# Patient Record
Sex: FEMALE | Race: WHITE | NOT HISPANIC OR LATINO
[De-identification: names, ages, dates, MRNs, and addresses within clinical notes are randomized per-mention and may not be internally consistent; named-entity substitution may affect disease eponyms.]

---

## 2017-04-14 ENCOUNTER — APPOINTMENT (OUTPATIENT)
Dept: HEART AND VASCULAR | Facility: CLINIC | Age: 32
End: 2017-04-14

## 2017-05-03 VITALS
SYSTOLIC BLOOD PRESSURE: 149 MMHG | DIASTOLIC BLOOD PRESSURE: 87 MMHG | OXYGEN SATURATION: 99 % | TEMPERATURE: 98 F | RESPIRATION RATE: 16 BRPM | HEART RATE: 92 BPM

## 2017-05-03 RX ORDER — CHLORHEXIDINE GLUCONATE 213 G/1000ML
1 SOLUTION TOPICAL ONCE
Qty: 0 | Refills: 0 | Status: DISCONTINUED | OUTPATIENT
Start: 2017-05-04 | End: 2017-05-05

## 2017-05-04 ENCOUNTER — INPATIENT (INPATIENT)
Facility: HOSPITAL | Age: 32
LOS: 0 days | Discharge: HOME | DRG: 254 | End: 2017-05-05
Attending: RADIOLOGY | Admitting: RADIOLOGY
Payer: COMMERCIAL

## 2017-05-04 DIAGNOSIS — Z98.89 OTHER SPECIFIED POSTPROCEDURAL STATES: Chronic | ICD-10-CM

## 2017-05-04 LAB
ALBUMIN SERPL ELPH-MCNC: 3.5 G/DL — SIGNIFICANT CHANGE UP (ref 3.4–5)
ALP SERPL-CCNC: 69 U/L — SIGNIFICANT CHANGE UP (ref 40–120)
ALT FLD-CCNC: 18 U/L — SIGNIFICANT CHANGE UP (ref 12–42)
ANION GAP SERPL CALC-SCNC: 7 MMOL/L — LOW (ref 9–16)
APTT BLD: 30.2 SEC — SIGNIFICANT CHANGE UP (ref 27.5–37.4)
AST SERPL-CCNC: 10 U/L — LOW (ref 15–37)
BASOPHILS NFR BLD AUTO: 0.3 % — SIGNIFICANT CHANGE UP (ref 0–2)
BILIRUB SERPL-MCNC: 0.6 MG/DL — SIGNIFICANT CHANGE UP (ref 0.2–1.2)
BUN SERPL-MCNC: 17 MG/DL — SIGNIFICANT CHANGE UP (ref 7–23)
CALCIUM SERPL-MCNC: 9.1 MG/DL — SIGNIFICANT CHANGE UP (ref 8.5–10.5)
CHLORIDE SERPL-SCNC: 103 MMOL/L — SIGNIFICANT CHANGE UP (ref 96–108)
CO2 SERPL-SCNC: 27 MMOL/L — SIGNIFICANT CHANGE UP (ref 22–31)
CREAT SERPL-MCNC: 0.68 MG/DL — SIGNIFICANT CHANGE UP (ref 0.5–1.3)
EOSINOPHIL NFR BLD AUTO: 0.3 % — SIGNIFICANT CHANGE UP (ref 0–6)
GLUCOSE SERPL-MCNC: 92 MG/DL — SIGNIFICANT CHANGE UP (ref 70–99)
HCG SERPL-ACNC: <1 MIU/ML — SIGNIFICANT CHANGE UP
HCT VFR BLD CALC: 40 % — SIGNIFICANT CHANGE UP (ref 34.5–45)
HGB BLD-MCNC: 13.7 G/DL — SIGNIFICANT CHANGE UP (ref 11.5–15.5)
INR BLD: 1.01 — SIGNIFICANT CHANGE UP (ref 0.88–1.16)
LYMPHOCYTES # BLD AUTO: 18.1 % — SIGNIFICANT CHANGE UP (ref 13–44)
MCHC RBC-ENTMCNC: 29.5 PG — SIGNIFICANT CHANGE UP (ref 27–34)
MCHC RBC-ENTMCNC: 34.3 G/DL — SIGNIFICANT CHANGE UP (ref 32–36)
MCV RBC AUTO: 86.2 FL — SIGNIFICANT CHANGE UP (ref 80–100)
MONOCYTES NFR BLD AUTO: 7.2 % — SIGNIFICANT CHANGE UP (ref 2–14)
NEUTROPHILS NFR BLD AUTO: 74.1 % — SIGNIFICANT CHANGE UP (ref 43–77)
PLATELET # BLD AUTO: 409 K/UL — HIGH (ref 150–400)
POTASSIUM SERPL-MCNC: 3.8 MMOL/L — SIGNIFICANT CHANGE UP (ref 3.5–5.3)
POTASSIUM SERPL-SCNC: 3.8 MMOL/L — SIGNIFICANT CHANGE UP (ref 3.5–5.3)
PROT SERPL-MCNC: 8.2 G/DL — SIGNIFICANT CHANGE UP (ref 6.4–8.2)
PROTHROM AB SERPL-ACNC: 11.2 SEC — SIGNIFICANT CHANGE UP (ref 9.8–12.7)
RBC # BLD: 4.64 M/UL — SIGNIFICANT CHANGE UP (ref 3.8–5.2)
RBC # FLD: 12.2 % — SIGNIFICANT CHANGE UP (ref 10.3–16.9)
SODIUM SERPL-SCNC: 137 MMOL/L — SIGNIFICANT CHANGE UP (ref 135–145)
WBC # BLD: 9.4 K/UL — SIGNIFICANT CHANGE UP (ref 3.8–10.5)
WBC # FLD AUTO: 9.4 K/UL — SIGNIFICANT CHANGE UP (ref 3.8–10.5)

## 2017-05-04 PROCEDURE — 75710 ARTERY X-RAYS ARM/LEG: CPT | Mod: 26,59

## 2017-05-04 PROCEDURE — 37242 VASC EMBOLIZE/OCCLUDE ARTERY: CPT

## 2017-05-04 PROCEDURE — 93010 ELECTROCARDIOGRAM REPORT: CPT

## 2017-05-04 RX ORDER — LISINOPRIL 2.5 MG/1
10 TABLET ORAL ONCE
Qty: 0 | Refills: 0 | Status: COMPLETED | OUTPATIENT
Start: 2017-05-04 | End: 2017-05-04

## 2017-05-04 RX ORDER — HYDROMORPHONE HYDROCHLORIDE 2 MG/ML
2 INJECTION INTRAMUSCULAR; INTRAVENOUS; SUBCUTANEOUS EVERY 6 HOURS
Qty: 0 | Refills: 0 | Status: DISCONTINUED | OUTPATIENT
Start: 2017-05-04 | End: 2017-05-05

## 2017-05-04 RX ORDER — SODIUM CHLORIDE 9 MG/ML
500 INJECTION INTRAMUSCULAR; INTRAVENOUS; SUBCUTANEOUS
Qty: 0 | Refills: 0 | Status: DISCONTINUED | OUTPATIENT
Start: 2017-05-04 | End: 2017-05-05

## 2017-05-04 RX ORDER — SODIUM CHLORIDE 9 MG/ML
1000 INJECTION INTRAMUSCULAR; INTRAVENOUS; SUBCUTANEOUS
Qty: 0 | Refills: 0 | Status: DISCONTINUED | OUTPATIENT
Start: 2017-05-04 | End: 2017-05-05

## 2017-05-04 RX ORDER — GABAPENTIN 400 MG/1
600 CAPSULE ORAL DAILY
Qty: 0 | Refills: 0 | Status: DISCONTINUED | OUTPATIENT
Start: 2017-05-04 | End: 2017-05-05

## 2017-05-04 RX ORDER — ONDANSETRON 8 MG/1
4 TABLET, FILM COATED ORAL EVERY 4 HOURS
Qty: 0 | Refills: 0 | Status: DISCONTINUED | OUTPATIENT
Start: 2017-05-04 | End: 2017-05-05

## 2017-05-04 RX ORDER — CIPROFLOXACIN LACTATE 400MG/40ML
400 VIAL (ML) INTRAVENOUS EVERY 12 HOURS
Qty: 0 | Refills: 0 | Status: COMPLETED | OUTPATIENT
Start: 2017-05-04 | End: 2017-05-05

## 2017-05-04 RX ADMIN — HYDROMORPHONE HYDROCHLORIDE 2 MILLIGRAM(S): 2 INJECTION INTRAMUSCULAR; INTRAVENOUS; SUBCUTANEOUS at 13:45

## 2017-05-04 RX ADMIN — Medication 200 MILLIGRAM(S): at 18:25

## 2017-05-04 RX ADMIN — HYDROMORPHONE HYDROCHLORIDE 2 MILLIGRAM(S): 2 INJECTION INTRAMUSCULAR; INTRAVENOUS; SUBCUTANEOUS at 21:45

## 2017-05-04 RX ADMIN — HYDROMORPHONE HYDROCHLORIDE 2 MILLIGRAM(S): 2 INJECTION INTRAMUSCULAR; INTRAVENOUS; SUBCUTANEOUS at 21:23

## 2017-05-04 RX ADMIN — HYDROMORPHONE HYDROCHLORIDE 2 MILLIGRAM(S): 2 INJECTION INTRAMUSCULAR; INTRAVENOUS; SUBCUTANEOUS at 13:30

## 2017-05-04 NOTE — BRIEF OPERATIVE NOTE - OPERATION/FINDINGS
LLE angiogram demonstrates normal arterial flow to the level of the distal PT, AV shunting in the foot at the region of known ulcer, DSE performed using 4cc of dehydrated EtOH.

## 2017-05-04 NOTE — BRIEF OPERATIVE NOTE - COMMENTS
3F R CFA sheath pulled and hemostasis obtained using manual compression. Bedrest 6h post sheath pull. Cipro 400mg q12h x 2 doses.

## 2017-05-05 VITALS
SYSTOLIC BLOOD PRESSURE: 144 MMHG | HEART RATE: 94 BPM | RESPIRATION RATE: 16 BRPM | DIASTOLIC BLOOD PRESSURE: 85 MMHG | OXYGEN SATURATION: 97 %

## 2017-05-05 LAB
ANION GAP SERPL CALC-SCNC: 6 MMOL/L — LOW (ref 9–16)
BUN SERPL-MCNC: 14 MG/DL — SIGNIFICANT CHANGE UP (ref 7–23)
CALCIUM SERPL-MCNC: 8.4 MG/DL — LOW (ref 8.5–10.5)
CHLORIDE SERPL-SCNC: 104 MMOL/L — SIGNIFICANT CHANGE UP (ref 96–108)
CO2 SERPL-SCNC: 29 MMOL/L — SIGNIFICANT CHANGE UP (ref 22–31)
CREAT SERPL-MCNC: 0.7 MG/DL — SIGNIFICANT CHANGE UP (ref 0.5–1.3)
GLUCOSE SERPL-MCNC: 90 MG/DL — SIGNIFICANT CHANGE UP (ref 70–99)
HCT VFR BLD CALC: 34.6 % — SIGNIFICANT CHANGE UP (ref 34.5–45)
HGB BLD-MCNC: 11.4 G/DL — LOW (ref 11.5–15.5)
MCHC RBC-ENTMCNC: 29.2 PG — SIGNIFICANT CHANGE UP (ref 27–34)
MCHC RBC-ENTMCNC: 32.9 G/DL — SIGNIFICANT CHANGE UP (ref 32–36)
MCV RBC AUTO: 88.5 FL — SIGNIFICANT CHANGE UP (ref 80–100)
PLATELET # BLD AUTO: 341 K/UL — SIGNIFICANT CHANGE UP (ref 150–400)
POTASSIUM SERPL-MCNC: 4.4 MMOL/L — SIGNIFICANT CHANGE UP (ref 3.5–5.3)
POTASSIUM SERPL-SCNC: 4.4 MMOL/L — SIGNIFICANT CHANGE UP (ref 3.5–5.3)
RBC # BLD: 3.91 M/UL — SIGNIFICANT CHANGE UP (ref 3.8–5.2)
RBC # FLD: 12.6 % — SIGNIFICANT CHANGE UP (ref 10.3–16.9)
SODIUM SERPL-SCNC: 139 MMOL/L — SIGNIFICANT CHANGE UP (ref 135–145)
WBC # BLD: 9.6 K/UL — SIGNIFICANT CHANGE UP (ref 3.8–10.5)
WBC # FLD AUTO: 9.6 K/UL — SIGNIFICANT CHANGE UP (ref 3.8–10.5)

## 2017-05-05 PROCEDURE — 80053 COMPREHEN METABOLIC PANEL: CPT

## 2017-05-05 PROCEDURE — 85027 COMPLETE CBC AUTOMATED: CPT

## 2017-05-05 PROCEDURE — 85025 COMPLETE CBC W/AUTO DIFF WBC: CPT

## 2017-05-05 PROCEDURE — 84702 CHORIONIC GONADOTROPIN TEST: CPT

## 2017-05-05 PROCEDURE — A9698: CPT

## 2017-05-05 PROCEDURE — C1887: CPT

## 2017-05-05 PROCEDURE — C1894: CPT

## 2017-05-05 PROCEDURE — C1769: CPT

## 2017-05-05 PROCEDURE — 80048 BASIC METABOLIC PNL TOTAL CA: CPT

## 2017-05-05 PROCEDURE — 85610 PROTHROMBIN TIME: CPT

## 2017-05-05 PROCEDURE — 93005 ELECTROCARDIOGRAM TRACING: CPT

## 2017-05-05 PROCEDURE — 85730 THROMBOPLASTIN TIME PARTIAL: CPT

## 2017-05-05 PROCEDURE — 97161 PT EVAL LOW COMPLEX 20 MIN: CPT

## 2017-05-05 PROCEDURE — C1889: CPT

## 2017-05-05 RX ORDER — MOXIFLOXACIN HYDROCHLORIDE TABLETS, 400 MG 400 MG/1
1 TABLET, FILM COATED ORAL
Qty: 14 | Refills: 0 | OUTPATIENT
Start: 2017-05-05 | End: 2017-05-12

## 2017-05-05 RX ADMIN — GABAPENTIN 600 MILLIGRAM(S): 400 CAPSULE ORAL at 10:24

## 2017-05-05 RX ADMIN — HYDROMORPHONE HYDROCHLORIDE 2 MILLIGRAM(S): 2 INJECTION INTRAMUSCULAR; INTRAVENOUS; SUBCUTANEOUS at 04:17

## 2017-05-05 RX ADMIN — HYDROMORPHONE HYDROCHLORIDE 2 MILLIGRAM(S): 2 INJECTION INTRAMUSCULAR; INTRAVENOUS; SUBCUTANEOUS at 10:45

## 2017-05-05 RX ADMIN — Medication 200 MILLIGRAM(S): at 05:31

## 2017-05-05 RX ADMIN — HYDROMORPHONE HYDROCHLORIDE 2 MILLIGRAM(S): 2 INJECTION INTRAMUSCULAR; INTRAVENOUS; SUBCUTANEOUS at 10:24

## 2017-05-05 NOTE — DISCHARGE NOTE ADULT - MEDICATION SUMMARY - MEDICATIONS TO TAKE
I will START or STAY ON the medications listed below when I get home from the hospital:    Percocet 10/325 oral tablet  -- 1 tab(s) by mouth every 6 hours, As Needed MDD:4  -- Indication: For Pain    lisinopril 10 mg oral tablet  -- 1 tab(s) by mouth once a day  -- Indication: For Hypertensive    gabapentin 600 mg/24 hours oral tablet, extended release  -- 1 tab(s) by mouth once a day  -- Indication: For Pain    hydrochlorothiazide 25 mg oral tablet  -- 1 tab(s) by mouth once a day  -- Indication: For High Blood Pressure    Cipro 500 mg oral tablet  -- 1 tab(s) by mouth 2 times a day  -- Avoid prolonged or excessive exposure to direct and/or artificial sunlight while taking this medication.  Check with your doctor before becoming pregnant.  Do not take dairy products, antacids, or iron preparations within one hour of this medication.  Finish all this medication unless otherwise directed by prescriber.  Medication should be taken with plenty of water.    -- Indication: For Infection Prevention    Junel 1.5/30 oral tablet  -- 1 tab(s) by mouth once a day  -- Indication: For Birth Control

## 2017-05-05 NOTE — DISCHARGE NOTE ADULT - CARE PROVIDER_API CALL
J Carlos Teran (MD), Diagnostic Radiology  [address] [address]  Phone: [phone]  Fax: [phone]

## 2017-05-05 NOTE — DISCHARGE NOTE ADULT - CARE PLAN
Principal Discharge DX:	AVM (arteriovenous malformation)  Secondary Diagnosis:	HTN (hypertension) Principal Discharge DX:	AVM (arteriovenous malformation)  Goal:	Please follow up with Dr Teran in 1-2 weeks and  your prescriptions from your pharmacy  Instructions for follow-up, activity and diet:	You underwent direct stick embolization of your left foot AVM and an angiogram that was done through the artery in your right groin should wait 3 days before returning to ordinary activities. Do not drive for 2 days. Consult your doctor before returning to vigorous activity. You may return to work in 3-5 days. The catheter from your groin was removed and you should remove the dressing in 2 hours. You may remove the dressing from your foot in two days. You may shower once the dressing is removed, but avoid baths, hot tubs, or swimming for 5 days to prevent infection. If you notice bleeding from the site, hardening and pain at the site, drainage or redness from the site, coolness/paleness of the extremity, swelling, or fever, please call [phone]. Please  your pain medications and antibiotics (Cipro 500mg orally BID times daily for the next 7 days) at your preferred [facility] today. Please follow up with Dr Teran in 1-2 weeks.  Secondary Diagnosis:	HTN (hypertension) Principal Discharge DX:	AVM (arteriovenous malformation)  Goal:	Please follow up with Dr Teran in 1-2 weeks and  your prescriptions from your pharmacy  Instructions for follow-up, activity and diet:	You underwent direct stick embolization of your left foot AVM and an angiogram that was done through the artery in your right groin should wait 3 days before returning to ordinary activities. Do not drive for 2 days. Consult your doctor before returning to vigorous activity. You may return to work in 3-5 days. The catheter from your groin was removed and you should remove the dressing in 2 hours. You may remove the dressing from your foot in two days. You may shower once the dressing is removed, but avoid baths, hot tubs, or swimming for 5 days to prevent infection. If you notice bleeding from the site, hardening and pain at the site, drainage or redness from the site, coolness/paleness of the extremity, swelling, or fever, please call [phone]. Please  your pain medications and antibiotics (Cipro 500mg orally two times daily for the next 7 days) at [facility] preferred [facility] today. Please follow up with Dr Teran in 1-2 weeks.  Secondary Diagnosis:	HTN (hypertension) Principal Discharge DX:	AVM (arteriovenous malformation)  Goal:	Please follow up with Dr Teran in 1-2 weeks and  your prescriptions from your pharmacy  Instructions for follow-up, activity and diet:	You underwent direct stick embolization of your left foot AVM and an angiogram that was done through the artery in your right groin should wait 3 days before returning to ordinary activities. Do not drive for 2 days. Consult your doctor before returning to vigorous activity. You may return to work in 3-5 days. The catheter from your groin was removed and you should remove the dressing in 24 hours. You may remove the dressing from your foot in two days. You may shower once the dressing is removed, but avoid baths, hot tubs, or swimming for 5 days to prevent infection. If you notice bleeding from the site, hardening and pain at the site, drainage or redness from the site, coolness/paleness of the extremity, swelling, or fever, please call [phone]. Please  your pain medications and antibiotics (Cipro 500mg orally two times daily for the next 7 days) at [facility] preferred [facility] today. Please follow up with Dr Teran in 1-2 weeks.  Secondary Diagnosis:	HTN (hypertension) Principal Discharge DX:	AVM (arteriovenous malformation)  Goal:	Please follow up with Dr Teran in 1-2 weeks and  your prescriptions from your pharmacy  Instructions for follow-up, activity and diet:	You underwent direct stick embolization of your left foot AVM and an angiogram that was done through the artery in your right groin should wait 3 days before returning to ordinary activities. Do not drive for 2 days. Consult your doctor before returning to vigorous activity. You may return to work in 3-5 days. The catheter from your groin was removed and you should remove the dressing in 24 hours. You may remove the dressing from your foot in two days. You may shower once the dressing is removed, but avoid baths, hot tubs, or swimming for 5 days to prevent infection. If you notice bleeding from the site, hardening and pain at the site, drainage or redness from the site, coolness/paleness of the extremity, swelling, or fever, please call [phone]. Please  your pain medications and antibiotics (Cipro 500mg orally two times daily for the next 7 days) at [facility] preferred [facility] today. Please follow up with Dr Teran in 1-2 weeks.  Secondary Diagnosis:	HTN (hypertension)

## 2017-05-05 NOTE — DISCHARGE NOTE ADULT - HOSPITAL COURSE
****LMP: Last week     32 y/o Obese Female, with PMHx of HTN, left foot AVM with recurrent non healing ulcer on the lateral aspect of her left foot with prior embolizations, most recently DSE @ [facility] on 06/22/16, who states she was doing well after last procedure and the ulcer on her foot had healed until 3 weeks she noticed it had opened  again with accompanying persistent pain. She is being followed by Dr. Sims  for pain management who prescribed Percocet 10/325mg PO Q4-6hrs as needed for pain and Gabapentin 600mg PO QHS.  She states she applies Medihoney and wraps the foot on a daily basis. Pt denies any fever/chills or any diagnostic LLE imaging studies since her last procedure.  .  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 In light of her Known Left foot AVM and painful  non-healing ulcer, pt now presents for left lower extremity angiogram with DSE under general anesthesia.  Pt is 5/4 s/p LLE angiogram, DSE left foot AVM, 5F R CFA sheath out. Vital sign sT 97.4F, HR 90, /57, RR 16, O2 98% on RA, Labs WNL, Physical exam WNL, access site stable. Patient was seen by PT and deemed it not necessary for crutches at this time. Patient asking for PO dilaudid, discussed wtih Dr Teran's PA and deemed if patient is in pain on Monday Emily PA will address it at that time. Case discussed with attending and after reviewing patient status, patient deemed stable for discharge wt follow up. Cipro 500mg BID x 7 days prescribed to patients preferred pharmacy.

## 2017-05-05 NOTE — PHYSICAL THERAPY INITIAL EVALUATION ADULT - PERTINENT HX OF CURRENT PROBLEM, REHAB EVAL
32 y/o Obese Female, with PMHx of HTN, left foot AVM with recurrent non healing ulcer on the lateral aspect of her left foot with prior embolizations, most recently DSE @ [facility] on 06/22/16, who states she was doing well after last procedure and the ulcer on her foot had healed until 3 weeks she noticed it had opened  again with accompanying persistent pain. Please refer to H&P on [place] for remaining.

## 2017-05-05 NOTE — DISCHARGE NOTE ADULT - PATIENT PORTAL LINK FT
“You can access the [facility] Patient Portal, offered by [facility], by registering with the following website: [email or web address]”

## 2017-05-05 NOTE — DISCHARGE NOTE ADULT - PLAN OF CARE
Please follow up with Dr Teran in 1-2 weeks and  your prescriptions from your pharmacy You underwent direct stick embolization of your left foot AVM and an angiogram that was done through the artery in your right groin should wait 3 days before returning to ordinary activities. Do not drive for 2 days. Consult your doctor before returning to vigorous activity. You may return to work in 3-5 days. The catheter from your groin was removed and you should remove the dressing in 2 hours. You may remove the dressing from your foot in two days. You may shower once the dressing is removed, but avoid baths, hot tubs, or swimming for 5 days to prevent infection. If you notice bleeding from the site, hardening and pain at the site, drainage or redness from the site, coolness/paleness of the extremity, swelling, or fever, please call [phone]. Please  your pain medications and antibiotics (Cipro 500mg orally BID times daily for the next 7 days) at [facility] preferred [facility] today. Please follow up with Dr Teran in 1-2 weeks. You underwent direct stick embolization of your left foot AVM and an angiogram that was done through the artery in your right groin should wait 3 days before returning to ordinary activities. Do not drive for 2 days. Consult your doctor before returning to vigorous activity. You may return to work in 3-5 days. The catheter from your groin was removed and you should remove the dressing in 2 hours. You may remove the dressing from your foot in two days. You may shower once the dressing is removed, but avoid baths, hot tubs, or swimming for 5 days to prevent infection. If you notice bleeding from the site, hardening and pain at the site, drainage or redness from the site, coolness/paleness of the extremity, swelling, or fever, please call [phone]. Please  your pain medications and antibiotics (Cipro 500mg orally two times daily for the next 7 days) at [facility] preferred [facility] today. Please follow up with Dr Teran in 1-2 weeks. You underwent direct stick embolization of your left foot AVM and an angiogram that was done through the artery in your right groin should wait 3 days before returning to ordinary activities. Do not drive for 2 days. Consult your doctor before returning to vigorous activity. You may return to work in 3-5 days. The catheter from your groin was removed and you should remove the dressing in 24 hours. You may remove the dressing from your foot in two days. You may shower once the dressing is removed, but avoid baths, hot tubs, or swimming for 5 days to prevent infection. If you notice bleeding from the site, hardening and pain at the site, drainage or redness from the site, coolness/paleness of the extremity, swelling, or fever, please call [phone]. Please  your pain medications and antibiotics (Cipro 500mg orally two times daily for the next 7 days) at [facility] preferred [facility] today. Please follow up with Dr Teran in 1-2 weeks.

## 2017-05-05 NOTE — PHYSICAL THERAPY INITIAL EVALUATION ADULT - LIVES WITH, PROFILE
family in house, 2 steps to enter (railing)/1 flight to bedroom, no use of assistive device prior to admission

## 2017-05-10 DIAGNOSIS — Z88.0 ALLERGY STATUS TO PENICILLIN: ICD-10-CM

## 2017-05-10 DIAGNOSIS — E66.9 OBESITY, UNSPECIFIED: ICD-10-CM

## 2017-05-10 DIAGNOSIS — L97.529 NON-PRESSURE CHRONIC ULCER OF OTHER PART OF LEFT FOOT WITH UNSPECIFIED SEVERITY: ICD-10-CM

## 2017-05-10 DIAGNOSIS — Q27.32 ARTERIOVENOUS MALFORMATION OF VESSEL OF LOWER LIMB: ICD-10-CM

## 2017-05-10 DIAGNOSIS — I10 ESSENTIAL (PRIMARY) HYPERTENSION: ICD-10-CM

## 2017-05-19 ENCOUNTER — APPOINTMENT (OUTPATIENT)
Dept: HEART AND VASCULAR | Facility: CLINIC | Age: 32
End: 2017-05-19

## 2018-01-22 ENCOUNTER — APPOINTMENT (OUTPATIENT)
Dept: HEART AND VASCULAR | Facility: CLINIC | Age: 33
End: 2018-01-22
Payer: COMMERCIAL

## 2018-01-22 PROCEDURE — 76882 US LMTD JT/FCL EVL NVASC XTR: CPT

## 2018-01-22 PROCEDURE — 99214 OFFICE O/P EST MOD 30 MIN: CPT

## 2018-02-06 VITALS
SYSTOLIC BLOOD PRESSURE: 131 MMHG | OXYGEN SATURATION: 98 % | RESPIRATION RATE: 16 BRPM | HEART RATE: 96 BPM | TEMPERATURE: 99 F | DIASTOLIC BLOOD PRESSURE: 78 MMHG | WEIGHT: 293 LBS | HEIGHT: 67 IN

## 2018-02-06 RX ORDER — CHLORHEXIDINE GLUCONATE 213 G/1000ML
1 SOLUTION TOPICAL ONCE
Qty: 0 | Refills: 0 | Status: DISCONTINUED | OUTPATIENT
Start: 2018-02-07 | End: 2018-02-08

## 2018-02-06 NOTE — H&P ADULT - ADDITIONAL PE
ASA:  Mallampati:  EKG: ASA: II  Mallampati: I  EKG: ASA: II  Mallampati: I  EKG: NSR@ 93 bpm, TWI III and V1

## 2018-02-06 NOTE — H&P ADULT - HISTORY OF PRESENT ILLNESS
**SKELETON      32 yr old F with PMHx of HTN, left foot AVM with recurrent non healing ulcer on the lateral aspect of her left foot with prior embolizations, most recently DSE @ [facility] on 5/4/17      Patient states she was doing well after last procedure and the ulcer on her foot had healed until 3 weeks she noticed it had opened  again with accompanying persistent pain. She is being followed by Dr. Sims  for pain management who prescribed Percocet 10/325mg PO Q4-6hrs as needed for pain and Gabapentin 600mg PO QHS.  She states she applies Medihoney and wraps the foot on a daily basis. Pt denies any fever/chills or any diagnostic LLE imaging studies since her last procedure. 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     In light of her Known Left foot AVM and painful  non-healing ulcer, pt now presents for left lower extremity angiogram with DSE under general anesthesia. 32 yr old F, LMP 1/14/18  with PMHx of HTN, left foot AVM with recurrent non healing ulcer on the lateral aspect of her left foot with prior embolizations, most recently DSE @ [facility] on 5/4/17. Patient states she was doing well after last procedure until the past few weeks when she noticed a dime sized ulcer on her foot with accompanying persistent pain. Patient describes it as being a throbbing pain with radiation to left foot into mid calf, 8/10 in severity occurring both upon ambulation and at rest. Patient followed for pain management who prescribed Percocet 10/325mg PO 5x/daily as needed for pain and Gabapentin 600mg PO QHS.  She states she applies topical neosporin wraps the foot on a daily basis. Pt denies any fever/chills or any diagnostic LLE imaging studies since her last procedure. 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     In light of her Known Left foot AVM and painful  non-healing ulcer, pt now presents for left lower extremity angiogram with DSE under general anesthesia. 32 yr old F, LMP 1/14/18  with PMHx of HTN, left foot AVM with recurrent non healing ulcer on the lateral aspect of her left foot with prior embolizations, most recently DSE @ [facility] on 5/4/17. Patient states she was doing well after last procedure until the past few weeks when she noticed a dime sized ulcer on her foot with accompanying persistent pain. Patient describes it as being a throbbing pain with radiation to left foot into mid calf, 8/10 in severity occurring both upon ambulation and at rest. Patient followed for pain management who prescribed Percocet 10/325mg PO 5x/daily as needed for pain and Gabapentin 600mg PO QHS.  She states she applies topical neosporin wraps the foot on a daily basis. Pt denies any fever/chills or any diagnostic LLE imaging studies since her last procedure. 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     In light of her Known Left foot AVM and painful  non-healing ulcer, pt now presents for left lower extremity angiogram with DSE under general anesthesia. 32 yr old F, LMP 1/14/18  with PMHx of HTN, left foot AVM with recurrent non healing ulcer on the lateral aspect of her left foot with prior embolizations, most recently DSE @ [facility] on 5/4/17. Patient states she was doing well after last procedure until the past few weeks when she noticed a dime sized ulcer on her foot with accompanying persistent pain. Patient describes it as being a throbbing pain with radiation to left foot into mid calf, 8/10 in severity occurring both upon ambulation and at rest. Patient followed for pain management who prescribed Percocet 10/325mg PO 5x/daily as needed for pain and Gabapentin 600mg PO QHS.  She states she applies topical neosporin wraps the foot on a daily basis. Pt denies any fever/chills or any diagnostic LLE imaging studies since her last procedure. 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     In light of her Known Left foot AVM and painful  non-healing ulcer, pt now presents for left lower extremity angiogram with DSE under general anesthesia.       LMP: 1/14/2018

## 2018-02-06 NOTE — H&P ADULT - ASSESSMENT
32 yr old F, LMP 1/14/18  with PMHx of HTN, left foot AVM with recurrent non healing ulcer on the lateral aspect of her left foot with prior embolizations, most recently DSE @ [facility] on 5/4/17.  Pt presents for left lower extremity angiogram with DSE under general anesthesia in light of her Known Left foot AVM and painful  non-healing ulcer.      Risks & benefits of procedure and alternative therapy have been explained to the patient including but not limited to: allergic reaction, bleeding w/possible need for blood transfusion, infection, renal and vascular compromise, limb damage, emergent surgery. Informed consent obtained and in chart.

## 2018-02-07 ENCOUNTER — INPATIENT (INPATIENT)
Facility: HOSPITAL | Age: 33
LOS: 0 days | Discharge: HOME | DRG: 253 | End: 2018-02-08
Attending: RADIOLOGY | Admitting: RADIOLOGY
Payer: COMMERCIAL

## 2018-02-07 DIAGNOSIS — Z98.89 OTHER SPECIFIED POSTPROCEDURAL STATES: Chronic | ICD-10-CM

## 2018-02-07 LAB
ANION GAP SERPL CALC-SCNC: 11 MMOL/L — SIGNIFICANT CHANGE UP (ref 5–17)
APTT BLD: 38.6 SEC — HIGH (ref 27.5–37.4)
BASOPHILS NFR BLD AUTO: 0.2 % — SIGNIFICANT CHANGE UP (ref 0–2)
BUN SERPL-MCNC: 16 MG/DL — SIGNIFICANT CHANGE UP (ref 7–23)
CALCIUM SERPL-MCNC: 9.4 MG/DL — SIGNIFICANT CHANGE UP (ref 8.4–10.5)
CHLORIDE SERPL-SCNC: 100 MMOL/L — SIGNIFICANT CHANGE UP (ref 96–108)
CO2 SERPL-SCNC: 25 MMOL/L — SIGNIFICANT CHANGE UP (ref 22–31)
CREAT SERPL-MCNC: 0.73 MG/DL — SIGNIFICANT CHANGE UP (ref 0.5–1.3)
EOSINOPHIL NFR BLD AUTO: 0.5 % — SIGNIFICANT CHANGE UP (ref 0–6)
GLUCOSE SERPL-MCNC: 94 MG/DL — SIGNIFICANT CHANGE UP (ref 70–99)
HCG SERPL-ACNC: <.1 MIU/ML — SIGNIFICANT CHANGE UP
HCT VFR BLD CALC: 38.9 % — SIGNIFICANT CHANGE UP (ref 34.5–45)
HGB BLD-MCNC: 13 G/DL — SIGNIFICANT CHANGE UP (ref 11.5–15.5)
INR BLD: 0.96 — SIGNIFICANT CHANGE UP (ref 0.88–1.16)
LYMPHOCYTES # BLD AUTO: 17.7 % — SIGNIFICANT CHANGE UP (ref 13–44)
MCHC RBC-ENTMCNC: 29.1 PG — SIGNIFICANT CHANGE UP (ref 27–34)
MCHC RBC-ENTMCNC: 33.4 G/DL — SIGNIFICANT CHANGE UP (ref 32–36)
MCV RBC AUTO: 87 FL — SIGNIFICANT CHANGE UP (ref 80–100)
MONOCYTES NFR BLD AUTO: 5.4 % — SIGNIFICANT CHANGE UP (ref 2–14)
NEUTROPHILS NFR BLD AUTO: 76.2 % — SIGNIFICANT CHANGE UP (ref 43–77)
PLATELET # BLD AUTO: 381 K/UL — SIGNIFICANT CHANGE UP (ref 150–400)
POTASSIUM SERPL-MCNC: 4.1 MMOL/L — SIGNIFICANT CHANGE UP (ref 3.5–5.3)
POTASSIUM SERPL-SCNC: 4.1 MMOL/L — SIGNIFICANT CHANGE UP (ref 3.5–5.3)
PROTHROM AB SERPL-ACNC: 10.6 SEC — SIGNIFICANT CHANGE UP (ref 9.8–12.7)
RBC # BLD: 4.47 M/UL — SIGNIFICANT CHANGE UP (ref 3.8–5.2)
RBC # FLD: 12.6 % — SIGNIFICANT CHANGE UP (ref 10.3–16.9)
SODIUM SERPL-SCNC: 136 MMOL/L — SIGNIFICANT CHANGE UP (ref 135–145)
WBC # BLD: 12.1 K/UL — HIGH (ref 3.8–10.5)
WBC # FLD AUTO: 12.1 K/UL — HIGH (ref 3.8–10.5)

## 2018-02-07 PROCEDURE — 37242 VASC EMBOLIZE/OCCLUDE ARTERY: CPT

## 2018-02-07 RX ORDER — ONDANSETRON 8 MG/1
4 TABLET, FILM COATED ORAL EVERY 4 HOURS
Qty: 0 | Refills: 0 | Status: DISCONTINUED | OUTPATIENT
Start: 2018-02-07 | End: 2018-02-08

## 2018-02-07 RX ORDER — HYDROMORPHONE HYDROCHLORIDE 2 MG/ML
2 INJECTION INTRAMUSCULAR; INTRAVENOUS; SUBCUTANEOUS EVERY 4 HOURS
Qty: 0 | Refills: 0 | Status: DISCONTINUED | OUTPATIENT
Start: 2018-02-07 | End: 2018-02-08

## 2018-02-07 RX ORDER — CIPROFLOXACIN LACTATE 400MG/40ML
400 VIAL (ML) INTRAVENOUS EVERY 12 HOURS
Qty: 0 | Refills: 0 | Status: COMPLETED | OUTPATIENT
Start: 2018-02-07 | End: 2018-02-08

## 2018-02-07 RX ADMIN — Medication 200 MILLIGRAM(S): at 23:44

## 2018-02-07 RX ADMIN — HYDROMORPHONE HYDROCHLORIDE 2 MILLIGRAM(S): 2 INJECTION INTRAMUSCULAR; INTRAVENOUS; SUBCUTANEOUS at 20:21

## 2018-02-07 NOTE — BRIEF OPERATIVE NOTE - PROCEDURE
<<-----Click on this checkbox to enter Procedure Embolization of artery  02/07/2018  direct stick embolization of LLE with ETOH  Active  MVRAMONER

## 2018-02-08 ENCOUNTER — TRANSCRIPTION ENCOUNTER (OUTPATIENT)
Age: 33
End: 2018-02-08

## 2018-02-08 VITALS — TEMPERATURE: 97 F

## 2018-02-08 LAB
ANION GAP SERPL CALC-SCNC: 12 MMOL/L — SIGNIFICANT CHANGE UP (ref 5–17)
BUN SERPL-MCNC: 13 MG/DL — SIGNIFICANT CHANGE UP (ref 7–23)
CALCIUM SERPL-MCNC: 9.4 MG/DL — SIGNIFICANT CHANGE UP (ref 8.4–10.5)
CHLORIDE SERPL-SCNC: 98 MMOL/L — SIGNIFICANT CHANGE UP (ref 96–108)
CO2 SERPL-SCNC: 26 MMOL/L — SIGNIFICANT CHANGE UP (ref 22–31)
CREAT SERPL-MCNC: 0.82 MG/DL — SIGNIFICANT CHANGE UP (ref 0.5–1.3)
GLUCOSE SERPL-MCNC: 140 MG/DL — HIGH (ref 70–99)
HCT VFR BLD CALC: 37.5 % — SIGNIFICANT CHANGE UP (ref 34.5–45)
HGB BLD-MCNC: 12.4 G/DL — SIGNIFICANT CHANGE UP (ref 11.5–15.5)
MCHC RBC-ENTMCNC: 28.9 PG — SIGNIFICANT CHANGE UP (ref 27–34)
MCHC RBC-ENTMCNC: 33.1 G/DL — SIGNIFICANT CHANGE UP (ref 32–36)
MCV RBC AUTO: 87.4 FL — SIGNIFICANT CHANGE UP (ref 80–100)
PLATELET # BLD AUTO: 360 K/UL — SIGNIFICANT CHANGE UP (ref 150–400)
POTASSIUM SERPL-MCNC: 4.8 MMOL/L — SIGNIFICANT CHANGE UP (ref 3.5–5.3)
POTASSIUM SERPL-SCNC: 4.8 MMOL/L — SIGNIFICANT CHANGE UP (ref 3.5–5.3)
RBC # BLD: 4.29 M/UL — SIGNIFICANT CHANGE UP (ref 3.8–5.2)
RBC # FLD: 12 % — SIGNIFICANT CHANGE UP (ref 10.3–16.9)
SODIUM SERPL-SCNC: 136 MMOL/L — SIGNIFICANT CHANGE UP (ref 135–145)
WBC # BLD: 8.2 K/UL — SIGNIFICANT CHANGE UP (ref 3.8–10.5)
WBC # FLD AUTO: 8.2 K/UL — SIGNIFICANT CHANGE UP (ref 3.8–10.5)

## 2018-02-08 PROCEDURE — 85730 THROMBOPLASTIN TIME PARTIAL: CPT

## 2018-02-08 PROCEDURE — 85610 PROTHROMBIN TIME: CPT

## 2018-02-08 PROCEDURE — 84702 CHORIONIC GONADOTROPIN TEST: CPT

## 2018-02-08 PROCEDURE — A9698: CPT

## 2018-02-08 PROCEDURE — 80048 BASIC METABOLIC PNL TOTAL CA: CPT

## 2018-02-08 PROCEDURE — C1889: CPT

## 2018-02-08 PROCEDURE — 85025 COMPLETE CBC W/AUTO DIFF WBC: CPT

## 2018-02-08 RX ORDER — HYDROCHLOROTHIAZIDE 25 MG
25 TABLET ORAL DAILY
Qty: 0 | Refills: 0 | Status: DISCONTINUED | OUTPATIENT
Start: 2018-02-08 | End: 2018-02-08

## 2018-02-08 RX ORDER — HYDROMORPHONE HYDROCHLORIDE 2 MG/ML
0.5 INJECTION INTRAMUSCULAR; INTRAVENOUS; SUBCUTANEOUS
Qty: 6 | Refills: 0 | OUTPATIENT
Start: 2018-02-08 | End: 2018-02-09

## 2018-02-08 RX ORDER — LISINOPRIL 2.5 MG/1
10 TABLET ORAL DAILY
Qty: 0 | Refills: 0 | Status: DISCONTINUED | OUTPATIENT
Start: 2018-02-08 | End: 2018-02-08

## 2018-02-08 RX ORDER — HYDROMORPHONE HYDROCHLORIDE 2 MG/ML
1 INJECTION INTRAMUSCULAR; INTRAVENOUS; SUBCUTANEOUS
Qty: 18 | Refills: 0
Start: 2018-02-08 | End: 2018-02-10

## 2018-02-08 RX ORDER — CIPROFLOXACIN LACTATE 400MG/40ML
1 VIAL (ML) INTRAVENOUS
Qty: 14 | Refills: 0 | OUTPATIENT
Start: 2018-02-08 | End: 2018-02-14

## 2018-02-08 RX ADMIN — LISINOPRIL 10 MILLIGRAM(S): 2.5 TABLET ORAL at 11:57

## 2018-02-08 RX ADMIN — HYDROMORPHONE HYDROCHLORIDE 2 MILLIGRAM(S): 2 INJECTION INTRAMUSCULAR; INTRAVENOUS; SUBCUTANEOUS at 13:18

## 2018-02-08 RX ADMIN — HYDROMORPHONE HYDROCHLORIDE 2 MILLIGRAM(S): 2 INJECTION INTRAMUSCULAR; INTRAVENOUS; SUBCUTANEOUS at 05:20

## 2018-02-08 RX ADMIN — Medication 25 MILLIGRAM(S): at 11:57

## 2018-02-08 RX ADMIN — HYDROMORPHONE HYDROCHLORIDE 2 MILLIGRAM(S): 2 INJECTION INTRAMUSCULAR; INTRAVENOUS; SUBCUTANEOUS at 13:35

## 2018-02-08 RX ADMIN — HYDROMORPHONE HYDROCHLORIDE 2 MILLIGRAM(S): 2 INJECTION INTRAMUSCULAR; INTRAVENOUS; SUBCUTANEOUS at 01:26

## 2018-02-08 RX ADMIN — Medication 200 MILLIGRAM(S): at 10:56

## 2018-02-08 RX ADMIN — HYDROMORPHONE HYDROCHLORIDE 2 MILLIGRAM(S): 2 INJECTION INTRAMUSCULAR; INTRAVENOUS; SUBCUTANEOUS at 00:44

## 2018-02-08 RX ADMIN — HYDROMORPHONE HYDROCHLORIDE 2 MILLIGRAM(S): 2 INJECTION INTRAMUSCULAR; INTRAVENOUS; SUBCUTANEOUS at 09:04

## 2018-02-08 RX ADMIN — HYDROMORPHONE HYDROCHLORIDE 2 MILLIGRAM(S): 2 INJECTION INTRAMUSCULAR; INTRAVENOUS; SUBCUTANEOUS at 04:49

## 2018-02-08 RX ADMIN — HYDROMORPHONE HYDROCHLORIDE 2 MILLIGRAM(S): 2 INJECTION INTRAMUSCULAR; INTRAVENOUS; SUBCUTANEOUS at 09:20

## 2018-02-08 NOTE — DISCHARGE NOTE ADULT - CARE PLAN
Principal Discharge DX:	Arteriovenous malformation  Goal:	continue medications, follow up  Assessment and plan of treatment:	You received a direct stick embolization to your right leg.  Secondary Diagnosis:	HTN (hypertension) Principal Discharge DX:	Arteriovenous malformation  Goal:	continue medications, follow up  Assessment and plan of treatment:	You received a direct stick embolization to your right leg. You underwent a direct stick embolization of your arteriovenous malformation. Avoid strenuous activity or heavy lifting for the next five days. Do not take a bath or swim for the next five days; you may shower. For any bleeding or hematoma formation (hardened blood collection under the skin) at the access site of bilateral groins please hold pressure and go to the emergency room. Please continue medications as prescribed. Follow up with Dr. Teran in 6 weeks.  Secondary Diagnosis:	HTN (hypertension)  Goal:	continue medications

## 2018-02-08 NOTE — DISCHARGE NOTE ADULT - PATIENT PORTAL LINK FT
You can access the [facility] Portal, offered by [facility], by registering with the following website: [email or web address]

## 2018-02-08 NOTE — DISCHARGE NOTE ADULT - MEDICATION SUMMARY - MEDICATIONS TO TAKE
I will START or STAY ON the medications listed below when I get home from the hospital:    Dilaudid 2 mg oral tablet  -- 1 tab(s) by mouth every 4 hours, As Needed -for severe pain MDD:6   -- Caution federal law prohibits the transfer of this drug to any person other  than the person for whom it was prescribed.  May cause drowsiness.  Alcohol may intensify this effect.  Use care when operating dangerous machinery.  This prescription cannot be refilled.  Using more of this medication than prescribed may cause serious breathing problems.    -- Indication: For As needed for pain    lisinopril 10 mg oral tablet  -- 1 tab(s) by mouth once a day  -- Indication: For High blood pressure    gabapentin 600 mg/24 hours oral tablet, extended release  -- 1 tab(s) by mouth once a day  -- Indication: For As prescribed    hydrochlorothiazide 25 mg oral tablet  -- 1 tab(s) by mouth once a day  -- Indication: For High blood pressure    ciprofloxacin 500 mg oral tablet  -- 1 tab(s) by mouth every 12 hours   -- Avoid prolonged or excessive exposure to direct and/or artificial sunlight while taking this medication.  Check with your doctor before becoming pregnant.  Do not take dairy products, antacids, or iron preparations within one hour of this medication.  Finish all this medication unless otherwise directed by prescriber.  Medication should be taken with plenty of water.    -- Indication: For infection prevention    Junel 1.5/30 oral tablet  -- 1 tab(s) by mouth once a day  -- Indication: For As prescribed

## 2018-02-08 NOTE — DISCHARGE NOTE ADULT - CARE PROVIDER_API CALL
J Carlos Teran (MD), Diagnostic Radiology  [address]  [address] [address]  Phone: [phone]  Fax: [phone]

## 2018-02-08 NOTE — DISCHARGE NOTE ADULT - HOSPITAL COURSE
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 on 5/4/17. Patient states she was doing well after last procedure until the past few weeks when she noticed a dime sized ulcer on her foot with accompanying persistent pain. Patient describes it as being a throbbing pain with radiation to left foot into mid calf, 8/10 in severity occurring both upon ambulation and at rest. Patient followed for pain management who prescribed Percocet 10/325mg PO 5x/daily as needed for pain and Gabapentin 600mg PO QHS.  She states she applies topical neosporin wraps the foot on a daily basis. Pt denies any fever/chills or any diagnostic LLE imaging studies since her last procedure. 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     In light of her Known Left foot AVM and painful  non-healing ulcer, pt now presents for left lower extremity angiogram with DSE under general anesthesia. 33yo F, LMP 1/14/18, with PMHx of HTN, left foot AVM with recurrent non healing ulcer on the lateral aspect of her left foot with prior embolizations, most recently DSE @ [facility] on 5/4/17. Patient states she was doing well after last procedure until the past few weeks when she noticed a dime sized ulcer on her foot with accompanying persistent pain. Patient describes it as being a throbbing pain with radiation to left foot into mid calf, 8/10 in severity occurring both upon ambulation and at rest. Patient followed for pain management who prescribed Percocet 10/325mg PO 5x/daily as needed for pain and Gabapentin 600mg PO QHS.  She states she applies topical neosporin wraps the foot on a daily basis. Pt denies any fever/chills or any diagnostic LLE imaging studies since her last procedure. 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 In light of her Known Left foot AVM and painful  non-healing ulcer, pt now presents for left lower extremity angiogram with DSE under general anesthesia. Patient underwent DSE of left foot AVM without complication on 2/7. Patient was seen and examined this AM and was asymptomatic without complaints. Patient's VSS, labs wnl and no events overnight on telemetry. Patient is stable for discharge per Dr. Teran. Patient has been given appropriate discharge instructions including medication regimen, access site management and follow up. Patient's medications have been e-prescribed to their preferred pharmacy.    Temp 97.7F, HR 87bpm, /57, RR 16, O2 sat 95% RA  Gen: NAD, A&O x 3  Cards: RRR, clear S1 and S2 without murmur  Pulm: CTA b/l without w/r/r  Abd: soft, NT  Ext: Dressing intact over left foot AVM without ooze, no edema or ulcerations b/l 31yo F, LMP 1/14/18, with PMHx of HTN, left foot AVM with recurrent non healing ulcer on the lateral aspect of her left foot with prior embolizations, most recently DSE @ [facility] on 5/4/17. Patient states she was doing well after last procedure until the past few weeks when she noticed a dime sized ulcer on her foot with accompanying persistent pain. Patient describes it as being a throbbing pain with radiation to left foot into mid calf, 8/10 in severity occurring both upon ambulation and at rest. Patient followed for pain management who prescribed Percocet 10/325mg PO 5x/daily as needed for pain and Gabapentin 600mg PO QHS.  She states she applies topical neosporin wraps the foot on a daily basis. Pt denies any fever/chills or any diagnostic LLE imaging studies since her last procedure. 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 In light of her Known Left foot AVM and painful  non-healing ulcer, pt now presents for left lower extremity angiogram with DSE under general anesthesia. Patient underwent DSE of left foot AVM without complication on 2/7. Patient was seen and examined this AM and was asymptomatic without complaints. Patient's VSS, labs wnl and no events overnight on telemetry. Patient is stable for discharge per Dr. Teran. Patient has been given appropriate discharge instructions including medication regimen, access site management and follow up. Patient's medications have been e-prescribed to their preferred pharmacy. Of note, patient's original prescription that was sent for pain post procedure was not filled by patient's pharmacy as she recently had a prescription at the same dosage for dilaudid filled. I spoke directly with patient's pain management MD, Dr. Rosaura Michaels, that patient turned in the dilaudid from that prescription and it was subsequently disposed of. Patient will be prescribed a few doses of dilaudid 4mg 1/2 tab every 4hrs as needed for severe pain for post procedure. Patient's pain MD was made    Temp 97.7F, HR 87bpm, /57, RR 16, O2 sat 95% RA  Gen: NAD, A&O x 3  Cards: RRR, clear S1 and S2 without murmur  Pulm: CTA b/l without w/r/r  Abd: soft, NT  Ext: Dressing intact over left foot AVM without ooze, no edema or ulcerations b/l 31yo F, LMP 1/14/18, with PMHx of HTN, left foot AVM with recurrent non healing ulcer on the lateral aspect of her left foot with prior embolizations, most recently DSE @ [facility] on 5/4/17. Patient states she was doing well after last procedure until the past few weeks when she noticed a dime sized ulcer on her foot with accompanying persistent pain. Patient describes it as being a throbbing pain with radiation to left foot into mid calf, 8/10 in severity occurring both upon ambulation and at rest. Patient followed for pain management who prescribed Percocet 10/325mg PO 5x/daily as needed for pain and Gabapentin 600mg PO QHS.  She states she applies topical neosporin wraps the foot on a daily basis. Pt denies any fever/chills or any diagnostic LLE imaging studies since her last procedure. 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 In light of her Known Left foot AVM and painful  non-healing ulcer, pt now presents for left lower extremity angiogram with DSE under general anesthesia. Patient underwent DSE of left foot AVM without complication on 2/7. Patient was seen and examined this AM and was asymptomatic without complaints. Patient's VSS, labs wnl and no events overnight on telemetry. Patient is stable for discharge per Dr. Teran. Patient has been given appropriate discharge instructions including medication regimen, access site management and follow up. Patient's medications have been e-prescribed to their preferred pharmacy. Of note, patient's original prescription that was sent for pain post procedure was not filled by patient's pharmacy as she recently had a prescription at the same dosage for dilaudid filled. I spoke directly with patient's pain management MD, Dr. Rosaura Michaels, who confirmed that patient turned in the dilaudid from that prescription to her office and it was subsequently disposed of. Patient will be prescribed a few doses of dilaudid 4mg 1/2 tab every 4hrs as needed for severe pain for post procedure. Patient's pain MD was made aware of this decision and will prescribe further meds thereafter.    Temp 97.7F, HR 87bpm, /57, RR 16, O2 sat 95% RA  Gen: NAD, A&O x 3  Cards: RRR, clear S1 and S2 without murmur  Pulm: CTA b/l without w/r/r  Abd: soft, NT  Ext: Dressing intact over left foot AVM without ooze, no edema or ulcerations b/l

## 2018-02-08 NOTE — DISCHARGE NOTE ADULT - MEDICATION SUMMARY - MEDICATIONS TO STOP TAKING
I will STOP taking the medications listed below when I get home from the hospital:    Percocet 10/325 oral tablet  -- 1 tab(s) by mouth 5 times a day, As Needed

## 2018-02-08 NOTE — DISCHARGE NOTE ADULT - PLAN OF CARE
continue medications, follow up You received a direct stick embolization to your right leg. You received a direct stick embolization to your right leg. You underwent a direct stick embolization of your arteriovenous malformation. Avoid strenuous activity or heavy lifting for the next five days. Do not take a bath or swim for the next five days; you may shower. For any bleeding or hematoma formation (hardened blood collection under the skin) at the access site of bilateral groins please hold pressure and go to the emergency room. Please continue medications as prescribed. Follow up with Dr. Teran in 6 weeks. continue medications

## 2018-02-13 DIAGNOSIS — Z88.0 ALLERGY STATUS TO PENICILLIN: ICD-10-CM

## 2018-02-13 DIAGNOSIS — E66.9 OBESITY, UNSPECIFIED: ICD-10-CM

## 2018-02-13 DIAGNOSIS — Q27.32 ARTERIOVENOUS MALFORMATION OF VESSEL OF LOWER LIMB: ICD-10-CM

## 2018-02-13 DIAGNOSIS — L97.529 NON-PRESSURE CHRONIC ULCER OF OTHER PART OF LEFT FOOT WITH UNSPECIFIED SEVERITY: ICD-10-CM

## 2018-02-13 DIAGNOSIS — I10 ESSENTIAL (PRIMARY) HYPERTENSION: ICD-10-CM

## 2018-03-30 ENCOUNTER — APPOINTMENT (OUTPATIENT)
Dept: HEART AND VASCULAR | Facility: CLINIC | Age: 33
End: 2018-03-30

## 2018-04-20 ENCOUNTER — APPOINTMENT (OUTPATIENT)
Dept: HEART AND VASCULAR | Facility: CLINIC | Age: 33
End: 2018-04-20
Payer: COMMERCIAL

## 2018-04-20 DIAGNOSIS — Z01.818 ENCOUNTER FOR OTHER PREPROCEDURAL EXAMINATION: ICD-10-CM

## 2018-04-20 PROCEDURE — 76882 US LMTD JT/FCL EVL NVASC XTR: CPT

## 2018-04-20 PROCEDURE — 99214 OFFICE O/P EST MOD 30 MIN: CPT | Mod: 25

## 2018-05-16 NOTE — H&P ADULT - ASSESSMENT
32 y.o Female,  LMP 05/10/18,  with PMHx of HTN, left foot AVM with recurrent non healing ulcer of the infra-malleolar area of the left foot with prior embolizations, most recently DSE @ [facility] on 02/07/18 who in light of persistent pain  and non healing ulcer returns today for repeat left lower extremity angiogram with DSE under general anesthesia.        ASA: III and Mallampati: III     -Slight Leukocytosis w/ WBC of 10.6.  No L shift noted. Pt remains afebrile.  No signs or symptoms of active infection.     -HCG negative today.

## 2018-05-16 NOTE — H&P ADULT - HISTORY OF PRESENT ILLNESS
***PT TO BRING IN ALL MEDS       **CONFIRM LMP         32 y.o Female,  LMP 1/14/18  with PMHx of HTN, left foot AVM with recurrent non healing ulcer of the infra-malleolar area of the left foot with prior embolizations, most recently DSE @ [facility] on 02/07/18.  Pt states she was doing well after her last procedure until a couple of months ago when a new area of breakdown appeared in the same location with accompanying persistent pain. She denies any oozing or bleeding from the site.   Patient  is followed for pain management who prescribed Percocet 10/325mg PO 5x/daily as needed for pain and Gabapentin 100mg PO QAM and 800mg PO QM.  She states she also applies topical neosporin wraps to the the foot on a daily basis. Pt denies any fever/chills or any other diagnostic LLE imaging studies since her last procedure.     In light of her Known Left foot AVM and painful non-healing ulcer, pt now presents for left lower extremity angiogram with DSE under general anesthesia. 32 y.o Female,  LMP 05/10/18,  with PMHx of HTN, left foot AVM with recurrent non healing ulcer of the infra-malleolar area of the left foot with prior embolizations, most recently DSE @ [facility] on 02/07/18.  Pt states she was doing well after her last procedure until a couple of months ago when a new area of breakdown appeared in the same location with accompanying persistent pain. She denies any oozing or bleeding from the site.   Patient  is followed for pain management who prescribed Percocet 10/325mg PO 5x/daily as needed for pain and Gabapentin 100mg PO QAM and 800mg PO QM.  She states she also applies topical neosporin wraps to the the foot on a daily basis. Pt denies any fever/chills or any other diagnostic LLE imaging studies since her last procedure.     In light of her Known Left foot AVM and painful non-healing ulcer, pt now presents for left lower extremity angiogram with DSE under general anesthesia.

## 2018-05-17 ENCOUNTER — INPATIENT (INPATIENT)
Facility: HOSPITAL | Age: 33
LOS: 0 days | Discharge: HOME | DRG: 253 | End: 2018-05-18
Attending: RADIOLOGY | Admitting: RADIOLOGY
Payer: COMMERCIAL

## 2018-05-17 VITALS
DIASTOLIC BLOOD PRESSURE: 84 MMHG | SYSTOLIC BLOOD PRESSURE: 138 MMHG | OXYGEN SATURATION: 97 % | HEART RATE: 104 BPM | WEIGHT: 293 LBS | RESPIRATION RATE: 18 BRPM | HEIGHT: 67 IN

## 2018-05-17 DIAGNOSIS — Z98.89 OTHER SPECIFIED POSTPROCEDURAL STATES: Chronic | ICD-10-CM

## 2018-05-17 LAB
ALBUMIN SERPL ELPH-MCNC: 4.1 G/DL — SIGNIFICANT CHANGE UP (ref 3.3–5)
ALP SERPL-CCNC: 61 U/L — SIGNIFICANT CHANGE UP (ref 40–120)
ALT FLD-CCNC: 9 U/L — LOW (ref 10–45)
ANION GAP SERPL CALC-SCNC: 16 MMOL/L — SIGNIFICANT CHANGE UP (ref 5–17)
APTT BLD: 29.2 SEC — SIGNIFICANT CHANGE UP (ref 27.5–37.4)
AST SERPL-CCNC: 14 U/L — SIGNIFICANT CHANGE UP (ref 10–40)
BASOPHILS NFR BLD AUTO: 0.3 % — SIGNIFICANT CHANGE UP (ref 0–2)
BILIRUB SERPL-MCNC: 0.5 MG/DL — SIGNIFICANT CHANGE UP (ref 0.2–1.2)
BUN SERPL-MCNC: 10 MG/DL — SIGNIFICANT CHANGE UP (ref 7–23)
CALCIUM SERPL-MCNC: 9.5 MG/DL — SIGNIFICANT CHANGE UP (ref 8.4–10.5)
CHLORIDE SERPL-SCNC: 98 MMOL/L — SIGNIFICANT CHANGE UP (ref 96–108)
CO2 SERPL-SCNC: 24 MMOL/L — SIGNIFICANT CHANGE UP (ref 22–31)
CREAT SERPL-MCNC: 0.76 MG/DL — SIGNIFICANT CHANGE UP (ref 0.5–1.3)
EOSINOPHIL NFR BLD AUTO: 0.4 % — SIGNIFICANT CHANGE UP (ref 0–6)
GLUCOSE SERPL-MCNC: 95 MG/DL — SIGNIFICANT CHANGE UP (ref 70–99)
HCG SERPL-ACNC: <.1 MIU/ML — SIGNIFICANT CHANGE UP
HCT VFR BLD CALC: 40.6 % — SIGNIFICANT CHANGE UP (ref 34.5–45)
HGB BLD-MCNC: 13.2 G/DL — SIGNIFICANT CHANGE UP (ref 11.5–15.5)
INR BLD: 1.06 — SIGNIFICANT CHANGE UP (ref 0.88–1.16)
LYMPHOCYTES # BLD AUTO: 20.6 % — SIGNIFICANT CHANGE UP (ref 13–44)
MCHC RBC-ENTMCNC: 28.8 PG — SIGNIFICANT CHANGE UP (ref 27–34)
MCHC RBC-ENTMCNC: 32.5 G/DL — SIGNIFICANT CHANGE UP (ref 32–36)
MCV RBC AUTO: 88.5 FL — SIGNIFICANT CHANGE UP (ref 80–100)
MONOCYTES NFR BLD AUTO: 5.9 % — SIGNIFICANT CHANGE UP (ref 2–14)
NEUTROPHILS NFR BLD AUTO: 72.8 % — SIGNIFICANT CHANGE UP (ref 43–77)
PLATELET # BLD AUTO: 467 K/UL — HIGH (ref 150–400)
POTASSIUM SERPL-MCNC: 4 MMOL/L — SIGNIFICANT CHANGE UP (ref 3.5–5.3)
POTASSIUM SERPL-SCNC: 4 MMOL/L — SIGNIFICANT CHANGE UP (ref 3.5–5.3)
PROT SERPL-MCNC: 8 G/DL — SIGNIFICANT CHANGE UP (ref 6–8.3)
PROTHROM AB SERPL-ACNC: 11.8 SEC — SIGNIFICANT CHANGE UP (ref 9.8–12.7)
RBC # BLD: 4.59 M/UL — SIGNIFICANT CHANGE UP (ref 3.8–5.2)
RBC # FLD: 12.6 % — SIGNIFICANT CHANGE UP (ref 10.3–16.9)
SODIUM SERPL-SCNC: 138 MMOL/L — SIGNIFICANT CHANGE UP (ref 135–145)
WBC # BLD: 10.6 K/UL — HIGH (ref 3.8–10.5)
WBC # FLD AUTO: 10.6 K/UL — HIGH (ref 3.8–10.5)

## 2018-05-17 PROCEDURE — 37242 VASC EMBOLIZE/OCCLUDE ARTERY: CPT

## 2018-05-17 PROCEDURE — 36248 INS CATH ABD/L-EXT ART ADDL: CPT | Mod: LT

## 2018-05-17 PROCEDURE — 36247 INS CATH ABD/L-EXT ART 3RD: CPT | Mod: LT

## 2018-05-17 RX ORDER — GABAPENTIN 400 MG/1
1 CAPSULE ORAL
Qty: 60 | Refills: 1 | COMMUNITY

## 2018-05-17 RX ORDER — HYDROMORPHONE HYDROCHLORIDE 2 MG/ML
2 INJECTION INTRAMUSCULAR; INTRAVENOUS; SUBCUTANEOUS EVERY 4 HOURS
Qty: 0 | Refills: 0 | Status: DISCONTINUED | OUTPATIENT
Start: 2018-05-17 | End: 2018-05-18

## 2018-05-17 RX ORDER — CIPROFLOXACIN LACTATE 400MG/40ML
400 VIAL (ML) INTRAVENOUS EVERY 12 HOURS
Qty: 0 | Refills: 0 | Status: COMPLETED | OUTPATIENT
Start: 2018-05-17 | End: 2018-05-18

## 2018-05-17 RX ORDER — ONDANSETRON 8 MG/1
4 TABLET, FILM COATED ORAL EVERY 4 HOURS
Qty: 0 | Refills: 0 | Status: DISCONTINUED | OUTPATIENT
Start: 2018-05-17 | End: 2018-05-18

## 2018-05-17 RX ORDER — SODIUM CHLORIDE 9 MG/ML
1000 INJECTION INTRAMUSCULAR; INTRAVENOUS; SUBCUTANEOUS
Qty: 0 | Refills: 0 | Status: DISCONTINUED | OUTPATIENT
Start: 2018-05-17 | End: 2018-05-18

## 2018-05-17 RX ORDER — BLEOMYCIN SULFATE 30 UNIT
15 VIAL (EA) INJECTION ONCE
Qty: 0 | Refills: 0 | Status: DISCONTINUED | OUTPATIENT
Start: 2018-05-17 | End: 2018-05-18

## 2018-05-17 RX ADMIN — Medication 200 MILLIGRAM(S): at 19:58

## 2018-05-17 RX ADMIN — HYDROMORPHONE HYDROCHLORIDE 2 MILLIGRAM(S): 2 INJECTION INTRAMUSCULAR; INTRAVENOUS; SUBCUTANEOUS at 20:45

## 2018-05-17 RX ADMIN — HYDROMORPHONE HYDROCHLORIDE 2 MILLIGRAM(S): 2 INJECTION INTRAMUSCULAR; INTRAVENOUS; SUBCUTANEOUS at 20:26

## 2018-05-18 ENCOUNTER — TRANSCRIPTION ENCOUNTER (OUTPATIENT)
Age: 33
End: 2018-05-18

## 2018-05-18 VITALS
DIASTOLIC BLOOD PRESSURE: 86 MMHG | RESPIRATION RATE: 16 BRPM | OXYGEN SATURATION: 97 % | HEART RATE: 86 BPM | SYSTOLIC BLOOD PRESSURE: 120 MMHG

## 2018-05-18 LAB
ANION GAP SERPL CALC-SCNC: 11 MMOL/L — SIGNIFICANT CHANGE UP (ref 5–17)
BUN SERPL-MCNC: 12 MG/DL — SIGNIFICANT CHANGE UP (ref 7–23)
CALCIUM SERPL-MCNC: 8.8 MG/DL — SIGNIFICANT CHANGE UP (ref 8.4–10.5)
CHLORIDE SERPL-SCNC: 100 MMOL/L — SIGNIFICANT CHANGE UP (ref 96–108)
CO2 SERPL-SCNC: 27 MMOL/L — SIGNIFICANT CHANGE UP (ref 22–31)
CREAT SERPL-MCNC: 0.68 MG/DL — SIGNIFICANT CHANGE UP (ref 0.5–1.3)
GLUCOSE SERPL-MCNC: 100 MG/DL — HIGH (ref 70–99)
HCT VFR BLD CALC: 34.8 % — SIGNIFICANT CHANGE UP (ref 34.5–45)
HGB BLD-MCNC: 11.5 G/DL — SIGNIFICANT CHANGE UP (ref 11.5–15.5)
MCHC RBC-ENTMCNC: 29.7 PG — SIGNIFICANT CHANGE UP (ref 27–34)
MCHC RBC-ENTMCNC: 33 G/DL — SIGNIFICANT CHANGE UP (ref 32–36)
MCV RBC AUTO: 89.9 FL — SIGNIFICANT CHANGE UP (ref 80–100)
PLATELET # BLD AUTO: 407 K/UL — HIGH (ref 150–400)
POTASSIUM SERPL-MCNC: 4 MMOL/L — SIGNIFICANT CHANGE UP (ref 3.5–5.3)
POTASSIUM SERPL-SCNC: 4 MMOL/L — SIGNIFICANT CHANGE UP (ref 3.5–5.3)
RBC # BLD: 3.87 M/UL — SIGNIFICANT CHANGE UP (ref 3.8–5.2)
RBC # FLD: 12.1 % — SIGNIFICANT CHANGE UP (ref 10.3–16.9)
SODIUM SERPL-SCNC: 138 MMOL/L — SIGNIFICANT CHANGE UP (ref 135–145)
WBC # BLD: 10 K/UL — SIGNIFICANT CHANGE UP (ref 3.8–10.5)
WBC # FLD AUTO: 10 K/UL — SIGNIFICANT CHANGE UP (ref 3.8–10.5)

## 2018-05-18 PROCEDURE — 36415 COLL VENOUS BLD VENIPUNCTURE: CPT

## 2018-05-18 PROCEDURE — 84702 CHORIONIC GONADOTROPIN TEST: CPT

## 2018-05-18 PROCEDURE — C1894: CPT

## 2018-05-18 PROCEDURE — C1887: CPT

## 2018-05-18 PROCEDURE — 85730 THROMBOPLASTIN TIME PARTIAL: CPT

## 2018-05-18 PROCEDURE — 85027 COMPLETE CBC AUTOMATED: CPT

## 2018-05-18 PROCEDURE — 85025 COMPLETE CBC W/AUTO DIFF WBC: CPT

## 2018-05-18 PROCEDURE — 80048 BASIC METABOLIC PNL TOTAL CA: CPT

## 2018-05-18 PROCEDURE — A9698: CPT

## 2018-05-18 PROCEDURE — 80053 COMPREHEN METABOLIC PANEL: CPT

## 2018-05-18 PROCEDURE — 85610 PROTHROMBIN TIME: CPT

## 2018-05-18 PROCEDURE — C1769: CPT

## 2018-05-18 PROCEDURE — C1889: CPT

## 2018-05-18 RX ORDER — OXYCODONE AND ACETAMINOPHEN 5; 325 MG/1; MG/1
1 TABLET ORAL ONCE
Qty: 0 | Refills: 0 | Status: DISCONTINUED | OUTPATIENT
Start: 2018-05-18 | End: 2018-05-18

## 2018-05-18 RX ORDER — GABAPENTIN 400 MG/1
0 CAPSULE ORAL
Qty: 0 | Refills: 0 | COMMUNITY

## 2018-05-18 RX ORDER — CIPROFLOXACIN LACTATE 400MG/40ML
1 VIAL (ML) INTRAVENOUS
Qty: 14 | Refills: 0 | OUTPATIENT
Start: 2018-05-18 | End: 2018-05-24

## 2018-05-18 RX ADMIN — HYDROMORPHONE HYDROCHLORIDE 2 MILLIGRAM(S): 2 INJECTION INTRAMUSCULAR; INTRAVENOUS; SUBCUTANEOUS at 08:59

## 2018-05-18 RX ADMIN — OXYCODONE AND ACETAMINOPHEN 1 TABLET(S): 5; 325 TABLET ORAL at 12:34

## 2018-05-18 RX ADMIN — Medication 200 MILLIGRAM(S): at 06:12

## 2018-05-18 RX ADMIN — OXYCODONE AND ACETAMINOPHEN 1 TABLET(S): 5; 325 TABLET ORAL at 13:00

## 2018-05-18 RX ADMIN — HYDROMORPHONE HYDROCHLORIDE 2 MILLIGRAM(S): 2 INJECTION INTRAMUSCULAR; INTRAVENOUS; SUBCUTANEOUS at 04:48

## 2018-05-18 RX ADMIN — HYDROMORPHONE HYDROCHLORIDE 2 MILLIGRAM(S): 2 INJECTION INTRAMUSCULAR; INTRAVENOUS; SUBCUTANEOUS at 04:31

## 2018-05-18 RX ADMIN — HYDROMORPHONE HYDROCHLORIDE 2 MILLIGRAM(S): 2 INJECTION INTRAMUSCULAR; INTRAVENOUS; SUBCUTANEOUS at 09:16

## 2018-05-18 RX ADMIN — HYDROMORPHONE HYDROCHLORIDE 2 MILLIGRAM(S): 2 INJECTION INTRAMUSCULAR; INTRAVENOUS; SUBCUTANEOUS at 00:49

## 2018-05-18 RX ADMIN — HYDROMORPHONE HYDROCHLORIDE 2 MILLIGRAM(S): 2 INJECTION INTRAMUSCULAR; INTRAVENOUS; SUBCUTANEOUS at 00:28

## 2018-05-18 NOTE — DISCHARGE NOTE ADULT - PATIENT PORTAL LINK FT
You can access the [facility] Patient Portal, offered by [facility], by registering with the following website: [email or web address]

## 2018-05-18 NOTE — DISCHARGE NOTE ADULT - HOSPITAL COURSE
32 yr old F,  LMP 05/10/18,  with PMHx of HTN, left foot AVM with recurrent non healing ulcer of the infra-malleolar area of the left foot with prior embolizations, most recently DSE @ [facility] on 02/07/18.  Pt was doing well after her last procedure until a couple of months ago when a new area of breakdown appeared in the same location with accompanying persistent pain. She denies any oozing or bleeding from the site.   Patient  is followed for pain management who prescribed Percocet 10/325mg PO 5x/daily as needed for pain and Gabapentin 100mg PO QAM and 800mg PO QM.  She states she also applies topical neosporin wraps to the foot on a daily basis. Pt denies any fever/chills or any other diagnostic LLE imaging studies since her last procedure. Patient now s/p transcatheter embolization to L foot AVM       BP:  128/86                HR: 83                   RR:16                  Temp: 97.7F                     O2 sat: 100% RA    GEN: well appearing female in NAD  Neck: supple, no JVD  Pulm: CTA B/L  CV: [id number]  Ext: warm well perfused, LLE dressing C/D/I  Neuro: no focal neurodeficits noted      Patient asymptomatic, labs reviewed. Patient deemed stable for discharge as per Dr. Teran and to follow-up with him in 4-6 weeks. 32 yr old F,  LMP 05/10/18,  with PMHx of HTN, left foot AVM with recurrent non healing ulcer of the infra-malleolar area of the left foot with prior embolizations, most recently DSE @ [facility] on 02/07/18.  Pt was doing well after her last procedure until a couple of months ago when a new area of breakdown appeared in the same location with accompanying persistent pain. She denies any oozing or bleeding from the site.   Patient  is followed for pain management who prescribed Percocet 10/325mg PO 5x/daily as needed for pain and Gabapentin 100mg PO QAM and 800mg PO QM.  She states she also applies topical neosporin wraps to the foot on a daily basis. Pt denies any fever/chills or any other diagnostic LLE imaging studies since her last procedure. Patient now s/p transcatheter embolization to L foot AVM       BP:  128/86                HR: 83                   RR:16                  Temp: 97.7F                     O2 sat: 100% RA    GEN: well appearing female in NAD  Neck: supple, no JVD  Pulm: CTA B/L  CV: [id number]  Ext: warm well perfused, LLE dressing C/D/I  Neuro: no focal neurodeficits noted      Patient asymptomatic, labs reviewed. Patient deemed stable for discharge as per Dr. Teran and to follow-up with him in 4 weeks.

## 2018-05-18 NOTE — DISCHARGE NOTE ADULT - MEDICATION SUMMARY - MEDICATIONS TO STOP TAKING
I will STOP taking the medications listed below when I get home from the hospital:    Percocet 10/325 oral tablet  -- orally every 4 hours, As Needed

## 2018-05-18 NOTE — DISCHARGE NOTE ADULT - MEDICATION SUMMARY - MEDICATIONS TO TAKE
I will START or STAY ON the medications listed below when I get home from the hospital:    lisinopril 10 mg oral tablet  -- 1 tab(s) by mouth once a day  -- Indication: For Hypertension    gabapentin 100 mg oral tablet  -- orally once a day in AM   -- Indication: For Pain    hydrochlorothiazide 25 mg oral tablet  -- 1 tab(s) by mouth once a day  -- Indication: For Hypertension    ciprofloxacin 500 mg oral tablet  -- 1 tab(s) by mouth every 12 hours  -- Indication: For Prophylaxis for infection    Junel 1.5/30 oral tablet  -- 1 tab(s) by mouth once a day  -- Indication: For Birth control

## 2018-05-18 NOTE — DISCHARGE NOTE ADULT - CARE PROVIDER_API CALL
J Carlos Teran (MD), Diagnostic Radiology  [address]  [address] [address]  Phone: [phone]  Fax: [phone]

## 2018-05-18 NOTE — DISCHARGE NOTE ADULT - PLAN OF CARE
You had successful transcatheter embolization to your left foot. --You are recommended to take Keflex 500mg by mouth every 6 hours for 1 week to prevent infection.  --Continue home regimen of pain management medications. Follow low sodium diet and continue current medications. --Continue Lisinopril 10mg by mouth daily and Hydrochlorothiazide 25mg by mouth daily --You are recommended to take Ciprofloxacin 500mg by mouth every 12 hours for 1 week to prevent infection.  --Continue home regimen of pain management medications. --Continue Lisinopril 10mg by mouth daily and Hydrochlorothiazide 25mg by mouth daily  -monitor your blood pressure daily with goal <140/90

## 2018-05-18 NOTE — DISCHARGE NOTE ADULT - CARE PLAN
Principal Discharge DX:	AVM (arteriovenous malformation)  Goal:	You had successful transcatheter embolization to your left foot.  Assessment and plan of treatment:	--You are recommended to take Keflex 500mg by mouth every 6 hours for 1 week to prevent infection.  --Continue home regimen of pain management medications.  Secondary Diagnosis:	HTN (hypertension)  Goal:	Follow low sodium diet and continue current medications.  Assessment and plan of treatment:	--Continue Lisinopril 10mg by mouth daily and Hydrochlorothiazide 25mg by mouth daily Principal Discharge DX:	AVM (arteriovenous malformation)  Goal:	You had successful transcatheter embolization to your left foot.  Assessment and plan of treatment:	--You are recommended to take Ciprofloxacin 500mg by mouth every 12 hours for 1 week to prevent infection.  --Continue home regimen of pain management medications.  Secondary Diagnosis:	HTN (hypertension)  Goal:	Follow low sodium diet and continue current medications.  Assessment and plan of treatment:	--Continue Lisinopril 10mg by mouth daily and Hydrochlorothiazide 25mg by mouth daily  -monitor your blood pressure daily with goal <140/90

## 2018-05-23 DIAGNOSIS — E66.09 OTHER OBESITY DUE TO EXCESS CALORIES: ICD-10-CM

## 2018-05-23 DIAGNOSIS — I25.10 ATHEROSCLEROTIC HEART DISEASE OF NATIVE CORONARY ARTERY WITHOUT ANGINA PECTORIS: ICD-10-CM

## 2018-05-23 DIAGNOSIS — L97.529 NON-PRESSURE CHRONIC ULCER OF OTHER PART OF LEFT FOOT WITH UNSPECIFIED SEVERITY: ICD-10-CM

## 2018-05-23 DIAGNOSIS — I10 ESSENTIAL (PRIMARY) HYPERTENSION: ICD-10-CM

## 2018-05-23 DIAGNOSIS — Q27.32 ARTERIOVENOUS MALFORMATION OF VESSEL OF LOWER LIMB: ICD-10-CM

## 2018-06-15 ENCOUNTER — APPOINTMENT (OUTPATIENT)
Dept: HEART AND VASCULAR | Facility: CLINIC | Age: 33
End: 2018-06-15
Payer: COMMERCIAL

## 2018-06-15 PROCEDURE — 99214 OFFICE O/P EST MOD 30 MIN: CPT

## 2018-06-15 RX ORDER — MOXIFLOXACIN HYDROCHLORIDE TABLETS, 400 MG 400 MG/1
1 TABLET, FILM COATED ORAL
Qty: 14 | Refills: 0
Start: 2018-06-15 | End: 2021-04-20

## 2018-06-28 ENCOUNTER — OTHER (OUTPATIENT)
Age: 33
End: 2018-06-28

## 2018-07-11 VITALS
HEART RATE: 90 BPM | WEIGHT: 293 LBS | HEIGHT: 67 IN | RESPIRATION RATE: 16 BRPM | SYSTOLIC BLOOD PRESSURE: 131 MMHG | OXYGEN SATURATION: 100 % | DIASTOLIC BLOOD PRESSURE: 74 MMHG | TEMPERATURE: 98 F

## 2018-07-11 RX ORDER — GABAPENTIN 400 MG/1
0 CAPSULE ORAL
Qty: 0 | Refills: 0 | COMMUNITY

## 2018-07-11 NOTE — ASU PATIENT PROFILE, ADULT - PSH
Elective surgery  transcatheter therapy vascular embolization x5  S/P debridement  LLE area overlying AVM

## 2018-07-12 ENCOUNTER — APPOINTMENT (OUTPATIENT)
Dept: VASCULAR SURGERY | Facility: HOSPITAL | Age: 33
End: 2018-07-12

## 2018-07-12 ENCOUNTER — RESULT REVIEW (OUTPATIENT)
Age: 33
End: 2018-07-12

## 2018-07-12 ENCOUNTER — INPATIENT (INPATIENT)
Facility: HOSPITAL | Age: 33
LOS: 0 days | Discharge: HOME HEALTH | DRG: 572 | End: 2018-07-13
Attending: SURGERY | Admitting: SURGERY
Payer: COMMERCIAL

## 2018-07-12 DIAGNOSIS — Z41.9 ENCOUNTER FOR PROCEDURE FOR PURPOSES OTHER THAN REMEDYING HEALTH STATE, UNSPECIFIED: Chronic | ICD-10-CM

## 2018-07-12 DIAGNOSIS — Z98.89 OTHER SPECIFIED POSTPROCEDURAL STATES: Chronic | ICD-10-CM

## 2018-07-12 LAB
ANION GAP SERPL CALC-SCNC: 15 MMOL/L — SIGNIFICANT CHANGE UP (ref 5–17)
BUN SERPL-MCNC: 15 MG/DL — SIGNIFICANT CHANGE UP (ref 7–23)
CALCIUM SERPL-MCNC: 9 MG/DL — SIGNIFICANT CHANGE UP (ref 8.4–10.5)
CHLORIDE SERPL-SCNC: 97 MMOL/L — SIGNIFICANT CHANGE UP (ref 96–108)
CO2 SERPL-SCNC: 22 MMOL/L — SIGNIFICANT CHANGE UP (ref 22–31)
CREAT SERPL-MCNC: 1.01 MG/DL — SIGNIFICANT CHANGE UP (ref 0.5–1.3)
GLUCOSE SERPL-MCNC: 187 MG/DL — HIGH (ref 70–99)
HCT VFR BLD CALC: 37.1 % — SIGNIFICANT CHANGE UP (ref 34.5–45)
HGB BLD-MCNC: 12.6 G/DL — SIGNIFICANT CHANGE UP (ref 11.5–15.5)
MAGNESIUM SERPL-MCNC: 1.7 MG/DL — SIGNIFICANT CHANGE UP (ref 1.6–2.6)
MCHC RBC-ENTMCNC: 29.4 PG — SIGNIFICANT CHANGE UP (ref 27–34)
MCHC RBC-ENTMCNC: 34 G/DL — SIGNIFICANT CHANGE UP (ref 32–36)
MCV RBC AUTO: 86.7 FL — SIGNIFICANT CHANGE UP (ref 80–100)
PHOSPHATE SERPL-MCNC: 3.2 MG/DL — SIGNIFICANT CHANGE UP (ref 2.5–4.5)
PLATELET # BLD AUTO: 397 K/UL — SIGNIFICANT CHANGE UP (ref 150–400)
POTASSIUM SERPL-MCNC: 4.4 MMOL/L — SIGNIFICANT CHANGE UP (ref 3.5–5.3)
POTASSIUM SERPL-SCNC: 4.4 MMOL/L — SIGNIFICANT CHANGE UP (ref 3.5–5.3)
RBC # BLD: 4.28 M/UL — SIGNIFICANT CHANGE UP (ref 3.8–5.2)
RBC # FLD: 12 % — SIGNIFICANT CHANGE UP (ref 10.3–16.9)
SODIUM SERPL-SCNC: 134 MMOL/L — LOW (ref 135–145)
WBC # BLD: 8 K/UL — SIGNIFICANT CHANGE UP (ref 3.8–10.5)
WBC # FLD AUTO: 8 K/UL — SIGNIFICANT CHANGE UP (ref 3.8–10.5)

## 2018-07-12 PROCEDURE — 97605 NEG PRS WND THER DME<=50SQCM: CPT | Mod: GC

## 2018-07-12 PROCEDURE — 11042 DBRDMT SUBQ TIS 1ST 20SQCM/<: CPT | Mod: GC

## 2018-07-12 RX ORDER — SODIUM CHLORIDE 9 MG/ML
1000 INJECTION, SOLUTION INTRAVENOUS
Qty: 0 | Refills: 0 | Status: DISCONTINUED | OUTPATIENT
Start: 2018-07-12 | End: 2018-07-13

## 2018-07-12 RX ORDER — HEPARIN SODIUM 5000 [USP'U]/ML
7500 INJECTION INTRAVENOUS; SUBCUTANEOUS EVERY 8 HOURS
Qty: 0 | Refills: 0 | Status: DISCONTINUED | OUTPATIENT
Start: 2018-07-12 | End: 2018-07-13

## 2018-07-12 RX ORDER — HYDROMORPHONE HYDROCHLORIDE 2 MG/ML
1 INJECTION INTRAMUSCULAR; INTRAVENOUS; SUBCUTANEOUS EVERY 4 HOURS
Qty: 0 | Refills: 0 | Status: DISCONTINUED | OUTPATIENT
Start: 2018-07-12 | End: 2018-07-13

## 2018-07-12 RX ORDER — MAGNESIUM OXIDE 400 MG ORAL TABLET 241.3 MG
400 TABLET ORAL
Qty: 0 | Refills: 0 | Status: COMPLETED | OUTPATIENT
Start: 2018-07-12 | End: 2018-07-13

## 2018-07-12 RX ORDER — HYDROMORPHONE HYDROCHLORIDE 2 MG/ML
0.5 INJECTION INTRAMUSCULAR; INTRAVENOUS; SUBCUTANEOUS EVERY 4 HOURS
Qty: 0 | Refills: 0 | Status: DISCONTINUED | OUTPATIENT
Start: 2018-07-12 | End: 2018-07-12

## 2018-07-12 RX ORDER — OXYCODONE AND ACETAMINOPHEN 5; 325 MG/1; MG/1
2 TABLET ORAL EVERY 4 HOURS
Qty: 0 | Refills: 0 | Status: DISCONTINUED | OUTPATIENT
Start: 2018-07-12 | End: 2018-07-13

## 2018-07-12 RX ORDER — HYDROMORPHONE HYDROCHLORIDE 2 MG/ML
0.5 INJECTION INTRAMUSCULAR; INTRAVENOUS; SUBCUTANEOUS ONCE
Qty: 0 | Refills: 0 | Status: DISCONTINUED | OUTPATIENT
Start: 2018-07-12 | End: 2018-07-12

## 2018-07-12 RX ORDER — LISINOPRIL 2.5 MG/1
10 TABLET ORAL DAILY
Qty: 0 | Refills: 0 | Status: DISCONTINUED | OUTPATIENT
Start: 2018-07-13 | End: 2018-07-13

## 2018-07-12 RX ORDER — HYDROMORPHONE HYDROCHLORIDE 2 MG/ML
0.5 INJECTION INTRAMUSCULAR; INTRAVENOUS; SUBCUTANEOUS EVERY 4 HOURS
Qty: 0 | Refills: 0 | Status: DISCONTINUED | OUTPATIENT
Start: 2018-07-12 | End: 2018-07-13

## 2018-07-12 RX ORDER — HYDROCHLOROTHIAZIDE 25 MG
25 TABLET ORAL DAILY
Qty: 0 | Refills: 0 | Status: DISCONTINUED | OUTPATIENT
Start: 2018-07-13 | End: 2018-07-13

## 2018-07-12 RX ORDER — DOCUSATE SODIUM 100 MG
100 CAPSULE ORAL THREE TIMES A DAY
Qty: 0 | Refills: 0 | Status: DISCONTINUED | OUTPATIENT
Start: 2018-07-12 | End: 2018-07-13

## 2018-07-12 RX ADMIN — HYDROMORPHONE HYDROCHLORIDE 1 MILLIGRAM(S): 2 INJECTION INTRAMUSCULAR; INTRAVENOUS; SUBCUTANEOUS at 23:27

## 2018-07-12 RX ADMIN — Medication 100 MILLIGRAM(S): at 22:55

## 2018-07-12 RX ADMIN — HYDROMORPHONE HYDROCHLORIDE 0.5 MILLIGRAM(S): 2 INJECTION INTRAMUSCULAR; INTRAVENOUS; SUBCUTANEOUS at 12:29

## 2018-07-12 RX ADMIN — HYDROMORPHONE HYDROCHLORIDE 0.5 MILLIGRAM(S): 2 INJECTION INTRAMUSCULAR; INTRAVENOUS; SUBCUTANEOUS at 14:12

## 2018-07-12 RX ADMIN — HYDROMORPHONE HYDROCHLORIDE 1 MILLIGRAM(S): 2 INJECTION INTRAMUSCULAR; INTRAVENOUS; SUBCUTANEOUS at 19:00

## 2018-07-12 RX ADMIN — HEPARIN SODIUM 7500 UNIT(S): 5000 INJECTION INTRAVENOUS; SUBCUTANEOUS at 22:55

## 2018-07-12 RX ADMIN — HYDROMORPHONE HYDROCHLORIDE 0.5 MILLIGRAM(S): 2 INJECTION INTRAMUSCULAR; INTRAVENOUS; SUBCUTANEOUS at 14:22

## 2018-07-12 RX ADMIN — HYDROMORPHONE HYDROCHLORIDE 0.5 MILLIGRAM(S): 2 INJECTION INTRAMUSCULAR; INTRAVENOUS; SUBCUTANEOUS at 12:04

## 2018-07-12 RX ADMIN — HYDROMORPHONE HYDROCHLORIDE 1 MILLIGRAM(S): 2 INJECTION INTRAMUSCULAR; INTRAVENOUS; SUBCUTANEOUS at 18:24

## 2018-07-12 RX ADMIN — SODIUM CHLORIDE 100 MILLILITER(S): 9 INJECTION, SOLUTION INTRAVENOUS at 12:21

## 2018-07-12 RX ADMIN — HYDROMORPHONE HYDROCHLORIDE 1 MILLIGRAM(S): 2 INJECTION INTRAMUSCULAR; INTRAVENOUS; SUBCUTANEOUS at 22:57

## 2018-07-12 NOTE — PROGRESS NOTE ADULT - SUBJECTIVE AND OBJECTIVE BOX
Vascular Surgery Post-Op Note    Procedure: left lateral heel wound debridement    Diagnosis/Indication: left heel non healing wound    Surgeon: Dr. Berkowitz    S: Pt has no complaints. Denies CP, SOB, AGUILAR, calf tenderness. Pain controlled with medication.    O:  T(C): 37.2 (07-12-18 @ 11:56), Max: 37.2 (07-12-18 @ 11:56)  T(F): 98.9 (07-12-18 @ 11:56), Max: 98.9 (07-12-18 @ 11:56)  HR: 84 (07-12-18 @ 14:36) (84 - 102)  BP: 103/87 (07-12-18 @ 14:36) (103/87 - 161/71)  RR: 16 (07-12-18 @ 14:36) (15 - 17)  SpO2: 95% (07-12-18 @ 14:36) (95% - 100%)  Wt(kg): --            Gen: NAD, resting comfortably in bed  C/V: NSR  Pulm: Nonlabored breathing, no respiratory distress  Extrem: left lateral heel ulcer with wound VAC  in place, good seal. Warm, calfs soft and on tender.      A/P: 32yFemale s/p above procedure  Diet: as tolerated  IVF  Pain/nausea control  DVT ppx: HSQ  Wound VAC to left heel

## 2018-07-12 NOTE — H&P ADULT - HISTORY OF PRESENT ILLNESS
32F w/ HTN, LEFT foot AVM c/b recurrent, non-healing infra-malleolar ulcer s/p embolizations (most recent DSE @ [facility] 5/16/18). Pt is followed by pain management MD who prescribes Percocet 10/325mg PO 5x/daily as needed for pain and Gabapentin 100mg PO QAM and 800mg PO QM.  She states she also applies topical neosporin wraps to the the foot on a daily basis. Now admitted to [facility] skin graft. 32F w/ HTN, LEFT foot AVM c/b recurrent, non-healing infra-malleolar ulcer. LLE angio ([facility]) 5/4/17 showed normal arterial flow to level of distal PT w/ AV shunting in region of known ulcer. Pt has since undergone multiple DSEs, most recently 5/26/18. Pt is followed by pain management MD who prescribes Percocet 10/325mg PO 5x/daily as needed for pain and Gabapentin 100mg PO QAM and 800mg PO QM. She states she also applies topical neosporin wraps to the the foot on a daily basis. Now admitted to [facility] skin graft.

## 2018-07-12 NOTE — H&P ADULT - ATTENDING COMMENTS
Discussion with patient.    Patient does not want a skin graft at this time. Would prefer for debridement and vac.    Will proceed with this.    Risks and benefits explained to patient and family.    Will admit overnight due to risk of bleeding.

## 2018-07-12 NOTE — BRIEF OPERATIVE NOTE - OPERATION/FINDINGS
Debridement of left lateral foot wound ~ 2 x 2 cm in size. Minimal surrounding redness. No drainage. Does not appear grossly infected. Wound vac applied.

## 2018-07-12 NOTE — H&P ADULT - NSHPPHYSICALEXAM_GEN_ALL_CORE
NEURO: A&Ox3, NAD, VINSON.  CV: RRR, S1&S2.   PULM: CTAB, no increased WOB.  ABD: Obese, Soft, ND, NT.  EXTR: WWP, no peripheral edema. LEFT inframalleolar/heel ulcer.

## 2018-07-13 ENCOUNTER — TRANSCRIPTION ENCOUNTER (OUTPATIENT)
Age: 33
End: 2018-07-13

## 2018-07-13 VITALS — TEMPERATURE: 96 F

## 2018-07-13 LAB — GRAM STN FLD: SIGNIFICANT CHANGE UP

## 2018-07-13 PROCEDURE — 87070 CULTURE OTHR SPECIMN AEROBIC: CPT

## 2018-07-13 PROCEDURE — C9399: CPT

## 2018-07-13 PROCEDURE — 83735 ASSAY OF MAGNESIUM: CPT

## 2018-07-13 PROCEDURE — 85027 COMPLETE CBC AUTOMATED: CPT

## 2018-07-13 PROCEDURE — 36415 COLL VENOUS BLD VENIPUNCTURE: CPT

## 2018-07-13 PROCEDURE — 87075 CULTR BACTERIA EXCEPT BLOOD: CPT

## 2018-07-13 PROCEDURE — 80048 BASIC METABOLIC PNL TOTAL CA: CPT

## 2018-07-13 PROCEDURE — 88304 TISSUE EXAM BY PATHOLOGIST: CPT

## 2018-07-13 PROCEDURE — 84100 ASSAY OF PHOSPHORUS: CPT

## 2018-07-13 RX ADMIN — HYDROMORPHONE HYDROCHLORIDE 1 MILLIGRAM(S): 2 INJECTION INTRAMUSCULAR; INTRAVENOUS; SUBCUTANEOUS at 11:51

## 2018-07-13 RX ADMIN — Medication 100 MILLIGRAM(S): at 07:10

## 2018-07-13 RX ADMIN — MAGNESIUM OXIDE 400 MG ORAL TABLET 400 MILLIGRAM(S): 241.3 TABLET ORAL at 07:50

## 2018-07-13 RX ADMIN — HYDROMORPHONE HYDROCHLORIDE 1 MILLIGRAM(S): 2 INJECTION INTRAMUSCULAR; INTRAVENOUS; SUBCUTANEOUS at 03:46

## 2018-07-13 RX ADMIN — OXYCODONE AND ACETAMINOPHEN 2 TABLET(S): 5; 325 TABLET ORAL at 14:53

## 2018-07-13 RX ADMIN — HYDROMORPHONE HYDROCHLORIDE 1 MILLIGRAM(S): 2 INJECTION INTRAMUSCULAR; INTRAVENOUS; SUBCUTANEOUS at 03:19

## 2018-07-13 RX ADMIN — HYDROMORPHONE HYDROCHLORIDE 1 MILLIGRAM(S): 2 INJECTION INTRAMUSCULAR; INTRAVENOUS; SUBCUTANEOUS at 12:15

## 2018-07-13 RX ADMIN — OXYCODONE AND ACETAMINOPHEN 2 TABLET(S): 5; 325 TABLET ORAL at 15:13

## 2018-07-13 RX ADMIN — SODIUM CHLORIDE 100 MILLILITER(S): 9 INJECTION, SOLUTION INTRAVENOUS at 03:22

## 2018-07-13 RX ADMIN — HYDROMORPHONE HYDROCHLORIDE 1 MILLIGRAM(S): 2 INJECTION INTRAMUSCULAR; INTRAVENOUS; SUBCUTANEOUS at 08:15

## 2018-07-13 RX ADMIN — HYDROMORPHONE HYDROCHLORIDE 1 MILLIGRAM(S): 2 INJECTION INTRAMUSCULAR; INTRAVENOUS; SUBCUTANEOUS at 07:50

## 2018-07-13 RX ADMIN — MAGNESIUM OXIDE 400 MG ORAL TABLET 400 MILLIGRAM(S): 241.3 TABLET ORAL at 11:50

## 2018-07-13 RX ADMIN — Medication 25 MILLIGRAM(S): at 07:11

## 2018-07-13 RX ADMIN — HEPARIN SODIUM 7500 UNIT(S): 5000 INJECTION INTRAVENOUS; SUBCUTANEOUS at 07:11

## 2018-07-13 RX ADMIN — LISINOPRIL 10 MILLIGRAM(S): 2.5 TABLET ORAL at 07:10

## 2018-07-13 NOTE — DISCHARGE NOTE ADULT - CARE PROVIDER_API CALL
Swapnil Berkowitz (MD), Surgery  [address]  [address]  [place], [place] [address]  Phone: [phone]  Fax: [phone]

## 2018-07-13 NOTE — DISCHARGE NOTE ADULT - HOME CARE AGENCY
Ibeth ([facility]) tel [phone], [phone] RN visits is pending insurance authorization

## 2018-07-13 NOTE — DISCHARGE NOTE ADULT - MEDICATION SUMMARY - MEDICATIONS TO TAKE
I will START or STAY ON the medications listed below when I get home from the hospital:    Percocet 7.5/325 oral tablet  -- 1 tab(s) by mouth every 6 hours  -- Indication: For Pain control    lisinopril 10 mg oral tablet  -- 1 tab(s) by mouth once a day  -- Indication: For HTN    hydrochlorothiazide 25 mg oral tablet  -- 1 tab(s) by mouth once a day  -- Indication: For HTN    Junel 1.5/30 oral tablet  -- 1 tab(s) by mouth once a day  -- Indication: For Hormone supplement

## 2018-07-13 NOTE — DISCHARGE NOTE ADULT - PATIENT PORTAL LINK FT
You can access the [facility] Patient Portal, offered by [facility], by registering with the following website: [email or web address]

## 2018-07-13 NOTE — DISCHARGE NOTE ADULT - CARE PLAN
Principal Discharge DX:	S/P debridement  Goal:	Wound healing  Assessment and plan of treatment:	Follow up with Dr. Berkowitz in 1-2 weeks. Call the office at  to schedule your appointment. You may shower; soap and water over wound sites. Do not scrub. Pat dry when done.   Left lateral heel: V.A.C. wound care. Change 3 times per week at 125 mmHg.  IF VAC malfunctions, then dampen gauze with saline solution. Pack into wound. Cover with dry gauze and Kerlix wrap daily. Ambulate as tolerated, but no heavy lifting (>10lbs) or strenuous exercise.   You may resume regular diet.   Call the office if you experience increasing pain, redness/swelling or discharge from the wound, chills or temperature >101.4F.

## 2018-07-13 NOTE — DISCHARGE NOTE ADULT - HOSPITAL COURSE
32F w/ HTN, LEFT foot AVM c/b recurrent, non-healing infra-malleolar ulcer. LLE angio ([facility]) 5/4/17 showed normal arterial flow to level of distal PT w/ AV shunting in region of known ulcer. Pt has since undergone multiple DSEs, most recently 5/26/18. Pt is followed by pain management MD who prescribes Percocet 10/325mg PO 5x/daily as needed for pain and Gabapentin 100mg PO QAM and 800mg PO QM. She states she also applies topical neosporin wraps to the the foot on a daily basis. Now admitted to E skin graft. 32F w/ HTN, LEFT foot AVM c/b recurrent, non-healing infra-malleolar ulcer. LLE angio ([facility]) 5/4/17 showed normal arterial flow to level of distal PT w/ AV shunting in region of known ulcer. Pt has since undergone multiple DSEs, most recently 5/26/18. Pt is followed by pain management MD who prescribes Percocet 10/325mg PO 5x/daily as needed for pain and Gabapentin 100mg PO QAM and 800mg PO QM. She states she also applies topical neosporin wraps to the the foot on a daily basis. Patient admitted and on 7/12/18 she underwent left heel wound debridement and wound VAC placement. Post op patient doing well, her vital signs and labs are normal. Patient was set up to resume wound VAC therapy at home and will be discharged today with wound care services and outpatient follow up.

## 2018-07-13 NOTE — PROGRESS NOTE ADULT - SUBJECTIVE AND OBJECTIVE BOX
O/N; ISABELLE, VSS              32y.o w/ HTN, and L ft AVM with non a non-healing inframalleolar ulcer.  Pt s/p L lateral heel wound debriedment     -Dc home with Vac today O/N; ISABELLE, VSS    Subjective:  No acute complaints at this time. Pain well controlled. Denies headache, nausea, vomiting, dizziness.    Vital Signs Last 24 Hrs  T(C): 36.6 (13 Jul 2018 09:48), Max: 37.4 (12 Jul 2018 18:28)  T(F): 97.9 (13 Jul 2018 09:48), Max: 99.3 (12 Jul 2018 18:28)  HR: 95 (13 Jul 2018 08:43) (75 - 104)  BP: 127/75 (13 Jul 2018 08:43) (103/87 - 161/71)  BP(mean): 90 (12 Jul 2018 15:36) (90 - 114)  RR: 18 (13 Jul 2018 08:43) (14 - 18)  SpO2: 95% (13 Jul 2018 08:43) (94% - 100%)    I&O's Summary    12 Jul 2018 07:01  -  13 Jul 2018 07:00  --------------------------------------------------------  IN: 1240 mL / OUT: 30 mL / NET: 1210 mL    13 Jul 2018 07:01  -  13 Jul 2018 09:54  --------------------------------------------------------  IN: 240 mL / OUT: 0 mL / NET: 240 mL        Physical Exam:  General: NAD, resting comfortably  Pulmonary: normal resp effort  Extremities: LLE vac in place  Neuro: A/O x 3    Lines/drains/tubes:  LLE Vac in place    LABS:                        12.6   8.0   )-----------( 397      ( 12 Jul 2018 20:21 )             37.1     07-12    134<L>  |  97  |  15  ----------------------------<  187<H>  4.4   |  22  |  1.01    Ca    9.0      12 Jul 2018 20:21  Phos  3.2     07-12  Mg     1.7     07-12      CAPILLARY BLOOD GLUCOSE  RADIOLOGY & ADDITIONAL TESTS:          32y.o w/ HTN, and L ft AVM with non a non-healing inframalleolar ulcer.  Pt POD#1 s/p L lateral lateral foot wound debridement.    - Vac ordered for home  - Will be d/c'd today with a wet-to-dry

## 2018-07-13 NOTE — PROGRESS NOTE ADULT - ATTENDING COMMENTS
No events overnight.    Tolerating VAC.    Will d/c today.    Will need home vac.    Will follow cultures in the office.

## 2018-07-13 NOTE — DISCHARGE NOTE ADULT - PLAN OF CARE
Wound healing Follow up with Dr. Berkowitz in 1-2 weeks. Call the office at  to schedule your appointment. You may shower; soap and water over wound sites. Do not scrub. Pat dry when done.   Left lateral heel: V.A.C. wound care. Change 3 times per week at 125 mmHg.  IF VAC malfunctions, then dampen gauze with saline solution. Pack into wound. Cover with dry gauze and Kerlix wrap daily. Ambulate as tolerated, but no heavy lifting (>10lbs) or strenuous exercise.   You may resume regular diet.   Call the office if you experience increasing pain, redness/swelling or discharge from the wound, chills or temperature >101.4F.

## 2018-07-15 LAB
CULTURE RESULTS: NO GROWTH — SIGNIFICANT CHANGE UP
SPECIMEN SOURCE: SIGNIFICANT CHANGE UP

## 2018-07-16 PROBLEM — L97.509 NON-PRESSURE CHRONIC ULCER OF OTHER PART OF UNSPECIFIED FOOT WITH UNSPECIFIED SEVERITY: Chronic | Status: ACTIVE | Noted: 2018-07-11

## 2018-07-16 PROBLEM — Q27.30 ARTERIOVENOUS MALFORMATION, SITE UNSPECIFIED: Chronic | Status: ACTIVE | Noted: 2018-07-11

## 2018-07-19 LAB — SURGICAL PATHOLOGY STUDY: SIGNIFICANT CHANGE UP

## 2018-07-20 DIAGNOSIS — I10 ESSENTIAL (PRIMARY) HYPERTENSION: ICD-10-CM

## 2018-07-20 DIAGNOSIS — L97.529 NON-PRESSURE CHRONIC ULCER OF OTHER PART OF LEFT FOOT WITH UNSPECIFIED SEVERITY: ICD-10-CM

## 2018-07-20 DIAGNOSIS — Q27.32 ARTERIOVENOUS MALFORMATION OF VESSEL OF LOWER LIMB: ICD-10-CM

## 2018-07-20 DIAGNOSIS — Z88.0 ALLERGY STATUS TO PENICILLIN: ICD-10-CM

## 2018-07-30 ENCOUNTER — APPOINTMENT (OUTPATIENT)
Dept: VASCULAR SURGERY | Facility: CLINIC | Age: 33
End: 2018-07-30
Payer: COMMERCIAL

## 2018-07-30 ENCOUNTER — INPATIENT (INPATIENT)
Facility: HOSPITAL | Age: 33
LOS: 5 days | Discharge: HOME | DRG: 593 | End: 2018-08-05
Attending: SURGERY | Admitting: SURGERY
Payer: COMMERCIAL

## 2018-07-30 VITALS — WEIGHT: 293 LBS | HEIGHT: 67 IN | BODY MASS INDEX: 45.99 KG/M2

## 2018-07-30 VITALS
SYSTOLIC BLOOD PRESSURE: 135 MMHG | RESPIRATION RATE: 18 BRPM | DIASTOLIC BLOOD PRESSURE: 81 MMHG | TEMPERATURE: 102 F | OXYGEN SATURATION: 100 % | HEART RATE: 92 BPM

## 2018-07-30 DIAGNOSIS — Z98.89 OTHER SPECIFIED POSTPROCEDURAL STATES: Chronic | ICD-10-CM

## 2018-07-30 DIAGNOSIS — Z41.9 ENCOUNTER FOR PROCEDURE FOR PURPOSES OTHER THAN REMEDYING HEALTH STATE, UNSPECIFIED: Chronic | ICD-10-CM

## 2018-07-30 LAB
ANION GAP SERPL CALC-SCNC: 16 MMOL/L — SIGNIFICANT CHANGE UP (ref 5–17)
APPEARANCE UR: CLEAR — SIGNIFICANT CHANGE UP
APTT BLD: 28.3 SEC — SIGNIFICANT CHANGE UP (ref 27.5–37.4)
BASOPHILS NFR BLD AUTO: 0.3 % — SIGNIFICANT CHANGE UP (ref 0–2)
BILIRUB UR-MCNC: NEGATIVE — SIGNIFICANT CHANGE UP
BLD GP AB SCN SERPL QL: NEGATIVE — SIGNIFICANT CHANGE UP
BUN SERPL-MCNC: 12 MG/DL — SIGNIFICANT CHANGE UP (ref 7–23)
CALCIUM SERPL-MCNC: 9.4 MG/DL — SIGNIFICANT CHANGE UP (ref 8.4–10.5)
CHLORIDE SERPL-SCNC: 98 MMOL/L — SIGNIFICANT CHANGE UP (ref 96–108)
CO2 SERPL-SCNC: 23 MMOL/L — SIGNIFICANT CHANGE UP (ref 22–31)
COLOR SPEC: YELLOW — SIGNIFICANT CHANGE UP
CREAT SERPL-MCNC: 0.78 MG/DL — SIGNIFICANT CHANGE UP (ref 0.5–1.3)
DIFF PNL FLD: ABNORMAL
EOSINOPHIL NFR BLD AUTO: 0.2 % — SIGNIFICANT CHANGE UP (ref 0–6)
GLUCOSE SERPL-MCNC: 92 MG/DL — SIGNIFICANT CHANGE UP (ref 70–99)
GLUCOSE UR QL: NEGATIVE — SIGNIFICANT CHANGE UP
HCT VFR BLD CALC: 38.5 % — SIGNIFICANT CHANGE UP (ref 34.5–45)
HGB BLD-MCNC: 12.7 G/DL — SIGNIFICANT CHANGE UP (ref 11.5–15.5)
INR BLD: 1.08 — SIGNIFICANT CHANGE UP (ref 0.88–1.16)
KETONES UR-MCNC: NEGATIVE — SIGNIFICANT CHANGE UP
LEUKOCYTE ESTERASE UR-ACNC: ABNORMAL
LYMPHOCYTES # BLD AUTO: 16.2 % — SIGNIFICANT CHANGE UP (ref 13–44)
MAGNESIUM SERPL-MCNC: 1.8 MG/DL — SIGNIFICANT CHANGE UP (ref 1.6–2.6)
MCHC RBC-ENTMCNC: 29.1 PG — SIGNIFICANT CHANGE UP (ref 27–34)
MCHC RBC-ENTMCNC: 33 G/DL — SIGNIFICANT CHANGE UP (ref 32–36)
MCV RBC AUTO: 88.3 FL — SIGNIFICANT CHANGE UP (ref 80–100)
MONOCYTES NFR BLD AUTO: 5.9 % — SIGNIFICANT CHANGE UP (ref 2–14)
NEUTROPHILS NFR BLD AUTO: 77.4 % — HIGH (ref 43–77)
NITRITE UR-MCNC: NEGATIVE — SIGNIFICANT CHANGE UP
PH UR: 5.5 — SIGNIFICANT CHANGE UP (ref 5–8)
PHOSPHATE SERPL-MCNC: 2.7 MG/DL — SIGNIFICANT CHANGE UP (ref 2.5–4.5)
PLATELET # BLD AUTO: 407 K/UL — HIGH (ref 150–400)
POTASSIUM SERPL-MCNC: 3.8 MMOL/L — SIGNIFICANT CHANGE UP (ref 3.5–5.3)
POTASSIUM SERPL-SCNC: 3.8 MMOL/L — SIGNIFICANT CHANGE UP (ref 3.5–5.3)
PROT UR-MCNC: NEGATIVE MG/DL — SIGNIFICANT CHANGE UP
PROTHROM AB SERPL-ACNC: 12 SEC — SIGNIFICANT CHANGE UP (ref 9.8–12.7)
RBC # BLD: 4.36 M/UL — SIGNIFICANT CHANGE UP (ref 3.8–5.2)
RBC # FLD: 12.5 % — SIGNIFICANT CHANGE UP (ref 10.3–16.9)
RH IG SCN BLD-IMP: POSITIVE — SIGNIFICANT CHANGE UP
SODIUM SERPL-SCNC: 137 MMOL/L — SIGNIFICANT CHANGE UP (ref 135–145)
SP GR SPEC: 1.02 — SIGNIFICANT CHANGE UP (ref 1–1.03)
UROBILINOGEN FLD QL: 0.2 E.U./DL — SIGNIFICANT CHANGE UP
WBC # BLD: 10 K/UL — SIGNIFICANT CHANGE UP (ref 3.8–10.5)
WBC # FLD AUTO: 10 K/UL — SIGNIFICANT CHANGE UP (ref 3.8–10.5)

## 2018-07-30 PROCEDURE — 71045 X-RAY EXAM CHEST 1 VIEW: CPT | Mod: 26

## 2018-07-30 PROCEDURE — 93010 ELECTROCARDIOGRAM REPORT: CPT

## 2018-07-30 PROCEDURE — 99214 OFFICE O/P EST MOD 30 MIN: CPT

## 2018-07-30 RX ORDER — LISINOPRIL 2.5 MG/1
10 TABLET ORAL DAILY
Qty: 0 | Refills: 0 | Status: DISCONTINUED | OUTPATIENT
Start: 2018-07-30 | End: 2018-08-05

## 2018-07-30 RX ORDER — CIPROFLOXACIN LACTATE 400MG/40ML
400 VIAL (ML) INTRAVENOUS ONCE
Qty: 0 | Refills: 0 | Status: DISCONTINUED | OUTPATIENT
Start: 2018-07-30 | End: 2018-07-30

## 2018-07-30 RX ORDER — CIPROFLOXACIN LACTATE 400MG/40ML
VIAL (ML) INTRAVENOUS
Qty: 0 | Refills: 0 | Status: DISCONTINUED | OUTPATIENT
Start: 2018-07-30 | End: 2018-07-30

## 2018-07-30 RX ORDER — HYDROMORPHONE HYDROCHLORIDE 2 MG/ML
0.5 INJECTION INTRAMUSCULAR; INTRAVENOUS; SUBCUTANEOUS EVERY 6 HOURS
Qty: 0 | Refills: 0 | Status: DISCONTINUED | OUTPATIENT
Start: 2018-07-30 | End: 2018-07-30

## 2018-07-30 RX ORDER — VANCOMYCIN HCL 1 G
1750 VIAL (EA) INTRAVENOUS ONCE
Qty: 0 | Refills: 0 | Status: COMPLETED | OUTPATIENT
Start: 2018-07-30 | End: 2018-07-30

## 2018-07-30 RX ORDER — HYDROMORPHONE HYDROCHLORIDE 2 MG/ML
2 INJECTION INTRAMUSCULAR; INTRAVENOUS; SUBCUTANEOUS EVERY 6 HOURS
Qty: 0 | Refills: 0 | Status: DISCONTINUED | OUTPATIENT
Start: 2018-07-30 | End: 2018-07-30

## 2018-07-30 RX ORDER — HEPARIN SODIUM 5000 [USP'U]/ML
7500 INJECTION INTRAVENOUS; SUBCUTANEOUS EVERY 8 HOURS
Qty: 0 | Refills: 0 | Status: DISCONTINUED | OUTPATIENT
Start: 2018-07-30 | End: 2018-08-05

## 2018-07-30 RX ORDER — HYDROMORPHONE HYDROCHLORIDE 2 MG/ML
1 INJECTION INTRAMUSCULAR; INTRAVENOUS; SUBCUTANEOUS EVERY 4 HOURS
Qty: 0 | Refills: 0 | Status: DISCONTINUED | OUTPATIENT
Start: 2018-07-30 | End: 2018-07-31

## 2018-07-30 RX ORDER — CIPROFLOXACIN LACTATE 400MG/40ML
400 VIAL (ML) INTRAVENOUS EVERY 12 HOURS
Qty: 0 | Refills: 0 | Status: DISCONTINUED | OUTPATIENT
Start: 2018-07-30 | End: 2018-07-31

## 2018-07-30 RX ORDER — DOCUSATE SODIUM 100 MG
100 CAPSULE ORAL THREE TIMES A DAY
Qty: 0 | Refills: 0 | Status: DISCONTINUED | OUTPATIENT
Start: 2018-07-30 | End: 2018-08-05

## 2018-07-30 RX ORDER — HYDROMORPHONE HYDROCHLORIDE 2 MG/ML
1 INJECTION INTRAMUSCULAR; INTRAVENOUS; SUBCUTANEOUS EVERY 6 HOURS
Qty: 0 | Refills: 0 | Status: DISCONTINUED | OUTPATIENT
Start: 2018-07-30 | End: 2018-07-30

## 2018-07-30 RX ORDER — HYDROMORPHONE HYDROCHLORIDE 2 MG/ML
0.5 INJECTION INTRAMUSCULAR; INTRAVENOUS; SUBCUTANEOUS EVERY 4 HOURS
Qty: 0 | Refills: 0 | Status: DISCONTINUED | OUTPATIENT
Start: 2018-07-30 | End: 2018-07-31

## 2018-07-30 RX ORDER — VANCOMYCIN HCL 1 G
1750 VIAL (EA) INTRAVENOUS EVERY 12 HOURS
Qty: 0 | Refills: 0 | Status: DISCONTINUED | OUTPATIENT
Start: 2018-07-31 | End: 2018-08-02

## 2018-07-30 RX ORDER — VANCOMYCIN HCL 1 G
VIAL (EA) INTRAVENOUS
Qty: 0 | Refills: 0 | Status: DISCONTINUED | OUTPATIENT
Start: 2018-07-30 | End: 2018-08-02

## 2018-07-30 RX ORDER — HYDROCHLOROTHIAZIDE 25 MG
25 TABLET ORAL DAILY
Qty: 0 | Refills: 0 | Status: DISCONTINUED | OUTPATIENT
Start: 2018-07-30 | End: 2018-08-05

## 2018-07-30 RX ADMIN — HYDROMORPHONE HYDROCHLORIDE 1 MILLIGRAM(S): 2 INJECTION INTRAMUSCULAR; INTRAVENOUS; SUBCUTANEOUS at 23:31

## 2018-07-30 RX ADMIN — HYDROMORPHONE HYDROCHLORIDE 1 MILLIGRAM(S): 2 INJECTION INTRAMUSCULAR; INTRAVENOUS; SUBCUTANEOUS at 19:11

## 2018-07-30 RX ADMIN — HYDROMORPHONE HYDROCHLORIDE 1 MILLIGRAM(S): 2 INJECTION INTRAMUSCULAR; INTRAVENOUS; SUBCUTANEOUS at 23:11

## 2018-07-30 RX ADMIN — Medication 200 MILLIGRAM(S): at 21:33

## 2018-07-30 RX ADMIN — HEPARIN SODIUM 7500 UNIT(S): 5000 INJECTION INTRAVENOUS; SUBCUTANEOUS at 21:33

## 2018-07-30 RX ADMIN — Medication 100 MILLIGRAM(S): at 21:33

## 2018-07-30 RX ADMIN — Medication 250 MILLIGRAM(S): at 19:12

## 2018-07-30 NOTE — H&P ADULT - HISTORY OF PRESENT ILLNESS
32F w/ HTN, LEFT foot AVM c/b recurrent, non-healing infra-malleolar ulcer. LLE angio ([facility]) 5/4/17 showed normal arterial flow to level of distal PT w/ AV shunting in region of known ulcer. Pt has since undergone multiple DSEs, most recently 5/26/18. Pt is followed by pain management MD who prescribes Percocet 10/325mg PO 5x/daily as needed for pain and Gabapentin 100mg PO QAM and 800mg PO QM. She states she also applies topical neosporin wraps to the the foot on a daily basis. Patient admitted and on 7/12/18 she underwent left heel wound debridement and wound VAC placement.     Presents today from Dr. Berkowitz's office with infected ulcer. 32F w/ HTN, LEFT foot AVM c/b recurrent, non-healing infra-malleolar ulcer. LLE angio ([facility]) 5/4/17 showed normal arterial flow to level of distal PT w/ AV shunting in region of known ulcer. Pt has since undergone multiple DSEs, most recently 5/26/18. Pt is followed by pain management MD who prescribes Percocet 10/325mg PO 5x/daily as needed for pain and Gabapentin 100mg PO QAM and 800mg PO QM. She states she also applies topical neosporin wraps to the the foot on a daily basis. Patient admitted and on 7/12/18 she underwent left heel wound debridement and wound VAC placement.     Presents today from Dr. Berkowitz's office with infected left foot ulcer. Patient had pain and noticed her wound was infected yesterday, opted to not reapply wound-vac, applied wet-to dry dressing and notified Dr. Berkowitz's office. Today, patient was seen in Dr. Berkowitz's office, ulcer found to be erythematous with necrotic core. Wet to dry dressing was changed and she was sent to the hospital for wound care and IV antibiotics.    Currently, the patient is febrile to 101.9 and has 9/10 pain in her left foot ulcer, she has not taken any of her pain medications today. Denies chills, chest pain/shortness of breath/palpitations, nausea/vomiting/diarrhea, urinary pain or frequency.

## 2018-07-30 NOTE — H&P ADULT - ASSESSMENT
32F w/ HTN, LEFT foot AVM c/b recurrent, non-healing infra-malleolar ulcer. 7/12/18 she underwent left heel wound debridement and wound VAC placement, now presenting with infected ulcer    -Antonacci protocol with ACE wrap  -Vanc/Zosyn  -f/u wound cultures from office 32F w/ HTN, LEFT foot AVM c/b recurrent, non-healing infra-malleolar ulcer. 7/12/18 she underwent left heel wound debridement and wound VAC placement, now presenting with infection and necrosis of ulcer, admitted for wound care and antibiotics    -Dilaudid PO PRN  -Antonacci protocol with ACE wrap  -Vanc/Cipro  - home meds: lisinopril, HCTZ, OCP  -f/u wound cultures from office   -f/u CXR, UA, BCx  -SQH, SCDs  -Diet:DASH  -Incentive Spirometry  -OOB, ambulate with assistance

## 2018-07-30 NOTE — H&P ADULT - NSHPPHYSICALEXAM_GEN_ALL_CORE
Vital Signs Last 24 Hrs  T(C): 38.8 (30 Jul 2018 16:55), Max: 38.8 (30 Jul 2018 16:55)  T(F): 101.9 (30 Jul 2018 16:55), Max: 101.9 (30 Jul 2018 16:55)  HR: 92 (30 Jul 2018 16:55) (92 - 92)  BP: 135/81 (30 Jul 2018 16:55) (135/81 - 135/81)  BP(mean): --  RR: 18 (30 Jul 2018 16:55) (18 - 18)  SpO2: 100% (30 Jul 2018 16:55) (100% - 100%)    General: NAD, Comfortable in bed      Neuro: A&Ox3  Respiratory: nonlabored breathing, no respiratory distress, CTAB  CV:  no palpable heaves/thrills, RRR, S1 S2, no m/r/g  Abd: obese, soft, nontender, nondistended  Extremities: WWP, nonedematous, SCDs in place  Vascular DP/PT palpable in RLE, LLE dressing clean, dry, intact

## 2018-07-31 ENCOUNTER — APPOINTMENT (OUTPATIENT)
Dept: VASCULAR SURGERY | Facility: CLINIC | Age: 33
End: 2018-07-31

## 2018-07-31 LAB
ANION GAP SERPL CALC-SCNC: 16 MMOL/L — SIGNIFICANT CHANGE UP (ref 5–17)
BUN SERPL-MCNC: 12 MG/DL — SIGNIFICANT CHANGE UP (ref 7–23)
CALCIUM SERPL-MCNC: 9.3 MG/DL — SIGNIFICANT CHANGE UP (ref 8.4–10.5)
CHLORIDE SERPL-SCNC: 99 MMOL/L — SIGNIFICANT CHANGE UP (ref 96–108)
CO2 SERPL-SCNC: 21 MMOL/L — LOW (ref 22–31)
CREAT SERPL-MCNC: 0.73 MG/DL — SIGNIFICANT CHANGE UP (ref 0.5–1.3)
GLUCOSE SERPL-MCNC: 102 MG/DL — HIGH (ref 70–99)
HCT VFR BLD CALC: 36.2 % — SIGNIFICANT CHANGE UP (ref 34.5–45)
HGB BLD-MCNC: 11.7 G/DL — SIGNIFICANT CHANGE UP (ref 11.5–15.5)
MAGNESIUM SERPL-MCNC: 1.8 MG/DL — SIGNIFICANT CHANGE UP (ref 1.6–2.6)
MCHC RBC-ENTMCNC: 28.7 PG — SIGNIFICANT CHANGE UP (ref 27–34)
MCHC RBC-ENTMCNC: 32.3 G/DL — SIGNIFICANT CHANGE UP (ref 32–36)
MCV RBC AUTO: 88.7 FL — SIGNIFICANT CHANGE UP (ref 80–100)
PHOSPHATE SERPL-MCNC: 3.2 MG/DL — SIGNIFICANT CHANGE UP (ref 2.5–4.5)
PLATELET # BLD AUTO: 353 K/UL — SIGNIFICANT CHANGE UP (ref 150–400)
POTASSIUM SERPL-MCNC: 3.8 MMOL/L — SIGNIFICANT CHANGE UP (ref 3.5–5.3)
POTASSIUM SERPL-SCNC: 3.8 MMOL/L — SIGNIFICANT CHANGE UP (ref 3.5–5.3)
RBC # BLD: 4.08 M/UL — SIGNIFICANT CHANGE UP (ref 3.8–5.2)
RBC # FLD: 12.6 % — SIGNIFICANT CHANGE UP (ref 10.3–16.9)
SODIUM SERPL-SCNC: 136 MMOL/L — SIGNIFICANT CHANGE UP (ref 135–145)
WBC # BLD: 7 K/UL — SIGNIFICANT CHANGE UP (ref 3.8–10.5)
WBC # FLD AUTO: 7 K/UL — SIGNIFICANT CHANGE UP (ref 3.8–10.5)

## 2018-07-31 PROCEDURE — 99231 SBSQ HOSP IP/OBS SF/LOW 25: CPT | Mod: GC

## 2018-07-31 RX ORDER — HYDROMORPHONE HYDROCHLORIDE 2 MG/ML
1 INJECTION INTRAMUSCULAR; INTRAVENOUS; SUBCUTANEOUS EVERY 4 HOURS
Qty: 0 | Refills: 0 | Status: DISCONTINUED | OUTPATIENT
Start: 2018-07-31 | End: 2018-08-04

## 2018-07-31 RX ORDER — HYDROMORPHONE HYDROCHLORIDE 2 MG/ML
2 INJECTION INTRAMUSCULAR; INTRAVENOUS; SUBCUTANEOUS EVERY 4 HOURS
Qty: 0 | Refills: 0 | Status: DISCONTINUED | OUTPATIENT
Start: 2018-07-31 | End: 2018-08-04

## 2018-07-31 RX ORDER — HYDROMORPHONE HYDROCHLORIDE 2 MG/ML
1 INJECTION INTRAMUSCULAR; INTRAVENOUS; SUBCUTANEOUS EVERY 6 HOURS
Qty: 0 | Refills: 0 | Status: DISCONTINUED | OUTPATIENT
Start: 2018-07-31 | End: 2018-07-31

## 2018-07-31 RX ORDER — MAGNESIUM SULFATE 500 MG/ML
1 VIAL (ML) INJECTION ONCE
Qty: 0 | Refills: 0 | Status: COMPLETED | OUTPATIENT
Start: 2018-07-31 | End: 2018-07-31

## 2018-07-31 RX ORDER — CIPROFLOXACIN LACTATE 400MG/40ML
500 VIAL (ML) INTRAVENOUS EVERY 12 HOURS
Qty: 0 | Refills: 0 | Status: DISCONTINUED | OUTPATIENT
Start: 2018-07-31 | End: 2018-08-02

## 2018-07-31 RX ORDER — HYDROMORPHONE HYDROCHLORIDE 2 MG/ML
2 INJECTION INTRAMUSCULAR; INTRAVENOUS; SUBCUTANEOUS EVERY 6 HOURS
Qty: 0 | Refills: 0 | Status: DISCONTINUED | OUTPATIENT
Start: 2018-07-31 | End: 2018-07-31

## 2018-07-31 RX ADMIN — HEPARIN SODIUM 7500 UNIT(S): 5000 INJECTION INTRAVENOUS; SUBCUTANEOUS at 05:08

## 2018-07-31 RX ADMIN — HYDROMORPHONE HYDROCHLORIDE 2 MILLIGRAM(S): 2 INJECTION INTRAMUSCULAR; INTRAVENOUS; SUBCUTANEOUS at 12:53

## 2018-07-31 RX ADMIN — HYDROMORPHONE HYDROCHLORIDE 2 MILLIGRAM(S): 2 INJECTION INTRAMUSCULAR; INTRAVENOUS; SUBCUTANEOUS at 17:23

## 2018-07-31 RX ADMIN — Medication 500 MILLIGRAM(S): at 09:40

## 2018-07-31 RX ADMIN — Medication 250 MILLIGRAM(S): at 17:22

## 2018-07-31 RX ADMIN — HEPARIN SODIUM 7500 UNIT(S): 5000 INJECTION INTRAVENOUS; SUBCUTANEOUS at 16:28

## 2018-07-31 RX ADMIN — Medication 100 MILLIGRAM(S): at 23:00

## 2018-07-31 RX ADMIN — Medication 500 MILLIGRAM(S): at 17:31

## 2018-07-31 RX ADMIN — HYDROMORPHONE HYDROCHLORIDE 2 MILLIGRAM(S): 2 INJECTION INTRAMUSCULAR; INTRAVENOUS; SUBCUTANEOUS at 21:50

## 2018-07-31 RX ADMIN — Medication 250 MILLIGRAM(S): at 05:08

## 2018-07-31 RX ADMIN — HYDROMORPHONE HYDROCHLORIDE 2 MILLIGRAM(S): 2 INJECTION INTRAMUSCULAR; INTRAVENOUS; SUBCUTANEOUS at 21:26

## 2018-07-31 RX ADMIN — HYDROMORPHONE HYDROCHLORIDE 1 MILLIGRAM(S): 2 INJECTION INTRAMUSCULAR; INTRAVENOUS; SUBCUTANEOUS at 07:35

## 2018-07-31 RX ADMIN — HYDROMORPHONE HYDROCHLORIDE 1 MILLIGRAM(S): 2 INJECTION INTRAMUSCULAR; INTRAVENOUS; SUBCUTANEOUS at 07:26

## 2018-07-31 RX ADMIN — HEPARIN SODIUM 7500 UNIT(S): 5000 INJECTION INTRAVENOUS; SUBCUTANEOUS at 23:00

## 2018-07-31 RX ADMIN — Medication 100 MILLIGRAM(S): at 16:29

## 2018-07-31 RX ADMIN — Medication 500 MILLIGRAM(S): at 17:22

## 2018-07-31 RX ADMIN — Medication 100 GRAM(S): at 09:40

## 2018-07-31 RX ADMIN — HYDROMORPHONE HYDROCHLORIDE 1 MILLIGRAM(S): 2 INJECTION INTRAMUSCULAR; INTRAVENOUS; SUBCUTANEOUS at 03:30

## 2018-07-31 RX ADMIN — HYDROMORPHONE HYDROCHLORIDE 1 MILLIGRAM(S): 2 INJECTION INTRAMUSCULAR; INTRAVENOUS; SUBCUTANEOUS at 03:20

## 2018-07-31 RX ADMIN — Medication 100 MILLIGRAM(S): at 05:08

## 2018-07-31 NOTE — PROGRESS NOTE ADULT - SUBJECTIVE AND OBJECTIVE BOX
24hr Events:  O/N: Dressing changed with bacitracin solution  7/30: admitted with wound infections following left heel debridement Fever 101.9, fever workup sent 24hr Events:  O/N: Dressing changed with bacitracin solution  7/30: admitted with wound infections following left heel debridement Fever 101.9, fever workup sent        Assessment/Plan:  32F w/ HTN, LEFT foot AVM c/b recurrent, non-healing infra-malleolar ulcer. 7/12/18 she underwent left heel wound debridement and wound VAC placement, now presenting with infection and necrosis of ulcer, admitted for wound care and antibiotics    -dilaudid prn  -Antonacci protocol with ACE wrap  -Vanc/Cipro  -f/u wound cultures from office   -SQH  -home medication  -F/u AM labs 24hr Events:  O/N: Dressing changed with bacitracin solution  7/30: admitted with wound infections following left heel debridement Fever 101.9, fever workup sent        Assessment/Plan:  32F w/ HTN, LEFT foot AVM c/b recurrent, non-healing infra-malleolar ulcer. 7/12/18 she underwent left heel wound debridement and wound VAC placement, now presenting with infection and necrosis of ulcer, admitted for wound care and antibiotics    -dilaudid prn  -Antonacci protocol with ACE wrap  -Vanc/Cipro  -f/u wound cultures from office   -SQH  -home medication  -F/u chest Xray read  -F/u Blood cultures  -F/u AM labs SUBJECTIVE:   Patient complains of pain   CXR negative, UA contaminated, BCx NGTD. switched to PO cipro. IV dilaudid to . dressing changed with bacitracin in AM  O/N: Dressing changed with bacitracin solution. Fever 101.9, fever workup sent  ---------------------------------------------------------------    Vital Signs Last 24 Hrs  T(C): 36.7 (2018 08:46), Max: 38.8 (2018 16:55)  T(F): 98 (2018 08:46), Max: 101.9 (2018 16:55)  HR: 84 (2018 08:46) (84 - 92)  BP: 122/82 (2018 08:46) (102/67 - 135/81)  BP(mean): --  RR: 16 (2018 08:46) (16 - 18)  SpO2: 99% (2018 08:46) (97% - 100%)    I&O's Summary    2018 07:  -  2018 07:00  --------------------------------------------------------  IN: 1420 mL / OUT: 1100 mL / NET: 320 mL    2018 07:01  -  2018 13:30  --------------------------------------------------------  IN: 100 mL / OUT: 0 mL / NET: 100 mL        ----------------------------------------------------------------    Physical Exam:  General: NAD, Comfortable in bed      	Neuro: A&Ox3  	Respiratory: nonlabored breathing, no respiratory distress, CTAB  	CV:  no palpable heaves/thrills, RRR, S1 S2, no m/r/g  	Abd: obese, soft, nontender, nondistended  	Extremities: WWP, nonedematous, SCDs in place  Vascular DP/PT palpable in RLE, LLE ulcer posterior to medial malleolus pink, improvement in surrounding erythema    -------------------------------------------------------------------    LABS:                        11.7   7.0   )-----------( 353      ( 2018 06:45 )             36.2     07-31    136  |  99  |  12  ----------------------------<  102<H>  3.8   |  21<L>  |  0.73    Ca    9.3      2018 06:45  Phos  3.2     07-31  Mg     1.8     07-31      PT/INR - ( 2018 19:19 )   PT: 12.0 sec;   INR: 1.08          PTT - ( 2018 19:19 )  PTT:28.3 sec  Urinalysis Basic - ( 2018 19:33 )    Color: Yellow / Appearance: Clear / S.025 / pH: x  Gluc: x / Ketone: NEGATIVE  / Bili: Negative / Urobili: 0.2 E.U./dL   Blood: x / Protein: NEGATIVE mg/dL / Nitrite: NEGATIVE   Leuk Esterase: Trace / RBC: 5-10 /HPF / WBC 5-10 /HPF   Sq Epi: x / Non Sq Epi: Moderate /HPF / Bacteria: Present /HPF        RADIOLOGY & ADDITIONAL STUDIES:

## 2018-08-01 LAB
ANION GAP SERPL CALC-SCNC: 13 MMOL/L — SIGNIFICANT CHANGE UP (ref 5–17)
BUN SERPL-MCNC: 13 MG/DL — SIGNIFICANT CHANGE UP (ref 7–23)
CALCIUM SERPL-MCNC: 8.7 MG/DL — SIGNIFICANT CHANGE UP (ref 8.4–10.5)
CHLORIDE SERPL-SCNC: 102 MMOL/L — SIGNIFICANT CHANGE UP (ref 96–108)
CO2 SERPL-SCNC: 22 MMOL/L — SIGNIFICANT CHANGE UP (ref 22–31)
CREAT SERPL-MCNC: 0.78 MG/DL — SIGNIFICANT CHANGE UP (ref 0.5–1.3)
GLUCOSE SERPL-MCNC: 93 MG/DL — SIGNIFICANT CHANGE UP (ref 70–99)
GRAM STN FLD: SIGNIFICANT CHANGE UP
HCT VFR BLD CALC: 37.2 % — SIGNIFICANT CHANGE UP (ref 34.5–45)
HGB BLD-MCNC: 11.5 G/DL — SIGNIFICANT CHANGE UP (ref 11.5–15.5)
MAGNESIUM SERPL-MCNC: 1.9 MG/DL — SIGNIFICANT CHANGE UP (ref 1.6–2.6)
MCHC RBC-ENTMCNC: 28.3 PG — SIGNIFICANT CHANGE UP (ref 27–34)
MCHC RBC-ENTMCNC: 30.9 G/DL — LOW (ref 32–36)
MCV RBC AUTO: 91.4 FL — SIGNIFICANT CHANGE UP (ref 80–100)
PHOSPHATE SERPL-MCNC: 3.4 MG/DL — SIGNIFICANT CHANGE UP (ref 2.5–4.5)
PLATELET # BLD AUTO: 342 K/UL — SIGNIFICANT CHANGE UP (ref 150–400)
POTASSIUM SERPL-MCNC: 3.9 MMOL/L — SIGNIFICANT CHANGE UP (ref 3.5–5.3)
POTASSIUM SERPL-SCNC: 3.9 MMOL/L — SIGNIFICANT CHANGE UP (ref 3.5–5.3)
RBC # BLD: 4.07 M/UL — SIGNIFICANT CHANGE UP (ref 3.8–5.2)
RBC # FLD: 12.5 % — SIGNIFICANT CHANGE UP (ref 10.3–16.9)
SODIUM SERPL-SCNC: 137 MMOL/L — SIGNIFICANT CHANGE UP (ref 135–145)
SPECIMEN SOURCE: SIGNIFICANT CHANGE UP
VANCOMYCIN TROUGH SERPL-MCNC: 15.1 UG/ML — SIGNIFICANT CHANGE UP (ref 10–20)
WBC # BLD: 7.2 K/UL — SIGNIFICANT CHANGE UP (ref 3.8–10.5)
WBC # FLD AUTO: 7.2 K/UL — SIGNIFICANT CHANGE UP (ref 3.8–10.5)

## 2018-08-01 PROCEDURE — 99231 SBSQ HOSP IP/OBS SF/LOW 25: CPT | Mod: GC

## 2018-08-01 RX ORDER — MAGNESIUM SULFATE 500 MG/ML
1 VIAL (ML) INJECTION ONCE
Qty: 0 | Refills: 0 | Status: COMPLETED | OUTPATIENT
Start: 2018-08-01 | End: 2018-08-01

## 2018-08-01 RX ORDER — POTASSIUM CHLORIDE 20 MEQ
20 PACKET (EA) ORAL ONCE
Qty: 0 | Refills: 0 | Status: COMPLETED | OUTPATIENT
Start: 2018-08-01 | End: 2018-08-01

## 2018-08-01 RX ADMIN — HYDROMORPHONE HYDROCHLORIDE 2 MILLIGRAM(S): 2 INJECTION INTRAMUSCULAR; INTRAVENOUS; SUBCUTANEOUS at 19:52

## 2018-08-01 RX ADMIN — Medication 250 MILLIGRAM(S): at 18:34

## 2018-08-01 RX ADMIN — HYDROMORPHONE HYDROCHLORIDE 2 MILLIGRAM(S): 2 INJECTION INTRAMUSCULAR; INTRAVENOUS; SUBCUTANEOUS at 23:50

## 2018-08-01 RX ADMIN — Medication 250 MILLIGRAM(S): at 07:12

## 2018-08-01 RX ADMIN — Medication 20 MILLIEQUIVALENT(S): at 10:21

## 2018-08-01 RX ADMIN — HYDROMORPHONE HYDROCHLORIDE 2 MILLIGRAM(S): 2 INJECTION INTRAMUSCULAR; INTRAVENOUS; SUBCUTANEOUS at 11:10

## 2018-08-01 RX ADMIN — Medication 100 MILLIGRAM(S): at 07:08

## 2018-08-01 RX ADMIN — HYDROMORPHONE HYDROCHLORIDE 2 MILLIGRAM(S): 2 INJECTION INTRAMUSCULAR; INTRAVENOUS; SUBCUTANEOUS at 11:39

## 2018-08-01 RX ADMIN — Medication 100 GRAM(S): at 10:21

## 2018-08-01 RX ADMIN — HEPARIN SODIUM 7500 UNIT(S): 5000 INJECTION INTRAVENOUS; SUBCUTANEOUS at 15:15

## 2018-08-01 RX ADMIN — HEPARIN SODIUM 7500 UNIT(S): 5000 INJECTION INTRAVENOUS; SUBCUTANEOUS at 07:07

## 2018-08-01 RX ADMIN — HYDROMORPHONE HYDROCHLORIDE 2 MILLIGRAM(S): 2 INJECTION INTRAMUSCULAR; INTRAVENOUS; SUBCUTANEOUS at 16:12

## 2018-08-01 RX ADMIN — Medication 500 MILLIGRAM(S): at 18:34

## 2018-08-01 RX ADMIN — HEPARIN SODIUM 7500 UNIT(S): 5000 INJECTION INTRAVENOUS; SUBCUTANEOUS at 22:54

## 2018-08-01 RX ADMIN — HYDROMORPHONE HYDROCHLORIDE 2 MILLIGRAM(S): 2 INJECTION INTRAMUSCULAR; INTRAVENOUS; SUBCUTANEOUS at 02:33

## 2018-08-01 RX ADMIN — HYDROMORPHONE HYDROCHLORIDE 2 MILLIGRAM(S): 2 INJECTION INTRAMUSCULAR; INTRAVENOUS; SUBCUTANEOUS at 19:24

## 2018-08-01 RX ADMIN — HYDROMORPHONE HYDROCHLORIDE 2 MILLIGRAM(S): 2 INJECTION INTRAMUSCULAR; INTRAVENOUS; SUBCUTANEOUS at 23:33

## 2018-08-01 RX ADMIN — HYDROMORPHONE HYDROCHLORIDE 2 MILLIGRAM(S): 2 INJECTION INTRAMUSCULAR; INTRAVENOUS; SUBCUTANEOUS at 02:23

## 2018-08-01 RX ADMIN — HYDROMORPHONE HYDROCHLORIDE 2 MILLIGRAM(S): 2 INJECTION INTRAMUSCULAR; INTRAVENOUS; SUBCUTANEOUS at 15:16

## 2018-08-01 RX ADMIN — Medication 100 MILLIGRAM(S): at 22:54

## 2018-08-01 RX ADMIN — HYDROMORPHONE HYDROCHLORIDE 2 MILLIGRAM(S): 2 INJECTION INTRAMUSCULAR; INTRAVENOUS; SUBCUTANEOUS at 07:30

## 2018-08-01 RX ADMIN — Medication 100 MILLIGRAM(S): at 15:16

## 2018-08-01 RX ADMIN — HYDROMORPHONE HYDROCHLORIDE 2 MILLIGRAM(S): 2 INJECTION INTRAMUSCULAR; INTRAVENOUS; SUBCUTANEOUS at 06:57

## 2018-08-01 NOTE — PROGRESS NOTE ADULT - SUBJECTIVE AND OBJECTIVE BOX
24hr Events:  O/N: Dressing changed, VSS  7/31 CXR negative, UA contaminated, BCx NGTD. switched to PO cipro. IV dilaudid to 1/2 q4. dressing changed with bacitracin in AM, gentamicin in PM        Assessment/Pan;  32F w/ HTN, LEFT foot AVM c/b recurrent, non-healing infra-malleolar ulcer. 7/12/18 she underwent left heel wound debridement and wound VAC placement, now presenting with infection and necrosis of ulcer, admitted for wound care and antibiotics    -dilaudid prn  -Antonacci protocol with ACE wrap     -AM bacitracin      -PM gentamicin     -O/N amphotericin  -Vanc IV/ Cipro PO  -f/u wound cultures from office   -SQH  -home medication 24hr Events:  O/N: Dressing changed, VSS   CXR negative, UA contaminated, BCx NGTD. switched to PO cipro. IV dilaudid to 1/2 q4. dressing changed with bacitracin in AM, gentamicin in PM    SUBJECTIVE:  pt seen at bedside, without complaints at this time    MEDICATIONS  (STANDING):  Amphotericin B (Conventional) 50 milliGRAM(s),Sterile water for irrigation 1000 milliLiter(s) 50 milliGRAM(s) Irrigation daily  Bacitracin 05079 Unit(s),Sodium Chlorie 0.9% 1000 milliLiter(s) 06123 Unit(s) Irrigation daily  ciprofloxacin     Tablet 500 milliGRAM(s) Oral every 12 hours  docusate sodium 100 milliGRAM(s) Oral three times a day  Gentamicin 80 milliGRAM(s),Sodium Chloride 0.9% 1000 milliLiter(s) 80 milliGRAM(s) Irrigation daily  heparin  Injectable 7500 Unit(s) SubCutaneous every 8 hours  hydrochlorothiazide 25 milliGRAM(s) Oral daily  lisinopril 10 milliGRAM(s) Oral daily  magnesium sulfate  IVPB 1 Gram(s) IV Intermittent once  potassium chloride   Powder 20 milliEquivalent(s) Oral once  vancomycin  IVPB 1750 milliGRAM(s) IV Intermittent every 12 hours  vancomycin  IVPB        MEDICATIONS  (PRN):  HYDROmorphone  Injectable 1 milliGRAM(s) IV Push every 4 hours PRN Moderate Pain (4 - 6)  HYDROmorphone  Injectable 2 milliGRAM(s) IV Push every 4 hours PRN Severe Pain (7 - 10)      Vital Signs Last 24 Hrs  T(C): 36.5 (01 Aug 2018 05:37), Max: 37.1 (2018 21:07)  T(F): 97.7 (01 Aug 2018 05:37), Max: 98.7 (2018 21:07)  HR: 75 (01 Aug 2018 05:37) (75 - 93)  BP: 106/72 (01 Aug 2018 05:37) (106/72 - 130/83)  BP(mean): --  RR: 17 (01 Aug 2018 05:37) (16 - 18)  SpO2: 99% (01 Aug 2018 05:37) (97% - 100%)    PHYSICAL EXAM:      Constitutional: A&Ox3    Respiratory: non labored breathing, no respiratory distress    Cardiovascular: NSR, RRR    Gastrointestinal: soft, nontender, nondistended     Extremities:  nonedematous, SCDs in place, LLE ulcer posterior to medical malleolus with improving erythema, tissue is pink and granulating, DP/PT palpable in RLE      I&O's Detail    2018 07:01  -  01 Aug 2018 07:00  --------------------------------------------------------  IN:    Oral Fluid: 380 mL    Solution: 100 mL  Total IN: 480 mL    OUT:  Total OUT: 0 mL    Total NET: 480 mL          LABS:                        11.5   7.2   )-----------( 342      ( 01 Aug 2018 06:32 )             37.2     08-    137  |  102  |  13  ----------------------------<  93  3.9   |  22  |  0.78    Ca    8.7      01 Aug 2018 06:32  Phos  3.4     08-  Mg     1.9     08-      PT/INR - ( 2018 19:19 )   PT: 12.0 sec;   INR: 1.08          PTT - ( 2018 19:19 )  PTT:28.3 sec  Urinalysis Basic - ( 2018 19:33 )    Color: Yellow / Appearance: Clear / S.025 / pH: x  Gluc: x / Ketone: NEGATIVE  / Bili: Negative / Urobili: 0.2 E.U./dL   Blood: x / Protein: NEGATIVE mg/dL / Nitrite: NEGATIVE   Leuk Esterase: Trace / RBC: 5-10 /HPF / WBC 5-10 /HPF   Sq Epi: x / Non Sq Epi: Moderate /HPF / Bacteria: Present /HPF        RADIOLOGY & ADDITIONAL STUDIES:        Assessment/Pan;  32F w/ HTN, LEFT foot AVM c/b recurrent, non-healing infra-malleolar ulcer. 18 she underwent left heel wound debridement and wound VAC placement, now presenting with infection and necrosis of ulcer, admitted for wound care and antibiotics    -dilaudid prn  -Antonacci protocol with ACE wrap     -AM bacitracin      -PM gentamicin     -O/N amphotericin  -Vanc IV/ Cipro PO  -f/u wound cultures from office   -SQH  -home medication 24hr Events:  O/N: Dressing changed, VSS   CXR negative, UA contaminated, BCx NGTD. switched to PO cipro. IV dilaudid to 1/2 q4. dressing changed with bacitracin in AM, gentamicin in PM    SUBJECTIVE:  pt seen at bedside, without complaints at this time    MEDICATIONS  (STANDING):  Amphotericin B (Conventional) 50 milliGRAM(s),Sterile water for irrigation 1000 milliLiter(s) 50 milliGRAM(s) Irrigation daily  Bacitracin 51663 Unit(s),Sodium Chlorie 0.9% 1000 milliLiter(s) 58770 Unit(s) Irrigation daily  ciprofloxacin     Tablet 500 milliGRAM(s) Oral every 12 hours  docusate sodium 100 milliGRAM(s) Oral three times a day  Gentamicin 80 milliGRAM(s),Sodium Chloride 0.9% 1000 milliLiter(s) 80 milliGRAM(s) Irrigation daily  heparin  Injectable 7500 Unit(s) SubCutaneous every 8 hours  hydrochlorothiazide 25 milliGRAM(s) Oral daily  lisinopril 10 milliGRAM(s) Oral daily  magnesium sulfate  IVPB 1 Gram(s) IV Intermittent once  potassium chloride   Powder 20 milliEquivalent(s) Oral once  vancomycin  IVPB 1750 milliGRAM(s) IV Intermittent every 12 hours  vancomycin  IVPB        MEDICATIONS  (PRN):  HYDROmorphone  Injectable 1 milliGRAM(s) IV Push every 4 hours PRN Moderate Pain (4 - 6)  HYDROmorphone  Injectable 2 milliGRAM(s) IV Push every 4 hours PRN Severe Pain (7 - 10)      Vital Signs Last 24 Hrs  T(C): 36.5 (01 Aug 2018 05:37), Max: 37.1 (2018 21:07)  T(F): 97.7 (01 Aug 2018 05:37), Max: 98.7 (2018 21:07)  HR: 75 (01 Aug 2018 05:37) (75 - 93)  BP: 106/72 (01 Aug 2018 05:37) (106/72 - 130/83)  BP(mean): --  RR: 17 (01 Aug 2018 05:37) (16 - 18)  SpO2: 99% (01 Aug 2018 05:37) (97% - 100%)    PHYSICAL EXAM:      Constitutional: A&Ox3    Respiratory: non labored breathing, no respiratory distress    Cardiovascular: NSR, RRR    Gastrointestinal: soft, nontender, nondistended     Extremities:  nonedematous, SCDs in place, LLE ulcer posterior to medical malleolus with improving erythema, tissue is pink and granulating, DP/PT palpable in RLE      I&O's Detail    2018 07:01  -  01 Aug 2018 07:00  --------------------------------------------------------  IN:    Oral Fluid: 380 mL    Solution: 100 mL  Total IN: 480 mL    OUT:  Total OUT: 0 mL    Total NET: 480 mL          LABS:                        11.5   7.2   )-----------( 342      ( 01 Aug 2018 06:32 )             37.2     08-    137  |  102  |  13  ----------------------------<  93  3.9   |  22  |  0.78    Ca    8.7      01 Aug 2018 06:32  Phos  3.4     08-  Mg     1.9     08-      PT/INR - ( 2018 19:19 )   PT: 12.0 sec;   INR: 1.08          PTT - ( 2018 19:19 )  PTT:28.3 sec  Urinalysis Basic - ( 2018 19:33 )    Color: Yellow / Appearance: Clear / S.025 / pH: x  Gluc: x / Ketone: NEGATIVE  / Bili: Negative / Urobili: 0.2 E.U./dL   Blood: x / Protein: NEGATIVE mg/dL / Nitrite: NEGATIVE   Leuk Esterase: Trace / RBC: 5-10 /HPF / WBC 5-10 /HPF   Sq Epi: x / Non Sq Epi: Moderate /HPF / Bacteria: Present /HPF        RADIOLOGY & ADDITIONAL STUDIES:        Assessment/Pan;  32F w/ HTN, morbid obesity, LEFT foot AVM c/b recurrent, non-healing infra-malleolar ulcer. 18 she underwent left heel wound debridement and wound VAC placement, now presenting with infection and necrosis of ulcer, admitted for wound care and antibiotics    -dilaudid prn  -Antonacci protocol with ACE wrap     -AM bacitracin      -PM gentamicin     -O/N amphotericin  -Vanc IV/ Cipro PO  -f/u wound cultures from office   -SQH  -home medication

## 2018-08-01 NOTE — PROGRESS NOTE ADULT - ATTENDING COMMENTS
Patient doing well    Pain controlled    Wound improving. Erythema better. Wound granulating. Slough has been removed with wet to dry dressings    Plan  1) F/u cultures  2) Continue abx and wound care  3) Aim home Friday with VNS

## 2018-08-02 LAB
-  CEFAZOLIN: SIGNIFICANT CHANGE UP
-  CLINDAMYCIN: SIGNIFICANT CHANGE UP
-  ERYTHROMYCIN: SIGNIFICANT CHANGE UP
-  LINEZOLID: SIGNIFICANT CHANGE UP
-  OXACILLIN: SIGNIFICANT CHANGE UP
-  PENICILLIN: SIGNIFICANT CHANGE UP
-  RIFAMPIN: SIGNIFICANT CHANGE UP
-  TRIMETHOPRIM/SULFAMETHOXAZOLE: SIGNIFICANT CHANGE UP
-  VANCOMYCIN: SIGNIFICANT CHANGE UP
ANION GAP SERPL CALC-SCNC: 14 MMOL/L — SIGNIFICANT CHANGE UP (ref 5–17)
BUN SERPL-MCNC: 11 MG/DL — SIGNIFICANT CHANGE UP (ref 7–23)
CALCIUM SERPL-MCNC: 8.6 MG/DL — SIGNIFICANT CHANGE UP (ref 8.4–10.5)
CHLORIDE SERPL-SCNC: 100 MMOL/L — SIGNIFICANT CHANGE UP (ref 96–108)
CO2 SERPL-SCNC: 22 MMOL/L — SIGNIFICANT CHANGE UP (ref 22–31)
CREAT SERPL-MCNC: 0.77 MG/DL — SIGNIFICANT CHANGE UP (ref 0.5–1.3)
CULTURE RESULTS: SIGNIFICANT CHANGE UP
GLUCOSE SERPL-MCNC: 87 MG/DL — SIGNIFICANT CHANGE UP (ref 70–99)
HCT VFR BLD CALC: 35.9 % — SIGNIFICANT CHANGE UP (ref 34.5–45)
HGB BLD-MCNC: 11.3 G/DL — LOW (ref 11.5–15.5)
MAGNESIUM SERPL-MCNC: 1.7 MG/DL — SIGNIFICANT CHANGE UP (ref 1.6–2.6)
MCHC RBC-ENTMCNC: 28.5 PG — SIGNIFICANT CHANGE UP (ref 27–34)
MCHC RBC-ENTMCNC: 31.5 G/DL — LOW (ref 32–36)
MCV RBC AUTO: 90.4 FL — SIGNIFICANT CHANGE UP (ref 80–100)
METHOD TYPE: SIGNIFICANT CHANGE UP
ORGANISM # SPEC MICROSCOPIC CNT: SIGNIFICANT CHANGE UP
ORGANISM # SPEC MICROSCOPIC CNT: SIGNIFICANT CHANGE UP
PHOSPHATE SERPL-MCNC: 3.6 MG/DL — SIGNIFICANT CHANGE UP (ref 2.5–4.5)
PLATELET # BLD AUTO: 352 K/UL — SIGNIFICANT CHANGE UP (ref 150–400)
POTASSIUM SERPL-MCNC: 4 MMOL/L — SIGNIFICANT CHANGE UP (ref 3.5–5.3)
POTASSIUM SERPL-SCNC: 4 MMOL/L — SIGNIFICANT CHANGE UP (ref 3.5–5.3)
RBC # BLD: 3.97 M/UL — SIGNIFICANT CHANGE UP (ref 3.8–5.2)
RBC # FLD: 12.4 % — SIGNIFICANT CHANGE UP (ref 10.3–16.9)
SODIUM SERPL-SCNC: 136 MMOL/L — SIGNIFICANT CHANGE UP (ref 135–145)
SPECIMEN SOURCE: SIGNIFICANT CHANGE UP
VANCOMYCIN TROUGH SERPL-MCNC: 11.2 UG/ML — SIGNIFICANT CHANGE UP (ref 10–20)
WBC # BLD: 6.9 K/UL — SIGNIFICANT CHANGE UP (ref 3.8–10.5)
WBC # FLD AUTO: 6.9 K/UL — SIGNIFICANT CHANGE UP (ref 3.8–10.5)

## 2018-08-02 PROCEDURE — 99231 SBSQ HOSP IP/OBS SF/LOW 25: CPT | Mod: GC

## 2018-08-02 RX ORDER — METRONIDAZOLE 500 MG
500 TABLET ORAL EVERY 8 HOURS
Qty: 0 | Refills: 0 | Status: DISCONTINUED | OUTPATIENT
Start: 2018-08-02 | End: 2018-08-05

## 2018-08-02 RX ORDER — MAGNESIUM SULFATE 500 MG/ML
1 VIAL (ML) INJECTION ONCE
Qty: 0 | Refills: 0 | Status: COMPLETED | OUTPATIENT
Start: 2018-08-02 | End: 2018-08-02

## 2018-08-02 RX ADMIN — Medication 100 MILLIGRAM(S): at 06:39

## 2018-08-02 RX ADMIN — HEPARIN SODIUM 7500 UNIT(S): 5000 INJECTION INTRAVENOUS; SUBCUTANEOUS at 21:21

## 2018-08-02 RX ADMIN — Medication 500 MILLIGRAM(S): at 06:39

## 2018-08-02 RX ADMIN — HYDROMORPHONE HYDROCHLORIDE 2 MILLIGRAM(S): 2 INJECTION INTRAMUSCULAR; INTRAVENOUS; SUBCUTANEOUS at 07:39

## 2018-08-02 RX ADMIN — Medication 100 MILLIGRAM(S): at 21:21

## 2018-08-02 RX ADMIN — HEPARIN SODIUM 7500 UNIT(S): 5000 INJECTION INTRAVENOUS; SUBCUTANEOUS at 06:39

## 2018-08-02 RX ADMIN — Medication 100 GRAM(S): at 11:55

## 2018-08-02 RX ADMIN — Medication 25 MILLIGRAM(S): at 06:39

## 2018-08-02 RX ADMIN — Medication 100 MILLIGRAM(S): at 13:09

## 2018-08-02 RX ADMIN — HYDROMORPHONE HYDROCHLORIDE 2 MILLIGRAM(S): 2 INJECTION INTRAMUSCULAR; INTRAVENOUS; SUBCUTANEOUS at 17:00

## 2018-08-02 RX ADMIN — Medication 1 TABLET(S): at 18:18

## 2018-08-02 RX ADMIN — HYDROMORPHONE HYDROCHLORIDE 2 MILLIGRAM(S): 2 INJECTION INTRAMUSCULAR; INTRAVENOUS; SUBCUTANEOUS at 03:35

## 2018-08-02 RX ADMIN — Medication 500 MILLIGRAM(S): at 18:18

## 2018-08-02 RX ADMIN — HYDROMORPHONE HYDROCHLORIDE 2 MILLIGRAM(S): 2 INJECTION INTRAMUSCULAR; INTRAVENOUS; SUBCUTANEOUS at 11:55

## 2018-08-02 RX ADMIN — Medication 250 MILLIGRAM(S): at 06:38

## 2018-08-02 RX ADMIN — HYDROMORPHONE HYDROCHLORIDE 2 MILLIGRAM(S): 2 INJECTION INTRAMUSCULAR; INTRAVENOUS; SUBCUTANEOUS at 07:57

## 2018-08-02 RX ADMIN — HYDROMORPHONE HYDROCHLORIDE 2 MILLIGRAM(S): 2 INJECTION INTRAMUSCULAR; INTRAVENOUS; SUBCUTANEOUS at 13:00

## 2018-08-02 RX ADMIN — HEPARIN SODIUM 7500 UNIT(S): 5000 INJECTION INTRAVENOUS; SUBCUTANEOUS at 13:09

## 2018-08-02 RX ADMIN — HYDROMORPHONE HYDROCHLORIDE 2 MILLIGRAM(S): 2 INJECTION INTRAMUSCULAR; INTRAVENOUS; SUBCUTANEOUS at 03:50

## 2018-08-02 RX ADMIN — HYDROMORPHONE HYDROCHLORIDE 2 MILLIGRAM(S): 2 INJECTION INTRAMUSCULAR; INTRAVENOUS; SUBCUTANEOUS at 19:56

## 2018-08-02 RX ADMIN — HYDROMORPHONE HYDROCHLORIDE 2 MILLIGRAM(S): 2 INJECTION INTRAMUSCULAR; INTRAVENOUS; SUBCUTANEOUS at 15:57

## 2018-08-02 RX ADMIN — LISINOPRIL 10 MILLIGRAM(S): 2.5 TABLET ORAL at 06:39

## 2018-08-02 NOTE — PROGRESS NOTE ADULT - SUBJECTIVE AND OBJECTIVE BOX
O/N: ISABELLE, VSS, Dressing chg'd                          Assessment/Pan;  32F w/ HTN, morbid obesity, LEFT foot AVM c/b recurrent, non-healing infra-malleolar ulcer. 7/12/18 she underwent left heel wound debridement and wound VAC placement, now presenting with infection and necrosis of ulcer, admitted for wound care and antibiotics    -dilaudid prn  -Antonacci protocol with ACE wrap     -AM bacitracin      -PM gentamicin     -O/N amphotericin  -Vanc IV/ Cipro PO  -f/u wound cultures from office   -SQH  -home medication O/N: ISABELLE, VSS, Dressing chg'd    SUBJECTIVE:  pt seen at bedside, without complaints at this time    MEDICATIONS  (STANDING):  Amphotericin B (Conventional) 50 milliGRAM(s),Sterile water for irrigation 1000 milliLiter(s) 50 milliGRAM(s) Irrigation daily  Bacitracin 43663 Unit(s),Sodium Chlorie 0.9% 1000 milliLiter(s) 18009 Unit(s) Irrigation daily  ciprofloxacin     Tablet 500 milliGRAM(s) Oral every 12 hours  docusate sodium 100 milliGRAM(s) Oral three times a day  Gentamicin 80 milliGRAM(s),Sodium Chloride 0.9% 1000 milliLiter(s) 80 milliGRAM(s) Irrigation daily  heparin  Injectable 7500 Unit(s) SubCutaneous every 8 hours  hydrochlorothiazide 25 milliGRAM(s) Oral daily  lisinopril 10 milliGRAM(s) Oral daily  magnesium sulfate  IVPB 1 Gram(s) IV Intermittent once  vancomycin  IVPB 1750 milliGRAM(s) IV Intermittent every 12 hours  vancomycin  IVPB        MEDICATIONS  (PRN):  HYDROmorphone  Injectable 1 milliGRAM(s) IV Push every 4 hours PRN Moderate Pain (4 - 6)  HYDROmorphone  Injectable 2 milliGRAM(s) IV Push every 4 hours PRN Severe Pain (7 - 10)      Vital Signs Last 24 Hrs  T(C): 36.6 (02 Aug 2018 05:40), Max: 36.9 (01 Aug 2018 13:27)  T(F): 97.9 (02 Aug 2018 05:40), Max: 98.5 (01 Aug 2018 13:27)  HR: 81 (02 Aug 2018 05:40) (79 - 87)  BP: 118/80 (02 Aug 2018 05:40) (98/71 - 120/81)  BP(mean): --  RR: 17 (02 Aug 2018 05:40) (17 - 18)  SpO2: 97% (02 Aug 2018 05:40) (96% - 98%)    PHYSICAL EXAM:      Constitutional: A&Ox3    Respiratory: non labored breathing, no respiratory distress    Cardiovascular: NSR, RRR    Gastrointestinal: soft, nontender, nondistended    Extremities: (-) edema, RLE wound improved, unsaturated dressing        I&O's Detail    01 Aug 2018 07:01  -  02 Aug 2018 07:00  --------------------------------------------------------  IN:    Oral Fluid: 560 mL    Solution: 500 mL    Solution: 100 mL  Total IN: 1160 mL    OUT:    Voided: 900 mL  Total OUT: 900 mL    Total NET: 260 mL          LABS:                        11.3   6.9   )-----------( 352      ( 02 Aug 2018 05:56 )             35.9     08-02    136  |  100  |  11  ----------------------------<  87  4.0   |  22  |  0.77    Ca    8.6      02 Aug 2018 05:55  Phos  3.6     08-02  Mg     1.7     08-02            RADIOLOGY & ADDITIONAL STUDIES:            Assessment/Pan;  32F w/ HTN, morbid obesity, LEFT foot AVM c/b recurrent, non-healing infra-malleolar ulcer. 7/12/18 she underwent left heel wound debridement and wound VAC placement, now presenting with infection and necrosis of ulcer, admitted for wound care and antibiotics    -dilaudid prn  -Antonacci protocol with ACE wrap     -AM bacitracin      -PM gentamicin     -O/N amphotericin  -Vanc IV/ Cipro PO  -f/u wound cultures from office   -SQH  -home medication

## 2018-08-03 ENCOUNTER — TRANSCRIPTION ENCOUNTER (OUTPATIENT)
Age: 33
End: 2018-08-03

## 2018-08-03 LAB
ANION GAP SERPL CALC-SCNC: 13 MMOL/L — SIGNIFICANT CHANGE UP (ref 5–17)
BUN SERPL-MCNC: 12 MG/DL — SIGNIFICANT CHANGE UP (ref 7–23)
CALCIUM SERPL-MCNC: 9.6 MG/DL — SIGNIFICANT CHANGE UP (ref 8.4–10.5)
CHLORIDE SERPL-SCNC: 97 MMOL/L — SIGNIFICANT CHANGE UP (ref 96–108)
CO2 SERPL-SCNC: 27 MMOL/L — SIGNIFICANT CHANGE UP (ref 22–31)
CREAT SERPL-MCNC: 1.05 MG/DL — SIGNIFICANT CHANGE UP (ref 0.5–1.3)
GLUCOSE SERPL-MCNC: 92 MG/DL — SIGNIFICANT CHANGE UP (ref 70–99)
HCT VFR BLD CALC: 39.5 % — SIGNIFICANT CHANGE UP (ref 34.5–45)
HGB BLD-MCNC: 12.5 G/DL — SIGNIFICANT CHANGE UP (ref 11.5–15.5)
MAGNESIUM SERPL-MCNC: 1.8 MG/DL — SIGNIFICANT CHANGE UP (ref 1.6–2.6)
MCHC RBC-ENTMCNC: 28.7 PG — SIGNIFICANT CHANGE UP (ref 27–34)
MCHC RBC-ENTMCNC: 31.6 G/DL — LOW (ref 32–36)
MCV RBC AUTO: 90.6 FL — SIGNIFICANT CHANGE UP (ref 80–100)
PHOSPHATE SERPL-MCNC: 3.9 MG/DL — SIGNIFICANT CHANGE UP (ref 2.5–4.5)
PLATELET # BLD AUTO: 407 K/UL — HIGH (ref 150–400)
POTASSIUM SERPL-MCNC: 4.4 MMOL/L — SIGNIFICANT CHANGE UP (ref 3.5–5.3)
POTASSIUM SERPL-SCNC: 4.4 MMOL/L — SIGNIFICANT CHANGE UP (ref 3.5–5.3)
RBC # BLD: 4.36 M/UL — SIGNIFICANT CHANGE UP (ref 3.8–5.2)
RBC # FLD: 12.6 % — SIGNIFICANT CHANGE UP (ref 10.3–16.9)
SODIUM SERPL-SCNC: 137 MMOL/L — SIGNIFICANT CHANGE UP (ref 135–145)
WBC # BLD: 8.6 K/UL — SIGNIFICANT CHANGE UP (ref 3.8–10.5)
WBC # FLD AUTO: 8.6 K/UL — SIGNIFICANT CHANGE UP (ref 3.8–10.5)

## 2018-08-03 PROCEDURE — 99231 SBSQ HOSP IP/OBS SF/LOW 25: CPT | Mod: GC

## 2018-08-03 RX ORDER — MAGNESIUM SULFATE 500 MG/ML
1 VIAL (ML) INJECTION ONCE
Qty: 0 | Refills: 0 | Status: COMPLETED | OUTPATIENT
Start: 2018-08-03 | End: 2018-08-03

## 2018-08-03 RX ADMIN — HYDROMORPHONE HYDROCHLORIDE 2 MILLIGRAM(S): 2 INJECTION INTRAMUSCULAR; INTRAVENOUS; SUBCUTANEOUS at 21:50

## 2018-08-03 RX ADMIN — HEPARIN SODIUM 7500 UNIT(S): 5000 INJECTION INTRAVENOUS; SUBCUTANEOUS at 06:07

## 2018-08-03 RX ADMIN — HYDROMORPHONE HYDROCHLORIDE 2 MILLIGRAM(S): 2 INJECTION INTRAMUSCULAR; INTRAVENOUS; SUBCUTANEOUS at 04:22

## 2018-08-03 RX ADMIN — Medication 500 MILLIGRAM(S): at 06:07

## 2018-08-03 RX ADMIN — Medication 500 MILLIGRAM(S): at 14:11

## 2018-08-03 RX ADMIN — Medication 100 MILLIGRAM(S): at 14:10

## 2018-08-03 RX ADMIN — HYDROMORPHONE HYDROCHLORIDE 2 MILLIGRAM(S): 2 INJECTION INTRAMUSCULAR; INTRAVENOUS; SUBCUTANEOUS at 09:57

## 2018-08-03 RX ADMIN — Medication 100 GRAM(S): at 14:10

## 2018-08-03 RX ADMIN — Medication 2 TABLET(S): at 18:07

## 2018-08-03 RX ADMIN — HYDROMORPHONE HYDROCHLORIDE 2 MILLIGRAM(S): 2 INJECTION INTRAMUSCULAR; INTRAVENOUS; SUBCUTANEOUS at 18:40

## 2018-08-03 RX ADMIN — HYDROMORPHONE HYDROCHLORIDE 2 MILLIGRAM(S): 2 INJECTION INTRAMUSCULAR; INTRAVENOUS; SUBCUTANEOUS at 12:55

## 2018-08-03 RX ADMIN — HYDROMORPHONE HYDROCHLORIDE 2 MILLIGRAM(S): 2 INJECTION INTRAMUSCULAR; INTRAVENOUS; SUBCUTANEOUS at 08:19

## 2018-08-03 RX ADMIN — HYDROMORPHONE HYDROCHLORIDE 2 MILLIGRAM(S): 2 INJECTION INTRAMUSCULAR; INTRAVENOUS; SUBCUTANEOUS at 04:07

## 2018-08-03 RX ADMIN — HYDROMORPHONE HYDROCHLORIDE 2 MILLIGRAM(S): 2 INJECTION INTRAMUSCULAR; INTRAVENOUS; SUBCUTANEOUS at 00:01

## 2018-08-03 RX ADMIN — HEPARIN SODIUM 7500 UNIT(S): 5000 INJECTION INTRAVENOUS; SUBCUTANEOUS at 21:18

## 2018-08-03 RX ADMIN — Medication 25 MILLIGRAM(S): at 06:07

## 2018-08-03 RX ADMIN — LISINOPRIL 10 MILLIGRAM(S): 2.5 TABLET ORAL at 06:07

## 2018-08-03 RX ADMIN — HYDROMORPHONE HYDROCHLORIDE 2 MILLIGRAM(S): 2 INJECTION INTRAMUSCULAR; INTRAVENOUS; SUBCUTANEOUS at 21:18

## 2018-08-03 RX ADMIN — Medication 100 MILLIGRAM(S): at 06:07

## 2018-08-03 RX ADMIN — Medication 100 MILLIGRAM(S): at 21:18

## 2018-08-03 RX ADMIN — HEPARIN SODIUM 7500 UNIT(S): 5000 INJECTION INTRAVENOUS; SUBCUTANEOUS at 14:10

## 2018-08-03 RX ADMIN — Medication 500 MILLIGRAM(S): at 21:18

## 2018-08-03 RX ADMIN — HYDROMORPHONE HYDROCHLORIDE 2 MILLIGRAM(S): 2 INJECTION INTRAMUSCULAR; INTRAVENOUS; SUBCUTANEOUS at 17:18

## 2018-08-03 RX ADMIN — HYDROMORPHONE HYDROCHLORIDE 2 MILLIGRAM(S): 2 INJECTION INTRAMUSCULAR; INTRAVENOUS; SUBCUTANEOUS at 14:00

## 2018-08-03 RX ADMIN — Medication 1 TABLET(S): at 06:07

## 2018-08-03 NOTE — DISCHARGE NOTE ADULT - CARE PLAN
Principal Discharge DX:	Ulcer of left foot, unspecified ulcer stage  Goal:	Wound care  Assessment and plan of treatment:	Dressing changes 3 times daily.  AM dressing change wet to dry with 4x4 gauze with bacitracin solution, dry 4x4 and wrapped with krilex  Afternoon dressing change wet to dry with 4x4 gauze with gentimicin solution, dry 4x4 and wrapped with krilex  PM dressing change wet to dry with 4x4 with amphotericin b solution, dry 4x4 and wrapped with krilex  Goal:	Recovery  Assessment and plan of treatment:	General Discharge Instructions:  Please resume all regular home medications unless specifically advised not to take a particular medication. Also, please take any new medications as prescribed.  Please get plenty of rest, continue to ambulate several times per day, and drink adequate amounts of fluids. Avoid lifting weights greater than 5-10 lbs until you follow-up with your surgeon, who will instruct you further regarding activity restrictions.  Avoid driving or operating heavy machinery while taking pain medications.  Please follow-up with your surgeon and Primary Care Provider (PCP) as advised.  Incision Care:  *Please call your doctor or nurse practitioner if you have increased pain, swelling, redness, or drainage from the incision site.  *Avoid swimming and baths until your follow-up appointment.  Goal:	follow up  Assessment and plan of treatment:	Please follow up with Dr. Berkowitz in 1-2 weeks, call the office for an appointment [phone]

## 2018-08-03 NOTE — DISCHARGE NOTE ADULT - MEDICATION SUMMARY - MEDICATIONS TO TAKE
I will START or STAY ON the medications listed below when I get home from the hospital:    metroNIDAZOLE 500 mg oral tablet  -- 1 tab(s) by mouth every 8 hours  -- Indication: For Ulcer of left foot, unspecified ulcer stage    Percocet 7.5/325 oral tablet  -- 1 tab(s) by mouth every 6 hours  -- Indication: For pain control    lisinopril 10 mg oral tablet  -- 1 tab(s) by mouth once a day  -- Indication: For hypertension    hydrochlorothiazide 25 mg oral tablet  -- 1 tab(s) by mouth once a day  -- Indication: For hypertension    Junel 1.5/30 oral tablet  -- 1 tab(s) by mouth once a day  -- Indication: For home medication    sulfamethoxazole-trimethoprim 800 mg-160 mg oral tablet  -- 2 tab(s) by mouth every 12 hours  -- Indication: For Ulcer of left foot, unspecified ulcer stage

## 2018-08-03 NOTE — DIETITIAN INITIAL EVALUATION ADULT. - OTHER INFO
32F w/ HTN, LEFT foot AVM c/b recurrent, non-healing infra-malleolar ulcer. 7/12/18 she underwent left heel wound debridement and wound VAC placement, now p/w  infection and necrosis of ulcer, admitted for wound care and antibiotics. Pt seen resting in bed w/ mother at bedside. Currently on a DASH/TLC diet and tolerating PO. Endorses good appetite, consuming >75% meals. Denies N/V/D/C. No issues chewing or swallowing. NKFA or dietary restrictions. No known wt changes. Skin: wound; GI WDL per flowsheet.

## 2018-08-03 NOTE — DISCHARGE NOTE ADULT - PATIENT PORTAL LINK FT
You can access the [facility] Patient Portal, offered by [facility], by registering with the following website: [email or web address]

## 2018-08-03 NOTE — DISCHARGE NOTE ADULT - HOSPITAL COURSE
INCOMPLETE 32F w/ HTN, LEFT foot AVM c/b recurrent, non-healing infra-malleolar ulcer. LLE angio (Jeffry) 5/4/17 showed normal arterial flow to level of distal PT w/ AV shunting in region of known ulcer. Pt has since undergone multiple DSEs, most recently 5/26/18. Pt is followed by pain management MD who prescribes Percocet 10/325mg PO 5x/daily as needed for pain and Gabapentin 100mg PO QAM and 800mg PO QM. She states she also applies topical neosporin wraps to the the foot on a daily basis. Patient admitted and on 7/12/18 she underwent left heel wound debridement and wound VAC placement.   Presents from Berkowitz's office with ulcer now erythematous with necrotic core.    7/30 Fever 101.9, fever workup negative. started on IV vancomycin and IV cipro. Ernestoacci protocol (TID wet to dry dressing changes with bacitracin solution in AM, gentamicin solution in PM, amphotericin solution at night)  7/31 Switched to PO cipro.   8/2 Wound cultures with staph aureus and prevatella, switched to flagyl and bactrim PO.    Discharged home 8/4 with bedside bottles of antonacci protocol and detailed instructions for dressing, PO flagyl and bactrim for 2 days. 32F w/ HTN, LEFT foot AVM c/b recurrent, non-healing infra-malleolar ulcer. LLE angio (Jeffry) 5/4/17 showed normal arterial flow to level of distal PT w/ AV shunting in region of known ulcer. Pt has since undergone multiple DSEs, most recently 5/26/18. Pt is followed by pain management MD who prescribes Percocet 10/325mg PO 5x/daily as needed for pain and Gabapentin 100mg PO QAM and 800mg PO QM. She states she also applies topical neosporin wraps to the the foot on a daily basis. Patient admitted and on 7/12/18 she underwent left heel wound debridement and wound VAC placement.   Presents from Berkowitz's office with ulcer now erythematous with necrotic core.    7/30 Fever 101.9, fever workup negative. started on IV vancomycin and IV cipro. Ernestoacci protocol (TID wet to dry dressing changes with bacitracin solution in AM, gentamicin solution in PM, amphotericin solution at night)  7/31 Switched to PO cipro.   8/2 Wound cultures with staph aureus and prevatella, switched to flagyl and bactrim PO.    Discharged home 8/5 with bedside bottles of antonacci protocol and detailed instructions for dressing, PO flagyl and bactrim for 11 days.

## 2018-08-03 NOTE — DISCHARGE NOTE ADULT - REASON FOR ADMISSION
L foot/ankle ulcer r/t vascular malformation. Hx of debridement on 7/12 at [facility] by MD Woo. Here for r/o & tx of infection.

## 2018-08-03 NOTE — DIETITIAN INITIAL EVALUATION ADULT. - NS AS NUTRI INTERV ED CONTENT
Purpose of the nutrition education/discussed purpose of DASH/TLC diet order and increased needs for wound healing

## 2018-08-03 NOTE — DISCHARGE NOTE ADULT - PLAN OF CARE
Wound care Dressing changes 3 times daily.  AM dressing change wet to dry with 4x4 gauze with bacitracin solution, dry 4x4 and wrapped with krilex  Afternoon dressing change wet to dry with 4x4 gauze with gentimicin solution, dry 4x4 and wrapped with krilex  PM dressing change wet to dry with 4x4 with amphotericin b solution, dry 4x4 and wrapped with krilex Recovery General Discharge Instructions:  Please resume all regular home medications unless specifically advised not to take a particular medication. Also, please take any new medications as prescribed.  Please get plenty of rest, continue to ambulate several times per day, and drink adequate amounts of fluids. Avoid lifting weights greater than 5-10 lbs until you follow-up with your surgeon, who will instruct you further regarding activity restrictions.  Avoid driving or operating heavy machinery while taking pain medications.  Please follow-up with your surgeon and Primary Care Provider (PCP) as advised.  Incision Care:  *Please call your doctor or nurse practitioner if you have increased pain, swelling, redness, or drainage from the incision site.  *Avoid swimming and baths until your follow-up appointment. follow up Please follow up with Dr. Berkowitz in 1-2 weeks, call the office for an appointment [phone]

## 2018-08-03 NOTE — PROGRESS NOTE ADULT - SUBJECTIVE AND OBJECTIVE BOX
O/N: ISABELLE, VSS, dressing chg'd                          32F w/ HTN, LEFT foot AVM c/b recurrent, non-healing infra-malleolar ulcer. 7/12/18 she underwent left heel wound debridement and wound VAC placement, now presenting with infection and necrosis of ulcer, admitted for wound care and antibiotics    -dilaudid prn  -Antonacci protocol with ACE wrap  -Bactrim and flagyl  -wound cx shows MRSA and Prevotella Disiens  -SQH  -home medication O/N: ISABELLE, VSS, dressing chg'd    SUBJECTIVE:  pt seen at bedside, complains of some ankle pain    MEDICATIONS  (STANDING):  docusate sodium 100 milliGRAM(s) Oral three times a day  heparin  Injectable 7500 Unit(s) SubCutaneous every 8 hours  hydrochlorothiazide 25 milliGRAM(s) Oral daily  lisinopril 10 milliGRAM(s) Oral daily  metroNIDAZOLE    Tablet 500 milliGRAM(s) Oral every 8 hours  trimethoprim  160 mG/sulfamethoxazole 800 mG 2 Tablet(s) Oral every 12 hours    MEDICATIONS  (PRN):  HYDROmorphone  Injectable 1 milliGRAM(s) IV Push every 4 hours PRN Moderate Pain (4 - 6)  HYDROmorphone  Injectable 2 milliGRAM(s) IV Push every 4 hours PRN Severe Pain (7 - 10)      Vital Signs Last 24 Hrs  T(C): 37.2 (03 Aug 2018 08:18), Max: 37.2 (02 Aug 2018 21:20)  T(F): 98.9 (03 Aug 2018 08:18), Max: 98.9 (02 Aug 2018 21:20)  HR: 99 (03 Aug 2018 08:18) (94 - 99)  BP: 126/86 (03 Aug 2018 08:18) (109/75 - 126/86)  BP(mean): --  RR: 17 (03 Aug 2018 08:18) (16 - 18)  SpO2: 99% (03 Aug 2018 08:18) (96% - 99%)    PHYSICAL EXAM:      Constitutional: A&Ox3    Respiratory: non labored breathing, no respiratory distress    Cardiovascular: NSR, RRR    Gastrointestinal: soft, nontender, nondistended    Extremities: (-) edema, right ankle wound healing, pink tissue noted without active discharge, erythema improved        I&O's Detail    02 Aug 2018 07:01  -  03 Aug 2018 07:00  --------------------------------------------------------  IN:    Oral Fluid: 360 mL    Solution: 100 mL  Total IN: 460 mL    OUT:    Voided: 700 mL  Total OUT: 700 mL    Total NET: -240 mL          LABS:                        11.3   6.9   )-----------( 352      ( 02 Aug 2018 05:56 )             35.9     08-02    136  |  100  |  11  ----------------------------<  87  4.0   |  22  |  0.77    Ca    8.6      02 Aug 2018 05:55  Phos  3.6     08-02  Mg     1.7     08-02            RADIOLOGY & ADDITIONAL STUDIES:      32F w/ HTN, LEFT foot AVM c/b recurrent, non-healing infra-malleolar ulcer. 7/12/18 she underwent left heel wound debridement and wound VAC placement, now presenting with infection and necrosis of ulcer, admitted for wound care and antibiotics    -dilaudid prn  -Antonacci protocol with ACE wrap  -Bactrim and flagyl  -wound cx shows MRSA and Prevotella Disiens  -SQH  -home medication  -dc planned for tomorrow with 2 week course of antibiotics

## 2018-08-03 NOTE — DISCHARGE NOTE ADULT - CARE PROVIDER_API CALL
Swapnil Berkowitz (MD), Surgery  [address]  [address]  [place], [place] [address]  Phone: [phone]  Fax: [phone]

## 2018-08-03 NOTE — DIETITIAN INITIAL EVALUATION ADULT. - ENERGY NEEDS
Height: 5'7" Weight: 294lbs, IBW 135lbs+/-10%, %%, BMI 46  IBW used for calculations as pt >120% of IBW   Nutrient needs based on [facility] standards of care for maintenance in adults.   Needs adjusted for wound healing

## 2018-08-04 LAB
CULTURE RESULTS: SIGNIFICANT CHANGE UP
CULTURE RESULTS: SIGNIFICANT CHANGE UP
SPECIMEN SOURCE: SIGNIFICANT CHANGE UP
SPECIMEN SOURCE: SIGNIFICANT CHANGE UP

## 2018-08-04 PROCEDURE — 99231 SBSQ HOSP IP/OBS SF/LOW 25: CPT | Mod: GC

## 2018-08-04 RX ORDER — ONDANSETRON 8 MG/1
4 TABLET, FILM COATED ORAL ONCE
Qty: 0 | Refills: 0 | Status: COMPLETED | OUTPATIENT
Start: 2018-08-04 | End: 2018-08-04

## 2018-08-04 RX ORDER — HYDROMORPHONE HYDROCHLORIDE 2 MG/ML
2 INJECTION INTRAMUSCULAR; INTRAVENOUS; SUBCUTANEOUS EVERY 6 HOURS
Qty: 0 | Refills: 0 | Status: DISCONTINUED | OUTPATIENT
Start: 2018-08-04 | End: 2018-08-04

## 2018-08-04 RX ORDER — OXYCODONE AND ACETAMINOPHEN 5; 325 MG/1; MG/1
1 TABLET ORAL EVERY 4 HOURS
Qty: 0 | Refills: 0 | Status: DISCONTINUED | OUTPATIENT
Start: 2018-08-04 | End: 2018-08-05

## 2018-08-04 RX ORDER — HYDROMORPHONE HYDROCHLORIDE 2 MG/ML
2 INJECTION INTRAMUSCULAR; INTRAVENOUS; SUBCUTANEOUS EVERY 4 HOURS
Qty: 0 | Refills: 0 | Status: DISCONTINUED | OUTPATIENT
Start: 2018-08-04 | End: 2018-08-04

## 2018-08-04 RX ORDER — ONDANSETRON 8 MG/1
4 TABLET, FILM COATED ORAL EVERY 6 HOURS
Qty: 0 | Refills: 0 | Status: DISCONTINUED | OUTPATIENT
Start: 2018-08-04 | End: 2018-08-05

## 2018-08-04 RX ADMIN — OXYCODONE AND ACETAMINOPHEN 1 TABLET(S): 5; 325 TABLET ORAL at 23:40

## 2018-08-04 RX ADMIN — Medication 2 TABLET(S): at 05:16

## 2018-08-04 RX ADMIN — ONDANSETRON 4 MILLIGRAM(S): 8 TABLET, FILM COATED ORAL at 22:51

## 2018-08-04 RX ADMIN — HEPARIN SODIUM 7500 UNIT(S): 5000 INJECTION INTRAVENOUS; SUBCUTANEOUS at 22:28

## 2018-08-04 RX ADMIN — HYDROMORPHONE HYDROCHLORIDE 2 MILLIGRAM(S): 2 INJECTION INTRAMUSCULAR; INTRAVENOUS; SUBCUTANEOUS at 09:38

## 2018-08-04 RX ADMIN — Medication 500 MILLIGRAM(S): at 13:10

## 2018-08-04 RX ADMIN — LISINOPRIL 10 MILLIGRAM(S): 2.5 TABLET ORAL at 05:16

## 2018-08-04 RX ADMIN — HYDROMORPHONE HYDROCHLORIDE 2 MILLIGRAM(S): 2 INJECTION INTRAMUSCULAR; INTRAVENOUS; SUBCUTANEOUS at 18:25

## 2018-08-04 RX ADMIN — Medication 500 MILLIGRAM(S): at 23:40

## 2018-08-04 RX ADMIN — Medication 100 MILLIGRAM(S): at 13:10

## 2018-08-04 RX ADMIN — HEPARIN SODIUM 7500 UNIT(S): 5000 INJECTION INTRAVENOUS; SUBCUTANEOUS at 13:09

## 2018-08-04 RX ADMIN — Medication 2 TABLET(S): at 18:21

## 2018-08-04 RX ADMIN — Medication 25 MILLIGRAM(S): at 05:16

## 2018-08-04 RX ADMIN — Medication 100 MILLIGRAM(S): at 05:16

## 2018-08-04 RX ADMIN — HYDROMORPHONE HYDROCHLORIDE 2 MILLIGRAM(S): 2 INJECTION INTRAMUSCULAR; INTRAVENOUS; SUBCUTANEOUS at 02:40

## 2018-08-04 RX ADMIN — Medication 500 MILLIGRAM(S): at 05:16

## 2018-08-04 RX ADMIN — HYDROMORPHONE HYDROCHLORIDE 2 MILLIGRAM(S): 2 INJECTION INTRAMUSCULAR; INTRAVENOUS; SUBCUTANEOUS at 10:30

## 2018-08-04 RX ADMIN — HEPARIN SODIUM 7500 UNIT(S): 5000 INJECTION INTRAVENOUS; SUBCUTANEOUS at 05:16

## 2018-08-04 RX ADMIN — HYDROMORPHONE HYDROCHLORIDE 2 MILLIGRAM(S): 2 INJECTION INTRAMUSCULAR; INTRAVENOUS; SUBCUTANEOUS at 06:17

## 2018-08-04 RX ADMIN — HYDROMORPHONE HYDROCHLORIDE 2 MILLIGRAM(S): 2 INJECTION INTRAMUSCULAR; INTRAVENOUS; SUBCUTANEOUS at 01:21

## 2018-08-04 RX ADMIN — HYDROMORPHONE HYDROCHLORIDE 2 MILLIGRAM(S): 2 INJECTION INTRAMUSCULAR; INTRAVENOUS; SUBCUTANEOUS at 14:24

## 2018-08-04 RX ADMIN — HYDROMORPHONE HYDROCHLORIDE 2 MILLIGRAM(S): 2 INJECTION INTRAMUSCULAR; INTRAVENOUS; SUBCUTANEOUS at 05:21

## 2018-08-04 NOTE — PROGRESS NOTE ADULT - ASSESSMENT
32F w/ HTN, LEFT foot AVM c/b recurrent, non-healing infra-malleolar ulcer. 7/12/18 she underwent left heel wound debridement and wound VAC placement, now presenting with infection and necrosis of ulcer, admitted for wound care and antibiotics    -PO pain meds (dilaudid)  -Antonacci protocol with ACE wrap  -Bactrim and flagyl  -wound cx shows MRSA and Prevotella Disiens  -SQH  -home medications  -possible discharge today if patient feels better in PM
32F w/ HTN, LEFT foot AVM c/b recurrent, non-healing infra-malleolar ulcer. 7/12/18 she underwent left heel wound debridement and wound VAC placement, now presenting with infection and necrosis of ulcer, admitted for wound care and antibiotics    -dilaudid prn  -Celena protocol with ACE wrap     -AM bacitracin      -PM gentamicin     -O/N amphotericin  -Vanc IV/ Cipro PO  -f/u wound cultures from office   -SQH  -home medication

## 2018-08-04 NOTE — PROGRESS NOTE ADULT - SUBJECTIVE AND OBJECTIVE BOX
O/N: 3rd dressing changed. PT concerned that wound more swollen and painful since ABX change   8/3 patient with pain and warmth at wound, wound marked, dressing changed x2              32F w/ HTN, LEFT foot AVM c/b recurrent, non-healing infra-malleolar ulcer. 7/12/18 she underwent left heel wound debridement and wound VAC placement, now presenting with infection and necrosis of ulcer, admitted for wound care and antibiotics    -dilaudid prn  -Antonacci protocol with ACE wrap  -Bactrim and flagyl  -wound cx shows MRSA and Prevotella Disiens  -SQH  -home medication SUBJECTIVE:   No acute events overnight.  Patient concerned about wound since Abx change from Vancomycin. PO Bactrim adjusted yesterday for weight.  Patient complains of diffuse aches and flu-like symptoms, pain in LLE    ---------------------------------------------------------------    Vital Signs Last 24 Hrs  T(C): 36.8 (04 Aug 2018 08:58), Max: 37.3 (03 Aug 2018 16:06)  T(F): 98.3 (04 Aug 2018 08:58), Max: 99.1 (03 Aug 2018 16:06)  HR: 94 (04 Aug 2018 08:58) (94 - 102)  BP: 127/81 (04 Aug 2018 08:58) (116/82 - 147/83)  BP(mean): --  RR: 16 (04 Aug 2018 08:58) (16 - 17)  SpO2: 98% (04 Aug 2018 08:58) (96% - 98%)    I&O's Summary    03 Aug 2018 07:01  -  04 Aug 2018 07:00  --------------------------------------------------------  IN: 840 mL / OUT: 900 mL / NET: -60 mL    04 Aug 2018 07:01  -  04 Aug 2018 12:33  --------------------------------------------------------  IN: 200 mL / OUT: 0 mL / NET: 200 mL        ----------------------------------------------------------------    Physical Exam:      Constitutional: A&Ox3    Respiratory: non labored breathing, no respiratory distress    Cardiovascular: NSR, RRR    Gastrointestinal: soft, nontender, nondistended    Extremities: (-) edema, right ankle wound healing, pink tissue noted without active discharge, erythema improved  -------------------------------------------------------------------    LABS:                        12.5   8.6   )-----------( 407      ( 03 Aug 2018 12:53 )             39.5     08-03    137  |  97  |  12  ----------------------------<  92  4.4   |  27  |  1.05    Ca    9.6      03 Aug 2018 12:53  Phos  3.9     08-03  Mg     1.8     08-03              RADIOLOGY & ADDITIONAL STUDIES:

## 2018-08-05 VITALS
OXYGEN SATURATION: 97 % | RESPIRATION RATE: 17 BRPM | HEART RATE: 90 BPM | SYSTOLIC BLOOD PRESSURE: 129 MMHG | DIASTOLIC BLOOD PRESSURE: 78 MMHG | TEMPERATURE: 98 F

## 2018-08-05 PROCEDURE — 80202 ASSAY OF VANCOMYCIN: CPT

## 2018-08-05 PROCEDURE — 80048 BASIC METABOLIC PNL TOTAL CA: CPT

## 2018-08-05 PROCEDURE — 93005 ELECTROCARDIOGRAM TRACING: CPT

## 2018-08-05 PROCEDURE — 87040 BLOOD CULTURE FOR BACTERIA: CPT

## 2018-08-05 PROCEDURE — 86850 RBC ANTIBODY SCREEN: CPT

## 2018-08-05 PROCEDURE — 81001 URINALYSIS AUTO W/SCOPE: CPT

## 2018-08-05 PROCEDURE — 87075 CULTR BACTERIA EXCEPT BLOOD: CPT

## 2018-08-05 PROCEDURE — 87070 CULTURE OTHR SPECIMN AEROBIC: CPT

## 2018-08-05 PROCEDURE — 86900 BLOOD TYPING SEROLOGIC ABO: CPT

## 2018-08-05 PROCEDURE — 85027 COMPLETE CBC AUTOMATED: CPT

## 2018-08-05 PROCEDURE — 85610 PROTHROMBIN TIME: CPT

## 2018-08-05 PROCEDURE — 87186 SC STD MICRODIL/AGAR DIL: CPT

## 2018-08-05 PROCEDURE — 86901 BLOOD TYPING SEROLOGIC RH(D): CPT

## 2018-08-05 PROCEDURE — 71045 X-RAY EXAM CHEST 1 VIEW: CPT

## 2018-08-05 PROCEDURE — 85025 COMPLETE CBC W/AUTO DIFF WBC: CPT

## 2018-08-05 PROCEDURE — 36415 COLL VENOUS BLD VENIPUNCTURE: CPT

## 2018-08-05 PROCEDURE — 84100 ASSAY OF PHOSPHORUS: CPT

## 2018-08-05 PROCEDURE — 85730 THROMBOPLASTIN TIME PARTIAL: CPT

## 2018-08-05 PROCEDURE — 83735 ASSAY OF MAGNESIUM: CPT

## 2018-08-05 RX ORDER — METRONIDAZOLE 500 MG
1 TABLET ORAL
Qty: 33 | Refills: 0 | OUTPATIENT
Start: 2018-08-05 | End: 2018-08-15

## 2018-08-05 RX ADMIN — HEPARIN SODIUM 7500 UNIT(S): 5000 INJECTION INTRAVENOUS; SUBCUTANEOUS at 06:21

## 2018-08-05 RX ADMIN — ONDANSETRON 4 MILLIGRAM(S): 8 TABLET, FILM COATED ORAL at 08:56

## 2018-08-05 RX ADMIN — OXYCODONE AND ACETAMINOPHEN 1 TABLET(S): 5; 325 TABLET ORAL at 00:40

## 2018-08-05 RX ADMIN — OXYCODONE AND ACETAMINOPHEN 1 TABLET(S): 5; 325 TABLET ORAL at 04:42

## 2018-08-05 RX ADMIN — Medication 2 TABLET(S): at 06:22

## 2018-08-05 RX ADMIN — LISINOPRIL 10 MILLIGRAM(S): 2.5 TABLET ORAL at 06:22

## 2018-08-05 RX ADMIN — Medication 25 MILLIGRAM(S): at 06:22

## 2018-08-05 RX ADMIN — OXYCODONE AND ACETAMINOPHEN 1 TABLET(S): 5; 325 TABLET ORAL at 11:06

## 2018-08-05 RX ADMIN — Medication 500 MILLIGRAM(S): at 06:22

## 2018-08-05 RX ADMIN — OXYCODONE AND ACETAMINOPHEN 1 TABLET(S): 5; 325 TABLET ORAL at 03:42

## 2018-08-10 DIAGNOSIS — B96.89 OTHER SPECIFIED BACTERIAL AGENTS AS THE CAUSE OF DISEASES CLASSIFIED ELSEWHERE: ICD-10-CM

## 2018-08-10 DIAGNOSIS — L97.321 NON-PRESSURE CHRONIC ULCER OF LEFT ANKLE LIMITED TO BREAKDOWN OF SKIN: ICD-10-CM

## 2018-08-10 DIAGNOSIS — Q27.32 ARTERIOVENOUS MALFORMATION OF VESSEL OF LOWER LIMB: ICD-10-CM

## 2018-08-10 DIAGNOSIS — I10 ESSENTIAL (PRIMARY) HYPERTENSION: ICD-10-CM

## 2018-08-10 DIAGNOSIS — E66.01 MORBID (SEVERE) OBESITY DUE TO EXCESS CALORIES: ICD-10-CM

## 2018-08-10 DIAGNOSIS — I96 GANGRENE, NOT ELSEWHERE CLASSIFIED: ICD-10-CM

## 2018-08-10 DIAGNOSIS — B95.61 METHICILLIN SUSCEPTIBLE STAPHYLOCOCCUS AUREUS INFECTION AS THE CAUSE OF DISEASES CLASSIFIED ELSEWHERE: ICD-10-CM

## 2018-08-10 DIAGNOSIS — Z88.0 ALLERGY STATUS TO PENICILLIN: ICD-10-CM

## 2018-08-14 ENCOUNTER — TRANSCRIPTION ENCOUNTER (OUTPATIENT)
Age: 33
End: 2018-08-14

## 2018-08-28 ENCOUNTER — APPOINTMENT (OUTPATIENT)
Dept: VASCULAR SURGERY | Facility: CLINIC | Age: 33
End: 2018-08-28
Payer: COMMERCIAL

## 2018-08-28 VITALS — OXYGEN SATURATION: 98 % | HEART RATE: 101 BPM | DIASTOLIC BLOOD PRESSURE: 100 MMHG | SYSTOLIC BLOOD PRESSURE: 148 MMHG

## 2018-08-28 DIAGNOSIS — Z86.79 PERSONAL HISTORY OF OTHER DISEASES OF THE CIRCULATORY SYSTEM: ICD-10-CM

## 2018-08-28 PROCEDURE — 99213 OFFICE O/P EST LOW 20 MIN: CPT

## 2018-08-29 PROBLEM — Z86.79 HISTORY OF HYPERTENSION: Status: RESOLVED | Noted: 2018-07-30 | Resolved: 2018-08-29

## 2018-08-29 RX ORDER — NORETHINDRONE ACETATE AND ETHINYL ESTRADIOL AND FERROUS FUMARATE 1.5-30(21)
1.5-3 KIT ORAL
Qty: 84 | Refills: 0 | Status: ACTIVE | COMMUNITY
Start: 2018-05-14

## 2018-08-29 RX ORDER — NYSTATIN 100000 U/G
100000 OINTMENT TOPICAL
Qty: 30 | Refills: 0 | Status: COMPLETED | COMMUNITY
Start: 2018-07-05

## 2018-08-29 RX ORDER — OXYCODONE AND ACETAMINOPHEN 7.5; 325 MG/1; MG/1
7.5-325 TABLET ORAL
Qty: 25 | Refills: 0 | Status: COMPLETED | COMMUNITY
Start: 2018-06-11

## 2018-08-29 RX ORDER — METRONIDAZOLE 7.5 MG/G
0.75 GEL VAGINAL
Qty: 70 | Refills: 0 | Status: COMPLETED | COMMUNITY
Start: 2018-04-24

## 2018-08-29 RX ORDER — GABAPENTIN 800 MG/1
800 TABLET, COATED ORAL
Qty: 90 | Refills: 0 | Status: ACTIVE | COMMUNITY
Start: 2018-04-24

## 2018-08-29 RX ORDER — OXYCODONE HYDROCHLORIDE 15 MG/1
15 TABLET ORAL
Qty: 25 | Refills: 0 | Status: COMPLETED | COMMUNITY
Start: 2018-05-14

## 2018-08-29 RX ORDER — ERGOCALCIFEROL 1.25 MG/1
1.25 MG CAPSULE, LIQUID FILLED ORAL
Qty: 12 | Refills: 0 | Status: ACTIVE | COMMUNITY
Start: 2018-05-14

## 2018-08-29 RX ORDER — CIPROFLOXACIN HYDROCHLORIDE 500 MG/1
500 TABLET, FILM COATED ORAL
Qty: 14 | Refills: 0 | Status: COMPLETED | COMMUNITY
Start: 2018-06-15

## 2018-08-29 RX ORDER — METRONIDAZOLE 500 MG/1
500 TABLET ORAL
Qty: 33 | Refills: 0 | Status: COMPLETED | COMMUNITY
Start: 2018-08-05

## 2018-08-29 RX ORDER — GABAPENTIN 100 MG/1
100 CAPSULE ORAL
Qty: 90 | Refills: 0 | Status: ACTIVE | COMMUNITY
Start: 2018-04-24

## 2018-08-29 RX ORDER — OXYCODONE HYDROCHLORIDE AND ACETAMINOPHEN 10; 325 MG/1; MG/1
10-325 TABLET ORAL
Refills: 0 | Status: COMPLETED | COMMUNITY

## 2018-08-29 RX ORDER — SULFAMETHOXAZOLE AND TRIMETHOPRIM 800; 160 MG/1; MG/1
800-160 TABLET ORAL
Qty: 44 | Refills: 0 | Status: COMPLETED | COMMUNITY
Start: 2018-08-05

## 2018-10-30 ENCOUNTER — APPOINTMENT (OUTPATIENT)
Dept: VASCULAR SURGERY | Facility: CLINIC | Age: 33
End: 2018-10-30
Payer: COMMERCIAL

## 2018-10-30 PROCEDURE — 99213 OFFICE O/P EST LOW 20 MIN: CPT

## 2018-12-10 ENCOUNTER — APPOINTMENT (OUTPATIENT)
Dept: HEART AND VASCULAR | Facility: CLINIC | Age: 33
End: 2018-12-10
Payer: COMMERCIAL

## 2018-12-10 PROCEDURE — 76882 US LMTD JT/FCL EVL NVASC XTR: CPT

## 2018-12-10 PROCEDURE — 99214 OFFICE O/P EST MOD 30 MIN: CPT | Mod: 25

## 2019-02-26 VITALS
DIASTOLIC BLOOD PRESSURE: 83 MMHG | SYSTOLIC BLOOD PRESSURE: 139 MMHG | WEIGHT: 293 LBS | OXYGEN SATURATION: 100 % | TEMPERATURE: 98 F | HEIGHT: 67 IN | HEART RATE: 101 BPM | RESPIRATION RATE: 17 BRPM

## 2019-02-26 RX ORDER — CHLORHEXIDINE GLUCONATE 213 G/1000ML
1 SOLUTION TOPICAL ONCE
Qty: 0 | Refills: 0 | Status: DISCONTINUED | OUTPATIENT
Start: 2019-02-28 | End: 2019-03-01

## 2019-02-26 NOTE — H&P ADULT - ASSESSMENT
This is a 33-year-old female with PMHx of AVM (s/p multiple treatments, most recently 05/2018) and HTN who presents for DSE under general anesthesia     ASA III Mallampati III  Consent will be obtained by Dr. Teran's team  Started IVF NS @ 75cc/h       Risks & benefits of procedure and alternative therapy have been explained to the patient including but not limited to: allergic reaction, bleeding w/possible need for blood transfusion, infection, renal and vascular compromise, limb damage, emergent surgery. Informed consent obtained and in chart. This is a 33-year-old female with PMHx of AVM (s/p multiple treatments, most recently 05/2018) and HTN who presents for DSE under general anesthesia     ASA III Mallampati III  Consent will be obtained by Dr. Teran's team  Started IVF NS @ 75cc/h   Patient hard stick unable to obtain CBC and K, LFTs hemolyzed resident and RN made aware will draw during procedure       Risks & benefits of procedure and alternative therapy have been explained to the patient including but not limited to: allergic reaction, bleeding w/possible need for blood transfusion, infection, renal and vascular compromise, limb damage, emergent surgery. Informed consent obtained and in chart.

## 2019-02-26 NOTE — H&P ADULT - HISTORY OF PRESENT ILLNESS
Confirm meds upon arrival     This is a 33-year-old female with PMHx of AVM (s/p multiple treatments, most recently 05/2018) and HTN who presented to Dr. Teran c/o an unusual high flow AVM involving the heel and plantar at aspect of the left hindfoot. The lesion consists of what appears to be poorly defined densely matted hypervascular tissues. She is status post multiple embolization procedures with significant improvement in her symptoms. Earlier last year she had an area of ulceration over the lateral inframalleolar area. She had embolization of peroneal branches feeding the malformation in May 2018 which did not seem to help healing. She then underwent 2 debridements and a wound VAC by Dr. Berkowitz (vascular surgery). Dr. Berkowitz has been following her for the last several months. She does complain of pain and tenderness in the lateral plantar heel area. An ultrasound in the office was completed not much was seen in the way of high flow at this point, but there are compressible vascular spaces visible. Pt denies any other symptoms including CP and SOB    Pt now presents for DSE under general anesthesia     LMP Confirm meds upon arrival     This is a 33-year-old female with PMHx of AVM (s/p multiple treatments, most recently 05/2018) and HTN who presented to Dr. Teran c/o an unusual high flow AVM involving the heel and plantar at aspect of the left hindfoot. The lesion consists of what appears to be poorly defined densely matted hypervascular tissues. She is status post multiple embolization procedures with significant improvement in her symptoms. Earlier last year she had an area of ulceration over the lateral inframalleolar area. She had embolization of peroneal branches feeding the malformation in May 2018 which did not seem to help healing. She then underwent 2 debridements and a wound VAC by Dr. Berkowitz (vascular surgery). Dr. Berkowitz has been following her for the last several months. She does complain of pain and tenderness in the lateral plantar heel area. An ultrasound in the office was completed not much was seen in the way of high flow at this point, but there are compressible vascular spaces visible. Pt denies any other symptoms including CP and SOB    Pt now presents for DSE under general anesthesia     LMP 02/07/2019 This is a 33-year-old female with PMHx of AVM (s/p multiple treatments, most recently 05/2018) and HTN who presented to Dr. Teran c/o an unusual high flow AVM involving the heel and plantar at aspect of the left hindfoot. The lesion consists of what appears to be poorly defined densely matted hypervascular tissues. She is status post multiple embolization procedures with significant improvement in her symptoms. Earlier last year she had an area of ulceration over the lateral inframalleolar area. She had embolization of peroneal branches feeding the malformation in May 2018 which did not seem to help healing. She then underwent 2 debridements and a wound VAC by Dr. Berkowitz (vascular surgery). Dr. Berkowitz has been following her for the last several months. She does complain of pain and tenderness in the lateral plantar heel area. An ultrasound in the office was completed not much was seen in the way of high flow at this point, but there are compressible vascular spaces visible. Pt denies any other symptoms including CP and SOB    Pt now presents for DSE under general anesthesia     LMP 02/07/2019

## 2019-02-28 ENCOUNTER — INPATIENT (INPATIENT)
Facility: HOSPITAL | Age: 34
LOS: 0 days | Discharge: HOME | DRG: 253 | End: 2019-03-01
Attending: RADIOLOGY | Admitting: RADIOLOGY
Payer: COMMERCIAL

## 2019-02-28 DIAGNOSIS — Z98.89 OTHER SPECIFIED POSTPROCEDURAL STATES: Chronic | ICD-10-CM

## 2019-02-28 DIAGNOSIS — Z41.9 ENCOUNTER FOR PROCEDURE FOR PURPOSES OTHER THAN REMEDYING HEALTH STATE, UNSPECIFIED: Chronic | ICD-10-CM

## 2019-02-28 LAB
ALBUMIN SERPL ELPH-MCNC: 3.2 G/DL — LOW (ref 3.3–5)
ALBUMIN SERPL ELPH-MCNC: 4.4 G/DL — SIGNIFICANT CHANGE UP (ref 3.3–5)
ALP SERPL-CCNC: 57 U/L — SIGNIFICANT CHANGE UP (ref 40–120)
ALP SERPL-CCNC: 70 U/L — SIGNIFICANT CHANGE UP (ref 40–120)
ALT FLD-CCNC: 7 U/L — LOW (ref 10–45)
ALT FLD-CCNC: SIGNIFICANT CHANGE UP U/L (ref 10–45)
ANION GAP SERPL CALC-SCNC: 12 MMOL/L — SIGNIFICANT CHANGE UP (ref 5–17)
ANION GAP SERPL CALC-SCNC: 12 MMOL/L — SIGNIFICANT CHANGE UP (ref 5–17)
APTT BLD: 26 SEC — LOW (ref 27.5–36.3)
AST SERPL-CCNC: 13 U/L — SIGNIFICANT CHANGE UP (ref 10–40)
AST SERPL-CCNC: SIGNIFICANT CHANGE UP U/L (ref 10–40)
BILIRUB SERPL-MCNC: 0.4 MG/DL — SIGNIFICANT CHANGE UP (ref 0.2–1.2)
BILIRUB SERPL-MCNC: 0.4 MG/DL — SIGNIFICANT CHANGE UP (ref 0.2–1.2)
BUN SERPL-MCNC: 14 MG/DL — SIGNIFICANT CHANGE UP (ref 7–23)
BUN SERPL-MCNC: 15 MG/DL — SIGNIFICANT CHANGE UP (ref 7–23)
CALCIUM SERPL-MCNC: 8.5 MG/DL — SIGNIFICANT CHANGE UP (ref 8.4–10.5)
CALCIUM SERPL-MCNC: 9.6 MG/DL — SIGNIFICANT CHANGE UP (ref 8.4–10.5)
CHLORIDE SERPL-SCNC: 103 MMOL/L — SIGNIFICANT CHANGE UP (ref 96–108)
CHLORIDE SERPL-SCNC: 105 MMOL/L — SIGNIFICANT CHANGE UP (ref 96–108)
CO2 SERPL-SCNC: 21 MMOL/L — LOW (ref 22–31)
CO2 SERPL-SCNC: 22 MMOL/L — SIGNIFICANT CHANGE UP (ref 22–31)
CREAT SERPL-MCNC: 0.69 MG/DL — SIGNIFICANT CHANGE UP (ref 0.5–1.3)
CREAT SERPL-MCNC: 0.73 MG/DL — SIGNIFICANT CHANGE UP (ref 0.5–1.3)
GLUCOSE SERPL-MCNC: 85 MG/DL — SIGNIFICANT CHANGE UP (ref 70–99)
GLUCOSE SERPL-MCNC: 95 MG/DL — SIGNIFICANT CHANGE UP (ref 70–99)
HCG SERPL-ACNC: <.1 MIU/ML — SIGNIFICANT CHANGE UP
HCT VFR BLD CALC: 36.4 % — SIGNIFICANT CHANGE UP (ref 34.5–45)
HGB BLD-MCNC: 11.8 G/DL — SIGNIFICANT CHANGE UP (ref 11.5–15.5)
INR BLD: 1.08 — SIGNIFICANT CHANGE UP (ref 0.88–1.16)
MCHC RBC-ENTMCNC: 29.4 PG — SIGNIFICANT CHANGE UP (ref 27–34)
MCHC RBC-ENTMCNC: 32.4 GM/DL — SIGNIFICANT CHANGE UP (ref 32–36)
MCV RBC AUTO: 90.5 FL — SIGNIFICANT CHANGE UP (ref 80–100)
NRBC # BLD: 0 /100 WBCS — SIGNIFICANT CHANGE UP (ref 0–0)
PLATELET # BLD AUTO: 424 K/UL — HIGH (ref 150–400)
POTASSIUM SERPL-MCNC: 4.1 MMOL/L — SIGNIFICANT CHANGE UP (ref 3.5–5.3)
POTASSIUM SERPL-MCNC: SIGNIFICANT CHANGE UP MMOL/L (ref 3.5–5.3)
POTASSIUM SERPL-SCNC: 4.1 MMOL/L — SIGNIFICANT CHANGE UP (ref 3.5–5.3)
POTASSIUM SERPL-SCNC: SIGNIFICANT CHANGE UP MMOL/L (ref 3.5–5.3)
PROT SERPL-MCNC: 6.6 G/DL — SIGNIFICANT CHANGE UP (ref 6–8.3)
PROT SERPL-MCNC: 7.9 G/DL — SIGNIFICANT CHANGE UP (ref 6–8.3)
PROTHROM AB SERPL-ACNC: 12.2 SEC — SIGNIFICANT CHANGE UP (ref 10–12.9)
RBC # BLD: 4.02 M/UL — SIGNIFICANT CHANGE UP (ref 3.8–5.2)
RBC # FLD: 12.6 % — SIGNIFICANT CHANGE UP (ref 10.3–14.5)
SODIUM SERPL-SCNC: 137 MMOL/L — SIGNIFICANT CHANGE UP (ref 135–145)
SODIUM SERPL-SCNC: 138 MMOL/L — SIGNIFICANT CHANGE UP (ref 135–145)
WBC # BLD: 15 K/UL — HIGH (ref 3.8–10.5)
WBC # FLD AUTO: 15 K/UL — HIGH (ref 3.8–10.5)

## 2019-02-28 PROCEDURE — 93010 ELECTROCARDIOGRAM REPORT: CPT

## 2019-02-28 PROCEDURE — 37241 VASC EMBOLIZE/OCCLUDE VENOUS: CPT

## 2019-02-28 RX ORDER — SODIUM CHLORIDE 9 MG/ML
1000 INJECTION INTRAMUSCULAR; INTRAVENOUS; SUBCUTANEOUS
Qty: 0 | Refills: 0 | Status: DISCONTINUED | OUTPATIENT
Start: 2019-02-28 | End: 2019-03-01

## 2019-02-28 RX ORDER — HEPARIN SODIUM 5000 [USP'U]/ML
7500 INJECTION INTRAVENOUS; SUBCUTANEOUS EVERY 8 HOURS
Qty: 0 | Refills: 0 | Status: DISCONTINUED | OUTPATIENT
Start: 2019-02-28 | End: 2019-03-01

## 2019-02-28 RX ORDER — SODIUM CHLORIDE 9 MG/ML
500 INJECTION INTRAMUSCULAR; INTRAVENOUS; SUBCUTANEOUS
Qty: 0 | Refills: 0 | Status: DISCONTINUED | OUTPATIENT
Start: 2019-02-28 | End: 2019-03-01

## 2019-02-28 RX ORDER — HYDROMORPHONE HYDROCHLORIDE 2 MG/ML
1 INJECTION INTRAMUSCULAR; INTRAVENOUS; SUBCUTANEOUS EVERY 4 HOURS
Qty: 0 | Refills: 0 | Status: DISCONTINUED | OUTPATIENT
Start: 2019-02-28 | End: 2019-02-28

## 2019-02-28 RX ORDER — OXYCODONE AND ACETAMINOPHEN 5; 325 MG/1; MG/1
2 TABLET ORAL EVERY 4 HOURS
Qty: 0 | Refills: 0 | Status: DISCONTINUED | OUTPATIENT
Start: 2019-02-28 | End: 2019-03-01

## 2019-02-28 RX ORDER — ONDANSETRON 8 MG/1
4 TABLET, FILM COATED ORAL EVERY 4 HOURS
Qty: 0 | Refills: 0 | Status: DISCONTINUED | OUTPATIENT
Start: 2019-02-28 | End: 2019-03-01

## 2019-02-28 RX ORDER — HYDROMORPHONE HYDROCHLORIDE 2 MG/ML
2 INJECTION INTRAMUSCULAR; INTRAVENOUS; SUBCUTANEOUS EVERY 4 HOURS
Qty: 0 | Refills: 0 | Status: DISCONTINUED | OUTPATIENT
Start: 2019-02-28 | End: 2019-03-01

## 2019-02-28 RX ADMIN — Medication 100 MILLIGRAM(S): at 22:56

## 2019-02-28 RX ADMIN — HYDROMORPHONE HYDROCHLORIDE 1 MILLIGRAM(S): 2 INJECTION INTRAMUSCULAR; INTRAVENOUS; SUBCUTANEOUS at 16:15

## 2019-02-28 RX ADMIN — HEPARIN SODIUM 7500 UNIT(S): 5000 INJECTION INTRAVENOUS; SUBCUTANEOUS at 22:56

## 2019-02-28 RX ADMIN — OXYCODONE AND ACETAMINOPHEN 2 TABLET(S): 5; 325 TABLET ORAL at 17:10

## 2019-02-28 RX ADMIN — HYDROMORPHONE HYDROCHLORIDE 1 MILLIGRAM(S): 2 INJECTION INTRAMUSCULAR; INTRAVENOUS; SUBCUTANEOUS at 16:30

## 2019-02-28 RX ADMIN — OXYCODONE AND ACETAMINOPHEN 2 TABLET(S): 5; 325 TABLET ORAL at 19:30

## 2019-02-28 RX ADMIN — HYDROMORPHONE HYDROCHLORIDE 2 MILLIGRAM(S): 2 INJECTION INTRAMUSCULAR; INTRAVENOUS; SUBCUTANEOUS at 21:12

## 2019-02-28 RX ADMIN — SODIUM CHLORIDE 100 MILLILITER(S): 9 INJECTION INTRAMUSCULAR; INTRAVENOUS; SUBCUTANEOUS at 15:56

## 2019-02-28 RX ADMIN — HYDROMORPHONE HYDROCHLORIDE 2 MILLIGRAM(S): 2 INJECTION INTRAMUSCULAR; INTRAVENOUS; SUBCUTANEOUS at 20:43

## 2019-02-28 NOTE — BRIEF OPERATIVE NOTE - PRE-OP DX
Arteriovenous malformation  02/28/2019  left foot  [facility]

## 2019-02-28 NOTE — BRIEF OPERATIVE NOTE - PROCEDURE
<<-----Click on this checkbox to enter Procedure Transcatheter embolization  02/28/2019    Active  [facility]  Angiogram  02/28/2019    [facility]

## 2019-02-28 NOTE — BRIEF OPERATIVE NOTE - POST-OP DX
Arteriovenous malformation  02/28/2019  left foot  [facility]

## 2019-03-01 ENCOUNTER — TRANSCRIPTION ENCOUNTER (OUTPATIENT)
Age: 34
End: 2019-03-01

## 2019-03-01 VITALS — TEMPERATURE: 98 F

## 2019-03-01 LAB
ANION GAP SERPL CALC-SCNC: 9 MMOL/L — SIGNIFICANT CHANGE UP (ref 5–17)
BUN SERPL-MCNC: 14 MG/DL — SIGNIFICANT CHANGE UP (ref 7–23)
CALCIUM SERPL-MCNC: 8.8 MG/DL — SIGNIFICANT CHANGE UP (ref 8.4–10.5)
CHLORIDE SERPL-SCNC: 106 MMOL/L — SIGNIFICANT CHANGE UP (ref 96–108)
CO2 SERPL-SCNC: 21 MMOL/L — LOW (ref 22–31)
CREAT SERPL-MCNC: 0.6 MG/DL — SIGNIFICANT CHANGE UP (ref 0.5–1.3)
GLUCOSE SERPL-MCNC: 130 MG/DL — HIGH (ref 70–99)
HCT VFR BLD CALC: 37 % — SIGNIFICANT CHANGE UP (ref 34.5–45)
HGB BLD-MCNC: 11.5 G/DL — SIGNIFICANT CHANGE UP (ref 11.5–15.5)
MCHC RBC-ENTMCNC: 29.3 PG — SIGNIFICANT CHANGE UP (ref 27–34)
MCHC RBC-ENTMCNC: 31.1 GM/DL — LOW (ref 32–36)
MCV RBC AUTO: 94.1 FL — SIGNIFICANT CHANGE UP (ref 80–100)
NRBC # BLD: 0 /100 WBCS — SIGNIFICANT CHANGE UP (ref 0–0)
PLATELET # BLD AUTO: 442 K/UL — HIGH (ref 150–400)
POTASSIUM SERPL-MCNC: 4.2 MMOL/L — SIGNIFICANT CHANGE UP (ref 3.5–5.3)
POTASSIUM SERPL-SCNC: 4.2 MMOL/L — SIGNIFICANT CHANGE UP (ref 3.5–5.3)
RBC # BLD: 3.93 M/UL — SIGNIFICANT CHANGE UP (ref 3.8–5.2)
RBC # FLD: 12.6 % — SIGNIFICANT CHANGE UP (ref 10.3–14.5)
SODIUM SERPL-SCNC: 136 MMOL/L — SIGNIFICANT CHANGE UP (ref 135–145)
WBC # BLD: 9.58 K/UL — SIGNIFICANT CHANGE UP (ref 3.8–10.5)
WBC # FLD AUTO: 9.58 K/UL — SIGNIFICANT CHANGE UP (ref 3.8–10.5)

## 2019-03-01 RX ADMIN — OXYCODONE AND ACETAMINOPHEN 2 TABLET(S): 5; 325 TABLET ORAL at 06:54

## 2019-03-01 RX ADMIN — HYDROMORPHONE HYDROCHLORIDE 2 MILLIGRAM(S): 2 INJECTION INTRAMUSCULAR; INTRAVENOUS; SUBCUTANEOUS at 05:24

## 2019-03-01 RX ADMIN — HYDROMORPHONE HYDROCHLORIDE 2 MILLIGRAM(S): 2 INJECTION INTRAMUSCULAR; INTRAVENOUS; SUBCUTANEOUS at 01:06

## 2019-03-01 RX ADMIN — HYDROMORPHONE HYDROCHLORIDE 2 MILLIGRAM(S): 2 INJECTION INTRAMUSCULAR; INTRAVENOUS; SUBCUTANEOUS at 09:43

## 2019-03-01 RX ADMIN — HEPARIN SODIUM 7500 UNIT(S): 5000 INJECTION INTRAVENOUS; SUBCUTANEOUS at 06:45

## 2019-03-01 RX ADMIN — OXYCODONE AND ACETAMINOPHEN 2 TABLET(S): 5; 325 TABLET ORAL at 14:15

## 2019-03-01 RX ADMIN — HYDROMORPHONE HYDROCHLORIDE 2 MILLIGRAM(S): 2 INJECTION INTRAMUSCULAR; INTRAVENOUS; SUBCUTANEOUS at 10:01

## 2019-03-01 RX ADMIN — OXYCODONE AND ACETAMINOPHEN 2 TABLET(S): 5; 325 TABLET ORAL at 13:40

## 2019-03-01 RX ADMIN — OXYCODONE AND ACETAMINOPHEN 2 TABLET(S): 5; 325 TABLET ORAL at 07:10

## 2019-03-01 RX ADMIN — HEPARIN SODIUM 7500 UNIT(S): 5000 INJECTION INTRAVENOUS; SUBCUTANEOUS at 12:05

## 2019-03-01 RX ADMIN — HYDROMORPHONE HYDROCHLORIDE 2 MILLIGRAM(S): 2 INJECTION INTRAMUSCULAR; INTRAVENOUS; SUBCUTANEOUS at 01:36

## 2019-03-01 RX ADMIN — Medication 100 MILLIGRAM(S): at 06:45

## 2019-03-01 NOTE — DISCHARGE NOTE PROVIDER - HOSPITAL COURSE
This is a 33-year-old female with PMHx of AVM (s/p multiple treatments, most recently 05/2018) and HTN who presented to Dr. Teran c/o an unusual high flow AVM involving the heel and plantar at aspect of the left hindfoot. The lesion consists of what appears to be poorly defined densely matted hypervascular tissues. She is status post multiple embolization procedures with significant improvement in her symptoms. Earlier last year she had an area of ulceration over the lateral inframalleolar area. She had embolization of peroneal branches feeding the malformation in May 2018 which did not seem to help healing. She then underwent 2 debridements and a wound VAC by Dr. Berkowitz (vascular surgery). Dr. Berkowitz has been following her for the last several months. She does complain of pain and tenderness in the lateral plantar heel area. An ultrasound in the office was completed not much was seen in the way of high flow at this point, but there are compressible vascular spaces visible. Pt denies any other symptoms including CP and SOB. Pt now presents for DSE under general anesthesia. LMP 02/07/2019.        Pt is s/p  DSE PT artery in L foot. R groin sheath removed and hemostasis achieved by direct manual pressure. Overnight, no events. This AM, feels well, denies CP, SOB, n/v/d, LE edema. VSS, no events on tele, labs unremarkable. PE sig for R groin site stable c/d/i and left leg wrapped c/d/i, no bleeding, pain or hematoma. Patient was seen and examined by attending who agrees with above d/c plan. All meds rx to pharmacy and explained to patient. Patient instructed to return if sx worsen including not limited to chest pain, shortness of breath.

## 2019-03-01 NOTE — DISCHARGE NOTE PROVIDER - NSDCCPCAREPLAN_GEN_ALL_CORE_FT
PRINCIPAL DISCHARGE DIAGNOSIS  Problem: AVM (arteriovenous malformation)  Assessment and Plan of Treatment: -You had a direct stick embolization to your AV malformation to your left foot.  -Please follow up with Dr. Teran in 6 weeks  -continue pain medications as prescribed by your pain managment provider      SECONDARY DISCHARGE DIAGNOSES  Problem: Hypertension  Assessment and Plan of Treatment: You have a history of elevated blood pressure and you should continue your blood pressure medications as prescribed.  -continue at home blood pressure medications as prescribed   -monitor blood pressure daily with goal <140/90

## 2019-03-01 NOTE — DISCHARGE NOTE NURSING/CASE MANAGEMENT/SOCIAL WORK - NSDCDPATPORTLINK_GEN_ALL_CORE
You can access the [facility] Patient Portal, offered by [facility], by registering with the following website: [email or web address]

## 2019-03-01 NOTE — DISCHARGE NOTE PROVIDER - CARE PROVIDER_API CALL
J Carlos Teran)  Diagnostic Radiology  [address] [place] [address]  Phone: [phone]  Fax: [phone]  Follow Up Time:

## 2019-03-01 NOTE — DISCHARGE NOTE PROVIDER - INSTRUCTIONS
Please continue a heart healthy diet low in sodium, cholesterol, and fat.   You underwent a direct stick embolization and should wait 3 days before returning to ordinary activities. Do not drive for 2 days. Consult your doctor before returning to vigorous activity. You may return to work in 3-5 days. The catheter from your groin was removed and you should remove the dressing in 24 hours. You may shower once the dressing is removed, but avoid baths, hot tubs, or swimming for 5 days to prevent infection. If you notice bleeding from the site, hardening and pain at the site, drainage or redness from the site, coolness/paleness of the extremity, swelling, or fever, please call [phone]. All of your prescriptions have been sent to the pharmacy. Please follow up with Dr. Teran in 6 weeks. All of your needed prescriptions have been sent

## 2019-03-06 DIAGNOSIS — R00.0 TACHYCARDIA, UNSPECIFIED: ICD-10-CM

## 2019-03-06 DIAGNOSIS — E66.9 OBESITY, UNSPECIFIED: ICD-10-CM

## 2019-03-06 DIAGNOSIS — I10 ESSENTIAL (PRIMARY) HYPERTENSION: ICD-10-CM

## 2019-03-06 DIAGNOSIS — Q27.32 ARTERIOVENOUS MALFORMATION OF VESSEL OF LOWER LIMB: ICD-10-CM

## 2019-03-06 DIAGNOSIS — Z88.0 ALLERGY STATUS TO PENICILLIN: ICD-10-CM

## 2019-03-20 PROCEDURE — 80053 COMPREHEN METABOLIC PANEL: CPT

## 2019-03-20 PROCEDURE — 93005 ELECTROCARDIOGRAM TRACING: CPT

## 2019-03-20 PROCEDURE — 84702 CHORIONIC GONADOTROPIN TEST: CPT

## 2019-03-20 PROCEDURE — C1887: CPT

## 2019-03-20 PROCEDURE — 85027 COMPLETE CBC AUTOMATED: CPT

## 2019-03-20 PROCEDURE — 85730 THROMBOPLASTIN TIME PARTIAL: CPT

## 2019-03-20 PROCEDURE — C1769: CPT

## 2019-03-20 PROCEDURE — C1894: CPT

## 2019-03-20 PROCEDURE — C1889: CPT

## 2019-03-20 PROCEDURE — 85610 PROTHROMBIN TIME: CPT

## 2019-03-20 PROCEDURE — 80048 BASIC METABOLIC PNL TOTAL CA: CPT

## 2019-03-20 PROCEDURE — 36415 COLL VENOUS BLD VENIPUNCTURE: CPT

## 2019-04-03 NOTE — ASU PREOP CHECKLIST - BMI (KG/M2)
Thank you for choosing [facility]!    DR. GOMEZ'S CLINIC SCHEDULE  MONDAY AM - [place] TUESDAY - [place]   [address] [address] [place] [address] [place] [address]   [phone] / [phone] / [phone]       WEDNESDAY - [place] FRIDAY AM - WOUND CENTER   [address]   [place] [address] [place] [address]   [phone] / [phone]       FRIDAY PM - [place] SCHEDULE SURGERY: [phone]   [address] BILLING QUESTIONS: [phone]   [place] [address] AFTER HOURS: [phone]   [phone] / [phone] APPOINTMENTS: [phone]     Consumer Price Line (CPL) [phone]       Follow up as needed          BODY WEIGHT AND YOUR FEET  The following information is included in the after visit summary for all patients. Body weight can be a sensitive issue to discuss in clinic, but we think the following information is very important. Although we focus on the feet and ankles, we do support the overall health of our patients.     Many things can cause foot and ankle problems. Foot structure, activity level, foot mechanics and injuries are common causes of pain. One very important issue that often goes unmentioned, is body weight. Extra weight can cause increased stress on muscles, ligaments, bones and tendons. Sometimes just a few extra pounds is all it takes to put one over her/his threshold. Without reducing that stress, it can be difficult to alleviate pain. As Foot & Ankle specialists, our job is addressing the lower extremity problem and possible causes. Regarding extra body weight, we encourage patients to discuss diet and weight management plans with their primary care doctors. It is this team approach that gives you the best opportunity for pain relief and getting you back on your feet.      [facility] has a Comprehensive Weight Management Program. This program  includes counseling, education, non-surgical and surgical approaches to weight loss. If you are interested in learning more either talk to you primary care provider or call [phone].         46.1

## 2019-04-22 ENCOUNTER — APPOINTMENT (OUTPATIENT)
Dept: HEART AND VASCULAR | Facility: CLINIC | Age: 34
End: 2019-04-22
Payer: COMMERCIAL

## 2019-04-22 PROCEDURE — 99214 OFFICE O/P EST MOD 30 MIN: CPT | Mod: 25

## 2019-04-22 PROCEDURE — 76882 US LMTD JT/FCL EVL NVASC XTR: CPT

## 2019-04-24 NOTE — ASSESSMENT
[FreeTextEntry1] : This patient is well-known to us with a an unusual AVM involving the lateral heel of the left foot. She had a nonhealing ulcer in that area for many months and we have done several embolizations, most recently trans-arterial. She is has finally healed the ulceration. The lesion looks quite good with intact overlying skin which seems to be quite healthy. She still has some discomfort there but this seems to be primarily due to the bulk of the lesion and trying to get a a shoe on. On physical exam she has the fairly large fusiform swelling in the area which is minimally tender. I did an ultrasound the office and it shows a homogeneous fibrous appearance with no significant blood flow arterially or any abnormal venous spaces visible. I told her that I wouldn't do anything further at the moment since she is finally healed and is doing reasonably well. We are going to give her the name of someone who might be able to make her her a special shoe/orthotic to improve her comfort when wearing shoes.

## 2019-04-24 NOTE — PHYSICAL EXAM
[Alert] : alert [No Acute Distress] : no acute distress [Normal Hearing] : hearing was normal [PERRL] : pupils equal, round and reactive to light [No Neck Mass] : no neck mass was observed [Normal Rate] : heart rate was normal  [No Respiratory Distress] : no respiratory distress [No Edema] : there was no peripheral edema [Regular Rhythm] : with a regular rhythm [Pedal Pulses Normal] : the pedal pulses are present [Not Tender] : non-tender [Not Distended] : not distended [Normal Strength/Tone] : muscle strength and tone were normal [Normal Gait] : normal gait [No Sensory Deficits] : the sensory exam was normal to light touch and pinprick [No Motor Deficits] : the motor exam was normal [Oriented x3] : oriented to person, place, and time [de-identified] : ulcer on lateral aspect of left heel has closed, skin less erythematous than previous visit

## 2019-04-29 ENCOUNTER — TRANSCRIPTION ENCOUNTER (OUTPATIENT)
Age: 34
End: 2019-04-29

## 2019-06-28 ENCOUNTER — APPOINTMENT (OUTPATIENT)
Dept: HEART AND VASCULAR | Facility: CLINIC | Age: 34
End: 2019-06-28
Payer: COMMERCIAL

## 2019-06-28 PROCEDURE — 99214 OFFICE O/P EST MOD 30 MIN: CPT

## 2019-07-08 NOTE — PHYSICAL EXAM
[Alert] : alert [No Acute Distress] : no acute distress [PERRL] : pupils equal, round and reactive to light [Normal Hearing] : hearing was normal [No Neck Mass] : no neck mass was observed [Normal Rate] : heart rate was normal  [No Respiratory Distress] : no respiratory distress [Pedal Pulses Normal] : the pedal pulses are present [Regular Rhythm] : with a regular rhythm [No Edema] : there was no peripheral edema [Not Tender] : non-tender [Not Distended] : not distended [Normal Gait] : normal gait [Normal Strength/Tone] : muscle strength and tone were normal [No Motor Deficits] : the motor exam was normal [No Sensory Deficits] : the sensory exam was normal to light touch and pinprick [de-identified] : ulcer on lateral aspect of left heel has closed, skin less erythematous than previous visit [Oriented x3] : oriented to person, place, and time

## 2019-07-08 NOTE — ASSESSMENT
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 She has had treatment elsewhere and she has had several embolizations here using both a transcatheter and direct stick approach. We've used both STS foam as well as ethanol. She went on to heal her area of ulceration last year and was doing fairly well but over the last several weeks she's noticed a very tiny new area of scab which she is concerned may be the beginning of another ulceration. Her lesion has a very unusual configuration in texture as it is a 5 x 6 cm tense firm mass without pulsation. The overlying skin is tense but without any associated birthmark. Neuromuscular function is intact and she walks without much of a problem except for some shoes which rub on the mass.  Previous angiogram shows diffuse small vessel hypervascularity, but on ultrasound there is no significant high flow noted. There is clearly a large amount of fibrous tissue associated with the lesion. I went over her prior scans to see if surgical resection might be feasible but the lesion does extend all the way to the bone (calcaneus) and is fairly extensive. Of note she states that all three of her siblings have vascular malformations in different parts of her body (spine, chest) but there was no known family history before her generation. I suggested that we do some genetic studies through Dr. Gómez and we will give her Dr Gómez's contact information. We took some pictures of the lesion and I will send these and the scans and angiographic studies to Dr. Uribe to see if he thinks resection might be feasible.  I would be concerned about healing in this area as well as the likelihood of recurrence since this could not be entirely resected.
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
## 2019-07-24 VITALS
OXYGEN SATURATION: 100 % | HEART RATE: 89 BPM | DIASTOLIC BLOOD PRESSURE: 80 MMHG | WEIGHT: 291.01 LBS | SYSTOLIC BLOOD PRESSURE: 150 MMHG | TEMPERATURE: 98 F | HEIGHT: 67 IN | RESPIRATION RATE: 16 BRPM

## 2019-07-24 RX ORDER — GABAPENTIN 400 MG/1
1 CAPSULE ORAL
Qty: 0 | Refills: 0 | DISCHARGE

## 2019-07-24 RX ORDER — CHLORHEXIDINE GLUCONATE 213 G/1000ML
1 SOLUTION TOPICAL ONCE
Refills: 0 | Status: DISCONTINUED | OUTPATIENT
Start: 2019-07-25 | End: 2019-07-26

## 2019-07-24 NOTE — H&P ADULT - ASSESSMENT
33-year-old female w/ PMHx of HTN and Left Foot AVM (s/p multiple treatments, most recently s/p DSE to Left PT Artery on 2/28/19) and HTN who presents for DSE under general anesthesia    ASA III Mallampati III  Consent to be obtained by Dr. Teran's team  Started IVF NS @ 75cc/h      Risks & benefits of procedure and alternative therapy have been explained to the patient including but not limited to: allergic reaction, bleeding w/possible need for blood transfusion, infection, renal and vascular compromise, limb damage, emergent surgery. Informed consent obtained and in chart.

## 2019-07-24 NOTE — H&P ADULT - NSICDXPASTSURGICALHX_GEN_ALL_CORE_FT
PAST SURGICAL HISTORY:  Elective surgery transcatheter therapy vascular embolization x5    S/P debridement LLE area overlying AVM

## 2019-07-24 NOTE — H&P ADULT - NSICDXPASTMEDICALHX_GEN_ALL_CORE_FT
PAST MEDICAL HISTORY:  AVM (arteriovenous malformation) of left foot    Foot ulceration left foot    HTN (hypertension)

## 2019-07-24 NOTE — H&P ADULT - HISTORY OF PRESENT ILLNESS
33-year-old female we've been treating for several years for a vascular malformation involving the lateral aspect of her left hindfoot associated with swelling, pain and nonhealing ulcers. She has had treatment elsewhere and she has had several embolizations here using both a transcatheter and direct stick approach. We've used both STS foam as well as ethanol. She went on to heal her area of ulceration last year and was doing fairly well but over the last several weeks she's noticed a very tiny new area of scab which she is concerned may be the beginning of another ulceration. Her lesion has a very unusual configuration in texture as it is a 5 x 6 cm tense firm mass without pulsation. The overlying skin is tense but without any associated birthmark. Neuromuscular function is intact and she walks without much of a problem except for some shoes which rub on the mass. Previous angiogram shows diffuse small vessel hypervascularity, but on ultrasound there is no significant high flow noted. There is clearly a large amount of fibrous tissue associated with the lesion. I went over her prior scans to see if surgical resection might be feasible but the lesion does extend all the way to the bone (calcaneus) and is fairly extensive. Of note she states that all three of her siblings have vascular malformations in different parts of her body (spine, chest) but there was no known family history before her generation. I suggested that we do some genetic studies through Dr. Gómez and we will give her Dr Gómez's contact information. We took some pictures of the lesion and I will send these and the scans and angiographic studies to Dr. Uribe to see if he thinks resection might be feasible. I would be concerned about healing in this area as well as the likelihood of recurrence since this could not be entirely resected. 33-year-old female w/ PMHx of HTN and Left Foot AVM (s/p multiple treatments, most recently s/p DSE to Left PT Artery on 2/28/19) and HTN who presented to Dr. Teran c/o a vascular malformation involving the lateral aspect of her left hindfoot associated with swelling, pain and nonhealing ulcers. She has had treatment elsewhere and she has had several embolizations here using both a transcatheter and direct stick approach. We've used both STS foam as well as ethanol. She went on to heal her area of ulceration last year and was doing fairly well but over the last several weeks she's noticed a very tiny new area of scab which she is concerned may be the beginning of another ulceration. Her lesion has a very unusual configuration in texture as it is a 5 x 6 cm tense firm mass without pulsation. The overlying skin is tense but without any associated birthmark. Neuromuscular function is intact and she walks without much of a problem except for some shoes which rub on the mass. Previous angiogram shows diffuse small vessel hypervascularity, but on ultrasound there is no significant high flow noted. There is clearly a large amount of fibrous tissue associated with the lesion. I went over her prior scans to see if surgical resection might be feasible but the lesion does extend all the way to the bone (calcaneus) and is fairly extensive. Of note she states that all three of her siblings have vascular malformations in different parts of her body (spine, chest) but there was no known family history before her generation.  In light of pt's symptoms and known AVM, pt now presents to [facility] for recommended DSE under general anesthesia with Dr. Teran.    LMP: "3 weeks ago" per pt

## 2019-07-25 ENCOUNTER — INPATIENT (INPATIENT)
Facility: HOSPITAL | Age: 34
LOS: 0 days | Discharge: HOME | DRG: 254 | End: 2019-07-26
Attending: RADIOLOGY | Admitting: RADIOLOGY
Payer: COMMERCIAL

## 2019-07-25 DIAGNOSIS — Z41.9 ENCOUNTER FOR PROCEDURE FOR PURPOSES OTHER THAN REMEDYING HEALTH STATE, UNSPECIFIED: Chronic | ICD-10-CM

## 2019-07-25 DIAGNOSIS — Z98.89 OTHER SPECIFIED POSTPROCEDURAL STATES: Chronic | ICD-10-CM

## 2019-07-25 LAB — HCG UR QL: NEGATIVE — SIGNIFICANT CHANGE UP

## 2019-07-25 PROCEDURE — 37242 VASC EMBOLIZE/OCCLUDE ARTERY: CPT

## 2019-07-25 PROCEDURE — 93010 ELECTROCARDIOGRAM REPORT: CPT

## 2019-07-25 PROCEDURE — 36246 INS CATH ABD/L-EXT ART 2ND: CPT

## 2019-07-25 RX ORDER — SODIUM CHLORIDE 9 MG/ML
500 INJECTION INTRAMUSCULAR; INTRAVENOUS; SUBCUTANEOUS
Refills: 0 | Status: DISCONTINUED | OUTPATIENT
Start: 2019-07-25 | End: 2019-07-26

## 2019-07-25 RX ORDER — ONDANSETRON 8 MG/1
4 TABLET, FILM COATED ORAL EVERY 4 HOURS
Refills: 0 | Status: DISCONTINUED | OUTPATIENT
Start: 2019-07-25 | End: 2019-07-26

## 2019-07-25 RX ORDER — HYDROMORPHONE HYDROCHLORIDE 2 MG/ML
2 INJECTION INTRAMUSCULAR; INTRAVENOUS; SUBCUTANEOUS EVERY 4 HOURS
Refills: 0 | Status: DISCONTINUED | OUTPATIENT
Start: 2019-07-25 | End: 2019-07-26

## 2019-07-25 RX ORDER — CIPROFLOXACIN LACTATE 400MG/40ML
400 VIAL (ML) INTRAVENOUS EVERY 12 HOURS
Refills: 0 | Status: COMPLETED | OUTPATIENT
Start: 2019-07-25 | End: 2019-07-26

## 2019-07-25 RX ADMIN — HYDROMORPHONE HYDROCHLORIDE 2 MILLIGRAM(S): 2 INJECTION INTRAMUSCULAR; INTRAVENOUS; SUBCUTANEOUS at 22:47

## 2019-07-25 RX ADMIN — SODIUM CHLORIDE 75 MILLILITER(S): 9 INJECTION INTRAMUSCULAR; INTRAVENOUS; SUBCUTANEOUS at 16:39

## 2019-07-25 RX ADMIN — HYDROMORPHONE HYDROCHLORIDE 2 MILLIGRAM(S): 2 INJECTION INTRAMUSCULAR; INTRAVENOUS; SUBCUTANEOUS at 18:55

## 2019-07-25 RX ADMIN — Medication 200 MILLIGRAM(S): at 22:42

## 2019-07-25 RX ADMIN — HYDROMORPHONE HYDROCHLORIDE 2 MILLIGRAM(S): 2 INJECTION INTRAMUSCULAR; INTRAVENOUS; SUBCUTANEOUS at 20:15

## 2019-07-25 RX ADMIN — HYDROMORPHONE HYDROCHLORIDE 2 MILLIGRAM(S): 2 INJECTION INTRAMUSCULAR; INTRAVENOUS; SUBCUTANEOUS at 23:17

## 2019-07-25 NOTE — PATIENT PROFILE ADULT - NSPROGENARRIVEDFROM_GEN_A_NUR
Health Maintenance Summary     Topic Due On Due Status Completed On Postpone Until Reason    PAP SMEAR - CERVICAL CANCER SCREENING Mar 17, 2018 Not Due Mar 17, 2015      Immunization - TDAP Pregnancy  Hidden       IMMUNIZATION - DTaP/Tdap/Td Aug 6, 1997 Postponed  Nov 30, 2017 Patient Refused    Immunization-Influenza Sep 1, 2017 Postponed  Apr 1, 2018 Patient Refused          Patient is due for topics as listed above, she wishes to proceed at this time, order (s) placed and patient given information .     home

## 2019-07-25 NOTE — BRIEF OPERATIVE NOTE - OPERATION/FINDINGS
Angiography of the left lower extremity via right CFA access demonstrated arteriovenous shunting within the left lateral hindfoot. Embolization of a branch of the posterior tibial artery using 500-700 micron microspheres was performed.  Repeat angiography demonstrated decreased shunting.

## 2019-07-26 ENCOUNTER — TRANSCRIPTION ENCOUNTER (OUTPATIENT)
Age: 34
End: 2019-07-26

## 2019-07-26 VITALS
OXYGEN SATURATION: 99 % | SYSTOLIC BLOOD PRESSURE: 133 MMHG | RESPIRATION RATE: 18 BRPM | HEART RATE: 91 BPM | DIASTOLIC BLOOD PRESSURE: 58 MMHG

## 2019-07-26 LAB
ANION GAP SERPL CALC-SCNC: 11 MMOL/L — SIGNIFICANT CHANGE UP (ref 5–17)
BUN SERPL-MCNC: 11 MG/DL — SIGNIFICANT CHANGE UP (ref 7–23)
CALCIUM SERPL-MCNC: 9.2 MG/DL — SIGNIFICANT CHANGE UP (ref 8.4–10.5)
CHLORIDE SERPL-SCNC: 101 MMOL/L — SIGNIFICANT CHANGE UP (ref 96–108)
CO2 SERPL-SCNC: 23 MMOL/L — SIGNIFICANT CHANGE UP (ref 22–31)
CREAT SERPL-MCNC: 0.67 MG/DL — SIGNIFICANT CHANGE UP (ref 0.5–1.3)
GLUCOSE SERPL-MCNC: 148 MG/DL — HIGH (ref 70–99)
HCT VFR BLD CALC: 39.1 % — SIGNIFICANT CHANGE UP (ref 34.5–45)
HGB BLD-MCNC: 12.6 G/DL — SIGNIFICANT CHANGE UP (ref 11.5–15.5)
MCHC RBC-ENTMCNC: 28.8 PG — SIGNIFICANT CHANGE UP (ref 27–34)
MCHC RBC-ENTMCNC: 32.2 GM/DL — SIGNIFICANT CHANGE UP (ref 32–36)
MCV RBC AUTO: 89.3 FL — SIGNIFICANT CHANGE UP (ref 80–100)
NRBC # BLD: 0 /100 WBCS — SIGNIFICANT CHANGE UP (ref 0–0)
PLATELET # BLD AUTO: 434 K/UL — HIGH (ref 150–400)
POTASSIUM SERPL-MCNC: 4.5 MMOL/L — SIGNIFICANT CHANGE UP (ref 3.5–5.3)
POTASSIUM SERPL-SCNC: 4.5 MMOL/L — SIGNIFICANT CHANGE UP (ref 3.5–5.3)
RBC # BLD: 4.38 M/UL — SIGNIFICANT CHANGE UP (ref 3.8–5.2)
RBC # FLD: 12.4 % — SIGNIFICANT CHANGE UP (ref 10.3–14.5)
SODIUM SERPL-SCNC: 135 MMOL/L — SIGNIFICANT CHANGE UP (ref 135–145)
WBC # BLD: 6.69 K/UL — SIGNIFICANT CHANGE UP (ref 3.8–10.5)
WBC # FLD AUTO: 6.69 K/UL — SIGNIFICANT CHANGE UP (ref 3.8–10.5)

## 2019-07-26 PROCEDURE — 36415 COLL VENOUS BLD VENIPUNCTURE: CPT

## 2019-07-26 PROCEDURE — 80048 BASIC METABOLIC PNL TOTAL CA: CPT

## 2019-07-26 PROCEDURE — C1894: CPT

## 2019-07-26 PROCEDURE — C1769: CPT

## 2019-07-26 PROCEDURE — 93005 ELECTROCARDIOGRAM TRACING: CPT

## 2019-07-26 PROCEDURE — C1889: CPT

## 2019-07-26 PROCEDURE — 81025 URINE PREGNANCY TEST: CPT

## 2019-07-26 PROCEDURE — C1887: CPT

## 2019-07-26 PROCEDURE — 85027 COMPLETE CBC AUTOMATED: CPT

## 2019-07-26 PROCEDURE — A9698: CPT

## 2019-07-26 RX ORDER — CIPROFLOXACIN LACTATE 400MG/40ML
1 VIAL (ML) INTRAVENOUS
Qty: 14 | Refills: 0
Start: 2019-07-26 | End: 2019-08-01

## 2019-07-26 RX ADMIN — HYDROMORPHONE HYDROCHLORIDE 2 MILLIGRAM(S): 2 INJECTION INTRAMUSCULAR; INTRAVENOUS; SUBCUTANEOUS at 11:45

## 2019-07-26 RX ADMIN — Medication 200 MILLIGRAM(S): at 11:34

## 2019-07-26 RX ADMIN — HYDROMORPHONE HYDROCHLORIDE 2 MILLIGRAM(S): 2 INJECTION INTRAMUSCULAR; INTRAVENOUS; SUBCUTANEOUS at 03:13

## 2019-07-26 RX ADMIN — HYDROMORPHONE HYDROCHLORIDE 2 MILLIGRAM(S): 2 INJECTION INTRAMUSCULAR; INTRAVENOUS; SUBCUTANEOUS at 11:28

## 2019-07-26 RX ADMIN — HYDROMORPHONE HYDROCHLORIDE 2 MILLIGRAM(S): 2 INJECTION INTRAMUSCULAR; INTRAVENOUS; SUBCUTANEOUS at 06:41

## 2019-07-26 RX ADMIN — HYDROMORPHONE HYDROCHLORIDE 2 MILLIGRAM(S): 2 INJECTION INTRAMUSCULAR; INTRAVENOUS; SUBCUTANEOUS at 07:11

## 2019-07-26 RX ADMIN — HYDROMORPHONE HYDROCHLORIDE 2 MILLIGRAM(S): 2 INJECTION INTRAMUSCULAR; INTRAVENOUS; SUBCUTANEOUS at 02:43

## 2019-07-26 NOTE — DISCHARGE NOTE PROVIDER - HOSPITAL COURSE
33-year-old female w/ PMHx of HTN and Left Foot AVM (s/p multiple treatments, most recently s/p DSE to Left PT Artery on 2/28/19) and HTN who presented to Dr. Teran c/o a vascular malformation involving the lateral aspect of her left hindfoot associated with swelling, pain and nonhealing ulcers. She has had treatment elsewhere and she has had several embolizations here using both a transcatheter and direct stick approach. We've used both STS foam as well as ethanol. She went on to heal her area of ulceration last year and was doing fairly well but over the last several weeks she's noticed a very tiny new area of scab which she is concerned may be the beginning of another ulceration. Her lesion has a very unusual configuration in texture as it is a 5 x 6 cm tense firm mass without pulsation. The overlying skin is tense but without any associated birthmark. Neuromuscular function is intact and she walks without much of a problem except for some shoes which rub on the mass. Previous angiogram shows diffuse small vessel hypervascularity, but on ultrasound there is no significant high flow noted. There is clearly a large amount of fibrous tissue associated with the lesion. I went over her prior scans to see if surgical resection might be feasible but the lesion does extend all the way to the bone (calcaneus) and is fairly extensive. Of note she states that all three of her siblings have vascular malformations in different parts of her body (spine, chest) but there was no known family history before her generation.    In light of pt's symptoms and known AVM, pt now presents to [facility] for recommended DSE under general anesthesia with Dr. Teran.    Pt is now s/p angiography of the left lower extremity via right CFA on 7/26/19 which demonstrated arteriovenous shunting within the left lateral hindfoot. Embolization of a branch of the posterior tibial artery using 500-700 micron microspheres was performed.  Repeat angiography demonstrated decreased shunting.    No significant events on telemetry overnight. Patient has been medically cleared for discharge as per Dr. Teran. Patient has been given appropriate discharge instructions including medication regimen, access site management and follow up. Medications that patient needs refills on have been e-prescribed to preferred pharmacy.         Temp 98.3 HR 77 /55 RR 16 SpO2 99% RA    Gen: NAD, A&O x3    Cards: RRR, clear S1 and S2 without murmur    Pulm: CTA B/L without w/r/r    Left Groin: No hematoma or ooze, peripheral pulses 2+ B/L    Abd: soft, NT    Ext: no LE edema or ulcerations B/L

## 2019-07-26 NOTE — DISCHARGE NOTE NURSING/CASE MANAGEMENT/SOCIAL WORK - NSDCDPATPORTLINK_GEN_ALL_CORE
You can access the [facility], offered by [facility], by registering with the following website: [email or web address]

## 2019-07-26 NOTE — DISCHARGE NOTE PROVIDER - NSDCCPCAREPLAN_GEN_ALL_CORE_FT
PRINCIPAL DISCHARGE DIAGNOSIS  Diagnosis: AVM (arteriovenous malformation)  Assessment and Plan of Treatment: You underwent a direct stick embolization to your AV malformation of your left foot.  -Please follow up with Dr. Teran in 6 weeks  -Continue pain medications as prescribed by your pain managment provider  -Please take Ciprofloxacin 500mg twice daily for one week

## 2019-07-26 NOTE — DISCHARGE NOTE PROVIDER - CARE PROVIDER_API CALL
J Carlos Teran)  Diagnostic Radiology  [address] [place] [address]  Phone: [phone]  Fax: [phone]  Follow Up Time:

## 2019-07-26 NOTE — DISCHARGE NOTE PROVIDER - NSDCFUADDINST_GEN_ALL_CORE_FT
You underwent a direct stick embolization and should wait 3 days before returning to ordinary activities. Do not drive for 2 days. Consult your doctor before returning to vigorous activity. You may return to work in 3-5 days. The catheter from your groin was removed and you should remove the dressing in 24 hours. You may shower once the dressing is removed, but avoid baths, hot tubs, or swimming for 5 days to prevent infection. If you notice bleeding from the site, hardening and pain at the site, drainage or redness from the site, coolness/paleness of the extremity, swelling, or fever, please call [phone]. All of your prescriptions have been sent to the pharmacy. All of your needed prescriptions have been sent

## 2019-07-31 DIAGNOSIS — Q27.32 ARTERIOVENOUS MALFORMATION OF VESSEL OF LOWER LIMB: ICD-10-CM

## 2019-07-31 DIAGNOSIS — I10 ESSENTIAL (PRIMARY) HYPERTENSION: ICD-10-CM

## 2019-11-01 ENCOUNTER — APPOINTMENT (OUTPATIENT)
Dept: HEART AND VASCULAR | Facility: CLINIC | Age: 34
End: 2019-11-01
Payer: COMMERCIAL

## 2019-11-01 PROCEDURE — 99214 OFFICE O/P EST MOD 30 MIN: CPT

## 2019-11-04 NOTE — ASSESSMENT
[FreeTextEntry1] : This is a 34-year-old female well known to us with a fairly large (approx 8cm diameter) high flow AVM in the lateral aspect of the left heel. In the past she has had pain, bleeding and ulceration. She has had multiple transcatheter and direct puncture embolizations using both glue and ethanol. Her last procedure was 3 months ago and she was doing fairly well but over the last month she noticed a very small area of scab formation in the upper aspect of the mass. There's been no bleeding and no significant pain. On physical exam the mass is still firm and feels minimally pulsatile. Even though it seems slightly pulsatile on ultrasound I don't see much of anything in terms of high flow.  Except for the tiny dry scab the overlying skin is intact and well perfused.  Given the fact that she is not in any pain and the scab is quite small I told her I would wait another month and see which way things are going before committing to another procedure. She was fine with this and will keep us informed.

## 2019-11-04 NOTE — PHYSICAL EXAM
[Alert] : alert [No Acute Distress] : no acute distress [PERRL] : pupils equal, round and reactive to light [Normal Hearing] : hearing was normal [No Neck Mass] : no neck mass was observed [No Respiratory Distress] : no respiratory distress [Normal Rate] : heart rate was normal  [Regular Rhythm] : with a regular rhythm [Pedal Pulses Normal] : the pedal pulses are present [No Edema] : there was no peripheral edema [Not Tender] : non-tender [Not Distended] : not distended [Normal Gait] : normal gait [Normal Strength/Tone] : muscle strength and tone were normal [No Motor Deficits] : the motor exam was normal [No Sensory Deficits] : the sensory exam was normal to light touch and pinprick [Oriented x3] : oriented to person, place, and time [de-identified] : ulcer on lateral aspect of left heel about 1mm in size, eschar over the ulceration, skin erythematous surrounding

## 2019-11-29 VITALS
HEIGHT: 67 IN | HEART RATE: 103 BPM | DIASTOLIC BLOOD PRESSURE: 91 MMHG | OXYGEN SATURATION: 97 % | WEIGHT: 293 LBS | TEMPERATURE: 98 F | RESPIRATION RATE: 16 BRPM | SYSTOLIC BLOOD PRESSURE: 166 MMHG

## 2019-11-29 RX ORDER — CHLORHEXIDINE GLUCONATE 213 G/1000ML
1 SOLUTION TOPICAL ONCE
Refills: 0 | Status: DISCONTINUED | OUTPATIENT
Start: 2019-12-03 | End: 2019-12-04

## 2019-11-29 NOTE — H&P ADULT - RS GEN PE MLT RESP DETAILS PC
clear to auscultation bilaterally/no rhonchi/chest wall tenderness/intercostal retractions/no rales/no wheezes

## 2019-11-29 NOTE — H&P ADULT - NSHPLABSRESULTS_GEN_ALL_CORE
EKG: EKG: Sinus tachycardia @ 104bpm, TWI in III, poor R wave progression.                         13.5   11.68 )-----------( 499      ( 03 Dec 2019 13:09 )             41.1       12-03    140  |  102  |  10  ----------------------------<  91  4.4   |  23  |  0.61    Ca    9.6      03 Dec 2019 13:09    TPro  7.7  /  Alb  4.3  /  TBili  0.7  /  DBili  x   /  AST  12  /  ALT  10  /  AlkPhos  77  12-03      PT/INR - ( 03 Dec 2019 13:09 )   PT: 11.3 sec;   INR: 1.00          PTT - ( 03 Dec 2019 13:09 )  PTT:31.0 sec

## 2019-11-29 NOTE — H&P ADULT - NSHPREVIEWOFSYSTEMS_GEN_ALL_CORE
This is a 34-year-old female with a history of L foot AVM and HTN who initially presented to Dr. Teran for follow up of well known, fairly large (approx 8cm diameter), high flow AVM in the lateral aspect of the left heel. In the past she has had pain, bleeding and ulceration. She has had multiple transcatheter and direct puncture embolization’s using both glue and ethanol. Her last procedure was 3 months ago and she was doing fairly well but over the last month she noticed a very small area of scab formation in the upper aspect of the mass. There's is no bleeding and no significant pain. Per Dr. Teran, on physical exam the mass is still firm and feels minimally pulsatile. On ultrasound there was not high flow. With the exception of a tiny dry scab the overlying skin, it is intact and well perfused. Per Dr. Teran’s [employer] note, the plan was to wait 4 wks for continued monitoring of the site. However, after 1 week, the patient began to notice that her crusting AVM begin to open and develop into a “reddish/purple” ulceration “about the size of a peter.” She admits to an intermittent burning, stabbing, pulsatile pain on the L heal with radiation to the superior aspect of the L foot and inferior aspect of the L heal. Additionally, she admits to pruritus, tingling and occasional bleeding but denies numbness, swelling, fever, chills. Pain is increased with ambulation and decreased with rest, gabapentin/oxycodone. It was decided by Dr. Teran that the patient should return for direct stick embolization to further manage known AVM. This is a 34-year-old female with a history of L foot AVM and HTN who initially presented to Dr. Teran for follow up of well known, fairly large (approx 8cm diameter), high flow AVM in the lateral aspect of the left heel. In the past she has had pain, bleeding and ulceration. She has had multiple transcatheter and direct puncture embolization’s using both glue and ethanol. Her last procedure was 5 months ago and she was doing fairly well but over the last month she noticed a very small area of scab formation in the upper aspect of the mass. There's is no bleeding and no significant pain. Per Dr. Teran, on physical exam the mass is still firm and feels minimally pulsatile. On ultrasound there was not high flow. With the exception of a tiny dry scab the overlying skin, it is intact and well perfused. Per Dr. Teran’s [employer] note, the plan was to wait 4 wks for continued monitoring of the site. However, after 1 week, the patient began to notice that her crusting AVM begin to open and develop into a “reddish/purple” ulceration “about the size of a peter.” She admits to an intermittent burning, stabbing, pulsatile pain on the L heal with radiation to the superior aspect of the L foot and inferior aspect of the L heal. Additionally, she admits to pruritus, tingling and occasional bleeding but denies numbness, swelling, fever, chills. Pain is increased with ambulation and decreased with rest, gabapentin/oxycodone. It was decided by Dr. Teran that the patient should return for direct stick embolization to further manage known AVM. This is a 34-year-old female with a history of L foot AVM and HTN who initially presented to Dr. Teran for follow up of well known, fairly large (approx 8cm diameter), high flow AVM in the lateral aspect of the left heel. In the past she has had pain, bleeding and ulceration. She has had multiple transcatheter and direct puncture embolization’s using both glue and ethanol. Her last procedure was 5 months ago and she was doing fairly well but over the last month she noticed a very small area of scab formation in the upper aspect of the mass. There's is no bleeding and no significant pain. Per Dr. Teran, on physical exam the mass is still firm and feels minimally pulsatile. On ultrasound there was not high flow. With the exception of a tiny dry scab the overlying skin, it is intact and well perfused. Per Dr. Teran’s [employer] note, the plan was to wait 4 wks for continued monitoring of the site. However, after 1 week, the patient began to notice that her crusting AVM begin to open and develop into a “reddish/purple” ulceration “about the size of a peter.” She admits to an intermittent burning, stabbing, pulsatile pain on the L heal with radiation to the superior aspect of the L foot and inferior aspect of the L heal. Additionally, she admits to pruritus, tingling and occasional bleeding but denies numbness, swelling, fever, chills. Pain is increased with ambulation and decreased with rest, gabapentin/oxycodone. It was decided by Dr. Teran that the patient should return for direct stick embolization to further manage known AVM.    LMP: 11/30/19

## 2019-12-03 ENCOUNTER — INPATIENT (INPATIENT)
Facility: HOSPITAL | Age: 34
LOS: 0 days | Discharge: HOME | DRG: 263 | End: 2019-12-04
Attending: RADIOLOGY | Admitting: RADIOLOGY
Payer: COMMERCIAL

## 2019-12-03 DIAGNOSIS — Z98.89 OTHER SPECIFIED POSTPROCEDURAL STATES: Chronic | ICD-10-CM

## 2019-12-03 DIAGNOSIS — Z41.9 ENCOUNTER FOR PROCEDURE FOR PURPOSES OTHER THAN REMEDYING HEALTH STATE, UNSPECIFIED: Chronic | ICD-10-CM

## 2019-12-03 LAB
ALBUMIN SERPL ELPH-MCNC: 4.3 G/DL — SIGNIFICANT CHANGE UP (ref 3.3–5)
ALP SERPL-CCNC: 77 U/L — SIGNIFICANT CHANGE UP (ref 40–120)
ALT FLD-CCNC: 10 U/L — SIGNIFICANT CHANGE UP (ref 10–45)
ANION GAP SERPL CALC-SCNC: 15 MMOL/L — SIGNIFICANT CHANGE UP (ref 5–17)
APTT BLD: 31 SEC — SIGNIFICANT CHANGE UP (ref 27.5–36.3)
AST SERPL-CCNC: 12 U/L — SIGNIFICANT CHANGE UP (ref 10–40)
BASOPHILS # BLD AUTO: 0.06 K/UL — SIGNIFICANT CHANGE UP (ref 0–0.2)
BASOPHILS NFR BLD AUTO: 0.5 % — SIGNIFICANT CHANGE UP (ref 0–2)
BILIRUB SERPL-MCNC: 0.7 MG/DL — SIGNIFICANT CHANGE UP (ref 0.2–1.2)
BUN SERPL-MCNC: 10 MG/DL — SIGNIFICANT CHANGE UP (ref 7–23)
CALCIUM SERPL-MCNC: 9.6 MG/DL — SIGNIFICANT CHANGE UP (ref 8.4–10.5)
CHLORIDE SERPL-SCNC: 102 MMOL/L — SIGNIFICANT CHANGE UP (ref 96–108)
CO2 SERPL-SCNC: 23 MMOL/L — SIGNIFICANT CHANGE UP (ref 22–31)
CREAT SERPL-MCNC: 0.61 MG/DL — SIGNIFICANT CHANGE UP (ref 0.5–1.3)
EOSINOPHIL # BLD AUTO: 0.05 K/UL — SIGNIFICANT CHANGE UP (ref 0–0.5)
EOSINOPHIL NFR BLD AUTO: 0.4 % — SIGNIFICANT CHANGE UP (ref 0–6)
GLUCOSE SERPL-MCNC: 91 MG/DL — SIGNIFICANT CHANGE UP (ref 70–99)
HCG SERPL-ACNC: 0 MIU/ML — SIGNIFICANT CHANGE UP
HCT VFR BLD CALC: 41.1 % — SIGNIFICANT CHANGE UP (ref 34.5–45)
HGB BLD-MCNC: 13.5 G/DL — SIGNIFICANT CHANGE UP (ref 11.5–15.5)
IMM GRANULOCYTES NFR BLD AUTO: 0.5 % — SIGNIFICANT CHANGE UP (ref 0–1.5)
INR BLD: 1 — SIGNIFICANT CHANGE UP (ref 0.88–1.16)
LYMPHOCYTES # BLD AUTO: 1.72 K/UL — SIGNIFICANT CHANGE UP (ref 1–3.3)
LYMPHOCYTES # BLD AUTO: 14.8 % — SIGNIFICANT CHANGE UP (ref 13–44)
MCHC RBC-ENTMCNC: 29 PG — SIGNIFICANT CHANGE UP (ref 27–34)
MCHC RBC-ENTMCNC: 32.8 GM/DL — SIGNIFICANT CHANGE UP (ref 32–36)
MCV RBC AUTO: 88.4 FL — SIGNIFICANT CHANGE UP (ref 80–100)
MONOCYTES # BLD AUTO: 0.72 K/UL — SIGNIFICANT CHANGE UP (ref 0–0.9)
MONOCYTES NFR BLD AUTO: 6.2 % — SIGNIFICANT CHANGE UP (ref 2–14)
NEUTROPHILS # BLD AUTO: 9.05 K/UL — HIGH (ref 1.8–7.4)
NEUTROPHILS NFR BLD AUTO: 77.6 % — HIGH (ref 43–77)
NRBC # BLD: 0 /100 WBCS — SIGNIFICANT CHANGE UP (ref 0–0)
PLATELET # BLD AUTO: 499 K/UL — HIGH (ref 150–400)
POTASSIUM SERPL-MCNC: 4.4 MMOL/L — SIGNIFICANT CHANGE UP (ref 3.5–5.3)
POTASSIUM SERPL-SCNC: 4.4 MMOL/L — SIGNIFICANT CHANGE UP (ref 3.5–5.3)
PROT SERPL-MCNC: 7.7 G/DL — SIGNIFICANT CHANGE UP (ref 6–8.3)
PROTHROM AB SERPL-ACNC: 11.3 SEC — SIGNIFICANT CHANGE UP (ref 10–12.9)
RBC # BLD: 4.65 M/UL — SIGNIFICANT CHANGE UP (ref 3.8–5.2)
RBC # FLD: 12.5 % — SIGNIFICANT CHANGE UP (ref 10.3–14.5)
SODIUM SERPL-SCNC: 140 MMOL/L — SIGNIFICANT CHANGE UP (ref 135–145)
WBC # BLD: 11.68 K/UL — HIGH (ref 3.8–10.5)
WBC # FLD AUTO: 11.68 K/UL — HIGH (ref 3.8–10.5)

## 2019-12-03 PROCEDURE — 37241 VASC EMBOLIZE/OCCLUDE VENOUS: CPT

## 2019-12-03 RX ORDER — GABAPENTIN 400 MG/1
800 CAPSULE ORAL DAILY
Refills: 0 | Status: DISCONTINUED | OUTPATIENT
Start: 2019-12-03 | End: 2019-12-04

## 2019-12-03 RX ORDER — ONDANSETRON 8 MG/1
4 TABLET, FILM COATED ORAL EVERY 4 HOURS
Refills: 0 | Status: DISCONTINUED | OUTPATIENT
Start: 2019-12-03 | End: 2019-12-04

## 2019-12-03 RX ORDER — KETOROLAC TROMETHAMINE 30 MG/ML
30 SYRINGE (ML) INJECTION ONCE
Refills: 0 | Status: DISCONTINUED | OUTPATIENT
Start: 2019-12-03 | End: 2019-12-03

## 2019-12-03 RX ORDER — SODIUM CHLORIDE 9 MG/ML
500 INJECTION INTRAMUSCULAR; INTRAVENOUS; SUBCUTANEOUS
Refills: 0 | Status: DISCONTINUED | OUTPATIENT
Start: 2019-12-03 | End: 2019-12-04

## 2019-12-03 RX ORDER — DIPHENHYDRAMINE HCL 50 MG
25 CAPSULE ORAL ONCE
Refills: 0 | Status: COMPLETED | OUTPATIENT
Start: 2019-12-03 | End: 2019-12-03

## 2019-12-03 RX ORDER — CIPROFLOXACIN LACTATE 400MG/40ML
500 VIAL (ML) INTRAVENOUS
Refills: 0 | Status: COMPLETED | OUTPATIENT
Start: 2019-12-03 | End: 2019-12-04

## 2019-12-03 RX ORDER — HYDROMORPHONE HYDROCHLORIDE 2 MG/ML
0.5 INJECTION INTRAMUSCULAR; INTRAVENOUS; SUBCUTANEOUS ONCE
Refills: 0 | Status: DISCONTINUED | OUTPATIENT
Start: 2019-12-03 | End: 2019-12-03

## 2019-12-03 RX ORDER — CIPROFLOXACIN LACTATE 400MG/40ML
400 VIAL (ML) INTRAVENOUS EVERY 12 HOURS
Refills: 0 | Status: DISCONTINUED | OUTPATIENT
Start: 2019-12-03 | End: 2019-12-03

## 2019-12-03 RX ORDER — INFLUENZA VIRUS VACCINE 15; 15; 15; 15 UG/.5ML; UG/.5ML; UG/.5ML; UG/.5ML
0.5 SUSPENSION INTRAMUSCULAR ONCE
Refills: 0 | Status: DISCONTINUED | OUTPATIENT
Start: 2019-12-03 | End: 2019-12-04

## 2019-12-03 RX ORDER — HYDROMORPHONE HYDROCHLORIDE 2 MG/ML
2 INJECTION INTRAMUSCULAR; INTRAVENOUS; SUBCUTANEOUS EVERY 4 HOURS
Refills: 0 | Status: DISCONTINUED | OUTPATIENT
Start: 2019-12-03 | End: 2019-12-04

## 2019-12-03 RX ADMIN — HYDROMORPHONE HYDROCHLORIDE 2 MILLIGRAM(S): 2 INJECTION INTRAMUSCULAR; INTRAVENOUS; SUBCUTANEOUS at 19:20

## 2019-12-03 RX ADMIN — HYDROMORPHONE HYDROCHLORIDE 2 MILLIGRAM(S): 2 INJECTION INTRAMUSCULAR; INTRAVENOUS; SUBCUTANEOUS at 23:45

## 2019-12-03 RX ADMIN — Medication 25 MILLIGRAM(S): at 23:25

## 2019-12-03 RX ADMIN — Medication 500 MILLIGRAM(S): at 23:25

## 2019-12-03 RX ADMIN — HYDROMORPHONE HYDROCHLORIDE 0.5 MILLIGRAM(S): 2 INJECTION INTRAMUSCULAR; INTRAVENOUS; SUBCUTANEOUS at 19:57

## 2019-12-03 RX ADMIN — Medication 200 MILLIGRAM(S): at 22:02

## 2019-12-03 RX ADMIN — HYDROMORPHONE HYDROCHLORIDE 2 MILLIGRAM(S): 2 INJECTION INTRAMUSCULAR; INTRAVENOUS; SUBCUTANEOUS at 19:58

## 2019-12-03 NOTE — CHART NOTE - NSCHARTNOTEFT_GEN_A_CORE
Patient with mild injection site reaction (erythema/pruritis) in left forearm to Cipro IV, has taken Cipro IV and PO form in the past without reactions. Patients VSS, no evidence of respiratory distress or generalized allergic reaction. Will give Cipro 500mg PO tonight and in AM instead. Will continue to monitor closely.

## 2019-12-04 ENCOUNTER — TRANSCRIPTION ENCOUNTER (OUTPATIENT)
Age: 34
End: 2019-12-04

## 2019-12-04 VITALS
SYSTOLIC BLOOD PRESSURE: 120 MMHG | DIASTOLIC BLOOD PRESSURE: 58 MMHG | RESPIRATION RATE: 16 BRPM | OXYGEN SATURATION: 97 % | HEART RATE: 110 BPM

## 2019-12-04 LAB
ANION GAP SERPL CALC-SCNC: 9 MMOL/L — SIGNIFICANT CHANGE UP (ref 5–17)
BUN SERPL-MCNC: 17 MG/DL — SIGNIFICANT CHANGE UP (ref 7–23)
CALCIUM SERPL-MCNC: 9.4 MG/DL — SIGNIFICANT CHANGE UP (ref 8.4–10.5)
CHLORIDE SERPL-SCNC: 104 MMOL/L — SIGNIFICANT CHANGE UP (ref 96–108)
CO2 SERPL-SCNC: 25 MMOL/L — SIGNIFICANT CHANGE UP (ref 22–31)
CREAT SERPL-MCNC: 0.85 MG/DL — SIGNIFICANT CHANGE UP (ref 0.5–1.3)
GLUCOSE SERPL-MCNC: 139 MG/DL — HIGH (ref 70–99)
HCT VFR BLD CALC: 40.4 % — SIGNIFICANT CHANGE UP (ref 34.5–45)
HGB BLD-MCNC: 12.6 G/DL — SIGNIFICANT CHANGE UP (ref 11.5–15.5)
MCHC RBC-ENTMCNC: 28.3 PG — SIGNIFICANT CHANGE UP (ref 27–34)
MCHC RBC-ENTMCNC: 31.2 GM/DL — LOW (ref 32–36)
MCV RBC AUTO: 90.6 FL — SIGNIFICANT CHANGE UP (ref 80–100)
NRBC # BLD: 0 /100 WBCS — SIGNIFICANT CHANGE UP (ref 0–0)
PLATELET # BLD AUTO: 509 K/UL — HIGH (ref 150–400)
POTASSIUM SERPL-MCNC: 5.7 MMOL/L — HIGH (ref 3.5–5.3)
POTASSIUM SERPL-SCNC: 5.7 MMOL/L — HIGH (ref 3.5–5.3)
RBC # BLD: 4.46 M/UL — SIGNIFICANT CHANGE UP (ref 3.8–5.2)
RBC # FLD: 12.2 % — SIGNIFICANT CHANGE UP (ref 10.3–14.5)
SODIUM SERPL-SCNC: 138 MMOL/L — SIGNIFICANT CHANGE UP (ref 135–145)
WBC # BLD: 9.72 K/UL — SIGNIFICANT CHANGE UP (ref 3.8–10.5)
WBC # FLD AUTO: 9.72 K/UL — SIGNIFICANT CHANGE UP (ref 3.8–10.5)

## 2019-12-04 RX ORDER — MOXIFLOXACIN HYDROCHLORIDE TABLETS, 400 MG 400 MG/1
1 TABLET, FILM COATED ORAL
Qty: 14 | Refills: 0
Start: 2019-12-04 | End: 2019-12-10

## 2019-12-04 RX ADMIN — HYDROMORPHONE HYDROCHLORIDE 2 MILLIGRAM(S): 2 INJECTION INTRAMUSCULAR; INTRAVENOUS; SUBCUTANEOUS at 00:15

## 2019-12-04 RX ADMIN — HYDROMORPHONE HYDROCHLORIDE 2 MILLIGRAM(S): 2 INJECTION INTRAMUSCULAR; INTRAVENOUS; SUBCUTANEOUS at 08:31

## 2019-12-04 RX ADMIN — HYDROMORPHONE HYDROCHLORIDE 2 MILLIGRAM(S): 2 INJECTION INTRAMUSCULAR; INTRAVENOUS; SUBCUTANEOUS at 04:05

## 2019-12-04 RX ADMIN — HYDROMORPHONE HYDROCHLORIDE 2 MILLIGRAM(S): 2 INJECTION INTRAMUSCULAR; INTRAVENOUS; SUBCUTANEOUS at 12:07

## 2019-12-04 RX ADMIN — Medication 500 MILLIGRAM(S): at 07:22

## 2019-12-04 RX ADMIN — HYDROMORPHONE HYDROCHLORIDE 2 MILLIGRAM(S): 2 INJECTION INTRAMUSCULAR; INTRAVENOUS; SUBCUTANEOUS at 07:55

## 2019-12-04 RX ADMIN — HYDROMORPHONE HYDROCHLORIDE 2 MILLIGRAM(S): 2 INJECTION INTRAMUSCULAR; INTRAVENOUS; SUBCUTANEOUS at 03:52

## 2019-12-04 NOTE — DISCHARGE NOTE PROVIDER - CARE PROVIDER_API CALL
J Carlos Teran)  Diagnostic Radiology  [address] [place] [address]  Phone: [phone]  Fax: [phone]  Follow Up Time:

## 2019-12-04 NOTE — DISCHARGE NOTE NURSING/CASE MANAGEMENT/SOCIAL WORK - PATIENT PORTAL LINK FT
You can access the FollowMyHealth Patient Portal offered by [facility] by registering at the following website: [email or web address]. By joining [facility]’s FollowMyHealth portal, you will also be able to view your health information using other applications (apps) compatible with our system.

## 2019-12-04 NOTE — DISCHARGE NOTE PROVIDER - NSDCCPCAREPLAN_GEN_ALL_CORE_FT
PRINCIPAL DISCHARGE DIAGNOSIS  Diagnosis: AVM (arteriovenous malformation)  Assessment and Plan of Treatment: - Please follow up with Dr. Teran  - You underwent a direct stick embolization to your left foot AVM  - Continue Cipro 500mg Twice daily for one week  - Continue home medications as usual

## 2019-12-04 NOTE — DISCHARGE NOTE PROVIDER - NSDCMRMEDTOKEN_GEN_ALL_CORE_FT
Cipro 500 mg oral tablet: 1 tab(s) orally every 12 hours   gabapentin 100 mg oral tablet: orally once a day  gabapentin 800 mg oral tablet: 1 tab(s) orally once a day  Junel 1.5/30 oral tablet: 1 tab(s) orally once a day  Percocet 10/325 oral tablet: 1 tab(s) orally every 6 hours, As Needed

## 2019-12-04 NOTE — DISCHARGE NOTE PROVIDER - HOSPITAL COURSE
34-year-old female with a history of L foot AVM and HTN who initially presented to Dr. Teran for follow up of well known, fairly large (approx 8cm diameter), high flow AVM in the lateral aspect of the left heel. In the past she has had pain, bleeding and ulceration. She has had multiple transcatheter and direct puncture embolization’s using both glue and ethanol. Her last procedure was 5 months ago and she was doing fairly well but over the last month she noticed a very small area of scab formation in the upper aspect of the mass. There's is no bleeding and no significant pain. Per Dr. Teran, on physical exam the mass is still firm and feels minimally pulsatile. On ultrasound there was not high flow. With the exception of a tiny dry scab the overlying skin, it is intact and well perfused. Per Dr. Teran’s [employer] note, the plan was to wait 4 wks for continued monitoring of the site. However, after 1 week, the patient began to notice that her crusting AVM begin to open and develop into a “reddish/purple” ulceration “about the size of a peter.” She admits to an intermittent burning, stabbing, pulsatile pain on the L heal with radiation to the superior aspect of the L foot and inferior aspect of the L heal. Additionally, she admits to pruritus, tingling and occasional bleeding but denies numbness, swelling, fever, chills. Pain is increased with ambulation and decreased with rest, gabapentin/oxycodone. It was decided by Dr. Teran that the patient should return for direct stick embolization to further manage known AVM.  She is s/p a direct embolization to the left foot AVM on 12/3/19.  Overnight patient with a mild injection site reaction (erythema/pruritis) in left forearm to Cipro IV , and was switched to oral cipro without further reaction.  Potassium noted to be elevated at 5.7 on morning labs, Dr. Teran aware, no further treatment at this time.  Labs, vitals, meds reviewed with dr. Teran and patient deemed stable for discharge home.  She will follow up with Dr Teran.  New antibiotics are E prescribed to pharmacy of choice.

## 2019-12-06 DIAGNOSIS — L97.529 NON-PRESSURE CHRONIC ULCER OF OTHER PART OF LEFT FOOT WITH UNSPECIFIED SEVERITY: ICD-10-CM

## 2019-12-06 DIAGNOSIS — E66.9 OBESITY, UNSPECIFIED: ICD-10-CM

## 2019-12-06 DIAGNOSIS — I10 ESSENTIAL (PRIMARY) HYPERTENSION: ICD-10-CM

## 2019-12-06 DIAGNOSIS — Q27.32 ARTERIOVENOUS MALFORMATION OF VESSEL OF LOWER LIMB: ICD-10-CM

## 2019-12-06 DIAGNOSIS — M79.606 PAIN IN LEG, UNSPECIFIED: ICD-10-CM

## 2019-12-06 DIAGNOSIS — Z88.0 ALLERGY STATUS TO PENICILLIN: ICD-10-CM

## 2019-12-16 NOTE — H&P ADULT - HISTORY OF PRESENT ILLNESS
The patient feels that the cataract is significantly impacting daily activities and has elected cataract surgery. The risks, benefits, and alternatives to surgery were discussed. The patient elects to proceed with surgery. *****SKELETON:    ****LMP:    32 y/o Obese Female, with PMHx of HTN, and left foot AVM with prior embolizations, most recentlyl DSE @ [facility] on 06/22/16 with persistent painful non-bleeding, non-healing ulcer on the lateral plantar aspect of her left foot.   After her procedure, pt was seen by Dr Sutton for pain management, recommended Gabapentin 300mg up to 4 x a day as patient feels improved, Oxycodone 5mg every 4-6 hrs as needed for pain and Motrin 600mg q hrs,       Pt states since her last procedure in April, the ulcer has not improved and the pain has now gotten a little worse. Pt states she applies Medihoney and wraps the foot on a daily basis. 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     In light of this non-healing ulcer and increasing LLE pain, pt now presents for left lower extremity angiogram with embolization. ****LMP: Last week     30 y/o Obese Female, with PMHx of HTN, left foot AVM with recurrent non healing ulcer on the lateral aspect of her left foot with prior embolizations, most recently DSE @ [facility] on 06/22/16, who states she was doing well after last procedure and the ulcer on her foot had healed until 3 weeks she noticed it had opened  again with accompanying persistent pain. She is being followed by Dr. Sims  for pain management who prescribed Percocet 10/325mg PO Q4-6hrs as needed for pain and Gabapentin 600mg PO QHS.  She states she applies Medihoney and wraps the foot on a daily basis. Pt denies any fever/chills or any diagnostic LLE imaging studies since her last procedure.  .  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     In light of her Known Left foot AVM and painful  non-healing ulcer, pt now presents for left lower extremity angiogram with DSE under general anesthesia.

## 2019-12-27 ENCOUNTER — APPOINTMENT (OUTPATIENT)
Dept: VASCULAR SURGERY | Facility: CLINIC | Age: 34
End: 2019-12-27
Payer: COMMERCIAL

## 2019-12-27 ENCOUNTER — INPATIENT (INPATIENT)
Facility: HOSPITAL | Age: 34
LOS: 2 days | Discharge: HOME | DRG: 594 | End: 2019-12-30
Attending: SURGERY | Admitting: SURGERY
Payer: COMMERCIAL

## 2019-12-27 VITALS
SYSTOLIC BLOOD PRESSURE: 190 MMHG | TEMPERATURE: 98 F | RESPIRATION RATE: 16 BRPM | OXYGEN SATURATION: 98 % | DIASTOLIC BLOOD PRESSURE: 157 MMHG | HEART RATE: 125 BPM

## 2019-12-27 DIAGNOSIS — Z41.9 ENCOUNTER FOR PROCEDURE FOR PURPOSES OTHER THAN REMEDYING HEALTH STATE, UNSPECIFIED: Chronic | ICD-10-CM

## 2019-12-27 DIAGNOSIS — Z98.89 OTHER SPECIFIED POSTPROCEDURAL STATES: Chronic | ICD-10-CM

## 2019-12-27 LAB
ALBUMIN SERPL ELPH-MCNC: 4.4 G/DL — SIGNIFICANT CHANGE UP (ref 3.3–5)
ALP SERPL-CCNC: 77 U/L — SIGNIFICANT CHANGE UP (ref 40–120)
ALT FLD-CCNC: 6 U/L — LOW (ref 10–45)
ANION GAP SERPL CALC-SCNC: 16 MMOL/L — SIGNIFICANT CHANGE UP (ref 5–17)
AST SERPL-CCNC: 10 U/L — SIGNIFICANT CHANGE UP (ref 10–40)
BASOPHILS # BLD AUTO: 0.05 K/UL — SIGNIFICANT CHANGE UP (ref 0–0.2)
BASOPHILS NFR BLD AUTO: 0.5 % — SIGNIFICANT CHANGE UP (ref 0–2)
BILIRUB SERPL-MCNC: 0.6 MG/DL — SIGNIFICANT CHANGE UP (ref 0.2–1.2)
BUN SERPL-MCNC: 12 MG/DL — SIGNIFICANT CHANGE UP (ref 7–23)
CALCIUM SERPL-MCNC: 9.6 MG/DL — SIGNIFICANT CHANGE UP (ref 8.4–10.5)
CHLORIDE SERPL-SCNC: 102 MMOL/L — SIGNIFICANT CHANGE UP (ref 96–108)
CO2 SERPL-SCNC: 21 MMOL/L — LOW (ref 22–31)
CREAT SERPL-MCNC: 0.6 MG/DL — SIGNIFICANT CHANGE UP (ref 0.5–1.3)
EOSINOPHIL # BLD AUTO: 0.11 K/UL — SIGNIFICANT CHANGE UP (ref 0–0.5)
EOSINOPHIL NFR BLD AUTO: 1 % — SIGNIFICANT CHANGE UP (ref 0–6)
GLUCOSE SERPL-MCNC: 95 MG/DL — SIGNIFICANT CHANGE UP (ref 70–99)
HCT VFR BLD CALC: 42.3 % — SIGNIFICANT CHANGE UP (ref 34.5–45)
HGB BLD-MCNC: 13.6 G/DL — SIGNIFICANT CHANGE UP (ref 11.5–15.5)
IMM GRANULOCYTES NFR BLD AUTO: 0.3 % — SIGNIFICANT CHANGE UP (ref 0–1.5)
LACTATE SERPL-SCNC: 2.8 MMOL/L — HIGH (ref 0.5–2)
LACTATE SERPL-SCNC: 2.9 MMOL/L — HIGH (ref 0.5–2)
LACTATE SERPL-SCNC: 3.1 MMOL/L — HIGH (ref 0.5–2)
LYMPHOCYTES # BLD AUTO: 2.25 K/UL — SIGNIFICANT CHANGE UP (ref 1–3.3)
LYMPHOCYTES # BLD AUTO: 20.4 % — SIGNIFICANT CHANGE UP (ref 13–44)
MCHC RBC-ENTMCNC: 28.7 PG — SIGNIFICANT CHANGE UP (ref 27–34)
MCHC RBC-ENTMCNC: 32.2 GM/DL — SIGNIFICANT CHANGE UP (ref 32–36)
MCV RBC AUTO: 89.2 FL — SIGNIFICANT CHANGE UP (ref 80–100)
MONOCYTES # BLD AUTO: 0.68 K/UL — SIGNIFICANT CHANGE UP (ref 0–0.9)
MONOCYTES NFR BLD AUTO: 6.2 % — SIGNIFICANT CHANGE UP (ref 2–14)
NEUTROPHILS # BLD AUTO: 7.93 K/UL — HIGH (ref 1.8–7.4)
NEUTROPHILS NFR BLD AUTO: 71.6 % — SIGNIFICANT CHANGE UP (ref 43–77)
NRBC # BLD: 0 /100 WBCS — SIGNIFICANT CHANGE UP (ref 0–0)
PLATELET # BLD AUTO: 496 K/UL — HIGH (ref 150–400)
POTASSIUM SERPL-MCNC: 4.2 MMOL/L — SIGNIFICANT CHANGE UP (ref 3.5–5.3)
POTASSIUM SERPL-SCNC: 4.2 MMOL/L — SIGNIFICANT CHANGE UP (ref 3.5–5.3)
PROT SERPL-MCNC: 7.9 G/DL — SIGNIFICANT CHANGE UP (ref 6–8.3)
RBC # BLD: 4.74 M/UL — SIGNIFICANT CHANGE UP (ref 3.8–5.2)
RBC # FLD: 12.3 % — SIGNIFICANT CHANGE UP (ref 10.3–14.5)
SODIUM SERPL-SCNC: 139 MMOL/L — SIGNIFICANT CHANGE UP (ref 135–145)
WBC # BLD: 11.05 K/UL — HIGH (ref 3.8–10.5)
WBC # FLD AUTO: 11.05 K/UL — HIGH (ref 3.8–10.5)

## 2019-12-27 PROCEDURE — 93010 ELECTROCARDIOGRAM REPORT: CPT | Mod: NC

## 2019-12-27 PROCEDURE — 99213 OFFICE O/P EST LOW 20 MIN: CPT

## 2019-12-27 PROCEDURE — 99254 IP/OBS CNSLTJ NEW/EST MOD 60: CPT | Mod: GC

## 2019-12-27 PROCEDURE — 99285 EMERGENCY DEPT VISIT HI MDM: CPT

## 2019-12-27 RX ORDER — ACETAMINOPHEN 500 MG
650 TABLET ORAL EVERY 6 HOURS
Refills: 0 | Status: DISCONTINUED | OUTPATIENT
Start: 2019-12-27 | End: 2019-12-30

## 2019-12-27 RX ORDER — CEFEPIME 1 G/1
INJECTION, POWDER, FOR SOLUTION INTRAMUSCULAR; INTRAVENOUS
Refills: 0 | Status: DISCONTINUED | OUTPATIENT
Start: 2019-12-27 | End: 2019-12-27

## 2019-12-27 RX ORDER — VANCOMYCIN HCL 1 G
1000 VIAL (EA) INTRAVENOUS ONCE
Refills: 0 | Status: COMPLETED | OUTPATIENT
Start: 2019-12-27 | End: 2019-12-27

## 2019-12-27 RX ORDER — SODIUM CHLORIDE 9 MG/ML
1000 INJECTION, SOLUTION INTRAVENOUS ONCE
Refills: 0 | Status: DISCONTINUED | OUTPATIENT
Start: 2019-12-27 | End: 2019-12-28

## 2019-12-27 RX ORDER — ONDANSETRON 8 MG/1
4 TABLET, FILM COATED ORAL EVERY 6 HOURS
Refills: 0 | Status: DISCONTINUED | OUTPATIENT
Start: 2019-12-27 | End: 2019-12-30

## 2019-12-27 RX ORDER — HYDROMORPHONE HYDROCHLORIDE 2 MG/ML
1 INJECTION INTRAMUSCULAR; INTRAVENOUS; SUBCUTANEOUS ONCE
Refills: 0 | Status: DISCONTINUED | OUTPATIENT
Start: 2019-12-27 | End: 2019-12-27

## 2019-12-27 RX ORDER — CEFEPIME 1 G/1
2000 INJECTION, POWDER, FOR SOLUTION INTRAMUSCULAR; INTRAVENOUS EVERY 8 HOURS
Refills: 0 | Status: COMPLETED | OUTPATIENT
Start: 2019-12-27 | End: 2019-12-28

## 2019-12-27 RX ORDER — OXYCODONE AND ACETAMINOPHEN 5; 325 MG/1; MG/1
2 TABLET ORAL EVERY 4 HOURS
Refills: 0 | Status: DISCONTINUED | OUTPATIENT
Start: 2019-12-27 | End: 2019-12-30

## 2019-12-27 RX ORDER — OXYCODONE AND ACETAMINOPHEN 5; 325 MG/1; MG/1
1 TABLET ORAL EVERY 4 HOURS
Refills: 0 | Status: DISCONTINUED | OUTPATIENT
Start: 2019-12-27 | End: 2019-12-30

## 2019-12-27 RX ORDER — ENOXAPARIN SODIUM 100 MG/ML
40 INJECTION SUBCUTANEOUS EVERY 12 HOURS
Refills: 0 | Status: DISCONTINUED | OUTPATIENT
Start: 2019-12-28 | End: 2019-12-30

## 2019-12-27 RX ORDER — CEFEPIME 1 G/1
2000 INJECTION, POWDER, FOR SOLUTION INTRAMUSCULAR; INTRAVENOUS EVERY 8 HOURS
Refills: 0 | Status: DISCONTINUED | OUTPATIENT
Start: 2019-12-28 | End: 2019-12-28

## 2019-12-27 RX ORDER — SODIUM CHLORIDE 9 MG/ML
4100 INJECTION INTRAMUSCULAR; INTRAVENOUS; SUBCUTANEOUS ONCE
Refills: 0 | Status: COMPLETED | OUTPATIENT
Start: 2019-12-27 | End: 2019-12-27

## 2019-12-27 RX ORDER — VANCOMYCIN HCL 1 G
1000 VIAL (EA) INTRAVENOUS EVERY 12 HOURS
Refills: 0 | Status: DISCONTINUED | OUTPATIENT
Start: 2019-12-27 | End: 2019-12-28

## 2019-12-27 RX ORDER — MORPHINE SULFATE 50 MG/1
4 CAPSULE, EXTENDED RELEASE ORAL EVERY 4 HOURS
Refills: 0 | Status: DISCONTINUED | OUTPATIENT
Start: 2019-12-27 | End: 2019-12-28

## 2019-12-27 RX ORDER — OXYCODONE AND ACETAMINOPHEN 5; 325 MG/1; MG/1
5-325 TABLET ORAL
Qty: 35 | Refills: 0 | Status: ACTIVE | COMMUNITY
Start: 2019-12-19

## 2019-12-27 RX ORDER — HYDROMORPHONE HYDROCHLORIDE 2 MG/ML
2 INJECTION INTRAMUSCULAR; INTRAVENOUS; SUBCUTANEOUS EVERY 24 HOURS
Refills: 0 | Status: DISCONTINUED | OUTPATIENT
Start: 2019-12-27 | End: 2019-12-27

## 2019-12-27 RX ORDER — SODIUM CHLORIDE 9 MG/ML
1000 INJECTION INTRAMUSCULAR; INTRAVENOUS; SUBCUTANEOUS
Refills: 0 | Status: DISCONTINUED | OUTPATIENT
Start: 2019-12-27 | End: 2019-12-28

## 2019-12-27 RX ADMIN — MORPHINE SULFATE 4 MILLIGRAM(S): 50 CAPSULE, EXTENDED RELEASE ORAL at 23:07

## 2019-12-27 RX ADMIN — HYDROMORPHONE HYDROCHLORIDE 1 MILLIGRAM(S): 2 INJECTION INTRAMUSCULAR; INTRAVENOUS; SUBCUTANEOUS at 19:00

## 2019-12-27 RX ADMIN — SODIUM CHLORIDE 4100 MILLILITER(S): 9 INJECTION INTRAMUSCULAR; INTRAVENOUS; SUBCUTANEOUS at 16:41

## 2019-12-27 RX ADMIN — MORPHINE SULFATE 4 MILLIGRAM(S): 50 CAPSULE, EXTENDED RELEASE ORAL at 23:48

## 2019-12-27 RX ADMIN — HYDROMORPHONE HYDROCHLORIDE 1 MILLIGRAM(S): 2 INJECTION INTRAMUSCULAR; INTRAVENOUS; SUBCUTANEOUS at 19:30

## 2019-12-27 RX ADMIN — Medication 250 MILLIGRAM(S): at 19:00

## 2019-12-27 RX ADMIN — CEFEPIME 100 MILLIGRAM(S): 1 INJECTION, POWDER, FOR SOLUTION INTRAMUSCULAR; INTRAVENOUS at 23:39

## 2019-12-27 RX ADMIN — SODIUM CHLORIDE 125 MILLILITER(S): 9 INJECTION INTRAMUSCULAR; INTRAVENOUS; SUBCUTANEOUS at 23:00

## 2019-12-27 NOTE — H&P ADULT - NSHPLABSRESULTS_GEN_ALL_CORE
LABS/RADIOLOGY RESULTS:                          13.6   11.05 )-----------( 496      ( 27 Dec 2019 17:23 )             42.3       Blood Cultures

## 2019-12-27 NOTE — ED PROVIDER NOTE - OBJECTIVE STATEMENT
33 y/o F pt with PMHx of HTN, L foot AVM, and hx of L foot ulcerations, and PSHx of elective surgery for vascular embolization and debridement presents to ED c/o L lateral malleolar open wound x 1 week. Per pt, she had a recent surgery 3 weeks ago on her L foot and while the surgical area had closed, a new area had opened up. Pt relates that since 3 days ago she noticed worsening pain, erythema to the area, discharge to the area, and last night she had an elevated fever of 101 F. Pt was noted to be afebrile and hypertensive on arrival with no discharge from the wound. Pt endorses having 9 total embolizations so far, and denies any other acute complaints.

## 2019-12-27 NOTE — PHYSICAL EXAM
[Respiratory Effort] : normal respiratory effort [Normal Heart Sounds] : normal heart sounds [2+] : left 2+ [Ankle Swelling (On Exam)] : present [Ankle Swelling On The Left] : of the left ankle [Alert] : alert [Oriented to Person] : oriented to person [Oriented to Place] : oriented to place [Oriented to Time] : oriented to time [Calm] : calm [de-identified] : well appearing [de-identified] : left lateral mallealor ulcer measuring 3x2cm with some slough, redness and tenderness around the wound.

## 2019-12-27 NOTE — H&P ADULT - HISTORY OF PRESENT ILLNESS
34 F with PMhx of HTN, left leg AVM s/p embolization x 9 presents with one week of left lateral malleolus open wound. She reports worsening pain, erythema. She recently got embolization of left leg AVM, reports fevers to 101 last night. On arrival, afebrile, hypertensive, no discharge from wound.

## 2019-12-27 NOTE — PATIENT PROFILE ADULT - NSPROSPHOSPCHAPLAINYN_GEN_A_NUR
Dehydration: Care Instructions  Your Care Instructions  Dehydration happens when your body loses too much fluid. This might happen when you do not drink enough water or you lose large amounts of fluids from your body because of diarrhea, vomiting, or sweating. Severe dehydration can be life-threatening. Water and minerals called electrolytes help put your body fluids back in balance. Learn the early signs of fluid loss, and drink more fluids to prevent dehydration. Follow-up care is a key part of your treatment and safety. Be sure to make and go to all appointments, and call your doctor if you are having problems. It's also a good idea to know your test results and keep a list of the medicines you take. How can you care for yourself at home? · To prevent dehydration, drink plenty of fluids, enough so that your urine is light yellow or clear like water. Choose water and other caffeine-free clear liquids until you feel better. If you have kidney, heart, or liver disease and have to limit fluids, talk with your doctor before you increase the amount of fluids you drink. · If you do not feel like eating or drinking, try taking small sips of water, sports drinks, or other rehydration drinks. · Get plenty of rest.  To prevent dehydration  · Add more fluids to your diet and daily routine, unless your doctor has told you not to. · During hot weather, drink more fluids. Drink even more fluids if you exercise a lot. Stay away from drinks with alcohol or caffeine. · Watch for the symptoms of dehydration. These include:  ¨ A dry, sticky mouth. ¨ Dark yellow urine, and not much of it. ¨ Dry and sunken eyes. ¨ Feeling very tired. · Learn what problems can lead to dehydration. These include:  ¨ Diarrhea, fever, and vomiting. ¨ Any illness with a fever, such as pneumonia or the flu. ¨ Activities that cause heavy sweating, such as endurance races and heavy outdoor work in hot or humid weather.   ¨ Alcohol or drug abuse or withdrawal.  ¨ Certain medicines, such as cold and allergy pills (antihistamines), diet pills (diuretics), and laxatives. ¨ Certain diseases, such as diabetes, cancer, and heart or kidney disease. When should you call for help? Call 911 anytime you think you may need emergency care. For example, call if:    · You passed out (lost consciousness).    Call your doctor now or seek immediate medical care if:    · You are confused and cannot think clearly.     · You are dizzy or lightheaded, or you feel like you may faint.     · You have signs of needing more fluids. You have sunken eyes and a dry mouth, and you pass only a little dark urine.     · You cannot keep fluids down.    Watch closely for changes in your health, and be sure to contact your doctor if:    · You are not making tears.     · Your skin is very dry and sags slowly back into place after you pinch it.     · Your mouth and eyes are very dry. Where can you learn more? Go to http://casey-sameer.info/. Enter W572 in the search box to learn more about \"Dehydration: Care Instructions. \"  Current as of: November 20, 2017  Content Version: 11.7  © 3651-9103 [employer]. Care instructions adapted under license by [employer] (which disclaims liability or warranty for this information). If you have questions about a medical condition or this instruction, always ask your healthcare professional. [place] [address] any warranty or liability for your use of this information. no

## 2019-12-27 NOTE — H&P ADULT - ATTENDING COMMENTS
Patient seen and examined    Agree with the above findings & statements    Needs IV abx and wound care    Will likely be ready for discharge in 48 hours    F/u culture results

## 2019-12-27 NOTE — ED ADULT NURSE NOTE - OBJECTIVE STATEMENT
left outer ankle wound open and draining after embolization 3 weeks ago- not healing and having fever and pain - sent by MD for evaluation and treatment

## 2019-12-27 NOTE — ASSESSMENT
[FreeTextEntry1] : 33 yo female with recurrent left foot AVM with multiple embolizations, now with infected wound. She does have a fever, recommend we admit her to the hospital for IV abx and Antonacci protocol dressing changes. Vascular team aware.

## 2019-12-27 NOTE — ED ADULT TRIAGE NOTE - CHIEF COMPLAINT QUOTE
L foot pain, sent in by vascular surgery team due to infected foot after vascular malformation surgery 3 weeks ago

## 2019-12-27 NOTE — HISTORY OF PRESENT ILLNESS
[FreeTextEntry1] : 33 yo female with PMH of AMV with multiple embolizations with Dr. Teran and open wounds requiring hospitalizations in the past. She had a small ulcer on the left lateral malleolus and Dr. Teran did direct stick embolization on 12/4/19, she was discharged on one week of cipro. She states that the wound was very small and healed up. About a week later she noticed a new wound under the old wound. She started doing wet to dry dressings and the wound continued to get larger, painful and red about 2 days ago. She felt feverish yesterday with a temp of 101, she started taking tylenol which helped.

## 2019-12-27 NOTE — H&P ADULT - NSHPPHYSICALEXAM_GEN_ALL_CORE
General: No acute distress  Neurology: Awake  Eyes: Scleras clear  Respiratory: Normal work of breathing  Extremities: B/L DP 2+, left lateral malleolus erythema, open ulcer, erythema, no drainage, cellulitis, tender to palpation

## 2019-12-27 NOTE — ED PROVIDER NOTE - CLINICAL SUMMARY MEDICAL DECISION MAKING FREE TEXT BOX
33 y/o F pt with PMHx of L foot AVM presents to ED with L lateral malleolar open wound. Will start pt on abx, give IV for elevated lactates, and admit for vascular surgery. 33 y/o F pt with PMHx of L foot AVM presents to ED with L lateral malleolar open wound. Will start pt on abx, given IV for elevated lactates, and admit for vascular surgery.

## 2019-12-27 NOTE — H&P ADULT - ASSESSMENT
34 F with PMhx of HTN, left leg AVM s/p embolization x 9 presents with one week of left lateral malleolus ulcer. On exam, erythema, tenderness to palpation, labs significant for leukocytosis concern for infected wound.    Blood cultures  Dr. Dallas protocol  Vancomycin/ Cefepime given penicillin anaphylaxis  Admit to Dr. Berkowitz  Wound cultures.

## 2019-12-27 NOTE — ED ADULT NURSE NOTE - NSIMPLEMENTINTERV_GEN_ALL_ED
Implemented All Fall Risk Interventions:  [place] to call system. Call bell, personal items and telephone within reach. Instruct patient to call for assistance. Room bathroom lighting operational. Non-slip footwear when patient is off stretcher. Physically safe environment: no spills, clutter or unnecessary equipment. Stretcher in lowest position, wheels locked, appropriate side rails in place. Provide visual cue, wrist band, yellow gown, etc. Monitor gait and stability. Monitor for mental status changes and reorient to person, place, and time. Review medications for side effects contributing to fall risk. Reinforce activity limits and safety measures with patient and family.

## 2019-12-28 LAB
ANION GAP SERPL CALC-SCNC: 11 MMOL/L — SIGNIFICANT CHANGE UP (ref 5–17)
BASOPHILS # BLD AUTO: 0.05 K/UL — SIGNIFICANT CHANGE UP (ref 0–0.2)
BASOPHILS NFR BLD AUTO: 0.6 % — SIGNIFICANT CHANGE UP (ref 0–2)
BUN SERPL-MCNC: 13 MG/DL — SIGNIFICANT CHANGE UP (ref 7–23)
CALCIUM SERPL-MCNC: 8.3 MG/DL — LOW (ref 8.4–10.5)
CHLORIDE SERPL-SCNC: 106 MMOL/L — SIGNIFICANT CHANGE UP (ref 96–108)
CO2 SERPL-SCNC: 20 MMOL/L — LOW (ref 22–31)
CREAT SERPL-MCNC: 0.58 MG/DL — SIGNIFICANT CHANGE UP (ref 0.5–1.3)
EOSINOPHIL # BLD AUTO: 0.15 K/UL — SIGNIFICANT CHANGE UP (ref 0–0.5)
EOSINOPHIL NFR BLD AUTO: 1.7 % — SIGNIFICANT CHANGE UP (ref 0–6)
GLUCOSE SERPL-MCNC: 107 MG/DL — HIGH (ref 70–99)
GRAM STN FLD: SIGNIFICANT CHANGE UP
GRAM STN FLD: SIGNIFICANT CHANGE UP
HCT VFR BLD CALC: 36.3 % — SIGNIFICANT CHANGE UP (ref 34.5–45)
HGB BLD-MCNC: 11.3 G/DL — LOW (ref 11.5–15.5)
IMM GRANULOCYTES NFR BLD AUTO: 0.5 % — SIGNIFICANT CHANGE UP (ref 0–1.5)
LACTATE SERPL-SCNC: 2.3 MMOL/L — HIGH (ref 0.5–2)
LYMPHOCYTES # BLD AUTO: 2.13 K/UL — SIGNIFICANT CHANGE UP (ref 1–3.3)
LYMPHOCYTES # BLD AUTO: 24.3 % — SIGNIFICANT CHANGE UP (ref 13–44)
MAGNESIUM SERPL-MCNC: 1.6 MG/DL — SIGNIFICANT CHANGE UP (ref 1.6–2.6)
MCHC RBC-ENTMCNC: 28.7 PG — SIGNIFICANT CHANGE UP (ref 27–34)
MCHC RBC-ENTMCNC: 31.1 GM/DL — LOW (ref 32–36)
MCV RBC AUTO: 92.1 FL — SIGNIFICANT CHANGE UP (ref 80–100)
MONOCYTES # BLD AUTO: 0.68 K/UL — SIGNIFICANT CHANGE UP (ref 0–0.9)
MONOCYTES NFR BLD AUTO: 7.7 % — SIGNIFICANT CHANGE UP (ref 2–14)
NEUTROPHILS # BLD AUTO: 5.73 K/UL — SIGNIFICANT CHANGE UP (ref 1.8–7.4)
NEUTROPHILS NFR BLD AUTO: 65.2 % — SIGNIFICANT CHANGE UP (ref 43–77)
NRBC # BLD: 0 /100 WBCS — SIGNIFICANT CHANGE UP (ref 0–0)
PHOSPHATE SERPL-MCNC: 3 MG/DL — SIGNIFICANT CHANGE UP (ref 2.5–4.5)
PLATELET # BLD AUTO: 392 K/UL — SIGNIFICANT CHANGE UP (ref 150–400)
POTASSIUM SERPL-MCNC: 4 MMOL/L — SIGNIFICANT CHANGE UP (ref 3.5–5.3)
POTASSIUM SERPL-SCNC: 4 MMOL/L — SIGNIFICANT CHANGE UP (ref 3.5–5.3)
RBC # BLD: 3.94 M/UL — SIGNIFICANT CHANGE UP (ref 3.8–5.2)
RBC # FLD: 12.4 % — SIGNIFICANT CHANGE UP (ref 10.3–14.5)
SODIUM SERPL-SCNC: 137 MMOL/L — SIGNIFICANT CHANGE UP (ref 135–145)
SPECIMEN SOURCE: SIGNIFICANT CHANGE UP
SPECIMEN SOURCE: SIGNIFICANT CHANGE UP
VANCOMYCIN TROUGH SERPL-MCNC: 4.9 UG/ML — LOW (ref 10–20)
WBC # BLD: 8.78 K/UL — SIGNIFICANT CHANGE UP (ref 3.8–10.5)
WBC # FLD AUTO: 8.78 K/UL — SIGNIFICANT CHANGE UP (ref 3.8–10.5)

## 2019-12-28 PROCEDURE — 99231 SBSQ HOSP IP/OBS SF/LOW 25: CPT | Mod: GC

## 2019-12-28 RX ORDER — VANCOMYCIN HCL 1 G
1250 VIAL (EA) INTRAVENOUS EVERY 12 HOURS
Refills: 0 | Status: DISCONTINUED | OUTPATIENT
Start: 2019-12-28 | End: 2019-12-29

## 2019-12-28 RX ORDER — HYDROMORPHONE HYDROCHLORIDE 2 MG/ML
2 INJECTION INTRAMUSCULAR; INTRAVENOUS; SUBCUTANEOUS EVERY 4 HOURS
Refills: 0 | Status: DISCONTINUED | OUTPATIENT
Start: 2019-12-28 | End: 2019-12-28

## 2019-12-28 RX ORDER — HYDROMORPHONE HYDROCHLORIDE 2 MG/ML
2 INJECTION INTRAMUSCULAR; INTRAVENOUS; SUBCUTANEOUS EVERY 4 HOURS
Refills: 0 | Status: DISCONTINUED | OUTPATIENT
Start: 2019-12-28 | End: 2019-12-30

## 2019-12-28 RX ORDER — HYDROMORPHONE HYDROCHLORIDE 2 MG/ML
2 INJECTION INTRAMUSCULAR; INTRAVENOUS; SUBCUTANEOUS EVERY 6 HOURS
Refills: 0 | Status: DISCONTINUED | OUTPATIENT
Start: 2019-12-28 | End: 2019-12-28

## 2019-12-28 RX ORDER — HYDROMORPHONE HYDROCHLORIDE 2 MG/ML
1 INJECTION INTRAMUSCULAR; INTRAVENOUS; SUBCUTANEOUS ONCE
Refills: 0 | Status: DISCONTINUED | OUTPATIENT
Start: 2019-12-28 | End: 2019-12-28

## 2019-12-28 RX ORDER — CEFEPIME 1 G/1
2000 INJECTION, POWDER, FOR SOLUTION INTRAMUSCULAR; INTRAVENOUS EVERY 8 HOURS
Refills: 0 | Status: DISCONTINUED | OUTPATIENT
Start: 2019-12-28 | End: 2019-12-29

## 2019-12-28 RX ORDER — MAGNESIUM OXIDE 400 MG ORAL TABLET 241.3 MG
400 TABLET ORAL ONCE
Refills: 0 | Status: COMPLETED | OUTPATIENT
Start: 2019-12-28 | End: 2019-12-28

## 2019-12-28 RX ADMIN — MAGNESIUM OXIDE 400 MG ORAL TABLET 400 MILLIGRAM(S): 241.3 TABLET ORAL at 10:06

## 2019-12-28 RX ADMIN — HYDROMORPHONE HYDROCHLORIDE 2 MILLIGRAM(S): 2 INJECTION INTRAMUSCULAR; INTRAVENOUS; SUBCUTANEOUS at 10:20

## 2019-12-28 RX ADMIN — Medication 250 MILLIGRAM(S): at 19:48

## 2019-12-28 RX ADMIN — CEFEPIME 100 MILLIGRAM(S): 1 INJECTION, POWDER, FOR SOLUTION INTRAMUSCULAR; INTRAVENOUS at 22:25

## 2019-12-28 RX ADMIN — CEFEPIME 100 MILLIGRAM(S): 1 INJECTION, POWDER, FOR SOLUTION INTRAMUSCULAR; INTRAVENOUS at 13:55

## 2019-12-28 RX ADMIN — ENOXAPARIN SODIUM 40 MILLIGRAM(S): 100 INJECTION SUBCUTANEOUS at 17:45

## 2019-12-28 RX ADMIN — OXYCODONE AND ACETAMINOPHEN 2 TABLET(S): 5; 325 TABLET ORAL at 06:30

## 2019-12-28 RX ADMIN — OXYCODONE AND ACETAMINOPHEN 2 TABLET(S): 5; 325 TABLET ORAL at 05:57

## 2019-12-28 RX ADMIN — Medication 250 MILLIGRAM(S): at 06:54

## 2019-12-28 RX ADMIN — HYDROMORPHONE HYDROCHLORIDE 2 MILLIGRAM(S): 2 INJECTION INTRAMUSCULAR; INTRAVENOUS; SUBCUTANEOUS at 23:35

## 2019-12-28 RX ADMIN — HYDROMORPHONE HYDROCHLORIDE 2 MILLIGRAM(S): 2 INJECTION INTRAMUSCULAR; INTRAVENOUS; SUBCUTANEOUS at 10:05

## 2019-12-28 RX ADMIN — HYDROMORPHONE HYDROCHLORIDE 2 MILLIGRAM(S): 2 INJECTION INTRAMUSCULAR; INTRAVENOUS; SUBCUTANEOUS at 14:29

## 2019-12-28 RX ADMIN — ENOXAPARIN SODIUM 40 MILLIGRAM(S): 100 INJECTION SUBCUTANEOUS at 05:56

## 2019-12-28 RX ADMIN — HYDROMORPHONE HYDROCHLORIDE 2 MILLIGRAM(S): 2 INJECTION INTRAMUSCULAR; INTRAVENOUS; SUBCUTANEOUS at 14:46

## 2019-12-28 RX ADMIN — CEFEPIME 100 MILLIGRAM(S): 1 INJECTION, POWDER, FOR SOLUTION INTRAMUSCULAR; INTRAVENOUS at 06:01

## 2019-12-28 RX ADMIN — HYDROMORPHONE HYDROCHLORIDE 2 MILLIGRAM(S): 2 INJECTION INTRAMUSCULAR; INTRAVENOUS; SUBCUTANEOUS at 19:37

## 2019-12-28 RX ADMIN — HYDROMORPHONE HYDROCHLORIDE 1 MILLIGRAM(S): 2 INJECTION INTRAMUSCULAR; INTRAVENOUS; SUBCUTANEOUS at 03:27

## 2019-12-28 RX ADMIN — HYDROMORPHONE HYDROCHLORIDE 2 MILLIGRAM(S): 2 INJECTION INTRAMUSCULAR; INTRAVENOUS; SUBCUTANEOUS at 19:02

## 2019-12-28 NOTE — CHART NOTE - NSCHARTNOTEFT_GEN_A_CORE
Infectious Diseases Anti-infective Approval Note    Medication:  Cefepime  Dose:  2 g  Route:  IV  Frequency:  q8h  Duration:  3d    Dose may be adjusted as needed for alterations in renal function.    *THIS IS NOT AN INFECTIOUS DISEASES CONSULTATION*

## 2019-12-28 NOTE — PROGRESS NOTE ADULT - SUBJECTIVE AND OBJECTIVE BOX
O/N: ISABELLE, VSS                                        34 F with PMhx of HTN, left leg AVM s/p embolization x 9 presents with one week of left lateral malleolus ulcer. On exam, erythema, tenderness to palpation, labs significant for leukocytosis concern for infected wound.    f/u Blood cultures  Antonacci protocol dressing changes  Vancomycin/ Cefepime given penicillin anaphylaxis  Consult ID  Wound cultures. O/N: ISABELLE, ADORE    Continues to have pain in left ankle wound. denies Fevers, chills, n,v     cefepime   IVPB 2000  cefepime   IVPB 2000  vancomycin  IVPB 1000  cefepime   IVPB 2000  cefepime   IVPB 2000  enoxaparin Injectable 40  vancomycin  IVPB 1000      Allergies    hydrochlorothiazide (Hives)  lisinopril (Angioedema; Rash; Hives)  penicillins (Anaphylaxis)    Intolerances        Vital Signs Last 24 Hrs  T(C): 36.8 (28 Dec 2019 06:20), Max: 36.9 (27 Dec 2019 16:36)  T(F): 98.2 (28 Dec 2019 06:20), Max: 98.5 (27 Dec 2019 16:36)  HR: 73 (28 Dec 2019 05:53) (73 - 125)  BP: 173/104 (28 Dec 2019 05:53) (115/73 - 190/157)  BP(mean): --  RR: 16 (28 Dec 2019 05:53) (16 - 17)  SpO2: 100% (28 Dec 2019 05:53) (98% - 100%)  I&O's Summary    27 Dec 2019 07:01  -  28 Dec 2019 07:00  --------------------------------------------------------  IN: 1590 mL / OUT: 700 mL / NET: 890 mL    28 Dec 2019 07:01  -  28 Dec 2019 09:49  --------------------------------------------------------  IN: 0 mL / OUT: 600 mL / NET: -600 mL        Physical Exam:  General:NAD  Pulmonary:b/l clear   Cardiovascular: s1s2 regular   Abdominal:  Extremities:Left ankle wound noted with ulceration. some educate and serous fluid discharge noted   no surrounding erythema   no purulent drainage noted   Pulses: bilateral DP pulses palpable   Right:                                                                          Left:  FEM [ ]2+ [ ]1+ [ ]doppler                                             FEM [ ]2+ [ ]1+ [ ]doppler    POP [ ]2+ [ ]1+ [ ]doppler                                             POP [ ]2+ [ ]1+ [ ]doppler    DP [ ]2+ [ ]1+ [ ]doppler                                                DP [ ]2+ [ ]1+ [ ]doppler  PT[ ]2+ [ ]1+ [ ]doppler                                                  PT [ ]2+ [ ]1+ [ ]doppler      LABS:                        11.3   8.78  )-----------( 392      ( 28 Dec 2019 06:50 )             36.3     12-28    137  |  106  |  13  ----------------------------<  107<H>  4.0   |  20<L>  |  0.58    Ca    8.3<L>      28 Dec 2019 06:50  Phos  3.0     12-28  Mg     1.6     12-28    TPro  7.9  /  Alb  4.4  /  TBili  0.6  /  DBili  x   /  AST  10  /  ALT  6<L>  /  AlkPhos  77  12-27        Radiology and Additional Studies:                      34 F with PMhx of HTN, left leg AVM s/p embolization x 9 presents with one week of left lateral malleolus ulcer. On exam, erythema, tenderness to palpation, labs significant for leukocytosis concern for infected wound.    f/u Blood cultures  Antonacci protocol dressing changes  continue to trend Lactate  will d/c fluids this morning   Vancomycin/ Cefepime given penicillin anaphylaxis  Vanc trough 12/29 17:30  Consulted ID (Dr. Rahman) Approved for Cefepime x3 days   Wound cultures. O/N: ISABELLE, ADORE    Continues to have pain in left ankle wound. denies Fevers, chills, n,v     cefepime   IVPB 2000  cefepime   IVPB 2000  vancomycin  IVPB 1000  cefepime   IVPB 2000  cefepime   IVPB 2000  enoxaparin Injectable 40  vancomycin  IVPB 1000      Allergies    hydrochlorothiazide (Hives)  lisinopril (Angioedema; Rash; Hives)  penicillins (Anaphylaxis)    Intolerances        Vital Signs Last 24 Hrs  T(C): 36.8 (28 Dec 2019 06:20), Max: 36.9 (27 Dec 2019 16:36)  T(F): 98.2 (28 Dec 2019 06:20), Max: 98.5 (27 Dec 2019 16:36)  HR: 73 (28 Dec 2019 05:53) (73 - 125)  BP: 173/104 (28 Dec 2019 05:53) (115/73 - 190/157)  BP(mean): --  RR: 16 (28 Dec 2019 05:53) (16 - 17)  SpO2: 100% (28 Dec 2019 05:53) (98% - 100%)  I&O's Summary    27 Dec 2019 07:01  -  28 Dec 2019 07:00  --------------------------------------------------------  IN: 1590 mL / OUT: 700 mL / NET: 890 mL    28 Dec 2019 07:01  -  28 Dec 2019 09:49  --------------------------------------------------------  IN: 0 mL / OUT: 600 mL / NET: -600 mL        Physical Exam:  General:NAD  Pulmonary:b/l clear   Cardiovascular: s1s2 regular   Abdominal:  Extremities:Left ankle wound noted with ulceration. some educate and serous fluid discharge noted   no surrounding erythema   no purulent drainage noted   Pulses: bilateral DP pulses palpable   Right:                                                                          Left:  FEM [ ]2+ [ ]1+ [ ]doppler                                             FEM [ ]2+ [ ]1+ [ ]doppler    POP [ ]2+ [ ]1+ [ ]doppler                                             POP [ ]2+ [ ]1+ [ ]doppler    DP [ ]2+ [ ]1+ [ ]doppler                                                DP [ ]2+ [ ]1+ [ ]doppler  PT[ ]2+ [ ]1+ [ ]doppler                                                  PT [ ]2+ [ ]1+ [ ]doppler      LABS:                        11.3   8.78  )-----------( 392      ( 28 Dec 2019 06:50 )             36.3     12-28    137  |  106  |  13  ----------------------------<  107<H>  4.0   |  20<L>  |  0.58    Ca    8.3<L>      28 Dec 2019 06:50  Phos  3.0     12-28  Mg     1.6     12-28    TPro  7.9  /  Alb  4.4  /  TBili  0.6  /  DBili  x   /  AST  10  /  ALT  6<L>  /  AlkPhos  77  12-27        Radiology and Additional Studies:          PLEASE CHECK WHEN PRESENT:     [  ]Heart Failure     [  ] Acute     [  ] Acute on Chronic     [  ] Chronic  -------------------------------------------------------------------     [  ]Diastolic [HFpEF]     [  ]Systolic [HFrEF]     [  ]Combined [HFpEF & HFrEF]     [ x ]Other: HTN  -------------------------------------------------------------------  [ ] Respiratory failure  [ ] Acute cor pulmonale  [ ] Asthma/COPD Exacerbation  [ ] Pleural effusion  [ ] Aspiration pneumonia  -------------------------------------------------------------------  [  ]GISELLE     [  ]ATN     [  ]Reneal Medullary Necrosis     [  ]Renal Cortical Necrosis     [  ]Other Pathological Lesions:    [  ]CKD 1  [  ]CKD 2  [  ]CKD 3  [  ]CKD 4  [ ]CKD 5  [  ]Other  -------------------------------------------------------------------  [  ]Diabetes  [  ] Diabetic PVD Ulcer  [  ] Neuropathic ulcer to DM  [  ] Diabetes with Nephropathy  [  ] Osteomyelitis due to diabetes  --------------------------------------------------------------------  [  ]Malnutrition: See Nutrition Note  [  ]Cachexia  [  ]Other:   [  ]Supplement Ordered:  [x  ]Morbid Obesity (BMI >=40]  ---------------------------------------------------------------------  [ ] Sepsis/severe sepsis/septic shock  [ ] UTI  [ ] Pneumonia  -----------------------------------------------------------------------  [ ] Acidosis/alkalosis  [ ] Fluid overload  [ ] Hypokalemia  [ ] Hyperkalemia  [ ] Hypomagnesemia  [ ] Hypophosphatemia  [ ] Hyperphosphatemia  ------------------------------------------------------------------------  [ ] Acute blood loss anemia  [ ] Post op blood loss anemia  [ ] Iron deficiency anemia  [ ] Anemia due to chronic disease  [ ] Hypercoagulable state  ----------------------------------------------------------------------  [ ] Cerebral infarction  [ ] Transient ischemia attack  [ ] Encephalopathy                      34 F with PMhx of HTN, left leg AVM s/p embolization x 9 presents with one week of left lateral malleolus ulcer. On exam, erythema, tenderness to palpation, labs significant for leukocytosis concern for infected wound.    f/u Blood cultures  [facility]  continue to trend Lactate  will d/c fluids this morning   Vancomycin/ Cefepime given penicillin anaphylaxis  Vanc trough 12/29 17:30  Consulted ID (Dr. Rahman) Approved for Cefepime x3 days   Wound cultures.

## 2019-12-29 LAB
-  CEFAZOLIN: SIGNIFICANT CHANGE UP
-  CEFAZOLIN: SIGNIFICANT CHANGE UP
-  CLINDAMYCIN: SIGNIFICANT CHANGE UP
-  CLINDAMYCIN: SIGNIFICANT CHANGE UP
-  ERYTHROMYCIN: SIGNIFICANT CHANGE UP
-  ERYTHROMYCIN: SIGNIFICANT CHANGE UP
-  LINEZOLID: SIGNIFICANT CHANGE UP
-  LINEZOLID: SIGNIFICANT CHANGE UP
-  OXACILLIN: SIGNIFICANT CHANGE UP
-  OXACILLIN: SIGNIFICANT CHANGE UP
-  RIFAMPIN: SIGNIFICANT CHANGE UP
-  RIFAMPIN: SIGNIFICANT CHANGE UP
-  TRIMETHOPRIM/SULFAMETHOXAZOLE: SIGNIFICANT CHANGE UP
-  TRIMETHOPRIM/SULFAMETHOXAZOLE: SIGNIFICANT CHANGE UP
-  VANCOMYCIN: SIGNIFICANT CHANGE UP
-  VANCOMYCIN: SIGNIFICANT CHANGE UP
ANION GAP SERPL CALC-SCNC: 12 MMOL/L — SIGNIFICANT CHANGE UP (ref 5–17)
BUN SERPL-MCNC: 15 MG/DL — SIGNIFICANT CHANGE UP (ref 7–23)
CALCIUM SERPL-MCNC: 8.7 MG/DL — SIGNIFICANT CHANGE UP (ref 8.4–10.5)
CHLORIDE SERPL-SCNC: 104 MMOL/L — SIGNIFICANT CHANGE UP (ref 96–108)
CO2 SERPL-SCNC: 24 MMOL/L — SIGNIFICANT CHANGE UP (ref 22–31)
CREAT SERPL-MCNC: 0.75 MG/DL — SIGNIFICANT CHANGE UP (ref 0.5–1.3)
CULTURE RESULTS: SIGNIFICANT CHANGE UP
CULTURE RESULTS: SIGNIFICANT CHANGE UP
GLUCOSE SERPL-MCNC: 90 MG/DL — SIGNIFICANT CHANGE UP (ref 70–99)
HCT VFR BLD CALC: 35.6 % — SIGNIFICANT CHANGE UP (ref 34.5–45)
HGB BLD-MCNC: 11 G/DL — LOW (ref 11.5–15.5)
MAGNESIUM SERPL-MCNC: 1.8 MG/DL — SIGNIFICANT CHANGE UP (ref 1.6–2.6)
MCHC RBC-ENTMCNC: 28.5 PG — SIGNIFICANT CHANGE UP (ref 27–34)
MCHC RBC-ENTMCNC: 30.9 GM/DL — LOW (ref 32–36)
MCV RBC AUTO: 92.2 FL — SIGNIFICANT CHANGE UP (ref 80–100)
METHOD TYPE: SIGNIFICANT CHANGE UP
METHOD TYPE: SIGNIFICANT CHANGE UP
NRBC # BLD: 0 /100 WBCS — SIGNIFICANT CHANGE UP (ref 0–0)
ORGANISM # SPEC MICROSCOPIC CNT: SIGNIFICANT CHANGE UP
PHOSPHATE SERPL-MCNC: 3.6 MG/DL — SIGNIFICANT CHANGE UP (ref 2.5–4.5)
PLATELET # BLD AUTO: 383 K/UL — SIGNIFICANT CHANGE UP (ref 150–400)
POTASSIUM SERPL-MCNC: 4.6 MMOL/L — SIGNIFICANT CHANGE UP (ref 3.5–5.3)
POTASSIUM SERPL-SCNC: 4.6 MMOL/L — SIGNIFICANT CHANGE UP (ref 3.5–5.3)
RBC # BLD: 3.86 M/UL — SIGNIFICANT CHANGE UP (ref 3.8–5.2)
RBC # FLD: 12.7 % — SIGNIFICANT CHANGE UP (ref 10.3–14.5)
SODIUM SERPL-SCNC: 140 MMOL/L — SIGNIFICANT CHANGE UP (ref 135–145)
SPECIMEN SOURCE: SIGNIFICANT CHANGE UP
SPECIMEN SOURCE: SIGNIFICANT CHANGE UP
WBC # BLD: 8.81 K/UL — SIGNIFICANT CHANGE UP (ref 3.8–10.5)
WBC # FLD AUTO: 8.81 K/UL — SIGNIFICANT CHANGE UP (ref 3.8–10.5)

## 2019-12-29 PROCEDURE — 99231 SBSQ HOSP IP/OBS SF/LOW 25: CPT | Mod: GC

## 2019-12-29 RX ORDER — VANCOMYCIN HCL 1 G
2000 VIAL (EA) INTRAVENOUS EVERY 12 HOURS
Refills: 0 | Status: DISCONTINUED | OUTPATIENT
Start: 2019-12-29 | End: 2019-12-29

## 2019-12-29 RX ORDER — VANCOMYCIN HCL 1 G
1000 VIAL (EA) INTRAVENOUS EVERY 12 HOURS
Refills: 0 | Status: DISCONTINUED | OUTPATIENT
Start: 2019-12-29 | End: 2019-12-29

## 2019-12-29 RX ORDER — MAGNESIUM OXIDE 400 MG ORAL TABLET 241.3 MG
400 TABLET ORAL ONCE
Refills: 0 | Status: COMPLETED | OUTPATIENT
Start: 2019-12-29 | End: 2019-12-29

## 2019-12-29 RX ADMIN — CEFEPIME 100 MILLIGRAM(S): 1 INJECTION, POWDER, FOR SOLUTION INTRAMUSCULAR; INTRAVENOUS at 14:17

## 2019-12-29 RX ADMIN — Medication 450 MILLIGRAM(S): at 17:20

## 2019-12-29 RX ADMIN — HYDROMORPHONE HYDROCHLORIDE 2 MILLIGRAM(S): 2 INJECTION INTRAMUSCULAR; INTRAVENOUS; SUBCUTANEOUS at 08:10

## 2019-12-29 RX ADMIN — HYDROMORPHONE HYDROCHLORIDE 2 MILLIGRAM(S): 2 INJECTION INTRAMUSCULAR; INTRAVENOUS; SUBCUTANEOUS at 16:24

## 2019-12-29 RX ADMIN — HYDROMORPHONE HYDROCHLORIDE 2 MILLIGRAM(S): 2 INJECTION INTRAMUSCULAR; INTRAVENOUS; SUBCUTANEOUS at 00:00

## 2019-12-29 RX ADMIN — HYDROMORPHONE HYDROCHLORIDE 2 MILLIGRAM(S): 2 INJECTION INTRAMUSCULAR; INTRAVENOUS; SUBCUTANEOUS at 12:13

## 2019-12-29 RX ADMIN — Medication 450 MILLIGRAM(S): at 23:55

## 2019-12-29 RX ADMIN — CEFEPIME 100 MILLIGRAM(S): 1 INJECTION, POWDER, FOR SOLUTION INTRAMUSCULAR; INTRAVENOUS at 05:50

## 2019-12-29 RX ADMIN — ENOXAPARIN SODIUM 40 MILLIGRAM(S): 100 INJECTION SUBCUTANEOUS at 17:21

## 2019-12-29 RX ADMIN — HYDROMORPHONE HYDROCHLORIDE 2 MILLIGRAM(S): 2 INJECTION INTRAMUSCULAR; INTRAVENOUS; SUBCUTANEOUS at 20:27

## 2019-12-29 RX ADMIN — HYDROMORPHONE HYDROCHLORIDE 2 MILLIGRAM(S): 2 INJECTION INTRAMUSCULAR; INTRAVENOUS; SUBCUTANEOUS at 16:39

## 2019-12-29 RX ADMIN — HYDROMORPHONE HYDROCHLORIDE 2 MILLIGRAM(S): 2 INJECTION INTRAMUSCULAR; INTRAVENOUS; SUBCUTANEOUS at 03:48

## 2019-12-29 RX ADMIN — MAGNESIUM OXIDE 400 MG ORAL TABLET 400 MILLIGRAM(S): 241.3 TABLET ORAL at 10:20

## 2019-12-29 RX ADMIN — HYDROMORPHONE HYDROCHLORIDE 2 MILLIGRAM(S): 2 INJECTION INTRAMUSCULAR; INTRAVENOUS; SUBCUTANEOUS at 12:28

## 2019-12-29 RX ADMIN — HYDROMORPHONE HYDROCHLORIDE 2 MILLIGRAM(S): 2 INJECTION INTRAMUSCULAR; INTRAVENOUS; SUBCUTANEOUS at 07:55

## 2019-12-29 RX ADMIN — HYDROMORPHONE HYDROCHLORIDE 2 MILLIGRAM(S): 2 INJECTION INTRAMUSCULAR; INTRAVENOUS; SUBCUTANEOUS at 04:10

## 2019-12-29 RX ADMIN — Medication 166.67 MILLIGRAM(S): at 07:02

## 2019-12-29 RX ADMIN — HYDROMORPHONE HYDROCHLORIDE 2 MILLIGRAM(S): 2 INJECTION INTRAMUSCULAR; INTRAVENOUS; SUBCUTANEOUS at 20:42

## 2019-12-29 RX ADMIN — ENOXAPARIN SODIUM 40 MILLIGRAM(S): 100 INJECTION SUBCUTANEOUS at 05:50

## 2019-12-30 ENCOUNTER — TRANSCRIPTION ENCOUNTER (OUTPATIENT)
Age: 34
End: 2019-12-30

## 2019-12-30 VITALS — DIASTOLIC BLOOD PRESSURE: 89 MMHG | RESPIRATION RATE: 19 BRPM | SYSTOLIC BLOOD PRESSURE: 159 MMHG | HEART RATE: 87 BPM

## 2019-12-30 PROCEDURE — 87070 CULTURE OTHR SPECIMN AEROBIC: CPT

## 2019-12-30 PROCEDURE — 85027 COMPLETE CBC AUTOMATED: CPT

## 2019-12-30 PROCEDURE — 87040 BLOOD CULTURE FOR BACTERIA: CPT

## 2019-12-30 PROCEDURE — 80053 COMPREHEN METABOLIC PANEL: CPT

## 2019-12-30 PROCEDURE — 36415 COLL VENOUS BLD VENIPUNCTURE: CPT

## 2019-12-30 PROCEDURE — 83605 ASSAY OF LACTIC ACID: CPT

## 2019-12-30 PROCEDURE — 87186 SC STD MICRODIL/AGAR DIL: CPT

## 2019-12-30 PROCEDURE — 99231 SBSQ HOSP IP/OBS SF/LOW 25: CPT | Mod: GC

## 2019-12-30 PROCEDURE — 80202 ASSAY OF VANCOMYCIN: CPT

## 2019-12-30 PROCEDURE — 85025 COMPLETE CBC W/AUTO DIFF WBC: CPT

## 2019-12-30 PROCEDURE — 93005 ELECTROCARDIOGRAM TRACING: CPT

## 2019-12-30 PROCEDURE — 99285 EMERGENCY DEPT VISIT HI MDM: CPT | Mod: 25

## 2019-12-30 PROCEDURE — 87075 CULTR BACTERIA EXCEPT BLOOD: CPT

## 2019-12-30 PROCEDURE — 84100 ASSAY OF PHOSPHORUS: CPT

## 2019-12-30 PROCEDURE — 83735 ASSAY OF MAGNESIUM: CPT

## 2019-12-30 PROCEDURE — 80048 BASIC METABOLIC PNL TOTAL CA: CPT

## 2019-12-30 RX ORDER — GABAPENTIN 400 MG/1
1 CAPSULE ORAL
Qty: 0 | Refills: 0 | DISCHARGE

## 2019-12-30 RX ORDER — PENICILLIN G POTASSIUM 5000000 [IU]/1
1 POWDER, FOR SOLUTION INTRAMUSCULAR; INTRAPLEURAL; INTRATHECAL; INTRAVENOUS ONCE
Refills: 0 | Status: DISCONTINUED | OUTPATIENT
Start: 2019-12-30 | End: 2019-12-30

## 2019-12-30 RX ORDER — SODIUM CHLORIDE 9 MG/ML
0.5 INJECTION INTRAMUSCULAR; INTRAVENOUS; SUBCUTANEOUS ONCE
Refills: 0 | Status: DISCONTINUED | OUTPATIENT
Start: 2019-12-30 | End: 2019-12-30

## 2019-12-30 RX ORDER — BENZYLPENICILLOYL POLYLYSINE 0.25 ML
0.25 AMPUL (ML) INTRADERMAL ONCE
Refills: 0 | Status: DISCONTINUED | OUTPATIENT
Start: 2019-12-30 | End: 2019-12-30

## 2019-12-30 RX ADMIN — Medication 450 MILLIGRAM(S): at 12:17

## 2019-12-30 RX ADMIN — HYDROMORPHONE HYDROCHLORIDE 2 MILLIGRAM(S): 2 INJECTION INTRAMUSCULAR; INTRAVENOUS; SUBCUTANEOUS at 06:06

## 2019-12-30 RX ADMIN — HYDROMORPHONE HYDROCHLORIDE 2 MILLIGRAM(S): 2 INJECTION INTRAMUSCULAR; INTRAVENOUS; SUBCUTANEOUS at 00:53

## 2019-12-30 RX ADMIN — OXYCODONE AND ACETAMINOPHEN 2 TABLET(S): 5; 325 TABLET ORAL at 13:41

## 2019-12-30 RX ADMIN — ENOXAPARIN SODIUM 40 MILLIGRAM(S): 100 INJECTION SUBCUTANEOUS at 06:06

## 2019-12-30 RX ADMIN — HYDROMORPHONE HYDROCHLORIDE 2 MILLIGRAM(S): 2 INJECTION INTRAMUSCULAR; INTRAVENOUS; SUBCUTANEOUS at 00:38

## 2019-12-30 RX ADMIN — HYDROMORPHONE HYDROCHLORIDE 2 MILLIGRAM(S): 2 INJECTION INTRAMUSCULAR; INTRAVENOUS; SUBCUTANEOUS at 06:21

## 2019-12-30 RX ADMIN — Medication 450 MILLIGRAM(S): at 06:06

## 2019-12-30 RX ADMIN — HYDROMORPHONE HYDROCHLORIDE 2 MILLIGRAM(S): 2 INJECTION INTRAMUSCULAR; INTRAVENOUS; SUBCUTANEOUS at 10:20

## 2019-12-30 RX ADMIN — HYDROMORPHONE HYDROCHLORIDE 2 MILLIGRAM(S): 2 INJECTION INTRAMUSCULAR; INTRAVENOUS; SUBCUTANEOUS at 10:03

## 2019-12-30 RX ADMIN — OXYCODONE AND ACETAMINOPHEN 2 TABLET(S): 5; 325 TABLET ORAL at 14:20

## 2019-12-30 NOTE — DISCHARGE NOTE PROVIDER - NSDCCPCAREPLAN_GEN_ALL_CORE_FT
PRINCIPAL DISCHARGE DIAGNOSIS  Diagnosis: Nonhealing ulcer of left lower leg  Assessment and Plan of Treatment: Lateral malleolus      SECONDARY DISCHARGE DIAGNOSES  Diagnosis: AVM (arteriovenous malformation)  Assessment and Plan of Treatment: Lt foot    Diagnosis: HTN (hypertension)  Assessment and Plan of Treatment:

## 2019-12-30 NOTE — DISCHARGE NOTE PROVIDER - HOSPITAL COURSE
34 F with PMhx of HTN, left leg AVM s/p embolization x 9 presents with one week of left lateral malleolus open wound. She reports worsening pain, erythema. She recently got embolization of left leg AVM, reports fevers to 101 last night. On arrival, afebrile, hypertensive, no discharge from wound.        During hospital course admitted and started on Vanc/Clinda due to pcn allergy.  Next day called ID for cefepime approval.  Stopped Vanc/cefepime and started on clindamycin PO.  TID dressing changes.  On day of discharge patient was stable to be d/c'd home on clinda x7days and TID dressing changes.

## 2019-12-30 NOTE — DISCHARGE NOTE NURSING/CASE MANAGEMENT/SOCIAL WORK - PATIENT PORTAL LINK FT
You can access the FollowMyHealth Patient Portal offered by [facility] by registering at the following website: [email or web address]. By joining [employer]’s FollowMyHealth portal, you will also be able to view your health information using other applications (apps) compatible with our system.

## 2019-12-30 NOTE — DISCHARGE NOTE PROVIDER - NSDCACTIVITY_GEN_ALL_CORE
Walking - Outdoors allowed/Showering allowed/Do not drive or operate machinery/Stairs allowed/Walking - Indoors allowed/No heavy lifting/straining

## 2019-12-30 NOTE — DISCHARGE NOTE PROVIDER - NSDCFUADDINST_GEN_ALL_CORE_FT
Follow-up with Dr. Berkowitz in 2 weeks in office at [phone].    Wound care: Remove dressings to shower with soap and water daily. Dry well. Dampen gauze with saline solution. Three different solutions (1. Amphotericin, 2. Polymyxin B-Bacitracin, Wilsons solution) use these solutions provided in an alternating fashion for three times a day dressing change.  Place on or into wound. Cover with Kerlix wrap daily.   Do NOT bathe, swim, or hot tub until cleared by your doctor.    Please call your doctor if you have a fever of over 101.9 or swelling/bleeding at wound. Follow-up with Dr. Berkowitz in 2 weeks in office at [phone].    Wound care: Remove dressings to shower with soap and water daily. Dry well. Dampen gauze with saline solution. Three different solutions (1. Amphotericin, 2. Polymyxin B-Bacitracin, Wilsons solution) use these solutions provided in an alternating fashion for three times a day dressing change.  Place on or into wound. Cover with Kerlix wrap daily.   Do NOT bathe, swim, or hot tub until cleared by your doctor.    Antibiotic: Clindamycin please take as prescribed for 7days    Please call your doctor if you have a fever of over 101.9 or swelling/bleeding at wound.

## 2019-12-30 NOTE — DISCHARGE NOTE PROVIDER - NSDCMRMEDTOKEN_GEN_ALL_CORE_FT
clindamycin 150 mg oral capsule: 3 cap(s) orally every 6 hours   gabapentin 100 mg oral tablet: orally once a day  gabapentin 800 mg oral tablet: 1 tab(s) orally once a day (at bedtime)  Junel 1.5/30 oral tablet: 1 tab(s) orally once a day  Percocet 10/325 oral tablet: 1 tab(s) orally every 6 hours, As Needed

## 2019-12-30 NOTE — PROGRESS NOTE ADULT - SUBJECTIVE AND OBJECTIVE BOX
24hr Events:  O/N: Dressing changed with Bacitracin, VSS  12/29: Increased vanc dose to 2g q12. TID dressign changes switched to Clinda PO           Assessment/Plan;  34 F with PMhx of HTN, left leg AVM s/p embolization x 9 presents with one week of left lateral malleolus ulcer. On exam, erythema, tenderness to palpation, labs significant for leukocytosis concern for infected wound.    F/u wound cultures   Antonacci protocol dressing changes  Vancomycin/ Cefepime given penicillin anaphylaxis  Vancomycin dose increased to 2g IV q12 hrs   Clindamycin  F/u AM labs 24hr Events:  O/N: Dressing changed with Bacitracin, VSS  12/29: Increased vanc dose to 2g q12. TID dressign changes switched to Clinda PO     clindamycin   Capsule 450  clindamycin   Capsule 450  enoxaparin Injectable 40        Vital Signs Last 24 Hrs  T(C): 36.7 (30 Dec 2019 05:06), Max: 36.9 (29 Dec 2019 18:27)  T(F): 98 (30 Dec 2019 05:06), Max: 98.5 (29 Dec 2019 22:33)  HR: 90 (30 Dec 2019 06:00) (85 - 106)  BP: 150/80 (30 Dec 2019 06:00) (128/78 - 183/104)  BP(mean): --  RR: 18 (30 Dec 2019 06:00) (16 - 18)  SpO2: 99% (29 Dec 2019 20:10) (98% - 99%)  I&O's Summary    29 Dec 2019 07:01  -  30 Dec 2019 07:00  --------------------------------------------------------  IN: 240 mL / OUT: 0 mL / NET: 240 mL        Physical Exam:  General: NAD, resting comfortably in bed  Pulmonary: Nonlabored breathing, no respiratory distress  Extremities:2+ edema of left ankle   ulceration without surrounding erythema or drainage noted   Pulses: DP pulse palpable in left foot       LABS:                        11.0   8.81  )-----------( 383      ( 29 Dec 2019 07:46 )             35.6     12-29    140  |  104  |  15  ----------------------------<  90  4.6   |  24  |  0.75    Ca    8.7      29 Dec 2019 07:46  Phos  3.6     12-29  Mg     1.8     12-29              Assessment/Plan;  34 F with PMhx of HTN, left leg AVM s/p embolization x 9 presents with one week of left lateral malleolus ulcer. On exam, erythema, tenderness to palpation, labs significant for leukocytosis concern for infected wound.    F/u wound cultures   Antonacci protocol dressing changes  Vancomycin/ Cefepime given penicillin anaphylaxis  Vancomycin dose increased to 2g IV q12 hrs   Clindamycin  F/u AM labs 24hr Events:  O/N: Dressing changed with Bacitracin, VSS  12/29: Increased vanc dose to 2g q12. TID dressign changes switched to Clinda PO     clindamycin   Capsule 450  clindamycin   Capsule 450  enoxaparin Injectable 40        Vital Signs Last 24 Hrs  T(C): 36.7 (30 Dec 2019 05:06), Max: 36.9 (29 Dec 2019 18:27)  T(F): 98 (30 Dec 2019 05:06), Max: 98.5 (29 Dec 2019 22:33)  HR: 90 (30 Dec 2019 06:00) (85 - 106)  BP: 150/80 (30 Dec 2019 06:00) (128/78 - 183/104)  BP(mean): --  RR: 18 (30 Dec 2019 06:00) (16 - 18)  SpO2: 99% (29 Dec 2019 20:10) (98% - 99%)  I&O's Summary    29 Dec 2019 07:01  -  30 Dec 2019 07:00  --------------------------------------------------------  IN: 240 mL / OUT: 0 mL / NET: 240 mL        Physical Exam:  General: NAD, resting comfortably in bed  Pulmonary: Nonlabored breathing, no respiratory distress  Extremities:drsg c/d/i      LABS:                        11.0   8.81  )-----------( 383      ( 29 Dec 2019 07:46 )             35.6     12-29    140  |  104  |  15  ----------------------------<  90  4.6   |  24  |  0.75    Ca    8.7      29 Dec 2019 07:46  Phos  3.6     12-29  Mg     1.8     12-29        PLEASE CHECK WHEN PRESENT:     [  ]Heart Failure     [  ] Acute     [  ] Acute on Chronic     [  ] Chronic  -------------------------------------------------------------------     [  ]Diastolic [HFpEF]     [  ]Systolic [HFrEF]     [  ]Combined [HFpEF & HFrEF]     [  ]Other:  -------------------------------------------------------------------  [ ] Respiratory failure  [ ] Acute cor pulmonale  [ ] Asthma/COPD Exacerbation  [ ] Pleural effusion  [ ] Aspiration pneumonia  -------------------------------------------------------------------  [  ]GISELLE     [  ]ATN     [  ]Reneal Medullary Necrosis     [  ]Renal Cortical Necrosis     [  ]Other Pathological Lesions:    [  ]CKD 1  [  ]CKD 2  [  ]CKD 3  [  ]CKD 4  [ ]CKD 5  [  ]Other  -------------------------------------------------------------------  [  ]Diabetes  [  ] Diabetic PVD Ulcer  [  ] Neuropathic ulcer to DM  [  ] Diabetes with Nephropathy  [  ] Osteomyelitis due to diabetes  --------------------------------------------------------------------  [  ]Malnutrition: See Nutrition Note  [  ]Cachexia   [  ]Other:   [  ]Supplement Ordered:  [  ]Morbid Obesity (BMI >=40]  ---------------------------------------------------------------------  [ ] Sepsis/severe sepsis/septic shock  [ ] UTI  [ ] Pneumonia  -----------------------------------------------------------------------  [ ] Acidosis/alkalosis  [ ] Fluid overload  [ ] Hypokalemia  [ ] Hyperkalemia  [ ] Hypomagnesemia  [ ] Hypophosphatemia  [ ] Hyperphosphatemia  ------------------------------------------------------------------------  [ ] Acute blood loss anemia  [ ] Post op blood loss anemia  [ ] Iron deficiency anemia  [ ] Anemia due to chronic disease  [ ] Hypercoagulable state  ----------------------------------------------------------------------  [ ] Cerebral infarction  [ ] Transient ischemia attack  [ ] Encephalopathy      Assessment/Plan;  34 F with PMhx of HTN, left leg AVM s/p embolization x 9 presents with one week of left lateral malleolus ulcer. On exam, erythema, tenderness to palpation, labs significant for leukocytosis concern for infected wound.    F/u wound cultures   LSQ  Celena protocol dressing changes  pain control  regular diet  Clindamycin PO  F/u AM labs

## 2019-12-31 ENCOUNTER — INBOUND DOCUMENT (OUTPATIENT)
Age: 34
End: 2019-12-31

## 2020-01-02 DIAGNOSIS — L97.529 NON-PRESSURE CHRONIC ULCER OF OTHER PART OF LEFT FOOT WITH UNSPECIFIED SEVERITY: ICD-10-CM

## 2020-01-02 DIAGNOSIS — I10 ESSENTIAL (PRIMARY) HYPERTENSION: ICD-10-CM

## 2020-01-02 DIAGNOSIS — Z88.0 ALLERGY STATUS TO PENICILLIN: ICD-10-CM

## 2020-01-02 DIAGNOSIS — Q27.32 ARTERIOVENOUS MALFORMATION OF VESSEL OF LOWER LIMB: ICD-10-CM

## 2020-01-05 NOTE — BRIEF OPERATIVE NOTE - LESION SIZE
RT called for help with suctioning. HFNC increased to 8 L 50%, from 5 L 25%, due to pt desats during suctioning. Will wean as appropriate once pt is calm.    2 x 2 cm

## 2020-01-14 PROCEDURE — 85730 THROMBOPLASTIN TIME PARTIAL: CPT

## 2020-01-14 PROCEDURE — 85610 PROTHROMBIN TIME: CPT

## 2020-01-14 PROCEDURE — C1889: CPT

## 2020-01-14 PROCEDURE — 85027 COMPLETE CBC AUTOMATED: CPT

## 2020-01-14 PROCEDURE — 36415 COLL VENOUS BLD VENIPUNCTURE: CPT

## 2020-01-14 PROCEDURE — 80053 COMPREHEN METABOLIC PANEL: CPT

## 2020-01-14 PROCEDURE — 80048 BASIC METABOLIC PNL TOTAL CA: CPT

## 2020-01-14 PROCEDURE — 84702 CHORIONIC GONADOTROPIN TEST: CPT

## 2020-01-21 ENCOUNTER — APPOINTMENT (OUTPATIENT)
Dept: VASCULAR SURGERY | Facility: CLINIC | Age: 35
End: 2020-01-21
Payer: COMMERCIAL

## 2020-01-21 ENCOUNTER — INPATIENT (INPATIENT)
Facility: HOSPITAL | Age: 35
LOS: 3 days | Discharge: HOME | DRG: 863 | End: 2020-01-25
Attending: SURGERY | Admitting: SURGERY
Payer: COMMERCIAL

## 2020-01-21 VITALS
SYSTOLIC BLOOD PRESSURE: 155 MMHG | DIASTOLIC BLOOD PRESSURE: 97 MMHG | OXYGEN SATURATION: 100 % | TEMPERATURE: 98 F | WEIGHT: 293 LBS | HEART RATE: 123 BPM | RESPIRATION RATE: 16 BRPM

## 2020-01-21 DIAGNOSIS — Z98.89 OTHER SPECIFIED POSTPROCEDURAL STATES: Chronic | ICD-10-CM

## 2020-01-21 DIAGNOSIS — Z41.9 ENCOUNTER FOR PROCEDURE FOR PURPOSES OTHER THAN REMEDYING HEALTH STATE, UNSPECIFIED: Chronic | ICD-10-CM

## 2020-01-21 LAB
ALBUMIN SERPL ELPH-MCNC: 4 G/DL — SIGNIFICANT CHANGE UP (ref 3.3–5)
ALP SERPL-CCNC: 61 U/L — SIGNIFICANT CHANGE UP (ref 40–120)
ALT FLD-CCNC: 7 U/L — LOW (ref 10–45)
ANION GAP SERPL CALC-SCNC: 16 MMOL/L — SIGNIFICANT CHANGE UP (ref 5–17)
ANION GAP SERPL CALC-SCNC: 17 MMOL/L — SIGNIFICANT CHANGE UP (ref 5–17)
APTT BLD: 24.5 SEC — LOW (ref 27.5–36.3)
AST SERPL-CCNC: 13 U/L — SIGNIFICANT CHANGE UP (ref 10–40)
BILIRUB SERPL-MCNC: 0.7 MG/DL — SIGNIFICANT CHANGE UP (ref 0.2–1.2)
BLD GP AB SCN SERPL QL: NEGATIVE — SIGNIFICANT CHANGE UP
BUN SERPL-MCNC: 9 MG/DL — SIGNIFICANT CHANGE UP (ref 7–23)
BUN SERPL-MCNC: 9 MG/DL — SIGNIFICANT CHANGE UP (ref 7–23)
CALCIUM SERPL-MCNC: 9.4 MG/DL — SIGNIFICANT CHANGE UP (ref 8.4–10.5)
CALCIUM SERPL-MCNC: 9.8 MG/DL — SIGNIFICANT CHANGE UP (ref 8.4–10.5)
CHLORIDE SERPL-SCNC: 100 MMOL/L — SIGNIFICANT CHANGE UP (ref 96–108)
CHLORIDE SERPL-SCNC: 103 MMOL/L — SIGNIFICANT CHANGE UP (ref 96–108)
CO2 SERPL-SCNC: 18 MMOL/L — LOW (ref 22–31)
CO2 SERPL-SCNC: 20 MMOL/L — LOW (ref 22–31)
CREAT SERPL-MCNC: 0.68 MG/DL — SIGNIFICANT CHANGE UP (ref 0.5–1.3)
CREAT SERPL-MCNC: 0.78 MG/DL — SIGNIFICANT CHANGE UP (ref 0.5–1.3)
CRP SERPL-MCNC: 2.41 MG/DL — HIGH (ref 0–0.4)
GLUCOSE SERPL-MCNC: 95 MG/DL — SIGNIFICANT CHANGE UP (ref 70–99)
GLUCOSE SERPL-MCNC: 96 MG/DL — SIGNIFICANT CHANGE UP (ref 70–99)
HCT VFR BLD CALC: 41.6 % — SIGNIFICANT CHANGE UP (ref 34.5–45)
HGB BLD-MCNC: 13.4 G/DL — SIGNIFICANT CHANGE UP (ref 11.5–15.5)
INR BLD: 1.06 — SIGNIFICANT CHANGE UP (ref 0.88–1.16)
LACTATE SERPL-SCNC: 1.4 MMOL/L — SIGNIFICANT CHANGE UP (ref 0.5–2)
MAGNESIUM SERPL-MCNC: 1.8 MG/DL — SIGNIFICANT CHANGE UP (ref 1.6–2.6)
MCHC RBC-ENTMCNC: 28.6 PG — SIGNIFICANT CHANGE UP (ref 27–34)
MCHC RBC-ENTMCNC: 32.2 GM/DL — SIGNIFICANT CHANGE UP (ref 32–36)
MCV RBC AUTO: 88.7 FL — SIGNIFICANT CHANGE UP (ref 80–100)
NRBC # BLD: 0 /100 WBCS — SIGNIFICANT CHANGE UP (ref 0–0)
PHOSPHATE SERPL-MCNC: 3.4 MG/DL — SIGNIFICANT CHANGE UP (ref 2.5–4.5)
PLATELET # BLD AUTO: 482 K/UL — HIGH (ref 150–400)
POTASSIUM SERPL-MCNC: 4.1 MMOL/L — SIGNIFICANT CHANGE UP (ref 3.5–5.3)
POTASSIUM SERPL-MCNC: 4.3 MMOL/L — SIGNIFICANT CHANGE UP (ref 3.5–5.3)
POTASSIUM SERPL-SCNC: 4.1 MMOL/L — SIGNIFICANT CHANGE UP (ref 3.5–5.3)
POTASSIUM SERPL-SCNC: 4.3 MMOL/L — SIGNIFICANT CHANGE UP (ref 3.5–5.3)
PROT SERPL-MCNC: 7.8 G/DL — SIGNIFICANT CHANGE UP (ref 6–8.3)
PROTHROM AB SERPL-ACNC: 12.1 SEC — SIGNIFICANT CHANGE UP (ref 10–12.9)
RBC # BLD: 4.69 M/UL — SIGNIFICANT CHANGE UP (ref 3.8–5.2)
RBC # FLD: 12 % — SIGNIFICANT CHANGE UP (ref 10.3–14.5)
RH IG SCN BLD-IMP: POSITIVE — SIGNIFICANT CHANGE UP
SODIUM SERPL-SCNC: 137 MMOL/L — SIGNIFICANT CHANGE UP (ref 135–145)
SODIUM SERPL-SCNC: 137 MMOL/L — SIGNIFICANT CHANGE UP (ref 135–145)
WBC # BLD: 10.7 K/UL — HIGH (ref 3.8–10.5)
WBC # FLD AUTO: 10.7 K/UL — HIGH (ref 3.8–10.5)

## 2020-01-21 PROCEDURE — 93010 ELECTROCARDIOGRAM REPORT: CPT | Mod: NC

## 2020-01-21 PROCEDURE — 99213 OFFICE O/P EST LOW 20 MIN: CPT

## 2020-01-21 PROCEDURE — 99285 EMERGENCY DEPT VISIT HI MDM: CPT

## 2020-01-21 RX ORDER — CHLORHEXIDINE GLUCONATE 213 G/1000ML
1 SOLUTION TOPICAL
Refills: 0 | Status: DISCONTINUED | OUTPATIENT
Start: 2020-01-21 | End: 2020-01-25

## 2020-01-21 RX ORDER — HEPARIN SODIUM 5000 [USP'U]/ML
5000 INJECTION INTRAVENOUS; SUBCUTANEOUS EVERY 8 HOURS
Refills: 0 | Status: DISCONTINUED | OUTPATIENT
Start: 2020-01-21 | End: 2020-01-21

## 2020-01-21 RX ORDER — SODIUM CHLORIDE 9 MG/ML
1000 INJECTION INTRAMUSCULAR; INTRAVENOUS; SUBCUTANEOUS ONCE
Refills: 0 | Status: COMPLETED | OUTPATIENT
Start: 2020-01-21 | End: 2020-01-21

## 2020-01-21 RX ORDER — HYDROMORPHONE HYDROCHLORIDE 2 MG/ML
1 INJECTION INTRAMUSCULAR; INTRAVENOUS; SUBCUTANEOUS EVERY 4 HOURS
Refills: 0 | Status: DISCONTINUED | OUTPATIENT
Start: 2020-01-21 | End: 2020-01-25

## 2020-01-21 RX ORDER — GABAPENTIN 400 MG/1
100 CAPSULE ORAL ONCE
Refills: 0 | Status: COMPLETED | OUTPATIENT
Start: 2020-01-22 | End: 2020-01-22

## 2020-01-21 RX ORDER — ENOXAPARIN SODIUM 100 MG/ML
40 INJECTION SUBCUTANEOUS EVERY 12 HOURS
Refills: 0 | Status: DISCONTINUED | OUTPATIENT
Start: 2020-01-21 | End: 2020-01-25

## 2020-01-21 RX ORDER — CEFTRIAXONE 500 MG/1
2000 INJECTION, POWDER, FOR SOLUTION INTRAMUSCULAR; INTRAVENOUS EVERY 24 HOURS
Refills: 0 | Status: DISCONTINUED | OUTPATIENT
Start: 2020-01-21 | End: 2020-01-22

## 2020-01-21 RX ORDER — HYDROMORPHONE HYDROCHLORIDE 2 MG/ML
1 INJECTION INTRAMUSCULAR; INTRAVENOUS; SUBCUTANEOUS EVERY 6 HOURS
Refills: 0 | Status: DISCONTINUED | OUTPATIENT
Start: 2020-01-21 | End: 2020-01-21

## 2020-01-21 RX ORDER — HYDROMORPHONE HYDROCHLORIDE 2 MG/ML
0.5 INJECTION INTRAMUSCULAR; INTRAVENOUS; SUBCUTANEOUS EVERY 4 HOURS
Refills: 0 | Status: DISCONTINUED | OUTPATIENT
Start: 2020-01-21 | End: 2020-01-25

## 2020-01-21 RX ORDER — VANCOMYCIN HCL 1 G
1750 VIAL (EA) INTRAVENOUS EVERY 12 HOURS
Refills: 0 | Status: DISCONTINUED | OUTPATIENT
Start: 2020-01-21 | End: 2020-01-22

## 2020-01-21 RX ORDER — GABAPENTIN 400 MG/1
800 CAPSULE ORAL AT BEDTIME
Refills: 0 | Status: DISCONTINUED | OUTPATIENT
Start: 2020-01-21 | End: 2020-01-25

## 2020-01-21 RX ORDER — HYDROMORPHONE HYDROCHLORIDE 2 MG/ML
0.5 INJECTION INTRAMUSCULAR; INTRAVENOUS; SUBCUTANEOUS EVERY 6 HOURS
Refills: 0 | Status: DISCONTINUED | OUTPATIENT
Start: 2020-01-21 | End: 2020-01-21

## 2020-01-21 RX ADMIN — SODIUM CHLORIDE 1000 MILLILITER(S): 9 INJECTION INTRAMUSCULAR; INTRAVENOUS; SUBCUTANEOUS at 17:20

## 2020-01-21 RX ADMIN — HYDROMORPHONE HYDROCHLORIDE 1 MILLIGRAM(S): 2 INJECTION INTRAMUSCULAR; INTRAVENOUS; SUBCUTANEOUS at 19:00

## 2020-01-21 RX ADMIN — HYDROMORPHONE HYDROCHLORIDE 1 MILLIGRAM(S): 2 INJECTION INTRAMUSCULAR; INTRAVENOUS; SUBCUTANEOUS at 18:03

## 2020-01-21 RX ADMIN — Medication 250 MILLIGRAM(S): at 18:08

## 2020-01-21 RX ADMIN — HYDROMORPHONE HYDROCHLORIDE 1 MILLIGRAM(S): 2 INJECTION INTRAMUSCULAR; INTRAVENOUS; SUBCUTANEOUS at 22:31

## 2020-01-21 RX ADMIN — HYDROMORPHONE HYDROCHLORIDE 1 MILLIGRAM(S): 2 INJECTION INTRAMUSCULAR; INTRAVENOUS; SUBCUTANEOUS at 23:34

## 2020-01-21 RX ADMIN — CEFTRIAXONE 100 MILLIGRAM(S): 500 INJECTION, POWDER, FOR SOLUTION INTRAMUSCULAR; INTRAVENOUS at 17:39

## 2020-01-21 RX ADMIN — ENOXAPARIN SODIUM 40 MILLIGRAM(S): 100 INJECTION SUBCUTANEOUS at 17:40

## 2020-01-21 NOTE — ED PROVIDER NOTE - MUSCULOSKELETAL, MLM
Spine appears normal, range of motion is not limited, left lower extremity with swelling over lateral malleolus with 1cm superficial ulceration, no drainage.  erythema/warmth of latera/dorsum of foot.  tender to touch.  excoriations over lower leg.  pedal pulse 2+

## 2020-01-21 NOTE — H&P ADULT - HISTORY OF PRESENT ILLNESS
34 F with PMhx of HTN, left leg AVM s/p embolization x 9, recent hospitalization for L lateral malleolus wound (d/c 12/30/19), presents with increasing redness and swelling around wound associated with fever 101F. Patient states that she finished her clindamycin however about one week ago her current sxs began. She is otherwise asymptomatic and denies nausea, emesis, CP, SOB, abd pain, constipation, diarrhea. During previous hospitalization was on vanc/cefepime 2/2 penicillin allergy.

## 2020-01-21 NOTE — ED ADULT NURSE NOTE - FAMILY HISTORY
Sibling  Still living? Unknown  Family history of congenital malformation, Age at diagnosis: Age Unknown

## 2020-01-21 NOTE — PATIENT PROFILE ADULT - HAS THE PATIENT BEEN ADMITTED FROM SHORT TERM REHAB?
Airway  Urgency: elective    Date/Time: 2/17/2017 7:25 AM  Airway not difficult    General Information and Staff    Patient location during procedure: OR  Anesthesiologist: COSMO CLARKE  CRNA: CATA ARTEAGA    Indications and Patient Condition  Indications for airway management: airway protection    Preoxygenated: yes  MILS maintained throughout  Mask difficulty assessment: 1 - vent by mask    Final Airway Details  Final airway type: endotracheal airway      Successful airway: ETT  Cuffed: yes   Successful intubation technique: direct laryngoscopy  Endotracheal tube insertion site: oral  Blade: Nur  Blade size: #2  ETT size: 7.5 mm  Cormack-Lehane Classification: grade I - full view of glottis  Placement verified by: chest auscultation   Measured from: lips  ETT to lips (cm): 20  Number of attempts at approach: 1    Additional Comments  No trauma or change to dentition             
no

## 2020-01-21 NOTE — ED PROVIDER NOTE - OBJECTIVE STATEMENT
history of left ankle avm s/p multiple embolizations, recent admission a few weeks ago for infected ulcer overlying area, here with increased redness and pain in area associated with fever for past 2 days.  Denies drainage.  Says she took tylenol at home pta.  Saw her vascular surgeon in office today and told to come to ED for iv antibiotics/ admission.  No n/v/d, cough/sob, urinary symptoms.  Notes she does feel itchy all over.  Finished course of clindamycin about a week ago.

## 2020-01-21 NOTE — ED ADULT NURSE NOTE - OBJECTIVE STATEMENT
Pt sent by Dr. Berkowitz for assessment of L foot arteriovenous malformation. Pt reports fever of 101 last night and this morning at home treated with tylenol. Pt states she has an embolization by vascular team within the last month and was dc'ed on abx. Per pt, abx were completed last week and since then the wound has become more edematous and red. Pt denies numbness/tingling. Positive movement/sensation, positive pulses. Redness and edema noted, minimal drainage, wound dressed with kurlex at this time, CDI.

## 2020-01-21 NOTE — ED PROVIDER NOTE - CLINICAL SUMMARY MEDICAL DECISION MAKING FREE TEXT BOX
left foot cellulitis with superficial ulceration over avm.  tachycardic, reports recent fever.  concern for sepsis.  labs/cultures obtained.  started ivf.  vascular consulted.  iv antibiotic ordered.  admit for further management.

## 2020-01-21 NOTE — H&P ADULT - ASSESSMENT
34 F with PMhx of HTN, left leg AVM s/p embolization x 9, recent hospitalization for L lateral malleolus wound (d/c 12/30/19), presents with non-healing L lateral malleolus wound.    - admit to Dr. Berkowitz, telemetry  - home gabapentin  - pain control  - regular diet  - start vanc/clinda  - Julianai protocol for dressings  - SQH  - ID consult

## 2020-01-21 NOTE — H&P ADULT - NSHPPHYSICALEXAM_GEN_ALL_CORE
Vital Signs Last 24 Hrs  T(C): 36.6 (21 Jan 2020 15:34), Max: 36.6 (21 Jan 2020 15:34)  T(F): 97.8 (21 Jan 2020 15:34), Max: 97.8 (21 Jan 2020 15:34)  HR: 123 (21 Jan 2020 15:34) (123 - 123)  BP: 155/97 (21 Jan 2020 15:34) (155/97 - 155/97)  BP(mean): --  RR: 16 (21 Jan 2020 15:34) (16 - 16)  SpO2: 100% (21 Jan 2020 15:34) (100% - 100%)    General: NAD  Pulm: normal resp effort and excursion  Ext: 2+ DP pulses bilaterally, L lateral malleolus w/ erythema, edema, and small non-purulent ulceration TTP

## 2020-01-22 LAB
ANION GAP SERPL CALC-SCNC: 12 MMOL/L — SIGNIFICANT CHANGE UP (ref 5–17)
BUN SERPL-MCNC: 14 MG/DL — SIGNIFICANT CHANGE UP (ref 7–23)
CALCIUM SERPL-MCNC: 8.8 MG/DL — SIGNIFICANT CHANGE UP (ref 8.4–10.5)
CHLORIDE SERPL-SCNC: 108 MMOL/L — SIGNIFICANT CHANGE UP (ref 96–108)
CO2 SERPL-SCNC: 22 MMOL/L — SIGNIFICANT CHANGE UP (ref 22–31)
CREAT SERPL-MCNC: 0.78 MG/DL — SIGNIFICANT CHANGE UP (ref 0.5–1.3)
GLUCOSE SERPL-MCNC: 97 MG/DL — SIGNIFICANT CHANGE UP (ref 70–99)
GRAM STN FLD: SIGNIFICANT CHANGE UP
HCT VFR BLD CALC: 38.5 % — SIGNIFICANT CHANGE UP (ref 34.5–45)
HGB BLD-MCNC: 12 G/DL — SIGNIFICANT CHANGE UP (ref 11.5–15.5)
MAGNESIUM SERPL-MCNC: 1.9 MG/DL — SIGNIFICANT CHANGE UP (ref 1.6–2.6)
MCHC RBC-ENTMCNC: 28.4 PG — SIGNIFICANT CHANGE UP (ref 27–34)
MCHC RBC-ENTMCNC: 31.2 GM/DL — LOW (ref 32–36)
MCV RBC AUTO: 91 FL — SIGNIFICANT CHANGE UP (ref 80–100)
NRBC # BLD: 0 /100 WBCS — SIGNIFICANT CHANGE UP (ref 0–0)
PHOSPHATE SERPL-MCNC: 3.4 MG/DL — SIGNIFICANT CHANGE UP (ref 2.5–4.5)
PLATELET # BLD AUTO: 406 K/UL — HIGH (ref 150–400)
POTASSIUM SERPL-MCNC: 5.1 MMOL/L — SIGNIFICANT CHANGE UP (ref 3.5–5.3)
POTASSIUM SERPL-SCNC: 5.1 MMOL/L — SIGNIFICANT CHANGE UP (ref 3.5–5.3)
RBC # BLD: 4.23 M/UL — SIGNIFICANT CHANGE UP (ref 3.8–5.2)
RBC # FLD: 12.4 % — SIGNIFICANT CHANGE UP (ref 10.3–14.5)
SODIUM SERPL-SCNC: 142 MMOL/L — SIGNIFICANT CHANGE UP (ref 135–145)
SPECIMEN SOURCE: SIGNIFICANT CHANGE UP
WBC # BLD: 8.27 K/UL — SIGNIFICANT CHANGE UP (ref 3.8–10.5)
WBC # FLD AUTO: 8.27 K/UL — SIGNIFICANT CHANGE UP (ref 3.8–10.5)

## 2020-01-22 PROCEDURE — 99253 IP/OBS CNSLTJ NEW/EST LOW 45: CPT

## 2020-01-22 PROCEDURE — 99231 SBSQ HOSP IP/OBS SF/LOW 25: CPT | Mod: GC

## 2020-01-22 PROCEDURE — 99254 IP/OBS CNSLTJ NEW/EST MOD 60: CPT

## 2020-01-22 RX ORDER — HYDROXYZINE HCL 10 MG
25 TABLET ORAL ONCE
Refills: 0 | Status: COMPLETED | OUTPATIENT
Start: 2020-01-22 | End: 2020-01-22

## 2020-01-22 RX ORDER — CEFAZOLIN SODIUM 1 G
VIAL (EA) INJECTION
Refills: 0 | Status: DISCONTINUED | OUTPATIENT
Start: 2020-01-22 | End: 2020-01-22

## 2020-01-22 RX ORDER — CEFAZOLIN SODIUM 1 G
2000 VIAL (EA) INJECTION ONCE
Refills: 0 | Status: COMPLETED | OUTPATIENT
Start: 2020-01-22 | End: 2020-01-22

## 2020-01-22 RX ORDER — CEFAZOLIN SODIUM 1 G
2000 VIAL (EA) INJECTION EVERY 8 HOURS
Refills: 0 | Status: DISCONTINUED | OUTPATIENT
Start: 2020-01-22 | End: 2020-01-22

## 2020-01-22 RX ORDER — CEFAZOLIN SODIUM 1 G
2000 VIAL (EA) INJECTION ONCE
Refills: 0 | Status: DISCONTINUED | OUTPATIENT
Start: 2020-01-22 | End: 2020-01-22

## 2020-01-22 RX ORDER — CEFAZOLIN SODIUM 1 G
VIAL (EA) INJECTION
Refills: 0 | Status: DISCONTINUED | OUTPATIENT
Start: 2020-01-22 | End: 2020-01-23

## 2020-01-22 RX ORDER — CEFAZOLIN SODIUM 1 G
2000 VIAL (EA) INJECTION EVERY 8 HOURS
Refills: 0 | Status: DISCONTINUED | OUTPATIENT
Start: 2020-01-22 | End: 2020-01-23

## 2020-01-22 RX ORDER — SODIUM CHLORIDE 9 MG/ML
1000 INJECTION INTRAMUSCULAR; INTRAVENOUS; SUBCUTANEOUS
Refills: 0 | Status: DISCONTINUED | OUTPATIENT
Start: 2020-01-22 | End: 2020-01-22

## 2020-01-22 RX ADMIN — SODIUM CHLORIDE 100 MILLILITER(S): 9 INJECTION INTRAMUSCULAR; INTRAVENOUS; SUBCUTANEOUS at 02:57

## 2020-01-22 RX ADMIN — Medication 25 MILLIGRAM(S): at 09:50

## 2020-01-22 RX ADMIN — ENOXAPARIN SODIUM 40 MILLIGRAM(S): 100 INJECTION SUBCUTANEOUS at 18:37

## 2020-01-22 RX ADMIN — GABAPENTIN 100 MILLIGRAM(S): 400 CAPSULE ORAL at 06:24

## 2020-01-22 RX ADMIN — Medication 250 MILLIGRAM(S): at 06:24

## 2020-01-22 RX ADMIN — HYDROMORPHONE HYDROCHLORIDE 1 MILLIGRAM(S): 2 INJECTION INTRAMUSCULAR; INTRAVENOUS; SUBCUTANEOUS at 03:10

## 2020-01-22 RX ADMIN — Medication 25 MILLIGRAM(S): at 21:38

## 2020-01-22 RX ADMIN — HYDROMORPHONE HYDROCHLORIDE 1 MILLIGRAM(S): 2 INJECTION INTRAMUSCULAR; INTRAVENOUS; SUBCUTANEOUS at 21:20

## 2020-01-22 RX ADMIN — Medication 1 APPLICATION(S): at 17:15

## 2020-01-22 RX ADMIN — GABAPENTIN 800 MILLIGRAM(S): 400 CAPSULE ORAL at 21:38

## 2020-01-22 RX ADMIN — Medication 2000 MILLIGRAM(S): at 12:01

## 2020-01-22 RX ADMIN — HYDROMORPHONE HYDROCHLORIDE 1 MILLIGRAM(S): 2 INJECTION INTRAMUSCULAR; INTRAVENOUS; SUBCUTANEOUS at 12:20

## 2020-01-22 RX ADMIN — ENOXAPARIN SODIUM 40 MILLIGRAM(S): 100 INJECTION SUBCUTANEOUS at 06:24

## 2020-01-22 RX ADMIN — HYDROMORPHONE HYDROCHLORIDE 1 MILLIGRAM(S): 2 INJECTION INTRAMUSCULAR; INTRAVENOUS; SUBCUTANEOUS at 02:55

## 2020-01-22 RX ADMIN — Medication 2000 MILLIGRAM(S): at 21:37

## 2020-01-22 RX ADMIN — HYDROMORPHONE HYDROCHLORIDE 1 MILLIGRAM(S): 2 INJECTION INTRAMUSCULAR; INTRAVENOUS; SUBCUTANEOUS at 16:20

## 2020-01-22 RX ADMIN — CHLORHEXIDINE GLUCONATE 1 APPLICATION(S): 213 SOLUTION TOPICAL at 18:37

## 2020-01-22 RX ADMIN — HYDROMORPHONE HYDROCHLORIDE 1 MILLIGRAM(S): 2 INJECTION INTRAMUSCULAR; INTRAVENOUS; SUBCUTANEOUS at 20:20

## 2020-01-22 RX ADMIN — CHLORHEXIDINE GLUCONATE 1 APPLICATION(S): 213 SOLUTION TOPICAL at 06:25

## 2020-01-22 RX ADMIN — HYDROMORPHONE HYDROCHLORIDE 1 MILLIGRAM(S): 2 INJECTION INTRAMUSCULAR; INTRAVENOUS; SUBCUTANEOUS at 16:02

## 2020-01-22 RX ADMIN — HYDROMORPHONE HYDROCHLORIDE 1 MILLIGRAM(S): 2 INJECTION INTRAMUSCULAR; INTRAVENOUS; SUBCUTANEOUS at 07:54

## 2020-01-22 RX ADMIN — HYDROMORPHONE HYDROCHLORIDE 1 MILLIGRAM(S): 2 INJECTION INTRAMUSCULAR; INTRAVENOUS; SUBCUTANEOUS at 08:12

## 2020-01-22 RX ADMIN — HYDROMORPHONE HYDROCHLORIDE 1 MILLIGRAM(S): 2 INJECTION INTRAMUSCULAR; INTRAVENOUS; SUBCUTANEOUS at 12:01

## 2020-01-22 NOTE — HISTORY OF PRESENT ILLNESS
[FreeTextEntry1] : 33 yo female with PMH of AMV with  embolizations x9 with Dr. Teran and open wounds requiring hospitalizations in the past due to cellulitis. She had a small ulcer on the left lateral malleolus and Dr. Teran did direct stick embolization on 12/4/19.\par \par She came to the office on 12/27/19 with worsening cellulitis and was admitted for a few days with wett to dry dressings and IV abx. She was discharged on one week of clindamycin which she finished last week. She was doing well until 2 days ago when the wound became very hot, warm and itchy. She started to develop fever last night about 101.

## 2020-01-22 NOTE — CONSULT NOTE ADULT - ASSESSMENT
33 yo female with LLE AVM s/p 9 embolizations most recently last month with course c/b MSSA SSTI of LLE for which she was discharged in late December on clindamycin (hx amoxicillin allergy ~10 years ago--angioedema) X 2 weeks with interval improvement however with relapse in symptoms after finishing clindamycin one week ago.  - recommend d/c vancomycin and ceftriaxone and start cefazolin 2g IV q8h (for optimal MSSA coverage); if continued improvement, anticipate transition to PO cephalexin tomorrow (given hx amoxicillin allergy, recommend first dose be administered in house)    ID will continue to follow

## 2020-01-22 NOTE — PROGRESS NOTE ADULT - SUBJECTIVE AND OBJECTIVE BOX
24hr Events:  O/N:Pain control, completed 1L bolus of fluids, with improvement of tachycardia, HR , started on NS@100ml/hr at 2:30am  1/21: Admitted, started on ceftriaxone and vanc per ID (Dr. Bennett) rec        Assessment/Plan;  34 F with PMhx of HTN, left leg AVM s/p embolization x 9, recent hospitalization for L lateral malleolus wound (d/c 12/30/19), presents with non-healing L lateral malleolus wound.      - home gabapentin  - pain control  - regular diet  - start vanc/Ceftriaxone  - Jose protocol for dressings  - Lovenox for DVT ppx  - F/u ID rec  -f/u AM labs 24hr Events:  O/N:Pain control, completed 1L bolus of fluids, with improvement of tachycardia, HR , started on NS@100ml/hr at 2:30am  1/21: Admitted, started on ceftriaxone and vanc per ID (Dr. Bennett) rec      Patient is a 34y old  Female who presents with a chief complaint of     INTERVAL HPI/OVERNIGHT EVENTS:      MEDICATIONS  (STANDING):  Amphotericin B (Conventional) 50 milliGRAM(s),Sterile water for Irrigation 1000 milliLiter(s) 50 milliGRAM(s) Irrigation daily  Bacitracin 36277 Unit(s),Sterile Water for Irrigation 1000 milliLiter(s) 39262 Unit(s) Irrigation daily  cefTRIAXone   IVPB 2000 milliGRAM(s) IV Intermittent every 24 hours  chlorhexidine 4% Liquid 1 Application(s) Topical two times a day  enoxaparin Injectable 40 milliGRAM(s) SubCutaneous every 12 hours  gabapentin 800 milliGRAM(s) Oral at bedtime  Gentamicin 160 milliGRAM(s),Sodium Chloride 0.9% Irrigation 1000 milliLiter(s) 160 milliGRAM(s) Irrigation daily  sodium chloride 0.9%. 1000 milliLiter(s) (100 mL/Hr) IV Continuous <Continuous>  vancomycin  IVPB 1750 milliGRAM(s) IV Intermittent every 12 hours    MEDICATIONS  (PRN):  HYDROmorphone  Injectable 1 milliGRAM(s) IV Push every 4 hours PRN Severe Pain (7 - 10)  HYDROmorphone  Injectable 0.5 milliGRAM(s) IV Push every 4 hours PRN Moderate Pain (4 - 6)      Allergies    hydrochlorothiazide (Hives)  lisinopril (Angioedema; Rash; Hives)  penicillins (Anaphylaxis)    Intolerances          Vital Signs Last 24 Hrs  T(C): 37.4 (22 Jan 2020 06:24), Max: 37.4 (22 Jan 2020 06:24)  T(F): 99.3 (22 Jan 2020 06:24), Max: 99.3 (22 Jan 2020 06:24)  HR: 94 (22 Jan 2020 06:00) (94 - 123)  BP: 132/67 (22 Jan 2020 06:00) (116/58 - 174/79)  BP(mean): --  RR: 18 (22 Jan 2020 06:00) (16 - 20)  SpO2: 97% (22 Jan 2020 06:00) (97% - 100%)  CAPILLARY BLOOD GLUCOSE            Physical Exam:    Daily Height in cm: 170.18 (21 Jan 2020 21:07)    Daily   General:  Well appearing, NAD  CV:  RRR  Lungs:  nonlabored breathing  Abdomen:  Soft, non-tender, no distended  Extremities:  LLE- lateral malleolus ulcer with surround erythema- improving no discharge/odor  Vascular: 2+ pal pulses throughout  Neuro:  AAOx3    LABS:                        12.0   8.27  )-----------( 406      ( 22 Jan 2020 06:45 )             38.5     01-21    137  |  103  |  9   ----------------------------<  96  4.1   |  18<L>  |  0.68    Ca    9.4      21 Jan 2020 18:29  Phos  3.4     01-21  Mg     1.8     01-21    TPro  7.8  /  Alb  4.0  /  TBili  0.7  /  DBili  x   /  AST  13  /  ALT  7<L>  /  AlkPhos  61  01-21    PT/INR - ( 21 Jan 2020 18:30 )   PT: 12.1 sec;   INR: 1.06          PTT - ( 21 Jan 2020 18:30 )  PTT:24.5 sec        ---------------------------------------------------------------------------  PLEASE CHECK WHEN PRESENT:     [  ] Heart Failure     [  ] Acute     [  ] Acute on Chronic     [  ] Chronic  -------------------------------------------------------------------     [  ]Diastolic [HFpEF]     [  ]Systolic [HFrEF]     [  ]Combined [HFpEF & HFrEF]     [X  ]Other: HTN  -------------------------------------------------------------------  [ ] Respiratory failure  [ ] Acute cor pulmonale  [ ] Asthma/COPD Exacerbation  [ ] Pleural effusion  [ ] Aspiration pneumonia  -------------------------------------------------------------------  [  ]GISELLE     [  ]ATN     [  ]Reneal Medullary Necrosis     [  ]Renal Cortical Necrosis     [  ]Other Pathological Lesions:    [  ]CKD 1  [  ]CKD 2  [  ]CKD 3  [  ]CKD 4  [  ]CKD 5  [  ]Other  -------------------------------------------------------------------  [  ]Diabetes  [  ] Diabetic PVD Ulcer  [  ] Neuropathic ulcer to DM  [  ] Diabetes with Nephropathy  [  ] Osteomyelitis due to diabetes  --------------------------------------------------------------------  [  ]Malnutrition: See Nutrition Note  [  ]Cachexia  [  ]Other:   [  ]Supplement Ordered:  [  ]Morbid Obesity (BMI >=40]  ---------------------------------------------------------------------  [ ] Sepsis/severe sepsis/septic shock  [ ] UTI  [ ] Pneumonia  -----------------------------------------------------------------------  [ ] Acidosis/alkalosis  [ ] Fluid overload  [ ] Hypokalemia  [ ] Hyperkalemia  [ ] Hypomagnesemia  [ ] Hypophosphatemia  [ ] Hyperphosphatemia  ------------------------------------------------------------------------  [ ] Acute blood loss anemia  [ ] Post op blood loss anemia  [ ] Iron deficiency anemia  [ ] Anemia due to chronic disease  [ ] Hypercoagulable state  ----------------------------------------------------------------------  [ ] Cerebral infarction  [ ] Transient ischemia attack  [ ] Encephalopathy        Assessment/Plan;  34 F with PMhx of HTN, left leg AVM s/p embolization x 9, recent hospitalization for L lateral malleolus wound (d/c 12/30/19), presents with non-healing L lateral malleolus wound.      - home gabapentin  - pain control  - regular diet  - start vanc/Ceftriaxone  - Antonocci protocol for dressings  - Lovenox for DVT ppx  - F/u ID rec  -f/u AM labs

## 2020-01-22 NOTE — ASSESSMENT
[FreeTextEntry1] : 33 yo female with recurrent left foot AVM with multiple embolizations, now with recurrent infected wound. She does have a fever, recommend we re-admit her to the hospital for IV abx and Antonacci protocol dressing changes. Vascular team aware.  [Arterial/Venous Disease] : arterial/venous disease

## 2020-01-22 NOTE — CONSULT NOTE ADULT - ATTENDING COMMENTS
33 y/o female with    1- SSTI- LLE- recurrent infected wound  on cefazolin  monitor for any allergic reaction  2- Rash- likely drug allergy to clinda- pt reports she's had it for over a week.  clinda stopped  symptomatic control  will monitor   3- Obesity

## 2020-01-22 NOTE — PHYSICAL EXAM
[Respiratory Effort] : normal respiratory effort [Normal Heart Sounds] : normal heart sounds [Ankle Swelling On The Left] : of the left ankle [2+] : left 2+ [Ankle Swelling (On Exam)] : present [Oriented to Time] : oriented to time [Alert] : alert [Oriented to Person] : oriented to person [Oriented to Place] : oriented to place [Calm] : calm [de-identified] : left lateral malleolar ulcer measuring 3x2cm with some slough, redness and tenderness around the wound. scratches on the foot.  [de-identified] : well appearing

## 2020-01-22 NOTE — CONSULT NOTE ADULT - SUBJECTIVE AND OBJECTIVE BOX
HPI:  34 F with PMhx of HTN, left leg AVM s/p embolization x 9, recent hospitalization for L lateral malleolus wound (d/c 12/30/19), presents with increasing redness and swelling around wound associated with fever 101F. Patient states that she finished her clindamycin however about one week ago her current sxs began. She is otherwise asymptomatic and denies nausea, emesis, CP, SOB, abd pain, constipation, diarrhea. During previous hospitalization was on vanc/cefepime 2/2 penicillin allergy. (21 Jan 2020 16:28)      PAST MEDICAL & SURGICAL HISTORY:  Foot ulceration: left foot  AVM (arteriovenous malformation): of left foot  HTN (hypertension)  Elective surgery: transcatheter therapy vascular embolization x5  S/P debridement: LLE area overlying AVM        REVIEW OF SYSTEMS:    General:	 no weakness; no fevers, no chills  Skin/Breast: no rash  Respiratory and Thorax: no SOB, no cough  Cardiovascular:	No chest pain  Gastrointestinal:	 no nausea, vomiting , diarrhea  Genitourinary:	no dysuria, no difficulty urinating, no hematuria  Musculoskeletal:	no weakness, no joint swelling/pain  Neurological:	no focal weakness/numbness  Endocrine:	no polyuria, no polydipsia      ANTIBIOTICS:  MEDICATIONS  (STANDING):  Amphotericin B (Conventional) 50 milliGRAM(s),Sterile water for Irrigation 1000 milliLiter(s) 50 milliGRAM(s) Irrigation daily  Bacitracin 76602 Unit(s),Sterile Water for Irrigation 1000 milliLiter(s) 24963 Unit(s) Irrigation daily  cefTRIAXone   IVPB 2000 milliGRAM(s) IV Intermittent every 24 hours  chlorhexidine 4% Liquid 1 Application(s) Topical two times a day  enoxaparin Injectable 40 milliGRAM(s) SubCutaneous every 12 hours  gabapentin 800 milliGRAM(s) Oral at bedtime  Gentamicin 160 milliGRAM(s),Sodium Chloride 0.9% Irrigation 1000 milliLiter(s) 160 milliGRAM(s) Irrigation daily  vancomycin  IVPB 1750 milliGRAM(s) IV Intermittent every 12 hours    MEDICATIONS  (PRN):  HYDROmorphone  Injectable 1 milliGRAM(s) IV Push every 4 hours PRN Severe Pain (7 - 10)  HYDROmorphone  Injectable 0.5 milliGRAM(s) IV Push every 4 hours PRN Moderate Pain (4 - 6)      Allergies    hydrochlorothiazide (Hives)  lisinopril (Angioedema; Rash; Hives)  penicillins (Anaphylaxis)    Intolerances        SOCIAL HISTORY:    FAMILY HISTORY:  Family history of congenital malformation (Sibling): AVMs: siblings      Vital Signs Last 24 Hrs  T(C): 36.6 (22 Jan 2020 09:06), Max: 37.4 (22 Jan 2020 06:24)  T(F): 97.9 (22 Jan 2020 09:06), Max: 99.3 (22 Jan 2020 06:24)  HR: 83 (22 Jan 2020 08:27) (83 - 123)  BP: 153/83 (22 Jan 2020 08:27) (116/58 - 174/79)  BP(mean): --  RR: 18 (22 Jan 2020 08:27) (16 - 20)  SpO2: 98% (22 Jan 2020 08:27) (97% - 100%)    PHYSICAL EXAM:  Constitutional:Well-developed, well nourished  Eyes:ANTOINETTE, EOMI  Ear/Nose/Throat: no oral lesion, no sinus tenderness on percussion	  Neck:no JVD, no lymphadenopathy, supple  Respiratory: CTA chanel  Cardiovascular: S1S2 RRR, no murmurs  Gastrointestinal:soft, (+) BS, no HSM  Extremities:no e/e/c; L lateral malleolus with shallow ulcer with surrounding erythema and warmth  Vascular: DP Pulse:	right normal; left normal            LABS:                        12.0   8.27  )-----------( 406      ( 22 Jan 2020 06:45 )             38.5     01-22    142  |  108  |  14  ----------------------------<  97  5.1   |  22  |  0.78    Ca    8.8      22 Jan 2020 06:45  Phos  3.4     01-22  Mg     1.9     01-22    TPro  7.8  /  Alb  4.0  /  TBili  0.7  /  DBili  x   /  AST  13  /  ALT  7<L>  /  AlkPhos  61  01-21    PT/INR - ( 21 Jan 2020 18:30 )   PT: 12.1 sec;   INR: 1.06          PTT - ( 21 Jan 2020 18:30 )  PTT:24.5 sec      MICROBIOLOGY: Culture - Surgical Swab (12.27.19 @ 19:43)    -  Vancomycin: S 2    -  Trimethoprim/Sulfamethoxazole: S <=0.5/9.5    -  Oxacillin: S <=0.25    -  RIF- Rifampin: S <=1 Should not be used as monotherapy    -  Linezolid: S 2    -  Clindamycin: S <=0.25    -  Erythromycin: S <=0.25    -  Cefazolin: S <=4    Gram Stain:   Few Gram positive cocci in pairs  Few White blood cells    Specimen Source: .Surgical Swab wound    Culture Results:   Moderate Staphylococcus aureus    Organism Identification: Staphylococcus aureus    Organism: Staphylococcus aureus    Method Type: GREG      RADIOLOGY & ADDITIONAL STUDIES: reviewed

## 2020-01-22 NOTE — PROGRESS NOTE ADULT - ATTENDING COMMENTS
Patient seen and examined    Improving    Afebrile overnight    Plan  1) Continue current abx & wound care  2) F/u cultures

## 2020-01-22 NOTE — CONSULT NOTE ADULT - SUBJECTIVE AND OBJECTIVE BOX
pt with rash on her upper and lower extremities, hives. reports rash started prior to coming to hospital. Improved since yesterday  no fever or chills  no sob  ros otherwise negative     HPI:  34 F with PMhx of HTN, left leg AVM s/p embolization x 9, recent hospitalization for L lateral malleolus wound (d/c 12/30/19), presents with increasing redness and swelling around wound associated with fever 101F. Patient states that she finished her clindamycin however about one week ago her current sxs began. She is otherwise asymptomatic and denies nausea, emesis, CP, SOB, abd pain, constipation, diarrhea. During previous hospitalization was on vanc/cefepime 2/2 penicillin allergy. (21 Jan 2020 16:28)      Endocrine:	    Allergic/Immunologic:	    Allergies    hydrochlorothiazide (Hives)  lisinopril (Angioedema; Rash; Hives)  penicillins (Anaphylaxis)    Intolerances        Home Medications:  gabapentin 100 mg oral tablet: orally once a day (03 Dec 2019 12:48)  gabapentin 800 mg oral tablet: 1 tab(s) orally once a day (at bedtime) (30 Dec 2019 08:42)  Junel 1.5/30 oral tablet: 1 tab(s) orally once a day (03 Dec 2019 12:48)      PAST MEDICAL & SURGICAL HISTORY:  Foot ulceration: left foot  AVM (arteriovenous malformation): of left foot  HTN (hypertension)  Elective surgery: transcatheter therapy vascular embolization x5  S/P debridement: LLE area overlying AVM      FAMILY HISTORY:  Family history of congenital malformation (Sibling): AVMs: siblings      SOCIAL HISTORY:      Vital Signs Last 24 Hrs  T(C): 36.6 (22 Jan 2020 09:06), Max: 37.4 (22 Jan 2020 06:24)  T(F): 97.9 (22 Jan 2020 09:06), Max: 99.3 (22 Jan 2020 06:24)  HR: 92 (22 Jan 2020 11:59) (83 - 123)  BP: 152/89 (22 Jan 2020 11:59) (116/58 - 174/79)  BP(mean): --  RR: 18 (22 Jan 2020 11:59) (16 - 20)  SpO2: 98% (22 Jan 2020 11:59) (97% - 100%)   I&O's Detail    Daily Height in cm: 170.18 (21 Jan 2020 21:07)    Daily     Physical Exam:   GEN: NAD, AAOx3  HEENT: MMM, no icterus  CV: S1 S2 RRR, no MRG  Lung: CTAB  Abd: soft NT ND +BS  Ext: no c/c/e  Neuro: no focal neuro deficit

## 2020-01-23 ENCOUNTER — TRANSCRIPTION ENCOUNTER (OUTPATIENT)
Age: 35
End: 2020-01-23

## 2020-01-23 LAB
ANION GAP SERPL CALC-SCNC: 11 MMOL/L — SIGNIFICANT CHANGE UP (ref 5–17)
BUN SERPL-MCNC: 12 MG/DL — SIGNIFICANT CHANGE UP (ref 7–23)
CALCIUM SERPL-MCNC: 8.7 MG/DL — SIGNIFICANT CHANGE UP (ref 8.4–10.5)
CHLORIDE SERPL-SCNC: 106 MMOL/L — SIGNIFICANT CHANGE UP (ref 96–108)
CO2 SERPL-SCNC: 23 MMOL/L — SIGNIFICANT CHANGE UP (ref 22–31)
CREAT SERPL-MCNC: 0.78 MG/DL — SIGNIFICANT CHANGE UP (ref 0.5–1.3)
GLUCOSE SERPL-MCNC: 98 MG/DL — SIGNIFICANT CHANGE UP (ref 70–99)
HCT VFR BLD CALC: 36.9 % — SIGNIFICANT CHANGE UP (ref 34.5–45)
HGB BLD-MCNC: 11.9 G/DL — SIGNIFICANT CHANGE UP (ref 11.5–15.5)
MAGNESIUM SERPL-MCNC: 1.8 MG/DL — SIGNIFICANT CHANGE UP (ref 1.6–2.6)
MCHC RBC-ENTMCNC: 29.2 PG — SIGNIFICANT CHANGE UP (ref 27–34)
MCHC RBC-ENTMCNC: 32.2 GM/DL — SIGNIFICANT CHANGE UP (ref 32–36)
MCV RBC AUTO: 90.4 FL — SIGNIFICANT CHANGE UP (ref 80–100)
NRBC # BLD: 0 /100 WBCS — SIGNIFICANT CHANGE UP (ref 0–0)
PHOSPHATE SERPL-MCNC: 3.5 MG/DL — SIGNIFICANT CHANGE UP (ref 2.5–4.5)
PLATELET # BLD AUTO: 390 K/UL — SIGNIFICANT CHANGE UP (ref 150–400)
POTASSIUM SERPL-MCNC: 4.4 MMOL/L — SIGNIFICANT CHANGE UP (ref 3.5–5.3)
POTASSIUM SERPL-SCNC: 4.4 MMOL/L — SIGNIFICANT CHANGE UP (ref 3.5–5.3)
RBC # BLD: 4.08 M/UL — SIGNIFICANT CHANGE UP (ref 3.8–5.2)
RBC # FLD: 12.4 % — SIGNIFICANT CHANGE UP (ref 10.3–14.5)
SODIUM SERPL-SCNC: 140 MMOL/L — SIGNIFICANT CHANGE UP (ref 135–145)
WBC # BLD: 7.64 K/UL — SIGNIFICANT CHANGE UP (ref 3.8–10.5)
WBC # FLD AUTO: 7.64 K/UL — SIGNIFICANT CHANGE UP (ref 3.8–10.5)

## 2020-01-23 PROCEDURE — 99231 SBSQ HOSP IP/OBS SF/LOW 25: CPT

## 2020-01-23 PROCEDURE — 99232 SBSQ HOSP IP/OBS MODERATE 35: CPT | Mod: GC

## 2020-01-23 PROCEDURE — 99231 SBSQ HOSP IP/OBS SF/LOW 25: CPT | Mod: GC

## 2020-01-23 RX ORDER — HYDROXYZINE HCL 10 MG
25 TABLET ORAL ONCE
Refills: 0 | Status: COMPLETED | OUTPATIENT
Start: 2020-01-23 | End: 2020-01-23

## 2020-01-23 RX ORDER — MAGNESIUM OXIDE 400 MG ORAL TABLET 241.3 MG
400 TABLET ORAL ONCE
Refills: 0 | Status: COMPLETED | OUTPATIENT
Start: 2020-01-23 | End: 2020-01-23

## 2020-01-23 RX ORDER — CEPHALEXIN 500 MG
500 CAPSULE ORAL ONCE
Refills: 0 | Status: COMPLETED | OUTPATIENT
Start: 2020-01-23 | End: 2020-01-23

## 2020-01-23 RX ORDER — CEPHALEXIN 500 MG
1 CAPSULE ORAL
Qty: 28 | Refills: 0
Start: 2020-01-23 | End: 2020-01-29

## 2020-01-23 RX ORDER — CEPHALEXIN 500 MG
500 CAPSULE ORAL EVERY 6 HOURS
Refills: 0 | Status: DISCONTINUED | OUTPATIENT
Start: 2020-01-23 | End: 2020-01-25

## 2020-01-23 RX ADMIN — HYDROMORPHONE HYDROCHLORIDE 1 MILLIGRAM(S): 2 INJECTION INTRAMUSCULAR; INTRAVENOUS; SUBCUTANEOUS at 00:31

## 2020-01-23 RX ADMIN — HYDROMORPHONE HYDROCHLORIDE 1 MILLIGRAM(S): 2 INJECTION INTRAMUSCULAR; INTRAVENOUS; SUBCUTANEOUS at 23:15

## 2020-01-23 RX ADMIN — HYDROMORPHONE HYDROCHLORIDE 1 MILLIGRAM(S): 2 INJECTION INTRAMUSCULAR; INTRAVENOUS; SUBCUTANEOUS at 22:39

## 2020-01-23 RX ADMIN — Medication 500 MILLIGRAM(S): at 18:36

## 2020-01-23 RX ADMIN — CHLORHEXIDINE GLUCONATE 1 APPLICATION(S): 213 SOLUTION TOPICAL at 05:24

## 2020-01-23 RX ADMIN — HYDROMORPHONE HYDROCHLORIDE 1 MILLIGRAM(S): 2 INJECTION INTRAMUSCULAR; INTRAVENOUS; SUBCUTANEOUS at 14:00

## 2020-01-23 RX ADMIN — Medication 25 MILLIGRAM(S): at 16:54

## 2020-01-23 RX ADMIN — ENOXAPARIN SODIUM 40 MILLIGRAM(S): 100 INJECTION SUBCUTANEOUS at 05:24

## 2020-01-23 RX ADMIN — HYDROMORPHONE HYDROCHLORIDE 1 MILLIGRAM(S): 2 INJECTION INTRAMUSCULAR; INTRAVENOUS; SUBCUTANEOUS at 20:00

## 2020-01-23 RX ADMIN — ENOXAPARIN SODIUM 40 MILLIGRAM(S): 100 INJECTION SUBCUTANEOUS at 16:56

## 2020-01-23 RX ADMIN — Medication 500 MILLIGRAM(S): at 13:44

## 2020-01-23 RX ADMIN — HYDROMORPHONE HYDROCHLORIDE 1 MILLIGRAM(S): 2 INJECTION INTRAMUSCULAR; INTRAVENOUS; SUBCUTANEOUS at 10:00

## 2020-01-23 RX ADMIN — HYDROMORPHONE HYDROCHLORIDE 1 MILLIGRAM(S): 2 INJECTION INTRAMUSCULAR; INTRAVENOUS; SUBCUTANEOUS at 09:44

## 2020-01-23 RX ADMIN — HYDROMORPHONE HYDROCHLORIDE 1 MILLIGRAM(S): 2 INJECTION INTRAMUSCULAR; INTRAVENOUS; SUBCUTANEOUS at 01:31

## 2020-01-23 RX ADMIN — HYDROMORPHONE HYDROCHLORIDE 1 MILLIGRAM(S): 2 INJECTION INTRAMUSCULAR; INTRAVENOUS; SUBCUTANEOUS at 05:24

## 2020-01-23 RX ADMIN — HYDROMORPHONE HYDROCHLORIDE 1 MILLIGRAM(S): 2 INJECTION INTRAMUSCULAR; INTRAVENOUS; SUBCUTANEOUS at 13:45

## 2020-01-23 RX ADMIN — HYDROMORPHONE HYDROCHLORIDE 1 MILLIGRAM(S): 2 INJECTION INTRAMUSCULAR; INTRAVENOUS; SUBCUTANEOUS at 18:36

## 2020-01-23 RX ADMIN — Medication 2000 MILLIGRAM(S): at 05:23

## 2020-01-23 RX ADMIN — HYDROMORPHONE HYDROCHLORIDE 1 MILLIGRAM(S): 2 INJECTION INTRAMUSCULAR; INTRAVENOUS; SUBCUTANEOUS at 06:24

## 2020-01-23 RX ADMIN — HYDROMORPHONE HYDROCHLORIDE 1 MILLIGRAM(S): 2 INJECTION INTRAMUSCULAR; INTRAVENOUS; SUBCUTANEOUS at 22:53

## 2020-01-23 RX ADMIN — MAGNESIUM OXIDE 400 MG ORAL TABLET 400 MILLIGRAM(S): 241.3 TABLET ORAL at 09:43

## 2020-01-23 NOTE — DISCHARGE NOTE PROVIDER - NSDCMRMEDTOKEN_GEN_ALL_CORE_FT
gabapentin 100 mg oral tablet: orally once a day  gabapentin 800 mg oral tablet: 1 tab(s) orally once a day (at bedtime)  Junel 1.5/30 oral tablet: 1 tab(s) orally once a day freetext medication     -: 25766 unit(s) irrigation once a day  freetext medication     -: 160 milligram(s) irrigation once a day  freetext medication     -: 50 milligram(s) irrigation once a day  gabapentin 100 mg oral tablet: orally once a day  gabapentin 800 mg oral tablet: 1 tab(s) orally once a day (at bedtime)  Junel 1.5/30 oral tablet: 1 tab(s) orally once a day freetext medication     -: 03800 unit(s) irrigation once a day  freetext medication     -: 160 milligram(s) irrigation once a day  freetext medication     -: 50 milligram(s) irrigation once a day  gabapentin 100 mg oral tablet: orally once a day  gabapentin 800 mg oral tablet: 1 tab(s) orally once a day (at bedtime)  Junel 1.5/30 oral tablet: 1 tab(s) orally once a day  Keflex 500 mg oral capsule: 1 cap(s) orally every 6 hours freetext medication     -: 94583 unit(s) irrigation once a day  freetext medication     -: 160 milligram(s) irrigation once a day  freetext medication     -: 50 milligram(s) irrigation once a day  gabapentin 100 mg oral tablet: orally once a day  gabapentin 800 mg oral tablet: 1 tab(s) orally once a day (at bedtime)  Junel 1.5/30 oral tablet: 1 tab(s) orally once a day  Keflex 500 mg oral capsule: 1 cap(s) orally every 6 hours cephalexin 500 mg oral capsule: 1 cap(s) orally every 6 hours  freetext medication     -: 66687 unit(s) irrigation once a day  freetext medication     -: 160 milligram(s) irrigation once a day  freetext medication     -: 50 milligram(s) irrigation once a day  gabapentin 100 mg oral tablet: orally once a day  gabapentin 800 mg oral tablet: 1 tab(s) orally once a day (at bedtime)  Junel 1.5/30 oral tablet: 1 tab(s) orally once a day  Zyvox 600 mg oral tablet: 1 tab(s) orally 2 times a day cephalexin 500 mg oral capsule: 1 cap(s) orally every 6 hours  freetext medication     -: 05751 unit(s) irrigation once a day  freetext medication     -: 160 milligram(s) irrigation once a day  freetext medication     -: 50 milligram(s) irrigation once a day  gabapentin 100 mg oral tablet: orally once a day  gabapentin 800 mg oral tablet: 1 tab(s) orally once a day (at bedtime)  Junel 1.5/30 oral tablet: 1 tab(s) orally once a day cephalexin 500 mg oral capsule: 1 cap(s) orally every 6 hours  freetext medication     -: 02306 unit(s) irrigation once a day  freetext medication     -: 160 milligram(s) irrigation once a day  freetext medication     -: 50 milligram(s) irrigation once a day  gabapentin 100 mg oral tablet: orally once a day  gabapentin 800 mg oral tablet: 1 tab(s) orally once a day (at bedtime)  Junel 1.5/30 oral tablet: 1 tab(s) orally once a day  Keflex 500 mg oral capsule: 1 cap(s) orally 4 times a day cephalexin 500 mg oral capsule: 1 cap(s) orally every 6 hours  freetext medication     -: 77416 unit(s) irrigation once a day  freetext medication     -: 160 milligram(s) irrigation once a day  freetext medication     -: 50 milligram(s) irrigation once a day  gabapentin 100 mg oral tablet: orally once a day  gabapentin 800 mg oral tablet: 1 tab(s) orally once a day (at bedtime)  Junel 1.5/30 oral tablet: 1 tab(s) orally once a day  Keflex 500 mg oral capsule: 1 cap(s) orally 4 times a day

## 2020-01-23 NOTE — DIETITIAN INITIAL EVALUATION ADULT. - ENERGY NEEDS
Ht: 67" Wt(1/21): 294lbs IBW: 135lbs (+/-10%), 218%IBW, BMI: 46.1 kg/m2   IBW (61.2 kg) used for calculations as pt >120% of IBW.  Nutrient needs based on [facility] standards of care for adults. Needs adjusted for wound, cellulitis, obesity.

## 2020-01-23 NOTE — DISCHARGE NOTE PROVIDER - CARE PROVIDER_API CALL
Swapnil Berkowitz)  Vascular Surgery  [address]  [place], [place] [address]  Phone: [phone]  Fax: [phone]  Follow Up Time:

## 2020-01-23 NOTE — DISCHARGE NOTE PROVIDER - NSDCFUADDINST_GEN_ALL_CORE_FT
FOLLOW UP: with  ___ in 1-2 weeks. Call the office at  to schedule your appointment. You may shower; soap and water over incision sites. Do not scrub. Pat dry when done.  Ambulate as tolerated, but no heavy lifting (>10lbs) or strenuous exercise. You may resume regular diet.   Call the office if you experience increasing pain, redness, swelling or drainage from incision sites/wounds, or temperature >101.4F. Follow-up with Dr. Berkowitz in 2 weeks in office at [phone].    Wound care: Remove dressings to shower with soap and water daily. Dry well. Dampen gauze with saline solution. Three different solutions (1. Amphotericin, 2. Polymyxin B-Bacitracin, Wilsons solution) use these solutions provided in an alternating fashion for three times a day dressing change.  Place on or into wound. Cover with Kerlix wrap daily.   Do NOT bathe, swim, or hot tub until cleared by your doctor.    Antibiotic:     Please call your doctor if you have a fever of over 101.9 or swelling/bleeding at wound. Follow-up with Dr. Berkowitz in 2 weeks in office at [phone].    Wound care: Remove dressings to shower with soap and water daily. Dry well. Dampen gauze with saline solution. Three different solutions (1. Amphotericin, 2. Polymyxin B-Bacitracin, Wilsons solution) use these solutions provided in an alternating fashion for three times a day dressing change.  Place on or into wound. Cover with Kerlix wrap daily.   Do NOT bathe, swim, or hot tub until cleared by your doctor.    Antibiotic: Keflax 500mg PO every 6 hours for 7 days. Jan 27th will be last day of medication.     Please call your doctor if you have a fever of over 101.9 or swelling/bleeding at wound.

## 2020-01-23 NOTE — PROGRESS NOTE ADULT - SUBJECTIVE AND OBJECTIVE BOX
O/N: ISABELLE, VSS, itching Atarax given                                Assessment/Plan;  34 F with PMhx of HTN, left leg AVM s/p embolization x 9, recent hospitalization for L lateral malleolus wound (d/c 12/30/19), presents with non-healing L lateral malleolus wound.      - home gabapentin  - pain control  - regular diet  - vanc/Ceftriaxone  - Antonacci protocol for dressings  - Lovenox for DVT ppx  - F/u ID rec  -f/u AM labs O/N: ISABELLE, VSS, itching Atarax given    1ceFAZolin  Injectable. 2000  ceFAZolin  Injectable.   ceFAZolin  Injectable. 2000  ceFAZolin  Injectable.   enoxaparin Injectable 40      Vital Signs Last 24 Hrs  T(C): 37 (23 Jan 2020 06:25), Max: 37 (23 Jan 2020 06:25)  T(F): 98.6 (23 Jan 2020 06:25), Max: 98.6 (23 Jan 2020 06:25)  HR: 93 (23 Jan 2020 00:16) (83 - 93)  BP: 143/63 (23 Jan 2020 00:16) (143/63 - 169/80)  BP(mean): --  RR: 18 (23 Jan 2020 00:16) (16 - 18)  SpO2: 98% (23 Jan 2020 00:16) (98% - 98%)  I&O's Summary      Physical Exam:  General:  Well appearing, NAD  CV:  RRR  Lungs:  nonlabored breathing  Abdomen:  Soft, non-tender, no distended  Extremities:  LLE- lateral malleolus ulcer with surround erythema- improving no discharge/odor  Vascular: 2+ pal pulses throughout  Neuro:  AAOx3      LABS:                        12.0   8.27  )-----------( 406      ( 22 Jan 2020 06:45 )             38.5     01-22    142  |  108  |  14  ----------------------------<  97  5.1   |  22  |  0.78    Ca    8.8      22 Jan 2020 06:45  Phos  3.4     01-22  Mg     1.9     01-22    TPro  7.8  /  Alb  4.0  /  TBili  0.7  /  DBili  x   /  AST  13  /  ALT  7<L>  /  AlkPhos  61  01-21    PT/INR - ( 21 Jan 2020 18:30 )   PT: 12.1 sec;   INR: 1.06          PTT - ( 21 Jan 2020 18:30 )  PTT:24.5 sec    PLEASE CHECK WHEN PRESENT:     [  ] Heart Failure     [  ] Acute     [  ] Acute on Chronic     [  ] Chronic  -------------------------------------------------------------------     [  ]Diastolic [HFpEF]     [  ]Systolic [HFrEF]     [  ]Combined [HFpEF & HFrEF]     [X  ]Other: HTN  -------------------------------------------------------------------  [ ] Respiratory failure  [ ] Acute cor pulmonale  [ ] Asthma/COPD Exacerbation  [ ] Pleural effusion  [ ] Aspiration pneumonia  -------------------------------------------------------------------  [  ]GISELLE     [  ]ATN     [  ]Reneal Medullary Necrosis     [  ]Renal Cortical Necrosis     [  ]Other Pathological Lesions:    [  ]CKD 1  [  ]CKD 2  [  ]CKD 3  [  ]CKD 4  [  ]CKD 5  [  ]Other  -------------------------------------------------------------------  [  ]Diabetes  [  ] Diabetic PVD Ulcer  [  ] Neuropathic ulcer to DM  [  ] Diabetes with Nephropathy  [  ] Osteomyelitis due to diabetes  --------------------------------------------------------------------  [  ]Malnutrition: See Nutrition Note  [  ]Cachexia  [  ]Other:   [  ]Supplement Ordered:  [  ]Morbid Obesity (BMI >=40]  ---------------------------------------------------------------------  [ ] Sepsis/severe sepsis/septic shock  [ ] UTI  [ ] Pneumonia  -----------------------------------------------------------------------  [ ] Acidosis/alkalosis  [ ] Fluid overload  [ ] Hypokalemia  [ ] Hyperkalemia  [ ] Hypomagnesemia  [ ] Hypophosphatemia  [ ] Hyperphosphatemia  ------------------------------------------------------------------------  [ ] Acute blood loss anemia  [ ] Post op blood loss anemia  [ ] Iron deficiency anemia  [ ] Anemia due to chronic disease  [ ] Hypercoagulable state  ----------------------------------------------------------------------  [ ] Cerebral infarction  [ ] Transient ischemia attack  [ ] Encephalopathy      Assessment/Plan;  34 F with PMhx of HTN, left leg AVM s/p embolization x 9, recent hospitalization for L lateral malleolus wound (d/c 12/30/19), presents with non-healing L lateral malleolus wound.      - home gabapentin  - pain control  - regular diet  - vanc/Ceftriaxone  - Antonacci protocol for dressings  - Lovenox for DVT ppx  - F/u ID rec  -f/u AM labs

## 2020-01-23 NOTE — DISCHARGE NOTE PROVIDER - HOSPITAL COURSE
34 F with PMhx of HTN, left leg AVM s/p embolization x 9, recent hospitalization for L lateral malleolus wound (d/c 12/30/19), presents with increasing redness and swelling around wound associated with fever 101F. Patient states that she finished her clindamycin however about one week ago her current sxs began. She is otherwise asymptomatic and denies nausea, emesis, CP, SOB, abd pain, constipation, diarrhea. During previous hospitalization was on vanc/cefepime 2/2 penicillin allergy. 34 F with PMhx of HTN, left leg AVM s/p embolization x 9, recent hospitalization for L lateral malleolus wound (d/c 12/30/19), presents with increasing redness and swelling around wound associated with fever 101F. Patient states that she finished her clindamycin however about one week ago her current sxs began. During previous hospitalization was on vanc/cefepime 2/2 penicillin allergy.         Upon admission patient was noted to have a rash throughout her body and itching that developed as outpatient. ID was consulted who recommended starting Cefazolin IV and wound cultures to be sent. Patient remained afebrile during her admission and her left foot wound became less erythematous and less tender. She underwent three times daily dressing changes while in hospital.         on 1/23/20 patient was in stable condition with improvement in her rash, and left foot cellulitis. She will be discharged home with plan for 3 times a day dressing changes and oral antibiotics 34 F with PMhx of HTN, left leg AVM s/p embolization x 9, recent hospitalization for L lateral malleolus wound (d/c 12/30/19), presents with increasing redness and swelling around wound associated with fever 101F. Patient states that she finished her clindamycin however about one week ago her current sxs began. During previous hospitalization was on vanc/cefepime 2/2 penicillin allergy.         Upon admission patient was noted to have a rash throughout her body and itching that developed as outpatient. ID was consulted who recommended starting Cefazolin IV and wound cultures to be sent. Patient remained afebrile during her admission and her left foot wound became less erythematous and less tender. She underwent three times daily dressing changes while in hospital.         on 1/23/20 patient was in stable condition with improvement in her rash, and left foot cellulitis. She will be discharged home with plan for 3 times a day dressing changes and oral antibiotics         Keflex 500 x4 for one week

## 2020-01-23 NOTE — PROGRESS NOTE ADULT - SUBJECTIVE AND OBJECTIVE BOX
************ INCOMPLETE NOTE******************  Infectious Diseases Progress Note:    SUBJECTIVE: Patient seen and examined at bedside. Patient denies fever, chills, HA, nausea, vomiting, abdominal pain, dysuria, diarrhea.    CELLULITIS        Allergies    hydrochlorothiazide (Hives)  lisinopril (Angioedema; Rash; Hives)  penicillins (Anaphylaxis)    Intolerances        ANTIBIOTICS/RELEVANT:  antimicrobials  ceFAZolin  Injectable. 2000 milliGRAM(s) IV Push every 8 hours  ceFAZolin  Injectable.        immunologic:      OTHER:  Amphotericin B (Conventional) 50 milliGRAM(s),Sterile water for Irrigation 1000 milliLiter(s) 50 milliGRAM(s) Irrigation daily  Bacitracin 83375 Unit(s),Sterile Water for Irrigation 1000 milliLiter(s) 55661 Unit(s) Irrigation daily  chlorhexidine 4% Liquid 1 Application(s) Topical two times a day  enoxaparin Injectable 40 milliGRAM(s) SubCutaneous every 12 hours  gabapentin 800 milliGRAM(s) Oral at bedtime  Gentamicin 160 milliGRAM(s),Sodium Chloride 0.9% Irrigation 1000 milliLiter(s) 160 milliGRAM(s) Irrigation daily  HYDROmorphone  Injectable 1 milliGRAM(s) IV Push every 4 hours PRN  HYDROmorphone  Injectable 0.5 milliGRAM(s) IV Push every 4 hours PRN  magnesium oxide 400 milliGRAM(s) Oral once      Objective:  Vital Signs Last 24 Hrs  T(C): 37 (23 Jan 2020 06:25), Max: 37 (23 Jan 2020 06:25)  T(F): 98.6 (23 Jan 2020 06:25), Max: 98.6 (23 Jan 2020 06:25)  HR: 79 (23 Jan 2020 05:20) (79 - 93)  BP: 126/86 (23 Jan 2020 05:20) (126/86 - 169/80)  BP(mean): --  RR: 17 (23 Jan 2020 05:20) (16 - 18)  SpO2: 97% (23 Jan 2020 05:20) (97% - 98%)    PHYSICAL EXAM:  Constitutional: Well-developed, well nourished  Eyes: PERRLA, EOMI  Ear/Nose/Throat: no oral lesion, no sinus tenderness on percussion	  Neck:no JVD, no lymphadenopathy, supple  Respiratory: CTA bilateral  Cardiovascular: S1S2, RRR, no murmurs  Gastrointestinal: soft, NTND, (+) BS, no HSM  Extremities: no c/e/e  Skin: no rashes    LABS:                        11.9   7.64  )-----------( 390      ( 23 Jan 2020 06:46 )             36.9     01-23    140  |  106  |  12  ----------------------------<  98  4.4   |  23  |  0.78    Ca    8.7      23 Jan 2020 06:46  Phos  3.5     01-23  Mg     1.8     01-23    TPro  7.8  /  Alb  4.0  /  TBili  0.7  /  DBili  x   /  AST  13  /  ALT  7<L>  /  AlkPhos  61  01-21    PT/INR - ( 21 Jan 2020 18:30 )   PT: 12.1 sec;   INR: 1.06          PTT - ( 21 Jan 2020 18:30 )  PTT:24.5 sec      MICROBIOLOGY:    Culture - Other (collected 01-22-20 @ 14:51)  Source: .Other LLE ulcer  Gram Stain (01-22-20 @ 19:43):    No organisms seen    Few WBC's              RADIOLOGY & ADDITIONAL STUDIES: Infectious Diseases Progress Note:    SUBJECTIVE: Patient seen and examined at bedside. Patient endorses continued pruritis which began prior to admission, but denies fever, chills, HA, nausea, vomiting, abdominal pain, dysuria, diarrhea, rash.    CELLULITIS        Allergies    hydrochlorothiazide (Hives)  lisinopril (Angioedema; Rash; Hives)  penicillins (Anaphylaxis)    Intolerances        ANTIBIOTICS/RELEVANT:  antimicrobials  ceFAZolin  Injectable. 2000 milliGRAM(s) IV Push every 8 hours  ceFAZolin  Injectable.        immunologic:      OTHER:  Amphotericin B (Conventional) 50 milliGRAM(s),Sterile water for Irrigation 1000 milliLiter(s) 50 milliGRAM(s) Irrigation daily  Bacitracin 96274 Unit(s),Sterile Water for Irrigation 1000 milliLiter(s) 68376 Unit(s) Irrigation daily  chlorhexidine 4% Liquid 1 Application(s) Topical two times a day  enoxaparin Injectable 40 milliGRAM(s) SubCutaneous every 12 hours  gabapentin 800 milliGRAM(s) Oral at bedtime  Gentamicin 160 milliGRAM(s),Sodium Chloride 0.9% Irrigation 1000 milliLiter(s) 160 milliGRAM(s) Irrigation daily  HYDROmorphone  Injectable 1 milliGRAM(s) IV Push every 4 hours PRN  HYDROmorphone  Injectable 0.5 milliGRAM(s) IV Push every 4 hours PRN  magnesium oxide 400 milliGRAM(s) Oral once      Objective:  Vital Signs Last 24 Hrs  T(C): 37 (23 Jan 2020 06:25), Max: 37 (23 Jan 2020 06:25)  T(F): 98.6 (23 Jan 2020 06:25), Max: 98.6 (23 Jan 2020 06:25)  HR: 79 (23 Jan 2020 05:20) (79 - 93)  BP: 126/86 (23 Jan 2020 05:20) (126/86 - 169/80)  BP(mean): --  RR: 17 (23 Jan 2020 05:20) (16 - 18)  SpO2: 97% (23 Jan 2020 05:20) (97% - 98%)    PHYSICAL EXAM:  Constitutional: Well-developed, well nourished  Eyes: PERRLA, EOMI  Ear/Nose/Throat: no oral lesion, no sinus tenderness on percussion	  Neck:no JVD, no lymphadenopathy, supple  Respiratory: CTA bilateral  Cardiovascular: S1S2, RRR, no murmurs  Gastrointestinal: soft, NTND, (+) BS, no HSM  Extremities: L ankle wrapped with gauze, nontender, no evidence of erythema   Skin: no evidence of rash     LABS:                        11.9   7.64  )-----------( 390      ( 23 Jan 2020 06:46 )             36.9     01-23    140  |  106  |  12  ----------------------------<  98  4.4   |  23  |  0.78    Ca    8.7      23 Jan 2020 06:46  Phos  3.5     01-23  Mg     1.8     01-23    TPro  7.8  /  Alb  4.0  /  TBili  0.7  /  DBili  x   /  AST  13  /  ALT  7<L>  /  AlkPhos  61  01-21    PT/INR - ( 21 Jan 2020 18:30 )   PT: 12.1 sec;   INR: 1.06          PTT - ( 21 Jan 2020 18:30 )  PTT:24.5 sec      MICROBIOLOGY:    Culture - Other (collected 01-22-20 @ 14:51)  Source: .Other LLE ulcer  Gram Stain (01-22-20 @ 19:43):    No organisms seen    Few WBC's              RADIOLOGY & ADDITIONAL STUDIES:

## 2020-01-23 NOTE — DIETITIAN INITIAL EVALUATION ADULT. - ADD RECOMMEND
1) Recommend change to DASH/TLC diet. 2) Recommend add daily multivitamin/minerals to aid in wound healing. 3) Monitor lytes and replete prn. 4) Trend wts.

## 2020-01-23 NOTE — DIETITIAN INITIAL EVALUATION ADULT. - OTHER INFO
Pt is a 34 F with PMhx of HTN, left leg AVM s/p embolization x 9, recent hospitalization for L lateral malleolus wound (d/c 12/30/19), presents with non-healing L lateral malleolus wound. Vascular following.     Pt seen resting in bed, pleasant. Pt reports good appetite and PO intake PTA, typically consuming a protein shake in the morning, protein bar for lunch, and a light dinner. Reports making an effort to intentionally lose wt PTA and has seen an RD in the past for wt management. Pt reports UBW 318lbs --> achieved wt loss to 250lbs --> now gained back to 294lbs (1/21) in the past "few" years. Confirms NKFA or dietary restrictions. Denies chewing/swallowing difficulties. Pt endorses good appetite at present, consuming >75% of meals. Denies N/V/D/C. +BM 1/23. No pain reported at this time. Skin: Edema 1+ left foot, left ankle noted; LLE cellulitis noted. Discussed heart-healthy nutrition therapy w/ handouts, increased protein needs. Pt appeared receptive. RD to f/u per protocol.

## 2020-01-23 NOTE — DISCHARGE NOTE PROVIDER - NSDCCPCAREPLAN_GEN_ALL_CORE_FT
PRINCIPAL DISCHARGE DIAGNOSIS  Diagnosis: Cellulitis  Assessment and Plan of Treatment: Left foot wound      SECONDARY DISCHARGE DIAGNOSES  Diagnosis: Body rash  Assessment and Plan of Treatment: Drug reaction    Diagnosis: Hypertension  Assessment and Plan of Treatment:     Diagnosis: Obesity  Assessment and Plan of Treatment: PRINCIPAL DISCHARGE DIAGNOSIS  Diagnosis: Cellulitis  Assessment and Plan of Treatment: Left foot wound      SECONDARY DISCHARGE DIAGNOSES  Diagnosis: AVM (arteriovenous malformation)  Assessment and Plan of Treatment:     Diagnosis: Body rash  Assessment and Plan of Treatment: Drug reaction    Diagnosis: Hypertension  Assessment and Plan of Treatment:     Diagnosis: Obesity  Assessment and Plan of Treatment:

## 2020-01-23 NOTE — PROGRESS NOTE ADULT - ASSESSMENT
33 yo female with LLE AVM s/p 9 embolizations most recently last month with course c/b MSSA SSTI of LLE for which she was discharged in late December on clindamycin (hx amoxicillin allergy ~10 years ago--angioedema) X 2 weeks with interval improvement however with relapse in symptoms after finishing clindamycin one week ago.      #LLE lateral malleolus non healing wound   - Start PO Keflex 500 mg Q6 for 7 days total (last dose 1/27/20).  - Please give first dose of Keflex while pt is still admitted to monitor for possible allergic reaction.       ID will sign off. Please reconsult if additional questions arise.

## 2020-01-23 NOTE — PROGRESS NOTE ADULT - ATTENDING COMMENTS
33 y/o female with    1- SSTI- LLE- recurrent infected wound  on cefazolin  monitor for any allergic reaction  2- Rash- likely drug allergy to clinda- pt reports she's had it for over a week.  clinda stopped  symptomatic control  will monitor   3- Obesity 35 y/o female with    1- SSTI- LLE- recurrent infected wound  on cefazolin  monitor for any allergic reaction  2- Rash- improved off clinda  3- Obesity

## 2020-01-23 NOTE — CHART NOTE - NSCHARTNOTEFT_GEN_A_CORE
Upon Nutritional Assessment by the Registered Dietitian your patient was determined to meet criteria / has evidence of the following diagnosis/diagnoses:          [ ]  Mild Protein Calorie Malnutrition        [ ]  Moderate Protein Calorie Malnutrition        [ ] Severe Protein Calorie Malnutrition        [ ] Unspecified Protein Calorie Malnutrition        [ ] Underweight / BMI <19        [ X ] Morbid Obesity / BMI > 40      Findings as based on:  •  Comprehensive nutrition assessment and consultation  Ht: 67" Wt(1/21): 294lbs IBW: 135lbs (+/-10%), 218%IBW, BMI: 46.1 kg/m2     Treatment:    The following diet has been recommended:  1) Recommend change to DASH/TLC diet.   2) Recommend add daily multivitamin/minerals to aid in wound healing.   3) Monitor lytes and replete prn.   4) Trend wts.   5) Nutrition education provided-please see diet initial.     PROVIDER Section:     By signing this assessment you are acknowledging and agree with the diagnosis/diagnoses assigned by the Registered Dietitian    Comments:

## 2020-01-23 NOTE — DIETITIAN INITIAL EVALUATION ADULT. - NUTRITIONGOAL OUTCOME1
Pt will consistently meet >75% of estimated nutrient needs; Prevent further wt gain throughout admit

## 2020-01-24 LAB
-  CEFAZOLIN: SIGNIFICANT CHANGE UP
-  CLINDAMYCIN: SIGNIFICANT CHANGE UP
-  ERYTHROMYCIN: SIGNIFICANT CHANGE UP
-  LINEZOLID: SIGNIFICANT CHANGE UP
-  OXACILLIN: SIGNIFICANT CHANGE UP
-  PENICILLIN: SIGNIFICANT CHANGE UP
-  RIFAMPIN: SIGNIFICANT CHANGE UP
-  TRIMETHOPRIM/SULFAMETHOXAZOLE: SIGNIFICANT CHANGE UP
-  VANCOMYCIN: SIGNIFICANT CHANGE UP
METHOD TYPE: SIGNIFICANT CHANGE UP

## 2020-01-24 PROCEDURE — 99231 SBSQ HOSP IP/OBS SF/LOW 25: CPT | Mod: GC

## 2020-01-24 RX ORDER — DIPHENHYDRAMINE HCL 50 MG
50 CAPSULE ORAL ONCE
Refills: 0 | Status: COMPLETED | OUTPATIENT
Start: 2020-01-24 | End: 2020-01-24

## 2020-01-24 RX ORDER — LINEZOLID 600 MG/300ML
600 INJECTION, SOLUTION INTRAVENOUS ONCE
Refills: 0 | Status: COMPLETED | OUTPATIENT
Start: 2020-01-24 | End: 2020-01-24

## 2020-01-24 RX ORDER — LINEZOLID 600 MG/300ML
1 INJECTION, SOLUTION INTRAVENOUS
Qty: 28 | Refills: 0
Start: 2020-01-24 | End: 2020-02-06

## 2020-01-24 RX ORDER — SODIUM CHLORIDE 9 MG/ML
1000 INJECTION, SOLUTION INTRAVENOUS ONCE
Refills: 0 | Status: COMPLETED | OUTPATIENT
Start: 2020-01-24 | End: 2020-01-24

## 2020-01-24 RX ORDER — CEPHALEXIN 500 MG
1 CAPSULE ORAL
Qty: 0 | Refills: 0 | DISCHARGE
Start: 2020-01-24

## 2020-01-24 RX ADMIN — HYDROMORPHONE HYDROCHLORIDE 1 MILLIGRAM(S): 2 INJECTION INTRAMUSCULAR; INTRAVENOUS; SUBCUTANEOUS at 15:18

## 2020-01-24 RX ADMIN — HYDROMORPHONE HYDROCHLORIDE 1 MILLIGRAM(S): 2 INJECTION INTRAMUSCULAR; INTRAVENOUS; SUBCUTANEOUS at 07:15

## 2020-01-24 RX ADMIN — HYDROMORPHONE HYDROCHLORIDE 1 MILLIGRAM(S): 2 INJECTION INTRAMUSCULAR; INTRAVENOUS; SUBCUTANEOUS at 15:03

## 2020-01-24 RX ADMIN — ENOXAPARIN SODIUM 40 MILLIGRAM(S): 100 INJECTION SUBCUTANEOUS at 06:03

## 2020-01-24 RX ADMIN — Medication 500 MILLIGRAM(S): at 11:01

## 2020-01-24 RX ADMIN — HYDROMORPHONE HYDROCHLORIDE 1 MILLIGRAM(S): 2 INJECTION INTRAMUSCULAR; INTRAVENOUS; SUBCUTANEOUS at 19:05

## 2020-01-24 RX ADMIN — HYDROMORPHONE HYDROCHLORIDE 1 MILLIGRAM(S): 2 INJECTION INTRAMUSCULAR; INTRAVENOUS; SUBCUTANEOUS at 23:25

## 2020-01-24 RX ADMIN — HYDROMORPHONE HYDROCHLORIDE 1 MILLIGRAM(S): 2 INJECTION INTRAMUSCULAR; INTRAVENOUS; SUBCUTANEOUS at 11:02

## 2020-01-24 RX ADMIN — Medication 50 MILLIGRAM(S): at 12:15

## 2020-01-24 RX ADMIN — HYDROMORPHONE HYDROCHLORIDE 1 MILLIGRAM(S): 2 INJECTION INTRAMUSCULAR; INTRAVENOUS; SUBCUTANEOUS at 03:22

## 2020-01-24 RX ADMIN — Medication 500 MILLIGRAM(S): at 00:30

## 2020-01-24 RX ADMIN — HYDROMORPHONE HYDROCHLORIDE 1 MILLIGRAM(S): 2 INJECTION INTRAMUSCULAR; INTRAVENOUS; SUBCUTANEOUS at 23:12

## 2020-01-24 RX ADMIN — GABAPENTIN 800 MILLIGRAM(S): 400 CAPSULE ORAL at 21:53

## 2020-01-24 RX ADMIN — HYDROMORPHONE HYDROCHLORIDE 1 MILLIGRAM(S): 2 INJECTION INTRAMUSCULAR; INTRAVENOUS; SUBCUTANEOUS at 11:20

## 2020-01-24 RX ADMIN — SODIUM CHLORIDE 1000 MILLILITER(S): 9 INJECTION, SOLUTION INTRAVENOUS at 18:57

## 2020-01-24 RX ADMIN — Medication 500 MILLIGRAM(S): at 18:14

## 2020-01-24 RX ADMIN — ENOXAPARIN SODIUM 40 MILLIGRAM(S): 100 INJECTION SUBCUTANEOUS at 18:14

## 2020-01-24 RX ADMIN — HYDROMORPHONE HYDROCHLORIDE 1 MILLIGRAM(S): 2 INJECTION INTRAMUSCULAR; INTRAVENOUS; SUBCUTANEOUS at 07:00

## 2020-01-24 RX ADMIN — HYDROMORPHONE HYDROCHLORIDE 1 MILLIGRAM(S): 2 INJECTION INTRAMUSCULAR; INTRAVENOUS; SUBCUTANEOUS at 02:55

## 2020-01-24 RX ADMIN — Medication 500 MILLIGRAM(S): at 06:03

## 2020-01-24 RX ADMIN — LINEZOLID 600 MILLIGRAM(S): 600 INJECTION, SOLUTION INTRAVENOUS at 11:18

## 2020-01-24 RX ADMIN — Medication 50 MILLIGRAM(S): at 23:11

## 2020-01-24 RX ADMIN — HYDROMORPHONE HYDROCHLORIDE 1 MILLIGRAM(S): 2 INJECTION INTRAMUSCULAR; INTRAVENOUS; SUBCUTANEOUS at 19:20

## 2020-01-24 NOTE — CHART NOTE - NSCHARTNOTEFT_GEN_A_CORE
Infectious Diseases Anti-infective Approval Note    Medication:  linezolid  Dose:  600 mg  Route:  PO   Frequency:  q12  Duration:  2 days     Dose may be adjusted as needed for alterations in renal function.    *THIS IS NOT AN INFECTIOUS DISEASES CONSULTATION*

## 2020-01-24 NOTE — PROVIDER CONTACT NOTE (OTHER) - SITUATION
patient reports feeling "itchy all over" with hives starting on fingers and pinpoint rash on right left. Was given Zyvox and Keflex PO within the past hour

## 2020-01-24 NOTE — PROGRESS NOTE ADULT - ATTENDING COMMENTS
Patient seen and examined    Wound improving    Afebrile    Pain improved    Plan  1) Home today with PO abx, once sensitivities are available  2) Continue wound care with TID dressing changes  3) F/u in 2 weeks in the office

## 2020-01-24 NOTE — PROVIDER CONTACT NOTE (OTHER) - REASON
patient reports feeling "itchy all over" with hives starting on fingers and pinpoint rash on right left

## 2020-01-24 NOTE — PROGRESS NOTE ADULT - SUBJECTIVE AND OBJECTIVE BOX
O/N: ISABELLE, VSS, dressing changed                                      Assessment/Plan;  34 F with PMhx of HTN, left leg AVM s/p embolization x 9, recent hospitalization for L lateral malleolus wound (d/c 12/30/19), presents with non-healing L lateral malleolus wound.      - home gabapentin  - pain control  - regular diet  - keflex  - Antonacci protocol for dressings  - Lovenox for DVT ppx  -Dc home today O/N: ISABELLE, VSS, dressing changed      cephalexin 500  cephalexin 500  enoxaparin Injectable 40      Vital Signs Last 24 Hrs  T(C): 36.8 (24 Jan 2020 05:37), Max: 36.8 (23 Jan 2020 17:58)  T(F): 98.2 (24 Jan 2020 05:37), Max: 98.3 (23 Jan 2020 17:58)  HR: 85 (24 Jan 2020 08:25) (80 - 109)  BP: 136/77 (24 Jan 2020 08:25) (115/56 - 158/84)  BP(mean): --  RR: 17 (24 Jan 2020 08:25) (15 - 18)  SpO2: 97% (24 Jan 2020 08:25) (96% - 100%)  I&O's Summary    23 Jan 2020 07:01  -  24 Jan 2020 07:00  --------------------------------------------------------  IN: 120 mL / OUT: 0 mL / NET: 120 mL    Physical Exam:  General:  Well appearing, NAD  CV:  RRR  Lungs:  nonlabored breathing  Abdomen:  Soft, non-tender, no distended  Extremities:  LLE- lateral malleolus ulcer with surround erythema- improving no discharge/odor  Vascular: 2+ pal pulses throughout  Neuro:  AAOx3    LABS:                        11.9   7.64  )-----------( 390      ( 23 Jan 2020 06:46 )             36.9     01-23    140  |  106  |  12  ----------------------------<  98  4.4   |  23  |  0.78    Ca    8.7      23 Jan 2020 06:46  Phos  3.5     01-23  Mg     1.8     01-23      PLEASE CHECK WHEN PRESENT:     [  ] Heart Failure     [  ] Acute     [  ] Acute on Chronic     [  ] Chronic  -------------------------------------------------------------------     [  ]Diastolic [HFpEF]     [  ]Systolic [HFrEF]     [  ]Combined [HFpEF & HFrEF]     [X  ]Other: HTN  -------------------------------------------------------------------  [ ] Respiratory failure  [ ] Acute cor pulmonale  [ ] Asthma/COPD Exacerbation  [ ] Pleural effusion  [ ] Aspiration pneumonia  -------------------------------------------------------------------  [  ]GISELLE     [  ]ATN     [  ]Reneal Medullary Necrosis     [  ]Renal Cortical Necrosis     [  ]Other Pathological Lesions:    [  ]CKD 1  [  ]CKD 2  [  ]CKD 3  [  ]CKD 4  [  ]CKD 5  [  ]Other  -------------------------------------------------------------------  [  ]Diabetes  [  ] Diabetic PVD Ulcer  [  ] Neuropathic ulcer to DM  [  ] Diabetes with Nephropathy  [  ] Osteomyelitis due to diabetes  --------------------------------------------------------------------  [  ]Malnutrition: See Nutrition Note  [  ]Cachexia  [  ]Other:   [  ]Supplement Ordered:  [  ]Morbid Obesity (BMI >=40]  ---------------------------------------------------------------------  [ ] Sepsis/severe sepsis/septic shock  [ ] UTI  [ ] Pneumonia  -----------------------------------------------------------------------  [ ] Acidosis/alkalosis  [ ] Fluid overload  [ ] Hypokalemia  [ ] Hyperkalemia  [ ] Hypomagnesemia  [ ] Hypophosphatemia  [ ] Hyperphosphatemia  ------------------------------------------------------------------------  [ ] Acute blood loss anemia  [ ] Post op blood loss anemia  [ ] Iron deficiency anemia  [ ] Anemia due to chronic disease  [ ] Hypercoagulable state  ----------------------------------------------------------------------  [ ] Cerebral infarction  [ ] Transient ischemia attack  [ ] Encephalopathy        Assessment/Plan;  34 F with PMhx of HTN, left leg AVM s/p embolization x 9, recent hospitalization for L lateral malleolus wound (d/c 12/30/19), presents with non-healing L lateral malleolus wound.      - home gabapentin  - pain control  - regular diet  - keflex  - Antonacci protocol for dressings  - Lovenox for DVT ppx  -Dc home today

## 2020-01-25 ENCOUNTER — TRANSCRIPTION ENCOUNTER (OUTPATIENT)
Age: 35
End: 2020-01-25

## 2020-01-25 VITALS — DIASTOLIC BLOOD PRESSURE: 91 MMHG | SYSTOLIC BLOOD PRESSURE: 159 MMHG | RESPIRATION RATE: 19 BRPM | HEART RATE: 94 BPM

## 2020-01-25 LAB
CULTURE RESULTS: SIGNIFICANT CHANGE UP
ORGANISM # SPEC MICROSCOPIC CNT: SIGNIFICANT CHANGE UP
ORGANISM # SPEC MICROSCOPIC CNT: SIGNIFICANT CHANGE UP
SPECIMEN SOURCE: SIGNIFICANT CHANGE UP

## 2020-01-25 PROCEDURE — 80053 COMPREHEN METABOLIC PANEL: CPT

## 2020-01-25 PROCEDURE — 36415 COLL VENOUS BLD VENIPUNCTURE: CPT

## 2020-01-25 PROCEDURE — 86140 C-REACTIVE PROTEIN: CPT

## 2020-01-25 PROCEDURE — 86850 RBC ANTIBODY SCREEN: CPT

## 2020-01-25 PROCEDURE — 93005 ELECTROCARDIOGRAM TRACING: CPT

## 2020-01-25 PROCEDURE — 87040 BLOOD CULTURE FOR BACTERIA: CPT

## 2020-01-25 PROCEDURE — 85730 THROMBOPLASTIN TIME PARTIAL: CPT

## 2020-01-25 PROCEDURE — 80048 BASIC METABOLIC PNL TOTAL CA: CPT

## 2020-01-25 PROCEDURE — 87070 CULTURE OTHR SPECIMN AEROBIC: CPT

## 2020-01-25 PROCEDURE — 99231 SBSQ HOSP IP/OBS SF/LOW 25: CPT | Mod: GC

## 2020-01-25 PROCEDURE — 99285 EMERGENCY DEPT VISIT HI MDM: CPT

## 2020-01-25 PROCEDURE — 83735 ASSAY OF MAGNESIUM: CPT

## 2020-01-25 PROCEDURE — 84100 ASSAY OF PHOSPHORUS: CPT

## 2020-01-25 PROCEDURE — 85610 PROTHROMBIN TIME: CPT

## 2020-01-25 PROCEDURE — 87186 SC STD MICRODIL/AGAR DIL: CPT

## 2020-01-25 PROCEDURE — 83605 ASSAY OF LACTIC ACID: CPT

## 2020-01-25 PROCEDURE — 85027 COMPLETE CBC AUTOMATED: CPT

## 2020-01-25 PROCEDURE — 86901 BLOOD TYPING SEROLOGIC RH(D): CPT

## 2020-01-25 RX ORDER — CEPHALEXIN 500 MG
1 CAPSULE ORAL
Qty: 28 | Refills: 0
Start: 2020-01-25 | End: 2020-01-31

## 2020-01-25 RX ORDER — LORATADINE 10 MG/1
10 TABLET ORAL DAILY
Refills: 0 | Status: DISCONTINUED | OUTPATIENT
Start: 2020-01-25 | End: 2020-01-25

## 2020-01-25 RX ADMIN — Medication 500 MILLIGRAM(S): at 06:27

## 2020-01-25 RX ADMIN — HYDROMORPHONE HYDROCHLORIDE 1 MILLIGRAM(S): 2 INJECTION INTRAMUSCULAR; INTRAVENOUS; SUBCUTANEOUS at 11:33

## 2020-01-25 RX ADMIN — Medication 500 MILLIGRAM(S): at 00:38

## 2020-01-25 RX ADMIN — HYDROMORPHONE HYDROCHLORIDE 1 MILLIGRAM(S): 2 INJECTION INTRAMUSCULAR; INTRAVENOUS; SUBCUTANEOUS at 03:27

## 2020-01-25 RX ADMIN — Medication 500 MILLIGRAM(S): at 11:29

## 2020-01-25 RX ADMIN — LORATADINE 10 MILLIGRAM(S): 10 TABLET ORAL at 07:26

## 2020-01-25 RX ADMIN — HYDROMORPHONE HYDROCHLORIDE 1 MILLIGRAM(S): 2 INJECTION INTRAMUSCULAR; INTRAVENOUS; SUBCUTANEOUS at 07:26

## 2020-01-25 RX ADMIN — CHLORHEXIDINE GLUCONATE 1 APPLICATION(S): 213 SOLUTION TOPICAL at 06:23

## 2020-01-25 RX ADMIN — HYDROMORPHONE HYDROCHLORIDE 1 MILLIGRAM(S): 2 INJECTION INTRAMUSCULAR; INTRAVENOUS; SUBCUTANEOUS at 07:40

## 2020-01-25 RX ADMIN — HYDROMORPHONE HYDROCHLORIDE 1 MILLIGRAM(S): 2 INJECTION INTRAMUSCULAR; INTRAVENOUS; SUBCUTANEOUS at 15:40

## 2020-01-25 RX ADMIN — ENOXAPARIN SODIUM 40 MILLIGRAM(S): 100 INJECTION SUBCUTANEOUS at 06:26

## 2020-01-25 RX ADMIN — HYDROMORPHONE HYDROCHLORIDE 1 MILLIGRAM(S): 2 INJECTION INTRAMUSCULAR; INTRAVENOUS; SUBCUTANEOUS at 15:55

## 2020-01-25 RX ADMIN — HYDROMORPHONE HYDROCHLORIDE 1 MILLIGRAM(S): 2 INJECTION INTRAMUSCULAR; INTRAVENOUS; SUBCUTANEOUS at 11:50

## 2020-01-25 RX ADMIN — HYDROMORPHONE HYDROCHLORIDE 1 MILLIGRAM(S): 2 INJECTION INTRAMUSCULAR; INTRAVENOUS; SUBCUTANEOUS at 03:12

## 2020-01-25 NOTE — PROGRESS NOTE ADULT - SUBJECTIVE AND OBJECTIVE BOX
O/N: ISABELLE, VSS itching gave Benadryl in pm and Loratadine this am                                  Assessment/Plan;  34 F with PMhx of HTN, left leg AVM s/p embolization x 9, recent hospitalization for L lateral malleolus wound (d/c 12/30/19), presents with non-healing L lateral malleolus wound.      - home gabapentin  - pain control  - regular diet  - keflex  - Antonacci protocol for dressings  - Lovenox for DVT ppx  -Dc home today O/N: ISABELLE, VSS itching gave Benadryl in pm and Loratadine this am      Patient was seen and examined at bedside. No acute event overnight. No complaints.      Vital Signs Last 24 Hrs  T(C): 36.9 (25 Jan 2020 09:20), Max: 37 (24 Jan 2020 18:40)  T(F): 98.5 (25 Jan 2020 09:20), Max: 98.6 (24 Jan 2020 18:40)  HR: 97 (25 Jan 2020 08:57) (92 - 108)  BP: 149/81 (25 Jan 2020 08:57) (136/70 - 166/76)  BP(mean): --  RR: 19 (25 Jan 2020 08:57) (16 - 19)  SpO2: 99% (25 Jan 2020 08:57) (97% - 99%)    Physical Exam:  General: NAD  Pulmonary: Nonlabored breathing, no respiratory distress  Cardiovascular: NSR, tachycardic to 110s  Abdominal: soft, NT/ND, no organomegaly  Extremities: WWP, normal strength, no clubbing/cyanosis/edema, wound with good granulation. no discharge. Dressing changed and cleaned with ampho B  Neuro: A/O x3,   Pulses: palpable distal pulses    Lines/drains/tubes:    I&O's Summary    24 Jan 2020 07:01  -  25 Jan 2020 07:00  --------------------------------------------------------  IN: 660 mL / OUT: 1000 mL / NET: -340 mL    25 Jan 2020 07:01  -  25 Jan 2020 12:24  --------------------------------------------------------  IN: 180 mL / OUT: 0 mL / NET: 180 mL        LABS:              CAPILLARY BLOOD GLUCOSE            RADIOLOGY & ADDITIONAL STUDIES:          PLEASE CHECK WHEN PRESENT:     [  ] Heart Failure     [  ] Acute     [  ] Acute on Chronic     [  ] Chronic  -------------------------------------------------------------------     [  ]Diastolic [HFpEF]     [  ]Systolic [HFrEF]     [  ]Combined [HFpEF & HFrEF]     [X  ]Other: HTN  -------------------------------------------------------------------  [ ] Respiratory failure  [ ] Acute cor pulmonale  [ ] Asthma/COPD Exacerbation  [ ] Pleural effusion  [ ] Aspiration pneumonia  -------------------------------------------------------------------  [  ]GISELLE     [  ]ATN     [  ]Reneal Medullary Necrosis     [  ]Renal Cortical Necrosis     [  ]Other Pathological Lesions:    [  ]CKD 1  [  ]CKD 2  [  ]CKD 3  [  ]CKD 4  [  ]CKD 5  [  ]Other  -------------------------------------------------------------------  [  ]Diabetes  [  ] Diabetic PVD Ulcer  [  ] Neuropathic ulcer to DM  [  ] Diabetes with Nephropathy  [  ] Osteomyelitis due to diabetes  --------------------------------------------------------------------  [  ]Malnutrition: See Nutrition Note  [  ]Cachexia  [  ]Other:   [  ]Supplement Ordered:  [  ]Morbid Obesity (BMI >=40]  ---------------------------------------------------------------------  [ ] Sepsis/severe sepsis/septic shock  [ ] UTI  [ ] Pneumonia  -----------------------------------------------------------------------  [ ] Acidosis/alkalosis  [ ] Fluid overload  [ ] Hypokalemia  [ ] Hyperkalemia  [ ] Hypomagnesemia  [ ] Hypophosphatemia  [ ] Hyperphosphatemia  ------------------------------------------------------------------------  [ ] Acute blood loss anemia  [ ] Post op blood loss anemia  [ ] Iron deficiency anemia  [ ] Anemia due to chronic disease  [ ] Hypercoagulable state  ----------------------------------------------------------------------  [ ] Cerebral infarction  [ ] Transient ischemia attack  [ ] Encephalopathy          Assessment/Plan;  34 F with PMhx of HTN, left leg AVM s/p embolization x 9, recent hospitalization for L lateral malleolus wound (d/c 12/30/19), presents with non-healing L lateral malleolus wound.      - home gabapentin  - pain control  - regular diet  - keflex  - Antonacci protocol for dressings  - Lovenox for DVT ppx  -Dc home today if tachy resolves

## 2020-01-25 NOTE — DISCHARGE NOTE NURSING/CASE MANAGEMENT/SOCIAL WORK - PATIENT PORTAL LINK FT
You can access the FollowMyHealth Patient Portal offered by [facility] by registering at the following website: [email or web address]. By joining [employer]’s FollowMyHealth portal, you will also be able to view your health information using other applications (apps) compatible with our system.

## 2020-01-31 DIAGNOSIS — I10 ESSENTIAL (PRIMARY) HYPERTENSION: ICD-10-CM

## 2020-01-31 DIAGNOSIS — T49.0X5D: ICD-10-CM

## 2020-01-31 DIAGNOSIS — T81.49XA INFECTION FOLLOWING A PROCEDURE, OTHER SURGICAL SITE, INITIAL ENCOUNTER: ICD-10-CM

## 2020-01-31 DIAGNOSIS — T36.8X5A ADVERSE EFFECT OF OTHER SYSTEMIC ANTIBIOTICS, INITIAL ENCOUNTER: ICD-10-CM

## 2020-01-31 DIAGNOSIS — E66.01 MORBID (SEVERE) OBESITY DUE TO EXCESS CALORIES: ICD-10-CM

## 2020-01-31 DIAGNOSIS — Q27.32 ARTERIOVENOUS MALFORMATION OF VESSEL OF LOWER LIMB: ICD-10-CM

## 2020-01-31 DIAGNOSIS — B95.61 METHICILLIN SUSCEPTIBLE STAPHYLOCOCCUS AUREUS INFECTION AS THE CAUSE OF DISEASES CLASSIFIED ELSEWHERE: ICD-10-CM

## 2020-01-31 DIAGNOSIS — L03.116 CELLULITIS OF LEFT LOWER LIMB: ICD-10-CM
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
## 2020-01-31 DIAGNOSIS — L97.329 NON-PRESSURE CHRONIC ULCER OF LEFT ANKLE WITH UNSPECIFIED SEVERITY: ICD-10-CM

## 2020-01-31 DIAGNOSIS — L27.0 GENERALIZED SKIN ERUPTION DUE TO DRUGS AND MEDICAMENTS TAKEN INTERNALLY: ICD-10-CM

## 2020-06-22 ENCOUNTER — APPOINTMENT (OUTPATIENT)
Dept: HEART AND VASCULAR | Facility: CLINIC | Age: 35
End: 2020-06-22
Payer: COMMERCIAL

## 2020-06-22 PROCEDURE — 99214 OFFICE O/P EST MOD 30 MIN: CPT

## 2020-06-24 NOTE — PHYSICAL EXAM
[Alert] : alert [No Acute Distress] : no acute distress [PERRL] : pupils equal, round and reactive to light [Normal Hearing] : hearing was normal [No Neck Mass] : no neck mass was observed [Normal Rate] : heart rate was normal  [No Respiratory Distress] : no respiratory distress [Pedal Pulses Normal] : the pedal pulses are present [Regular Rhythm] : with a regular rhythm [No Edema] : there was no peripheral edema [Not Tender] : non-tender [Not Distended] : not distended [Normal Gait] : normal gait [Normal Strength/Tone] : muscle strength and tone were normal [No Motor Deficits] : the motor exam was normal [No Sensory Deficits] : the sensory exam was normal to light touch and pinprick [de-identified] : ulcer on lateral aspect of left heel about 1mm in size, eschar over the ulceration, skin erythematous surrounding [Oriented x3] : oriented to person, place, and time

## 2020-06-24 NOTE — ASSESSMENT
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 This was causing pain, swelling and an area of spontaneous breakdown with subsequent infection. She's had multiple embolizations both transarterial and by direct puncture as well as wound care and treatment of infection by Dr. Berkowitz. Her last embolization was 6 months ago and she went on to heal the ulceration in January and it remained healed for approximately 5 months. Recently she noticed a feeling of pulsation in the same area and subsequently developed a new area of ulceration measuring approximately 1 x 1 cm. There is no evidence of active infection and there has been no bleeding. She said the area is moderately painful. I did an ultrasound in the office which doesn't show any evidence of macrovascular shunting. Her angiograms in the past have shown an unusual matrix of a tiny vessels without major shunting. I think we should do another angiogram and see if there are any new feeding vessels amenable to embolization using either glue or microspheres. I would then do direct embolization trying bleomycin this time. We were going to treat with bleomycin previously but she healed prior to requiring this. She did have pulmonary function studies performed in preparation for the procedure at that time and therefore we'll not need new testing.

## 2020-06-27 ENCOUNTER — TRANSCRIPTION ENCOUNTER (OUTPATIENT)
Age: 35
End: 2020-06-27

## 2020-06-29 VITALS
SYSTOLIC BLOOD PRESSURE: 180 MMHG | OXYGEN SATURATION: 98 % | RESPIRATION RATE: 16 BRPM | HEIGHT: 67 IN | DIASTOLIC BLOOD PRESSURE: 104 MMHG | TEMPERATURE: 98 F | HEART RATE: 87 BPM | WEIGHT: 293 LBS

## 2020-06-29 NOTE — H&P ADULT - ASSESSMENT
34 F with PMHx of HTN, left leg AVM s/p multiple embolization, persistent non-healing L lateral malleolus wound, presents for direct stick embolization of LLE AVM.     -Mallampati I, ASA II  -consent to be obtained by Dr. Teran's team

## 2020-06-29 NOTE — H&P ADULT - HISTORY OF PRESENT ILLNESS
Hx obtained via Dr. 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 This was causing pain, swelling and an area of spontaneous breakdown with subsequent infection. Per Dr. Teran’s note, she's had multiple embolizations both transarterial and by direct puncture as well as wound care and treatment of infection by Dr. Berkowitz. Her last embolization was 6 months ago and she went on to heal the ulceration in January and it remained healed for approximately 5 months. Per Dr. Teran, she recently she noticed a feeling of pulsation in the same area and subsequently developed a new area of ulceration measuring approximately 1 x 1 cm. There is no evidence of active infection and there has been no bleeding. She said the area is moderately painful. Dr. Teran did an ultrasound in the office which didn’t show any evidence of macrovascular shunting. Her angiograms in the past have shown an unusual matrix of a tiny vessels without major shunting. Dr. Teran felt another angiogram was indicated to see if there are any new feeding vessels amenable to embolization using either glue or microspheres. Dr. Teran would also like to do direct embolization with bleomycin this time. In the past, bleomycin was attempted to be used however she healed prior to requiring this. She did have pulmonary function studies performed in preparation for the procedure at that time and therefore we'll not need new testing. Hx obtained via Dr. 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 This was causing pain, swelling and an area of spontaneous breakdown with subsequent infection. Per Dr. Teran’s note, she's had multiple embolizations both transarterial and by direct puncture as well as wound care and treatment of infection by Dr. Berkowitz. Her last embolization was 6 months ago and she went on to heal the ulceration in January and it remained healed for approximately 5 months. Per Dr. Teran, she recently she noticed a feeling of pulsation in the same area and subsequently developed a new area of ulceration measuring approximately 1 x 1 cm. There is no evidence of active infection and there has been no bleeding. She said the area is moderately painful. Dr. Teran did an ultrasound in the office which didn’t show any evidence of macrovascular shunting. Her angiograms in the past have shown an unusual matrix of a tiny vessels without major shunting. Dr. Teran felt another angiogram was indicated to see if there are any new feeding vessels amenable to embolization using either glue or microspheres. Dr. Teran would also like to do direct embolization with bleomycin this time. In the past, bleomycin was attempted to be used however she healed prior to requiring this. She did have pulmonary function studies performed in preparation for the procedure at that time and therefore we'll not need new testing. Hx obtained via Dr. Trean's most recent office note 6/22/20  COVID neg 6/27 -- HIE    34-year-old female who Dr. 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 This was causing pain, swelling and an area of spontaneous breakdown with subsequent infection. Per Dr. Teran’s note, she's had multiple embolizations both transarterial and by direct puncture as well as wound care and treatment of infection by Dr. Berkowitz. Her last embolization was 6 months ago and she went on to heal the ulceration in January and it remained healed for approximately 5 months. Per Dr. Teran, she recently she noticed a feeling of pulsation in the same area and subsequently developed a new area of ulceration measuring approximately 1 x 1 cm. There is no evidence of active infection and there has been no bleeding. She said the area is moderately painful. Dr. Teran did an ultrasound in the office which didn’t show any evidence of macrovascular shunting. Her angiograms in the past have shown an unusual matrix of a tiny vessels without major shunting. Dr. Teran felt another angiogram was indicated to see if there are any new feeding vessels amenable to embolization using either glue or microspheres. Dr. Teran would also like to do direct embolization with bleomycin this time. In the past, bleomycin was attempted to be used however she healed prior to requiring this. She did have pulmonary function studies performed in preparation for the procedure at that time and therefore we'll not need new testing. Hx obtained via Dr. Teran's most recent office note 6/22/20  COVID neg 6/27    34-year-old female who Dr. 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 This was causing pain, swelling and an area of spontaneous breakdown with subsequent infection. Per Dr. Teran’s note, she's had multiple embolizations both transarterial and by direct puncture as well as wound care and treatment of infection by Dr. Berkowitz. Her last embolization was 6 months ago and she went on to heal the ulceration in January and it remained healed for approximately 5 months. Per Dr. Teran, she recently she noticed a feeling of pulsation in the same area and subsequently developed a new area of ulceration measuring approximately 1 x 1 cm. There is no evidence of active infection and there has been no bleeding. She said the area is moderately painful. Dr. Teran did an ultrasound in the office which didn’t show any evidence of macrovascular shunting. Her angiograms in the past have shown an unusual matrix of a tiny vessels without major shunting. Dr. Teran felt another angiogram was indicated to see if there are any new feeding vessels amenable to embolization using either glue or microspheres. Dr. Teran would also like to do direct embolization with bleomycin this time. In the past, bleomycin was attempted to be used however she healed prior to requiring this. She did have pulmonary function studies performed in preparation for the procedure at that time and therefore we'll not need new testing.

## 2020-06-29 NOTE — H&P ADULT - NSICDXFAMILYHX_GEN_ALL_CORE_FT
FAMILY HISTORY:  Sibling  Still living? Unknown  Family history of congenital malformation, Age at diagnosis: Age Unknown

## 2020-06-29 NOTE — H&P ADULT - NSHPLABSRESULTS_GEN_ALL_CORE
12.1   10.45 )-----------( 398      ( 30 Jun 2020 08:13 )             37.3         PT/INR - ( 30 Jun 2020 08:13 )   PT: 12.3 sec;   INR: 1.03          PTT - ( 30 Jun 2020 08:13 )  PTT:31.4 sec              EKG: 12.1   10.45 )-----------( 398      ( 30 Jun 2020 08:13 )             37.3       06-30    140  |  106  |  13  ----------------------------<  93  4.2   |  21<L>  |  0.71    Ca    8.8      30 Jun 2020 08:13    TPro  7.1  /  Alb  3.8  /  TBili  0.5  /  DBili  <0.2  /  AST  12  /  ALT  8<L>  /  AlkPhos  63  06-30      PT/INR - ( 30 Jun 2020 08:13 )   PT: 12.3 sec;   INR: 1.03          PTT - ( 30 Jun 2020 08:13 )  PTT:31.4 sec      EKG: not required per anesthesia due to bleomycin protocol

## 2020-06-30 ENCOUNTER — INPATIENT (INPATIENT)
Facility: HOSPITAL | Age: 35
LOS: 0 days | Discharge: HOME | DRG: 253 | End: 2020-07-01
Attending: RADIOLOGY | Admitting: RADIOLOGY
Payer: COMMERCIAL

## 2020-06-30 DIAGNOSIS — Z98.89 OTHER SPECIFIED POSTPROCEDURAL STATES: Chronic | ICD-10-CM

## 2020-06-30 DIAGNOSIS — Z41.9 ENCOUNTER FOR PROCEDURE FOR PURPOSES OTHER THAN REMEDYING HEALTH STATE, UNSPECIFIED: Chronic | ICD-10-CM

## 2020-06-30 LAB
ALBUMIN SERPL ELPH-MCNC: 3.8 G/DL — SIGNIFICANT CHANGE UP (ref 3.3–5)
ALP SERPL-CCNC: 63 U/L — SIGNIFICANT CHANGE UP (ref 40–120)
ALT FLD-CCNC: 8 U/L — LOW (ref 10–45)
ANION GAP SERPL CALC-SCNC: 13 MMOL/L — SIGNIFICANT CHANGE UP (ref 5–17)
APTT BLD: 31.4 SEC — SIGNIFICANT CHANGE UP (ref 27.5–35.5)
AST SERPL-CCNC: 12 U/L — SIGNIFICANT CHANGE UP (ref 10–40)
BILIRUB DIRECT SERPL-MCNC: <0.2 MG/DL — SIGNIFICANT CHANGE UP (ref 0–0.2)
BILIRUB INDIRECT FLD-MCNC: >0.3 MG/DL — SIGNIFICANT CHANGE UP (ref 0.2–1)
BILIRUB SERPL-MCNC: 0.5 MG/DL — SIGNIFICANT CHANGE UP (ref 0.2–1.2)
BUN SERPL-MCNC: 13 MG/DL — SIGNIFICANT CHANGE UP (ref 7–23)
CALCIUM SERPL-MCNC: 8.8 MG/DL — SIGNIFICANT CHANGE UP (ref 8.4–10.5)
CHLORIDE SERPL-SCNC: 106 MMOL/L — SIGNIFICANT CHANGE UP (ref 96–108)
CO2 SERPL-SCNC: 21 MMOL/L — LOW (ref 22–31)
CREAT SERPL-MCNC: 0.71 MG/DL — SIGNIFICANT CHANGE UP (ref 0.5–1.3)
GLUCOSE SERPL-MCNC: 93 MG/DL — SIGNIFICANT CHANGE UP (ref 70–99)
HCG SERPL-ACNC: <0 MIU/ML — SIGNIFICANT CHANGE UP
HCT VFR BLD CALC: 37.3 % — SIGNIFICANT CHANGE UP (ref 34.5–45)
HGB BLD-MCNC: 12.1 G/DL — SIGNIFICANT CHANGE UP (ref 11.5–15.5)
INR BLD: 1.03 — SIGNIFICANT CHANGE UP (ref 0.88–1.16)
MCHC RBC-ENTMCNC: 28.5 PG — SIGNIFICANT CHANGE UP (ref 27–34)
MCHC RBC-ENTMCNC: 32.4 GM/DL — SIGNIFICANT CHANGE UP (ref 32–36)
MCV RBC AUTO: 88 FL — SIGNIFICANT CHANGE UP (ref 80–100)
NRBC # BLD: 0 /100 WBCS — SIGNIFICANT CHANGE UP (ref 0–0)
PLATELET # BLD AUTO: 398 K/UL — SIGNIFICANT CHANGE UP (ref 150–400)
POTASSIUM SERPL-MCNC: 4.2 MMOL/L — SIGNIFICANT CHANGE UP (ref 3.5–5.3)
POTASSIUM SERPL-SCNC: 4.2 MMOL/L — SIGNIFICANT CHANGE UP (ref 3.5–5.3)
PROT SERPL-MCNC: 7.1 G/DL — SIGNIFICANT CHANGE UP (ref 6–8.3)
PROTHROM AB SERPL-ACNC: 12.3 SEC — SIGNIFICANT CHANGE UP (ref 10.6–13.6)
RBC # BLD: 4.24 M/UL — SIGNIFICANT CHANGE UP (ref 3.8–5.2)
RBC # FLD: 12.5 % — SIGNIFICANT CHANGE UP (ref 10.3–14.5)
SODIUM SERPL-SCNC: 140 MMOL/L — SIGNIFICANT CHANGE UP (ref 135–145)
WBC # BLD: 10.45 K/UL — SIGNIFICANT CHANGE UP (ref 3.8–10.5)
WBC # FLD AUTO: 10.45 K/UL — SIGNIFICANT CHANGE UP (ref 3.8–10.5)

## 2020-06-30 PROCEDURE — 37242 VASC EMBOLIZE/OCCLUDE ARTERY: CPT

## 2020-06-30 RX ORDER — GABAPENTIN 400 MG/1
0 CAPSULE ORAL
Qty: 0 | Refills: 0 | DISCHARGE

## 2020-06-30 RX ORDER — CIPROFLOXACIN LACTATE 400MG/40ML
400 VIAL (ML) INTRAVENOUS EVERY 12 HOURS
Refills: 0 | Status: COMPLETED | OUTPATIENT
Start: 2020-06-30 | End: 2020-07-01

## 2020-06-30 RX ORDER — HYDROMORPHONE HYDROCHLORIDE 2 MG/ML
2 INJECTION INTRAMUSCULAR; INTRAVENOUS; SUBCUTANEOUS EVERY 4 HOURS
Refills: 0 | Status: DISCONTINUED | OUTPATIENT
Start: 2020-06-30 | End: 2020-07-01

## 2020-06-30 RX ORDER — BLEOMYCIN SULFATE 30 UNIT
15 VIAL (EA) INJECTION ONCE
Refills: 0 | Status: DISCONTINUED | OUTPATIENT
Start: 2020-06-30 | End: 2020-07-01

## 2020-06-30 RX ORDER — ONDANSETRON 8 MG/1
4 TABLET, FILM COATED ORAL EVERY 4 HOURS
Refills: 0 | Status: DISCONTINUED | OUTPATIENT
Start: 2020-06-30 | End: 2020-07-01

## 2020-06-30 RX ADMIN — Medication 200 MILLIGRAM(S): at 18:36

## 2020-06-30 RX ADMIN — HYDROMORPHONE HYDROCHLORIDE 2 MILLIGRAM(S): 2 INJECTION INTRAMUSCULAR; INTRAVENOUS; SUBCUTANEOUS at 20:35

## 2020-06-30 RX ADMIN — HYDROMORPHONE HYDROCHLORIDE 2 MILLIGRAM(S): 2 INJECTION INTRAMUSCULAR; INTRAVENOUS; SUBCUTANEOUS at 16:38

## 2020-07-01 ENCOUNTER — TRANSCRIPTION ENCOUNTER (OUTPATIENT)
Age: 35
End: 2020-07-01

## 2020-07-01 VITALS
RESPIRATION RATE: 18 BRPM | SYSTOLIC BLOOD PRESSURE: 154 MMHG | OXYGEN SATURATION: 97 % | DIASTOLIC BLOOD PRESSURE: 102 MMHG | HEART RATE: 100 BPM

## 2020-07-01 LAB
ALBUMIN SERPL ELPH-MCNC: 3.4 G/DL — SIGNIFICANT CHANGE UP (ref 3.3–5)
ALP SERPL-CCNC: 60 U/L — SIGNIFICANT CHANGE UP (ref 40–120)
ALT FLD-CCNC: 7 U/L — LOW (ref 10–45)
ANION GAP SERPL CALC-SCNC: 10 MMOL/L — SIGNIFICANT CHANGE UP (ref 5–17)
AST SERPL-CCNC: 10 U/L — SIGNIFICANT CHANGE UP (ref 10–40)
BILIRUB SERPL-MCNC: 0.5 MG/DL — SIGNIFICANT CHANGE UP (ref 0.2–1.2)
BUN SERPL-MCNC: 11 MG/DL — SIGNIFICANT CHANGE UP (ref 7–23)
CALCIUM SERPL-MCNC: 8.9 MG/DL — SIGNIFICANT CHANGE UP (ref 8.4–10.5)
CHLORIDE SERPL-SCNC: 103 MMOL/L — SIGNIFICANT CHANGE UP (ref 96–108)
CO2 SERPL-SCNC: 24 MMOL/L — SIGNIFICANT CHANGE UP (ref 22–31)
CREAT SERPL-MCNC: 0.65 MG/DL — SIGNIFICANT CHANGE UP (ref 0.5–1.3)
GLUCOSE SERPL-MCNC: 119 MG/DL — HIGH (ref 70–99)
HCT VFR BLD CALC: 35.4 % — SIGNIFICANT CHANGE UP (ref 34.5–45)
HGB BLD-MCNC: 11.7 G/DL — SIGNIFICANT CHANGE UP (ref 11.5–15.5)
MAGNESIUM SERPL-MCNC: 1.9 MG/DL — SIGNIFICANT CHANGE UP (ref 1.6–2.6)
MCHC RBC-ENTMCNC: 28.9 PG — SIGNIFICANT CHANGE UP (ref 27–34)
MCHC RBC-ENTMCNC: 33.1 GM/DL — SIGNIFICANT CHANGE UP (ref 32–36)
MCV RBC AUTO: 87.4 FL — SIGNIFICANT CHANGE UP (ref 80–100)
NRBC # BLD: 0 /100 WBCS — SIGNIFICANT CHANGE UP (ref 0–0)
PLATELET # BLD AUTO: 422 K/UL — HIGH (ref 150–400)
POTASSIUM SERPL-MCNC: 4.2 MMOL/L — SIGNIFICANT CHANGE UP (ref 3.5–5.3)
POTASSIUM SERPL-SCNC: 4.2 MMOL/L — SIGNIFICANT CHANGE UP (ref 3.5–5.3)
PROT SERPL-MCNC: 6.6 G/DL — SIGNIFICANT CHANGE UP (ref 6–8.3)
RBC # BLD: 4.05 M/UL — SIGNIFICANT CHANGE UP (ref 3.8–5.2)
RBC # FLD: 12.4 % — SIGNIFICANT CHANGE UP (ref 10.3–14.5)
SODIUM SERPL-SCNC: 137 MMOL/L — SIGNIFICANT CHANGE UP (ref 135–145)
WBC # BLD: 10.14 K/UL — SIGNIFICANT CHANGE UP (ref 3.8–10.5)
WBC # FLD AUTO: 10.14 K/UL — SIGNIFICANT CHANGE UP (ref 3.8–10.5)

## 2020-07-01 PROCEDURE — 84702 CHORIONIC GONADOTROPIN TEST: CPT

## 2020-07-01 PROCEDURE — 85730 THROMBOPLASTIN TIME PARTIAL: CPT

## 2020-07-01 PROCEDURE — 80076 HEPATIC FUNCTION PANEL: CPT

## 2020-07-01 PROCEDURE — 83735 ASSAY OF MAGNESIUM: CPT

## 2020-07-01 PROCEDURE — 85027 COMPLETE CBC AUTOMATED: CPT

## 2020-07-01 PROCEDURE — C9399: CPT

## 2020-07-01 PROCEDURE — 80053 COMPREHEN METABOLIC PANEL: CPT

## 2020-07-01 PROCEDURE — C1889: CPT

## 2020-07-01 PROCEDURE — 36415 COLL VENOUS BLD VENIPUNCTURE: CPT

## 2020-07-01 PROCEDURE — 80048 BASIC METABOLIC PNL TOTAL CA: CPT

## 2020-07-01 PROCEDURE — 85610 PROTHROMBIN TIME: CPT

## 2020-07-01 RX ORDER — HYDROMORPHONE HYDROCHLORIDE 2 MG/ML
1 INJECTION INTRAMUSCULAR; INTRAVENOUS; SUBCUTANEOUS
Qty: 30 | Refills: 0
Start: 2020-07-01 | End: 2020-07-05

## 2020-07-01 RX ORDER — OXYCODONE AND ACETAMINOPHEN 5; 325 MG/1; MG/1
1 TABLET ORAL ONCE
Refills: 0 | Status: DISCONTINUED | OUTPATIENT
Start: 2020-07-01 | End: 2020-07-01

## 2020-07-01 RX ORDER — MOXIFLOXACIN HYDROCHLORIDE TABLETS, 400 MG 400 MG/1
1 TABLET, FILM COATED ORAL
Qty: 14 | Refills: 0
Start: 2020-07-01 | End: 2020-07-07

## 2020-07-01 RX ORDER — HYDROMORPHONE HYDROCHLORIDE 2 MG/ML
1 INJECTION INTRAMUSCULAR; INTRAVENOUS; SUBCUTANEOUS
Qty: 0 | Refills: 0 | DISCHARGE
Start: 2020-07-01

## 2020-07-01 RX ORDER — NEBIVOLOL HYDROCHLORIDE 5 MG/1
5 TABLET ORAL
Refills: 0 | Status: DISCONTINUED | OUTPATIENT
Start: 2020-07-01 | End: 2020-07-01

## 2020-07-01 RX ADMIN — HYDROMORPHONE HYDROCHLORIDE 2 MILLIGRAM(S): 2 INJECTION INTRAMUSCULAR; INTRAVENOUS; SUBCUTANEOUS at 04:57

## 2020-07-01 RX ADMIN — HYDROMORPHONE HYDROCHLORIDE 2 MILLIGRAM(S): 2 INJECTION INTRAMUSCULAR; INTRAVENOUS; SUBCUTANEOUS at 00:41

## 2020-07-01 RX ADMIN — OXYCODONE AND ACETAMINOPHEN 1 TABLET(S): 5; 325 TABLET ORAL at 15:22

## 2020-07-01 RX ADMIN — HYDROMORPHONE HYDROCHLORIDE 2 MILLIGRAM(S): 2 INJECTION INTRAMUSCULAR; INTRAVENOUS; SUBCUTANEOUS at 09:16

## 2020-07-01 RX ADMIN — Medication 200 MILLIGRAM(S): at 05:01

## 2020-07-01 RX ADMIN — HYDROMORPHONE HYDROCHLORIDE 2 MILLIGRAM(S): 2 INJECTION INTRAMUSCULAR; INTRAVENOUS; SUBCUTANEOUS at 13:11

## 2020-07-01 RX ADMIN — NEBIVOLOL HYDROCHLORIDE 5 MILLIGRAM(S): 5 TABLET ORAL at 06:43

## 2020-07-01 NOTE — DISCHARGE NOTE PROVIDER - NSDCCPCAREPLAN_GEN_ALL_CORE_FT
PRINCIPAL DISCHARGE DIAGNOSIS  Diagnosis: Venous malformation  Assessment and Plan of Treatment:       SECONDARY DISCHARGE DIAGNOSES  Diagnosis: Hypertension  Assessment and Plan of Treatment: PRINCIPAL DISCHARGE DIAGNOSIS  Diagnosis: Venous malformation  Assessment and Plan of Treatment: You underwent direct stick embolization of your left foot with Bleomycin. Please do not remove the EKG Leads or tegaderms for 2 days or this may cause skin discoloration. Please take Cipro 500mg twice daily for 7 days to prevent infection.      SECONDARY DISCHARGE DIAGNOSES  Diagnosis: Hypertension  Assessment and Plan of Treatment: Hypertension, commonly called high blood pressure, is when the force of blood pumping through your arteries is too strong. Hypertension forces your heart to work harder to pump blood. Your arteries may become narrow or stiff. Having untreated or uncontrolled hypertension for a long period of time can cause heart attack, stroke, kidney disease, and other problems.   Please continue to taking your blood pressure medications  Maintain a healthy lifestyle and follow up with your primary care physician. For blood pressures at home that are too high or low please see your doctor or go to the Emergency Room as necessary.

## 2020-07-01 NOTE — DISCHARGE NOTE PROVIDER - YES NO FOR MLM POSITIVE OR NEGATIVE COVID RESULT
, Follow up with primary physician and neurologist in 1 week.  Pt needs outpatient neuropathy work -up with EMG/NCS

## 2020-07-01 NOTE — DISCHARGE NOTE PROVIDER - NSDCMRMEDTOKEN_GEN_ALL_CORE_FT
Bystolic 5 mg oral tablet: 1 tab(s) orally 2 times a day  Cipro 500 mg oral tablet: 1 tab(s) orally 2 times a day   gabapentin 100 mg oral capsule: 1 cap(s) orally once a day (in the morning)  gabapentin 800 mg oral tablet: 1 tab(s) orally once a day (at bedtime)  HYDROmorphone 100 mg/50 mL-D5% intravenous solution: 1 milliliter(s) intravenous every 4 hours, As needed, Severe Pain (7 - 10)  Junel 1.5/30 oral tablet: 1 tab(s) orally once a day  Percocet 10/325 oral tablet: 1 tab(s) orally every 4 hours, As Needed

## 2020-07-01 NOTE — DISCHARGE NOTE PROVIDER - HOSPITAL COURSE
34-year-old female who Dr. 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 This was causing pain, swelling and an area of spontaneous breakdown with subsequent infection. Per Dr. Teran’s note, she's had multiple embolizations both transarterial and by direct puncture as well as wound care and treatment of infection by Dr. Berkowitz. Her last embolization was 6 months ago and she went on to heal the ulceration in January and it remained healed for approximately 5 months. Per Dr. Teran, she recently she noticed a feeling of pulsation in the same area and subsequently developed a new area of ulceration measuring approximately 1 x 1 cm. There is no evidence of active infection and there has been no bleeding. She said the area is moderately painful. Dr. Teran did an ultrasound in the office which didn’t show any evidence of macrovascular shunting. Her angiograms in the past have shown an unusual matrix of a tiny vessels without major shunting. Dr. Teran felt another angiogram was indicated to see if there are any new feeding vessels amenable to embolization using either glue or microspheres. Patient no s/p  direct stick embolization of Left foot Pt. seen and examined at bedside this morning, without complaints and is out of bed ambulating. LE foot dressing C/D/I.  Vital signs stable, and labs reviwed. Patient sent home with Cipro 500mg BID x 7 days and pain medications were sent by [facility].  Pt. stable for discharge as per Dr. Teran and to f/u with Dr. Teran. Patient has been given appropriate discharge instructions including medication regimen, access site management and follow up. Prescriptions have been e-prescribed to patient's preferred pharmacy.

## 2020-07-01 NOTE — DISCHARGE NOTE PROVIDER - CARE PROVIDER_API CALL
J Carlos Teran  DIAGNOSTIC RADIOLOGY  [address] [place] [address]  Phone: [phone]  Fax: [phone]  Follow Up Time:

## 2020-07-01 NOTE — DISCHARGE NOTE PROVIDER - NSDCQMSTROKE_NEU_ALL_CORE
Diverticulosis    Diverticulosis means that small pouches have formed in the wall of your large intestine (colon). Most often, this problem causes no symptoms and is common as people age. But the pouches in the colon are at risk of becoming infected. When this happens, the condition is called diverticulitis. Although most people with diverticulosis never develop diverticulitis, it is still not uncommon. Rectal bleeding can also occur and in less common situations, a type of colon inflammation called colitis.  While most people do not have symptoms, some people with diverticulosis may have:  · Abdominal cramps and pain  · Bloating  · Constipation  · Change in bowel habits  Causes  The exact cause of diverticulosis (and diverticulitis) has not been proved, but a few things are associated with the condition:  · Low-fiber diet  · Constipation  · Lack of exercise  Your healthcare provider will talk with you about how to manage your condition. Diet changes may be all that are needed to help control diverticulosis and prevent progression to diverticulitis. If you develop diverticulitis, you will likely need other treatments.  Home care  You may be told to take fiber supplements daily. Fiber adds bulk to the stool so that it passes through the colon more easily. Stool softeners may be recommended. You may also be given medications for pain relief. Be sure to take all medications as directed.  In the past, people were told to avoid corn, nuts, and seeds. This is no longer necessary.  Follow these guidelines when caring for yourself at home:  · Eat unprocessed foods that are high in fiber. Whole grains, fruits, and vegetables are good choices.  · Drink 6 to 8 glasses of water every day unless your healthcare provider has you limit how much fluid you should have.  · Watch for changes in your bowel movements. Tell your provider if you notice any changes.  · Begin an exercise program. Ask your provider how to get started.  Generally, walking is the best.  · Get plenty of rest and sleep.  Follow-up care  Follow up with your healthcare provider, or as advised. Regular visits may be needed to check on your health. Sometimes special procedures such as colonoscopy, are needed after an episode of diverticulitis or blooding. Be sure to keep all your appointments.  If a stool sample was taken, or cultures were done, you should be told if they are positive, or if your treatment needs to be changed. You can call as directed for the results.  If X-rays were done, a radiologist will look at them. You will be told if there is a change in your treatment.  If antibiotics were prescribed, be sure to finish them all.  When to seek medical advice  Call your healthcare provider right away if any of these occur:  · Fever of 100.4°F (38°C) or higher, or as directed by your healthcare provider  · Severe cramps in the lower left side of the abdomen or pain that is getting worse  · Tenderness in the lower left side of the abdomen or worsening pain throughout the abdomen  · Diarrhea or constipation that doesn't get better within 24 hours  · Nausea and vomiting  · Bleeding from the rectum  Call 911  Call 911 if any of the following occur:  · Trouble breathing  · Confusion  · Very drowsy or trouble awakening  · Fainting or loss of consciousness  · Rapid heart rate  · Chest pain  Date Last Reviewed: 12/30/2015 © 2000-2018 [employer]. [address] [place] [address]. All rights reserved. This information is not intended as a substitute for professional medical care. Always follow your healthcare professional's instructions.          Dehydration (Adult)  Dehydration occurs when your body loses too much fluid. This may be the result of prolonged vomiting or diarrhea, excessive sweating, or a high fever. It may also happen if you don’t drink enough fluid when you’re sick or out in the heat. Misuse of diuretics (water pills) can also be a  cause.  Symptoms include thirst and decreased urine output. You may also feel dizzy, weak, fatigued, or very drowsy. The diet described below is usually enough to treat dehydration. In some cases, you may need medicine.  Home care  · Drink at least 12 8-ounce glasses of fluid every day to resolve the dehydration. Fluid may include water; orange juice; lemonade; apple, grape, or cranberry juice; clear fruit drinks; electrolyte replacement and sports drinks; and teas and coffee without caffeine. Don't drink alcohol. If you have been diagnosed with a kidney disease, ask your doctor how much and what types of fluids you should drink to prevent dehydration. If you have kidney disease, fluid can build up in the body. This can be dangerous to your health.  · If you have a fever, muscle aches, or a headache as a result of a cold or flu, you may take acetaminophen or ibuprofen, unless another medicine was prescribed. If you have chronic liver or kidney disease, or have ever had a stomach ulcer or gastrointestinal bleeding, talk with your healthcare provider before using these medicines. Don't take aspirin if you are younger than 18 and have a fever. Aspirin raises the chance for severe liver injury.  Follow-up care  Follow up with your healthcare provider, or as advised.  When to seek medical advice  Call your healthcare provider right away if any of these occur:  · Continued vomiting  · Frequent diarrhea (more than 5 times a day); blood (red or black color) or mucus in diarrhea  · Blood in vomit or stool  · Swollen abdomen or increasing abdominal pain  · Weakness, dizziness, or fainting  · Unusual drowsiness or confusion  · Reduced urine output or extreme thirst  · Fever of 100.4°F (38°C) or higher  Date Last Reviewed: 5/1/2017  © 5147-4190 [employer]. [address] [place] [address], [place] [address]. All rights reserved. This information is not intended as a substitute for professional medical care. Always follow  your healthcare professional's instructions.          Pneumonia (Adult)  Pneumonia is an infection deep within the lungs. It is in the small air sacs (alveoli). Pneumonia may be caused by a virus or bacteria. Pneumonia caused by bacteria is usually treated with an antibiotic. Severe cases may need to be treated in the hospital. Milder cases can be treated at home. Symptoms usually start to get better during the first 2 days of treatment.    Home care  Follow these guidelines when caring for yourself at home:  · Rest at home for the first 2 to 3 days, or until you feel stronger. Don’t let yourself get overly tired when you go back to your activities.  · Stay away from cigarette smoke - yours or other people’s.  · You may use acetaminophen or ibuprofen to control fever or pain, unless another medicine was prescribed. If you have chronic liver or kidney disease, talk with your healthcare provider before using these medicines. Also talk with your provider if you’ve had a stomach ulcer or gastrointestinal bleeding. Don’t give aspirin to anyone younger than 18 years of age who is ill with a fever. It may cause severe liver damage.  · Your appetite may be poor, so a light diet is fine.  · Drink 6 to 8 glasses of fluids every day to make sure you are getting enough fluids. Beverages can include water, sport drinks, sodas without caffeine, juices, tea, or soup. Fluids will help loosen secretions in the lung. This will make it easier for you to cough up the phlegm (sputum). If you also have heart or kidney disease, check with your healthcare provider before you drink extra fluids.  · Take antibiotic medicine prescribed until it is all gone, even if you are feeling better after a few days.  Follow-up care  Follow up with your healthcare provider in the next 2 to 3 days, or as advised. This is to be sure the medicine is helping you get better.  If you are 65 or older, you should get a pneumococcal vaccine and a yearly flu  (influenza) shot. You should also get these vaccines if you have chronic lung disease like asthma, emphysema, or COPD. Recently, a second type of pneumonia vaccine has become available for everyone over 65 years old. This is in addition to the previous vaccine. Ask your provider about this.  When to seek medical advice  Call your healthcare provider right away if any of these occur:  · You don’t get better within the first 48 hours of treatment  · Shortness of breath gets worse  · Rapid breathing (more than 25 breaths per minute)  · Coughing up blood  · Chest pain gets worse with breathing  · Fever of 100.4°F (38°C) or higher that doesn’t get better with fever medicine  · Weakness, dizziness, or fainting that gets worse  · Thirst or dry mouth that gets worse  · Sinus pain, headache, or a stiff neck  · Chest pain not caused by coughing  Date Last Reviewed: 1/1/2017  © 3108-1153 [employer]. [address] [place] [address]. All rights reserved. This information is not intended as a substitute for professional medical care. Always follow your healthcare professional's instructions.      Keep yourself well-hydrated with water, Gatorade. Avoid nuts and seeds which can worsen your diverticulosis. Take antibiotic as prescribed. Follow-up with your primary care for reevaluation and also get a repeat chest x-ray or CT scan of the chest in 2 weeks to look for resolution of the left lower lobe lesion noted on CT scan today.   No

## 2020-07-01 NOTE — DISCHARGE NOTE NURSING/CASE MANAGEMENT/SOCIAL WORK - PATIENT PORTAL LINK FT
You can access the FollowMyHealth Patient Portal offered by [facility] by registering at the following website: [email or web address]. By joining [employer]’s FollowMyHealth portal, you will also be able to view your health information using other applications (apps) compatible with our system.

## 2020-07-08 DIAGNOSIS — I10 ESSENTIAL (PRIMARY) HYPERTENSION: ICD-10-CM

## 2020-07-08 DIAGNOSIS — Q27.32 ARTERIOVENOUS MALFORMATION OF VESSEL OF LOWER LIMB: ICD-10-CM

## 2020-07-08 DIAGNOSIS — Z88.1 ALLERGY STATUS TO OTHER ANTIBIOTIC AGENTS STATUS: ICD-10-CM

## 2020-07-08 DIAGNOSIS — E66.9 OBESITY, UNSPECIFIED: ICD-10-CM

## 2020-07-08 DIAGNOSIS — L97.529 NON-PRESSURE CHRONIC ULCER OF OTHER PART OF LEFT FOOT WITH UNSPECIFIED SEVERITY: ICD-10-CM

## 2020-07-08 DIAGNOSIS — Z88.8 ALLERGY STATUS TO OTHER DRUGS, MEDICAMENTS AND BIOLOGICAL SUBSTANCES: ICD-10-CM

## 2020-10-18 ENCOUNTER — TRANSCRIPTION ENCOUNTER (OUTPATIENT)
Age: 35
End: 2020-10-18

## 2020-10-21 ENCOUNTER — TRANSCRIPTION ENCOUNTER (OUTPATIENT)
Age: 35
End: 2020-10-21

## 2020-10-21 ENCOUNTER — INPATIENT (INPATIENT)
Facility: HOSPITAL | Age: 35
LOS: 0 days | Discharge: HOME | DRG: 254 | End: 2020-10-22
Attending: RADIOLOGY | Admitting: RADIOLOGY
Payer: COMMERCIAL

## 2020-10-21 VITALS
SYSTOLIC BLOOD PRESSURE: 141 MMHG | DIASTOLIC BLOOD PRESSURE: 118 MMHG | HEART RATE: 95 BPM | OXYGEN SATURATION: 96 % | RESPIRATION RATE: 16 BRPM | HEIGHT: 67 IN | WEIGHT: 293 LBS

## 2020-10-21 DIAGNOSIS — Z41.9 ENCOUNTER FOR PROCEDURE FOR PURPOSES OTHER THAN REMEDYING HEALTH STATE, UNSPECIFIED: Chronic | ICD-10-CM

## 2020-10-21 DIAGNOSIS — Z98.89 OTHER SPECIFIED POSTPROCEDURAL STATES: Chronic | ICD-10-CM

## 2020-10-21 LAB
ALBUMIN SERPL ELPH-MCNC: 3.9 G/DL — SIGNIFICANT CHANGE UP (ref 3.3–5)
ALP SERPL-CCNC: 82 U/L — SIGNIFICANT CHANGE UP (ref 40–120)
ALT FLD-CCNC: 9 U/L — LOW (ref 10–45)
ANION GAP SERPL CALC-SCNC: 13 MMOL/L — SIGNIFICANT CHANGE UP (ref 5–17)
APTT BLD: 24.1 SEC — LOW (ref 27.5–35.5)
AST SERPL-CCNC: 13 U/L — SIGNIFICANT CHANGE UP (ref 10–40)
BASOPHILS # BLD AUTO: 0.07 K/UL — SIGNIFICANT CHANGE UP (ref 0–0.2)
BASOPHILS NFR BLD AUTO: 0.7 % — SIGNIFICANT CHANGE UP (ref 0–2)
BILIRUB SERPL-MCNC: 0.6 MG/DL — SIGNIFICANT CHANGE UP (ref 0.2–1.2)
BUN SERPL-MCNC: 14 MG/DL — SIGNIFICANT CHANGE UP (ref 7–23)
CALCIUM SERPL-MCNC: 9 MG/DL — SIGNIFICANT CHANGE UP (ref 8.4–10.5)
CHLORIDE SERPL-SCNC: 103 MMOL/L — SIGNIFICANT CHANGE UP (ref 96–108)
CO2 SERPL-SCNC: 22 MMOL/L — SIGNIFICANT CHANGE UP (ref 22–31)
CREAT SERPL-MCNC: 0.68 MG/DL — SIGNIFICANT CHANGE UP (ref 0.5–1.3)
EOSINOPHIL # BLD AUTO: 0.08 K/UL — SIGNIFICANT CHANGE UP (ref 0–0.5)
EOSINOPHIL NFR BLD AUTO: 0.7 % — SIGNIFICANT CHANGE UP (ref 0–6)
GLUCOSE SERPL-MCNC: 98 MG/DL — SIGNIFICANT CHANGE UP (ref 70–99)
HCG SERPL-ACNC: <0 MIU/ML — SIGNIFICANT CHANGE UP
HCG UR QL: NEGATIVE — SIGNIFICANT CHANGE UP
HCT VFR BLD CALC: 39.3 % — SIGNIFICANT CHANGE UP (ref 34.5–45)
HGB BLD-MCNC: 12.7 G/DL — SIGNIFICANT CHANGE UP (ref 11.5–15.5)
IMM GRANULOCYTES NFR BLD AUTO: 0.2 % — SIGNIFICANT CHANGE UP (ref 0–1.5)
INR BLD: 1 — SIGNIFICANT CHANGE UP (ref 0.88–1.16)
LYMPHOCYTES # BLD AUTO: 2.25 K/UL — SIGNIFICANT CHANGE UP (ref 1–3.3)
LYMPHOCYTES # BLD AUTO: 21.1 % — SIGNIFICANT CHANGE UP (ref 13–44)
MCHC RBC-ENTMCNC: 28.9 PG — SIGNIFICANT CHANGE UP (ref 27–34)
MCHC RBC-ENTMCNC: 32.3 GM/DL — SIGNIFICANT CHANGE UP (ref 32–36)
MCV RBC AUTO: 89.3 FL — SIGNIFICANT CHANGE UP (ref 80–100)
MONOCYTES # BLD AUTO: 0.85 K/UL — SIGNIFICANT CHANGE UP (ref 0–0.9)
MONOCYTES NFR BLD AUTO: 8 % — SIGNIFICANT CHANGE UP (ref 2–14)
NEUTROPHILS # BLD AUTO: 7.4 K/UL — SIGNIFICANT CHANGE UP (ref 1.8–7.4)
NEUTROPHILS NFR BLD AUTO: 69.3 % — SIGNIFICANT CHANGE UP (ref 43–77)
NRBC # BLD: 0 /100 WBCS — SIGNIFICANT CHANGE UP (ref 0–0)
PLATELET # BLD AUTO: 434 K/UL — HIGH (ref 150–400)
POTASSIUM SERPL-MCNC: 4.2 MMOL/L — SIGNIFICANT CHANGE UP (ref 3.5–5.3)
POTASSIUM SERPL-SCNC: 4.2 MMOL/L — SIGNIFICANT CHANGE UP (ref 3.5–5.3)
PROT SERPL-MCNC: 7 G/DL — SIGNIFICANT CHANGE UP (ref 6–8.3)
PROTHROM AB SERPL-ACNC: 12 SEC — SIGNIFICANT CHANGE UP (ref 10.6–13.6)
RBC # BLD: 4.4 M/UL — SIGNIFICANT CHANGE UP (ref 3.8–5.2)
RBC # FLD: 12.7 % — SIGNIFICANT CHANGE UP (ref 10.3–14.5)
SODIUM SERPL-SCNC: 138 MMOL/L — SIGNIFICANT CHANGE UP (ref 135–145)
WBC # BLD: 10.67 K/UL — HIGH (ref 3.8–10.5)
WBC # FLD AUTO: 10.67 K/UL — HIGH (ref 3.8–10.5)

## 2020-10-21 PROCEDURE — 36247 INS CATH ABD/L-EXT ART 3RD: CPT

## 2020-10-21 PROCEDURE — 37242 VASC EMBOLIZE/OCCLUDE ARTERY: CPT

## 2020-10-21 PROCEDURE — 36248 INS CATH ABD/L-EXT ART ADDL: CPT

## 2020-10-21 PROCEDURE — 75710 ARTERY X-RAYS ARM/LEG: CPT | Mod: 26,59

## 2020-10-21 PROCEDURE — 93010 ELECTROCARDIOGRAM REPORT: CPT

## 2020-10-21 PROCEDURE — 75774 ARTERY X-RAY EACH VESSEL: CPT | Mod: 26,59

## 2020-10-21 RX ORDER — INFLUENZA VIRUS VACCINE 15; 15; 15; 15 UG/.5ML; UG/.5ML; UG/.5ML; UG/.5ML
0.5 SUSPENSION INTRAMUSCULAR ONCE
Refills: 0 | Status: COMPLETED | OUTPATIENT
Start: 2020-10-21 | End: 2020-10-22

## 2020-10-21 RX ORDER — NEBIVOLOL HYDROCHLORIDE 5 MG/1
5 TABLET ORAL ONCE
Refills: 0 | Status: COMPLETED | OUTPATIENT
Start: 2020-10-21 | End: 2020-10-21

## 2020-10-21 RX ORDER — CIPROFLOXACIN LACTATE 400MG/40ML
400 VIAL (ML) INTRAVENOUS EVERY 12 HOURS
Refills: 0 | Status: DISCONTINUED | OUTPATIENT
Start: 2020-10-21 | End: 2020-10-22

## 2020-10-21 RX ORDER — ONDANSETRON 8 MG/1
4 TABLET, FILM COATED ORAL EVERY 4 HOURS
Refills: 0 | Status: DISCONTINUED | OUTPATIENT
Start: 2020-10-21 | End: 2020-10-22

## 2020-10-21 RX ORDER — HYDROMORPHONE HYDROCHLORIDE 2 MG/ML
2 INJECTION INTRAMUSCULAR; INTRAVENOUS; SUBCUTANEOUS EVERY 4 HOURS
Refills: 0 | Status: DISCONTINUED | OUTPATIENT
Start: 2020-10-21 | End: 2020-10-22

## 2020-10-21 RX ORDER — SODIUM CHLORIDE 9 MG/ML
500 INJECTION INTRAMUSCULAR; INTRAVENOUS; SUBCUTANEOUS
Refills: 0 | Status: DISCONTINUED | OUTPATIENT
Start: 2020-10-21 | End: 2020-10-22

## 2020-10-21 RX ORDER — CHLORHEXIDINE GLUCONATE 213 G/1000ML
1 SOLUTION TOPICAL ONCE
Refills: 0 | Status: DISCONTINUED | OUTPATIENT
Start: 2020-10-21 | End: 2020-10-22

## 2020-10-21 RX ADMIN — Medication 200 MILLIGRAM(S): at 17:22

## 2020-10-21 RX ADMIN — SODIUM CHLORIDE 75 MILLILITER(S): 9 INJECTION INTRAMUSCULAR; INTRAVENOUS; SUBCUTANEOUS at 12:59

## 2020-10-21 RX ADMIN — NEBIVOLOL HYDROCHLORIDE 5 MILLIGRAM(S): 5 TABLET ORAL at 22:18

## 2020-10-21 RX ADMIN — HYDROMORPHONE HYDROCHLORIDE 2 MILLIGRAM(S): 2 INJECTION INTRAMUSCULAR; INTRAVENOUS; SUBCUTANEOUS at 17:22

## 2020-10-21 RX ADMIN — HYDROMORPHONE HYDROCHLORIDE 2 MILLIGRAM(S): 2 INJECTION INTRAMUSCULAR; INTRAVENOUS; SUBCUTANEOUS at 21:40

## 2020-10-21 RX ADMIN — HYDROMORPHONE HYDROCHLORIDE 2 MILLIGRAM(S): 2 INJECTION INTRAMUSCULAR; INTRAVENOUS; SUBCUTANEOUS at 12:59

## 2020-10-21 RX ADMIN — HYDROMORPHONE HYDROCHLORIDE 2 MILLIGRAM(S): 2 INJECTION INTRAMUSCULAR; INTRAVENOUS; SUBCUTANEOUS at 13:30

## 2020-10-21 RX ADMIN — HYDROMORPHONE HYDROCHLORIDE 2 MILLIGRAM(S): 2 INJECTION INTRAMUSCULAR; INTRAVENOUS; SUBCUTANEOUS at 21:50

## 2020-10-21 RX ADMIN — HYDROMORPHONE HYDROCHLORIDE 2 MILLIGRAM(S): 2 INJECTION INTRAMUSCULAR; INTRAVENOUS; SUBCUTANEOUS at 17:47

## 2020-10-21 NOTE — DISCHARGE NOTE PROVIDER - NSDCCPCAREPLAN_GEN_ALL_CORE_FT
PRINCIPAL DISCHARGE DIAGNOSIS  Diagnosis: AVM (arteriovenous malformation)  Assessment and Plan of Treatment: You underwent successful left lower extremity angiogram and embolization left foot AVM.  ****The catheter from your groin was removed and you should remove the dressing in 24 hours.   ***If you notice bleeding from the site, hardening and pain at the site, drainage or redness from the site, coolness/paleness of the extremity, swelling, or fever, please call [phone].   ***All of your prescriptions have been sent to the pharmacy.         SECONDARY DISCHARGE DIAGNOSES  Diagnosis: HTN (hypertension)  Assessment and Plan of Treatment: Continue home regimen of Bystolic 5mg by mouth twice daily     PRINCIPAL DISCHARGE DIAGNOSIS  Diagnosis: AVM (arteriovenous malformation)  Assessment and Plan of Treatment: You underwent successful left lower extremity angiogram and embolization left foot AVM. The catheter from your groin was removed and you should avoid strenuous activity or heavy lifting anything more than 5lbs for the next five days. Do not take a bath or swim for the next five days; you may shower. For any bleeding or hematoma formation (hardened blood collection under the skin) at the access site of your groin please hold pressure and go to the emergency room.   Please START CEPHALEXIN 500MG 4 TIMES A DAY FOR 7 DAYS. Please follow up with Dr. Berkowitz in 4 weeks.      SECONDARY DISCHARGE DIAGNOSES  Diagnosis: HTN (hypertension)  Assessment and Plan of Treatment: Continue home regimen of Bystolic 5mg by mouth twice daily

## 2020-10-21 NOTE — DISCHARGE NOTE PROVIDER - NSDCMRMEDTOKEN_GEN_ALL_CORE_FT
Bystolic 5 mg oral tablet: 1 tab(s) orally 2 times a day  gabapentin 100 mg oral capsule: 1 cap(s) orally once a day (in the morning)  gabapentin 800 mg oral tablet: 1 tab(s) orally once a day (at bedtime)  Junel 1.5/30 oral tablet: 1 tab(s) orally once a day  Percocet 10/325 oral tablet: 1 tab(s) orally every 4 hours, As Needed   Bystolic 5 mg oral tablet: 1 tab(s) orally 2 times a day  gabapentin 100 mg oral capsule: 1 cap(s) orally once a day (in the morning)  gabapentin 800 mg oral tablet: 1 tab(s) orally once a day (at bedtime)  Junel 1.5/30 oral tablet: 1 tab(s) orally once a day  Keflex 500 mg oral capsule: 1 cap(s) orally 4 times a day   Percocet 10/325 oral tablet: 1 tab(s) orally every 4 hours, As Needed

## 2020-10-21 NOTE — DISCHARGE NOTE PROVIDER - HOSPITAL COURSE
**INCOMPLETE  35 yr old F with PMHx HTN, known vascular malformation of left foot s/p multiple embolizations both transarterial and by direct puncture as well as wound care and treatment of infection by Dr. Berkowitz. Pt states she felt mild improvement with her symptoms following DSE in June 2020, but noticed worsening Left ankle pain and 3 new ulcers over the last 4 weeks. Patient describes the pain as constant, burning/throbbing and particularly worse with ambulating. Patient subsequently referred to [facility] 10/21/20 and now s/p LLE angio and embolization left foot AVM.    Patient admitted to [facility] for monitoring post procedure. Patient currently asymptomatic, VSS, left foot dressing C/D/I, right groin access site soft, NT, distal pulses unchanged from baseline. Labs/telemetry reviewed. Patient deemed stable for discharge as per Dr. Teran and to follow with _____ in ____ weeks.   35 yr old F with PMHx HTN, known vascular malformation of left foot s/p multiple embolizations both transarterial and by direct puncture as well as wound care and treatment of infection by Dr. Berkowitz. Pt states she felt mild improvement with her symptoms following DSE in June 2020, but noticed worsening Left ankle pain and 3 new ulcers over the last 4 weeks. Patient describes the pain as constant, burning/throbbing and particularly worse with ambulating. Patient subsequently referred to [facility] 10/21/20 and now s/p LLE angio and embolization left foot AVM. Patient admitted to [facility] for monitoring post procedure. Patient currently asymptomatic, VSS, left foot dressing C/D/I, right groin access site soft, NT, distal pulses unchanged from baseline. Labs/telemetry reviewed. Patient deemed stable for discharge as per Dr. Teran and to follow with Dr. Berkowitz in 4 weeks.

## 2020-10-21 NOTE — BRIEF OPERATIVE NOTE - OPERATION/FINDINGS
Angiogram shows normal patent infrarenal aorta, bilateral iliac arteries, patent left CFA, SFA, popliteal, PT, peroneal, and AT arteries. Left PT artery selectively catheterized using 135cm Berenstein catheter and Progreat microcatheter. AV malformation of foot supplied by branches from PT artery in foot, embolized using 300-500 micron microspheres with moderately decreased flow, and intact pedal arch on completion angiogram. R groin sheath removed, hemostasis achieved by direct manual pressure.

## 2020-10-22 ENCOUNTER — TRANSCRIPTION ENCOUNTER (OUTPATIENT)
Age: 35
End: 2020-10-22

## 2020-10-22 VITALS
HEART RATE: 93 BPM | RESPIRATION RATE: 18 BRPM | SYSTOLIC BLOOD PRESSURE: 150 MMHG | OXYGEN SATURATION: 96 % | DIASTOLIC BLOOD PRESSURE: 70 MMHG

## 2020-10-22 LAB
ANION GAP SERPL CALC-SCNC: 13 MMOL/L — SIGNIFICANT CHANGE UP (ref 5–17)
BUN SERPL-MCNC: 13 MG/DL — SIGNIFICANT CHANGE UP (ref 7–23)
CALCIUM SERPL-MCNC: 8.6 MG/DL — SIGNIFICANT CHANGE UP (ref 8.4–10.5)
CHLORIDE SERPL-SCNC: 104 MMOL/L — SIGNIFICANT CHANGE UP (ref 96–108)
CO2 SERPL-SCNC: 21 MMOL/L — LOW (ref 22–31)
CREAT SERPL-MCNC: 0.58 MG/DL — SIGNIFICANT CHANGE UP (ref 0.5–1.3)
GLUCOSE SERPL-MCNC: 146 MG/DL — HIGH (ref 70–99)
HCT VFR BLD CALC: 34.6 % — SIGNIFICANT CHANGE UP (ref 34.5–45)
HGB BLD-MCNC: 10.9 G/DL — LOW (ref 11.5–15.5)
MCHC RBC-ENTMCNC: 28.2 PG — SIGNIFICANT CHANGE UP (ref 27–34)
MCHC RBC-ENTMCNC: 31.5 GM/DL — LOW (ref 32–36)
MCV RBC AUTO: 89.6 FL — SIGNIFICANT CHANGE UP (ref 80–100)
NRBC # BLD: 0 /100 WBCS — SIGNIFICANT CHANGE UP (ref 0–0)
PLATELET # BLD AUTO: 406 K/UL — HIGH (ref 150–400)
POTASSIUM SERPL-MCNC: 4.3 MMOL/L — SIGNIFICANT CHANGE UP (ref 3.5–5.3)
POTASSIUM SERPL-SCNC: 4.3 MMOL/L — SIGNIFICANT CHANGE UP (ref 3.5–5.3)
RBC # BLD: 3.86 M/UL — SIGNIFICANT CHANGE UP (ref 3.8–5.2)
RBC # FLD: 12.8 % — SIGNIFICANT CHANGE UP (ref 10.3–14.5)
SODIUM SERPL-SCNC: 138 MMOL/L — SIGNIFICANT CHANGE UP (ref 135–145)
WBC # BLD: 9.19 K/UL — SIGNIFICANT CHANGE UP (ref 3.8–10.5)
WBC # FLD AUTO: 9.19 K/UL — SIGNIFICANT CHANGE UP (ref 3.8–10.5)

## 2020-10-22 RX ORDER — CEPHALEXIN 500 MG
1 CAPSULE ORAL
Qty: 28 | Refills: 0
Start: 2020-10-22 | End: 2020-10-28

## 2020-10-22 RX ADMIN — HYDROMORPHONE HYDROCHLORIDE 2 MILLIGRAM(S): 2 INJECTION INTRAMUSCULAR; INTRAVENOUS; SUBCUTANEOUS at 01:40

## 2020-10-22 RX ADMIN — HYDROMORPHONE HYDROCHLORIDE 2 MILLIGRAM(S): 2 INJECTION INTRAMUSCULAR; INTRAVENOUS; SUBCUTANEOUS at 10:15

## 2020-10-22 RX ADMIN — Medication 200 MILLIGRAM(S): at 05:47

## 2020-10-22 RX ADMIN — HYDROMORPHONE HYDROCHLORIDE 2 MILLIGRAM(S): 2 INJECTION INTRAMUSCULAR; INTRAVENOUS; SUBCUTANEOUS at 14:02

## 2020-10-22 RX ADMIN — HYDROMORPHONE HYDROCHLORIDE 2 MILLIGRAM(S): 2 INJECTION INTRAMUSCULAR; INTRAVENOUS; SUBCUTANEOUS at 06:00

## 2020-10-22 RX ADMIN — HYDROMORPHONE HYDROCHLORIDE 2 MILLIGRAM(S): 2 INJECTION INTRAMUSCULAR; INTRAVENOUS; SUBCUTANEOUS at 01:59

## 2020-10-22 RX ADMIN — INFLUENZA VIRUS VACCINE 0.5 MILLILITER(S): 15; 15; 15; 15 SUSPENSION INTRAMUSCULAR at 10:12

## 2020-10-22 RX ADMIN — HYDROMORPHONE HYDROCHLORIDE 2 MILLIGRAM(S): 2 INJECTION INTRAMUSCULAR; INTRAVENOUS; SUBCUTANEOUS at 05:42

## 2020-10-22 RX ADMIN — HYDROMORPHONE HYDROCHLORIDE 2 MILLIGRAM(S): 2 INJECTION INTRAMUSCULAR; INTRAVENOUS; SUBCUTANEOUS at 10:01

## 2020-10-22 RX ADMIN — HYDROMORPHONE HYDROCHLORIDE 2 MILLIGRAM(S): 2 INJECTION INTRAMUSCULAR; INTRAVENOUS; SUBCUTANEOUS at 14:17

## 2020-10-22 NOTE — DISCHARGE NOTE NURSING/CASE MANAGEMENT/SOCIAL WORK - NSDCPEFALRISK_GEN_ALL_CORE
Patient information on fall and injury prevention Epidermal Autograft Text: The defect edges were debeveled with a #15 scalpel blade.  Given the location of the defect, shape of the defect and the proximity to free margins an epidermal autograft was deemed most appropriate.  Using a sterile surgical marker, the primary defect shape was transferred to the donor site. The epidermal graft was then harvested.  The skin graft was then placed in the primary defect and oriented appropriately.

## 2020-10-22 NOTE — DISCHARGE NOTE NURSING/CASE MANAGEMENT/SOCIAL WORK - PATIENT PORTAL LINK FT
You can access the FollowMyHealth Patient Portal offered by [facility] by registering at the following website: [email or web address]. By joining [employer]’s FollowMyHealth portal, you will also be able to view your health information using other applications (apps) compatible with our system.

## 2020-10-29 DIAGNOSIS — Q27.32 ARTERIOVENOUS MALFORMATION OF VESSEL OF LOWER LIMB: ICD-10-CM

## 2020-10-29 DIAGNOSIS — I10 ESSENTIAL (PRIMARY) HYPERTENSION: ICD-10-CM

## 2020-10-29 DIAGNOSIS — Z88.0 ALLERGY STATUS TO PENICILLIN: ICD-10-CM

## 2020-10-30 PROCEDURE — C1769: CPT

## 2020-10-30 PROCEDURE — C1887: CPT

## 2020-10-30 PROCEDURE — 81025 URINE PREGNANCY TEST: CPT

## 2020-10-30 PROCEDURE — 85025 COMPLETE CBC W/AUTO DIFF WBC: CPT

## 2020-10-30 PROCEDURE — 84702 CHORIONIC GONADOTROPIN TEST: CPT

## 2020-10-30 PROCEDURE — 90686 IIV4 VACC NO PRSV 0.5 ML IM: CPT

## 2020-10-30 PROCEDURE — 36415 COLL VENOUS BLD VENIPUNCTURE: CPT

## 2020-10-30 PROCEDURE — C1889: CPT

## 2020-10-30 PROCEDURE — 85730 THROMBOPLASTIN TIME PARTIAL: CPT

## 2020-10-30 PROCEDURE — C1894: CPT

## 2020-10-30 PROCEDURE — 85610 PROTHROMBIN TIME: CPT

## 2020-10-30 PROCEDURE — 80053 COMPREHEN METABOLIC PANEL: CPT

## 2020-10-30 PROCEDURE — C1725: CPT

## 2020-10-30 PROCEDURE — 80048 BASIC METABOLIC PNL TOTAL CA: CPT

## 2020-10-30 PROCEDURE — 93005 ELECTROCARDIOGRAM TRACING: CPT

## 2020-10-30 PROCEDURE — 85027 COMPLETE CBC AUTOMATED: CPT

## 2020-11-23 NOTE — PATIENT PROFILE ADULT. - PT NEEDS ASSIST
no Closure 4 Information: This tab is for additional flaps and grafts above and beyond our usual structured repairs.  Please note if you enter information here it will not currently bill and you will need to add the billing information manually.

## 2021-03-29 ENCOUNTER — APPOINTMENT (OUTPATIENT)
Dept: HEART AND VASCULAR | Facility: CLINIC | Age: 36
End: 2021-03-29
Payer: MEDICARE

## 2021-03-29 PROCEDURE — 99214 OFFICE O/P EST MOD 30 MIN: CPT

## 2021-03-29 RX ORDER — NEBIVOLOL HYDROCHLORIDE 5 MG/1
5 TABLET ORAL
Refills: 0 | Status: ACTIVE | COMMUNITY

## 2021-03-29 RX ORDER — HYDROCHLOROTHIAZIDE 25 MG/1
25 TABLET ORAL
Refills: 0 | Status: DISCONTINUED | COMMUNITY
End: 2021-03-29

## 2021-03-29 NOTE — ASSESSMENT
[FreeTextEntry1] : Bell Kearney. 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 She has a fairly large soft tissue mass which is prone to recurrent ulceration associated with pain and bleeding on occasion. We did a transarterial microsphere embolization 6 months ago and she went on to heal and had very good pain relief.  Recently she again developed a small (5 mm) area of superficial ulceration on the lateral aspect of the heel with oozing and painful. She states she had Covid in December and feels like the pain is worse since that time. We previously investigated the possibility of surgical resection as the lesion is quite protuberant but MRI showed that the depth the lesion with involvement down to the bone made this inadvisable.  The skin over the lesion is also very tight with a diffuse dense consistency to the malformation. I told her that we would proceed with another angiogram and transarterial embolization and we will make arrangements for this as soon as possible.

## 2021-03-29 NOTE — PHYSICAL EXAM
[Alert] : alert [No Acute Distress] : no acute distress [PERRL] : pupils equal, round and reactive to light [Normal Hearing] : hearing was normal [No Neck Mass] : no neck mass was observed [No Respiratory Distress] : no respiratory distress [Normal Rate] : heart rate was normal  [Regular Rhythm] : with a regular rhythm [Pedal Pulses Normal] : the pedal pulses are present [No Edema] : there was no peripheral edema [Not Tender] : non-tender [Not Distended] : not distended [Normal Gait] : normal gait [Normal Strength/Tone] : muscle strength and tone were normal [No Motor Deficits] : the motor exam was normal [No Sensory Deficits] : the sensory exam was normal to light touch and pinprick [Oriented x3] : oriented to person, place, and time [de-identified] : ulcer on lateral aspect of left heel about 1mm in size, eschar over the ulceration, skin erythematous surrounding

## 2021-04-11 ENCOUNTER — TRANSCRIPTION ENCOUNTER (OUTPATIENT)
Age: 36
End: 2021-04-11

## 2021-04-13 VITALS
DIASTOLIC BLOOD PRESSURE: 86 MMHG | WEIGHT: 293 LBS | HEIGHT: 67 IN | TEMPERATURE: 98 F | SYSTOLIC BLOOD PRESSURE: 175 MMHG | RESPIRATION RATE: 16 BRPM | HEART RATE: 83 BPM | OXYGEN SATURATION: 95 %

## 2021-04-13 RX ORDER — CHLORHEXIDINE GLUCONATE 213 G/1000ML
1 SOLUTION TOPICAL ONCE
Refills: 0 | Status: DISCONTINUED | OUTPATIENT
Start: 2021-04-14 | End: 2021-04-15

## 2021-04-13 NOTE — H&P ADULT - HISTORY OF PRESENT ILLNESS
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 She has a fairly large soft tissue mass which is prone to recurrent ulceration associated with pain and bleeding on occasion. We did a transarterial microsphere embolization 6 months ago and she went on to heal and had very good pain relief. Recently she again developed a small (5 mm) area of superficial ulceration on the lateral aspect of the heel with oozing and painful. She states she had Covid in December and feels like the pain is worse since that time. We previously investigated the possibility of surgical resection as the lesion is quite protuberant but MRI showed that the depth the lesion with involvement down to the bone made this inadvisable. The skin over the lesion is also very tight with a diffuse dense consistency to the malformation. I told her that we would proceed with another angiogram and transarterial embolization and we will make arrangements for this as soon as possible.    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 She has a fairly large soft tissue mass which is prone to recurrent ulceration associated with pain and bleeding on occasion. We did a transarterial microsphere embolization 6 months ago and she went on to heal and had very good pain relief. Recently she again developed a small (5 mm) area of superficial ulceration on the lateral aspect of the heel with oozing and painful. She states she had Covid in December and feels like the pain is worse since that time. We previously investigated the possibility of surgical resection as the lesion is quite protuberant but MRI showed that the depth the lesion with involvement down to the bone made this inadvisable. The skin over the lesion is also very tight with a diffuse dense consistency to the malformation. I told her that we would proceed with another angiogram and transarterial embolization and we will make arrangements for this as soon as possible.    COVID negative 04/11 in [facility]  Pharmacy information in [place]  LMP: 04/08/21  Hx obtained via Dr. 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 She has a fairly large soft tissue mass which is prone to recurrent ulceration associated with pain and bleeding on occasion. We did a transarterial microsphere embolization 6 months ago and she went on to heal and had very good pain relief. Recently she again developed a small (5 mm) area of superficial ulceration on the lateral aspect of the heel with oozing and painful. She states she had Covid in December and feels like the pain is worse since that time. We previously investigated the possibility of surgical resection as the lesion is quite protuberant but MRI showed that the depth the lesion with involvement down to the bone made this inadvisable. The skin over the lesion is also very tight with a diffuse dense consistency to the malformation. I told her that we would proceed with another angiogram and transarterial embolization and we will make arrangements for this as soon as possible.

## 2021-04-13 NOTE — H&P ADULT - ASSESSMENT
36-year-old female with an small vessel arteriovenous malformation  presents for DSE under general anesthesia.     ASA I Mallampati I  Consent to be obtained by Dr. Teran's team  Started IVF NS @ 75cc/h

## 2021-04-14 ENCOUNTER — INPATIENT (INPATIENT)
Facility: HOSPITAL | Age: 36
LOS: 0 days | Discharge: HOME | DRG: 253 | End: 2021-04-15
Attending: RADIOLOGY | Admitting: RADIOLOGY
Payer: MEDICARE

## 2021-04-14 ENCOUNTER — TRANSCRIPTION ENCOUNTER (OUTPATIENT)
Age: 36
End: 2021-04-14

## 2021-04-14 DIAGNOSIS — Z98.89 OTHER SPECIFIED POSTPROCEDURAL STATES: Chronic | ICD-10-CM

## 2021-04-14 DIAGNOSIS — Z41.9 ENCOUNTER FOR PROCEDURE FOR PURPOSES OTHER THAN REMEDYING HEALTH STATE, UNSPECIFIED: Chronic | ICD-10-CM

## 2021-04-14 LAB
ALBUMIN SERPL ELPH-MCNC: 3.2 G/DL — LOW (ref 3.3–5)
ALP SERPL-CCNC: 72 U/L — SIGNIFICANT CHANGE UP (ref 40–120)
ALT FLD-CCNC: 16 U/L — SIGNIFICANT CHANGE UP (ref 10–45)
ANION GAP SERPL CALC-SCNC: 15 MMOL/L — SIGNIFICANT CHANGE UP (ref 5–17)
APTT BLD: 51.6 SEC — HIGH (ref 27.5–35.5)
AST SERPL-CCNC: 18 U/L — SIGNIFICANT CHANGE UP (ref 10–40)
BASOPHILS # BLD AUTO: 0.04 K/UL — SIGNIFICANT CHANGE UP (ref 0–0.2)
BASOPHILS NFR BLD AUTO: 0.5 % — SIGNIFICANT CHANGE UP (ref 0–2)
BILIRUB SERPL-MCNC: 0.5 MG/DL — SIGNIFICANT CHANGE UP (ref 0.2–1.2)
BUN SERPL-MCNC: 10 MG/DL — SIGNIFICANT CHANGE UP (ref 7–23)
CALCIUM SERPL-MCNC: 7.9 MG/DL — LOW (ref 8.4–10.5)
CHLORIDE SERPL-SCNC: 107 MMOL/L — SIGNIFICANT CHANGE UP (ref 96–108)
CHOLEST SERPL-MCNC: 144 MG/DL — SIGNIFICANT CHANGE UP
CO2 SERPL-SCNC: 22 MMOL/L — SIGNIFICANT CHANGE UP (ref 22–31)
CREAT SERPL-MCNC: 0.52 MG/DL — SIGNIFICANT CHANGE UP (ref 0.5–1.3)
EOSINOPHIL # BLD AUTO: 0.08 K/UL — SIGNIFICANT CHANGE UP (ref 0–0.5)
EOSINOPHIL NFR BLD AUTO: 0.9 % — SIGNIFICANT CHANGE UP (ref 0–6)
GLUCOSE SERPL-MCNC: 98 MG/DL — SIGNIFICANT CHANGE UP (ref 70–99)
HCG SERPL-ACNC: <0 MIU/ML — SIGNIFICANT CHANGE UP
HCG UR QL: NEGATIVE — SIGNIFICANT CHANGE UP
HCT VFR BLD CALC: 33.4 % — LOW (ref 34.5–45)
HDLC SERPL-MCNC: 35 MG/DL — LOW
HGB BLD-MCNC: 10.7 G/DL — LOW (ref 11.5–15.5)
IMM GRANULOCYTES NFR BLD AUTO: 0.2 % — SIGNIFICANT CHANGE UP (ref 0–1.5)
INR BLD: 1.12 — SIGNIFICANT CHANGE UP (ref 0.88–1.16)
LIPID PNL WITH DIRECT LDL SERPL: 54 MG/DL — SIGNIFICANT CHANGE UP
LYMPHOCYTES # BLD AUTO: 1.98 K/UL — SIGNIFICANT CHANGE UP (ref 1–3.3)
LYMPHOCYTES # BLD AUTO: 22.5 % — SIGNIFICANT CHANGE UP (ref 13–44)
MCHC RBC-ENTMCNC: 28.7 PG — SIGNIFICANT CHANGE UP (ref 27–34)
MCHC RBC-ENTMCNC: 32 GM/DL — SIGNIFICANT CHANGE UP (ref 32–36)
MCV RBC AUTO: 89.5 FL — SIGNIFICANT CHANGE UP (ref 80–100)
MONOCYTES # BLD AUTO: 0.66 K/UL — SIGNIFICANT CHANGE UP (ref 0–0.9)
MONOCYTES NFR BLD AUTO: 7.5 % — SIGNIFICANT CHANGE UP (ref 2–14)
NEUTROPHILS # BLD AUTO: 6.02 K/UL — SIGNIFICANT CHANGE UP (ref 1.8–7.4)
NEUTROPHILS NFR BLD AUTO: 68.4 % — SIGNIFICANT CHANGE UP (ref 43–77)
NON HDL CHOLESTEROL: 109 MG/DL — SIGNIFICANT CHANGE UP
NRBC # BLD: 0 /100 WBCS — SIGNIFICANT CHANGE UP (ref 0–0)
PLATELET # BLD AUTO: 414 K/UL — HIGH (ref 150–400)
POTASSIUM SERPL-MCNC: 3.9 MMOL/L — SIGNIFICANT CHANGE UP (ref 3.5–5.3)
POTASSIUM SERPL-SCNC: 3.9 MMOL/L — SIGNIFICANT CHANGE UP (ref 3.5–5.3)
PROT SERPL-MCNC: 5.7 G/DL — LOW (ref 6–8.3)
PROTHROM AB SERPL-ACNC: 13.4 SEC — SIGNIFICANT CHANGE UP (ref 10.6–13.6)
RBC # BLD: 3.73 M/UL — LOW (ref 3.8–5.2)
RBC # FLD: 12.4 % — SIGNIFICANT CHANGE UP (ref 10.3–14.5)
SODIUM SERPL-SCNC: 144 MMOL/L — SIGNIFICANT CHANGE UP (ref 135–145)
TRIGL SERPL-MCNC: 275 MG/DL — HIGH
WBC # BLD: 8.8 K/UL — SIGNIFICANT CHANGE UP (ref 3.8–10.5)
WBC # FLD AUTO: 8.8 K/UL — SIGNIFICANT CHANGE UP (ref 3.8–10.5)

## 2021-04-14 PROCEDURE — 93010 ELECTROCARDIOGRAM REPORT: CPT

## 2021-04-14 PROCEDURE — 36248 INS CATH ABD/L-EXT ART ADDL: CPT

## 2021-04-14 PROCEDURE — 37242 VASC EMBOLIZE/OCCLUDE ARTERY: CPT

## 2021-04-14 PROCEDURE — 75710 ARTERY X-RAYS ARM/LEG: CPT | Mod: 26,59

## 2021-04-14 PROCEDURE — 36247 INS CATH ABD/L-EXT ART 3RD: CPT

## 2021-04-14 RX ORDER — CIPROFLOXACIN LACTATE 400MG/40ML
400 VIAL (ML) INTRAVENOUS EVERY 12 HOURS
Refills: 0 | Status: DISCONTINUED | OUTPATIENT
Start: 2021-04-14 | End: 2021-04-14

## 2021-04-14 RX ORDER — CIPROFLOXACIN LACTATE 400MG/40ML
400 VIAL (ML) INTRAVENOUS EVERY 12 HOURS
Refills: 0 | Status: COMPLETED | OUTPATIENT
Start: 2021-04-14 | End: 2021-04-14

## 2021-04-14 RX ORDER — ONDANSETRON 8 MG/1
4 TABLET, FILM COATED ORAL EVERY 4 HOURS
Refills: 0 | Status: DISCONTINUED | OUTPATIENT
Start: 2021-04-14 | End: 2021-04-15

## 2021-04-14 RX ORDER — HYDROMORPHONE HYDROCHLORIDE 2 MG/ML
2 INJECTION INTRAMUSCULAR; INTRAVENOUS; SUBCUTANEOUS EVERY 4 HOURS
Refills: 0 | Status: DISCONTINUED | OUTPATIENT
Start: 2021-04-14 | End: 2021-04-15

## 2021-04-14 RX ORDER — SODIUM CHLORIDE 9 MG/ML
500 INJECTION INTRAMUSCULAR; INTRAVENOUS; SUBCUTANEOUS
Refills: 0 | Status: DISCONTINUED | OUTPATIENT
Start: 2021-04-14 | End: 2021-04-15

## 2021-04-14 RX ADMIN — HYDROMORPHONE HYDROCHLORIDE 2 MILLIGRAM(S): 2 INJECTION INTRAMUSCULAR; INTRAVENOUS; SUBCUTANEOUS at 23:26

## 2021-04-14 RX ADMIN — HYDROMORPHONE HYDROCHLORIDE 2 MILLIGRAM(S): 2 INJECTION INTRAMUSCULAR; INTRAVENOUS; SUBCUTANEOUS at 18:58

## 2021-04-14 RX ADMIN — Medication 200 MILLIGRAM(S): at 23:27

## 2021-04-14 NOTE — BRIEF OPERATIVE NOTE - NSICDXBRIEFPROCEDURE_GEN_ALL_CORE_FT
PROCEDURES:  Embolization of aneurysm or arteriovenous malformation 14-Apr-2021 17:18:52  Kaylyn Worley

## 2021-04-14 NOTE — BRIEF OPERATIVE NOTE - OPERATION/FINDINGS
Patient prepped and draped in usual sterile fashion. Access was obtained via the right common femoral artery using micropuncture kit and 5F sheath. Aortic bifurcation was crossed and went up and over and initial angiogram demonstrated patent SFA, patent popliteal, and patent infrapopliteal vessels. At the level of the left foot, there were two AVMs seen on angiography. The proximal AVM was smaller and located at the PT near the proximal foot and the distal AVM was larger and located at the PT near the midfoot. The proximal lesion was injected with 300-500 Embosphere microsphere solution and the distal lesion was injected with 500-700 Embosphere microsphere solution. After injection, repeat angiogram demonstrated normal flow to the foot with improvement in AVM filling. No complications were encountered in the case.

## 2021-04-15 ENCOUNTER — TRANSCRIPTION ENCOUNTER (OUTPATIENT)
Age: 36
End: 2021-04-15

## 2021-04-15 VITALS
HEART RATE: 106 BPM | DIASTOLIC BLOOD PRESSURE: 85 MMHG | OXYGEN SATURATION: 95 % | SYSTOLIC BLOOD PRESSURE: 159 MMHG | RESPIRATION RATE: 18 BRPM

## 2021-04-15 LAB
ALBUMIN SERPL ELPH-MCNC: 3.5 G/DL — SIGNIFICANT CHANGE UP (ref 3.3–5)
ALP SERPL-CCNC: 83 U/L — SIGNIFICANT CHANGE UP (ref 40–120)
ALT FLD-CCNC: 18 U/L — SIGNIFICANT CHANGE UP (ref 10–45)
ANION GAP SERPL CALC-SCNC: 12 MMOL/L — SIGNIFICANT CHANGE UP (ref 5–17)
AST SERPL-CCNC: 16 U/L — SIGNIFICANT CHANGE UP (ref 10–40)
BILIRUB SERPL-MCNC: 0.5 MG/DL — SIGNIFICANT CHANGE UP (ref 0.2–1.2)
BUN SERPL-MCNC: 11 MG/DL — SIGNIFICANT CHANGE UP (ref 7–23)
CALCIUM SERPL-MCNC: 9 MG/DL — SIGNIFICANT CHANGE UP (ref 8.4–10.5)
CHLORIDE SERPL-SCNC: 103 MMOL/L — SIGNIFICANT CHANGE UP (ref 96–108)
CO2 SERPL-SCNC: 23 MMOL/L — SIGNIFICANT CHANGE UP (ref 22–31)
COVID-19 SPIKE DOMAIN AB INTERP: POSITIVE
COVID-19 SPIKE DOMAIN ANTIBODY RESULT: >250 U/ML — HIGH
CREAT SERPL-MCNC: 0.56 MG/DL — SIGNIFICANT CHANGE UP (ref 0.5–1.3)
GLUCOSE SERPL-MCNC: 141 MG/DL — HIGH (ref 70–99)
HCT VFR BLD CALC: 38.6 % — SIGNIFICANT CHANGE UP (ref 34.5–45)
HGB BLD-MCNC: 12.4 G/DL — SIGNIFICANT CHANGE UP (ref 11.5–15.5)
MCHC RBC-ENTMCNC: 28.4 PG — SIGNIFICANT CHANGE UP (ref 27–34)
MCHC RBC-ENTMCNC: 32.1 GM/DL — SIGNIFICANT CHANGE UP (ref 32–36)
MCV RBC AUTO: 88.5 FL — SIGNIFICANT CHANGE UP (ref 80–100)
NRBC # BLD: 0 /100 WBCS — SIGNIFICANT CHANGE UP (ref 0–0)
PLATELET # BLD AUTO: 431 K/UL — HIGH (ref 150–400)
POTASSIUM SERPL-MCNC: 4.4 MMOL/L — SIGNIFICANT CHANGE UP (ref 3.5–5.3)
POTASSIUM SERPL-SCNC: 4.4 MMOL/L — SIGNIFICANT CHANGE UP (ref 3.5–5.3)
PROT SERPL-MCNC: 7 G/DL — SIGNIFICANT CHANGE UP (ref 6–8.3)
RBC # BLD: 4.36 M/UL — SIGNIFICANT CHANGE UP (ref 3.8–5.2)
RBC # FLD: 12.3 % — SIGNIFICANT CHANGE UP (ref 10.3–14.5)
SARS-COV-2 IGG+IGM SERPL QL IA: >250 U/ML — HIGH
SARS-COV-2 IGG+IGM SERPL QL IA: POSITIVE
SODIUM SERPL-SCNC: 138 MMOL/L — SIGNIFICANT CHANGE UP (ref 135–145)
WBC # BLD: 8.69 K/UL — SIGNIFICANT CHANGE UP (ref 3.8–10.5)
WBC # FLD AUTO: 8.69 K/UL — SIGNIFICANT CHANGE UP (ref 3.8–10.5)

## 2021-04-15 PROCEDURE — C1887: CPT

## 2021-04-15 PROCEDURE — C1889: CPT

## 2021-04-15 PROCEDURE — 80053 COMPREHEN METABOLIC PANEL: CPT

## 2021-04-15 PROCEDURE — 85027 COMPLETE CBC AUTOMATED: CPT

## 2021-04-15 PROCEDURE — 93005 ELECTROCARDIOGRAM TRACING: CPT

## 2021-04-15 PROCEDURE — 85025 COMPLETE CBC W/AUTO DIFF WBC: CPT

## 2021-04-15 PROCEDURE — 84702 CHORIONIC GONADOTROPIN TEST: CPT

## 2021-04-15 PROCEDURE — 36415 COLL VENOUS BLD VENIPUNCTURE: CPT

## 2021-04-15 PROCEDURE — 85730 THROMBOPLASTIN TIME PARTIAL: CPT

## 2021-04-15 PROCEDURE — 80061 LIPID PANEL: CPT

## 2021-04-15 PROCEDURE — 85610 PROTHROMBIN TIME: CPT

## 2021-04-15 PROCEDURE — 86769 SARS-COV-2 COVID-19 ANTIBODY: CPT

## 2021-04-15 PROCEDURE — C1894: CPT

## 2021-04-15 PROCEDURE — C1769: CPT

## 2021-04-15 PROCEDURE — 81025 URINE PREGNANCY TEST: CPT

## 2021-04-15 RX ORDER — NEBIVOLOL HYDROCHLORIDE 5 MG/1
5 TABLET ORAL ONCE
Refills: 0 | Status: COMPLETED | OUTPATIENT
Start: 2021-04-15 | End: 2021-04-15

## 2021-04-15 RX ORDER — NEBIVOLOL HYDROCHLORIDE 5 MG/1
5 TABLET ORAL
Refills: 0 | Status: DISCONTINUED | OUTPATIENT
Start: 2021-04-15 | End: 2021-04-15

## 2021-04-15 RX ADMIN — HYDROMORPHONE HYDROCHLORIDE 2 MILLIGRAM(S): 2 INJECTION INTRAMUSCULAR; INTRAVENOUS; SUBCUTANEOUS at 11:55

## 2021-04-15 RX ADMIN — NEBIVOLOL HYDROCHLORIDE 5 MILLIGRAM(S): 5 TABLET ORAL at 16:11

## 2021-04-15 RX ADMIN — HYDROMORPHONE HYDROCHLORIDE 2 MILLIGRAM(S): 2 INJECTION INTRAMUSCULAR; INTRAVENOUS; SUBCUTANEOUS at 00:07

## 2021-04-15 RX ADMIN — HYDROMORPHONE HYDROCHLORIDE 2 MILLIGRAM(S): 2 INJECTION INTRAMUSCULAR; INTRAVENOUS; SUBCUTANEOUS at 04:00

## 2021-04-15 RX ADMIN — HYDROMORPHONE HYDROCHLORIDE 2 MILLIGRAM(S): 2 INJECTION INTRAMUSCULAR; INTRAVENOUS; SUBCUTANEOUS at 03:35

## 2021-04-15 RX ADMIN — HYDROMORPHONE HYDROCHLORIDE 2 MILLIGRAM(S): 2 INJECTION INTRAMUSCULAR; INTRAVENOUS; SUBCUTANEOUS at 07:42

## 2021-04-15 RX ADMIN — HYDROMORPHONE HYDROCHLORIDE 2 MILLIGRAM(S): 2 INJECTION INTRAMUSCULAR; INTRAVENOUS; SUBCUTANEOUS at 16:11

## 2021-04-15 NOTE — DISCHARGE NOTE PROVIDER - NSDCMRMEDTOKEN_GEN_ALL_CORE_FT
Bystolic 5 mg oral tablet: 1 tab(s) orally 2 times a day  Cipro 500 mg oral tablet: 1 tab(s) orally every 12 hours   gabapentin 100 mg oral capsule: 1 cap(s) orally once a day (in the morning)  gabapentin 800 mg oral tablet: 1 tab(s) orally once a day (at bedtime)  Junel 1.5/30 oral tablet: 1 tab(s) orally once a day  Percocet 10/325 oral tablet: 1 tab(s) orally every 4 hours, As Needed

## 2021-04-15 NOTE — DISCHARGE NOTE PROVIDER - HOSPITAL COURSE
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 She has a fairly large soft tissue mass which is prone to recurrent ulceration associated with pain and bleeding on occasion. We did a transarterial microsphere embolization 6 months ago and she went on to heal and had very good pain relief. Recently she again developed a small (5 mm) area of superficial ulceration on the lateral aspect of the heel with oozing and painful. She states she had Covid in December and feels like the pain is worse since that time. We previously investigated the possibility of surgical resection as the lesion is quite protuberant but MRI showed that the depth the lesion with involvement down to the bone made this inadvisable. The skin over the lesion is also very tight with a diffuse dense consistency to the malformation. I told her that we would proceed with another angiogram and transarterial embolization and we will make arrangements for this as soon as possible.       4/14 Cath - left leg angio. 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 She has a fairly large soft tissue mass which is prone to recurrent ulceration associated with pain and bleeding on occasion. We did a transarterial microsphere embolization 6 months ago and she went on to heal and had very good pain relief. Recently she again developed a small (5 mm) area of superficial ulceration on the lateral aspect of the heel with oozing and painful. She states she had Covid in December and feels like the pain is worse since that time. We previously investigated the possibility of surgical resection as the lesion is quite protuberant but MRI showed that the depth the lesion with involvement down to the bone made this inadvisable. The skin over the lesion is also very tight with a diffuse dense consistency to the malformation. I told her that we would proceed with another angiogram and transarterial embolization and we will make arrangements for this as soon as possible.   Pt now s/p left leg angiogram and embolization of AVM, access R groin. Pt admitted overnight for observation and telemetry monitoring. Pt seen and examined at bedside this AM without any complaints or events overnight, VSS, labs and telemetry reviewed and pt stable for discharge as discussed with Dr. Teran. Pt has received appropriate discharge instructions, including medication regimen, access site management and follow up with Dr. Teran in 1-2 weeks.

## 2021-04-15 NOTE — DISCHARGE NOTE NURSING/CASE MANAGEMENT/SOCIAL WORK - PATIENT PORTAL LINK FT
You can access the FollowMyHealth Patient Portal offered by [facility] by registering at the following website: [email or web address]. By joining [employer]’s FollowMyHealth portal, you will also be able to view your health information using other applications (apps) compatible with our system.

## 2021-04-15 NOTE — DISCHARGE NOTE PROVIDER - CARE PROVIDER_API CALL
J Carlos Teran)  Diagnostic Radiology  [address] [place] [address]  Phone: [phone]  Fax: [phone]  Follow Up Time:

## 2021-04-15 NOTE — DISCHARGE NOTE PROVIDER - NSDCCPCAREPLAN_GEN_ALL_CORE_FT
PRINCIPAL DISCHARGE DIAGNOSIS  Diagnosis: Arteriovenous malformation (AVM)  Assessment and Plan of Treatment: You came to the hospital and had a direct stick embolization of your arteriovenous malformation.  Please continue Ciprofloxacin 500mg two times a day.  Avoid strenuous activity or heavy lifting anything more than 5lbs for the next five days. Do not take a bath or swim for the next five days; you may shower. For any bleeding or hematoma formation (hardened blood collection under the skin) at the access site of your groin please hold pressure and go to the emergency room. Please follow up with Dr. Teran in 1-2 weeks.

## 2021-04-20 DIAGNOSIS — Q27.32 ARTERIOVENOUS MALFORMATION OF VESSEL OF LOWER LIMB: ICD-10-CM

## 2021-04-20 DIAGNOSIS — I10 ESSENTIAL (PRIMARY) HYPERTENSION: ICD-10-CM

## 2021-04-20 DIAGNOSIS — Z86.16 PERSONAL HISTORY OF COVID-19: ICD-10-CM

## 2021-04-20 DIAGNOSIS — Z79.891 LONG TERM (CURRENT) USE OF OPIATE ANALGESIC: ICD-10-CM

## 2021-04-20 DIAGNOSIS — Z88.0 ALLERGY STATUS TO PENICILLIN: ICD-10-CM

## 2021-04-20 DIAGNOSIS — L97.429 NON-PRESSURE CHRONIC ULCER OF LEFT HEEL AND MIDFOOT WITH UNSPECIFIED SEVERITY: ICD-10-CM

## 2021-04-20 DIAGNOSIS — Z79.899 OTHER LONG TERM (CURRENT) DRUG THERAPY: ICD-10-CM

## 2021-04-20 DIAGNOSIS — Z88.8 ALLERGY STATUS TO OTHER DRUGS, MEDICAMENTS AND BIOLOGICAL SUBSTANCES STATUS: ICD-10-CM

## 2021-07-19 ENCOUNTER — APPOINTMENT (OUTPATIENT)
Dept: HEART AND VASCULAR | Facility: CLINIC | Age: 36
End: 2021-07-19
Payer: MEDICARE

## 2021-07-19 PROCEDURE — 99214 OFFICE O/P EST MOD 30 MIN: CPT

## 2021-07-23 NOTE — PHYSICAL EXAM
[Alert] : alert [No Acute Distress] : no acute distress [PERRL] : pupils equal, round and reactive to light [Normal Hearing] : hearing was normal [No Neck Mass] : no neck mass was observed [No Respiratory Distress] : no respiratory distress [Normal Rate] : heart rate was normal  [Regular Rhythm] : with a regular rhythm [Pedal Pulses Normal] : the pedal pulses are present [No Edema] : there was no peripheral edema [Not Tender] : non-tender [Not Distended] : not distended [Normal Gait] : normal gait [Normal Strength/Tone] : muscle strength and tone were normal [No Motor Deficits] : the motor exam was normal [No Sensory Deficits] : the sensory exam was normal to light touch and pinprick [Oriented x3] : oriented to person, place, and time [de-identified] : ulcer on lateral aspect of left heel about 1mm in size, eschar over the ulceration, skin erythematous surrounding

## 2021-07-23 NOTE — ASSESSMENT
[FreeTextEntry1] : Bell is 2 months since her last transarterial microsphere embolization for a high flow AVM in her left lateral heel which causes pain and recurrent ulceration with bleeding. She was doing well until last week when she developed a new small area of breakdown measuring approximately 1 x 2 cm causing pain and minimal bleeding. On physical exam she still has the large firm soft tissue mass over the lateral heel which is nontender to palpation except at the ulceration site. I did an ultrasound in the office and don’t see any evidence of significant high flow but we have found that the arterial embolizations seemed to result in longer periods of healing and pain relief than the direct embolizations. Therefore we will schedule her for another angiogram and most likely microsphere embolization.

## 2021-07-29 VITALS
SYSTOLIC BLOOD PRESSURE: 157 MMHG | TEMPERATURE: 98 F | RESPIRATION RATE: 18 BRPM | HEART RATE: 95 BPM | DIASTOLIC BLOOD PRESSURE: 82 MMHG | OXYGEN SATURATION: 98 %

## 2021-07-29 RX ORDER — CHLORHEXIDINE GLUCONATE 213 G/1000ML
1 SOLUTION TOPICAL ONCE
Refills: 0 | Status: DISCONTINUED | OUTPATIENT
Start: 2021-08-03 | End: 2021-08-04

## 2021-07-29 NOTE — H&P ADULT - ASSESSMENT
36 y/o Female w/ PMHx of HTN and small vessel AVM involving lateral aspect of L heel now presents to [facility] for LLE angiogram w/ transarterial embolization w/ Dr. Teran.    EKG: To be performed by anesthesia in room.    ASA 2			Mallampati class: 4     Patient Is Suitable Candidate For Sedation?     Yes    Consent to be obtained by Dr. Teran's team.

## 2021-07-31 ENCOUNTER — TRANSCRIPTION ENCOUNTER (OUTPATIENT)
Age: 36
End: 2021-07-31

## 2021-08-03 ENCOUNTER — INPATIENT (INPATIENT)
Facility: HOSPITAL | Age: 36
LOS: 0 days | Discharge: HOME | DRG: 253 | End: 2021-08-04
Attending: RADIOLOGY | Admitting: INTERNAL MEDICINE
Payer: COMMERCIAL

## 2021-08-03 DIAGNOSIS — Z86.16 PERSONAL HISTORY OF COVID-19: ICD-10-CM

## 2021-08-03 DIAGNOSIS — Q27.32 ARTERIOVENOUS MALFORMATION OF VESSEL OF LOWER LIMB: ICD-10-CM

## 2021-08-03 DIAGNOSIS — Z98.89 OTHER SPECIFIED POSTPROCEDURAL STATES: Chronic | ICD-10-CM

## 2021-08-03 DIAGNOSIS — Z41.9 ENCOUNTER FOR PROCEDURE FOR PURPOSES OTHER THAN REMEDYING HEALTH STATE, UNSPECIFIED: Chronic | ICD-10-CM

## 2021-08-03 DIAGNOSIS — E66.9 OBESITY, UNSPECIFIED: ICD-10-CM

## 2021-08-03 DIAGNOSIS — L97.429 NON-PRESSURE CHRONIC ULCER OF LEFT HEEL AND MIDFOOT WITH UNSPECIFIED SEVERITY: ICD-10-CM

## 2021-08-03 DIAGNOSIS — I10 ESSENTIAL (PRIMARY) HYPERTENSION: ICD-10-CM

## 2021-08-03 DIAGNOSIS — Z88.0 ALLERGY STATUS TO PENICILLIN: ICD-10-CM

## 2021-08-03 LAB
A1C WITH ESTIMATED AVERAGE GLUCOSE RESULT: 5.3 % — SIGNIFICANT CHANGE UP (ref 4–5.6)
ALBUMIN SERPL ELPH-MCNC: 4.1 G/DL — SIGNIFICANT CHANGE UP (ref 3.3–5)
ALP SERPL-CCNC: 103 U/L — SIGNIFICANT CHANGE UP (ref 40–120)
ALT FLD-CCNC: 28 U/L — SIGNIFICANT CHANGE UP (ref 10–45)
ANION GAP SERPL CALC-SCNC: 11 MMOL/L — SIGNIFICANT CHANGE UP (ref 5–17)
APTT BLD: 25.2 SEC — LOW (ref 27.5–35.5)
AST SERPL-CCNC: 23 U/L — SIGNIFICANT CHANGE UP (ref 10–40)
BASOPHILS # BLD AUTO: 0.04 K/UL — SIGNIFICANT CHANGE UP (ref 0–0.2)
BASOPHILS NFR BLD AUTO: 0.5 % — SIGNIFICANT CHANGE UP (ref 0–2)
BILIRUB SERPL-MCNC: 0.5 MG/DL — SIGNIFICANT CHANGE UP (ref 0.2–1.2)
BUN SERPL-MCNC: 14 MG/DL — SIGNIFICANT CHANGE UP (ref 7–23)
CALCIUM SERPL-MCNC: 9.3 MG/DL — SIGNIFICANT CHANGE UP (ref 8.4–10.5)
CHLORIDE SERPL-SCNC: 103 MMOL/L — SIGNIFICANT CHANGE UP (ref 96–108)
CHOLEST SERPL-MCNC: 157 MG/DL — SIGNIFICANT CHANGE UP
CO2 SERPL-SCNC: 24 MMOL/L — SIGNIFICANT CHANGE UP (ref 22–31)
CREAT SERPL-MCNC: 0.69 MG/DL — SIGNIFICANT CHANGE UP (ref 0.5–1.3)
EOSINOPHIL # BLD AUTO: 0.17 K/UL — SIGNIFICANT CHANGE UP (ref 0–0.5)
EOSINOPHIL NFR BLD AUTO: 2 % — SIGNIFICANT CHANGE UP (ref 0–6)
ESTIMATED AVERAGE GLUCOSE: 105 MG/DL — SIGNIFICANT CHANGE UP (ref 68–114)
GLUCOSE SERPL-MCNC: 109 MG/DL — HIGH (ref 70–99)
HCG SERPL-ACNC: <0 MIU/ML — SIGNIFICANT CHANGE UP
HCT VFR BLD CALC: 39.2 % — SIGNIFICANT CHANGE UP (ref 34.5–45)
HDLC SERPL-MCNC: 44 MG/DL — LOW
HGB BLD-MCNC: 12.4 G/DL — SIGNIFICANT CHANGE UP (ref 11.5–15.5)
IMM GRANULOCYTES NFR BLD AUTO: 0.5 % — SIGNIFICANT CHANGE UP (ref 0–1.5)
INR BLD: 0.97 — SIGNIFICANT CHANGE UP (ref 0.88–1.16)
LIPID PNL WITH DIRECT LDL SERPL: 68 MG/DL — SIGNIFICANT CHANGE UP
LYMPHOCYTES # BLD AUTO: 2.19 K/UL — SIGNIFICANT CHANGE UP (ref 1–3.3)
LYMPHOCYTES # BLD AUTO: 25.9 % — SIGNIFICANT CHANGE UP (ref 13–44)
MCHC RBC-ENTMCNC: 28.1 PG — SIGNIFICANT CHANGE UP (ref 27–34)
MCHC RBC-ENTMCNC: 31.6 GM/DL — LOW (ref 32–36)
MCV RBC AUTO: 88.9 FL — SIGNIFICANT CHANGE UP (ref 80–100)
MONOCYTES # BLD AUTO: 0.74 K/UL — SIGNIFICANT CHANGE UP (ref 0–0.9)
MONOCYTES NFR BLD AUTO: 8.7 % — SIGNIFICANT CHANGE UP (ref 2–14)
NEUTROPHILS # BLD AUTO: 5.28 K/UL — SIGNIFICANT CHANGE UP (ref 1.8–7.4)
NEUTROPHILS NFR BLD AUTO: 62.4 % — SIGNIFICANT CHANGE UP (ref 43–77)
NON HDL CHOLESTEROL: 113 MG/DL — SIGNIFICANT CHANGE UP
NRBC # BLD: 0 /100 WBCS — SIGNIFICANT CHANGE UP (ref 0–0)
PLATELET # BLD AUTO: 423 K/UL — HIGH (ref 150–400)
POTASSIUM SERPL-MCNC: 3.9 MMOL/L — SIGNIFICANT CHANGE UP (ref 3.5–5.3)
POTASSIUM SERPL-SCNC: 3.9 MMOL/L — SIGNIFICANT CHANGE UP (ref 3.5–5.3)
PROT SERPL-MCNC: 7.3 G/DL — SIGNIFICANT CHANGE UP (ref 6–8.3)
PROTHROM AB SERPL-ACNC: 11.6 SEC — SIGNIFICANT CHANGE UP (ref 10.6–13.6)
RBC # BLD: 4.41 M/UL — SIGNIFICANT CHANGE UP (ref 3.8–5.2)
RBC # FLD: 12.7 % — SIGNIFICANT CHANGE UP (ref 10.3–14.5)
SODIUM SERPL-SCNC: 138 MMOL/L — SIGNIFICANT CHANGE UP (ref 135–145)
TRIGL SERPL-MCNC: 224 MG/DL — HIGH
WBC # BLD: 8.46 K/UL — SIGNIFICANT CHANGE UP (ref 3.8–10.5)
WBC # FLD AUTO: 8.46 K/UL — SIGNIFICANT CHANGE UP (ref 3.8–10.5)

## 2021-08-03 PROCEDURE — 37242 VASC EMBOLIZE/OCCLUDE ARTERY: CPT

## 2021-08-03 PROCEDURE — 93010 ELECTROCARDIOGRAM REPORT: CPT

## 2021-08-03 PROCEDURE — 75710 ARTERY X-RAYS ARM/LEG: CPT | Mod: 26,59

## 2021-08-03 PROCEDURE — 36247 INS CATH ABD/L-EXT ART 3RD: CPT

## 2021-08-03 PROCEDURE — 36248 INS CATH ABD/L-EXT ART ADDL: CPT

## 2021-08-03 RX ORDER — NEBIVOLOL HYDROCHLORIDE 5 MG/1
1 TABLET ORAL
Qty: 0 | Refills: 0 | DISCHARGE

## 2021-08-03 RX ORDER — GABAPENTIN 400 MG/1
800 CAPSULE ORAL AT BEDTIME
Refills: 0 | Status: COMPLETED | OUTPATIENT
Start: 2021-08-03 | End: 2021-08-04

## 2021-08-03 RX ORDER — SODIUM CHLORIDE 9 MG/ML
1000 INJECTION INTRAMUSCULAR; INTRAVENOUS; SUBCUTANEOUS
Refills: 0 | Status: DISCONTINUED | OUTPATIENT
Start: 2021-08-03 | End: 2021-08-04

## 2021-08-03 RX ORDER — ONDANSETRON 8 MG/1
4 TABLET, FILM COATED ORAL EVERY 4 HOURS
Refills: 0 | Status: DISCONTINUED | OUTPATIENT
Start: 2021-08-03 | End: 2021-08-04

## 2021-08-03 RX ORDER — NEBIVOLOL HYDROCHLORIDE 5 MG/1
5 TABLET ORAL AT BEDTIME
Refills: 0 | Status: DISCONTINUED | OUTPATIENT
Start: 2021-08-03 | End: 2021-08-03

## 2021-08-03 RX ORDER — CIPROFLOXACIN LACTATE 400MG/40ML
400 VIAL (ML) INTRAVENOUS ONCE
Refills: 0 | Status: DISCONTINUED | OUTPATIENT
Start: 2021-08-03 | End: 2021-08-03

## 2021-08-03 RX ORDER — NEBIVOLOL HYDROCHLORIDE 5 MG/1
10 TABLET ORAL
Refills: 0 | Status: DISCONTINUED | OUTPATIENT
Start: 2021-08-04 | End: 2021-08-04

## 2021-08-03 RX ORDER — GABAPENTIN 400 MG/1
100 CAPSULE ORAL ONCE
Refills: 0 | Status: DISCONTINUED | OUTPATIENT
Start: 2021-08-03 | End: 2021-08-03

## 2021-08-03 RX ORDER — NEBIVOLOL HYDROCHLORIDE 5 MG/1
10 TABLET ORAL
Refills: 0 | Status: DISCONTINUED | OUTPATIENT
Start: 2021-08-03 | End: 2021-08-03

## 2021-08-03 RX ORDER — GABAPENTIN 400 MG/1
100 CAPSULE ORAL DAILY
Refills: 0 | Status: DISCONTINUED | OUTPATIENT
Start: 2021-08-03 | End: 2021-08-04

## 2021-08-03 RX ORDER — HYDROMORPHONE HYDROCHLORIDE 2 MG/ML
2 INJECTION INTRAMUSCULAR; INTRAVENOUS; SUBCUTANEOUS EVERY 4 HOURS
Refills: 0 | Status: DISCONTINUED | OUTPATIENT
Start: 2021-08-03 | End: 2021-08-04

## 2021-08-03 RX ORDER — NEBIVOLOL HYDROCHLORIDE 5 MG/1
5 TABLET ORAL AT BEDTIME
Refills: 0 | Status: DISCONTINUED | OUTPATIENT
Start: 2021-08-03 | End: 2021-08-04

## 2021-08-03 RX ORDER — CIPROFLOXACIN LACTATE 400MG/40ML
400 VIAL (ML) INTRAVENOUS EVERY 12 HOURS
Refills: 0 | Status: COMPLETED | OUTPATIENT
Start: 2021-08-03 | End: 2021-08-04

## 2021-08-03 RX ORDER — HEPARIN SODIUM 5000 [USP'U]/ML
5000 INJECTION INTRAVENOUS; SUBCUTANEOUS EVERY 8 HOURS
Refills: 0 | Status: DISCONTINUED | OUTPATIENT
Start: 2021-08-03 | End: 2021-08-04

## 2021-08-03 RX ORDER — MORPHINE SULFATE 50 MG/1
4 CAPSULE, EXTENDED RELEASE ORAL EVERY 4 HOURS
Refills: 0 | Status: DISCONTINUED | OUTPATIENT
Start: 2021-08-03 | End: 2021-08-03

## 2021-08-03 RX ADMIN — HYDROMORPHONE HYDROCHLORIDE 2 MILLIGRAM(S): 2 INJECTION INTRAMUSCULAR; INTRAVENOUS; SUBCUTANEOUS at 21:56

## 2021-08-03 RX ADMIN — HEPARIN SODIUM 5000 UNIT(S): 5000 INJECTION INTRAVENOUS; SUBCUTANEOUS at 13:39

## 2021-08-03 RX ADMIN — HYDROMORPHONE HYDROCHLORIDE 2 MILLIGRAM(S): 2 INJECTION INTRAMUSCULAR; INTRAVENOUS; SUBCUTANEOUS at 16:15

## 2021-08-03 RX ADMIN — HYDROMORPHONE HYDROCHLORIDE 2 MILLIGRAM(S): 2 INJECTION INTRAMUSCULAR; INTRAVENOUS; SUBCUTANEOUS at 13:34

## 2021-08-03 RX ADMIN — NEBIVOLOL HYDROCHLORIDE 5 MILLIGRAM(S): 5 TABLET ORAL at 21:40

## 2021-08-03 RX ADMIN — HYDROMORPHONE HYDROCHLORIDE 2 MILLIGRAM(S): 2 INJECTION INTRAMUSCULAR; INTRAVENOUS; SUBCUTANEOUS at 21:41

## 2021-08-03 RX ADMIN — SODIUM CHLORIDE 75 MILLILITER(S): 9 INJECTION INTRAMUSCULAR; INTRAVENOUS; SUBCUTANEOUS at 11:57

## 2021-08-03 RX ADMIN — HYDROMORPHONE HYDROCHLORIDE 2 MILLIGRAM(S): 2 INJECTION INTRAMUSCULAR; INTRAVENOUS; SUBCUTANEOUS at 16:30

## 2021-08-03 RX ADMIN — Medication 200 MILLIGRAM(S): at 21:40

## 2021-08-03 RX ADMIN — HEPARIN SODIUM 5000 UNIT(S): 5000 INJECTION INTRAVENOUS; SUBCUTANEOUS at 21:41

## 2021-08-03 RX ADMIN — HYDROMORPHONE HYDROCHLORIDE 2 MILLIGRAM(S): 2 INJECTION INTRAMUSCULAR; INTRAVENOUS; SUBCUTANEOUS at 12:13

## 2021-08-03 NOTE — BRIEF OPERATIVE NOTE - NSICDXBRIEFPROCEDURE_GEN_ALL_CORE_FT
PROCEDURES:  Arteriogram, lower extremity, with embolization 03-Aug-2021 11:48:44  Beltran Singletary

## 2021-08-03 NOTE — BRIEF OPERATIVE NOTE - OPERATION/FINDINGS
R groin CFA access via micropuncture technique under ultrasound guidance. Initial LLE Angiograms showed multiple high flow AVMs in foot stemming from the PT artery. Complete pedal arch. Exchanged for 6Fr orestes Carrasquillo Catheter, Progreat microcatheter. Treated with 500-700um Embospheres in 3 separate niduses of AVM. Completion runs showed decreased shunting through AVM, confirmed pedal arch still intact. R groin sheath pulled, manual compression held with hemostasis.

## 2021-08-04 ENCOUNTER — TRANSCRIPTION ENCOUNTER (OUTPATIENT)
Age: 36
End: 2021-08-04

## 2021-08-04 VITALS
SYSTOLIC BLOOD PRESSURE: 127 MMHG | OXYGEN SATURATION: 95 % | DIASTOLIC BLOOD PRESSURE: 68 MMHG | RESPIRATION RATE: 16 BRPM | HEART RATE: 88 BPM

## 2021-08-04 LAB
ANION GAP SERPL CALC-SCNC: 12 MMOL/L — SIGNIFICANT CHANGE UP (ref 5–17)
BUN SERPL-MCNC: 13 MG/DL — SIGNIFICANT CHANGE UP (ref 7–23)
CALCIUM SERPL-MCNC: 8.9 MG/DL — SIGNIFICANT CHANGE UP (ref 8.4–10.5)
CHLORIDE SERPL-SCNC: 105 MMOL/L — SIGNIFICANT CHANGE UP (ref 96–108)
CO2 SERPL-SCNC: 22 MMOL/L — SIGNIFICANT CHANGE UP (ref 22–31)
COVID-19 SPIKE DOMAIN AB INTERP: POSITIVE
COVID-19 SPIKE DOMAIN ANTIBODY RESULT: >250 U/ML — HIGH
CREAT SERPL-MCNC: 0.6 MG/DL — SIGNIFICANT CHANGE UP (ref 0.5–1.3)
GLUCOSE SERPL-MCNC: 143 MG/DL — HIGH (ref 70–99)
HCT VFR BLD CALC: 35.9 % — SIGNIFICANT CHANGE UP (ref 34.5–45)
HGB BLD-MCNC: 11.2 G/DL — LOW (ref 11.5–15.5)
MAGNESIUM SERPL-MCNC: 1.7 MG/DL — SIGNIFICANT CHANGE UP (ref 1.6–2.6)
MCHC RBC-ENTMCNC: 28.1 PG — SIGNIFICANT CHANGE UP (ref 27–34)
MCHC RBC-ENTMCNC: 31.2 GM/DL — LOW (ref 32–36)
MCV RBC AUTO: 90 FL — SIGNIFICANT CHANGE UP (ref 80–100)
NRBC # BLD: 0 /100 WBCS — SIGNIFICANT CHANGE UP (ref 0–0)
PHOSPHATE SERPL-MCNC: 3 MG/DL — SIGNIFICANT CHANGE UP (ref 2.5–4.5)
PLATELET # BLD AUTO: 444 K/UL — HIGH (ref 150–400)
POTASSIUM SERPL-MCNC: 4 MMOL/L — SIGNIFICANT CHANGE UP (ref 3.5–5.3)
POTASSIUM SERPL-SCNC: 4 MMOL/L — SIGNIFICANT CHANGE UP (ref 3.5–5.3)
RBC # BLD: 3.99 M/UL — SIGNIFICANT CHANGE UP (ref 3.8–5.2)
RBC # FLD: 12.6 % — SIGNIFICANT CHANGE UP (ref 10.3–14.5)
SARS-COV-2 IGG+IGM SERPL QL IA: >250 U/ML — HIGH
SARS-COV-2 IGG+IGM SERPL QL IA: POSITIVE
SODIUM SERPL-SCNC: 139 MMOL/L — SIGNIFICANT CHANGE UP (ref 135–145)
WBC # BLD: 10.33 K/UL — SIGNIFICANT CHANGE UP (ref 3.8–10.5)
WBC # FLD AUTO: 10.33 K/UL — SIGNIFICANT CHANGE UP (ref 3.8–10.5)

## 2021-08-04 PROCEDURE — 85027 COMPLETE CBC AUTOMATED: CPT

## 2021-08-04 PROCEDURE — 84100 ASSAY OF PHOSPHORUS: CPT

## 2021-08-04 PROCEDURE — C1894: CPT

## 2021-08-04 PROCEDURE — 85730 THROMBOPLASTIN TIME PARTIAL: CPT

## 2021-08-04 PROCEDURE — 84702 CHORIONIC GONADOTROPIN TEST: CPT

## 2021-08-04 PROCEDURE — 37242 VASC EMBOLIZE/OCCLUDE ARTERY: CPT

## 2021-08-04 PROCEDURE — 80048 BASIC METABOLIC PNL TOTAL CA: CPT

## 2021-08-04 PROCEDURE — 86769 SARS-COV-2 COVID-19 ANTIBODY: CPT

## 2021-08-04 PROCEDURE — 85025 COMPLETE CBC W/AUTO DIFF WBC: CPT

## 2021-08-04 PROCEDURE — 83036 HEMOGLOBIN GLYCOSYLATED A1C: CPT

## 2021-08-04 PROCEDURE — 80053 COMPREHEN METABOLIC PANEL: CPT

## 2021-08-04 PROCEDURE — 85610 PROTHROMBIN TIME: CPT

## 2021-08-04 PROCEDURE — 83735 ASSAY OF MAGNESIUM: CPT

## 2021-08-04 PROCEDURE — 36415 COLL VENOUS BLD VENIPUNCTURE: CPT

## 2021-08-04 PROCEDURE — 36246 INS CATH ABD/L-EXT ART 2ND: CPT

## 2021-08-04 PROCEDURE — 80061 LIPID PANEL: CPT

## 2021-08-04 PROCEDURE — 93005 ELECTROCARDIOGRAM TRACING: CPT

## 2021-08-04 RX ORDER — MAGNESIUM SULFATE 500 MG/ML
2 VIAL (ML) INJECTION ONCE
Refills: 0 | Status: COMPLETED | OUTPATIENT
Start: 2021-08-04 | End: 2021-08-04

## 2021-08-04 RX ORDER — CEPHALEXIN 500 MG
1 CAPSULE ORAL
Qty: 28 | Refills: 0
Start: 2021-08-04 | End: 2021-08-10

## 2021-08-04 RX ORDER — ACETAMINOPHEN 500 MG
650 TABLET ORAL EVERY 6 HOURS
Refills: 0 | Status: DISCONTINUED | OUTPATIENT
Start: 2021-08-04 | End: 2021-08-04

## 2021-08-04 RX ADMIN — HYDROMORPHONE HYDROCHLORIDE 2 MILLIGRAM(S): 2 INJECTION INTRAMUSCULAR; INTRAVENOUS; SUBCUTANEOUS at 10:44

## 2021-08-04 RX ADMIN — Medication 200 MILLIGRAM(S): at 09:22

## 2021-08-04 RX ADMIN — HYDROMORPHONE HYDROCHLORIDE 2 MILLIGRAM(S): 2 INJECTION INTRAMUSCULAR; INTRAVENOUS; SUBCUTANEOUS at 06:52

## 2021-08-04 RX ADMIN — HYDROMORPHONE HYDROCHLORIDE 2 MILLIGRAM(S): 2 INJECTION INTRAMUSCULAR; INTRAVENOUS; SUBCUTANEOUS at 02:19

## 2021-08-04 RX ADMIN — HYDROMORPHONE HYDROCHLORIDE 2 MILLIGRAM(S): 2 INJECTION INTRAMUSCULAR; INTRAVENOUS; SUBCUTANEOUS at 02:04

## 2021-08-04 RX ADMIN — NEBIVOLOL HYDROCHLORIDE 10 MILLIGRAM(S): 5 TABLET ORAL at 05:54

## 2021-08-04 RX ADMIN — HYDROMORPHONE HYDROCHLORIDE 2 MILLIGRAM(S): 2 INJECTION INTRAMUSCULAR; INTRAVENOUS; SUBCUTANEOUS at 14:49

## 2021-08-04 RX ADMIN — HEPARIN SODIUM 5000 UNIT(S): 5000 INJECTION INTRAVENOUS; SUBCUTANEOUS at 05:54

## 2021-08-04 RX ADMIN — GABAPENTIN 800 MILLIGRAM(S): 400 CAPSULE ORAL at 05:54

## 2021-08-04 RX ADMIN — Medication 50 GRAM(S): at 08:01

## 2021-08-04 RX ADMIN — HYDROMORPHONE HYDROCHLORIDE 2 MILLIGRAM(S): 2 INJECTION INTRAMUSCULAR; INTRAVENOUS; SUBCUTANEOUS at 15:05

## 2021-08-04 RX ADMIN — HYDROMORPHONE HYDROCHLORIDE 2 MILLIGRAM(S): 2 INJECTION INTRAMUSCULAR; INTRAVENOUS; SUBCUTANEOUS at 11:00

## 2021-08-04 RX ADMIN — HYDROMORPHONE HYDROCHLORIDE 2 MILLIGRAM(S): 2 INJECTION INTRAMUSCULAR; INTRAVENOUS; SUBCUTANEOUS at 06:37

## 2021-08-04 NOTE — DISCHARGE NOTE PROVIDER - NSDCCPCAREPLAN_GEN_ALL_CORE_FT
PRINCIPAL DISCHARGE DIAGNOSIS  Diagnosis: Arteriovenous malformation (AVM)  Assessment and Plan of Treatment: You presented to the hospital due to an arteriovenous malformation involving the lateral aspect of your left heel. You subsequently underwent an angiogram and embolization procedure which you tolerated well. We are sending you home on a 7 day course of Keflex 500 mg four times a day to prevent infection from this procedure. Please continue to take your pain medicine as it has been prescribed to you by your outpatient pain management provider.   The catheter from your groin was removed and bleeding was stopped with manual pressure.  After 24hours you may take off the dressing and shower. Wash the site with soap and water.  There is no need to put on another bandage.  Avoid tub baths, hot tubs or swimming for 5 days.  If you experience any of the following, please contact Dr. Teran's office or our team at [facility] ([phone]): Bleeding or hematoma formation (collection of blood under the skin), drainage or redness at the puncture site, numbness, decrease in strength, coolness or pale coloration of skin of the leg or hand.      SECONDARY DISCHARGE DIAGNOSES  Diagnosis: Hypertension  Assessment and Plan of Treatment: You have a history of being diagnosed with high blood pressure and should continue to take your home medications as they were previously prescribed.

## 2021-08-04 NOTE — DISCHARGE NOTE PROVIDER - CARE PROVIDER_API CALL
J Carlos Teran)  Diagnostic Radiology  [address] [place] [address]  Phone: [phone]  Fax: [phone]  Follow Up Time: Routine

## 2021-08-04 NOTE — DISCHARGE NOTE PROVIDER - HOSPITAL COURSE
34 y/o female w/ PMHx HTN and small vessel AVM involving lateral aspect of left heel who initially presented to Dr. Teran for transarterial microsphere embolization in 2020 which was successful w/ good pain relief, now returns for small (5mm) superficial ulcer of lateral aspect of left heel w/ oozing and pain. She has a fairly large soft tissue mass prone to recurrent ulceration and associated w/ occasional pain and bleeding. She states she had COVID in December and feels that the pain has been worse since that time. Surgical resection was previously considered as the lesion is quite protuberant but MRI showed that the lesion depth had bone involvement so this is inadvisable. The skin over the lesion is also very tight w/ diffuse dense consistency to the malformation. Patient presented to [facility] for LLE angiogram w/ transarterial embolization w/ Dr. Teran.    Patient is now s/p LLE angiogram and embolization on 08/03/2021. Initial angiogram showed multiple high flow AVM's in the foot stemming from the PT artery w/ complete pedal arch, and these were treated w/ Embospheres in three separate niduses of AVM. Completion runs showed decreased shunting through the AVM and confirmed the pedal arch still intact. Right groin access was used, sheath was pulled, and manual compression was held w/ hemostasis achieved. Patient was seen and examined at bedside on 08/04/2021 AM and denied any active complaints. Right groin access remained stable. No significant events were noted overnight on telemetry and labs were reviewed and remained stable. Patient has now been medically cleared for discharge as per Dr. Teran. Patient was given appropriate discharge instructions including medication regimen, access site management, and follow up. Prescriptions the patient requires have been e-prescribed to patient's preferred pharmacy.     VS Stable  Gen: NAD, A&O x3  Cards: RRR, clear S1 and S2 without murmur  Pulm: CTA B/L without w/r/r  Right Groin: No hematoma or ooze, peripheral pulses 2+ B/L  Abd: soft, NT  Ext: no LE edema or ulcerations B/L

## 2021-08-04 NOTE — DISCHARGE NOTE NURSING/CASE MANAGEMENT/SOCIAL WORK - PATIENT PORTAL LINK FT
You can access the FollowMyHealth Patient Portal offered by [facility] by registering at the following website: [email or web address]. By joining [employer]’s FollowMyHealth portal, you will also be able to view your health information using other applications (apps) compatible with our system.

## 2021-08-04 NOTE — DISCHARGE NOTE PROVIDER - NSDCMRMEDTOKEN_GEN_ALL_CORE_FT
Bystolic 10 mg oral tablet: 1 tab(s) orally once a day (in the morning)  Bystolic 5 mg oral tablet: 1 tab(s) orally once a day (at bedtime)  cephalexin 500 mg oral capsule: 1 cap(s) orally 4 times a day   gabapentin 100 mg oral capsule: 1 cap(s) orally once a day (in the morning)  gabapentin 800 mg oral tablet: 1 tab(s) orally once a day (at bedtime)  Percocet 10/325 oral tablet: 1 tab(s) orally every 4 hours, As Needed

## 2021-09-20 ENCOUNTER — APPOINTMENT (OUTPATIENT)
Dept: HEART AND VASCULAR | Facility: CLINIC | Age: 36
End: 2021-09-20
Payer: MEDICARE

## 2021-09-20 DIAGNOSIS — M79.672 PAIN IN LEFT FOOT: ICD-10-CM

## 2021-09-20 PROCEDURE — 99214 OFFICE O/P EST MOD 30 MIN: CPT

## 2021-09-22 NOTE — PHYSICAL EXAM
[Alert] : alert [No Acute Distress] : no acute distress [PERRL] : pupils equal, round and reactive to light [Normal Hearing] : hearing was normal [No Neck Mass] : no neck mass was observed [No Respiratory Distress] : no respiratory distress [Normal Rate] : heart rate was normal  [Regular Rhythm] : with a regular rhythm [Pedal Pulses Normal] : the pedal pulses are present [No Edema] : there was no peripheral edema [Not Tender] : non-tender [Not Distended] : not distended [Normal Gait] : normal gait [Normal Strength/Tone] : muscle strength and tone were normal [No Motor Deficits] : the motor exam was normal [No Sensory Deficits] : the sensory exam was normal to light touch and pinprick [Oriented x3] : oriented to person, place, and time [de-identified] : ulcer on lateral aspect of left heel about 1mm in size, eschar over the ulceration, skin erythematous surrounding

## 2021-09-22 NOTE — ASSESSMENT
[FreeTextEntry1] : Bell is 7 weeks status post her most recent embolization for ulceration secondary to a small vessel AVM in the left foot.  The embolization was performed via a transarterial approach. The ulcer we were treating at that time healed completely but in the last week or two she's developed a new area of ulceration at a different site which is about one cm and linear in shape. She said it is moderately painful. It's unclear what it will take to keep her foot from ulcerating as we’ve been through this sequence multiple times. I reviewed her prior procedure and we could be more aggressive embolizing if necessary but this obviously increases the risk that we could end up with ischemic problems. I told her I would like to wait 2 weeks with conservative wound care see if it might heal on its own. If not she is willing to come in for another embolization procedure. There is some exudate in the ulcer and I suggested that she scrub it with soap and water at each dressing change.

## 2021-10-10 ENCOUNTER — TRANSCRIPTION ENCOUNTER (OUTPATIENT)
Age: 36
End: 2021-10-10

## 2021-10-11 VITALS
HEIGHT: 67 IN | SYSTOLIC BLOOD PRESSURE: 149 MMHG | HEART RATE: 83 BPM | DIASTOLIC BLOOD PRESSURE: 89 MMHG | TEMPERATURE: 98 F | OXYGEN SATURATION: 100 % | WEIGHT: 293 LBS | RESPIRATION RATE: 16 BRPM

## 2021-10-11 RX ORDER — CHLORHEXIDINE GLUCONATE 213 G/1000ML
1 SOLUTION TOPICAL ONCE
Refills: 0 | Status: DISCONTINUED | OUTPATIENT
Start: 2021-10-13 | End: 2021-10-14

## 2021-10-11 NOTE — H&P ADULT - HISTORY OF PRESENT ILLNESS
COVID: PCR @ [facility] [place] 10/10  Pharmacy: [facility] - [address], [place] [address]  Escort: boyfriend    37 y/o female w/ PMHx HTN and small vessel AVM involving lateral aspect of left heel who initially presented to Dr. Teran for small (5mm) superficial ulcer of lateral aspect of left heel w/ oozing and pain, now s/p LLE angiogram and embolization on 08/03/2021 now presents to [facility] for LLE angiogram w/ embolization. Pt had ulceration of L foot mass prior to embolization in 8/2021, which she states has healed, however, she states she now has developed a similar ulceration just lateral to the old one. Ulceration in slightly larger than the most recent one (5cm), and is causing her pain. Surgical resection was previously considered as the lesion is quite protuberant but MRI showed that the lesion depth had bone involvement so this is inadvisable. The skin over the lesion is also very tight w/ diffuse dense consistency to the malformation.    COVID: PCR @ [facility] [place] 10/10 (-) in chart   [facility] [address], [place] [address]  Escort: boyfriend    35 y/o female w/ PMHx HTN and small vessel AVM involving lateral aspect of left heel who initially presented to Dr. Teran for small (5mm) superficial ulcer of lateral aspect of left heel w/ oozing and pain, now s/p LLE angiogram and embolization on 08/03/2021 now presents to [facility] for LLE angiogram w/ embolization. Pt had ulceration of L foot mass prior to embolization in 8/2021, which she states has healed, however, she states she now has developed a similar ulceration just lateral to the old one. Ulceration in slightly larger than the most recent one (5cm), and is causing her pain. Surgical resection was previously considered as the lesion is quite protuberant but MRI showed that the lesion depth had bone involvement so this is inadvisable. The skin over the lesion is also very tight w/ diffuse dense consistency to the malformation.   Patient presents for LLE angiogram w/ embolization with Dr. Teran.

## 2021-10-11 NOTE — H&P ADULT - ASSESSMENT
35 y/o female w/ PMHx HTN and small vessel AVM involving lateral aspect of left heel who initially presented to Dr. Teran for small (5mm) superficial ulcer of lateral aspect of left heel w/ oozing and pain, now s/p LLE angiogram and embolization on 08/03/2021 now presents to [facility] for LLE angiogram w/ embolization. Pt had ulceration of L foot mass prior to embolization in 8/2021, which she states has healed, however, she states she now has developed a similar ulceration just lateral to the old one. Ulceration in slightly larger than the most recent one (5cm), and is causing her pain. Surgical resection was previously considered as the lesion is quite protuberant but MRI showed that the lesion depth had bone involvement so this is inadvisable. The skin over the lesion is also very tight w/ diffuse dense consistency to the malformation.  Patient presents for LLE angiogram w/ embolization with Dr. Teran.     - EKG:   NSR @ 75 bpm, no acute ST-T wave changes   - ASA: III           Mallampati: II  - H/H stable: 13.4/44.0       Platelets/Coags stable. Cr:   - Fluids to be ordered by Dr. Teran and their team. Consent to be obtained by Dr. Teran and their team.      Risks & benefits of procedure and alternative therapy have been explained to the patient including but not limited to: allergic reaction, bleeding w/possible need for blood transfusion, infection, renal and vascular compromise, limb damage, emergent surgery. Informed consent obtained and in chart.     37 y/o female w/ PMHx HTN and small vessel AVM involving lateral aspect of left heel who initially presented to Dr. Teran for small (5mm) superficial ulcer of lateral aspect of left heel w/ oozing and pain, now s/p LLE angiogram and embolization on 08/03/2021 now presents to [facility] for LLE angiogram w/ embolization. Pt had ulceration of L foot mass prior to embolization in 8/2021, which she states has healed, however, she states she now has developed a similar ulceration just lateral to the old one. Ulceration in slightly larger than the most recent one (5cm), and is causing her pain. Surgical resection was previously considered as the lesion is quite protuberant but MRI showed that the lesion depth had bone involvement so this is inadvisable. The skin over the lesion is also very tight w/ diffuse dense consistency to the malformation.  Patient presents for LLE angiogram w/ embolization with Dr. Teran.     - EKG:   NSR @ 75 bpm, no acute ST-T wave changes   - ASA: III           Mallampati: II  - H/H stable: 13.4/44.0       Platelets/Coags stable. Cr: 0.80; HCG serum (-)   - K hemolyzed, repeat K to be sent in room   - Fluids to be ordered by Dr. Teran and their team. Consent to be obtained by Dr. Teran and their team.      Risks & benefits of procedure and alternative therapy have been explained to the patient including but not limited to: allergic reaction, bleeding w/possible need for blood transfusion, infection, renal and vascular compromise, limb damage, emergent surgery. Informed consent obtained and in chart.

## 2021-10-13 ENCOUNTER — INPATIENT (INPATIENT)
Facility: HOSPITAL | Age: 36
LOS: 0 days | Discharge: HOME | DRG: 253 | End: 2021-10-14
Attending: RADIOLOGY | Admitting: RADIOLOGY
Payer: COMMERCIAL

## 2021-10-13 DIAGNOSIS — Z41.9 ENCOUNTER FOR PROCEDURE FOR PURPOSES OTHER THAN REMEDYING HEALTH STATE, UNSPECIFIED: Chronic | ICD-10-CM

## 2021-10-13 DIAGNOSIS — Z98.89 OTHER SPECIFIED POSTPROCEDURAL STATES: Chronic | ICD-10-CM

## 2021-10-13 LAB
ALBUMIN SERPL ELPH-MCNC: 3.6 G/DL — SIGNIFICANT CHANGE UP (ref 3.3–5)
ALBUMIN SERPL ELPH-MCNC: 4.1 G/DL — SIGNIFICANT CHANGE UP (ref 3.3–5)
ALP SERPL-CCNC: 102 U/L — SIGNIFICANT CHANGE UP (ref 40–120)
ALP SERPL-CCNC: 117 U/L — SIGNIFICANT CHANGE UP (ref 40–120)
ALT FLD-CCNC: 14 U/L — SIGNIFICANT CHANGE UP (ref 10–45)
ALT FLD-CCNC: SIGNIFICANT CHANGE UP (ref 10–45)
ANION GAP SERPL CALC-SCNC: 12 MMOL/L — SIGNIFICANT CHANGE UP (ref 5–17)
ANION GAP SERPL CALC-SCNC: 14 MMOL/L — SIGNIFICANT CHANGE UP (ref 5–17)
APTT BLD: 29.4 SEC — SIGNIFICANT CHANGE UP (ref 27.5–35.5)
AST SERPL-CCNC: 20 U/L — SIGNIFICANT CHANGE UP (ref 10–40)
AST SERPL-CCNC: SIGNIFICANT CHANGE UP (ref 10–40)
BASOPHILS # BLD AUTO: 0.1 K/UL — SIGNIFICANT CHANGE UP (ref 0–0.2)
BASOPHILS NFR BLD AUTO: 0.9 % — SIGNIFICANT CHANGE UP (ref 0–2)
BILIRUB SERPL-MCNC: 0.5 MG/DL — SIGNIFICANT CHANGE UP (ref 0.2–1.2)
BILIRUB SERPL-MCNC: 0.5 MG/DL — SIGNIFICANT CHANGE UP (ref 0.2–1.2)
BUN SERPL-MCNC: 12 MG/DL — SIGNIFICANT CHANGE UP (ref 7–23)
BUN SERPL-MCNC: 14 MG/DL — SIGNIFICANT CHANGE UP (ref 7–23)
CALCIUM SERPL-MCNC: 9.2 MG/DL — SIGNIFICANT CHANGE UP (ref 8.4–10.5)
CALCIUM SERPL-MCNC: 9.6 MG/DL — SIGNIFICANT CHANGE UP (ref 8.4–10.5)
CHLORIDE SERPL-SCNC: 104 MMOL/L — SIGNIFICANT CHANGE UP (ref 96–108)
CHLORIDE SERPL-SCNC: 105 MMOL/L — SIGNIFICANT CHANGE UP (ref 96–108)
CO2 SERPL-SCNC: 18 MMOL/L — LOW (ref 22–31)
CO2 SERPL-SCNC: 19 MMOL/L — LOW (ref 22–31)
CREAT SERPL-MCNC: 0.72 MG/DL — SIGNIFICANT CHANGE UP (ref 0.5–1.3)
CREAT SERPL-MCNC: 0.8 MG/DL — SIGNIFICANT CHANGE UP (ref 0.5–1.3)
EOSINOPHIL # BLD AUTO: 0.15 K/UL — SIGNIFICANT CHANGE UP (ref 0–0.5)
EOSINOPHIL NFR BLD AUTO: 1.4 % — SIGNIFICANT CHANGE UP (ref 0–6)
GLUCOSE SERPL-MCNC: 127 MG/DL — HIGH (ref 70–99)
GLUCOSE SERPL-MCNC: 98 MG/DL — SIGNIFICANT CHANGE UP (ref 70–99)
HCG SERPL-ACNC: <0 MIU/ML — SIGNIFICANT CHANGE UP
HCT VFR BLD CALC: 44 % — SIGNIFICANT CHANGE UP (ref 34.5–45)
HGB BLD-MCNC: 13.4 G/DL — SIGNIFICANT CHANGE UP (ref 11.5–15.5)
IMM GRANULOCYTES NFR BLD AUTO: 0.3 % — SIGNIFICANT CHANGE UP (ref 0–1.5)
INR BLD: 0.95 — SIGNIFICANT CHANGE UP (ref 0.88–1.16)
LYMPHOCYTES # BLD AUTO: 2.92 K/UL — SIGNIFICANT CHANGE UP (ref 1–3.3)
LYMPHOCYTES # BLD AUTO: 26.8 % — SIGNIFICANT CHANGE UP (ref 13–44)
MCHC RBC-ENTMCNC: 27.5 PG — SIGNIFICANT CHANGE UP (ref 27–34)
MCHC RBC-ENTMCNC: 30.5 GM/DL — LOW (ref 32–36)
MCV RBC AUTO: 90.2 FL — SIGNIFICANT CHANGE UP (ref 80–100)
MONOCYTES # BLD AUTO: 0.75 K/UL — SIGNIFICANT CHANGE UP (ref 0–0.9)
MONOCYTES NFR BLD AUTO: 6.9 % — SIGNIFICANT CHANGE UP (ref 2–14)
NEUTROPHILS # BLD AUTO: 6.94 K/UL — SIGNIFICANT CHANGE UP (ref 1.8–7.4)
NEUTROPHILS NFR BLD AUTO: 63.7 % — SIGNIFICANT CHANGE UP (ref 43–77)
NRBC # BLD: 0 /100 WBCS — SIGNIFICANT CHANGE UP (ref 0–0)
PLATELET # BLD AUTO: 471 K/UL — HIGH (ref 150–400)
POTASSIUM SERPL-MCNC: 4.9 MMOL/L — SIGNIFICANT CHANGE UP (ref 3.5–5.3)
POTASSIUM SERPL-MCNC: SIGNIFICANT CHANGE UP (ref 3.5–5.3)
POTASSIUM SERPL-SCNC: 4.9 MMOL/L — SIGNIFICANT CHANGE UP (ref 3.5–5.3)
POTASSIUM SERPL-SCNC: SIGNIFICANT CHANGE UP (ref 3.5–5.3)
PROT SERPL-MCNC: 7.2 G/DL — SIGNIFICANT CHANGE UP (ref 6–8.3)
PROT SERPL-MCNC: 8.2 G/DL — SIGNIFICANT CHANGE UP (ref 6–8.3)
PROTHROM AB SERPL-ACNC: 11.4 SEC — SIGNIFICANT CHANGE UP (ref 10.6–13.6)
RBC # BLD: 4.88 M/UL — SIGNIFICANT CHANGE UP (ref 3.8–5.2)
RBC # FLD: 12.8 % — SIGNIFICANT CHANGE UP (ref 10.3–14.5)
SODIUM SERPL-SCNC: 135 MMOL/L — SIGNIFICANT CHANGE UP (ref 135–145)
SODIUM SERPL-SCNC: 137 MMOL/L — SIGNIFICANT CHANGE UP (ref 135–145)
WBC # BLD: 10.89 K/UL — HIGH (ref 3.8–10.5)
WBC # FLD AUTO: 10.89 K/UL — HIGH (ref 3.8–10.5)

## 2021-10-13 PROCEDURE — 36247 INS CATH ABD/L-EXT ART 3RD: CPT

## 2021-10-13 PROCEDURE — 37242 VASC EMBOLIZE/OCCLUDE ARTERY: CPT

## 2021-10-13 PROCEDURE — 93010 ELECTROCARDIOGRAM REPORT: CPT

## 2021-10-13 PROCEDURE — 75710 ARTERY X-RAYS ARM/LEG: CPT | Mod: 26,59

## 2021-10-13 RX ORDER — INFLUENZA VIRUS VACCINE 15; 15; 15; 15 UG/.5ML; UG/.5ML; UG/.5ML; UG/.5ML
0.5 SUSPENSION INTRAMUSCULAR ONCE
Refills: 0 | Status: COMPLETED | OUTPATIENT
Start: 2021-10-13 | End: 2021-10-14

## 2021-10-13 RX ORDER — GABAPENTIN 400 MG/1
800 CAPSULE ORAL AT BEDTIME
Refills: 0 | Status: DISCONTINUED | OUTPATIENT
Start: 2021-10-13 | End: 2021-10-14

## 2021-10-13 RX ORDER — ONDANSETRON 8 MG/1
4 TABLET, FILM COATED ORAL EVERY 4 HOURS
Refills: 0 | Status: DISCONTINUED | OUTPATIENT
Start: 2021-10-13 | End: 2021-10-14

## 2021-10-13 RX ORDER — GABAPENTIN 400 MG/1
100 CAPSULE ORAL DAILY
Refills: 0 | Status: DISCONTINUED | OUTPATIENT
Start: 2021-10-14 | End: 2021-10-14

## 2021-10-13 RX ORDER — CIPROFLOXACIN LACTATE 400MG/40ML
400 VIAL (ML) INTRAVENOUS EVERY 12 HOURS
Refills: 0 | Status: COMPLETED | OUTPATIENT
Start: 2021-10-13 | End: 2021-10-14

## 2021-10-13 RX ORDER — HYDROMORPHONE HYDROCHLORIDE 2 MG/ML
2 INJECTION INTRAMUSCULAR; INTRAVENOUS; SUBCUTANEOUS EVERY 4 HOURS
Refills: 0 | Status: DISCONTINUED | OUTPATIENT
Start: 2021-10-13 | End: 2021-10-14

## 2021-10-13 RX ADMIN — HYDROMORPHONE HYDROCHLORIDE 2 MILLIGRAM(S): 2 INJECTION INTRAMUSCULAR; INTRAVENOUS; SUBCUTANEOUS at 17:10

## 2021-10-13 RX ADMIN — HYDROMORPHONE HYDROCHLORIDE 2 MILLIGRAM(S): 2 INJECTION INTRAMUSCULAR; INTRAVENOUS; SUBCUTANEOUS at 21:12

## 2021-10-13 RX ADMIN — HYDROMORPHONE HYDROCHLORIDE 2 MILLIGRAM(S): 2 INJECTION INTRAMUSCULAR; INTRAVENOUS; SUBCUTANEOUS at 12:35

## 2021-10-13 RX ADMIN — HYDROMORPHONE HYDROCHLORIDE 2 MILLIGRAM(S): 2 INJECTION INTRAMUSCULAR; INTRAVENOUS; SUBCUTANEOUS at 21:28

## 2021-10-13 RX ADMIN — Medication 200 MILLIGRAM(S): at 18:10

## 2021-10-13 RX ADMIN — HYDROMORPHONE HYDROCHLORIDE 2 MILLIGRAM(S): 2 INJECTION INTRAMUSCULAR; INTRAVENOUS; SUBCUTANEOUS at 16:47

## 2021-10-14 ENCOUNTER — TRANSCRIPTION ENCOUNTER (OUTPATIENT)
Age: 36
End: 2021-10-14

## 2021-10-14 VITALS — TEMPERATURE: 98 F

## 2021-10-14 LAB
ANION GAP SERPL CALC-SCNC: 10 MMOL/L — SIGNIFICANT CHANGE UP (ref 5–17)
BASOPHILS # BLD AUTO: 0.03 K/UL — SIGNIFICANT CHANGE UP (ref 0–0.2)
BASOPHILS NFR BLD AUTO: 0.3 % — SIGNIFICANT CHANGE UP (ref 0–2)
BUN SERPL-MCNC: 15 MG/DL — SIGNIFICANT CHANGE UP (ref 7–23)
CALCIUM SERPL-MCNC: 9.3 MG/DL — SIGNIFICANT CHANGE UP (ref 8.4–10.5)
CHLORIDE SERPL-SCNC: 105 MMOL/L — SIGNIFICANT CHANGE UP (ref 96–108)
CO2 SERPL-SCNC: 22 MMOL/L — SIGNIFICANT CHANGE UP (ref 22–31)
COVID-19 SPIKE DOMAIN AB INTERP: POSITIVE
COVID-19 SPIKE DOMAIN ANTIBODY RESULT: 247 U/ML — HIGH
CREAT SERPL-MCNC: 0.81 MG/DL — SIGNIFICANT CHANGE UP (ref 0.5–1.3)
EOSINOPHIL # BLD AUTO: 0.01 K/UL — SIGNIFICANT CHANGE UP (ref 0–0.5)
EOSINOPHIL NFR BLD AUTO: 0.1 % — SIGNIFICANT CHANGE UP (ref 0–6)
GLUCOSE SERPL-MCNC: 118 MG/DL — HIGH (ref 70–99)
HCT VFR BLD CALC: 38.2 % — SIGNIFICANT CHANGE UP (ref 34.5–45)
HGB BLD-MCNC: 12.2 G/DL — SIGNIFICANT CHANGE UP (ref 11.5–15.5)
IMM GRANULOCYTES NFR BLD AUTO: 0.4 % — SIGNIFICANT CHANGE UP (ref 0–1.5)
LYMPHOCYTES # BLD AUTO: 1.87 K/UL — SIGNIFICANT CHANGE UP (ref 1–3.3)
LYMPHOCYTES # BLD AUTO: 17.1 % — SIGNIFICANT CHANGE UP (ref 13–44)
MCHC RBC-ENTMCNC: 28.1 PG — SIGNIFICANT CHANGE UP (ref 27–34)
MCHC RBC-ENTMCNC: 31.9 GM/DL — LOW (ref 32–36)
MCV RBC AUTO: 88 FL — SIGNIFICANT CHANGE UP (ref 80–100)
MONOCYTES # BLD AUTO: 0.95 K/UL — HIGH (ref 0–0.9)
MONOCYTES NFR BLD AUTO: 8.7 % — SIGNIFICANT CHANGE UP (ref 2–14)
NEUTROPHILS # BLD AUTO: 8.05 K/UL — HIGH (ref 1.8–7.4)
NEUTROPHILS NFR BLD AUTO: 73.4 % — SIGNIFICANT CHANGE UP (ref 43–77)
NRBC # BLD: 0 /100 WBCS — SIGNIFICANT CHANGE UP (ref 0–0)
PLATELET # BLD AUTO: 472 K/UL — HIGH (ref 150–400)
POTASSIUM SERPL-MCNC: 4.2 MMOL/L — SIGNIFICANT CHANGE UP (ref 3.5–5.3)
POTASSIUM SERPL-SCNC: 4.2 MMOL/L — SIGNIFICANT CHANGE UP (ref 3.5–5.3)
RBC # BLD: 4.34 M/UL — SIGNIFICANT CHANGE UP (ref 3.8–5.2)
RBC # FLD: 12.9 % — SIGNIFICANT CHANGE UP (ref 10.3–14.5)
SARS-COV-2 IGG+IGM SERPL QL IA: 247 U/ML — HIGH
SARS-COV-2 IGG+IGM SERPL QL IA: POSITIVE
SODIUM SERPL-SCNC: 137 MMOL/L — SIGNIFICANT CHANGE UP (ref 135–145)
WBC # BLD: 10.95 K/UL — HIGH (ref 3.8–10.5)
WBC # FLD AUTO: 10.95 K/UL — HIGH (ref 3.8–10.5)

## 2021-10-14 RX ORDER — CIPROFLOXACIN LACTATE 400MG/40ML
1 VIAL (ML) INTRAVENOUS
Qty: 14 | Refills: 0
Start: 2021-10-14 | End: 2021-10-20

## 2021-10-14 RX ADMIN — HYDROMORPHONE HYDROCHLORIDE 2 MILLIGRAM(S): 2 INJECTION INTRAMUSCULAR; INTRAVENOUS; SUBCUTANEOUS at 09:19

## 2021-10-14 RX ADMIN — HYDROMORPHONE HYDROCHLORIDE 2 MILLIGRAM(S): 2 INJECTION INTRAMUSCULAR; INTRAVENOUS; SUBCUTANEOUS at 13:22

## 2021-10-14 RX ADMIN — HYDROMORPHONE HYDROCHLORIDE 2 MILLIGRAM(S): 2 INJECTION INTRAMUSCULAR; INTRAVENOUS; SUBCUTANEOUS at 05:35

## 2021-10-14 RX ADMIN — HYDROMORPHONE HYDROCHLORIDE 2 MILLIGRAM(S): 2 INJECTION INTRAMUSCULAR; INTRAVENOUS; SUBCUTANEOUS at 09:45

## 2021-10-14 RX ADMIN — HYDROMORPHONE HYDROCHLORIDE 2 MILLIGRAM(S): 2 INJECTION INTRAMUSCULAR; INTRAVENOUS; SUBCUTANEOUS at 01:16

## 2021-10-14 RX ADMIN — Medication 200 MILLIGRAM(S): at 06:07

## 2021-10-14 RX ADMIN — HYDROMORPHONE HYDROCHLORIDE 2 MILLIGRAM(S): 2 INJECTION INTRAMUSCULAR; INTRAVENOUS; SUBCUTANEOUS at 05:19

## 2021-10-14 RX ADMIN — HYDROMORPHONE HYDROCHLORIDE 2 MILLIGRAM(S): 2 INJECTION INTRAMUSCULAR; INTRAVENOUS; SUBCUTANEOUS at 01:37

## 2021-10-14 RX ADMIN — GABAPENTIN 100 MILLIGRAM(S): 400 CAPSULE ORAL at 11:43

## 2021-10-14 RX ADMIN — INFLUENZA VIRUS VACCINE 0.5 MILLILITER(S): 15; 15; 15; 15 SUSPENSION INTRAMUSCULAR at 09:19

## 2021-10-14 NOTE — DISCHARGE NOTE PROVIDER - NSDCCPCAREPLAN_GEN_ALL_CORE_FT
PRINCIPAL DISCHARGE DIAGNOSIS  Diagnosis: Arteriovenous malformation (AVM)  Assessment and Plan of Treatment: The catheter from your groin was removed and bleeding was stopped by manual pressure.  Wash the site daily with soap and water.  Please remove the dressing in 24 hours. There is no need to put on a bandage afterwards. No lifting more than 5 pounds or strenuous exercise for 5 days.       Call the Interventional Cardiology/Radiology team at [phone] if any of following occur pertaining to your vascular access site:  Bleeding or hematoma formation (collection of blood under the skin), drainage or redness at the puncture site, numbness, decrease in strength, coolness or pale coloration of skin of the leg or hand.  Please complete the antibiotic __________ for 7 days for prophylaxis.  Please call to schedule a follow up appointment with Dr Teran in 6 weeks.       PRINCIPAL DISCHARGE DIAGNOSIS  Diagnosis: Arteriovenous malformation (AVM)  Assessment and Plan of Treatment: The catheter from your groin was removed and bleeding was stopped by manual pressure.  Wash the site daily with soap and water.  Please remove the dressing in 24 hours. There is no need to put on a bandage afterwards. No lifting more than 5 pounds or strenuous exercise for 5 days.       Call the Interventional Cardiology/Radiology team at [phone] if any of following occur pertaining to your vascular access site:  Bleeding or hematoma formation (collection of blood under the skin), drainage or redness at the puncture site, numbness, decrease in strength, coolness or pale coloration of skin of the leg or hand.  Please complete the antibiotic Ciprofloxacin 500 mg twice daily for 7 days for prophylaxis.  Please call to schedule a follow up appointment with Dr Teran in 6 weeks.

## 2021-10-14 NOTE — DISCHARGE NOTE NURSING/CASE MANAGEMENT/SOCIAL WORK - NSDCVIVACCINE_GEN_ALL_CORE_FT
influenza, injectable, quadrivalent, preservative free; 22-Oct-2020 10:12; Miley Hebert (RN); [employer]; [id number] (Exp. Date: 30-Jun-2021); IntraMuscular; Deltoid Left.; 0.5 milliLiter(s); VIS (VIS Published: 15-Aug-2019, VIS Presented: 22-Oct-2020);   influenza, injectable, quadrivalent, preservative free; 14-Oct-2021 09:19; Margie Taveras (RN); [employer]; [id number] (Exp. Date: 30-Jun-2022); IntraMuscular; Deltoid Right.; 0.5 milliLiter(s); VIS (VIS Published: 06-Aug-2021, VIS Presented: 14-Oct-2021);

## 2021-10-14 NOTE — DISCHARGE NOTE PROVIDER - NSDCMRMEDTOKEN_GEN_ALL_CORE_FT
Bystolic 10 mg oral tablet: 1 tab(s) orally once a day (in the morning)  Bystolic 5 mg oral tablet: 1 tab(s) orally once a day (at bedtime)  gabapentin 100 mg oral capsule: 1 cap(s) orally once a day (in the morning)  gabapentin 800 mg oral tablet: 1 tab(s) orally once a day (at bedtime)  Percocet 10/325 oral tablet: 1 tab(s) orally every 4 hours, As Needed   Bystolic 10 mg oral tablet: 1 tab(s) orally once a day (in the morning)  Bystolic 5 mg oral tablet: 1 tab(s) orally once a day (at bedtime)  ciprofloxacin 500 mg oral tablet: 1 tab(s) orally every 12 hours   gabapentin 100 mg oral capsule: 1 cap(s) orally once a day (in the morning)  gabapentin 800 mg oral tablet: 1 tab(s) orally once a day (at bedtime)  Percocet 10/325 oral tablet: 1 tab(s) orally every 4 hours, As Needed

## 2021-10-14 NOTE — DISCHARGE NOTE PROVIDER - CARE PROVIDER_API CALL
J Carlos Teran)  Diagnostic Radiology  [address]  [place], [place] [address]  Phone: [phone]  Fax: [phone]  Established Patient  Follow Up Time:

## 2021-10-14 NOTE — DISCHARGE NOTE PROVIDER - HOSPITAL COURSE
37 y/o female w/ PMHx HTN and small vessel AVM involving lateral aspect of left heel who initially presented to Dr. Teran for small (5mm) superficial ulcer of lateral aspect of left heel w/ oozing and pain, now s/p LLE angiogram and embolization on 08/03/2021 now presents to [facility] for LLE angiogram w/ embolization. Pt had ulceration of L foot mass prior to embolization in 8/2021, which she states has healed, however, she states she now has developed a similar ulceration just lateral to the old one. Ulceration in slightly larger than the most recent one (5cm), and is causing her pain. Surgical resection was previously considered as the lesion is quite protuberant but MRI showed that the lesion depth had bone involvement so this is inadvisable. The skin over the lesion is also very tight w/ diffuse dense consistency to the malformation.   Patient presents for LLE angiogram w/ embolization with Dr. Teran.   10/13/21 s/p LLE angio, DSE Left foot AVM, 5F R CFA manually compressed. Admitted to [place] overnight for observation. VSS,labs checked, foot/groin sites stable no signs of bleeding/hematoma, dressing C/D/I. _______________ X 7 days prophylaxis. Pt given appropriate d/c instructions and verbalized understanding. Medications sent to preferred pharmacy. Pt deemed stable for d/c per Dr. Teran and will f/u with Dr. Teran in 6 weeks.     35 y/o female w/ PMHx HTN and small vessel AVM involving lateral aspect of left heel who initially presented to Dr. Teran for small (5mm) superficial ulcer of lateral aspect of left heel w/ oozing and pain, now s/p LLE angiogram and embolization on 08/03/2021 now presents to [facility] for LLE angiogram w/ embolization. Pt had ulceration of L foot mass prior to embolization in 8/2021, which she states has healed, however, she states she now has developed a similar ulceration just lateral to the old one. Ulceration in slightly larger than the most recent one (5cm), and is causing her pain. Surgical resection was previously considered as the lesion is quite protuberant but MRI showed that the lesion depth had bone involvement so this is inadvisable. The skin over the lesion is also very tight w/ diffuse dense consistency to the malformation.   Patient presents for LLE angiogram w/ embolization with Dr. Teran.   10/13/21 s/p LLE angio, DSE Left foot AVM, 5F R CFA manually compressed. Admitted to [facility] overnight for observation. VSS,labs checked, foot/groin sites stable no signs of bleeding/hematoma, dressing C/D/I. Ciprofloxacin 500 mg BID X 7 days prophylaxis. Pt given appropriate d/c instructions and verbalized understanding. Medications sent to preferred pharmacy. Pt deemed stable for d/c per Dr. Teran and will f/u with Dr. Teran in 6 weeks.

## 2021-10-14 NOTE — DISCHARGE NOTE PROVIDER - NSDCQMPCI_CARD_ALL_CORE
Date of Service: 10/07/2021    PROCEDURE NOTE     SURGEON:   Monisha Lawton MD.     ASSISTANT(S):   None.    CHIEF COMPLAINT:  Low back and lower extremity radicular pain.    PREPROCEDURE DIAGNOSES:  1.  Low back pain.  2.  Lower extremity radicular pain, worse on the left side.  3.  Multilevel lumbar degenerative disk disease.  4.  Multilevel lumbosacral spondylosis.  5.  Lumbar post-laminectomy syndrome.  6.  Lumbar spinal canal stenosis.    POSTOPERATIVE DIAGNOSES:  1.  Low back pain.  2.  Lower extremity radicular pain, worse on the left side.  3.  Multilevel lumbar degenerative disk disease.  4.  Multilevel lumbosacral spondylosis.  5.  Lumbar post-laminectomy syndrome.  6.  Lumbar spinal canal stenosis.    COMPLICATIONS:  None.    ESTIMATED BLOOD LOSS:  Zero.    INTERVAL HISTORY:  This patient was last seen at the [facility] on 07/08/2021.  On that day, he had a lumbar epidural steroid injection using an L4-L5 interlaminar approach and left L3-L4 transforaminal approach.  He said he has had excellent results with greater than 75% improvement in pain and function.  He would like to have repeat injection performed.  His pain has increased in intensity within the last 2 weeks.    PROCEDURE:  1.  L4-L5 interlaminar interspinous epidural steroid injection.  2.  Left L3-4 transforaminal epidural steroid injection.  3.  Procedure performed with the aid of fluoroscopy, confirmed on epidurogram.    Total fluoroscopy time is 63 seconds utilizing 37 mGy of radiation.    DESCRIPTION OF PROCEDURE:  After obtaining informed consent, he was taken to procedure room, placed prone on the fluoroscopy table.  Lower back, upper gluteal region was prepped with DuraPrep, draped in the usual sterile fashion.  The C-arm was utilized to visualize the lumbosacral spine.  Counting from below, the L4-L5 level was identified.  I infiltrated the skin and subcutaneous tissue overlying the L4-L5 space.  This was done with approximately 13  mL 0.25% Bupivacaine.  I placed an 18-gauge epidural Tuohy needle measuring 3.5 inches.  This was done in gradual increments using the loss of resistance technique to air.  There was easy entry into the epidural space at the first attempt.  I injected 2 mL of Omnipaque 180 dye.  It showed a normal lumbar epidurogram in the anteroposterior and lateral view.  I injected 4 mL 0.5% Lidocaine with 40 mg Methylprednisolone.  The patient tolerated the procedure well.  There was no acute exacerbation of pain or paresthesia.  Needle was removed.  A Band-Aid was placed over the cleansed skin puncture site.  I mobilized the C-arm superiorly to the L3 level.  I superimposed the inferior endplate of L3, rotated the C-arm in left oblique position to show the Scottie dog view.  I injected the skin and subcutaneous tissue overlying this level with 12 mL of 0.25% Bupivacaine.  I carefully placed a 22-gauge 5-inch spinal needle in gradual increments until it was in mid portion of the foramen on the left side at L3-L4.  There was no paresthesia or pain.  I injected 1 mL of Omnipaque 180 dye.  Dye was seen to migrate medially into the epidural space anterolaterally along the nerve root.  I injected 4 mL 0.1% Bupivacaine with 40 mg Methylprednisolone.  He tolerated the procedure well.  There was no acute exacerbation of pain or paresthesia.  Needle was removed.  A Band-Aid was placed over the cleansed skin puncture site.  Fluoroscopy time is 63 seconds utilizing 37 mGy of radiation.  Preprocedure pain was a 4/10 to 5/10 on a pain scale.  He will be seen within the next 3 months for possible repeat injection into the lumbar epidural space.        Dictated By: Monisha Lawton MD  Signing Provider: Monisha Lawton MD    AL/[id number] ([id number])  DD: 10/07/2021 19:02:33 TD: 10/07/2021 20:01:30    Copy Sent To:    No

## 2021-10-14 NOTE — DISCHARGE NOTE NURSING/CASE MANAGEMENT/SOCIAL WORK - PATIENT PORTAL LINK FT
You can access the FollowMyHealth Patient Portal offered by [facility] by registering at the following website: [email or web address]. By joining [employer]’s FollowMyHealth portal, you will also be able to view your health information using other applications (apps) compatible with our system.

## 2021-10-21 DIAGNOSIS — Q27.32 ARTERIOVENOUS MALFORMATION OF VESSEL OF LOWER LIMB: ICD-10-CM

## 2021-10-21 DIAGNOSIS — E66.9 OBESITY, UNSPECIFIED: ICD-10-CM

## 2021-10-21 DIAGNOSIS — L97.529 NON-PRESSURE CHRONIC ULCER OF OTHER PART OF LEFT FOOT WITH UNSPECIFIED SEVERITY: ICD-10-CM

## 2021-10-21 DIAGNOSIS — I10 ESSENTIAL (PRIMARY) HYPERTENSION: ICD-10-CM

## 2021-10-26 PROCEDURE — 84702 CHORIONIC GONADOTROPIN TEST: CPT

## 2021-10-26 PROCEDURE — 37242 VASC EMBOLIZE/OCCLUDE ARTERY: CPT

## 2021-10-26 PROCEDURE — 86769 SARS-COV-2 COVID-19 ANTIBODY: CPT

## 2021-10-26 PROCEDURE — 36415 COLL VENOUS BLD VENIPUNCTURE: CPT

## 2021-10-26 PROCEDURE — 85730 THROMBOPLASTIN TIME PARTIAL: CPT

## 2021-10-26 PROCEDURE — 85025 COMPLETE CBC W/AUTO DIFF WBC: CPT

## 2021-10-26 PROCEDURE — C1894: CPT

## 2021-10-26 PROCEDURE — 80053 COMPREHEN METABOLIC PANEL: CPT

## 2021-10-26 PROCEDURE — 93005 ELECTROCARDIOGRAM TRACING: CPT

## 2021-10-26 PROCEDURE — C1769: CPT

## 2021-10-26 PROCEDURE — C1889: CPT

## 2021-10-26 PROCEDURE — 80048 BASIC METABOLIC PNL TOTAL CA: CPT

## 2021-10-26 PROCEDURE — C1887: CPT

## 2021-10-26 PROCEDURE — 90686 IIV4 VACC NO PRSV 0.5 ML IM: CPT

## 2021-10-26 PROCEDURE — 85610 PROTHROMBIN TIME: CPT

## 2021-12-20 NOTE — DISCHARGE NOTE ADULT - REASON FOR ADMISSION
Bed: 12  Expected date:   Expected time:   Means of arrival:   Comments:  Ashly 709 Curtis Gold RN  12/20/21 7254 HCC s/p visceral angiogram with chemoembolization under general anesthesia Left foot AVM s/p LLE angiogram, DSE left foot AVM

## 2022-02-03 NOTE — PATIENT PROFILE ADULT - FALL HARM RISK CONCLUSION
Biometrics completed.    Results reviewed with a Registered Nurse; understanding of results and   educational materials was verbalized.   Fall with Harm Risk

## 2022-03-02 NOTE — BRIEF OPERATIVE NOTE - OPERATION/FINDINGS
LLE angiogram performed via R CFA access showing known AVM of left foot. Attempt made at transcatheter embolization of AVM nidus, but unable to reach distal foot. Direct stick embolization of left foot perforumed under fluoroscopic guidance with NCBA glue with adequate result.  Contrast 100cc It is important to see your primary physician as well as other necessary consultants within the next week to perform a comprehensive medical review.  Call their offices for an appointment as soon as you leave the hospital.  If you do not have a primary physician or cant reach him/her, contact the [facility] Physician Referral Service at [phone].  Your medical issues appear to be stable at this time, but if your symptoms recur or worsen, contact your physicians and/or return to the hospital if necessary.  If you encounter any issues or questions with your medication, call your physicians before stopping the medication.

## 2022-03-21 ENCOUNTER — APPOINTMENT (OUTPATIENT)
Dept: HEART AND VASCULAR | Facility: CLINIC | Age: 37
End: 2022-03-21
Payer: MEDICARE

## 2022-03-21 ENCOUNTER — NON-APPOINTMENT (OUTPATIENT)
Age: 37
End: 2022-03-21

## 2022-03-21 PROCEDURE — 99214 OFFICE O/P EST MOD 30 MIN: CPT

## 2022-03-21 NOTE — PHYSICAL EXAM
[No Acute Distress] : no acute distress [Alert] : alert [No Respiratory Distress] : no respiratory distress [Normal Rate and Effort] : normal respiratory rhythm and effort [de-identified] : Swelling and erythema in the lateral aspect of the posterior left foot. Small ulceration and clear discharge. Ultrasound demonstrating abnormal blood vessels with aliasing.

## 2022-03-31 ENCOUNTER — TRANSCRIPTION ENCOUNTER (OUTPATIENT)
Age: 37
End: 2022-03-31

## 2022-03-31 VITALS
TEMPERATURE: 99 F | OXYGEN SATURATION: 98 % | DIASTOLIC BLOOD PRESSURE: 86 MMHG | WEIGHT: 293 LBS | RESPIRATION RATE: 16 BRPM | HEIGHT: 67 IN | HEART RATE: 111 BPM | SYSTOLIC BLOOD PRESSURE: 148 MMHG

## 2022-03-31 RX ORDER — CHLORHEXIDINE GLUCONATE 213 G/1000ML
1 SOLUTION TOPICAL ONCE
Refills: 0 | Status: DISCONTINUED | OUTPATIENT
Start: 2022-04-05 | End: 2022-04-06

## 2022-03-31 NOTE — H&P ADULT - NSHPLABSRESULTS_GEN_ALL_CORE
EKG: sinus tachy 111bpm, no ischemia LABS:                    13.6   11.75 )-----------( 540      ( 05 Apr 2022 09:55 )             42.5       04-05    133<L>  |  99  |  17  ----------------------------<  119<H>  4.4   |  21<L>  |  0.79    Ca    9.9      05 Apr 2022 09:55        PT/INR - ( 05 Apr 2022 09:55 )   PT: 11.5 sec;   INR: 0.97          PTT - ( 05 Apr 2022 09:55 )  PTT:30.5 sec      EKG: sinus tachy 111bpm, no ischemia

## 2022-03-31 NOTE — H&P ADULT - ASSESSMENT
35 y/o female w/ PMHx HTN and small vessel AVM involving lateral aspect of left heel w/ numerous recurring superficial ulcers of the left foot, s/p multiple embolizations (most recent 10/2021), presents to [facility] for LLE angiogram w/ embolization.       -ASA II, Mallampati I  -EKG/labs reviewed

## 2022-03-31 NOTE — H&P ADULT - HISTORY OF PRESENT ILLNESS
Covid: [facility] in [place] 3/31  Cardiologist: Dr Teran  Pharmacy: [facility] [address] [facility]  Escort: [employer] [facility]    37 y/o female w/ PMHx HTN and small vessel AVM involving lateral aspect of left heel w/ numerous recurring superficial ulcers of the left foot, s/p multiple embolizations (most recent 10/2021), presented to Dr Teran for follow up. Pt states that the most recent ulcer healed well, but she has now developed another small, ~1cm, linear ulceration at a different site on the L foot. Pt is experiencing moderate pain with this. Conservative wound care was attempted over the course of two weeks with no relief. She now presents to [facility] for another LLE angiogram w/ embolization.    Covid: negative 3/31 (copy in chart)  Cardiologist: Dr Teran  Pharmacy: [facility] [address] [facility]; meds confirmed verbally with patient (reliable)  Escort: Boyfriend    37 y/o female w/ PMHx HTN and small vessel AVM involving lateral aspect of left heel w/ numerous recurring superficial ulcers of the left foot, s/p multiple embolizations (most recent 10/2021), presented to Dr Teran for follow up. Pt states that the most recent ulcer healed well, but she has now developed another small, ~1cm, linear ulceration at a different site on the L foot. Pt is experiencing moderate pain with this. Conservative wound care was attempted over the course of two weeks with no relief. She now presents to [facility] for another LLE angiogram w/ embolization.

## 2022-04-05 ENCOUNTER — INPATIENT (INPATIENT)
Facility: HOSPITAL | Age: 37
LOS: 0 days | Discharge: HOME | DRG: 254 | End: 2022-04-06
Attending: RADIOLOGY | Admitting: RADIOLOGY
Payer: MEDICARE

## 2022-04-05 ENCOUNTER — TRANSCRIPTION ENCOUNTER (OUTPATIENT)
Age: 37
End: 2022-04-05

## 2022-04-05 DIAGNOSIS — Z98.89 OTHER SPECIFIED POSTPROCEDURAL STATES: Chronic | ICD-10-CM

## 2022-04-05 DIAGNOSIS — Z41.9 ENCOUNTER FOR PROCEDURE FOR PURPOSES OTHER THAN REMEDYING HEALTH STATE, UNSPECIFIED: Chronic | ICD-10-CM

## 2022-04-05 LAB
ANION GAP SERPL CALC-SCNC: 13 MMOL/L — SIGNIFICANT CHANGE UP (ref 5–17)
APTT BLD: 30.5 SEC — SIGNIFICANT CHANGE UP (ref 27.5–35.5)
BASOPHILS # BLD AUTO: 0.03 K/UL — SIGNIFICANT CHANGE UP (ref 0–0.2)
BASOPHILS NFR BLD AUTO: 0.3 % — SIGNIFICANT CHANGE UP (ref 0–2)
BUN SERPL-MCNC: 17 MG/DL — SIGNIFICANT CHANGE UP (ref 7–23)
CALCIUM SERPL-MCNC: 9.9 MG/DL — SIGNIFICANT CHANGE UP (ref 8.4–10.5)
CHLORIDE SERPL-SCNC: 99 MMOL/L — SIGNIFICANT CHANGE UP (ref 96–108)
CO2 SERPL-SCNC: 21 MMOL/L — LOW (ref 22–31)
CREAT SERPL-MCNC: 0.79 MG/DL — SIGNIFICANT CHANGE UP (ref 0.5–1.3)
EGFR: 98 ML/MIN/1.73M2 — SIGNIFICANT CHANGE UP
EOSINOPHIL # BLD AUTO: 0.01 K/UL — SIGNIFICANT CHANGE UP (ref 0–0.5)
EOSINOPHIL NFR BLD AUTO: 0.1 % — SIGNIFICANT CHANGE UP (ref 0–6)
GLUCOSE SERPL-MCNC: 119 MG/DL — HIGH (ref 70–99)
HCG SERPL-ACNC: <0 MIU/ML — SIGNIFICANT CHANGE UP
HCT VFR BLD CALC: 42.5 % — SIGNIFICANT CHANGE UP (ref 34.5–45)
HGB BLD-MCNC: 13.6 G/DL — SIGNIFICANT CHANGE UP (ref 11.5–15.5)
IMM GRANULOCYTES NFR BLD AUTO: 0.3 % — SIGNIFICANT CHANGE UP (ref 0–1.5)
INR BLD: 0.97 — SIGNIFICANT CHANGE UP (ref 0.88–1.16)
LYMPHOCYTES # BLD AUTO: 1 K/UL — SIGNIFICANT CHANGE UP (ref 1–3.3)
LYMPHOCYTES # BLD AUTO: 8.5 % — LOW (ref 13–44)
MCHC RBC-ENTMCNC: 28.3 PG — SIGNIFICANT CHANGE UP (ref 27–34)
MCHC RBC-ENTMCNC: 32 GM/DL — SIGNIFICANT CHANGE UP (ref 32–36)
MCV RBC AUTO: 88.4 FL — SIGNIFICANT CHANGE UP (ref 80–100)
MONOCYTES # BLD AUTO: 0.31 K/UL — SIGNIFICANT CHANGE UP (ref 0–0.9)
MONOCYTES NFR BLD AUTO: 2.6 % — SIGNIFICANT CHANGE UP (ref 2–14)
NEUTROPHILS # BLD AUTO: 10.37 K/UL — HIGH (ref 1.8–7.4)
NEUTROPHILS NFR BLD AUTO: 88.2 % — HIGH (ref 43–77)
NRBC # BLD: 0 /100 WBCS — SIGNIFICANT CHANGE UP (ref 0–0)
PLATELET # BLD AUTO: 540 K/UL — HIGH (ref 150–400)
POTASSIUM SERPL-MCNC: 4.4 MMOL/L — SIGNIFICANT CHANGE UP (ref 3.5–5.3)
POTASSIUM SERPL-SCNC: 4.4 MMOL/L — SIGNIFICANT CHANGE UP (ref 3.5–5.3)
PROTHROM AB SERPL-ACNC: 11.5 SEC — SIGNIFICANT CHANGE UP (ref 10.5–13.4)
RBC # BLD: 4.81 M/UL — SIGNIFICANT CHANGE UP (ref 3.8–5.2)
RBC # FLD: 12.7 % — SIGNIFICANT CHANGE UP (ref 10.3–14.5)
SODIUM SERPL-SCNC: 133 MMOL/L — LOW (ref 135–145)
WBC # BLD: 11.75 K/UL — HIGH (ref 3.8–10.5)
WBC # FLD AUTO: 11.75 K/UL — HIGH (ref 3.8–10.5)

## 2022-04-05 PROCEDURE — 36246 INS CATH ABD/L-EXT ART 2ND: CPT

## 2022-04-05 PROCEDURE — 75710 ARTERY X-RAYS ARM/LEG: CPT | Mod: 26,59

## 2022-04-05 PROCEDURE — 93010 ELECTROCARDIOGRAM REPORT: CPT

## 2022-04-05 PROCEDURE — 37241 VASC EMBOLIZE/OCCLUDE VENOUS: CPT

## 2022-04-05 RX ORDER — NEBIVOLOL HYDROCHLORIDE 5 MG/1
10 TABLET ORAL AT BEDTIME
Refills: 0 | Status: ACTIVE | OUTPATIENT
Start: 2022-04-05 | End: 2023-03-04

## 2022-04-05 RX ORDER — GABAPENTIN 400 MG/1
100 CAPSULE ORAL DAILY
Refills: 0 | Status: DISCONTINUED | OUTPATIENT
Start: 2022-04-05 | End: 2022-04-06

## 2022-04-05 RX ORDER — ACETAMINOPHEN 500 MG
650 TABLET ORAL EVERY 4 HOURS
Refills: 0 | Status: DISCONTINUED | OUTPATIENT
Start: 2022-04-05 | End: 2022-04-06

## 2022-04-05 RX ORDER — GABAPENTIN 400 MG/1
1 CAPSULE ORAL
Qty: 0 | Refills: 0 | DISCHARGE

## 2022-04-05 RX ORDER — NORETHINDRONE AND ETHINYL ESTRADIOL 0.4-0.035
1 KIT ORAL
Qty: 0 | Refills: 0 | DISCHARGE

## 2022-04-05 RX ORDER — HYDROMORPHONE HYDROCHLORIDE 2 MG/ML
2 INJECTION INTRAMUSCULAR; INTRAVENOUS; SUBCUTANEOUS EVERY 4 HOURS
Refills: 0 | Status: DISCONTINUED | OUTPATIENT
Start: 2022-04-05 | End: 2022-04-06

## 2022-04-05 RX ORDER — GABAPENTIN 400 MG/1
800 CAPSULE ORAL DAILY
Refills: 0 | Status: DISCONTINUED | OUTPATIENT
Start: 2022-04-05 | End: 2022-04-06

## 2022-04-05 RX ORDER — HYDROMORPHONE HYDROCHLORIDE 2 MG/ML
1 INJECTION INTRAMUSCULAR; INTRAVENOUS; SUBCUTANEOUS EVERY 4 HOURS
Refills: 0 | Status: DISCONTINUED | OUTPATIENT
Start: 2022-04-05 | End: 2022-04-05

## 2022-04-05 RX ORDER — ONDANSETRON 8 MG/1
4 TABLET, FILM COATED ORAL ONCE
Refills: 0 | Status: DISCONTINUED | OUTPATIENT
Start: 2022-04-05 | End: 2022-04-06

## 2022-04-05 RX ORDER — NEBIVOLOL HYDROCHLORIDE 5 MG/1
1 TABLET ORAL
Qty: 0 | Refills: 0 | DISCHARGE

## 2022-04-05 RX ORDER — HYDROMORPHONE HYDROCHLORIDE 2 MG/ML
2 INJECTION INTRAMUSCULAR; INTRAVENOUS; SUBCUTANEOUS EVERY 6 HOURS
Refills: 0 | Status: DISCONTINUED | OUTPATIENT
Start: 2022-04-05 | End: 2022-04-05

## 2022-04-05 RX ADMIN — HYDROMORPHONE HYDROCHLORIDE 2 MILLIGRAM(S): 2 INJECTION INTRAMUSCULAR; INTRAVENOUS; SUBCUTANEOUS at 19:44

## 2022-04-05 RX ADMIN — HYDROMORPHONE HYDROCHLORIDE 2 MILLIGRAM(S): 2 INJECTION INTRAMUSCULAR; INTRAVENOUS; SUBCUTANEOUS at 22:53

## 2022-04-05 RX ADMIN — HYDROMORPHONE HYDROCHLORIDE 1 MILLIGRAM(S): 2 INJECTION INTRAMUSCULAR; INTRAVENOUS; SUBCUTANEOUS at 14:20

## 2022-04-05 RX ADMIN — HYDROMORPHONE HYDROCHLORIDE 2 MILLIGRAM(S): 2 INJECTION INTRAMUSCULAR; INTRAVENOUS; SUBCUTANEOUS at 23:10

## 2022-04-05 RX ADMIN — HYDROMORPHONE HYDROCHLORIDE 2 MILLIGRAM(S): 2 INJECTION INTRAMUSCULAR; INTRAVENOUS; SUBCUTANEOUS at 18:43

## 2022-04-05 NOTE — BRIEF OPERATIVE NOTE - OPERATION/FINDINGS
Patient placed under general anesthesia, Bilateral groins and L foot prepped and draped. R groin access via  5F max sheath. LLE angiogram performed demonstrating AV malformation with aberrant filling collaterals. Direct stick embolization performed using 2g of NBCA glue across multiple areas over left heel. Hemostasis over heel achieved with surgiflo and via manual pressure over R groin.

## 2022-04-05 NOTE — PATIENT PROFILE ADULT - FALL HARM RISK - HARM RISK INTERVENTIONS

## 2022-04-06 ENCOUNTER — TRANSCRIPTION ENCOUNTER (OUTPATIENT)
Age: 37
End: 2022-04-06

## 2022-04-06 VITALS
SYSTOLIC BLOOD PRESSURE: 151 MMHG | HEART RATE: 110 BPM | OXYGEN SATURATION: 97 % | DIASTOLIC BLOOD PRESSURE: 91 MMHG | RESPIRATION RATE: 18 BRPM

## 2022-04-06 LAB
ANION GAP SERPL CALC-SCNC: 10 MMOL/L — SIGNIFICANT CHANGE UP (ref 5–17)
BUN SERPL-MCNC: 17 MG/DL — SIGNIFICANT CHANGE UP (ref 7–23)
CALCIUM SERPL-MCNC: 8.8 MG/DL — SIGNIFICANT CHANGE UP (ref 8.4–10.5)
CHLORIDE SERPL-SCNC: 107 MMOL/L — SIGNIFICANT CHANGE UP (ref 96–108)
CO2 SERPL-SCNC: 23 MMOL/L — SIGNIFICANT CHANGE UP (ref 22–31)
CREAT SERPL-MCNC: 0.79 MG/DL — SIGNIFICANT CHANGE UP (ref 0.5–1.3)
EGFR: 98 ML/MIN/1.73M2 — SIGNIFICANT CHANGE UP
GLUCOSE SERPL-MCNC: 102 MG/DL — HIGH (ref 70–99)
POTASSIUM SERPL-MCNC: 4.2 MMOL/L — SIGNIFICANT CHANGE UP (ref 3.5–5.3)
POTASSIUM SERPL-SCNC: 4.2 MMOL/L — SIGNIFICANT CHANGE UP (ref 3.5–5.3)
SODIUM SERPL-SCNC: 140 MMOL/L — SIGNIFICANT CHANGE UP (ref 135–145)

## 2022-04-06 RX ORDER — OXYCODONE HYDROCHLORIDE 5 MG/1
10 TABLET ORAL EVERY 4 HOURS
Refills: 0 | Status: DISCONTINUED | OUTPATIENT
Start: 2022-04-06 | End: 2022-04-06

## 2022-04-06 RX ORDER — HYDROMORPHONE HYDROCHLORIDE 2 MG/ML
2 INJECTION INTRAMUSCULAR; INTRAVENOUS; SUBCUTANEOUS ONCE
Refills: 0 | Status: DISCONTINUED | OUTPATIENT
Start: 2022-04-06 | End: 2022-04-06

## 2022-04-06 RX ADMIN — HYDROMORPHONE HYDROCHLORIDE 2 MILLIGRAM(S): 2 INJECTION INTRAMUSCULAR; INTRAVENOUS; SUBCUTANEOUS at 11:11

## 2022-04-06 RX ADMIN — HYDROMORPHONE HYDROCHLORIDE 2 MILLIGRAM(S): 2 INJECTION INTRAMUSCULAR; INTRAVENOUS; SUBCUTANEOUS at 02:54

## 2022-04-06 RX ADMIN — Medication 650 MILLIGRAM(S): at 09:06

## 2022-04-06 RX ADMIN — HYDROMORPHONE HYDROCHLORIDE 2 MILLIGRAM(S): 2 INJECTION INTRAMUSCULAR; INTRAVENOUS; SUBCUTANEOUS at 07:15

## 2022-04-06 RX ADMIN — Medication 650 MILLIGRAM(S): at 10:00

## 2022-04-06 RX ADMIN — GABAPENTIN 100 MILLIGRAM(S): 400 CAPSULE ORAL at 09:06

## 2022-04-06 RX ADMIN — HYDROMORPHONE HYDROCHLORIDE 2 MILLIGRAM(S): 2 INJECTION INTRAMUSCULAR; INTRAVENOUS; SUBCUTANEOUS at 03:10

## 2022-04-06 RX ADMIN — HYDROMORPHONE HYDROCHLORIDE 2 MILLIGRAM(S): 2 INJECTION INTRAMUSCULAR; INTRAVENOUS; SUBCUTANEOUS at 07:00

## 2022-04-06 RX ADMIN — HYDROMORPHONE HYDROCHLORIDE 2 MILLIGRAM(S): 2 INJECTION INTRAMUSCULAR; INTRAVENOUS; SUBCUTANEOUS at 11:26

## 2022-04-06 NOTE — DISCHARGE NOTE PROVIDER - NSDCMRMEDTOKEN_GEN_ALL_CORE_FT
Bystolic 10 mg oral tablet: 1 tab(s) orally once a day (in the morning)  gabapentin 100 mg oral capsule: 1 cap(s) orally once a day (in the morning)  gabapentin 800 mg oral tablet: 1  orally once a day (in the afternoon)  Junel Fe 1.5/30 oral tablet: 1 tab(s) orally once a day  Percocet 10/325 oral tablet: 1 tab(s) orally every 4 hours, As Needed

## 2022-04-06 NOTE — DISCHARGE NOTE PROVIDER - CARE PROVIDERS DIRECT ADDRESSES
,DirectAddress_Unknown ,[email or web address]

## 2022-04-06 NOTE — DISCHARGE NOTE NURSING/CASE MANAGEMENT/SOCIAL WORK - PATIENT PORTAL LINK FT
You can access the FollowMyHealth Patient Portal offered by [facility] by registering at the following website: [email or web address]. By joining [employer]’s FollowMyHealth portal, you will also be able to view your health information using other applications (apps) compatible with our system.

## 2022-04-06 NOTE — DISCHARGE NOTE PROVIDER - NSDCCPCAREPLAN_GEN_ALL_CORE_FT
PRINCIPAL DISCHARGE DIAGNOSIS  Diagnosis: Arteriovenous malformation (AVM)  Assessment and Plan of Treatment: You were admitted to the hospital for a left lower extremity angiogram. The angiogram showed AV malformation and an embolization was performed. We recommend you follow up with Dr. Teran on discharge. Please keep your left heel clean and dry. Continue to use Tylenol as needed for pain control.  If you develop high fevers, chills, nausea/vomiting, extreme pain to the site, please return to the Emergency Department for further evaluation.       PRINCIPAL DISCHARGE DIAGNOSIS  Diagnosis: Arteriovenous malformation (AVM)  Assessment and Plan of Treatment: You were admitted to the hospital for a left lower extremity angiogram. The angiogram showed AV malformation and an embolization was performed. We recommend you follow up with Dr. Teran on discharge. Please continue wet to dry dressings to the left heel as directed. Continue to use your home dose of Percocet every 4 hours as needed for pain control. Limit additional use of Tylenol, as Percocet has Tylenol in it already.  If you develop high fevers, chills, nausea/vomiting, extreme pain to the site, please return to the Emergency Department for further evaluation.

## 2022-04-06 NOTE — DISCHARGE NOTE PROVIDER - CARE PROVIDER_API CALL
JESSICA CHAR  Internal Medicine  [address] [place] [address], [place] [address]  Phone: [phone]  Fax: [phone]  Established Patient  Follow Up Time: 1 week   J Carlos Teran)  Diagnostic Radiology  [address] [place] [address]  Phone: [phone]  Fax: [phone]  Established Patient  Follow Up Time: 1 month   J Carlos Teran)  Diagnostic Radiology  [address], [address] [place] [address]  Phone: [phone]  Fax: [phone]  Established Patient  Scheduled Appointment: 05/16/2022 12:00 PM

## 2022-04-06 NOTE — DISCHARGE NOTE NURSING/CASE MANAGEMENT/SOCIAL WORK - NSDCVIVACCINE_GEN_ALL_CORE_FT
influenza, injectable, quadrivalent, preservative free; 22-Oct-2020 10:12; Miley Hebert (RN); [employer]; [id number] (Exp. Date: 30-Jun-2021); IntraMuscular; Deltoid Left.; 0.5 milliLiter(s); VIS (VIS Published: 15-Aug-2019, VIS Presented: 22-Oct-2020);   influenza, injectable, quadrivalent, preservative free; 14-Oct-2021 09:19; Margie Taveras (RN); [employer]; [id number] (Exp. Date: 30-Jun-2022); IntraMuscular; Deltoid Right.; 0.5 milliLiter(s); VIS (VIS Published: 06-Aug-2021, VIS Presented: 14-Oct-2021);

## 2022-04-06 NOTE — DISCHARGE NOTE PROVIDER - HOSPITAL COURSE
#Discharge: do not delete    35 y/o female w/ PMHx HTN and small vessel AVM involving lateral aspect of left heel w/ numerous recurring superficial ulcers of the left foot, s/p multiple embolizations (most recent 10/2021), follows with Dr. Teran, presents to [facility] for LLE angiogram w/ embolization. Underwent LLE angiogram on 4/5. Showed AV malformation with aberrant filling collaterals. Direct stick embolization performed using 2g of NBCA glue across multiple areas over left heel. Hemostasis over heel achieved with surgiflo and via manual pressure over R groin. Patient tolerated procedure well. R groin checked on 4/6, no signs of hematoma. Patient reports pain is well controlled, notes slight tenderness. Limited drainage to L heel. Discharged home with f/u with Dr. Teran.    Patient was discharged to: Home    New medications: None  Changes to old medications: None  Medications that were stopped: None    Items to follow up as outpatient: PCP (Dr. Onesimo Randolph), Dr. Teran (cardiology)    Physical exam at the time of discharge:  Resting comfortably in bed, no acute distress, speaking in full sentences  RRR, CTA b/l  Abd soft, non tender  R groin site soft, no signs of hematoma. Slight tenderness to palpation  L heel with kerlex wrapping. Minimal drainage. LE with no tenderness or edema  AOx3       #Discharge: do not delete    37 y/o female w/ PMHx HTN and small vessel AVM involving lateral aspect of left heel w/ numerous recurring superficial ulcers of the left foot, s/p multiple embolizations (most recent 10/2021), follows with Dr. Teran, presents to [facility] for LLE angiogram w/ embolization. Underwent LLE angiogram on 4/5. Showed AV malformation with aberrant filling collaterals. Direct stick embolization performed using 2g of NBCA glue across multiple areas over left heel. Hemostasis over heel achieved with surgiflo and via manual pressure over R groin. Patient tolerated procedure well. R groin checked on 4/6, no signs of hematoma. Patient reports pain is well controlled, notes slight tenderness. Limited drainage to L heel. Discharged home with f/u with Dr. Teran. Patient has motorized wheelchair to be used at home.    Patient was discharged to: Home    New medications: None  Changes to old medications: None  Medications that were stopped: None    Items to follow up as outpatient: PCP (Dr. Onesimo Randolph), Dr. Teran (cardiology)    Physical exam at the time of discharge:  Resting comfortably in bed, no acute distress, speaking in full sentences  RRR, CTA b/l  Abd soft, non tender  R groin site soft, no signs of hematoma. Slight tenderness to palpation  L heel with kerlex wrapping. Minimal drainage. LE with no tenderness or edema  AOx3

## 2022-04-06 NOTE — DISCHARGE NOTE PROVIDER - PROVIDER TOKENS
PROVIDER:[TOKEN:[[id number]],FOLLOWUP:[1 week],ESTABLISHEDPATIENT:[T]] PROVIDER:[TOKEN:[[id number]],FOLLOWUP:[1 month],ESTABLISHEDPATIENT:[T]] PROVIDER:[TOKEN:[[id number]],SCHEDULEDAPPT:[05/16/2022],SCHEDULEDAPPTTIME:[12:00 PM],ESTABLISHEDPATIENT:[T]]

## 2022-04-06 NOTE — DISCHARGE NOTE NURSING/CASE MANAGEMENT/SOCIAL WORK - NSDCPEFALRISK_GEN_ALL_CORE
For information on Fall & Injury Prevention, visit: [email or web address] OR  [email or web address] OR  https://www.cdc.gov/steadi/patient.html

## 2022-04-10 DIAGNOSIS — L97.529 NON-PRESSURE CHRONIC ULCER OF OTHER PART OF LEFT FOOT WITH UNSPECIFIED SEVERITY: ICD-10-CM

## 2022-04-10 DIAGNOSIS — Q27.32 ARTERIOVENOUS MALFORMATION OF VESSEL OF LOWER LIMB: ICD-10-CM

## 2022-04-10 DIAGNOSIS — I10 ESSENTIAL (PRIMARY) HYPERTENSION: ICD-10-CM

## 2022-04-26 PROCEDURE — 85730 THROMBOPLASTIN TIME PARTIAL: CPT

## 2022-04-26 PROCEDURE — 80048 BASIC METABOLIC PNL TOTAL CA: CPT

## 2022-04-26 PROCEDURE — 85610 PROTHROMBIN TIME: CPT

## 2022-04-26 PROCEDURE — 36415 COLL VENOUS BLD VENIPUNCTURE: CPT

## 2022-04-26 PROCEDURE — C1894: CPT

## 2022-04-26 PROCEDURE — C1887: CPT

## 2022-04-26 PROCEDURE — C1889: CPT

## 2022-04-26 PROCEDURE — 93005 ELECTROCARDIOGRAM TRACING: CPT

## 2022-04-26 PROCEDURE — 85025 COMPLETE CBC W/AUTO DIFF WBC: CPT

## 2022-04-26 PROCEDURE — C1769: CPT

## 2022-04-26 PROCEDURE — 84702 CHORIONIC GONADOTROPIN TEST: CPT

## 2022-05-16 ENCOUNTER — APPOINTMENT (OUTPATIENT)
Dept: HEART AND VASCULAR | Facility: CLINIC | Age: 37
End: 2022-05-16

## 2022-05-30 NOTE — H&P ADULT - BMI (KG/M2)
Gen: Alert, NAD  Head: NC, AT   Eyes: PERRL, EOMI, normal lids/conjunctiva  ENT: normal hearing, patent oropharynx without erythema/exudate, uvula midline  Neck: supple, no tenderness, Trachea midline  Pulm: Bilateral BS, normal resp effort, no wheeze/stridor/retractions  CV: RRR, no M/R/G, 2+ radial and dp pulses bl, no edema  Abd: soft, NT/ND, +BS, no hepatosplenomegaly  Mskel: extremities x4 with normal ROM and no joint effusions. no ctl spine ttp.   Skin: no rash, no bruising   Neuro: AAOx3, no sensory/motor deficits, CN 2-12 intact 46.7

## 2022-06-23 NOTE — BRIEF OPERATIVE NOTE - OPERATION/FINDINGS
[address] [place], [place]  [address], [address], [place] [address]  [address]  PHONE: [phone]    Ronna Odonnell  1952      SUBJECTIVE:   Ronna Odonnell is a 68 y.o. female seen for a follow up visit regarding the following:     Chief Complaint   Patient presents with    Congestive Heart Failure     3 month. BMP/MAG       HPI:    Patient presents for follow-up. She has been doing well from a cardiac perspective. Minimal edema. No significant PND or orthopnea. No chest pain. She does note her blood pressure has been low at times at home with readings between 100-129. No true dizziness but sometimes feels as if she is on a boat. No syncope. No palpitations or tachycardia. Overall feels well. Past Medical History, Past Surgical History, Family history, Social History, and Medications were all reviewed with the patient today and updated as necessary. Current Outpatient Medications:     metoprolol succinate (TOPROL XL) 100 MG extended release tablet, Take 1 tablet by mouth daily, Disp: 30 tablet, Rfl: 5    potassium chloride (KLOR-CON) 10 MEQ extended release tablet, Take 1 tablet by mouth daily, Disp: 90 tablet, Rfl: 3    losartan (COZAAR) 50 MG tablet, Take 50 mg by mouth daily, Disp: , Rfl:     magnesium oxide (MAG-OX) 400 MG tablet, Take 400 mg by mouth 2 times daily, Disp: , Rfl:     omeprazole (PRILOSEC) 40 MG delayed release capsule, Take 40 mg by mouth daily, Disp: , Rfl:     spironolactone (ALDACTONE) 25 MG tablet, Take 25 mg by mouth daily, Disp: , Rfl:      No Known Allergies     Patient Active Problem List    Diagnosis Date Noted    Chronic systolic congestive heart failure (HCC) 11/25/2021     Priority: Low     1.  Echo (11/5/2021): EF 40-45%. Mild LAE. Moderate MR. Mild TR.  RVSP   27  2. LHC (12/27/21):  EF 45-50%. Normal coronary arteries.          Tachycardia 11/01/2021     Priority: Low    PVC's (premature ventricular contractions) 11/01/2021     Priority: Low     Holter (2/21): Average heart rate 75. Minimum heart rate 58. Max heart   rate 98.  3464 PVCs. 15 PACs with 1 3 beat run. No atrial fibrillation. No pauses. Total of 226,870 QRS complexes        Primary hypertension 11/01/2021     Priority: Low    Severe obesity ([address] 03/12/2020     Priority: Low        Social History     Tobacco Use    Smoking status: Never Smoker    Smokeless tobacco: Never Used   Substance Use Topics    Alcohol use: Never       ROS:    Review of Systems   Constitutional: Negative for malaise/fatigue. Cardiovascular: Negative for chest pain. Respiratory: Negative for shortness of breath. Musculoskeletal: Positive for arthritis. Neurological: Negative for focal weakness. Psychiatric/Behavioral: Negative for depression. PHYSICAL EXAM:  Wt Readings from Last 3 Encounters:   06/23/22 236 lb (107 kg)   03/28/22 234 lb (106.1 kg)   02/14/22 231 lb (104.8 kg)     BP Readings from Last 3 Encounters:   06/23/22 126/76   03/28/22 132/68   02/14/22 134/62     Pulse Readings from Last 3 Encounters:   06/23/22 71   03/28/22 72   02/14/22 74       Physical Exam  Constitutional:       General: She is not in acute distress. Appearance: Normal appearance. Neck:      Vascular: No carotid bruit. Cardiovascular:      Rate and Rhythm: Normal rate and regular rhythm. Heart sounds: Murmur (Grade I/ VI GLENN) heard. Pulmonary:      Breath sounds: Normal breath sounds. No wheezing. Abdominal:      General: There is no distension. Palpations: Abdomen is soft. Musculoskeletal:         General: No swelling. Skin:     General: Skin is warm and dry. Neurological:      General: No focal deficit present. Psychiatric:         Mood and Affect: Mood normal.         Medical problems and test results were reviewed with the patient today.        Lab Results   Component Value Date    WBC 8.6 12/27/2021    HGB 11.9 12/27/2021    HCT 37.8 12/27/2021    MCV 88.3 12/27/2021     12/27/2021       Lab Results   Component Value Date     04/15/2022    K 4.2 04/15/2022     04/15/2022    CO2 21 04/15/2022    BUN 11 04/15/2022    CREATININE 1.02 04/15/2022    GLUCOSE 91 04/15/2022    CALCIUM 9.2 04/15/2022        No results found for: CHOL  No results found for: TRIG  No results found for: HDL  No results found for: LDLCHOLESTEROL, LDLCALC  No results found for: LABVLDL, VLDL  No results found for: CHOLHDLRATIO     Data from outside records/labs from outside providers have been reviewed and summarized as noted in the HPI, past history and data review sections of this note       ASSESSMENT and PLAN      1. Chronic systolic congestive heart failure (HCC)  Currently well compensated. Labs in April showed normal electrolytes and renal function. Recheck echo prior to next visit which will be 1 year from her previous study. Take Toprol and losartan at night to avoid orthostatic hypotension. Call if her symptoms persist.  - metoprolol succinate (TOPROL XL) 100 MG extended release tablet; Take 1 tablet by mouth daily  Dispense: 30 tablet; Refill: 5  - potassium chloride (KLOR-CON) 10 MEQ extended release tablet; Take 1 tablet by mouth daily  Dispense: 90 tablet; Refill: 3  - Transthoracic echocardiogram (TTE) complete with contrast, bubble, strain, and 3D PRN; Future    2. Primary hypertension  Discussed taking Toprol and losartan night as she has some symptoms of orthostatic hypotension. Call if her symptoms persist and we will need to adjust medications. - metoprolol succinate (TOPROL XL) 100 MG extended release tablet; Take 1 tablet by mouth daily  Dispense: 30 tablet; Refill: 5  - potassium chloride (KLOR-CON) 10 MEQ extended release tablet; Take 1 tablet by mouth daily  Dispense: 90 tablet; Refill: 3    3. PVC's (premature ventricular contractions)  No recent symptoms. Continue Toprol.                    Anna Scott MD  6/23/2022  11:45 AM    This note may have been dictated using speech recognition software.   As a result, error of speech recognition may have occurred Angiogram shows normal patent infrarenal aorta, bilateral iliac arteries, patent left CFA, SFA, popliteal, PT, peroneal, and AT arteries. Left PT artery selectively catheterized using verterbral catheter and Progreat microcatheter. AV malformation of foot supplied by branches from PT artery in foot. Angiogram shows normal patent infrarenal aorta, bilateral iliac arteries, patent left CFA, SFA, popliteal, PT, peroneal, and AT arteries. Left PT artery selectively catheterized using verterbral catheter and Progreat microcatheter. AV malformation of foot supplied by branches from PT artery in foot, embolized using 500-700 micron microspheres with moderately decreased flow, and intact pedal arch on completion angiogram. R groin sheath removed, hemostasis achieved by direct manual pressure.

## 2022-08-08 ENCOUNTER — APPOINTMENT (OUTPATIENT)
Dept: HEART AND VASCULAR | Facility: CLINIC | Age: 37
End: 2022-08-08

## 2022-08-08 PROCEDURE — 99213 OFFICE O/P EST LOW 20 MIN: CPT

## 2022-08-08 NOTE — PHYSICAL EXAM
[Alert] : alert [No Acute Distress] : no acute distress [Well Nourished] : well nourished [Normal Sclera/Conjunctiva] : normal sclera/conjunctiva [Normal TMs] : both tympanic membranes were normal [No Neck Mass] : no neck mass was observed [No Respiratory Distress] : no respiratory distress [Normal PMI] : the apical impulse was normal [Carotids Normal] : carotid pulses were normal with no bruits

## 2022-08-08 NOTE — HISTORY OF PRESENT ILLNESS
[FreeTextEntry1] : Patient is 4 months status post an AVM embolization procedure of her left foot AVM. Patient reports that initially, it healed completely fine, but notes that about 1 month ago it opened up again and she developed 2 ulcers on her left lateral heel area and is here for another embolization procedure.

## 2022-08-08 NOTE — ASSESSMENT
[FreeTextEntry1] : Bell came in today as she has had 2 new tiny areas of ulceration appear in the usual location over the lateral left foot where she has an unusual small vessel high flow AVM. She seems to heal quickly after embolization and then have recurrence within several months. We will schedule her for another embolization as I don't think there are any better options given the location of this malformation.

## 2022-08-11 VITALS
HEART RATE: 104 BPM | TEMPERATURE: 98 F | OXYGEN SATURATION: 98 % | HEIGHT: 67 IN | WEIGHT: 293 LBS | SYSTOLIC BLOOD PRESSURE: 170 MMHG | DIASTOLIC BLOOD PRESSURE: 94 MMHG | RESPIRATION RATE: 18 BRPM

## 2022-08-11 RX ORDER — CHLORHEXIDINE GLUCONATE 213 G/1000ML
1 SOLUTION TOPICAL ONCE
Refills: 0 | Status: DISCONTINUED | OUTPATIENT
Start: 2022-08-16 | End: 2022-08-17

## 2022-08-11 NOTE — H&P ADULT - ASSESSMENT
35 yo female with PMHx of HTN, AVM presented to Dr. Teran's office for follow up of her 4 months s/p AVM embolization of her left foot AVM. Pt reports that initially, it healed completely fine but noted that about 1 month ago it opened up again and she developed 2 ulcers on her left lateral heel area. LLE angiogram 4/5/2022: arteriovenous microshunting within the left foot supplied by distal branches of the posterior tibial artery. Direct embolization of avm nidus performed. Repeat LLE angiogram demonstrates reduced arteriovenous shunting within the left foot. She is now presents to [facility] for another LLE angiogram w/ embolization.       - EKG:   Sinus tachycardia at 104 bpm, no acute ST-T wave changes   - Patient's BP and HR elevated - patient stating this happens when in pain, SAMM Savage made aware. Patient given her home Bystolic 10 mg PO x 1.   - Consent to be obtained by Dr. Teran and Janette QUIJANO     Risks & benefits of procedure and alternative therapy have been explained to the patient including but not limited to: allergic reaction, bleeding w/possible need for blood transfusion, infection, renal and vascular compromise, limb damage, emergent surgery. Informed consent obtained and in chart.   35 yo female with PMHx of HTN, AVM presented to Dr. Teran's office for follow up of her 4 months s/p AVM embolization of her left foot AVM. Pt reports that initially, it healed completely fine but noted that about 1 month ago it opened up again and she developed 2 ulcers on her left lateral heel area. LLE angiogram 4/5/2022: arteriovenous microshunting within the left foot supplied by distal branches of the posterior tibial artery. Direct embolization of avm nidus performed. Repeat LLE angiogram demonstrates reduced arteriovenous shunting within the left foot. She is now presents to [facility] for another LLE angiogram w/ embolization.       - EKG:   Sinus tachycardia at 104 bpm, no acute ST-T wave changes   - Patient's BP and HR elevated - patient stating this happens when in pain, SAMM Savage made aware. Patient given her home Bystolic 10 mg PO x 1.   - Hcg 0. Hgb/Hct/Platelets/Cr stable  - Consent to be obtained by Dr. Teran and Janette QUIJANO     Risks & benefits of procedure and alternative therapy have been explained to the patient including but not limited to: allergic reaction, bleeding w/possible need for blood transfusion, infection, renal and vascular compromise, limb damage, emergent surgery. Informed consent obtained and in chart.

## 2022-08-11 NOTE — H&P ADULT - HISTORY OF PRESENT ILLNESS
COVID:  Cardiologist: Dr. Teran  Pharmacy: [address]  Escort:     35 yo female with PMHx of HTN, AVM presented to Dr. Terna's office for follow up of her 4 months s/p AVM embolization of her left foot AVM. Pt reports that initially, it healed completely fine but noted that about 1 month ago it opened up again and she developed 2 ulcers on her left lateral heel area. LLE angiogram 4/5/2022: arteriovenous microshunting within the left foot supplied by distal branches of the posterior tibial artery. Direct embolization of avm nidus performed. Repeat LLE angiogram demonstrates reduced arteriovenous shunting within the left foot. She is now presents to [facility] for another LLE angiogram w/ embolization.    COVID: (-) in [place] Packet   Cardiologist: Dr. Teran  Pharmacy: [address]  Escort: None - staying overnight     35 yo female with PMHx of HTN, AVM presented to Dr. Teran's office for follow up of her 4 months s/p AVM embolization of her left foot AVM. Pt reports that initially, it healed completely fine but noted that about 1 month ago it opened up again and she developed 2 ulcers on her left lateral heel area. LLE angiogram 4/5/2022: arteriovenous microshunting within the left foot supplied by distal branches of the posterior tibial artery. Direct embolization of avm nidus performed. Repeat LLE angiogram demonstrates reduced arteriovenous shunting within the left foot. She is now presents to [facility] for another LLE angiogram w/ embolization.    COVID: (-) 08/14/22 in Packet   Cardiologist: Dr. Teran  Pharmacy: [address]  Escort: None - staying overnight     35 yo female with PMHx of HTN, AVM presented to Dr. Teran's office for follow up of her 4 months s/p AVM embolization of her left foot AVM. Pt reports that initially, it healed completely fine but noted that about 1 month ago it opened up again and she developed 2 ulcers on her left lateral heel area. LLE angiogram 4/5/2022: arteriovenous microshunting within the left foot supplied by distal branches of the posterior tibial artery. Direct embolization of avm nidus performed. Repeat LLE angiogram demonstrates reduced arteriovenous shunting within the left foot. She is now presents to [facility] for another LLE angiogram w/ embolization.

## 2022-08-12 ENCOUNTER — NON-APPOINTMENT (OUTPATIENT)
Age: 37
End: 2022-08-12

## 2022-08-16 ENCOUNTER — INPATIENT (INPATIENT)
Facility: HOSPITAL | Age: 37
LOS: 0 days | Discharge: HOME | DRG: 253 | End: 2022-08-17
Attending: RADIOLOGY | Admitting: RADIOLOGY
Payer: COMMERCIAL

## 2022-08-16 DIAGNOSIS — Z41.9 ENCOUNTER FOR PROCEDURE FOR PURPOSES OTHER THAN REMEDYING HEALTH STATE, UNSPECIFIED: Chronic | ICD-10-CM

## 2022-08-16 DIAGNOSIS — Z98.89 OTHER SPECIFIED POSTPROCEDURAL STATES: Chronic | ICD-10-CM

## 2022-08-16 LAB
A1C WITH ESTIMATED AVERAGE GLUCOSE RESULT: 5.1 % — SIGNIFICANT CHANGE UP (ref 4–5.6)
ALBUMIN SERPL ELPH-MCNC: 4.3 G/DL — SIGNIFICANT CHANGE UP (ref 3.3–5)
ALP SERPL-CCNC: 69 U/L — SIGNIFICANT CHANGE UP (ref 40–120)
ALT FLD-CCNC: 8 U/L — LOW (ref 10–45)
ANION GAP SERPL CALC-SCNC: 11 MMOL/L — SIGNIFICANT CHANGE UP (ref 5–17)
APTT BLD: 30.4 SEC — SIGNIFICANT CHANGE UP (ref 27.5–35.5)
AST SERPL-CCNC: 12 U/L — SIGNIFICANT CHANGE UP (ref 10–40)
BASOPHILS # BLD AUTO: 0.05 K/UL — SIGNIFICANT CHANGE UP (ref 0–0.2)
BASOPHILS NFR BLD AUTO: 0.6 % — SIGNIFICANT CHANGE UP (ref 0–2)
BILIRUB SERPL-MCNC: 0.5 MG/DL — SIGNIFICANT CHANGE UP (ref 0.2–1.2)
BUN SERPL-MCNC: 10 MG/DL — SIGNIFICANT CHANGE UP (ref 7–23)
CALCIUM SERPL-MCNC: 9 MG/DL — SIGNIFICANT CHANGE UP (ref 8.4–10.5)
CHLORIDE SERPL-SCNC: 104 MMOL/L — SIGNIFICANT CHANGE UP (ref 96–108)
CHOLEST SERPL-MCNC: 163 MG/DL — SIGNIFICANT CHANGE UP
CK MB CFR SERPL CALC: 1.2 NG/ML — SIGNIFICANT CHANGE UP (ref 0–6.7)
CK SERPL-CCNC: 104 U/L — SIGNIFICANT CHANGE UP (ref 25–170)
CO2 SERPL-SCNC: 23 MMOL/L — SIGNIFICANT CHANGE UP (ref 22–31)
CREAT SERPL-MCNC: 0.82 MG/DL — SIGNIFICANT CHANGE UP (ref 0.5–1.3)
EGFR: 94 ML/MIN/1.73M2 — SIGNIFICANT CHANGE UP
EOSINOPHIL # BLD AUTO: 0.05 K/UL — SIGNIFICANT CHANGE UP (ref 0–0.5)
EOSINOPHIL NFR BLD AUTO: 0.6 % — SIGNIFICANT CHANGE UP (ref 0–6)
ESTIMATED AVERAGE GLUCOSE: 100 MG/DL — SIGNIFICANT CHANGE UP (ref 68–114)
GLUCOSE SERPL-MCNC: 104 MG/DL — HIGH (ref 70–99)
HCG SERPL-ACNC: 0 MIU/ML — SIGNIFICANT CHANGE UP
HCT VFR BLD CALC: 38.8 % — SIGNIFICANT CHANGE UP (ref 34.5–45)
HDLC SERPL-MCNC: 50 MG/DL — LOW
HGB BLD-MCNC: 12.7 G/DL — SIGNIFICANT CHANGE UP (ref 11.5–15.5)
IMM GRANULOCYTES NFR BLD AUTO: 0.4 % — SIGNIFICANT CHANGE UP (ref 0–1.5)
INR BLD: 0.93 — SIGNIFICANT CHANGE UP (ref 0.88–1.16)
LIPID PNL WITH DIRECT LDL SERPL: 62 MG/DL — SIGNIFICANT CHANGE UP
LYMPHOCYTES # BLD AUTO: 1.52 K/UL — SIGNIFICANT CHANGE UP (ref 1–3.3)
LYMPHOCYTES # BLD AUTO: 17.8 % — SIGNIFICANT CHANGE UP (ref 13–44)
MCHC RBC-ENTMCNC: 28.6 PG — SIGNIFICANT CHANGE UP (ref 27–34)
MCHC RBC-ENTMCNC: 32.7 GM/DL — SIGNIFICANT CHANGE UP (ref 32–36)
MCV RBC AUTO: 87.4 FL — SIGNIFICANT CHANGE UP (ref 80–100)
MONOCYTES # BLD AUTO: 0.48 K/UL — SIGNIFICANT CHANGE UP (ref 0–0.9)
MONOCYTES NFR BLD AUTO: 5.6 % — SIGNIFICANT CHANGE UP (ref 2–14)
NEUTROPHILS # BLD AUTO: 6.41 K/UL — SIGNIFICANT CHANGE UP (ref 1.8–7.4)
NEUTROPHILS NFR BLD AUTO: 75 % — SIGNIFICANT CHANGE UP (ref 43–77)
NON HDL CHOLESTEROL: 113 MG/DL — SIGNIFICANT CHANGE UP
NRBC # BLD: 0 /100 WBCS — SIGNIFICANT CHANGE UP (ref 0–0)
PLATELET # BLD AUTO: 355 K/UL — SIGNIFICANT CHANGE UP (ref 150–400)
POTASSIUM SERPL-MCNC: 4.1 MMOL/L — SIGNIFICANT CHANGE UP (ref 3.5–5.3)
POTASSIUM SERPL-SCNC: 4.1 MMOL/L — SIGNIFICANT CHANGE UP (ref 3.5–5.3)
PROT SERPL-MCNC: 7.5 G/DL — SIGNIFICANT CHANGE UP (ref 6–8.3)
PROTHROM AB SERPL-ACNC: 11.1 SEC — SIGNIFICANT CHANGE UP (ref 10.5–13.4)
RBC # BLD: 4.44 M/UL — SIGNIFICANT CHANGE UP (ref 3.8–5.2)
RBC # FLD: 12 % — SIGNIFICANT CHANGE UP (ref 10.3–14.5)
SODIUM SERPL-SCNC: 138 MMOL/L — SIGNIFICANT CHANGE UP (ref 135–145)
TRIGL SERPL-MCNC: 253 MG/DL — HIGH
WBC # BLD: 8.54 K/UL — SIGNIFICANT CHANGE UP (ref 3.8–10.5)
WBC # FLD AUTO: 8.54 K/UL — SIGNIFICANT CHANGE UP (ref 3.8–10.5)

## 2022-08-16 PROCEDURE — 93010 ELECTROCARDIOGRAM REPORT: CPT

## 2022-08-16 PROCEDURE — 36246 INS CATH ABD/L-EXT ART 2ND: CPT

## 2022-08-16 PROCEDURE — 37241 VASC EMBOLIZE/OCCLUDE VENOUS: CPT

## 2022-08-16 RX ORDER — ONDANSETRON 8 MG/1
4 TABLET, FILM COATED ORAL ONCE
Refills: 0 | Status: DISCONTINUED | OUTPATIENT
Start: 2022-08-16 | End: 2022-08-17

## 2022-08-16 RX ORDER — KETOROLAC TROMETHAMINE 30 MG/ML
15 SYRINGE (ML) INJECTION ONCE
Refills: 0 | Status: DISCONTINUED | OUTPATIENT
Start: 2022-08-16 | End: 2022-08-17

## 2022-08-16 RX ORDER — SODIUM CHLORIDE 9 MG/ML
1000 INJECTION, SOLUTION INTRAVENOUS
Refills: 0 | Status: DISCONTINUED | OUTPATIENT
Start: 2022-08-16 | End: 2022-08-17

## 2022-08-16 RX ORDER — GABAPENTIN 400 MG/1
100 CAPSULE ORAL DAILY
Refills: 0 | Status: COMPLETED | OUTPATIENT
Start: 2022-08-16 | End: 2022-08-16

## 2022-08-16 RX ORDER — HYDROMORPHONE HYDROCHLORIDE 2 MG/ML
2 INJECTION INTRAMUSCULAR; INTRAVENOUS; SUBCUTANEOUS EVERY 4 HOURS
Refills: 0 | Status: DISCONTINUED | OUTPATIENT
Start: 2022-08-16 | End: 2022-08-16

## 2022-08-16 RX ORDER — HYDROMORPHONE HYDROCHLORIDE 2 MG/ML
2 INJECTION INTRAMUSCULAR; INTRAVENOUS; SUBCUTANEOUS EVERY 4 HOURS
Refills: 0 | Status: DISCONTINUED | OUTPATIENT
Start: 2022-08-16 | End: 2022-08-17

## 2022-08-16 RX ORDER — NEBIVOLOL HYDROCHLORIDE 5 MG/1
10 TABLET ORAL DAILY
Refills: 0 | Status: DISCONTINUED | OUTPATIENT
Start: 2022-08-16 | End: 2022-08-17

## 2022-08-16 RX ORDER — ACETAMINOPHEN 500 MG
650 TABLET ORAL EVERY 6 HOURS
Refills: 0 | Status: COMPLETED | OUTPATIENT
Start: 2022-08-16 | End: 2022-08-17

## 2022-08-16 RX ORDER — GABAPENTIN 400 MG/1
800 CAPSULE ORAL AT BEDTIME
Refills: 0 | Status: COMPLETED | OUTPATIENT
Start: 2022-08-16 | End: 2022-08-16

## 2022-08-16 RX ADMIN — Medication 650 MILLIGRAM(S): at 16:22

## 2022-08-16 RX ADMIN — HYDROMORPHONE HYDROCHLORIDE 2 MILLIGRAM(S): 2 INJECTION INTRAMUSCULAR; INTRAVENOUS; SUBCUTANEOUS at 22:49

## 2022-08-16 RX ADMIN — HYDROMORPHONE HYDROCHLORIDE 2 MILLIGRAM(S): 2 INJECTION INTRAMUSCULAR; INTRAVENOUS; SUBCUTANEOUS at 13:29

## 2022-08-16 RX ADMIN — Medication 100 MILLIGRAM(S): at 16:52

## 2022-08-16 RX ADMIN — SODIUM CHLORIDE 100 MILLILITER(S): 9 INJECTION, SOLUTION INTRAVENOUS at 13:29

## 2022-08-16 RX ADMIN — HYDROMORPHONE HYDROCHLORIDE 2 MILLIGRAM(S): 2 INJECTION INTRAMUSCULAR; INTRAVENOUS; SUBCUTANEOUS at 23:10

## 2022-08-16 RX ADMIN — GABAPENTIN 100 MILLIGRAM(S): 400 CAPSULE ORAL at 15:22

## 2022-08-16 RX ADMIN — GABAPENTIN 800 MILLIGRAM(S): 400 CAPSULE ORAL at 22:49

## 2022-08-16 RX ADMIN — HYDROMORPHONE HYDROCHLORIDE 2 MILLIGRAM(S): 2 INJECTION INTRAMUSCULAR; INTRAVENOUS; SUBCUTANEOUS at 19:24

## 2022-08-16 RX ADMIN — Medication 650 MILLIGRAM(S): at 21:10

## 2022-08-16 RX ADMIN — HYDROMORPHONE HYDROCHLORIDE 2 MILLIGRAM(S): 2 INJECTION INTRAMUSCULAR; INTRAVENOUS; SUBCUTANEOUS at 18:24

## 2022-08-16 RX ADMIN — Medication 650 MILLIGRAM(S): at 15:22

## 2022-08-16 RX ADMIN — Medication 650 MILLIGRAM(S): at 22:15

## 2022-08-16 RX ADMIN — Medication 100 MILLIGRAM(S): at 22:00

## 2022-08-16 RX ADMIN — NEBIVOLOL HYDROCHLORIDE 10 MILLIGRAM(S): 5 TABLET ORAL at 09:53

## 2022-08-16 RX ADMIN — HYDROMORPHONE HYDROCHLORIDE 2 MILLIGRAM(S): 2 INJECTION INTRAMUSCULAR; INTRAVENOUS; SUBCUTANEOUS at 14:13

## 2022-08-16 NOTE — PATIENT PROFILE ADULT - FALL HARM RISK - HARM RISK INTERVENTIONS

## 2022-08-16 NOTE — PRE-ANESTHESIA EVALUATION ADULT - NSANTHASARD_GEN_ALL_CORE
3 - consider chemical DVT ppx after CTH NC r/o bleed  - regular diet pending formal swallow eval  - fall precaution  - aspiration precaution - consider chemical DVT ppx after CTH NC r/o bleed  - regular diet pending formal swallow eval  - fall precaution  - aspiration precaution  - PT c/s - consider chemical DVT ppx as above after CTH NC r/o bleed  - regular diet pending formal swallow eval  - fall precaution  - aspiration precaution  - PT c/s

## 2022-08-16 NOTE — BRIEF OPERATIVE NOTE - OPERATION/FINDINGS
Patient prepped and draped under LMA anesthesia.  Left foot AVM accessed under direct stick.  Nidus visualized. A total of 1cc of nBCA glue was injected intrarterially into the nidus with completion angiography demonstrating good result.   No complications encountered.

## 2022-08-16 NOTE — BRIEF OPERATIVE NOTE - NSICDXBRIEFPROCEDURE_GEN_ALL_CORE_FT
PROCEDURES:  Arterial injection for occlusion of arteriovenous malformation 16-Aug-2022 12:09:39  Kaylyn Worley

## 2022-08-17 ENCOUNTER — TRANSCRIPTION ENCOUNTER (OUTPATIENT)
Age: 37
End: 2022-08-17

## 2022-08-17 VITALS — TEMPERATURE: 98 F

## 2022-08-17 PROCEDURE — 82550 ASSAY OF CK (CPK): CPT

## 2022-08-17 PROCEDURE — C1769: CPT

## 2022-08-17 PROCEDURE — 37242 VASC EMBOLIZE/OCCLUDE ARTERY: CPT

## 2022-08-17 PROCEDURE — 36415 COLL VENOUS BLD VENIPUNCTURE: CPT

## 2022-08-17 PROCEDURE — 82553 CREATINE MB FRACTION: CPT

## 2022-08-17 PROCEDURE — 84702 CHORIONIC GONADOTROPIN TEST: CPT

## 2022-08-17 PROCEDURE — 83036 HEMOGLOBIN GLYCOSYLATED A1C: CPT

## 2022-08-17 PROCEDURE — 80061 LIPID PANEL: CPT

## 2022-08-17 PROCEDURE — 80053 COMPREHEN METABOLIC PANEL: CPT

## 2022-08-17 PROCEDURE — 85730 THROMBOPLASTIN TIME PARTIAL: CPT

## 2022-08-17 PROCEDURE — 85610 PROTHROMBIN TIME: CPT

## 2022-08-17 PROCEDURE — C1889: CPT

## 2022-08-17 PROCEDURE — 93005 ELECTROCARDIOGRAM TRACING: CPT

## 2022-08-17 PROCEDURE — 85025 COMPLETE CBC W/AUTO DIFF WBC: CPT

## 2022-08-17 RX ORDER — ACETAMINOPHEN 500 MG
1 TABLET ORAL
Qty: 12 | Refills: 0
Start: 2022-08-17 | End: 2022-08-19

## 2022-08-17 RX ORDER — ACETAMINOPHEN 500 MG
2 TABLET ORAL
Qty: 24 | Refills: 0
Start: 2022-08-17 | End: 2022-08-19

## 2022-08-17 RX ADMIN — HYDROMORPHONE HYDROCHLORIDE 2 MILLIGRAM(S): 2 INJECTION INTRAMUSCULAR; INTRAVENOUS; SUBCUTANEOUS at 07:12

## 2022-08-17 RX ADMIN — Medication 650 MILLIGRAM(S): at 03:07

## 2022-08-17 RX ADMIN — HYDROMORPHONE HYDROCHLORIDE 2 MILLIGRAM(S): 2 INJECTION INTRAMUSCULAR; INTRAVENOUS; SUBCUTANEOUS at 15:19

## 2022-08-17 RX ADMIN — HYDROMORPHONE HYDROCHLORIDE 2 MILLIGRAM(S): 2 INJECTION INTRAMUSCULAR; INTRAVENOUS; SUBCUTANEOUS at 07:30

## 2022-08-17 RX ADMIN — HYDROMORPHONE HYDROCHLORIDE 2 MILLIGRAM(S): 2 INJECTION INTRAMUSCULAR; INTRAVENOUS; SUBCUTANEOUS at 15:45

## 2022-08-17 RX ADMIN — Medication 650 MILLIGRAM(S): at 11:13

## 2022-08-17 RX ADMIN — HYDROMORPHONE HYDROCHLORIDE 2 MILLIGRAM(S): 2 INJECTION INTRAMUSCULAR; INTRAVENOUS; SUBCUTANEOUS at 03:04

## 2022-08-17 RX ADMIN — HYDROMORPHONE HYDROCHLORIDE 2 MILLIGRAM(S): 2 INJECTION INTRAMUSCULAR; INTRAVENOUS; SUBCUTANEOUS at 04:00

## 2022-08-17 RX ADMIN — Medication 650 MILLIGRAM(S): at 10:34

## 2022-08-17 RX ADMIN — HYDROMORPHONE HYDROCHLORIDE 2 MILLIGRAM(S): 2 INJECTION INTRAMUSCULAR; INTRAVENOUS; SUBCUTANEOUS at 11:45

## 2022-08-17 RX ADMIN — SODIUM CHLORIDE 100 MILLILITER(S): 9 INJECTION, SOLUTION INTRAVENOUS at 03:40

## 2022-08-17 RX ADMIN — HYDROMORPHONE HYDROCHLORIDE 2 MILLIGRAM(S): 2 INJECTION INTRAMUSCULAR; INTRAVENOUS; SUBCUTANEOUS at 11:13

## 2022-08-17 RX ADMIN — NEBIVOLOL HYDROCHLORIDE 10 MILLIGRAM(S): 5 TABLET ORAL at 10:34

## 2022-08-17 RX ADMIN — Medication 650 MILLIGRAM(S): at 03:37

## 2022-08-17 NOTE — DISCHARGE NOTE NURSING/CASE MANAGEMENT/SOCIAL WORK - PATIENT PORTAL LINK FT
You can access the FollowMyHealth Patient Portal offered by [facility] by registering at the following website: [email or web address]. By joining [facility]’s FollowMyHealth portal, you will also be able to view your health information using other applications (apps) compatible with our system.

## 2022-08-17 NOTE — DISCHARGE NOTE PROVIDER - NSDCMRMEDTOKEN_GEN_ALL_CORE_FT
Bystolic 10 mg oral tablet: 1 tab(s) orally once a day (in the morning)  gabapentin 100 mg oral capsule: 1 cap(s) orally once a day (in the morning)  gabapentin 800 mg oral tablet: 1  orally once a day (in the afternoon)  Junel Fe 1.5/30 oral tablet: 1 tab(s) orally once a day  Percocet 10/325 oral tablet: 1 tab(s) orally every 4 hours, As Needed  Tylenol Extra Strength 500 mg oral tablet: 1 tab(s) orally 4 times a day

## 2022-08-17 NOTE — DISCHARGE NOTE NURSING/CASE MANAGEMENT/SOCIAL WORK - NSDCVIVACCINE_GEN_ALL_CORE_FT
influenza, injectable, quadrivalent, preservative free; 22-Oct-2020 10:12; Miley Hebert (RN); [employer]; [id number] (Exp. Date: 30-Jun-2021); IntraMuscular; Deltoid Left.; 0.5 milliLiter(s); VIS (VIS Published: 15-Aug-2019, VIS Presented: 22-Oct-2020);   influenza, injectable, quadrivalent, preservative free; 14-Oct-2021 09:19; Margie Taveras (RN); [employer]; [id number] (Exp. Date: 30-Jun-2022); IntraMuscular; Deltoid Right.; 0.5 milliLiter(s); VIS (VIS Published: 06-Aug-2021, VIS Presented: 14-Oct-2021);

## 2022-08-17 NOTE — DISCHARGE NOTE PROVIDER - CARE PROVIDER_API CALL
J Carlos Teran)  Diagnostic Radiology  [address] [place] [address]  Phone: [phone]  Fax: [phone]  Follow Up Time:

## 2022-08-17 NOTE — DISCHARGE NOTE PROVIDER - NSDCCPCAREPLAN_GEN_ALL_CORE_FT
PRINCIPAL DISCHARGE DIAGNOSIS  Diagnosis: AVM (arteriovenous malformation)  Assessment and Plan of Treatment: You underwent a left foot embolization with Dr. Teran on 8/16/22 without any difficulty  --TAKE HOME PERCOCET AS PRESCRIBED BY YOUR OUTPATIENT PHYSICIAN   --TAKE TYLENOL XL 500MG EVERY 6 HOURS FOR PAIN  --Call your doctor immediately should  increased/persistent pain at the site. Follow up with your Radiologist in one month for post discharge check-up.   You may call [facility] Cardiovascular unit at  [phone] after office hours and weekends  with any questions or concerns following your procedure. Take medications as prescribed.         PRINCIPAL DISCHARGE DIAGNOSIS  Diagnosis: AVM (arteriovenous malformation)  Assessment and Plan of Treatment: You underwent a left foot embolization with Dr. Teran on 8/16/22 without any difficulty  --TAKE HOME PERCOCET AS PRESCRIBED BY YOUR OUTPATIENT PHYSICIAN   --TAKE TYLENOL XL 500MG EVERY 6 HOURS FOR PAIN  --You may shower 24 hours following procedure but avoid baths and swimming for 1 week  --Call your doctor immediately should  increased/persistent pain at the site. Follow up with your Radiologist in one month for post discharge check-up.   You may call [facility] Cardiovascular unit at  [phone] after office hours and weekends  with any questions or concerns following your procedure. Take medications as prescribed.

## 2022-08-17 NOTE — DISCHARGE NOTE PROVIDER - HOSPITAL COURSE
35 yo female with PMHx of HTN, AVM presented to Dr. Teran's office for follow up of her 4 months s/p AVM embolization of her left foot AVM. Pt reports that initially, it healed completely fine but noted that about 1 month ago it opened up again and she developed 2 ulcers on her left lateral heel area. LLE angiogram 4/5/2022: arteriovenous microshunting within the left foot supplied by distal branches of the posterior tibial artery. Direct embolization of avm nidus performed. Repeat LLE angiogram demonstrates reduced arteriovenous shunting within the left foot. She is now presents to [facility] for another LLE angiogram w/ embolization.     Pt is Direct Stick Embolization of Left foot with Dr. Teran on 8/16/22.  Pt seen at bedside today, examined found NAD, HD stable, denies any complaints at this time.  VSS.  Right  foot embolization site appears stable.   Right pulse stable, 2+, back to baseline.  No blood work ordered for AM given pt is s/p DSE of foot.   Of note:  Pt  already has percocet 30 days supply prescribed by PCP refilled on 8/1/22, therefore, will not be discharged with narcotics.  Pt is was ordered for Tylenol 650mg q6h routinely and dilaudid 2mg PO q4h while inpatient.    Pt  received Clinda x 2 doses post op given amoxicillin allergies and hx post op infection.   She is to continue Tylenol 650mg q6h as needed and home Percocet as prescribed.    Pt is deemed stable for discharge per Dr Teran with follow up in one month for post discharge check-up.  Rx sent to pt preferred pharmacy.  Discharge plans and instruction discussed with pt who is agreeable with plan. is advised to return to the nearest ED if worsening symptoms of CP, SOB or palpitations or severe bleeding from access site occurs.       37 yo female with PMHx of HTN, AVM presented to Dr. Teran's office for follow up of her 4 months s/p AVM embolization of her left foot AVM. Pt reports that initially, it healed completely fine but noted that about 1 month ago it opened up again and she developed 2 ulcers on her left lateral heel area. LLE angiogram 4/5/2022: arteriovenous microshunting within the left foot supplied by distal branches of the posterior tibial artery. Direct embolization of avm nidus performed. Repeat LLE angiogram demonstrates reduced arteriovenous shunting within the left foot. She is now presents to [facility] for another LLE angiogram w/ embolization.     Pt is Direct Stick Embolization of Left foot with Dr. Teran on 8/16/22.  Pt seen at bedside today, examined found NAD, HD stable, denies any complaints at this time.  VSS.  Right  foot embolization site appears stable.   Right pulse stable, 2+, back to baseline.  No blood work ordered for AM given pt is s/p DSE of foot. .   Of note:  Pt  already has percocet 30 days supply prescribed by PCP refilled on 8/1/22, therefore, will not be discharged with narcotics,  No further oral antibiotics per IR team as pt. was treated with IV Clindamycin x 2 doses post op given amoxicillin allergies and hx post op infection.   She is to continue Tylenol 500mg q6h as needed and home Percocet as prescribed by outpatient PCP.  Pt is deemed stable for discharge per Dr Teran with follow up in one month for post discharge check-up.  Rx sent to pt preferred pharmacy.  Discharge plans and instruction discussed with pt who is agreeable with plan. is advised to return to the nearest ED if worsening symptoms of CP, SOB or palpitations or severe bleeding from access site occurs.

## 2022-08-17 NOTE — DISCHARGE NOTE NURSING/CASE MANAGEMENT/SOCIAL WORK - NSDCPEFALRISK_GEN_ALL_CORE
For information on Fall & Injury Prevention, visit: [email or web address] OR  [email or web address] OR  https://www.cdc.gov/steadi/patient.html

## 2022-08-22 DIAGNOSIS — Q27.32 ARTERIOVENOUS MALFORMATION OF VESSEL OF LOWER LIMB: ICD-10-CM

## 2022-08-22 DIAGNOSIS — E66.9 OBESITY, UNSPECIFIED: ICD-10-CM

## 2022-08-22 DIAGNOSIS — Z88.0 ALLERGY STATUS TO PENICILLIN: ICD-10-CM

## 2022-08-22 DIAGNOSIS — Z79.899 OTHER LONG TERM (CURRENT) DRUG THERAPY: ICD-10-CM

## 2022-08-22 DIAGNOSIS — Z88.8 ALLERGY STATUS TO OTHER DRUGS, MEDICAMENTS AND BIOLOGICAL SUBSTANCES: ICD-10-CM

## 2022-08-22 DIAGNOSIS — I10 ESSENTIAL (PRIMARY) HYPERTENSION: ICD-10-CM

## 2022-08-22 DIAGNOSIS — R00.0 TACHYCARDIA, UNSPECIFIED: ICD-10-CM

## 2022-09-09 NOTE — H&P ADULT - ASSESSMENT
32 y/o Obese Female, with PMHx of HTN, left foot AVM with recurrent non healing ulcer on the lateral aspect of her left foot with prior embolizations, most recently DSE @ [facility] on 06/22/16 presents for left lower extremity angiogram with DSE under general anesthesia due to Known Left foot AVM and painful  non-healing ulcer. 5

## 2022-09-14 NOTE — ED ADULT NURSE NOTE - IN THE PAST 12 MONTHS HAVE YOU USED DRUGS OTHER THAN THOSE REQUIRED FOR MEDICAL REASON?
Asaf is a 76 year old who is being evaluated via a billable video visit.      How would you like to obtain your AVS? MyChart  If the video visit is dropped, the invitation should be resent by: Text to cell phone: [phone]  Will anyone else be joining your video visit? No        Assessment & Plan     Anxiety  Continue PRN treatment, advised for side effects   - ALPRAZolam (XANAX) 0.5 MG tablet; TAKE ONE TABLET BY MOUTH ONE TIME DAILY AS NEEDED    Infection due to 2019 novel coronavirus  Improving, no respiratory symptoms. If he develops CP, SOB, recurrent fever, should go to ER   Unable to use Paxlovid, because of CKD, creatinine clearance <30.                See Patient Instructions    Return in about 1 week (around 9/21/2022) for follow up on acute problem if persists, Routine Visit.    Rahul Finch MD  [facility]   Asaf is a 76 year old, presenting for the following health issues:  Covid Concern      Patient was not able to open video visit  Telephone call    HPI       COVID-19 Symptom Review  How many days ago did these symptoms start? 09/11/2022    Are any of the following symptoms significant for you?    New or worsening difficulty breathing? No    Worsening cough? No    Fever or chills? Yes, I felt feverish or had chills.    Headache: YES    Sore throat: No    Chest pain: No    Diarrhea: YES    Body aches? YES    What treatments has patient tried?    Does patient live in a nursing home, group home, or shelter? No  Does patient have a way to get food/medications during quarantined? Yes, I have a friend or family member who can help me.    Patient presents with symptoms of fever, headache, muscle and joint ache.   Symptoms started 4 days ago. Tested positive for COVID in [facility].   Has had diarrheal stools. Had an episode of cough.   Symptoms have improved. No current fever, HA, diarrhea.   Has muscle and joint aches. Feels tired.   Has h/o CKD with cr clearance  <30.   Able to drink fluids and eat meals. No dizziness. No nausea, vomiting.   Has h/o anxiety. On PRN Xanax. No over use or side effects, usually takes 1/2 tablet at bedtime         Review of Systems   Constitutional, HEENT, cardiovascular, pulmonary, gi and gu systems are negative, except as otherwise noted.      Objective    Vitals - Patient Reported  Systolic (Patient Reported): (!) 140  Diastolic (Patient Reported): 70  Temperature (Patient Reported): 98  F (36.7  C)  Pain Score: Moderate Pain (5)  Pain Loc: Other - see comment      Vitals:  No vitals were obtained today due to virtual visit.    Physical Exam   GENERAL: alert and no distress  RESP: No audible wheeze, cough, + hoarseness of the voice     PSYCH: Mentation appears normal, affect normal/bright, judgement and insight intact, normal speech   Lab on 08/01/2022   Component Date Value Ref Range Status     Sodium 08/01/2022 141  133 - 144 mmol/L Final     Potassium 08/01/2022 4.1  3.4 - 5.3 mmol/L Final     Chloride 08/01/2022 110 (A) 94 - 109 mmol/L Final     Carbon Dioxide (CO2) 08/01/2022 19 (A) 20 - 32 mmol/L Final     Anion Gap 08/01/2022 12  3 - 14 mmol/L Final     Urea Nitrogen 08/01/2022 87 (A) 7 - 30 mg/dL Final     Creatinine 08/01/2022 4.66 (A) 0.66 - 1.25 mg/dL Final     Calcium 08/01/2022 7.7 (A) 8.5 - 10.1 mg/dL Final     Glucose 08/01/2022 147 (A) 70 - 99 mg/dL Final     Albumin 08/01/2022 3.7  3.4 - 5.0 g/dL Final     Phosphorus 08/01/2022 5.6 (A) 2.5 - 4.5 mg/dL Final     GFR Estimate 08/01/2022 12 (A) >60 mL/min/1.73m2 Final    Effective December 21, 2021 eGFRcr in adults is calculated using the 2021 CKD-EPI creatinine equation which includes age and gender (Christa vallejo al., NEJM, DOI: 10.1056/WPNVnv0394482)     Parathyroid Hormone Intact 08/01/2022 238 (A) 15 - 65 pg/mL Final     Vitamin D, Total (25-Hydroxy) 08/01/2022 28  20 - 75 ug/L Final     WBC Count 08/01/2022 7.4  4.0 - 11.0 10e3/uL Final     RBC Count 08/01/2022 3.30 (A) 4.40 -  5.90 10e6/uL Final     Hemoglobin 08/01/2022 10.2 (A) 13.3 - 17.7 g/dL Final     Hematocrit 08/01/2022 29.9 (A) 40.0 - 53.0 % Final     MCV 08/01/2022 91  78 - 100 fL Final     MCH 08/01/2022 30.9  26.5 - 33.0 pg Final     MCHC 08/01/2022 34.1  31.5 - 36.5 g/dL Final     RDW 08/01/2022 11.9  10.0 - 15.0 % Final     Platelet Count 08/01/2022 202  150 - 450 10e3/uL Final     Total Protein Urine mg/dL 08/01/2022 14.8  mg/dL Final    The reference ranges have not been established in urine protein. The results should be integrated into the clinical context for interpretation.     Total Protein UR MG/MG CR 08/01/2022 0.34 (A) 0.00 - 0.20 mg/mg Cr Final     Creatinine Urine mg/dL 08/01/2022 43.9  mg/dL Final    The reference ranges have not been established in urine creatinine. The results should be integrated into the clinical context for interpretation.             Phone call time 11 min   No

## 2022-09-19 ENCOUNTER — NON-APPOINTMENT (OUTPATIENT)
Age: 37
End: 2022-09-19

## 2022-09-20 VITALS
RESPIRATION RATE: 16 BRPM | DIASTOLIC BLOOD PRESSURE: 95 MMHG | HEIGHT: 67 IN | HEART RATE: 91 BPM | OXYGEN SATURATION: 99 % | WEIGHT: 293 LBS | TEMPERATURE: 98 F | SYSTOLIC BLOOD PRESSURE: 168 MMHG

## 2022-09-20 RX ORDER — CHLORHEXIDINE GLUCONATE 213 G/1000ML
1 SOLUTION TOPICAL ONCE
Refills: 0 | Status: DISCONTINUED | OUTPATIENT
Start: 2022-09-21 | End: 2022-09-23

## 2022-09-20 NOTE — H&P ADULT - ASSESSMENT
37F w/ PMHx HTN and L foot AVM s/p multiple DSE (most recently 8/16/22), who returns for wound debridement and peripheral angiogram with Dr. Teran.    ASA III and Mallampati III    OF NOTE:    Medications were confirmed with the pt.  Pt's WBC is 12.26, Dr. Teran is aware, pt will be admitted for IV abxs     BHCG urine is negative

## 2022-09-20 NOTE — H&P ADULT - HISTORY OF PRESENT ILLNESS
COVID: 9/20/22 ____  Pharmacy:  Escort:    37F w/ PMHx HTN and L foot AVM s/p multiple DSE (most recently 8/16/22), who returns for wound debridement. Pt reports that initially, it healed completely fine but noted that about 1 month ago it opened up again and she developed 2 ulcers on her left lateral heel area. She denies any ____ fever, chills, ulcer drainage, LE edema or erythema, decreased LE sensation.    Pt now presents for [facility] for recommended wound debridement. COVID: 9/20/22 neg in the chart   [facility] [place] [phone]  Escort: pt will be admitted     37F w/ PMHx HTN and L foot AVM s/p multiple DSE (most recently 8/16/22), who returns for wound debridement and peripheral angiogram.  Pt reports that initially, it healed completely fine but noted that about 1 month ago it opened up again and she developed 2 ulcers on her left lateral heel area. Pt complains of tenderness on her foot claims that she had low grade fever yesterday. Wound does not have any drainage but erythema around the foot present.  She denies  n/v, chills, ulcer drainage, LE edema or decreased LE sensation.    Pt now presents for [facility] for recommended wound debridement and possible peripheral angiogram with Dr. Teran.

## 2022-09-21 ENCOUNTER — TRANSCRIPTION ENCOUNTER (OUTPATIENT)
Age: 37
End: 2022-09-21

## 2022-09-21 ENCOUNTER — INPATIENT (INPATIENT)
Facility: HOSPITAL | Age: 37
LOS: 1 days | Discharge: HOME | DRG: 253 | End: 2022-09-23
Attending: RADIOLOGY | Admitting: RADIOLOGY
Payer: MEDICARE

## 2022-09-21 DIAGNOSIS — Z41.9 ENCOUNTER FOR PROCEDURE FOR PURPOSES OTHER THAN REMEDYING HEALTH STATE, UNSPECIFIED: Chronic | ICD-10-CM

## 2022-09-21 DIAGNOSIS — Z98.89 OTHER SPECIFIED POSTPROCEDURAL STATES: Chronic | ICD-10-CM

## 2022-09-21 LAB
ALBUMIN SERPL ELPH-MCNC: 4.8 G/DL — SIGNIFICANT CHANGE UP (ref 3.3–5)
ALP SERPL-CCNC: 81 U/L — SIGNIFICANT CHANGE UP (ref 40–120)
ALT FLD-CCNC: 8 U/L — LOW (ref 10–45)
ANION GAP SERPL CALC-SCNC: 13 MMOL/L — SIGNIFICANT CHANGE UP (ref 5–17)
APTT BLD: 31.6 SEC — SIGNIFICANT CHANGE UP (ref 27.5–35.5)
AST SERPL-CCNC: 12 U/L — SIGNIFICANT CHANGE UP (ref 10–40)
BASOPHILS # BLD AUTO: 0.06 K/UL — SIGNIFICANT CHANGE UP (ref 0–0.2)
BASOPHILS NFR BLD AUTO: 0.5 % — SIGNIFICANT CHANGE UP (ref 0–2)
BILIRUB SERPL-MCNC: 0.6 MG/DL — SIGNIFICANT CHANGE UP (ref 0.2–1.2)
BUN SERPL-MCNC: 12 MG/DL — SIGNIFICANT CHANGE UP (ref 7–23)
CALCIUM SERPL-MCNC: 9.4 MG/DL — SIGNIFICANT CHANGE UP (ref 8.4–10.5)
CHLORIDE SERPL-SCNC: 103 MMOL/L — SIGNIFICANT CHANGE UP (ref 96–108)
CO2 SERPL-SCNC: 22 MMOL/L — SIGNIFICANT CHANGE UP (ref 22–31)
CREAT SERPL-MCNC: 0.7 MG/DL — SIGNIFICANT CHANGE UP (ref 0.5–1.3)
EGFR: 113 ML/MIN/1.73M2 — SIGNIFICANT CHANGE UP
EOSINOPHIL # BLD AUTO: 0.03 K/UL — SIGNIFICANT CHANGE UP (ref 0–0.5)
EOSINOPHIL NFR BLD AUTO: 0.2 % — SIGNIFICANT CHANGE UP (ref 0–6)
GLUCOSE SERPL-MCNC: 101 MG/DL — HIGH (ref 70–99)
HCG UR QL: NEGATIVE — SIGNIFICANT CHANGE UP
HCT VFR BLD CALC: 41.5 % — SIGNIFICANT CHANGE UP (ref 34.5–45)
HGB BLD-MCNC: 13.6 G/DL — SIGNIFICANT CHANGE UP (ref 11.5–15.5)
IMM GRANULOCYTES NFR BLD AUTO: 0.3 % — SIGNIFICANT CHANGE UP (ref 0–0.9)
INR BLD: 1.05 — SIGNIFICANT CHANGE UP (ref 0.88–1.16)
LYMPHOCYTES # BLD AUTO: 1.91 K/UL — SIGNIFICANT CHANGE UP (ref 1–3.3)
LYMPHOCYTES # BLD AUTO: 15.6 % — SIGNIFICANT CHANGE UP (ref 13–44)
MCHC RBC-ENTMCNC: 28.5 PG — SIGNIFICANT CHANGE UP (ref 27–34)
MCHC RBC-ENTMCNC: 32.8 GM/DL — SIGNIFICANT CHANGE UP (ref 32–36)
MCV RBC AUTO: 87 FL — SIGNIFICANT CHANGE UP (ref 80–100)
MONOCYTES # BLD AUTO: 0.86 K/UL — SIGNIFICANT CHANGE UP (ref 0–0.9)
MONOCYTES NFR BLD AUTO: 7 % — SIGNIFICANT CHANGE UP (ref 2–14)
NEUTROPHILS # BLD AUTO: 9.36 K/UL — HIGH (ref 1.8–7.4)
NEUTROPHILS NFR BLD AUTO: 76.4 % — SIGNIFICANT CHANGE UP (ref 43–77)
NRBC # BLD: 0 /100 WBCS — SIGNIFICANT CHANGE UP (ref 0–0)
PLATELET # BLD AUTO: 514 K/UL — HIGH (ref 150–400)
POTASSIUM SERPL-MCNC: 3.9 MMOL/L — SIGNIFICANT CHANGE UP (ref 3.5–5.3)
POTASSIUM SERPL-SCNC: 3.9 MMOL/L — SIGNIFICANT CHANGE UP (ref 3.5–5.3)
PROT SERPL-MCNC: 7.9 G/DL — SIGNIFICANT CHANGE UP (ref 6–8.3)
PROTHROM AB SERPL-ACNC: 12.5 SEC — SIGNIFICANT CHANGE UP (ref 10.5–13.4)
RBC # BLD: 4.77 M/UL — SIGNIFICANT CHANGE UP (ref 3.8–5.2)
RBC # FLD: 12.5 % — SIGNIFICANT CHANGE UP (ref 10.3–14.5)
SODIUM SERPL-SCNC: 138 MMOL/L — SIGNIFICANT CHANGE UP (ref 135–145)
WBC # BLD: 12.26 K/UL — HIGH (ref 3.8–10.5)
WBC # FLD AUTO: 12.26 K/UL — HIGH (ref 3.8–10.5)

## 2022-09-21 PROCEDURE — 36247 INS CATH ABD/L-EXT ART 3RD: CPT

## 2022-09-21 PROCEDURE — 37242 VASC EMBOLIZE/OCCLUDE ARTERY: CPT

## 2022-09-21 PROCEDURE — 36248 INS CATH ABD/L-EXT ART ADDL: CPT | Mod: 59

## 2022-09-21 RX ORDER — NEBIVOLOL HYDROCHLORIDE 5 MG/1
10 TABLET ORAL DAILY
Refills: 0 | Status: DISCONTINUED | OUTPATIENT
Start: 2022-09-21 | End: 2022-09-23

## 2022-09-21 RX ORDER — NEBIVOLOL HYDROCHLORIDE 5 MG/1
1 TABLET ORAL
Qty: 0 | Refills: 0 | DISCHARGE

## 2022-09-21 RX ORDER — INFLUENZA VIRUS VACCINE 15; 15; 15; 15 UG/.5ML; UG/.5ML; UG/.5ML; UG/.5ML
0.5 SUSPENSION INTRAMUSCULAR ONCE
Refills: 0 | Status: DISCONTINUED | OUTPATIENT
Start: 2022-09-21 | End: 2022-09-23

## 2022-09-21 RX ORDER — GABAPENTIN 400 MG/1
100 CAPSULE ORAL EVERY 24 HOURS
Refills: 0 | Status: DISCONTINUED | OUTPATIENT
Start: 2022-09-22 | End: 2022-09-23

## 2022-09-21 RX ORDER — HYDROMORPHONE HYDROCHLORIDE 2 MG/ML
2 INJECTION INTRAMUSCULAR; INTRAVENOUS; SUBCUTANEOUS EVERY 4 HOURS
Refills: 0 | Status: DISCONTINUED | OUTPATIENT
Start: 2022-09-21 | End: 2022-09-21

## 2022-09-21 RX ORDER — HYDROMORPHONE HYDROCHLORIDE 2 MG/ML
2 INJECTION INTRAMUSCULAR; INTRAVENOUS; SUBCUTANEOUS EVERY 4 HOURS
Refills: 0 | Status: DISCONTINUED | OUTPATIENT
Start: 2022-09-21 | End: 2022-09-23

## 2022-09-21 RX ORDER — GABAPENTIN 400 MG/1
800 CAPSULE ORAL EVERY 24 HOURS
Refills: 0 | Status: DISCONTINUED | OUTPATIENT
Start: 2022-09-22 | End: 2022-09-23

## 2022-09-21 RX ORDER — OXYCODONE AND ACETAMINOPHEN 5; 325 MG/1; MG/1
2 TABLET ORAL EVERY 4 HOURS
Refills: 0 | Status: DISCONTINUED | OUTPATIENT
Start: 2022-09-21 | End: 2022-09-22

## 2022-09-21 RX ADMIN — HYDROMORPHONE HYDROCHLORIDE 2 MILLIGRAM(S): 2 INJECTION INTRAMUSCULAR; INTRAVENOUS; SUBCUTANEOUS at 17:40

## 2022-09-21 RX ADMIN — NEBIVOLOL HYDROCHLORIDE 10 MILLIGRAM(S): 5 TABLET ORAL at 19:40

## 2022-09-21 RX ADMIN — HYDROMORPHONE HYDROCHLORIDE 2 MILLIGRAM(S): 2 INJECTION INTRAMUSCULAR; INTRAVENOUS; SUBCUTANEOUS at 22:13

## 2022-09-21 RX ADMIN — HYDROMORPHONE HYDROCHLORIDE 2 MILLIGRAM(S): 2 INJECTION INTRAMUSCULAR; INTRAVENOUS; SUBCUTANEOUS at 22:30

## 2022-09-21 RX ADMIN — HYDROMORPHONE HYDROCHLORIDE 2 MILLIGRAM(S): 2 INJECTION INTRAMUSCULAR; INTRAVENOUS; SUBCUTANEOUS at 18:00

## 2022-09-21 NOTE — PATIENT PROFILE ADULT - FALL HARM RISK - HARM RISK INTERVENTIONS

## 2022-09-21 NOTE — BRIEF OPERATIVE NOTE - COMMENTS
admit for inpatient observation  resume home pain meds regimen Percocet 10mg Q4h with Dilaudid IV prn.  groin check per post cath protocol   bedrest for 4 hours.

## 2022-09-21 NOTE — PRE-ANESTHESIA EVALUATION ADULT - NSANTHOSAYNRD_GEN_A_CORE
No. IDANIA screening performed.  STOP BANG Legend: 0-2 = LOW Risk; 3-4 = INTERMEDIATE Risk; 5-8 = HIGH Risk

## 2022-09-21 NOTE — BRIEF OPERATIVE NOTE - NSICDXBRIEFPREOP_GEN_ALL_CORE_FT
PRE-OP DIAGNOSIS:  Congenital peripheral AVM 21-Sep-2022 13:24:08  Janette Dixon  Congenital peripheral AVM 21-Sep-2022 13:24:14  Janette Dixon

## 2022-09-21 NOTE — BRIEF OPERATIVE NOTE - ASSISTANT(S)
Goal Outcome Evaluation:           Progress: no change  Outcome Summary: Patient had no complaints of pain throughout shift. Wound care preformed, orders for sutures to be removed tomorrow. Patient was extremely fatigued throughout shift, but was able to sit up on the side of the bed and eat breakfast. Patient slept most of the day.   Li

## 2022-09-21 NOTE — BRIEF OPERATIVE NOTE - OPERATION/FINDINGS
R CAF access using micropuncture technique and 5F sheath. Left LE angiogram shows avm supplied by PTA. Transcatheter embolization performed using a total of 0.6 nBCA at 3 separate AVM nidus near the lateral ankle of the foot. Repeat angiogram shows improved an perfusion and decreased AV shunting. Sheath removed with manual compression for 20 mins with hemostasis achieved. R CAF access using micropuncture technique and 5F sheath. Left LE angiogram shows avm supplied by PTA. Transcatheter embolization performed using a total of 0.6 nBCA at 3 separate AVM nidus near the lateral ankle of the foot. Repeat angiogram shows improved an perfusion and decreased AV shunting. Sheath removed with manual compression for 20 mins with hemostasis achieved. Ulcer at the left lateral ankle cleaned with 4% chlorhexidine.

## 2022-09-22 ENCOUNTER — TRANSCRIPTION ENCOUNTER (OUTPATIENT)
Age: 37
End: 2022-09-22

## 2022-09-22 DIAGNOSIS — Q27.30 ARTERIOVENOUS MALFORMATION, SITE UNSPECIFIED: ICD-10-CM

## 2022-09-22 DIAGNOSIS — I10 ESSENTIAL (PRIMARY) HYPERTENSION: ICD-10-CM

## 2022-09-22 LAB
ANION GAP SERPL CALC-SCNC: 10 MMOL/L — SIGNIFICANT CHANGE UP (ref 5–17)
BUN SERPL-MCNC: 17 MG/DL — SIGNIFICANT CHANGE UP (ref 7–23)
CALCIUM SERPL-MCNC: 8.7 MG/DL — SIGNIFICANT CHANGE UP (ref 8.4–10.5)
CHLORIDE SERPL-SCNC: 103 MMOL/L — SIGNIFICANT CHANGE UP (ref 96–108)
CO2 SERPL-SCNC: 23 MMOL/L — SIGNIFICANT CHANGE UP (ref 22–31)
CREAT SERPL-MCNC: 0.71 MG/DL — SIGNIFICANT CHANGE UP (ref 0.5–1.3)
EGFR: 111 ML/MIN/1.73M2 — SIGNIFICANT CHANGE UP
GLUCOSE SERPL-MCNC: 114 MG/DL — HIGH (ref 70–99)
HCT VFR BLD CALC: 35.8 % — SIGNIFICANT CHANGE UP (ref 34.5–45)
HGB BLD-MCNC: 11.5 G/DL — SIGNIFICANT CHANGE UP (ref 11.5–15.5)
MAGNESIUM SERPL-MCNC: 1.9 MG/DL — SIGNIFICANT CHANGE UP (ref 1.6–2.6)
MCHC RBC-ENTMCNC: 28.7 PG — SIGNIFICANT CHANGE UP (ref 27–34)
MCHC RBC-ENTMCNC: 32.1 GM/DL — SIGNIFICANT CHANGE UP (ref 32–36)
MCV RBC AUTO: 89.3 FL — SIGNIFICANT CHANGE UP (ref 80–100)
NRBC # BLD: 0 /100 WBCS — SIGNIFICANT CHANGE UP (ref 0–0)
PLATELET # BLD AUTO: 430 K/UL — HIGH (ref 150–400)
POTASSIUM SERPL-MCNC: 4 MMOL/L — SIGNIFICANT CHANGE UP (ref 3.5–5.3)
POTASSIUM SERPL-SCNC: 4 MMOL/L — SIGNIFICANT CHANGE UP (ref 3.5–5.3)
RBC # BLD: 4.01 M/UL — SIGNIFICANT CHANGE UP (ref 3.8–5.2)
RBC # FLD: 12.4 % — SIGNIFICANT CHANGE UP (ref 10.3–14.5)
SODIUM SERPL-SCNC: 136 MMOL/L — SIGNIFICANT CHANGE UP (ref 135–145)
WBC # BLD: 11.83 K/UL — HIGH (ref 3.8–10.5)
WBC # FLD AUTO: 11.83 K/UL — HIGH (ref 3.8–10.5)

## 2022-09-22 RX ORDER — VANCOMYCIN HCL 1 G
2000 VIAL (EA) INTRAVENOUS ONCE
Refills: 0 | Status: COMPLETED | OUTPATIENT
Start: 2022-09-22 | End: 2022-09-22

## 2022-09-22 RX ADMIN — HYDROMORPHONE HYDROCHLORIDE 2 MILLIGRAM(S): 2 INJECTION INTRAMUSCULAR; INTRAVENOUS; SUBCUTANEOUS at 18:53

## 2022-09-22 RX ADMIN — HYDROMORPHONE HYDROCHLORIDE 2 MILLIGRAM(S): 2 INJECTION INTRAMUSCULAR; INTRAVENOUS; SUBCUTANEOUS at 14:44

## 2022-09-22 RX ADMIN — HYDROMORPHONE HYDROCHLORIDE 2 MILLIGRAM(S): 2 INJECTION INTRAMUSCULAR; INTRAVENOUS; SUBCUTANEOUS at 10:57

## 2022-09-22 RX ADMIN — NEBIVOLOL HYDROCHLORIDE 10 MILLIGRAM(S): 5 TABLET ORAL at 06:28

## 2022-09-22 RX ADMIN — HYDROMORPHONE HYDROCHLORIDE 2 MILLIGRAM(S): 2 INJECTION INTRAMUSCULAR; INTRAVENOUS; SUBCUTANEOUS at 18:38

## 2022-09-22 RX ADMIN — HYDROMORPHONE HYDROCHLORIDE 2 MILLIGRAM(S): 2 INJECTION INTRAMUSCULAR; INTRAVENOUS; SUBCUTANEOUS at 23:24

## 2022-09-22 RX ADMIN — HYDROMORPHONE HYDROCHLORIDE 2 MILLIGRAM(S): 2 INJECTION INTRAMUSCULAR; INTRAVENOUS; SUBCUTANEOUS at 06:45

## 2022-09-22 RX ADMIN — GABAPENTIN 800 MILLIGRAM(S): 400 CAPSULE ORAL at 12:20

## 2022-09-22 RX ADMIN — Medication 250 MILLIGRAM(S): at 14:10

## 2022-09-22 RX ADMIN — HYDROMORPHONE HYDROCHLORIDE 2 MILLIGRAM(S): 2 INJECTION INTRAMUSCULAR; INTRAVENOUS; SUBCUTANEOUS at 10:42

## 2022-09-22 RX ADMIN — HYDROMORPHONE HYDROCHLORIDE 2 MILLIGRAM(S): 2 INJECTION INTRAMUSCULAR; INTRAVENOUS; SUBCUTANEOUS at 02:15

## 2022-09-22 RX ADMIN — HYDROMORPHONE HYDROCHLORIDE 2 MILLIGRAM(S): 2 INJECTION INTRAMUSCULAR; INTRAVENOUS; SUBCUTANEOUS at 02:01

## 2022-09-22 RX ADMIN — GABAPENTIN 100 MILLIGRAM(S): 400 CAPSULE ORAL at 06:29

## 2022-09-22 RX ADMIN — HYDROMORPHONE HYDROCHLORIDE 2 MILLIGRAM(S): 2 INJECTION INTRAMUSCULAR; INTRAVENOUS; SUBCUTANEOUS at 22:54

## 2022-09-22 RX ADMIN — HYDROMORPHONE HYDROCHLORIDE 2 MILLIGRAM(S): 2 INJECTION INTRAMUSCULAR; INTRAVENOUS; SUBCUTANEOUS at 14:59

## 2022-09-22 RX ADMIN — HYDROMORPHONE HYDROCHLORIDE 2 MILLIGRAM(S): 2 INJECTION INTRAMUSCULAR; INTRAVENOUS; SUBCUTANEOUS at 06:30

## 2022-09-22 NOTE — PROGRESS NOTE ADULT - PROBLEM SELECTOR PLAN 2
-SBP 120s  -continue home BP meds          DVT PPx: ambulatory  Dispo: dc in AM if stable    Case d/w Dr. Teran's team

## 2022-09-22 NOTE — DISCHARGE NOTE PROVIDER - NSDCCPCAREPLAN_GEN_ALL_CORE_FT
PRINCIPAL DISCHARGE DIAGNOSIS  Diagnosis: AVM (arteriovenous malformation)  Assessment and Plan of Treatment:       SECONDARY DISCHARGE DIAGNOSES  Diagnosis: Hypertension  Assessment and Plan of Treatment: You have a history of elevated blood pressure and you should continue your blood pressure medications as prescribed. Monitor blood pressure daily with goal <140/90.     PRINCIPAL DISCHARGE DIAGNOSIS  Diagnosis: AVM (arteriovenous malformation)  Assessment and Plan of Treatment: You have a history of an AVM, you underwent a angiogram of your lower extremity and an embolization. You should continue your antibiotics and your pain medications as prescribed. Follow up with Dr. Teran  within 4-6 weeks of your discharge.      SECONDARY DISCHARGE DIAGNOSES  Diagnosis: Hypertension  Assessment and Plan of Treatment: You have a history of elevated blood pressure and you should continue your blood pressure medications as prescribed. Monitor blood pressure daily with goal <140/90.     PRINCIPAL DISCHARGE DIAGNOSIS  Diagnosis: AVM (arteriovenous malformation)  Assessment and Plan of Treatment: You have a history of an AVM, you underwent a angiogram of your lower extremity and an embolization. You should continue  your pain medications as prescribed by pain management specialist. Follow up with Dr. Teran  within 4-6 weeks of your discharge.      SECONDARY DISCHARGE DIAGNOSES  Diagnosis: Hypertension  Assessment and Plan of Treatment: You have a history of elevated blood pressure and you should continue your blood pressure medications as prescribed. Monitor blood pressure daily with goal <140/90.

## 2022-09-22 NOTE — PROGRESS NOTE ADULT - SUBJECTIVE AND OBJECTIVE BOX
Interventional Cardiology PA Adult Progress Note    Subjective Assessment:  	  MEDICATIONS:  nebivolol 10 milliGRAM(s) Oral daily    vancomycin  IVPB 2000 milliGRAM(s) IV Intermittent once      gabapentin 100 milliGRAM(s) Oral every 24 hours  gabapentin 800 milliGRAM(s) Oral every 24 hours  HYDROmorphone  Injectable 2 milliGRAM(s) IV Push every 4 hours PRN  oxycodone    5 mG/acetaminophen 325 mG 2 Tablet(s) Oral every 4 hours        chlorhexidine 4% Liquid 1 Application(s) Topical once  influenza   Vaccine 0.5 milliLiter(s) IntraMuscular once      	    [PHYSICAL EXAM:  TELEMETRY:  T(C): 36.7 (09-22-22 @ 09:29), Max: 36.8 (09-22-22 @ 06:08)  HR: 82 (09-22-22 @ 10:00) (78 - 90)  BP: 132/76 (09-22-22 @ 10:00) (91/55 - 136/90)  RR: 18 (09-22-22 @ 10:00) (18 - 18)  SpO2: 98% (09-22-22 @ 10:00) (95% - 99%)  Wt(kg): --  I&O's Summary    21 Sep 2022 07:01  -  22 Sep 2022 07:00  --------------------------------------------------------  IN: 200 mL / OUT: 0 mL / NET: 200 mL    22 Sep 2022 07:01  -  22 Sep 2022 11:13  --------------------------------------------------------  IN: 375 mL / OUT: 0 mL / NET: 375 mL        Robles:  Central/PICC/Mid Line:                                         Appearance: obese female   HEENT:   Normal oral mucosa, PERRL, EOMI	  Neck: Supple, - JVD; no Carotid Bruit   Cardiovascular: Normal S1 S2, No JVD, No murmurs,   Respiratory: Lungs clear to auscultation, No Rales, Rhonchi, Wheezing	  Gastrointestinal:  Soft, Non-tender, + BS	  Skin: No rashes, No ecchymoses, No cyanosis  Extremities: Normal range of motion, No clubbing, cyanosis or edema, left foot dressing c/d/i   Vascular: Peripheral pulses palpable 2+ bilaterally  Neurologic: Non-focal  Psychiatry: A & O x 3, Mood & affect appropriate    	    LABS:	 	               11.5   11.83 )-----------( 430      ( 22 Sep 2022 05:53 )             35.8     09-22    136  |  103  |  17  ----------------------------<  114<H>  4.0   |  23  |  0.71    Ca    8.7      22 Sep 2022 05:53  Mg     1.9     09-22    TPro  7.9  /  Alb  4.8  /  TBili  0.6  /  DBili  x   /  AST  12  /  ALT  8<L>  /  AlkPhos  81  09-21    PT/INR - ( 21 Sep 2022 09:17 )   PT: 12.5 sec;   INR: 1.05          PTT - ( 21 Sep 2022 09:17 )  PTT:31.6 sec       Interventional Cardiology PA Adult Progress Note    Subjective Assessment: Patient examined at bedside this AM, feeling well. Denies CP, SOB, LE pain, or palpitations.   	  MEDICATIONS:  nebivolol 10 milliGRAM(s) Oral daily  vancomycin  IVPB 2000 milliGRAM(s) IV Intermittent once  gabapentin 100 milliGRAM(s) Oral every 24 hours  gabapentin 800 milliGRAM(s) Oral every 24 hours  HYDROmorphone  Injectable 2 milliGRAM(s) IV Push every 4 hours PRN  oxycodone    5 mG/acetaminophen 325 mG 2 Tablet(s) Oral every 4 hours  chlorhexidine 4% Liquid 1 Application(s) Topical once  influenza   Vaccine 0.5 milliLiter(s) IntraMuscular once      	    [PHYSICAL EXAM:  TELEMETRY:  T(C): 36.7 (09-22-22 @ 09:29), Max: 36.8 (09-22-22 @ 06:08)  HR: 82 (09-22-22 @ 10:00) (78 - 90)  BP: 132/76 (09-22-22 @ 10:00) (91/55 - 136/90)  RR: 18 (09-22-22 @ 10:00) (18 - 18)  SpO2: 98% (09-22-22 @ 10:00) (95% - 99%)    I&O's Summary    21 Sep 2022 07:01  -  22 Sep 2022 07:00  --------------------------------------------------------  IN: 200 mL / OUT: 0 mL / NET: 200 mL    22 Sep 2022 07:01  -  22 Sep 2022 11:13  --------------------------------------------------------  IN: 375 mL / OUT: 0 mL / NET: 375 mL                                      Appearance: obese female   HEENT:   Normal oral mucosa, PERRL, EOMI	  Neck: Supple, - JVD; no Carotid Bruit   Cardiovascular: Normal S1 S2, No JVD, No murmurs,   Respiratory: Lungs clear to auscultation, No Rales, Rhonchi, Wheezing	  Gastrointestinal:  Soft, Non-tender, + BS	  Skin: No rashes, No ecchymoses, No cyanosis  Extremities: Normal range of motion, No clubbing, cyanosis or edema, left foot dressing c/d/i   Vascular: Peripheral pulses palpable 2+ bilaterally  Neurologic: Non-focal  Psychiatry: A & O x 3, Mood & affect appropriate    	    LABS:	 	               11.5   11.83 )-----------( 430      ( 22 Sep 2022 05:53 )             35.8     09-22    136  |  103  |  17  ----------------------------<  114<H>  4.0   |  23  |  0.71    Ca    8.7      22 Sep 2022 05:53  Mg     1.9     09-22    TPro  7.9  /  Alb  4.8  /  TBili  0.6  /  DBili  x   /  AST  12  /  ALT  8<L>  /  AlkPhos  81  09-21    PT/INR - ( 21 Sep 2022 09:17 )   PT: 12.5 sec;   INR: 1.05          PTT - ( 21 Sep 2022 09:17 )  PTT:31.6 sec

## 2022-09-22 NOTE — DISCHARGE NOTE PROVIDER - CARE PROVIDER_API CALL
J Carlos Teran)  Diagnostic Radiology  [address] [place] [address]  Phone: [phone]  Fax: [phone]  Follow Up Time: Routine

## 2022-09-22 NOTE — DISCHARGE NOTE PROVIDER - NSDCMRMEDTOKEN_GEN_ALL_CORE_FT
Bystolic 10 mg oral tablet: 1 tab(s) orally 2 times a day  gabapentin 100 mg oral capsule: 1 cap(s) orally once a day (in the morning)  gabapentin 800 mg oral tablet: 1  orally once a day (in the afternoon)  Junel Fe 1.5/30 oral tablet: 1 tab(s) orally once a day  Percocet 10/325 oral tablet: 1 tab(s) orally every 4 hours, As Needed  tiZANidine 4 mg oral tablet: orally once a day (at bedtime)  Tylenol Extra Strength 500 mg oral tablet: 1 tab(s) orally 4 times a day, As Needed

## 2022-09-22 NOTE — PROGRESS NOTE ADULT - ASSESSMENT
37F w/ PMHx HTN and L foot AVM s/p multiple DSE (most recently 8/16/22), who is s/p wound debridement and peripheral angiogram of left LE. Monitoring overnight w/ possible dc in AM

## 2022-09-22 NOTE — DISCHARGE NOTE PROVIDER - CARE PROVIDERS DIRECT ADDRESSES
[email or web address]

## 2022-09-22 NOTE — PROGRESS NOTE ADULT - PROBLEM SELECTOR PLAN 1
-hx of multiple DSE of left LE AVM  -s/p LE angiogram/embolization w/ wound debridement 09/21  -Ordered for IV Vancomycin 2g x1 and will continue pain management as per Dr. Teran team

## 2022-09-22 NOTE — DISCHARGE NOTE PROVIDER - HOSPITAL COURSE
37F w/ PMHx HTN and L foot AVM s/p multiple DSE (most recently 8/16/22), who returns for wound debridement and peripheral angiogram.  Pt reports that initially, it healed completely fine but noted that about 1 month ago it opened up again and she developed 2 ulcers on her left lateral heel area. Pt complains of tenderness on her foot claims that she had low grade fever yesterday. Wound does not have any drainage but erythema around the foot present.  She denies  n/v, chills, ulcer drainage, LE edema or decreased LE sensation. Pt now presents for [facility] for recommended wound debridement and possible peripheral angiogram with Dr. Teran.    37F w/ PMHx HTN and L foot AVM s/p multiple DSE (most recently 8/16/22), who returns for wound debridement and peripheral angiogram.  Pt reports that initially, it healed completely fine but noted that about 1 month ago it opened up again and she developed 2 ulcers on her left lateral heel area. Pt complains of tenderness on her foot claims that she had low grade fever yesterday. Wound does not have any drainage but erythema around the foot present.  She denies  n/v, chills, ulcer drainage, LE edema or decreased LE sensation. Pt now presents for [facility] for recommended wound debridement and possible peripheral angiogram with Dr. Teran. Pt is s/p angiogram and DSE 9/21. RFA sheath removed and manually compressed. Access site stable. Overnight, no events. This AM, feels well, denies CP, SOB, n/v/d, LE edema. VSS, no events on tele, labs reviewed PE sig for R groin site stable c/d/i, no bleeding, pain or hematoma. Patient was seen and examined by attending who agrees with above d/c plan. All meds rx to pharmacy and explained to patient. Patient instructed to return if sx worsen including not limited to chest pain, shortness of breath.    37F w/ PMHx HTN and L foot AVM s/p multiple DSE (most recently 8/16/22), who returns for wound debridement and peripheral angiogram.  Pt reports that initially, it healed completely fine but noted that about 1 month ago it opened up again and she developed 2 ulcers on her left lateral heel area. Pt complains of tenderness on her foot claims that she had low grade fever yesterday. Wound does not have any drainage but erythema around the foot present.  She denies  n/v, chills, ulcer drainage, LE edema or decreased LE sensation. Pt now presents for [facility] for recommended wound debridement and possible peripheral angiogram with Dr. Teran. Pt is s/p angiogram and DSE 9/21. RFA sheath removed and manually compressed. Access site stable. Overnight, no events. This AM, feels well, denies CP, SOB, n/v/d, LE edema. VSS, no events on tele, labs reviewed PE sig for R groin site stable c/d/i, no bleeding, pain or hematoma and LLE dressing c/d/i. Patient was seen and examined by attending who agrees with above d/c plan. All meds rx to pharmacy and explained to patient. Patient instructed to return if sx worsen including not limited to chest pain, shortness of breath.

## 2022-09-23 ENCOUNTER — TRANSCRIPTION ENCOUNTER (OUTPATIENT)
Age: 37
End: 2022-09-23

## 2022-09-23 VITALS
SYSTOLIC BLOOD PRESSURE: 162 MMHG | HEART RATE: 93 BPM | DIASTOLIC BLOOD PRESSURE: 92 MMHG | RESPIRATION RATE: 18 BRPM | OXYGEN SATURATION: 98 %

## 2022-09-23 LAB
ANION GAP SERPL CALC-SCNC: 12 MMOL/L — SIGNIFICANT CHANGE UP (ref 5–17)
BASOPHILS # BLD AUTO: 0.08 K/UL — SIGNIFICANT CHANGE UP (ref 0–0.2)
BASOPHILS NFR BLD AUTO: 0.8 % — SIGNIFICANT CHANGE UP (ref 0–2)
BUN SERPL-MCNC: 16 MG/DL — SIGNIFICANT CHANGE UP (ref 7–23)
CALCIUM SERPL-MCNC: 8.8 MG/DL — SIGNIFICANT CHANGE UP (ref 8.4–10.5)
CHLORIDE SERPL-SCNC: 103 MMOL/L — SIGNIFICANT CHANGE UP (ref 96–108)
CO2 SERPL-SCNC: 23 MMOL/L — SIGNIFICANT CHANGE UP (ref 22–31)
CREAT SERPL-MCNC: 0.8 MG/DL — SIGNIFICANT CHANGE UP (ref 0.5–1.3)
EGFR: 96 ML/MIN/1.73M2 — SIGNIFICANT CHANGE UP
EOSINOPHIL # BLD AUTO: 0.25 K/UL — SIGNIFICANT CHANGE UP (ref 0–0.5)
EOSINOPHIL NFR BLD AUTO: 2.6 % — SIGNIFICANT CHANGE UP (ref 0–6)
GLUCOSE SERPL-MCNC: 86 MG/DL — SIGNIFICANT CHANGE UP (ref 70–99)
HCT VFR BLD CALC: 37.5 % — SIGNIFICANT CHANGE UP (ref 34.5–45)
HGB BLD-MCNC: 11.9 G/DL — SIGNIFICANT CHANGE UP (ref 11.5–15.5)
IMM GRANULOCYTES NFR BLD AUTO: 0.4 % — SIGNIFICANT CHANGE UP (ref 0–0.9)
LYMPHOCYTES # BLD AUTO: 2.33 K/UL — SIGNIFICANT CHANGE UP (ref 1–3.3)
LYMPHOCYTES # BLD AUTO: 24.7 % — SIGNIFICANT CHANGE UP (ref 13–44)
MAGNESIUM SERPL-MCNC: 1.7 MG/DL — SIGNIFICANT CHANGE UP (ref 1.6–2.6)
MCHC RBC-ENTMCNC: 28.7 PG — SIGNIFICANT CHANGE UP (ref 27–34)
MCHC RBC-ENTMCNC: 31.7 GM/DL — LOW (ref 32–36)
MCV RBC AUTO: 90.6 FL — SIGNIFICANT CHANGE UP (ref 80–100)
MONOCYTES # BLD AUTO: 0.93 K/UL — HIGH (ref 0–0.9)
MONOCYTES NFR BLD AUTO: 9.8 % — SIGNIFICANT CHANGE UP (ref 2–14)
NEUTROPHILS # BLD AUTO: 5.82 K/UL — SIGNIFICANT CHANGE UP (ref 1.8–7.4)
NEUTROPHILS NFR BLD AUTO: 61.7 % — SIGNIFICANT CHANGE UP (ref 43–77)
NRBC # BLD: 0 /100 WBCS — SIGNIFICANT CHANGE UP (ref 0–0)
PLATELET # BLD AUTO: 411 K/UL — HIGH (ref 150–400)
POTASSIUM SERPL-MCNC: 4.1 MMOL/L — SIGNIFICANT CHANGE UP (ref 3.5–5.3)
POTASSIUM SERPL-SCNC: 4.1 MMOL/L — SIGNIFICANT CHANGE UP (ref 3.5–5.3)
RBC # BLD: 4.14 M/UL — SIGNIFICANT CHANGE UP (ref 3.8–5.2)
RBC # FLD: 12.7 % — SIGNIFICANT CHANGE UP (ref 10.3–14.5)
SODIUM SERPL-SCNC: 138 MMOL/L — SIGNIFICANT CHANGE UP (ref 135–145)
WBC # BLD: 9.45 K/UL — SIGNIFICANT CHANGE UP (ref 3.8–10.5)
WBC # FLD AUTO: 9.45 K/UL — SIGNIFICANT CHANGE UP (ref 3.8–10.5)

## 2022-09-23 PROCEDURE — C1887: CPT

## 2022-09-23 PROCEDURE — 36415 COLL VENOUS BLD VENIPUNCTURE: CPT

## 2022-09-23 PROCEDURE — 85027 COMPLETE CBC AUTOMATED: CPT

## 2022-09-23 PROCEDURE — 85610 PROTHROMBIN TIME: CPT

## 2022-09-23 PROCEDURE — 85025 COMPLETE CBC W/AUTO DIFF WBC: CPT

## 2022-09-23 PROCEDURE — 80048 BASIC METABOLIC PNL TOTAL CA: CPT

## 2022-09-23 PROCEDURE — 80053 COMPREHEN METABOLIC PANEL: CPT

## 2022-09-23 PROCEDURE — C1889: CPT

## 2022-09-23 PROCEDURE — 83735 ASSAY OF MAGNESIUM: CPT

## 2022-09-23 PROCEDURE — 81025 URINE PREGNANCY TEST: CPT

## 2022-09-23 PROCEDURE — C1894: CPT

## 2022-09-23 PROCEDURE — 85730 THROMBOPLASTIN TIME PARTIAL: CPT

## 2022-09-23 PROCEDURE — C1769: CPT

## 2022-09-23 RX ADMIN — HYDROMORPHONE HYDROCHLORIDE 2 MILLIGRAM(S): 2 INJECTION INTRAMUSCULAR; INTRAVENOUS; SUBCUTANEOUS at 11:55

## 2022-09-23 RX ADMIN — HYDROMORPHONE HYDROCHLORIDE 2 MILLIGRAM(S): 2 INJECTION INTRAMUSCULAR; INTRAVENOUS; SUBCUTANEOUS at 03:08

## 2022-09-23 RX ADMIN — HYDROMORPHONE HYDROCHLORIDE 2 MILLIGRAM(S): 2 INJECTION INTRAMUSCULAR; INTRAVENOUS; SUBCUTANEOUS at 16:15

## 2022-09-23 RX ADMIN — NEBIVOLOL HYDROCHLORIDE 10 MILLIGRAM(S): 5 TABLET ORAL at 05:35

## 2022-09-23 RX ADMIN — HYDROMORPHONE HYDROCHLORIDE 2 MILLIGRAM(S): 2 INJECTION INTRAMUSCULAR; INTRAVENOUS; SUBCUTANEOUS at 11:40

## 2022-09-23 RX ADMIN — GABAPENTIN 100 MILLIGRAM(S): 400 CAPSULE ORAL at 05:35

## 2022-09-23 RX ADMIN — HYDROMORPHONE HYDROCHLORIDE 2 MILLIGRAM(S): 2 INJECTION INTRAMUSCULAR; INTRAVENOUS; SUBCUTANEOUS at 07:15

## 2022-09-23 RX ADMIN — HYDROMORPHONE HYDROCHLORIDE 2 MILLIGRAM(S): 2 INJECTION INTRAMUSCULAR; INTRAVENOUS; SUBCUTANEOUS at 03:30

## 2022-09-23 RX ADMIN — HYDROMORPHONE HYDROCHLORIDE 2 MILLIGRAM(S): 2 INJECTION INTRAMUSCULAR; INTRAVENOUS; SUBCUTANEOUS at 15:54

## 2022-09-23 NOTE — DISCHARGE NOTE NURSING/CASE MANAGEMENT/SOCIAL WORK - NSDCPEFALRISK_GEN_ALL_CORE
For information on Fall & Injury Prevention, visit: [email or web address] OR  [email or web address] OR  https://www.cdc.gov/steadi/patient.html
For information on Fall & Injury Prevention, visit: [email or web address] OR  [email or web address] OR  https://www.cdc.gov/steadi/patient.html

## 2022-09-23 NOTE — DISCHARGE NOTE NURSING/CASE MANAGEMENT/SOCIAL WORK - NSDCVIVACCINE_GEN_ALL_CORE_FT
influenza, injectable, quadrivalent, preservative free; 22-Oct-2020 10:12; Miley Hebert (RN); [employer]; [id number] (Exp. Date: 30-Jun-2021); IntraMuscular; Deltoid Left.; 0.5 milliLiter(s); VIS (VIS Published: 15-Aug-2019, VIS Presented: 22-Oct-2020);   influenza, injectable, quadrivalent, preservative free; 14-Oct-2021 09:19; Margie Taveras (RN); [employer]; [id number] (Exp. Date: 30-Jun-2022); IntraMuscular; Deltoid Right.; 0.5 milliLiter(s); VIS (VIS Published: 06-Aug-2021, VIS Presented: 14-Oct-2021);   
influenza, injectable, quadrivalent, preservative free; 22-Oct-2020 10:12; Miley Hebert (RN); [employer]; [id number] (Exp. Date: 30-Jun-2021); IntraMuscular; Deltoid Left.; 0.5 milliLiter(s); VIS (VIS Published: 15-Aug-2019, VIS Presented: 22-Oct-2020);   influenza, injectable, quadrivalent, preservative free; 14-Oct-2021 09:19; Margie Taveras (RN); [employer]; [id number] (Exp. Date: 30-Jun-2022); IntraMuscular; Deltoid Right.; 0.5 milliLiter(s); VIS (VIS Published: 06-Aug-2021, VIS Presented: 14-Oct-2021);

## 2022-09-23 NOTE — DISCHARGE NOTE NURSING/CASE MANAGEMENT/SOCIAL WORK - PATIENT PORTAL LINK FT
You can access the FollowMyHealth Patient Portal offered by [facility] by registering at the following website: [email or web address]. By joining [employer]’s FollowMyHealth portal, you will also be able to view your health information using other applications (apps) compatible with our system.
You can access the FollowMyHealth Patient Portal offered by [facility] by registering at the following website: [email or web address]. By joining [employer]’s FollowMyHealth portal, you will also be able to view your health information using other applications (apps) compatible with our system.

## 2022-09-27 DIAGNOSIS — I10 ESSENTIAL (PRIMARY) HYPERTENSION: ICD-10-CM

## 2022-09-27 DIAGNOSIS — Q27.32 ARTERIOVENOUS MALFORMATION OF VESSEL OF LOWER LIMB: ICD-10-CM

## 2022-09-27 DIAGNOSIS — L97.329 NON-PRESSURE CHRONIC ULCER OF LEFT ANKLE WITH UNSPECIFIED SEVERITY: ICD-10-CM

## 2022-10-10 ENCOUNTER — INPATIENT (INPATIENT)
Facility: HOSPITAL | Age: 37
LOS: 6 days | Discharge: HOME HEALTH | DRG: 253 | End: 2022-10-17
Attending: RADIOLOGY | Admitting: RADIOLOGY
Payer: MEDICARE

## 2022-10-10 VITALS
TEMPERATURE: 99 F | HEIGHT: 67 IN | DIASTOLIC BLOOD PRESSURE: 115 MMHG | OXYGEN SATURATION: 100 % | HEART RATE: 105 BPM | SYSTOLIC BLOOD PRESSURE: 174 MMHG | RESPIRATION RATE: 18 BRPM | WEIGHT: 293 LBS

## 2022-10-10 DIAGNOSIS — Z41.9 ENCOUNTER FOR PROCEDURE FOR PURPOSES OTHER THAN REMEDYING HEALTH STATE, UNSPECIFIED: Chronic | ICD-10-CM

## 2022-10-10 DIAGNOSIS — L97.509 NON-PRESSURE CHRONIC ULCER OF OTHER PART OF UNSPECIFIED FOOT WITH UNSPECIFIED SEVERITY: ICD-10-CM

## 2022-10-10 DIAGNOSIS — I10 ESSENTIAL (PRIMARY) HYPERTENSION: ICD-10-CM

## 2022-10-10 DIAGNOSIS — Z98.89 OTHER SPECIFIED POSTPROCEDURAL STATES: Chronic | ICD-10-CM

## 2022-10-10 DIAGNOSIS — Q27.30 ARTERIOVENOUS MALFORMATION, SITE UNSPECIFIED: ICD-10-CM

## 2022-10-10 LAB
ALBUMIN SERPL ELPH-MCNC: 4.4 G/DL — SIGNIFICANT CHANGE UP (ref 3.3–5)
ALP SERPL-CCNC: 79 U/L — SIGNIFICANT CHANGE UP (ref 40–120)
ALT FLD-CCNC: 10 U/L — SIGNIFICANT CHANGE UP (ref 10–45)
ANION GAP SERPL CALC-SCNC: 13 MMOL/L — SIGNIFICANT CHANGE UP (ref 5–17)
APTT BLD: 32.6 SEC — SIGNIFICANT CHANGE UP (ref 27.5–35.5)
AST SERPL-CCNC: 16 U/L — SIGNIFICANT CHANGE UP (ref 10–40)
BASOPHILS # BLD AUTO: 0.07 K/UL — SIGNIFICANT CHANGE UP (ref 0–0.2)
BASOPHILS NFR BLD AUTO: 0.7 % — SIGNIFICANT CHANGE UP (ref 0–2)
BILIRUB SERPL-MCNC: 0.7 MG/DL — SIGNIFICANT CHANGE UP (ref 0.2–1.2)
BLD GP AB SCN SERPL QL: NEGATIVE — SIGNIFICANT CHANGE UP
BUN SERPL-MCNC: 12 MG/DL — SIGNIFICANT CHANGE UP (ref 7–23)
CALCIUM SERPL-MCNC: 9.6 MG/DL — SIGNIFICANT CHANGE UP (ref 8.4–10.5)
CHLORIDE SERPL-SCNC: 104 MMOL/L — SIGNIFICANT CHANGE UP (ref 96–108)
CO2 SERPL-SCNC: 23 MMOL/L — SIGNIFICANT CHANGE UP (ref 22–31)
CREAT SERPL-MCNC: 0.72 MG/DL — SIGNIFICANT CHANGE UP (ref 0.5–1.3)
EGFR: 109 ML/MIN/1.73M2 — SIGNIFICANT CHANGE UP
EOSINOPHIL # BLD AUTO: 0.02 K/UL — SIGNIFICANT CHANGE UP (ref 0–0.5)
EOSINOPHIL NFR BLD AUTO: 0.2 % — SIGNIFICANT CHANGE UP (ref 0–6)
GLUCOSE SERPL-MCNC: 104 MG/DL — HIGH (ref 70–99)
GRAM STN FLD: SIGNIFICANT CHANGE UP
HCT VFR BLD CALC: 40.3 % — SIGNIFICANT CHANGE UP (ref 34.5–45)
HGB BLD-MCNC: 13.2 G/DL — SIGNIFICANT CHANGE UP (ref 11.5–15.5)
IMM GRANULOCYTES NFR BLD AUTO: 0.3 % — SIGNIFICANT CHANGE UP (ref 0–0.9)
INR BLD: 1.1 — SIGNIFICANT CHANGE UP (ref 0.88–1.16)
LYMPHOCYTES # BLD AUTO: 1.62 K/UL — SIGNIFICANT CHANGE UP (ref 1–3.3)
LYMPHOCYTES # BLD AUTO: 15.3 % — SIGNIFICANT CHANGE UP (ref 13–44)
MCHC RBC-ENTMCNC: 28.8 PG — SIGNIFICANT CHANGE UP (ref 27–34)
MCHC RBC-ENTMCNC: 32.8 GM/DL — SIGNIFICANT CHANGE UP (ref 32–36)
MCV RBC AUTO: 87.8 FL — SIGNIFICANT CHANGE UP (ref 80–100)
MONOCYTES # BLD AUTO: 0.59 K/UL — SIGNIFICANT CHANGE UP (ref 0–0.9)
MONOCYTES NFR BLD AUTO: 5.6 % — SIGNIFICANT CHANGE UP (ref 2–14)
NEUTROPHILS # BLD AUTO: 8.26 K/UL — HIGH (ref 1.8–7.4)
NEUTROPHILS NFR BLD AUTO: 77.9 % — HIGH (ref 43–77)
NRBC # BLD: 0 /100 WBCS — SIGNIFICANT CHANGE UP (ref 0–0)
PLATELET # BLD AUTO: 533 K/UL — HIGH (ref 150–400)
POTASSIUM SERPL-MCNC: 4.2 MMOL/L — SIGNIFICANT CHANGE UP (ref 3.5–5.3)
POTASSIUM SERPL-SCNC: 4.2 MMOL/L — SIGNIFICANT CHANGE UP (ref 3.5–5.3)
PROT SERPL-MCNC: 8.3 G/DL — SIGNIFICANT CHANGE UP (ref 6–8.3)
PROTHROM AB SERPL-ACNC: 13.1 SEC — SIGNIFICANT CHANGE UP (ref 10.5–13.4)
RBC # BLD: 4.59 M/UL — SIGNIFICANT CHANGE UP (ref 3.8–5.2)
RBC # FLD: 12.6 % — SIGNIFICANT CHANGE UP (ref 10.3–14.5)
RH IG SCN BLD-IMP: POSITIVE — SIGNIFICANT CHANGE UP
SARS-COV-2 RNA SPEC QL NAA+PROBE: NEGATIVE — SIGNIFICANT CHANGE UP
SODIUM SERPL-SCNC: 140 MMOL/L — SIGNIFICANT CHANGE UP (ref 135–145)
SPECIMEN SOURCE: SIGNIFICANT CHANGE UP
WBC # BLD: 10.59 K/UL — HIGH (ref 3.8–10.5)
WBC # FLD AUTO: 10.59 K/UL — HIGH (ref 3.8–10.5)

## 2022-10-10 PROCEDURE — 99285 EMERGENCY DEPT VISIT HI MDM: CPT | Mod: FS

## 2022-10-10 RX ORDER — CHLORHEXIDINE GLUCONATE 213 G/1000ML
1 SOLUTION TOPICAL DAILY
Refills: 0 | Status: DISCONTINUED | OUTPATIENT
Start: 2022-10-10 | End: 2022-10-12

## 2022-10-10 RX ORDER — INFLUENZA VIRUS VACCINE 15; 15; 15; 15 UG/.5ML; UG/.5ML; UG/.5ML; UG/.5ML
0.5 SUSPENSION INTRAMUSCULAR ONCE
Refills: 0 | Status: DISCONTINUED | OUTPATIENT
Start: 2022-10-10 | End: 2022-10-17

## 2022-10-10 RX ORDER — ACETAMINOPHEN 500 MG
1000 TABLET ORAL ONCE
Refills: 0 | Status: COMPLETED | OUTPATIENT
Start: 2022-10-10 | End: 2022-10-11

## 2022-10-10 RX ORDER — HYDROMORPHONE HYDROCHLORIDE 2 MG/ML
2 INJECTION INTRAMUSCULAR; INTRAVENOUS; SUBCUTANEOUS EVERY 4 HOURS
Refills: 0 | Status: DISCONTINUED | OUTPATIENT
Start: 2022-10-10 | End: 2022-10-14

## 2022-10-10 RX ORDER — VANCOMYCIN HCL 1 G
1750 VIAL (EA) INTRAVENOUS EVERY 24 HOURS
Refills: 0 | Status: DISCONTINUED | OUTPATIENT
Start: 2022-10-10 | End: 2022-10-11

## 2022-10-10 RX ORDER — METOPROLOL TARTRATE 50 MG
50 TABLET ORAL
Refills: 0 | Status: DISCONTINUED | OUTPATIENT
Start: 2022-10-10 | End: 2022-10-11

## 2022-10-10 RX ORDER — GABAPENTIN 400 MG/1
800 CAPSULE ORAL
Refills: 0 | Status: DISCONTINUED | OUTPATIENT
Start: 2022-10-10 | End: 2022-10-17

## 2022-10-10 RX ORDER — HYDROMORPHONE HYDROCHLORIDE 2 MG/ML
2 INJECTION INTRAMUSCULAR; INTRAVENOUS; SUBCUTANEOUS ONCE
Refills: 0 | Status: DISCONTINUED | OUTPATIENT
Start: 2022-10-10 | End: 2022-10-10

## 2022-10-10 RX ORDER — GABAPENTIN 400 MG/1
100 CAPSULE ORAL
Refills: 0 | Status: DISCONTINUED | OUTPATIENT
Start: 2022-10-11 | End: 2022-10-17

## 2022-10-10 RX ADMIN — HYDROMORPHONE HYDROCHLORIDE 2 MILLIGRAM(S): 2 INJECTION INTRAMUSCULAR; INTRAVENOUS; SUBCUTANEOUS at 15:33

## 2022-10-10 RX ADMIN — Medication 50 MILLIGRAM(S): at 20:06

## 2022-10-10 RX ADMIN — Medication 250 MILLIGRAM(S): at 23:36

## 2022-10-10 RX ADMIN — HYDROMORPHONE HYDROCHLORIDE 2 MILLIGRAM(S): 2 INJECTION INTRAMUSCULAR; INTRAVENOUS; SUBCUTANEOUS at 20:34

## 2022-10-10 RX ADMIN — HYDROMORPHONE HYDROCHLORIDE 2 MILLIGRAM(S): 2 INJECTION INTRAMUSCULAR; INTRAVENOUS; SUBCUTANEOUS at 16:15

## 2022-10-10 RX ADMIN — HYDROMORPHONE HYDROCHLORIDE 2 MILLIGRAM(S): 2 INJECTION INTRAMUSCULAR; INTRAVENOUS; SUBCUTANEOUS at 20:06

## 2022-10-10 NOTE — H&P ADULT - PROBLEM SELECTOR PLAN 1
-Pt with Left Lateral foot ulcer with purulence and fever at home Tmax 101.4   - Wound culture swabbed and sent in ED, started on Vanco for empiric, reconsult ID in AM as team called and it was after hours   - Temp of 99 in ED had taken Tylenol at home continue tylenol prn has needed for fever/mild pain and Dialudid 2mg IV fro sever prn q 4  - Dressing changes as needed, placed saline dressing with Krilix 10/10 at night   - Chlorhexidine swabs   - Plan for possible repeat peripheral angiography or debridement if no improvement of ulcer with IV abx

## 2022-10-10 NOTE — ED ADULT NURSE NOTE - CHIEF COMPLAINT QUOTE
Pt presents to ED c/o post op complication. Had AVM embolization of L foot 2.5 weeks ago. States "Dr. Teran is admitting me because it is infected and I need wound care",

## 2022-10-10 NOTE — ED ADULT TRIAGE NOTE - OTHER COMPLAINTS
noted to be hypertensive, states "this is my baseline when I am in pain". denies cp, dizziness, headache, blurred vision.

## 2022-10-10 NOTE — H&P ADULT - SKIN
Patient with Left lateral ankle non healing ulcer lwith purulent 1.5-2-cm deep/warm and dry/color normal/normal/no rashes/no ulcers/wound

## 2022-10-10 NOTE — ED PROVIDER NOTE - PHYSICAL EXAMINATION
VITAL SIGNS: I have reviewed nursing notes and confirm.  CONSTITUTIONAL: Well-developed; in no acute distress.   SKIN:  warm and dry, no acute rash.   HEAD:  normocephalic, atraumatic.  EYES: PERRL, EOM intact; conjunctiva and sclera clear.  ENT: No nasal discharge; airway clear.   NECK: Supple; non tender.  CARD: S1, S2 normal; no murmurs, gallops, or rubs. Regular rate and rhythm.   RESP:  Clear to auscultation b/l, no wheezes, rales or rhonchi.  ABD: Normal bowel sounds; soft; non-distended; non-tender; no guarding/ rebound.  EXT: Normal ROM. No clubbing, cyanosis or edema. 2+ pulses to b/l ue/le.  NEURO: Alert, oriented, grossly unremarkable  PSYCH: Cooperative, mood and affect appropriate. VITAL SIGNS: I have reviewed nursing notes and confirm.  CONSTITUTIONAL: Well-developed; in no acute distress.   SKIN:  warm and dry, no acute rash. 1.5cm shallow ulceration over lateral aspect of left heel inferior to the lateral malleolus with 1cm surrounding erythema, ttp.   HEAD:  normocephalic, atraumatic.  EYES: PERRL, EOM intact; conjunctiva and sclera clear.  ENT: No nasal discharge; airway clear.   NECK: Supple; non tender.  CARD: S1, S2 normal; no murmurs, gallops, or rubs. Regular rate and rhythm.   RESP:  Clear to auscultation b/l, no wheezes, rales or rhonchi.  ABD: Normal bowel sounds; soft; non-distended; non-tender; no guarding/ rebound.  EXT: Normal ROM. No clubbing, cyanosis or edema. 2+ pulses to b/l ue/le.  NEURO: Alert, oriented, grossly unremarkable. Sensation and strength intact BLE.   PSYCH: Cooperative, mood and affect appropriate.

## 2022-10-10 NOTE — H&P ADULT - NSHPLABSRESULTS_GEN_ALL_CORE
13.2   10.59 )-----------( 533      ( 10 Oct 2022 15:30 )             40.3   10-10    140  |  104  |  12  ----------------------------<  104<H>  4.2   |  23  |  0.72    Ca    9.6      10 Oct 2022 15:30    TPro  8.3  /  Alb  4.4  /  TBili  0.7  /  DBili  x   /  AST  16  /  ALT  10  /  AlkPhos  79  10-10

## 2022-10-10 NOTE — H&P ADULT - ASSESSMENT
Pt is a 36yo female with pmhx of HTN and L foot AVM s/p multiple DSE (most recently 9/21/22) who returns for worsening left foot foot pain and redness. Pt was last admitted in late September, states she had angiogram and DSE 9/21/22 with Dr. Teran. At that time she reports there was consideration for debridement but was deferred at the time and was given one dose of Vanco before DC with report of improvement of fever and reduction of redness with dressing placed. Pt now reports last week she had increased pain and redness from 10/6 and was prescribed clindamycin by Dr. Teran's team. Pt has since starting PO Abx reporting continued fever Tmax 101.4, and intermittent diarrhea since starting Clinda. Pt denies cough, chest pain, shortness of breath, abd pain, leg swelling, bleeding from prior access site, palpations, fatigue, weakness, numbness. Pt admitted to 5 uris under Dr. Teran for Lower Left leg non healing ulcer with plan for IV Abx and if no improvement possible repeat angiogram/debridement.

## 2022-10-10 NOTE — ED PROVIDER NOTE - OBJECTIVE STATEMENT
37F w/ PMHx HTN and L foot AVM s/p multiple DSE (most recently 8/16/22), who returns for wound debridement and peripheral angiogram.  Pt reports that initially, it healed completely fine but noted that about 1 month ago it opened up again and she developed 2 ulcers on her left lateral heel area. Pt complains of tenderness on her foot claims that she had low grade fever yesterday. Wound does not have any drainage but erythema around the foot present.  She denies  n/v, chills, ulcer drainage, LE edema or decreased LE sensation. Pt now presents for [facility] for recommended wound debridement and possible peripheral angiogram with Dr. Teran. Pt is s/p angiogram and DSE 9/21. 36yo female with pmhx of HTN and L foot AVM s/p multiple DSE (most recently 9/21/22) who returns for worsening left foot foot pain and redness. Pt was last admitted in late September, states she had angiogram and DSE 9/21/22 with Dr. Teran. She reports increased pain and redness since 10/6 and has been taking clindamycin since that time. She endorses intermittent fevers (tmax 101F). Denies cough, chest pain, shortness of breath, abd pain, leg swelling.

## 2022-10-10 NOTE — ED ADULT NURSE NOTE - OBJECTIVE STATEMENT
37y female presents to ED c/o post-op complication. Pt states she has a AVM on left foot and had embolization with MD Teran. In the last 5 days, area has become reddened with purulent discharge. Referred for admission by MD Teran for wound care. Pt endorse pain to the area, denies fever/chills at home. PMH of HTN. A&Ox4.

## 2022-10-10 NOTE — ED PROVIDER NOTE - CLINICAL SUMMARY MEDICAL DECISION MAKING FREE TEXT BOX
ED course unremarkable- afebrile and hemodynamically stable. She has shallow ulceration over left lateral heel with surrounding erythema. 2+ DP pulse. Sensation intact. Labs sent. Wound culture obtained. Pt currently on clindamycin. Pt admitted to cardiology under Dr. Teran for further management. Pt aware of and agrees with plan.

## 2022-10-10 NOTE — ED PROVIDER NOTE - ATTENDING APP SHARED VISIT CONTRIBUTION OF CARE
38 yo F h/o HTN and L foot AVM s/p multiple DSE (most recently 9/21/22) who returns for worsening left foot pain, wound on lateral heel and redness tba to Dr. Teran for further treatment.  Pt noted sx starting 10/6 despite clindamycin. + fevers (tmax 101F).  Plan preop eval, tba to Dr Teran.

## 2022-10-10 NOTE — H&P ADULT - HISTORY OF PRESENT ILLNESS
Pt is a 36yo female with pmhx of HTN and L foot AVM s/p multiple DSE (most recently 9/21/22) who returns for worsening left foot foot pain and redness. Pt was last admitted in late September, states she had angiogram and DSE 9/21/22 with Dr. Teran. At that time she reports there was consideration for debridement but was deferred at the time and was given one dose of Vanco before DC with report of improvement of fever and reduction of redness with dressing placed. Pt now reports last week she had increased pain and redness from 10/6 and was prescribed clindamycin by Dr. Teran's team. Pt has since starting PO Abx reporting continued fever Tmax 101.4, and intermittent diahrrea since starting Clinda. Pt denies cough, chest pain, shortness of breath, abd pain, leg swelling, bleeding from prior access site, palpations, fatigue, weakness, numbness. Pt admitted to 5 uris under Dr. Teran for Lower Left leg non healing ulcer with plan for IV Abx and if no improvement possible repeat angiogram/debridement.

## 2022-10-11 DIAGNOSIS — E66.01 MORBID (SEVERE) OBESITY DUE TO EXCESS CALORIES: Chronic | ICD-10-CM

## 2022-10-11 LAB
ANION GAP SERPL CALC-SCNC: 11 MMOL/L — SIGNIFICANT CHANGE UP (ref 5–17)
APTT BLD: 29.4 SEC — SIGNIFICANT CHANGE UP (ref 27.5–35.5)
BUN SERPL-MCNC: 14 MG/DL — SIGNIFICANT CHANGE UP (ref 7–23)
CALCIUM SERPL-MCNC: 9.3 MG/DL — SIGNIFICANT CHANGE UP (ref 8.4–10.5)
CHLORIDE SERPL-SCNC: 102 MMOL/L — SIGNIFICANT CHANGE UP (ref 96–108)
CO2 SERPL-SCNC: 23 MMOL/L — SIGNIFICANT CHANGE UP (ref 22–31)
CREAT SERPL-MCNC: 0.75 MG/DL — SIGNIFICANT CHANGE UP (ref 0.5–1.3)
EGFR: 104 ML/MIN/1.73M2 — SIGNIFICANT CHANGE UP
GLUCOSE SERPL-MCNC: 92 MG/DL — SIGNIFICANT CHANGE UP (ref 70–99)
GRAM STN FLD: SIGNIFICANT CHANGE UP
HCG UR QL: NEGATIVE — SIGNIFICANT CHANGE UP
HCT VFR BLD CALC: 35.4 % — SIGNIFICANT CHANGE UP (ref 34.5–45)
HGB BLD-MCNC: 11.4 G/DL — LOW (ref 11.5–15.5)
INR BLD: 1.06 — SIGNIFICANT CHANGE UP (ref 0.88–1.16)
MCHC RBC-ENTMCNC: 28.6 PG — SIGNIFICANT CHANGE UP (ref 27–34)
MCHC RBC-ENTMCNC: 32.2 GM/DL — SIGNIFICANT CHANGE UP (ref 32–36)
MCV RBC AUTO: 88.9 FL — SIGNIFICANT CHANGE UP (ref 80–100)
NRBC # BLD: 0 /100 WBCS — SIGNIFICANT CHANGE UP (ref 0–0)
PLATELET # BLD AUTO: 482 K/UL — HIGH (ref 150–400)
POTASSIUM SERPL-MCNC: 3.7 MMOL/L — SIGNIFICANT CHANGE UP (ref 3.5–5.3)
POTASSIUM SERPL-SCNC: 3.7 MMOL/L — SIGNIFICANT CHANGE UP (ref 3.5–5.3)
PROTHROM AB SERPL-ACNC: 12.6 SEC — SIGNIFICANT CHANGE UP (ref 10.5–13.4)
RBC # BLD: 3.98 M/UL — SIGNIFICANT CHANGE UP (ref 3.8–5.2)
RBC # FLD: 12.7 % — SIGNIFICANT CHANGE UP (ref 10.3–14.5)
SODIUM SERPL-SCNC: 136 MMOL/L — SIGNIFICANT CHANGE UP (ref 135–145)
SPECIMEN SOURCE: SIGNIFICANT CHANGE UP
WBC # BLD: 7.99 K/UL — SIGNIFICANT CHANGE UP (ref 3.8–10.5)
WBC # FLD AUTO: 7.99 K/UL — SIGNIFICANT CHANGE UP (ref 3.8–10.5)

## 2022-10-11 PROCEDURE — 99222 1ST HOSP IP/OBS MODERATE 55: CPT

## 2022-10-11 PROCEDURE — 36248 INS CATH ABD/L-EXT ART ADDL: CPT

## 2022-10-11 PROCEDURE — 37242 VASC EMBOLIZE/OCCLUDE ARTERY: CPT

## 2022-10-11 PROCEDURE — 36247 INS CATH ABD/L-EXT ART 3RD: CPT

## 2022-10-11 RX ORDER — VANCOMYCIN HCL 1 G
1250 VIAL (EA) INTRAVENOUS ONCE
Refills: 0 | Status: COMPLETED | OUTPATIENT
Start: 2022-10-11 | End: 2022-10-11

## 2022-10-11 RX ORDER — CEFEPIME 1 G/1
2000 INJECTION, POWDER, FOR SOLUTION INTRAMUSCULAR; INTRAVENOUS EVERY 8 HOURS
Refills: 0 | Status: DISCONTINUED | OUTPATIENT
Start: 2022-10-11 | End: 2022-10-17

## 2022-10-11 RX ORDER — HYDROMORPHONE HYDROCHLORIDE 2 MG/ML
2 INJECTION INTRAMUSCULAR; INTRAVENOUS; SUBCUTANEOUS ONCE
Refills: 0 | Status: DISCONTINUED | OUTPATIENT
Start: 2022-10-11 | End: 2022-10-11

## 2022-10-11 RX ORDER — VANCOMYCIN HCL 1 G
1250 VIAL (EA) INTRAVENOUS EVERY 8 HOURS
Refills: 0 | Status: DISCONTINUED | OUTPATIENT
Start: 2022-10-11 | End: 2022-10-17

## 2022-10-11 RX ORDER — KETOROLAC TROMETHAMINE 30 MG/ML
15 SYRINGE (ML) INJECTION EVERY 6 HOURS
Refills: 0 | Status: DISCONTINUED | OUTPATIENT
Start: 2022-10-11 | End: 2022-10-11

## 2022-10-11 RX ORDER — VANCOMYCIN HCL 1 G
VIAL (EA) INTRAVENOUS
Refills: 0 | Status: DISCONTINUED | OUTPATIENT
Start: 2022-10-11 | End: 2022-10-17

## 2022-10-11 RX ORDER — NEBIVOLOL HYDROCHLORIDE 5 MG/1
10 TABLET ORAL
Refills: 0 | Status: DISCONTINUED | OUTPATIENT
Start: 2022-10-12 | End: 2022-10-17

## 2022-10-11 RX ADMIN — Medication 400 MILLIGRAM(S): at 14:20

## 2022-10-11 RX ADMIN — GABAPENTIN 100 MILLIGRAM(S): 400 CAPSULE ORAL at 05:26

## 2022-10-11 RX ADMIN — Medication 166.67 MILLIGRAM(S): at 22:18

## 2022-10-11 RX ADMIN — CEFEPIME 100 MILLIGRAM(S): 1 INJECTION, POWDER, FOR SOLUTION INTRAMUSCULAR; INTRAVENOUS at 20:00

## 2022-10-11 RX ADMIN — HYDROMORPHONE HYDROCHLORIDE 2 MILLIGRAM(S): 2 INJECTION INTRAMUSCULAR; INTRAVENOUS; SUBCUTANEOUS at 04:50

## 2022-10-11 RX ADMIN — Medication 166.67 MILLIGRAM(S): at 16:10

## 2022-10-11 RX ADMIN — HYDROMORPHONE HYDROCHLORIDE 2 MILLIGRAM(S): 2 INJECTION INTRAMUSCULAR; INTRAVENOUS; SUBCUTANEOUS at 09:45

## 2022-10-11 RX ADMIN — HYDROMORPHONE HYDROCHLORIDE 2 MILLIGRAM(S): 2 INJECTION INTRAMUSCULAR; INTRAVENOUS; SUBCUTANEOUS at 00:07

## 2022-10-11 RX ADMIN — Medication 1000 MILLIGRAM(S): at 14:46

## 2022-10-11 RX ADMIN — HYDROMORPHONE HYDROCHLORIDE 2 MILLIGRAM(S): 2 INJECTION INTRAMUSCULAR; INTRAVENOUS; SUBCUTANEOUS at 04:10

## 2022-10-11 RX ADMIN — HYDROMORPHONE HYDROCHLORIDE 2 MILLIGRAM(S): 2 INJECTION INTRAMUSCULAR; INTRAVENOUS; SUBCUTANEOUS at 10:20

## 2022-10-11 RX ADMIN — HYDROMORPHONE HYDROCHLORIDE 2 MILLIGRAM(S): 2 INJECTION INTRAMUSCULAR; INTRAVENOUS; SUBCUTANEOUS at 20:00

## 2022-10-11 RX ADMIN — HYDROMORPHONE HYDROCHLORIDE 2 MILLIGRAM(S): 2 INJECTION INTRAMUSCULAR; INTRAVENOUS; SUBCUTANEOUS at 16:05

## 2022-10-11 RX ADMIN — HYDROMORPHONE HYDROCHLORIDE 2 MILLIGRAM(S): 2 INJECTION INTRAMUSCULAR; INTRAVENOUS; SUBCUTANEOUS at 00:22

## 2022-10-11 RX ADMIN — GABAPENTIN 800 MILLIGRAM(S): 400 CAPSULE ORAL at 17:52

## 2022-10-11 RX ADMIN — HYDROMORPHONE HYDROCHLORIDE 2 MILLIGRAM(S): 2 INJECTION INTRAMUSCULAR; INTRAVENOUS; SUBCUTANEOUS at 10:04

## 2022-10-11 RX ADMIN — HYDROMORPHONE HYDROCHLORIDE 2 MILLIGRAM(S): 2 INJECTION INTRAMUSCULAR; INTRAVENOUS; SUBCUTANEOUS at 09:17

## 2022-10-11 RX ADMIN — Medication 15 MILLIGRAM(S): at 14:43

## 2022-10-11 RX ADMIN — HYDROMORPHONE HYDROCHLORIDE 2 MILLIGRAM(S): 2 INJECTION INTRAMUSCULAR; INTRAVENOUS; SUBCUTANEOUS at 16:35

## 2022-10-11 RX ADMIN — HYDROMORPHONE HYDROCHLORIDE 2 MILLIGRAM(S): 2 INJECTION INTRAMUSCULAR; INTRAVENOUS; SUBCUTANEOUS at 00:00

## 2022-10-11 NOTE — CONSULT NOTE ADULT - SUBJECTIVE AND OBJECTIVE BOX
HPI:  36 yo F with HTN, MO and AVM L foot with ulcer.  ID consult for assistance with antibiotics.  She began having problems a/c her L foot AVM at age 19-20 when it increased in size. She has been followed by Dr. Teran since 2016 and has undergone transcatheter and direct stick embolization, as well as wound debridement multiple times.  She develops pinhole ulcer, which prompts performance of a procedure with subsequent healing.  She had been treated with 5 d course of clindamycin following each procedure and experienced healing.  Over the summer, she developed ongoing urticaria.  Her PCP diagnosed her as having yeast overgrowth in her bowel.  She was started on fluconazole for one week per month X 6 months – has done 5 courses.  Urticaria has resolved.  In early 8/2022, developed pinhole ulceration lateral ankle.  On 8/16, she underwent direct stick embolization on 8/16, at which time she was treated with clinda perioperatively X 2 doses but not continued b/o concern regarding urticaria.  Initially, the ulcer started to close but in early Sept, began increasing in size with increasing erythema.  She began having intermittent low-grade fevers (Tm 100.8) without rigors and developed increasing drainage.  She underwent transcatheter embolization with deferral of wound debridement on 9/21.  She received one dose of vancomycin at that time.  For the first 4-5 d, she was improving with decreasing erythema and no d/c on wet-to dry dressings.  Starting on d 7 or 8, she began developing increasing erythema around the ulcer and increasing pain with recurrence of low-grade fever (Tm 100.8).  She called Dr. Teran on 10/6 – was started on clinda at that time.  Pain and erythema continued to worsen.  She continued to have fever and developed frequent loose stools on 10/9.  She was referred to the ED on 10/10, where she had T 99.1, , /115.  Labs with WBC 10.6 with 78% PMNs, plt 533.  She was given a dose of vancomycin last night.  She has been afebrile.  Today, she underwent transcatheter embolization of AVM and sharp debridement of ulcer at lateral heel.  ID consult requested.     PAST MEDICAL & SURGICAL HISTORY:  HTN (hypertension)      AVM (arteriovenous malformation)  of left foot      Foot ulceration  left foot      S/P debridement  LLE area overlying AVM      Elective surgery  transcatheter therapy vascular embolization x5      Morbid obesity              MEDICATIONS  (STANDING):  chlorhexidine 2% Cloths 1 Application(s) Topical daily  gabapentin 100 milliGRAM(s) Oral <User Schedule>  gabapentin 800 milliGRAM(s) Oral <User Schedule>  influenza   Vaccine 0.5 milliLiter(s) IntraMuscular once  vancomycin  IVPB      vancomycin  IVPB 1250 milliGRAM(s) IV Intermittent every 8 hours    MEDICATIONS  (PRN):  HYDROmorphone  Injectable 2 milliGRAM(s) IV Push every 4 hours PRN Severe Pain (7 - 10)  ketorolac   Injectable 15 milliGRAM(s) IV Push every 6 hours PRN Severe Pain (7 - 10)      Allergies    amoxicillin (she does not remember for sure - she thinks a rash  hydrochlorothiazide (Hives)  lisinopril (Angioedema; Rash; Hives)  penicillin (angioedema (swelling in throat and face) - age 17 or 18, taken for foot ulceration)    Intolerances        SOCIAL HISTORY:  Born in [place], lives there now with her mother and boyfriend.  Has 2 dogs, no children.  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  No tobacco, alcohol or recreational drug use.    FAMILY HISTORY:  Family history of congenital malformation (Sibling)  AVMs: siblings    ROS:  No HA, photophobia, neck stiffness, rhinorrhea, sore throat, cough, CP, SOB, N, V, diarrhea, abd pain, dysuria, hematuria, frequency, muscle/joint symptoms, bruising/bleeding.    Vital Signs Last 24 Hrs  T(C): 36.8 (11 Oct 2022 11:54), Max: 37.3 (10 Oct 2022 18:51)  T(F): 98.2 (11 Oct 2022 08:31), Max: 99.1 (10 Oct 2022 18:51)  HR: 92 (11 Oct 2022 11:54) (90 - 99)  BP: 160/85 (11 Oct 2022 11:54) (146/85 - 198/117)  BP(mean): 109 (11 Oct 2022 05:25) (109 - 127)  RR: 17 (11 Oct 2022 11:54) (16 - 20)  SpO2: 95% (11 Oct 2022 11:54) (94% - 98%)    Parameters below as of 11 Oct 2022 10:25  Patient On (Oxygen Delivery Method): room air        PE:  Lying flat in bed, conversant, nontoxic.  HEENT:  NC, PERRL, sclerae anicteric, conjunctivae clear, EOMI.  Sinuses nontender, no nasal exudate.  No buccal or pharyngeal lesions, erythema or exudate  Neck:  Supple, no adenopathy  Lungs:  Clear to auscultation  Cor:  RRR, S1, S2, no murmur appreciated  Abd:  Symmetric, normoactive BS.  Soft, nontender, no masses, guarding or rebound.  Liver and spleen not enlarged  Extrem:  No cyanosis or edema.  R groin and R foot dressed, no erythema/warmth above dressing  Skin:  No rashes.    LABS:                        11.4   7.99  )-----------( 482      ( 11 Oct 2022 05:30 )             35.4     10-11    136  |  102  |  14  ----------------------------<  92  3.7   |  23  |  0.75    Ca    9.3      11 Oct 2022 05:30    TPro  8.3  /  Alb  4.4  /  TBili  0.7  /  DBili  x   /  AST  16  /  ALT  10  /  AlkPhos  79  10-10    MICROBIOLOGY:    Surgical swab 10/10:  Corynebacterium and Pseudomonas    RADIOLOGY & ADDITIONAL STUDIES:

## 2022-10-11 NOTE — CONSULT NOTE ADULT - PROVIDER SPECIALTY LIST ADULT
Problem: Nutrition/Hydration-ADULT  Goal: Nutrient/Hydration intake appropriate for improving, restoring or maintaining nutritional needs  Description: Monitor and assess patient's nutrition/hydration status for malnutrition  Collaborate with interdisciplinary team and initiate plan and interventions as ordered  Monitor patient's weight and dietary intake as ordered or per policy  Utilize nutrition screening tool and intervene as necessary  Determine patient's food preferences and provide high-protein, high-caloric foods as appropriate       INTERVENTIONS:  - Monitor oral intake, urinary output, labs, and treatment plans  - Assess nutrition and hydration status and recommend course of action  - Evaluate amount of meals eaten  - Assist patient with eating if necessary   - Allow adequate time for meals  - Recommend/ encourage appropriate diets, oral nutritional supplements, and vitamin/mineral supplements  - Order, calculate, and assess calorie counts as needed  - Recommend, monitor, and adjust tube feedings and TPN/PPN based on assessed needs  - Assess need for intravenous fluids  - Provide specific nutrition/hydration education as appropriate  - Include patient/family/caregiver in decisions related to nutrition  Outcome: Not Progressing Infectious Disease

## 2022-10-11 NOTE — PROGRESS NOTE ADULT - PROBLEM SELECTOR PLAN 2
Pt with multiple Direct stick embolizations, and most recent is s/p angiogram and DSE 9/21 with Dr. Teran  - Pain control with IV dialudid 2mg prn q 4hrs  Patient on gabapentin 100mg AM and 800mg evening (before 4pm) for chronic pain as well as percocet at home  -possible repeat angiogram today as noted above w/ debridement

## 2022-10-11 NOTE — PROGRESS NOTE ADULT - ASSESSMENT
Pt is a 36yo female with pmhx of HTN and L foot AVM s/p multiple DSE (most recently 9/21/22) who returns for worsening left foot foot pain and non-healing ulcer w/ erythema that did not respond to outpatient oral antibiotics

## 2022-10-11 NOTE — PROGRESS NOTE ADULT - SUBJECTIVE AND OBJECTIVE BOX
Interventional Cardiology PA Adult Progress Note    C.C.:     Subjective Assessment:      ROS Negative except as per Subjective and HPI  	  MEDICATIONS:  metoprolol tartrate 50 milliGRAM(s) Oral two times a day    vancomycin  IVPB 1750 milliGRAM(s) IV Intermittent every 24 hours      acetaminophen   IVPB .. 1000 milliGRAM(s) IV Intermittent once  gabapentin 100 milliGRAM(s) Oral <User Schedule>  gabapentin 800 milliGRAM(s) Oral <User Schedule>  HYDROmorphone  Injectable 2 milliGRAM(s) IV Push every 4 hours PRN        chlorhexidine 2% Cloths 1 Application(s) Topical daily  influenza   Vaccine 0.5 milliLiter(s) IntraMuscular once      	    [PHYSICAL EXAM:  TELEMETRY:  T(C): 36.8 (10-11-22 @ 08:31), Max: 37.3 (10-10-22 @ 13:47)  HR: 94 (10-11-22 @ 05:25) (90 - 105)  BP: 146/85 (10-11-22 @ 05:25) (142/89 - 174/115)  RR: 16 (10-11-22 @ 05:25) (16 - 20)  SpO2: 94% (10-11-22 @ 05:25) (94% - 100%)  Wt(kg): --  I&O's Summary    10 Oct 2022 07:01  -  11 Oct 2022 07:00  --------------------------------------------------------  IN: 360 mL / OUT: 0 mL / NET: 360 mL      Height (cm): 170.2 (10-10 @ 18:45)  Weight (kg): 134.3 (10-10 @ 18:45)  BMI (kg/m2): 46.4 (10-10 @ 18:45)  BSA (m2): 2.39 (10-10 @ 18:45)  Robles:  Central/PICC/Mid Line:                                         Appearance: Normal	  HEENT:   Normal oral mucosa, PERRL, EOMI	  Neck: Supple, + JVD/ - JVD; Carotid Bruit   Cardiovascular: Normal S1 S2, No JVD, No murmurs,   Respiratory: Lungs clear to auscultation/Decreased Breath Sounds/No Rales, Rhonchi, Wheezing	  Gastrointestinal:  Soft, Non-tender, + BS	  Skin: No rashes, No ecchymoses, No cyanosis  Extremities: Normal range of motion, No clubbing, cyanosis or edema  Vascular: Peripheral pulses palpable 2+ bilaterally  Neurologic: Non-focal  Psychiatry: A & O x 3, Mood & affect appropriate      	    ECG:  	  RADIOLOGY:   DIAGNOSTIC TESTING:  [ ] Echocardiogram:  [ ]  Catheterization:  [ ] Stress Test:    [ ] CHARLEEN  OTHER: 	    LABS:	 	  CARDIAC MARKERS:                                  11.4   7.99  )-----------( 482      ( 11 Oct 2022 05:30 )             35.4     10-11    136  |  102  |  14  ----------------------------<  92  3.7   |  23  |  0.75    Ca    9.3      11 Oct 2022 05:30    TPro  8.3  /  Alb  4.4  /  TBili  0.7  /  DBili  x   /  AST  16  /  ALT  10  /  AlkPhos  79  10-10    proBNP:   Lipid Profile:   HgA1c:   TSH:   PT/INR - ( 10 Oct 2022 15:30 )   PT: 13.1 sec;   INR: 1.10          PTT - ( 10 Oct 2022 15:30 )  PTT:32.6 sec    ASSESSMENT/PLAN: 	        DVT ppx:  Dispo:     Interventional Cardiology PA Adult Progress Note    Subjective Assessment:  Pt seen and examined at bedside. C/o intermittent pain, controlled with current pain regimen. No subjective fevers, no chills.     ROS Negative except as per Subjective and HPI  	  MEDICATIONS:  metoprolol tartrate 50 milliGRAM(s) Oral two times a day  vancomycin  IVPB 1750 milliGRAM(s) IV Intermittent every 24 hours  acetaminophen   IVPB .. 1000 milliGRAM(s) IV Intermittent once  gabapentin 100 milliGRAM(s) Oral <User Schedule>  gabapentin 800 milliGRAM(s) Oral <User Schedule>  HYDROmorphone  Injectable 2 milliGRAM(s) IV Push every 4 hours PRN  chlorhexidine 2% Cloths 1 Application(s) Topical daily  influenza   Vaccine 0.5 milliLiter(s) IntraMuscular once  	    [PHYSICAL EXAM:  TELEMETRY:  T(C): 36.8 (10-11-22 @ 08:31), Max: 37.3 (10-10-22 @ 13:47)  HR: 94 (10-11-22 @ 05:25) (90 - 105)  BP: 146/85 (10-11-22 @ 05:25) (142/89 - 174/115)  RR: 16 (10-11-22 @ 05:25) (16 - 20)  SpO2: 94% (10-11-22 @ 05:25) (94% - 100%)  Wt(kg): --  I&O's Summary    10 Oct 2022 07:01  -  11 Oct 2022 07:00  --------------------------------------------------------  IN: 360 mL / OUT: 0 mL / NET: 360 mL      Height (cm): 170.2 (10-10 @ 18:45)  Weight (kg): 134.3 (10-10 @ 18:45)  BMI (kg/m2): 46.4 (10-10 @ 18:45)  BSA (m2): 2.39 (10-10 @ 18:45)  Robles:  Central/PICC/Mid Line:                                         Appearance: Normal		  Neck: Supple, - JVD  Cardiovascular: Normal S1 S2, No JVD, No murmurs,   Respiratory: Lungs clear to auscultation. No Rales, Rhonchi, Wheezing	  Gastrointestinal:  Soft, Non-tender, + BS	  Skin: L foot w/ dressing over it. Pt showed picture of wound which was located on the lateral aspect of the L foot over the calcaneous. Wound appeared to be purulent, w/ surrounding erythema. Difficult to size based on picture.   Extremities: Normal range of motion, No clubbing, cyanosis or edema  Neurologic: Non-focal  Psychiatry: A & O x 3, Mood & affect appropriate      	    ECG:  	  RADIOLOGY:   DIAGNOSTIC TESTING:  [ ] Echocardiogram:  [ ]  Catheterization:  [ ] Stress Test:    [ ] CHARLEEN  OTHER: 	    LABS:	 	  CARDIAC MARKERS:                                  11.4   7.99  )-----------( 482      ( 11 Oct 2022 05:30 )             35.4     10-11    136  |  102  |  14  ----------------------------<  92  3.7   |  23  |  0.75    Ca    9.3      11 Oct 2022 05:30    TPro  8.3  /  Alb  4.4  /  TBili  0.7  /  DBili  x   /  AST  16  /  ALT  10  /  AlkPhos  79  10-10    proBNP:   Lipid Profile:   HgA1c:   TSH:   PT/INR - ( 10 Oct 2022 15:30 )   PT: 13.1 sec;   INR: 1.10          PTT - ( 10 Oct 2022 15:30 )  PTT:32.6 sec    ASSESSMENT/PLAN: 	        DVT ppx:  Dispo:

## 2022-10-11 NOTE — DIETITIAN INITIAL EVALUATION ADULT - PERTINENT LABORATORY DATA
10-11    136  |  102  |  14  ----------------------------<  92  3.7   |  23  |  0.75    Ca    9.3      11 Oct 2022 05:30    TPro  8.3  /  Alb  4.4  /  TBili  0.7  /  DBili  x   /  AST  16  /  ALT  10  /  AlkPhos  79  10-10  A1C with Estimated Average Glucose Result: 5.1 % (08-16-22 @ 08:54)

## 2022-10-11 NOTE — DIETITIAN INITIAL EVALUATION ADULT - OTHER CALCULATIONS
5'7''  pounds +-10%  Wt 296 pounds, BMI 46.3, %[id number]  IBW used to calculate energy needs due to pt's current body weight exceeding 120% of IBW  Adjust for age, BMI, Skin

## 2022-10-11 NOTE — BRIEF OPERATIVE NOTE - OPERATION/FINDINGS
R CFA access using micropuncture technique and 5F sheath. Left LE angiogram shows residual avm supplied by PTA. Transcatheter embolization performed using a total of 0.2 nBCA at an AVM nidus near the lateral ankle of the foot. Repeat angiogram shows improved an perfusion and decreased AV shunting. Sheath removed with manual compression for 15 mins with hemostasis achieved. Sharp debridement of ulcer at lateral heel and tissue sample sent for tissue culture. Ulcer cleaned with 4% chlorhexidine and dressed.       Right groin access with 5 Fr sheath. AVM accessed via PTA. Area of AVM embolized with 0.2 cc glue

## 2022-10-11 NOTE — DIETITIAN INITIAL EVALUATION ADULT - PERTINENT MEDS FT
MEDICATIONS  (STANDING):  chlorhexidine 2% Cloths 1 Application(s) Topical daily  gabapentin 100 milliGRAM(s) Oral <User Schedule>  gabapentin 800 milliGRAM(s) Oral <User Schedule>  influenza   Vaccine 0.5 milliLiter(s) IntraMuscular once  vancomycin  IVPB      vancomycin  IVPB 1250 milliGRAM(s) IV Intermittent every 8 hours    MEDICATIONS  (PRN):  HYDROmorphone  Injectable 2 milliGRAM(s) IV Push every 4 hours PRN Severe Pain (7 - 10)  ketorolac   Injectable 15 milliGRAM(s) IV Push every 6 hours PRN Severe Pain (7 - 10)

## 2022-10-11 NOTE — DIETITIAN INITIAL EVALUATION ADULT - ADD RECOMMEND
1. Monitor PO intake/appetite, GI distress, diet tolerance, labs, weights.  2. Honor pt food preferences as able.  3. RD to remain available for additional nutrition interventions as needed.  **  Moderate Nutrition Risk.

## 2022-10-11 NOTE — PROGRESS NOTE ADULT - PROBLEM SELECTOR PLAN 3
Patient on Home Bystolic 10mg BID -- originally on metoprolol as TIC, but pt brought in her own bystolic  -order placed by pharmacy and medication sent down w/ tech. Will plan on pt getting bystolic starting 10/12        VTE PPx: none given procedure today  Dispo: pending further eval  d/w Dr Teran's team

## 2022-10-11 NOTE — DIETITIAN INITIAL EVALUATION ADULT - OTHER INFO
36yo female with pmhx of HTN and L foot AVM s/p multiple DSE (most recently 9/21/22) who returns for worsening left foot foot pain and redness. Pt now reports last week she had increased pain and redness from 10/6 and was prescribed clindamycin by Dr. Teran's team. Pt has since starting PO Abx reporting continued fever Tmax 101.4, and intermittent diahrrea since starting Clinda. Pt admitted to 5 uris under Dr. Teran for Lower Left leg non healing ulcer with plan for IV Abx and if no improvement possible repeat angiogram/debridement.      Pt seen alert in room, had just gotten back to the unit. Pt NPO most of the day. Seen with outside food at bedside: chips, vitamin water, deli sandwich. Pt admit wt of 296 pounds noted, pt reports wt loss over the last 8 months d/t starting a wt loss med per suggestion of MD. Pt feels she can still improve her diet. Diet education provided today. NKFA. No issues chewing/swallowing.   Pain: Left leg and ankle.  Skin: Michael 20, 2+left ankle edema, Ulcer - hx L foot AVM.  GI: BM+ 10/10.   Please see below for nutritions recommendations.

## 2022-10-11 NOTE — PROGRESS NOTE ADULT - PROBLEM SELECTOR PLAN 1
- Pt with Left Lateral foot ulcer with purulence and fever at home Tmax 101.4   - currently afebrile, no leukocytosis  - Wound culture swabbed and sent in ED, f/u results  - continue tylenol prn has needed for fever/mild pain and Dialudid 2mg IV fro sever prn q 4  - Dressing changes as needed, placed saline dressing with Krilix 10/10 at night   - plan for debridement and possible angiogram today w/ Dr Teran  - ID team 2 following, appreciate recs  - c/w vanco 1250mg IVPB q8h. Ordered for 7 days, f/u length w/ ID's official recs  - vanc trough prior to 4th dose  - blood cultures to be drawn once pt returns from procedure  - tissue cultures to be obtained intra-procedure, f/u   - Chlorhexidine swabs

## 2022-10-12 LAB
-  AZTREONAM: SIGNIFICANT CHANGE UP
-  CEFEPIME: SIGNIFICANT CHANGE UP
-  CIPROFLOXACIN: SIGNIFICANT CHANGE UP
-  GENTAMICIN: SIGNIFICANT CHANGE UP
-  LEVOFLOXACIN: SIGNIFICANT CHANGE UP
-  MEROPENEM: SIGNIFICANT CHANGE UP
-  PIPERACILLIN/TAZOBACTAM: SIGNIFICANT CHANGE UP
-  TOBRAMYCIN: SIGNIFICANT CHANGE UP
ANION GAP SERPL CALC-SCNC: 12 MMOL/L — SIGNIFICANT CHANGE UP (ref 5–17)
BUN SERPL-MCNC: 15 MG/DL — SIGNIFICANT CHANGE UP (ref 7–23)
CALCIUM SERPL-MCNC: 9 MG/DL — SIGNIFICANT CHANGE UP (ref 8.4–10.5)
CHLORIDE SERPL-SCNC: 104 MMOL/L — SIGNIFICANT CHANGE UP (ref 96–108)
CO2 SERPL-SCNC: 23 MMOL/L — SIGNIFICANT CHANGE UP (ref 22–31)
CREAT SERPL-MCNC: 0.67 MG/DL — SIGNIFICANT CHANGE UP (ref 0.5–1.3)
EGFR: 114 ML/MIN/1.73M2 — SIGNIFICANT CHANGE UP
GLUCOSE SERPL-MCNC: 122 MG/DL — HIGH (ref 70–99)
HCT VFR BLD CALC: 33 % — LOW (ref 34.5–45)
HGB BLD-MCNC: 10.9 G/DL — LOW (ref 11.5–15.5)
MAGNESIUM SERPL-MCNC: 1.9 MG/DL — SIGNIFICANT CHANGE UP (ref 1.6–2.6)
MCHC RBC-ENTMCNC: 29 PG — SIGNIFICANT CHANGE UP (ref 27–34)
MCHC RBC-ENTMCNC: 33 GM/DL — SIGNIFICANT CHANGE UP (ref 32–36)
MCV RBC AUTO: 87.8 FL — SIGNIFICANT CHANGE UP (ref 80–100)
METHOD TYPE: SIGNIFICANT CHANGE UP
NRBC # BLD: 0 /100 WBCS — SIGNIFICANT CHANGE UP (ref 0–0)
PLATELET # BLD AUTO: 452 K/UL — HIGH (ref 150–400)
POTASSIUM SERPL-MCNC: 4.4 MMOL/L — SIGNIFICANT CHANGE UP (ref 3.5–5.3)
POTASSIUM SERPL-SCNC: 4.4 MMOL/L — SIGNIFICANT CHANGE UP (ref 3.5–5.3)
RBC # BLD: 3.76 M/UL — LOW (ref 3.8–5.2)
RBC # FLD: 12.8 % — SIGNIFICANT CHANGE UP (ref 10.3–14.5)
SODIUM SERPL-SCNC: 139 MMOL/L — SIGNIFICANT CHANGE UP (ref 135–145)
VANCOMYCIN TROUGH SERPL-MCNC: 13.1 UG/ML — SIGNIFICANT CHANGE UP (ref 10–20)
WBC # BLD: 10.99 K/UL — HIGH (ref 3.8–10.5)
WBC # FLD AUTO: 10.99 K/UL — HIGH (ref 3.8–10.5)

## 2022-10-12 PROCEDURE — 99232 SBSQ HOSP IP/OBS MODERATE 35: CPT

## 2022-10-12 RX ORDER — ENOXAPARIN SODIUM 100 MG/ML
40 INJECTION SUBCUTANEOUS EVERY 12 HOURS
Refills: 0 | Status: DISCONTINUED | OUTPATIENT
Start: 2022-10-12 | End: 2022-10-17

## 2022-10-12 RX ORDER — OXYCODONE AND ACETAMINOPHEN 5; 325 MG/1; MG/1
1 TABLET ORAL ONCE
Refills: 0 | Status: DISCONTINUED | OUTPATIENT
Start: 2022-10-12 | End: 2022-10-12

## 2022-10-12 RX ADMIN — ENOXAPARIN SODIUM 40 MILLIGRAM(S): 100 INJECTION SUBCUTANEOUS at 21:09

## 2022-10-12 RX ADMIN — HYDROMORPHONE HYDROCHLORIDE 2 MILLIGRAM(S): 2 INJECTION INTRAMUSCULAR; INTRAVENOUS; SUBCUTANEOUS at 05:27

## 2022-10-12 RX ADMIN — HYDROMORPHONE HYDROCHLORIDE 2 MILLIGRAM(S): 2 INJECTION INTRAMUSCULAR; INTRAVENOUS; SUBCUTANEOUS at 00:15

## 2022-10-12 RX ADMIN — HYDROMORPHONE HYDROCHLORIDE 2 MILLIGRAM(S): 2 INJECTION INTRAMUSCULAR; INTRAVENOUS; SUBCUTANEOUS at 21:52

## 2022-10-12 RX ADMIN — Medication 166.67 MILLIGRAM(S): at 21:52

## 2022-10-12 RX ADMIN — NEBIVOLOL HYDROCHLORIDE 10 MILLIGRAM(S): 5 TABLET ORAL at 05:50

## 2022-10-12 RX ADMIN — GABAPENTIN 100 MILLIGRAM(S): 400 CAPSULE ORAL at 06:17

## 2022-10-12 RX ADMIN — OXYCODONE AND ACETAMINOPHEN 1 TABLET(S): 5; 325 TABLET ORAL at 11:37

## 2022-10-12 RX ADMIN — CEFEPIME 100 MILLIGRAM(S): 1 INJECTION, POWDER, FOR SOLUTION INTRAMUSCULAR; INTRAVENOUS at 14:18

## 2022-10-12 RX ADMIN — HYDROMORPHONE HYDROCHLORIDE 2 MILLIGRAM(S): 2 INJECTION INTRAMUSCULAR; INTRAVENOUS; SUBCUTANEOUS at 08:57

## 2022-10-12 RX ADMIN — OXYCODONE AND ACETAMINOPHEN 1 TABLET(S): 5; 325 TABLET ORAL at 12:37

## 2022-10-12 RX ADMIN — CEFEPIME 100 MILLIGRAM(S): 1 INJECTION, POWDER, FOR SOLUTION INTRAMUSCULAR; INTRAVENOUS at 21:09

## 2022-10-12 RX ADMIN — HYDROMORPHONE HYDROCHLORIDE 2 MILLIGRAM(S): 2 INJECTION INTRAMUSCULAR; INTRAVENOUS; SUBCUTANEOUS at 01:15

## 2022-10-12 RX ADMIN — GABAPENTIN 800 MILLIGRAM(S): 400 CAPSULE ORAL at 14:25

## 2022-10-12 RX ADMIN — NEBIVOLOL HYDROCHLORIDE 10 MILLIGRAM(S): 5 TABLET ORAL at 18:03

## 2022-10-12 RX ADMIN — HYDROMORPHONE HYDROCHLORIDE 2 MILLIGRAM(S): 2 INJECTION INTRAMUSCULAR; INTRAVENOUS; SUBCUTANEOUS at 04:27

## 2022-10-12 RX ADMIN — HYDROMORPHONE HYDROCHLORIDE 2 MILLIGRAM(S): 2 INJECTION INTRAMUSCULAR; INTRAVENOUS; SUBCUTANEOUS at 13:48

## 2022-10-12 RX ADMIN — Medication 166.67 MILLIGRAM(S): at 14:26

## 2022-10-12 RX ADMIN — HYDROMORPHONE HYDROCHLORIDE 2 MILLIGRAM(S): 2 INJECTION INTRAMUSCULAR; INTRAVENOUS; SUBCUTANEOUS at 09:12

## 2022-10-12 RX ADMIN — Medication 166.67 MILLIGRAM(S): at 07:08

## 2022-10-12 RX ADMIN — HYDROMORPHONE HYDROCHLORIDE 2 MILLIGRAM(S): 2 INJECTION INTRAMUSCULAR; INTRAVENOUS; SUBCUTANEOUS at 17:49

## 2022-10-12 RX ADMIN — HYDROMORPHONE HYDROCHLORIDE 2 MILLIGRAM(S): 2 INJECTION INTRAMUSCULAR; INTRAVENOUS; SUBCUTANEOUS at 00:00

## 2022-10-12 RX ADMIN — CEFEPIME 100 MILLIGRAM(S): 1 INJECTION, POWDER, FOR SOLUTION INTRAMUSCULAR; INTRAVENOUS at 05:50

## 2022-10-12 RX ADMIN — HYDROMORPHONE HYDROCHLORIDE 2 MILLIGRAM(S): 2 INJECTION INTRAMUSCULAR; INTRAVENOUS; SUBCUTANEOUS at 18:04

## 2022-10-12 NOTE — PROGRESS NOTE ADULT - PROBLEM SELECTOR PLAN 3
-C/w home Bystolic 10mg BID  -BPs elevated this admission, but improve w/ pain control. No BP med changes at this time    VTE PPx: lovenox  Dispo: possible discharge tomorrow pending ID recs  d/w Dr eTran

## 2022-10-12 NOTE — PROGRESS NOTE ADULT - PROBLEM SELECTOR PLAN 2
Pt with multiple Direct stick embolizations, and most recent is s/p angiogram and DSE 9/21 with Dr. Teran  - Pain control with IV dialudid 2mg prn q 4hrs  Patient on gabapentin 100mg AM and 800mg evening (before 4pm) for chronic pain as well as percocet at home  -repeat angiogram/embolization performed yesterday as noted above w/ debridement

## 2022-10-12 NOTE — PROGRESS NOTE ADULT - ASSESSMENT
Pt is a 38yo female with pmhx of HTN and L foot AVM s/p multiple DSE (most recently 9/21/22) who returns for worsening left foot foot pain and non-healing ulcer w/ erythema that did not respond to outpatient oral antibiotics. Pt now s/p angiogram/embolization of left foot AVM, as well as wound debridement/deep tissue culture on 10/11. ID following for Abx management

## 2022-10-12 NOTE — PROGRESS NOTE ADULT - SUBJECTIVE AND OBJECTIVE BOX
Interventional Cardiology PA Adult Progress Note    C.C.:     Subjective Assessment:      ROS Negative except as per Subjective and HPI  	  MEDICATIONS:  nebivolol 10 milliGRAM(s) Oral daily    cefepime   IVPB 2000 milliGRAM(s) IV Intermittent every 8 hours  vancomycin  IVPB      vancomycin  IVPB 1250 milliGRAM(s) IV Intermittent every 8 hours      gabapentin 100 milliGRAM(s) Oral <User Schedule>  gabapentin 800 milliGRAM(s) Oral <User Schedule>  HYDROmorphone  Injectable 2 milliGRAM(s) IV Push every 4 hours PRN  ketorolac   Injectable 15 milliGRAM(s) IV Push every 6 hours PRN  oxycodone    5 mG/acetaminophen 325 mG 1 Tablet(s) Oral once PRN        influenza   Vaccine 0.5 milliLiter(s) IntraMuscular once      	    [PHYSICAL EXAM:  TELEMETRY:  T(C): 36.8 (10-12-22 @ 09:13), Max: 36.8 (10-11-22 @ 11:54)  HR: 88 (10-12-22 @ 10:13) (88 - 116)  BP: 158/83 (10-12-22 @ 10:13) (136/67 - 184/93)  RR: 18 (10-12-22 @ 10:13) (17 - 20)  SpO2: 99% (10-12-22 @ 10:13) (94% - 99%)  Wt(kg): --  I&O's Summary    11 Oct 2022 07:01  -  12 Oct 2022 07:00  --------------------------------------------------------  IN: 490 mL / OUT: 650 mL / NET: -160 mL    12 Oct 2022 07:01  -  12 Oct 2022 10:59  --------------------------------------------------------  IN: 240 mL / OUT: 0 mL / NET: 240 mL      Height (cm): 170.2 (10-11 @ 11:54)  Weight (kg): 134.3 (10-11 @ 11:54)  BMI (kg/m2): 46.4 (10-11 @ 11:54)  BSA (m2): 2.39 (10-11 @ 11:54)  Robles:  Central/PICC/Mid Line:                                         Appearance: Normal	  HEENT:   Normal oral mucosa, PERRL, EOMI	  Neck: Supple, + JVD/ - JVD; Carotid Bruit   Cardiovascular: Normal S1 S2, No JVD, No murmurs,   Respiratory: Lungs clear to auscultation/Decreased Breath Sounds/No Rales, Rhonchi, Wheezing	  Gastrointestinal:  Soft, Non-tender, + BS	  Skin: No rashes, No ecchymoses, No cyanosis  Extremities: Normal range of motion, No clubbing, cyanosis or edema  Vascular: Peripheral pulses palpable 2+ bilaterally  Neurologic: Non-focal  Psychiatry: A & O x 3, Mood & affect appropriate      	    ECG:  	  RADIOLOGY:   DIAGNOSTIC TESTING:  [ ] Echocardiogram:  [ ]  Catheterization:  [ ] Stress Test:    [ ] CHARLEEN  OTHER: 	    LABS:	 	  CARDIAC MARKERS:                                  11.4   7.99  )-----------( 482      ( 11 Oct 2022 05:30 )             35.4     10-12    139  |  104  |  15  ----------------------------<  122<H>  4.4   |  23  |  0.67    Ca    9.0      12 Oct 2022 05:58  Mg     1.9     10-12    TPro  8.3  /  Alb  4.4  /  TBili  0.7  /  DBili  x   /  AST  16  /  ALT  10  /  AlkPhos  79  10-10    proBNP:   Lipid Profile:   HgA1c:   TSH:   PT/INR - ( 11 Oct 2022 08:23 )   PT: 12.6 sec;   INR: 1.06          PTT - ( 11 Oct 2022 08:23 )  PTT:29.4 sec    ASSESSMENT/PLAN: 	        DVT ppx:  Dispo:     Interventional Cardiology PA Adult Progress Note    Subjective Assessment:  Pt seen and examined at bedside this am and reports feeling well. Pain is still there but controlled. Denies chills, CP, SOB, palpitations, orthopnea, n/v.    ROS Negative except as per Subjective and HPI  	  MEDICATIONS:  nebivolol 10 milliGRAM(s) Oral daily  cefepime   IVPB 2000 milliGRAM(s) IV Intermittent every 8 hours  vancomycin  IVPB      vancomycin  IVPB 1250 milliGRAM(s) IV Intermittent every 8 hours  gabapentin 100 milliGRAM(s) Oral <User Schedule>  gabapentin 800 milliGRAM(s) Oral <User Schedule>  HYDROmorphone  Injectable 2 milliGRAM(s) IV Push every 4 hours PRN  ketorolac   Injectable 15 milliGRAM(s) IV Push every 6 hours PRN  oxycodone    5 mG/acetaminophen 325 mG 1 Tablet(s) Oral once PRN  influenza   Vaccine 0.5 milliLiter(s) IntraMuscular once      [PHYSICAL EXAM:  TELEMETRY:  T(C): 36.8 (10-12-22 @ 09:13), Max: 36.8 (10-11-22 @ 11:54)  HR: 88 (10-12-22 @ 10:13) (88 - 116)  BP: 158/83 (10-12-22 @ 10:13) (136/67 - 184/93)  RR: 18 (10-12-22 @ 10:13) (17 - 20)  SpO2: 99% (10-12-22 @ 10:13) (94% - 99%)  Wt(kg): --  I&O's Summary    11 Oct 2022 07:01  -  12 Oct 2022 07:00  --------------------------------------------------------  IN: 490 mL / OUT: 650 mL / NET: -160 mL    12 Oct 2022 07:01  -  12 Oct 2022 10:59  --------------------------------------------------------  IN: 240 mL / OUT: 0 mL / NET: 240 mL      Height (cm): 170.2 (10-11 @ 11:54)  Weight (kg): 134.3 (10-11 @ 11:54)  BMI (kg/m2): 46.4 (10-11 @ 11:54)  BSA (m2): 2.39 (10-11 @ 11:54)  Robles:  Central/PICC/Mid Line:                                         Appearance: Normal	  Neck: Supple, - JVD  Cardiovascular: Normal S1 S2, No JVD, No murmurs,   Respiratory: Lungs clear to auscultation. No Rales, Rhonchi, Wheezing	  Gastrointestinal:  Soft, Non-tender, + BS	  Skin: L foot w/ dressing over it. Picture of wound: located on the lateral aspect of the L foot over the calcaneous. Wound appeared to be purulent, w/ surrounding erythema. Difficult to size based on picture.   Extremities: Normal range of motion, No clubbing, cyanosis or edema  Neurologic: Non-focal  Psychiatry: A & O x 3, Mood & affect appropriate    	    ECG:  	  RADIOLOGY:   DIAGNOSTIC TESTING:  [ ] Echocardiogram:  [ ]  Catheterization:  [ ] Stress Test:    [ ] CHARLEEN  OTHER: 	    LABS:	 	  CARDIAC MARKERS:                                  11.4   7.99  )-----------( 482      ( 11 Oct 2022 05:30 )             35.4     10-12    139  |  104  |  15  ----------------------------<  122<H>  4.4   |  23  |  0.67    Ca    9.0      12 Oct 2022 05:58  Mg     1.9     10-12    TPro  8.3  /  Alb  4.4  /  TBili  0.7  /  DBili  x   /  AST  16  /  ALT  10  /  AlkPhos  79  10-10    proBNP:   Lipid Profile:   HgA1c:   TSH:   PT/INR - ( 11 Oct 2022 08:23 )   PT: 12.6 sec;   INR: 1.06          PTT - ( 11 Oct 2022 08:23 )  PTT:29.4 sec    ASSESSMENT/PLAN: 	        DVT ppx:  Dispo:

## 2022-10-12 NOTE — PROGRESS NOTE ADULT - PROBLEM SELECTOR PLAN 1
- Pt with Left Lateral foot ulcer with purulence and fever at home Tmax 101.4   - currently afebrile, WBC today 10.99  - Superficial wound cx growing pseudomonas  - deep tissue (surgical) cx growing pseudomonas, staph haemolyticus, corynbacterium striatum/jeikeium  - BCx w/ NGTD  - continue to follow cultures  - continue tylenol prn has needed for fever/mild pain and Dialudid 2mg IV fro sever prn q 4  - s/p angiogram/embolization of left foot AVM, as well as wound debridement/deep tissue culture on 10/11 w/ Dr Teran. R groin access  - Dressing changes as needed, placed saline dressing with Krilix 10/10 at night   - Wound care ordered, did not see pt today. Please call in am to have them assess pt prior to discharge  - ID team 2 following, appreciate recs  - c/w vanco 1250mg IVPB q8h. Ordered for 7 days (no length rec'd yet)  - vanc trough prior to each 4th dose, goal 10-15, WNL x 1  - next trough prior to 4th dose tomorrow which will be due at 3pm (ordered trough for 2:30)  - f/u w ID tomorrow regarding PO abx regimen if pt to be discharged

## 2022-10-12 NOTE — PROGRESS NOTE ADULT - ASSESSMENT
38 yo F with HTN, MO and AVM L foot with ulcer since early 8/2022 that has been refractory to direct stick and then transcatheter embolizations.  In contrast to prior episodes, she received only tressa-procedural antibiotics b/o concern related to urticaria attributed to yeast in bowel.  Debridement culture is growing Pseudomonas.  Would also empirically cover Staph, Strep for now.  Her vancomycin trough was therapeutic and appropriately timed.  Suggest:  - Blood cultures x 2  - Continue to f/u debridement culture for Pseudomonas susceptibility and additional growth  - Continue vancomycin 1250 mg IV q8h.  Vancomycin trough prior to 4th dose.  Goal trough 10-15.  - Start cefepime 2 g IV q8h  Recommendations discussed with primary team.  Will follow with you - team 2.  Dr. Khan will assume care tomorrow.

## 2022-10-12 NOTE — PROGRESS NOTE ADULT - SUBJECTIVE AND OBJECTIVE BOX
INTERVAL HPI/OVERNIGHT EVENTS:  Afebrile.  Pain in foot is 8/10 in severity    CONSTITUTIONAL:  No fever, chills, night sweats  EYES:  No photophobia or visual changes  CARDIOVASCULAR:  No chest pain  RESPIRATORY:  No cough, wheezing, or SOB   GASTROINTESTINAL:  No nausea, vomiting, diarrhea, constipation, or abdominal pain  GENITOURINARY:  No frequency, urgency, dysuria or hematuria  NEUROLOGIC:  No headache, lightheadedness      ANTIBIOTICS/RELEVANT:    Vancomycin 1250 mg IV q8h (10/10-present)  Cefepime 2 g IV q8h (10/11-present)      Vital Signs Last 24 Hrs  T(C): 36.8 (12 Oct 2022 13:26), Max: 36.8 (12 Oct 2022 09:13)  T(F): 98.3 (12 Oct 2022 13:26), Max: 98.3 (12 Oct 2022 09:13)  HR: 98 (12 Oct 2022 14:00) (88 - 116)  BP: 162/99 (12 Oct 2022 14:00) (144/80 - 184/93)  BP(mean): --  RR: 18 (12 Oct 2022 14:00) (17 - 20)  SpO2: 98% (12 Oct 2022 14:00) (94% - 99%)    Parameters below as of 12 Oct 2022 14:00  Patient On (Oxygen Delivery Method): room air        PHYSICAL EXAM:  Constitutional:  Lying flat in bed, conversant, nontoxic.  Eyes:  Sclerae anicteric, conjunctivae clear, PERRL  Ear/Nose/Throat:  No nasal exudate or sinus tenderness;  No buccal mucosal lesions, no pharyngeal erythema or exudate	  Neck:  Supple, no adenopathy  Respiratory:  Clear bilaterally  Cardiovascular:  RRR, S1S2, no murmur appreciated  Gastrointestinal:  Symmetric, normoactive BS, soft, NT, no masses, guarding or rebound.  No HSM  Extremities:  No edema.  L foot dressed      LABS:                        10.9   10.99 )-----------( 452      ( 12 Oct 2022 15:31 )             33.0         10-12    139  |  104  |  15  ----------------------------<  122<H>  4.4   |  23  |  0.67    Ca    9.0      12 Oct 2022 05:58  Mg     1.9     10-12      Vancomycin trough 13.1 (05:58;  prior dose 22:18)      MICROBIOLOGY:    Surgical swab 10/10:  Corynebacterium and Pseudomonas    Wound culture 10/11: Pseudomonas aeruginosa    RADIOLOGY & ADDITIONAL STUDIES:

## 2022-10-13 ENCOUNTER — TRANSCRIPTION ENCOUNTER (OUTPATIENT)
Age: 37
End: 2022-10-13

## 2022-10-13 LAB
-  CLINDAMYCIN: SIGNIFICANT CHANGE UP
-  ERYTHROMYCIN: SIGNIFICANT CHANGE UP
-  LINEZOLID: SIGNIFICANT CHANGE UP
-  OXACILLIN: SIGNIFICANT CHANGE UP
-  RIFAMPIN: SIGNIFICANT CHANGE UP
-  TRIMETHOPRIM/SULFAMETHOXAZOLE: SIGNIFICANT CHANGE UP
-  VANCOMYCIN: SIGNIFICANT CHANGE UP
ANION GAP SERPL CALC-SCNC: 8 MMOL/L — SIGNIFICANT CHANGE UP (ref 5–17)
BUN SERPL-MCNC: 14 MG/DL — SIGNIFICANT CHANGE UP (ref 7–23)
CALCIUM SERPL-MCNC: 8.6 MG/DL — SIGNIFICANT CHANGE UP (ref 8.4–10.5)
CHLORIDE SERPL-SCNC: 107 MMOL/L — SIGNIFICANT CHANGE UP (ref 96–108)
CO2 SERPL-SCNC: 24 MMOL/L — SIGNIFICANT CHANGE UP (ref 22–31)
CREAT SERPL-MCNC: 0.71 MG/DL — SIGNIFICANT CHANGE UP (ref 0.5–1.3)
CULTURE RESULTS: SIGNIFICANT CHANGE UP
EGFR: 111 ML/MIN/1.73M2 — SIGNIFICANT CHANGE UP
GLUCOSE SERPL-MCNC: 103 MG/DL — HIGH (ref 70–99)
HCT VFR BLD CALC: 34 % — LOW (ref 34.5–45)
HGB BLD-MCNC: 10.9 G/DL — LOW (ref 11.5–15.5)
MAGNESIUM SERPL-MCNC: 1.8 MG/DL — SIGNIFICANT CHANGE UP (ref 1.6–2.6)
MCHC RBC-ENTMCNC: 29 PG — SIGNIFICANT CHANGE UP (ref 27–34)
MCHC RBC-ENTMCNC: 32.1 GM/DL — SIGNIFICANT CHANGE UP (ref 32–36)
MCV RBC AUTO: 90.4 FL — SIGNIFICANT CHANGE UP (ref 80–100)
METHOD TYPE: SIGNIFICANT CHANGE UP
NRBC # BLD: 0 /100 WBCS — SIGNIFICANT CHANGE UP (ref 0–0)
ORGANISM # SPEC MICROSCOPIC CNT: SIGNIFICANT CHANGE UP
PLATELET # BLD AUTO: 453 K/UL — HIGH (ref 150–400)
POTASSIUM SERPL-MCNC: 4 MMOL/L — SIGNIFICANT CHANGE UP (ref 3.5–5.3)
POTASSIUM SERPL-SCNC: 4 MMOL/L — SIGNIFICANT CHANGE UP (ref 3.5–5.3)
RBC # BLD: 3.76 M/UL — LOW (ref 3.8–5.2)
RBC # FLD: 12.8 % — SIGNIFICANT CHANGE UP (ref 10.3–14.5)
SODIUM SERPL-SCNC: 139 MMOL/L — SIGNIFICANT CHANGE UP (ref 135–145)
SPECIMEN SOURCE: SIGNIFICANT CHANGE UP
VANCOMYCIN TROUGH SERPL-MCNC: 17.5 UG/ML — SIGNIFICANT CHANGE UP (ref 10–20)
WBC # BLD: 8.75 K/UL — SIGNIFICANT CHANGE UP (ref 3.8–10.5)
WBC # FLD AUTO: 8.75 K/UL — SIGNIFICANT CHANGE UP (ref 3.8–10.5)

## 2022-10-13 PROCEDURE — 99232 SBSQ HOSP IP/OBS MODERATE 35: CPT

## 2022-10-13 RX ADMIN — HYDROMORPHONE HYDROCHLORIDE 2 MILLIGRAM(S): 2 INJECTION INTRAMUSCULAR; INTRAVENOUS; SUBCUTANEOUS at 10:12

## 2022-10-13 RX ADMIN — HYDROMORPHONE HYDROCHLORIDE 2 MILLIGRAM(S): 2 INJECTION INTRAMUSCULAR; INTRAVENOUS; SUBCUTANEOUS at 10:27

## 2022-10-13 RX ADMIN — ENOXAPARIN SODIUM 40 MILLIGRAM(S): 100 INJECTION SUBCUTANEOUS at 19:52

## 2022-10-13 RX ADMIN — CEFEPIME 100 MILLIGRAM(S): 1 INJECTION, POWDER, FOR SOLUTION INTRAMUSCULAR; INTRAVENOUS at 21:53

## 2022-10-13 RX ADMIN — HYDROMORPHONE HYDROCHLORIDE 2 MILLIGRAM(S): 2 INJECTION INTRAMUSCULAR; INTRAVENOUS; SUBCUTANEOUS at 23:28

## 2022-10-13 RX ADMIN — HYDROMORPHONE HYDROCHLORIDE 2 MILLIGRAM(S): 2 INJECTION INTRAMUSCULAR; INTRAVENOUS; SUBCUTANEOUS at 01:54

## 2022-10-13 RX ADMIN — HYDROMORPHONE HYDROCHLORIDE 2 MILLIGRAM(S): 2 INJECTION INTRAMUSCULAR; INTRAVENOUS; SUBCUTANEOUS at 14:22

## 2022-10-13 RX ADMIN — HYDROMORPHONE HYDROCHLORIDE 2 MILLIGRAM(S): 2 INJECTION INTRAMUSCULAR; INTRAVENOUS; SUBCUTANEOUS at 07:17

## 2022-10-13 RX ADMIN — ENOXAPARIN SODIUM 40 MILLIGRAM(S): 100 INJECTION SUBCUTANEOUS at 08:21

## 2022-10-13 RX ADMIN — NEBIVOLOL HYDROCHLORIDE 10 MILLIGRAM(S): 5 TABLET ORAL at 18:04

## 2022-10-13 RX ADMIN — HYDROMORPHONE HYDROCHLORIDE 2 MILLIGRAM(S): 2 INJECTION INTRAMUSCULAR; INTRAVENOUS; SUBCUTANEOUS at 19:02

## 2022-10-13 RX ADMIN — NEBIVOLOL HYDROCHLORIDE 10 MILLIGRAM(S): 5 TABLET ORAL at 05:26

## 2022-10-13 RX ADMIN — HYDROMORPHONE HYDROCHLORIDE 2 MILLIGRAM(S): 2 INJECTION INTRAMUSCULAR; INTRAVENOUS; SUBCUTANEOUS at 06:17

## 2022-10-13 RX ADMIN — GABAPENTIN 100 MILLIGRAM(S): 400 CAPSULE ORAL at 05:26

## 2022-10-13 RX ADMIN — HYDROMORPHONE HYDROCHLORIDE 2 MILLIGRAM(S): 2 INJECTION INTRAMUSCULAR; INTRAVENOUS; SUBCUTANEOUS at 02:54

## 2022-10-13 RX ADMIN — CEFEPIME 100 MILLIGRAM(S): 1 INJECTION, POWDER, FOR SOLUTION INTRAMUSCULAR; INTRAVENOUS at 15:42

## 2022-10-13 RX ADMIN — HYDROMORPHONE HYDROCHLORIDE 2 MILLIGRAM(S): 2 INJECTION INTRAMUSCULAR; INTRAVENOUS; SUBCUTANEOUS at 18:47

## 2022-10-13 RX ADMIN — Medication 166.67 MILLIGRAM(S): at 16:09

## 2022-10-13 RX ADMIN — HYDROMORPHONE HYDROCHLORIDE 2 MILLIGRAM(S): 2 INJECTION INTRAMUSCULAR; INTRAVENOUS; SUBCUTANEOUS at 14:37

## 2022-10-13 RX ADMIN — GABAPENTIN 800 MILLIGRAM(S): 400 CAPSULE ORAL at 14:53

## 2022-10-13 RX ADMIN — CEFEPIME 100 MILLIGRAM(S): 1 INJECTION, POWDER, FOR SOLUTION INTRAMUSCULAR; INTRAVENOUS at 05:25

## 2022-10-13 NOTE — DISCHARGE NOTE PROVIDER - HOSPITAL COURSE
38yo female with pmhx of HTN and L foot AVM s/p multiple DSE (most recently 9/21/22) who returned for worsening left foot foot pain and erythema around non-healing ulcer. Pt was last admitted in late September, states she had angiogram and DSE 9/21/22 with Dr. Teran. At that time she reports there was consideration for debridement but was deferred at the time and was given one dose of Vanco before DC with report of improvement of fever and reduction of redness with dressing placed. Pt now reports last week she had increased pain and redness from 10/6 and was prescribed clindamycin by Dr. Teran's team. Pt report minimal improvement with this, and states she had continued fevers w/ Tmax 101.4. She was admitted to [facility] telemetry unit for further management.    Pt underwent repeat LLE angiogram and ulcer debridement on 10/11/22 w/ Dr Teran where she had embolization of L lateral ankle AVM w/ glue. Pt had deep wound cultures obtained intraprocedure. Procedure was done via R CFA s/p manual compression. Infectious disease was consulted for antibiotic regimen. Overall, at the time of discharge, superficial wound cultures grew pseudomonas aeruginosa, deep surgical wound cultures grew pseudomonas aeruginosa, staph haemolyticus, and campylobacter striatum and jeikeium. Blood cultures had NGTD upon discharge. Pt was started on Vancomycin on 10/10 and continued on this until discharge. Pt was also started on Cefepime on 10/11 for pseudomonas coverage and continued until discharge. Pt will continue ________?po antibiotics?_____ upon discharge.     Wound care evaluated pt on 10/13/22 and recommended _______________    She has been seen and examined at bedside this morning. She is out of bed and ambulating with no complaints. R groin access stable with no hematoma, no bleed, distal pulse to baseline. Lab values, telemetry, and vital signs reviewed and remained stable. Discharge medication regimen reviewed with patient, Dr Teran, and Dr Khan. All discharge instructions reviewed with patient and medications e-prescribed to pharmacy. Pt is cleared for discharge per Dr. Teran, and will follow up with him within 1-2 weeks of discharge.     36yo female with pmhx of HTN and L foot AVM s/p multiple DSE (most recently 9/21/22) who returned for worsening left foot foot pain and erythema around non-healing ulcer. Pt was last admitted in late September, states she had angiogram and DSE 9/21/22 with Dr. Teran. At that time she reports there was consideration for debridement but was deferred at the time and was given one dose of Vanco before DC with report of improvement of fever and reduction of redness with dressing placed. Pt now reports last week she had increased pain and redness from 10/6 and was prescribed clindamycin by Dr. Teran's team. Pt report minimal improvement with this, and states she had continued fevers w/ Tmax 101.4. She was admitted to [facility] telemetry unit for further management.    Pt underwent repeat LLE angiogram and ulcer debridement on 10/11/22 w/ Dr Teran where she had embolization of L lateral ankle AVM w/ glue. Pt had deep wound cultures obtained intraprocedure. Procedure was done via R CFA s/p manual compression. Infectious disease was consulted for antibiotic regimen. Overall, at the time of discharge, superficial wound cultures grew pseudomonas aeruginosa, deep surgical wound cultures grew pseudomonas aeruginosa, staph haemolyticus, and campylobacter striatum and jeikeium. Blood cultures had NGTD upon discharge. Pt was started on Vancomycin on 10/10 and continued on this until discharge. Pt was also started on Cefepime on 10/11 for pseudomonas coverage and continued until discharge. Pt will continue ________ with/without abx on DC     Wound care evaluated pt on 10/13/22 and recommended Cleanse foot wound with vashe wound cleanser, as follows: moisten 4x4 gauze, well with vashe then lightly pack into wound for 3 minutes, remove and pat dry. Then cut a small piece of aquacel AG and lightly pack into wound then cover with dry dressing, and wrap with Misa to secure, and change every other day.    She has been seen and examined at bedside this morning. She is out of bed and ambulating with no complaints. R groin access stable with no hematoma, no bleed, distal pulse to baseline. Lab values, telemetry, and vital signs reviewed and remained stable. Discharge medication regimen reviewed with patient, Dr Teran, and Dr Khan. All discharge instructions reviewed with patient and medications e-prescribed to pharmacy. Pt is cleared for discharge per Dr. Teran, and will follow up with him within 1-2 weeks of discharge.     38yo female with pmhx of HTN and L foot AVM s/p multiple DSE (most recently 9/21/22) who returned for worsening left foot foot pain and erythema around non-healing ulcer. Pt was last admitted in late September, states she had angiogram and DSE 9/21/22 with Dr. Teran. At that time she reports there was consideration for debridement but was deferred at the time and was given one dose of Vanco before DC with report of improvement of fever and reduction of redness with dressing placed. Pt now reports last week she had increased pain and redness from 10/6 and was prescribed clindamycin by Dr. Teran's team. Pt report minimal improvement with this, and states she had continued fevers w/ Tmax 101.4. She was admitted to [facility] telemetry unit for further management.    Pt now s/p repeat LLE angiogram and ulcer debridement on 10/11/22 w/ Dr Teran where she had embolization of L lateral ankle AVM w/ glue. Pt had deep wound cultures obtained intraprocedure. Procedure was done via R CFA s/p manual compression. Infectious disease was consulted for antibiotic regimen. Overall, at the time of discharge, superficial wound cultures grew pseudomonas aeruginosa, deep surgical wound cultures grew pseudomonas aeruginosa, staph haemolyticus, and campylobacter striatum and jeikeium. Blood cultures had NGTD upon discharge. Pt was started on Vancomycin on 10/10 and Cefepime on 10/11 for pseudomonas coverage which was stopped on 10/17. No need for abx outpatient per ID.    Wound care evaluated pt on 10/13/22 and recommended Cleanse foot wound with vashe wound cleanser, as follows: moisten 4x4 gauze, well with vashe then lightly pack into wound for 3 minutes, remove and pat dry. Then cut a small piece of aquacel AG and lightly pack into wound then cover with dry dressing, and wrap with Misa to secure, and change every other day.    Patient seen and examined at the bedside on 10/17/22. No significant events on telemetry overnight. AM labs wnl, VSS/HD stable. Right groin site stable; no hematoma or bruit. Denies any complaints at this time. Patient has been medically cleared for discharge as per Dr. Teran. Patient has been given appropriate discharge instructions including medication regimen, access site management and follow up. Medications that patient needs refills on (+/- new medications) have been e-prescribed to preferred pharmacy. Patient will f/u with Dr. Teran in 1-2 weeks for further management.     VSS  Gen: NAD, A&O x3  Cards: RRR, clear S1 and S2 without murmur  Pulm: CTA B/L without w/r/r  Vascular: Right groin w/o hematoma, ooze or bruit. Peripheral pulses 2+ B/L  Abd: soft, NT  Ext: no LE edema or ulcerations B/L

## 2022-10-13 NOTE — DISCHARGE NOTE PROVIDER - DETAILS OF MALNUTRITION DIAGNOSIS/DIAGNOSES
This patient has been assessed with a concern for Malnutrition and was treated during this hospitalization for the following Nutrition diagnosis/diagnoses:     -  10/11/2022: Morbid obesity (BMI > 40)

## 2022-10-13 NOTE — PROVIDER CONTACT NOTE (OTHER) - SITUATION
Pt w/o IV access, x2 attempts by RN Danny, contacted [facility] ([facility] [address]), unable to send RN due to unavailable staff due to code.

## 2022-10-13 NOTE — PROGRESS NOTE ADULT - PROBLEM SELECTOR PLAN 3
-C/w home Bystolic 10mg BID  -BPs elevated this admission, but improve w/ pain control. No BP med changes at this time    VTE PPx: lovenox  Dispo: Pt still needing IV abx at this time, assess wound if possible to go home on regimen   d/w Dr Teran

## 2022-10-13 NOTE — PROGRESS NOTE ADULT - SUBJECTIVE AND OBJECTIVE BOX
INFECTIOUS DISEASES CONSULT FOLLOW-UP NOTE    INTERVAL HPI/OVERNIGHT EVENTS:  No event overnight  patient feels well, denied fever/chills, n/v/d     ROS:   Constitutional, eyes, ENT, cardiovascular, respiratory, gastrointestinal, genitourinary, integumentary, neurological, psychiatric and heme/lymph are otherwise negative other than noted above       ANTIBIOTICS/RELEVANT:    MEDICATIONS  (STANDING):  cefepime   IVPB 2000 milliGRAM(s) IV Intermittent every 8 hours  enoxaparin Injectable 40 milliGRAM(s) SubCutaneous every 12 hours  gabapentin 100 milliGRAM(s) Oral <User Schedule>  gabapentin 800 milliGRAM(s) Oral <User Schedule>  influenza   Vaccine 0.5 milliLiter(s) IntraMuscular once  nebivolol 10 milliGRAM(s) Oral <User Schedule>  vancomycin  IVPB      vancomycin  IVPB 1250 milliGRAM(s) IV Intermittent every 8 hours    MEDICATIONS  (PRN):  HYDROmorphone  Injectable 2 milliGRAM(s) IV Push every 4 hours PRN Severe Pain (7 - 10)  ketorolac   Injectable 15 milliGRAM(s) IV Push every 6 hours PRN Severe Pain (7 - 10)        Vital Signs Last 24 Hrs  T(C): 36.4 (13 Oct 2022 18:24), Max: 36.6 (13 Oct 2022 10:29)  T(F): 97.5 (13 Oct 2022 18:24), Max: 97.9 (13 Oct 2022 10:29)  HR: 88 (13 Oct 2022 15:15) (82 - 90)  BP: 153/84 (13 Oct 2022 15:15) (144/80 - 176/98)  BP(mean): --  RR: 16 (13 Oct 2022 15:15) (16 - 18)  SpO2: 97% (13 Oct 2022 14:24) (95% - 97%)    Parameters below as of 13 Oct 2022 15:15  Patient On (Oxygen Delivery Method): room air        10-12-22 @ 07:01  -  10-13-22 @ 07:00  --------------------------------------------------------  IN: 810 mL / OUT: 3 mL / NET: 807 mL    10-13-22 @ 07:01  -  10-13-22 @ 20:06  --------------------------------------------------------  IN: 740 mL / OUT: 0 mL / NET: 740 mL      PHYSICAL EXAM:  Constitutional: alert, NAD  Eyes: the sclera and conjunctiva were normal.   ENT: the ears and nose were normal in appearance.   Neck: the appearance of the neck was normal and the neck was supple.   Pulmonary: no respiratory distress and lungs were clear to auscultation bilaterally.   Heart: heart rate was normal and rhythm regular, normal S1 and S2  Vascular:. there was no peripheral edema  Abdomen: normal bowel sounds, soft, non-tender  Ext: L foot wrapped with dressing         LABS:                        10.9   8.75  )-----------( 453      ( 13 Oct 2022 05:30 )             34.0     10-13    139  |  107  |  14  ----------------------------<  103<H>  4.0   |  24  |  0.71    Ca    8.6      13 Oct 2022 05:30  Mg     1.8     10-13            MICROBIOLOGY:  10/11 BCx ngtd  10/11 WCx PsA  10/10 L lateral foot wound: PsA (pan S), staph hemolyticus (S to linezolid, vanco), corynebacterium striatum, corynebacterium jeikeium      RADIOLOGY & ADDITIONAL STUDIES:  Reviewed

## 2022-10-13 NOTE — DISCHARGE NOTE PROVIDER - CARE PROVIDER_API CALL
J Carlos Teran)  Diagnostic Radiology  [address], [address] [place] [address]  Phone: [phone]  Fax: [phone]  Follow Up Time:

## 2022-10-13 NOTE — PROGRESS NOTE ADULT - ASSESSMENT
Pt is a 36yo female with pmhx of HTN and L foot AVM s/p multiple DSE (most recently 9/21/22) who returns for worsening left foot foot pain and non-healing ulcer w/ erythema that did not respond to outpatient oral antibiotics. Pt now s/p angiogram/embolization of left foot AVM, as well as wound debridement/deep tissue culture on 10/11. ID following for Abx management

## 2022-10-13 NOTE — DISCHARGE NOTE PROVIDER - NSDCFUADDAPPT_GEN_ALL_CORE_FT
Please follow up with Dr. Teran within 1-2 weeks of discharge. Please call his office to schedule an appointment.  Please follow up with your pain management doctor for pain control.

## 2022-10-13 NOTE — DISCHARGE NOTE PROVIDER - NSDCQMSTROKE_NEU_ALL_CORE
Patient presents with:  Sore Throat      HPI:     Zuleyka Zhang is a 9year old male who presents with for chief complaint of nasal congestion, sore throat, fatigue. X 1 week.     The patient denies complaints of  neck pain, ear pain, difficulty breat lesions      Assessment/Plan:     Labs performed this visit:    Recent Results (from the past 10 hour(s))  -[facility] POCT RAPID STREP   Collection Time: 07/15/18  5:27 PM   Result Value Ref Range   POCT Rapid Strep Positive (A) Negative       Diagnosis:    ICD- No

## 2022-10-13 NOTE — PROVIDER CONTACT NOTE (OTHER) - ACTION/TREATMENT ORDERED:
per SAMM Mcdermott, to attempt to contact [address] ([facility]) again to place peripheral IV
SAMM Leung unable to place US guided IV, per PA to contact clinical impact team.

## 2022-10-13 NOTE — PROVIDER CONTACT NOTE (OTHER) - BACKGROUND
Pt admitted for AVM embolization, embolization on 10/11, on IV Vancomycin q8h.
Pt admitted for AVM embolization, embolization on 10/11, on IV vancomycin q8h

## 2022-10-13 NOTE — PROGRESS NOTE ADULT - ASSESSMENT
37F h/o HTN, MO and AVM L foot with chronic L foot ulcer s/p transcatheter embolization p/w wound infection.  WCx grew PsA, staph and corynebacterium.  Coryne spp and staph hemolyticus likely represents skin wm and PsA is the major causative agent for infection.     - cont vancomycin 1250 mg IV q8h for now.  Vancomycin trough prior to 4th dose.  Goal trough 10-15.  - cont cefepime 2 g IV q8h  - will determine the duration of abx.  She may need midline and OPAT pending clinical course.    Team 2 will follow you.  Case d/w primary team.    Génesis Khan MD, MS  Infectious Disease attending  work cell [phone]   For any questions during evening/weekend/holiday, please page ID on call

## 2022-10-13 NOTE — PROGRESS NOTE ADULT - SUBJECTIVE AND OBJECTIVE BOX
INCOMPLETE    OVERNIGHT EVENTS:  No acute events overnight.    SUBJECTIVE / INTERVAL HPI: Patient seen and examined at bedside.    Denies CP, SOB, dizziness/lightheadedness, palpitations    12 point ROS otherwise negative    VITAL SIGNS:  Vital Signs Last 24 Hrs  T(C): 36.6 (13 Oct 2022 14:35), Max: 36.6 (13 Oct 2022 10:29)  T(F): 97.8 (13 Oct 2022 14:35), Max: 97.9 (13 Oct 2022 10:29)  HR: 84 (13 Oct 2022 10:00) (82 - 95)  BP: 148/89 (13 Oct 2022 10:00) (144/80 - 163/93)  BP(mean): --  RR: 16 (13 Oct 2022 10:00) (16 - 18)  SpO2: 97% (13 Oct 2022 10:00) (95% - 97%)    Parameters below as of 13 Oct 2022 10:00  Patient On (Oxygen Delivery Method): room air          I&O's Summary    12 Oct 2022 07:01  -  13 Oct 2022 07:00  --------------------------------------------------------  IN: 810 mL / OUT: 3 mL / NET: 807 mL    13 Oct 2022 07:01  -  13 Oct 2022 14:49  --------------------------------------------------------  IN: 420 mL / OUT: 0 mL / NET: 420 mL          PHYSICAL EXAM:    General: sitting up in bed, NAD  HEENT: conjunctiva clear; MMM  Neck: supple, no JVD  Cardiovascular: RRR, no murmurs  Respiratory: CTA B/L  Gastrointestinal: soft, NT/ND, +BS  Extremities: WWP, no edema or cyanosis  Vascular: Peripheral pulses palpable 2+ bilaterally/ carotid 2+ b/l, no bruits; radial 2+ b/l; femoral 2+ b/l no bruits; DP/PT 2+ b/l  Neurological: AAOx3, no focal deficits    MEDICATIONS:  MEDICATIONS  (STANDING):  cefepime   IVPB 2000 milliGRAM(s) IV Intermittent every 8 hours  enoxaparin Injectable 40 milliGRAM(s) SubCutaneous every 12 hours  gabapentin 100 milliGRAM(s) Oral <User Schedule>  gabapentin 800 milliGRAM(s) Oral <User Schedule>  influenza   Vaccine 0.5 milliLiter(s) IntraMuscular once  nebivolol 10 milliGRAM(s) Oral <User Schedule>  vancomycin  IVPB      vancomycin  IVPB 1250 milliGRAM(s) IV Intermittent every 8 hours    MEDICATIONS  (PRN):  HYDROmorphone  Injectable 2 milliGRAM(s) IV Push every 4 hours PRN Severe Pain (7 - 10)  ketorolac   Injectable 15 milliGRAM(s) IV Push every 6 hours PRN Severe Pain (7 - 10)      LABS:                        10.9   8.75  )-----------( 453      ( 13 Oct 2022 05:30 )             34.0       10-13    139  |  107  |  14  ----------------------------<  103<H>  4.0   |  24  |  0.71    Ca    8.6      13 Oct 2022 05:30  Mg     1.8     10-13                  TELEMETRY:    RADIOLOGY & ADDITIONAL TESTS: Reviewed. OVERNIGHT EVENTS:  No acute events overnight.    SUBJECTIVE / INTERVAL HPI: Patient seen and examined at bedside.    Denies CP, SOB, dizziness/lightheadedness, palpitations    12 point ROS otherwise negative    VITAL SIGNS:  Vital Signs Last 24 Hrs  T(C): 36.6 (13 Oct 2022 14:35), Max: 36.6 (13 Oct 2022 10:29)  T(F): 97.8 (13 Oct 2022 14:35), Max: 97.9 (13 Oct 2022 10:29)  HR: 84 (13 Oct 2022 10:00) (82 - 95)  BP: 148/89 (13 Oct 2022 10:00) (144/80 - 163/93)  BP(mean): --  RR: 16 (13 Oct 2022 10:00) (16 - 18)  SpO2: 97% (13 Oct 2022 10:00) (95% - 97%)    Parameters below as of 13 Oct 2022 10:00  Patient On (Oxygen Delivery Method): room air          I&O's Summary    12 Oct 2022 07:01  -  13 Oct 2022 07:00  --------------------------------------------------------  IN: 810 mL / OUT: 3 mL / NET: 807 mL    13 Oct 2022 07:01  -  13 Oct 2022 14:49  --------------------------------------------------------  IN: 420 mL / OUT: 0 mL / NET: 420 mL          PHYSICAL EXAM:    General: sitting up in bed, NAD  HEENT: conjunctiva clear; MMM  Neck: supple, no JVD  Cardiovascular: RRR, no murmurs  Respiratory: CTA B/L  Gastrointestinal: soft, NT/ND, +BS  Extremities: WWP, no edema or cyanosis  Vascular: Peripheral pulses palpable 2+ bilaterally/ carotid 2+ b/l, no bruits; radial 2+ b/l; femoral 2+ b/l no bruits; DP/PT 2+ b/l  Neurological: AAOx3, no focal deficits    MEDICATIONS:  MEDICATIONS  (STANDING):  cefepime   IVPB 2000 milliGRAM(s) IV Intermittent every 8 hours  enoxaparin Injectable 40 milliGRAM(s) SubCutaneous every 12 hours  gabapentin 100 milliGRAM(s) Oral <User Schedule>  gabapentin 800 milliGRAM(s) Oral <User Schedule>  influenza   Vaccine 0.5 milliLiter(s) IntraMuscular once  nebivolol 10 milliGRAM(s) Oral <User Schedule>  vancomycin  IVPB      vancomycin  IVPB 1250 milliGRAM(s) IV Intermittent every 8 hours    MEDICATIONS  (PRN):  HYDROmorphone  Injectable 2 milliGRAM(s) IV Push every 4 hours PRN Severe Pain (7 - 10)  ketorolac   Injectable 15 milliGRAM(s) IV Push every 6 hours PRN Severe Pain (7 - 10)      LABS:                        10.9   8.75  )-----------( 453      ( 13 Oct 2022 05:30 )             34.0       10-13    139  |  107  |  14  ----------------------------<  103<H>  4.0   |  24  |  0.71    Ca    8.6      13 Oct 2022 05:30  Mg     1.8     10-13                  TELEMETRY:    RADIOLOGY & ADDITIONAL TESTS: Reviewed.

## 2022-10-13 NOTE — PROVIDER CONTACT NOTE (OTHER) - ASSESSMENT
Previous 22g on RAC infiltrated, poorly perfused small veins and torturous veins noted
Previous 22g on R AC infiltrated, site symptomatic, Pt c/o pain at site.

## 2022-10-13 NOTE — PROGRESS NOTE ADULT - PROBLEM SELECTOR PLAN 1
- Pt with Left Lateral foot ulcer with purulence and fever at home Tmax 101.4   - currently afebrile, WBC today downtrended to 8  - Superficial wound cx growing pseudomonas  - deep tissue (surgical) cx growing pseudomonas, staph haemolyticus, corynbacterium striatum/jeikeium  - BCx w/ NGTD  - continue to follow cultures  - continue tylenol prn has needed for fever/mild pain and Dilaudid 2mg IV fro sever prn q 4  - s/p angiogram/embolization of left foot AVM, as well as wound debridement/deep tissue culture on 10/11 w/ Dr Teran. DARREN groin access  - Dressing changes as needed, placed saline dressing with Krilix 10/10 at night   - Wound care ordered, did not see pt today. Please call in am to have them assess pt prior to discharge  - ID team 2 following, appreciate recs  - c/w vanco 1250mg IVPB q8h. Ordered for 7 days (no length rec'd yet) ID following will need IV Abx still as patient with allergy to cipro. Assess wound with wound care tomr   - vanc trough prior to each 4th dose, goal 10-15, WNL x 1, trough was 17 on 10/13 continue Vanco  - next trough prior to next 4th dose which will be due at 3pm (ordered trough for next )

## 2022-10-13 NOTE — DISCHARGE NOTE PROVIDER - NSDCCPCAREPLAN_GEN_ALL_CORE_FT
PRINCIPAL DISCHARGE DIAGNOSIS  Diagnosis: Nonhealing wound of heel  Assessment and Plan of Treatment: You presented for pain and nonhealing ulcer on the lateral aspect of your left heal. You had a repeat angiogram on 10/11 w/ Dr Teran, where residual AVM of the left lateral ankle was embolized using glue. This resulted in improved profusion. You also had a debridement of the wound itself, and a deep tissue culture was obtained to help guide antibiotic therapy. The procedure was performed via your right groin. You should refrain from lifting anything over 5lbs for the next 3 days. If you notice any concerning signs or symptoms such and coolness of the leg, numbness/tingling, bluish color, or extreme pain, you should call us on our 24 hour line at [phone]. If any of these sympotms are severe, or you notice rapid swelling at the site or bleeding, you should go to the emergency room immediately.   You were started on IV antibiotics (vancomycin and cefepime) at the hospital for an infection of this wound (cellulitis). The infectious disease team followd you while you were here, and helped recommend antibiotic therapy based on all of the cultures that were obtained.   The wound care team assessed you in the hospital, and recommended ____________COPY AND PASTE RECOMMENDATIONS_____________  You should continue with ______ANTIBIOTICS______________. You should follow up with Dr Teran within 1-2 weeks       PRINCIPAL DISCHARGE DIAGNOSIS  Diagnosis: Nonhealing wound of heel  Assessment and Plan of Treatment: You presented for pain and nonhealing ulcer on the lateral aspect of your left heal. You had a repeat angiogram on 10/11 w/ Dr Teran, where residual AVM of the left lateral ankle was embolized using glue. This resulted in improved profusion. You also had a debridement of the wound itself, and a deep tissue culture was obtained to help guide antibiotic therapy. The procedure was performed via your right groin. You should refrain from lifting anything over 5lbs for the next 3 days. If you notice any concerning signs or symptoms such and coolness of the leg, numbness/tingling, bluish color, or extreme pain, you should call us on our 24 hour line at [phone]. If any of these sympotms are severe, or you notice rapid swelling at the site or bleeding, you should go to the emergency room immediately.   You were started on IV antibiotics (vancomycin and cefepime) at the hospital for an infection of this wound (cellulitis). The infectious disease team followd you while you were here, and helped recommend antibiotic therapy based on all of the cultures that were obtained.   The wound care team assessed you in the hospital, and recommended Cleanse foot wound with vashe wound cleanser, as follows: moisten 4x4 gauze, well with vashe then lightly pack into wound for 3 minutes, remove and pat dry. Then cut a small piece of aquacel AG and lightly pack into wound then cover with dry dressing, and wrap with Misa to secure, and change every other day.  You should continue with ______ANTIBIOTICS______________. You should follow up with Dr Teran within 1-2 weeks       PRINCIPAL DISCHARGE DIAGNOSIS  Diagnosis: Nonhealing wound of heel  Assessment and Plan of Treatment: You presented for pain and nonhealing ulcer on the lateral aspect of your left heal. You had a repeat angiogram on 10/11 w/ Dr Teran, where residual AVM of the left lateral ankle was embolized using glue. This resulted in improved perfusion. You also had a debridement of the wound itself, and a deep tissue culture was obtained to help guide antibiotic therapy. The procedure was performed via your right groin. You should refrain from lifting anything over 5lbs for the next 3 days. If you notice any concerning signs or symptoms such and coolness of the leg, numbness/tingling, bluish color, or extreme pain, you should call us on our 24 hour line at [phone]. If any of these sympotms are severe, or you notice rapid swelling at the site or bleeding, you should go to the emergency room immediately.   You were started on IV antibiotics (vancomycin and cefepime) at the hospital for an infection of this wound (cellulitis). The infectious disease team followd you while you were here, and helped recommend antibiotic therapy based on all of the cultures that were obtained. You no longer require antibiotic treatment outpatient.  The wound care team assessed you in the hospital, and recommended Cleanse foot wound with vashe wound cleanser, as follows: moisten 4x4 gauze, well with vashe then lightly pack into wound for 3 minutes, remove and pat dry. Then cut a small piece of aquacel AG and lightly pack into wound then cover with dry dressing, and wrap with Misa to secure, and change every other day.  You should follow up with Dr Teran within 1-2 weeks

## 2022-10-14 LAB
-  AZTREONAM: SIGNIFICANT CHANGE UP
-  CEFEPIME: SIGNIFICANT CHANGE UP
-  CIPROFLOXACIN: SIGNIFICANT CHANGE UP
-  GENTAMICIN: SIGNIFICANT CHANGE UP
-  LEVOFLOXACIN: SIGNIFICANT CHANGE UP
-  PIPERACILLIN/TAZOBACTAM: SIGNIFICANT CHANGE UP
-  TOBRAMYCIN: SIGNIFICANT CHANGE UP
ANION GAP SERPL CALC-SCNC: 10 MMOL/L — SIGNIFICANT CHANGE UP (ref 5–17)
BUN SERPL-MCNC: 12 MG/DL — SIGNIFICANT CHANGE UP (ref 7–23)
CALCIUM SERPL-MCNC: 8.7 MG/DL — SIGNIFICANT CHANGE UP (ref 8.4–10.5)
CHLORIDE SERPL-SCNC: 102 MMOL/L — SIGNIFICANT CHANGE UP (ref 96–108)
CO2 SERPL-SCNC: 25 MMOL/L — SIGNIFICANT CHANGE UP (ref 22–31)
CREAT SERPL-MCNC: 0.71 MG/DL — SIGNIFICANT CHANGE UP (ref 0.5–1.3)
CULTURE RESULTS: SIGNIFICANT CHANGE UP
EGFR: 111 ML/MIN/1.73M2 — SIGNIFICANT CHANGE UP
GLUCOSE SERPL-MCNC: 89 MG/DL — SIGNIFICANT CHANGE UP (ref 70–99)
HCT VFR BLD CALC: 36.7 % — SIGNIFICANT CHANGE UP (ref 34.5–45)
HGB BLD-MCNC: 11.5 G/DL — SIGNIFICANT CHANGE UP (ref 11.5–15.5)
MAGNESIUM SERPL-MCNC: 1.8 MG/DL — SIGNIFICANT CHANGE UP (ref 1.6–2.6)
MCHC RBC-ENTMCNC: 28.5 PG — SIGNIFICANT CHANGE UP (ref 27–34)
MCHC RBC-ENTMCNC: 31.3 GM/DL — LOW (ref 32–36)
MCV RBC AUTO: 91.1 FL — SIGNIFICANT CHANGE UP (ref 80–100)
METHOD TYPE: SIGNIFICANT CHANGE UP
NRBC # BLD: 0 /100 WBCS — SIGNIFICANT CHANGE UP (ref 0–0)
ORGANISM # SPEC MICROSCOPIC CNT: SIGNIFICANT CHANGE UP
PLATELET # BLD AUTO: 394 K/UL — SIGNIFICANT CHANGE UP (ref 150–400)
POTASSIUM SERPL-MCNC: 4.2 MMOL/L — SIGNIFICANT CHANGE UP (ref 3.5–5.3)
POTASSIUM SERPL-SCNC: 4.2 MMOL/L — SIGNIFICANT CHANGE UP (ref 3.5–5.3)
RBC # BLD: 4.03 M/UL — SIGNIFICANT CHANGE UP (ref 3.8–5.2)
RBC # FLD: 12.5 % — SIGNIFICANT CHANGE UP (ref 10.3–14.5)
SODIUM SERPL-SCNC: 137 MMOL/L — SIGNIFICANT CHANGE UP (ref 135–145)
SPECIMEN SOURCE: SIGNIFICANT CHANGE UP
VANCOMYCIN TROUGH SERPL-MCNC: 15.4 UG/ML — SIGNIFICANT CHANGE UP (ref 10–20)
WBC # BLD: 9.92 K/UL — SIGNIFICANT CHANGE UP (ref 3.8–10.5)
WBC # FLD AUTO: 9.92 K/UL — SIGNIFICANT CHANGE UP (ref 3.8–10.5)

## 2022-10-14 PROCEDURE — 99232 SBSQ HOSP IP/OBS MODERATE 35: CPT

## 2022-10-14 RX ORDER — HYDROMORPHONE HYDROCHLORIDE 2 MG/ML
2 INJECTION INTRAMUSCULAR; INTRAVENOUS; SUBCUTANEOUS EVERY 4 HOURS
Refills: 0 | Status: DISCONTINUED | OUTPATIENT
Start: 2022-10-14 | End: 2022-10-17

## 2022-10-14 RX ADMIN — HYDROMORPHONE HYDROCHLORIDE 2 MILLIGRAM(S): 2 INJECTION INTRAMUSCULAR; INTRAVENOUS; SUBCUTANEOUS at 03:39

## 2022-10-14 RX ADMIN — HYDROMORPHONE HYDROCHLORIDE 2 MILLIGRAM(S): 2 INJECTION INTRAMUSCULAR; INTRAVENOUS; SUBCUTANEOUS at 07:40

## 2022-10-14 RX ADMIN — HYDROMORPHONE HYDROCHLORIDE 2 MILLIGRAM(S): 2 INJECTION INTRAMUSCULAR; INTRAVENOUS; SUBCUTANEOUS at 20:02

## 2022-10-14 RX ADMIN — ENOXAPARIN SODIUM 40 MILLIGRAM(S): 100 INJECTION SUBCUTANEOUS at 22:00

## 2022-10-14 RX ADMIN — CEFEPIME 100 MILLIGRAM(S): 1 INJECTION, POWDER, FOR SOLUTION INTRAMUSCULAR; INTRAVENOUS at 11:31

## 2022-10-14 RX ADMIN — HYDROMORPHONE HYDROCHLORIDE 2 MILLIGRAM(S): 2 INJECTION INTRAMUSCULAR; INTRAVENOUS; SUBCUTANEOUS at 08:40

## 2022-10-14 RX ADMIN — CEFEPIME 100 MILLIGRAM(S): 1 INJECTION, POWDER, FOR SOLUTION INTRAMUSCULAR; INTRAVENOUS at 18:16

## 2022-10-14 RX ADMIN — HYDROMORPHONE HYDROCHLORIDE 2 MILLIGRAM(S): 2 INJECTION INTRAMUSCULAR; INTRAVENOUS; SUBCUTANEOUS at 00:00

## 2022-10-14 RX ADMIN — GABAPENTIN 800 MILLIGRAM(S): 400 CAPSULE ORAL at 14:29

## 2022-10-14 RX ADMIN — HYDROMORPHONE HYDROCHLORIDE 2 MILLIGRAM(S): 2 INJECTION INTRAMUSCULAR; INTRAVENOUS; SUBCUTANEOUS at 12:05

## 2022-10-14 RX ADMIN — HYDROMORPHONE HYDROCHLORIDE 2 MILLIGRAM(S): 2 INJECTION INTRAMUSCULAR; INTRAVENOUS; SUBCUTANEOUS at 16:05

## 2022-10-14 RX ADMIN — Medication 166.67 MILLIGRAM(S): at 11:50

## 2022-10-14 RX ADMIN — Medication 166.67 MILLIGRAM(S): at 00:04

## 2022-10-14 RX ADMIN — HYDROMORPHONE HYDROCHLORIDE 2 MILLIGRAM(S): 2 INJECTION INTRAMUSCULAR; INTRAVENOUS; SUBCUTANEOUS at 11:50

## 2022-10-14 RX ADMIN — HYDROMORPHONE HYDROCHLORIDE 2 MILLIGRAM(S): 2 INJECTION INTRAMUSCULAR; INTRAVENOUS; SUBCUTANEOUS at 15:50

## 2022-10-14 RX ADMIN — Medication 166.67 MILLIGRAM(S): at 19:11

## 2022-10-14 RX ADMIN — GABAPENTIN 100 MILLIGRAM(S): 400 CAPSULE ORAL at 05:16

## 2022-10-14 RX ADMIN — HYDROMORPHONE HYDROCHLORIDE 2 MILLIGRAM(S): 2 INJECTION INTRAMUSCULAR; INTRAVENOUS; SUBCUTANEOUS at 19:40

## 2022-10-14 RX ADMIN — NEBIVOLOL HYDROCHLORIDE 10 MILLIGRAM(S): 5 TABLET ORAL at 18:16

## 2022-10-14 RX ADMIN — ENOXAPARIN SODIUM 40 MILLIGRAM(S): 100 INJECTION SUBCUTANEOUS at 10:31

## 2022-10-14 RX ADMIN — HYDROMORPHONE HYDROCHLORIDE 2 MILLIGRAM(S): 2 INJECTION INTRAMUSCULAR; INTRAVENOUS; SUBCUTANEOUS at 23:35

## 2022-10-14 RX ADMIN — HYDROMORPHONE HYDROCHLORIDE 2 MILLIGRAM(S): 2 INJECTION INTRAMUSCULAR; INTRAVENOUS; SUBCUTANEOUS at 05:12

## 2022-10-14 RX ADMIN — NEBIVOLOL HYDROCHLORIDE 10 MILLIGRAM(S): 5 TABLET ORAL at 06:26

## 2022-10-14 NOTE — PROGRESS NOTE ADULT - SUBJECTIVE AND OBJECTIVE BOX
INCOMPLETE    OVERNIGHT EVENTS:  No acute events overnight.    SUBJECTIVE / INTERVAL HPI: Patient seen and examined at bedside.    Denies CP, SOB, dizziness/lightheadedness, palpitations    12 point ROS otherwise negative    VITAL SIGNS:  Vital Signs Last 24 Hrs  T(C): 36.3 (14 Oct 2022 14:02), Max: 36.6 (14 Oct 2022 09:04)  T(F): 97.4 (14 Oct 2022 14:02), Max: 97.9 (14 Oct 2022 09:04)  HR: 78 (14 Oct 2022 11:49) (74 - 85)  BP: 168/90 (14 Oct 2022 11:49) (133/62 - 168/99)  BP(mean): 109 (14 Oct 2022 06:24) (89 - 117)  RR: 18 (14 Oct 2022 11:49) (16 - 18)  SpO2: 98% (14 Oct 2022 11:49) (96% - 99%)    Parameters below as of 14 Oct 2022 11:49  Patient On (Oxygen Delivery Method): room air          I&O's Summary    13 Oct 2022 07:01  -  14 Oct 2022 07:00  --------------------------------------------------------  IN: 740 mL / OUT: 0 mL / NET: 740 mL    14 Oct 2022 07:01  -  14 Oct 2022 16:08  --------------------------------------------------------  IN: 390 mL / OUT: 0 mL / NET: 390 mL          PHYSICAL EXAM:    General: sitting up in bed, NAD  HEENT: conjunctiva clear; MMM  Neck: supple, no JVD  Cardiovascular: RRR, no murmurs  Respiratory: CTA B/L  Gastrointestinal: soft, NT/ND, +BS  Extremities: WWP, no edema or cyanosis  Vascular: Peripheral pulses palpable 2+ bilaterally/ carotid 2+ b/l, no bruits; radial 2+ b/l; femoral 2+ b/l no bruits; DP/PT 2+ b/l  Neurological: AAOx3, no focal deficits    MEDICATIONS:  MEDICATIONS  (STANDING):  cefepime   IVPB 2000 milliGRAM(s) IV Intermittent every 8 hours  enoxaparin Injectable 40 milliGRAM(s) SubCutaneous every 12 hours  gabapentin 100 milliGRAM(s) Oral <User Schedule>  gabapentin 800 milliGRAM(s) Oral <User Schedule>  influenza   Vaccine 0.5 milliLiter(s) IntraMuscular once  nebivolol 10 milliGRAM(s) Oral <User Schedule>  vancomycin  IVPB 1250 milliGRAM(s) IV Intermittent every 8 hours  vancomycin  IVPB        MEDICATIONS  (PRN):  HYDROmorphone  Injectable 2 milliGRAM(s) IV Push every 4 hours PRN Severe Pain (7 - 10)  ketorolac   Injectable 15 milliGRAM(s) IV Push every 6 hours PRN Severe Pain (7 - 10)      LABS:                        11.5   9.92  )-----------( 394      ( 14 Oct 2022 05:30 )             36.7       10-14    137  |  102  |  12  ----------------------------<  89  4.2   |  25  |  0.71    Ca    8.7      14 Oct 2022 05:30  Mg     1.8     10-14                  TELEMETRY:    RADIOLOGY & ADDITIONAL TESTS: Reviewed. OVERNIGHT EVENTS:  No acute events overnight.    SUBJECTIVE / INTERVAL HPI: Patient seen and examined at bedside.    Denies CP, SOB, dizziness/lightheadedness, palpitations    12 point ROS otherwise negative    VITAL SIGNS:  Vital Signs Last 24 Hrs  T(C): 36.3 (14 Oct 2022 14:02), Max: 36.6 (14 Oct 2022 09:04)  T(F): 97.4 (14 Oct 2022 14:02), Max: 97.9 (14 Oct 2022 09:04)  HR: 78 (14 Oct 2022 11:49) (74 - 85)  BP: 168/90 (14 Oct 2022 11:49) (133/62 - 168/99)  BP(mean): 109 (14 Oct 2022 06:24) (89 - 117)  RR: 18 (14 Oct 2022 11:49) (16 - 18)  SpO2: 98% (14 Oct 2022 11:49) (96% - 99%)    Parameters below as of 14 Oct 2022 11:49  Patient On (Oxygen Delivery Method): room air          I&O's Summary    13 Oct 2022 07:01  -  14 Oct 2022 07:00  --------------------------------------------------------  IN: 740 mL / OUT: 0 mL / NET: 740 mL    14 Oct 2022 07:01  -  14 Oct 2022 16:08  --------------------------------------------------------  IN: 390 mL / OUT: 0 mL / NET: 390 mL          PHYSICAL EXAM:    General: sitting up in bed, NAD  HEENT: conjunctiva clear; MMM  Neck: supple, no JVD  Cardiovascular: RRR, no murmurs  Respiratory: CTA B/L  Gastrointestinal: soft, NT/ND, +BS  Extremities: WWP, no edema or cyanosis  Vascular: Peripheral pulses palpable 2+ bilaterally/ carotid 2+ b/l, no bruits; radial 2+ b/l; femoral 2+ b/l no bruits; DP/PT 2+ b/l  Neurological: AAOx3, no focal deficits    MEDICATIONS:  MEDICATIONS  (STANDING):  cefepime   IVPB 2000 milliGRAM(s) IV Intermittent every 8 hours  enoxaparin Injectable 40 milliGRAM(s) SubCutaneous every 12 hours  gabapentin 100 milliGRAM(s) Oral <User Schedule>  gabapentin 800 milliGRAM(s) Oral <User Schedule>  influenza   Vaccine 0.5 milliLiter(s) IntraMuscular once  nebivolol 10 milliGRAM(s) Oral <User Schedule>  vancomycin  IVPB 1250 milliGRAM(s) IV Intermittent every 8 hours  vancomycin  IVPB        MEDICATIONS  (PRN):  HYDROmorphone  Injectable 2 milliGRAM(s) IV Push every 4 hours PRN Severe Pain (7 - 10)  ketorolac   Injectable 15 milliGRAM(s) IV Push every 6 hours PRN Severe Pain (7 - 10)      LABS:                        11.5   9.92  )-----------( 394      ( 14 Oct 2022 05:30 )             36.7       10-14    137  |  102  |  12  ----------------------------<  89  4.2   |  25  |  0.71    Ca    8.7      14 Oct 2022 05:30  Mg     1.8     10-14                  TELEMETRY:    RADIOLOGY & ADDITIONAL TESTS: Reviewed.

## 2022-10-14 NOTE — PROGRESS NOTE ADULT - ASSESSMENT
37F h/o HTN, MO and AVM L foot with chronic L foot ulcer s/p transcatheter embolization p/w wound infection.  WCx grew PsA, staph and corynebacterium.  Coryne spp and staph hemolyticus likely represents skin wm and PsA is the major causative agent for infection.   Case d/w vascular team - patient improving on abx.  Will plan for short course of abx and finish abx 10/16 so that she can go home w/o abx.    - cont vancomycin 1250 mg IV q8h for now.  Vancomycin trough prior to 4th dose.  Goal trough 10-15.  - cont cefepime 2 g IV q8h  - duration of abx is 10/11 - 10/16       Team 2 will follow you.  Case d/w primary team.    Génesis Khan MD, MS  Infectious Disease attending  work cell [phone]   For any questions during evening/weekend/holiday, please page ID on call      37F h/o HTN, MO and AVM L foot with chronic L foot ulcer s/p transcatheter embolization p/w wound infection.  WCx grew PsA, staph and corynebacterium.  Coryne spp and staph hemolyticus likely represents skin wm and PsA is the major causative agent for infection.   Case d/w vascular team - patient improving on abx.  Will plan for short course of abx and finish abx 10/16 so that she can go home w/o abx.    - cont vancomycin 1250 mg IV q8h for now.  Vancomycin trough prior to 4th dose.  Goal trough 10-15.  - cont cefepime 2 g IV q8h  - duration of abx is 10/11 - 10/16     Thank you for your consult.  Please re-consult us or call us with questions.  Case d/w primary team.    Génesis Khan MD, MS  Infectious Disease attending  work cell [phone]  For any questions during evening/weekend/holiday, please page ID on call

## 2022-10-14 NOTE — PROGRESS NOTE ADULT - ASSESSMENT
Pt is a 38yo female with pmhx of HTN and L foot AVM s/p multiple DSE (most recently 9/21/22) who returns for worsening left foot foot pain and non-healing ulcer w/ erythema that did not respond to outpatient oral antibiotics. Pt now s/p angiogram/embolization of left foot AVM, as well as wound debridement/deep tissue culture on 10/11. ID following for Abx management Pt is a 38yo female with pmhx of HTN and L foot AVM s/p multiple DSE (most recently 9/21/22) who returns for worsening left foot foot pain and non-healing ulcer w/ erythema that did not respond to outpatient oral antibiotics. Pt now s/p angiogram/embolization of left foot AVM, as well as wound debridement/deep tissue culture on 10/11. ID following for Abx management. Plan for 7 day course ending Sunday night and if wound continued to improve possible DC monday,

## 2022-10-14 NOTE — PROGRESS NOTE ADULT - PROBLEM SELECTOR PLAN 1
- Pt with Left Lateral foot ulcer with purulence and fever at home Tmax 101.4   - currently afebrile, WBC today downtrended to 8  - Superficial wound cx growing pseudomonas  - deep tissue (surgical) cx growing pseudomonas, staph haemolyticus, corynbacterium striatum/jeikeium  - BCx w/ NGTD  - continue to follow cultures  - continue tylenol prn has needed for fever/mild pain and Dilaudid 2mg IV fro sever prn q 4  - s/p angiogram/embolization of left foot AVM, as well as wound debridement/deep tissue culture on 10/11 w/ Dr Teran. DARREN groin access  - Dressing changes as needed, placed saline dressing with Krilix 10/10 at night   - Wound care ordered, did not see pt today. Please call in am to have them assess pt prior to discharge  - ID team 2 following, appreciate recs  - c/w vanco 1250mg IVPB q8h. Ordered for 7 days (no length rec'd yet) ID following will need IV Abx still as patient with allergy to cipro. Assess wound with wound care tomr   - vanc trough prior to each 4th dose, goal 10-15, WNL x 1, trough was 17 on 10/13 continue Vanco  - next trough prior to next 4th dose which will be due at 3pm (ordered trough for next ) - Pt with Left Lateral foot ulcer with purulence and fever at home Tmax 101.4   - currently afebrile, no leukocytosis, blood cultures no growth to date   - superficial cx with pseudomonas   -deep tissue (surgical) cx growing pseudomonas, staph haemolyticus, corynbacterium striatum/jeikeium  - continue to follow cultures  - continue tylenol prn has needed for fever/mild pain and Dilaudid 2mg IV fro sever prn q 4  - s/p angiogram/embolization of left foot AVM, as well as wound debridement/deep tissue culture on 10/11 w/ Dr Teran. R groin access  - Dressing changes as needed, placed saline dressing with Krilix 10/10 at night   - Wound care ordered, did not see pt today. Please call in am to have them assess pt prior to discharge  - ID team 2 following, appreciate recs  - c/w vanco 1250mg IVPB q8h. Ordered for 7 days (no length rec'd yet) ID following will need IV Abx still as patient with allergy to cipro. Assess wound with wound care tomr   - vanc trough prior to each 4th dose, goal 10-15, WNL x 1, trough was 17 on 10/13 continue Vanco  - next trough prior to next 4th dose which will be due at 3pm (ordered trough for next )

## 2022-10-14 NOTE — PROGRESS NOTE ADULT - PROBLEM SELECTOR PLAN 3
-C/w home Bystolic 10mg BID  -BPs elevated this admission, but improve w/ pain control. No BP med changes at this time    VTE PPx: lovenox  Dispo: Pt still needing IV abx at this time, assess wound if possible to go home on regimen   d/w Dr Teran -C/w home Bystolic 10mg BID  -BPs elevated this admission, but improve w/ pain control. No BP med changes at this time    VTE PPx: lovenox  Dispo: Pt still needing IV abx with plan for possible DC monday if site continues to improve  d/w Dr Teran

## 2022-10-14 NOTE — PROGRESS NOTE ADULT - SUBJECTIVE AND OBJECTIVE BOX
INFECTIOUS DISEASES CONSULT FOLLOW-UP NOTE    INTERVAL HPI/OVERNIGHT EVENTS:  No event overnight  she feels well  denied fever/chills, n/v/d, abdominal pain     ROS:   Constitutional, eyes, ENT, cardiovascular, respiratory, gastrointestinal, genitourinary, integumentary, neurological, psychiatric and heme/lymph are otherwise negative other than noted above       ANTIBIOTICS/RELEVANT:    MEDICATIONS  (STANDING):  cefepime   IVPB 2000 milliGRAM(s) IV Intermittent every 8 hours  enoxaparin Injectable 40 milliGRAM(s) SubCutaneous every 12 hours  gabapentin 100 milliGRAM(s) Oral <User Schedule>  gabapentin 800 milliGRAM(s) Oral <User Schedule>  influenza   Vaccine 0.5 milliLiter(s) IntraMuscular once  nebivolol 10 milliGRAM(s) Oral <User Schedule>  vancomycin  IVPB 1250 milliGRAM(s) IV Intermittent every 8 hours  vancomycin  IVPB        MEDICATIONS  (PRN):  HYDROmorphone  Injectable 2 milliGRAM(s) IV Push every 4 hours PRN Severe Pain (7 - 10)  ketorolac   Injectable 15 milliGRAM(s) IV Push every 6 hours PRN Severe Pain (7 - 10)        Vital Signs Last 24 Hrs  T(C): 36.3 (14 Oct 2022 14:02), Max: 36.6 (14 Oct 2022 09:04)  T(F): 97.4 (14 Oct 2022 14:02), Max: 97.9 (14 Oct 2022 09:04)  HR: 78 (14 Oct 2022 11:49) (74 - 85)  BP: 168/90 (14 Oct 2022 11:49) (133/62 - 168/99)  BP(mean): 109 (14 Oct 2022 06:24) (89 - 117)  RR: 18 (14 Oct 2022 11:49) (16 - 18)  SpO2: 98% (14 Oct 2022 11:49) (96% - 99%)    Parameters below as of 14 Oct 2022 11:49  Patient On (Oxygen Delivery Method): room air        10-13-22 @ 07:01  -  10-14-22 @ 07:00  --------------------------------------------------------  IN: 740 mL / OUT: 0 mL / NET: 740 mL    10-14-22 @ 07:01  -  10-14-22 @ 16:39  --------------------------------------------------------  IN: 390 mL / OUT: 0 mL / NET: 390 mL      PHYSICAL EXAM:  Constitutional: alert, NAD  Eyes: the sclera and conjunctiva were normal.   ENT: the ears and nose were normal in appearance.   Neck: the appearance of the neck was normal and the neck was supple.   Pulmonary: no respiratory distress and lungs were clear to auscultation bilaterally.   Heart: heart rate was normal and rhythm regular, normal S1 and S2  Vascular:. there was no peripheral edema  Abdomen: normal bowel sounds, soft, non-tender  Ext: L foot wrapped with dressing - photo reviewed, wound with surrounding pink tissue         LABS:                        11.5   9.92  )-----------( 394      ( 14 Oct 2022 05:30 )             36.7     10-14    137  |  102  |  12  ----------------------------<  89  4.2   |  25  |  0.71    Ca    8.7      14 Oct 2022 05:30  Mg     1.8     10-14            MICROBIOLOGY:  10/11 BCx ngtd  10/11 WCx PsA  10/10 L lateral foot wound: PsA (pan S), staph hemolyticus (S to linezolid, vanco), corynebacterium striatum, corynebacterium jeikeium      RADIOLOGY & ADDITIONAL STUDIES:  Reviewed

## 2022-10-15 LAB
ANION GAP SERPL CALC-SCNC: 12 MMOL/L — SIGNIFICANT CHANGE UP (ref 5–17)
BUN SERPL-MCNC: 15 MG/DL — SIGNIFICANT CHANGE UP (ref 7–23)
CALCIUM SERPL-MCNC: 9.1 MG/DL — SIGNIFICANT CHANGE UP (ref 8.4–10.5)
CHLORIDE SERPL-SCNC: 102 MMOL/L — SIGNIFICANT CHANGE UP (ref 96–108)
CO2 SERPL-SCNC: 23 MMOL/L — SIGNIFICANT CHANGE UP (ref 22–31)
CREAT SERPL-MCNC: 0.72 MG/DL — SIGNIFICANT CHANGE UP (ref 0.5–1.3)
EGFR: 109 ML/MIN/1.73M2 — SIGNIFICANT CHANGE UP
GLUCOSE SERPL-MCNC: 144 MG/DL — HIGH (ref 70–99)
HCT VFR BLD CALC: 37 % — SIGNIFICANT CHANGE UP (ref 34.5–45)
HGB BLD-MCNC: 11.8 G/DL — SIGNIFICANT CHANGE UP (ref 11.5–15.5)
MAGNESIUM SERPL-MCNC: 1.8 MG/DL — SIGNIFICANT CHANGE UP (ref 1.6–2.6)
MCHC RBC-ENTMCNC: 28.7 PG — SIGNIFICANT CHANGE UP (ref 27–34)
MCHC RBC-ENTMCNC: 31.9 GM/DL — LOW (ref 32–36)
MCV RBC AUTO: 90 FL — SIGNIFICANT CHANGE UP (ref 80–100)
NRBC # BLD: 0 /100 WBCS — SIGNIFICANT CHANGE UP (ref 0–0)
PLATELET # BLD AUTO: 488 K/UL — HIGH (ref 150–400)
POTASSIUM SERPL-MCNC: 4 MMOL/L — SIGNIFICANT CHANGE UP (ref 3.5–5.3)
POTASSIUM SERPL-SCNC: 4 MMOL/L — SIGNIFICANT CHANGE UP (ref 3.5–5.3)
RBC # BLD: 4.11 M/UL — SIGNIFICANT CHANGE UP (ref 3.8–5.2)
RBC # FLD: 12.6 % — SIGNIFICANT CHANGE UP (ref 10.3–14.5)
SODIUM SERPL-SCNC: 137 MMOL/L — SIGNIFICANT CHANGE UP (ref 135–145)
WBC # BLD: 9.65 K/UL — SIGNIFICANT CHANGE UP (ref 3.8–10.5)
WBC # FLD AUTO: 9.65 K/UL — SIGNIFICANT CHANGE UP (ref 3.8–10.5)

## 2022-10-15 RX ADMIN — HYDROMORPHONE HYDROCHLORIDE 2 MILLIGRAM(S): 2 INJECTION INTRAMUSCULAR; INTRAVENOUS; SUBCUTANEOUS at 16:14

## 2022-10-15 RX ADMIN — Medication 166.67 MILLIGRAM(S): at 11:50

## 2022-10-15 RX ADMIN — HYDROMORPHONE HYDROCHLORIDE 2 MILLIGRAM(S): 2 INJECTION INTRAMUSCULAR; INTRAVENOUS; SUBCUTANEOUS at 04:03

## 2022-10-15 RX ADMIN — HYDROMORPHONE HYDROCHLORIDE 2 MILLIGRAM(S): 2 INJECTION INTRAMUSCULAR; INTRAVENOUS; SUBCUTANEOUS at 20:16

## 2022-10-15 RX ADMIN — Medication 166.67 MILLIGRAM(S): at 03:07

## 2022-10-15 RX ADMIN — HYDROMORPHONE HYDROCHLORIDE 2 MILLIGRAM(S): 2 INJECTION INTRAMUSCULAR; INTRAVENOUS; SUBCUTANEOUS at 16:30

## 2022-10-15 RX ADMIN — HYDROMORPHONE HYDROCHLORIDE 2 MILLIGRAM(S): 2 INJECTION INTRAMUSCULAR; INTRAVENOUS; SUBCUTANEOUS at 07:59

## 2022-10-15 RX ADMIN — NEBIVOLOL HYDROCHLORIDE 10 MILLIGRAM(S): 5 TABLET ORAL at 06:08

## 2022-10-15 RX ADMIN — CEFEPIME 100 MILLIGRAM(S): 1 INJECTION, POWDER, FOR SOLUTION INTRAMUSCULAR; INTRAVENOUS at 19:28

## 2022-10-15 RX ADMIN — HYDROMORPHONE HYDROCHLORIDE 2 MILLIGRAM(S): 2 INJECTION INTRAMUSCULAR; INTRAVENOUS; SUBCUTANEOUS at 12:15

## 2022-10-15 RX ADMIN — GABAPENTIN 800 MILLIGRAM(S): 400 CAPSULE ORAL at 15:32

## 2022-10-15 RX ADMIN — NEBIVOLOL HYDROCHLORIDE 10 MILLIGRAM(S): 5 TABLET ORAL at 18:09

## 2022-10-15 RX ADMIN — HYDROMORPHONE HYDROCHLORIDE 2 MILLIGRAM(S): 2 INJECTION INTRAMUSCULAR; INTRAVENOUS; SUBCUTANEOUS at 04:30

## 2022-10-15 RX ADMIN — ENOXAPARIN SODIUM 40 MILLIGRAM(S): 100 INJECTION SUBCUTANEOUS at 09:45

## 2022-10-15 RX ADMIN — ENOXAPARIN SODIUM 40 MILLIGRAM(S): 100 INJECTION SUBCUTANEOUS at 21:47

## 2022-10-15 RX ADMIN — HYDROMORPHONE HYDROCHLORIDE 2 MILLIGRAM(S): 2 INJECTION INTRAMUSCULAR; INTRAVENOUS; SUBCUTANEOUS at 00:00

## 2022-10-15 RX ADMIN — CEFEPIME 100 MILLIGRAM(S): 1 INJECTION, POWDER, FOR SOLUTION INTRAMUSCULAR; INTRAVENOUS at 11:05

## 2022-10-15 RX ADMIN — HYDROMORPHONE HYDROCHLORIDE 2 MILLIGRAM(S): 2 INJECTION INTRAMUSCULAR; INTRAVENOUS; SUBCUTANEOUS at 12:07

## 2022-10-15 RX ADMIN — HYDROMORPHONE HYDROCHLORIDE 2 MILLIGRAM(S): 2 INJECTION INTRAMUSCULAR; INTRAVENOUS; SUBCUTANEOUS at 08:20

## 2022-10-15 RX ADMIN — GABAPENTIN 100 MILLIGRAM(S): 400 CAPSULE ORAL at 04:52

## 2022-10-15 RX ADMIN — Medication 166.67 MILLIGRAM(S): at 21:44

## 2022-10-15 RX ADMIN — CEFEPIME 100 MILLIGRAM(S): 1 INJECTION, POWDER, FOR SOLUTION INTRAMUSCULAR; INTRAVENOUS at 03:07

## 2022-10-15 NOTE — PROGRESS NOTE ADULT - SUBJECTIVE AND OBJECTIVE BOX
Incomplete    S: Pt seen and examined bedside, found NAD, HDS.  Patient denies C/P, SOB, N/V, dizziness, palpitations, and diaphoresis, denies fever/chills, dysuria, abdominal pain, diarrhea, and cough      12 Point ROS otherwise negative except as per HPI/subjective.     O: Vital Signs Last 24 Hrs  T(C): 36.4 (15 Oct 2022 05:00), Max: 36.6 (14 Oct 2022 09:04)  T(F): 97.6 (15 Oct 2022 05:00), Max: 97.9 (14 Oct 2022 09:04)  HR: 74 (15 Oct 2022 04:54) (74 - 85)  BP: 131/72 (15 Oct 2022 04:54) (121/69 - 168/99)  BP(mean): 90 (15 Oct 2022 04:54) (88 - 90)  RR: 16 (15 Oct 2022 04:54) (16 - 18)  SpO2: 97% (15 Oct 2022 04:54) (94% - 98%)    Parameters below as of 15 Oct 2022 04:54  Patient On (Oxygen Delivery Method): room air        PHYSICAL EXAM:  GEN: NAD  HEENT: No JVD  PULM:  CTA B/L  CARD:  RRR, S1 and S2   ABD: +BS, NT, soft/ND	  EXT: No Edema B/L LE  NEURO: A+Ox3, no focal deficit  PSYCH: Mood Appropriate    LABS:                        11.5   9.92  )-----------( 394      ( 14 Oct 2022 05:30 )             36.7     10-14    137  |  102  |  12  ----------------------------<  89  4.2   |  25  |  0.71    Ca    8.7      14 Oct 2022 05:30  Mg     1.8     10-14            10-14 @ 07:01  -  10-15 @ 07:00  --------------------------------------------------------  IN: 740 mL / OUT: 0 mL / NET: 740 mL      Daily     Daily    S: Pt seen and examined bedside, found NAD, HDS.  Patient denies C/P, SOB, N/V, dizziness, palpitations, and diaphoresis, denies fever/chills, dysuria, abdominal pain, diarrhea, and cough.    12 Point ROS otherwise negative except as per HPI/subjective.     O: Vital Signs Last 24 Hrs  T(C): 36.4 (15 Oct 2022 05:00), Max: 36.6 (14 Oct 2022 09:04)  T(F): 97.6 (15 Oct 2022 05:00), Max: 97.9 (14 Oct 2022 09:04)  HR: 74 (15 Oct 2022 04:54) (74 - 85)  BP: 131/72 (15 Oct 2022 04:54) (121/69 - 168/99)  BP(mean): 90 (15 Oct 2022 04:54) (88 - 90)  RR: 16 (15 Oct 2022 04:54) (16 - 18)  SpO2: 97% (15 Oct 2022 04:54) (94% - 98%)    Parameters below as of 15 Oct 2022 04:54  Patient On (Oxygen Delivery Method): room air        PHYSICAL EXAM:  GEN: NAD  HEENT: No JVD B/L, Carotid bruit B/L  PULM:  CTA B/L, no RRW B/L  CARD:  RRR, S1 and S2   ABD: +BS, NT, soft/ND	  EXT: Palpable LE pulses B/L  NEURO: A+Ox3, no focal deficit  PSYCH: Mood Appropriate    LABS:                        11.5   9.92  )-----------( 394      ( 14 Oct 2022 05:30 )             36.7     10-14    137  |  102  |  12  ----------------------------<  89  4.2   |  25  |  0.71    Ca    8.7      14 Oct 2022 05:30  Mg     1.8     10-14            10-14 @ 07:01  -  10-15 @ 07:00  --------------------------------------------------------  IN: 740 mL / OUT: 0 mL / NET: 740 mL

## 2022-10-15 NOTE — PROGRESS NOTE ADULT - PROBLEM SELECTOR PLAN 2
Pt with multiple Direct stick embolizations, and most recent is s/p angiogram and DSE 9/21 with Dr. Teran  - Pain control with IV dialudid 2mg prn q 4hrs  Patient on gabapentin 100mg AM and 800mg evening (before 4pm) for chronic pain as well as percocet at home  -repeat angiogram/embolization performed yesterday as noted above w/ debridement Hx multiple Direct stick embolizations, now s/p LLE Angio/DSE with wound debridement on 10/11/22  -c/w Dilaudid 2mg IV q4h PRN, Toradol 15mg q6h PRN and Gabapentin as prescribed

## 2022-10-15 NOTE — PROGRESS NOTE ADULT - PROBLEM SELECTOR PLAN 1
- Pt with Left Lateral foot ulcer with purulence and fever at home Tmax 101.4   - currently afebrile, no leukocytosis, blood cultures no growth to date   - superficial cx with pseudomonas   -deep tissue (surgical) cx growing pseudomonas, staph haemolyticus, corynbacterium striatum/jeikeium  - continue to follow cultures  - continue tylenol prn has needed for fever/mild pain and Dilaudid 2mg IV fro sever prn q 4  - s/p angiogram/embolization of left foot AVM, as well as wound debridement/deep tissue culture on 10/11 w/ Dr Teran. R groin access  - Dressing changes as needed, placed saline dressing with Krilix 10/10 at night   - Wound care ordered, did not see pt today. Please call in am to have them assess pt prior to discharge  - ID team 2 following, appreciate recs  - c/w vanco 1250mg IVPB q8h. Ordered for 7 days (no length rec'd yet) ID following will need IV Abx still as patient with allergy to cipro. Assess wound with wound care tomr   - vanc trough prior to each 4th dose, goal 10-15, WNL x 1, trough was 17 on 10/13 continue Vanco  - next trough prior to next 4th dose which will be due at 3pm (ordered trough for next ) Pt presenting with Left Lateral foot ulcer with purulence and fever at home Tmax 101.4. Remains Afebrile, no leukocytosis, blood cultures no growth to date.    -Wound Cx with Pseudomonas, staph haemolyticus, corynbacterium striatum/jeikeium  -s/p Left Foot Angio 10/11: DSE L AVM, s/p wound debridement/deep tissue culture, RCFA Accessed  -Wound consult (will need to see pt prior to discharge).  c/w daily dressing changes   -ID following (team 2); recs appreciated   -c/w Cefepime 2g q8h and vanco 1250mg IVPB q8h (total 7 days to be completed on 10/16). F/Y   -Vanc trough prior to each 4th dose, goal 10-15, WNL x 1, most recent trough 10/15:    -tentative plans for D/C Monday if medically stable Pt presenting with Left Lateral foot ulcer with purulence and fever at home Tmax 101.4. Remains Afebrile, no leukocytosis, blood cultures no growth to date.    -Wound Cx with Pseudomonas, staph haemolyticus, corynbacterium striatum/jeikeium  -s/p Left Foot Angio 10/11: DSE L AVM, s/p wound debridement/deep tissue culture, RCFA Accessed  -Wound consult (will need to see pt prior to discharge).  c/w daily dressing changes   -ID following (team 2); recs appreciated   -c/w Cefepime 2g q8h and vanco 1250mg IVPB q8h (total 7 days to be completed on 10/16)  -Most recent Vanco trough 15. 4, (10/14). F/U trough @ 6PM (done).  Order Vanco trough prior to each 4th dose (goal 10-15)    -tentative plans for D/C Monday if medically stable

## 2022-10-15 NOTE — PROGRESS NOTE ADULT - PROBLEM SELECTOR PLAN 3
-C/w home Bystolic 10mg BID  -BPs elevated this admission, but improve w/ pain control. No BP med changes at this time    VTE PPx: lovenox  Dispo: Pt still needing IV abx with plan for possible DC monday if site continues to improve  d/w Dr Teran Stable  -c/w home Bystolic 10mg BID    VTE PPx: lovenox    Dispo: pending medical optimization

## 2022-10-15 NOTE — PROGRESS NOTE ADULT - ASSESSMENT
Pt is a 38yo female with pmhx of HTN and L foot AVM s/p multiple DSE (most recently 9/21/22) who returns for worsening left foot foot pain and non-healing ulcer w/ erythema that did not respond to outpatient oral antibiotics. Pt now s/p angiogram/embolization of left foot AVM, as well as wound debridement/deep tissue culture on 10/11. ID following for Abx management. Plan for 7 day course ending Sunday night and if wound continued to improve possible DC monday, 38yo female, PMHx HTN, L Foot AVM s/p multiple DSE (most recently 9/21/22) re-admitted for worsening Left Foot pain, non-healing ulcer w/ erythema,  not responsive to outpatient oral antibiotics.  Pt now s/p angiogram/embolization of left foot AVM, and wound debridement/deep tissue culture on 10/11. ID following for IV Abx management with plans to competed full 7 days course on Sunday. Tentative plan for D/C on Monday if wound and pain improve  36yo female, PMHx HTN, L Foot AVM s/p multiple DSE (most recently 9/21/22) re-admitted for worsening Left Foot pain, non-healing ulcer w/ erythema,  not responsive to outpatient oral antibiotics.  Pt now s/p angiogram/embolization of left foot AVM, and wound debridement/deep tissue culture on 10/11. ID following for IV Abx management with plans to competed full 7 days course on Sunday. Tentative plan for D/C on Monday if wound and pain improves.

## 2022-10-16 ENCOUNTER — TRANSCRIPTION ENCOUNTER (OUTPATIENT)
Age: 37
End: 2022-10-16

## 2022-10-16 LAB
ANION GAP SERPL CALC-SCNC: 12 MMOL/L — SIGNIFICANT CHANGE UP (ref 5–17)
BUN SERPL-MCNC: 14 MG/DL — SIGNIFICANT CHANGE UP (ref 7–23)
CALCIUM SERPL-MCNC: 8.9 MG/DL — SIGNIFICANT CHANGE UP (ref 8.4–10.5)
CHLORIDE SERPL-SCNC: 102 MMOL/L — SIGNIFICANT CHANGE UP (ref 96–108)
CO2 SERPL-SCNC: 25 MMOL/L — SIGNIFICANT CHANGE UP (ref 22–31)
CREAT SERPL-MCNC: 0.7 MG/DL — SIGNIFICANT CHANGE UP (ref 0.5–1.3)
CULTURE RESULTS: SIGNIFICANT CHANGE UP
CULTURE RESULTS: SIGNIFICANT CHANGE UP
EGFR: 113 ML/MIN/1.73M2 — SIGNIFICANT CHANGE UP
GLUCOSE SERPL-MCNC: 145 MG/DL — HIGH (ref 70–99)
HCT VFR BLD CALC: 34.4 % — LOW (ref 34.5–45)
HGB BLD-MCNC: 11.1 G/DL — LOW (ref 11.5–15.5)
MAGNESIUM SERPL-MCNC: 1.6 MG/DL — SIGNIFICANT CHANGE UP (ref 1.6–2.6)
MCHC RBC-ENTMCNC: 28.6 PG — SIGNIFICANT CHANGE UP (ref 27–34)
MCHC RBC-ENTMCNC: 32.3 GM/DL — SIGNIFICANT CHANGE UP (ref 32–36)
MCV RBC AUTO: 88.7 FL — SIGNIFICANT CHANGE UP (ref 80–100)
NRBC # BLD: 0 /100 WBCS — SIGNIFICANT CHANGE UP (ref 0–0)
PLATELET # BLD AUTO: 463 K/UL — HIGH (ref 150–400)
POTASSIUM SERPL-MCNC: 4.1 MMOL/L — SIGNIFICANT CHANGE UP (ref 3.5–5.3)
POTASSIUM SERPL-SCNC: 4.1 MMOL/L — SIGNIFICANT CHANGE UP (ref 3.5–5.3)
RBC # BLD: 3.88 M/UL — SIGNIFICANT CHANGE UP (ref 3.8–5.2)
RBC # FLD: 12.4 % — SIGNIFICANT CHANGE UP (ref 10.3–14.5)
SODIUM SERPL-SCNC: 139 MMOL/L — SIGNIFICANT CHANGE UP (ref 135–145)
SPECIMEN SOURCE: SIGNIFICANT CHANGE UP
SPECIMEN SOURCE: SIGNIFICANT CHANGE UP
VANCOMYCIN TROUGH SERPL-MCNC: 11.2 UG/ML — SIGNIFICANT CHANGE UP (ref 10–20)
WBC # BLD: 10.13 K/UL — SIGNIFICANT CHANGE UP (ref 3.8–10.5)
WBC # FLD AUTO: 10.13 K/UL — SIGNIFICANT CHANGE UP (ref 3.8–10.5)

## 2022-10-16 RX ADMIN — HYDROMORPHONE HYDROCHLORIDE 2 MILLIGRAM(S): 2 INJECTION INTRAMUSCULAR; INTRAVENOUS; SUBCUTANEOUS at 00:39

## 2022-10-16 RX ADMIN — HYDROMORPHONE HYDROCHLORIDE 2 MILLIGRAM(S): 2 INJECTION INTRAMUSCULAR; INTRAVENOUS; SUBCUTANEOUS at 08:52

## 2022-10-16 RX ADMIN — Medication 166.67 MILLIGRAM(S): at 14:34

## 2022-10-16 RX ADMIN — ENOXAPARIN SODIUM 40 MILLIGRAM(S): 100 INJECTION SUBCUTANEOUS at 10:45

## 2022-10-16 RX ADMIN — HYDROMORPHONE HYDROCHLORIDE 2 MILLIGRAM(S): 2 INJECTION INTRAMUSCULAR; INTRAVENOUS; SUBCUTANEOUS at 04:34

## 2022-10-16 RX ADMIN — HYDROMORPHONE HYDROCHLORIDE 2 MILLIGRAM(S): 2 INJECTION INTRAMUSCULAR; INTRAVENOUS; SUBCUTANEOUS at 17:40

## 2022-10-16 RX ADMIN — NEBIVOLOL HYDROCHLORIDE 10 MILLIGRAM(S): 5 TABLET ORAL at 05:54

## 2022-10-16 RX ADMIN — HYDROMORPHONE HYDROCHLORIDE 2 MILLIGRAM(S): 2 INJECTION INTRAMUSCULAR; INTRAVENOUS; SUBCUTANEOUS at 12:51

## 2022-10-16 RX ADMIN — HYDROMORPHONE HYDROCHLORIDE 2 MILLIGRAM(S): 2 INJECTION INTRAMUSCULAR; INTRAVENOUS; SUBCUTANEOUS at 09:10

## 2022-10-16 RX ADMIN — NEBIVOLOL HYDROCHLORIDE 10 MILLIGRAM(S): 5 TABLET ORAL at 17:23

## 2022-10-16 RX ADMIN — HYDROMORPHONE HYDROCHLORIDE 2 MILLIGRAM(S): 2 INJECTION INTRAMUSCULAR; INTRAVENOUS; SUBCUTANEOUS at 13:05

## 2022-10-16 RX ADMIN — HYDROMORPHONE HYDROCHLORIDE 2 MILLIGRAM(S): 2 INJECTION INTRAMUSCULAR; INTRAVENOUS; SUBCUTANEOUS at 21:00

## 2022-10-16 RX ADMIN — CEFEPIME 100 MILLIGRAM(S): 1 INJECTION, POWDER, FOR SOLUTION INTRAMUSCULAR; INTRAVENOUS at 12:50

## 2022-10-16 RX ADMIN — HYDROMORPHONE HYDROCHLORIDE 2 MILLIGRAM(S): 2 INJECTION INTRAMUSCULAR; INTRAVENOUS; SUBCUTANEOUS at 01:09

## 2022-10-16 RX ADMIN — HYDROMORPHONE HYDROCHLORIDE 2 MILLIGRAM(S): 2 INJECTION INTRAMUSCULAR; INTRAVENOUS; SUBCUTANEOUS at 17:23

## 2022-10-16 RX ADMIN — HYDROMORPHONE HYDROCHLORIDE 2 MILLIGRAM(S): 2 INJECTION INTRAMUSCULAR; INTRAVENOUS; SUBCUTANEOUS at 21:28

## 2022-10-16 RX ADMIN — GABAPENTIN 100 MILLIGRAM(S): 400 CAPSULE ORAL at 05:58

## 2022-10-16 RX ADMIN — Medication 166.67 MILLIGRAM(S): at 05:30

## 2022-10-16 RX ADMIN — ENOXAPARIN SODIUM 40 MILLIGRAM(S): 100 INJECTION SUBCUTANEOUS at 21:28

## 2022-10-16 RX ADMIN — HYDROMORPHONE HYDROCHLORIDE 2 MILLIGRAM(S): 2 INJECTION INTRAMUSCULAR; INTRAVENOUS; SUBCUTANEOUS at 04:50

## 2022-10-16 RX ADMIN — CEFEPIME 100 MILLIGRAM(S): 1 INJECTION, POWDER, FOR SOLUTION INTRAMUSCULAR; INTRAVENOUS at 04:50

## 2022-10-16 RX ADMIN — CEFEPIME 100 MILLIGRAM(S): 1 INJECTION, POWDER, FOR SOLUTION INTRAMUSCULAR; INTRAVENOUS at 21:28

## 2022-10-16 NOTE — PROGRESS NOTE ADULT - PROBLEM SELECTOR PLAN 1
Pt presenting with Left Lateral foot ulcer with purulence and fever at home Tmax 101.4. Remains Afebrile, no leukocytosis, blood cultures no growth to date.    -Wound Cx with Pseudomonas, staph haemolyticus, corynbacterium striatum/jeikeium  -s/p Left Foot Angio 10/11: DSE L AVM, s/p wound debridement/deep tissue culture, RCFA Accessed  -Wound consult (will need to see pt prior to discharge).  c/w daily dressing changes   -ID following (team 2); recs appreciated   -c/w Cefepime 2g q8h and vanco 1250mg IVPB q8h (total 7 days to be completed on 10/16)  -Most recent Vanco trough 15. 4, (10/14). F/U trough @ 6PM (done).  Order Vanco trough prior to each 4th dose (goal 10-15)    -tentative plans for D/C Monday if medically stable Pt presenting with Left Lateral foot ulcer with purulence and fever at home Tmax 101.4. Remains Afebrile, no leukocytosis, blood cultures no growth to date.    -Wound Cx with Pseudomonas, staph haemolyticus, corynbacterium striatum/jeikeium  -s/p Left Foot Angio 10/11: DSE L AVM, s/p wound debridement/deep tissue culture, FA Accessed  -Wound consult (will need to see pt prior to discharge).  c/w daily dressing changes   -ID following (team 2); recs appreciated   -c/w Cefepime 2g q8h and Vanco 1250mg IVPB q8h (total 7 days to be completed on 10/16PM)  -Vanco trough 10/15PM 15.4. At goal, c/w dose as above  -Likely D/C in AM if medically stable

## 2022-10-16 NOTE — PROGRESS NOTE ADULT - PROVIDER SPECIALTY LIST ADULT
Infectious Disease
Infectious Disease
Intervent Cardiology
Infectious Disease
Intervent Cardiology

## 2022-10-16 NOTE — DISCHARGE NOTE NURSING/CASE MANAGEMENT/SOCIAL WORK - PATIENT PORTAL LINK FT
You can access the FollowMyHealth Patient Portal offered by [facility] by registering at the following website: [email or web address]. By joining [facility]’s FollowMyHealth portal, you will also be able to view your health information using other applications (apps) compatible with our system.

## 2022-10-16 NOTE — DISCHARGE NOTE NURSING/CASE MANAGEMENT/SOCIAL WORK - NSDCVIVACCINE_GEN_ALL_CORE_FT
influenza, injectable, quadrivalent, preservative free; 22-Oct-2020 10:12; Miley Hebert (RN); [employer]; [id number] (Exp. Date: 30-Jun-2021); IntraMuscular; Deltoid Left.; 0.5 milliLiter(s); VIS (VIS Published: 15-Aug-2019, VIS Presented: 22-Oct-2020);   influenza, injectable, quadrivalent, preservative free; 14-Oct-2021 09:19; Margie Taveras (RN); [employer]; [id number] (Exp. Date: 30-Jun-2022); IntraMuscular; Deltoid Right.; 0.5 milliLiter(s); VIS (VIS Published: 06-Aug-2021, VIS Presented: 14-Oct-2021);

## 2022-10-16 NOTE — PROGRESS NOTE ADULT - NUTRITIONAL ASSESSMENT
This patient has been assessed with a concern for Malnutrition and has been determined to have a diagnosis/diagnoses of Morbid obesity (BMI > 40).    This patient is being managed with:   Diet DASH/TLC-  Sodium & Cholesterol Restricted  Entered: Oct 11 2022  6:28PM    

## 2022-10-16 NOTE — PROGRESS NOTE ADULT - ASSESSMENT
38yo female, PMHx HTN, L Foot AVM s/p multiple DSE (most recently 9/21/22) re-admitted for worsening Left Foot pain, non-healing ulcer w/ erythema,  not responsive to outpatient oral antibiotics.  Pt now s/p angiogram/embolization of left foot AVM, and wound debridement/deep tissue culture on 10/11. ID following for IV Abx management with plans to competed full 7 days course on Sunday. Tentative plan for D/C on Monday if wound and pain improves.  36 y/o Female w/ PMHx of HTN, L Foot AVM s/p multiple DSE (most recently 9/21/22) re-admitted for worsening left foot pain a/w non-healing ulcer w/ erythema unresponsive to oral antibiotics outpatient. Now s/p angiogram/embolization 10/11: of left foot AVM and wound debridement/deep tissue culture. On Vanc/Cefepime per ID, to complete course 10/16PM. D/C planning tomorrow if wound/pain improved.

## 2022-10-16 NOTE — PROGRESS NOTE ADULT - PROBLEM SELECTOR PROBLEM 1
Foot ulceration
Secondary Intention Text (Leave Blank If You Do Not Want): The defect will heal with secondary intention.

## 2022-10-16 NOTE — PROGRESS NOTE ADULT - PROBLEM SELECTOR PLAN 2
Hx multiple Direct stick embolizations, now s/p LLE Angio/DSE with wound debridement on 10/11/22  -c/w Dilaudid 2mg IV q4h PRN, Toradol 15mg q6h PRN and Gabapentin as prescribed Hx multiple direct stick embolizations, now s/p LLE Angio/DSE with wound debridement on 10/11/22  -c/w Dilaudid 2mg IV q4h PRN, Toradol 15mg q6h PRN and Gabapentin as prescribed

## 2022-10-16 NOTE — PROGRESS NOTE ADULT - PROBLEM SELECTOR PLAN 3
Stable  -c/w home Bystolic 10mg BID    VTE PPx: lovenox    Dispo: pending medical optimization Stable  -c/w home Bystolic 10mg BID    #PPx  -VTE PPx: Lovenox  -Dispo: Likely in AM

## 2022-10-16 NOTE — PROGRESS NOTE ADULT - SUBJECTIVE AND OBJECTIVE BOX
Interventional Cardiology PA Adult Progress Note    Subjective Assessment:    ROS Negative except as per Subjective and HPI  	  MEDICATIONS:  nebivolol 10 milliGRAM(s) Oral <User Schedule>    cefepime   IVPB 2000 milliGRAM(s) IV Intermittent every 8 hours  vancomycin  IVPB 1250 milliGRAM(s) IV Intermittent every 8 hours  vancomycin  IVPB          gabapentin 100 milliGRAM(s) Oral <User Schedule>  gabapentin 800 milliGRAM(s) Oral <User Schedule>  HYDROmorphone  Injectable 2 milliGRAM(s) IV Push every 4 hours PRN  ketorolac   Injectable 15 milliGRAM(s) IV Push every 6 hours PRN        enoxaparin Injectable 40 milliGRAM(s) SubCutaneous every 12 hours  influenza   Vaccine 0.5 milliLiter(s) IntraMuscular once        PHYSICAL EXAM:  TELEMETRY:  T(C): 36.6 (10-16-22 @ 06:23), Max: 37 (10-15-22 @ 12:19)  HR: 80 (10-16-22 @ 08:26) (79 - 86)  BP: 170/96 (10-16-22 @ 08:26) (144/85 - 170/96)  RR: 15 (10-15-22 @ 21:30) (15 - 18)  SpO2: 96% (10-15-22 @ 21:30) (95% - 96%)  Wt(kg): --  I&O's Summary    15 Oct 2022 07:01  -  16 Oct 2022 07:00  --------------------------------------------------------  IN: 780 mL / OUT: 0 mL / NET: 780 mL        Robles:  Central/PICC/Mid Line:                                         Appearance: Normal	  HEENT:   Normal oral mucosa, PERRL, EOMI	  Neck: Supple, + JVD/ - JVD; Carotid Bruit   Cardiovascular: Normal S1 S2, No JVD, No murmurs,   Respiratory: Lungs clear to auscultation/Decreased Breath Sounds/No Rales, Rhonchi, Wheezing	  Gastrointestinal:  Soft, Non-tender, + BS	  Skin: No rashes, No ecchymoses, No cyanosis  Extremities: Normal range of motion, No clubbing, cyanosis or edema  Vascular: Peripheral pulses palpable 2+ bilaterally  Neurologic: Non-focal  Psychiatry: A & O x 3, Mood & affect appropriate  	    ECG:  	  RADIOLOGY:   DIAGNOSTIC TESTING:  [ ] Echocardiogram:  [ ]  Catheterization:  [ ] Stress Test:    [ ] CHARLEEN  OTHER: 	    LABS:	 	  CARDIAC MARKERS:                                  11.8   9.65  )-----------( 488      ( 15 Oct 2022 10:15 )             37.0     10-15    137  |  102  |  15  ----------------------------<  144<H>  4.0   |  23  |  0.72    Ca    9.1      15 Oct 2022 10:15  Mg     1.8     10-15      proBNP:   Lipid Profile:   HgA1c:   TSH:    Interventional Cardiology PA Adult Progress Note    Subjective Assessment: Patient seen and assessed at bedside. Endorsed mild pain after dressing change, slowly improving after administration of Dilaudid. Denies chest pain, palpitations, shortness of breath, abdominal pain, N/V/D, any other complaints.    ROS Negative except as per Subjective and HPI  	  MEDICATIONS:  nebivolol 10 milliGRAM(s) Oral <User Schedule>    cefepime   IVPB 2000 milliGRAM(s) IV Intermittent every 8 hours  vancomycin  IVPB 1250 milliGRAM(s) IV Intermittent every 8 hours  vancomycin  IVPB          gabapentin 100 milliGRAM(s) Oral <User Schedule>  gabapentin 800 milliGRAM(s) Oral <User Schedule>  HYDROmorphone  Injectable 2 milliGRAM(s) IV Push every 4 hours PRN  ketorolac   Injectable 15 milliGRAM(s) IV Push every 6 hours PRN    enoxaparin Injectable 40 milliGRAM(s) SubCutaneous every 12 hours  influenza   Vaccine 0.5 milliLiter(s) IntraMuscular once        PHYSICAL EXAM:  TELEMETRY:  T(C): 36.6 (10-16-22 @ 06:23), Max: 37 (10-15-22 @ 12:19)  HR: 80 (10-16-22 @ 08:26) (79 - 86)  BP: 170/96 (10-16-22 @ 08:26) (144/85 - 170/96)  RR: 15 (10-15-22 @ 21:30) (15 - 18)  SpO2: 96% (10-15-22 @ 21:30) (95% - 96%)    I&O's Summary    15 Oct 2022 07:01  -  16 Oct 2022 07:00  --------------------------------------------------------  IN: 780 mL / OUT: 0 mL / NET: 780 mL        Robles:  Central/PICC/Mid Line:                                         Appearance: Normal	  HEENT:   Normal oral mucosa, PERRL, EOMI	  Neck: Supple, - JVD  Cardiovascular: Normal S1 S2, No JVD, No murmurs,   Respiratory: Lungs clear to auscultation. No Rales, Rhonchi, Wheezing	  Gastrointestinal:  Soft, Non-tender, + BS	  Skin: No rashes, No ecchymoses, No cyanosis  Extremities: Normal range of motion, No clubbing, cyanosis or edema  Vascular: Peripheral pulses palpable 2+ bilaterally  Neurologic: Non-focal  Psychiatry: A & O x 3, Mood & affect appropriate  	    ECG:  	  RADIOLOGY:   DIAGNOSTIC TESTING:  [ ] Echocardiogram:  [ ]  Catheterization:  [ ] Stress Test:    [ ] CHARLEEN  OTHER: 	    LABS:	 	  CARDIAC MARKERS:                  11.8   9.65  )-----------( 488      ( 15 Oct 2022 10:15 )             37.0     10-15    137  |  102  |  15  ----------------------------<  144<H>  4.0   |  23  |  0.72    Ca    9.1      15 Oct 2022 10:15  Mg     1.8     10-15      proBNP:   Lipid Profile:   HgA1c:   TSH:

## 2022-10-16 NOTE — PROGRESS NOTE ADULT - REASON FOR ADMISSION
Left Ankle Ulcer

## 2022-10-16 NOTE — DISCHARGE NOTE NURSING/CASE MANAGEMENT/SOCIAL WORK - NSDCPEFALRISK_GEN_ALL_CORE
For information on Fall & Injury Prevention, visit: [email or web address] OR  [email or web address] OR  https://www.cdc.gov/steadi/patient.html

## 2022-10-16 NOTE — PROGRESS NOTE ADULT - PROBLEM SELECTOR PROBLEM 2
AVM (arteriovenous malformation)

## 2022-10-17 VITALS — TEMPERATURE: 97 F

## 2022-10-17 LAB
ANION GAP SERPL CALC-SCNC: 8 MMOL/L — SIGNIFICANT CHANGE UP (ref 5–17)
BASOPHILS # BLD AUTO: 0.06 K/UL — SIGNIFICANT CHANGE UP (ref 0–0.2)
BASOPHILS NFR BLD AUTO: 0.7 % — SIGNIFICANT CHANGE UP (ref 0–2)
BUN SERPL-MCNC: 14 MG/DL — SIGNIFICANT CHANGE UP (ref 7–23)
CALCIUM SERPL-MCNC: 9.1 MG/DL — SIGNIFICANT CHANGE UP (ref 8.4–10.5)
CHLORIDE SERPL-SCNC: 100 MMOL/L — SIGNIFICANT CHANGE UP (ref 96–108)
CO2 SERPL-SCNC: 27 MMOL/L — SIGNIFICANT CHANGE UP (ref 22–31)
CREAT SERPL-MCNC: 0.65 MG/DL — SIGNIFICANT CHANGE UP (ref 0.5–1.3)
EGFR: 115 ML/MIN/1.73M2 — SIGNIFICANT CHANGE UP
EOSINOPHIL # BLD AUTO: 0.23 K/UL — SIGNIFICANT CHANGE UP (ref 0–0.5)
EOSINOPHIL NFR BLD AUTO: 2.5 % — SIGNIFICANT CHANGE UP (ref 0–6)
GLUCOSE SERPL-MCNC: 93 MG/DL — SIGNIFICANT CHANGE UP (ref 70–99)
HCT VFR BLD CALC: 38 % — SIGNIFICANT CHANGE UP (ref 34.5–45)
HGB BLD-MCNC: 12.2 G/DL — SIGNIFICANT CHANGE UP (ref 11.5–15.5)
IMM GRANULOCYTES NFR BLD AUTO: 0.4 % — SIGNIFICANT CHANGE UP (ref 0–0.9)
LYMPHOCYTES # BLD AUTO: 2.42 K/UL — SIGNIFICANT CHANGE UP (ref 1–3.3)
LYMPHOCYTES # BLD AUTO: 26.3 % — SIGNIFICANT CHANGE UP (ref 13–44)
MAGNESIUM SERPL-MCNC: 1.9 MG/DL — SIGNIFICANT CHANGE UP (ref 1.6–2.6)
MCHC RBC-ENTMCNC: 28.9 PG — SIGNIFICANT CHANGE UP (ref 27–34)
MCHC RBC-ENTMCNC: 32.1 GM/DL — SIGNIFICANT CHANGE UP (ref 32–36)
MCV RBC AUTO: 90 FL — SIGNIFICANT CHANGE UP (ref 80–100)
MONOCYTES # BLD AUTO: 0.75 K/UL — SIGNIFICANT CHANGE UP (ref 0–0.9)
MONOCYTES NFR BLD AUTO: 8.1 % — SIGNIFICANT CHANGE UP (ref 2–14)
NEUTROPHILS # BLD AUTO: 5.71 K/UL — SIGNIFICANT CHANGE UP (ref 1.8–7.4)
NEUTROPHILS NFR BLD AUTO: 62 % — SIGNIFICANT CHANGE UP (ref 43–77)
NRBC # BLD: 0 /100 WBCS — SIGNIFICANT CHANGE UP (ref 0–0)
PLATELET # BLD AUTO: 486 K/UL — HIGH (ref 150–400)
POTASSIUM SERPL-MCNC: 4.5 MMOL/L — SIGNIFICANT CHANGE UP (ref 3.5–5.3)
POTASSIUM SERPL-SCNC: 4.5 MMOL/L — SIGNIFICANT CHANGE UP (ref 3.5–5.3)
RBC # BLD: 4.22 M/UL — SIGNIFICANT CHANGE UP (ref 3.8–5.2)
RBC # FLD: 12.3 % — SIGNIFICANT CHANGE UP (ref 10.3–14.5)
SODIUM SERPL-SCNC: 135 MMOL/L — SIGNIFICANT CHANGE UP (ref 135–145)
WBC # BLD: 9.21 K/UL — SIGNIFICANT CHANGE UP (ref 3.8–10.5)
WBC # FLD AUTO: 9.21 K/UL — SIGNIFICANT CHANGE UP (ref 3.8–10.5)

## 2022-10-17 PROCEDURE — C1889: CPT

## 2022-10-17 PROCEDURE — 80048 BASIC METABOLIC PNL TOTAL CA: CPT

## 2022-10-17 PROCEDURE — 85730 THROMBOPLASTIN TIME PARTIAL: CPT

## 2022-10-17 PROCEDURE — 99285 EMERGENCY DEPT VISIT HI MDM: CPT

## 2022-10-17 PROCEDURE — 86850 RBC ANTIBODY SCREEN: CPT

## 2022-10-17 PROCEDURE — 36415 COLL VENOUS BLD VENIPUNCTURE: CPT

## 2022-10-17 PROCEDURE — C1887: CPT

## 2022-10-17 PROCEDURE — 87040 BLOOD CULTURE FOR BACTERIA: CPT

## 2022-10-17 PROCEDURE — 80202 ASSAY OF VANCOMYCIN: CPT

## 2022-10-17 PROCEDURE — C1894: CPT

## 2022-10-17 PROCEDURE — 87184 SC STD DISK METHOD PER PLATE: CPT

## 2022-10-17 PROCEDURE — 85027 COMPLETE CBC AUTOMATED: CPT

## 2022-10-17 PROCEDURE — 81025 URINE PREGNANCY TEST: CPT

## 2022-10-17 PROCEDURE — 87635 SARS-COV-2 COVID-19 AMP PRB: CPT

## 2022-10-17 PROCEDURE — 87186 SC STD MICRODIL/AGAR DIL: CPT

## 2022-10-17 PROCEDURE — 83735 ASSAY OF MAGNESIUM: CPT

## 2022-10-17 PROCEDURE — 86900 BLOOD TYPING SEROLOGIC ABO: CPT

## 2022-10-17 PROCEDURE — 87070 CULTURE OTHR SPECIMN AEROBIC: CPT

## 2022-10-17 PROCEDURE — 87075 CULTR BACTERIA EXCEPT BLOOD: CPT

## 2022-10-17 PROCEDURE — 80053 COMPREHEN METABOLIC PANEL: CPT

## 2022-10-17 PROCEDURE — 86901 BLOOD TYPING SEROLOGIC RH(D): CPT

## 2022-10-17 PROCEDURE — 85610 PROTHROMBIN TIME: CPT

## 2022-10-17 PROCEDURE — C1769: CPT

## 2022-10-17 PROCEDURE — 85025 COMPLETE CBC W/AUTO DIFF WBC: CPT

## 2022-10-17 RX ADMIN — ENOXAPARIN SODIUM 40 MILLIGRAM(S): 100 INJECTION SUBCUTANEOUS at 10:54

## 2022-10-17 RX ADMIN — GABAPENTIN 100 MILLIGRAM(S): 400 CAPSULE ORAL at 06:06

## 2022-10-17 RX ADMIN — CEFEPIME 100 MILLIGRAM(S): 1 INJECTION, POWDER, FOR SOLUTION INTRAMUSCULAR; INTRAVENOUS at 06:06

## 2022-10-17 RX ADMIN — HYDROMORPHONE HYDROCHLORIDE 2 MILLIGRAM(S): 2 INJECTION INTRAMUSCULAR; INTRAVENOUS; SUBCUTANEOUS at 06:06

## 2022-10-17 RX ADMIN — HYDROMORPHONE HYDROCHLORIDE 2 MILLIGRAM(S): 2 INJECTION INTRAMUSCULAR; INTRAVENOUS; SUBCUTANEOUS at 11:00

## 2022-10-17 RX ADMIN — HYDROMORPHONE HYDROCHLORIDE 2 MILLIGRAM(S): 2 INJECTION INTRAMUSCULAR; INTRAVENOUS; SUBCUTANEOUS at 15:45

## 2022-10-17 RX ADMIN — GABAPENTIN 800 MILLIGRAM(S): 400 CAPSULE ORAL at 15:53

## 2022-10-17 RX ADMIN — Medication 166.67 MILLIGRAM(S): at 00:00

## 2022-10-17 RX ADMIN — Medication 166.67 MILLIGRAM(S): at 07:05

## 2022-10-17 RX ADMIN — HYDROMORPHONE HYDROCHLORIDE 2 MILLIGRAM(S): 2 INJECTION INTRAMUSCULAR; INTRAVENOUS; SUBCUTANEOUS at 10:54

## 2022-10-17 RX ADMIN — HYDROMORPHONE HYDROCHLORIDE 2 MILLIGRAM(S): 2 INJECTION INTRAMUSCULAR; INTRAVENOUS; SUBCUTANEOUS at 01:51

## 2022-10-17 RX ADMIN — NEBIVOLOL HYDROCHLORIDE 10 MILLIGRAM(S): 5 TABLET ORAL at 06:06

## 2022-10-17 RX ADMIN — HYDROMORPHONE HYDROCHLORIDE 2 MILLIGRAM(S): 2 INJECTION INTRAMUSCULAR; INTRAVENOUS; SUBCUTANEOUS at 15:25

## 2022-10-17 RX ADMIN — CEFEPIME 100 MILLIGRAM(S): 1 INJECTION, POWDER, FOR SOLUTION INTRAMUSCULAR; INTRAVENOUS at 14:12

## 2022-10-17 RX ADMIN — HYDROMORPHONE HYDROCHLORIDE 2 MILLIGRAM(S): 2 INJECTION INTRAMUSCULAR; INTRAVENOUS; SUBCUTANEOUS at 01:06

## 2022-10-17 RX ADMIN — HYDROMORPHONE HYDROCHLORIDE 2 MILLIGRAM(S): 2 INJECTION INTRAMUSCULAR; INTRAVENOUS; SUBCUTANEOUS at 06:32

## 2022-10-28 DIAGNOSIS — L97.529 NON-PRESSURE CHRONIC ULCER OF OTHER PART OF LEFT FOOT WITH UNSPECIFIED SEVERITY: ICD-10-CM

## 2022-10-28 DIAGNOSIS — Q27.32 ARTERIOVENOUS MALFORMATION OF VESSEL OF LOWER LIMB: ICD-10-CM

## 2022-10-28 DIAGNOSIS — E66.01 MORBID (SEVERE) OBESITY DUE TO EXCESS CALORIES: ICD-10-CM

## 2022-10-28 DIAGNOSIS — I10 ESSENTIAL (PRIMARY) HYPERTENSION: ICD-10-CM

## 2022-10-28 DIAGNOSIS — Z88.1 ALLERGY STATUS TO OTHER ANTIBIOTIC AGENTS STATUS: ICD-10-CM

## 2022-10-28 DIAGNOSIS — B96.5 PSEUDOMONAS (AERUGINOSA) (MALLEI) (PSEUDOMALLEI) AS THE CAUSE OF DISEASES CLASSIFIED ELSEWHERE: ICD-10-CM

## 2022-10-28 DIAGNOSIS — Z88.8 ALLERGY STATUS TO OTHER DRUGS, MEDICAMENTS AND BIOLOGICAL SUBSTANCES STATUS: ICD-10-CM

## 2022-10-31 ENCOUNTER — INPATIENT (INPATIENT)
Facility: HOSPITAL | Age: 37
LOS: 10 days | Discharge: HOME HEALTH | DRG: 872 | End: 2022-11-11
Attending: STUDENT IN AN ORGANIZED HEALTH CARE EDUCATION/TRAINING PROGRAM | Admitting: STUDENT IN AN ORGANIZED HEALTH CARE EDUCATION/TRAINING PROGRAM
Payer: MEDICARE

## 2022-10-31 VITALS
HEART RATE: 102 BPM | SYSTOLIC BLOOD PRESSURE: 166 MMHG | HEIGHT: 67 IN | WEIGHT: 293 LBS | OXYGEN SATURATION: 100 % | DIASTOLIC BLOOD PRESSURE: 102 MMHG | TEMPERATURE: 99 F | RESPIRATION RATE: 18 BRPM

## 2022-10-31 DIAGNOSIS — A41.9 SEPSIS, UNSPECIFIED ORGANISM: ICD-10-CM

## 2022-10-31 DIAGNOSIS — Z41.9 ENCOUNTER FOR PROCEDURE FOR PURPOSES OTHER THAN REMEDYING HEALTH STATE, UNSPECIFIED: Chronic | ICD-10-CM

## 2022-10-31 DIAGNOSIS — Z29.9 ENCOUNTER FOR PROPHYLACTIC MEASURES, UNSPECIFIED: ICD-10-CM

## 2022-10-31 DIAGNOSIS — Q27.30 ARTERIOVENOUS MALFORMATION, SITE UNSPECIFIED: ICD-10-CM

## 2022-10-31 DIAGNOSIS — L97.509 NON-PRESSURE CHRONIC ULCER OF OTHER PART OF UNSPECIFIED FOOT WITH UNSPECIFIED SEVERITY: ICD-10-CM

## 2022-10-31 DIAGNOSIS — Z98.89 OTHER SPECIFIED POSTPROCEDURAL STATES: Chronic | ICD-10-CM

## 2022-10-31 DIAGNOSIS — E66.01 MORBID (SEVERE) OBESITY DUE TO EXCESS CALORIES: Chronic | ICD-10-CM

## 2022-10-31 DIAGNOSIS — I10 ESSENTIAL (PRIMARY) HYPERTENSION: ICD-10-CM

## 2022-10-31 LAB
ALBUMIN SERPL ELPH-MCNC: 4.6 G/DL — SIGNIFICANT CHANGE UP (ref 3.3–5)
ALP SERPL-CCNC: 65 U/L — SIGNIFICANT CHANGE UP (ref 40–120)
ALT FLD-CCNC: 8 U/L — LOW (ref 10–45)
ANION GAP SERPL CALC-SCNC: 15 MMOL/L — SIGNIFICANT CHANGE UP (ref 5–17)
APTT BLD: 32.8 SEC — SIGNIFICANT CHANGE UP (ref 27.5–35.5)
AST SERPL-CCNC: 10 U/L — SIGNIFICANT CHANGE UP (ref 10–40)
BILIRUB SERPL-MCNC: 0.9 MG/DL — SIGNIFICANT CHANGE UP (ref 0.2–1.2)
BUN SERPL-MCNC: 13 MG/DL — SIGNIFICANT CHANGE UP (ref 7–23)
CALCIUM SERPL-MCNC: 9.8 MG/DL — SIGNIFICANT CHANGE UP (ref 8.4–10.5)
CHLORIDE SERPL-SCNC: 104 MMOL/L — SIGNIFICANT CHANGE UP (ref 96–108)
CO2 SERPL-SCNC: 21 MMOL/L — LOW (ref 22–31)
CREAT SERPL-MCNC: 0.85 MG/DL — SIGNIFICANT CHANGE UP (ref 0.5–1.3)
EGFR: 89 ML/MIN/1.73M2 — SIGNIFICANT CHANGE UP
GLUCOSE SERPL-MCNC: 108 MG/DL — HIGH (ref 70–99)
HCT VFR BLD CALC: 38.3 % — SIGNIFICANT CHANGE UP (ref 34.5–45)
HGB BLD-MCNC: 12.9 G/DL — SIGNIFICANT CHANGE UP (ref 11.5–15.5)
INR BLD: 1.12 — SIGNIFICANT CHANGE UP (ref 0.88–1.16)
LACTATE SERPL-SCNC: 1.3 MMOL/L — SIGNIFICANT CHANGE UP (ref 0.5–2)
MCHC RBC-ENTMCNC: 28.9 PG — SIGNIFICANT CHANGE UP (ref 27–34)
MCHC RBC-ENTMCNC: 33.7 GM/DL — SIGNIFICANT CHANGE UP (ref 32–36)
MCV RBC AUTO: 85.9 FL — SIGNIFICANT CHANGE UP (ref 80–100)
NRBC # BLD: 0 /100 WBCS — SIGNIFICANT CHANGE UP (ref 0–0)
PLATELET # BLD AUTO: 458 K/UL — HIGH (ref 150–400)
POTASSIUM SERPL-MCNC: 3.4 MMOL/L — LOW (ref 3.5–5.3)
POTASSIUM SERPL-SCNC: 3.4 MMOL/L — LOW (ref 3.5–5.3)
PROT SERPL-MCNC: 8.4 G/DL — HIGH (ref 6–8.3)
PROTHROM AB SERPL-ACNC: 13.3 SEC — SIGNIFICANT CHANGE UP (ref 10.5–13.4)
RBC # BLD: 4.46 M/UL — SIGNIFICANT CHANGE UP (ref 3.8–5.2)
RBC # FLD: 12.6 % — SIGNIFICANT CHANGE UP (ref 10.3–14.5)
SARS-COV-2 RNA SPEC QL NAA+PROBE: NEGATIVE — SIGNIFICANT CHANGE UP
SODIUM SERPL-SCNC: 140 MMOL/L — SIGNIFICANT CHANGE UP (ref 135–145)
WBC # BLD: 13.07 K/UL — HIGH (ref 3.8–10.5)
WBC # FLD AUTO: 13.07 K/UL — HIGH (ref 3.8–10.5)

## 2022-10-31 PROCEDURE — 73630 X-RAY EXAM OF FOOT: CPT | Mod: 26,LT

## 2022-10-31 PROCEDURE — 99223 1ST HOSP IP/OBS HIGH 75: CPT | Mod: GC

## 2022-10-31 PROCEDURE — 99285 EMERGENCY DEPT VISIT HI MDM: CPT | Mod: CS,25

## 2022-10-31 PROCEDURE — 93010 ELECTROCARDIOGRAM REPORT: CPT

## 2022-10-31 RX ORDER — OXYCODONE AND ACETAMINOPHEN 5; 325 MG/1; MG/1
2 TABLET ORAL EVERY 4 HOURS
Refills: 0 | Status: DISCONTINUED | OUTPATIENT
Start: 2022-10-31 | End: 2022-11-07

## 2022-10-31 RX ORDER — GABAPENTIN 400 MG/1
100 CAPSULE ORAL EVERY 24 HOURS
Refills: 0 | Status: DISCONTINUED | OUTPATIENT
Start: 2022-11-01 | End: 2022-11-11

## 2022-10-31 RX ORDER — SODIUM CHLORIDE 9 MG/ML
1000 INJECTION INTRAMUSCULAR; INTRAVENOUS; SUBCUTANEOUS ONCE
Refills: 0 | Status: COMPLETED | OUTPATIENT
Start: 2022-10-31 | End: 2022-10-31

## 2022-10-31 RX ORDER — ACETAMINOPHEN 500 MG
650 TABLET ORAL EVERY 6 HOURS
Refills: 0 | Status: DISCONTINUED | OUTPATIENT
Start: 2022-10-31 | End: 2022-11-11

## 2022-10-31 RX ORDER — GABAPENTIN 400 MG/1
800 CAPSULE ORAL EVERY 24 HOURS
Refills: 0 | Status: DISCONTINUED | OUTPATIENT
Start: 2022-10-31 | End: 2022-11-11

## 2022-10-31 RX ORDER — HYDROMORPHONE HYDROCHLORIDE 2 MG/ML
1 INJECTION INTRAMUSCULAR; INTRAVENOUS; SUBCUTANEOUS ONCE
Refills: 0 | Status: DISCONTINUED | OUTPATIENT
Start: 2022-10-31 | End: 2022-11-01

## 2022-10-31 RX ORDER — ACETAMINOPHEN 500 MG
1000 TABLET ORAL ONCE
Refills: 0 | Status: COMPLETED | OUTPATIENT
Start: 2022-10-31 | End: 2022-10-31

## 2022-10-31 RX ORDER — HYDROMORPHONE HYDROCHLORIDE 2 MG/ML
0.5 INJECTION INTRAMUSCULAR; INTRAVENOUS; SUBCUTANEOUS ONCE
Refills: 0 | Status: DISCONTINUED | OUTPATIENT
Start: 2022-10-31 | End: 2022-10-31

## 2022-10-31 RX ORDER — VANCOMYCIN HCL 1 G
1000 VIAL (EA) INTRAVENOUS ONCE
Refills: 0 | Status: COMPLETED | OUTPATIENT
Start: 2022-10-31 | End: 2022-10-31

## 2022-10-31 RX ORDER — ENOXAPARIN SODIUM 100 MG/ML
40 INJECTION SUBCUTANEOUS EVERY 12 HOURS
Refills: 0 | Status: DISCONTINUED | OUTPATIENT
Start: 2022-10-31 | End: 2022-11-11

## 2022-10-31 RX ADMIN — SODIUM CHLORIDE 1000 MILLILITER(S): 9 INJECTION INTRAMUSCULAR; INTRAVENOUS; SUBCUTANEOUS at 19:30

## 2022-10-31 RX ADMIN — HYDROMORPHONE HYDROCHLORIDE 0.5 MILLIGRAM(S): 2 INJECTION INTRAMUSCULAR; INTRAVENOUS; SUBCUTANEOUS at 20:42

## 2022-10-31 RX ADMIN — Medication 250 MILLIGRAM(S): at 22:16

## 2022-10-31 RX ADMIN — Medication 1000 MILLIGRAM(S): at 20:10

## 2022-10-31 NOTE — H&P ADULT - PROBLEM SELECTOR PLAN 5
F: s/p 1L NS  E: Replete as necessary K>4 Mg>2  N: DASH diet     DVT Prophylaxis: Lovenox 40mg BID  GI prophylaxis: None   CODE STATUS: FULL

## 2022-10-31 NOTE — H&P ADULT - NSICDXPASTSURGICALHX_GEN_ALL_CORE_FT
PAST SURGICAL HISTORY:  Elective surgery transcatheter therapy vascular embolization x5    Morbid obesity     S/P debridement LLE area overlying AVM

## 2022-10-31 NOTE — ED ADULT NURSE NOTE - NSIMPLEMENTINTERV_GEN_ALL_ED
Implemented All Universal Safety Interventions:  [place] to call system. Call bell, personal items and telephone within reach. Instruct patient to call for assistance. Room bathroom lighting operational. Non-slip footwear when patient is off stretcher. Physically safe environment: no spills, clutter or unnecessary equipment. Stretcher in lowest position, wheels locked, appropriate side rails in place.

## 2022-10-31 NOTE — ED ADULT TRIAGE NOTE - CHIEF COMPLAINT QUOTE
Pt has infected wound to left foot, admitted two weeks ago for IV antibiotics, reports increasing pain and fevers over past couple of days. Ambulating with steady gait.

## 2022-10-31 NOTE — H&P ADULT - HISTORY OF PRESENT ILLNESS
37F w/ PMHx of morbid obesity, HTN, L foot AVM s/p multiple DSE, recently hospitalized (10/10/22-10/17/22) for LLL angio/ulcer debridement and L lateral ankle avm glue embolization (10/11/22) with Dr. Savage. s/p vanc/cefepime per ID (stopped 10/17/22),  deep surgical wound cultures grew pseudomonas aeruginosa, staph haemolyticus, and campylobacter striatum and jeikeium. Reportedly referred in by dr savage for admission. Presented for worsening left foot pain and erythema around non-healing ulcer. Progressive L lateral ankle erythema/swelling/pain and daily fever at home with t max of 101. Patient denies h/n/v/d, chills, cp, palpitations, sob, abd pain, leg swelling, rashes, dysuria, and changes in BM.     Vitals: T 100.2, , /102, RR 18, 100%RA  Labs: WBC 13.07, , K 3.4, Bicarb 21, CRP 18.2, ESR 42, COVID neg.   Foot Xray: preliminary read no signs of OM or NF  Interventions: 1L NS, vanc 1g IVP, Dilaudid 0.5mg IVP, Tylenol.

## 2022-10-31 NOTE — H&P ADULT - NSHPLABSRESULTS_GEN_ALL_CORE
LABS:                         12.9   13.07 )-----------( 458      ( 31 Oct 2022 18:25 )             38.3     10-31    140  |  104  |  13  ----------------------------<  108<H>  3.4<L>   |  21<L>  |  0.85    Ca    9.8      31 Oct 2022 18:25    TPro  8.4<H>  /  Alb  4.6  /  TBili  0.9  /  DBili  x   /  AST  10  /  ALT  8<L>  /  AlkPhos  65  10-31    PT/INR - ( 31 Oct 2022 18:25 )   PT: 13.3 sec;   INR: 1.12       PTT - ( 31 Oct 2022 18:25 )  PTT:32.8 sec    Lactate, Blood: 1.3 mmol/L (10-31 @ 18:25)

## 2022-10-31 NOTE — ED PROVIDER NOTE - WR INTERPRETATION 1
radiopaque densities overlying L lateral ankle soft tissue c/w prior procedures. no acute fx/dislocation.

## 2022-10-31 NOTE — ED PROVIDER NOTE - OBJECTIVE STATEMENT
37F morbid obesity, htn, L foot avm s/p multiple DSE, recently hospitalized (10/10/22-10/17/22) for LLL angio/ulcer debridement and L lateral ankle avm glue embolization (10/11/22) s/p vanc/cefepime per ID (stopped 10/17/22), reportedly referred in by dr savage for admission. pt c/o 2-3d progressive L lateral ankle erythema/swelling/pain and daily fever (tmax 101). no trauma.     interventional: janette  vasc: tyson

## 2022-10-31 NOTE — H&P ADULT - PROBLEM SELECTOR PLAN 2
Pt with Left Lateral foot ulcer with purulence and fever at home Tmax 101. Recently hospitalized (10/10/22-10/17/22) for LLL angio/ulcer debridement and L lateral ankle AVM glue embolization (10/11/22) with Dr. Teran. s/p vanc/cefepime per ID (stopped 10/17/22), deep surgical wound cultures grew pseudomonas aeruginosa, staph haemolyticus, and campylobacter striatum and jeikeium. Foot Xray: preliminary read no signs of OM or NF, elevated ESR/CRP.  - s/p vanc in the ED  - Start on Vanc and cefepime   - Consult ID in the AM  - Contact Dr. Teran vs vascular in the AM  - Pain management: Tylenol prn for mild pain and Dilaudid 1mg IV q2 for sever prn   - Dressing changes per last wound care recs  - F/u MRI of the foot for r/o OM Pt with Left Lateral foot ulcer with purulence and fever at home Tmax 101. Recently hospitalized (10/10/22-10/17/22) for LLL angio/ulcer debridement and L lateral ankle AVM glue embolization (10/11/22) with Dr. Teran. s/p vanc/cefepime per ID (stopped 10/17/22), deep surgical wound cultures grew pseudomonas aeruginosa, staph haemolyticus, and campylobacter striatum and jeikeium. Foot Xray: preliminary read no signs of OM or NF, elevated ESR/CRP.  - s/p vanc in the ED  - Start on Vanc and cefepime   - Consult ID in the AM  - Contact Dr. Teran vs vascular in the AM  - Pain management: Tylenol prn for mild pain and Dilaudid 1mg IV q2 for sever prn   - Dressing changes per last wound care recs  - F/u MRI of the foot for r/o OM  - vascular consulted for possible debridement

## 2022-10-31 NOTE — ED ADULT NURSE NOTE - NSFALLRSKASSESSDT_ED_ALL_ED
Discontinue Regimen: Clindamyacin Initiate Treatment: Soolantra Detail Level: Zone Plan: Sunscreen daily 31-Oct-2022 20:01

## 2022-10-31 NOTE — H&P ADULT - NSHPPHYSICALEXAM_GEN_ALL_CORE
Constitutional: NAD, comfortable in bed.  HEENT: NC/AT, PERRLA, EOMI, no conjunctival pallor or scleral icterus, MMM  Neck: Supple, no JVD  Respiratory: CTA B/L. No w/r/r.   Cardiovascular: RRR, normal S1 and S2, no m/r/g.   Gastrointestinal: +BS, soft NTND, no guarding or rebound tenderness, no palpable masses   Extremities: wwp; no cyanosis, clubbing or edema.   Vascular: Pulses equal and strong throughout.   Neurological: AAOx3, no CN deficits, strength and sensation intact throughout.   Skin: No gross skin abnormalities or rashes Constitutional: Obese woman, NAD, comfortable in bed.  HEENT: NC/AT, PERRLA, EOMI, no conjunctival pallor or scleral icterus, MMM  Neck: Supple, no JVD  Respiratory: CTA B/L. No w/r/r.   Cardiovascular: RRR, normal S1 and S2, no m/r/g.   Gastrointestinal: Obese abdomen +BS, soft NTND, no guarding or rebound tenderness, no palpable masses   Extremities: wwp; no cyanosis, clubbing or edema.   Vascular: Pulses equal and strong throughout.   Neurological: AAOx3, no CN deficits, strength and sensation intact throughout.. well healing 1x1cm ulcer with green/yellow mild discharge with surrounding erythema 5x5 cm   Skin: No gross skin abnormalities or rashes

## 2022-10-31 NOTE — ED PROVIDER NOTE - PHYSICAL EXAMINATION
CONST: obese nontoxic NAD speaking in full sentences  HEAD: atraumatic  EYES: conjunctivae clear, PERRL, EOMI  ENT: mmm  NECK: supple/FROM  CARD: tachy regular no murmurs  CHEST: ctab no r/r/w  ABD: soft, nd, nttp, no rebound/guarding  EXT: compartments soft, FROM, symmetric distal pulses intact  SKIN: warm, dry, +nonhealing ulcer overlying L lateral ankle w/ assoc 3cm area of erythema/swelling/ttp, no crepitus/fluctuance, cap refill <2sec  NEURO: a+ox3, 5/5 strength x4, gross sensation intact x4, baseline gait

## 2022-10-31 NOTE — H&P ADULT - PROBLEM SELECTOR PLAN 3
Pt with multiple Direct stick embolizations, and most recent is s/p angiogram and DSE 10/11/22 with Dr. Teran  - c/w home medications: gabapentin 100mg AM and 800mg at noon and Percocet

## 2022-10-31 NOTE — ED PROVIDER NOTE - CLINICAL SUMMARY MEDICAL DECISION MAKING FREE TEXT BOX
found to have sepsis likely 2/2 recurrent L ankle nonhealing wound. s/p abx. bcx pending. xray w/o acute fx/dislocation. multiple failed attempts to get a hold of dr savage. vasc/cards also contacted. reportedly unaware of pt admission. ED , dr taylor, contacted. okay to admit to medicine for iv abx given does not require emergent vasc intervention. admitting team may reattempt to contact dr savage in AM.

## 2022-10-31 NOTE — H&P ADULT - REASON FOR ADMISSION
Non-healing ulcer Tranexamic Acid Counseling:  Patient advised of the small risk of bleeding problems with tranexamic acid. They were also instructed to call if they developed any nausea, vomiting or diarrhea. All of the patient's questions and concerns were addressed.

## 2022-10-31 NOTE — ED PROVIDER NOTE - PROGRESS NOTE DETAILS
multiple failed attempts to get a hold of dr savage. vas/cards also contacted. reportedly unaware of pt admission. ED , dr taylor, contacted. okay to admit to medicine for iv abx given does not require multiple failed attempts to get a hold of dr savage. vasc/cards also contacted. reportedly unaware of pt admission. ED , dr taylor, contacted. okay to admit to medicine for iv abx given does not require emergent vasc intervention. admitting team may reattempt to contact dr savage in AM.

## 2022-10-31 NOTE — H&P ADULT - PROBLEM SELECTOR PLAN 1
Presented with SIRS 2/4 tachycardia 102, leukocytosis 13 and home T max 101 (100.2 in the ED). Source m/p chronic foot ulceration   - f/u BCx  - Contact Dr. Teran vs vascular in the AM for wound cultures  - Xray w/o signs of OM, but due to elevated ESR/CRP will obtain MRI  - s/p vanc in the ED  - Start on vanc and cefepime (prior pseudomonas)  - ID consult Presented with SIRS 2/4 tachycardia 102, leukocytosis 13 and home T max 101 (100.2 in the ED). Source m/p chronic foot ulceration   - f/u BCx  - Contact Dr. Teran vs vascular in the AM for wound cultures  - Xray w/o signs of OM, but due to elevated ESR/CRP will obtain MRI  - s/p vanc in the ED  - Start on vanc and cefepime (prior pseudomonas)  - ID consult in the AM

## 2022-10-31 NOTE — H&P ADULT - ASSESSMENT
37F w/ PMHx of morbid obesity, HTN, L foot AVM s/p multiple DSE, recently hospitalized (10/10/22-10/17/22) for LLL ulcer debridement with Dr. Teran. Presented for worsening left foot pain and erythema around non-healing ulcer.        37F w/ PMHx of morbid obesity, HTN, L foot AVM s/p multiple DSE, recently hospitalized (10/10/22-10/17/22) for LLL ulcer debridement with Dr. Teran. Presented for worsening left foot pain and erythema around non-healing ulcer.

## 2022-10-31 NOTE — H&P ADULT - ATTENDING COMMENTS
Patient spouse is requesting a call back from Karolyn.     #Sepsis: +leukocytosis, . 2/2 infected LLE w/ erthema/purulence, no crepitus. Xray w/o gas or signs of OM. F/up esr/CRP. Vascular consult, ID consult in AM. c/w vanc/cefepime, f/up bcx      #L foot AVM: w/ chronic LLE ulcer, rct infection. WCx grew PsA, staph and corynebacterium. s/p angiogram/embolization of left foot AVM. Pulses present b/l. c/w management above. F/up janette betts #Sepsis: +leukocytosis, . 2/2 infected LLE w/ erthema/purulence, no crepitus. Xray w/o gas or signs of OM. F/up esr/CRP. Vascular consult for possible debridement, ID consult in AM. c/w vanc/cefepime, f/up bcx    #L foot AVM: w/ chronic LLE ulcer, rct infection. WCx grew PsA, staph and corynebacterium. s/p angiogram/embolization of left foot AVM. Pulses present b/l. c/w management above. F/up janette betts

## 2022-11-01 LAB
ALBUMIN SERPL ELPH-MCNC: 4 G/DL — SIGNIFICANT CHANGE UP (ref 3.3–5)
ALP SERPL-CCNC: 53 U/L — SIGNIFICANT CHANGE UP (ref 40–120)
ALT FLD-CCNC: 7 U/L — LOW (ref 10–45)
ANION GAP SERPL CALC-SCNC: 14 MMOL/L — SIGNIFICANT CHANGE UP (ref 5–17)
AST SERPL-CCNC: 14 U/L — SIGNIFICANT CHANGE UP (ref 10–40)
BASOPHILS # BLD AUTO: 0.05 K/UL — SIGNIFICANT CHANGE UP (ref 0–0.2)
BASOPHILS # BLD AUTO: 0.06 K/UL — SIGNIFICANT CHANGE UP (ref 0–0.2)
BASOPHILS NFR BLD AUTO: 0.4 % — SIGNIFICANT CHANGE UP (ref 0–2)
BASOPHILS NFR BLD AUTO: 0.6 % — SIGNIFICANT CHANGE UP (ref 0–2)
BILIRUB SERPL-MCNC: 0.8 MG/DL — SIGNIFICANT CHANGE UP (ref 0.2–1.2)
BUN SERPL-MCNC: 13 MG/DL — SIGNIFICANT CHANGE UP (ref 7–23)
CALCIUM SERPL-MCNC: 8.7 MG/DL — SIGNIFICANT CHANGE UP (ref 8.4–10.5)
CHLORIDE SERPL-SCNC: 107 MMOL/L — SIGNIFICANT CHANGE UP (ref 96–108)
CO2 SERPL-SCNC: 18 MMOL/L — LOW (ref 22–31)
CREAT SERPL-MCNC: 0.68 MG/DL — SIGNIFICANT CHANGE UP (ref 0.5–1.3)
EGFR: 114 ML/MIN/1.73M2 — SIGNIFICANT CHANGE UP
EOSINOPHIL # BLD AUTO: 0.01 K/UL — SIGNIFICANT CHANGE UP (ref 0–0.5)
EOSINOPHIL # BLD AUTO: 0.06 K/UL — SIGNIFICANT CHANGE UP (ref 0–0.5)
EOSINOPHIL NFR BLD AUTO: 0.1 % — SIGNIFICANT CHANGE UP (ref 0–6)
EOSINOPHIL NFR BLD AUTO: 0.6 % — SIGNIFICANT CHANGE UP (ref 0–6)
GLUCOSE SERPL-MCNC: 102 MG/DL — HIGH (ref 70–99)
GRAM STN FLD: SIGNIFICANT CHANGE UP
HCT VFR BLD CALC: 35.4 % — SIGNIFICANT CHANGE UP (ref 34.5–45)
HGB BLD-MCNC: 11.5 G/DL — SIGNIFICANT CHANGE UP (ref 11.5–15.5)
IMM GRANULOCYTES NFR BLD AUTO: 0.2 % — SIGNIFICANT CHANGE UP (ref 0–0.9)
IMM GRANULOCYTES NFR BLD AUTO: 0.3 % — SIGNIFICANT CHANGE UP (ref 0–0.9)
LYMPHOCYTES # BLD AUTO: 15.6 % — SIGNIFICANT CHANGE UP (ref 13–44)
LYMPHOCYTES # BLD AUTO: 2.02 K/UL — SIGNIFICANT CHANGE UP (ref 1–3.3)
LYMPHOCYTES # BLD AUTO: 2.2 K/UL — SIGNIFICANT CHANGE UP (ref 1–3.3)
LYMPHOCYTES # BLD AUTO: 23 % — SIGNIFICANT CHANGE UP (ref 13–44)
MAGNESIUM SERPL-MCNC: 1.8 MG/DL — SIGNIFICANT CHANGE UP (ref 1.6–2.6)
MCHC RBC-ENTMCNC: 29 PG — SIGNIFICANT CHANGE UP (ref 27–34)
MCHC RBC-ENTMCNC: 32.5 GM/DL — SIGNIFICANT CHANGE UP (ref 32–36)
MCV RBC AUTO: 89.2 FL — SIGNIFICANT CHANGE UP (ref 80–100)
MONOCYTES # BLD AUTO: 0.71 K/UL — SIGNIFICANT CHANGE UP (ref 0–0.9)
MONOCYTES # BLD AUTO: 0.84 K/UL — SIGNIFICANT CHANGE UP (ref 0–0.9)
MONOCYTES NFR BLD AUTO: 5.5 % — SIGNIFICANT CHANGE UP (ref 2–14)
MONOCYTES NFR BLD AUTO: 8.8 % — SIGNIFICANT CHANGE UP (ref 2–14)
NEUTROPHILS # BLD AUTO: 10.17 K/UL — HIGH (ref 1.8–7.4)
NEUTROPHILS # BLD AUTO: 6.39 K/UL — SIGNIFICANT CHANGE UP (ref 1.8–7.4)
NEUTROPHILS NFR BLD AUTO: 66.7 % — SIGNIFICANT CHANGE UP (ref 43–77)
NEUTROPHILS NFR BLD AUTO: 78.2 % — HIGH (ref 43–77)
NRBC # BLD: 0 /100 WBCS — SIGNIFICANT CHANGE UP (ref 0–0)
PHOSPHATE SERPL-MCNC: 3 MG/DL — SIGNIFICANT CHANGE UP (ref 2.5–4.5)
PLATELET # BLD AUTO: 385 K/UL — SIGNIFICANT CHANGE UP (ref 150–400)
POTASSIUM SERPL-MCNC: 3.7 MMOL/L — SIGNIFICANT CHANGE UP (ref 3.5–5.3)
POTASSIUM SERPL-SCNC: 3.7 MMOL/L — SIGNIFICANT CHANGE UP (ref 3.5–5.3)
PROT SERPL-MCNC: 7 G/DL — SIGNIFICANT CHANGE UP (ref 6–8.3)
RBC # BLD: 3.97 M/UL — SIGNIFICANT CHANGE UP (ref 3.8–5.2)
RBC # FLD: 12.7 % — SIGNIFICANT CHANGE UP (ref 10.3–14.5)
SODIUM SERPL-SCNC: 139 MMOL/L — SIGNIFICANT CHANGE UP (ref 135–145)
SPECIMEN SOURCE: SIGNIFICANT CHANGE UP
VANCOMYCIN TROUGH SERPL-MCNC: 15.1 UG/ML — SIGNIFICANT CHANGE UP (ref 10–20)
WBC # BLD: 9.58 K/UL — SIGNIFICANT CHANGE UP (ref 3.8–10.5)
WBC # FLD AUTO: 9.58 K/UL — SIGNIFICANT CHANGE UP (ref 3.8–10.5)

## 2022-11-01 PROCEDURE — 99233 SBSQ HOSP IP/OBS HIGH 50: CPT | Mod: GC

## 2022-11-01 PROCEDURE — 99222 1ST HOSP IP/OBS MODERATE 55: CPT

## 2022-11-01 RX ORDER — POTASSIUM CHLORIDE 20 MEQ
20 PACKET (EA) ORAL ONCE
Refills: 0 | Status: COMPLETED | OUTPATIENT
Start: 2022-11-01 | End: 2022-11-01

## 2022-11-01 RX ORDER — INFLUENZA VIRUS VACCINE 15; 15; 15; 15 UG/.5ML; UG/.5ML; UG/.5ML; UG/.5ML
0.5 SUSPENSION INTRAMUSCULAR ONCE
Refills: 0 | Status: DISCONTINUED | OUTPATIENT
Start: 2022-11-01 | End: 2022-11-11

## 2022-11-01 RX ORDER — METOPROLOL TARTRATE 50 MG
50 TABLET ORAL EVERY 12 HOURS
Refills: 0 | Status: DISCONTINUED | OUTPATIENT
Start: 2022-11-01 | End: 2022-11-01

## 2022-11-01 RX ORDER — CEFEPIME 1 G/1
INJECTION, POWDER, FOR SOLUTION INTRAMUSCULAR; INTRAVENOUS
Refills: 0 | Status: DISCONTINUED | OUTPATIENT
Start: 2022-11-01 | End: 2022-11-01

## 2022-11-01 RX ORDER — HYDROMORPHONE HYDROCHLORIDE 2 MG/ML
0.5 INJECTION INTRAMUSCULAR; INTRAVENOUS; SUBCUTANEOUS EVERY 4 HOURS
Refills: 0 | Status: DISCONTINUED | OUTPATIENT
Start: 2022-11-01 | End: 2022-11-01

## 2022-11-01 RX ORDER — NEBIVOLOL HYDROCHLORIDE 5 MG/1
10 TABLET ORAL
Refills: 0 | Status: DISCONTINUED | OUTPATIENT
Start: 2022-11-01 | End: 2022-11-11

## 2022-11-01 RX ORDER — POLYETHYLENE GLYCOL 3350 17 G/17G
17 POWDER, FOR SOLUTION ORAL EVERY 24 HOURS
Refills: 0 | Status: DISCONTINUED | OUTPATIENT
Start: 2022-11-01 | End: 2022-11-11

## 2022-11-01 RX ORDER — HYDROMORPHONE HYDROCHLORIDE 2 MG/ML
1 INJECTION INTRAMUSCULAR; INTRAVENOUS; SUBCUTANEOUS
Refills: 0 | Status: DISCONTINUED | OUTPATIENT
Start: 2022-11-01 | End: 2022-11-01

## 2022-11-01 RX ORDER — SENNA PLUS 8.6 MG/1
2 TABLET ORAL AT BEDTIME
Refills: 0 | Status: DISCONTINUED | OUTPATIENT
Start: 2022-11-01 | End: 2022-11-11

## 2022-11-01 RX ORDER — MAGNESIUM SULFATE 500 MG/ML
2 VIAL (ML) INJECTION ONCE
Refills: 0 | Status: COMPLETED | OUTPATIENT
Start: 2022-11-01 | End: 2022-11-01

## 2022-11-01 RX ORDER — CEFEPIME 1 G/1
2000 INJECTION, POWDER, FOR SOLUTION INTRAMUSCULAR; INTRAVENOUS EVERY 8 HOURS
Refills: 0 | Status: COMPLETED | OUTPATIENT
Start: 2022-11-01 | End: 2022-11-01

## 2022-11-01 RX ORDER — VANCOMYCIN HCL 1 G
2000 VIAL (EA) INTRAVENOUS EVERY 12 HOURS
Refills: 0 | Status: DISCONTINUED | OUTPATIENT
Start: 2022-11-01 | End: 2022-11-01

## 2022-11-01 RX ORDER — HYDROMORPHONE HYDROCHLORIDE 2 MG/ML
0.5 INJECTION INTRAMUSCULAR; INTRAVENOUS; SUBCUTANEOUS
Refills: 0 | Status: DISCONTINUED | OUTPATIENT
Start: 2022-11-01 | End: 2022-11-01

## 2022-11-01 RX ORDER — CEFEPIME 1 G/1
2000 INJECTION, POWDER, FOR SOLUTION INTRAMUSCULAR; INTRAVENOUS EVERY 8 HOURS
Refills: 0 | Status: DISCONTINUED | OUTPATIENT
Start: 2022-11-01 | End: 2022-11-07

## 2022-11-01 RX ORDER — VANCOMYCIN HCL 1 G
1250 VIAL (EA) INTRAVENOUS EVERY 8 HOURS
Refills: 0 | Status: COMPLETED | OUTPATIENT
Start: 2022-11-01 | End: 2022-11-01

## 2022-11-01 RX ORDER — VANCOMYCIN HCL 1 G
1250 VIAL (EA) INTRAVENOUS EVERY 8 HOURS
Refills: 0 | Status: COMPLETED | OUTPATIENT
Start: 2022-11-01 | End: 2022-11-02

## 2022-11-01 RX ORDER — HYDROMORPHONE HYDROCHLORIDE 2 MG/ML
0.5 INJECTION INTRAMUSCULAR; INTRAVENOUS; SUBCUTANEOUS EVERY 4 HOURS
Refills: 0 | Status: DISCONTINUED | OUTPATIENT
Start: 2022-11-01 | End: 2022-11-07

## 2022-11-01 RX ADMIN — OXYCODONE AND ACETAMINOPHEN 2 TABLET(S): 5; 325 TABLET ORAL at 18:45

## 2022-11-01 RX ADMIN — OXYCODONE AND ACETAMINOPHEN 2 TABLET(S): 5; 325 TABLET ORAL at 14:22

## 2022-11-01 RX ADMIN — HYDROMORPHONE HYDROCHLORIDE 1 MILLIGRAM(S): 2 INJECTION INTRAMUSCULAR; INTRAVENOUS; SUBCUTANEOUS at 06:27

## 2022-11-01 RX ADMIN — HYDROMORPHONE HYDROCHLORIDE 1 MILLIGRAM(S): 2 INJECTION INTRAMUSCULAR; INTRAVENOUS; SUBCUTANEOUS at 00:15

## 2022-11-01 RX ADMIN — GABAPENTIN 100 MILLIGRAM(S): 400 CAPSULE ORAL at 08:35

## 2022-11-01 RX ADMIN — Medication 50 MILLIGRAM(S): at 07:28

## 2022-11-01 RX ADMIN — HYDROMORPHONE HYDROCHLORIDE 0.5 MILLIGRAM(S): 2 INJECTION INTRAMUSCULAR; INTRAVENOUS; SUBCUTANEOUS at 11:09

## 2022-11-01 RX ADMIN — HYDROMORPHONE HYDROCHLORIDE 1 MILLIGRAM(S): 2 INJECTION INTRAMUSCULAR; INTRAVENOUS; SUBCUTANEOUS at 08:46

## 2022-11-01 RX ADMIN — Medication 20 MILLIEQUIVALENT(S): at 09:27

## 2022-11-01 RX ADMIN — GABAPENTIN 800 MILLIGRAM(S): 400 CAPSULE ORAL at 15:23

## 2022-11-01 RX ADMIN — HYDROMORPHONE HYDROCHLORIDE 0.5 MILLIGRAM(S): 2 INJECTION INTRAMUSCULAR; INTRAVENOUS; SUBCUTANEOUS at 11:25

## 2022-11-01 RX ADMIN — HYDROMORPHONE HYDROCHLORIDE 1 MILLIGRAM(S): 2 INJECTION INTRAMUSCULAR; INTRAVENOUS; SUBCUTANEOUS at 07:00

## 2022-11-01 RX ADMIN — Medication 166.67 MILLIGRAM(S): at 06:31

## 2022-11-01 RX ADMIN — Medication 25 GRAM(S): at 08:36

## 2022-11-01 RX ADMIN — HYDROMORPHONE HYDROCHLORIDE 1 MILLIGRAM(S): 2 INJECTION INTRAMUSCULAR; INTRAVENOUS; SUBCUTANEOUS at 03:27

## 2022-11-01 RX ADMIN — ENOXAPARIN SODIUM 40 MILLIGRAM(S): 100 INJECTION SUBCUTANEOUS at 19:03

## 2022-11-01 RX ADMIN — ENOXAPARIN SODIUM 40 MILLIGRAM(S): 100 INJECTION SUBCUTANEOUS at 07:28

## 2022-11-01 RX ADMIN — HYDROMORPHONE HYDROCHLORIDE 1 MILLIGRAM(S): 2 INJECTION INTRAMUSCULAR; INTRAVENOUS; SUBCUTANEOUS at 00:34

## 2022-11-01 RX ADMIN — CEFEPIME 100 MILLIGRAM(S): 1 INJECTION, POWDER, FOR SOLUTION INTRAMUSCULAR; INTRAVENOUS at 19:03

## 2022-11-01 RX ADMIN — CEFEPIME 100 MILLIGRAM(S): 1 INJECTION, POWDER, FOR SOLUTION INTRAMUSCULAR; INTRAVENOUS at 03:28

## 2022-11-01 RX ADMIN — HYDROMORPHONE HYDROCHLORIDE 1 MILLIGRAM(S): 2 INJECTION INTRAMUSCULAR; INTRAVENOUS; SUBCUTANEOUS at 09:00

## 2022-11-01 RX ADMIN — OXYCODONE AND ACETAMINOPHEN 2 TABLET(S): 5; 325 TABLET ORAL at 18:27

## 2022-11-01 RX ADMIN — HYDROMORPHONE HYDROCHLORIDE 0.5 MILLIGRAM(S): 2 INJECTION INTRAMUSCULAR; INTRAVENOUS; SUBCUTANEOUS at 21:00

## 2022-11-01 RX ADMIN — OXYCODONE AND ACETAMINOPHEN 2 TABLET(S): 5; 325 TABLET ORAL at 15:25

## 2022-11-01 RX ADMIN — HYDROMORPHONE HYDROCHLORIDE 1 MILLIGRAM(S): 2 INJECTION INTRAMUSCULAR; INTRAVENOUS; SUBCUTANEOUS at 04:00

## 2022-11-01 RX ADMIN — Medication 166.67 MILLIGRAM(S): at 14:00

## 2022-11-01 RX ADMIN — NEBIVOLOL HYDROCHLORIDE 10 MILLIGRAM(S): 5 TABLET ORAL at 19:26

## 2022-11-01 RX ADMIN — HYDROMORPHONE HYDROCHLORIDE 0.5 MILLIGRAM(S): 2 INJECTION INTRAMUSCULAR; INTRAVENOUS; SUBCUTANEOUS at 22:00

## 2022-11-01 RX ADMIN — HYDROMORPHONE HYDROCHLORIDE 0.5 MILLIGRAM(S): 2 INJECTION INTRAMUSCULAR; INTRAVENOUS; SUBCUTANEOUS at 16:40

## 2022-11-01 RX ADMIN — CEFEPIME 100 MILLIGRAM(S): 1 INJECTION, POWDER, FOR SOLUTION INTRAMUSCULAR; INTRAVENOUS at 11:10

## 2022-11-01 RX ADMIN — HYDROMORPHONE HYDROCHLORIDE 0.5 MILLIGRAM(S): 2 INJECTION INTRAMUSCULAR; INTRAVENOUS; SUBCUTANEOUS at 16:25

## 2022-11-01 NOTE — PROGRESS NOTE ADULT - SUBJECTIVE AND OBJECTIVE BOX
INFECTIOUS DISEASES INITIAL CONSULT NOTE    HPI:  37F w/ PMHx of morbid obesity, HTN, L foot AVM s/p multiple DSE, recently hospitalized (10/10/22-10/17/22) for LLL angio/ulcer debridement and L lateral ankle avm glue embolization (10/11/22) with Dr. Savage. s/p vanc/cefepime per ID (stopped 10/17/22),  deep surgical wound cultures grew pseudomonas aeruginosa, staph haemolyticus, and campylobacter striatum and jeikeium. Reportedly referred in by dr savage for admission. Presented for worsening left foot pain and erythema around non-healing ulcer. Progressive L lateral ankle erythema/swelling/pain and daily fever at home with t max of 101. Patient denies h/n/v/d, chills, cp, palpitations, sob, abd pain, leg swelling, rashes, dysuria, and changes in BM.     Vitals: T 100.2, , /102, RR 18, 100%RA  Labs: WBC 13.07, , K 3.4, Bicarb 21, CRP 18.2, ESR 42, COVID neg.   Foot Xray: preliminary read no signs of OM or NF  Interventions: 1L NS, vanc 1g IVP, Dilaudid 0.5mg IVP, Tylenol. (31 Oct 2022 23:34)      ID INTERVAL HPI:    PAST MEDICAL & SURGICAL HISTORY:  HTN (hypertension)      AVM (arteriovenous malformation)  of left foot      Foot ulceration  left foot      S/P debridement  LLE area overlying AVM      Elective surgery  transcatheter therapy vascular embolization x5      Morbid obesity          Review of Systems:   Constitutional, eyes, ENT, cardiovascular, respiratory, gastrointestinal, genitourinary, integumentary, neurological, psychiatric and heme/lymph are otherwise negative other than noted above       ANTIBIOTICS:  MEDICATIONS  (STANDING):  cefepime   IVPB 2000 milliGRAM(s) IV Intermittent every 8 hours  enoxaparin Injectable 40 milliGRAM(s) SubCutaneous every 12 hours  gabapentin 100 milliGRAM(s) Oral every 24 hours  gabapentin 800 milliGRAM(s) Oral every 24 hours  influenza   Vaccine 0.5 milliLiter(s) IntraMuscular once  metoprolol tartrate 50 milliGRAM(s) Oral every 12 hours  senna 2 Tablet(s) Oral at bedtime  vancomycin  IVPB 1250 milliGRAM(s) IV Intermittent every 8 hours    MEDICATIONS  (PRN):  acetaminophen     Tablet .. 650 milliGRAM(s) Oral every 6 hours PRN Temp greater or equal to 38C (100.4F), Mild Pain (1 - 3)  oxycodone    5 mG/acetaminophen 325 mG 2 Tablet(s) Oral every 4 hours PRN Severe Pain (7 - 10)  polyethylene glycol 3350 17 Gram(s) Oral every 24 hours PRN Constipation      Allergies    amoxicillin (Angioedema)  ciprofloxacin (Rash)  hydrochlorothiazide (Hives)  lisinopril (Angioedema; Rash; Hives)  penicillin (Rash)    Intolerances        SOCIAL HISTORY:    FAMILY HISTORY:  Family history of congenital malformation (Sibling)  AVMs: siblings     no FH leading to current infection    Vital Signs Last 24 Hrs  T(C): 36.7 (01 Nov 2022 12:30), Max: 37.9 (31 Oct 2022 18:49)  T(F): 98.1 (01 Nov 2022 12:30), Max: 100.2 (31 Oct 2022 18:49)  HR: 86 (01 Nov 2022 12:30) (82 - 102)  BP: 154/91 (01 Nov 2022 12:30) (142/92 - 166/102)  BP(mean): --  RR: 17 (01 Nov 2022 12:30) (17 - 19)  SpO2: 98% (01 Nov 2022 12:30) (97% - 100%)    Parameters below as of 01 Nov 2022 12:30  Patient On (Oxygen Delivery Method): room air          PHYSICAL EXAM  Constitutional: alert, NAD  Eyes: the sclera and conjunctiva were normal.   ENT: the ears and nose were normal in appearance.   Neck: the appearance of the neck was normal and the neck was supple.   Pulmonary: no respiratory distress and lungs CTA bilaterally.   Heart: heart rate was normal and rhythm regular, normal S1 and S2  Vascular: no peripheral edema  Abdomen: normal bowel sounds, soft, non-tender  Neurological: no focal deficits  Psychiatric: the affect was normal      LABS:                        11.5   9.58  )-----------( 385      ( 01 Nov 2022 05:30 )             35.4     11-01    139  |  107  |  13  ----------------------------<  102<H>  3.7   |  18<L>  |  0.68    Ca    8.7      01 Nov 2022 05:30  Phos  3.0     11-01  Mg     1.8     11-01    TPro  7.0  /  Alb  4.0  /  TBili  0.8  /  DBili  x   /  AST  14  /  ALT  7<L>  /  AlkPhos  53  11-01    PT/INR - ( 31 Oct 2022 18:25 )   PT: 13.3 sec;   INR: 1.12          PTT - ( 31 Oct 2022 18:25 )  PTT:32.8 sec      MICROBIOLOGY:    Culture - Blood (collected 31 Oct 2022 07:35)  Source: .Blood Blood  Preliminary Report (01 Nov 2022 10:01):    No growth at 12 hours    Culture - Blood (collected 31 Oct 2022 07:30)  Source: .Blood Blood  Preliminary Report (01 Nov 2022 10:00):    No growth at 12 hours        RADIOLOGY & ADDITIONAL STUDIES: Reviewed.   INFECTIOUS DISEASES INITIAL CONSULT NOTE    HPI:  37F w/ PMHx of morbid obesity, HTN, L foot AVM s/p multiple DSE, recently hospitalized (10/10/22-10/17/22) for LLL angio/ulcer debridement and L lateral ankle avm glue embolization (10/11/22) with Dr. Savage. s/p vanc/cefepime per ID (stopped 10/17/22),  deep surgical wound cultures grew pseudomonas aeruginosa, staph haemolyticus, and campylobacter striatum and jeikeium. Reportedly referred in by dr savage for admission. Presented for worsening left foot pain and erythema around non-healing ulcer. Progressive L lateral ankle erythema/swelling/pain and daily fever at home with t max of 101. Patient denies h/n/v/d, chills, cp, palpitations, sob, abd pain, leg swelling, rashes, dysuria, and changes in BM.     Vitals: T 100.2, , /102, RR 18, 100%RA  Labs: WBC 13.07, , K 3.4, Bicarb 21, CRP 18.2, ESR 42, COVID neg.   Foot Xray: preliminary read no signs of OM or NF  Interventions: 1L NS, vanc 1g IVP, Dilaudid 0.5mg IVP, Tylenol. (31 Oct 2022 23:34)      ID INTERVAL HPI: 37-year-old woman with PMH of HTN, MO, and AVM L foot with ulcer. ID consult for assistance with antibiotics. Had been admitted 10/10/22-10/17/22 for LLL angio/ulcer debridement and L lateral ankle avm glue embolization (10/11/22), had wound infection with WCx notable for PsA and treated with 5 day course of cefepime and vancomycin. After her discharge, she felt much improved until she began to feel feverish on 10/28/22 and took her temperature to ~99F. The next day her temperature was 100.8F with shaking chills, and on the subsequent day (10/30) had fever to 101F with chills. She presented to the ED yesterday 10/31 with temperature of 100.2F; she reports she had taken acetaminophen shortly beforehand. She notes very mild yellow/green discharge with some blood from the wound. She rates the L foot pain as 8/10 in severity. She notes a mild headache that comes on with the pain but resolves when her pain decreases. She denies CP, SOB, cough, abdominal pain, n/v/d, dysuria, hematuria.     Extended history as documented in previous ID consult note from 10/11/22:  "She began having problems a/c her L foot AVM at age 19-18 when it increased in size. She has been followed by Dr. Savage since 2016 and has undergone transcatheter and direct stick embolization, as well as wound debridement multiple times.  She develops pinhole ulcer, which prompts performance of a procedure with subsequent healing.  She had been treated with 5 d course of clindamycin following each procedure and experienced healing.  Over the summer, she developed ongoing urticaria.  Her PCP diagnosed her as having yeast overgrowth in her bowel.  She was started on fluconazole for one week per month X 6 months – has done 5 courses.  Urticaria has resolved.  In early 8/2022, developed pinhole ulceration lateral ankle.  On 8/16, she underwent direct stick embolization on 8/16, at which time she was treated with clinda perioperatively X 2 doses but not continued b/o concern regarding urticaria.  Initially, the ulcer started to close but in early Sept, began increasing in size with increasing erythema.  She began having intermittent low-grade fevers (Tm 100.8) without rigors and developed increasing drainage.  She underwent transcatheter embolization with deferral of wound debridement on 9/21.  She received one dose of vancomycin at that time.  For the first 4-5 d, she was improving with decreasing erythema and no d/c on wet-to dry dressings.  Starting on d 7 or 8, she began developing increasing erythema around the ulcer and increasing pain with recurrence of low-grade fever (Tm 100.8).  She called Dr. Savage on 10/6 – was started on clinda at that time.  Pain and erythema continued to worsen.  She continued to have fever and developed frequent loose stools on 10/9.  She was referred to the ED on 10/10."      PAST MEDICAL & SURGICAL HISTORY:  HTN (hypertension)  AVM (arteriovenous malformation)  of left foot  Foot ulceration  left foot  S/P debridement  LLE area overlying AVM  Elective surgery  transcatheter therapy vascular embolization x5  Morbid obesity    Review of Systems:   No photophobia, neck stiffness, rhinorrhea, sore throat, cough, CP, SOB, N, V, diarrhea, abd pain, dysuria, hematuria, frequency, muscle/joint symptoms, bruising/bleeding.      ANTIBIOTICS:  MEDICATIONS  (STANDING):  cefepime   IVPB 2000 milliGRAM(s) IV Intermittent every 8 hours  enoxaparin Injectable 40 milliGRAM(s) SubCutaneous every 12 hours  gabapentin 100 milliGRAM(s) Oral every 24 hours  gabapentin 800 milliGRAM(s) Oral every 24 hours  influenza   Vaccine 0.5 milliLiter(s) IntraMuscular once  metoprolol tartrate 50 milliGRAM(s) Oral every 12 hours  senna 2 Tablet(s) Oral at bedtime  vancomycin  IVPB 1250 milliGRAM(s) IV Intermittent every 8 hours    MEDICATIONS  (PRN):  acetaminophen     Tablet .. 650 milliGRAM(s) Oral every 6 hours PRN Temp greater or equal to 38C (100.4F), Mild Pain (1 - 3)  oxycodone    5 mG/acetaminophen 325 mG 2 Tablet(s) Oral every 4 hours PRN Severe Pain (7 - 10)  polyethylene glycol 3350 17 Gram(s) Oral every 24 hours PRN Constipation      Allergies  amoxicillin (Angioedema)  ciprofloxacin (Rash)  hydrochlorothiazide (Hives)  lisinopril (Angioedema; Rash; Hives)  penicillin (Rash)    Intolerances      SOCIAL HISTORY: Born in [place], lives there now with her mother and boyfriend.  Has 2 dogs, no children.  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  No tobacco, alcohol or recreational drug use.    FAMILY HISTORY:  Family history of congenital malformation (Sibling)  AVMs: siblings    Vital Signs Last 24 Hrs  T(C): 36.7 (01 Nov 2022 12:30), Max: 37.9 (31 Oct 2022 18:49)  T(F): 98.1 (01 Nov 2022 12:30), Max: 100.2 (31 Oct 2022 18:49)  HR: 86 (01 Nov 2022 12:30) (82 - 102)  BP: 154/91 (01 Nov 2022 12:30) (142/92 - 166/102)  BP(mean): --  RR: 17 (01 Nov 2022 12:30) (17 - 19)  SpO2: 98% (01 Nov 2022 12:30) (97% - 100%)    Parameters below as of 01 Nov 2022 12:30  Patient On (Oxygen Delivery Method): room air      PHYSICAL EXAM  Constitutional: Lying flat in bed, conversant, nontoxic  HEENT: NC, PERRL, sclerae anicteric, conjunctivae clear, EOMI.  Sinuses nontender, no nasal exudate.  No buccal or pharyngeal lesions, erythema or exudate  Neck: Supple, no adenopathy   Pulmonary: CTAB, no w/r/r.   Heart: RRR, S1 S2, no m/r/g  Abdomen: Symmetric, normoactive BS.  Soft, nontender, no masses, guarding or rebound.  Liver and spleen not enlarged  Extremities: No cyanosis or edema. R foot/ankle dressed, no erythema/warmth above dressing.  Neurological: Alert and oriented to person, place, and time. VINSON spontaneously.  Skin: no rashes      LABS:                        11.5   9.58  )-----------( 385      ( 01 Nov 2022 05:30 )             35.4     11-01    139  |  107  |  13  ----------------------------<  102<H>  3.7   |  18<L>  |  0.68    Ca    8.7      01 Nov 2022 05:30  Phos  3.0     11-01  Mg     1.8     11-01    TPro  7.0  /  Alb  4.0  /  TBili  0.8  /  DBili  x   /  AST  14  /  ALT  7<L>  /  AlkPhos  53  11-01    PT/INR - ( 31 Oct 2022 18:25 )   PT: 13.3 sec;   INR: 1.12          PTT - ( 31 Oct 2022 18:25 )  PTT:32.8 sec      MICROBIOLOGY:    Culture - Blood (collected 31 Oct 2022 07:35)  Source: .Blood Blood  Preliminary Report (01 Nov 2022 10:01):    No growth at 12 hours    Culture - Blood (collected 31 Oct 2022 07:30)  Source: .Blood Blood  Preliminary Report (01 Nov 2022 10:00):    No growth at 12 hours        RADIOLOGY & ADDITIONAL STUDIES: Reviewed.

## 2022-11-01 NOTE — PROGRESS NOTE ADULT - PROBLEM SELECTOR PLAN 2
Pt with Left Lateral foot ulcer with purulence and fever at home Tmax 101. Recently hospitalized (10/10/22-10/17/22) for LLL angio/ulcer debridement and L lateral ankle AVM glue embolization (10/11/22) with Dr. Teran. s/p vanc/cefepime per ID (stopped 10/17/22), deep surgical wound cultures grew pseudomonas aeruginosa, staph haemolyticus, and campylobacter striatum and jeikeium. Foot Xray: preliminary read no signs of OM or NF, elevated ESR/CRP.  - s/p vanc in the ED  - Start on Vanc and cefepime   - Consult ID in the AM  - Contact Dr. Teran vs vascular in the AM  - Pain management: Tylenol prn for mild pain and Dilaudid 1mg IV q2 for sever prn   - Dressing changes per last wound care recs  - F/u MRI of the foot for r/o OM  - vascular consulted for possible debridement Pt with Left Lateral foot ulcer with purulence and fever at home Tmax 101. Recently hospitalized (10/10/22-10/17/22) for LLL angio/ulcer debridement and L lateral ankle AVM glue embolization (10/11/22) with Dr. Teran. s/p vanc/cefepime per ID (stopped 10/17/22), deep surgical wound cultures grew pseudomonas aeruginosa, staph haemolyticus, and campylobacter striatum and jeikeium. Foot Xray: preliminary read no signs of OM or NF, elevated ESR/CRP.  - Start on Vanc and Cefepime for treatment.  - Consult ID in the AM for treatment.  - Contact Dr. Teran vs vascular in the AM for wound cultures.  - Pain management: Tylenol prn for mild pain and Dilaudid 0.5mg IV q2 prn for severe pain.  - Continue dressing changes per last wound care recs.  - F/u MRI of the foot for r/o OM. Pt with Left Lateral foot ulcer with purulence and fever at home Tmax 101. Recently hospitalized (10/10/22-10/17/22) for LLL angio/ulcer debridement and L lateral ankle AVM glue embolization (10/11/22) with Dr. Teran. s/p vanc/cefepime per ID (stopped 10/17/22), deep surgical wound cultures grew pseudomonas aeruginosa, staph haemolyticus, and campylobacter striatum and jeikeium. Foot Xray: preliminary read no signs of OM or NF, elevated ESR/CRP.  - Start on Vanc and Cefepime for treatment.  - Consult ID in the AM for treatment.  - Contact Dr. Teran vs vascular in the AM for wound cultures.  - Pain Management: Tylenol prn for mild pain and Dilaudid 0.5mg IV q2 prn for severe pain.  - Continue dressing changes per last wound care recs.  - F/U MRI of foot to r/o OM. Pt with Left Lateral foot ulcer with purulence and fever at home Tmax 101. Recently hospitalized (10/10/22-10/17/22) for LLL angio/ulcer debridement and L lateral ankle AVM glue embolization (10/11/22) with Dr. Teran. s/p vanc/cefepime per ID (stopped 10/17/22), deep surgical wound cultures grew pseudomonas aeruginosa, staph haemolyticus, and campylobacter striatum and jeikeium. Foot Xray: preliminary read no signs of OM or NF, elevated ESR/CRP.  - Continue Vanc/Cefepime  - f/u ID recs  - Contact Dr. Teran, normally sees patient when admitted to hospital.  - Pain Management: Tylenol prn for mild pain and percocet  po q4h for severe pain.  - Continue dressing changes per last wound care recs.  - F/U MRI of foot to r/o OM.

## 2022-11-01 NOTE — CHART NOTE - NSCHARTNOTEFT_GEN_A_CORE
Infectious Diseases Anti-infective Approval Note    Medication:   Cefepime   Dose: 2 grams   Route:  IV  Frequency:  q8hrs  Duration**:  1 day    **Duration refers to duration of approval, not recommended duration of treatment      Dose may be adjusted as needed for alterations in renal function.    *THIS IS NOT AN INFECTIOUS DISEASES CONSULTATION*

## 2022-11-01 NOTE — PROGRESS NOTE ADULT - ASSESSMENT
37F w/ PMHx of morbid obesity, HTN, L foot AVM s/p multiple DSE, recently hospitalized (10/10/22-10/17/22) for LLL ulcer debridement with Dr. Teran. Presented for worsening left foot pain and erythema around non-healing ulcer.  37F w/ PMHx of morbid obesity, HTN, L foot AVM s/p multiple DSE, recently hospitalized (10/10/22-10/17/22) for LLL ulcer debridement with Dr. Teran, presented for worsening left foot pain and erythema around non-healing ulcer.  37F w/ PMHx of morbid obesity, HTN, L foot AVM s/p multiple DSE, recently hospitalized (10/10/22-10/17/22) for LLL ulcer debridement with Dr. Teran, presented for worsening left foot pain and erythema surrounding non-healing ulcer.

## 2022-11-01 NOTE — CHART NOTE - NSCHARTNOTEFT_GEN_A_CORE
Rx Written	Rx Dispensed	Drug	Quantity	Days Supply	Prescriber Name	Prescriber Ami #	Payment Method  10/14/2022	10/17/2022	oxycodone-acetaminophen  mg tab	150	30	Lizette Dubose	[id number]	Medicare    10/06/2022	10/06/2022	oxycodone hcl (ir) 5 mg tablet	15	5	Lizette Dubose	[id number]	Medicare

## 2022-11-01 NOTE — PROGRESS NOTE ADULT - PROBLEM SELECTOR PLAN 1
Presented with SIRS 2/4 tachycardia 102, leukocytosis 13 and home T max 101 (100.2 in the ED). Source m/p chronic foot ulceration   - f/u BCx  - Contact Dr. Teran vs vascular in the AM for wound cultures  - Xray w/o signs of OM, but due to elevated ESR/CRP will obtain MRI  - s/p vanc in the ED  - Start on vanc and cefepime (prior pseudomonas)  - ID consult in the AM Presented with SIRS 2/4 tachycardia 102, leukocytosis 13 and home T max 101 (100.2 in the ED). Source m/p chronic foot ulceration.  - Improving: HR 83BPM, Leukocytes 9.58, Temp 98.2F on 11/1/2022.  - f/u BCx for infection treatment. Presented with SIRS 2/4 tachycardia 102, leukocytosis 13 and home T max 101 (100.2 in the ED). Source m/p chronic foot ulceration.  - Improving: HR 83BPM, Leukocytes 9.58, Temp 98.2F on 11/1/2022.  - F/U BCx for infection treatment.

## 2022-11-01 NOTE — PROGRESS NOTE ADULT - PROBLEM SELECTOR PLAN 4
Patient on Home Bystolic 10mg BID  - start with Lopressor 50mg BID for TIC. Patient on Home Bystolic 10mg BID  - Start Lopressor 50mg BID for TIC. Patient on Home Bystolic 10mg BID  - Start Lopressor 50mg BID (therapeutic interchange) Patient on Home Bystolic 10mg BID  - continue home med

## 2022-11-01 NOTE — PROGRESS NOTE ADULT - ASSESSMENT
37-year-old woman with history of HTN, MO, and AVM L foot with chronic L foot ulcer s/p transcatheter embolization, recent admission 10/10-10/17 with wound infection, presents with fevers and L foot pain concerning for wound infection. Surgical WCx from 10/10/22 grew Pseudomonas aeruginosa, S. haemolyticus, and Corynebacterium spp. Pseudomonas is still likely cause of infection with Staph and Corynebacterium representing skin wm.     Culture data:  10/10/22 Surgical WCx: Pseudomonas aeruginosa, S. haemolyticus, Corynebacterium striatum grp., C. jeikeium  10/11 WCx: PsA  10/11 BCx x2: no growth at 5 days  10/31 BCx x2: NGTD    Recommendations:  -  37-year-old woman with history of HTN, MO, and AVM L foot with chronic L foot ulcer s/p transcatheter embolization, recent admission 10/10-10/17 with wound infection, presents with fevers and L foot pain concerning for wound infection. Surgical WCx from 10/10/22 grew Pseudomonas aeruginosa, S. haemolyticus, and Corynebacterium spp. Pseudomonas is still likely cause of infection with Staph and Corynebacterium representing skin wm.     Culture data:  10/10/22 Surgical WCx: Pseudomonas aeruginosa, S. haemolyticus, Corynebacterium striatum grp., C. jeikeium  10/11 WCx: Pseudomonas   10/11 BCx x2: no growth at 5 days  10/31 BCx x2: NGTD    Recommendations:  - c/w vancomycin and cefepime  - Vanc trough prior to 4th dose. Target 12 - 16.   - F/u blood cultures X 2 from 10/31  - f/u wound swab from 11/1  - F/u MRI of ankle to eval for osteo     ID team 1 will continue to follow

## 2022-11-01 NOTE — PROGRESS NOTE ADULT - SUBJECTIVE AND OBJECTIVE BOX
HOSPITAL COURSE:    OVERNIGHT EVENTS:     SUBJECTIVE / INTERVAL HPI: Patient seen and examined at bedside.     VITAL SIGNS:  Vital Signs Last 24 Hrs  T(C): 36.8 (01 Nov 2022 06:25), Max: 37.9 (31 Oct 2022 18:49)  T(F): 98.2 (01 Nov 2022 06:25), Max: 100.2 (31 Oct 2022 18:49)  HR: 83 (01 Nov 2022 06:25) (82 - 102)  BP: 142/92 (01 Nov 2022 06:25) (142/92 - 166/102)  BP(mean): --  RR: 19 (01 Nov 2022 06:25) (18 - 19)  SpO2: 97% (01 Nov 2022 06:25) (97% - 100%)    Parameters below as of 01 Nov 2022 06:25  Patient On (Oxygen Delivery Method): room air      PHYSICAL EXAM:  General:   HEENT:   Neck:  Cardiovascular:  Respiratory:   Gastrointestinal:   Extremities:  Vascular:   Neurological:       MEDICATIONS:  MEDICATIONS  (STANDING):  cefepime   IVPB 2000 milliGRAM(s) IV Intermittent every 8 hours  cefepime   IVPB 2000 milliGRAM(s) IV Intermittent every 8 hours  enoxaparin Injectable 40 milliGRAM(s) SubCutaneous every 12 hours  gabapentin 100 milliGRAM(s) Oral every 24 hours  gabapentin 800 milliGRAM(s) Oral every 24 hours  influenza   Vaccine 0.5 milliLiter(s) IntraMuscular once  magnesium sulfate  IVPB 2 Gram(s) IV Intermittent once  metoprolol tartrate 50 milliGRAM(s) Oral every 12 hours  vancomycin  IVPB 1250 milliGRAM(s) IV Intermittent every 8 hours    MEDICATIONS  (PRN):  acetaminophen     Tablet .. 650 milliGRAM(s) Oral every 6 hours PRN Temp greater or equal to 38C (100.4F), Mild Pain (1 - 3)  HYDROmorphone  Injectable 1 milliGRAM(s) IV Push every 2 hours PRN breakthrough pain  oxycodone    5 mG/acetaminophen 325 mG 2 Tablet(s) Oral every 4 hours PRN Severe Pain (7 - 10)      ALLERGIES:  Allergies    amoxicillin (Angioedema)  ciprofloxacin (Rash)  hydrochlorothiazide (Hives)  lisinopril (Angioedema; Rash; Hives)  penicillin (Rash)    Intolerances        LABS:                        12.9   13.07 )-----------( 458      ( 31 Oct 2022 18:25 )             38.3     10-31    140  |  104  |  13  ----------------------------<  108<H>  3.4<L>   |  21<L>  |  0.85    Ca    9.8      31 Oct 2022 18:25  Phos  3.0     11-01  Mg     1.8     11-01    TPro  8.4<H>  /  Alb  4.6  /  TBili  0.9  /  DBili  x   /  AST  10  /  ALT  8<L>  /  AlkPhos  65  10-31    PT/INR - ( 31 Oct 2022 18:25 )   PT: 13.3 sec;   INR: 1.12          PTT - ( 31 Oct 2022 18:25 )  PTT:32.8 sec    CAPILLARY BLOOD GLUCOSE          RADIOLOGY & ADDITIONAL TESTS: Reviewed.   HOSPITAL COURSE:  SUBJECTIVE / INTERVAL HPI: Patient seen and examined at bedside. Patient said symptoms started Friday with associated fevers and chills with erythema surrounding the ulcer which improves upon IV Vancomycin/Cefepime. Today patient complains of feeling week with a headache that is improved with pain and blood pressure medications, and a strong, sharp pulsatile pain in L foot that improves with pain medications. However, patient says the erythema surrounding the ulcer and pain is improving since admission.  Patient doesn't smoke.    Denies radiation of pain, swelling of extremities, intermittent claudication, neuropathic pain, previous history of diabetes, hematochezia, constipation, diarrhea, dysuria and dyspnea.    VITAL SIGNS:  Vital Signs Last 24 Hrs  T(C): 36.8 (01 Nov 2022 06:25), Max: 37.9 (31 Oct 2022 18:49)  T(F): 98.2 (01 Nov 2022 06:25), Max: 100.2 (31 Oct 2022 18:49)  HR: 83 (01 Nov 2022 06:25) (82 - 102)  BP: 142/92 (01 Nov 2022 06:25) (142/92 - 166/102)  BP(mean): --  RR: 19 (01 Nov 2022 06:25) (18 - 19)  SpO2: 97% (01 Nov 2022 06:25) (97% - 100%)    Parameters below as of 01 Nov 2022 06:25  Patient On (Oxygen Delivery Method): room air      PHYSICAL EXAM:  General: A&Ox3. Awake and laying comfortably in bed.  HEENT: Palpebral conjunctiva pink and sclera white BL. Mucosa pink and moist.  Neck: Supple, FROM, no JVD.  Cardiovascular: Clear S1 and S2. No murmurs, gallops or rubs.  Respiratory: Clear to auscultation bilaterally. No wheezing, rhonchi or rales.  Gastrointestinal: Nondistended, nontender. BS normoactive x4.  Extremities: No discoloration, brittle nails, varicose veins, clubbing or edema noted.  Vascular: 2+ equal bilaterally dorsalis pedis and posterior tibial.  Neurological: Sensation intact equally bilaterally in all extremities. 1x1cm ulcer with green yellow mild discharge with surrounding erythema 5x5cm.      MEDICATIONS:  MEDICATIONS  (STANDING):  cefepime   IVPB 2000 milliGRAM(s) IV Intermittent every 8 hours  cefepime   IVPB 2000 milliGRAM(s) IV Intermittent every 8 hours  enoxaparin Injectable 40 milliGRAM(s) SubCutaneous every 12 hours  gabapentin 100 milliGRAM(s) Oral every 24 hours  gabapentin 800 milliGRAM(s) Oral every 24 hours  influenza   Vaccine 0.5 milliLiter(s) IntraMuscular once  magnesium sulfate  IVPB 2 Gram(s) IV Intermittent once  metoprolol tartrate 50 milliGRAM(s) Oral every 12 hours  vancomycin  IVPB 1250 milliGRAM(s) IV Intermittent every 8 hours    MEDICATIONS  (PRN):  acetaminophen     Tablet .. 650 milliGRAM(s) Oral every 6 hours PRN Temp greater or equal to 38C (100.4F), Mild Pain (1 - 3)  HYDROmorphone  Injectable 1 milliGRAM(s) IV Push every 2 hours PRN breakthrough pain  oxycodone    5 mG/acetaminophen 325 mG 2 Tablet(s) Oral every 4 hours PRN Severe Pain (7 - 10)      ALLERGIES:  Allergies    amoxicillin (Angioedema)  ciprofloxacin (Rash)  hydrochlorothiazide (Hives)  lisinopril (Angioedema; Rash; Hives)  penicillin (Rash)    Intolerances        LABS:                        12.9   13.07 )-----------( 458      ( 31 Oct 2022 18:25 )             38.3     10-31    140  |  104  |  13  ----------------------------<  108<H>  3.4<L>   |  21<L>  |  0.85    Ca    9.8      31 Oct 2022 18:25  Phos  3.0     11-01  Mg     1.8     11-01    TPro  8.4<H>  /  Alb  4.6  /  TBili  0.9  /  DBili  x   /  AST  10  /  ALT  8<L>  /  AlkPhos  65  10-31    PT/INR - ( 31 Oct 2022 18:25 )   PT: 13.3 sec;   INR: 1.12          PTT - ( 31 Oct 2022 18:25 )  PTT:32.8 sec    CAPILLARY BLOOD GLUCOSE          RADIOLOGY & ADDITIONAL TESTS: Reviewed.   HOSPITAL COURSE:  SUBJECTIVE / INTERVAL HPI: Patient seen and examined at bedside. Patient said symptoms started Friday with associated fevers and chills, and erythema surrounding the ulcer which improves upon IV Vancomycin/Cefepime. Today patient complains of feeling week with a headache that is improved with pain and blood pressure medications, and a strong, sharp pulsatile pain in L foot that improves with pain medications. However, patient says the pain and erythema surrounding the ulcer is improving since admission. Patient denies smoking.    Denies radiation of pain, swelling of extremities, intermittent claudication, neuropathic pain, previous history of diabetes, hematochezia, constipation, diarrhea, dysuria and dyspnea.    VITAL SIGNS:  Vital Signs Last 24 Hrs  T(C): 36.8 (01 Nov 2022 06:25), Max: 37.9 (31 Oct 2022 18:49)  T(F): 98.2 (01 Nov 2022 06:25), Max: 100.2 (31 Oct 2022 18:49)  HR: 83 (01 Nov 2022 06:25) (82 - 102)  BP: 142/92 (01 Nov 2022 06:25) (142/92 - 166/102)  BP(mean): --  RR: 19 (01 Nov 2022 06:25) (18 - 19)  SpO2: 97% (01 Nov 2022 06:25) (97% - 100%)    Parameters below as of 01 Nov 2022 06:25  Patient On (Oxygen Delivery Method): room air      PHYSICAL EXAM:  General: A&Ox3. Awake and laying comfortably in bed.  HEENT: Palpebral conjunctiva pink and sclera white BL. Mucosa pink and moist.  Neck: Supple, FROM, no JVD.  Cardiovascular: Clear S1 and S2. No murmurs, gallops or rubs.  Respiratory: Clear to auscultation bilaterally. No wheezing, rhonchi or rales.  Gastrointestinal: Nondistended, nontender. BS normoactive x4.  Extremities: No discoloration, brittle nails, varicose veins, clubbing or edema noted.  Vascular: 2+ equally bilaterally dorsalis pedis and posterior tibial.  Neurological: Sensation intact equally bilaterally in all extremities. 1x1cm ulcer with green, yellow mild discharge with surrounding erythema 5x5cm.      MEDICATIONS:  MEDICATIONS  (STANDING):  cefepime   IVPB 2000 milliGRAM(s) IV Intermittent every 8 hours  cefepime   IVPB 2000 milliGRAM(s) IV Intermittent every 8 hours  enoxaparin Injectable 40 milliGRAM(s) SubCutaneous every 12 hours  gabapentin 100 milliGRAM(s) Oral every 24 hours  gabapentin 800 milliGRAM(s) Oral every 24 hours  influenza   Vaccine 0.5 milliLiter(s) IntraMuscular once  magnesium sulfate  IVPB 2 Gram(s) IV Intermittent once  metoprolol tartrate 50 milliGRAM(s) Oral every 12 hours  vancomycin  IVPB 1250 milliGRAM(s) IV Intermittent every 8 hours    MEDICATIONS  (PRN):  acetaminophen     Tablet .. 650 milliGRAM(s) Oral every 6 hours PRN Temp greater or equal to 38C (100.4F), Mild Pain (1 - 3)  HYDROmorphone  Injectable 1 milliGRAM(s) IV Push every 2 hours PRN breakthrough pain  oxycodone    5 mG/acetaminophen 325 mG 2 Tablet(s) Oral every 4 hours PRN Severe Pain (7 - 10)      ALLERGIES:  Allergies    amoxicillin (Angioedema)  ciprofloxacin (Rash)  hydrochlorothiazide (Hives)  lisinopril (Angioedema; Rash; Hives)  penicillin (Rash)    Intolerances        LABS:                        12.9   13.07 )-----------( 458      ( 31 Oct 2022 18:25 )             38.3     10-31    140  |  104  |  13  ----------------------------<  108<H>  3.4<L>   |  21<L>  |  0.85    Ca    9.8      31 Oct 2022 18:25  Phos  3.0     11-01  Mg     1.8     11-01    TPro  8.4<H>  /  Alb  4.6  /  TBili  0.9  /  DBili  x   /  AST  10  /  ALT  8<L>  /  AlkPhos  65  10-31    PT/INR - ( 31 Oct 2022 18:25 )   PT: 13.3 sec;   INR: 1.12          PTT - ( 31 Oct 2022 18:25 )  PTT:32.8 sec    CAPILLARY BLOOD GLUCOSE          RADIOLOGY & ADDITIONAL TESTS: Reviewed.   HOSPITAL COURSE:  37F w/ PMHx of morbid obesity, HTN, L foot AVM s/p multiple DSE, recently hospitalized (10/10/22-10/17/22) for LLL angio/ulcer debridement and L lateral ankle AVM glue embolization (10/11/22) with Dr. Teran. s/p vanc/cefepime per ID (stopped 10/17/22), presented for worsening left foot pain and erythema around non-healing ulcer with home-measured Tmax 101. Cardiologist Dr. Teran (who does vascular procedures) follows the patient. XR foot shows no sign of OM, NF. Vascular consulted, recommend daily dressing changes. IV antibiotics started. Pending MRI foot to r/o OM, ID recommendations.    SUBJECTIVE / INTERVAL HPI: Patient seen and examined at bedside. Patient said symptoms started Friday with associated fevers and chills, and erythema surrounding the ulcer which improves upon IV Vancomycin/Cefepime. Today patient complains of feeling week with a headache that is improved with pain and blood pressure medications, and a strong, sharp pulsatile pain in L foot that improves with pain medications. However, patient says the pain and erythema surrounding the ulcer is improving since admission. Patient denies smoking.    Denies radiation of pain, swelling of extremities, intermittent claudication, neuropathic pain, previous history of diabetes, hematochezia, constipation, diarrhea, dysuria and dyspnea.    VITAL SIGNS:  Vital Signs Last 24 Hrs  T(C): 36.8 (01 Nov 2022 06:25), Max: 37.9 (31 Oct 2022 18:49)  T(F): 98.2 (01 Nov 2022 06:25), Max: 100.2 (31 Oct 2022 18:49)  HR: 83 (01 Nov 2022 06:25) (82 - 102)  BP: 142/92 (01 Nov 2022 06:25) (142/92 - 166/102)  BP(mean): --  RR: 19 (01 Nov 2022 06:25) (18 - 19)  SpO2: 97% (01 Nov 2022 06:25) (97% - 100%)    Parameters below as of 01 Nov 2022 06:25  Patient On (Oxygen Delivery Method): room air      PHYSICAL EXAM:  General: A&Ox3. Awake and laying comfortably in bed.  HEENT: Palpebral conjunctiva pink and sclera white BL. Mucosa pink and moist.  Neck: Supple, FROM, no JVD.  Cardiovascular: Clear S1 and S2. No murmurs, gallops or rubs.  Respiratory: Clear to auscultation bilaterally. No wheezing, rhonchi or rales.  Gastrointestinal: Nondistended, nontender. BS normoactive x4.  Extremities: No discoloration, brittle nails, varicose veins, clubbing or edema noted.  Vascular: 2+ equally bilaterally dorsalis pedis and posterior tibial.  Neurological: Sensation intact equally bilaterally in all extremities. 1x1cm ulcer with green, yellow mild discharge with surrounding erythema 5x5cm.      MEDICATIONS:  MEDICATIONS  (STANDING):  cefepime   IVPB 2000 milliGRAM(s) IV Intermittent every 8 hours  cefepime   IVPB 2000 milliGRAM(s) IV Intermittent every 8 hours  enoxaparin Injectable 40 milliGRAM(s) SubCutaneous every 12 hours  gabapentin 100 milliGRAM(s) Oral every 24 hours  gabapentin 800 milliGRAM(s) Oral every 24 hours  influenza   Vaccine 0.5 milliLiter(s) IntraMuscular once  magnesium sulfate  IVPB 2 Gram(s) IV Intermittent once  metoprolol tartrate 50 milliGRAM(s) Oral every 12 hours  vancomycin  IVPB 1250 milliGRAM(s) IV Intermittent every 8 hours    MEDICATIONS  (PRN):  acetaminophen     Tablet .. 650 milliGRAM(s) Oral every 6 hours PRN Temp greater or equal to 38C (100.4F), Mild Pain (1 - 3)  HYDROmorphone  Injectable 1 milliGRAM(s) IV Push every 2 hours PRN breakthrough pain  oxycodone    5 mG/acetaminophen 325 mG 2 Tablet(s) Oral every 4 hours PRN Severe Pain (7 - 10)      ALLERGIES:  Allergies    amoxicillin (Angioedema)  ciprofloxacin (Rash)  hydrochlorothiazide (Hives)  lisinopril (Angioedema; Rash; Hives)  penicillin (Rash)    Intolerances        LABS:                        12.9   13.07 )-----------( 458      ( 31 Oct 2022 18:25 )             38.3     10-31    140  |  104  |  13  ----------------------------<  108<H>  3.4<L>   |  21<L>  |  0.85    Ca    9.8      31 Oct 2022 18:25  Phos  3.0     11-01  Mg     1.8     11-01    TPro  8.4<H>  /  Alb  4.6  /  TBili  0.9  /  DBili  x   /  AST  10  /  ALT  8<L>  /  AlkPhos  65  10-31    PT/INR - ( 31 Oct 2022 18:25 )   PT: 13.3 sec;   INR: 1.12          PTT - ( 31 Oct 2022 18:25 )  PTT:32.8 sec    CAPILLARY BLOOD GLUCOSE          RADIOLOGY & ADDITIONAL TESTS: Reviewed.   HOSPITAL COURSE:  37F w/ PMHx of morbid obesity, HTN, L foot AVM s/p multiple DSE, recently hospitalized (10/10/22-10/17/22) for LLL angio/ulcer debridement and L lateral ankle AVM glue embolization (10/11/22) with Dr. Teran. s/p vanc/cefepime per ID (stopped 10/17/22), presented for worsening left foot pain and erythema around non-healing ulcer with home-measured Tmax 101. Cardiologist Dr. Teran (who does vascular procedures) follows the patient. XR foot shows no sign of OM, NF. Vascular consulted, recommend daily dressing changes. IV antibiotics started. Pending MRI foot to r/o OM, ID recommendations.    SUBJECTIVE / INTERVAL HPI: Patient seen and examined at bedside. Patient complains of L foot pain previously improved with antibiotic treatment. Mild headache improved with pain medications. Erythema surrounding foot wound is improved since previous admission.. Patient denies smoking.    Denies radiation of pain, swelling of extremities, intermittent claudication, neuropathic pain, previous history of diabetes, hematochezia, constipation, diarrhea, dysuria and dyspnea. 12-pt ROS otherwise negative.    VITAL SIGNS:  Vital Signs Last 24 Hrs  T(C): 36.8 (01 Nov 2022 06:25), Max: 37.9 (31 Oct 2022 18:49)  T(F): 98.2 (01 Nov 2022 06:25), Max: 100.2 (31 Oct 2022 18:49)  HR: 83 (01 Nov 2022 06:25) (82 - 102)  BP: 142/92 (01 Nov 2022 06:25) (142/92 - 166/102)  BP(mean): --  RR: 19 (01 Nov 2022 06:25) (18 - 19)  SpO2: 97% (01 Nov 2022 06:25) (97% - 100%)    Parameters below as of 01 Nov 2022 06:25  Patient On (Oxygen Delivery Method): room air      PHYSICAL EXAM:  General: A&Ox3. Awake and laying comfortably in bed.  HEENT: Palpebral conjunctiva pink and sclera white BL. Mucosa pink and moist.  Neck: Supple, FROM, no JVD.  Cardiovascular: Clear S1 and S2. No murmurs, gallops or rubs.  Respiratory: Clear to auscultation bilaterally. No wheezing, rhonchi or rales.  Gastrointestinal: Nondistended, nontender. BS normoactive x4.  Extremities: No discoloration, brittle nails, varicose veins, clubbing or edema noted.  Vascular: 2+ equally bilaterally dorsalis pedis and posterior tibial.  Neurological: Sensation intact equally bilaterally in all extremities. 1x1cm ulcer with green, yellow minimal discharge with surrounding erythema 5x5cm.      MEDICATIONS:  MEDICATIONS  (STANDING):  cefepime   IVPB 2000 milliGRAM(s) IV Intermittent every 8 hours  cefepime   IVPB 2000 milliGRAM(s) IV Intermittent every 8 hours  enoxaparin Injectable 40 milliGRAM(s) SubCutaneous every 12 hours  gabapentin 100 milliGRAM(s) Oral every 24 hours  gabapentin 800 milliGRAM(s) Oral every 24 hours  influenza   Vaccine 0.5 milliLiter(s) IntraMuscular once  magnesium sulfate  IVPB 2 Gram(s) IV Intermittent once  metoprolol tartrate 50 milliGRAM(s) Oral every 12 hours  vancomycin  IVPB 1250 milliGRAM(s) IV Intermittent every 8 hours    MEDICATIONS  (PRN):  acetaminophen     Tablet .. 650 milliGRAM(s) Oral every 6 hours PRN Temp greater or equal to 38C (100.4F), Mild Pain (1 - 3)  HYDROmorphone  Injectable 1 milliGRAM(s) IV Push every 2 hours PRN breakthrough pain  oxycodone    5 mG/acetaminophen 325 mG 2 Tablet(s) Oral every 4 hours PRN Severe Pain (7 - 10)      ALLERGIES:  Allergies    amoxicillin (Angioedema)  ciprofloxacin (Rash)  hydrochlorothiazide (Hives)  lisinopril (Angioedema; Rash; Hives)  penicillin (Rash)    Intolerances      LABS:                        12.9   13.07 )-----------( 458      ( 31 Oct 2022 18:25 )             38.3     10-31    140  |  104  |  13  ----------------------------<  108<H>  3.4<L>   |  21<L>  |  0.85    Ca    9.8      31 Oct 2022 18:25  Phos  3.0     11-01  Mg     1.8     11-01    TPro  8.4<H>  /  Alb  4.6  /  TBili  0.9  /  DBili  x   /  AST  10  /  ALT  8<L>  /  AlkPhos  65  10-31    PT/INR - ( 31 Oct 2022 18:25 )   PT: 13.3 sec;   INR: 1.12          PTT - ( 31 Oct 2022 18:25 )  PTT:32.8 sec    CAPILLARY BLOOD GLUCOSE          RADIOLOGY & ADDITIONAL TESTS: Reviewed.

## 2022-11-01 NOTE — PROGRESS NOTE ADULT - PROBLEM SELECTOR PLAN 3
Pt with multiple Direct stick embolizations, and most recent is s/p angiogram and DSE 10/11/22 with Dr. Teran  - c/w home medications: gabapentin 100mg AM and 800mg at noon and Percocet Pt with multiple Direct stick embolizations, and most recent is s/p angiogram and DSE 10/11/22 with Dr. Teran  - C/W home medications: Gabapentin 100mg AM and 800mg at noon, and Percocet.

## 2022-11-01 NOTE — CONSULT NOTE ADULT - SUBJECTIVE AND OBJECTIVE BOX
Vascular Attending:  Dr. Kris Eli Complaint: LLE non healing ulcer       HPI:  37F w/ PMHx of morbid obesity, HTN, L foot AVM s/p multiple DSE, recently hospitalized (10/10/22-10/17/22) for LLL angio/ulcer debridement and L lateral ankle avm glue embolization (10/11/22) with Dr. Savage. s/p vanc/cefepime per ID (stopped 10/17/22),  deep surgical wound cultures grew pseudomonas aeruginosa, staph haemolyticus, and campylobacter striatum and jeikeium. Reportedly referred in by dr savage for admission. Presented for worsening left foot pain and erythema around non-healing ulcer. Progressive L lateral ankle erythema/swelling/pain and daily fever at home with t max of 101. Patient denies h/n/v/d, chills, cp, palpitations, sob, abd pain, leg swelling, rashes, dysuria, and changes in BM.     Vitals: T 100.2, , /102, RR 18, 100%RA  Labs: WBC 13.07, , K 3.4, Bicarb 21, CRP 18.2, ESR 42, COVID neg.   Foot Xray: preliminary read no signs of OM or NF  Interventions: 1L NS, vanc 1g IVP, Dilaudid 0.5mg IVP, Tylenol. (31 Oct 2022 23:34)    Vascular addendum:  PT was discharge home from recent admission, wound care nurse would come to the house and change the dressing every other day. PT had a fever and erythema around the wound that started on friday. pt states that she took some tylenol that would help with the fever temporarily but the fever would always come back. This continued until sunday and monday morning she called the office and they advised her to come in.     PAST MEDICAL & SURGICAL HISTORY:  HTN (hypertension)      AVM (arteriovenous malformation)  of left foot      Foot ulceration  left foot      S/P debridement  LLE area overlying AVM      Elective surgery  transcatheter therapy vascular embolization x5      Morbid obesity          REVIEW OF SYSTEMS  All negative unless other wise stated in HPI.         MEDICATIONS  (STANDING):  cefepime   IVPB      cefepime   IVPB 2000 milliGRAM(s) IV Intermittent every 8 hours  enoxaparin Injectable 40 milliGRAM(s) SubCutaneous every 12 hours  gabapentin 100 milliGRAM(s) Oral every 24 hours  gabapentin 800 milliGRAM(s) Oral every 24 hours  metoprolol tartrate 50 milliGRAM(s) Oral every 12 hours  vancomycin  IVPB 1250 milliGRAM(s) IV Intermittent every 8 hours    MEDICATIONS  (PRN):  acetaminophen     Tablet .. 650 milliGRAM(s) Oral every 6 hours PRN Temp greater or equal to 38C (100.4F), Mild Pain (1 - 3)  oxycodone    5 mG/acetaminophen 325 mG 2 Tablet(s) Oral every 4 hours PRN Severe Pain (7 - 10)      Allergies    amoxicillin (Angioedema)  ciprofloxacin (Rash)  hydrochlorothiazide (Hives)  lisinopril (Angioedema; Rash; Hives)  penicillin (Rash)    Intolerances        SOCIAL HISTORY:    FAMILY HISTORY:  Family history of congenital malformation (Sibling)  AVMs: siblings        Vital Signs Last 24 Hrs  T(C): 37.1 (01 Nov 2022 01:27), Max: 37.9 (31 Oct 2022 18:49)  T(F): 98.8 (01 Nov 2022 01:27), Max: 100.2 (31 Oct 2022 18:49)  HR: 82 (01 Nov 2022 01:27) (82 - 102)  BP: 152/78 (01 Nov 2022 01:27) (152/78 - 166/102)  BP(mean): --  RR: 18 (01 Nov 2022 01:27) (18 - 18)  SpO2: 99% (01 Nov 2022 01:27) (99% - 100%)    Parameters below as of 31 Oct 2022 17:28  Patient On (Oxygen Delivery Method): room air        PHYSICAL EXAM:      Constitutional: NAD    Respiratory: RA    Cardiovascular: NSR    Gastrointestinal: soft, NT, ND    Vascular: LLE : palpable distal pulses, palpable fem. left lateral ankle wound thats about [id number] x0.1cm. yellow stranding, no purulent drainage, erythema surrounding the wound, tenderness to palpation around the wound. good motor sensory function.       LABS:                        12.9   13.07 )-----------( 458      ( 31 Oct 2022 18:25 )             38.3     10-31    140  |  104  |  13  ----------------------------<  108<H>  3.4<L>   |  21<L>  |  0.85    Ca    9.8      31 Oct 2022 18:25    TPro  8.4<H>  /  Alb  4.6  /  TBili  0.9  /  DBili  x   /  AST  10  /  ALT  8<L>  /  AlkPhos  65  10-31    PT/INR - ( 31 Oct 2022 18:25 )   PT: 13.3 sec;   INR: 1.12          PTT - ( 31 Oct 2022 18:25 )  PTT:32.8 sec      RADIOLOGY & ADDITIONAL STUDIES    A/P: 37F w/ PMHx of morbid obesity, HTN, L foot AVM s/p multiple DSE, recently hospitalized (10/10/22-10/17/22) for LLL angio/ulcer debridement and L lateral ankle avm glue embolization (10/11/22) with Dr. Savage. s/p vanc/cefepime per ID (stopped 10/17/22),  deep surgical wound cultures grew pseudomonas aeruginosa, staph haemolyticus, and campylobacter striatum and jeikeium. Reportedly referred in by dr savage for admission. Presented for worsening left foot pain and erythema around non-healing ulcer. Progressive L lateral ankle erythema/swelling/pain and daily fever at home with t max of 101. vascular consulted for culture sample and further management.       Vascular/PAD:  - continue with IV abx as per primary team   - please do daily WTD dressing changes that primary or nurses can do. wet 4x4 with NS flush and place over wound and wrap with kerlix.   - discussed with chief on call.   - [id number]

## 2022-11-01 NOTE — PATIENT PROFILE ADULT - FALL HARM RISK - HARM RISK INTERVENTIONS
Assistance OOB with selected safe patient handling equipment/Communicate Risk of Fall with Harm to all staff/Discuss with provider need for PT consult/Monitor gait and stability/Provide patient with walking aids - walker, cane, crutches/Reinforce activity limits and safety measures with patient and family/Tailored Fall Risk Interventions/Visual Cue: Yellow wristband and red socks/Bed in lowest position, wheels locked, appropriate side rails in place/Call bell, personal items and telephone in reach/Instruct patient to call for assistance before getting out of bed or chair/Non-slip footwear when patient is out of bed/[place] to call system/Physically safe environment - no spills, clutter or unnecessary equipment/Purposeful Proactive Rounding/Room/bathroom lighting operational, light cord in reach Communicate Risk of Fall with Harm to all staff/Monitor gait and stability/Reinforce activity limits and safety measures with patient and family/Tailored Fall Risk Interventions/Visual Cue: Yellow wristband and red socks/Bed in lowest position, wheels locked, appropriate side rails in place/Call bell, personal items and telephone in reach/Instruct patient to call for assistance before getting out of bed or chair/Non-slip footwear when patient is out of bed/[place] to call system/Physically safe environment - no spills, clutter or unnecessary equipment/Purposeful Proactive Rounding/Room/bathroom lighting operational, light cord in reach

## 2022-11-01 NOTE — PROGRESS NOTE ADULT - PROBLEM SELECTOR PLAN 5
F: s/p 1L NS  E: Replenish as necessary K>4 Mg>2  N: DASH diet     DVT Prophylaxis: Lovenox 40mg BID  GI prophylaxis: None   CODE STATUS: FULL

## 2022-11-02 DIAGNOSIS — S93.499A SPRAIN OF OTHER LIGAMENT OF UNSPECIFIED ANKLE, INITIAL ENCOUNTER: ICD-10-CM

## 2022-11-02 LAB
A1C WITH ESTIMATED AVERAGE GLUCOSE RESULT: 5.2 % — SIGNIFICANT CHANGE UP (ref 4–5.6)
ANION GAP SERPL CALC-SCNC: 13 MMOL/L — SIGNIFICANT CHANGE UP (ref 5–17)
BUN SERPL-MCNC: 16 MG/DL — SIGNIFICANT CHANGE UP (ref 7–23)
CALCIUM SERPL-MCNC: 8.6 MG/DL — SIGNIFICANT CHANGE UP (ref 8.4–10.5)
CHLORIDE SERPL-SCNC: 108 MMOL/L — SIGNIFICANT CHANGE UP (ref 96–108)
CO2 SERPL-SCNC: 19 MMOL/L — LOW (ref 22–31)
CREAT SERPL-MCNC: 0.75 MG/DL — SIGNIFICANT CHANGE UP (ref 0.5–1.3)
EGFR: 104 ML/MIN/1.73M2 — SIGNIFICANT CHANGE UP
ESTIMATED AVERAGE GLUCOSE: 103 MG/DL — SIGNIFICANT CHANGE UP (ref 68–114)
GLUCOSE SERPL-MCNC: 99 MG/DL — SIGNIFICANT CHANGE UP (ref 70–99)
HCT VFR BLD CALC: 34.8 % — SIGNIFICANT CHANGE UP (ref 34.5–45)
HGB BLD-MCNC: 11.3 G/DL — LOW (ref 11.5–15.5)
MAGNESIUM SERPL-MCNC: 1.8 MG/DL — SIGNIFICANT CHANGE UP (ref 1.6–2.6)
MCHC RBC-ENTMCNC: 28.8 PG — SIGNIFICANT CHANGE UP (ref 27–34)
MCHC RBC-ENTMCNC: 32.5 GM/DL — SIGNIFICANT CHANGE UP (ref 32–36)
MCV RBC AUTO: 88.5 FL — SIGNIFICANT CHANGE UP (ref 80–100)
NRBC # BLD: 0 /100 WBCS — SIGNIFICANT CHANGE UP (ref 0–0)
PHOSPHATE SERPL-MCNC: 3.7 MG/DL — SIGNIFICANT CHANGE UP (ref 2.5–4.5)
PLATELET # BLD AUTO: 384 K/UL — SIGNIFICANT CHANGE UP (ref 150–400)
POTASSIUM SERPL-MCNC: 4.1 MMOL/L — SIGNIFICANT CHANGE UP (ref 3.5–5.3)
POTASSIUM SERPL-SCNC: 4.1 MMOL/L — SIGNIFICANT CHANGE UP (ref 3.5–5.3)
RBC # BLD: 3.93 M/UL — SIGNIFICANT CHANGE UP (ref 3.8–5.2)
RBC # FLD: 12.6 % — SIGNIFICANT CHANGE UP (ref 10.3–14.5)
SODIUM SERPL-SCNC: 140 MMOL/L — SIGNIFICANT CHANGE UP (ref 135–145)
VANCOMYCIN TROUGH SERPL-MCNC: 16.5 UG/ML — SIGNIFICANT CHANGE UP (ref 10–20)
WBC # BLD: 7.21 K/UL — SIGNIFICANT CHANGE UP (ref 3.8–10.5)
WBC # FLD AUTO: 7.21 K/UL — SIGNIFICANT CHANGE UP (ref 3.8–10.5)

## 2022-11-02 PROCEDURE — 99233 SBSQ HOSP IP/OBS HIGH 50: CPT | Mod: GC

## 2022-11-02 PROCEDURE — 99232 SBSQ HOSP IP/OBS MODERATE 35: CPT | Mod: GC

## 2022-11-02 PROCEDURE — 73722 MRI JOINT OF LWR EXTR W/DYE: CPT | Mod: 26,LT

## 2022-11-02 RX ORDER — VANCOMYCIN HCL 1 G
1250 VIAL (EA) INTRAVENOUS EVERY 8 HOURS
Refills: 0 | Status: DISCONTINUED | OUTPATIENT
Start: 2022-11-02 | End: 2022-11-03

## 2022-11-02 RX ADMIN — OXYCODONE AND ACETAMINOPHEN 2 TABLET(S): 5; 325 TABLET ORAL at 04:53

## 2022-11-02 RX ADMIN — HYDROMORPHONE HYDROCHLORIDE 0.5 MILLIGRAM(S): 2 INJECTION INTRAMUSCULAR; INTRAVENOUS; SUBCUTANEOUS at 17:00

## 2022-11-02 RX ADMIN — CEFEPIME 100 MILLIGRAM(S): 1 INJECTION, POWDER, FOR SOLUTION INTRAMUSCULAR; INTRAVENOUS at 03:20

## 2022-11-02 RX ADMIN — OXYCODONE AND ACETAMINOPHEN 2 TABLET(S): 5; 325 TABLET ORAL at 10:09

## 2022-11-02 RX ADMIN — Medication 166.67 MILLIGRAM(S): at 23:11

## 2022-11-02 RX ADMIN — HYDROMORPHONE HYDROCHLORIDE 0.5 MILLIGRAM(S): 2 INJECTION INTRAMUSCULAR; INTRAVENOUS; SUBCUTANEOUS at 02:30

## 2022-11-02 RX ADMIN — Medication 166.67 MILLIGRAM(S): at 06:40

## 2022-11-02 RX ADMIN — OXYCODONE AND ACETAMINOPHEN 2 TABLET(S): 5; 325 TABLET ORAL at 19:26

## 2022-11-02 RX ADMIN — NEBIVOLOL HYDROCHLORIDE 10 MILLIGRAM(S): 5 TABLET ORAL at 19:42

## 2022-11-02 RX ADMIN — GABAPENTIN 800 MILLIGRAM(S): 400 CAPSULE ORAL at 15:07

## 2022-11-02 RX ADMIN — OXYCODONE AND ACETAMINOPHEN 2 TABLET(S): 5; 325 TABLET ORAL at 15:08

## 2022-11-02 RX ADMIN — HYDROMORPHONE HYDROCHLORIDE 0.5 MILLIGRAM(S): 2 INJECTION INTRAMUSCULAR; INTRAVENOUS; SUBCUTANEOUS at 12:16

## 2022-11-02 RX ADMIN — Medication 166.67 MILLIGRAM(S): at 14:09

## 2022-11-02 RX ADMIN — NEBIVOLOL HYDROCHLORIDE 10 MILLIGRAM(S): 5 TABLET ORAL at 07:22

## 2022-11-02 RX ADMIN — OXYCODONE AND ACETAMINOPHEN 2 TABLET(S): 5; 325 TABLET ORAL at 01:05

## 2022-11-02 RX ADMIN — HYDROMORPHONE HYDROCHLORIDE 0.5 MILLIGRAM(S): 2 INJECTION INTRAMUSCULAR; INTRAVENOUS; SUBCUTANEOUS at 06:40

## 2022-11-02 RX ADMIN — ENOXAPARIN SODIUM 40 MILLIGRAM(S): 100 INJECTION SUBCUTANEOUS at 06:40

## 2022-11-02 RX ADMIN — HYDROMORPHONE HYDROCHLORIDE 0.5 MILLIGRAM(S): 2 INJECTION INTRAMUSCULAR; INTRAVENOUS; SUBCUTANEOUS at 22:15

## 2022-11-02 RX ADMIN — HYDROMORPHONE HYDROCHLORIDE 0.5 MILLIGRAM(S): 2 INJECTION INTRAMUSCULAR; INTRAVENOUS; SUBCUTANEOUS at 21:11

## 2022-11-02 RX ADMIN — OXYCODONE AND ACETAMINOPHEN 2 TABLET(S): 5; 325 TABLET ORAL at 18:26

## 2022-11-02 RX ADMIN — GABAPENTIN 100 MILLIGRAM(S): 400 CAPSULE ORAL at 07:22

## 2022-11-02 RX ADMIN — Medication 166.67 MILLIGRAM(S): at 00:43

## 2022-11-02 RX ADMIN — OXYCODONE AND ACETAMINOPHEN 2 TABLET(S): 5; 325 TABLET ORAL at 14:08

## 2022-11-02 RX ADMIN — OXYCODONE AND ACETAMINOPHEN 2 TABLET(S): 5; 325 TABLET ORAL at 00:08

## 2022-11-02 RX ADMIN — OXYCODONE AND ACETAMINOPHEN 2 TABLET(S): 5; 325 TABLET ORAL at 09:09

## 2022-11-02 RX ADMIN — OXYCODONE AND ACETAMINOPHEN 2 TABLET(S): 5; 325 TABLET ORAL at 23:48

## 2022-11-02 RX ADMIN — OXYCODONE AND ACETAMINOPHEN 2 TABLET(S): 5; 325 TABLET ORAL at 05:50

## 2022-11-02 RX ADMIN — HYDROMORPHONE HYDROCHLORIDE 0.5 MILLIGRAM(S): 2 INJECTION INTRAMUSCULAR; INTRAVENOUS; SUBCUTANEOUS at 07:40

## 2022-11-02 RX ADMIN — CEFEPIME 100 MILLIGRAM(S): 1 INJECTION, POWDER, FOR SOLUTION INTRAMUSCULAR; INTRAVENOUS at 10:53

## 2022-11-02 RX ADMIN — HYDROMORPHONE HYDROCHLORIDE 0.5 MILLIGRAM(S): 2 INJECTION INTRAMUSCULAR; INTRAVENOUS; SUBCUTANEOUS at 13:16

## 2022-11-02 RX ADMIN — ENOXAPARIN SODIUM 40 MILLIGRAM(S): 100 INJECTION SUBCUTANEOUS at 18:26

## 2022-11-02 RX ADMIN — HYDROMORPHONE HYDROCHLORIDE 0.5 MILLIGRAM(S): 2 INJECTION INTRAMUSCULAR; INTRAVENOUS; SUBCUTANEOUS at 16:43

## 2022-11-02 RX ADMIN — HYDROMORPHONE HYDROCHLORIDE 0.5 MILLIGRAM(S): 2 INJECTION INTRAMUSCULAR; INTRAVENOUS; SUBCUTANEOUS at 02:15

## 2022-11-02 NOTE — PROGRESS NOTE ADULT - SUBJECTIVE AND OBJECTIVE BOX
*incomplete    OVERNIGHT EVENTS:    SUBJECTIVE / INTERVAL HPI: Patient seen and examined at bedside.     VITAL SIGNS:  Vital Signs Last 24 Hrs  T(C): 36.9 (02 Nov 2022 06:00), Max: 36.9 (01 Nov 2022 20:25)  T(F): 98.5 (02 Nov 2022 06:00), Max: 98.5 (01 Nov 2022 20:25)  HR: 90 (02 Nov 2022 07:16) (72 - 93)  BP: 108/72 (02 Nov 2022 07:16) (108/72 - 154/91)  BP(mean): --  RR: 18 (02 Nov 2022 06:00) (17 - 18)  SpO2: 98% (02 Nov 2022 06:00) (96% - 98%)    Parameters below as of 02 Nov 2022 06:00  Patient On (Oxygen Delivery Method): room air        PHYSICAL EXAM:    General: alert, in no acute distress  HEENT: NC/AT; PERRL, anicteric sclera; MMM  Neck: supple  Cardiovascular: +S1/S2, RRR  Respiratory: CTA B/L; no W/R/R  Gastrointestinal: soft, NT/ND; +BSx4  Extremities: WWP; no edema, clubbing or cyanosis  Vascular: 2+ radial, DP/PT pulses B/L  Neurological: AAOx3; no focal deficits    MEDICATIONS:  MEDICATIONS  (STANDING):  cefepime   IVPB 2000 milliGRAM(s) IV Intermittent every 8 hours  enoxaparin Injectable 40 milliGRAM(s) SubCutaneous every 12 hours  gabapentin 100 milliGRAM(s) Oral every 24 hours  gabapentin 800 milliGRAM(s) Oral every 24 hours  influenza   Vaccine 0.5 milliLiter(s) IntraMuscular once  nebivolol 10 milliGRAM(s) Oral <User Schedule>  senna 2 Tablet(s) Oral at bedtime  vancomycin  IVPB 1250 milliGRAM(s) IV Intermittent every 8 hours    MEDICATIONS  (PRN):  acetaminophen     Tablet .. 650 milliGRAM(s) Oral every 6 hours PRN Temp greater or equal to 38C (100.4F), Mild Pain (1 - 3)  HYDROmorphone  Injectable 0.5 milliGRAM(s) IV Push every 4 hours PRN breakthrough pain  oxycodone    5 mG/acetaminophen 325 mG 2 Tablet(s) Oral every 4 hours PRN Severe Pain (7 - 10)  polyethylene glycol 3350 17 Gram(s) Oral every 24 hours PRN Constipation      ALLERGIES:  Allergies    amoxicillin (Angioedema)  ciprofloxacin (Rash)  hydrochlorothiazide (Hives)  lisinopril (Angioedema; Rash; Hives)  penicillin (Rash)    Intolerances        LABS:                        11.3   7.21  )-----------( 384      ( 02 Nov 2022 05:30 )             34.8     11-01    139  |  107  |  13  ----------------------------<  102<H>  3.7   |  18<L>  |  0.68    Ca    8.7      01 Nov 2022 05:30  Phos  3.0     11-01  Mg     1.8     11-01    TPro  7.0  /  Alb  4.0  /  TBili  0.8  /  DBili  x   /  AST  14  /  ALT  7<L>  /  AlkPhos  53  11-01    PT/INR - ( 31 Oct 2022 18:25 )   PT: 13.3 sec;   INR: 1.12          PTT - ( 31 Oct 2022 18:25 )  PTT:32.8 sec    CAPILLARY BLOOD GLUCOSE          RADIOLOGY & ADDITIONAL TESTS: Reviewed.   OVERNIGHT EVENTS:  NAEON    SUBJECTIVE / INTERVAL HPI: Patient seen and examined at bedside. This morning she complains of continued pain in her left ankle that is throbbing at the sit of the ulcer/cellulitis. She is still requiring breakthrough Dilaudid for her pain. Discussed w/ patient how it is only for severe breakthrough pain and she should continue w/ the percocet and tylenol first as needed for her pain.      VITAL SIGNS:  Vital Signs Last 24 Hrs  T(C): 36.9 (02 Nov 2022 06:00), Max: 36.9 (01 Nov 2022 20:25)  T(F): 98.5 (02 Nov 2022 06:00), Max: 98.5 (01 Nov 2022 20:25)  HR: 90 (02 Nov 2022 07:16) (72 - 93)  BP: 108/72 (02 Nov 2022 07:16) (108/72 - 154/91)  BP(mean): --  RR: 18 (02 Nov 2022 06:00) (17 - 18)  SpO2: 98% (02 Nov 2022 06:00) (96% - 98%)    Parameters below as of 02 Nov 2022 06:00  Patient On (Oxygen Delivery Method): room air        PHYSICAL EXAM:  General: A&Ox3. Awake and laying comfortably in bed.  HEENT: Palpebral conjunctiva pink and sclera white BL. Mucosa pink and moist.  Neck: Supple, FROM, no JVD.  Cardiovascular: Clear S1 and S2. No murmurs, gallops or rubs.  Respiratory: Clear to auscultation bilaterally. No wheezing, rhonchi or rales.  Gastrointestinal: Nondistended, nontender. BS normoactive x4.  Extremities: Left ankle wrapped w/ bandaging. Left lateral ankle w/ 1x1cm ulcer with yellow minimal discharge with surrounding erythema.  No discoloration, brittle nails, varicose veins, clubbing or edema noted.  Vascular: 2+ equally bilaterally dorsalis pedis and posterior tibial.  Neurological: Sensation intact equally bilaterally in all extremities.      MEDICATIONS:  MEDICATIONS  (STANDING):  cefepime   IVPB 2000 milliGRAM(s) IV Intermittent every 8 hours  enoxaparin Injectable 40 milliGRAM(s) SubCutaneous every 12 hours  gabapentin 100 milliGRAM(s) Oral every 24 hours  gabapentin 800 milliGRAM(s) Oral every 24 hours  influenza   Vaccine 0.5 milliLiter(s) IntraMuscular once  nebivolol 10 milliGRAM(s) Oral <User Schedule>  senna 2 Tablet(s) Oral at bedtime  vancomycin  IVPB 1250 milliGRAM(s) IV Intermittent every 8 hours    MEDICATIONS  (PRN):  acetaminophen     Tablet .. 650 milliGRAM(s) Oral every 6 hours PRN Temp greater or equal to 38C (100.4F), Mild Pain (1 - 3)  HYDROmorphone  Injectable 0.5 milliGRAM(s) IV Push every 4 hours PRN breakthrough pain  oxycodone    5 mG/acetaminophen 325 mG 2 Tablet(s) Oral every 4 hours PRN Severe Pain (7 - 10)  polyethylene glycol 3350 17 Gram(s) Oral every 24 hours PRN Constipation      ALLERGIES:  Allergies    amoxicillin (Angioedema)  ciprofloxacin (Rash)  hydrochlorothiazide (Hives)  lisinopril (Angioedema; Rash; Hives)  penicillin (Rash)    Intolerances        LABS:                        11.3   7.21  )-----------( 384      ( 02 Nov 2022 05:30 )             34.8     11-01    139  |  107  |  13  ----------------------------<  102<H>  3.7   |  18<L>  |  0.68    Ca    8.7      01 Nov 2022 05:30  Phos  3.0     11-01  Mg     1.8     11-01    TPro  7.0  /  Alb  4.0  /  TBili  0.8  /  DBili  x   /  AST  14  /  ALT  7<L>  /  AlkPhos  53  11-01    PT/INR - ( 31 Oct 2022 18:25 )   PT: 13.3 sec;   INR: 1.12          PTT - ( 31 Oct 2022 18:25 )  PTT:32.8 sec    CAPILLARY BLOOD GLUCOSE          RADIOLOGY & ADDITIONAL TESTS: Reviewed.

## 2022-11-02 NOTE — PROGRESS NOTE ADULT - PROBLEM SELECTOR PLAN 3
Pt with multiple Direct stick embolizations, and most recent is s/p angiogram and DSE 10/11/22 with Dr. Teran  - C/W home medications: Gabapentin 100mg AM and 800mg at noon, and Percocet. *resolved  Presented with SIRS 2/4 tachycardia 102, leukocytosis 13 and home T max 101 (100.2 in the ED). Source m/p chronic foot ulceration.  - Improved: HR 72-93 BPM over past 24 hours, Leukocytes downtrending to 7.21 (11/2), afebrile since 11/1.  - BCx (10/31) and wound culture (11/1) NGTD

## 2022-11-02 NOTE — PROGRESS NOTE ADULT - PROBLEM SELECTOR PLAN 4
Patient on Home Bystolic 10mg BID  - continue home med Pt with multiple Direct stick embolizations, and most recent is s/p angiogram and DSE 10/11/22 with Dr. Teran  - C/W home medications: Gabapentin 100mg AM and 800mg at noon, and Percocet.

## 2022-11-02 NOTE — PROGRESS NOTE ADULT - PROBLEM SELECTOR PLAN 5
F: s/p 1L NS  E: Replenish as necessary K>4 Mg>2  N: DASH diet     DVT Prophylaxis: Lovenox 40mg BID  GI prophylaxis: None   CODE STATUS: FULL F: None  E: Replenish as necessary K>4 Mg>2  N: DASH diet   DVT Prophylaxis: Lovenox 40mg BID  GI prophylaxis: None   CODE STATUS: FULL Patient on Home Bystolic 10mg BID  - continue home med

## 2022-11-02 NOTE — PROGRESS NOTE ADULT - PROBLEM SELECTOR PLAN 2
Pt with Left Lateral foot ulcer with purulence and fever at home Tmax 101. Recently hospitalized (10/10/22-10/17/22) for LLL angio/ulcer debridement and L lateral ankle AVM glue embolization (10/11/22) with Dr. Teran. s/p vanc/cefepime per ID (stopped 10/17/22), deep surgical wound cultures grew pseudomonas aeruginosa, staph haemolyticus, and campylobacter striatum and jeikeium. Foot Xray: preliminary read no signs of OM or NF, elevated ESR/CRP.  - Continue Vanc 1250mg Q8 Hrs (day 1 10/31) and Cefepime 2g (Day 1 11/1)  - f/u ID recs  - Contact Dr. Teran, normally sees patient when admitted to hospital.  - Pain Management: Tylenol prn for mild pain and percocet  po q4h for severe pain.  - Continue dressing changes per last wound care recs.  - F/U MRI of foot to r/o OM. *resolved  Presented with SIRS 2/4 tachycardia 102, leukocytosis 13 and home T max 101 (100.2 in the ED). Source m/p chronic foot ulceration.  - Improved: HR 72-93 BPM over past 24 hours, Leukocytes downtrending to 7.21 (11/2), afebrile since 11/1.  - BCx (10/31) and wound culture (11/1) NGTD MRA of Left ankle: High-grade partial-thickness tearing of the ATFL and CFL with partial-thickness tearing of the deltoid ligament.  - PT inpatient and refer to outpatient PT   - consider follow-up w/ sports med outpatient   - continue w/ pain management as above

## 2022-11-02 NOTE — PROGRESS NOTE ADULT - PROBLEM SELECTOR PLAN 1
Presented with SIRS 2/4 tachycardia 102, leukocytosis 13 and home T max 101 (100.2 in the ED). Source m/p chronic foot ulceration.  - Improving: HR 72-93 BPM over past 24 hours, Leukocytes downtrending to 7.21, afebrile since 11/1.  - F/U BCx for infection treatment. BCx NGTD Pt with Left Lateral foot ulcer with purulence and fever at home Tmax 101. Recently hospitalized (10/10/22-10/17/22) for LLL angio/ulcer debridement and L lateral ankle AVM glue embolization (10/11/22) with Dr. Teran. s/p vanc/cefepime per ID (stopped 10/17/22), deep surgical wound cultures grew pseudomonas aeruginosa, staph haemolyticus, and campylobacter striatum and jeikeium. Foot Xray: preliminary read no signs of OM or NF, elevated ESR/CRP.  - Continue Vanc 1250mg Q8 Hrs (day 1 10/31) and Cefepime 2g (Day 1 11/1)  - f/u Vanc trough 9pm 11/2, goal Target 12 - 16.   - f/u ID recs  - Contact Dr. Teran, normally sees patient when admitted to hospital.  - Pain Management: Tylenol prn for mild pain, percocet  po q4h for severe pain, Dilaudid .5mg IV Q4Hrs for breakthrough pain  - Continue dressing changes per last wound care recs.  - MRA of Left ankle: No evidence of osteomyelitis. 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 Pt with Left Lateral foot ulcer with purulence and fever at home Tmax 101. Recently hospitalized (10/10/22-10/17/22) for LLL angio/ulcer debridement and L lateral ankle AVM glue embolization (10/11/22) with Dr. Teran. s/p vanc/cefepime per ID (stopped 10/17/22), deep surgical wound cultures grew pseudomonas aeruginosa, staph haemolyticus, and campylobacter striatum and jeikeium. Foot Xray: preliminary read no signs of OM or NF, elevated ESR/CRP.  - Continue Vanc 1250mg Q8 Hrs (day 1 10/31) and Cefepime 2g (Day 1 11/1)  - f/u Vanc trough 9pm 11/2, goal Target 12 - 16.   - f/u ID recs for duration of antibiotics  - Contact Dr. Teran, normally sees patient when admitted to hospital.  - Pain Management: Tylenol prn for mild pain, percocet  po q4h for severe pain, Dilaudid .5mg IV Q4Hrs for breakthrough pain  - Continue dressing changes per last wound care recs.  - MRA of Left ankle: No evidence of osteomyelitis. 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

## 2022-11-02 NOTE — PROGRESS NOTE ADULT - ASSESSMENT
37-year-old woman with history of HTN, MO, and AVM L foot with chronic L foot ulcer s/p transcatheter embolization, recent admission 10/10-10/17 with wound infection, presents with fevers and L foot pain, MR L ankle negative for osteomyelitis, admitted with cellulitis. Surgical WCx from 10/10/22 grew Pseudomonas aeruginosa, S. haemolyticus, and Corynebacterium spp. Pseudomonas is still likely cause of infection with Staph and Corynebacterium representing skin wm.    Culture data:  10/10/22 Surgical WCx: Pseudomonas aeruginosa, S. haemolyticus, Corynebacterium striatum grp., C. jeikeium  10/11 WCx: Pseudomonas   10/11 BCx x2: no growth at 5 days  10/31 BCx x2: NGTD  11/1 WCx: Gm stain rare WBCs no organisms; Cx in progress    Recommendations:  -        37-year-old woman with history of HTN, obesity, and AVM L foot with chronic L foot ulcer s/p transcatheter embolization, recent admission 10/10-10/17 with same presentation, presents with fevers and L foot pain.     Imaging:  - 11/2 MR L ankle: Intramuscular and subcutaneous vascular malformation within the plantar aspect of the foot with phlegmonous/edematous infiltration within the subcutaneous tissues; no MR evidence of osteomyelitis. High-grade partial-thickness tearing of the ATFL and CFL with partial-thickness tearing of the deltoid ligament.  - Xray 10/31: Frontal, lateral and oblique views of the left foot demonstrate radiopaque material within the small vessels of the dorsal aspect of the foot with infiltrative radiopaque material within the subcutaneous tissues with adjacent soft tissue swelling. There is Achilles tendon spurring. There is no fracture. No dislocation. Tarsometatarsal joint is in appropriate alignment.    Culture data:  10/10/22 wound culture (swab): Pseudomonas aeruginosa, S. haemolyticus, Corynebacterium striatum grp., C. jeikeium  10/11 Surgical wound culture: Pseudomonas   10/11 BCx x2: no growth at 5 days  10/31 BCx x2: NGTD  11/1 WCx: Gm stain rare WBCs no organisms; Cx in progress    Recommendations:  - c/w cefepime 2g q8  - c/w vancomycin. Next vanc trough tonight 8pm. Target 12 - 16.   - F/u blood cultures X 2 from 10/31  - f/u wound swab from 11/1    ID team 1 will continue to follow

## 2022-11-02 NOTE — PROGRESS NOTE ADULT - SUBJECTIVE AND OBJECTIVE BOX
INFECTIOUS DISEASES CONSULT FOLLOW-UP NOTE    *INCOMPLETE*    INTERVAL HPI/OVERNIGHT EVENTS:    Subjective: Patient seen and examined at bedside. Denies fevers, chills, cough, abdominal pain, diarrhea.      ANTIBIOTICS/RELEVANT:    MEDICATIONS  (STANDING):  cefepime   IVPB 2000 milliGRAM(s) IV Intermittent every 8 hours  enoxaparin Injectable 40 milliGRAM(s) SubCutaneous every 12 hours  gabapentin 100 milliGRAM(s) Oral every 24 hours  gabapentin 800 milliGRAM(s) Oral every 24 hours  influenza   Vaccine 0.5 milliLiter(s) IntraMuscular once  nebivolol 10 milliGRAM(s) Oral <User Schedule>  senna 2 Tablet(s) Oral at bedtime  vancomycin  IVPB 1250 milliGRAM(s) IV Intermittent every 8 hours    MEDICATIONS  (PRN):  acetaminophen     Tablet .. 650 milliGRAM(s) Oral every 6 hours PRN Temp greater or equal to 38C (100.4F), Mild Pain (1 - 3)  HYDROmorphone  Injectable 0.5 milliGRAM(s) IV Push every 4 hours PRN breakthrough pain  oxycodone    5 mG/acetaminophen 325 mG 2 Tablet(s) Oral every 4 hours PRN Severe Pain (7 - 10)  polyethylene glycol 3350 17 Gram(s) Oral every 24 hours PRN Constipation        Vital Signs Last 24 Hrs  T(C): 36.9 (02 Nov 2022 06:00), Max: 36.9 (01 Nov 2022 20:25)  T(F): 98.5 (02 Nov 2022 06:00), Max: 98.5 (01 Nov 2022 20:25)  HR: 90 (02 Nov 2022 07:16) (72 - 93)  BP: 108/72 (02 Nov 2022 07:16) (108/72 - 154/91)  BP(mean): --  RR: 18 (02 Nov 2022 06:00) (17 - 18)  SpO2: 98% (02 Nov 2022 06:00) (96% - 98%)    Parameters below as of 02 Nov 2022 06:00  Patient On (Oxygen Delivery Method): room air          PHYSICAL EXAM  Constitutional: alert, NAD  Eyes: the sclera and conjunctiva were normal.   ENT: the ears and nose were normal in appearance.   Neck: the appearance of the neck was normal and the neck was supple.   Pulmonary: no respiratory distress and lungs CTA bilaterally.   Heart: heart rate was normal and rhythm regular, normal S1 and S2  Vascular: no peripheral edema  Abdomen: normal bowel sounds, soft, non-tender  Neurological: no focal deficits  Psychiatric: the affect was normal      LABS:                        11.3   7.21  )-----------( 384      ( 02 Nov 2022 05:30 )             34.8     11-02    140  |  108  |  16  ----------------------------<  99  4.1   |  19<L>  |  0.75    Ca    8.6      02 Nov 2022 05:30  Phos  3.7     11-02  Mg     1.8     11-02    TPro  7.0  /  Alb  4.0  /  TBili  0.8  /  DBili  x   /  AST  14  /  ALT  7<L>  /  AlkPhos  53  11-01    PT/INR - ( 31 Oct 2022 18:25 )   PT: 13.3 sec;   INR: 1.12          PTT - ( 31 Oct 2022 18:25 )  PTT:32.8 sec      MICROBIOLOGY:    Culture - Surgical Swab (collected 01 Nov 2022 02:31)  Source: .Surgical Swab  Left ankle wound culture  Gram Stain (01 Nov 2022 16:10):    No organisms seen    Rare WBC's  Preliminary Report (02 Nov 2022 11:43):    Culture in progress    Culture - Blood (collected 31 Oct 2022 07:35)  Source: .Blood Blood  Preliminary Report (01 Nov 2022 22:01):    No growth at 1 day.    Culture - Blood (collected 31 Oct 2022 07:30)  Source: .Blood Blood  Preliminary Report (01 Nov 2022 22:01):    No growth at 1 day.        RADIOLOGY & ADDITIONAL STUDIES:  Reviewed INFECTIOUS DISEASES CONSULT FOLLOW-UP NOTE    *INCOMPLETE*    INTERVAL HPI/OVERNIGHT EVENTS: Patient seen and examined at bedside. No overnight events. Has continued L foot/ankle pain, 9/10 in severity. Denies fevers, chills, cough, headache, abdominal pain, nausea, vomiting, diarrhea.      ANTIBIOTICS/RELEVANT:    MEDICATIONS  (STANDING):  cefepime   IVPB 2000 milliGRAM(s) IV Intermittent every 8 hours  enoxaparin Injectable 40 milliGRAM(s) SubCutaneous every 12 hours  gabapentin 100 milliGRAM(s) Oral every 24 hours  gabapentin 800 milliGRAM(s) Oral every 24 hours  influenza   Vaccine 0.5 milliLiter(s) IntraMuscular once  nebivolol 10 milliGRAM(s) Oral <User Schedule>  senna 2 Tablet(s) Oral at bedtime  vancomycin  IVPB 1250 milliGRAM(s) IV Intermittent every 8 hours    MEDICATIONS  (PRN):  acetaminophen     Tablet .. 650 milliGRAM(s) Oral every 6 hours PRN Temp greater or equal to 38C (100.4F), Mild Pain (1 - 3)  HYDROmorphone  Injectable 0.5 milliGRAM(s) IV Push every 4 hours PRN breakthrough pain  oxycodone    5 mG/acetaminophen 325 mG 2 Tablet(s) Oral every 4 hours PRN Severe Pain (7 - 10)  polyethylene glycol 3350 17 Gram(s) Oral every 24 hours PRN Constipation        Vital Signs Last 24 Hrs  T(C): 36.9 (02 Nov 2022 06:00), Max: 36.9 (01 Nov 2022 20:25)  T(F): 98.5 (02 Nov 2022 06:00), Max: 98.5 (01 Nov 2022 20:25)  HR: 90 (02 Nov 2022 07:16) (72 - 93)  BP: 108/72 (02 Nov 2022 07:16) (108/72 - 154/91)  BP(mean): --  RR: 18 (02 Nov 2022 06:00) (17 - 18)  SpO2: 98% (02 Nov 2022 06:00) (96% - 98%)    Parameters below as of 02 Nov 2022 06:00  Patient On (Oxygen Delivery Method): room air          PHYSICAL EXAM  Constitutional: Lying flat in bed, conversant, nontoxic  HEENT: NC, PERRL, sclerae anicteric, conjunctivae clear, EOMI.  Sinuses nontender, no nasal exudate.  No buccal or pharyngeal lesions, erythema or exudate  Neck: Supple, no adenopathy   Pulmonary: CTAB, no w/r/r.   Heart: RRR, S1 S2, no m/r/g  Abdomen: Symmetric, normoactive BS.  Soft, nontender, no masses, guarding or rebound.  Liver and spleen not enlarged  Extremities: No cyanosis or edema. R foot/ankle dressed, no erythema/warmth above dressing. 2+ equal DP/PT pulses  Neurological: Alert and oriented to person, place, and time. VINSON spontaneously.  Skin: no rashes        LABS:                        11.3   7.21  )-----------( 384      ( 02 Nov 2022 05:30 )             34.8     11-02    140  |  108  |  16  ----------------------------<  99  4.1   |  19<L>  |  0.75    Ca    8.6      02 Nov 2022 05:30  Phos  3.7     11-02  Mg     1.8     11-02    TPro  7.0  /  Alb  4.0  /  TBili  0.8  /  DBili  x   /  AST  14  /  ALT  7<L>  /  AlkPhos  53  11-01    PT/INR - ( 31 Oct 2022 18:25 )   PT: 13.3 sec;   INR: 1.12          PTT - ( 31 Oct 2022 18:25 )  PTT:32.8 sec      MICROBIOLOGY:    Culture - Surgical Swab (collected 01 Nov 2022 02:31)  Source: .Surgical Swab  Left ankle wound culture  Gram Stain (01 Nov 2022 16:10):    No organisms seen    Rare WBC's  Preliminary Report (02 Nov 2022 11:43):    Culture in progress    Culture - Blood (collected 31 Oct 2022 07:35)  Source: .Blood Blood  Preliminary Report (01 Nov 2022 22:01):    No growth at 1 day.    Culture - Blood (collected 31 Oct 2022 07:30)  Source: .Blood Blood  Preliminary Report (01 Nov 2022 22:01):    No growth at 1 day.        RADIOLOGY & ADDITIONAL STUDIES:  Reviewed INFECTIOUS DISEASES CONSULT FOLLOW-UP NOTE    INTERVAL HPI/OVERNIGHT EVENTS: Patient seen and examined at bedside. No overnight events. Has continued L foot/ankle pain, 9/10 in severity. Denies fevers, chills, cough, headache, abdominal pain, nausea, vomiting, diarrhea.      ANTIBIOTICS/RELEVANT:    MEDICATIONS  (STANDING):  cefepime   IVPB 2000 milliGRAM(s) IV Intermittent every 8 hours  enoxaparin Injectable 40 milliGRAM(s) SubCutaneous every 12 hours  gabapentin 100 milliGRAM(s) Oral every 24 hours  gabapentin 800 milliGRAM(s) Oral every 24 hours  influenza   Vaccine 0.5 milliLiter(s) IntraMuscular once  nebivolol 10 milliGRAM(s) Oral <User Schedule>  senna 2 Tablet(s) Oral at bedtime  vancomycin  IVPB 1250 milliGRAM(s) IV Intermittent every 8 hours    MEDICATIONS  (PRN):  acetaminophen     Tablet .. 650 milliGRAM(s) Oral every 6 hours PRN Temp greater or equal to 38C (100.4F), Mild Pain (1 - 3)  HYDROmorphone  Injectable 0.5 milliGRAM(s) IV Push every 4 hours PRN breakthrough pain  oxycodone    5 mG/acetaminophen 325 mG 2 Tablet(s) Oral every 4 hours PRN Severe Pain (7 - 10)  polyethylene glycol 3350 17 Gram(s) Oral every 24 hours PRN Constipation        Vital Signs Last 24 Hrs  T(C): 36.9 (02 Nov 2022 06:00), Max: 36.9 (01 Nov 2022 20:25)  T(F): 98.5 (02 Nov 2022 06:00), Max: 98.5 (01 Nov 2022 20:25)  HR: 90 (02 Nov 2022 07:16) (72 - 93)  BP: 108/72 (02 Nov 2022 07:16) (108/72 - 154/91)  BP(mean): --  RR: 18 (02 Nov 2022 06:00) (17 - 18)  SpO2: 98% (02 Nov 2022 06:00) (96% - 98%)    Parameters below as of 02 Nov 2022 06:00  Patient On (Oxygen Delivery Method): room air          PHYSICAL EXAM  Constitutional: Lying flat in bed, conversant, nontoxic  HEENT: NC, PERRL, sclerae anicteric, conjunctivae clear, EOMI.  Sinuses nontender, no nasal exudate.  No buccal or pharyngeal lesions, erythema or exudate  Neck: Supple, no adenopathy   Pulmonary: CTAB, no w/r/r.   Heart: RRR, S1 S2, no m/r/g  Abdomen: Symmetric, normoactive BS.  Soft, nontender, no masses, guarding or rebound.  Liver and spleen not enlarged  Extremities: No cyanosis or edema. L foot/ankle dressed, no erythema/warmth above dressing. 2+ equal DP/PT pulses  Neurological: Alert and oriented to person, place, and time. VINSON spontaneously.  Skin: no rashes        LABS:                        11.3   7.21  )-----------( 384      ( 02 Nov 2022 05:30 )             34.8     11-02    140  |  108  |  16  ----------------------------<  99  4.1   |  19<L>  |  0.75    Ca    8.6      02 Nov 2022 05:30  Phos  3.7     11-02  Mg     1.8     11-02    TPro  7.0  /  Alb  4.0  /  TBili  0.8  /  DBili  x   /  AST  14  /  ALT  7<L>  /  AlkPhos  53  11-01    PT/INR - ( 31 Oct 2022 18:25 )   PT: 13.3 sec;   INR: 1.12          PTT - ( 31 Oct 2022 18:25 )  PTT:32.8 sec      MICROBIOLOGY:    Culture - Surgical Swab (collected 01 Nov 2022 02:31)  Source: .Surgical Swab  Left ankle wound culture  Gram Stain (01 Nov 2022 16:10):    No organisms seen    Rare WBC's  Preliminary Report (02 Nov 2022 11:43):    Culture in progress    Culture - Blood (collected 31 Oct 2022 07:35)  Source: .Blood Blood  Preliminary Report (01 Nov 2022 22:01):    No growth at 1 day.    Culture - Blood (collected 31 Oct 2022 07:30)  Source: .Blood Blood  Preliminary Report (01 Nov 2022 22:01):    No growth at 1 day.        RADIOLOGY & ADDITIONAL STUDIES:  Reviewed

## 2022-11-02 NOTE — PROGRESS NOTE ADULT - ASSESSMENT
37F w/ PMHx of morbid obesity, HTN, L foot AVM s/p multiple DSE, recently hospitalized (10/10/22-10/17/22) for LLL ulcer debridement with Dr. Teran, presented for worsening left foot pain and erythema surrounding non-healing ulcer.

## 2022-11-03 LAB
ANION GAP SERPL CALC-SCNC: 12 MMOL/L — SIGNIFICANT CHANGE UP (ref 5–17)
BUN SERPL-MCNC: 11 MG/DL — SIGNIFICANT CHANGE UP (ref 7–23)
CALCIUM SERPL-MCNC: 8.8 MG/DL — SIGNIFICANT CHANGE UP (ref 8.4–10.5)
CHLORIDE SERPL-SCNC: 106 MMOL/L — SIGNIFICANT CHANGE UP (ref 96–108)
CO2 SERPL-SCNC: 23 MMOL/L — SIGNIFICANT CHANGE UP (ref 22–31)
CREAT SERPL-MCNC: 0.72 MG/DL — SIGNIFICANT CHANGE UP (ref 0.5–1.3)
EGFR: 109 ML/MIN/1.73M2 — SIGNIFICANT CHANGE UP
GLUCOSE SERPL-MCNC: 107 MG/DL — HIGH (ref 70–99)
HCT VFR BLD CALC: 36.7 % — SIGNIFICANT CHANGE UP (ref 34.5–45)
HGB BLD-MCNC: 11.8 G/DL — SIGNIFICANT CHANGE UP (ref 11.5–15.5)
MAGNESIUM SERPL-MCNC: 1.6 MG/DL — SIGNIFICANT CHANGE UP (ref 1.6–2.6)
MCHC RBC-ENTMCNC: 28.8 PG — SIGNIFICANT CHANGE UP (ref 27–34)
MCHC RBC-ENTMCNC: 32.2 GM/DL — SIGNIFICANT CHANGE UP (ref 32–36)
MCV RBC AUTO: 89.5 FL — SIGNIFICANT CHANGE UP (ref 80–100)
NRBC # BLD: 0 /100 WBCS — SIGNIFICANT CHANGE UP (ref 0–0)
PHOSPHATE SERPL-MCNC: 2.8 MG/DL — SIGNIFICANT CHANGE UP (ref 2.5–4.5)
PLATELET # BLD AUTO: 402 K/UL — HIGH (ref 150–400)
POTASSIUM SERPL-MCNC: 3.8 MMOL/L — SIGNIFICANT CHANGE UP (ref 3.5–5.3)
POTASSIUM SERPL-SCNC: 3.8 MMOL/L — SIGNIFICANT CHANGE UP (ref 3.5–5.3)
RBC # BLD: 4.1 M/UL — SIGNIFICANT CHANGE UP (ref 3.8–5.2)
RBC # FLD: 12.8 % — SIGNIFICANT CHANGE UP (ref 10.3–14.5)
SODIUM SERPL-SCNC: 141 MMOL/L — SIGNIFICANT CHANGE UP (ref 135–145)
WBC # BLD: 7.41 K/UL — SIGNIFICANT CHANGE UP (ref 3.8–10.5)
WBC # FLD AUTO: 7.41 K/UL — SIGNIFICANT CHANGE UP (ref 3.8–10.5)

## 2022-11-03 PROCEDURE — 99233 SBSQ HOSP IP/OBS HIGH 50: CPT | Mod: GC

## 2022-11-03 PROCEDURE — 99232 SBSQ HOSP IP/OBS MODERATE 35: CPT | Mod: GC

## 2022-11-03 RX ORDER — VANCOMYCIN HCL 1 G
1250 VIAL (EA) INTRAVENOUS ONCE
Refills: 0 | Status: DISCONTINUED | OUTPATIENT
Start: 2022-11-03 | End: 2022-11-03

## 2022-11-03 RX ADMIN — OXYCODONE AND ACETAMINOPHEN 2 TABLET(S): 5; 325 TABLET ORAL at 11:30

## 2022-11-03 RX ADMIN — OXYCODONE AND ACETAMINOPHEN 2 TABLET(S): 5; 325 TABLET ORAL at 10:26

## 2022-11-03 RX ADMIN — NEBIVOLOL HYDROCHLORIDE 10 MILLIGRAM(S): 5 TABLET ORAL at 07:35

## 2022-11-03 RX ADMIN — GABAPENTIN 800 MILLIGRAM(S): 400 CAPSULE ORAL at 14:38

## 2022-11-03 RX ADMIN — HYDROMORPHONE HYDROCHLORIDE 0.5 MILLIGRAM(S): 2 INJECTION INTRAMUSCULAR; INTRAVENOUS; SUBCUTANEOUS at 12:40

## 2022-11-03 RX ADMIN — HYDROMORPHONE HYDROCHLORIDE 0.5 MILLIGRAM(S): 2 INJECTION INTRAMUSCULAR; INTRAVENOUS; SUBCUTANEOUS at 16:50

## 2022-11-03 RX ADMIN — OXYCODONE AND ACETAMINOPHEN 2 TABLET(S): 5; 325 TABLET ORAL at 00:50

## 2022-11-03 RX ADMIN — OXYCODONE AND ACETAMINOPHEN 2 TABLET(S): 5; 325 TABLET ORAL at 07:25

## 2022-11-03 RX ADMIN — HYDROMORPHONE HYDROCHLORIDE 0.5 MILLIGRAM(S): 2 INJECTION INTRAMUSCULAR; INTRAVENOUS; SUBCUTANEOUS at 08:09

## 2022-11-03 RX ADMIN — OXYCODONE AND ACETAMINOPHEN 2 TABLET(S): 5; 325 TABLET ORAL at 18:53

## 2022-11-03 RX ADMIN — OXYCODONE AND ACETAMINOPHEN 2 TABLET(S): 5; 325 TABLET ORAL at 23:31

## 2022-11-03 RX ADMIN — HYDROMORPHONE HYDROCHLORIDE 0.5 MILLIGRAM(S): 2 INJECTION INTRAMUSCULAR; INTRAVENOUS; SUBCUTANEOUS at 16:31

## 2022-11-03 RX ADMIN — ENOXAPARIN SODIUM 40 MILLIGRAM(S): 100 INJECTION SUBCUTANEOUS at 17:54

## 2022-11-03 RX ADMIN — CEFEPIME 100 MILLIGRAM(S): 1 INJECTION, POWDER, FOR SOLUTION INTRAMUSCULAR; INTRAVENOUS at 22:24

## 2022-11-03 RX ADMIN — GABAPENTIN 100 MILLIGRAM(S): 400 CAPSULE ORAL at 07:35

## 2022-11-03 RX ADMIN — NEBIVOLOL HYDROCHLORIDE 10 MILLIGRAM(S): 5 TABLET ORAL at 19:26

## 2022-11-03 RX ADMIN — HYDROMORPHONE HYDROCHLORIDE 0.5 MILLIGRAM(S): 2 INJECTION INTRAMUSCULAR; INTRAVENOUS; SUBCUTANEOUS at 03:17

## 2022-11-03 RX ADMIN — CEFEPIME 100 MILLIGRAM(S): 1 INJECTION, POWDER, FOR SOLUTION INTRAMUSCULAR; INTRAVENOUS at 13:32

## 2022-11-03 RX ADMIN — ENOXAPARIN SODIUM 40 MILLIGRAM(S): 100 INJECTION SUBCUTANEOUS at 06:24

## 2022-11-03 RX ADMIN — HYDROMORPHONE HYDROCHLORIDE 0.5 MILLIGRAM(S): 2 INJECTION INTRAMUSCULAR; INTRAVENOUS; SUBCUTANEOUS at 12:26

## 2022-11-03 RX ADMIN — HYDROMORPHONE HYDROCHLORIDE 0.5 MILLIGRAM(S): 2 INJECTION INTRAMUSCULAR; INTRAVENOUS; SUBCUTANEOUS at 21:27

## 2022-11-03 RX ADMIN — OXYCODONE AND ACETAMINOPHEN 2 TABLET(S): 5; 325 TABLET ORAL at 14:38

## 2022-11-03 RX ADMIN — OXYCODONE AND ACETAMINOPHEN 2 TABLET(S): 5; 325 TABLET ORAL at 06:24

## 2022-11-03 RX ADMIN — HYDROMORPHONE HYDROCHLORIDE 0.5 MILLIGRAM(S): 2 INJECTION INTRAMUSCULAR; INTRAVENOUS; SUBCUTANEOUS at 04:17

## 2022-11-03 RX ADMIN — CEFEPIME 100 MILLIGRAM(S): 1 INJECTION, POWDER, FOR SOLUTION INTRAMUSCULAR; INTRAVENOUS at 06:23

## 2022-11-03 RX ADMIN — HYDROMORPHONE HYDROCHLORIDE 0.5 MILLIGRAM(S): 2 INJECTION INTRAMUSCULAR; INTRAVENOUS; SUBCUTANEOUS at 21:48

## 2022-11-03 NOTE — PROGRESS NOTE ADULT - PROBLEM SELECTOR PLAN 4
Pt with multiple Direct stick embolizations, and most recent is s/p angiogram and DSE 10/11/22 with Dr. Teran  - C/W home medications: Gabapentin 100mg AM and 800mg at noon, and Percocet.

## 2022-11-03 NOTE — PROGRESS NOTE ADULT - PROBLEM SELECTOR PLAN 3
*resolved  Presented with SIRS 2/4 tachycardia 102, leukocytosis 13 and home T max 101 (100.2 in the ED). Source m/p chronic foot ulceration.  - Improved: HR 72-93 BPM over past 24 hours, Leukocytes downtrending to 7.21 (11/2), afebrile since 11/1.  - BCx (10/31) and wound culture (11/1) NGTD *resolved  Presented with SIRS 2/4 tachycardia 102, leukocytosis 13 and home T max 101 (100.2 in the ED). Source m/p chronic foot ulceration.  - Improved: HR 79-90 BPM over past 24 hours, Leukocytes downtrending to 7.21 (11/2), afebrile since 11/1.  - BCx (10/31) and wound culture (11/1) NGTD

## 2022-11-03 NOTE — PROGRESS NOTE ADULT - PROBLEM SELECTOR PLAN 1
Pt with Left Lateral foot ulcer with purulence and fever at home Tmax 101. Recently hospitalized (10/10/22-10/17/22) for LLL angio/ulcer debridement and L lateral ankle AVM glue embolization (10/11/22) with Dr. Teran. s/p vanc/cefepime per ID (stopped 10/17/22), deep surgical wound cultures grew pseudomonas aeruginosa, staph haemolyticus, and campylobacter striatum and jeikeium. Foot Xray: preliminary read no signs of OM or NF, elevated ESR/CRP.  - Continue Vanc 1250mg Q8 Hrs (day 1 10/31) and Cefepime 2g (Day 1 11/1)  - f/u Vanc trough 9pm 11/2, goal Target 12 - 16.   - f/u ID recs for duration of antibiotics  - Contact Dr. Teran, normally sees patient when admitted to hospital.  - Pain Management: Tylenol prn for mild pain, percocet  po q4h for severe pain, Dilaudid .5mg IV Q4Hrs for breakthrough pain  - Continue dressing changes per last wound care recs.  - MRA of Left ankle: No evidence of osteomyelitis. 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 Pt with Left Lateral foot ulcer with purulence and fever at home Tmax 101. Recently hospitalized (10/10/22-10/17/22) for LLL angio/ulcer debridement and L lateral ankle AVM glue embolization (10/11/22) with Dr. Teran. s/p vanc/cefepime per ID (stopped 10/17/22), deep surgical wound cultures grew pseudomonas aeruginosa, staph haemolyticus, and campylobacter striatum and jeikeium. Foot Xray: preliminary read no signs of OM or NF, elevated ESR/CRP.  - Continue Vanc 1250mg Q8 Hrs (day 1 10/31) and Cefepime 2g (Day 1 11/1)  - f/u Vanc trough 5am 11/4  - f/u ID recs for duration of antibiotics  - Overall, ulcer appears to be slowly improving   - Contact Dr. Teran, normally sees patient when admitted to hospital.  - Pain Management: Tylenol prn for mild pain, percocet  po q4h for severe pain, and Dilaudid .5mg IV Q4Hrs for breakthrough pain  - Dressing changes BID per last vascular recs- WTD- wet 4x4 with NS flush and place over wound and wrap with kerlix.  - MRA of Left ankle: No evidence of osteomyelitis. 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 Pt with Left Lateral foot ulcer with purulence and fever at home Tmax 101. Recently hospitalized (10/10/22-10/17/22) for LLL angio/ulcer debridement and L lateral ankle AVM glue embolization (10/11/22) with Dr. Teran. s/p vanc/cefepime per ID (stopped 10/17/22), deep surgical wound cultures grew pseudomonas aeruginosa, staph haemolyticus, and campylobacter striatum and jeikeium. Foot Xray: preliminary read no signs of OM or NF, elevated ESR/CRP.  - Continue Cefepime 2g (Day 1 11/1) - txt for pseudomonas from prior surgical culture  - S/p Vanc 10/31-11/3  - Bcx and wound cultures NGTD  - f/u ID recs for duration of antibiotics  - Overall, ulcer appears to be slowly improving   - Contact Dr. Teran, normally sees patient when admitted to hospital.  - Pain Management: Tylenol prn for mild pain, percocet  po q4h for severe pain, and Dilaudid .5mg IV Q4Hrs for breakthrough pain  - Dressing changes BID per last vascular recs- WTD- wet 4x4 with NS flush and place over wound and wrap with kerlix.  - MRA of Left ankle: No evidence of osteomyelitis. 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

## 2022-11-03 NOTE — PROGRESS NOTE ADULT - PROBLEM SELECTOR PLAN 2
MRA of Left ankle: High-grade partial-thickness tearing of the ATFL and CFL with partial-thickness tearing of the deltoid ligament.  - PT inpatient and refer to outpatient PT   - consider follow-up w/ sports med outpatient   - continue w/ pain management as above

## 2022-11-03 NOTE — PROGRESS NOTE ADULT - SUBJECTIVE AND OBJECTIVE BOX
INFECTIOUS DISEASES CONSULT FOLLOW-UP NOTE    *INCOMPLETE*    INTERVAL HPI/OVERNIGHT EVENTS:    Subjective: Patient seen and examined at bedside. Denies fevers, chills, cough, abdominal pain, diarrhea.      ANTIBIOTICS/RELEVANT:    MEDICATIONS  (STANDING):  cefepime   IVPB 2000 milliGRAM(s) IV Intermittent every 8 hours  enoxaparin Injectable 40 milliGRAM(s) SubCutaneous every 12 hours  gabapentin 100 milliGRAM(s) Oral every 24 hours  gabapentin 800 milliGRAM(s) Oral every 24 hours  influenza   Vaccine 0.5 milliLiter(s) IntraMuscular once  nebivolol 10 milliGRAM(s) Oral <User Schedule>  senna 2 Tablet(s) Oral at bedtime    MEDICATIONS  (PRN):  acetaminophen     Tablet .. 650 milliGRAM(s) Oral every 6 hours PRN Temp greater or equal to 38C (100.4F), Mild Pain (1 - 3)  HYDROmorphone  Injectable 0.5 milliGRAM(s) IV Push every 4 hours PRN breakthrough pain  oxycodone    5 mG/acetaminophen 325 mG 2 Tablet(s) Oral every 4 hours PRN Severe Pain (7 - 10)  polyethylene glycol 3350 17 Gram(s) Oral every 24 hours PRN Constipation        Vital Signs Last 24 Hrs  T(C): 36.7 (03 Nov 2022 05:45), Max: 36.9 (02 Nov 2022 20:15)  T(F): 98.1 (03 Nov 2022 05:45), Max: 98.4 (02 Nov 2022 20:15)  HR: 79 (03 Nov 2022 05:45) (79 - 83)  BP: 123/82 (03 Nov 2022 05:45) (123/82 - 142/87)  BP(mean): --  RR: 19 (03 Nov 2022 05:45) (18 - 19)  SpO2: 98% (03 Nov 2022 05:45) (97% - 98%)    Parameters below as of 03 Nov 2022 05:45  Patient On (Oxygen Delivery Method): room air          PHYSICAL EXAM  Constitutional: alert, NAD  Eyes: the sclera and conjunctiva were normal.   ENT: the ears and nose were normal in appearance.   Neck: the appearance of the neck was normal and the neck was supple.   Pulmonary: no respiratory distress and lungs CTA bilaterally.   Heart: heart rate was normal and rhythm regular, normal S1 and S2  Vascular: no peripheral edema  Abdomen: normal bowel sounds, soft, non-tender  Neurological: no focal deficits  Psychiatric: the affect was normal      LABS:                        11.3   7.21  )-----------( 384      ( 02 Nov 2022 05:30 )             34.8     11-02    140  |  108  |  16  ----------------------------<  99  4.1   |  19<L>  |  0.75    Ca    8.6      02 Nov 2022 05:30  Phos  3.7     11-02  Mg     1.8     11-02            MICROBIOLOGY:    Culture - Surgical Swab (collected 01 Nov 2022 02:31)  Source: .Surgical Swab  Left ankle wound culture  Gram Stain (01 Nov 2022 16:10):    No organisms seen    Rare WBC's  Preliminary Report (02 Nov 2022 11:43):    Culture in progress    Culture - Blood (collected 31 Oct 2022 07:35)  Source: .Blood Blood  Preliminary Report (02 Nov 2022 22:00):    No growth at 2 days.    Culture - Blood (collected 31 Oct 2022 07:30)  Source: .Blood Blood  Preliminary Report (02 Nov 2022 22:00):    No growth at 2 days.        RADIOLOGY & ADDITIONAL STUDIES:  Reviewed INFECTIOUS DISEASES CONSULT FOLLOW-UP NOTE    *INCOMPLETE*    INTERVAL HPI/OVERNIGHT EVENTS: Lost IV access overnight, difficulty reestablishing and US-guided line was placed. Dc'ing vancomycin.    Subjective: Patient seen and examined at bedside. Says her pain is more manageable this morning. Denies fevers, chills, cough, abdominal pain, diarrhea.    MEDICATIONS  (STANDING):  cefepime   IVPB 2000 milliGRAM(s) IV Intermittent every 8 hours  enoxaparin Injectable 40 milliGRAM(s) SubCutaneous every 12 hours  gabapentin 100 milliGRAM(s) Oral every 24 hours  gabapentin 800 milliGRAM(s) Oral every 24 hours  influenza   Vaccine 0.5 milliLiter(s) IntraMuscular once  nebivolol 10 milliGRAM(s) Oral <User Schedule>  senna 2 Tablet(s) Oral at bedtime    MEDICATIONS  (PRN):  acetaminophen     Tablet .. 650 milliGRAM(s) Oral every 6 hours PRN Temp greater or equal to 38C (100.4F), Mild Pain (1 - 3)  HYDROmorphone  Injectable 0.5 milliGRAM(s) IV Push every 4 hours PRN breakthrough pain  oxycodone    5 mG/acetaminophen 325 mG 2 Tablet(s) Oral every 4 hours PRN Severe Pain (7 - 10)  polyethylene glycol 3350 17 Gram(s) Oral every 24 hours PRN Constipation        Vital Signs Last 24 Hrs  T(C): 36.7 (03 Nov 2022 05:45), Max: 36.9 (02 Nov 2022 20:15)  T(F): 98.1 (03 Nov 2022 05:45), Max: 98.4 (02 Nov 2022 20:15)  HR: 79 (03 Nov 2022 05:45) (79 - 83)  BP: 123/82 (03 Nov 2022 05:45) (123/82 - 142/87)  BP(mean): --  RR: 19 (03 Nov 2022 05:45) (18 - 19)  SpO2: 98% (03 Nov 2022 05:45) (97% - 98%)    Parameters below as of 03 Nov 2022 05:45  Patient On (Oxygen Delivery Method): room air      PHYSICAL EXAM  Constitutional: Lying flat in bed, conversant, nontoxic  HEENT: NC, PERRL, sclerae anicteric, conjunctivae clear.  Neck: Supple, no adenopathy   Pulmonary: CTAB, no w/r/r.   Heart: RRR, S1 S2, no m/r/g  Abdomen: Symmetric, normoactive BS.  Soft, nontender, no masses, guarding or rebound.  Liver and spleen not enlarged.  Extremities: No cyanosis or edema. L foot/ankle dressed, no erythema/warmth above dressing. 2+ equal DP/PT pulses  Neurological: Alert and oriented to person, place, and time. VINSON spontaneously.  Skin: no rashes    LABS:                        11.3   7.21  )-----------( 384      ( 02 Nov 2022 05:30 )             34.8     11-02    140  |  108  |  16  ----------------------------<  99  4.1   |  19<L>  |  0.75    Ca    8.6      02 Nov 2022 05:30  Phos  3.7     11-02  Mg     1.8     11-02            MICROBIOLOGY:    Culture - Surgical Swab (collected 01 Nov 2022 02:31)  Source: .Surgical Swab  Left ankle wound culture  Gram Stain (01 Nov 2022 16:10):    No organisms seen    Rare WBC's  Preliminary Report (02 Nov 2022 11:43):    Culture in progress    Culture - Blood (collected 31 Oct 2022 07:35)  Source: .Blood Blood  Preliminary Report (02 Nov 2022 22:00):    No growth at 2 days.    Culture - Blood (collected 31 Oct 2022 07:30)  Source: .Blood Blood  Preliminary Report (02 Nov 2022 22:00):    No growth at 2 days.        RADIOLOGY & ADDITIONAL STUDIES:  Reviewed INFECTIOUS DISEASES CONSULT FOLLOW-UP NOTE      INTERVAL HPI/OVERNIGHT EVENTS: Lost IV access overnight, difficulty reestablishing and US-guided line was placed. Dc'ing vancomycin.    Subjective: Patient seen and examined at bedside. Says her pain is more manageable this morning. Denies fevers, chills, cough, abdominal pain, diarrhea.    MEDICATIONS  (STANDING):  cefepime   IVPB 2000 milliGRAM(s) IV Intermittent every 8 hours  enoxaparin Injectable 40 milliGRAM(s) SubCutaneous every 12 hours  gabapentin 100 milliGRAM(s) Oral every 24 hours  gabapentin 800 milliGRAM(s) Oral every 24 hours  influenza   Vaccine 0.5 milliLiter(s) IntraMuscular once  nebivolol 10 milliGRAM(s) Oral <User Schedule>  senna 2 Tablet(s) Oral at bedtime    MEDICATIONS  (PRN):  acetaminophen     Tablet .. 650 milliGRAM(s) Oral every 6 hours PRN Temp greater or equal to 38C (100.4F), Mild Pain (1 - 3)  HYDROmorphone  Injectable 0.5 milliGRAM(s) IV Push every 4 hours PRN breakthrough pain  oxycodone    5 mG/acetaminophen 325 mG 2 Tablet(s) Oral every 4 hours PRN Severe Pain (7 - 10)  polyethylene glycol 3350 17 Gram(s) Oral every 24 hours PRN Constipation        Vital Signs Last 24 Hrs  T(C): 36.7 (03 Nov 2022 05:45), Max: 36.9 (02 Nov 2022 20:15)  T(F): 98.1 (03 Nov 2022 05:45), Max: 98.4 (02 Nov 2022 20:15)  HR: 79 (03 Nov 2022 05:45) (79 - 83)  BP: 123/82 (03 Nov 2022 05:45) (123/82 - 142/87)  BP(mean): --  RR: 19 (03 Nov 2022 05:45) (18 - 19)  SpO2: 98% (03 Nov 2022 05:45) (97% - 98%)    Parameters below as of 03 Nov 2022 05:45  Patient On (Oxygen Delivery Method): room air      PHYSICAL EXAM  Constitutional: Lying flat in bed, conversant, nontoxic  HEENT: NC, PERRL, sclerae anicteric, conjunctivae clear.  Neck: Supple, no adenopathy   Pulmonary: CTAB, no w/r/r.   Heart: RRR, S1 S2, no m/r/g  Abdomen: Symmetric, normoactive BS.  Soft, nontender, no masses, guarding or rebound.  Liver and spleen not enlarged.  Extremities: No cyanosis or edema. L foot/ankle dressed, no erythema/warmth above dressing. 2+ equal DP/PT pulses  Neurological: Alert and oriented to person, place, and time. VINSON spontaneously.  Skin: no rashes    LABS:                        11.3   7.21  )-----------( 384      ( 02 Nov 2022 05:30 )             34.8     11-02    140  |  108  |  16  ----------------------------<  99  4.1   |  19<L>  |  0.75    Ca    8.6      02 Nov 2022 05:30  Phos  3.7     11-02  Mg     1.8     11-02            MICROBIOLOGY:    Culture - Surgical Swab (collected 01 Nov 2022 02:31)  Source: .Surgical Swab  Left ankle wound culture  Gram Stain (01 Nov 2022 16:10):    No organisms seen    Rare WBC's  Preliminary Report (02 Nov 2022 11:43):    Culture in progress    Culture - Blood (collected 31 Oct 2022 07:35)  Source: .Blood Blood  Preliminary Report (02 Nov 2022 22:00):    No growth at 2 days.    Culture - Blood (collected 31 Oct 2022 07:30)  Source: .Blood Blood  Preliminary Report (02 Nov 2022 22:00):    No growth at 2 days.        RADIOLOGY & ADDITIONAL STUDIES:  Reviewed

## 2022-11-03 NOTE — PROGRESS NOTE ADULT - SUBJECTIVE AND OBJECTIVE BOX
*incomplete    OVERNIGHT EVENTS:    SUBJECTIVE / INTERVAL HPI: Patient seen and examined at bedside.     VITAL SIGNS:  Vital Signs Last 24 Hrs  T(C): 36.7 (03 Nov 2022 05:45), Max: 36.9 (02 Nov 2022 20:15)  T(F): 98.1 (03 Nov 2022 05:45), Max: 98.4 (02 Nov 2022 20:15)  HR: 79 (03 Nov 2022 05:45) (79 - 83)  BP: 123/82 (03 Nov 2022 05:45) (123/82 - 142/87)  BP(mean): --  RR: 19 (03 Nov 2022 05:45) (18 - 19)  SpO2: 98% (03 Nov 2022 05:45) (97% - 98%)    Parameters below as of 03 Nov 2022 05:45  Patient On (Oxygen Delivery Method): room air        PHYSICAL EXAM:    General: alert, in no acute distress  HEENT: NC/AT; PERRL, anicteric sclera; MMM  Neck: supple  Cardiovascular: +S1/S2, RRR  Respiratory: CTA B/L; no W/R/R  Gastrointestinal: soft, NT/ND; +BSx4  Extremities: WWP; no edema, clubbing or cyanosis  Vascular: 2+ radial, DP/PT pulses B/L  Neurological: AAOx3; no focal deficits    MEDICATIONS:  MEDICATIONS  (STANDING):  cefepime   IVPB 2000 milliGRAM(s) IV Intermittent every 8 hours  enoxaparin Injectable 40 milliGRAM(s) SubCutaneous every 12 hours  gabapentin 100 milliGRAM(s) Oral every 24 hours  gabapentin 800 milliGRAM(s) Oral every 24 hours  influenza   Vaccine 0.5 milliLiter(s) IntraMuscular once  nebivolol 10 milliGRAM(s) Oral <User Schedule>  senna 2 Tablet(s) Oral at bedtime  vancomycin  IVPB 1250 milliGRAM(s) IV Intermittent once  vancomycin  IVPB 1250 milliGRAM(s) IV Intermittent once    MEDICATIONS  (PRN):  acetaminophen     Tablet .. 650 milliGRAM(s) Oral every 6 hours PRN Temp greater or equal to 38C (100.4F), Mild Pain (1 - 3)  HYDROmorphone  Injectable 0.5 milliGRAM(s) IV Push every 4 hours PRN breakthrough pain  oxycodone    5 mG/acetaminophen 325 mG 2 Tablet(s) Oral every 4 hours PRN Severe Pain (7 - 10)  polyethylene glycol 3350 17 Gram(s) Oral every 24 hours PRN Constipation      ALLERGIES:  Allergies    amoxicillin (Angioedema)  ciprofloxacin (Rash)  hydrochlorothiazide (Hives)  lisinopril (Angioedema; Rash; Hives)  penicillin (Rash)    Intolerances        LABS:                        11.3   7.21  )-----------( 384      ( 02 Nov 2022 05:30 )             34.8     11-02    140  |  108  |  16  ----------------------------<  99  4.1   |  19<L>  |  0.75    Ca    8.6      02 Nov 2022 05:30  Phos  3.7     11-02  Mg     1.8     11-02          CAPILLARY BLOOD GLUCOSE          RADIOLOGY & ADDITIONAL TESTS: Reviewed.   OVERNIGHT EVENTS:  Patient IV infiltrated and took a few hours to place a new one. Received dose of Vancomycin that finished at 6am. Vancomycin rescheduled Q8 from that time, next dose at 2pm.     SUBJECTIVE / INTERVAL HPI: Patient seen and examined at bedside. This morning she complains of pain in her foot and says her night was long while they were trying to place a new IV for her. She also is frustrated that her dressing on her foot hasn't been changed. Denies fever, chills, sob, abdominal pain.    VITAL SIGNS:  Vital Signs Last 24 Hrs  T(C): 36.7 (03 Nov 2022 05:45), Max: 36.9 (02 Nov 2022 20:15)  T(F): 98.1 (03 Nov 2022 05:45), Max: 98.4 (02 Nov 2022 20:15)  HR: 79 (03 Nov 2022 05:45) (79 - 83)  BP: 123/82 (03 Nov 2022 05:45) (123/82 - 142/87)  BP(mean): --  RR: 19 (03 Nov 2022 05:45) (18 - 19)  SpO2: 98% (03 Nov 2022 05:45) (97% - 98%)    Parameters below as of 03 Nov 2022 05:45  Patient On (Oxygen Delivery Method): room air      PHYSICAL EXAM:  General: A&Ox3. Awake and laying comfortably in bed.  HEENT: Palpebral conjunctiva pink and sclera white BL. Mucosa pink and moist.  Neck: Supple, FROM, no JVD.  Cardiovascular: Clear S1 and S2. No murmurs, gallops or rubs.  Respiratory: Clear to auscultation bilaterally. No wheezing, rhonchi or rales.  Gastrointestinal: Nondistended, nontender. BS normoactive x4.  Extremities: Left ankle wrapped w/ bandaging. Left lateral ankle w/ 2x2cm ulcer red without pus w/ surrounding erythema. Tenderness to palpation around the wound.  No discoloration, brittle nails, varicose veins, clubbing or edema noted.  Vascular: 2+ equally bilaterally dorsalis pedis and posterior tibial.  Neurological: Sensation intact equally bilaterally in all extremities.    MEDICATIONS:  MEDICATIONS  (STANDING):  cefepime   IVPB 2000 milliGRAM(s) IV Intermittent every 8 hours  enoxaparin Injectable 40 milliGRAM(s) SubCutaneous every 12 hours  gabapentin 100 milliGRAM(s) Oral every 24 hours  gabapentin 800 milliGRAM(s) Oral every 24 hours  influenza   Vaccine 0.5 milliLiter(s) IntraMuscular once  nebivolol 10 milliGRAM(s) Oral <User Schedule>  senna 2 Tablet(s) Oral at bedtime  vancomycin  IVPB 1250 milliGRAM(s) IV Intermittent once  vancomycin  IVPB 1250 milliGRAM(s) IV Intermittent once    MEDICATIONS  (PRN):  acetaminophen     Tablet .. 650 milliGRAM(s) Oral every 6 hours PRN Temp greater or equal to 38C (100.4F), Mild Pain (1 - 3)  HYDROmorphone  Injectable 0.5 milliGRAM(s) IV Push every 4 hours PRN breakthrough pain  oxycodone    5 mG/acetaminophen 325 mG 2 Tablet(s) Oral every 4 hours PRN Severe Pain (7 - 10)  polyethylene glycol 3350 17 Gram(s) Oral every 24 hours PRN Constipation      ALLERGIES:  Allergies    amoxicillin (Angioedema)  ciprofloxacin (Rash)  hydrochlorothiazide (Hives)  lisinopril (Angioedema; Rash; Hives)  penicillin (Rash)    Intolerances        LABS:                        11.3   7.21  )-----------( 384      ( 02 Nov 2022 05:30 )             34.8     11-02    140  |  108  |  16  ----------------------------<  99  4.1   |  19<L>  |  0.75    Ca    8.6      02 Nov 2022 05:30  Phos  3.7     11-02  Mg     1.8     11-02          CAPILLARY BLOOD GLUCOSE          RADIOLOGY & ADDITIONAL TESTS: Reviewed.

## 2022-11-03 NOTE — PROGRESS NOTE ADULT - ASSESSMENT
37-year-old woman with history of HTN, obesity, and AVM L foot with chronic L foot ulcer s/p transcatheter embolization, recent admission 10/10-10/17 with same presentation, presents with fevers and L foot pain.     Imaging:  - 11/2 MR L ankle: Intramuscular and subcutaneous vascular malformation within the plantar aspect of the foot with phlegmonous/edematous infiltration within the subcutaneous tissues; no MR evidence of osteomyelitis. High-grade partial-thickness tearing of the ATFL and CFL with partial-thickness tearing of the deltoid ligament.  - Xray 10/31: Frontal, lateral and oblique views of the left foot demonstrate radiopaque material within the small vessels of the dorsal aspect of the foot with infiltrative radiopaque material within the subcutaneous tissues with adjacent soft tissue swelling. There is Achilles tendon spurring. There is no fracture. No dislocation. Tarsometatarsal joint is in appropriate alignment.    Culture data:  10/10/22 wound culture (swab): Pseudomonas aeruginosa, S. haemolyticus, Corynebacterium striatum grp., C. jeikeium  10/11 Surgical wound culture: Pseudomonas   10/11 BCx x2: no growth at 5 days  10/31 BCx x2: NGTD  11/1 WCx: Gm stain rare WBCs no organisms; Cx in progress    Recommendations:  - discontinue vancomycin  - c/w cefepime 2g q8  - F/u blood cultures X 2 from 10/31  - f/u wound swab from 11/1    ID team 1 will continue to follow

## 2022-11-04 LAB
ALBUMIN SERPL ELPH-MCNC: 4 G/DL — SIGNIFICANT CHANGE UP (ref 3.3–5)
ALP SERPL-CCNC: 63 U/L — SIGNIFICANT CHANGE UP (ref 40–120)
ALT FLD-CCNC: 11 U/L — SIGNIFICANT CHANGE UP (ref 10–45)
ANION GAP SERPL CALC-SCNC: 10 MMOL/L — SIGNIFICANT CHANGE UP (ref 5–17)
AST SERPL-CCNC: 11 U/L — SIGNIFICANT CHANGE UP (ref 10–40)
BILIRUB SERPL-MCNC: 0.4 MG/DL — SIGNIFICANT CHANGE UP (ref 0.2–1.2)
BUN SERPL-MCNC: 12 MG/DL — SIGNIFICANT CHANGE UP (ref 7–23)
CALCIUM SERPL-MCNC: 9 MG/DL — SIGNIFICANT CHANGE UP (ref 8.4–10.5)
CHLORIDE SERPL-SCNC: 104 MMOL/L — SIGNIFICANT CHANGE UP (ref 96–108)
CO2 SERPL-SCNC: 25 MMOL/L — SIGNIFICANT CHANGE UP (ref 22–31)
CREAT SERPL-MCNC: 0.65 MG/DL — SIGNIFICANT CHANGE UP (ref 0.5–1.3)
EGFR: 115 ML/MIN/1.73M2 — SIGNIFICANT CHANGE UP
GLUCOSE SERPL-MCNC: 96 MG/DL — SIGNIFICANT CHANGE UP (ref 70–99)
HCT VFR BLD CALC: 37.2 % — SIGNIFICANT CHANGE UP (ref 34.5–45)
HGB BLD-MCNC: 11.9 G/DL — SIGNIFICANT CHANGE UP (ref 11.5–15.5)
MAGNESIUM SERPL-MCNC: 1.7 MG/DL — SIGNIFICANT CHANGE UP (ref 1.6–2.6)
MCHC RBC-ENTMCNC: 28.8 PG — SIGNIFICANT CHANGE UP (ref 27–34)
MCHC RBC-ENTMCNC: 32 GM/DL — SIGNIFICANT CHANGE UP (ref 32–36)
MCV RBC AUTO: 90.1 FL — SIGNIFICANT CHANGE UP (ref 80–100)
NRBC # BLD: 0 /100 WBCS — SIGNIFICANT CHANGE UP (ref 0–0)
PHOSPHATE SERPL-MCNC: 3 MG/DL — SIGNIFICANT CHANGE UP (ref 2.5–4.5)
PLATELET # BLD AUTO: 395 K/UL — SIGNIFICANT CHANGE UP (ref 150–400)
POTASSIUM SERPL-MCNC: 3.9 MMOL/L — SIGNIFICANT CHANGE UP (ref 3.5–5.3)
POTASSIUM SERPL-SCNC: 3.9 MMOL/L — SIGNIFICANT CHANGE UP (ref 3.5–5.3)
PROT SERPL-MCNC: 6.9 G/DL — SIGNIFICANT CHANGE UP (ref 6–8.3)
RBC # BLD: 4.13 M/UL — SIGNIFICANT CHANGE UP (ref 3.8–5.2)
RBC # FLD: 12.7 % — SIGNIFICANT CHANGE UP (ref 10.3–14.5)
SODIUM SERPL-SCNC: 139 MMOL/L — SIGNIFICANT CHANGE UP (ref 135–145)
WBC # BLD: 7.29 K/UL — SIGNIFICANT CHANGE UP (ref 3.8–10.5)
WBC # FLD AUTO: 7.29 K/UL — SIGNIFICANT CHANGE UP (ref 3.8–10.5)

## 2022-11-04 PROCEDURE — 36569 INSJ PICC 5 YR+ W/O IMAGING: CPT

## 2022-11-04 PROCEDURE — 99232 SBSQ HOSP IP/OBS MODERATE 35: CPT | Mod: GC

## 2022-11-04 PROCEDURE — 76937 US GUIDE VASCULAR ACCESS: CPT | Mod: 26,59

## 2022-11-04 RX ORDER — SODIUM CHLORIDE 9 MG/ML
10 INJECTION INTRAMUSCULAR; INTRAVENOUS; SUBCUTANEOUS
Refills: 0 | Status: DISCONTINUED | OUTPATIENT
Start: 2022-11-04 | End: 2022-11-11

## 2022-11-04 RX ORDER — CHLORHEXIDINE GLUCONATE 213 G/1000ML
1 SOLUTION TOPICAL
Refills: 0 | Status: DISCONTINUED | OUTPATIENT
Start: 2022-11-04 | End: 2022-11-11

## 2022-11-04 RX ORDER — VANCOMYCIN HCL 1 G
1000 VIAL (EA) INTRAVENOUS EVERY 8 HOURS
Refills: 0 | Status: COMPLETED | OUTPATIENT
Start: 2022-11-04 | End: 2022-11-05

## 2022-11-04 RX ADMIN — HYDROMORPHONE HYDROCHLORIDE 0.5 MILLIGRAM(S): 2 INJECTION INTRAMUSCULAR; INTRAVENOUS; SUBCUTANEOUS at 01:31

## 2022-11-04 RX ADMIN — HYDROMORPHONE HYDROCHLORIDE 0.5 MILLIGRAM(S): 2 INJECTION INTRAMUSCULAR; INTRAVENOUS; SUBCUTANEOUS at 20:34

## 2022-11-04 RX ADMIN — HYDROMORPHONE HYDROCHLORIDE 0.5 MILLIGRAM(S): 2 INJECTION INTRAMUSCULAR; INTRAVENOUS; SUBCUTANEOUS at 01:46

## 2022-11-04 RX ADMIN — ENOXAPARIN SODIUM 40 MILLIGRAM(S): 100 INJECTION SUBCUTANEOUS at 06:12

## 2022-11-04 RX ADMIN — HYDROMORPHONE HYDROCHLORIDE 0.5 MILLIGRAM(S): 2 INJECTION INTRAMUSCULAR; INTRAVENOUS; SUBCUTANEOUS at 06:27

## 2022-11-04 RX ADMIN — CEFEPIME 100 MILLIGRAM(S): 1 INJECTION, POWDER, FOR SOLUTION INTRAMUSCULAR; INTRAVENOUS at 06:12

## 2022-11-04 RX ADMIN — OXYCODONE AND ACETAMINOPHEN 2 TABLET(S): 5; 325 TABLET ORAL at 18:04

## 2022-11-04 RX ADMIN — NEBIVOLOL HYDROCHLORIDE 10 MILLIGRAM(S): 5 TABLET ORAL at 19:38

## 2022-11-04 RX ADMIN — HYDROMORPHONE HYDROCHLORIDE 0.5 MILLIGRAM(S): 2 INJECTION INTRAMUSCULAR; INTRAVENOUS; SUBCUTANEOUS at 15:42

## 2022-11-04 RX ADMIN — OXYCODONE AND ACETAMINOPHEN 2 TABLET(S): 5; 325 TABLET ORAL at 13:31

## 2022-11-04 RX ADMIN — OXYCODONE AND ACETAMINOPHEN 2 TABLET(S): 5; 325 TABLET ORAL at 03:22

## 2022-11-04 RX ADMIN — OXYCODONE AND ACETAMINOPHEN 2 TABLET(S): 5; 325 TABLET ORAL at 09:40

## 2022-11-04 RX ADMIN — HYDROMORPHONE HYDROCHLORIDE 0.5 MILLIGRAM(S): 2 INJECTION INTRAMUSCULAR; INTRAVENOUS; SUBCUTANEOUS at 11:26

## 2022-11-04 RX ADMIN — Medication 250 MILLIGRAM(S): at 17:14

## 2022-11-04 RX ADMIN — GABAPENTIN 100 MILLIGRAM(S): 400 CAPSULE ORAL at 07:40

## 2022-11-04 RX ADMIN — OXYCODONE AND ACETAMINOPHEN 2 TABLET(S): 5; 325 TABLET ORAL at 00:31

## 2022-11-04 RX ADMIN — GABAPENTIN 800 MILLIGRAM(S): 400 CAPSULE ORAL at 15:08

## 2022-11-04 RX ADMIN — OXYCODONE AND ACETAMINOPHEN 2 TABLET(S): 5; 325 TABLET ORAL at 23:55

## 2022-11-04 RX ADMIN — OXYCODONE AND ACETAMINOPHEN 2 TABLET(S): 5; 325 TABLET ORAL at 08:45

## 2022-11-04 RX ADMIN — CEFEPIME 100 MILLIGRAM(S): 1 INJECTION, POWDER, FOR SOLUTION INTRAMUSCULAR; INTRAVENOUS at 22:05

## 2022-11-04 RX ADMIN — OXYCODONE AND ACETAMINOPHEN 2 TABLET(S): 5; 325 TABLET ORAL at 22:55

## 2022-11-04 RX ADMIN — OXYCODONE AND ACETAMINOPHEN 2 TABLET(S): 5; 325 TABLET ORAL at 18:30

## 2022-11-04 RX ADMIN — HYDROMORPHONE HYDROCHLORIDE 0.5 MILLIGRAM(S): 2 INJECTION INTRAMUSCULAR; INTRAVENOUS; SUBCUTANEOUS at 06:12

## 2022-11-04 RX ADMIN — OXYCODONE AND ACETAMINOPHEN 2 TABLET(S): 5; 325 TABLET ORAL at 14:09

## 2022-11-04 RX ADMIN — NEBIVOLOL HYDROCHLORIDE 10 MILLIGRAM(S): 5 TABLET ORAL at 07:40

## 2022-11-04 RX ADMIN — HYDROMORPHONE HYDROCHLORIDE 0.5 MILLIGRAM(S): 2 INJECTION INTRAMUSCULAR; INTRAVENOUS; SUBCUTANEOUS at 21:34

## 2022-11-04 RX ADMIN — HYDROMORPHONE HYDROCHLORIDE 0.5 MILLIGRAM(S): 2 INJECTION INTRAMUSCULAR; INTRAVENOUS; SUBCUTANEOUS at 11:37

## 2022-11-04 RX ADMIN — ENOXAPARIN SODIUM 40 MILLIGRAM(S): 100 INJECTION SUBCUTANEOUS at 17:13

## 2022-11-04 RX ADMIN — CEFEPIME 100 MILLIGRAM(S): 1 INJECTION, POWDER, FOR SOLUTION INTRAMUSCULAR; INTRAVENOUS at 13:31

## 2022-11-04 NOTE — PROGRESS NOTE ADULT - SUBJECTIVE AND OBJECTIVE BOX
INFECTIOUS DISEASES CONSULT FOLLOW-UP NOTE    *INCOMPLETE*    INTERVAL HPI/OVERNIGHT EVENTS:    Subjective: Patient seen and examined at bedside. Denies fevers, chills, cough, abdominal pain, diarrhea.      ANTIBIOTICS/RELEVANT:    MEDICATIONS  (STANDING):  cefepime   IVPB 2000 milliGRAM(s) IV Intermittent every 8 hours  enoxaparin Injectable 40 milliGRAM(s) SubCutaneous every 12 hours  gabapentin 100 milliGRAM(s) Oral every 24 hours  gabapentin 800 milliGRAM(s) Oral every 24 hours  influenza   Vaccine 0.5 milliLiter(s) IntraMuscular once  nebivolol 10 milliGRAM(s) Oral <User Schedule>  senna 2 Tablet(s) Oral at bedtime    MEDICATIONS  (PRN):  acetaminophen     Tablet .. 650 milliGRAM(s) Oral every 6 hours PRN Temp greater or equal to 38C (100.4F), Mild Pain (1 - 3)  HYDROmorphone  Injectable 0.5 milliGRAM(s) IV Push every 4 hours PRN breakthrough pain  oxycodone    5 mG/acetaminophen 325 mG 2 Tablet(s) Oral every 4 hours PRN Severe Pain (7 - 10)  polyethylene glycol 3350 17 Gram(s) Oral every 24 hours PRN Constipation        Vital Signs Last 24 Hrs  T(C): 36.6 (04 Nov 2022 06:22), Max: 36.8 (03 Nov 2022 20:12)  T(F): 97.9 (04 Nov 2022 06:22), Max: 98.2 (03 Nov 2022 20:12)  HR: 76 (04 Nov 2022 07:40) (72 - 85)  BP: 138/83 (04 Nov 2022 07:40) (131/86 - 143/88)  BP(mean): 105 (04 Nov 2022 06:22) (105 - 105)  RR: 18 (04 Nov 2022 06:22) (18 - 18)  SpO2: 98% (04 Nov 2022 06:22) (98% - 98%)    Parameters below as of 04 Nov 2022 06:22  Patient On (Oxygen Delivery Method): room air          PHYSICAL EXAM  Constitutional: alert, NAD  Eyes: the sclera and conjunctiva were normal.   ENT: the ears and nose were normal in appearance.   Neck: the appearance of the neck was normal and the neck was supple.   Pulmonary: no respiratory distress and lungs CTA bilaterally.   Heart: heart rate was normal and rhythm regular, normal S1 and S2  Vascular: no peripheral edema  Abdomen: normal bowel sounds, soft, non-tender  Neurological: no focal deficits  Psychiatric: the affect was normal      LABS:                        11.9   7.29  )-----------( 395      ( 04 Nov 2022 05:30 )             37.2     11-04    139  |  104  |  12  ----------------------------<  96  3.9   |  25  |  0.65    Ca    9.0      04 Nov 2022 05:30  Phos  3.0     11-04  Mg     1.7     11-04    TPro  6.9  /  Alb  4.0  /  TBili  0.4  /  DBili  x   /  AST  11  /  ALT  11  /  AlkPhos  63  11-04          MICROBIOLOGY:    Culture - Surgical Swab (collected 01 Nov 2022 02:31)  Source: .Surgical Swab  Left ankle wound culture  Gram Stain (01 Nov 2022 16:10):    No organisms seen    Rare WBC's  Preliminary Report (03 Nov 2022 15:54):    Rare Coag Negative Staphylococcus    Culture - Blood (collected 31 Oct 2022 07:35)  Source: .Blood Blood  Preliminary Report (03 Nov 2022 22:00):    No growth at 3 days.    Culture - Blood (collected 31 Oct 2022 07:30)  Source: .Blood Blood  Preliminary Report (03 Nov 2022 22:00):    No growth at 3 days.        RADIOLOGY & ADDITIONAL STUDIES:  Reviewed INFECTIOUS DISEASES CONSULT FOLLOW-UP NOTE    *INCOMPLETE*    INTERVAL HPI/OVERNIGHT EVENTS:    Subjective: Patient seen and examined at bedside. Has continued L foot/ankle pain that is manageable with pain medication. Is having dressings changed 3x a day. Denies fevers, chills, headache, photophobia, cough, abdominal pain, diarrhea, dysuria, hematuria.      ANTIBIOTICS/RELEVANT:    MEDICATIONS  (STANDING):  cefepime   IVPB 2000 milliGRAM(s) IV Intermittent every 8 hours  enoxaparin Injectable 40 milliGRAM(s) SubCutaneous every 12 hours  gabapentin 100 milliGRAM(s) Oral every 24 hours  gabapentin 800 milliGRAM(s) Oral every 24 hours  influenza   Vaccine 0.5 milliLiter(s) IntraMuscular once  nebivolol 10 milliGRAM(s) Oral <User Schedule>  senna 2 Tablet(s) Oral at bedtime    MEDICATIONS  (PRN):  acetaminophen     Tablet .. 650 milliGRAM(s) Oral every 6 hours PRN Temp greater or equal to 38C (100.4F), Mild Pain (1 - 3)  HYDROmorphone  Injectable 0.5 milliGRAM(s) IV Push every 4 hours PRN breakthrough pain  oxycodone    5 mG/acetaminophen 325 mG 2 Tablet(s) Oral every 4 hours PRN Severe Pain (7 - 10)  polyethylene glycol 3350 17 Gram(s) Oral every 24 hours PRN Constipation        Vital Signs Last 24 Hrs  T(C): 36.6 (04 Nov 2022 06:22), Max: 36.8 (03 Nov 2022 20:12)  T(F): 97.9 (04 Nov 2022 06:22), Max: 98.2 (03 Nov 2022 20:12)  HR: 76 (04 Nov 2022 07:40) (72 - 85)  BP: 138/83 (04 Nov 2022 07:40) (131/86 - 143/88)  BP(mean): 105 (04 Nov 2022 06:22) (105 - 105)  RR: 18 (04 Nov 2022 06:22) (18 - 18)  SpO2: 98% (04 Nov 2022 06:22) (98% - 98%)    Parameters below as of 04 Nov 2022 06:22  Patient On (Oxygen Delivery Method): room air          PHYSICAL EXAM  Constitutional: Lying flat in bed, conversant, nontoxic  HEENT: NC, PERRL, sclerae anicteric, conjunctivae clear.  Neck: Supple, no adenopathy   Pulmonary: CTAB, no w/r/r.   Heart: RRR, S1 S2, no m/r/g  Abdomen: Symmetric, normoactive BS.  Soft, nontender, no masses, guarding or rebound.  Liver and spleen not enlarged  Extremities: No cyanosis or edema. L foot/ankle dressed, no erythema/warmth above dressing. 2+ equal DP/PT pulses. Picture taken from dressing change this morning shows continued/increased erythema surrounding wound.  Neurological: Alert and oriented to person, place, and time. VINSON spontaneously.  Skin: no rashes      LABS:                        11.9   7.29  )-----------( 395      ( 04 Nov 2022 05:30 )             37.2     11-04    139  |  104  |  12  ----------------------------<  96  3.9   |  25  |  0.65    Ca    9.0      04 Nov 2022 05:30  Phos  3.0     11-04  Mg     1.7     11-04    TPro  6.9  /  Alb  4.0  /  TBili  0.4  /  DBili  x   /  AST  11  /  ALT  11  /  AlkPhos  63  11-04          MICROBIOLOGY:    Culture - Surgical Swab (collected 01 Nov 2022 02:31)  Source: .Surgical Swab  Left ankle wound culture  Gram Stain (01 Nov 2022 16:10):    No organisms seen    Rare WBC's  Preliminary Report (03 Nov 2022 15:54):    Rare Coag Negative Staphylococcus    Culture - Blood (collected 31 Oct 2022 07:35)  Source: .Blood Blood  Preliminary Report (03 Nov 2022 22:00):    No growth at 3 days.    Culture - Blood (collected 31 Oct 2022 07:30)  Source: .Blood Blood  Preliminary Report (03 Nov 2022 22:00):    No growth at 3 days.        RADIOLOGY & ADDITIONAL STUDIES:  Reviewed INFECTIOUS DISEASES CONSULT FOLLOW-UP NOTE    INTERVAL HPI/OVERNIGHT EVENTS: ISABELLE    Subjective: Patient seen and examined at bedside. Has continued L foot/ankle pain that is manageable with pain medication. Is having dressings changed 3x a day. Denies fevers, chills, headache, photophobia, cough, abdominal pain, diarrhea, dysuria, hematuria.      ANTIBIOTICS/RELEVANT:    MEDICATIONS  (STANDING):  cefepime   IVPB 2000 milliGRAM(s) IV Intermittent every 8 hours  enoxaparin Injectable 40 milliGRAM(s) SubCutaneous every 12 hours  gabapentin 100 milliGRAM(s) Oral every 24 hours  gabapentin 800 milliGRAM(s) Oral every 24 hours  influenza   Vaccine 0.5 milliLiter(s) IntraMuscular once  nebivolol 10 milliGRAM(s) Oral <User Schedule>  senna 2 Tablet(s) Oral at bedtime    MEDICATIONS  (PRN):  acetaminophen     Tablet .. 650 milliGRAM(s) Oral every 6 hours PRN Temp greater or equal to 38C (100.4F), Mild Pain (1 - 3)  HYDROmorphone  Injectable 0.5 milliGRAM(s) IV Push every 4 hours PRN breakthrough pain  oxycodone    5 mG/acetaminophen 325 mG 2 Tablet(s) Oral every 4 hours PRN Severe Pain (7 - 10)  polyethylene glycol 3350 17 Gram(s) Oral every 24 hours PRN Constipation        Vital Signs Last 24 Hrs  T(C): 36.6 (04 Nov 2022 06:22), Max: 36.8 (03 Nov 2022 20:12)  T(F): 97.9 (04 Nov 2022 06:22), Max: 98.2 (03 Nov 2022 20:12)  HR: 76 (04 Nov 2022 07:40) (72 - 85)  BP: 138/83 (04 Nov 2022 07:40) (131/86 - 143/88)  BP(mean): 105 (04 Nov 2022 06:22) (105 - 105)  RR: 18 (04 Nov 2022 06:22) (18 - 18)  SpO2: 98% (04 Nov 2022 06:22) (98% - 98%)    Parameters below as of 04 Nov 2022 06:22  Patient On (Oxygen Delivery Method): room air          PHYSICAL EXAM  Constitutional: Lying flat in bed, conversant, nontoxic  HEENT: NC, PERRL, sclerae anicteric, conjunctivae clear.  Neck: Supple, no adenopathy   Pulmonary: CTAB, no w/r/r.   Heart: RRR, S1 S2, no m/r/g  Abdomen: Symmetric, normoactive BS.  Soft, nontender, no masses, guarding or rebound.  Liver and spleen not enlarged  Extremities: No cyanosis or edema. L foot/ankle dressed, no erythema/warmth above dressing. 2+ equal DP/PT pulses. Picture taken from dressing change this morning shows continued/increased erythema surrounding wound.  Neurological: Alert and oriented to person, place, and time. VINSON spontaneously.  Skin: no rashes      LABS:                        11.9   7.29  )-----------( 395      ( 04 Nov 2022 05:30 )             37.2     11-04    139  |  104  |  12  ----------------------------<  96  3.9   |  25  |  0.65    Ca    9.0      04 Nov 2022 05:30  Phos  3.0     11-04  Mg     1.7     11-04    TPro  6.9  /  Alb  4.0  /  TBili  0.4  /  DBili  x   /  AST  11  /  ALT  11  /  AlkPhos  63  11-04          MICROBIOLOGY:    Culture - Surgical Swab (collected 01 Nov 2022 02:31)  Source: .Surgical Swab  Left ankle wound culture  Gram Stain (01 Nov 2022 16:10):    No organisms seen    Rare WBC's  Preliminary Report (03 Nov 2022 15:54):    Rare Coag Negative Staphylococcus    Culture - Blood (collected 31 Oct 2022 07:35)  Source: .Blood Blood  Preliminary Report (03 Nov 2022 22:00):    No growth at 3 days.    Culture - Blood (collected 31 Oct 2022 07:30)  Source: .Blood Blood  Preliminary Report (03 Nov 2022 22:00):    No growth at 3 days.        RADIOLOGY & ADDITIONAL STUDIES:  Reviewed

## 2022-11-04 NOTE — PROGRESS NOTE ADULT - PROBLEM SELECTOR PLAN 3
*resolved  Presented with SIRS 2/4 tachycardia 102, leukocytosis 13 and home T max 101 (100.2 in the ED). Source m/p chronic foot ulceration.  - Improved: HR 79-90 BPM over past 24 hours, Leukocytes downtrending to 7.21 (11/2), afebrile since 11/1.  - BCx (10/31) and wound culture (11/1) NGTD

## 2022-11-04 NOTE — PROGRESS NOTE ADULT - PROBLEM SELECTOR PLAN 1
Pt with Left Lateral foot ulcer with purulence and fever at home Tmax 101. Recently hospitalized (10/10/22-10/17/22) for LLL angio/ulcer debridement and L lateral ankle AVM glue embolization (10/11/22) with Dr. Teran. s/p vanc/cefepime per ID (stopped 10/17/22), deep surgical wound cultures grew pseudomonas aeruginosa, staph haemolyticus, and campylobacter striatum and jeikeium. Foot Xray: preliminary read no signs of OM or NF, elevated ESR/CRP.  - Continue Cefepime 2g (Day 1 11/1) - txt for pseudomonas from prior surgical culture  - S/p Vanc 10/31-11/3  - Bcx and wound cultures NGTD  - f/u ID recs for duration of antibiotics  - Contact Dr. Teran, normally sees patient when admitted to hospital.  - Pain Management: Tylenol prn for mild pain, percocet  po q4h for severe pain, and Dilaudid .5mg IV Q4Hrs for breakthrough pain  - Dressing changes TID per last vascular recs- WTD- wet 4x4 with NS flush and place over wound and wrap with kerlix.  - MRA of Left ankle: No evidence of osteomyelitis. 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 Pt with Left Lateral foot ulcer with purulence and fever at home Tmax 101. Recently hospitalized (10/10/22-10/17/22) for LLL angio/ulcer debridement and L lateral ankle AVM glue embolization (10/11/22) with Dr. Teran. s/p vanc/cefepime per ID (stopped 10/17/22), deep surgical wound cultures grew pseudomonas aeruginosa, staph haemolyticus, and campylobacter striatum and jeikeium. Foot Xray: preliminary read no signs of OM or NF, elevated ESR/CRP.  - Continue Cefepime 2g (Day 1 11/1) - txt for pseudomonas from prior surgical culture  - S/p Vanc 10/31-11/3  - Bcx and wound cultures NGTD  - f/u ID recs for duration of antibiotics  - PICC placed for long-term antibiotic treatment  - Contact Dr. Teran, normally sees patient when admitted to hospital.  - Pain Management: Tylenol prn for mild pain, percocet  po q4h for severe pain, and Dilaudid .5mg IV Q4Hrs for breakthrough pain  - Dressing changes TID per last vascular recs- WTD- wet 4x4 with NS flush and place over wound and wrap with kerlix.  - MRA of Left ankle: No evidence of osteomyelitis. 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

## 2022-11-04 NOTE — PROGRESS NOTE ADULT - SUBJECTIVE AND OBJECTIVE BOX
*incomplete  OVERNIGHT EVENTS:    SUBJECTIVE / INTERVAL HPI: Patient seen and examined at bedside.     VITAL SIGNS:  Vital Signs Last 24 Hrs  T(C): 36.6 (04 Nov 2022 06:22), Max: 36.8 (03 Nov 2022 12:21)  T(F): 97.9 (04 Nov 2022 06:22), Max: 98.2 (03 Nov 2022 12:21)  HR: 76 (04 Nov 2022 07:40) (72 - 85)  BP: 138/83 (04 Nov 2022 07:40) (131/86 - 143/88)  BP(mean): 105 (04 Nov 2022 06:22) (105 - 105)  RR: 18 (04 Nov 2022 06:22) (18 - 18)  SpO2: 98% (04 Nov 2022 06:22) (98% - 98%)    Parameters below as of 04 Nov 2022 06:22  Patient On (Oxygen Delivery Method): room air        PHYSICAL EXAM:    General: alert, in no acute distress  HEENT: NC/AT; PERRL, anicteric sclera; MMM  Neck: supple  Cardiovascular: +S1/S2, RRR  Respiratory: CTA B/L; no W/R/R  Gastrointestinal: soft, NT/ND; +BSx4  Extremities: WWP; no edema, clubbing or cyanosis  Vascular: 2+ radial, DP/PT pulses B/L  Neurological: AAOx3; no focal deficits    MEDICATIONS:  MEDICATIONS  (STANDING):  cefepime   IVPB 2000 milliGRAM(s) IV Intermittent every 8 hours  enoxaparin Injectable 40 milliGRAM(s) SubCutaneous every 12 hours  gabapentin 100 milliGRAM(s) Oral every 24 hours  gabapentin 800 milliGRAM(s) Oral every 24 hours  influenza   Vaccine 0.5 milliLiter(s) IntraMuscular once  nebivolol 10 milliGRAM(s) Oral <User Schedule>  senna 2 Tablet(s) Oral at bedtime    MEDICATIONS  (PRN):  acetaminophen     Tablet .. 650 milliGRAM(s) Oral every 6 hours PRN Temp greater or equal to 38C (100.4F), Mild Pain (1 - 3)  HYDROmorphone  Injectable 0.5 milliGRAM(s) IV Push every 4 hours PRN breakthrough pain  oxycodone    5 mG/acetaminophen 325 mG 2 Tablet(s) Oral every 4 hours PRN Severe Pain (7 - 10)  polyethylene glycol 3350 17 Gram(s) Oral every 24 hours PRN Constipation      ALLERGIES:  Allergies    amoxicillin (Angioedema)  ciprofloxacin (Rash)  hydrochlorothiazide (Hives)  lisinopril (Angioedema; Rash; Hives)  penicillin (Rash)    Intolerances        LABS:                        11.8   7.41  )-----------( 402      ( 03 Nov 2022 10:00 )             36.7     11-03    141  |  106  |  11  ----------------------------<  107<H>  3.8   |  23  |  0.72    Ca    8.8      03 Nov 2022 10:00  Phos  2.8     11-03  Mg     1.6     11-03          CAPILLARY BLOOD GLUCOSE          RADIOLOGY & ADDITIONAL TESTS: Reviewed. OVERNIGHT EVENTS:  NAEON    SUBJECTIVE / INTERVAL HPI: Patient seen and examined at bedside. This morning she complains of continued pain in her left foot that is throbbing. She felt chills last night. Denies headache, sob, and abdominal pain.     VITAL SIGNS:  Vital Signs Last 24 Hrs  T(C): 36.6 (04 Nov 2022 06:22), Max: 36.8 (03 Nov 2022 12:21)  T(F): 97.9 (04 Nov 2022 06:22), Max: 98.2 (03 Nov 2022 12:21)  HR: 76 (04 Nov 2022 07:40) (72 - 85)  BP: 138/83 (04 Nov 2022 07:40) (131/86 - 143/88)  BP(mean): 105 (04 Nov 2022 06:22) (105 - 105)  RR: 18 (04 Nov 2022 06:22) (18 - 18)  SpO2: 98% (04 Nov 2022 06:22) (98% - 98%)    Parameters below as of 04 Nov 2022 06:22  Patient On (Oxygen Delivery Method): room air      PHYSICAL EXAM:  General: A&Ox3. Awake and laying comfortably in bed.  HEENT: Palpebral conjunctiva pink and sclera white BL. Mucosa pink and moist.  Neck: Supple, FROM, no JVD.  Cardiovascular: Clear S1 and S2. No murmurs, gallops or rubs.  Respiratory: Clear to auscultation bilaterally. No wheezing, rhonchi or rales.  Gastrointestinal: Nondistended, nontender. BS normoactive x4.  Extremities: Left ankle wrapped w/ bandaging. Left lateral ankle w/ 2x2cm ulcer red without pus w/ surrounding erythema. Tenderness to palpation around the wound.  No discoloration, brittle nails, varicose veins, clubbing or edema noted.  Vascular: 2+ equally bilaterally dorsalis pedis and posterior tibial.  Neurological: Sensation intact equally bilaterally in all extremities.      MEDICATIONS:  MEDICATIONS  (STANDING):  cefepime   IVPB 2000 milliGRAM(s) IV Intermittent every 8 hours  enoxaparin Injectable 40 milliGRAM(s) SubCutaneous every 12 hours  gabapentin 100 milliGRAM(s) Oral every 24 hours  gabapentin 800 milliGRAM(s) Oral every 24 hours  influenza   Vaccine 0.5 milliLiter(s) IntraMuscular once  nebivolol 10 milliGRAM(s) Oral <User Schedule>  senna 2 Tablet(s) Oral at bedtime    MEDICATIONS  (PRN):  acetaminophen     Tablet .. 650 milliGRAM(s) Oral every 6 hours PRN Temp greater or equal to 38C (100.4F), Mild Pain (1 - 3)  HYDROmorphone  Injectable 0.5 milliGRAM(s) IV Push every 4 hours PRN breakthrough pain  oxycodone    5 mG/acetaminophen 325 mG 2 Tablet(s) Oral every 4 hours PRN Severe Pain (7 - 10)  polyethylene glycol 3350 17 Gram(s) Oral every 24 hours PRN Constipation      ALLERGIES:  Allergies    amoxicillin (Angioedema)  ciprofloxacin (Rash)  hydrochlorothiazide (Hives)  lisinopril (Angioedema; Rash; Hives)  penicillin (Rash)    Intolerances        LABS:                        11.8   7.41  )-----------( 402      ( 03 Nov 2022 10:00 )             36.7     11-03    141  |  106  |  11  ----------------------------<  107<H>  3.8   |  23  |  0.72    Ca    8.8      03 Nov 2022 10:00  Phos  2.8     11-03  Mg     1.6     11-03          CAPILLARY BLOOD GLUCOSE          RADIOLOGY & ADDITIONAL TESTS: Reviewed. OVERNIGHT EVENTS:  NAEON    SUBJECTIVE / INTERVAL HPI: Patient seen and examined at bedside. This morning she complains of continued pain in her left foot that is throbbing. She felt chills last night. Denies headache, sob, and abdominal pain.     VITAL SIGNS:  Vital Signs Last 24 Hrs  T(C): 36.6 (04 Nov 2022 06:22), Max: 36.8 (03 Nov 2022 12:21)  T(F): 97.9 (04 Nov 2022 06:22), Max: 98.2 (03 Nov 2022 12:21)  HR: 76 (04 Nov 2022 07:40) (72 - 85)  BP: 138/83 (04 Nov 2022 07:40) (131/86 - 143/88)  BP(mean): 105 (04 Nov 2022 06:22) (105 - 105)  RR: 18 (04 Nov 2022 06:22) (18 - 18)  SpO2: 98% (04 Nov 2022 06:22) (98% - 98%)    Parameters below as of 04 Nov 2022 06:22  Patient On (Oxygen Delivery Method): room air    PHYSICAL EXAM:  General: A&Ox3. Awake and laying comfortably in bed.  HEENT: Palpebral conjunctiva pink and sclera white BL. Mucosa pink and moist.  Neck: Supple, FROM, no JVD.  Cardiovascular: Clear S1 and S2. No murmurs, gallops or rubs.  Respiratory: Clear to auscultation bilaterally. No wheezing, rhonchi or rales.  Gastrointestinal: Nondistended, nontender. BS normoactive x4.  Extremities: Left ankle wrapped w/ bandaging. Left lateral ankle w/ 2x2cm ulcer red without pus w/ surrounding erythema. Tenderness to palpation around the wound.  No discoloration, brittle nails, varicose veins, clubbing or edema noted.  Vascular: 2+ equally bilaterally dorsalis pedis and posterior tibial.  Neurological: Sensation intact equally bilaterally in all extremities.      MEDICATIONS:  MEDICATIONS  (STANDING):  cefepime   IVPB 2000 milliGRAM(s) IV Intermittent every 8 hours  enoxaparin Injectable 40 milliGRAM(s) SubCutaneous every 12 hours  gabapentin 100 milliGRAM(s) Oral every 24 hours  gabapentin 800 milliGRAM(s) Oral every 24 hours  influenza   Vaccine 0.5 milliLiter(s) IntraMuscular once  nebivolol 10 milliGRAM(s) Oral <User Schedule>  senna 2 Tablet(s) Oral at bedtime    MEDICATIONS  (PRN):  acetaminophen     Tablet .. 650 milliGRAM(s) Oral every 6 hours PRN Temp greater or equal to 38C (100.4F), Mild Pain (1 - 3)  HYDROmorphone  Injectable 0.5 milliGRAM(s) IV Push every 4 hours PRN breakthrough pain  oxycodone    5 mG/acetaminophen 325 mG 2 Tablet(s) Oral every 4 hours PRN Severe Pain (7 - 10)  polyethylene glycol 3350 17 Gram(s) Oral every 24 hours PRN Constipation      ALLERGIES:  Allergies    amoxicillin (Angioedema)  ciprofloxacin (Rash)  hydrochlorothiazide (Hives)  lisinopril (Angioedema; Rash; Hives)  penicillin (Rash)    Intolerances        LABS:                        11.8   7.41  )-----------( 402      ( 03 Nov 2022 10:00 )             36.7     11-03    141  |  106  |  11  ----------------------------<  107<H>  3.8   |  23  |  0.72    Ca    8.8      03 Nov 2022 10:00  Phos  2.8     11-03  Mg     1.6     11-03      CAPILLARY BLOOD GLUCOSE      RADIOLOGY & ADDITIONAL TESTS: Reviewed.

## 2022-11-04 NOTE — PROGRESS NOTE ADULT - ASSESSMENT
37-year-old woman with history of hypertension and AVM L foot with chronic L foot ulcer s/p transcatheter embolization, recent admission 10/10-10/17 with same presentation, presents with fevers and L foot pain.     Imaging:  - 11/2 MR L ankle: Intramuscular and subcutaneous vascular malformation within the plantar aspect of the foot with phlegmonous/edematous infiltration within the subcutaneous tissues; no MR evidence of osteomyelitis. High-grade partial-thickness tearing of the ATFL and CFL with partial-thickness tearing of the deltoid ligament.  - Xray 10/31: Frontal, lateral and oblique views of the left foot demonstrate radiopaque material within the small vessels of the dorsal aspect of the foot with infiltrative radiopaque material within the subcutaneous tissues with adjacent soft tissue swelling. There is Achilles tendon spurring. There is no fracture. No dislocation. Tarsometatarsal joint is in appropriate alignment.    Culture data:  10/10/22 wound culture (swab): Pseudomonas aeruginosa, S. haemolyticus, Corynebacterium striatum grp., C. jeikeium  10/11 Surgical wound culture: Pseudomonas   10/11 BCx x2: no growth at 5 days  10/31 BCx x2: NGTD  11/1 WCx: Gm stain rare WBCs no organisms; Cx preliminarily showing rare CONS    Recommendations:  - pending    ID team 1 will continue to follow 37-year-old woman with history of hypertension and AVM L foot with chronic L foot ulcer s/p transcatheter embolization, recent admission 10/10-10/17 with same presentation, presents with fevers and L foot pain. Photograph of wound from this morning during dressing change shows continued/increased erythema surrounding wound.    Imaging:  - 11/2 MR L ankle: Intramuscular and subcutaneous vascular malformation within the plantar aspect of the foot with phlegmonous/edematous infiltration within the subcutaneous tissues; no MR evidence of osteomyelitis. High-grade partial-thickness tearing of the ATFL and CFL with partial-thickness tearing of the deltoid ligament.  - Xray 10/31: Frontal, lateral and oblique views of the left foot demonstrate radiopaque material within the small vessels of the dorsal aspect of the foot with infiltrative radiopaque material within the subcutaneous tissues with adjacent soft tissue swelling. There is Achilles tendon spurring. There is no fracture. No dislocation. Tarsometatarsal joint is in appropriate alignment.    Culture data:  10/10/22 wound culture (swab): Pseudomonas aeruginosa, S. haemolyticus, Corynebacterium striatum grp., C. jeikeium  10/11 Surgical wound culture: Pseudomonas   10/11 BCx x2: no growth at 5 days  10/31 BCx x2: NGTD  11/1 WCx: Gm stain rare WBCs no organisms; Cx preliminarily showing rare CONS    Recommendations:  - pending    ID team 1 will continue to follow 37-year-old woman with history of hypertension and AVM L foot with chronic L foot ulcer s/p transcatheter embolization, recent admission 10/10-10/17 with same presentation, presents with fevers and L foot pain. Photograph of wound from this morning during dressing change shows continued/increased erythema surrounding wound.    Imaging:  - 11/2 MR L ankle: Intramuscular and subcutaneous vascular malformation within the plantar aspect of the foot with phlegmonous/edematous infiltration within the subcutaneous tissues; no MR evidence of osteomyelitis. High-grade partial-thickness tearing of the ATFL and CFL with partial-thickness tearing of the deltoid ligament.  - Xray 10/31: Frontal, lateral and oblique views of the left foot demonstrate radiopaque material within the small vessels of the dorsal aspect of the foot with infiltrative radiopaque material within the subcutaneous tissues with adjacent soft tissue swelling. There is Achilles tendon spurring. There is no fracture. No dislocation. Tarsometatarsal joint is in appropriate alignment.    Culture data:  10/10/22 wound culture (swab): Pseudomonas aeruginosa, S. haemolyticus, Corynebacterium striatum grp., C. jeikeium  10/11 Surgical wound culture: Pseudomonas   10/11 BCx x2: no growth at 5 days  10/31 BCx x2: NGTD  11/1 WCx: Gm stain rare WBCs no organisms; Cx preliminarily showing rare CONS    Recommendations:  - c/w cefepime 2g q8  - restart vancomycin 1g q8hrs, vanc trough before 4th dose. Goal 12 - 16.   - f/u wound swab from 11/1    ID team 1 will continue to follow

## 2022-11-05 LAB
-  CLINDAMYCIN: SIGNIFICANT CHANGE UP
-  CLINDAMYCIN: SIGNIFICANT CHANGE UP
-  ERYTHROMYCIN: SIGNIFICANT CHANGE UP
-  ERYTHROMYCIN: SIGNIFICANT CHANGE UP
-  LINEZOLID: SIGNIFICANT CHANGE UP
-  LINEZOLID: SIGNIFICANT CHANGE UP
-  OXACILLIN: SIGNIFICANT CHANGE UP
-  OXACILLIN: SIGNIFICANT CHANGE UP
-  RIFAMPIN: SIGNIFICANT CHANGE UP
-  RIFAMPIN: SIGNIFICANT CHANGE UP
-  TRIMETHOPRIM/SULFAMETHOXAZOLE: SIGNIFICANT CHANGE UP
-  TRIMETHOPRIM/SULFAMETHOXAZOLE: SIGNIFICANT CHANGE UP
-  VANCOMYCIN: SIGNIFICANT CHANGE UP
-  VANCOMYCIN: SIGNIFICANT CHANGE UP
ANION GAP SERPL CALC-SCNC: 10 MMOL/L — SIGNIFICANT CHANGE UP (ref 5–17)
BUN SERPL-MCNC: 15 MG/DL — SIGNIFICANT CHANGE UP (ref 7–23)
CALCIUM SERPL-MCNC: 8.8 MG/DL — SIGNIFICANT CHANGE UP (ref 8.4–10.5)
CHLORIDE SERPL-SCNC: 106 MMOL/L — SIGNIFICANT CHANGE UP (ref 96–108)
CO2 SERPL-SCNC: 25 MMOL/L — SIGNIFICANT CHANGE UP (ref 22–31)
CREAT SERPL-MCNC: 0.68 MG/DL — SIGNIFICANT CHANGE UP (ref 0.5–1.3)
CULTURE RESULTS: SIGNIFICANT CHANGE UP
EGFR: 114 ML/MIN/1.73M2 — SIGNIFICANT CHANGE UP
GLUCOSE SERPL-MCNC: 109 MG/DL — HIGH (ref 70–99)
HCT VFR BLD CALC: 33.5 % — LOW (ref 34.5–45)
HGB BLD-MCNC: 10.9 G/DL — LOW (ref 11.5–15.5)
MAGNESIUM SERPL-MCNC: 1.8 MG/DL — SIGNIFICANT CHANGE UP (ref 1.6–2.6)
MCHC RBC-ENTMCNC: 29.2 PG — SIGNIFICANT CHANGE UP (ref 27–34)
MCHC RBC-ENTMCNC: 32.5 GM/DL — SIGNIFICANT CHANGE UP (ref 32–36)
MCV RBC AUTO: 89.8 FL — SIGNIFICANT CHANGE UP (ref 80–100)
METHOD TYPE: SIGNIFICANT CHANGE UP
METHOD TYPE: SIGNIFICANT CHANGE UP
NRBC # BLD: 0 /100 WBCS — SIGNIFICANT CHANGE UP (ref 0–0)
ORGANISM # SPEC MICROSCOPIC CNT: SIGNIFICANT CHANGE UP
PHOSPHATE SERPL-MCNC: 3.2 MG/DL — SIGNIFICANT CHANGE UP (ref 2.5–4.5)
PLATELET # BLD AUTO: 365 K/UL — SIGNIFICANT CHANGE UP (ref 150–400)
POTASSIUM SERPL-MCNC: 4.2 MMOL/L — SIGNIFICANT CHANGE UP (ref 3.5–5.3)
POTASSIUM SERPL-SCNC: 4.2 MMOL/L — SIGNIFICANT CHANGE UP (ref 3.5–5.3)
RBC # BLD: 3.73 M/UL — LOW (ref 3.8–5.2)
RBC # FLD: 12.5 % — SIGNIFICANT CHANGE UP (ref 10.3–14.5)
SODIUM SERPL-SCNC: 141 MMOL/L — SIGNIFICANT CHANGE UP (ref 135–145)
SPECIMEN SOURCE: SIGNIFICANT CHANGE UP
VANCOMYCIN TROUGH SERPL-MCNC: 8.5 UG/ML — LOW (ref 10–20)
WBC # BLD: 8.7 K/UL — SIGNIFICANT CHANGE UP (ref 3.8–10.5)
WBC # FLD AUTO: 8.7 K/UL — SIGNIFICANT CHANGE UP (ref 3.8–10.5)

## 2022-11-05 PROCEDURE — 99232 SBSQ HOSP IP/OBS MODERATE 35: CPT | Mod: GC

## 2022-11-05 RX ORDER — VANCOMYCIN HCL 1 G
1250 VIAL (EA) INTRAVENOUS EVERY 8 HOURS
Refills: 0 | Status: COMPLETED | OUTPATIENT
Start: 2022-11-06 | End: 2022-11-06

## 2022-11-05 RX ORDER — VANCOMYCIN HCL 1 G
2000 VIAL (EA) INTRAVENOUS EVERY 12 HOURS
Refills: 0 | Status: DISCONTINUED | OUTPATIENT
Start: 2022-11-05 | End: 2022-11-05

## 2022-11-05 RX ORDER — VANCOMYCIN HCL 1 G
1250 VIAL (EA) INTRAVENOUS EVERY 8 HOURS
Refills: 0 | Status: DISCONTINUED | OUTPATIENT
Start: 2022-11-05 | End: 2022-11-05

## 2022-11-05 RX ADMIN — ENOXAPARIN SODIUM 40 MILLIGRAM(S): 100 INJECTION SUBCUTANEOUS at 17:50

## 2022-11-05 RX ADMIN — HYDROMORPHONE HYDROCHLORIDE 0.5 MILLIGRAM(S): 2 INJECTION INTRAMUSCULAR; INTRAVENOUS; SUBCUTANEOUS at 22:17

## 2022-11-05 RX ADMIN — OXYCODONE AND ACETAMINOPHEN 2 TABLET(S): 5; 325 TABLET ORAL at 12:41

## 2022-11-05 RX ADMIN — NEBIVOLOL HYDROCHLORIDE 10 MILLIGRAM(S): 5 TABLET ORAL at 19:24

## 2022-11-05 RX ADMIN — Medication 250 MILLIGRAM(S): at 21:45

## 2022-11-05 RX ADMIN — Medication 250 MILLIGRAM(S): at 01:08

## 2022-11-05 RX ADMIN — CHLORHEXIDINE GLUCONATE 1 APPLICATION(S): 213 SOLUTION TOPICAL at 05:20

## 2022-11-05 RX ADMIN — OXYCODONE AND ACETAMINOPHEN 2 TABLET(S): 5; 325 TABLET ORAL at 11:41

## 2022-11-05 RX ADMIN — OXYCODONE AND ACETAMINOPHEN 2 TABLET(S): 5; 325 TABLET ORAL at 15:46

## 2022-11-05 RX ADMIN — HYDROMORPHONE HYDROCHLORIDE 0.5 MILLIGRAM(S): 2 INJECTION INTRAMUSCULAR; INTRAVENOUS; SUBCUTANEOUS at 14:02

## 2022-11-05 RX ADMIN — ENOXAPARIN SODIUM 40 MILLIGRAM(S): 100 INJECTION SUBCUTANEOUS at 05:19

## 2022-11-05 RX ADMIN — OXYCODONE AND ACETAMINOPHEN 2 TABLET(S): 5; 325 TABLET ORAL at 16:46

## 2022-11-05 RX ADMIN — NEBIVOLOL HYDROCHLORIDE 10 MILLIGRAM(S): 5 TABLET ORAL at 07:06

## 2022-11-05 RX ADMIN — GABAPENTIN 100 MILLIGRAM(S): 400 CAPSULE ORAL at 07:05

## 2022-11-05 RX ADMIN — HYDROMORPHONE HYDROCHLORIDE 0.5 MILLIGRAM(S): 2 INJECTION INTRAMUSCULAR; INTRAVENOUS; SUBCUTANEOUS at 09:42

## 2022-11-05 RX ADMIN — HYDROMORPHONE HYDROCHLORIDE 0.5 MILLIGRAM(S): 2 INJECTION INTRAMUSCULAR; INTRAVENOUS; SUBCUTANEOUS at 01:09

## 2022-11-05 RX ADMIN — HYDROMORPHONE HYDROCHLORIDE 0.5 MILLIGRAM(S): 2 INJECTION INTRAMUSCULAR; INTRAVENOUS; SUBCUTANEOUS at 17:50

## 2022-11-05 RX ADMIN — OXYCODONE AND ACETAMINOPHEN 2 TABLET(S): 5; 325 TABLET ORAL at 07:40

## 2022-11-05 RX ADMIN — HYDROMORPHONE HYDROCHLORIDE 0.5 MILLIGRAM(S): 2 INJECTION INTRAMUSCULAR; INTRAVENOUS; SUBCUTANEOUS at 09:57

## 2022-11-05 RX ADMIN — HYDROMORPHONE HYDROCHLORIDE 0.5 MILLIGRAM(S): 2 INJECTION INTRAMUSCULAR; INTRAVENOUS; SUBCUTANEOUS at 23:17

## 2022-11-05 RX ADMIN — HYDROMORPHONE HYDROCHLORIDE 0.5 MILLIGRAM(S): 2 INJECTION INTRAMUSCULAR; INTRAVENOUS; SUBCUTANEOUS at 05:19

## 2022-11-05 RX ADMIN — HYDROMORPHONE HYDROCHLORIDE 0.5 MILLIGRAM(S): 2 INJECTION INTRAMUSCULAR; INTRAVENOUS; SUBCUTANEOUS at 13:47

## 2022-11-05 RX ADMIN — OXYCODONE AND ACETAMINOPHEN 2 TABLET(S): 5; 325 TABLET ORAL at 08:40

## 2022-11-05 RX ADMIN — Medication 250 MILLIGRAM(S): at 09:42

## 2022-11-05 RX ADMIN — GABAPENTIN 800 MILLIGRAM(S): 400 CAPSULE ORAL at 13:47

## 2022-11-05 RX ADMIN — CEFEPIME 100 MILLIGRAM(S): 1 INJECTION, POWDER, FOR SOLUTION INTRAMUSCULAR; INTRAVENOUS at 13:16

## 2022-11-05 RX ADMIN — HYDROMORPHONE HYDROCHLORIDE 0.5 MILLIGRAM(S): 2 INJECTION INTRAMUSCULAR; INTRAVENOUS; SUBCUTANEOUS at 18:05

## 2022-11-05 RX ADMIN — CEFEPIME 100 MILLIGRAM(S): 1 INJECTION, POWDER, FOR SOLUTION INTRAMUSCULAR; INTRAVENOUS at 07:05

## 2022-11-05 RX ADMIN — CEFEPIME 100 MILLIGRAM(S): 1 INJECTION, POWDER, FOR SOLUTION INTRAMUSCULAR; INTRAVENOUS at 23:07

## 2022-11-05 RX ADMIN — HYDROMORPHONE HYDROCHLORIDE 0.5 MILLIGRAM(S): 2 INJECTION INTRAMUSCULAR; INTRAVENOUS; SUBCUTANEOUS at 02:09

## 2022-11-05 RX ADMIN — OXYCODONE AND ACETAMINOPHEN 2 TABLET(S): 5; 325 TABLET ORAL at 19:50

## 2022-11-05 NOTE — PROGRESS NOTE ADULT - PROBLEM SELECTOR PLAN 1
Pt with Left Lateral foot ulcer with purulence and fever at home Tmax 101. Recently hospitalized (10/10/22-10/17/22) for LLL angio/ulcer debridement and L lateral ankle AVM glue embolization (10/11/22) with Dr. Teran. s/p vanc/cefepime per ID (stopped 10/17/22), deep surgical wound cultures grew pseudomonas aeruginosa, staph haemolyticus, and campylobacter striatum and jeikeium. Foot Xray: preliminary read no signs of OM or NF, elevated ESR/CRP.  - Continue Cefepime 2g (Day 1 11/1) and Vancomycin 1g q8hrs (10/31-11/3 and 11/4-) txt for pseudomonas from prior surgical culture  - F/u Vanc trough at 4pm  - Bcx NGTD  - 11/1 WCx: Gm stain rare WBCs no organisms; Cx showing rare CONS  - F/u ID recs for duration of antibiotics  - PICC placed for long-term antibiotic treatment  - Contact Dr. Teran, normally sees patient when admitted to hospital.  - Pain Management: Tylenol prn for mild pain, percocet  po q4h for severe pain, and Dilaudid .5mg IV Q4Hrs for breakthrough pain  - Dressing changes TID per last vascular recs- WTD- wet 4x4 with NS flush and place over wound and wrap with kerlix.  - MRA of Left ankle: No evidence of osteomyelitis. 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

## 2022-11-05 NOTE — PROGRESS NOTE ADULT - SUBJECTIVE AND OBJECTIVE BOX
OVERNIGHT EVENTS:  NAEON    SUBJECTIVE / INTERVAL HPI: Patient seen and examined at bedside. This morning she complains of chills and feeling warm last night. No documented fever. She still has throbbing pain in her leg. She feels it has gotten a little better today. She had a bowel movement this morning.  Denies headache, sob, abdominal pain, and diarrhea.    VITAL SIGNS:  Vital Signs Last 24 Hrs  T(C): 36.9 (05 Nov 2022 05:24), Max: 37.1 (04 Nov 2022 20:50)  T(F): 98.5 (05 Nov 2022 05:24), Max: 98.8 (04 Nov 2022 20:50)  HR: 85 (05 Nov 2022 05:24) (84 - 85)  BP: 145/87 (05 Nov 2022 05:24) (133/84 - 145/87)  BP(mean): --  RR: 18 (05 Nov 2022 05:24) (18 - 18)  SpO2: 98% (05 Nov 2022 05:24) (97% - 98%)    Parameters below as of 05 Nov 2022 05:24  Patient On (Oxygen Delivery Method): room air    PHYSICAL EXAM:  General: A&Ox3. Awake and laying comfortably in bed.  HEENT: Palpebral conjunctiva pink and sclera white BL. Mucosa pink and moist.  Neck: Supple, FROM, no JVD.  Cardiovascular: Clear S1 and S2. No murmurs, gallops or rubs.  Respiratory: Clear to auscultation bilaterally. No wheezing, rhonchi or rales.  Gastrointestinal: Nondistended, nontender. BS normoactive x4.  Extremities: Left ankle wrapped w/ bandaging. Left lateral ankle w/ 2x2cm ulcer red without pus w/ surrounding erythema. Tenderness to palpation around the wound.  No discoloration, brittle nails, varicose veins, clubbing or edema noted.  Vascular: 2+ equally bilaterally dorsalis pedis and posterior tibial.  Neurological: Sensation intact equally bilaterally in all extremities.    MEDICATIONS:  MEDICATIONS  (STANDING):  cefepime   IVPB 2000 milliGRAM(s) IV Intermittent every 8 hours  chlorhexidine 2% Cloths 1 Application(s) Topical <User Schedule>  enoxaparin Injectable 40 milliGRAM(s) SubCutaneous every 12 hours  gabapentin 100 milliGRAM(s) Oral every 24 hours  gabapentin 800 milliGRAM(s) Oral every 24 hours  influenza   Vaccine 0.5 milliLiter(s) IntraMuscular once  nebivolol 10 milliGRAM(s) Oral <User Schedule>  senna 2 Tablet(s) Oral at bedtime    MEDICATIONS  (PRN):  acetaminophen     Tablet .. 650 milliGRAM(s) Oral every 6 hours PRN Temp greater or equal to 38C (100.4F), Mild Pain (1 - 3)  HYDROmorphone  Injectable 0.5 milliGRAM(s) IV Push every 4 hours PRN breakthrough pain  oxycodone    5 mG/acetaminophen 325 mG 2 Tablet(s) Oral every 4 hours PRN Severe Pain (7 - 10)  polyethylene glycol 3350 17 Gram(s) Oral every 24 hours PRN Constipation  sodium chloride 0.9% lock flush 10 milliLiter(s) IV Push every 1 hour PRN Pre/post blood products, medications, blood draw, and to maintain line patency      ALLERGIES:  Allergies    amoxicillin (Angioedema)  ciprofloxacin (Rash)  hydrochlorothiazide (Hives)  lisinopril (Angioedema; Rash; Hives)  penicillin (Rash)    Intolerances        LABS:                        10.9   8.70  )-----------( 365      ( 05 Nov 2022 05:30 )             33.5     11-05    141  |  106  |  15  ----------------------------<  109<H>  4.2   |  25  |  0.68    Ca    8.8      05 Nov 2022 05:30  Phos  3.2     11-05  Mg     1.8     11-05    TPro  6.9  /  Alb  4.0  /  TBili  0.4  /  DBili  x   /  AST  11  /  ALT  11  /  AlkPhos  63  11-04        CAPILLARY BLOOD GLUCOSE          RADIOLOGY & ADDITIONAL TESTS: Reviewed.

## 2022-11-06 LAB
ALBUMIN SERPL ELPH-MCNC: 3.7 G/DL — SIGNIFICANT CHANGE UP (ref 3.3–5)
ALP SERPL-CCNC: 67 U/L — SIGNIFICANT CHANGE UP (ref 40–120)
ALT FLD-CCNC: 10 U/L — SIGNIFICANT CHANGE UP (ref 10–45)
ANION GAP SERPL CALC-SCNC: 12 MMOL/L — SIGNIFICANT CHANGE UP (ref 5–17)
AST SERPL-CCNC: 14 U/L — SIGNIFICANT CHANGE UP (ref 10–40)
BASOPHILS # BLD AUTO: 0.06 K/UL — SIGNIFICANT CHANGE UP (ref 0–0.2)
BASOPHILS NFR BLD AUTO: 0.8 % — SIGNIFICANT CHANGE UP (ref 0–2)
BILIRUB SERPL-MCNC: 0.2 MG/DL — SIGNIFICANT CHANGE UP (ref 0.2–1.2)
BUN SERPL-MCNC: 14 MG/DL — SIGNIFICANT CHANGE UP (ref 7–23)
CALCIUM SERPL-MCNC: 8.5 MG/DL — SIGNIFICANT CHANGE UP (ref 8.4–10.5)
CHLORIDE SERPL-SCNC: 105 MMOL/L — SIGNIFICANT CHANGE UP (ref 96–108)
CO2 SERPL-SCNC: 23 MMOL/L — SIGNIFICANT CHANGE UP (ref 22–31)
CREAT SERPL-MCNC: 0.61 MG/DL — SIGNIFICANT CHANGE UP (ref 0.5–1.3)
EGFR: 117 ML/MIN/1.73M2 — SIGNIFICANT CHANGE UP
EOSINOPHIL # BLD AUTO: 0.36 K/UL — SIGNIFICANT CHANGE UP (ref 0–0.5)
EOSINOPHIL NFR BLD AUTO: 4.8 % — SIGNIFICANT CHANGE UP (ref 0–6)
GLUCOSE SERPL-MCNC: 96 MG/DL — SIGNIFICANT CHANGE UP (ref 70–99)
HCT VFR BLD CALC: 34.2 % — LOW (ref 34.5–45)
HGB BLD-MCNC: 10.9 G/DL — LOW (ref 11.5–15.5)
IMM GRANULOCYTES NFR BLD AUTO: 0.3 % — SIGNIFICANT CHANGE UP (ref 0–0.9)
LYMPHOCYTES # BLD AUTO: 2.17 K/UL — SIGNIFICANT CHANGE UP (ref 1–3.3)
LYMPHOCYTES # BLD AUTO: 28.9 % — SIGNIFICANT CHANGE UP (ref 13–44)
MAGNESIUM SERPL-MCNC: 1.6 MG/DL — SIGNIFICANT CHANGE UP (ref 1.6–2.6)
MCHC RBC-ENTMCNC: 28.7 PG — SIGNIFICANT CHANGE UP (ref 27–34)
MCHC RBC-ENTMCNC: 31.9 GM/DL — LOW (ref 32–36)
MCV RBC AUTO: 90 FL — SIGNIFICANT CHANGE UP (ref 80–100)
MONOCYTES # BLD AUTO: 0.73 K/UL — SIGNIFICANT CHANGE UP (ref 0–0.9)
MONOCYTES NFR BLD AUTO: 9.7 % — SIGNIFICANT CHANGE UP (ref 2–14)
NEUTROPHILS # BLD AUTO: 4.16 K/UL — SIGNIFICANT CHANGE UP (ref 1.8–7.4)
NEUTROPHILS NFR BLD AUTO: 55.5 % — SIGNIFICANT CHANGE UP (ref 43–77)
NRBC # BLD: 0 /100 WBCS — SIGNIFICANT CHANGE UP (ref 0–0)
PHOSPHATE SERPL-MCNC: 3.6 MG/DL — SIGNIFICANT CHANGE UP (ref 2.5–4.5)
PLATELET # BLD AUTO: 388 K/UL — SIGNIFICANT CHANGE UP (ref 150–400)
POTASSIUM SERPL-MCNC: 4.1 MMOL/L — SIGNIFICANT CHANGE UP (ref 3.5–5.3)
POTASSIUM SERPL-SCNC: 4.1 MMOL/L — SIGNIFICANT CHANGE UP (ref 3.5–5.3)
PROT SERPL-MCNC: 6.5 G/DL — SIGNIFICANT CHANGE UP (ref 6–8.3)
RBC # BLD: 3.8 M/UL — SIGNIFICANT CHANGE UP (ref 3.8–5.2)
RBC # FLD: 12.5 % — SIGNIFICANT CHANGE UP (ref 10.3–14.5)
SODIUM SERPL-SCNC: 140 MMOL/L — SIGNIFICANT CHANGE UP (ref 135–145)
VANCOMYCIN TROUGH SERPL-MCNC: 12.4 UG/ML — SIGNIFICANT CHANGE UP (ref 10–20)
WBC # BLD: 7.5 K/UL — SIGNIFICANT CHANGE UP (ref 3.8–10.5)
WBC # FLD AUTO: 7.5 K/UL — SIGNIFICANT CHANGE UP (ref 3.8–10.5)

## 2022-11-06 PROCEDURE — 99232 SBSQ HOSP IP/OBS MODERATE 35: CPT

## 2022-11-06 PROCEDURE — 99232 SBSQ HOSP IP/OBS MODERATE 35: CPT | Mod: GC

## 2022-11-06 RX ORDER — VANCOMYCIN HCL 1 G
1500 VIAL (EA) INTRAVENOUS EVERY 8 HOURS
Refills: 0 | Status: DISCONTINUED | OUTPATIENT
Start: 2022-11-06 | End: 2022-11-07

## 2022-11-06 RX ADMIN — Medication 166.67 MILLIGRAM(S): at 15:11

## 2022-11-06 RX ADMIN — OXYCODONE AND ACETAMINOPHEN 2 TABLET(S): 5; 325 TABLET ORAL at 10:00

## 2022-11-06 RX ADMIN — OXYCODONE AND ACETAMINOPHEN 2 TABLET(S): 5; 325 TABLET ORAL at 22:15

## 2022-11-06 RX ADMIN — OXYCODONE AND ACETAMINOPHEN 2 TABLET(S): 5; 325 TABLET ORAL at 21:15

## 2022-11-06 RX ADMIN — HYDROMORPHONE HYDROCHLORIDE 0.5 MILLIGRAM(S): 2 INJECTION INTRAMUSCULAR; INTRAVENOUS; SUBCUTANEOUS at 07:02

## 2022-11-06 RX ADMIN — HYDROMORPHONE HYDROCHLORIDE 0.5 MILLIGRAM(S): 2 INJECTION INTRAMUSCULAR; INTRAVENOUS; SUBCUTANEOUS at 01:40

## 2022-11-06 RX ADMIN — HYDROMORPHONE HYDROCHLORIDE 0.5 MILLIGRAM(S): 2 INJECTION INTRAMUSCULAR; INTRAVENOUS; SUBCUTANEOUS at 11:23

## 2022-11-06 RX ADMIN — ENOXAPARIN SODIUM 40 MILLIGRAM(S): 100 INJECTION SUBCUTANEOUS at 07:01

## 2022-11-06 RX ADMIN — OXYCODONE AND ACETAMINOPHEN 2 TABLET(S): 5; 325 TABLET ORAL at 05:19

## 2022-11-06 RX ADMIN — OXYCODONE AND ACETAMINOPHEN 2 TABLET(S): 5; 325 TABLET ORAL at 01:40

## 2022-11-06 RX ADMIN — HYDROMORPHONE HYDROCHLORIDE 0.5 MILLIGRAM(S): 2 INJECTION INTRAMUSCULAR; INTRAVENOUS; SUBCUTANEOUS at 15:09

## 2022-11-06 RX ADMIN — OXYCODONE AND ACETAMINOPHEN 2 TABLET(S): 5; 325 TABLET ORAL at 13:05

## 2022-11-06 RX ADMIN — CEFEPIME 100 MILLIGRAM(S): 1 INJECTION, POWDER, FOR SOLUTION INTRAMUSCULAR; INTRAVENOUS at 23:35

## 2022-11-06 RX ADMIN — HYDROMORPHONE HYDROCHLORIDE 0.5 MILLIGRAM(S): 2 INJECTION INTRAMUSCULAR; INTRAVENOUS; SUBCUTANEOUS at 01:50

## 2022-11-06 RX ADMIN — HYDROMORPHONE HYDROCHLORIDE 0.5 MILLIGRAM(S): 2 INJECTION INTRAMUSCULAR; INTRAVENOUS; SUBCUTANEOUS at 19:09

## 2022-11-06 RX ADMIN — Medication 166.67 MILLIGRAM(S): at 07:01

## 2022-11-06 RX ADMIN — GABAPENTIN 800 MILLIGRAM(S): 400 CAPSULE ORAL at 15:09

## 2022-11-06 RX ADMIN — HYDROMORPHONE HYDROCHLORIDE 0.5 MILLIGRAM(S): 2 INJECTION INTRAMUSCULAR; INTRAVENOUS; SUBCUTANEOUS at 19:24

## 2022-11-06 RX ADMIN — NEBIVOLOL HYDROCHLORIDE 10 MILLIGRAM(S): 5 TABLET ORAL at 19:09

## 2022-11-06 RX ADMIN — CHLORHEXIDINE GLUCONATE 1 APPLICATION(S): 213 SOLUTION TOPICAL at 08:16

## 2022-11-06 RX ADMIN — NEBIVOLOL HYDROCHLORIDE 10 MILLIGRAM(S): 5 TABLET ORAL at 08:15

## 2022-11-06 RX ADMIN — OXYCODONE AND ACETAMINOPHEN 2 TABLET(S): 5; 325 TABLET ORAL at 04:23

## 2022-11-06 RX ADMIN — HYDROMORPHONE HYDROCHLORIDE 0.5 MILLIGRAM(S): 2 INJECTION INTRAMUSCULAR; INTRAVENOUS; SUBCUTANEOUS at 15:24

## 2022-11-06 RX ADMIN — HYDROMORPHONE HYDROCHLORIDE 0.5 MILLIGRAM(S): 2 INJECTION INTRAMUSCULAR; INTRAVENOUS; SUBCUTANEOUS at 23:35

## 2022-11-06 RX ADMIN — OXYCODONE AND ACETAMINOPHEN 2 TABLET(S): 5; 325 TABLET ORAL at 00:40

## 2022-11-06 RX ADMIN — HYDROMORPHONE HYDROCHLORIDE 0.5 MILLIGRAM(S): 2 INJECTION INTRAMUSCULAR; INTRAVENOUS; SUBCUTANEOUS at 11:08

## 2022-11-06 RX ADMIN — OXYCODONE AND ACETAMINOPHEN 2 TABLET(S): 5; 325 TABLET ORAL at 17:20

## 2022-11-06 RX ADMIN — GABAPENTIN 100 MILLIGRAM(S): 400 CAPSULE ORAL at 07:01

## 2022-11-06 RX ADMIN — OXYCODONE AND ACETAMINOPHEN 2 TABLET(S): 5; 325 TABLET ORAL at 18:20

## 2022-11-06 RX ADMIN — ENOXAPARIN SODIUM 40 MILLIGRAM(S): 100 INJECTION SUBCUTANEOUS at 17:55

## 2022-11-06 RX ADMIN — HYDROMORPHONE HYDROCHLORIDE 0.5 MILLIGRAM(S): 2 INJECTION INTRAMUSCULAR; INTRAVENOUS; SUBCUTANEOUS at 23:50

## 2022-11-06 RX ADMIN — OXYCODONE AND ACETAMINOPHEN 2 TABLET(S): 5; 325 TABLET ORAL at 14:05

## 2022-11-06 RX ADMIN — CEFEPIME 100 MILLIGRAM(S): 1 INJECTION, POWDER, FOR SOLUTION INTRAMUSCULAR; INTRAVENOUS at 17:54

## 2022-11-06 RX ADMIN — CEFEPIME 100 MILLIGRAM(S): 1 INJECTION, POWDER, FOR SOLUTION INTRAMUSCULAR; INTRAVENOUS at 09:00

## 2022-11-06 RX ADMIN — OXYCODONE AND ACETAMINOPHEN 2 TABLET(S): 5; 325 TABLET ORAL at 09:00

## 2022-11-06 NOTE — PROGRESS NOTE ADULT - ASSESSMENT
38 yo F with HTN, AVM L foot s/p multiple transcatheter and stick embolizations, chronic L foot ulcer, culture from OR debridement 10/11 with Pseudomonas treated with brief course of vanc and cefepime with recurrence of infection.  She defervesced on vanc and cefepime, MRI without evidence for underlying OM.   - continue vancomycin 1.25g IV x4l--cybstc trough prior to 4th dose  - continue cefepime 2g IV q8h    Dr. Rahman ID Team 1 resumes care Monday

## 2022-11-06 NOTE — PROGRESS NOTE ADULT - SUBJECTIVE AND OBJECTIVE BOX
INTERVAL HPI/OVERNIGHT EVENTS: Gradual improvement L foot erythema. Ongoing pain at site of ulcer.    CONSTITUTIONAL:  Negative fever or chills, feels well, good appetite  EYES:  Negative  blurry vision or double vision  CARDIOVASCULAR:  Negative for chest pain or palpitations  RESPIRATORY:  Negative for cough, wheezing, or SOB   GASTROINTESTINAL:  Negative for nausea, vomiting, diarrhea, constipation, or abdominal pain  GENITOURINARY:  Negative frequency, urgency or dysuria  NEUROLOGIC:  No headache, confusion, dizziness, lightheadedness      ANTIBIOTICS/RELEVANT:    MEDICATIONS  (STANDING):  cefepime   IVPB 2000 milliGRAM(s) IV Intermittent every 8 hours  chlorhexidine 2% Cloths 1 Application(s) Topical <User Schedule>  enoxaparin Injectable 40 milliGRAM(s) SubCutaneous every 12 hours  gabapentin 100 milliGRAM(s) Oral every 24 hours  gabapentin 800 milliGRAM(s) Oral every 24 hours  influenza   Vaccine 0.5 milliLiter(s) IntraMuscular once  nebivolol 10 milliGRAM(s) Oral <User Schedule>  senna 2 Tablet(s) Oral at bedtime  vancomycin  IVPB 1250 milliGRAM(s) IV Intermittent every 8 hours    MEDICATIONS  (PRN):  acetaminophen     Tablet .. 650 milliGRAM(s) Oral every 6 hours PRN Temp greater or equal to 38C (100.4F), Mild Pain (1 - 3)  HYDROmorphone  Injectable 0.5 milliGRAM(s) IV Push every 4 hours PRN breakthrough pain  oxycodone    5 mG/acetaminophen 325 mG 2 Tablet(s) Oral every 4 hours PRN Severe Pain (7 - 10)  polyethylene glycol 3350 17 Gram(s) Oral every 24 hours PRN Constipation  sodium chloride 0.9% lock flush 10 milliLiter(s) IV Push every 1 hour PRN Pre/post blood products, medications, blood draw, and to maintain line patency        Vital Signs Last 24 Hrs  T(C): 36.2 (06 Nov 2022 05:59), Max: 36.7 (05 Nov 2022 20:36)  T(F): 97.1 (06 Nov 2022 05:59), Max: 98.1 (05 Nov 2022 20:36)  HR: 81 (06 Nov 2022 08:15) (79 - 90)  BP: 127/81 (06 Nov 2022 08:15) (127/81 - 148/68)  BP(mean): --  RR: 19 (06 Nov 2022 05:59) (18 - 19)  SpO2: 96% (06 Nov 2022 05:59) (96% - 97%)    Parameters below as of 06 Nov 2022 05:59  Patient On (Oxygen Delivery Method): room air        PHYSICAL EXAM:  Constitutional: NAD  Eyes: ANTOINETTE, EOMI  Ear/Nose/Throat: no oral lesion, no sinus tenderness on percussion	  Neck: no JVD, no lymphadenopathy, supple  Respiratory: CTA chanel  Cardiovascular: S1S2 RRR, no murmurs  Gastrointestinal:soft, (+) BS, no HSM  Extremities:no e/e/c  Skin: photo from patient reviewed (taken this AM): L lateral foot shallow ulceration with surrounding erythema   Vascular: DP Pulse:	right normal; left normal      LABS:                        10.9   7.50  )-----------( 388      ( 06 Nov 2022 08:59 )             34.2     11-06    140  |  105  |  14  ----------------------------<  96  4.1   |  23  |  0.61    Ca    8.5      06 Nov 2022 08:59  Phos  3.6     11-06  Mg     1.6     11-06    TPro  6.5  /  Alb  3.7  /  TBili  0.2  /  DBili  x   /  AST  14  /  ALT  10  /  AlkPhos  67  11-06          MICROBIOLOGY: reviewed    RADIOLOGY & ADDITIONAL STUDIES: reviewed

## 2022-11-07 LAB
ANION GAP SERPL CALC-SCNC: 10 MMOL/L — SIGNIFICANT CHANGE UP (ref 5–17)
BUN SERPL-MCNC: 14 MG/DL — SIGNIFICANT CHANGE UP (ref 7–23)
CALCIUM SERPL-MCNC: 8.7 MG/DL — SIGNIFICANT CHANGE UP (ref 8.4–10.5)
CHLORIDE SERPL-SCNC: 99 MMOL/L — SIGNIFICANT CHANGE UP (ref 96–108)
CO2 SERPL-SCNC: 28 MMOL/L — SIGNIFICANT CHANGE UP (ref 22–31)
CREAT SERPL-MCNC: 0.65 MG/DL — SIGNIFICANT CHANGE UP (ref 0.5–1.3)
EGFR: 115 ML/MIN/1.73M2 — SIGNIFICANT CHANGE UP
GLUCOSE SERPL-MCNC: 91 MG/DL — SIGNIFICANT CHANGE UP (ref 70–99)
HCT VFR BLD CALC: 36.9 % — SIGNIFICANT CHANGE UP (ref 34.5–45)
HGB BLD-MCNC: 11.7 G/DL — SIGNIFICANT CHANGE UP (ref 11.5–15.5)
MAGNESIUM SERPL-MCNC: 1.6 MG/DL — SIGNIFICANT CHANGE UP (ref 1.6–2.6)
MCHC RBC-ENTMCNC: 28.4 PG — SIGNIFICANT CHANGE UP (ref 27–34)
MCHC RBC-ENTMCNC: 31.7 GM/DL — LOW (ref 32–36)
MCV RBC AUTO: 89.6 FL — SIGNIFICANT CHANGE UP (ref 80–100)
NRBC # BLD: 0 /100 WBCS — SIGNIFICANT CHANGE UP (ref 0–0)
PHOSPHATE SERPL-MCNC: 3.8 MG/DL — SIGNIFICANT CHANGE UP (ref 2.5–4.5)
PLATELET # BLD AUTO: 366 K/UL — SIGNIFICANT CHANGE UP (ref 150–400)
POTASSIUM SERPL-MCNC: 4.2 MMOL/L — SIGNIFICANT CHANGE UP (ref 3.5–5.3)
POTASSIUM SERPL-SCNC: 4.2 MMOL/L — SIGNIFICANT CHANGE UP (ref 3.5–5.3)
RBC # BLD: 4.12 M/UL — SIGNIFICANT CHANGE UP (ref 3.8–5.2)
RBC # FLD: 12.4 % — SIGNIFICANT CHANGE UP (ref 10.3–14.5)
SODIUM SERPL-SCNC: 137 MMOL/L — SIGNIFICANT CHANGE UP (ref 135–145)
VANCOMYCIN TROUGH SERPL-MCNC: 21.1 UG/ML — HIGH (ref 10–20)
WBC # BLD: 6.56 K/UL — SIGNIFICANT CHANGE UP (ref 3.8–10.5)
WBC # FLD AUTO: 6.56 K/UL — SIGNIFICANT CHANGE UP (ref 3.8–10.5)

## 2022-11-07 PROCEDURE — 99232 SBSQ HOSP IP/OBS MODERATE 35: CPT | Mod: GC

## 2022-11-07 RX ORDER — MEROPENEM 1 G/30ML
1000 INJECTION INTRAVENOUS EVERY 8 HOURS
Refills: 0 | Status: DISCONTINUED | OUTPATIENT
Start: 2022-11-07 | End: 2022-11-11

## 2022-11-07 RX ORDER — VANCOMYCIN HCL 1 G
1250 VIAL (EA) INTRAVENOUS EVERY 8 HOURS
Refills: 0 | Status: COMPLETED | OUTPATIENT
Start: 2022-11-07 | End: 2022-11-08

## 2022-11-07 RX ORDER — HYDROMORPHONE HYDROCHLORIDE 2 MG/ML
0.5 INJECTION INTRAMUSCULAR; INTRAVENOUS; SUBCUTANEOUS EVERY 4 HOURS
Refills: 0 | Status: DISCONTINUED | OUTPATIENT
Start: 2022-11-07 | End: 2022-11-11

## 2022-11-07 RX ORDER — OXYCODONE AND ACETAMINOPHEN 5; 325 MG/1; MG/1
2 TABLET ORAL EVERY 4 HOURS
Refills: 0 | Status: DISCONTINUED | OUTPATIENT
Start: 2022-11-07 | End: 2022-11-11

## 2022-11-07 RX ORDER — MEROPENEM 1 G/30ML
1000 INJECTION INTRAVENOUS ONCE
Refills: 0 | Status: COMPLETED | OUTPATIENT
Start: 2022-11-07 | End: 2022-11-07

## 2022-11-07 RX ADMIN — HYDROMORPHONE HYDROCHLORIDE 0.5 MILLIGRAM(S): 2 INJECTION INTRAMUSCULAR; INTRAVENOUS; SUBCUTANEOUS at 08:00

## 2022-11-07 RX ADMIN — OXYCODONE AND ACETAMINOPHEN 2 TABLET(S): 5; 325 TABLET ORAL at 02:38

## 2022-11-07 RX ADMIN — GABAPENTIN 800 MILLIGRAM(S): 400 CAPSULE ORAL at 15:41

## 2022-11-07 RX ADMIN — GABAPENTIN 100 MILLIGRAM(S): 400 CAPSULE ORAL at 07:45

## 2022-11-07 RX ADMIN — OXYCODONE AND ACETAMINOPHEN 2 TABLET(S): 5; 325 TABLET ORAL at 23:22

## 2022-11-07 RX ADMIN — Medication 300 MILLIGRAM(S): at 09:26

## 2022-11-07 RX ADMIN — HYDROMORPHONE HYDROCHLORIDE 0.5 MILLIGRAM(S): 2 INJECTION INTRAMUSCULAR; INTRAVENOUS; SUBCUTANEOUS at 04:00

## 2022-11-07 RX ADMIN — NEBIVOLOL HYDROCHLORIDE 10 MILLIGRAM(S): 5 TABLET ORAL at 19:21

## 2022-11-07 RX ADMIN — OXYCODONE AND ACETAMINOPHEN 2 TABLET(S): 5; 325 TABLET ORAL at 09:40

## 2022-11-07 RX ADMIN — MEROPENEM 100 MILLIGRAM(S): 1 INJECTION INTRAVENOUS at 21:09

## 2022-11-07 RX ADMIN — ENOXAPARIN SODIUM 40 MILLIGRAM(S): 100 INJECTION SUBCUTANEOUS at 17:41

## 2022-11-07 RX ADMIN — OXYCODONE AND ACETAMINOPHEN 2 TABLET(S): 5; 325 TABLET ORAL at 06:41

## 2022-11-07 RX ADMIN — CEFEPIME 100 MILLIGRAM(S): 1 INJECTION, POWDER, FOR SOLUTION INTRAMUSCULAR; INTRAVENOUS at 06:08

## 2022-11-07 RX ADMIN — HYDROMORPHONE HYDROCHLORIDE 0.5 MILLIGRAM(S): 2 INJECTION INTRAMUSCULAR; INTRAVENOUS; SUBCUTANEOUS at 07:45

## 2022-11-07 RX ADMIN — CHLORHEXIDINE GLUCONATE 1 APPLICATION(S): 213 SOLUTION TOPICAL at 06:38

## 2022-11-07 RX ADMIN — OXYCODONE AND ACETAMINOPHEN 2 TABLET(S): 5; 325 TABLET ORAL at 13:42

## 2022-11-07 RX ADMIN — OXYCODONE AND ACETAMINOPHEN 2 TABLET(S): 5; 325 TABLET ORAL at 18:24

## 2022-11-07 RX ADMIN — Medication 166.67 MILLIGRAM(S): at 19:29

## 2022-11-07 RX ADMIN — HYDROMORPHONE HYDROCHLORIDE 0.5 MILLIGRAM(S): 2 INJECTION INTRAMUSCULAR; INTRAVENOUS; SUBCUTANEOUS at 21:23

## 2022-11-07 RX ADMIN — HYDROMORPHONE HYDROCHLORIDE 0.5 MILLIGRAM(S): 2 INJECTION INTRAMUSCULAR; INTRAVENOUS; SUBCUTANEOUS at 21:08

## 2022-11-07 RX ADMIN — OXYCODONE AND ACETAMINOPHEN 2 TABLET(S): 5; 325 TABLET ORAL at 05:41

## 2022-11-07 RX ADMIN — HYDROMORPHONE HYDROCHLORIDE 0.5 MILLIGRAM(S): 2 INJECTION INTRAMUSCULAR; INTRAVENOUS; SUBCUTANEOUS at 16:38

## 2022-11-07 RX ADMIN — HYDROMORPHONE HYDROCHLORIDE 0.5 MILLIGRAM(S): 2 INJECTION INTRAMUSCULAR; INTRAVENOUS; SUBCUTANEOUS at 16:14

## 2022-11-07 RX ADMIN — ENOXAPARIN SODIUM 40 MILLIGRAM(S): 100 INJECTION SUBCUTANEOUS at 06:08

## 2022-11-07 RX ADMIN — NEBIVOLOL HYDROCHLORIDE 10 MILLIGRAM(S): 5 TABLET ORAL at 07:45

## 2022-11-07 RX ADMIN — OXYCODONE AND ACETAMINOPHEN 2 TABLET(S): 5; 325 TABLET ORAL at 01:28

## 2022-11-07 RX ADMIN — Medication 300 MILLIGRAM(S): at 01:40

## 2022-11-07 RX ADMIN — HYDROMORPHONE HYDROCHLORIDE 0.5 MILLIGRAM(S): 2 INJECTION INTRAMUSCULAR; INTRAVENOUS; SUBCUTANEOUS at 03:45

## 2022-11-07 RX ADMIN — MEROPENEM 100 MILLIGRAM(S): 1 INJECTION INTRAVENOUS at 13:37

## 2022-11-07 RX ADMIN — OXYCODONE AND ACETAMINOPHEN 2 TABLET(S): 5; 325 TABLET ORAL at 10:40

## 2022-11-07 RX ADMIN — HYDROMORPHONE HYDROCHLORIDE 0.5 MILLIGRAM(S): 2 INJECTION INTRAMUSCULAR; INTRAVENOUS; SUBCUTANEOUS at 11:58

## 2022-11-07 NOTE — PROGRESS NOTE ADULT - SUBJECTIVE AND OBJECTIVE BOX
INFECTIOUS DISEASES CONSULT FOLLOW-UP NOTE    INTERVAL HPI/OVERNIGHT EVENTS: ISABELLE    Subjective: Patient seen and examined at bedside. Ongoing foot pain. Denies fevers, chills, cough, abdominal pain, diarrhea.      ANTIBIOTICS/RELEVANT:    MEDICATIONS  (STANDING):  chlorhexidine 2% Cloths 1 Application(s) Topical <User Schedule>  enoxaparin Injectable 40 milliGRAM(s) SubCutaneous every 12 hours  gabapentin 100 milliGRAM(s) Oral every 24 hours  gabapentin 800 milliGRAM(s) Oral every 24 hours  influenza   Vaccine 0.5 milliLiter(s) IntraMuscular once  meropenem  IVPB 1000 milliGRAM(s) IV Intermittent every 8 hours  nebivolol 10 milliGRAM(s) Oral <User Schedule>  senna 2 Tablet(s) Oral at bedtime  vancomycin  IVPB 1250 milliGRAM(s) IV Intermittent every 8 hours    MEDICATIONS  (PRN):  acetaminophen     Tablet .. 650 milliGRAM(s) Oral every 6 hours PRN Temp greater or equal to 38C (100.4F), Mild Pain (1 - 3)  HYDROmorphone  Injectable 0.5 milliGRAM(s) IV Push every 4 hours PRN breakthrough pain  oxycodone    5 mG/acetaminophen 325 mG 2 Tablet(s) Oral every 4 hours PRN Severe Pain (7 - 10)  polyethylene glycol 3350 17 Gram(s) Oral every 24 hours PRN Constipation  sodium chloride 0.9% lock flush 10 milliLiter(s) IV Push every 1 hour PRN Pre/post blood products, medications, blood draw, and to maintain line patency        Vital Signs Last 24 Hrs  T(C): 36.7 (07 Nov 2022 12:48), Max: 36.7 (06 Nov 2022 21:26)  T(F): 98.1 (07 Nov 2022 12:48), Max: 98.1 (07 Nov 2022 05:53)  HR: 82 (07 Nov 2022 19:23) (76 - 84)  BP: 143/80 (07 Nov 2022 19:23) (133/78 - 147/88)  BP(mean): --  RR: 18 (07 Nov 2022 12:48) (18 - 19)  SpO2: 98% (07 Nov 2022 12:48) (97% - 98%)    Parameters below as of 07 Nov 2022 12:48  Patient On (Oxygen Delivery Method): room air      PHYSICAL EXAM  Constitutional: alert, NAD  Eyes: the sclera and conjunctiva were normal.   ENT: the ears and nose were normal in appearance.   Neck: the appearance of the neck was normal and the neck was supple.   Pulmonary: no respiratory distress and lungs CTA bilaterally.   Heart: heart rate was normal and rhythm regular, normal S1 and S2  Vascular: no peripheral edema. LLE wrapped - pictures today show no improvement.  Abdomen: normal bowel sounds, soft, non-tender  Neurological: no focal deficits  Psychiatric: the affect was normal      LABS:                        11.7   6.56  )-----------( 366      ( 07 Nov 2022 05:30 )             36.9     11-07    137  |  99  |  14  ----------------------------<  91  4.2   |  28  |  0.65    Ca    8.7      07 Nov 2022 05:30  Phos  3.8     11-07  Mg     1.6     11-07    TPro  6.5  /  Alb  3.7  /  TBili  0.2  /  DBili  x   /  AST  14  /  ALT  10  /  AlkPhos  67  11-06          MICROBIOLOGY:      RADIOLOGY & ADDITIONAL STUDIES:  Reviewed

## 2022-11-07 NOTE — PROGRESS NOTE ADULT - SUBJECTIVE AND OBJECTIVE BOX
OVERNIGHT EVENTS:  NAEON    SUBJECTIVE / INTERVAL HPI: Patient seen and examined at bedside. This morning she says her ulcer is looking a little better. Still in pain and requiring Dilaudid. She has been reaching out to her home pain doctor to have correct pain regimen when she leaves the hospital     VITAL SIGNS:  Vital Signs Last 24 Hrs  T(C): 36.7 (07 Nov 2022 12:48), Max: 36.7 (06 Nov 2022 21:26)  T(F): 98.1 (07 Nov 2022 12:48), Max: 98.1 (07 Nov 2022 05:53)  HR: 80 (07 Nov 2022 12:48) (76 - 93)  BP: 133/78 (07 Nov 2022 12:48) (133/78 - 147/88)  BP(mean): --  RR: 18 (07 Nov 2022 12:48) (18 - 19)  SpO2: 98% (07 Nov 2022 12:48) (97% - 98%)    Parameters below as of 07 Nov 2022 12:48  Patient On (Oxygen Delivery Method): room air    PHYSICAL EXAM:  General: A&Ox3. Awake and laying comfortably in bed.  HEENT: Palpebral conjunctiva pink and sclera white BL. Mucosa pink and moist.  Neck: Supple, FROM, no JVD.  Cardiovascular: Clear S1 and S2. No murmurs, gallops or rubs.  Respiratory: Clear to auscultation bilaterally. No wheezing, rhonchi or rales.  Gastrointestinal: Nondistended, nontender. BS normoactive x4.  Extremities: Left ankle wrapped w/ bandaging. Left lateral ankle w/ 2x2cm ulcer red without pus w/ surrounding erythema. Tenderness to palpation around the wound.  No discoloration, brittle nails, varicose veins, clubbing or edema noted.  Vascular: 2+ equally bilaterally dorsalis pedis and posterior tibial.  Neurological: Sensation intact equally bilaterally in all extremities.      MEDICATIONS:  MEDICATIONS  (STANDING):  chlorhexidine 2% Cloths 1 Application(s) Topical <User Schedule>  enoxaparin Injectable 40 milliGRAM(s) SubCutaneous every 12 hours  gabapentin 100 milliGRAM(s) Oral every 24 hours  gabapentin 800 milliGRAM(s) Oral every 24 hours  influenza   Vaccine 0.5 milliLiter(s) IntraMuscular once  meropenem  IVPB 1000 milliGRAM(s) IV Intermittent once  nebivolol 10 milliGRAM(s) Oral <User Schedule>  senna 2 Tablet(s) Oral at bedtime  vancomycin  IVPB 1500 milliGRAM(s) IV Intermittent every 8 hours    MEDICATIONS  (PRN):  acetaminophen     Tablet .. 650 milliGRAM(s) Oral every 6 hours PRN Temp greater or equal to 38C (100.4F), Mild Pain (1 - 3)  HYDROmorphone  Injectable 0.5 milliGRAM(s) IV Push every 4 hours PRN breakthrough pain  oxycodone    5 mG/acetaminophen 325 mG 2 Tablet(s) Oral every 4 hours PRN Severe Pain (7 - 10)  polyethylene glycol 3350 17 Gram(s) Oral every 24 hours PRN Constipation  sodium chloride 0.9% lock flush 10 milliLiter(s) IV Push every 1 hour PRN Pre/post blood products, medications, blood draw, and to maintain line patency      ALLERGIES:  Allergies    amoxicillin (Angioedema)  ciprofloxacin (Rash)  hydrochlorothiazide (Hives)  lisinopril (Angioedema; Rash; Hives)  penicillin (Rash)    Intolerances        LABS:                        11.7   6.56  )-----------( 366      ( 07 Nov 2022 05:30 )             36.9     11-07    137  |  99  |  14  ----------------------------<  91  4.2   |  28  |  0.65    Ca    8.7      07 Nov 2022 05:30  Phos  3.8     11-07  Mg     1.6     11-07    TPro  6.5  /  Alb  3.7  /  TBili  0.2  /  DBili  x   /  AST  14  /  ALT  10  /  AlkPhos  67  11-06        CAPILLARY BLOOD GLUCOSE          RADIOLOGY & ADDITIONAL TESTS: Reviewed.

## 2022-11-07 NOTE — ADVANCED PRACTICE NURSE CONSULT - REASON FOR CONSULT
WOC nurse consult to assess non-healing wound. Patient evaluated by vascular with wound recommendations. WOCN consult not indicated at this time. WOCN discussed the same with house staff, Dr Javed.

## 2022-11-07 NOTE — DIETITIAN NUTRITION RISK NOTIFICATION - TREATMENT: THE FOLLOWING DIET HAS BEEN RECOMMENDED
1. Continue DASH diet.   Monitor PO intake. [place] food preferences as able   2. Recommend adding MVI, vit C, zinc oxide to promote wounds healing.   3. Monitor and replete lytes (BMPs, lipid panels)   4. Monitor skin integrity, pain, GI distress  5. Nutrition education PRN (review DASH, healthy weight loss, increase physical activity (once medically feasible)

## 2022-11-07 NOTE — DIETITIAN INITIAL EVALUATION ADULT - ADD RECOMMEND
1. Continue DASH diet. Monitor PO intake. [place] food preferences as able 2. Recommend adding MVI, vit C, zinc oxide to promote wounds healing. 3. Monitor and replete lytes (BMPs, lipid panels) 4. Monitor skin integrity, pain, GI distress

## 2022-11-07 NOTE — PROGRESS NOTE ADULT - PROBLEM SELECTOR PLAN 3
*resolved  Presented with SIRS 2/4 tachycardia 102, leukocytosis 13 and home T max 101 (100.2 in the ED). Source m/p chronic foot ulceration.  - Improved: HR 79-90 BPM over past 24 hours, Leukocytes downtrending to 7.21 (11/2), afebrile since 11/1.  - BCx (10/31) and wound culture (11/1) NGTD *resolved  Presented with SIRS 2/4 tachycardia 102, leukocytosis 13 and home T max 101 (100.2 in the ED). Source m/p chronic foot ulceration.  - Improved: HR 79-90 BPM over past 24 hours, Leukocytes downtrending to 7.21 (11/2), afebrile since 11/1.

## 2022-11-07 NOTE — DIETITIAN INITIAL EVALUATION ADULT - PERTINENT MEDS FT
MEDICATIONS  (STANDING):  cefepime   IVPB 2000 milliGRAM(s) IV Intermittent every 8 hours  chlorhexidine 2% Cloths 1 Application(s) Topical <User Schedule>  enoxaparin Injectable 40 milliGRAM(s) SubCutaneous every 12 hours  gabapentin 100 milliGRAM(s) Oral every 24 hours  gabapentin 800 milliGRAM(s) Oral every 24 hours  influenza   Vaccine 0.5 milliLiter(s) IntraMuscular once  nebivolol 10 milliGRAM(s) Oral <User Schedule>  senna 2 Tablet(s) Oral at bedtime  vancomycin  IVPB 1500 milliGRAM(s) IV Intermittent every 8 hours    MEDICATIONS  (PRN):  acetaminophen     Tablet .. 650 milliGRAM(s) Oral every 6 hours PRN Temp greater or equal to 38C (100.4F), Mild Pain (1 - 3)  HYDROmorphone  Injectable 0.5 milliGRAM(s) IV Push every 4 hours PRN breakthrough pain  oxycodone    5 mG/acetaminophen 325 mG 2 Tablet(s) Oral every 4 hours PRN Severe Pain (7 - 10)  polyethylene glycol 3350 17 Gram(s) Oral every 24 hours PRN Constipation  sodium chloride 0.9% lock flush 10 milliLiter(s) IV Push every 1 hour PRN Pre/post blood products, medications, blood draw, and to maintain line patency

## 2022-11-07 NOTE — PROGRESS NOTE ADULT - PROBLEM SELECTOR PLAN 1
Pt with Left Lateral foot ulcer with purulence and fever at home Tmax 101. Recently hospitalized (10/10/22-10/17/22) for LLL angio/ulcer debridement and L lateral ankle AVM glue embolization (10/11/22) with Dr. Teran. s/p vanc/cefepime per ID (stopped 10/17/22), deep surgical wound cultures grew pseudomonas aeruginosa, staph haemolyticus, and campylobacter striatum and jeikeium. Foot Xray: preliminary read no signs of OM or NF, elevated ESR/CRP.  - Start Meropenem 1g Q8, stop cefepime 2mg.  - C/w Vancomycin 1500mg q8hrs (10/31-11/3 and 11/4-) txt for pseudomonas from prior surgical culture  - S/p Cefepime 2g Q8 (11/1 - 11/7)  - F/u Vanc trough at 5pm prior to 6pm dose  - Bcx NGTD  - 11/1 WCx: Gm stain rare WBCs no organisms; Cx showing rare CONS  - F/u ID recs for duration of antibiotics  - PICC placed for long-term antibiotic treatment  - Follows w/ Dr. Teran, normally sees patient when admitted to hospital.  - Pain Management: Tylenol prn for mild pain, percocet  po q4h for severe pain, and Dilaudid .5mg IV Q4Hrs for breakthrough pain. Pt has outpatient pain management doctor  - Dressing changes TID per last vascular recs- WTD- wet 4x4 with NS flush and place over wound and wrap with kerlix.  - MRA of Left ankle: No evidence of osteomyelitis. 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 Pt with Left Lateral foot ulcer with purulence and fever at home Tmax 101. Recently hospitalized (10/10/22-10/17/22) for LLL angio/ulcer debridement and L lateral ankle AVM glue embolization (10/11/22) with Dr. Teran. s/p vanc/cefepime per ID (stopped 10/17/22), deep surgical wound cultures grew pseudomonas aeruginosa, staph haemolyticus, and campylobacter striatum and jeikeium. Foot Xray: preliminary read no signs of OM or NF, elevated ESR/CRP. 11/1 WCx: Staph Epidermidis and Staph Haemolyticus  - Start Meropenem 1g Q8, stop cefepime 2mg.  - C/w Vancomycin 1500mg q8hrs (10/31-11/3 and 11/4-) txt for pseudomonas from prior surgical culture  - S/p Cefepime 2g Q8 (11/1 - 11/7)  - F/u Vanc trough at 5pm prior to 6pm dose  - Bcx NGTD  - F/u ID recs for duration of antibiotics  - PICC placed for long-term antibiotic treatment  - Follows w/ Dr. Teran, normally sees patient when admitted to hospital.  - Pain Management: Tylenol prn for mild pain, percocet  po q4h for severe pain, and Dilaudid .5mg IV Q4Hrs for breakthrough pain. Pt has outpatient pain management doctor  - Dressing changes TID per last vascular recs- WTD- wet 4x4 with NS flush and place over wound and wrap with kerlix.  - MRA of Left ankle: No evidence of osteomyelitis. 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

## 2022-11-07 NOTE — DIETITIAN INITIAL EVALUATION ADULT - OTHER CALCULATIONS
Ideal body weight used for calculations as pt >120% of IBW (232%). Adjusted for increase protein needs due to cellulitis.

## 2022-11-07 NOTE — DIETITIAN INITIAL EVALUATION ADULT - OTHER INFO
"5/3/2018 with prescribing provider:  Solomon   5/3/2018   Total Score 15     ANNA-7 SCORE 5/3/37F w/ PMHx of morbid obesity, HTN, L foot AVM s/p multiple DSE, recently hospitalized (10/10/22-10/17/22) for LLL ulcer debridement with Dr. Tearn, presented for worsening left foot pain and erythema surrounding non-healing ulcer.  37F w/ PMHx of morbid obesity, HTN, L foot AVM s/p multiple DSE, recently hospitalized (10/10/22-10/17/22) for LLL ulcer debridement with Dr. Teran, presented for worsening left foot pain and erythema surrounding non-healing ulcer.     Pt seen in the room. Resting in bed, room air. Afebrile 98.1. /78. Pt is on DASH diet. Confirms NKFA. Pt endorse good appetite PTA and in hospital. Pt states shes able to eat >75% of her overall meal. She reports 65lbs intentional weight loss over the course of 6 months. Pt reports she enrolled in a weigh loss pill program "Qysnia" as per her doctor recommendation. Pt mobility is limited due to her knee cellulitis. Labs WNL. Pt reports her A1C is now normal; decreased from 6.5 to 5.1. Observed no fat, muscle wasting. No edema. RD reviewed current DASH diet, focusing on low sodium, healthy fat and avoiding processed meat and saturated fat. pt verbalize understanding. LBM 11/6. Denies n/v/c/d. See additional nutrition recs below.

## 2022-11-08 LAB
ANION GAP SERPL CALC-SCNC: 11 MMOL/L — SIGNIFICANT CHANGE UP (ref 5–17)
BUN SERPL-MCNC: 14 MG/DL — SIGNIFICANT CHANGE UP (ref 7–23)
CALCIUM SERPL-MCNC: 8.8 MG/DL — SIGNIFICANT CHANGE UP (ref 8.4–10.5)
CHLORIDE SERPL-SCNC: 99 MMOL/L — SIGNIFICANT CHANGE UP (ref 96–108)
CO2 SERPL-SCNC: 24 MMOL/L — SIGNIFICANT CHANGE UP (ref 22–31)
CREAT SERPL-MCNC: 0.61 MG/DL — SIGNIFICANT CHANGE UP (ref 0.5–1.3)
EGFR: 117 ML/MIN/1.73M2 — SIGNIFICANT CHANGE UP
GLUCOSE SERPL-MCNC: 104 MG/DL — HIGH (ref 70–99)
HCT VFR BLD CALC: 34.3 % — LOW (ref 34.5–45)
HGB BLD-MCNC: 11 G/DL — LOW (ref 11.5–15.5)
MAGNESIUM SERPL-MCNC: 1.8 MG/DL — SIGNIFICANT CHANGE UP (ref 1.6–2.6)
MCHC RBC-ENTMCNC: 28.8 PG — SIGNIFICANT CHANGE UP (ref 27–34)
MCHC RBC-ENTMCNC: 32.1 GM/DL — SIGNIFICANT CHANGE UP (ref 32–36)
MCV RBC AUTO: 89.8 FL — SIGNIFICANT CHANGE UP (ref 80–100)
NRBC # BLD: 0 /100 WBCS — SIGNIFICANT CHANGE UP (ref 0–0)
PHOSPHATE SERPL-MCNC: 4.4 MG/DL — SIGNIFICANT CHANGE UP (ref 2.5–4.5)
PLATELET # BLD AUTO: 375 K/UL — SIGNIFICANT CHANGE UP (ref 150–400)
POTASSIUM SERPL-MCNC: 4.3 MMOL/L — SIGNIFICANT CHANGE UP (ref 3.5–5.3)
POTASSIUM SERPL-SCNC: 4.3 MMOL/L — SIGNIFICANT CHANGE UP (ref 3.5–5.3)
RBC # BLD: 3.82 M/UL — SIGNIFICANT CHANGE UP (ref 3.8–5.2)
RBC # FLD: 12.4 % — SIGNIFICANT CHANGE UP (ref 10.3–14.5)
SODIUM SERPL-SCNC: 134 MMOL/L — LOW (ref 135–145)
VANCOMYCIN TROUGH SERPL-MCNC: 14.1 UG/ML — SIGNIFICANT CHANGE UP (ref 10–20)
WBC # BLD: 7.17 K/UL — SIGNIFICANT CHANGE UP (ref 3.8–10.5)
WBC # FLD AUTO: 7.17 K/UL — SIGNIFICANT CHANGE UP (ref 3.8–10.5)

## 2022-11-08 PROCEDURE — 99232 SBSQ HOSP IP/OBS MODERATE 35: CPT | Mod: GC

## 2022-11-08 RX ORDER — VANCOMYCIN HCL 1 G
1250 VIAL (EA) INTRAVENOUS EVERY 8 HOURS
Refills: 0 | Status: COMPLETED | OUTPATIENT
Start: 2022-11-08 | End: 2022-11-09

## 2022-11-08 RX ADMIN — ENOXAPARIN SODIUM 40 MILLIGRAM(S): 100 INJECTION SUBCUTANEOUS at 17:39

## 2022-11-08 RX ADMIN — OXYCODONE AND ACETAMINOPHEN 2 TABLET(S): 5; 325 TABLET ORAL at 21:44

## 2022-11-08 RX ADMIN — HYDROMORPHONE HYDROCHLORIDE 0.5 MILLIGRAM(S): 2 INJECTION INTRAMUSCULAR; INTRAVENOUS; SUBCUTANEOUS at 15:40

## 2022-11-08 RX ADMIN — HYDROMORPHONE HYDROCHLORIDE 0.5 MILLIGRAM(S): 2 INJECTION INTRAMUSCULAR; INTRAVENOUS; SUBCUTANEOUS at 23:58

## 2022-11-08 RX ADMIN — HYDROMORPHONE HYDROCHLORIDE 0.5 MILLIGRAM(S): 2 INJECTION INTRAMUSCULAR; INTRAVENOUS; SUBCUTANEOUS at 10:50

## 2022-11-08 RX ADMIN — Medication 166.67 MILLIGRAM(S): at 04:05

## 2022-11-08 RX ADMIN — CHLORHEXIDINE GLUCONATE 1 APPLICATION(S): 213 SOLUTION TOPICAL at 06:20

## 2022-11-08 RX ADMIN — HYDROMORPHONE HYDROCHLORIDE 0.5 MILLIGRAM(S): 2 INJECTION INTRAMUSCULAR; INTRAVENOUS; SUBCUTANEOUS at 06:37

## 2022-11-08 RX ADMIN — HYDROMORPHONE HYDROCHLORIDE 0.5 MILLIGRAM(S): 2 INJECTION INTRAMUSCULAR; INTRAVENOUS; SUBCUTANEOUS at 15:17

## 2022-11-08 RX ADMIN — MEROPENEM 100 MILLIGRAM(S): 1 INJECTION INTRAVENOUS at 06:08

## 2022-11-08 RX ADMIN — NEBIVOLOL HYDROCHLORIDE 10 MILLIGRAM(S): 5 TABLET ORAL at 07:00

## 2022-11-08 RX ADMIN — Medication 166.67 MILLIGRAM(S): at 21:53

## 2022-11-08 RX ADMIN — OXYCODONE AND ACETAMINOPHEN 2 TABLET(S): 5; 325 TABLET ORAL at 04:23

## 2022-11-08 RX ADMIN — HYDROMORPHONE HYDROCHLORIDE 0.5 MILLIGRAM(S): 2 INJECTION INTRAMUSCULAR; INTRAVENOUS; SUBCUTANEOUS at 11:05

## 2022-11-08 RX ADMIN — OXYCODONE AND ACETAMINOPHEN 2 TABLET(S): 5; 325 TABLET ORAL at 22:44

## 2022-11-08 RX ADMIN — OXYCODONE AND ACETAMINOPHEN 2 TABLET(S): 5; 325 TABLET ORAL at 18:40

## 2022-11-08 RX ADMIN — OXYCODONE AND ACETAMINOPHEN 2 TABLET(S): 5; 325 TABLET ORAL at 09:40

## 2022-11-08 RX ADMIN — HYDROMORPHONE HYDROCHLORIDE 0.5 MILLIGRAM(S): 2 INJECTION INTRAMUSCULAR; INTRAVENOUS; SUBCUTANEOUS at 01:36

## 2022-11-08 RX ADMIN — OXYCODONE AND ACETAMINOPHEN 2 TABLET(S): 5; 325 TABLET ORAL at 08:39

## 2022-11-08 RX ADMIN — OXYCODONE AND ACETAMINOPHEN 2 TABLET(S): 5; 325 TABLET ORAL at 17:39

## 2022-11-08 RX ADMIN — MEROPENEM 100 MILLIGRAM(S): 1 INJECTION INTRAVENOUS at 21:17

## 2022-11-08 RX ADMIN — GABAPENTIN 100 MILLIGRAM(S): 400 CAPSULE ORAL at 07:00

## 2022-11-08 RX ADMIN — NEBIVOLOL HYDROCHLORIDE 10 MILLIGRAM(S): 5 TABLET ORAL at 19:27

## 2022-11-08 RX ADMIN — OXYCODONE AND ACETAMINOPHEN 2 TABLET(S): 5; 325 TABLET ORAL at 14:41

## 2022-11-08 RX ADMIN — OXYCODONE AND ACETAMINOPHEN 2 TABLET(S): 5; 325 TABLET ORAL at 00:22

## 2022-11-08 RX ADMIN — HYDROMORPHONE HYDROCHLORIDE 0.5 MILLIGRAM(S): 2 INJECTION INTRAMUSCULAR; INTRAVENOUS; SUBCUTANEOUS at 06:22

## 2022-11-08 RX ADMIN — HYDROMORPHONE HYDROCHLORIDE 0.5 MILLIGRAM(S): 2 INJECTION INTRAMUSCULAR; INTRAVENOUS; SUBCUTANEOUS at 01:21

## 2022-11-08 RX ADMIN — HYDROMORPHONE HYDROCHLORIDE 0.5 MILLIGRAM(S): 2 INJECTION INTRAMUSCULAR; INTRAVENOUS; SUBCUTANEOUS at 20:50

## 2022-11-08 RX ADMIN — ENOXAPARIN SODIUM 40 MILLIGRAM(S): 100 INJECTION SUBCUTANEOUS at 06:09

## 2022-11-08 RX ADMIN — Medication 166.67 MILLIGRAM(S): at 10:50

## 2022-11-08 RX ADMIN — HYDROMORPHONE HYDROCHLORIDE 0.5 MILLIGRAM(S): 2 INJECTION INTRAMUSCULAR; INTRAVENOUS; SUBCUTANEOUS at 23:43

## 2022-11-08 RX ADMIN — HYDROMORPHONE HYDROCHLORIDE 0.5 MILLIGRAM(S): 2 INJECTION INTRAMUSCULAR; INTRAVENOUS; SUBCUTANEOUS at 19:40

## 2022-11-08 RX ADMIN — OXYCODONE AND ACETAMINOPHEN 2 TABLET(S): 5; 325 TABLET ORAL at 13:18

## 2022-11-08 NOTE — PROGRESS NOTE ADULT - PROBLEM SELECTOR PLAN 1
Pt with Left Lateral foot ulcer with purulence and fever at home Tmax 101. Recently hospitalized (10/10/22-10/17/22) for LLL angio/ulcer debridement and L lateral ankle AVM glue embolization (10/11/22) with Dr. Teran. s/p vanc/cefepime per ID (stopped 10/17/22), deep surgical wound cultures grew pseudomonas aeruginosa, staph haemolyticus, and campylobacter striatum and jeikeium. Foot Xray: preliminary read no signs of OM or NF, elevated ESR/CRP. 11/1 WCx: Staph Epidermidis and Staph Haemolyticus  - C/w Meropenem 1g Q8.  - C/w Vancomycin 1250mg q8hrs (10/31-11/3 and 11/4-) txt for pseudomonas from prior surgical culture  - S/p Cefepime 2g Q8 (11/1 - 11/7)  - F/u Vanc trough prior to 4th dose  - Bcx NGTD  - F/u ID recs for duration of antibiotics  - PICC placed for long-term antibiotic treatment  - Follows w/ Dr. Teran, normally sees patient when admitted to hospital.  - Pain Management: Tylenol prn for mild pain, percocet  po q4h for severe pain, and Dilaudid .5mg IV Q4Hrs for breakthrough pain. Pt has outpatient pain management doctor  - Dressing changes TID per last vascular recs- WTD- wet 4x4 with NS flush and place over wound and wrap with kerlix.  - MRA of Left ankle: No evidence of osteomyelitis. 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

## 2022-11-08 NOTE — PROGRESS NOTE ADULT - SUBJECTIVE AND OBJECTIVE BOX
**incomplete    OVERNIGHT EVENTS:  NAEON     SUBJECTIVE / INTERVAL HPI: Patient seen and examined at bedside.     VITAL SIGNS:  Vital Signs Last 24 Hrs  T(C): 36.6 (08 Nov 2022 04:44), Max: 36.7 (07 Nov 2022 12:48)  T(F): 97.8 (08 Nov 2022 04:44), Max: 98.1 (07 Nov 2022 12:48)  HR: 80 (08 Nov 2022 04:44) (80 - 82)  BP: 141/83 (08 Nov 2022 04:44) (133/78 - 143/80)  BP(mean): --  RR: 18 (08 Nov 2022 04:44) (18 - 19)  SpO2: 97% (08 Nov 2022 04:44) (96% - 98%)    Parameters below as of 08 Nov 2022 04:44  Patient On (Oxygen Delivery Method): room air        PHYSICAL EXAM:    General: alert, in no acute distress  HEENT: NC/AT; PERRL, anicteric sclera; MMM  Neck: supple  Cardiovascular: +S1/S2, RRR  Respiratory: CTA B/L; no W/R/R  Gastrointestinal: soft, NT/ND; +BSx4  Extremities: WWP; no edema, clubbing or cyanosis  Vascular: 2+ radial, DP/PT pulses B/L  Neurological: AAOx3; no focal deficits    MEDICATIONS:  MEDICATIONS  (STANDING):  chlorhexidine 2% Cloths 1 Application(s) Topical <User Schedule>  enoxaparin Injectable 40 milliGRAM(s) SubCutaneous every 12 hours  gabapentin 100 milliGRAM(s) Oral every 24 hours  gabapentin 800 milliGRAM(s) Oral every 24 hours  influenza   Vaccine 0.5 milliLiter(s) IntraMuscular once  meropenem  IVPB 1000 milliGRAM(s) IV Intermittent every 8 hours  nebivolol 10 milliGRAM(s) Oral <User Schedule>  senna 2 Tablet(s) Oral at bedtime    MEDICATIONS  (PRN):  acetaminophen     Tablet .. 650 milliGRAM(s) Oral every 6 hours PRN Temp greater or equal to 38C (100.4F), Mild Pain (1 - 3)  HYDROmorphone  Injectable 0.5 milliGRAM(s) IV Push every 4 hours PRN breakthrough pain  oxycodone    5 mG/acetaminophen 325 mG 2 Tablet(s) Oral every 4 hours PRN Severe Pain (7 - 10)  polyethylene glycol 3350 17 Gram(s) Oral every 24 hours PRN Constipation  sodium chloride 0.9% lock flush 10 milliLiter(s) IV Push every 1 hour PRN Pre/post blood products, medications, blood draw, and to maintain line patency      ALLERGIES:  Allergies    amoxicillin (Angioedema)  ciprofloxacin (Rash)  hydrochlorothiazide (Hives)  lisinopril (Angioedema; Rash; Hives)  penicillin (Rash)    Intolerances        LABS:                        11.0   7.17  )-----------( 375      ( 08 Nov 2022 06:04 )             34.3     11-08    134<L>  |  99  |  14  ----------------------------<  104<H>  4.3   |  24  |  0.61    Ca    8.8      08 Nov 2022 06:04  Phos  4.4     11-08  Mg     1.8     11-08          CAPILLARY BLOOD GLUCOSE          RADIOLOGY & ADDITIONAL TESTS: Reviewed. **incomplete    OVERNIGHT EVENTS:  NAEON     SUBJECTIVE / INTERVAL HPI: Patient seen and examined at bedside. This morning she feels that her foot ulcer is getting better.     VITAL SIGNS:  Vital Signs Last 24 Hrs  T(C): 36.6 (08 Nov 2022 04:44), Max: 36.7 (07 Nov 2022 12:48)  T(F): 97.8 (08 Nov 2022 04:44), Max: 98.1 (07 Nov 2022 12:48)  HR: 80 (08 Nov 2022 04:44) (80 - 82)  BP: 141/83 (08 Nov 2022 04:44) (133/78 - 143/80)  BP(mean): --  RR: 18 (08 Nov 2022 04:44) (18 - 19)  SpO2: 97% (08 Nov 2022 04:44) (96% - 98%)    Parameters below as of 08 Nov 2022 04:44  Patient On (Oxygen Delivery Method): room air        PHYSICAL EXAM:    General: alert, in no acute distress  HEENT: NC/AT; PERRL, anicteric sclera; MMM  Neck: supple  Cardiovascular: +S1/S2, RRR  Respiratory: CTA B/L; no W/R/R  Gastrointestinal: soft, NT/ND; +BSx4  Extremities: WWP; no edema, clubbing or cyanosis  Vascular: 2+ radial, DP/PT pulses B/L  Neurological: AAOx3; no focal deficits    MEDICATIONS:  MEDICATIONS  (STANDING):  chlorhexidine 2% Cloths 1 Application(s) Topical <User Schedule>  enoxaparin Injectable 40 milliGRAM(s) SubCutaneous every 12 hours  gabapentin 100 milliGRAM(s) Oral every 24 hours  gabapentin 800 milliGRAM(s) Oral every 24 hours  influenza   Vaccine 0.5 milliLiter(s) IntraMuscular once  meropenem  IVPB 1000 milliGRAM(s) IV Intermittent every 8 hours  nebivolol 10 milliGRAM(s) Oral <User Schedule>  senna 2 Tablet(s) Oral at bedtime    MEDICATIONS  (PRN):  acetaminophen     Tablet .. 650 milliGRAM(s) Oral every 6 hours PRN Temp greater or equal to 38C (100.4F), Mild Pain (1 - 3)  HYDROmorphone  Injectable 0.5 milliGRAM(s) IV Push every 4 hours PRN breakthrough pain  oxycodone    5 mG/acetaminophen 325 mG 2 Tablet(s) Oral every 4 hours PRN Severe Pain (7 - 10)  polyethylene glycol 3350 17 Gram(s) Oral every 24 hours PRN Constipation  sodium chloride 0.9% lock flush 10 milliLiter(s) IV Push every 1 hour PRN Pre/post blood products, medications, blood draw, and to maintain line patency      ALLERGIES:  Allergies    amoxicillin (Angioedema)  ciprofloxacin (Rash)  hydrochlorothiazide (Hives)  lisinopril (Angioedema; Rash; Hives)  penicillin (Rash)    Intolerances        LABS:                        11.0   7.17  )-----------( 375      ( 08 Nov 2022 06:04 )             34.3     11-08    134<L>  |  99  |  14  ----------------------------<  104<H>  4.3   |  24  |  0.61    Ca    8.8      08 Nov 2022 06:04  Phos  4.4     11-08  Mg     1.8     11-08          CAPILLARY BLOOD GLUCOSE          RADIOLOGY & ADDITIONAL TESTS: Reviewed.   OVERNIGHT EVENTS:  NAEON     SUBJECTIVE / INTERVAL HPI: Patient seen and examined at bedside. This morning she feels that her foot ulcer is getting better. She says the chills and warmth she has been feeling at night has gotten better. Still has pain in her foot requiring Dilaudid. Denies headache, sob, abdominal pain, and constipation.     VITAL SIGNS:  Vital Signs Last 24 Hrs  T(C): 36.6 (08 Nov 2022 04:44), Max: 36.7 (07 Nov 2022 12:48)  T(F): 97.8 (08 Nov 2022 04:44), Max: 98.1 (07 Nov 2022 12:48)  HR: 80 (08 Nov 2022 04:44) (80 - 82)  BP: 141/83 (08 Nov 2022 04:44) (133/78 - 143/80)  BP(mean): --  RR: 18 (08 Nov 2022 04:44) (18 - 19)  SpO2: 97% (08 Nov 2022 04:44) (96% - 98%)    Parameters below as of 08 Nov 2022 04:44  Patient On (Oxygen Delivery Method): room air    PHYSICAL EXAM:  General: A&Ox3. Awake and laying comfortably in bed.  HEENT: Palpebral conjunctiva pink and sclera white BL. Mucosa pink and moist.  Neck: Supple, FROM, no JVD.  Cardiovascular: Clear S1 and S2. No murmurs, gallops or rubs.  Respiratory: Clear to auscultation bilaterally. No wheezing, rhonchi or rales.  Gastrointestinal: Nondistended, nontender. BS normoactive x4.  Extremities: Left ankle wrapped w/ bandaging. Left lateral ankle w/ 2x2cm ulcer less without pus w/ surrounding erythema that has decreased. Tenderness to palpation around the wound.  No discoloration, brittle nails, varicose veins, clubbing or edema noted.  Vascular: 2+ equally bilaterally dorsalis pedis and posterior tibial.  Neurological: Sensation intact equally bilaterally in all extremities.    MEDICATIONS:  MEDICATIONS  (STANDING):  chlorhexidine 2% Cloths 1 Application(s) Topical <User Schedule>  enoxaparin Injectable 40 milliGRAM(s) SubCutaneous every 12 hours  gabapentin 100 milliGRAM(s) Oral every 24 hours  gabapentin 800 milliGRAM(s) Oral every 24 hours  influenza   Vaccine 0.5 milliLiter(s) IntraMuscular once  meropenem  IVPB 1000 milliGRAM(s) IV Intermittent every 8 hours  nebivolol 10 milliGRAM(s) Oral <User Schedule>  senna 2 Tablet(s) Oral at bedtime    MEDICATIONS  (PRN):  acetaminophen     Tablet .. 650 milliGRAM(s) Oral every 6 hours PRN Temp greater or equal to 38C (100.4F), Mild Pain (1 - 3)  HYDROmorphone  Injectable 0.5 milliGRAM(s) IV Push every 4 hours PRN breakthrough pain  oxycodone    5 mG/acetaminophen 325 mG 2 Tablet(s) Oral every 4 hours PRN Severe Pain (7 - 10)  polyethylene glycol 3350 17 Gram(s) Oral every 24 hours PRN Constipation  sodium chloride 0.9% lock flush 10 milliLiter(s) IV Push every 1 hour PRN Pre/post blood products, medications, blood draw, and to maintain line patency    ALLERGIES:  Allergies    amoxicillin (Angioedema)  ciprofloxacin (Rash)  hydrochlorothiazide (Hives)  lisinopril (Angioedema; Rash; Hives)  penicillin (Rash)    Intolerances      LABS:                        11.0   7.17  )-----------( 375      ( 08 Nov 2022 06:04 )             34.3     11-08    134<L>  |  99  |  14  ----------------------------<  104<H>  4.3   |  24  |  0.61    Ca    8.8      08 Nov 2022 06:04  Phos  4.4     11-08  Mg     1.8     11-08          CAPILLARY BLOOD GLUCOSE          RADIOLOGY & ADDITIONAL TESTS: Reviewed.

## 2022-11-08 NOTE — PROGRESS NOTE ADULT - SUBJECTIVE AND OBJECTIVE BOX
INFECTIOUS DISEASES CONSULT FOLLOW-UP NOTE    *INCOMPLETE*    INTERVAL HPI/OVERNIGHT EVENTS:    Subjective: Patient seen and examined at bedside. Denies fevers, chills, cough, abdominal pain, diarrhea.      ANTIBIOTICS/RELEVANT:    MEDICATIONS  (STANDING):  chlorhexidine 2% Cloths 1 Application(s) Topical <User Schedule>  enoxaparin Injectable 40 milliGRAM(s) SubCutaneous every 12 hours  gabapentin 100 milliGRAM(s) Oral every 24 hours  gabapentin 800 milliGRAM(s) Oral every 24 hours  influenza   Vaccine 0.5 milliLiter(s) IntraMuscular once  meropenem  IVPB 1000 milliGRAM(s) IV Intermittent every 8 hours  nebivolol 10 milliGRAM(s) Oral <User Schedule>  senna 2 Tablet(s) Oral at bedtime  vancomycin  IVPB 1250 milliGRAM(s) IV Intermittent every 8 hours    MEDICATIONS  (PRN):  acetaminophen     Tablet .. 650 milliGRAM(s) Oral every 6 hours PRN Temp greater or equal to 38C (100.4F), Mild Pain (1 - 3)  HYDROmorphone  Injectable 0.5 milliGRAM(s) IV Push every 4 hours PRN breakthrough pain  oxycodone    5 mG/acetaminophen 325 mG 2 Tablet(s) Oral every 4 hours PRN Severe Pain (7 - 10)  polyethylene glycol 3350 17 Gram(s) Oral every 24 hours PRN Constipation  sodium chloride 0.9% lock flush 10 milliLiter(s) IV Push every 1 hour PRN Pre/post blood products, medications, blood draw, and to maintain line patency        Vital Signs Last 24 Hrs  T(C): 36.6 (08 Nov 2022 04:44), Max: 36.7 (07 Nov 2022 12:48)  T(F): 97.8 (08 Nov 2022 04:44), Max: 98.1 (07 Nov 2022 12:48)  HR: 80 (08 Nov 2022 04:44) (80 - 82)  BP: 141/83 (08 Nov 2022 04:44) (133/78 - 143/80)  BP(mean): --  RR: 18 (08 Nov 2022 04:44) (18 - 19)  SpO2: 97% (08 Nov 2022 04:44) (96% - 98%)    Parameters below as of 08 Nov 2022 04:44  Patient On (Oxygen Delivery Method): room air          PHYSICAL EXAM  Constitutional: alert, NAD  Eyes: the sclera and conjunctiva were normal.   ENT: the ears and nose were normal in appearance.   Neck: the appearance of the neck was normal and the neck was supple.   Pulmonary: no respiratory distress and lungs CTA bilaterally.   Heart: heart rate was normal and rhythm regular, normal S1 and S2  Vascular: no peripheral edema  Abdomen: normal bowel sounds, soft, non-tender  Neurological: no focal deficits  Psychiatric: the affect was normal      LABS:                        11.0   7.17  )-----------( 375      ( 08 Nov 2022 06:04 )             34.3     11-08    134<L>  |  99  |  14  ----------------------------<  104<H>  4.3   |  24  |  0.61    Ca    8.8      08 Nov 2022 06:04  Phos  4.4     11-08  Mg     1.8     11-08            MICROBIOLOGY:      RADIOLOGY & ADDITIONAL STUDIES:  Reviewed INFECTIOUS DISEASES CONSULT FOLLOW-UP NOTE    INTERVAL HPI/OVERNIGHT EVENTS: ISABELLE    Subjective: Patient seen and examined at bedside. Patient feels her foot pain is improving some. Denies fevers, chills, cough, abdominal pain, diarrhea.    ANTIBIOTICS/RELEVANT:  Meropenem and Vanc    MEDICATIONS  (STANDING):  chlorhexidine 2% Cloths 1 Application(s) Topical <User Schedule>  enoxaparin Injectable 40 milliGRAM(s) SubCutaneous every 12 hours  gabapentin 100 milliGRAM(s) Oral every 24 hours  gabapentin 800 milliGRAM(s) Oral every 24 hours  influenza   Vaccine 0.5 milliLiter(s) IntraMuscular once  meropenem  IVPB 1000 milliGRAM(s) IV Intermittent every 8 hours  nebivolol 10 milliGRAM(s) Oral <User Schedule>  senna 2 Tablet(s) Oral at bedtime  vancomycin  IVPB 1250 milliGRAM(s) IV Intermittent every 8 hours    MEDICATIONS  (PRN):  acetaminophen     Tablet .. 650 milliGRAM(s) Oral every 6 hours PRN Temp greater or equal to 38C (100.4F), Mild Pain (1 - 3)  HYDROmorphone  Injectable 0.5 milliGRAM(s) IV Push every 4 hours PRN breakthrough pain  oxycodone    5 mG/acetaminophen 325 mG 2 Tablet(s) Oral every 4 hours PRN Severe Pain (7 - 10)  polyethylene glycol 3350 17 Gram(s) Oral every 24 hours PRN Constipation  sodium chloride 0.9% lock flush 10 milliLiter(s) IV Push every 1 hour PRN Pre/post blood products, medications, blood draw, and to maintain line patency        Vital Signs Last 24 Hrs  T(C): 36.6 (08 Nov 2022 04:44), Max: 36.7 (07 Nov 2022 12:48)  T(F): 97.8 (08 Nov 2022 04:44), Max: 98.1 (07 Nov 2022 12:48)  HR: 80 (08 Nov 2022 04:44) (80 - 82)  BP: 141/83 (08 Nov 2022 04:44) (133/78 - 143/80)  BP(mean): --  RR: 18 (08 Nov 2022 04:44) (18 - 19)  SpO2: 97% (08 Nov 2022 04:44) (96% - 98%)    Parameters below as of 08 Nov 2022 04:44  Patient On (Oxygen Delivery Method): room air    PHYSICAL EXAM  Constitutional: alert, NAD  Eyes: the sclera and conjunctiva were normal.   ENT: the ears and nose were normal in appearance.   Neck: the appearance of the neck was normal and the neck was supple.   Pulmonary: no respiratory distress and lungs CTA bilaterally.   Heart: heart rate was normal and rhythm regular, normal S1 and S2  Vascular: no peripheral edema  Abdomen: normal bowel sounds, soft, non-tender  Neurological: no focal deficits  Psychiatric: the affect was normal  Left foot wrapped (Picture from today appears improved slightly)      LABS:                        11.0   7.17  )-----------( 375      ( 08 Nov 2022 06:04 )             34.3     11-08    134<L>  |  99  |  14  ----------------------------<  104<H>  4.3   |  24  |  0.61    Ca    8.8      08 Nov 2022 06:04  Phos  4.4     11-08  Mg     1.8     11-08            MICROBIOLOGY:      RADIOLOGY & ADDITIONAL STUDIES:  Reviewed

## 2022-11-08 NOTE — PROGRESS NOTE ADULT - ASSESSMENT
37-year-old woman with history of hypertension and AVM L foot with chronic L foot ulcer s/p transcatheter embolization, recent admission 10/10-10/17 with same presentation, presents with fevers and L foot pain. Photograph of wound from this morning during dressing change shows continued/increased erythema surrounding wound.    Imaging:  - 11/2 MR L ankle: Intramuscular and subcutaneous vascular malformation within the plantar aspect of the foot with phlegmonous/edematous infiltration within the subcutaneous tissues; no MR evidence of osteomyelitis. High-grade partial-thickness tearing of the ATFL and CFL with partial-thickness tearing of the deltoid ligament.  - Xray 10/31: Frontal, lateral and oblique views of the left foot demonstrate radiopaque material within the small vessels of the dorsal aspect of the foot with infiltrative radiopaque material within the subcutaneous tissues with adjacent soft tissue swelling. There is Achilles tendon spurring. There is no fracture. No dislocation. Tarsometatarsal joint is in appropriate alignment.    Culture data:  10/10/22 wound culture (swab): Pseudomonas aeruginosa, S. haemolyticus, Corynebacterium striatum grp., C. jeikeium  10/11 Surgical wound culture: Pseudomonas   10/11 BCx x2: no growth at 5 days  10/31 BCx x2: NGTD  11/1 WCx: Staph epi, staph haemolyticus     Recommendations:  - c/w Meropenem 1g q8 IV  - c/w Vancomycin 1250mg q8 IV, trough due tonight at 6pm.    ID team 1 will continue to follow

## 2022-11-08 NOTE — PROGRESS NOTE ADULT - PROBLEM SELECTOR PROBLEM 4
attempted several more times to [facility] Marroquin with no success. Patient's daughter Tawny Russo called [facility] at 1:25 pm and said she was told she'd hear this afternoon one way or the other about placement. Tawny Russo states they would rather not reschedule surgery as several people have taken off work Thursday to be there.  will continue to try and contact [facility]. AVM (arteriovenous malformation)

## 2022-11-08 NOTE — PROGRESS NOTE ADULT - PROBLEM SELECTOR PLAN 3
*resolved  Presented with SIRS 2/4 tachycardia 102, leukocytosis 13 and home T max 101 (100.2 in the ED). Source m/p chronic foot ulceration.  - Improved: HR 79-90 BPM over past 24 hours, Leukocytes downtrending to 7.21 (11/2), afebrile since 11/1.

## 2022-11-09 ENCOUNTER — TRANSCRIPTION ENCOUNTER (OUTPATIENT)
Age: 37
End: 2022-11-09

## 2022-11-09 LAB
ANION GAP SERPL CALC-SCNC: 7 MMOL/L — SIGNIFICANT CHANGE UP (ref 5–17)
BASOPHILS # BLD AUTO: 0.05 K/UL — SIGNIFICANT CHANGE UP (ref 0–0.2)
BASOPHILS NFR BLD AUTO: 0.7 % — SIGNIFICANT CHANGE UP (ref 0–2)
BUN SERPL-MCNC: 15 MG/DL — SIGNIFICANT CHANGE UP (ref 7–23)
CALCIUM SERPL-MCNC: 9.2 MG/DL — SIGNIFICANT CHANGE UP (ref 8.4–10.5)
CHLORIDE SERPL-SCNC: 103 MMOL/L — SIGNIFICANT CHANGE UP (ref 96–108)
CO2 SERPL-SCNC: 27 MMOL/L — SIGNIFICANT CHANGE UP (ref 22–31)
CREAT SERPL-MCNC: 0.67 MG/DL — SIGNIFICANT CHANGE UP (ref 0.5–1.3)
CRP SERPL-MCNC: 11.4 MG/L — HIGH (ref 0–4)
EGFR: 114 ML/MIN/1.73M2 — SIGNIFICANT CHANGE UP
EOSINOPHIL # BLD AUTO: 0.27 K/UL — SIGNIFICANT CHANGE UP (ref 0–0.5)
EOSINOPHIL NFR BLD AUTO: 3.9 % — SIGNIFICANT CHANGE UP (ref 0–6)
ERYTHROCYTE [SEDIMENTATION RATE] IN BLOOD: 20 MM/HR — HIGH
GLUCOSE SERPL-MCNC: 99 MG/DL — SIGNIFICANT CHANGE UP (ref 70–99)
HCT VFR BLD CALC: 37.3 % — SIGNIFICANT CHANGE UP (ref 34.5–45)
HGB BLD-MCNC: 11.8 G/DL — SIGNIFICANT CHANGE UP (ref 11.5–15.5)
IMM GRANULOCYTES NFR BLD AUTO: 0.4 % — SIGNIFICANT CHANGE UP (ref 0–0.9)
LYMPHOCYTES # BLD AUTO: 2.06 K/UL — SIGNIFICANT CHANGE UP (ref 1–3.3)
LYMPHOCYTES # BLD AUTO: 29.6 % — SIGNIFICANT CHANGE UP (ref 13–44)
MAGNESIUM SERPL-MCNC: 1.7 MG/DL — SIGNIFICANT CHANGE UP (ref 1.6–2.6)
MCHC RBC-ENTMCNC: 28.8 PG — SIGNIFICANT CHANGE UP (ref 27–34)
MCHC RBC-ENTMCNC: 31.6 GM/DL — LOW (ref 32–36)
MCV RBC AUTO: 91 FL — SIGNIFICANT CHANGE UP (ref 80–100)
MONOCYTES # BLD AUTO: 0.67 K/UL — SIGNIFICANT CHANGE UP (ref 0–0.9)
MONOCYTES NFR BLD AUTO: 9.6 % — SIGNIFICANT CHANGE UP (ref 2–14)
NEUTROPHILS # BLD AUTO: 3.88 K/UL — SIGNIFICANT CHANGE UP (ref 1.8–7.4)
NEUTROPHILS NFR BLD AUTO: 55.8 % — SIGNIFICANT CHANGE UP (ref 43–77)
NRBC # BLD: 0 /100 WBCS — SIGNIFICANT CHANGE UP (ref 0–0)
PHOSPHATE SERPL-MCNC: 4.2 MG/DL — SIGNIFICANT CHANGE UP (ref 2.5–4.5)
PLATELET # BLD AUTO: 370 K/UL — SIGNIFICANT CHANGE UP (ref 150–400)
POTASSIUM SERPL-MCNC: 4.2 MMOL/L — SIGNIFICANT CHANGE UP (ref 3.5–5.3)
POTASSIUM SERPL-SCNC: 4.2 MMOL/L — SIGNIFICANT CHANGE UP (ref 3.5–5.3)
RBC # BLD: 4.1 M/UL — SIGNIFICANT CHANGE UP (ref 3.8–5.2)
RBC # FLD: 12.6 % — SIGNIFICANT CHANGE UP (ref 10.3–14.5)
SODIUM SERPL-SCNC: 137 MMOL/L — SIGNIFICANT CHANGE UP (ref 135–145)
VANCOMYCIN TROUGH SERPL-MCNC: 18.4 UG/ML — SIGNIFICANT CHANGE UP (ref 10–20)
WBC # BLD: 6.96 K/UL — SIGNIFICANT CHANGE UP (ref 3.8–10.5)
WBC # FLD AUTO: 6.96 K/UL — SIGNIFICANT CHANGE UP (ref 3.8–10.5)

## 2022-11-09 PROCEDURE — 99232 SBSQ HOSP IP/OBS MODERATE 35: CPT | Mod: GC

## 2022-11-09 RX ORDER — VANCOMYCIN HCL 1 G
1250 VIAL (EA) INTRAVENOUS EVERY 8 HOURS
Refills: 0 | Status: DISCONTINUED | OUTPATIENT
Start: 2022-11-09 | End: 2022-11-11

## 2022-11-09 RX ORDER — MEROPENEM 1 G/30ML
1000 INJECTION INTRAVENOUS
Qty: 33 | Refills: 0
Start: 2022-11-09 | End: 2022-11-19

## 2022-11-09 RX ORDER — VANCOMYCIN HCL 1 G
1.25 VIAL (EA) INTRAVENOUS
Qty: 41.25 | Refills: 0
Start: 2022-11-09 | End: 2022-11-19

## 2022-11-09 RX ADMIN — OXYCODONE AND ACETAMINOPHEN 2 TABLET(S): 5; 325 TABLET ORAL at 11:22

## 2022-11-09 RX ADMIN — HYDROMORPHONE HYDROCHLORIDE 0.5 MILLIGRAM(S): 2 INJECTION INTRAMUSCULAR; INTRAVENOUS; SUBCUTANEOUS at 03:47

## 2022-11-09 RX ADMIN — NEBIVOLOL HYDROCHLORIDE 10 MILLIGRAM(S): 5 TABLET ORAL at 19:13

## 2022-11-09 RX ADMIN — HYDROMORPHONE HYDROCHLORIDE 0.5 MILLIGRAM(S): 2 INJECTION INTRAMUSCULAR; INTRAVENOUS; SUBCUTANEOUS at 14:35

## 2022-11-09 RX ADMIN — CHLORHEXIDINE GLUCONATE 1 APPLICATION(S): 213 SOLUTION TOPICAL at 05:25

## 2022-11-09 RX ADMIN — OXYCODONE AND ACETAMINOPHEN 2 TABLET(S): 5; 325 TABLET ORAL at 02:22

## 2022-11-09 RX ADMIN — HYDROMORPHONE HYDROCHLORIDE 0.5 MILLIGRAM(S): 2 INJECTION INTRAMUSCULAR; INTRAVENOUS; SUBCUTANEOUS at 18:13

## 2022-11-09 RX ADMIN — OXYCODONE AND ACETAMINOPHEN 2 TABLET(S): 5; 325 TABLET ORAL at 19:48

## 2022-11-09 RX ADMIN — GABAPENTIN 800 MILLIGRAM(S): 400 CAPSULE ORAL at 15:33

## 2022-11-09 RX ADMIN — HYDROMORPHONE HYDROCHLORIDE 0.5 MILLIGRAM(S): 2 INJECTION INTRAMUSCULAR; INTRAVENOUS; SUBCUTANEOUS at 23:15

## 2022-11-09 RX ADMIN — Medication 166.67 MILLIGRAM(S): at 06:10

## 2022-11-09 RX ADMIN — HYDROMORPHONE HYDROCHLORIDE 0.5 MILLIGRAM(S): 2 INJECTION INTRAMUSCULAR; INTRAVENOUS; SUBCUTANEOUS at 22:17

## 2022-11-09 RX ADMIN — MEROPENEM 100 MILLIGRAM(S): 1 INJECTION INTRAVENOUS at 05:02

## 2022-11-09 RX ADMIN — OXYCODONE AND ACETAMINOPHEN 2 TABLET(S): 5; 325 TABLET ORAL at 16:01

## 2022-11-09 RX ADMIN — MEROPENEM 100 MILLIGRAM(S): 1 INJECTION INTRAVENOUS at 13:23

## 2022-11-09 RX ADMIN — OXYCODONE AND ACETAMINOPHEN 2 TABLET(S): 5; 325 TABLET ORAL at 01:52

## 2022-11-09 RX ADMIN — OXYCODONE AND ACETAMINOPHEN 2 TABLET(S): 5; 325 TABLET ORAL at 06:11

## 2022-11-09 RX ADMIN — HYDROMORPHONE HYDROCHLORIDE 0.5 MILLIGRAM(S): 2 INJECTION INTRAMUSCULAR; INTRAVENOUS; SUBCUTANEOUS at 04:02

## 2022-11-09 RX ADMIN — OXYCODONE AND ACETAMINOPHEN 2 TABLET(S): 5; 325 TABLET ORAL at 07:11

## 2022-11-09 RX ADMIN — Medication 166.67 MILLIGRAM(S): at 22:17

## 2022-11-09 RX ADMIN — GABAPENTIN 100 MILLIGRAM(S): 400 CAPSULE ORAL at 07:00

## 2022-11-09 RX ADMIN — HYDROMORPHONE HYDROCHLORIDE 0.5 MILLIGRAM(S): 2 INJECTION INTRAMUSCULAR; INTRAVENOUS; SUBCUTANEOUS at 18:08

## 2022-11-09 RX ADMIN — HYDROMORPHONE HYDROCHLORIDE 0.5 MILLIGRAM(S): 2 INJECTION INTRAMUSCULAR; INTRAVENOUS; SUBCUTANEOUS at 08:53

## 2022-11-09 RX ADMIN — OXYCODONE AND ACETAMINOPHEN 2 TABLET(S): 5; 325 TABLET ORAL at 12:26

## 2022-11-09 RX ADMIN — Medication 166.67 MILLIGRAM(S): at 14:41

## 2022-11-09 RX ADMIN — OXYCODONE AND ACETAMINOPHEN 2 TABLET(S): 5; 325 TABLET ORAL at 15:46

## 2022-11-09 RX ADMIN — MEROPENEM 100 MILLIGRAM(S): 1 INJECTION INTRAVENOUS at 21:36

## 2022-11-09 RX ADMIN — ENOXAPARIN SODIUM 40 MILLIGRAM(S): 100 INJECTION SUBCUTANEOUS at 18:08

## 2022-11-09 RX ADMIN — HYDROMORPHONE HYDROCHLORIDE 0.5 MILLIGRAM(S): 2 INJECTION INTRAMUSCULAR; INTRAVENOUS; SUBCUTANEOUS at 09:42

## 2022-11-09 RX ADMIN — NEBIVOLOL HYDROCHLORIDE 10 MILLIGRAM(S): 5 TABLET ORAL at 07:00

## 2022-11-09 RX ADMIN — HYDROMORPHONE HYDROCHLORIDE 0.5 MILLIGRAM(S): 2 INJECTION INTRAMUSCULAR; INTRAVENOUS; SUBCUTANEOUS at 13:23

## 2022-11-09 RX ADMIN — OXYCODONE AND ACETAMINOPHEN 2 TABLET(S): 5; 325 TABLET ORAL at 20:20

## 2022-11-09 RX ADMIN — ENOXAPARIN SODIUM 40 MILLIGRAM(S): 100 INJECTION SUBCUTANEOUS at 06:11

## 2022-11-09 NOTE — DISCHARGE NOTE PROVIDER - CARE PROVIDER_API CALL
Mila Rahman)  Infectious Disease; Internal Medicine  [address]  [place], [place] [address]  Phone: [phone]  Fax: [phone]  Follow Up Time:    Mila Rahman)  Infectious Disease; Internal Medicine  [address], [address] [place] [address]  Phone: [phone]  Fax: [phone]  Follow Up Time:     DAVID SNEED  Internal Medicine  [address]  [place] [address]  Phone: [phone]  Fax: ([phone]  Established Patient  Follow Up Time: 2 weeks   Mila Rahman)  Infectious Disease; Internal Medicine  [address], [address] [place] [address]  Phone: [phone]  Fax: [phone]  Follow Up Time:     DAVID SNEED  Internal Medicine  [address]  [place] [address]  Phone: [phone]  Fax: [phone]  Established Patient  Follow Up Time: 2 weeks    J Carlos Teran)  Diagnostic Radiology  [address] [place] [address]  Phone: [phone]  Fax: [phone]  Follow Up Time:

## 2022-11-09 NOTE — PROGRESS NOTE ADULT - SUBJECTIVE AND OBJECTIVE BOX
INFECTIOUS DISEASES CONSULT FOLLOW-UP NOTE    *INCOMPLETE*    INTERVAL HPI/OVERNIGHT EVENTS:    Subjective: Patient seen and examined at bedside. Denies fevers, chills, cough, abdominal pain, diarrhea.      ANTIBIOTICS/RELEVANT:    MEDICATIONS  (STANDING):  chlorhexidine 2% Cloths 1 Application(s) Topical <User Schedule>  enoxaparin Injectable 40 milliGRAM(s) SubCutaneous every 12 hours  gabapentin 100 milliGRAM(s) Oral every 24 hours  gabapentin 800 milliGRAM(s) Oral every 24 hours  influenza   Vaccine 0.5 milliLiter(s) IntraMuscular once  meropenem  IVPB 1000 milliGRAM(s) IV Intermittent every 8 hours  nebivolol 10 milliGRAM(s) Oral <User Schedule>  senna 2 Tablet(s) Oral at bedtime  vancomycin  IVPB 1250 milliGRAM(s) IV Intermittent every 8 hours    MEDICATIONS  (PRN):  acetaminophen     Tablet .. 650 milliGRAM(s) Oral every 6 hours PRN Temp greater or equal to 38C (100.4F), Mild Pain (1 - 3)  HYDROmorphone  Injectable 0.5 milliGRAM(s) IV Push every 4 hours PRN breakthrough pain  oxycodone    5 mG/acetaminophen 325 mG 2 Tablet(s) Oral every 4 hours PRN Severe Pain (7 - 10)  polyethylene glycol 3350 17 Gram(s) Oral every 24 hours PRN Constipation  sodium chloride 0.9% lock flush 10 milliLiter(s) IV Push every 1 hour PRN Pre/post blood products, medications, blood draw, and to maintain line patency        Vital Signs Last 24 Hrs  T(C): 36.7 (09 Nov 2022 06:03), Max: 36.8 (08 Nov 2022 12:45)  T(F): 98.1 (09 Nov 2022 06:03), Max: 98.2 (08 Nov 2022 12:45)  HR: 76 (09 Nov 2022 06:03) (75 - 88)  BP: 124/79 (09 Nov 2022 06:03) (124/79 - 149/91)  BP(mean): --  RR: 19 (08 Nov 2022 20:42) (18 - 19)  SpO2: 95% (09 Nov 2022 06:03) (94% - 99%)    Parameters below as of 09 Nov 2022 06:03  Patient On (Oxygen Delivery Method): room air          PHYSICAL EXAM  Constitutional: alert, NAD  Eyes: the sclera and conjunctiva were normal.   ENT: the ears and nose were normal in appearance.   Neck: the appearance of the neck was normal and the neck was supple.   Pulmonary: no respiratory distress and lungs CTA bilaterally.   Heart: heart rate was normal and rhythm regular, normal S1 and S2  Vascular: no peripheral edema  Abdomen: normal bowel sounds, soft, non-tender  Neurological: no focal deficits  Psychiatric: the affect was normal      LABS:                        11.8   6.96  )-----------( 370      ( 09 Nov 2022 05:30 )             37.3     11-09    137  |  103  |  15  ----------------------------<  99  4.2   |  27  |  0.67    Ca    9.2      09 Nov 2022 05:30  Phos  4.2     11-09  Mg     1.7     11-09            MICROBIOLOGY:      RADIOLOGY & ADDITIONAL STUDIES:  Reviewed INFECTIOUS DISEASES CONSULT FOLLOW-UP NOTE    *INCOMPLETE*    INTERVAL HPI/OVERNIGHT EVENTS:    Subjective: Patient seen and examined at bedside. States she is doing better this morning and that redness around wound is decreasing. Tends to be a little more red in the mornings than later in the day. Denies fevers, chills, cough, abdominal pain, diarrhea.      ANTIBIOTICS/RELEVANT:    MEDICATIONS  (STANDING):  chlorhexidine 2% Cloths 1 Application(s) Topical <User Schedule>  enoxaparin Injectable 40 milliGRAM(s) SubCutaneous every 12 hours  gabapentin 100 milliGRAM(s) Oral every 24 hours  gabapentin 800 milliGRAM(s) Oral every 24 hours  influenza   Vaccine 0.5 milliLiter(s) IntraMuscular once  meropenem  IVPB 1000 milliGRAM(s) IV Intermittent every 8 hours  nebivolol 10 milliGRAM(s) Oral <User Schedule>  senna 2 Tablet(s) Oral at bedtime  vancomycin  IVPB 1250 milliGRAM(s) IV Intermittent every 8 hours    MEDICATIONS  (PRN):  acetaminophen     Tablet .. 650 milliGRAM(s) Oral every 6 hours PRN Temp greater or equal to 38C (100.4F), Mild Pain (1 - 3)  HYDROmorphone  Injectable 0.5 milliGRAM(s) IV Push every 4 hours PRN breakthrough pain  oxycodone    5 mG/acetaminophen 325 mG 2 Tablet(s) Oral every 4 hours PRN Severe Pain (7 - 10)  polyethylene glycol 3350 17 Gram(s) Oral every 24 hours PRN Constipation  sodium chloride 0.9% lock flush 10 milliLiter(s) IV Push every 1 hour PRN Pre/post blood products, medications, blood draw, and to maintain line patency        Vital Signs Last 24 Hrs  T(C): 36.7 (09 Nov 2022 06:03), Max: 36.8 (08 Nov 2022 12:45)  T(F): 98.1 (09 Nov 2022 06:03), Max: 98.2 (08 Nov 2022 12:45)  HR: 76 (09 Nov 2022 06:03) (75 - 88)  BP: 124/79 (09 Nov 2022 06:03) (124/79 - 149/91)  BP(mean): --  RR: 19 (08 Nov 2022 20:42) (18 - 19)  SpO2: 95% (09 Nov 2022 06:03) (94% - 99%)    Parameters below as of 09 Nov 2022 06:03  Patient On (Oxygen Delivery Method): room air          PHYSICAL EXAM  Constitutional: NAD, alert  Eyes: EOMI	  Neck: no JVD, supple  Respiratory: CTAB, no w/r/r  Cardiovascular: RRR, S1 S2, no m/r/g  Gastrointestinal:soft, (+) BS, non-tender, no hepatosplenomegaly  Extremities: no edema or cyanosis, left foot wrapped, photo from patient reviewed (taken this morning): L lateral foot shallow ulceration with surrounding erythema, mildly improved from yesterday's photo  Vascular: DP Pulse 2+ bilaterally    LABS:                        11.8   6.96  )-----------( 370      ( 09 Nov 2022 05:30 )             37.3     11-09    137  |  103  |  15  ----------------------------<  99  4.2   |  27  |  0.67    Ca    9.2      09 Nov 2022 05:30  Phos  4.2     11-09  Mg     1.7     11-09            MICROBIOLOGY:      RADIOLOGY & ADDITIONAL STUDIES:  Reviewed INFECTIOUS DISEASES CONSULT FOLLOW-UP NOTE    INTERVAL HPI/OVERNIGHT EVENTS:    Subjective: Patient seen and examined at bedside. States she is doing better this morning and that redness around wound is decreasing. Tends to be a little more red in the mornings than later in the day. Denies fevers, chills, cough, abdominal pain, diarrhea.      ANTIBIOTICS/RELEVANT:  Vancomycin and Meropenem    MEDICATIONS  (STANDING):  chlorhexidine 2% Cloths 1 Application(s) Topical <User Schedule>  enoxaparin Injectable 40 milliGRAM(s) SubCutaneous every 12 hours  gabapentin 100 milliGRAM(s) Oral every 24 hours  gabapentin 800 milliGRAM(s) Oral every 24 hours  influenza   Vaccine 0.5 milliLiter(s) IntraMuscular once  meropenem  IVPB 1000 milliGRAM(s) IV Intermittent every 8 hours  nebivolol 10 milliGRAM(s) Oral <User Schedule>  senna 2 Tablet(s) Oral at bedtime  vancomycin  IVPB 1250 milliGRAM(s) IV Intermittent every 8 hours    MEDICATIONS  (PRN):  acetaminophen     Tablet .. 650 milliGRAM(s) Oral every 6 hours PRN Temp greater or equal to 38C (100.4F), Mild Pain (1 - 3)  HYDROmorphone  Injectable 0.5 milliGRAM(s) IV Push every 4 hours PRN breakthrough pain  oxycodone    5 mG/acetaminophen 325 mG 2 Tablet(s) Oral every 4 hours PRN Severe Pain (7 - 10)  polyethylene glycol 3350 17 Gram(s) Oral every 24 hours PRN Constipation  sodium chloride 0.9% lock flush 10 milliLiter(s) IV Push every 1 hour PRN Pre/post blood products, medications, blood draw, and to maintain line patency        Vital Signs Last 24 Hrs  T(C): 36.7 (09 Nov 2022 06:03), Max: 36.8 (08 Nov 2022 12:45)  T(F): 98.1 (09 Nov 2022 06:03), Max: 98.2 (08 Nov 2022 12:45)  HR: 76 (09 Nov 2022 06:03) (75 - 88)  BP: 124/79 (09 Nov 2022 06:03) (124/79 - 149/91)  BP(mean): --  RR: 19 (08 Nov 2022 20:42) (18 - 19)  SpO2: 95% (09 Nov 2022 06:03) (94% - 99%)    Parameters below as of 09 Nov 2022 06:03  Patient On (Oxygen Delivery Method): room air          PHYSICAL EXAM  Constitutional: NAD, alert  Eyes: EOMI	  Neck: no JVD, supple  Respiratory: CTAB, no w/r/r  Cardiovascular: RRR, S1 S2, no m/r/g  Gastrointestinal:soft, (+) BS, non-tender, no hepatosplenomegaly  Extremities: no edema or cyanosis, left foot wrapped, photo from patient reviewed (taken this morning): L lateral foot shallow ulceration with surrounding erythema, mildly improved from yesterday's photo  Vascular: DP Pulse 2+ bilaterally    LABS:                        11.8   6.96  )-----------( 370      ( 09 Nov 2022 05:30 )             37.3     11-09    137  |  103  |  15  ----------------------------<  99  4.2   |  27  |  0.67    Ca    9.2      09 Nov 2022 05:30  Phos  4.2     11-09  Mg     1.7     11-09            MICROBIOLOGY:      RADIOLOGY & ADDITIONAL STUDIES:  Reviewed

## 2022-11-09 NOTE — DISCHARGE NOTE PROVIDER - CARE PROVIDERS DIRECT ADDRESSES
,[email or web address] [email or web address],[email or web address]

## 2022-11-09 NOTE — PROGRESS NOTE ADULT - ASSESSMENT
37-year-old woman with history of hypertension and AVM L foot with chronic L foot ulcer s/p transcatheter embolization, recent admission 10/10-10/17 with same presentation, presents with fevers and L foot pain. Photograph of wound from this morning during dressing change shows continued/increased erythema surrounding wound.    Imaging:  - 11/2 MR L ankle: Intramuscular and subcutaneous vascular malformation within the plantar aspect of the foot with phlegmonous/edematous infiltration within the subcutaneous tissues; no MR evidence of osteomyelitis. High-grade partial-thickness tearing of the ATFL and CFL with partial-thickness tearing of the deltoid ligament.  - Xray 10/31: Frontal, lateral and oblique views of the left foot demonstrate radiopaque material within the small vessels of the dorsal aspect of the foot with infiltrative radiopaque material within the subcutaneous tissues with adjacent soft tissue swelling. There is Achilles tendon spurring. There is no fracture. No dislocation. Tarsometatarsal joint is in appropriate alignment.    Culture data:  10/10/22 wound culture (swab): Pseudomonas aeruginosa, S. haemolyticus, Corynebacterium striatum grp., C. jeikeium  10/11 Surgical wound culture: Pseudomonas   10/11 BCx x2: no growth at 5 days  10/31 BCx x2: NGTD  11/1 WCx: Staph epi, staph haemolyticus     Recommendations:  - repeat ESR, CRP  - c/w Meropenem 1g q8h IV through 11/20/22  - c/w Vancomycin 1250mg q8 IV through 11/20/22  - after discharge, weekly CBC and CMP - please fax results to Dr. Rahman at [phone]  - follow up with Dr. Rahman as outpatient. Her office will arrange the appointment.     37-year-old woman with history of hypertension and AVM L foot with chronic L foot ulcer s/p transcatheter embolization, recent admission 10/10-10/17 with same presentation, presents with fevers and L foot pain.     Imaging:  - 11/2 MR L ankle: Intramuscular and subcutaneous vascular malformation within the plantar aspect of the foot with phlegmonous/edematous infiltration within the subcutaneous tissues; no MR evidence of osteomyelitis. High-grade partial-thickness tearing of the ATFL and CFL with partial-thickness tearing of the deltoid ligament.  - Xray 10/31: Frontal, lateral and oblique views of the left foot demonstrate radiopaque material within the small vessels of the dorsal aspect of the foot with infiltrative radiopaque material within the subcutaneous tissues with adjacent soft tissue swelling. There is Achilles tendon spurring. There is no fracture. No dislocation. Tarsometatarsal joint is in appropriate alignment.    Culture data:  10/10/22 wound culture (swab): Pseudomonas aeruginosa, S. haemolyticus, Corynebacterium striatum grp., C. jeikeium  10/11 Surgical wound culture: Pseudomonas   10/11 BCx x2: no growth at 5 days  10/31 BCx x2: NGTD  11/1 WCx: Staph epi, staph haemolyticus     Recommendations:  - repeat ESR, CRP  - c/w Meropenem 1g q8h IV through 11/20/22  - c/w Vancomycin 1250mg q8 IV through 11/20/22  - after discharge, weekly CBC and CMP - please fax results to Dr. Rahman at [phone]  - follow up with Dr. Rahman as outpatient. Her office will arrange the appointment.    Please re-consult if additional questions arise.     37-year-old woman with history of hypertension and AVM L foot with chronic L foot ulcer s/p transcatheter embolization, recent admission 10/10-10/17 with same presentation, presents with fevers and L foot pain.     Imaging:  - 11/2 MR L ankle: Intramuscular and subcutaneous vascular malformation within the plantar aspect of the foot with phlegmonous/edematous infiltration within the subcutaneous tissues; no MR evidence of osteomyelitis. High-grade partial-thickness tearing of the ATFL and CFL with partial-thickness tearing of the deltoid ligament.  - Xray 10/31: Frontal, lateral and oblique views of the left foot demonstrate radiopaque material within the small vessels of the dorsal aspect of the foot with infiltrative radiopaque material within the subcutaneous tissues with adjacent soft tissue swelling. There is Achilles tendon spurring. There is no fracture. No dislocation. Tarsometatarsal joint is in appropriate alignment.    Culture data:  10/10/22 wound culture (swab): Pseudomonas aeruginosa, S. haemolyticus, Corynebacterium striatum grp., C. jeikeium  10/11 Surgical wound culture: Pseudomonas   10/11 BCx x2: no growth at 5 days  10/31 BCx x2: NGTD  11/1 WCx: Staph epi, staph haemolyticus     Recommendations:  - repeat ESR, CRP  - c/w Meropenem 1g q8h IV through 11/20/22  - c/w Vancomycin 1250mg q8 IV through 11/20/22  - after discharge, weekly CBC, CMP, ESR/CRP, and vanc trough - please fax results to Dr. Rahman at [phone]  - follow up with Dr. Rahman as outpatient. Her office will arrange the appointment.    Please re-consult if additional questions arise.

## 2022-11-09 NOTE — DISCHARGE NOTE PROVIDER - NSDCMRMEDTOKEN_GEN_ALL_CORE_FT
Bystolic 10 mg oral tablet: 1 tab(s) orally 2 times a day  gabapentin 100 mg oral capsule: 1 cap(s) orally once a day (in the morning)  gabapentin 800 mg oral tablet: 1  orally once a day (in the afternoon)  Heparin infusion: Normal saline (NS) 10ml pre and post-infusion    Heparin (10units per ml) post infusion    ICD Chronic ulcer of left lower extremity L97.929  Junel Fe 1.5/30 oral tablet: 1 tab(s) orally once a day  meropenem 1000 mg intravenous injection: 1000 milligram(s) intravenously every 8 hours ENDING 11/20/2022.    ICD-10 Chronic ulcer of left lower leg L97.929  Percocet 10/325 oral tablet: 1 tab(s) orally every 4 hours, As Needed  tiZANidine 4 mg oral tablet: orally once a day (at bedtime)  Tylenol Extra Strength 500 mg oral tablet: 1 tab(s) orally 4 times a day, As Needed  vancomycin 1.25 g/250 mL intravenous solution: 1.25 gram(s) intravenously every 8 hours UNTIL 11/20/20.     ICD-10 Chronic ulcer of left lower leg L97.929  Weekly labs CBC, CMP, ESR/CRP : Weekly labs CBC, CMP, ESR/CRP to fax to Dr. Rahman&#x27;s office [phone]    ICD-10 Chronic ulcer of left lower leg L97.929   Bystolic 10 mg oral tablet: 1 tab(s) orally 2 times a day  gabapentin 100 mg oral capsule: 1 cap(s) orally once a day (in the morning)  gabapentin 800 mg oral tablet: 1  orally once a day (in the afternoon)  Heparin infusion: Normal saline (NS) 10ml pre and post-infusion    Heparin (10units per ml) post infusion    ICD Chronic ulcer of left lower extremity L97.929  Junel Fe 1.5/30 oral tablet: 1 tab(s) orally once a day  meropenem 1000 mg intravenous injection: 1000 milligram(s) intravenous every 8 hours  Percocet 10/325 oral tablet: 1 tab(s) orally every 4 hours, As Needed  tiZANidine 4 mg oral tablet: orally once a day (at bedtime)  Tylenol Extra Strength 500 mg oral tablet: 1 tab(s) orally 4 times a day, As Needed  vancomycin 1.25 g intravenous injection: 1.25 gram(s) intravenous every 8 hours  Weekly labs CBC, CMP, ESR/CRP : Weekly labs CBC, CMP, ESR/CRP to fax to Dr. Rahman&#x27;s office [phone]    ICD-10 Chronic ulcer of left lower leg L97.923   Bystolic 10 mg oral tablet: 1 tab(s) orally 2 times a day  gabapentin 100 mg oral capsule: 1 cap(s) orally once a day (in the morning)  gabapentin 800 mg oral tablet: 1  orally once a day (in the afternoon)  Heparin infusion: Normal saline (NS) 10ml pre and post-infusion    Heparin (10units per ml) post infusion    ICD Chronic ulcer of left lower extremity L97.929  Junel Fe 1.5/30 oral tablet: 1 tab(s) orally once a day  meropenem 1000 mg intravenous injection: 1000 milligram(s) intravenous every 8 hours  Percocet: 15 milligram(s) orally 4 times a day as needed for pain for 5 days  Percocet 10/325 oral tablet: 1 tab(s) orally every 4 hours, As Needed  tiZANidine 4 mg oral tablet: orally once a day (at bedtime)  Tylenol Extra Strength 500 mg oral tablet: 1 tab(s) orally 4 times a day, As Needed  vancomycin 1.25 g intravenous injection: 1.25 gram(s) intravenous every 8 hours  Weekly labs CBC, CMP, ESR/CRP : Weekly labs CBC, CMP, ESR/CRP to fax to Dr. Rahman&#x27;s office [phone]    ICD-10 Chronic ulcer of left lower leg L97.857

## 2022-11-09 NOTE — PROGRESS NOTE ADULT - SUBJECTIVE AND OBJECTIVE BOX
OVERNIGHT EVENTS:  NAEON    SUBJECTIVE / INTERVAL HPI: Patient seen and examined at bedside. She feels her pain has gotten better, she is still requiring the Dilaudid. She also has had less chills and feeling of warmth at night. Wound measures 1cm x 0.5cm. Denies headache, fever, and abdominal pain.       VITAL SIGNS:  Vital Signs Last 24 Hrs  T(C): 36.7 (09 Nov 2022 06:03), Max: 36.8 (08 Nov 2022 12:45)  T(F): 98.1 (09 Nov 2022 06:03), Max: 98.2 (08 Nov 2022 12:45)  HR: 76 (09 Nov 2022 06:03) (75 - 88)  BP: 124/79 (09 Nov 2022 06:03) (124/79 - 149/91)  BP(mean): --  RR: 19 (08 Nov 2022 20:42) (18 - 19)  SpO2: 95% (09 Nov 2022 06:03) (94% - 99%)    Parameters below as of 09 Nov 2022 06:03  Patient On (Oxygen Delivery Method): room air    PHYSICAL EXAM:  General: A&Ox3. Awake and laying comfortably in bed.  HEENT: Palpebral conjunctiva pink and sclera white BL. Mucosa pink and moist.  Neck: Supple, FROM, no JVD.  Cardiovascular: Clear S1 and S2. No murmurs, gallops or rubs.  Respiratory: Clear to auscultation bilaterally. No wheezing, rhonchi or rales.  Gastrointestinal: Nondistended, nontender. BS normoactive x4.  Extremities: Left ankle wrapped w/ bandaging. Left lateral ankle w/ 1cm x .5cm ulcer without pus w/ surrounding erythema that has decreased. Less tenderness to palpation around the wound.  No discoloration, brittle nails, varicose veins, clubbing or edema noted.  Vascular: 2+ equally bilaterally dorsalis pedis and posterior tibial.  Neurological: Sensation intact equally bilaterally in all extremities.    MEDICATIONS:  MEDICATIONS  (STANDING):  chlorhexidine 2% Cloths 1 Application(s) Topical <User Schedule>  enoxaparin Injectable 40 milliGRAM(s) SubCutaneous every 12 hours  gabapentin 100 milliGRAM(s) Oral every 24 hours  gabapentin 800 milliGRAM(s) Oral every 24 hours  influenza   Vaccine 0.5 milliLiter(s) IntraMuscular once  meropenem  IVPB 1000 milliGRAM(s) IV Intermittent every 8 hours  nebivolol 10 milliGRAM(s) Oral <User Schedule>  senna 2 Tablet(s) Oral at bedtime  vancomycin  IVPB 1250 milliGRAM(s) IV Intermittent every 8 hours    MEDICATIONS  (PRN):  acetaminophen     Tablet .. 650 milliGRAM(s) Oral every 6 hours PRN Temp greater or equal to 38C (100.4F), Mild Pain (1 - 3)  HYDROmorphone  Injectable 0.5 milliGRAM(s) IV Push every 4 hours PRN breakthrough pain  oxycodone    5 mG/acetaminophen 325 mG 2 Tablet(s) Oral every 4 hours PRN Severe Pain (7 - 10)  polyethylene glycol 3350 17 Gram(s) Oral every 24 hours PRN Constipation  sodium chloride 0.9% lock flush 10 milliLiter(s) IV Push every 1 hour PRN Pre/post blood products, medications, blood draw, and to maintain line patency      ALLERGIES:  Allergies    amoxicillin (Angioedema)  ciprofloxacin (Rash)  hydrochlorothiazide (Hives)  lisinopril (Angioedema; Rash; Hives)  penicillin (Rash)    Intolerances        LABS:                        11.8   6.96  )-----------( 370      ( 09 Nov 2022 05:30 )             37.3     11-09    137  |  103  |  15  ----------------------------<  99  4.2   |  27  |  0.67    Ca    9.2      09 Nov 2022 05:30  Phos  4.2     11-09  Mg     1.7     11-09          CAPILLARY BLOOD GLUCOSE          RADIOLOGY & ADDITIONAL TESTS: Reviewed.

## 2022-11-09 NOTE — DISCHARGE NOTE PROVIDER - PROVIDER TOKENS
PROVIDER:[TOKEN:[[id number]]] PROVIDER:[TOKEN:[[id number]]],PROVIDER:[TOKEN:[[id number]:[id number]],FOLLOWUP:[2 weeks],ESTABLISHEDPATIENT:[T]] PROVIDER:[TOKEN:[[id number]]],PROVIDER:[TOKEN:[[id number]],FOLLOWUP:[2 weeks],ESTABLISHEDPATIENT:[T]],PROVIDER:[TOKEN:[[id number]]]

## 2022-11-09 NOTE — PROGRESS NOTE ADULT - PROBLEM SELECTOR PLAN 1
Pt with Left Lateral foot ulcer with purulence and fever at home Tmax 101. Recently hospitalized (10/10/22-10/17/22) for LLL angio/ulcer debridement and L lateral ankle AVM glue embolization (10/11/22) with Dr. Teran. s/p vanc/cefepime per ID (stopped 10/17/22), deep surgical wound cultures grew pseudomonas aeruginosa, staph haemolyticus, and campylobacter striatum and jeikeium. Foot Xray: preliminary read no signs of OM or NF, elevated ESR/CRP. 11/1 WCx: Staph Epidermidis and Staph Haemolyticus  - C/w Meropenem 1g Q8 and Vancomycin 1250mg q8hrs (10/31-11/3 and 11/4-) txt for pseudomonas from prior surgical culture  - Per ID: Course of Meropenem 1g Q8 and Vanc 1250mg Q8hrs through 11/20. Ensure follow-up with Dr. Rahman  w/ weekly CBC, CMP, ESR/CRP.  - S/p Cefepime 2g Q8 (11/1 - 11/7)  - F/u Vanc trough prior to 4th dose  - Bcx NGTD  - PICC placed for long-term antibiotic treatment  - Pain Management: Tylenol prn for mild pain, percocet  po q4h for severe pain, and Dilaudid .5mg IV Q4Hrs for breakthrough pain. Pt has outpatient pain management doctor  - Dressing changes TID per last vascular recs- WTD- wet 4x4 with NS flush and place over wound and wrap with kerlix.  - MRA of Left ankle: No evidence of osteomyelitis. 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

## 2022-11-09 NOTE — DISCHARGE NOTE PROVIDER - HOSPITAL COURSE
#Discharge    37F w/ PMHx of morbid obesity, HTN, L foot AVM s/p multiple DSE, recently hospitalized (10/10/22-10/17/22) for LLL ulcer debridement with Dr. Teran, presented for worsening left foot pain and erythema surrounding non-healing ulcer.    Problem List/Main Diagnoses (system-based):     #Foot ulceration.   Pt with Left Lateral foot ulcer with purulence and fever at home Tmax 101. Recently hospitalized (10/10/22-10/17/22) for LLL angio/ulcer debridement and L lateral ankle AVM glue embolization (10/11/22) with Dr. Teran. s/p vanc/cefepime per ID (stopped 10/17/22), deep surgical wound cultures grew pseudomonas aeruginosa, staph haemolyticus, and campylobacter striatum and jeikeium. Foot Xray: preliminary read no signs of OM or NF, elevated ESR/CRP. 11/1 WCx: Staph Epidermidis and Staph Haemolyticus  - C/w Meropenem 1g Q8 and Vancomycin 1250mg q8hrs (10/31-11/3 and 11/4-) txt for pseudomonas from prior surgical culture  - Per ID: Course of Meropenem 1g Q8 and Vanc 1250mg Q8hrs through 11/20. Ensure follow-up with Dr. Rahman  w/ weekly CBC, CMP, ESR/CRP.  - S/p Cefepime 2g Q8 (11/1 - 11/7)  - F/u Vanc trough prior to 4th dose  - Bcx NGTD  - PICC placed for long-term antibiotic treatment  - Pain Management: Tylenol prn for mild pain, percocet  po q4h for severe pain, and Dilaudid .5mg IV Q4Hrs for breakthrough pain. Pt has outpatient pain management doctor  - Dressing changes TID per last vascular recs- WTD- wet 4x4 with NS flush and place over wound and wrap with kerlix.  - MRA of Left ankle: No evidence of osteomyelitis. 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    #Partial tear of ligament of lateral aspect of ankle.   ·  Plan: MRA of Left ankle: High-grade partial-thickness tearing of the ATFL and CFL with partial-thickness tearing of the deltoid ligament.  - PT inpatient and refer to outpatient PT   - Consider follow-up w/ sports med outpatient   - Continue w/ pain management as above.    # Acute sepsis.   ·  Plan: *resolved  Presented with SIRS 2/4 tachycardia 102, leukocytosis 13 and home T max 101 (100.2 in the ED). Source m/p chronic foot ulceration.  - Improved: HR 79-90 BPM over past 24 hours, Leukocytes downtrending to 7.21 (11/2), afebrile since 11/1.     #AVM (arteriovenous malformation).   ·Pt with multiple Direct stick embolizations, and most recent is s/p angiogram and DSE 10/11/22 with Dr. Teran  - C/W home medications: Gabapentin 100mg AM and 800mg at noon, and Percocet.    # HTN (hypertension).    Patient on Home Bystolic 10mg BID  - continue home med.    New medications:   Meropenem 1g Q8 IV until 11/20/22  Vancomycin 1250mg IV Q8hrs until 11/20/22    Labs to be followed outpatient: CBC, CMP please fax results to Dr. Rahman at [phone]  Exam to be followed outpatient:    #Discharge    37F w/ PMHx of morbid obesity, HTN, L foot AVM s/p multiple DSE, recently hospitalized (10/10/22-10/17/22) for LLL ulcer debridement with Dr. Teran, presented for worsening left foot pain and erythema surrounding non-healing ulcer.    Problem List/Main Diagnoses (system-based):     #Foot ulceration.   Pt with Left Lateral foot ulcer with purulence and fever at home Tmax 101. Recently hospitalized (10/10/22-10/17/22) for LLL angio/ulcer debridement and L lateral ankle AVM glue embolization (10/11/22) with Dr. Teran. s/p vanc/cefepime per ID (stopped 10/17/22), deep surgical wound cultures grew pseudomonas aeruginosa, staph haemolyticus, and campylobacter striatum and jeikeium. Foot Xray: preliminary read no signs of OM or NF, elevated ESR/CRP. 11/1 WCx: Staph Epidermidis and Staph Haemolyticus. She was started on a course of Meropenem 1g Q8 and Vancomycin 1250mg q8hrs, and while on this regimen she started to show improvement in her ulcer. PICC was placed for long term antibiotic treatment. ID recommends continuing the course of antibiotics until 11/20. Bcx NGTD. For pain management: Tylenol prn for mild pain, percocet  po q4h for severe pain, and Dilaudid .5mg IV Q4Hrs for breakthrough pain. Pt has outpatient pain management doctor that she has been in contact with throughout hospitalization for outpatient pain control. We changed her dressing three times a day to ensure optimal wound care.  - C/w course of Meropenem 1g Q8 and Vanc 1250mg Q8hrs through 11/20  - Follow-up with Dr. Rahman  w/ weekly CBC, CMP, ESR/CRP.  - Dressing changes TID- WTD- wet 4x4 with NS flush and place over wound and wrap with kerlix.    #Partial tear of ligament of lateral aspect of ankle.   MRA of Left ankle: High-grade partial-thickness tearing of the ATFL and CFL with partial-thickness tearing of the deltoid ligament.   - PT inpatient    - Continue w/ pain management as above.    #AVM (arteriovenous malformation).   Pt with multiple Direct stick embolizations, and most recent is s/p angiogram and DSE 10/11/22 with Dr. Teran. She has close follow-up with Dr. Teran every 6 weeks and will follow-up with him in two weeks.  - C/W home medications: Gabapentin 100mg AM and 800mg at noon, and Percocet.    #HTN (hypertension).    Patient on Home Bystolic 10mg BID  - c/w Bystolic 10mg BID    New medications:   Meropenem 1g Q8 IV until 11/20/22  Vancomycin 1250mg IV Q8hrs until 11/20/22    Labs to be followed outpatient: CBC, CMP please fax results to Dr. Rahman at [phone]      Physical Exam on Discharge:    General: A&Ox3. Awake and laying comfortably in bed.  HEENT: Palpebral conjunctiva pink and sclera white BL. Mucosa pink and moist.  Neck: Supple, FROM, no JVD.  Cardiovascular: Clear S1 and S2. No murmurs, gallops or rubs.  Respiratory: Clear to auscultation bilaterally. No wheezing, rhonchi or rales.  Gastrointestinal: Nondistended, nontender. BS normoactive x4.  Extremities: Left ankle wrapped w/ bandaging. Left lateral ankle w/ 1cm x .5cm ulcer without pus w/ surrounding erythema that has decreased. Less tenderness to palpation around the wound.  No discoloration, brittle nails, varicose veins, clubbing or edema noted.  Vascular: 2+ equally bilaterally dorsalis pedis and posterior tibial.  Neurological: Sensation intact equally bilaterally in all extremities.     #Discharge    37F w/ PMHx of morbid obesity, HTN, L foot AVM s/p multiple DSE, recently hospitalized (10/10/22-10/17/22) for LLL ulcer debridement with Dr. Teran, presented for worsening left foot pain and erythema surrounding non-healing ulcer.    Problem List/Main Diagnoses (system-based):     #Foot ulceration.   Pt with Left Lateral foot ulcer with purulence and fever at home Tmax 101. Recently hospitalized (10/10/22-10/17/22) for LLL angio/ulcer debridement and L lateral ankle AVM glue embolization (10/11/22) with Dr. Teran. s/p vanc/cefepime per ID (stopped 10/17/22), deep surgical wound cultures grew pseudomonas aeruginosa, staph haemolyticus, and campylobacter striatum and jeikeium. Foot Xray: preliminary read no signs of OM or NF, elevated ESR/CRP. 11/1 WCx: Staph Epidermidis and Staph Haemolyticus. She was started on a course of Meropenem 1g Q8 and Vancomycin 1250mg q8hrs, and while on this regimen she started to show improvement in her ulcer. PICC was placed for long term antibiotic treatment. ID recommends continuing the course of antibiotics until 11/20. Bcx NGTD. For pain management: Tylenol prn for mild pain, percocet  po q4h for severe pain, and Dilaudid .5mg IV Q4Hrs for breakthrough pain. Pt has outpatient pain management doctor that she has been in contact with throughout hospitalization for outpatient pain control. We changed her dressing three times a day to ensure optimal wound care.  - C/w course of Meropenem 1g Q8 and Vanc 1250mg Q8hrs through 11/20  - Follow-up with Dr. Rahman  w/ weekly CBC, CMP, ESR/CRP.  - Dressing changes TID- WTD- wet 4x4 with NS flush and place over wound and wrap with kerlix.    #Partial tear of ligament of lateral aspect of ankle.   MRA of Left ankle: High-grade partial-thickness tearing of the ATFL and CFL with partial-thickness tearing of the deltoid ligament.   - PT inpatient    - Continue w/ pain management as above.    #AVM (arteriovenous malformation).   Pt with multiple Direct stick embolizations, and most recent is s/p angiogram and DSE 10/11/22 with Dr. Teran. She has close follow-up with Dr. Teran every 6 weeks and will follow-up with him in two weeks.  - C/W home medications: Gabapentin 100mg AM and 800mg at noon, and Percocet.    #HTN (hypertension).    Patient on Home Bystolic 10mg BID  - c/w Bystolic 10mg BID    New medications:   Meropenem 1g Q8 IV through 11/20/22  Vancomycin 1250mg IV Q8hrs through 11/20/22    Labs to be followed outpatient: CBC, CMP please fax results to Dr. Rahman at [phone]      Physical Exam on Discharge:    General: A&Ox3. Awake and laying comfortably in bed.  HEENT: Palpebral conjunctiva pink and sclera white BL. Mucosa pink and moist.  Neck: Supple, FROM, no JVD.  Cardiovascular: Clear S1 and S2. No murmurs, gallops or rubs.  Respiratory: Clear to auscultation bilaterally. No wheezing, rhonchi or rales.  Gastrointestinal: Nondistended, nontender. BS normoactive x4.  Extremities: Left ankle wrapped w/ bandaging. Left lateral ankle w/ 1cm x .5cm ulcer without pus w/ surrounding erythema that has decreased. Less tenderness to palpation around the wound.  No discoloration, brittle nails, varicose veins, clubbing or edema noted.  Vascular: 2+ equally bilaterally dorsalis pedis and posterior tibial.  Neurological: Sensation intact equally bilaterally in all extremities.

## 2022-11-09 NOTE — DISCHARGE NOTE PROVIDER - NSDCCPCAREPLAN_GEN_ALL_CORE_FT
PRINCIPAL DISCHARGE DIAGNOSIS  Diagnosis: Foot ulceration  Assessment and Plan of Treatment: left foot      SECONDARY DISCHARGE DIAGNOSES  Diagnosis: Cellulitis  Assessment and Plan of Treatment:      PRINCIPAL DISCHARGE DIAGNOSIS  Diagnosis: Foot ulceration  Assessment and Plan of Treatment: You were found to have a skin ulcer due to an AVM in your left ankle. You were treated with intravenous antibiotics during your stay at [facility].  Should you start to notice symptoms such as but not limited to: high persisting fevers (>104 F or lasting more than 3 days), severe pain, increased swelling and/or skin color changes after finishing your antibiotics, please return to the emergency department for interval evaluation.  **Please continue IV infusions at home- Vancomycin 1250mg Q8Hrs and Meropenem 1g every day and follow-up with your primary care physician.**  Please follow-up with Dr. Rahman, her office will call you for an appointment.       PRINCIPAL DISCHARGE DIAGNOSIS  Diagnosis: Foot ulceration  Assessment and Plan of Treatment: You were found to have a skin ulcer due to an AVM in your left ankle. You were treated with intravenous antibiotics during your stay at Health system.  Should you start to notice symptoms such as but not limited to: high persisting fevers (>104 F or lasting more than 3 days), severe pain, increased swelling and/or skin color changes after finishing your antibiotics, please return to the emergency department for interval evaluation.  **Please continue IV infusions at home- Vancomycin 1250mg Q8Hrs and Meropenem 1g Q8Hrs and follow-up with your primary care physician.**  Please follow-up with Dr. Rahman, her office will call you for an appointment.      SECONDARY DISCHARGE DIAGNOSES  Diagnosis: AVM (arteriovenous malformation)  Assessment and Plan of Treatment:   An AVM is an abnormal connection between arteries and veins. The connection becomes tangled. Blood flows too quickly from the arteries and pushes on the walls of the veins. The walls weaken and become narrow. The artery walls also become weak. They begin to bulge from blood that is not able to go into the narrow veins. An AVM that has not burst usually causes no symptoms, or may cause headaches or seizures.  Please follow-up with Dr. Teran for care of your AVM.     PRINCIPAL DISCHARGE DIAGNOSIS  Diagnosis: Foot ulceration  Assessment and Plan of Treatment: You were found to have a skin ulcer due to an AVM in your left ankle. You were treated with intravenous antibiotics during your stay at [facility].  Should you start to notice symptoms such as but not limited to: high persisting fevers (>104 F or lasting more than 3 days), severe pain, increased swelling and/or skin color changes after finishing your antibiotics, please return to the emergency department for interval evaluation.  **Please continue IV infusions at home- Vancomycin 1250mg Q8Hrs and Meropenem 1g Q8Hrs through 11/20/2022 and follow-up with your primary care physician.**  Please follow-up with Dr. Rahman, her office will call you for an appointment.      SECONDARY DISCHARGE DIAGNOSES  Diagnosis: AVM (arteriovenous malformation)  Assessment and Plan of Treatment:   An AVM is an abnormal connection between arteries and veins. The connection becomes tangled. Blood flows too quickly from the arteries and pushes on the walls of the veins. The walls weaken and become narrow. The artery walls also become weak. They begin to bulge from blood that is not able to go into the narrow veins. An AVM that has not burst usually causes no symptoms, or may cause headaches or seizures.  Please follow-up with Dr. Teran for care of your AVM.

## 2022-11-09 NOTE — DISCHARGE NOTE PROVIDER - DETAILS OF MALNUTRITION DIAGNOSIS/DIAGNOSES
This patient has been assessed with a concern for Malnutrition and was treated during this hospitalization for the following Nutrition diagnosis/diagnoses:     -  11/07/2022: Morbid obesity (BMI > 40)

## 2022-11-10 LAB
ANION GAP SERPL CALC-SCNC: 8 MMOL/L — SIGNIFICANT CHANGE UP (ref 5–17)
BUN SERPL-MCNC: 13 MG/DL — SIGNIFICANT CHANGE UP (ref 7–23)
CALCIUM SERPL-MCNC: 8.8 MG/DL — SIGNIFICANT CHANGE UP (ref 8.4–10.5)
CHLORIDE SERPL-SCNC: 102 MMOL/L — SIGNIFICANT CHANGE UP (ref 96–108)
CO2 SERPL-SCNC: 25 MMOL/L — SIGNIFICANT CHANGE UP (ref 22–31)
CREAT SERPL-MCNC: 0.55 MG/DL — SIGNIFICANT CHANGE UP (ref 0.5–1.3)
EGFR: 120 ML/MIN/1.73M2 — SIGNIFICANT CHANGE UP
GLUCOSE SERPL-MCNC: 95 MG/DL — SIGNIFICANT CHANGE UP (ref 70–99)
HCT VFR BLD CALC: 35.9 % — SIGNIFICANT CHANGE UP (ref 34.5–45)
HGB BLD-MCNC: 11.6 G/DL — SIGNIFICANT CHANGE UP (ref 11.5–15.5)
MAGNESIUM SERPL-MCNC: 1.6 MG/DL — SIGNIFICANT CHANGE UP (ref 1.6–2.6)
MCHC RBC-ENTMCNC: 29.1 PG — SIGNIFICANT CHANGE UP (ref 27–34)
MCHC RBC-ENTMCNC: 32.3 GM/DL — SIGNIFICANT CHANGE UP (ref 32–36)
MCV RBC AUTO: 90.2 FL — SIGNIFICANT CHANGE UP (ref 80–100)
NRBC # BLD: 0 /100 WBCS — SIGNIFICANT CHANGE UP (ref 0–0)
PHOSPHATE SERPL-MCNC: 3.3 MG/DL — SIGNIFICANT CHANGE UP (ref 2.5–4.5)
PLATELET # BLD AUTO: 386 K/UL — SIGNIFICANT CHANGE UP (ref 150–400)
POTASSIUM SERPL-MCNC: 4 MMOL/L — SIGNIFICANT CHANGE UP (ref 3.5–5.3)
POTASSIUM SERPL-SCNC: 4 MMOL/L — SIGNIFICANT CHANGE UP (ref 3.5–5.3)
RBC # BLD: 3.98 M/UL — SIGNIFICANT CHANGE UP (ref 3.8–5.2)
RBC # FLD: 12.4 % — SIGNIFICANT CHANGE UP (ref 10.3–14.5)
SODIUM SERPL-SCNC: 135 MMOL/L — SIGNIFICANT CHANGE UP (ref 135–145)
WBC # BLD: 7.46 K/UL — SIGNIFICANT CHANGE UP (ref 3.8–10.5)
WBC # FLD AUTO: 7.46 K/UL — SIGNIFICANT CHANGE UP (ref 3.8–10.5)

## 2022-11-10 PROCEDURE — 99232 SBSQ HOSP IP/OBS MODERATE 35: CPT | Mod: GC

## 2022-11-10 RX ADMIN — OXYCODONE AND ACETAMINOPHEN 2 TABLET(S): 5; 325 TABLET ORAL at 03:10

## 2022-11-10 RX ADMIN — HYDROMORPHONE HYDROCHLORIDE 0.5 MILLIGRAM(S): 2 INJECTION INTRAMUSCULAR; INTRAVENOUS; SUBCUTANEOUS at 23:24

## 2022-11-10 RX ADMIN — MEROPENEM 100 MILLIGRAM(S): 1 INJECTION INTRAVENOUS at 05:43

## 2022-11-10 RX ADMIN — NEBIVOLOL HYDROCHLORIDE 10 MILLIGRAM(S): 5 TABLET ORAL at 19:51

## 2022-11-10 RX ADMIN — OXYCODONE AND ACETAMINOPHEN 2 TABLET(S): 5; 325 TABLET ORAL at 20:13

## 2022-11-10 RX ADMIN — HYDROMORPHONE HYDROCHLORIDE 0.5 MILLIGRAM(S): 2 INJECTION INTRAMUSCULAR; INTRAVENOUS; SUBCUTANEOUS at 04:15

## 2022-11-10 RX ADMIN — GABAPENTIN 800 MILLIGRAM(S): 400 CAPSULE ORAL at 14:41

## 2022-11-10 RX ADMIN — OXYCODONE AND ACETAMINOPHEN 2 TABLET(S): 5; 325 TABLET ORAL at 11:58

## 2022-11-10 RX ADMIN — CHLORHEXIDINE GLUCONATE 1 APPLICATION(S): 213 SOLUTION TOPICAL at 05:44

## 2022-11-10 RX ADMIN — HYDROMORPHONE HYDROCHLORIDE 0.5 MILLIGRAM(S): 2 INJECTION INTRAMUSCULAR; INTRAVENOUS; SUBCUTANEOUS at 04:30

## 2022-11-10 RX ADMIN — Medication 166.67 MILLIGRAM(S): at 14:35

## 2022-11-10 RX ADMIN — MEROPENEM 100 MILLIGRAM(S): 1 INJECTION INTRAVENOUS at 21:40

## 2022-11-10 RX ADMIN — OXYCODONE AND ACETAMINOPHEN 2 TABLET(S): 5; 325 TABLET ORAL at 02:10

## 2022-11-10 RX ADMIN — HYDROMORPHONE HYDROCHLORIDE 0.5 MILLIGRAM(S): 2 INJECTION INTRAMUSCULAR; INTRAVENOUS; SUBCUTANEOUS at 08:55

## 2022-11-10 RX ADMIN — OXYCODONE AND ACETAMINOPHEN 2 TABLET(S): 5; 325 TABLET ORAL at 12:28

## 2022-11-10 RX ADMIN — ENOXAPARIN SODIUM 40 MILLIGRAM(S): 100 INJECTION SUBCUTANEOUS at 18:02

## 2022-11-10 RX ADMIN — HYDROMORPHONE HYDROCHLORIDE 0.5 MILLIGRAM(S): 2 INJECTION INTRAMUSCULAR; INTRAVENOUS; SUBCUTANEOUS at 14:15

## 2022-11-10 RX ADMIN — Medication 166.67 MILLIGRAM(S): at 06:44

## 2022-11-10 RX ADMIN — ENOXAPARIN SODIUM 40 MILLIGRAM(S): 100 INJECTION SUBCUTANEOUS at 05:44

## 2022-11-10 RX ADMIN — MEROPENEM 100 MILLIGRAM(S): 1 INJECTION INTRAVENOUS at 13:38

## 2022-11-10 RX ADMIN — HYDROMORPHONE HYDROCHLORIDE 0.5 MILLIGRAM(S): 2 INJECTION INTRAMUSCULAR; INTRAVENOUS; SUBCUTANEOUS at 13:45

## 2022-11-10 RX ADMIN — GABAPENTIN 100 MILLIGRAM(S): 400 CAPSULE ORAL at 07:18

## 2022-11-10 RX ADMIN — OXYCODONE AND ACETAMINOPHEN 2 TABLET(S): 5; 325 TABLET ORAL at 20:43

## 2022-11-10 RX ADMIN — OXYCODONE AND ACETAMINOPHEN 2 TABLET(S): 5; 325 TABLET ORAL at 16:06

## 2022-11-10 RX ADMIN — HYDROMORPHONE HYDROCHLORIDE 0.5 MILLIGRAM(S): 2 INJECTION INTRAMUSCULAR; INTRAVENOUS; SUBCUTANEOUS at 22:24

## 2022-11-10 RX ADMIN — HYDROMORPHONE HYDROCHLORIDE 0.5 MILLIGRAM(S): 2 INJECTION INTRAMUSCULAR; INTRAVENOUS; SUBCUTANEOUS at 09:30

## 2022-11-10 RX ADMIN — HYDROMORPHONE HYDROCHLORIDE 0.5 MILLIGRAM(S): 2 INJECTION INTRAMUSCULAR; INTRAVENOUS; SUBCUTANEOUS at 18:02

## 2022-11-10 RX ADMIN — OXYCODONE AND ACETAMINOPHEN 2 TABLET(S): 5; 325 TABLET ORAL at 07:44

## 2022-11-10 RX ADMIN — NEBIVOLOL HYDROCHLORIDE 10 MILLIGRAM(S): 5 TABLET ORAL at 07:18

## 2022-11-10 RX ADMIN — OXYCODONE AND ACETAMINOPHEN 2 TABLET(S): 5; 325 TABLET ORAL at 16:45

## 2022-11-10 RX ADMIN — Medication 166.67 MILLIGRAM(S): at 22:23

## 2022-11-10 RX ADMIN — HYDROMORPHONE HYDROCHLORIDE 0.5 MILLIGRAM(S): 2 INJECTION INTRAMUSCULAR; INTRAVENOUS; SUBCUTANEOUS at 18:30

## 2022-11-10 RX ADMIN — OXYCODONE AND ACETAMINOPHEN 2 TABLET(S): 5; 325 TABLET ORAL at 06:44

## 2022-11-10 NOTE — PROGRESS NOTE ADULT - PROBLEM SELECTOR PLAN 6
F: None  E: Replenish as necessary K>4 Mg>2  N: DASH diet   DVT Prophylaxis: Lovenox 40mg BID  GI prophylaxis: None   CODE STATUS: FULL

## 2022-11-10 NOTE — PROGRESS NOTE ADULT - PROBLEM SELECTOR PLAN 2
MRA of Left ankle: High-grade partial-thickness tearing of the ATFL and CFL with partial-thickness tearing of the deltoid ligament.  - PT inpatient and refer to outpatient PT   - Consider follow-up w/ sports med outpatient   - Continue w/ pain management as above non-distended

## 2022-11-10 NOTE — PROGRESS NOTE ADULT - ATTENDING COMMENTS
Patient was seen and examined at bedside on 11/6/2022 at 1030 am. Patient has no acute complaints, reports intermittent subjective fevers. ROS is otherwise negative. Leg is improved. Vitals, labwork and pertinent imaging reviewed. Physical exam - NAD, AAO x 4, PERRLA, EOMI, MMM, supple neck, chest - CTA b/l, CV - rrr, s1s2, no m/r/g, abd - soft, NTND, + BS, ext - wwp, psych - normal affect, skin - L heel ulcer, appears clean, no purulence    Plan  -Pending final ID recs
36 yo F with HTN, AVM L foot s/p multiple transcatheter and stick embolizations, chronic L foot ulcer, culture from OR debridement 10/11 with Pseudomonas treated with brief course of vanc and cefepime with recurrence of infection.  She defervesced on vanc and cefepime, MRI without evidence for underlying OM.  She is on vancomycin and cefepime, ulcer pain/erythema not improving.  Would continue vanc, d/c cefepime, start meropenem.  Will follow with you - team 1.
38 yo F with HTN, AVM L foot s/p multiple transcatheter and stick embolizations, chronic L foot ulcer, culture from OR debridement 10/11 with Pseudomonas treated with brief course of vanc and cefepime with recurrence of infection.  Yesterday, vancomycin was continued, cefepime changed to meropenem.  She reports that today for the first time, she feels better with improvement of chills/sweats and possibly foot.  Would continue vanc and meropenem, dosing and monitoring as above.  Will follow with you - team 1.
36 yo F with HTN, MO and AVM with ulcer, most recently s/p debridement 10/11, OR culture with Pseudomonas aeruginosa treated with vanc & cefepime X ~5 d, now again with fever and worsening L foot pain with erythema around ulcer.  She had initial temp 100.2, WBC 13.07, was started on vanc and cefepime.  Would f/u blood cultures X 2 from 10/31, agree with plan for MRI, continue vancomycin and cefepime for now.  Vancomycin trough prior to 4th dose – target:  12-16.  Will follow with you – team 1.
Afebrile.  Her pain is unchanged.  Images of wound compared from admission to today - erythema surrounding wound has increased, base of wound appears .  Needs more aggressive wound care.  At this point, would target the Pseudomonas in the prior OR debridement culture.  Would f/u blood cultures, wound culture (superficial swab), continue cefepime, d/c vancomycin.  She gives reliable history regarding cipro allergy - would not use.  Would place midline, final recommendations pending clinical improvement.  Discussed with Dr. Tan.  Will follow with you - team 1.
36 yo F with HTN, MO and AVM with infected ulcer.  Though she has defervesced, she has not felt improvement in pain since start of antibiotics and no micro data are available from this admission - only OR debridement culture from prior admission with Pseudomonas.  She is to have dressing change by Vascular.  Need to evaluate wound further and make sure that she is improving prior to giving final recommendations for completion of antibiotics as an outpatient.  Will follow with you - team 1.
38 yo F with HTN, AVM L foot s/p multiple transcatheter and stick embolizations, chronic L foot ulcer, culture from OR debridement 10/11 with Pseudomonas treated with brief course of vanc and cefepime with recurrence of infection.  She defervesced on vanc and cefepime, MRI without evidence for underlying OM.  She is on cefepime, vanc d/osorio yesterday, surrounding erythema worse today.  Would continue to f/u wound culture from 11/1 (though superficial and significance unclear - currently only growing rare CNS), restart vancomycin, continue cefepime, dosing and monitoring as above.  Will follow with you – team 1.  Dr. Bennett will cover from tonight through 11/6.  I will resume care on 11/7.
38 yo F with HTN, AVM L foot s/p multiple transcatheter and stick embolizations, chronic L foot ulcer, culture from OR debridement 10/11 with Pseudomonas treated with brief course of vanc and cefepime with recurrence of infection.  She is on vanc, cefepime changed to hugh on 11/7.  She feels that she is having ongoing improvement in systemic symptoms and foot.  Inflammatory markers are still elevated but have come down.  Would continue vanc and hugh to complete 2 week course from start of hugh, dosing and monitoring as above.  Please recall if further ID input is desired - team 1.
Patient was seen and examined at bedside on 11/5/2022 at 1030 am. Patient has no acute complaints, reports intermittent subjective fevers. ROS is otherwise negative. Vitals, labwork and pertinent imaging reviewed. Physical exam - NAD, AAO x 4, PERRLA, EOMI, MMM, supple neck, chest - CTA b/l, CV - rrr, s1s2, no m/r/g, abd - soft, NTND, + BS, ext - wwp, psych - normal affect, skin - L heel ulcer, appears clean, no purulence    Plan  -Pending final ID recs
Date of service 11/9/22  Pain continues to improve now after being on IV meropenem. Ulcer appears to be smaller. Plan to set-up home infusion services w IV vancomycin and Meropenem x14d total course 11/20.
Optimized for discharge. Pain steadily improving. Pending set-up home infusion - anticipate start of care 11/11
Pain remains the same. No measured fevers. Exam shows erythema and tenderness surrounding ulcer. Appreciate ID Recs - switching cefepime to meropenem. Continue TID wet-to-dry dressings.
37F w HTN, Morbid obesity (49), chornic L foot ulcer s/p transcatheter embolization of AVM 10/11, on IV Vanc+Cefepime 10/11-16 Cx growing PsA, Staph, Corynebacter, p/w increased pain, outpatient fever c/f worsening infection - MRI negative for osteomyelitis, persistent pain, erythema around ulcer - on IV Abx    Pt reports pain about the same. No fever, chills, sweats. Ambulating wo issues. Wound check today - no odor or drainage. Slight erythema surrounding ulcer w tenderness. Wet to dry dressing gauze applied w kerlex.     #L ankle ulcer - As below  #HTN - c/w home HTN medications  #Morbid obesity - BMI 49. Affects all aspects of care  #Chronic pain - on percocet 10 q4h (pt usually takes up to 5x/d - recently d/t increased pain) follows w pain mgmt   - also on tony 800 daily, zanaflex 4mg daily  #ATFL, CFL partial thickness tears in L ankle    Plan  Discussed w ID - continue IV Vanc and Cefepime. Continue PsA coverage. Noted Current culture growing Coag Neg staph.  Pain control: percocet 10/325 q4h PRN for severe pain; IV Dilaudid 0.5mg IV q4h PRN for breakthrough  Can follow-up outpatient w PCP, for partial thickness L ankle tears.   Midline today - anticipating 14d course IV Abx    DISPO: TBD pending clinical improvement of ulcer, pain, set-up of home IV Infusion
Date of service 11/8/22  Ulcer examined w sheila - mild improvement in erythema and tenderness. Otherwise eating well, ambulating, no issues with bowel movements  Pending ID recommendations and continued improvement on clinical examination
Date of service 11/3/22  37F w HTN, MOrbid obesity (49), chornic L foot ulcer s/p transcatheter embolization of AVM 10/11, on IV Vanc+Cefepime 10/11-16 Cx growing PsA, Staph, Corynebacter, p/w increased pain, outpatient fever c/f worsening infection - MRI negative for osteomyelitis, persistent pain, erythema around ulcer - on IV Vancomycin and Cefepime    Wound check today - erythema persisting. No chills, sweats, fevers    #L ankle ulcer - As below  #HTN - c/w home HTN medications  #Morbid obesity - BMI 49. Affects all aspects of care  #Chronic pain - on percocet 10 q4h (pt usually takes up to 5x/d - recently d/t increased pain) follows w pain mgmt   - also on tony 800 daily, zanaflex 4mg daily  #ATFL, CFL partial thickness tears in L ankle    Plan  Discussed w ID - continue IV Abx. Continue PsA coverage. Noted Current culture growing Coag Neg staph.  Pain control: percocet 10/325 q4h PRN for severe pain; IV Dilaudid 0.5mg IV q4h PRN for breakthrough  Can follow-up outpatient w PCP, for partial thickness L ankle tears.     DISPO: TBD
37F w HTN, MOrbid obesity (49), chornic L foot ulcer s/p transcatheter embolization of AVM 10/11, on IV Vanc+Cefepime 10/11-16 Cx growing PsA, Staph, Corynebacter, p/w increased pain, outpatient fever c/f worsening infection - rule out osteomyelitis    Pt reports pain controlled w IV Dilaudid. No fevers, chills overnight. Denies recent drainage but does report increased pain in setting of fevers. Denies other infective ROS - dysuria, frequency, urgency, URI sxs. +    #L ankle ulcer - As below  #HTN - c/w home HTN medications  #Morbid obesity - BMI 49. Affects all aspects of care  #Chronic pain - on percocet 10 q4h (pt usually takes up to 5x/d - recently d/t increased pain) follows w pain mgmt   - also on tony 800 daily, zanaflex 4mg daily    Plan  Overall, pt clinically appears stable. BCx NGTD at this time  Continue IV Vancomycin, Cefepime  Pain control: percocet 10/325 q4h PRN for severe pain; IV Dilaudid 0.5mg IV q4h PRN for breakthrough  Vascular sx consult  ID consult  MRI L ankle to eval for osteomyelitis    DISPO: TBD
37F w HTN, MOrbid obesity (49), chornic L foot ulcer s/p transcatheter embolization of AVM 10/11, on IV Vanc+Cefepime 10/11-16 Cx growing PsA, Staph, Corynebacter, p/w increased pain, outpatient fever c/f worsening infection - rule out osteomyelitis, MRI negative for osteomyelitis    Pt reports pain controlled w PO Percocet w PRN Breakthrough IV Dilaudid. Denies any fever, chills, sweats.   Discussed results of MRI w patient - no osteomyelitis. However there is evidence of ATFL and CFL partial thickness tears present. Pt denies any recent trauma, injury to the ankle but does report pain when ambulating that she just attributed to the ulcer. Does not see PT previously. Has been ambulating wo assistance.    #L ankle ulcer - As below  #HTN - c/w home HTN medications  #Morbid obesity - BMI 49. Affects all aspects of care  #Chronic pain - on percocet 10 q4h (pt usually takes up to 5x/d - recently d/t increased pain) follows w pain mgmt   - also on tony 800 daily, zanaflex 4mg daily  #ATFL, CFL partial thickness tears in L ankle    Plan  Overall, pt clinically appears stable. BCx NGTD at this time  F/U ID recs - continue IV Vancomycin, Cefepime  Pain control: percocet 10/325 q4h PRN for severe pain; IV Dilaudid 0.5mg IV q4h PRN for breakthrough  Can follow-up outpatient w PCP, for partial thickness L ankle tears.     DISPO: TBD.

## 2022-11-10 NOTE — PROGRESS NOTE ADULT - PROVIDER SPECIALTY LIST ADULT
Infectious Disease
Internal Medicine
Infectious Disease
Internal Medicine
Infectious Disease
Infectious Disease
Internal Medicine

## 2022-11-10 NOTE — PROGRESS NOTE ADULT - ATTENDING SUPERVISION STATEMENT
Resident
Resident/Student
Resident
Resident/Student
Resident/Student
Resident

## 2022-11-10 NOTE — PROGRESS NOTE ADULT - SUBJECTIVE AND OBJECTIVE BOX
OVERNIGHT EVENTS:  NAEON    SUBJECTIVE / INTERVAL HPI: Patient seen and examined at bedside. This morning she feels that her ulcer is continuing to get better. It has gotten smaller this past week. She still has pain in her ulcer requiring Dilaudid Otherwise, no complaints from patient. Denies fever, chills, abdominal pain, and constipation.    VITAL SIGNS:  Vital Signs Last 24 Hrs  T(C): 36.8 (10 Nov 2022 12:15), Max: 36.8 (10 Nov 2022 12:15)  T(F): 98.2 (10 Nov 2022 12:15), Max: 98.2 (10 Nov 2022 12:15)  HR: 87 (10 Nov 2022 12:15) (78 - 90)  BP: 154/94 (10 Nov 2022 12:15) (120/84 - 154/94)  BP(mean): --  RR: 18 (10 Nov 2022 12:15) (17 - 18)  SpO2: 97% (10 Nov 2022 12:15) (96% - 97%)    Parameters below as of 10 Nov 2022 12:15  Patient On (Oxygen Delivery Method): room air    PHYSICAL EXAM:  General: A&Ox3. Awake and laying comfortably in bed.  HEENT: Palpebral conjunctiva pink and sclera white BL. Mucosa pink and moist.  Neck: Supple, FROM, no JVD.  Cardiovascular: Clear S1 and S2. No murmurs, gallops or rubs.  Respiratory: Clear to auscultation bilaterally. No wheezing, rhonchi or rales.  Gastrointestinal: Nondistended, nontender. BS normoactive x4.  Extremities: Left ankle wrapped w/ bandaging. Left lateral ankle w/ 1cm x .5cm ulcer without pus w/ surrounding erythema that has decreased. Less tenderness to palpation around the wound.  No discoloration, brittle nails, varicose veins, clubbing or edema noted.  Vascular: 2+ equally bilaterally dorsalis pedis and posterior tibial.  Neurological: Sensation intact equally bilaterally in all extremities.    MEDICATIONS:  MEDICATIONS  (STANDING):  chlorhexidine 2% Cloths 1 Application(s) Topical <User Schedule>  enoxaparin Injectable 40 milliGRAM(s) SubCutaneous every 12 hours  gabapentin 100 milliGRAM(s) Oral every 24 hours  gabapentin 800 milliGRAM(s) Oral every 24 hours  influenza   Vaccine 0.5 milliLiter(s) IntraMuscular once  meropenem  IVPB 1000 milliGRAM(s) IV Intermittent every 8 hours  nebivolol 10 milliGRAM(s) Oral <User Schedule>  senna 2 Tablet(s) Oral at bedtime  vancomycin  IVPB 1250 milliGRAM(s) IV Intermittent every 8 hours    MEDICATIONS  (PRN):  acetaminophen     Tablet .. 650 milliGRAM(s) Oral every 6 hours PRN Temp greater or equal to 38C (100.4F), Mild Pain (1 - 3)  HYDROmorphone  Injectable 0.5 milliGRAM(s) IV Push every 4 hours PRN breakthrough pain  oxycodone    5 mG/acetaminophen 325 mG 2 Tablet(s) Oral every 4 hours PRN Severe Pain (7 - 10)  polyethylene glycol 3350 17 Gram(s) Oral every 24 hours PRN Constipation  sodium chloride 0.9% lock flush 10 milliLiter(s) IV Push every 1 hour PRN Pre/post blood products, medications, blood draw, and to maintain line patency      ALLERGIES:  Allergies    amoxicillin (Angioedema)  ciprofloxacin (Rash)  hydrochlorothiazide (Hives)  lisinopril (Angioedema; Rash; Hives)  penicillin (Rash)    Intolerances        LABS:                        11.6   7.46  )-----------( 386      ( 10 Nov 2022 08:28 )             35.9     11-10    135  |  102  |  13  ----------------------------<  95  4.0   |  25  |  0.55    Ca    8.8      10 Nov 2022 08:28  Phos  3.3     11-10  Mg     1.6     11-10          CAPILLARY BLOOD GLUCOSE          RADIOLOGY & ADDITIONAL TESTS: Reviewed.

## 2022-11-10 NOTE — PROGRESS NOTE ADULT - NUTRITIONAL ASSESSMENT
This patient has been assessed with a concern for Malnutrition and has been determined to have a diagnosis/diagnoses of Morbid obesity (BMI > 40).    This patient is being managed with:   Diet DASH/TLC-  Sodium & Cholesterol Restricted  Entered: Nov 1 2022 12:00AM    

## 2022-11-11 ENCOUNTER — TRANSCRIPTION ENCOUNTER (OUTPATIENT)
Age: 37
End: 2022-11-11

## 2022-11-11 VITALS
RESPIRATION RATE: 18 BRPM | DIASTOLIC BLOOD PRESSURE: 77 MMHG | HEART RATE: 86 BPM | TEMPERATURE: 99 F | SYSTOLIC BLOOD PRESSURE: 114 MMHG | OXYGEN SATURATION: 96 %

## 2022-11-11 LAB
ANION GAP SERPL CALC-SCNC: 10 MMOL/L — SIGNIFICANT CHANGE UP (ref 5–17)
BUN SERPL-MCNC: 15 MG/DL — SIGNIFICANT CHANGE UP (ref 7–23)
CALCIUM SERPL-MCNC: 8.8 MG/DL — SIGNIFICANT CHANGE UP (ref 8.4–10.5)
CHLORIDE SERPL-SCNC: 102 MMOL/L — SIGNIFICANT CHANGE UP (ref 96–108)
CO2 SERPL-SCNC: 26 MMOL/L — SIGNIFICANT CHANGE UP (ref 22–31)
CREAT SERPL-MCNC: 0.54 MG/DL — SIGNIFICANT CHANGE UP (ref 0.5–1.3)
EGFR: 120 ML/MIN/1.73M2 — SIGNIFICANT CHANGE UP
GLUCOSE SERPL-MCNC: 93 MG/DL — SIGNIFICANT CHANGE UP (ref 70–99)
HCT VFR BLD CALC: 34.5 % — SIGNIFICANT CHANGE UP (ref 34.5–45)
HGB BLD-MCNC: 11.1 G/DL — LOW (ref 11.5–15.5)
MAGNESIUM SERPL-MCNC: 1.7 MG/DL — SIGNIFICANT CHANGE UP (ref 1.6–2.6)
MCHC RBC-ENTMCNC: 28.8 PG — SIGNIFICANT CHANGE UP (ref 27–34)
MCHC RBC-ENTMCNC: 32.2 GM/DL — SIGNIFICANT CHANGE UP (ref 32–36)
MCV RBC AUTO: 89.6 FL — SIGNIFICANT CHANGE UP (ref 80–100)
NRBC # BLD: 0 /100 WBCS — SIGNIFICANT CHANGE UP (ref 0–0)
PHOSPHATE SERPL-MCNC: 3.6 MG/DL — SIGNIFICANT CHANGE UP (ref 2.5–4.5)
PLATELET # BLD AUTO: 377 K/UL — SIGNIFICANT CHANGE UP (ref 150–400)
POTASSIUM SERPL-MCNC: 4.1 MMOL/L — SIGNIFICANT CHANGE UP (ref 3.5–5.3)
POTASSIUM SERPL-SCNC: 4.1 MMOL/L — SIGNIFICANT CHANGE UP (ref 3.5–5.3)
RBC # BLD: 3.85 M/UL — SIGNIFICANT CHANGE UP (ref 3.8–5.2)
RBC # FLD: 12.3 % — SIGNIFICANT CHANGE UP (ref 10.3–14.5)
SODIUM SERPL-SCNC: 138 MMOL/L — SIGNIFICANT CHANGE UP (ref 135–145)
WBC # BLD: 6.96 K/UL — SIGNIFICANT CHANGE UP (ref 3.8–10.5)
WBC # FLD AUTO: 6.96 K/UL — SIGNIFICANT CHANGE UP (ref 3.8–10.5)

## 2022-11-11 PROCEDURE — 96374 THER/PROPH/DIAG INJ IV PUSH: CPT

## 2022-11-11 PROCEDURE — 73722 MRI JOINT OF LWR EXTR W/DYE: CPT

## 2022-11-11 PROCEDURE — 96375 TX/PRO/DX INJ NEW DRUG ADDON: CPT

## 2022-11-11 PROCEDURE — 84100 ASSAY OF PHOSPHORUS: CPT

## 2022-11-11 PROCEDURE — 85610 PROTHROMBIN TIME: CPT

## 2022-11-11 PROCEDURE — 80048 BASIC METABOLIC PNL TOTAL CA: CPT

## 2022-11-11 PROCEDURE — 80053 COMPREHEN METABOLIC PANEL: CPT

## 2022-11-11 PROCEDURE — 85730 THROMBOPLASTIN TIME PARTIAL: CPT

## 2022-11-11 PROCEDURE — 87635 SARS-COV-2 COVID-19 AMP PRB: CPT

## 2022-11-11 PROCEDURE — 73630 X-RAY EXAM OF FOOT: CPT

## 2022-11-11 PROCEDURE — 83605 ASSAY OF LACTIC ACID: CPT

## 2022-11-11 PROCEDURE — 36573 INSJ PICC RS&I 5 YR+: CPT

## 2022-11-11 PROCEDURE — 85027 COMPLETE CBC AUTOMATED: CPT

## 2022-11-11 PROCEDURE — 99239 HOSP IP/OBS DSCHRG MGMT >30: CPT

## 2022-11-11 PROCEDURE — 85025 COMPLETE CBC W/AUTO DIFF WBC: CPT

## 2022-11-11 PROCEDURE — 36415 COLL VENOUS BLD VENIPUNCTURE: CPT

## 2022-11-11 PROCEDURE — 85652 RBC SED RATE AUTOMATED: CPT

## 2022-11-11 PROCEDURE — 87186 SC STD MICRODIL/AGAR DIL: CPT

## 2022-11-11 PROCEDURE — 83735 ASSAY OF MAGNESIUM: CPT

## 2022-11-11 PROCEDURE — 99285 EMERGENCY DEPT VISIT HI MDM: CPT | Mod: 25

## 2022-11-11 PROCEDURE — 87070 CULTURE OTHR SPECIMN AEROBIC: CPT

## 2022-11-11 PROCEDURE — 83036 HEMOGLOBIN GLYCOSYLATED A1C: CPT

## 2022-11-11 PROCEDURE — 86140 C-REACTIVE PROTEIN: CPT

## 2022-11-11 PROCEDURE — 87040 BLOOD CULTURE FOR BACTERIA: CPT

## 2022-11-11 PROCEDURE — A9585: CPT

## 2022-11-11 PROCEDURE — 80202 ASSAY OF VANCOMYCIN: CPT

## 2022-11-11 PROCEDURE — 87075 CULTR BACTERIA EXCEPT BLOOD: CPT

## 2022-11-11 RX ORDER — MEROPENEM 1 G/30ML
1000 INJECTION INTRAVENOUS
Qty: 0 | Refills: 0 | DISCHARGE
Start: 2022-11-11

## 2022-11-11 RX ORDER — VANCOMYCIN HCL 1 G
1.25 VIAL (EA) INTRAVENOUS
Qty: 0 | Refills: 0 | DISCHARGE
Start: 2022-11-11

## 2022-11-11 RX ADMIN — HYDROMORPHONE HYDROCHLORIDE 0.5 MILLIGRAM(S): 2 INJECTION INTRAMUSCULAR; INTRAVENOUS; SUBCUTANEOUS at 16:15

## 2022-11-11 RX ADMIN — OXYCODONE AND ACETAMINOPHEN 2 TABLET(S): 5; 325 TABLET ORAL at 09:31

## 2022-11-11 RX ADMIN — CHLORHEXIDINE GLUCONATE 1 APPLICATION(S): 213 SOLUTION TOPICAL at 05:31

## 2022-11-11 RX ADMIN — MEROPENEM 100 MILLIGRAM(S): 1 INJECTION INTRAVENOUS at 05:31

## 2022-11-11 RX ADMIN — GABAPENTIN 100 MILLIGRAM(S): 400 CAPSULE ORAL at 07:04

## 2022-11-11 RX ADMIN — MEROPENEM 100 MILLIGRAM(S): 1 INJECTION INTRAVENOUS at 13:17

## 2022-11-11 RX ADMIN — HYDROMORPHONE HYDROCHLORIDE 0.5 MILLIGRAM(S): 2 INJECTION INTRAMUSCULAR; INTRAVENOUS; SUBCUTANEOUS at 11:50

## 2022-11-11 RX ADMIN — HYDROMORPHONE HYDROCHLORIDE 0.5 MILLIGRAM(S): 2 INJECTION INTRAMUSCULAR; INTRAVENOUS; SUBCUTANEOUS at 02:50

## 2022-11-11 RX ADMIN — HYDROMORPHONE HYDROCHLORIDE 0.5 MILLIGRAM(S): 2 INJECTION INTRAMUSCULAR; INTRAVENOUS; SUBCUTANEOUS at 02:25

## 2022-11-11 RX ADMIN — GABAPENTIN 800 MILLIGRAM(S): 400 CAPSULE ORAL at 15:36

## 2022-11-11 RX ADMIN — OXYCODONE AND ACETAMINOPHEN 2 TABLET(S): 5; 325 TABLET ORAL at 05:35

## 2022-11-11 RX ADMIN — HYDROMORPHONE HYDROCHLORIDE 0.5 MILLIGRAM(S): 2 INJECTION INTRAMUSCULAR; INTRAVENOUS; SUBCUTANEOUS at 07:03

## 2022-11-11 RX ADMIN — HYDROMORPHONE HYDROCHLORIDE 0.5 MILLIGRAM(S): 2 INJECTION INTRAMUSCULAR; INTRAVENOUS; SUBCUTANEOUS at 07:36

## 2022-11-11 RX ADMIN — HYDROMORPHONE HYDROCHLORIDE 0.5 MILLIGRAM(S): 2 INJECTION INTRAMUSCULAR; INTRAVENOUS; SUBCUTANEOUS at 15:36

## 2022-11-11 RX ADMIN — OXYCODONE AND ACETAMINOPHEN 2 TABLET(S): 5; 325 TABLET ORAL at 09:01

## 2022-11-11 RX ADMIN — HYDROMORPHONE HYDROCHLORIDE 0.5 MILLIGRAM(S): 2 INJECTION INTRAMUSCULAR; INTRAVENOUS; SUBCUTANEOUS at 11:20

## 2022-11-11 RX ADMIN — OXYCODONE AND ACETAMINOPHEN 2 TABLET(S): 5; 325 TABLET ORAL at 13:55

## 2022-11-11 RX ADMIN — Medication 166.67 MILLIGRAM(S): at 06:10

## 2022-11-11 RX ADMIN — OXYCODONE AND ACETAMINOPHEN 2 TABLET(S): 5; 325 TABLET ORAL at 00:31

## 2022-11-11 RX ADMIN — NEBIVOLOL HYDROCHLORIDE 10 MILLIGRAM(S): 5 TABLET ORAL at 07:04

## 2022-11-11 RX ADMIN — ENOXAPARIN SODIUM 40 MILLIGRAM(S): 100 INJECTION SUBCUTANEOUS at 05:32

## 2022-11-11 RX ADMIN — Medication 166.67 MILLIGRAM(S): at 14:12

## 2022-11-11 RX ADMIN — OXYCODONE AND ACETAMINOPHEN 2 TABLET(S): 5; 325 TABLET ORAL at 04:35

## 2022-11-11 RX ADMIN — OXYCODONE AND ACETAMINOPHEN 2 TABLET(S): 5; 325 TABLET ORAL at 13:25

## 2022-11-11 RX ADMIN — OXYCODONE AND ACETAMINOPHEN 2 TABLET(S): 5; 325 TABLET ORAL at 01:30

## 2022-11-11 NOTE — DISCHARGE NOTE NURSING/CASE MANAGEMENT/SOCIAL WORK - PATIENT PORTAL LINK FT
You can access the FollowMyHealth Patient Portal offered by [facility] by registering at the following website: [email or web address]. By joining [facility]’s FollowMyHealth portal, you will also be able to view your health information using other applications (apps) compatible with our system.

## 2022-11-11 NOTE — DISCHARGE NOTE NURSING/CASE MANAGEMENT/SOCIAL WORK - NSDCVIVACCINE_GEN_ALL_CORE_FT
influenza, injectable, quadrivalent, preservative free; 22-Oct-2020 10:12; Miley Hebert (RN); [employer]; [id number] (Exp. Date: 30-Jun-2021); IntraMuscular; Deltoid Left.; 0.5 milliLiter(s); VIS (VIS Published: 15-Aug-2019, VIS Presented: 22-Oct-2020);   influenza, injectable, quadrivalent, preservative free; 14-Oct-2021 09:19; Margie Taveras (RN); [employer]; [id number] (Exp. Date: 30-Jun-2022); IntraMuscular; Deltoid Right.; 0.5 milliLiter(s); VIS (VIS Published: 06-Aug-2021, VIS Presented: 14-Oct-2021);

## 2022-11-14 DIAGNOSIS — L97.529 NON-PRESSURE CHRONIC ULCER OF OTHER PART OF LEFT FOOT WITH UNSPECIFIED SEVERITY: ICD-10-CM

## 2022-11-14 DIAGNOSIS — Q27.32 ARTERIOVENOUS MALFORMATION OF VESSEL OF LOWER LIMB: ICD-10-CM

## 2022-11-14 DIAGNOSIS — I10 ESSENTIAL (PRIMARY) HYPERTENSION: ICD-10-CM

## 2022-11-14 DIAGNOSIS — L03.116 CELLULITIS OF LEFT LOWER LIMB: ICD-10-CM
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
## 2022-11-14 DIAGNOSIS — S93.492A SPRAIN OF OTHER LIGAMENT OF LEFT ANKLE, INITIAL ENCOUNTER: ICD-10-CM

## 2022-11-14 DIAGNOSIS — G89.29 OTHER CHRONIC PAIN: ICD-10-CM

## 2022-11-14 DIAGNOSIS — Z88.1 ALLERGY STATUS TO OTHER ANTIBIOTIC AGENTS STATUS: ICD-10-CM

## 2022-11-14 DIAGNOSIS — Z88.0 ALLERGY STATUS TO PENICILLIN: ICD-10-CM

## 2022-11-14 DIAGNOSIS — E66.01 MORBID (SEVERE) OBESITY DUE TO EXCESS CALORIES: ICD-10-CM

## 2022-11-14 DIAGNOSIS — A41.9 SEPSIS, UNSPECIFIED ORGANISM: ICD-10-CM

## 2022-11-18 ENCOUNTER — APPOINTMENT (OUTPATIENT)
Dept: INFECTIOUS DISEASE | Facility: CLINIC | Age: 37
End: 2022-11-18
Payer: MEDICARE

## 2022-11-18 VITALS
HEART RATE: 93 BPM | BODY MASS INDEX: 45.99 KG/M2 | DIASTOLIC BLOOD PRESSURE: 110 MMHG | TEMPERATURE: 97.8 F | WEIGHT: 293 LBS | OXYGEN SATURATION: 99 % | HEIGHT: 67 IN | SYSTOLIC BLOOD PRESSURE: 170 MMHG

## 2022-11-18 PROCEDURE — 99214 OFFICE O/P EST MOD 30 MIN: CPT | Mod: 25

## 2022-11-18 RX ORDER — HYDROMORPHONE HYDROCHLORIDE 4 MG/1
4 TABLET ORAL
Qty: 28 | Refills: 0 | Status: COMPLETED | COMMUNITY
Start: 2019-07-30 | End: 2022-11-18

## 2022-11-18 RX ORDER — HYDROMORPHONE HYDROCHLORIDE 2 MG/1
2 TABLET ORAL
Qty: 28 | Refills: 0 | Status: COMPLETED | COMMUNITY
Start: 2019-11-22 | End: 2022-11-18

## 2022-11-18 NOTE — DATA REVIEWED
[FreeTextEntry1] : 11/15: CBC, CMP WNL;  WBC 8.0;BUN 13, Cr 0.56;  ESR 39 (<=32), CRP 21 (<=10);  vanc trough 14.3

## 2022-11-18 NOTE — REVIEW OF SYSTEMS
[As Noted in HPI] : as noted in HPI [Fever] : no fever [Chills] : no chills [Eye Pain] : no eye pain [Eyesight Problems] : no eyesight problems [Nasal Discharge] : no nasal discharge [Sore Throat] : no sore throat [Chest Pain] : no chest pain [Shortness Of Breath] : no shortness of breath [Cough] : no cough [Abdominal Pain] : no abdominal pain [Vomiting] : no vomiting [Dysuria] : no dysuria

## 2022-11-18 NOTE — ASSESSMENT
[FreeTextEntry1] : 38 yo F with HTN, AVM L foot s/p multiple transcatheter and stick embolizations, chronic L foot ulcer, culture from OR debridement 10/11 with Pseudomonas treated with brief course of vanc and cefepime with recurrence of infection. She is improving slowly on current regimen. Vancomycin trough was ~1 hr early but trough is appropriate for SSTI.\par Plan:\par - Continue meropenem 1 g IV q 8 h and vancomycin 1250 mg q 8 h an additional 2 weeks, tentative end date 12/4\par - CBC, CMP, ESR, CRP, vanco trough weekly while on antibiotics\par -  Continue local wound care\par Follow up in 2 weeks. Contact the office if symptoms worsen.

## 2022-11-18 NOTE — HISTORY OF PRESENT ILLNESS
[FreeTextEntry1] : 37 year old female with HTN, MO and AVM L foot with ulcer.  She began having problems a/c her L foot AVM at age 19-20 when it increased in size. She has been followed by Dr. Teran since 2016 and has undergone transcatheter and direct stick embolization, as well as wound debridement multiple times.  She develops pinhole ulcer, which prompts performance of a procedure with subsequent healing.  She had been treated with 5 d course of clindamycin following each procedure and experienced healing.  Over the summer, she developed ongoing urticaria.  Her PCP diagnosed her as having yeast overgrowth in her bowel.  She was started on fluconazole for one week per month X 6 months – has done 5 courses.  Urticaria has resolved.  In early 8/2022, developed pinhole ulceration lateral ankle.  On 8/16, she underwent direct stick embolization on 8/16, at which time she was treated with clinda perioperatively X 2 doses but not continued b/o concern regarding urticaria.  Initially, the ulcer started to close but in early Sept, began increasing in size with increasing erythema.  She began having intermittent low-grade fevers (Tm 100.8) without rigors and developed increasing drainage.  She underwent transcatheter embolization with deferral of wound debridement on 9/21.  She received one dose of vancomycin at that time.  For the first 4-5 d, she was improving with decreasing erythema and no d/c on wet-to dry dressings.  Starting on d 7 or 8, she began developing increasing erythema around the ulcer and increasing pain with recurrence of low-grade fever (Tm 100.8).  She called Dr. Teran on 10/6 – was started on clinda at that time.  Pain and erythema continued to worsen and she developed fever, prompting her to go to ED.  On 10/11, she underwent transcatheter embolization of AVM and sharp debridement of ulcer at lateral heel. She was started on vanc and cefepime. Wound culture grew Pseudomonas aeruginosa, Corynebacterium spp and Staph haemolyticus.  She was continued on vancomycin and cefepime from 10/11 - 10/16.  \par \par She returned to ED on 10/31 with fevers and L foot pain. MR L ankle 11/2 showed phlegmonous/edematous infiltration, no evidence of OM.  Vancomycin and cefepime were restarted. Fever resolved but she continued to have ulcer pain and erythema, as well as milder intermittent chills and sweats. Cefepime was discontinued and meropenem started on 11/7. She was discharged to continue meropenem 1 g IV q 8 h and vancomycin 1250 mg q 8 h x 2 weeks through 11/20.  She feels foot is 60% improved. Still has redness, swelling and pain. She is doing wet to dry dressing three times daily.

## 2022-11-18 NOTE — PHYSICAL EXAM
[General Appearance - Alert] : alert [General Appearance - Well-Appearing] : healthy appearing [Sclera] : the sclera and conjunctiva were normal [Auscultation Breath Sounds / Voice Sounds] : lungs were clear to auscultation bilaterally [Heart Rate And Rhythm] : heart rate was normal and rhythm regular [Heart Sounds] : normal S1 and S2 [Murmurs] : no murmurs [Edema] : there was no peripheral edema [Bowel Sounds] : normal bowel sounds [Abdomen Soft] : soft [Abdomen Tenderness] : non-tender [Costovertebral Angle Tenderness] : no CVA tenderness [Skin Color & Pigmentation] : normal skin color and pigmentation [] : no rash [FreeTextEntry1] : L lateral foot ulcer somewhat smaller and more shallow, surrounding erythema is less prominent.  RUE PICC exit site clean.

## 2022-11-21 NOTE — H&P ADULT - NSICDXPASTSURGICALHX_GEN_ALL_CORE_FT
1210 pt tolerated destini SQ to abd. Pt took home steroid prior to arrival. Pt voiced no new complaints or concerns at this time. NAD noted. Pt d/c home, left unit ambulatory.   
PAST SURGICAL HISTORY:  Elective surgery transcatheter therapy vascular embolization x5    S/P debridement LLE area overlying AVM

## 2022-12-02 ENCOUNTER — APPOINTMENT (OUTPATIENT)
Dept: INFECTIOUS DISEASE | Facility: CLINIC | Age: 37
End: 2022-12-02
Payer: MEDICARE

## 2022-12-02 VITALS
WEIGHT: 293 LBS | SYSTOLIC BLOOD PRESSURE: 189 MMHG | OXYGEN SATURATION: 97 % | DIASTOLIC BLOOD PRESSURE: 125 MMHG | TEMPERATURE: 97.7 F | HEART RATE: 83 BPM | HEIGHT: 67 IN | BODY MASS INDEX: 45.99 KG/M2

## 2022-12-02 PROCEDURE — 99214 OFFICE O/P EST MOD 30 MIN: CPT | Mod: 25

## 2022-12-02 PROCEDURE — 36415 COLL VENOUS BLD VENIPUNCTURE: CPT

## 2022-12-02 NOTE — ASSESSMENT
[FreeTextEntry1] : 38 yo F with HTN, AVM L foot s/p multiple transcatheter and stick embolizations, chronic L foot ulcer, culture from OR debridement 10/11 with Pseudomonas treated with brief course of vanc and cefepime with recurrence of infection. Inflammatory markers and WBC are up on most recent labs however clinically, wound continues to improve. Vancomycin trough was drawn 1.5 h early - but is therapeutic for SSTI\par Plan:\par - Continue meropenem 1 g IV q 8 h and vancomycin 1250 mg q 8 h an additional 2 weeks, tentative end date 12/16\par - CBC, CMP, ESR, CRP, vanco trough weekly while on antibiotics\par -  Continue local wound care\par - CBC, CMP, ESR, CRP drawn and sent from office\par Follow up in 2 weeks. Contact the office if symptoms worsen.

## 2022-12-02 NOTE — DATA REVIEWED
[FreeTextEntry1] : 11/28:  WBC 10.9 (<--9.1), plt 620 (<--435);  ESR 80 (<--66), CRP 23 (<--33);  CMP ~WNLexcept prot 8.7, alb 4.9, vanc trough 15.4 (dosed at 6 am, drawn at 11:30)

## 2022-12-02 NOTE — PHYSICAL EXAM
[General Appearance - Alert] : alert [General Appearance - Well-Appearing] : healthy appearing [Sclera] : the sclera and conjunctiva were normal [Auscultation Breath Sounds / Voice Sounds] : lungs were clear to auscultation bilaterally [Heart Rate And Rhythm] : heart rate was normal and rhythm regular [Heart Sounds] : normal S1 and S2 [Murmurs] : no murmurs [Edema] : there was no peripheral edema [Bowel Sounds] : normal bowel sounds [Abdomen Soft] : soft [Abdomen Tenderness] : non-tender [Costovertebral Angle Tenderness] : no CVA tenderness [Skin Color & Pigmentation] : normal skin color and pigmentation [] : no rash [FreeTextEntry1] : L lateral foot ulcer markedly improved - ~3-4 mm, shallow, with very mild surrounding erythema, scant serosanguinous drainage.  RUE PICC exit site with very mild surrounding erythema, no tenderness, drainage or warmth

## 2022-12-02 NOTE — REVIEW OF SYSTEMS
[As Noted in HPI] : as noted in HPI [Fever] : no fever [Chills] : no chills [Eye Pain] : no eye pain [Eyesight Problems] : no eyesight problems [Nasal Discharge] : no nasal discharge [Sore Throat] : no sore throat [Chest Pain] : no chest pain [Shortness Of Breath] : no shortness of breath [Cough] : no cough [Abdominal Pain] : no abdominal pain

## 2022-12-02 NOTE — HISTORY OF PRESENT ILLNESS
[FreeTextEntry1] : 37 year old female with HTN, AVM L foot s/p multiple transcatheter and stick embolizations, chronic L foot ulcer, culture from OR debridement 10/11 with Pseudomonas treated with brief course of vanc and cefepime with recurrence of infection. MR L ankle 11/2 showed phlegmonous/edematous infiltration, no evidence of OM. She is being treated with meropenem 1 g IV q 8 h and vancomycin 1250 mg q 8 h (11/7 – present).  She feels 90% improved. Wound is still open but much smaller. She is doing wet to dry dressings.

## 2022-12-04 ENCOUNTER — INPATIENT (INPATIENT)
Facility: HOSPITAL | Age: 37
LOS: 4 days | Discharge: HOME | DRG: 593 | End: 2022-12-09
Admitting: STUDENT IN AN ORGANIZED HEALTH CARE EDUCATION/TRAINING PROGRAM
Payer: MEDICARE

## 2022-12-04 VITALS
SYSTOLIC BLOOD PRESSURE: 185 MMHG | HEIGHT: 67 IN | RESPIRATION RATE: 17 BRPM | DIASTOLIC BLOOD PRESSURE: 134 MMHG | OXYGEN SATURATION: 100 % | WEIGHT: 293 LBS | TEMPERATURE: 99 F | HEART RATE: 104 BPM

## 2022-12-04 DIAGNOSIS — Z41.9 ENCOUNTER FOR PROCEDURE FOR PURPOSES OTHER THAN REMEDYING HEALTH STATE, UNSPECIFIED: Chronic | ICD-10-CM

## 2022-12-04 DIAGNOSIS — Z98.89 OTHER SPECIFIED POSTPROCEDURAL STATES: Chronic | ICD-10-CM

## 2022-12-04 DIAGNOSIS — E66.01 MORBID (SEVERE) OBESITY DUE TO EXCESS CALORIES: Chronic | ICD-10-CM

## 2022-12-04 LAB
ALBUMIN SERPL ELPH-MCNC: 3.9 G/DL — SIGNIFICANT CHANGE UP (ref 3.3–5)
ALBUMIN SERPL ELPH-MCNC: 4.5 G/DL
ALP BLD-CCNC: 76 U/L
ALP SERPL-CCNC: 71 U/L — SIGNIFICANT CHANGE UP (ref 40–120)
ALT FLD-CCNC: 11 U/L — SIGNIFICANT CHANGE UP (ref 10–45)
ALT SERPL-CCNC: 12 U/L
ANION GAP SERPL CALC-SCNC: 10 MMOL/L — SIGNIFICANT CHANGE UP (ref 5–17)
ANION GAP SERPL CALC-SCNC: 18 MMOL/L
AST SERPL-CCNC: 12 U/L
AST SERPL-CCNC: 12 U/L — SIGNIFICANT CHANGE UP (ref 10–40)
BASE EXCESS BLDV CALC-SCNC: 2.4 MMOL/L — SIGNIFICANT CHANGE UP (ref -2–3)
BASOPHILS # BLD AUTO: 0.05 K/UL
BASOPHILS # BLD AUTO: 0.06 K/UL — SIGNIFICANT CHANGE UP (ref 0–0.2)
BASOPHILS NFR BLD AUTO: 0.5 %
BASOPHILS NFR BLD AUTO: 0.6 % — SIGNIFICANT CHANGE UP (ref 0–2)
BILIRUB SERPL-MCNC: 0.6 MG/DL
BILIRUB SERPL-MCNC: 0.9 MG/DL — SIGNIFICANT CHANGE UP (ref 0.2–1.2)
BUN SERPL-MCNC: 11 MG/DL
BUN SERPL-MCNC: 8 MG/DL — SIGNIFICANT CHANGE UP (ref 7–23)
CA-I SERPL-SCNC: 1.14 MMOL/L — LOW (ref 1.15–1.33)
CALCIUM SERPL-MCNC: 9.1 MG/DL
CALCIUM SERPL-MCNC: 9.1 MG/DL — SIGNIFICANT CHANGE UP (ref 8.4–10.5)
CHLORIDE SERPL-SCNC: 102 MMOL/L
CHLORIDE SERPL-SCNC: 99 MMOL/L — SIGNIFICANT CHANGE UP (ref 96–108)
CO2 BLDV-SCNC: 27.6 MMOL/L — HIGH (ref 22–26)
CO2 SERPL-SCNC: 19 MMOL/L
CO2 SERPL-SCNC: 26 MMOL/L — SIGNIFICANT CHANGE UP (ref 22–31)
CREAT SERPL-MCNC: 0.57 MG/DL
CREAT SERPL-MCNC: 0.66 MG/DL — SIGNIFICANT CHANGE UP (ref 0.5–1.3)
CRP SERPL-MCNC: 30 MG/L
EGFR: 114 ML/MIN/1.73M2 — SIGNIFICANT CHANGE UP
EGFR: 118 ML/MIN/1.73M2
EOSINOPHIL # BLD AUTO: 0.02 K/UL
EOSINOPHIL # BLD AUTO: 0.03 K/UL — SIGNIFICANT CHANGE UP (ref 0–0.5)
EOSINOPHIL NFR BLD AUTO: 0.2 %
EOSINOPHIL NFR BLD AUTO: 0.3 % — SIGNIFICANT CHANGE UP (ref 0–6)
ERYTHROCYTE [SEDIMENTATION RATE] IN BLOOD BY WESTERGREN METHOD: 66 MM/HR
FLUAV AG NPH QL: SIGNIFICANT CHANGE UP
FLUBV AG NPH QL: SIGNIFICANT CHANGE UP
GAS PNL BLDV: 133 MMOL/L — LOW (ref 136–145)
GAS PNL BLDV: SIGNIFICANT CHANGE UP
GLUCOSE SERPL-MCNC: 95 MG/DL — SIGNIFICANT CHANGE UP (ref 70–99)
GLUCOSE SERPL-MCNC: 99 MG/DL
HCO3 BLDV-SCNC: 26 MMOL/L — SIGNIFICANT CHANGE UP (ref 22–29)
HCT VFR BLD CALC: 34.3 % — LOW (ref 34.5–45)
HCT VFR BLD CALC: 37.1 %
HGB BLD-MCNC: 11.6 G/DL — SIGNIFICANT CHANGE UP (ref 11.5–15.5)
HGB BLD-MCNC: 12.3 G/DL
IMM GRANULOCYTES NFR BLD AUTO: 0.3 % — SIGNIFICANT CHANGE UP (ref 0–0.9)
IMM GRANULOCYTES NFR BLD AUTO: 0.4 %
LACTATE SERPL-SCNC: 1.2 MMOL/L — SIGNIFICANT CHANGE UP (ref 0.5–2)
LYMPHOCYTES # BLD AUTO: 1.49 K/UL
LYMPHOCYTES # BLD AUTO: 1.72 K/UL — SIGNIFICANT CHANGE UP (ref 1–3.3)
LYMPHOCYTES # BLD AUTO: 16 % — SIGNIFICANT CHANGE UP (ref 13–44)
LYMPHOCYTES NFR BLD AUTO: 15.9 %
MAN DIFF?: NORMAL
MCHC RBC-ENTMCNC: 28.4 PG
MCHC RBC-ENTMCNC: 28.8 PG — SIGNIFICANT CHANGE UP (ref 27–34)
MCHC RBC-ENTMCNC: 33.2 GM/DL
MCHC RBC-ENTMCNC: 33.8 GM/DL — SIGNIFICANT CHANGE UP (ref 32–36)
MCV RBC AUTO: 85.1 FL — SIGNIFICANT CHANGE UP (ref 80–100)
MCV RBC AUTO: 85.7 FL
MONOCYTES # BLD AUTO: 0.54 K/UL — SIGNIFICANT CHANGE UP (ref 0–0.9)
MONOCYTES # BLD AUTO: 0.58 K/UL
MONOCYTES NFR BLD AUTO: 5 % — SIGNIFICANT CHANGE UP (ref 2–14)
MONOCYTES NFR BLD AUTO: 6.2 %
NEUTROPHILS # BLD AUTO: 7.19 K/UL
NEUTROPHILS # BLD AUTO: 8.34 K/UL — HIGH (ref 1.8–7.4)
NEUTROPHILS NFR BLD AUTO: 76.8 %
NEUTROPHILS NFR BLD AUTO: 77.8 % — HIGH (ref 43–77)
NRBC # BLD: 0 /100 WBCS — SIGNIFICANT CHANGE UP (ref 0–0)
PCO2 BLDV: 38 MMHG — LOW (ref 39–42)
PH BLDV: 7.45 — HIGH (ref 7.32–7.43)
PLATELET # BLD AUTO: 482 K/UL — HIGH (ref 150–400)
PLATELET # BLD AUTO: 549 K/UL
PO2 BLDV: 57 MMHG — HIGH (ref 25–45)
POTASSIUM BLDV-SCNC: 4.1 MMOL/L — SIGNIFICANT CHANGE UP (ref 3.5–5.1)
POTASSIUM SERPL-MCNC: 4 MMOL/L — SIGNIFICANT CHANGE UP (ref 3.5–5.3)
POTASSIUM SERPL-SCNC: 4 MMOL/L — SIGNIFICANT CHANGE UP (ref 3.5–5.3)
POTASSIUM SERPL-SCNC: 4.1 MMOL/L
PROT SERPL-MCNC: 7.3 G/DL — SIGNIFICANT CHANGE UP (ref 6–8.3)
PROT SERPL-MCNC: 7.6 G/DL
RBC # BLD: 4.03 M/UL — SIGNIFICANT CHANGE UP (ref 3.8–5.2)
RBC # BLD: 4.33 M/UL
RBC # FLD: 11.9 % — SIGNIFICANT CHANGE UP (ref 10.3–14.5)
RBC # FLD: 12.1 %
RSV RNA NPH QL NAA+NON-PROBE: SIGNIFICANT CHANGE UP
SAO2 % BLDV: 89 % — HIGH (ref 67–88)
SARS-COV-2 RNA SPEC QL NAA+PROBE: SIGNIFICANT CHANGE UP
SODIUM SERPL-SCNC: 135 MMOL/L — SIGNIFICANT CHANGE UP (ref 135–145)
SODIUM SERPL-SCNC: 138 MMOL/L
WBC # BLD: 10.72 K/UL — HIGH (ref 3.8–10.5)
WBC # FLD AUTO: 10.72 K/UL — HIGH (ref 3.8–10.5)
WBC # FLD AUTO: 9.37 K/UL

## 2022-12-04 PROCEDURE — 99223 1ST HOSP IP/OBS HIGH 75: CPT | Mod: GC

## 2022-12-04 PROCEDURE — 99285 EMERGENCY DEPT VISIT HI MDM: CPT | Mod: CS,25

## 2022-12-04 PROCEDURE — 71046 X-RAY EXAM CHEST 2 VIEWS: CPT | Mod: 26

## 2022-12-04 PROCEDURE — 73723 MRI JOINT LWR EXTR W/O&W/DYE: CPT | Mod: 26,LT,MA

## 2022-12-04 RX ORDER — MEROPENEM 1 G/30ML
1000 INJECTION INTRAVENOUS ONCE
Refills: 0 | Status: COMPLETED | OUTPATIENT
Start: 2022-12-04 | End: 2022-12-04

## 2022-12-04 RX ORDER — VANCOMYCIN HCL 1 G
1250 VIAL (EA) INTRAVENOUS ONCE
Refills: 0 | Status: COMPLETED | OUTPATIENT
Start: 2022-12-04 | End: 2022-12-04

## 2022-12-04 RX ORDER — GABAPENTIN 400 MG/1
800 CAPSULE ORAL EVERY 24 HOURS
Refills: 0 | Status: DISCONTINUED | OUTPATIENT
Start: 2022-12-05 | End: 2022-12-09

## 2022-12-04 RX ORDER — ACETAMINOPHEN 500 MG
650 TABLET ORAL EVERY 6 HOURS
Refills: 0 | Status: DISCONTINUED | OUTPATIENT
Start: 2022-12-04 | End: 2022-12-05

## 2022-12-04 RX ORDER — HYDROMORPHONE HYDROCHLORIDE 2 MG/ML
0.5 INJECTION INTRAMUSCULAR; INTRAVENOUS; SUBCUTANEOUS ONCE
Refills: 0 | Status: DISCONTINUED | OUTPATIENT
Start: 2022-12-04 | End: 2022-12-04

## 2022-12-04 RX ORDER — GABAPENTIN 400 MG/1
100 CAPSULE ORAL EVERY 24 HOURS
Refills: 0 | Status: DISCONTINUED | OUTPATIENT
Start: 2022-12-05 | End: 2022-12-09

## 2022-12-04 RX ORDER — NEBIVOLOL HYDROCHLORIDE 5 MG/1
10 TABLET ORAL ONCE
Refills: 0 | Status: COMPLETED | OUTPATIENT
Start: 2022-12-04 | End: 2022-12-05

## 2022-12-04 RX ORDER — OXYCODONE AND ACETAMINOPHEN 5; 325 MG/1; MG/1
1 TABLET ORAL
Qty: 0 | Refills: 0 | DISCHARGE

## 2022-12-04 RX ADMIN — HYDROMORPHONE HYDROCHLORIDE 0.5 MILLIGRAM(S): 2 INJECTION INTRAMUSCULAR; INTRAVENOUS; SUBCUTANEOUS at 20:42

## 2022-12-04 RX ADMIN — HYDROMORPHONE HYDROCHLORIDE 0.5 MILLIGRAM(S): 2 INJECTION INTRAMUSCULAR; INTRAVENOUS; SUBCUTANEOUS at 22:03

## 2022-12-04 RX ADMIN — MEROPENEM 100 MILLIGRAM(S): 1 INJECTION INTRAVENOUS at 22:46

## 2022-12-04 NOTE — ED PROVIDER NOTE - OBJECTIVE STATEMENT
PMHx of morbid obesity, HTN, L foot AVM s/p multiple DSE, recently hospitalized (10/10/22-10/17/22) for LLL ulcer debridement, followed by re-admission 10/31-11/10 for worsening left foot pain and erythema surrounding non-healing ulcer.    Summary from prior admission-  "Pt with Left Lateral foot ulcer with purulence and fever at home Tmax 101. Recently hospitalized (10/10/22-10/17/22) for LLL angio/ulcer debridement and L lateral ankle AVM glue embolization (10/11/22) with Dr. Teran. s/p vanc/cefepime per ID (stopped 10/17/22), deep surgical wound cultures grew pseudomonas aeruginosa, staph haemolyticus, and campylobacter striatum and jeikeium. Foot Xray: preliminary read no signs of OM or NF, elevated ESR/CRP. 11/1 WCx: Staph Epidermidis and Staph Haemolyticus. She was started on a course of Meropenem 1g Q8 and Vancomycin 1250mg q8hrs, and while on this regimen she started to show improvement in her ulcer. PICC was placed for long term antibiotic treatment. ID recommends continuing the course of antibiotics until 11/20. Bcx NGTD. For pain management: Tylenol prn for mild pain, percocet  po q4h for severe pain, and Dilaudid .5mg IV Q4Hrs for breakthrough pain. Pt has outpatient pain management doctor that she has been in contact with throughout hospitalization for outpatient pain control. We changed her dressing three times a day to ensure optimal wound care.  - C/w course of Meropenem 1g Q8 and Vanc 1250mg Q8hrs through 11/20  - Follow-up with Dr. Rahman  w/ weekly CBC, CMP, ESR/CRP."    Today p/w worsening foot pain, starting mild s/p last visit w/ Dr Rahman on 12/2. Pt reports at that visit the ulcer was healing well, but the pt had rising WBC and inflamm markers. She states pain worsened in the past 1-2 days, w/ fever 101 at home yesterday evening and this morning. She report pain not controlled w/ Percocet. She denies other infectious sx: no URI / GI /  sx. She state the ulcer still looks well healed, but the swelling is worse. She called Dr Rahman and was told to come to the ED. Of note, BP is high. She reports compliance w/ Bystolic 10 mg BID. She thinks the BP is uncontrolled 2/2 pain. She reports Dilaudid has controlled her pain on prior admissions. She denies sx of HTN

## 2022-12-04 NOTE — ED PROVIDER NOTE - WR INTERPRETATION 1
CXR negative - No pneumothorax, No opacities, No free airCXR negative - No infiltrates, No consolidation, No atelectasis seenCXR negative - No CHF, No cardiomegaly, No pleural effusions. stable exam compared to 2018

## 2022-12-04 NOTE — ED PROVIDER NOTE - CLINICAL SUMMARY MEDICAL DECISION MAKING FREE TEXT BOX
Pt p/w worsening foot pain, swelling, and reported fever, in the setting of known AVM, non healing ulcer, on long-term abx (hugh / vanco) via PICC, followed by ID Dr Rahman. Denies other infectious sx. Afebrile on arrival, although mildly tachycardic, will get rectal temp. Check labs, cultures, inflamm markers, d/w Dr Rahman for further recommendations.  Asx HTN, likely multifactorial: inappropriate size cuff for pt's body habitus, pain. Will give pain control, if still elevated antihypertensives.  Dispo pending w/u and clinical status

## 2022-12-04 NOTE — ED PROVIDER NOTE - PROGRESS NOTE DETAILS
D/w Dr Rahman - admit to medicine, continue Vanc / Annie. NO antipyretics D/w Dr Rahman - requesting MRI, labs w/ ESR / CRP. Will dispo, pending results No MRI results yet available. Pending on Vrad cue for some time. I have attempted to contact Vrad via chat and telephone w/o success. D/w Dr Rahman - admit to medicine, continue Vanc / Annie. NO antipyretics

## 2022-12-04 NOTE — ED PROVIDER NOTE - NS ED ROS FT
Constitutional: See HPI  Eyes: No pain, blurry vision, or discharge.  ENMT: No hearing changes, pain, discharge or infections. No neck pain or stiffness.  Cardiac: No chest pain, SOB or edema. No chest pain with exertion.  Respiratory: No cough or respiratory distress. No hemoptysis. No history of asthma or RAD.  GI: No nausea, vomiting, diarrhea or abdominal pain.  : No dysuria, frequency or burning.  MS: No myalgia, muscle weakness, joint pain or back pain.  Neuro: No headache or weakness. No LOC.  Skin: See HPI   Endocrine: No history of thyroid disease or diabetes.  Except as documented in the HPI, all other systems are negative.

## 2022-12-04 NOTE — ED PROVIDER NOTE - PHYSICAL EXAMINATION
Constitutional: Well appearing, awake, alert, oriented to person, place, time/situation and in no apparent distress. non toxic appearing  ENMT: Airway patent. Normal MM  Eyes: Clear bilaterally  Cardiac: Normal rate, regular rhythm.  Heart sounds S1, S2.  No murmurs, rubs or gallops.  Respiratory: Breaths sounds equal and clear b/l. No increased WOB, tachypnea, hypoxia, or accessory mm use. Pt speaks in full sentences.   Gastrointestinal: Abd soft, NT, ND, NABS. No guarding, rebound, or rigidity.   Musculoskeletal: Range of motion is not limited  Neuro: Alert and oriented x 3, face symmetric and speech fluent. Strength 5/5 x 4 ext and symmetric, nml gross motor movement, nml gait. No focal deficits noted.  Skin: Skin normal color for race, warm, dry. L lateral foot swollen, inferior to the malleolus, w/ minimal pinkish hue, small ulceration to the superior aspect of the swelling, appears to be healing. no warmth / induration. + ttp. no crepitus. no purulence. no malodor.  Psych: Alert and oriented to person, place, time/situation. normal mood and affect. no apparent risk to self or others.

## 2022-12-04 NOTE — ED ADULT NURSE NOTE - OTHER COMPLAINTS
ulcer to left foot on meropenem and vanc for infection through right picc. fever 101 today, tylenol at 1130. hx of htn, denies sob, lightheadedness, dizziness, cp. reports pain increases bp.

## 2022-12-04 NOTE — ED ADULT NURSE NOTE - PAIN RATING/NUMBER SCALE (0-10): ACTIVITY
4 Pt presents today for pre-biopsy rectal swab. Prostate biopsy instructions and information reviewed with pt, and printed handout provided to pt with callback number for questions. Rectal swab obtained without difficulty, pt tolerated well. Prostate bx appt confirmed for 5/12/20 at 0930.

## 2022-12-04 NOTE — ED ADULT NURSE NOTE - OBJECTIVE STATEMENT
Pt. a&ox4 ambulatory with steady gait hx hx of DM2, chronic left outer diabetic foot ulcer requiring PICC long term 6 week course of Meropenem and wound care @ home, comes to ED c/o f/c (highest recorded @ home 101.6F) that started last night into this am. Pt. reports she had a f/u appt with her doctor regarding the ulcer, and instructed pt. to come to ED over the weekend if she felt she was developing any s/s of infection. pt. reported increase in ulcer pain starting friday night, and then became +f/c yesterday. Pt. denies radiation of pain up the LLE, n/t, n/v/d, SOB, cp, HA, dizziness. Pt. ulcer appears slightly reddened, swollen, clean and dry ; no odor or purulence / drainage on assessment. Pt. has PICC to [facility], flushes without difficulty on arrival. Pt. pedal pulse on affected extremity strong and cap refill brisk <2s.

## 2022-12-05 DIAGNOSIS — I10 ESSENTIAL (PRIMARY) HYPERTENSION: ICD-10-CM

## 2022-12-05 DIAGNOSIS — R52 PAIN, UNSPECIFIED: ICD-10-CM

## 2022-12-05 DIAGNOSIS — R63.8 OTHER SYMPTOMS AND SIGNS CONCERNING FOOD AND FLUID INTAKE: ICD-10-CM

## 2022-12-05 DIAGNOSIS — E66.01 MORBID (SEVERE) OBESITY DUE TO EXCESS CALORIES: ICD-10-CM

## 2022-12-05 DIAGNOSIS — L97.329 NON-PRESSURE CHRONIC ULCER OF LEFT ANKLE WITH UNSPECIFIED SEVERITY: ICD-10-CM

## 2022-12-05 LAB
ALBUMIN SERPL ELPH-MCNC: 3.8 G/DL — SIGNIFICANT CHANGE UP (ref 3.3–5)
ALP SERPL-CCNC: 78 U/L — SIGNIFICANT CHANGE UP (ref 40–120)
ALT FLD-CCNC: 10 U/L — SIGNIFICANT CHANGE UP (ref 10–45)
ANION GAP SERPL CALC-SCNC: 11 MMOL/L — SIGNIFICANT CHANGE UP (ref 5–17)
APPEARANCE UR: CLEAR — SIGNIFICANT CHANGE UP
AST SERPL-CCNC: 15 U/L — SIGNIFICANT CHANGE UP (ref 10–40)
BACTERIA # UR AUTO: PRESENT /HPF
BASOPHILS # BLD AUTO: 0.07 K/UL — SIGNIFICANT CHANGE UP (ref 0–0.2)
BASOPHILS NFR BLD AUTO: 0.9 % — SIGNIFICANT CHANGE UP (ref 0–2)
BILIRUB SERPL-MCNC: 0.7 MG/DL — SIGNIFICANT CHANGE UP (ref 0.2–1.2)
BILIRUB UR-MCNC: NEGATIVE — SIGNIFICANT CHANGE UP
BUN SERPL-MCNC: 11 MG/DL — SIGNIFICANT CHANGE UP (ref 7–23)
CALCIUM SERPL-MCNC: 9 MG/DL — SIGNIFICANT CHANGE UP (ref 8.4–10.5)
CHLORIDE SERPL-SCNC: 103 MMOL/L — SIGNIFICANT CHANGE UP (ref 96–108)
CO2 SERPL-SCNC: 25 MMOL/L — SIGNIFICANT CHANGE UP (ref 22–31)
COLOR SPEC: YELLOW — SIGNIFICANT CHANGE UP
CREAT SERPL-MCNC: 0.8 MG/DL — SIGNIFICANT CHANGE UP (ref 0.5–1.3)
CRP SERPL-MCNC: 31.7 MG/L — HIGH (ref 0–4)
DIFF PNL FLD: ABNORMAL
EGFR: 96 ML/MIN/1.73M2 — SIGNIFICANT CHANGE UP
EOSINOPHIL # BLD AUTO: 0.07 K/UL — SIGNIFICANT CHANGE UP (ref 0–0.5)
EOSINOPHIL NFR BLD AUTO: 0.9 % — SIGNIFICANT CHANGE UP (ref 0–6)
EPI CELLS # UR: ABNORMAL /HPF (ref 0–5)
ERYTHROCYTE [SEDIMENTATION RATE] IN BLOOD: 28 MM/HR — HIGH
GLUCOSE SERPL-MCNC: 98 MG/DL — SIGNIFICANT CHANGE UP (ref 70–99)
GLUCOSE UR QL: NEGATIVE — SIGNIFICANT CHANGE UP
HCT VFR BLD CALC: 36.4 % — SIGNIFICANT CHANGE UP (ref 34.5–45)
HGB BLD-MCNC: 12 G/DL — SIGNIFICANT CHANGE UP (ref 11.5–15.5)
IMM GRANULOCYTES NFR BLD AUTO: 0.3 % — SIGNIFICANT CHANGE UP (ref 0–0.9)
KETONES UR-MCNC: ABNORMAL MG/DL
LEUKOCYTE ESTERASE UR-ACNC: NEGATIVE — SIGNIFICANT CHANGE UP
LYMPHOCYTES # BLD AUTO: 1.88 K/UL — SIGNIFICANT CHANGE UP (ref 1–3.3)
LYMPHOCYTES # BLD AUTO: 23.8 % — SIGNIFICANT CHANGE UP (ref 13–44)
MAGNESIUM SERPL-MCNC: 2 MG/DL — SIGNIFICANT CHANGE UP (ref 1.6–2.6)
MCHC RBC-ENTMCNC: 28.6 PG — SIGNIFICANT CHANGE UP (ref 27–34)
MCHC RBC-ENTMCNC: 33 GM/DL — SIGNIFICANT CHANGE UP (ref 32–36)
MCV RBC AUTO: 86.9 FL — SIGNIFICANT CHANGE UP (ref 80–100)
MONOCYTES # BLD AUTO: 0.79 K/UL — SIGNIFICANT CHANGE UP (ref 0–0.9)
MONOCYTES NFR BLD AUTO: 10 % — SIGNIFICANT CHANGE UP (ref 2–14)
NEUTROPHILS # BLD AUTO: 5.06 K/UL — SIGNIFICANT CHANGE UP (ref 1.8–7.4)
NEUTROPHILS NFR BLD AUTO: 64.1 % — SIGNIFICANT CHANGE UP (ref 43–77)
NITRITE UR-MCNC: NEGATIVE — SIGNIFICANT CHANGE UP
NRBC # BLD: 0 /100 WBCS — SIGNIFICANT CHANGE UP (ref 0–0)
PH UR: 6.5 — SIGNIFICANT CHANGE UP (ref 5–8)
PHOSPHATE SERPL-MCNC: 2.9 MG/DL — SIGNIFICANT CHANGE UP (ref 2.5–4.5)
PLATELET # BLD AUTO: 512 K/UL — HIGH (ref 150–400)
POTASSIUM SERPL-MCNC: 3.7 MMOL/L — SIGNIFICANT CHANGE UP (ref 3.5–5.3)
POTASSIUM SERPL-SCNC: 3.7 MMOL/L — SIGNIFICANT CHANGE UP (ref 3.5–5.3)
PROT SERPL-MCNC: 7.1 G/DL — SIGNIFICANT CHANGE UP (ref 6–8.3)
PROT UR-MCNC: NEGATIVE MG/DL — SIGNIFICANT CHANGE UP
RBC # BLD: 4.19 M/UL — SIGNIFICANT CHANGE UP (ref 3.8–5.2)
RBC # FLD: 12.2 % — SIGNIFICANT CHANGE UP (ref 10.3–14.5)
RBC CASTS # UR COMP ASSIST: > 10 /HPF
SODIUM SERPL-SCNC: 139 MMOL/L — SIGNIFICANT CHANGE UP (ref 135–145)
SP GR SPEC: 1.02 — SIGNIFICANT CHANGE UP (ref 1–1.03)
UROBILINOGEN FLD QL: 0.2 E.U./DL — SIGNIFICANT CHANGE UP
VANCOMYCIN TROUGH SERPL-MCNC: 13.5 UG/ML — SIGNIFICANT CHANGE UP (ref 10–20)
WBC # BLD: 7.89 K/UL — SIGNIFICANT CHANGE UP (ref 3.8–10.5)
WBC # FLD AUTO: 7.89 K/UL — SIGNIFICANT CHANGE UP (ref 3.8–10.5)
WBC UR QL: ABNORMAL /HPF

## 2022-12-05 PROCEDURE — 99222 1ST HOSP IP/OBS MODERATE 55: CPT

## 2022-12-05 PROCEDURE — 12345: CPT | Mod: NC,GC

## 2022-12-05 RX ORDER — HYDROMORPHONE HYDROCHLORIDE 2 MG/ML
0.5 INJECTION INTRAMUSCULAR; INTRAVENOUS; SUBCUTANEOUS ONCE
Refills: 0 | Status: DISCONTINUED | OUTPATIENT
Start: 2022-12-05 | End: 2022-12-05

## 2022-12-05 RX ORDER — HYDROMORPHONE HYDROCHLORIDE 2 MG/ML
0.5 INJECTION INTRAMUSCULAR; INTRAVENOUS; SUBCUTANEOUS ONCE
Refills: 0 | Status: COMPLETED | OUTPATIENT
Start: 2022-12-05 | End: 2022-12-05

## 2022-12-05 RX ORDER — OXYCODONE HYDROCHLORIDE 5 MG/1
15 TABLET ORAL EVERY 4 HOURS
Refills: 0 | Status: DISCONTINUED | OUTPATIENT
Start: 2022-12-05 | End: 2022-12-05

## 2022-12-05 RX ORDER — ENOXAPARIN SODIUM 100 MG/ML
40 INJECTION SUBCUTANEOUS EVERY 24 HOURS
Refills: 0 | Status: DISCONTINUED | OUTPATIENT
Start: 2022-12-05 | End: 2022-12-09

## 2022-12-05 RX ORDER — INFLUENZA VIRUS VACCINE 15; 15; 15; 15 UG/.5ML; UG/.5ML; UG/.5ML; UG/.5ML
0.5 SUSPENSION INTRAMUSCULAR ONCE
Refills: 0 | Status: COMPLETED | OUTPATIENT
Start: 2022-12-05 | End: 2022-12-09

## 2022-12-05 RX ORDER — OXYCODONE HYDROCHLORIDE 5 MG/1
5 TABLET ORAL EVERY 6 HOURS
Refills: 0 | Status: DISCONTINUED | OUTPATIENT
Start: 2022-12-05 | End: 2022-12-05

## 2022-12-05 RX ORDER — NEBIVOLOL HYDROCHLORIDE 5 MG/1
10 TABLET ORAL
Refills: 0 | Status: DISCONTINUED | OUTPATIENT
Start: 2022-12-05 | End: 2022-12-09

## 2022-12-05 RX ORDER — HYDROMORPHONE HYDROCHLORIDE 2 MG/ML
0.5 INJECTION INTRAMUSCULAR; INTRAVENOUS; SUBCUTANEOUS EVERY 4 HOURS
Refills: 0 | Status: DISCONTINUED | OUTPATIENT
Start: 2022-12-05 | End: 2022-12-06

## 2022-12-05 RX ORDER — VANCOMYCIN HCL 1 G
1250 VIAL (EA) INTRAVENOUS EVERY 8 HOURS
Refills: 0 | Status: COMPLETED | OUTPATIENT
Start: 2022-12-05 | End: 2022-12-05

## 2022-12-05 RX ORDER — OXYCODONE HYDROCHLORIDE 5 MG/1
10 TABLET ORAL EVERY 4 HOURS
Refills: 0 | Status: DISCONTINUED | OUTPATIENT
Start: 2022-12-05 | End: 2022-12-09

## 2022-12-05 RX ORDER — MEROPENEM 1 G/30ML
1000 INJECTION INTRAVENOUS EVERY 8 HOURS
Refills: 0 | Status: DISCONTINUED | OUTPATIENT
Start: 2022-12-05 | End: 2022-12-09

## 2022-12-05 RX ORDER — ACETAMINOPHEN 500 MG
325 TABLET ORAL EVERY 4 HOURS
Refills: 0 | Status: DISCONTINUED | OUTPATIENT
Start: 2022-12-05 | End: 2022-12-09

## 2022-12-05 RX ADMIN — NEBIVOLOL HYDROCHLORIDE 10 MILLIGRAM(S): 5 TABLET ORAL at 18:03

## 2022-12-05 RX ADMIN — OXYCODONE HYDROCHLORIDE 5 MILLIGRAM(S): 5 TABLET ORAL at 00:59

## 2022-12-05 RX ADMIN — OXYCODONE HYDROCHLORIDE 15 MILLIGRAM(S): 5 TABLET ORAL at 05:42

## 2022-12-05 RX ADMIN — HYDROMORPHONE HYDROCHLORIDE 0.5 MILLIGRAM(S): 2 INJECTION INTRAMUSCULAR; INTRAVENOUS; SUBCUTANEOUS at 06:19

## 2022-12-05 RX ADMIN — Medication 325 MILLIGRAM(S): at 21:55

## 2022-12-05 RX ADMIN — Medication 325 MILLIGRAM(S): at 20:54

## 2022-12-05 RX ADMIN — NEBIVOLOL HYDROCHLORIDE 10 MILLIGRAM(S): 5 TABLET ORAL at 05:52

## 2022-12-05 RX ADMIN — HYDROMORPHONE HYDROCHLORIDE 0.5 MILLIGRAM(S): 2 INJECTION INTRAMUSCULAR; INTRAVENOUS; SUBCUTANEOUS at 15:00

## 2022-12-05 RX ADMIN — HYDROMORPHONE HYDROCHLORIDE 0.5 MILLIGRAM(S): 2 INJECTION INTRAMUSCULAR; INTRAVENOUS; SUBCUTANEOUS at 06:05

## 2022-12-05 RX ADMIN — HYDROMORPHONE HYDROCHLORIDE 0.5 MILLIGRAM(S): 2 INJECTION INTRAMUSCULAR; INTRAVENOUS; SUBCUTANEOUS at 23:15

## 2022-12-05 RX ADMIN — HYDROMORPHONE HYDROCHLORIDE 0.5 MILLIGRAM(S): 2 INJECTION INTRAMUSCULAR; INTRAVENOUS; SUBCUTANEOUS at 01:50

## 2022-12-05 RX ADMIN — HYDROMORPHONE HYDROCHLORIDE 0.5 MILLIGRAM(S): 2 INJECTION INTRAMUSCULAR; INTRAVENOUS; SUBCUTANEOUS at 19:10

## 2022-12-05 RX ADMIN — HYDROMORPHONE HYDROCHLORIDE 0.5 MILLIGRAM(S): 2 INJECTION INTRAMUSCULAR; INTRAVENOUS; SUBCUTANEOUS at 18:47

## 2022-12-05 RX ADMIN — OXYCODONE HYDROCHLORIDE 10 MILLIGRAM(S): 5 TABLET ORAL at 21:53

## 2022-12-05 RX ADMIN — HYDROMORPHONE HYDROCHLORIDE 0.5 MILLIGRAM(S): 2 INJECTION INTRAMUSCULAR; INTRAVENOUS; SUBCUTANEOUS at 22:57

## 2022-12-05 RX ADMIN — Medication 166.67 MILLIGRAM(S): at 15:03

## 2022-12-05 RX ADMIN — HYDROMORPHONE HYDROCHLORIDE 0.5 MILLIGRAM(S): 2 INJECTION INTRAMUSCULAR; INTRAVENOUS; SUBCUTANEOUS at 14:41

## 2022-12-05 RX ADMIN — HYDROMORPHONE HYDROCHLORIDE 0.5 MILLIGRAM(S): 2 INJECTION INTRAMUSCULAR; INTRAVENOUS; SUBCUTANEOUS at 11:00

## 2022-12-05 RX ADMIN — MEROPENEM 100 MILLIGRAM(S): 1 INJECTION INTRAVENOUS at 13:15

## 2022-12-05 RX ADMIN — OXYCODONE HYDROCHLORIDE 5 MILLIGRAM(S): 5 TABLET ORAL at 01:29

## 2022-12-05 RX ADMIN — OXYCODONE HYDROCHLORIDE 10 MILLIGRAM(S): 5 TABLET ORAL at 20:53

## 2022-12-05 RX ADMIN — OXYCODONE HYDROCHLORIDE 10 MILLIGRAM(S): 5 TABLET ORAL at 17:00

## 2022-12-05 RX ADMIN — MEROPENEM 100 MILLIGRAM(S): 1 INJECTION INTRAVENOUS at 05:45

## 2022-12-05 RX ADMIN — HYDROMORPHONE HYDROCHLORIDE 0.5 MILLIGRAM(S): 2 INJECTION INTRAMUSCULAR; INTRAVENOUS; SUBCUTANEOUS at 10:14

## 2022-12-05 RX ADMIN — GABAPENTIN 100 MILLIGRAM(S): 400 CAPSULE ORAL at 09:17

## 2022-12-05 RX ADMIN — MEROPENEM 100 MILLIGRAM(S): 1 INJECTION INTRAVENOUS at 22:07

## 2022-12-05 RX ADMIN — NEBIVOLOL HYDROCHLORIDE 10 MILLIGRAM(S): 5 TABLET ORAL at 00:01

## 2022-12-05 RX ADMIN — HYDROMORPHONE HYDROCHLORIDE 0.5 MILLIGRAM(S): 2 INJECTION INTRAMUSCULAR; INTRAVENOUS; SUBCUTANEOUS at 01:38

## 2022-12-05 RX ADMIN — Medication 166.67 MILLIGRAM(S): at 00:01

## 2022-12-05 RX ADMIN — Medication 166.67 MILLIGRAM(S): at 05:52

## 2022-12-05 RX ADMIN — OXYCODONE HYDROCHLORIDE 10 MILLIGRAM(S): 5 TABLET ORAL at 16:45

## 2022-12-05 RX ADMIN — OXYCODONE HYDROCHLORIDE 15 MILLIGRAM(S): 5 TABLET ORAL at 04:26

## 2022-12-05 RX ADMIN — ENOXAPARIN SODIUM 40 MILLIGRAM(S): 100 INJECTION SUBCUTANEOUS at 19:27

## 2022-12-05 NOTE — H&P ADULT - HISTORY OF PRESENT ILLNESS
Ms. Dale is a 36yo woman w/ HTN, Morbid obesity (BMI 49), hx chronic L foot ulcer s/p transcatheter embolization of AVM 10/11, previously on IV Vanc+Cefepime 10/11-16 Cx growing PsA, Staph, Corynebacter, recently admitted 10/23-11/10 for increased L foot pain discharged on home infusion IV abx, who presents for recurrence of symptoms. During patient's prior admission 10/23-11/10, patient was febrile however MRI was negative for osteomyelitis. Due to patient's persistent pain, erythema around ulcer - not improving with IV Vanc + Cefepime, she wa switched to IV Vanc + Meropenem with slow improvement. She was since discharged home with a midline with plan to complete home infusion x14d total course thru 11/20/22. Patient now reports pain in LLE has been slowly worsening over past 1 week, however ulcer on L ankle has been slowly decreasing in size. She reports subjective fevers at home, but otherwise denies chills, headache, nausea, vomiting, cough, chest pain, sob, palpitations, abdominal pain, genitourinary sx, extremity swelling.

## 2022-12-05 NOTE — H&P ADULT - PROBLEM SELECTOR PLAN 4
- c/w home PRN oxycodone 15mg IR Q4hrs for severe pain - c/w home PRN oxycodone 15mg IR Q4hrs for severe pain  - c/w tony 800 daily, zanaflex 4mg daily

## 2022-12-05 NOTE — CONSULT NOTE ADULT - ASSESSMENT
38 yo F with HTN, MO and AVM L foot with recurrent ulcer/cellulitis on OPAT with vancomycin and meropenem.  She had had leukocytosis and worsening of inflammatory markers on outpatient labs on 11/28, despite ongoing clinical improvement in L foot cellulitis. She then developed fever, still with mild leukocytosis with PMN predominance that resolved when her PICC was not used for infusion.  Though Tm here has been 99.5, course is concerning for PICC infection.  She had had some increased warmth of L foot in area of AVM but MRI is unchanged and ulcer/cellulitis remain improved.  Suggest:  - Please remove PICC  - F/U blood cultures from 12/4 - can replace PICC if cultures remain negative for >48 h  - Continue vancomycin 1250 mg IV q8h  - Continue meropenem 1 g IV q8h  - Weekly labs while on antibiotics:  CBC, CMP, ESR, CRP, vancomycin trough.  Recommendations discussed with primary team.  Will follow with you - team 2.

## 2022-12-05 NOTE — PATIENT PROFILE ADULT - FALL HARM RISK - HARM RISK INTERVENTIONS
Assistance with ambulation/Assistance OOB with selected safe patient handling equipment/Communicate Risk of Fall with Harm to all staff/Discuss with provider need for PT consult/Monitor gait and stability/Reinforce activity limits and safety measures with patient and family/Tailored Fall Risk Interventions/Visual Cue: Yellow wristband and red socks/Bed in lowest position, wheels locked, appropriate side rails in place/Call bell, personal items and telephone in reach/Instruct patient to call for assistance before getting out of bed or chair/Non-slip footwear when patient is out of bed/[place] to call system/Physically safe environment - no spills, clutter or unnecessary equipment/Purposeful Proactive Rounding/Room/bathroom lighting operational, light cord in reach

## 2022-12-05 NOTE — H&P ADULT - NSHPPHYSICALEXAM_GEN_ALL_CORE
T(C): 37.5 (12-04-22 @ 23:26), Max: 37.5 (12-04-22 @ 23:26)  HR: 85 (12-04-22 @ 23:26) (85 - 107)  BP: 169/101 (12-04-22 @ 23:26) (149/103 - 194/94)  RR: 18 (12-04-22 @ 23:26) (16 - 18)  SpO2: 96% (12-04-22 @ 23:26) (96% - 100%)    General: NAD, laying in bed, speaking in full sentences  HEENT: head NC/AT, no conjunctival injection, EOMI, MMM  Neck: supple, no JVD  Cardio: RRR, +S1/S2, no M/R/G  Resp: lungs CTAB, no cough/wheezes/rales/rhonchi  Abdo: soft, NT, ND, +bowel sounds x4, no organomegaly or palpable mass    Extremities: WWP, no edema/cyanosis/clubbing   Vasc: 2+ radial and DP pulses b/l  Neuro: A&Ox3  Psych: speech non-pressured, thoughts goal-oriented  Skin: dry, intact, no visible jaundice   MSK: no joint swelling T(C): 37.5 (12-04-22 @ 23:26), Max: 37.5 (12-04-22 @ 23:26)  HR: 85 (12-04-22 @ 23:26) (85 - 107)  BP: 169/101 (12-04-22 @ 23:26) (149/103 - 194/94)  RR: 18 (12-04-22 @ 23:26) (16 - 18)  SpO2: 96% (12-04-22 @ 23:26) (96% - 100%)    General: NAD, laying in bed, speaking in full sentences, +obese  HEENT: head NC/AT, no conjunctival injection, EOMI, MMM  Neck: supple, no JVD  Cardio: RRR, +S1/S2, no M/R/G  Resp: lungs CTAB, no cough/wheezes/rales/rhonchi  Abdo: soft, NT, ND, +bowel sounds x4, no organomegaly or palpable mass    Extremities: WWP, +Left lateral ankle w/ 1cm x .5cm ulcer without pus or surrounding erythema  Vasc: 2+ radial and DP pulses b/l  Neuro: A&Ox3  Psych: speech non-pressured, thoughts goal-oriented  Skin: dry, intact, no visible jaundice   MSK: no joint swelling

## 2022-12-05 NOTE — H&P ADULT - NSHPLABSRESULTS_GEN_ALL_CORE
LABS:                        11.6   10.72 )-----------( 482      ( 04 Dec 2022 17:27 )             34.3     12-04    135  |  99  |  8   ----------------------------<  95  4.0   |  26  |  0.66    Ca    9.1      04 Dec 2022 17:27    TPro  7.3  /  Alb  3.9  /  TBili  0.9  /  DBili  x   /  AST  12  /  ALT  11  /  AlkPhos  71  12-04        CAPILLARY BLOOD GLUCOSE                        LIVER FUNCTIONS - ( 04 Dec 2022 17:27 )  Alb: 3.9 g/dL / Pro: 7.3 g/dL / ALK PHOS: 71 U/L / ALT: 11 U/L / AST: 12 U/L / GGT: x                     I & O Summary:      Microbiology:        RADIOLOGY, EKG AND ADDITIONAL TESTS: Reviewed.

## 2022-12-05 NOTE — H&P ADULT - ATTENDING COMMENTS
#LLE ulcer: currently on IV vanc/meropenem, p/w increased pain, subjective fevers. On exam, ulcer does not appear infected; w/o purulence/erythema, good ROM of ankle joint. Afebrile in ED, slight elevation of esr/crp, leukocytosis from previous admission. Repeat MRI completed, f/up results. Per ID recs c/w vanc/hugh for now, avoid antipyretics to monitor fever curve, f/up MRI, blood cx. ID recs

## 2022-12-05 NOTE — PROGRESS NOTE ADULT - SUBJECTIVE AND OBJECTIVE BOX
SUBJECTIVE / INTERVAL HPI: Patient seen and examined at bedside in [facility]. States she did not sleep well and has pain throughout the night. However, not feeling feverish. ROS otherwise negative.     VITAL SIGNS:  Vital Signs Last 24 Hrs  T(C): 36.9 (05 Dec 2022 16:03), Max: 37.5 (04 Dec 2022 23:26)  T(F): 98.5 (05 Dec 2022 16:03), Max: 99.5 (04 Dec 2022 23:26)  HR: 82 (05 Dec 2022 16:03) (82 - 107)  BP: 140/85 (05 Dec 2022 16:03) (140/85 - 194/94)  BP(mean): --  RR: 18 (05 Dec 2022 16:03) (16 - 18)  SpO2: 96% (05 Dec 2022 16:03) (96% - 100%)    Parameters below as of 05 Dec 2022 14:07  Patient On (Oxygen Delivery Method): room air      I&O's Summary    05 Dec 2022 07:01  -  05 Dec 2022 16:07  --------------------------------------------------------  IN: 570 mL / OUT: 750 mL / NET: -180 mL        PHYSICAL EXAM:    General: NAD, laying in bed, speaking in full sentences, +obese  HEENT: head NC/AT, no conjunctival injection, EOMI, MMM  Neck: supple, no JVD  Cardio: RRR, +S1/S2, no M/R/G  Resp: lungs CTAB, no cough/wheezes/rales/rhonchi  Abdo: soft, NT, ND, +bowel sounds x4, no organomegaly or palpable mass    Extremities: WWP, +Left lateral ankle w/ 1cm x .5cm ulcer without pus or surrounding erythema  Vasc: 2+ radial and DP pulses b/l  Neuro: A&Ox3  Psych: speech non-pressured, thoughts goal-oriented  Skin: dry, intact, no visible jaundice   MSK: no joint swelling    MEDICATIONS:  MEDICATIONS  (STANDING):  enoxaparin Injectable 40 milliGRAM(s) SubCutaneous every 24 hours  gabapentin 100 milliGRAM(s) Oral every 24 hours  gabapentin 800 milliGRAM(s) Oral every 24 hours  influenza   Vaccine 0.5 milliLiter(s) IntraMuscular once  meropenem  IVPB 1000 milliGRAM(s) IV Intermittent every 8 hours  nebivolol 10 milliGRAM(s) Oral daily    MEDICATIONS  (PRN):  HYDROmorphone  Injectable 0.5 milliGRAM(s) IV Push every 4 hours PRN Severe Pain (7 - 10)  oxycodone    5 mG/acetaminophen 325 mG 1 Tablet(s) Oral every 4 hours PRN Severe Pain (7 - 10)      ALLERGIES:  Allergies    amoxicillin (Angioedema)  ciprofloxacin (Rash)  hydrochlorothiazide (Hives)  lisinopril (Angioedema; Rash; Hives)  penicillin (Rash)    Intolerances        LABS:                        12.0   7.89  )-----------( 512      ( 05 Dec 2022 05:30 )             36.4     12-    139  |  103  |  11  ----------------------------<  98  3.7   |  25  |  0.80    Ca    9.0      05 Dec 2022 05:30  Phos  2.9     12-05  Mg     2.0     12-05    TPro  7.1  /  Alb  3.8  /  TBili  0.7  /  DBili  x   /  AST  15  /  ALT  10  /  AlkPhos  78  12-05      Urinalysis Basic - ( 05 Dec 2022 00:31 )    Color: Yellow / Appearance: Clear / S.020 / pH: x  Gluc: x / Ketone: Trace mg/dL  / Bili: Negative / Urobili: 0.2 E.U./dL   Blood: x / Protein: NEGATIVE mg/dL / Nitrite: NEGATIVE   Leuk Esterase: NEGATIVE / RBC: > 10 /HPF / WBC 5-10 /HPF   Sq Epi: x / Non Sq Epi: Many /HPF / Bacteria: Present /HPF      CAPILLARY BLOOD GLUCOSE          RADIOLOGY & ADDITIONAL TESTS: Reviewed.   SUBJECTIVE / INTERVAL HPI: Patient seen and examined at bedside in [facility]. States she did not sleep well in the ED overnight, pain is still present. However, not feeling feverish. ROS otherwise negative.     VITAL SIGNS:  Vital Signs Last 24 Hrs  T(C): 36.9 (05 Dec 2022 16:03), Max: 37.5 (04 Dec 2022 23:26)  T(F): 98.5 (05 Dec 2022 16:03), Max: 99.5 (04 Dec 2022 23:26)  HR: 82 (05 Dec 2022 16:03) (82 - 107)  BP: 140/85 (05 Dec 2022 16:03) (140/85 - 194/94)  BP(mean): --  RR: 18 (05 Dec 2022 16:03) (16 - 18)  SpO2: 96% (05 Dec 2022 16:03) (96% - 100%)    Parameters below as of 05 Dec 2022 14:07  Patient On (Oxygen Delivery Method): room air      I&O's Summary    05 Dec 2022 07:01  -  05 Dec 2022 16:07  --------------------------------------------------------  IN: 570 mL / OUT: 750 mL / NET: -180 mL        PHYSICAL EXAM:    General: obese female laying in bed, NAD  HEENT: head NC/AT, no conjunctival injection, EOMI, MMM  Neck: supple, no JVD  Cardio: RRR, +S1/S2, no M/R/G  Resp: lungs CTAB, no cough/wheezes/rales/rhonchi  Abdo: soft, NT, ND, +bowel sounds x4, no organomegaly or palpable mass    Extremities: WWP, +Left lateral ankle w/ 1cm x .5cm ulcer without pus or surrounding erythema  Vasc: 2+ radial and DP pulses b/l  Neuro: A&Ox3  Skin: dry, intact, no visible jaundice   MSK: no joint swelling    MEDICATIONS:  MEDICATIONS  (STANDING):  enoxaparin Injectable 40 milliGRAM(s) SubCutaneous every 24 hours  gabapentin 100 milliGRAM(s) Oral every 24 hours  gabapentin 800 milliGRAM(s) Oral every 24 hours  influenza   Vaccine 0.5 milliLiter(s) IntraMuscular once  meropenem  IVPB 1000 milliGRAM(s) IV Intermittent every 8 hours  nebivolol 10 milliGRAM(s) Oral daily    MEDICATIONS  (PRN):  HYDROmorphone  Injectable 0.5 milliGRAM(s) IV Push every 4 hours PRN Severe Pain (7 - 10)  oxycodone    5 mG/acetaminophen 325 mG 1 Tablet(s) Oral every 4 hours PRN Severe Pain (7 - 10)      ALLERGIES:  Allergies    amoxicillin (Angioedema)  ciprofloxacin (Rash)  hydrochlorothiazide (Hives)  lisinopril (Angioedema; Rash; Hives)  penicillin (Rash)    Intolerances        LABS:                        12.0   7.89  )-----------( 512      ( 05 Dec 2022 05:30 )             36.4     12-    139  |  103  |  11  ----------------------------<  98  3.7   |  25  |  0.80    Ca    9.0      05 Dec 2022 05:30  Phos  2.9     12-  Mg     2.0     12-    TPro  7.1  /  Alb  3.8  /  TBili  0.7  /  DBili  x   /  AST  15  /  ALT  10  /  AlkPhos  78  12-05      Urinalysis Basic - ( 05 Dec 2022 00:31 )    Color: Yellow / Appearance: Clear / S.020 / pH: x  Gluc: x / Ketone: Trace mg/dL  / Bili: Negative / Urobili: 0.2 E.U./dL   Blood: x / Protein: NEGATIVE mg/dL / Nitrite: NEGATIVE   Leuk Esterase: NEGATIVE / RBC: > 10 /HPF / WBC 5-10 /HPF   Sq Epi: x / Non Sq Epi: Many /HPF / Bacteria: Present /HPF      CAPILLARY BLOOD GLUCOSE          RADIOLOGY & ADDITIONAL TESTS: Reviewed.

## 2022-12-05 NOTE — H&P ADULT - ASSESSMENT
38yo woman w/ HTN, Morbid obesity (BMI 49), hx chronic L foot ulcer s/p transcatheter embolization of AVM 10/11, previously on IV Vanc+Cefepime 10/11-16 Cx growing PsA, StaphRosaer, recently admitted 10/23-11/10 for increased L foot pain discharged on home infusion IV abx, who presents for recurrence of symptoms.    #L ankle ulcer - As below  #HTN - c/w home HTN medications  #Morbid obesity - BMI 49. Affects all aspects of care  #Chronic pain - On DC will have oxycodone 15mg q4h prn for severe pain (up to 5x/daily) prescribed by pain mgmt physician.    - also on tony 800 daily, zanaflex 4mg daily  #ATFL, CFL partial thickness tears in L ankle - recommended for outpatient follow-up 38yo woman w/ HTN, Morbid obesity (BMI 49), hx chronic L foot ulcer s/p transcatheter embolization of AVM 10/11, previously on IV Vanc+Cefepime 10/11-16 Cx growing PsA, StaphRosaer, recently admitted 10/23-11/10 for increased L foot pain discharged on home infusion IV abx, who presents for recurrence of symptoms.    #L ankle ulcer - As below  #HTN - c/w home HTN medications  #Morbid obesity - BMI 49. Affects all aspects of care  #Chronic pain - On DC will have oxycodone 15mg q4h prn for severe pain (up to 5x/daily) prescribed by pain mgmt physician.    -   #ATFL, CFL partial thickness tears in L ankle - recommended for outpatient follow-up

## 2022-12-05 NOTE — H&P ADULT - PROBLEM SELECTOR PLAN 1
Pt noted in  ED for elevated ESR and CRP otherwise w/o significant clinical signs that show worsening LLE ulcer or localized infection. Other possibility includes infection 2/2 midline.  - for now will continue w/ vancomycin 1250mg Q8hrs and meropenem 1g Q8hrs per ID recs  - f/u BCx x2  - avoid antipyretics for now to trend fever curve Pt noted in  ED for elevated ESR and CRP otherwise w/o significant clinical signs that show worsening LLE ulcer or localized infection. Other possibility includes infection 2/2 midline.  - for now will continue w/ vancomycin 1250mg Q8hrs and meropenem 1g Q8hrs per ID recs  - f/u BCx x2  - avoid antipyretics for now to trend fever curve  - f/u repeat MRI to r/o osteomyelitis

## 2022-12-05 NOTE — PROGRESS NOTE ADULT - PROBLEM SELECTOR PLAN 1
Pt has hx of AVM L foot s/p multiple transcatheter and stick embolizations, chronic L foot ulcer, culture from OR debridement 10/11 with Pseudomonas treated with brief course of vanc and cefepime with recurrence of infection and readmission and d/c with picc line for outpatient IV Abx. s/p additional two week course abx with meropenem and vancomycin. Pt follows with Dr. Rahman outpatient who saw pt in office 12/2 at which time decision was made to continue abx for additional 2 weeks (end date 12/16) as inflammatory markers and WBC are up. 12/5 patient febrile to 101.6 at home and sent to ED. In ED noted to have elevated ESR and CRP otherwise w/o significant clinical signs that show worsening LLE ulcer or localized infection. PICC line not flushing and erythematous at insertion site, concerning for CLABSI.     Plan:  - MRI w/o osteomyelitis  - Remove PICC line and followup blood cultures in 48 hours. If negative can replace PICC line.  - f/u Bcx x2 - from peripheral site, unable to draw from PICC line  - Continue meropenem 1 g IV q 8 h and vancomycin 1250 mg q 8 h an additional 2 weeks through peripheral line, tentative end date 12/16  - CBC, CMP, ESR, CRP daily  - Vanc trough before 4th dose 12/5 PM  - Continue local wound dressings  - Trend fever and leukocytosis Pt has hx of AVM L foot s/p multiple transcatheter and stick embolizations, chronic L foot ulcer, culture from OR debridement 10/11 with Pseudomonas treated with brief course of vanc and cefepime with recurrence of infection and readmission and d/c with picc line for outpatient IV Abx. s/p additional two week course abx with meropenem and vancomycin. Pt follows with Dr. Rahman outpatient who saw pt in office 12/2 at which time decision was made to continue abx for additional 2 weeks (end date 12/16) as inflammatory markers and WBC are up. 12/5 patient febrile to 101.6 at home and sent to ED. In ED noted to have elevated ESR and CRP otherwise w/o significant clinical signs that show worsening LLE ulcer or localized infection. PICC line not flushing and erythematous at insertion site, concerning for CLABSI.     Plan:  - MRI w/o osteomyelitis  - Remove PICC line and followup blood cultures in 48 hours. If negative can replace PICC line.  - f/u Bcx x2 - from peripheral site, unable to draw from PICC line  - Continue meropenem 1 g IV q 8 h and vancomycin 1250 mg q 8 h an additional 2 weeks through peripheral line, tentative end date 12/16  - CBC, CMP, ESR, CRP daily  - Vanc trough before 4th dose 12/5 PM  - Continue local wound dressings  - Trend fever and leukocytosis  - Pain regimen: oxycodone 10/acetaminophen 325mg q4h for severe pain, 0.5mg IV dilaudid q4h for breakthrough pain

## 2022-12-05 NOTE — CONSULT NOTE ADULT - SUBJECTIVE AND OBJECTIVE BOX
HPI:  36 yo F with HTN, MO and AVM L foot with recurrent ulcer.  ID consult for fever  She has undergone multiple direct stick and transcatheter embolizations for ulcers related to AVM.  Initially, she had been treated with courses of clindamycin.  She had been admitted on 10/10, underwent transcatheter embolization and OR wound debridement 10/11.  Wound culture from OR grew Pseudomonas aeruginosa.  She was treated with a 5 d course of vancomycin and cefepime.  She was readmitted on 10/31 with fever and L lateral ankle erythema/swelling/pain.  She initially was treated with vanc and cefepime, worsened after vanc was d/osorio and did not improve until cefepime was changed to meropenem.  She was d/osorio on 11/10 with close ID follow-up, steady improvement.  Labs ([facility]) on  were concerning for WBC 10.9, plt 620, ESR 80 and CRP 23.  Vancomycin trough was therapeutic.  She was last seen by me on  – at that time, she was having no fever, chills, reported L foot ulcer/erythema 90% resolved.  Labs obtained – had WBC She called me on  with fever, T 101 on 12/3 and .  She was taking Tylenol.  No localizing symptoms or worsening of L foot.  I referred her to the ED for further evaluation.  On arrival, she was afebrile, , /134.  She reported that her L foot felt more swollen and warmer than it had been.  Labs notable for WBC 10.7 with 78% PMNs, plt 482, ESR 34, CRP 29.  She was admitted for further evaluation and management.  She was administered medications through peripheral line.  This morning, she continued afebrile.  WBC 7.9 with nl diff.  She reports mild chills/sweats this morning.    PAST MEDICAL & SURGICAL HISTORY:  HTN (hypertension)      AVM (arteriovenous malformation)  of left foot      Foot ulceration  left foot      S/P debridement  LLE area overlying AVM      Elective surgery  transcatheter therapy vascular embolization x5      Morbid obesity              MEDICATIONS  (STANDING):  enoxaparin Injectable 40 milliGRAM(s) SubCutaneous every 24 hours  gabapentin 100 milliGRAM(s) Oral every 24 hours  gabapentin 800 milliGRAM(s) Oral every 24 hours  HYDROmorphone  Injectable 0.5 milliGRAM(s) IV Push once  influenza   Vaccine 0.5 milliLiter(s) IntraMuscular once  meropenem  IVPB 1000 milliGRAM(s) IV Intermittent every 8 hours  nebivolol 10 milliGRAM(s) Oral <User Schedule>    MEDICATIONS  (PRN):  acetaminophen     Tablet .. 325 milliGRAM(s) Oral every 4 hours PRN Severe Pain (7 - 10)  HYDROmorphone  Injectable 0.5 milliGRAM(s) IV Push every 4 hours PRN Severe Pain (7 - 10)  oxyCODONE    IR 10 milliGRAM(s) Oral every 4 hours PRN Severe Pain (7 - 10)      Allergies    amoxicillin (Angioedema)  ciprofloxacin (Rash)  hydrochlorothiazide (Hives)  lisinopril (Angioedema; Rash; Hives)  penicillin (Rash)    Intolerances        SOCIAL HISTORY:  Born in [place], lives there now with her mother and boyfriend.  Has 2 dogs, no children.  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  No tobacco, alcohol or recreational drug use.    FAMILY HISTORY:  Family history of congenital malformation (Sibling)  AVMs: siblings    ROS:  No HA, photophobia, neck stiffness, rhinorrhea, sore throat, cough, CP, SOB, N, V, diarrhea, abd pain, dysuria, hematuria, frequency, muscle/joint symptoms, bruising/bleeding.      Vital Signs Last 24 Hrs  T(C): 36.9 (05 Dec 2022 16:03), Max: 37.5 (04 Dec 2022 23:26)  T(F): 98.5 (05 Dec 2022 16:03), Max: 99.5 (04 Dec 2022 23:26)  HR: 82 (05 Dec 2022 16:03) (82 - 90)  BP: 140/85 (05 Dec 2022 16:03) (140/85 - 173/99)  BP(mean): --  RR: 18 (05 Dec 2022 16:03) (16 - 18)  SpO2: 96% (05 Dec 2022 16:03) (96% - 99%)    Parameters below as of 05 Dec 2022 14:07  Patient On (Oxygen Delivery Method): room air        PE:  Relatively well appearing  HEENT:  NC, PERRL, sclerae anicteric, conjunctivae clear, EOMI.  Sinuses nontender, no nasal exudate.  No buccal or pharyngeal lesions, erythema or exudate  Neck:  Supple, no adenopathy  Lungs:  Clear to auscultation  Cor:  RRR, S1, S2, no murmur appreciated  Abd:  Symmetric, normoactive BS.  Soft, nontender, no masses, guarding or rebound.  Liver and spleen not enlarged  Extrem:  RUE PICC exit site with mild erythema, no drainage or warmth.  L foot with increased warmth/swelling of AVM, ~3 mm ulcer with mild surrounding erythema  Skin:  No rashes.    LABS:                        12.0   7.89  )-----------( 512      ( 05 Dec 2022 05:30 )             36.4     12-    139  |  103  |  11  ----------------------------<  98  3.7   |  25  |  0.80    Ca    9.0      05 Dec 2022 05:30  Phos  2.9     12-05  Mg     2.0     12-05    TPro  7.1  /  Alb  3.8  /  TBili  0.7  /  DBili  x   /  AST  15  /  ALT  10  /  AlkPhos  78  12-05    Urinalysis Basic - ( 05 Dec 2022 00:31 )    Color: Yellow / Appearance: Clear / S.020 / pH: x  Gluc: x / Ketone: Trace mg/dL  / Bili: Negative / Urobili: 0.2 E.U./dL   Blood: x / Protein: NEGATIVE mg/dL / Nitrite: NEGATIVE   Leuk Esterase: NEGATIVE / RBC: > 10 /HPF / WBC 5-10 /HPF   Sq Epi: x / Non Sq Epi: Many /HPF / Bacteria: Present /HPF        RADIOLOGY & ADDITIONAL STUDIES:    < from: MR Ankle w/wo IV Cont, Left (22 @ 20:26) >  Evaluation of the ankle demonstrates persistent partial-thickness tearing   of the ATFL (image 18, axial). The PTFL is intact. There is persistent   prior injury of the CFL which is thickened. The anterior extensor tendons   are intact. The peroneus brevis and longus tendons are intact. The PTT,   FHL and FDL tendons are intact. There is partial thickness tearing of   fibers of the deltoid ligament (image 17, coronal), overall unchanged   since 2022. The Achilles tendon, plantar fascia and sinus tarsi are   unremarkable. There is appropriate hindfoot alignment.    There is edematous infiltration throughout the musculature of the foot   with high density material, consistent with known extravasated radiopaque   contrast, in correlation with radiograph dated 10/31/2022. There is   phlegmonous infiltration along the lateral aspect of the foot spanning   about 4.4 cm, unchanged since 2022 with adjacent ulceration; no   drainable fluid collection. There is no marrow edema. There is no   cellular infiltration of the marrow. No fracture. No dislocation.    The tibiotalar, subtalar, calcaneocuboid, talonavicular and   tarsometatarsal joints are in appropriate alignment. The Lisfranc   ligament is intact.            IMPRESSION:    Persistent phlegmonous infiltration of the lateral plantar musculature   with chronically extravasated contrast material; no MR evidence of   osteomyelitis.    Persistent partial-thickness tearing of the ATFL, CFL and deltoid   ligament.    < end of copied text >

## 2022-12-06 LAB
ALBUMIN SERPL ELPH-MCNC: 3.5 G/DL — SIGNIFICANT CHANGE UP (ref 3.3–5)
ALP SERPL-CCNC: 67 U/L — SIGNIFICANT CHANGE UP (ref 40–120)
ALT FLD-CCNC: 8 U/L — LOW (ref 10–45)
ANION GAP SERPL CALC-SCNC: 11 MMOL/L — SIGNIFICANT CHANGE UP (ref 5–17)
AST SERPL-CCNC: 8 U/L — LOW (ref 10–40)
BASOPHILS # BLD AUTO: 0.04 K/UL — SIGNIFICANT CHANGE UP (ref 0–0.2)
BASOPHILS NFR BLD AUTO: 0.5 % — SIGNIFICANT CHANGE UP (ref 0–2)
BILIRUB SERPL-MCNC: 0.7 MG/DL — SIGNIFICANT CHANGE UP (ref 0.2–1.2)
BUN SERPL-MCNC: 13 MG/DL — SIGNIFICANT CHANGE UP (ref 7–23)
CALCIUM SERPL-MCNC: 8.9 MG/DL — SIGNIFICANT CHANGE UP (ref 8.4–10.5)
CHLORIDE SERPL-SCNC: 100 MMOL/L — SIGNIFICANT CHANGE UP (ref 96–108)
CO2 SERPL-SCNC: 24 MMOL/L — SIGNIFICANT CHANGE UP (ref 22–31)
CREAT SERPL-MCNC: 0.57 MG/DL — SIGNIFICANT CHANGE UP (ref 0.5–1.3)
CRP SERPL-MCNC: 34.4 MG/L — HIGH (ref 0–4)
EGFR: 118 ML/MIN/1.73M2 — SIGNIFICANT CHANGE UP
EOSINOPHIL # BLD AUTO: 0.17 K/UL — SIGNIFICANT CHANGE UP (ref 0–0.5)
EOSINOPHIL NFR BLD AUTO: 1.9 % — SIGNIFICANT CHANGE UP (ref 0–6)
GLUCOSE SERPL-MCNC: 100 MG/DL — HIGH (ref 70–99)
HCT VFR BLD CALC: 35.1 % — SIGNIFICANT CHANGE UP (ref 34.5–45)
HGB BLD-MCNC: 11.3 G/DL — LOW (ref 11.5–15.5)
IMM GRANULOCYTES NFR BLD AUTO: 0.5 % — SIGNIFICANT CHANGE UP (ref 0–0.9)
LYMPHOCYTES # BLD AUTO: 1.43 K/UL — SIGNIFICANT CHANGE UP (ref 1–3.3)
LYMPHOCYTES # BLD AUTO: 16.2 % — SIGNIFICANT CHANGE UP (ref 13–44)
MAGNESIUM SERPL-MCNC: 1.7 MG/DL — SIGNIFICANT CHANGE UP (ref 1.6–2.6)
MCHC RBC-ENTMCNC: 29 PG — SIGNIFICANT CHANGE UP (ref 27–34)
MCHC RBC-ENTMCNC: 32.2 GM/DL — SIGNIFICANT CHANGE UP (ref 32–36)
MCV RBC AUTO: 90.2 FL — SIGNIFICANT CHANGE UP (ref 80–100)
MONOCYTES # BLD AUTO: 0.75 K/UL — SIGNIFICANT CHANGE UP (ref 0–0.9)
MONOCYTES NFR BLD AUTO: 8.5 % — SIGNIFICANT CHANGE UP (ref 2–14)
NEUTROPHILS # BLD AUTO: 6.4 K/UL — SIGNIFICANT CHANGE UP (ref 1.8–7.4)
NEUTROPHILS NFR BLD AUTO: 72.4 % — SIGNIFICANT CHANGE UP (ref 43–77)
NRBC # BLD: 0 /100 WBCS — SIGNIFICANT CHANGE UP (ref 0–0)
PHOSPHATE SERPL-MCNC: 3.3 MG/DL — SIGNIFICANT CHANGE UP (ref 2.5–4.5)
PLATELET # BLD AUTO: 399 K/UL — SIGNIFICANT CHANGE UP (ref 150–400)
POTASSIUM SERPL-MCNC: 3.8 MMOL/L — SIGNIFICANT CHANGE UP (ref 3.5–5.3)
POTASSIUM SERPL-SCNC: 3.8 MMOL/L — SIGNIFICANT CHANGE UP (ref 3.5–5.3)
PROT SERPL-MCNC: 6.6 G/DL — SIGNIFICANT CHANGE UP (ref 6–8.3)
RBC # BLD: 3.89 M/UL — SIGNIFICANT CHANGE UP (ref 3.8–5.2)
RBC # FLD: 12.2 % — SIGNIFICANT CHANGE UP (ref 10.3–14.5)
SODIUM SERPL-SCNC: 135 MMOL/L — SIGNIFICANT CHANGE UP (ref 135–145)
VANCOMYCIN TROUGH SERPL-MCNC: 18.3 UG/ML — SIGNIFICANT CHANGE UP (ref 10–20)
WBC # BLD: 8.83 K/UL — SIGNIFICANT CHANGE UP (ref 3.8–10.5)
WBC # FLD AUTO: 8.83 K/UL — SIGNIFICANT CHANGE UP (ref 3.8–10.5)

## 2022-12-06 PROCEDURE — 99233 SBSQ HOSP IP/OBS HIGH 50: CPT

## 2022-12-06 PROCEDURE — 99233 SBSQ HOSP IP/OBS HIGH 50: CPT | Mod: GC

## 2022-12-06 RX ORDER — VANCOMYCIN HCL 1 G
1250 VIAL (EA) INTRAVENOUS EVERY 8 HOURS
Refills: 0 | Status: COMPLETED | OUTPATIENT
Start: 2022-12-06 | End: 2022-12-06

## 2022-12-06 RX ORDER — VANCOMYCIN HCL 1 G
1250 VIAL (EA) INTRAVENOUS EVERY 8 HOURS
Refills: 0 | Status: DISCONTINUED | OUTPATIENT
Start: 2022-12-06 | End: 2022-12-06

## 2022-12-06 RX ORDER — CHLORHEXIDINE GLUCONATE 213 G/1000ML
1 SOLUTION TOPICAL DAILY
Refills: 0 | Status: DISCONTINUED | OUTPATIENT
Start: 2022-12-06 | End: 2022-12-09

## 2022-12-06 RX ORDER — HYDROMORPHONE HYDROCHLORIDE 2 MG/ML
1 INJECTION INTRAMUSCULAR; INTRAVENOUS; SUBCUTANEOUS EVERY 4 HOURS
Refills: 0 | Status: DISCONTINUED | OUTPATIENT
Start: 2022-12-06 | End: 2022-12-09

## 2022-12-06 RX ADMIN — GABAPENTIN 100 MILLIGRAM(S): 400 CAPSULE ORAL at 08:01

## 2022-12-06 RX ADMIN — HYDROMORPHONE HYDROCHLORIDE 1 MILLIGRAM(S): 2 INJECTION INTRAMUSCULAR; INTRAVENOUS; SUBCUTANEOUS at 13:21

## 2022-12-06 RX ADMIN — Medication 166.67 MILLIGRAM(S): at 00:39

## 2022-12-06 RX ADMIN — Medication 325 MILLIGRAM(S): at 06:18

## 2022-12-06 RX ADMIN — MEROPENEM 100 MILLIGRAM(S): 1 INJECTION INTRAVENOUS at 22:30

## 2022-12-06 RX ADMIN — OXYCODONE HYDROCHLORIDE 10 MILLIGRAM(S): 5 TABLET ORAL at 16:00

## 2022-12-06 RX ADMIN — MEROPENEM 100 MILLIGRAM(S): 1 INJECTION INTRAVENOUS at 14:28

## 2022-12-06 RX ADMIN — Medication 325 MILLIGRAM(S): at 20:30

## 2022-12-06 RX ADMIN — Medication 325 MILLIGRAM(S): at 16:30

## 2022-12-06 RX ADMIN — HYDROMORPHONE HYDROCHLORIDE 0.5 MILLIGRAM(S): 2 INJECTION INTRAMUSCULAR; INTRAVENOUS; SUBCUTANEOUS at 03:57

## 2022-12-06 RX ADMIN — HYDROMORPHONE HYDROCHLORIDE 0.5 MILLIGRAM(S): 2 INJECTION INTRAMUSCULAR; INTRAVENOUS; SUBCUTANEOUS at 04:15

## 2022-12-06 RX ADMIN — Medication 325 MILLIGRAM(S): at 10:56

## 2022-12-06 RX ADMIN — Medication 325 MILLIGRAM(S): at 20:18

## 2022-12-06 RX ADMIN — OXYCODONE HYDROCHLORIDE 10 MILLIGRAM(S): 5 TABLET ORAL at 16:30

## 2022-12-06 RX ADMIN — GABAPENTIN 800 MILLIGRAM(S): 400 CAPSULE ORAL at 16:00

## 2022-12-06 RX ADMIN — OXYCODONE HYDROCHLORIDE 10 MILLIGRAM(S): 5 TABLET ORAL at 11:20

## 2022-12-06 RX ADMIN — HYDROMORPHONE HYDROCHLORIDE 1 MILLIGRAM(S): 2 INJECTION INTRAMUSCULAR; INTRAVENOUS; SUBCUTANEOUS at 22:45

## 2022-12-06 RX ADMIN — OXYCODONE HYDROCHLORIDE 10 MILLIGRAM(S): 5 TABLET ORAL at 06:19

## 2022-12-06 RX ADMIN — HYDROMORPHONE HYDROCHLORIDE 1 MILLIGRAM(S): 2 INJECTION INTRAMUSCULAR; INTRAVENOUS; SUBCUTANEOUS at 14:00

## 2022-12-06 RX ADMIN — NEBIVOLOL HYDROCHLORIDE 10 MILLIGRAM(S): 5 TABLET ORAL at 17:28

## 2022-12-06 RX ADMIN — MEROPENEM 100 MILLIGRAM(S): 1 INJECTION INTRAVENOUS at 06:47

## 2022-12-06 RX ADMIN — Medication 166.67 MILLIGRAM(S): at 08:00

## 2022-12-06 RX ADMIN — CHLORHEXIDINE GLUCONATE 1 APPLICATION(S): 213 SOLUTION TOPICAL at 11:00

## 2022-12-06 RX ADMIN — Medication 325 MILLIGRAM(S): at 07:20

## 2022-12-06 RX ADMIN — OXYCODONE HYDROCHLORIDE 10 MILLIGRAM(S): 5 TABLET ORAL at 10:55

## 2022-12-06 RX ADMIN — OXYCODONE HYDROCHLORIDE 10 MILLIGRAM(S): 5 TABLET ORAL at 01:17

## 2022-12-06 RX ADMIN — OXYCODONE HYDROCHLORIDE 10 MILLIGRAM(S): 5 TABLET ORAL at 20:18

## 2022-12-06 RX ADMIN — Medication 325 MILLIGRAM(S): at 01:17

## 2022-12-06 RX ADMIN — HYDROMORPHONE HYDROCHLORIDE 0.5 MILLIGRAM(S): 2 INJECTION INTRAMUSCULAR; INTRAVENOUS; SUBCUTANEOUS at 08:45

## 2022-12-06 RX ADMIN — HYDROMORPHONE HYDROCHLORIDE 1 MILLIGRAM(S): 2 INJECTION INTRAMUSCULAR; INTRAVENOUS; SUBCUTANEOUS at 22:30

## 2022-12-06 RX ADMIN — OXYCODONE HYDROCHLORIDE 10 MILLIGRAM(S): 5 TABLET ORAL at 02:17

## 2022-12-06 RX ADMIN — HYDROMORPHONE HYDROCHLORIDE 0.5 MILLIGRAM(S): 2 INJECTION INTRAMUSCULAR; INTRAVENOUS; SUBCUTANEOUS at 09:10

## 2022-12-06 RX ADMIN — HYDROMORPHONE HYDROCHLORIDE 1 MILLIGRAM(S): 2 INJECTION INTRAMUSCULAR; INTRAVENOUS; SUBCUTANEOUS at 18:06

## 2022-12-06 RX ADMIN — Medication 325 MILLIGRAM(S): at 02:17

## 2022-12-06 RX ADMIN — OXYCODONE HYDROCHLORIDE 10 MILLIGRAM(S): 5 TABLET ORAL at 07:20

## 2022-12-06 RX ADMIN — NEBIVOLOL HYDROCHLORIDE 10 MILLIGRAM(S): 5 TABLET ORAL at 06:19

## 2022-12-06 RX ADMIN — OXYCODONE HYDROCHLORIDE 10 MILLIGRAM(S): 5 TABLET ORAL at 20:30

## 2022-12-06 RX ADMIN — Medication 166.67 MILLIGRAM(S): at 16:01

## 2022-12-06 RX ADMIN — Medication 325 MILLIGRAM(S): at 11:20

## 2022-12-06 RX ADMIN — HYDROMORPHONE HYDROCHLORIDE 1 MILLIGRAM(S): 2 INJECTION INTRAMUSCULAR; INTRAVENOUS; SUBCUTANEOUS at 18:36

## 2022-12-06 RX ADMIN — ENOXAPARIN SODIUM 40 MILLIGRAM(S): 100 INJECTION SUBCUTANEOUS at 20:19

## 2022-12-06 RX ADMIN — Medication 325 MILLIGRAM(S): at 16:00

## 2022-12-06 NOTE — PROGRESS NOTE ADULT - SUBJECTIVE AND OBJECTIVE BOX
OVERNIGHT EVENTS: ISABELLE    SUBJECTIVE / INTERVAL HPI: Patient seen and examined at bedside. States she had a slight headache overnight but overall her pain is much better today with current regimen. No acute complaints at this time, ROS otherwise negative.     VITAL SIGNS:  Vital Signs Last 24 Hrs  T(C): 36.5 (06 Dec 2022 08:30), Max: 36.9 (05 Dec 2022 14:07)  T(F): 97.7 (06 Dec 2022 08:30), Max: 98.5 (05 Dec 2022 16:03)  HR: 82 (06 Dec 2022 08:30) (76 - 87)  BP: 159/68 (06 Dec 2022 08:30) (136/81 - 160/109)  BP(mean): --  RR: 18 (06 Dec 2022 08:30) (17 - 18)  SpO2: 97% (06 Dec 2022 08:30) (96% - 97%)    Parameters below as of 06 Dec 2022 08:30  Patient On (Oxygen Delivery Method): room air      I&O's Summary    05 Dec 2022 07:01  -  06 Dec 2022 07:00  --------------------------------------------------------  IN: 810 mL / OUT: 1050 mL / NET: -240 mL    06 Dec 2022 07:01  -  06 Dec 2022 10:47  --------------------------------------------------------  IN: 720 mL / OUT: 600 mL / NET: 120 mL        PHYSICAL EXAM:    General: obese female resting in bed, NAD  HEENT: head NC/AT, no conjunctival injection, EOMI, MMM  Neck: supple, no JVD  Cardio: RRR, +S1/S2, no M/R/G  Resp: lungs CTAB, no cough/wheezes/rales/rhonchi  Abdo: soft, NT, ND, +bowel sounds x4, no organomegaly or palpable mass    Extremities: WWP, +Left lateral ankle w/ 1cm x .5cm ulcer without pus or surrounding erythema, wrapped in dressing  Vasc: 2+ radial and DP pulses b/l  Neuro: A&Ox3  Skin: dry, intact, no visible jaundice   MSK: no joint swelling    MEDICATIONS:  MEDICATIONS  (STANDING):  chlorhexidine 2% Cloths 1 Application(s) Topical daily  enoxaparin Injectable 40 milliGRAM(s) SubCutaneous every 24 hours  gabapentin 100 milliGRAM(s) Oral every 24 hours  gabapentin 800 milliGRAM(s) Oral every 24 hours  influenza   Vaccine 0.5 milliLiter(s) IntraMuscular once  meropenem  IVPB 1000 milliGRAM(s) IV Intermittent every 8 hours  nebivolol 10 milliGRAM(s) Oral <User Schedule>  vancomycin  IVPB 1250 milliGRAM(s) IV Intermittent every 8 hours    MEDICATIONS  (PRN):  acetaminophen     Tablet .. 325 milliGRAM(s) Oral every 4 hours PRN Severe Pain (7 - 10)  HYDROmorphone  Injectable 0.5 milliGRAM(s) IV Push every 4 hours PRN Severe Pain (7 - 10)  oxyCODONE    IR 10 milliGRAM(s) Oral every 4 hours PRN Severe Pain (7 - 10)      ALLERGIES:  Allergies    amoxicillin (Angioedema)  ciprofloxacin (Rash)  hydrochlorothiazide (Hives)  lisinopril (Angioedema; Rash; Hives)  penicillin (Rash)    Intolerances        LABS:                        11.3   8.83  )-----------( 399      ( 06 Dec 2022 05:30 )             35.1     12-    135  |  100  |  13  ----------------------------<  100<H>  3.8   |  24  |  0.57    Ca    8.9      06 Dec 2022 05:30  Phos  3.3     12-  Mg     1.7     12-    TPro  6.6  /  Alb  3.5  /  TBili  0.7  /  DBili  x   /  AST  8<L>  /  ALT  8<L>  /  AlkPhos  67  12-      Urinalysis Basic - ( 05 Dec 2022 00:31 )    Color: Yellow / Appearance: Clear / S.020 / pH: x  Gluc: x / Ketone: Trace mg/dL  / Bili: Negative / Urobili: 0.2 E.U./dL   Blood: x / Protein: NEGATIVE mg/dL / Nitrite: NEGATIVE   Leuk Esterase: NEGATIVE / RBC: > 10 /HPF / WBC 5-10 /HPF   Sq Epi: x / Non Sq Epi: Many /HPF / Bacteria: Present /HPF      CAPILLARY BLOOD GLUCOSE          RADIOLOGY & ADDITIONAL TESTS: Reviewed.

## 2022-12-06 NOTE — PROGRESS NOTE ADULT - SUBJECTIVE AND OBJECTIVE BOX
INTERVAL HPI/OVERNIGHT EVENTS:  Feels overall better, some sweats overnight.  L foot pain decreasing after pain meds.  Thinks the appearance of her foot is the same but still with increased blood flow.    CONSTITUTIONAL:  No fever, chills  EYES:  No photophobia or visual changes  CARDIOVASCULAR:  No chest pain  RESPIRATORY:  No cough, wheezing, or SOB   GASTROINTESTINAL:  No nausea, vomiting, diarrhea, constipation, or abdominal pain  GENITOURINARY:  No frequency, urgency, dysuria or hematuria  NEUROLOGIC:  No headache, lightheadedness      ANTIBIOTICS/RELEVANT:    Vancomycin 1250 mg IV q8h  Meropenem 1 g IV q8h      Vital Signs Last 24 Hrs  T(C): 37.2 (06 Dec 2022 16:51), Max: 37.2 (06 Dec 2022 16:51)  T(F): 99 (06 Dec 2022 16:51), Max: 99 (06 Dec 2022 16:51)  HR: 81 (06 Dec 2022 16:51) (76 - 85)  BP: 132/91 (06 Dec 2022 16:51) (132/91 - 160/109)  BP(mean): --  RR: 17 (06 Dec 2022 16:51) (17 - 18)  SpO2: 96% (06 Dec 2022 16:51) (96% - 97%)    Parameters below as of 06 Dec 2022 16:51  Patient On (Oxygen Delivery Method): room air        PHYSICAL EXAM:  Constitutional:  Well-developed, well nourished  Eyes:  Sclerae anicterica, conjunctivae clear, PERRL  Ear/Nose/Throat:  No nasal exudate or sinus tenderness;  No buccal mucosal lesions, no pharyngeal erythema or exudate	  Neck:  Supple, no adenopathy  Respiratory:  Clear bilaterally  Cardiovascular:  RRR, S1S2, no murmur appreciated  Gastrointestinal:  Symmetric, normoactive BS, soft, NT, no masses, guarding or rebound.  No HSM  Extrem:  Former RUE PICC exit site with mild erythema, no drainage or warmth.  L foot with increased warmth/swelling of AVM, ~3 mm ulcer with mild surrounding erythema  Skin:  No rashes.    LABS:                        11.3   8.83  )-----------( 399      ( 06 Dec 2022 05:30 )             35.1         12-06    135  |  100  |  13  ----------------------------<  100<H>  3.8   |  24  |  0.57    Ca    8.9      06 Dec 2022 05:30  Phos  3.3     12  Mg     1.7         TPro  6.6  /  Alb  3.5  /  TBili  0.7  /  DBili  x   /  AST  8<L>  /  ALT  8<L>  /  AlkPhos  67        Urinalysis Basic - ( 05 Dec 2022 00:31 )    Color: Yellow / Appearance: Clear / S.020 / pH: x  Gluc: x / Ketone: Trace mg/dL  / Bili: Negative / Urobili: 0.2 E.U./dL   Blood: x / Protein: NEGATIVE mg/dL / Nitrite: NEGATIVE   Leuk Esterase: NEGATIVE / RBC: > 10 /HPF / WBC 5-10 /HPF   Sq Epi: x / Non Sq Epi: Many /HPF / Bacteria: Present /HPF        MICROBIOLOGY:    Blood cultures:  /4 X 2 - NGTD    RADIOLOGY & ADDITIONAL STUDIES:

## 2022-12-06 NOTE — PROGRESS NOTE ADULT - PROBLEM SELECTOR PLAN 1
Fever  Pt has hx of AVM L foot s/p multiple transcatheter and stick embolizations, chronic L foot ulcer, culture from OR debridement 10/11 with Pseudomonas treated with brief course of vanc and cefepime with recurrence of infection and readmission and d/c with picc line for outpatient IV Abx. s/p additional two week course abx with meropenem and vancomycin. Pt follows with Dr. Rahman outpatient who saw pt in office 12/2 at which time decision was made to continue abx for additional 2 weeks (end date 12/16) as inflammatory markers and WBC are up. 12/5 patient febrile to 101.6 at home and sent to ED. In ED noted to have elevated ESR and CRP otherwise w/o significant clinical signs that show worsening LLE ulcer or localized infection. PICC line not flushing and erythematous at insertion site, concerning for CLABSI.     Plan:  - Patient afebrile and leukocytosis downtrending today  - MRI w/o osteomyelitis  - PICC line removed 12/5   - Blood cx after 24hours NGTD - followup blood cultures in another 24 hours. If negative can replace PICC line.  - f/u Bcx x2 - from peripheral site, unable to draw from PICC line  - Continue meropenem 1 g IV q 8 h and vancomycin 1250 mg q 8 h an additional 2 weeks through peripheral line, tentative end date 12/16  - Next vanc trough 12/6 10pm  - Continue local wound dressings  - Trend fever and leukocytosis  - Pain regimen: oxycodone 10/acetaminophen 325mg q4h for severe pain, 0.5mg IV dilaudid q4h for breakthrough pain

## 2022-12-07 ENCOUNTER — TRANSCRIPTION ENCOUNTER (OUTPATIENT)
Age: 37
End: 2022-12-07

## 2022-12-07 LAB
-  AMPICILLIN: SIGNIFICANT CHANGE UP
-  CIPROFLOXACIN: SIGNIFICANT CHANGE UP
-  NITROFURANTOIN: SIGNIFICANT CHANGE UP
-  TETRACYCLINE: SIGNIFICANT CHANGE UP
-  VANCOMYCIN: SIGNIFICANT CHANGE UP
HCG UR QL: NEGATIVE — SIGNIFICANT CHANGE UP
HCT VFR BLD CALC: 35.1 % — SIGNIFICANT CHANGE UP (ref 34.5–45)
HGB BLD-MCNC: 11.3 G/DL — LOW (ref 11.5–15.5)
MCHC RBC-ENTMCNC: 28.6 PG — SIGNIFICANT CHANGE UP (ref 27–34)
MCHC RBC-ENTMCNC: 32.2 GM/DL — SIGNIFICANT CHANGE UP (ref 32–36)
MCV RBC AUTO: 88.9 FL — SIGNIFICANT CHANGE UP (ref 80–100)
METHOD TYPE: SIGNIFICANT CHANGE UP
NRBC # BLD: 0 /100 WBCS — SIGNIFICANT CHANGE UP (ref 0–0)
PLATELET # BLD AUTO: 366 K/UL — SIGNIFICANT CHANGE UP (ref 150–400)
RBC # BLD: 3.95 M/UL — SIGNIFICANT CHANGE UP (ref 3.8–5.2)
RBC # FLD: 12 % — SIGNIFICANT CHANGE UP (ref 10.3–14.5)
VANCOMYCIN TROUGH SERPL-MCNC: 20.1 UG/ML — HIGH (ref 10–20)
WBC # BLD: 6.48 K/UL — SIGNIFICANT CHANGE UP (ref 3.8–10.5)
WBC # FLD AUTO: 6.48 K/UL — SIGNIFICANT CHANGE UP (ref 3.8–10.5)

## 2022-12-07 PROCEDURE — 99233 SBSQ HOSP IP/OBS HIGH 50: CPT | Mod: GC

## 2022-12-07 PROCEDURE — 36573 INSJ PICC RS&I 5 YR+: CPT

## 2022-12-07 PROCEDURE — 99232 SBSQ HOSP IP/OBS MODERATE 35: CPT

## 2022-12-07 RX ORDER — VANCOMYCIN HCL 1 G
1250 VIAL (EA) INTRAVENOUS EVERY 8 HOURS
Refills: 0 | Status: DISCONTINUED | OUTPATIENT
Start: 2022-12-08 | End: 2022-12-09

## 2022-12-07 RX ORDER — VANCOMYCIN HCL 1 G
1250 VIAL (EA) INTRAVENOUS EVERY 8 HOURS
Refills: 0 | Status: COMPLETED | OUTPATIENT
Start: 2022-12-07 | End: 2022-12-07

## 2022-12-07 RX ADMIN — NEBIVOLOL HYDROCHLORIDE 10 MILLIGRAM(S): 5 TABLET ORAL at 06:06

## 2022-12-07 RX ADMIN — OXYCODONE HYDROCHLORIDE 10 MILLIGRAM(S): 5 TABLET ORAL at 09:48

## 2022-12-07 RX ADMIN — HYDROMORPHONE HYDROCHLORIDE 1 MILLIGRAM(S): 2 INJECTION INTRAMUSCULAR; INTRAVENOUS; SUBCUTANEOUS at 22:00

## 2022-12-07 RX ADMIN — Medication 166.67 MILLIGRAM(S): at 01:07

## 2022-12-07 RX ADMIN — HYDROMORPHONE HYDROCHLORIDE 1 MILLIGRAM(S): 2 INJECTION INTRAMUSCULAR; INTRAVENOUS; SUBCUTANEOUS at 02:58

## 2022-12-07 RX ADMIN — Medication 325 MILLIGRAM(S): at 23:41

## 2022-12-07 RX ADMIN — Medication 325 MILLIGRAM(S): at 10:48

## 2022-12-07 RX ADMIN — Medication 325 MILLIGRAM(S): at 19:39

## 2022-12-07 RX ADMIN — MEROPENEM 100 MILLIGRAM(S): 1 INJECTION INTRAVENOUS at 06:07

## 2022-12-07 RX ADMIN — HYDROMORPHONE HYDROCHLORIDE 1 MILLIGRAM(S): 2 INJECTION INTRAMUSCULAR; INTRAVENOUS; SUBCUTANEOUS at 03:15

## 2022-12-07 RX ADMIN — OXYCODONE HYDROCHLORIDE 10 MILLIGRAM(S): 5 TABLET ORAL at 10:48

## 2022-12-07 RX ADMIN — CHLORHEXIDINE GLUCONATE 1 APPLICATION(S): 213 SOLUTION TOPICAL at 11:26

## 2022-12-07 RX ADMIN — OXYCODONE HYDROCHLORIDE 10 MILLIGRAM(S): 5 TABLET ORAL at 00:46

## 2022-12-07 RX ADMIN — MEROPENEM 100 MILLIGRAM(S): 1 INJECTION INTRAVENOUS at 23:08

## 2022-12-07 RX ADMIN — HYDROMORPHONE HYDROCHLORIDE 1 MILLIGRAM(S): 2 INJECTION INTRAMUSCULAR; INTRAVENOUS; SUBCUTANEOUS at 07:13

## 2022-12-07 RX ADMIN — HYDROMORPHONE HYDROCHLORIDE 1 MILLIGRAM(S): 2 INJECTION INTRAMUSCULAR; INTRAVENOUS; SUBCUTANEOUS at 07:30

## 2022-12-07 RX ADMIN — HYDROMORPHONE HYDROCHLORIDE 1 MILLIGRAM(S): 2 INJECTION INTRAMUSCULAR; INTRAVENOUS; SUBCUTANEOUS at 17:22

## 2022-12-07 RX ADMIN — MEROPENEM 100 MILLIGRAM(S): 1 INJECTION INTRAVENOUS at 14:02

## 2022-12-07 RX ADMIN — Medication 325 MILLIGRAM(S): at 15:01

## 2022-12-07 RX ADMIN — HYDROMORPHONE HYDROCHLORIDE 1 MILLIGRAM(S): 2 INJECTION INTRAMUSCULAR; INTRAVENOUS; SUBCUTANEOUS at 21:45

## 2022-12-07 RX ADMIN — Medication 166.67 MILLIGRAM(S): at 17:23

## 2022-12-07 RX ADMIN — Medication 325 MILLIGRAM(S): at 14:01

## 2022-12-07 RX ADMIN — Medication 325 MILLIGRAM(S): at 05:26

## 2022-12-07 RX ADMIN — Medication 325 MILLIGRAM(S): at 20:39

## 2022-12-07 RX ADMIN — ENOXAPARIN SODIUM 40 MILLIGRAM(S): 100 INJECTION SUBCUTANEOUS at 19:39

## 2022-12-07 RX ADMIN — Medication 325 MILLIGRAM(S): at 09:48

## 2022-12-07 RX ADMIN — HYDROMORPHONE HYDROCHLORIDE 1 MILLIGRAM(S): 2 INJECTION INTRAMUSCULAR; INTRAVENOUS; SUBCUTANEOUS at 17:37

## 2022-12-07 RX ADMIN — OXYCODONE HYDROCHLORIDE 10 MILLIGRAM(S): 5 TABLET ORAL at 20:39

## 2022-12-07 RX ADMIN — GABAPENTIN 100 MILLIGRAM(S): 400 CAPSULE ORAL at 07:09

## 2022-12-07 RX ADMIN — OXYCODONE HYDROCHLORIDE 10 MILLIGRAM(S): 5 TABLET ORAL at 01:45

## 2022-12-07 RX ADMIN — Medication 166.67 MILLIGRAM(S): at 09:48

## 2022-12-07 RX ADMIN — OXYCODONE HYDROCHLORIDE 10 MILLIGRAM(S): 5 TABLET ORAL at 05:26

## 2022-12-07 RX ADMIN — Medication 325 MILLIGRAM(S): at 01:45

## 2022-12-07 RX ADMIN — NEBIVOLOL HYDROCHLORIDE 10 MILLIGRAM(S): 5 TABLET ORAL at 18:59

## 2022-12-07 RX ADMIN — OXYCODONE HYDROCHLORIDE 10 MILLIGRAM(S): 5 TABLET ORAL at 23:41

## 2022-12-07 RX ADMIN — Medication 325 MILLIGRAM(S): at 06:25

## 2022-12-07 RX ADMIN — OXYCODONE HYDROCHLORIDE 10 MILLIGRAM(S): 5 TABLET ORAL at 15:02

## 2022-12-07 RX ADMIN — OXYCODONE HYDROCHLORIDE 10 MILLIGRAM(S): 5 TABLET ORAL at 14:02

## 2022-12-07 RX ADMIN — HYDROMORPHONE HYDROCHLORIDE 1 MILLIGRAM(S): 2 INJECTION INTRAMUSCULAR; INTRAVENOUS; SUBCUTANEOUS at 11:26

## 2022-12-07 RX ADMIN — Medication 325 MILLIGRAM(S): at 00:45

## 2022-12-07 RX ADMIN — HYDROMORPHONE HYDROCHLORIDE 1 MILLIGRAM(S): 2 INJECTION INTRAMUSCULAR; INTRAVENOUS; SUBCUTANEOUS at 11:41

## 2022-12-07 RX ADMIN — OXYCODONE HYDROCHLORIDE 10 MILLIGRAM(S): 5 TABLET ORAL at 19:38

## 2022-12-07 RX ADMIN — OXYCODONE HYDROCHLORIDE 10 MILLIGRAM(S): 5 TABLET ORAL at 06:25

## 2022-12-07 NOTE — PROGRESS NOTE ADULT - SUBJECTIVE AND OBJECTIVE BOX
OVERNIGHT EVENTS: ISABELLE    SUBJECTIVE / INTERVAL HPI: Patient seen and examined at bedside. Her pain is controlled after increasing the dilaudid dose. Denies any urinary symptoms at this time, ROS otherwise negative.     VITAL SIGNS:  Vital Signs Last 24 Hrs  T(C): 36.8 (07 Dec 2022 09:20), Max: 37.2 (06 Dec 2022 16:51)  T(F): 98.2 (07 Dec 2022 09:20), Max: 99 (06 Dec 2022 16:51)  HR: 78 (07 Dec 2022 09:20) (69 - 87)  BP: 131/83 (07 Dec 2022 09:20) (128/84 - 153/100)  BP(mean): --  RR: 17 (07 Dec 2022 09:20) (17 - 18)  SpO2: 95% (07 Dec 2022 09:20) (95% - 97%)    Parameters below as of 07 Dec 2022 09:20  Patient On (Oxygen Delivery Method): room air      I&O's Summary    06 Dec 2022 07:01  -  07 Dec 2022 07:00  --------------------------------------------------------  IN: 820 mL / OUT: 1250 mL / NET: -430 mL    07 Dec 2022 07:01  -  07 Dec 2022 11:28  --------------------------------------------------------  IN: 360 mL / OUT: 0 mL / NET: 360 mL        PHYSICAL EXAM:    General: obese female resting in bed, NAD  HEENT: head NC/AT, no conjunctival injection, EOMI, MMM  Neck: supple, no JVD  Cardio: RRR, +S1/S2, no M/R/G  Resp: lungs CTAB, no cough/wheezes/rales/rhonchi  Abdo: soft, NT, ND, +bowel sounds x4, no organomegaly or palpable mass    Extremities: WWP, +Left lateral ankle w/ 1cm x .5cm ulcer without pus or surrounding erythema, wrapped in dressing  Vasc: 2+ radial and DP pulses b/l  Neuro: A&Ox3  Skin: dry, intact, no visible jaundice   MSK: no joint swelling    MEDICATIONS:  MEDICATIONS  (STANDING):  chlorhexidine 2% Cloths 1 Application(s) Topical daily  enoxaparin Injectable 40 milliGRAM(s) SubCutaneous every 24 hours  gabapentin 100 milliGRAM(s) Oral every 24 hours  gabapentin 800 milliGRAM(s) Oral every 24 hours  influenza   Vaccine 0.5 milliLiter(s) IntraMuscular once  meropenem  IVPB 1000 milliGRAM(s) IV Intermittent every 8 hours  nebivolol 10 milliGRAM(s) Oral <User Schedule>  vancomycin  IVPB 1250 milliGRAM(s) IV Intermittent every 8 hours    MEDICATIONS  (PRN):  acetaminophen     Tablet .. 325 milliGRAM(s) Oral every 4 hours PRN Severe Pain (7 - 10)  HYDROmorphone  Injectable 1 milliGRAM(s) IV Push every 4 hours PRN breakthrough pain  oxyCODONE    IR 10 milliGRAM(s) Oral every 4 hours PRN Severe Pain (7 - 10)      ALLERGIES:  Allergies    amoxicillin (Angioedema)  ciprofloxacin (Rash)  hydrochlorothiazide (Hives)  lisinopril (Angioedema; Rash; Hives)  penicillin (Rash)    Intolerances        LABS:                        11.3   6.48  )-----------( 366      ( 07 Dec 2022 10:00 )             35.1     12-06    135  |  100  |  13  ----------------------------<  100<H>  3.8   |  24  |  0.57    Ca    8.9      06 Dec 2022 05:30  Phos  3.3     12-06  Mg     1.7     12-06    TPro  6.6  /  Alb  3.5  /  TBili  0.7  /  DBili  x   /  AST  8<L>  /  ALT  8<L>  /  AlkPhos  67  12-06        CAPILLARY BLOOD GLUCOSE          RADIOLOGY & ADDITIONAL TESTS: Reviewed.

## 2022-12-07 NOTE — DISCHARGE NOTE PROVIDER - NSDCCONDITION_GEN_ALL_CORE
DAILY PROGRESS NOTE  ===========================================================    Patient Information:  KELLI WYATT  /  42y  /  Male  /  MRN#: [id number]    Hospital Day: 9d     |:::::::::::::::::::::::::::| SUBJECTIVE |:::::::::::::::::::::::::::|    OVERNIGHT EVENTS:   TODAY: Patient was seen today at bedside. Patient resting comfortable - mentions that he in unable to weight bear on right foot after amputation. Waiting on graft from Dr. Mckenzie. Review of systems is otherwise negative.    |:::::::::::::::::::::::::::| OBJECTIVE |:::::::::::::::::::::::::::|    VITAL SIGNS: Last 24 Hours  T(C): 36.8 (15 Jul 2020 05:19), Max: 36.8 (15 Jul 2020 05:19)  T(F): 98.3 (15 Jul 2020 05:19), Max: 98.3 (15 Jul 2020 05:19)  HR: 95 (15 Jul 2020 05:19) (89 - 95)  BP: 132/85 (15 Jul 2020 05:19) (130/65 - 132/85)  BP(mean): --  RR: 18 (15 Jul 2020 05:19) (18 - 18)  SpO2: --    PHYSICAL EXAM:  GENERAL:   Awake, alert; NAD.  HEENT:  Head NC/AT; Conjunctivae pink, Sclera anicteric; Oral mucosa moist.  CARDIO:   Regular rate; Regular rhythm; S1 & S2.  RESP:   No rales, wheezing, or rhonchi appreciated.  GI:   Soft; NT/ND; BS; No guarding; No rebound tenderness.  EXT:   Currently wound dressing protecting surgical wound from Right Dorsal Foot; Extending from Ankle Joint to MTP  Neuro: Protective Sensation Diminished      LAB RESULTS:                        11.2   10.05 )-----------( 457      ( 14 Jul 2020 06:36 )             34.3     07-14    135  |  98  |  8<L>  ----------------------------<  87  4.2   |  26  |  0.6<L>    Ca    8.6      14 Jul 2020 06:36        MICROBIOLOGY:  Culture - Blood in AM (07.09.20 @ 05:25)    Specimen Source: .Blood None    Culture Results:   No Growth Final      RADIOLOGY:  < from: CT Lower Extremity w/ IV Cont, Right (07.06.20 @ 19:43) >  Impression:     Findings compatible with necrotizing fasciitis and osteomyelitis of the fifth proximal phalanx,    < end of copied text >    ALLERGIES:  No Known Allergies      =========================================================== Stable

## 2022-12-07 NOTE — DIETITIAN INITIAL EVALUATION ADULT - NS FNS ENTERAL CURRENT ORDER
I received last x-ray note from [facility] 4/17. They stated he had been treated for TB there along time ago and they had been following him regularly since then. The x-ray note is scanned in his chart. See my previous note. Let me know if you still want him to see a pulmonologist. Family states they will have a hard time getting him to agree to this.   Current diet order meets estimated nutrient requirements

## 2022-12-07 NOTE — DISCHARGE NOTE PROVIDER - DETAILS OF MALNUTRITION DIAGNOSIS/DIAGNOSES
This patient has been assessed with a concern for Malnutrition and was treated during this hospitalization for the following Nutrition diagnosis/diagnoses:     -  12/07/2022: Morbid obesity (BMI > 40)

## 2022-12-07 NOTE — PROGRESS NOTE ADULT - SUBJECTIVE AND OBJECTIVE BOX
INTERVAL HPI/OVERNIGHT EVENTS:  No fever, some sweats.  Foot pain better controlled last night.    CONSTITUTIONAL:  As above  EYES:  No photophobia or visual changes  CARDIOVASCULAR:  No chest pain  RESPIRATORY:  No cough, wheezing, or SOB   GASTROINTESTINAL:  No nausea, vomiting, diarrhea, constipation, or abdominal pain  GENITOURINARY:  No frequency, urgency, dysuria or hematuria  NEUROLOGIC:  No headache, lightheadedness      ANTIBIOTICS/RELEVANT:    Vancomycin 1250 mg IV q8h  Meropenem 1 g IV q8h      Vital Signs Last 24 Hrs  T(C): 36.8 (07 Dec 2022 13:26), Max: 37.2 (06 Dec 2022 16:51)  T(F): 98.2 (07 Dec 2022 13:26), Max: 99 (06 Dec 2022 16:51)  HR: 76 (07 Dec 2022 13:26) (69 - 87)  BP: 142/92 (07 Dec 2022 13:26) (128/84 - 153/100)  BP(mean): --  RR: 19 (07 Dec 2022 13:26) (17 - 19)  SpO2: 96% (07 Dec 2022 13:26) (95% - 97%)    Parameters below as of 07 Dec 2022 13:26  Patient On (Oxygen Delivery Method): room air        PHYSICAL EXAM:  Constitutional:  Well-developed, well nourished  Eyes:  Sclerae anicteric, conjunctivae clear, PERRL  Ear/Nose/Throat:  No nasal exudate or sinus tenderness;  No buccal mucosal lesions, no pharyngeal erythema or exudate	  Neck:  Supple, no adenopathy  Respiratory:  Clear bilaterally  Cardiovascular:  RRR, S1S2, no murmur appreciated  Gastrointestinal:  Symmetric, normoactive BS, soft, NT, no masses, guarding or rebound.  No HSM  Extrem:  Former RUE PICC exit site with mild erythema, no drainage or warmth.  L foot wrapped  Skin:  No rashes.    LABS:                        11.3   6.48  )-----------( 366      ( 07 Dec 2022 10:00 )             35.1         12-06    135  |  100  |  13  ----------------------------<  100<H>  3.8   |  24  |  0.57    Ca    8.9      06 Dec 2022 05:30  Phos  3.3     12-06  Mg     1.7     12-06    TPro  6.6  /  Alb  3.5  /  TBili  0.7  /  DBili  x   /  AST  8<L>  /  ALT  8<L>  /  AlkPhos  67  12-06          MICROBIOLOGY:    Blood cultures:  12/4 X 2 - NGTD    RADIOLOGY & ADDITIONAL STUDIES:

## 2022-12-07 NOTE — DISCHARGE NOTE PROVIDER - NSDCCPCAREPLAN_GEN_ALL_CORE_FT
PRINCIPAL DISCHARGE DIAGNOSIS  Diagnosis: Fever  Assessment and Plan of Treatment: You were sent to the ED by your doctor Dr. Rahman because of a fever you reported at home. A PICC line was placed in the hospital at your last admission for antibiotic treatment of your chronic skin ulcer due to an AVM in your left ankle. Outpatient labs indicated you a elevated white count which is a sign of infection, and the source of your infection was presumed to be the PICC line. During this admission, the PICC line was removed and we monitored blood cultures for 48hours. Your white count came down and you remained afebrile. A new PICC line was placed for continuation of your IV antibiotics at home. Should you start to notice symptoms such as but not limited to: high persisting fevers (>104 F or lasting more than 3 days), severe pain, increased swelling and/or skin color changes after finishing your antibiotics, please return to the emergency department for interval evaluation.  **Please continue IV infusions at home- Vancomycin 1250mg Q8Hrs and Meropenem 1g Q8Hrs through 12/16/2022 and follow-up with Dr. Rahman on 12/16.      SECONDARY DISCHARGE DIAGNOSES  Diagnosis: Enterococcus UTI  Assessment and Plan of Treatment: Urinary tract infections, also called "UTIs," are infections that affect either the bladder or the kidneys. Bladder infections are more common than kidney infections. Bladder infections happen when bacteria get into the urethra and travel up into the bladder. Kidney infections happen when the bacteria travel even higher, up into the kidneys. The symptoms of a bladder infection include pain or a burning feeling when you urinate, the need to urinate often, the need to urinate suddenly or in a hurry, blood in the urine. Signs that an infection has spread to the kidneys include fever, back pain, or nausea/vomiting. Since you remained asymptomatic you were not treated with antibiotics in the hospital.

## 2022-12-07 NOTE — PROGRESS NOTE ADULT - PROBLEM SELECTOR PLAN 1
Fever  Pt has hx of AVM L foot s/p multiple transcatheter and stick embolizations, chronic L foot ulcer, culture from OR debridement 10/11 with Pseudomonas treated with brief course of vanc and cefepime with recurrence of infection and readmission and d/c with picc line for outpatient IV Abx. s/p additional two week course abx with meropenem and vancomycin. Pt follows with Dr. Rahman outpatient who saw pt in office 12/2 at which time decision was made to continue abx for additional 2 weeks (end date 12/16) as inflammatory markers and WBC are up. 12/5 patient febrile to 101.6 at home and sent to ED. In ED noted to have elevated ESR and CRP otherwise w/o significant clinical signs that show worsening LLE ulcer or localized infection. PICC line not flushing and erythematous at insertion site, concerning for CLABSI.     Plan:  - Patient afebrile and leukocytosis resolved today  - MRI w/o osteomyelitis  - PICC line removed 12/5   - Blood cx after 48hours  - Place PICC today  - Continue meropenem 1 g IV q 8 h and vancomycin 1250 mg q 8 h an additional 2 weeks through peripheral line, tentative end date 12/16  - Continue local wound dressings  - Trend fever and leukocytosis  - Pain regimen: oxycodone 10/acetaminophen 325mg q4h for severe pain, 1mg IV dilaudid q4h for breakthrough pain

## 2022-12-07 NOTE — DIETITIAN INITIAL EVALUATION ADULT - ADD RECOMMEND
1. Continue with regular diet  2. BM and pain regimen per team  3. Monitor BMP, BG, POCT, renal indices, LFTs, CBC, lytes, replete prn  4. Diet edu prn

## 2022-12-07 NOTE — DIETITIAN NUTRITION RISK NOTIFICATION - TREATMENT: THE FOLLOWING DIET HAS BEEN RECOMMENDED
Diet, Regular (12-04-22 @ 23:23) [Active]    1. Continue with regular diet  2. BM and pain regimen per team  3. Monitor BMP, BG, POCT, renal indices, LFTs, CBC, lytes, replete prn  4. Diet edu prn

## 2022-12-07 NOTE — DIETITIAN INITIAL EVALUATION ADULT - OTHER INFO
36yo woman w/ HTN, Morbid obesity (BMI 49), hx chronic L foot ulcer s/p transcatheter embolization of AVM 10/11, previously on IV Vanc+Cefepime 10/11-16 Cx growing PsA, Staph, Rosaer, recently admitted 10/23-11/10 for increased L foot pain discharged on home infusion IV abx with PICC line, who presents d/t persistent pain and fever concerning for infected PICC.     Pt seen resting in bed on assessment. She denies any n/v/d/c. Last BM 12/6. No abd discomfort or distention. Endorses L ankle pain as noted in flowsheet. Denies changes in appetite. States started on wt loss pill in June 2022, prior to start of pill, weighed at 382 lbs, CBW of 324 lbs, indicating wt loss of 58 lbs/15% in ~6 months, clinically significant, wt loss intentional. She reports has been managing diet with 2 meals per day, usually starts breakfast @ ~11, dinner prior to 8PM. Consumes mostly proteins and veg/salad for meals. Reports internal MD helping pt to manage diet. Discussed with pt about general healthy diet, discussed importance of balanced meals, which inclusion of CHO, focus lean protein to help with wound healing/infection at this time, continue to encourage. Pt receptive. No cultural, ethnic, Yazdanism food preferences noted. NKFA. Skin: Michael 20, noted wound to L ankle ulcer. No PU or edema. Labs WNL. RD to continue to follow.

## 2022-12-07 NOTE — DISCHARGE NOTE PROVIDER - HOSPITAL COURSE
#Discharge: do not delete    Patient is 36yo woman w/ HTN, Morbid obesity (BMI 49), hx chronic L foot ulcer s/p transcatheter embolization of AVM 10/11, previously on IV Vanc+Cefepime 10/11-16 Cx growing PsA, Staph, Corynebacter, recently admitted 10/23-11/10 for increased L foot pain discharged on home infusion IV abx with PICC line, who presents d/t persistent pain and fever.    Hospital course (by problem):     36yo woman w/ HTN, Morbid obesity (BMI 49), hx chronic L foot ulcer s/p transcatheter embolization of AVM 10/11, previously on IV Vanc+Cefepime 10/11-16 Cx growing PsA, Staph, Corynebacter, recently admitted 10/23-11/10 for increased L foot pain discharged on home infusion IV abx with PICC line, who presents d/t persistent pain and fever.    #Fever  #Chronic ulcer of left ankle  Pt has hx of AVM L foot s/p multiple transcatheter and stick embolizations, chronic L foot ulcer, culture from OR debridement 10/11 with Pseudomonas treated with brief course of vanc and cefepime with recurrence of infection and readmission and d/c with picc line for outpatient IV Abx. s/p additional two week course abx with meropenem and vancomycin. Pt follows with Dr. Rahman outpatient who saw pt in office 12/2 at which time decision was made to continue abx for additional 2 weeks (end date 12/16) as inflammatory markers and WBC are up. 12/5 patient febrile to 101.6 at home and sent to ED. In ED noted to have elevated ESR and CRP otherwise w/o significant clinical signs that show worsening LLE ulcer or localized infection. PICC line not flushing and erythematous at insertion site, concerning for CLABSI.     - Patient afebrile and leukocytosis downtrending   - MRI w/o osteomyelitis.  - PICC line removed 12/5   - Blood cx after 48 hours NGTD, PICC line replaced  - Reviewed MRI and XRays with Dr. Kimberly Jacob - the phlegmon underlying ulcer is surrounding the area of contrast extravasation and is without improvement from 11/2. Antibiotics alone may not cure underlying infection. Patient may need evaluation for surgical intervention but will be complicated in presence of AVM. Should followup with Dr. Teran.  - Pain regimen: oxycodone 10/acetaminophen 325mg q4h for severe pain, 1mg IV dilaudid q4h for breakthrough pain.  - Continue meropenem 1 g IV q 8 h and vancomycin 1250 mg q 8 h an additional 2 weeks through peripheral line, tentative end date 12/16    #Enterococcus UTI  Urine Cx growing Enterococcus. Patient asymptomatic. Did not treat with Abx.    Patient was discharged to: home    New medications: none  Changes to old medications: none  Medications that were stopped: none    Items to follow up as outpatient: Dr. Rahman and Dr. Teran    Physical exam at the time of discharge:       #Discharge: do not delete    Patient is 38yo woman w/ HTN, Morbid obesity (BMI 49), hx chronic L foot ulcer s/p transcatheter embolization of AVM 10/11, previously on IV Vanc+Cefepime 10/11-16 Cx growing PsA, Staph, Corynebacter, recently admitted 10/23-11/10 for increased L foot pain discharged on home infusion IV abx with PICC line, who presents d/t persistent pain and fever.    Hospital course (by problem):     38yo woman w/ HTN, Morbid obesity (BMI 49), hx chronic L foot ulcer s/p transcatheter embolization of AVM 10/11, previously on IV Vanc+Cefepime 10/11-16 Cx growing PsA, Staph, Corynebacter, recently admitted 10/23-11/10 for increased L foot pain discharged on home infusion IV abx with PICC line, who presents d/t persistent pain and fever.    #Fever  #Chronic ulcer of left ankle  Pt has hx of AVM L foot s/p multiple transcatheter and stick embolizations, chronic L foot ulcer, culture from OR debridement 10/11 with Pseudomonas treated with brief course of vanc and cefepime with recurrence of infection and readmission and d/c with picc line for outpatient IV Abx. s/p additional two week course abx with meropenem and vancomycin. Pt follows with Dr. Rahman outpatient who saw pt in office 12/2 at which time decision was made to continue abx for additional 2 weeks (end date 12/16) as inflammatory markers and WBC are up. 12/5 patient febrile to 101.6 at home and sent to ED. In ED noted to have elevated ESR and CRP otherwise w/o significant clinical signs that show worsening LLE ulcer or localized infection. PICC line not flushing and erythematous at insertion site, concerning for CLABSI.     - Patient afebrile and leukocytosis downtrending   - MRI w/o osteomyelitis.  - PICC line removed 12/5   - Blood cx after 48 hours NGTD, PICC line replaced 12/7  - Reviewed MRI and XRays with Dr. Kimberly Jacob - the phlegmon underlying ulcer is surrounding the area of contrast extravasation and is without improvement from 11/2. Antibiotics alone may not cure underlying infection. Patient may need evaluation for surgical intervention but will be complicated in presence of AVM. Should followup with Dr. Teran.  - Pain regimen: oxycodone 10/acetaminophen 325mg q4h for severe pain, 1mg IV dilaudid q4h for breakthrough pain.  - Continue meropenem 1 g IV q 8 h and vancomycin 1250 mg q 8 h an additional 2 weeks through peripheral line, tentative end date 12/16    #Enterococcus UTI resistant to vancomycin  Urine Cx growing Enterococcus. Patient asymptomatic. Did not treat with Abx.    Patient was discharged to: home    New medications: none  Changes to old medications: none  Medications that were stopped: none    Items to follow up as outpatient: Dr. Rahman and Dr. Teran    Physical exam at the time of discharge:       #Discharge: do not delete    Patient is 38yo woman w/ HTN, Morbid obesity (BMI 49), hx chronic L foot ulcer s/p transcatheter embolization of AVM 10/11, previously on IV Vanc+Cefepime 10/11-16 Cx growing PsA, Staph, Corynebacter, recently admitted 10/23-11/10 for increased L foot pain discharged on home infusion IV abx with PICC line, who presents d/t persistent pain and fever.    Hospital course (by problem):     38yo woman w/ HTN, Morbid obesity (BMI 49), hx chronic L foot ulcer s/p transcatheter embolization of AVM 10/11, previously on IV Vanc+Cefepime 10/11-16 Cx growing PsA, Staph, Corynebacter, recently admitted 10/23-11/10 for increased L foot pain discharged on home infusion IV abx with PICC line, who presents d/t persistent pain and fever.    #Fever  #Chronic ulcer of left ankle  Pt has hx of AVM L foot s/p multiple transcatheter and stick embolizations, chronic L foot ulcer, culture from OR debridement 10/11 with Pseudomonas treated with brief course of vanc and cefepime with recurrence of infection and readmission and d/c with picc line for outpatient IV Abx. s/p additional two week course abx with meropenem and vancomycin. Pt follows with Dr. Rahman outpatient who saw pt in office 12/2 at which time decision was made to continue abx for additional 2 weeks (end date 12/16) as inflammatory markers and WBC are up. 12/5 patient febrile to 101.6 at home and sent to ED. In ED noted to have elevated ESR and CRP otherwise w/o significant clinical signs that show worsening LLE ulcer or localized infection. PICC line not flushing and erythematous at insertion site, concerning for CLABSI.     - Patient afebrile and leukocytosis downtrending   - MRI w/o osteomyelitis.  - PICC line removed 12/5   - Blood cx after 48 hours NGTD, PICC line replaced 12/7  - Reviewed MRI and XRays with Dr. Kimberly Jacob - the phlegmon underlying ulcer is surrounding the area of contrast extravasation and is without improvement from 11/2. Antibiotics alone may not cure underlying infection. Patient may need evaluation for surgical intervention but will be complicated in presence of AVM. Should followup with Dr. Teran.  - Pain regimen: oxycodone 10/acetaminophen 325mg q4h for severe pain, 1mg IV dilaudid q4h for breakthrough pain.  - Continue meropenem 1 g IV q 8 h and vancomycin 1250 mg q 8 h an additional 2 weeks through peripheral line, tentative end date 12/16    #Enterococcus UTI resistant to vancomycin  Urine Cx growing Enterococcus. Patient asymptomatic. Did not treat with Abx.    Patient was discharged to: home    New medications: none  Changes to old medications: none  Medications that were stopped: none    Items to follow up as outpatient: Dr. Rahman and Dr. Teran    Physical exam at the time of discharge:    General: obese female resting in bed, NAD  HEENT: head NC/AT, no conjunctival injection, EOMI, MMM  Neck: supple, no JVD  Cardio: RRR, +S1/S2, no M/R/G  Resp: lungs CTAB, no cough/wheezes/rales/rhonchi  Abdo: soft, NT, ND, +bowel sounds x4, no organomegaly or palpable mass    Extremities: WWP, +Left lateral ankle w/ 1cm x .5cm ulcer without pus or surrounding erythema, wrapped in dressing, L PICC c/d/i  Vasc: 2+ radial and DP pulses b/l  Neuro: A&Ox3  Skin: dry, intact, no visible jaundice   MSK: no joint swelling

## 2022-12-07 NOTE — DISCHARGE NOTE PROVIDER - NSDCMRMEDTOKEN_GEN_ALL_CORE_FT
Bystolic 10 mg oral tablet: 1 tab(s) orally 2 times a day  gabapentin 100 mg oral capsule: 1 cap(s) orally once a day (in the morning)  gabapentin 800 mg oral tablet: 1  orally once a day (in the afternoon)  Junel Fe 1.5/30 oral tablet: 1 tab(s) orally once a day  meropenem 1000 mg intravenous injection: 1000 milligram(s) intravenous every 8 hours  tiZANidine 4 mg oral tablet: orally once a day (at bedtime)  Tylenol Extra Strength 500 mg oral tablet: 1 tab(s) orally 4 times a day, As Needed  vancomycin 1.25 g intravenous injection: 1.25 gram(s) intravenous every 8 hours   Bystolic 10 mg oral tablet: 1 tab(s) orally 2 times a day  gabapentin 100 mg oral capsule: 1 cap(s) orally once a day (in the morning)  gabapentin 800 mg oral tablet: 1  orally once a day (in the afternoon)  Junel Fe 1.5/30 oral tablet: 1 tab(s) orally once a day  meropenem 1000 mg intravenous injection: 1000 milligram(s) intravenous every 8 hours  oxyCODONE 10 mg oral tablet: 1 tab(s) orally every 4 hours, As needed, Severe Pain (7 - 10)  tiZANidine 4 mg oral tablet: orally once a day (at bedtime)  Tylenol Extra Strength 500 mg oral tablet: 1 tab(s) orally 4 times a day, As Needed  vancomycin 1.25 g intravenous injection: 1.25 gram(s) intravenous every 8 hours

## 2022-12-07 NOTE — DIETITIAN INITIAL EVALUATION ADULT - OTHER CALCULATIONS
lbs. %%. IBW used to calculate energy needs due to pt's current body weight exceeding 120% of IBW. Needs adjusted for age and wt, BMI, post op demands

## 2022-12-07 NOTE — DIETITIAN INITIAL EVALUATION ADULT - PERTINENT MEDS FT
MEDICATIONS  (STANDING):  chlorhexidine 2% Cloths 1 Application(s) Topical daily  enoxaparin Injectable 40 milliGRAM(s) SubCutaneous every 24 hours  gabapentin 100 milliGRAM(s) Oral every 24 hours  gabapentin 800 milliGRAM(s) Oral every 24 hours  influenza   Vaccine 0.5 milliLiter(s) IntraMuscular once  meropenem  IVPB 1000 milliGRAM(s) IV Intermittent every 8 hours  nebivolol 10 milliGRAM(s) Oral <User Schedule>  vancomycin  IVPB 1250 milliGRAM(s) IV Intermittent every 8 hours    MEDICATIONS  (PRN):  acetaminophen     Tablet .. 325 milliGRAM(s) Oral every 4 hours PRN Severe Pain (7 - 10)  HYDROmorphone  Injectable 1 milliGRAM(s) IV Push every 4 hours PRN breakthrough pain  oxyCODONE    IR 10 milliGRAM(s) Oral every 4 hours PRN Severe Pain (7 - 10)

## 2022-12-08 LAB
-  DAPTOMYCIN: SIGNIFICANT CHANGE UP
CULTURE RESULTS: SIGNIFICANT CHANGE UP
METHOD TYPE: SIGNIFICANT CHANGE UP
ORGANISM # SPEC MICROSCOPIC CNT: SIGNIFICANT CHANGE UP
SPECIMEN SOURCE: SIGNIFICANT CHANGE UP

## 2022-12-08 PROCEDURE — 99232 SBSQ HOSP IP/OBS MODERATE 35: CPT | Mod: GC

## 2022-12-08 RX ADMIN — Medication 166.67 MILLIGRAM(S): at 17:18

## 2022-12-08 RX ADMIN — HYDROMORPHONE HYDROCHLORIDE 1 MILLIGRAM(S): 2 INJECTION INTRAMUSCULAR; INTRAVENOUS; SUBCUTANEOUS at 11:59

## 2022-12-08 RX ADMIN — OXYCODONE HYDROCHLORIDE 10 MILLIGRAM(S): 5 TABLET ORAL at 19:26

## 2022-12-08 RX ADMIN — OXYCODONE HYDROCHLORIDE 10 MILLIGRAM(S): 5 TABLET ORAL at 03:59

## 2022-12-08 RX ADMIN — OXYCODONE HYDROCHLORIDE 10 MILLIGRAM(S): 5 TABLET ORAL at 04:58

## 2022-12-08 RX ADMIN — HYDROMORPHONE HYDROCHLORIDE 1 MILLIGRAM(S): 2 INJECTION INTRAMUSCULAR; INTRAVENOUS; SUBCUTANEOUS at 16:29

## 2022-12-08 RX ADMIN — OXYCODONE HYDROCHLORIDE 10 MILLIGRAM(S): 5 TABLET ORAL at 18:26

## 2022-12-08 RX ADMIN — GABAPENTIN 800 MILLIGRAM(S): 400 CAPSULE ORAL at 16:14

## 2022-12-08 RX ADMIN — OXYCODONE HYDROCHLORIDE 10 MILLIGRAM(S): 5 TABLET ORAL at 09:49

## 2022-12-08 RX ADMIN — Medication 325 MILLIGRAM(S): at 10:49

## 2022-12-08 RX ADMIN — Medication 325 MILLIGRAM(S): at 15:05

## 2022-12-08 RX ADMIN — CHLORHEXIDINE GLUCONATE 1 APPLICATION(S): 213 SOLUTION TOPICAL at 12:00

## 2022-12-08 RX ADMIN — HYDROMORPHONE HYDROCHLORIDE 1 MILLIGRAM(S): 2 INJECTION INTRAMUSCULAR; INTRAVENOUS; SUBCUTANEOUS at 20:49

## 2022-12-08 RX ADMIN — HYDROMORPHONE HYDROCHLORIDE 1 MILLIGRAM(S): 2 INJECTION INTRAMUSCULAR; INTRAVENOUS; SUBCUTANEOUS at 16:14

## 2022-12-08 RX ADMIN — OXYCODONE HYDROCHLORIDE 10 MILLIGRAM(S): 5 TABLET ORAL at 15:05

## 2022-12-08 RX ADMIN — HYDROMORPHONE HYDROCHLORIDE 1 MILLIGRAM(S): 2 INJECTION INTRAMUSCULAR; INTRAVENOUS; SUBCUTANEOUS at 20:36

## 2022-12-08 RX ADMIN — NEBIVOLOL HYDROCHLORIDE 10 MILLIGRAM(S): 5 TABLET ORAL at 17:18

## 2022-12-08 RX ADMIN — HYDROMORPHONE HYDROCHLORIDE 1 MILLIGRAM(S): 2 INJECTION INTRAMUSCULAR; INTRAVENOUS; SUBCUTANEOUS at 12:14

## 2022-12-08 RX ADMIN — Medication 325 MILLIGRAM(S): at 09:49

## 2022-12-08 RX ADMIN — HYDROMORPHONE HYDROCHLORIDE 1 MILLIGRAM(S): 2 INJECTION INTRAMUSCULAR; INTRAVENOUS; SUBCUTANEOUS at 01:50

## 2022-12-08 RX ADMIN — Medication 325 MILLIGRAM(S): at 14:05

## 2022-12-08 RX ADMIN — Medication 325 MILLIGRAM(S): at 00:41

## 2022-12-08 RX ADMIN — Medication 325 MILLIGRAM(S): at 18:26

## 2022-12-08 RX ADMIN — HYDROMORPHONE HYDROCHLORIDE 1 MILLIGRAM(S): 2 INJECTION INTRAMUSCULAR; INTRAVENOUS; SUBCUTANEOUS at 02:05

## 2022-12-08 RX ADMIN — ENOXAPARIN SODIUM 40 MILLIGRAM(S): 100 INJECTION SUBCUTANEOUS at 19:29

## 2022-12-08 RX ADMIN — HYDROMORPHONE HYDROCHLORIDE 1 MILLIGRAM(S): 2 INJECTION INTRAMUSCULAR; INTRAVENOUS; SUBCUTANEOUS at 08:04

## 2022-12-08 RX ADMIN — Medication 325 MILLIGRAM(S): at 23:20

## 2022-12-08 RX ADMIN — OXYCODONE HYDROCHLORIDE 10 MILLIGRAM(S): 5 TABLET ORAL at 10:49

## 2022-12-08 RX ADMIN — Medication 166.67 MILLIGRAM(S): at 09:04

## 2022-12-08 RX ADMIN — GABAPENTIN 100 MILLIGRAM(S): 400 CAPSULE ORAL at 07:13

## 2022-12-08 RX ADMIN — MEROPENEM 100 MILLIGRAM(S): 1 INJECTION INTRAVENOUS at 22:33

## 2022-12-08 RX ADMIN — Medication 325 MILLIGRAM(S): at 22:32

## 2022-12-08 RX ADMIN — MEROPENEM 100 MILLIGRAM(S): 1 INJECTION INTRAVENOUS at 14:04

## 2022-12-08 RX ADMIN — Medication 166.67 MILLIGRAM(S): at 01:20

## 2022-12-08 RX ADMIN — OXYCODONE HYDROCHLORIDE 10 MILLIGRAM(S): 5 TABLET ORAL at 23:20

## 2022-12-08 RX ADMIN — Medication 325 MILLIGRAM(S): at 19:26

## 2022-12-08 RX ADMIN — Medication 325 MILLIGRAM(S): at 04:58

## 2022-12-08 RX ADMIN — NEBIVOLOL HYDROCHLORIDE 10 MILLIGRAM(S): 5 TABLET ORAL at 07:13

## 2022-12-08 RX ADMIN — HYDROMORPHONE HYDROCHLORIDE 1 MILLIGRAM(S): 2 INJECTION INTRAMUSCULAR; INTRAVENOUS; SUBCUTANEOUS at 07:49

## 2022-12-08 RX ADMIN — MEROPENEM 100 MILLIGRAM(S): 1 INJECTION INTRAVENOUS at 07:13

## 2022-12-08 RX ADMIN — OXYCODONE HYDROCHLORIDE 10 MILLIGRAM(S): 5 TABLET ORAL at 22:33

## 2022-12-08 RX ADMIN — Medication 325 MILLIGRAM(S): at 03:58

## 2022-12-08 RX ADMIN — OXYCODONE HYDROCHLORIDE 10 MILLIGRAM(S): 5 TABLET ORAL at 14:05

## 2022-12-08 RX ADMIN — OXYCODONE HYDROCHLORIDE 10 MILLIGRAM(S): 5 TABLET ORAL at 00:41

## 2022-12-08 NOTE — PROGRESS NOTE ADULT - PROBLEM SELECTOR PLAN 4
Home meds: gabapentin 100mg AM, gabapentin 800mg before 4PM, tizanidine 4mg PM  - c/w home meds

## 2022-12-08 NOTE — PROGRESS NOTE ADULT - SUBJECTIVE AND OBJECTIVE BOX
OVERNIGHT EVENTS: ISABELLE    SUBJECTIVE / INTERVAL HPI: Patient seen and examined at bedside. Had her PICC placed yesterday and endorsing mild arm pain, otherwise no acute complaints.     VITAL SIGNS:  Vital Signs Last 24 Hrs  T(C): 36.4 (08 Dec 2022 09:05), Max: 36.9 (07 Dec 2022 20:43)  T(F): 97.5 (08 Dec 2022 09:05), Max: 98.5 (07 Dec 2022 20:43)  HR: 73 (08 Dec 2022 09:05) (73 - 81)  BP: 138/87 (08 Dec 2022 09:05) (123/83 - 145/85)  BP(mean): --  RR: 16 (08 Dec 2022 09:05) (16 - 17)  SpO2: 96% (08 Dec 2022 09:05) (95% - 98%)    Parameters below as of 08 Dec 2022 09:05  Patient On (Oxygen Delivery Method): room air      I&O's Summary    07 Dec 2022 07:01  -  08 Dec 2022 07:00  --------------------------------------------------------  IN: 1160 mL / OUT: 1260 mL / NET: -100 mL    08 Dec 2022 07:01  -  08 Dec 2022 16:24  --------------------------------------------------------  IN: 1460 mL / OUT: 1150 mL / NET: 310 mL        PHYSICAL EXAM:    General: obese female resting in bed, NAD  HEENT: head NC/AT, no conjunctival injection, EOMI, MMM  Neck: supple, no JVD  Cardio: RRR, +S1/S2, no M/R/G  Resp: lungs CTAB, no cough/wheezes/rales/rhonchi  Abdo: soft, NT, ND, +bowel sounds x4, no organomegaly or palpable mass    Extremities: WWP, +Left lateral ankle w/ 1cm x .5cm ulcer without pus or surrounding erythema, wrapped in dressing, L PICC c/d/i  Vasc: 2+ radial and DP pulses b/l  Neuro: A&Ox3  Skin: dry, intact, no visible jaundice   MSK: no joint swelling    MEDICATIONS:  MEDICATIONS  (STANDING):  chlorhexidine 2% Cloths 1 Application(s) Topical daily  enoxaparin Injectable 40 milliGRAM(s) SubCutaneous every 24 hours  gabapentin 100 milliGRAM(s) Oral every 24 hours  gabapentin 800 milliGRAM(s) Oral every 24 hours  influenza   Vaccine 0.5 milliLiter(s) IntraMuscular once  meropenem  IVPB 1000 milliGRAM(s) IV Intermittent every 8 hours  nebivolol 10 milliGRAM(s) Oral <User Schedule>  vancomycin  IVPB 1250 milliGRAM(s) IV Intermittent every 8 hours    MEDICATIONS  (PRN):  acetaminophen     Tablet .. 325 milliGRAM(s) Oral every 4 hours PRN Severe Pain (7 - 10)  HYDROmorphone  Injectable 1 milliGRAM(s) IV Push every 4 hours PRN breakthrough pain  oxyCODONE    IR 10 milliGRAM(s) Oral every 4 hours PRN Severe Pain (7 - 10)      ALLERGIES:  Allergies    amoxicillin (Angioedema)  ciprofloxacin (Rash)  hydrochlorothiazide (Hives)  lisinopril (Angioedema; Rash; Hives)  penicillin (Rash)    Intolerances        LABS:                        11.3   6.48  )-----------( 366      ( 07 Dec 2022 10:00 )             35.1               CAPILLARY BLOOD GLUCOSE          RADIOLOGY & ADDITIONAL TESTS: Reviewed.

## 2022-12-08 NOTE — PROGRESS NOTE ADULT - PROBLEM SELECTOR PLAN 5
F: none  E: replete PRN  N: regular diet  GI ppx: not required  DVT ppx: lovenox 40mg QHS

## 2022-12-08 NOTE — PROGRESS NOTE ADULT - NUTRITIONAL ASSESSMENT
This patient has been assessed with a concern for Malnutrition and has been determined to have a diagnosis/diagnoses of Morbid obesity (BMI > 40).    This patient is being managed with:   Diet Regular-  Entered: Dec  4 2022 11:23PM

## 2022-12-08 NOTE — PROGRESS NOTE ADULT - ATTENDING COMMENTS
Patient was seen and examined with the resident team today.  I agree with Dr. Jacob's assessment and plan with the following exceptions/additions:     Briefly, this is a 36yo woman with a PMH of  essential HTN, morbid obesity (BMI 49), AVM c/b chronic L foot ulcer s/p transcatheter embolizations and various round of IV abx, s/p are recent admission for a recurrent infection and currently on a prolonged course of IV abx, who re-presents fevers x 24hrs and L-foot pain, sent in by Dr. Rahman.     #Fever - remove PICC, f/u blood cultures from ED; c/w Annie and Vanc as per Dr. Rahman, appreciate recs   #Chronic pain - c/w Dilaudid IV and Percocet PRN; c/w Gabapentin   #Essential HTN - c/w Bystolic   #DVT PPx - Lovenox  #Dispo - home w/IV abx presumably     Leydi Huff  [phone]
Patient was seen and examined with the resident team today.  I agree with Dr. Jacob's assessment and plan with the following exceptions/additions:     Briefly, this is a 38yo woman with a PMH of  essential HTN, morbid obesity (BMI 49), AVM c/b chronic L foot ulcer s/p transcatheter embolizations and various round of IV abx, s/p are recent admission for a recurrent infection and currently on a prolonged course of IV abx, who re-presents fevers x 24hrs and L-foot pain, sent in by Dr. Rahman.  Now s/p PICC removal given c/f line infection > cellulitis.  As cultures have remained NGTD >48hrs and she's been afebrile, will replace PICC today and coordinate resumption of home infusions for discharge this afternoon.     #Fever   #Chronic pain   #Essential HTN     Gen: NAD, lying flat in bed  HEENT: NCAT, MMM, clear OP  CV: RRR, no m/g/r appreciated  Pulm: CTA B, no w/r/r; no increase in WOB  Abd: normoactive BS, soft, NTND  Ext: WWP, 2+ pulses x4; no c/c/e  Skin: lateral aspect of L-foot with gross deformation from AVM and small scabbed (well-healing) ulcer w/o drainage or erythema   Neuro: AOx3, nonfocal  Psych: pleasant, conversant and appropriate    Leydi Huff  [phone]
Patient was seen and examined with the resident team today.  I agree with Dr. Jacob's assessment and plan with the following exceptions/additions:     Briefly, this is a 36yo woman with a PMH of  essential HTN, morbid obesity (BMI 49), AVM c/b chronic L foot ulcer s/p transcatheter embolizations and various round of IV abx, s/p are recent admission for a recurrent infection and currently on a prolonged course of IV abx, who re-presents fevers x 24hrs and L-foot pain, sent in by Dr. Rahman.  Now s/p PICC removal given c/f line infection > cellulitis.  Watching blood cultures from 12/4 x48hrs before PICC can be replaced.     #Fever - f/u blood cultures and if NGTD by tomorrow, can place new PICC; c/w Annie and Vanc as per Dr. Rahman, appreciate recs   #Chronic pain - c/w Dilaudid IV (will increase to 1mg today for better pain control) and Percocet PRN; c/w Gabapentin   #Essential HTN - c/w Bystolic   #DVT PPx - Lovenox  #Dispo - home w/IV abx likely tomorrow (12/7)    Leydi Huff  [phone]
Patient was seen and examined with the resident team today.  I agree with Dr. Jacob's assessment and plan with the following exceptions/additions:     Discharge summary signed instead because we were under the impression that  has already coordinated her home infusions.  They did not.  Remains medically cleared since PICC was replaced.      Leydi Huff  [phone]

## 2022-12-08 NOTE — PROGRESS NOTE ADULT - PROVIDER SPECIALTY LIST ADULT
Internal Medicine
Infectious Disease
Infectious Disease
Internal Medicine

## 2022-12-08 NOTE — PROGRESS NOTE ADULT - ASSESSMENT
36 yo F with HTN, MO and AVM L foot with recurrent ulcer/cellulitis on OPAT with vancomycin and meropenem.  She had had leukocytosis and worsening of inflammatory markers on outpatient labs on 11/28, despite ongoing clinical improvement in L foot cellulitis. She then developed fever, still with mild leukocytosis with PMN predominance that resolved when her PICC was not used for infusion.  Though Tm here has been 99.5, course is concerning for PICC infection.  She had had some increased warmth of L foot in area of AVM but MRI is unchanged and ulcer/cellulitis remain improved.  Reviewed MRI and XRays with Dr. Kimberly Jacob - the phlegmon underlying ulcer is surrounding the area of contrast extravasation and is without improvement from 11/2. Antibiotics alone may not cure underlying infection. She may need evaluation for surgical intervention but will be complicated in presence of AVM  Suggest:  - F/U blood cultures from 12/4   - Okay to replace PICC as long as blood cultures remain negative  - Continue vancomycin 1250 mg IV q8h  - Continue meropenem 1 g IV q8h  - Weekly labs while on antibiotics:  CBC, CMP, ESR, CRP, vancomycin trough.  Fax to me at [phone]  - Will discuss possible Ortho evaluation with Dr. Teran.  - She has outpatient f/u scheduled with me on 12/16 at 10 am  Recommendations discussed with primary team.  Please recall if further ID input is desired - team 2.  
38 yo F with HTN, MO and AVM L foot with recurrent ulcer/cellulitis on OPAT with vancomycin and meropenem.  She had had leukocytosis and worsening of inflammatory markers on outpatient labs on 11/28, despite ongoing clinical improvement in L foot cellulitis. She then developed fever, still with mild leukocytosis with PMN predominance that resolved when her PICC was not used for infusion.  Though Tm here has been 99.5, course is concerning for PICC infection.  She had had some increased warmth of L foot in area of AVM but MRI is unchanged and ulcer/cellulitis remain improved.  Reviewed MRI and XRays with Dr. Kimberly Jacob - the phlegmon underlying ulcer is surrounding the area of contrast extravasation and is without improvement from 11/2. Antibiotics alone may not cure underlying infection. She may need evaluation for surgical intervention but will be complicated in presence of AVM  Suggest:  - F/U blood cultures from 12/4 - can replace PICC if cultures remain negative for >48 h  - Continue vancomycin 1250 mg IV q8h  - Continue meropenem 1 g IV q8h  - Weekly labs while on antibiotics:  CBC, CMP, ESR, CRP, vancomycin trough.  - Will discuss possible Ortho evaluation with Dr. Teran.  Recommendations discussed with primary team.  Will follow with you - team 2.  
38yo woman w/ HTN, Morbid obesity (BMI 49), hx chronic L foot ulcer s/p transcatheter embolization of AVM 10/11, previously on IV Vanc+Cefepime 10/11-16 Cx growing PsA, Staph, Corynebacter, recently admitted 10/23-11/10 for increased L foot pain discharged on home infusion IV abx with PICC line, who presents d/t persistent pain and fever.
36yo woman w/ HTN, Morbid obesity (BMI 49), hx chronic L foot ulcer s/p transcatheter embolization of AVM 10/11, previously on IV Vanc+Cefepime 10/11-16 Cx growing PsA, Staph, Corynebacter, recently admitted 10/23-11/10 for increased L foot pain discharged on home infusion IV abx with PICC line, who presents d/t persistent pain and fever.

## 2022-12-08 NOTE — PROGRESS NOTE ADULT - PROBLEM SELECTOR PLAN 1
Fever  Pt has hx of AVM L foot s/p multiple transcatheter and stick embolizations, chronic L foot ulcer, culture from OR debridement 10/11 with Pseudomonas treated with brief course of vanc and cefepime with recurrence of infection and readmission and d/c with picc line for outpatient IV Abx. s/p additional two week course abx with meropenem and vancomycin. Pt follows with Dr. Rahman outpatient who saw pt in office 12/2 at which time decision was made to continue abx for additional 2 weeks (end date 12/16) as inflammatory markers and WBC are up. 12/5 patient febrile to 101.6 at home and sent to ED. In ED noted to have elevated ESR and CRP otherwise w/o significant clinical signs that show worsening LLE ulcer or localized infection. PICC line not flushing and erythematous at insertion site, concerning for CLABSI.     Had replacement PICC placed by IR 12/7  - Patient afebrile and leukocytosis resolved   - MRI w/o osteomyelitis  - PICC line removed 12/5   - Blood cx after 48hours  - Continue meropenem 1 g IV q 8 h and vancomycin 1250 mg q 8 h an additional 2 weeks through peripheral line, tentative end date 12/16  - Continue local wound dressings  - Trend fever and leukocytosis  - Pain regimen: oxycodone 10/acetaminophen 325mg q4h for severe pain, 1mg IV dilaudid q4h for breakthrough pain

## 2022-12-08 NOTE — PROGRESS NOTE ADULT - PROBLEM SELECTOR PROBLEM 3
Morbid obesity with body mass index (BMI) of 40.0 to 49.9

## 2022-12-08 NOTE — PROGRESS NOTE ADULT - PROBLEM SELECTOR PLAN 2
- c/w home bystolic 10mg BID

## 2022-12-09 ENCOUNTER — TRANSCRIPTION ENCOUNTER (OUTPATIENT)
Age: 37
End: 2022-12-09

## 2022-12-09 VITALS
TEMPERATURE: 98 F | HEART RATE: 81 BPM | SYSTOLIC BLOOD PRESSURE: 138 MMHG | DIASTOLIC BLOOD PRESSURE: 89 MMHG | OXYGEN SATURATION: 95 % | RESPIRATION RATE: 18 BRPM

## 2022-12-09 PROCEDURE — 85027 COMPLETE CBC AUTOMATED: CPT

## 2022-12-09 PROCEDURE — 85652 RBC SED RATE AUTOMATED: CPT

## 2022-12-09 PROCEDURE — 36573 INSJ PICC RS&I 5 YR+: CPT

## 2022-12-09 PROCEDURE — 81025 URINE PREGNANCY TEST: CPT

## 2022-12-09 PROCEDURE — 96376 TX/PRO/DX INJ SAME DRUG ADON: CPT

## 2022-12-09 PROCEDURE — 87040 BLOOD CULTURE FOR BACTERIA: CPT

## 2022-12-09 PROCEDURE — 82330 ASSAY OF CALCIUM: CPT

## 2022-12-09 PROCEDURE — 81001 URINALYSIS AUTO W/SCOPE: CPT

## 2022-12-09 PROCEDURE — 84100 ASSAY OF PHOSPHORUS: CPT

## 2022-12-09 PROCEDURE — 87637 SARSCOV2&INF A&B&RSV AMP PRB: CPT

## 2022-12-09 PROCEDURE — 36415 COLL VENOUS BLD VENIPUNCTURE: CPT

## 2022-12-09 PROCEDURE — 93005 ELECTROCARDIOGRAM TRACING: CPT

## 2022-12-09 PROCEDURE — 84132 ASSAY OF SERUM POTASSIUM: CPT

## 2022-12-09 PROCEDURE — 83735 ASSAY OF MAGNESIUM: CPT

## 2022-12-09 PROCEDURE — 80053 COMPREHEN METABOLIC PANEL: CPT

## 2022-12-09 PROCEDURE — 87186 SC STD MICRODIL/AGAR DIL: CPT

## 2022-12-09 PROCEDURE — 80202 ASSAY OF VANCOMYCIN: CPT

## 2022-12-09 PROCEDURE — 83605 ASSAY OF LACTIC ACID: CPT

## 2022-12-09 PROCEDURE — C1751: CPT

## 2022-12-09 PROCEDURE — 84295 ASSAY OF SERUM SODIUM: CPT

## 2022-12-09 PROCEDURE — 90686 IIV4 VACC NO PRSV 0.5 ML IM: CPT

## 2022-12-09 PROCEDURE — 71046 X-RAY EXAM CHEST 2 VIEWS: CPT

## 2022-12-09 PROCEDURE — 82803 BLOOD GASES ANY COMBINATION: CPT

## 2022-12-09 PROCEDURE — 85025 COMPLETE CBC W/AUTO DIFF WBC: CPT

## 2022-12-09 PROCEDURE — 73723 MRI JOINT LWR EXTR W/O&W/DYE: CPT | Mod: MA

## 2022-12-09 PROCEDURE — 99239 HOSP IP/OBS DSCHRG MGMT >30: CPT

## 2022-12-09 PROCEDURE — 96374 THER/PROPH/DIAG INJ IV PUSH: CPT

## 2022-12-09 PROCEDURE — 87181 SC STD AGAR DILUTION PER AGT: CPT

## 2022-12-09 PROCEDURE — 87086 URINE CULTURE/COLONY COUNT: CPT

## 2022-12-09 PROCEDURE — 86140 C-REACTIVE PROTEIN: CPT

## 2022-12-09 PROCEDURE — 99285 EMERGENCY DEPT VISIT HI MDM: CPT | Mod: 25

## 2022-12-09 PROCEDURE — A9585: CPT

## 2022-12-09 RX ORDER — OXYCODONE HYDROCHLORIDE 5 MG/1
1 TABLET ORAL
Qty: 0 | Refills: 0 | DISCHARGE
Start: 2022-12-09

## 2022-12-09 RX ADMIN — HYDROMORPHONE HYDROCHLORIDE 1 MILLIGRAM(S): 2 INJECTION INTRAMUSCULAR; INTRAVENOUS; SUBCUTANEOUS at 00:32

## 2022-12-09 RX ADMIN — MEROPENEM 100 MILLIGRAM(S): 1 INJECTION INTRAVENOUS at 05:09

## 2022-12-09 RX ADMIN — NEBIVOLOL HYDROCHLORIDE 10 MILLIGRAM(S): 5 TABLET ORAL at 07:41

## 2022-12-09 RX ADMIN — HYDROMORPHONE HYDROCHLORIDE 1 MILLIGRAM(S): 2 INJECTION INTRAMUSCULAR; INTRAVENOUS; SUBCUTANEOUS at 04:40

## 2022-12-09 RX ADMIN — HYDROMORPHONE HYDROCHLORIDE 1 MILLIGRAM(S): 2 INJECTION INTRAMUSCULAR; INTRAVENOUS; SUBCUTANEOUS at 08:41

## 2022-12-09 RX ADMIN — INFLUENZA VIRUS VACCINE 0.5 MILLILITER(S): 15; 15; 15; 15 SUSPENSION INTRAMUSCULAR at 14:43

## 2022-12-09 RX ADMIN — OXYCODONE HYDROCHLORIDE 10 MILLIGRAM(S): 5 TABLET ORAL at 08:00

## 2022-12-09 RX ADMIN — HYDROMORPHONE HYDROCHLORIDE 1 MILLIGRAM(S): 2 INJECTION INTRAMUSCULAR; INTRAVENOUS; SUBCUTANEOUS at 05:00

## 2022-12-09 RX ADMIN — OXYCODONE HYDROCHLORIDE 10 MILLIGRAM(S): 5 TABLET ORAL at 03:29

## 2022-12-09 RX ADMIN — MEROPENEM 100 MILLIGRAM(S): 1 INJECTION INTRAVENOUS at 14:19

## 2022-12-09 RX ADMIN — CHLORHEXIDINE GLUCONATE 1 APPLICATION(S): 213 SOLUTION TOPICAL at 11:38

## 2022-12-09 RX ADMIN — Medication 325 MILLIGRAM(S): at 02:40

## 2022-12-09 RX ADMIN — GABAPENTIN 100 MILLIGRAM(S): 400 CAPSULE ORAL at 07:10

## 2022-12-09 RX ADMIN — Medication 325 MILLIGRAM(S): at 11:39

## 2022-12-09 RX ADMIN — Medication 325 MILLIGRAM(S): at 07:10

## 2022-12-09 RX ADMIN — OXYCODONE HYDROCHLORIDE 10 MILLIGRAM(S): 5 TABLET ORAL at 07:11

## 2022-12-09 RX ADMIN — Medication 166.67 MILLIGRAM(S): at 08:55

## 2022-12-09 RX ADMIN — OXYCODONE HYDROCHLORIDE 10 MILLIGRAM(S): 5 TABLET ORAL at 11:37

## 2022-12-09 RX ADMIN — OXYCODONE HYDROCHLORIDE 10 MILLIGRAM(S): 5 TABLET ORAL at 02:39

## 2022-12-09 RX ADMIN — Medication 166.67 MILLIGRAM(S): at 00:32

## 2022-12-09 RX ADMIN — Medication 325 MILLIGRAM(S): at 03:20

## 2022-12-09 RX ADMIN — Medication 325 MILLIGRAM(S): at 08:00

## 2022-12-09 RX ADMIN — HYDROMORPHONE HYDROCHLORIDE 1 MILLIGRAM(S): 2 INJECTION INTRAMUSCULAR; INTRAVENOUS; SUBCUTANEOUS at 00:55

## 2022-12-09 RX ADMIN — HYDROMORPHONE HYDROCHLORIDE 1 MILLIGRAM(S): 2 INJECTION INTRAMUSCULAR; INTRAVENOUS; SUBCUTANEOUS at 13:44

## 2022-12-09 NOTE — DISCHARGE NOTE NURSING/CASE MANAGEMENT/SOCIAL WORK - PATIENT PORTAL LINK FT
You can access the FollowMyHealth Patient Portal offered by [facility] by registering at the following website: [email or web address]. By joining [facility]’s FollowMyHealth portal, you will also be able to view your health information using other applications (apps) compatible with our system.

## 2022-12-09 NOTE — DISCHARGE NOTE NURSING/CASE MANAGEMENT/SOCIAL WORK - NSDCVIVACCINE_GEN_ALL_CORE_FT
influenza, injectable, quadrivalent, preservative free; 22-Oct-2020 10:12; Miley Hebert (RN); [employer]; [id number] (Exp. Date: 30-Jun-2021); IntraMuscular; Deltoid Left.; 0.5 milliLiter(s); VIS (VIS Published: 15-Aug-2019, VIS Presented: 22-Oct-2020);   influenza, injectable, quadrivalent, preservative free; 14-Oct-2021 09:19; Margie Taveras (RN); [employer]; [id number] (Exp. Date: 30-Jun-2022); IntraMuscular; Deltoid Right.; 0.5 milliLiter(s); VIS (VIS Published: 06-Aug-2021, VIS Presented: 14-Oct-2021);

## 2022-12-14 DIAGNOSIS — I10 ESSENTIAL (PRIMARY) HYPERTENSION: ICD-10-CM

## 2022-12-14 DIAGNOSIS — L97.329 NON-PRESSURE CHRONIC ULCER OF LEFT ANKLE WITH UNSPECIFIED SEVERITY: ICD-10-CM

## 2022-12-14 DIAGNOSIS — G89.29 OTHER CHRONIC PAIN: ICD-10-CM

## 2022-12-14 DIAGNOSIS — E66.01 MORBID (SEVERE) OBESITY DUE TO EXCESS CALORIES: ICD-10-CM

## 2022-12-14 DIAGNOSIS — N39.0 URINARY TRACT INFECTION, SITE NOT SPECIFIED: ICD-10-CM

## 2022-12-14 DIAGNOSIS — B95.2 ENTEROCOCCUS AS THE CAUSE OF DISEASES CLASSIFIED ELSEWHERE: ICD-10-CM

## 2022-12-14 DIAGNOSIS — Q27.32 ARTERIOVENOUS MALFORMATION OF VESSEL OF LOWER LIMB: ICD-10-CM

## 2022-12-16 ENCOUNTER — APPOINTMENT (OUTPATIENT)
Dept: INFECTIOUS DISEASE | Facility: CLINIC | Age: 37
End: 2022-12-16
Payer: MEDICARE

## 2022-12-16 VITALS
BODY MASS INDEX: 45.99 KG/M2 | HEIGHT: 67 IN | TEMPERATURE: 97.6 F | HEART RATE: 102 BPM | OXYGEN SATURATION: 97 % | SYSTOLIC BLOOD PRESSURE: 181 MMHG | DIASTOLIC BLOOD PRESSURE: 133 MMHG | WEIGHT: 293 LBS

## 2022-12-16 VITALS — SYSTOLIC BLOOD PRESSURE: 171 MMHG | DIASTOLIC BLOOD PRESSURE: 138 MMHG

## 2022-12-16 PROCEDURE — 99214 OFFICE O/P EST MOD 30 MIN: CPT

## 2022-12-16 NOTE — ASSESSMENT
[FreeTextEntry1] : 36 yo F with HTN, AVM L foot s/p multiple transcatheter and stick embolizations, chronic L foot ulcer, culture from OR debridement 10/11 with Pseudomonas treated with brief course of vanc and cefepime with recurrence of infection. She has been on a prolonged course of vancomycin and meropenem.  Recently admitted with fevers, resolved after PICC exchange. Ulcer continues to heal. Inflammatory markers remain mildly elevated.  Vancomycin trough is therapeutic.\par Plan:\par - Continue meropenem 1 g IV q 8 h and vancomycin 1250 mg q 8 h an additional 3 weeks for now (until 1/6). If wound closes prior to that date, she will contact the office and we will decide when to d/c antibiotics\par - CBC, CMP, ESR, CRP, vanco trough weekly while on antibiotics\par - Continue local wound care\par Follow up in 3 weeks. Call if fever, chills, worsening appearance of ulcer or additional concerns.

## 2022-12-16 NOTE — PHYSICAL EXAM
[General Appearance - Alert] : alert [General Appearance - Well-Appearing] : healthy appearing [Sclera] : the sclera and conjunctiva were normal [Auscultation Breath Sounds / Voice Sounds] : lungs were clear to auscultation bilaterally [Heart Rate And Rhythm] : heart rate was normal and rhythm regular [Heart Sounds] : normal S1 and S2 [Murmurs] : no murmurs [Edema] : there was no peripheral edema [Bowel Sounds] : normal bowel sounds [Abdomen Soft] : soft [Abdomen Tenderness] : non-tender [Costovertebral Angle Tenderness] : no CVA tenderness [Skin Color & Pigmentation] : normal skin color and pigmentation [] : no rash [FreeTextEntry1] : L lateral foot ulcer markedly improved - ~2-3 mm, shallow, with very mild surrounding erythema, no drainage.  LUE PICC exit site no erythema, tenderness, drainage or warmth

## 2022-12-16 NOTE — DATA REVIEWED
[FreeTextEntry1] : 12/11:  WBC 6.3  H/H 11.9/36.0  plt 426K  CMP with Na 145, Cl 93, CO2 19,  ESR 42 (0-32), CRP 23 (0-10), vancomycin trough 13.1

## 2022-12-16 NOTE — REVIEW OF SYSTEMS
[As Noted in HPI] : as noted in HPI [Fever] : no fever [Chills] : no chills [Eye Pain] : no eye pain [Eyesight Problems] : no eyesight problems [Nasal Discharge] : no nasal discharge [Sore Throat] : no sore throat [Chest Pain] : no chest pain [Palpitations] : no palpitations [Shortness Of Breath] : no shortness of breath [Cough] : no cough [Abdominal Pain] : no abdominal pain [Vomiting] : no vomiting [Diarrhea] : no diarrhea [Dysuria] : no dysuria [Skin Lesions] : no skin lesions [FreeTextEntry2] : Occ mild sweats, feels tired

## 2022-12-28 ENCOUNTER — NON-APPOINTMENT (OUTPATIENT)
Age: 37
End: 2022-12-28

## 2023-01-06 ENCOUNTER — APPOINTMENT (OUTPATIENT)
Dept: INFECTIOUS DISEASE | Facility: CLINIC | Age: 38
End: 2023-01-06
Payer: MEDICARE

## 2023-01-06 VITALS
SYSTOLIC BLOOD PRESSURE: 187 MMHG | HEIGHT: 67 IN | BODY MASS INDEX: 45.99 KG/M2 | DIASTOLIC BLOOD PRESSURE: 134 MMHG | HEART RATE: 100 BPM | OXYGEN SATURATION: 100 % | TEMPERATURE: 97.6 F | WEIGHT: 293 LBS

## 2023-01-06 PROCEDURE — 99213 OFFICE O/P EST LOW 20 MIN: CPT | Mod: 25

## 2023-01-06 PROCEDURE — 36415 COLL VENOUS BLD VENIPUNCTURE: CPT

## 2023-01-06 NOTE — REVIEW OF SYSTEMS
[As Noted in HPI] : as noted in HPI [Fever] : no fever [Chills] : no chills [Eye Pain] : no eye pain [Eyesight Problems] : no eyesight problems [Nasal Discharge] : no nasal discharge [Sore Throat] : no sore throat [Chest Pain] : no chest pain [Palpitations] : no palpitations [Shortness Of Breath] : no shortness of breath [Cough] : no cough [Abdominal Pain] : no abdominal pain [Vomiting] : no vomiting [Dysuria] : no dysuria [FreeTextEntry2] : No night sweats

## 2023-01-06 NOTE — PHYSICAL EXAM
[General Appearance - Alert] : alert [General Appearance - Well-Appearing] : healthy appearing [Sclera] : the sclera and conjunctiva were normal [Auscultation Breath Sounds / Voice Sounds] : lungs were clear to auscultation bilaterally [Heart Rate And Rhythm] : heart rate was normal and rhythm regular [Heart Sounds] : normal S1 and S2 [Murmurs] : no murmurs [Edema] : there was no peripheral edema [Bowel Sounds] : normal bowel sounds [Abdomen Soft] : soft [Abdomen Tenderness] : non-tender [Costovertebral Angle Tenderness] : no CVA tenderness [Skin Color & Pigmentation] : normal skin color and pigmentation [] : no rash [FreeTextEntry1] : L lateral foot ulcer pinpoint, minimal erythema, no drainage.  Former PICC exit site healing, some erythema related to tape

## 2023-01-06 NOTE — ASSESSMENT
[FreeTextEntry1] : 37 year old female with HTN, AVM L foot s/p multiple transcatheter and stick embolizations, chronic L foot ulcer, culture from OR debridement 10/11 with Pseudomonas treated with brief course of vanc and cefepime with recurrence of infection. She was readmitted with fevers, resolved after PICC exchange. She completed prolonged course of meropenem and vancomycin. Antibiotics discontinued after wound closed. She is doing well. No signs of active infection. \par Plan:\par - CBC, CMP, ESR, CRP drawn for post treatment baseline\par - Continue f/u with Dr. Teran\par - Contact the office with any concerns for infection\par F/u PRN.

## 2023-01-06 NOTE — HISTORY OF PRESENT ILLNESS
[FreeTextEntry1] : 37 year old female with HTN, AVM L foot s/p multiple transcatheter and stick embolizations, chronic L foot ulcer. She had OR debridement on 10/11, culture with Pseudomonas. She was treated with brief course of vanc and cefepime with recurrence of infection. She was then treated with course of meropenem 1 g IV q 8 h and vancomycin 1250 mg q 8 h (11/7/22 – 12/29/22). Wound closed on 12/23 and has remained closed. She has noted increased pulsing sensation in foot. She saw Dr. Teran who, per patient, suggested she may need another embolization. She is hesitant to do this given recent infection.

## 2023-01-09 ENCOUNTER — NON-APPOINTMENT (OUTPATIENT)
Age: 38
End: 2023-01-09

## 2023-01-09 LAB
ALBUMIN SERPL ELPH-MCNC: 4.1 G/DL
ALP BLD-CCNC: 79 U/L
ALT SERPL-CCNC: 10 U/L
ANION GAP SERPL CALC-SCNC: 14 MMOL/L
AST SERPL-CCNC: 10 U/L
BASOPHILS # BLD AUTO: 0.05 K/UL
BASOPHILS NFR BLD AUTO: 0.5 %
BILIRUB SERPL-MCNC: 0.7 MG/DL
BUN SERPL-MCNC: 10 MG/DL
CALCIUM SERPL-MCNC: 9.5 MG/DL
CHLORIDE SERPL-SCNC: 102 MMOL/L
CO2 SERPL-SCNC: 20 MMOL/L
CREAT SERPL-MCNC: 0.64 MG/DL
CRP SERPL-MCNC: 28 MG/L
EGFR: 115 ML/MIN/1.73M2
EOSINOPHIL # BLD AUTO: 0.07 K/UL
EOSINOPHIL NFR BLD AUTO: 0.7 %
ERYTHROCYTE [SEDIMENTATION RATE] IN BLOOD BY WESTERGREN METHOD: 40 MM/HR
GLUCOSE SERPL-MCNC: 95 MG/DL
HCT VFR BLD CALC: 37.9 %
HGB BLD-MCNC: 12.5 G/DL
IMM GRANULOCYTES NFR BLD AUTO: 0.3 %
LYMPHOCYTES # BLD AUTO: 1.39 K/UL
LYMPHOCYTES NFR BLD AUTO: 13.9 %
MAN DIFF?: NORMAL
MCHC RBC-ENTMCNC: 28.2 PG
MCHC RBC-ENTMCNC: 33 GM/DL
MCV RBC AUTO: 85.6 FL
MONOCYTES # BLD AUTO: 0.53 K/UL
MONOCYTES NFR BLD AUTO: 5.3 %
NEUTROPHILS # BLD AUTO: 7.9 K/UL
NEUTROPHILS NFR BLD AUTO: 79.3 %
PLATELET # BLD AUTO: 491 K/UL
POTASSIUM SERPL-SCNC: 4.3 MMOL/L
PROT SERPL-MCNC: 7.1 G/DL
RBC # BLD: 4.43 M/UL
RBC # FLD: 13 %
SODIUM SERPL-SCNC: 136 MMOL/L
WBC # FLD AUTO: 9.97 K/UL

## 2023-01-15 NOTE — ED ADULT NURSE NOTE - ALCOHOL PRE SCREEN (AUDIT - C)
EXAMINATION TYPE: CT lumbar spine wo con

 

DATE OF EXAM: 1/15/2023

 

COMPARISON: None

 

HISTORY: traumatic spinal tap, r/o epidural hematoma

 

CT DLP: 1680.6 mGycm

Automated exposure control for dose reduction was used.

 

Images obtained from the level of T12-S1 vertebra without contrast.

 

The lumbar vertebrae have normal alignment. There is spurring of the endplates throughout the lumbar 
spine. No compression fracture. Facet joints are intact. There is mild hypertrophic facet arthropathy
 and ligament thickening with lateral recess stenosis at L4-5. The sacroiliac joints are intact. No l
umbar paraspinal mass. No evidence of epidural mass. There is atherosclerotic vascular calcification.


 

IMPRESSION:

Hypertrophic degenerative changes in the lumbar spine. No evidence of epidural hematoma. Statement Selected

## 2023-01-17 NOTE — PROGRESS NOTE ADULT - SUBJECTIVE AND OBJECTIVE BOX
O/N: ISABELLE, VSS, dressing change with Baci solution                        34 F with PMhx of HTN, left leg AVM s/p embolization x 9 presents with one week of left lateral malleolus ulcer. On exam, erythema, tenderness to palpation, labs significant for leukocytosis concern for infected wound.    f/u Blood cultures  Antonacci protocol dressing changes  continue to trend Lactate  will d/c fluids this morning   Vancomycin/ Cefepime given penicillin anaphylaxis  Vanc trough 12/29 17:30  Consulted ID (Dr. Rahman) Approved for Cefepime x3 days   Wound cultures. O/N: ISABELLE, VSS, dressing change with Baci solution        cefepime   IVPB 2000  vancomycin  IVPB 1000  cefepime   IVPB 2000  enoxaparin Injectable 40  vancomycin  IVPB 1000      Allergies    hydrochlorothiazide (Hives)  lisinopril (Angioedema; Rash; Hives)  penicillins (Anaphylaxis)    Intolerances        Vital Signs Last 24 Hrs  T(C): 36.3 (29 Dec 2019 05:49), Max: 37.1 (28 Dec 2019 18:05)  T(F): 97.3 (29 Dec 2019 05:49), Max: 98.8 (28 Dec 2019 22:16)  HR: 93 (29 Dec 2019 05:49) (87 - 102)  BP: 128/76 (29 Dec 2019 05:49) (128/76 - 176/96)  BP(mean): --  RR: 16 (29 Dec 2019 05:49) (16 - 16)  SpO2: 96% (29 Dec 2019 05:49) (96% - 99%)  I&O's Summary    28 Dec 2019 07:01  -  29 Dec 2019 07:00  --------------------------------------------------------  IN: 840 mL / OUT: 800 mL / NET: 40 mL        Physical Exam:  General:  Pulmonary:  Cardiovascular:  Abdominal:  Extremities:  Pulses:   Right:                                                                          Left:  FEM [ ]2+ [ ]1+ [ ]doppler                                             FEM [ ]2+ [ ]1+ [ ]doppler    POP [ ]2+ [ ]1+ [ ]doppler                                             POP [ ]2+ [ ]1+ [ ]doppler    DP [ ]2+ [ ]1+ [ ]doppler                                                DP [ ]2+ [ ]1+ [ ]doppler  PT[ ]2+ [ ]1+ [ ]doppler                                                  PT [ ]2+ [ ]1+ [ ]doppler      LABS:                        11.3   8.78  )-----------( 392      ( 28 Dec 2019 06:50 )             36.3     12-28    137  |  106  |  13  ----------------------------<  107<H>  4.0   |  20<L>  |  0.58    Ca    8.3<L>      28 Dec 2019 06:50  Phos  3.0     12-28  Mg     1.6     12-28    TPro  7.9  /  Alb  4.4  /  TBili  0.6  /  DBili  x   /  AST  10  /  ALT  6<L>  /  AlkPhos  77  12-27        Radiology and Additional Studies:                34 F with PMhx of HTN, left leg AVM s/p embolization x 9 presents with one week of left lateral malleolus ulcer. On exam, erythema, tenderness to palpation, labs significant for leukocytosis concern for infected wound.    f/u Blood cultures  [facility]  continue to trend Lactate  will d/c fluids this morning   Vancomycin/ Cefepime given penicillin anaphylaxis  Vanc trough 12/29 17:30  Consulted ID (Dr. Rahman) Approved for Cefepime x3 days   Wound cultures. O/N: ISABELLE, VSS, dressing change with Baci solution    pain better controlled overnight. Denies CP, SOB     cefepime   IVPB 2000  vancomycin  IVPB 1000  cefepime   IVPB 2000  enoxaparin Injectable 40  vancomycin  IVPB 1000      Allergies    hydrochlorothiazide (Hives)  lisinopril (Angioedema; Rash; Hives)  penicillins (Anaphylaxis)    Intolerances        Vital Signs Last 24 Hrs  T(C): 36.3 (29 Dec 2019 05:49), Max: 37.1 (28 Dec 2019 18:05)  T(F): 97.3 (29 Dec 2019 05:49), Max: 98.8 (28 Dec 2019 22:16)  HR: 93 (29 Dec 2019 05:49) (87 - 102)  BP: 128/76 (29 Dec 2019 05:49) (128/76 - 176/96)  BP(mean): --  RR: 16 (29 Dec 2019 05:49) (16 - 16)  SpO2: 96% (29 Dec 2019 05:49) (96% - 99%)  I&O's Summary    28 Dec 2019 07:01  -  29 Dec 2019 07:00  --------------------------------------------------------  IN: 840 mL / OUT: 800 mL / NET: 40 mL        Physical Exam:  General:NAD AAOx3   Pulmonary: b/l Clear   Cardiovascular: S1S2 regular   Extremities:2+ edema of left ankle   ulceration without surrounding erythema or drainage noted   Pulses: DP pulse palpable in left foot         LABS:                        11.3   8.78  )-----------( 392      ( 28 Dec 2019 06:50 )             36.3     12-28    137  |  106  |  13  ----------------------------<  107<H>  4.0   |  20<L>  |  0.58    Ca    8.3<L>      28 Dec 2019 06:50  Phos  3.0     12-28  Mg     1.6     12-28    TPro  7.9  /  Alb  4.4  /  TBili  0.6  /  DBili  x   /  AST  10  /  ALT  6<L>  /  AlkPhos  77  12-27        Radiology and Additional Studies:      34 F with PMhx of HTN, left leg AVM s/p embolization x 9 presents with one week of left lateral malleolus ulcer. On exam, erythema, tenderness to palpation, labs significant for leukocytosis concern for infected wound.    f/u wound cultures   Antonacci protocol dressing changes  Vancomycin/ Cefepime given penicillin anaphylaxis  Vancomycin dose increased to 2g IV q12 hrs   Consulted ID (Dr. Rahman) Approved for Cefepime x3 days all other ROS negative except as per HPI

## 2023-01-20 ENCOUNTER — NON-APPOINTMENT (OUTPATIENT)
Age: 38
End: 2023-01-20

## 2023-01-20 VITALS
SYSTOLIC BLOOD PRESSURE: 138 MMHG | HEART RATE: 86 BPM | DIASTOLIC BLOOD PRESSURE: 71 MMHG | WEIGHT: 293 LBS | HEIGHT: 67 IN | RESPIRATION RATE: 18 BRPM | TEMPERATURE: 98 F | OXYGEN SATURATION: 96 %

## 2023-01-20 RX ORDER — CHLORHEXIDINE GLUCONATE 213 G/1000ML
1 SOLUTION TOPICAL ONCE
Refills: 0 | Status: DISCONTINUED | OUTPATIENT
Start: 2023-01-24 | End: 2023-02-07

## 2023-01-20 NOTE — H&P ADULT - ASSESSMENT
38 y/o female, morbidly obese, w/ PMHx HTN and left foot AVM w/ chronic left foot ulcer (s/p multiple embolizations, previously requiring IV antibiotics, s/p prior OR debridement 10/11/2022, and subsequently treated w/ IV Meropenem/Vancomycin from 11/07/2022 to 12/29/2022 w/ wound closure on 12/23/2022) who presents for repeat direct stick embolization. Patient difficult stick, CBC was obtained and within normal limits, and beta-HCG was negative; remainder of labs to be obtained in the procedure room.   				  ASA: III		Mallampati class: IV	            Sedation Plan: Moderate    Patient Is Suitable Candidate For Sedation: Yes      Risks & benefits of procedure and alternative therapy have been explained to the patient including but not limited to: allergic reaction, bleeding w/possible need for blood transfusion, infection, renal and vascular compromise, limb damage, emergent surgery. Informed consent obtained and in chart.

## 2023-01-20 NOTE — H&P ADULT - HISTORY OF PRESENT ILLNESS
Physician: Dr. Jeffry CRAWFORD:  Pharmacy:  [facility]:    37 year old female with HTN, Morbid obesity (BMI 49), hx L foot AVM/chronic L foot ulcer s/p multiple transcatheter embolization 10/11, previously on IV Vanc+Cefepime 10/11-16 Cx growing PsA, Staph, Corynebacter, recently admitted 10/23-11/10 for increased L foot pain discharged on home infusion IV abx, who presented 11/722-12/29/22  s/p IV ABX (meropenem 1g and Vanco 1250 mg) s/p would closure on 12/23/22. She has noted increased pulsing sensations in her left foot and now presents to [facility] for direct stick embolization.  COVID: Negative 01/21/2023, printed and in patient's chart   Outpatient: Dr. Teran   Pharmacy: [facility] [id number] (in [place])    36 y/o female, morbidly obese, w/ PMHx HTN and left foot AVM w/ chronic left foot ulcer (s/p multiple embolizations, previously requiring IV antibiotics, s/p prior OR debridement 10/11/2022, and subsequently treated w/ IV Meropenem/Vancomycin from 11/07/2022 to 12/29/2022 w/ wound closure on 12/23/2022) who presented to outpatient provider for continued follow up. Patient noted recent increased pulsing sensation in her foot and was told she may require additional embolization, though patient was initially hesitant to pursue this given recent infection. Patient denied any additional complaints.     Patient now presented for repeat direct stick embolization of the left foot.

## 2023-01-24 ENCOUNTER — INPATIENT (INPATIENT)
Facility: HOSPITAL | Age: 38
LOS: 13 days | Discharge: HOME HEALTH | DRG: 253 | End: 2023-02-07
Attending: RADIOLOGY | Admitting: RADIOLOGY
Payer: MEDICARE

## 2023-01-24 ENCOUNTER — TRANSCRIPTION ENCOUNTER (OUTPATIENT)
Age: 38
End: 2023-01-24

## 2023-01-24 DIAGNOSIS — E66.01 MORBID (SEVERE) OBESITY DUE TO EXCESS CALORIES: Chronic | ICD-10-CM

## 2023-01-24 DIAGNOSIS — Z98.89 OTHER SPECIFIED POSTPROCEDURAL STATES: Chronic | ICD-10-CM

## 2023-01-24 DIAGNOSIS — Z41.9 ENCOUNTER FOR PROCEDURE FOR PURPOSES OTHER THAN REMEDYING HEALTH STATE, UNSPECIFIED: Chronic | ICD-10-CM

## 2023-01-24 LAB
ALBUMIN SERPL ELPH-MCNC: 4 G/DL — SIGNIFICANT CHANGE UP (ref 3.3–5)
ALP SERPL-CCNC: 86 U/L — SIGNIFICANT CHANGE UP (ref 40–120)
ALT FLD-CCNC: 17 U/L — SIGNIFICANT CHANGE UP (ref 10–45)
ANION GAP SERPL CALC-SCNC: 11 MMOL/L — SIGNIFICANT CHANGE UP (ref 5–17)
APTT BLD: 32.9 SEC — SIGNIFICANT CHANGE UP (ref 27.5–35.5)
AST SERPL-CCNC: 18 U/L — SIGNIFICANT CHANGE UP (ref 10–40)
BASOPHILS # BLD AUTO: 0.08 K/UL — SIGNIFICANT CHANGE UP (ref 0–0.2)
BASOPHILS NFR BLD AUTO: 0.9 % — SIGNIFICANT CHANGE UP (ref 0–2)
BILIRUB SERPL-MCNC: 0.6 MG/DL — SIGNIFICANT CHANGE UP (ref 0.2–1.2)
BUN SERPL-MCNC: 13 MG/DL — SIGNIFICANT CHANGE UP (ref 7–23)
CALCIUM SERPL-MCNC: 9.4 MG/DL — SIGNIFICANT CHANGE UP (ref 8.4–10.5)
CHLORIDE SERPL-SCNC: 99 MMOL/L — SIGNIFICANT CHANGE UP (ref 96–108)
CO2 SERPL-SCNC: 25 MMOL/L — SIGNIFICANT CHANGE UP (ref 22–31)
CREAT SERPL-MCNC: 0.74 MG/DL — SIGNIFICANT CHANGE UP (ref 0.5–1.3)
EGFR: 105 ML/MIN/1.73M2 — SIGNIFICANT CHANGE UP
EOSINOPHIL # BLD AUTO: 0.14 K/UL — SIGNIFICANT CHANGE UP (ref 0–0.5)
EOSINOPHIL NFR BLD AUTO: 1.6 % — SIGNIFICANT CHANGE UP (ref 0–6)
GLUCOSE SERPL-MCNC: 98 MG/DL — SIGNIFICANT CHANGE UP (ref 70–99)
HCG SERPL-ACNC: <0 MIU/ML — SIGNIFICANT CHANGE UP
HCG UR QL: NEGATIVE — SIGNIFICANT CHANGE UP
HCT VFR BLD CALC: 36.3 % — SIGNIFICANT CHANGE UP (ref 34.5–45)
HGB BLD-MCNC: 12 G/DL — SIGNIFICANT CHANGE UP (ref 11.5–15.5)
IMM GRANULOCYTES NFR BLD AUTO: 0.3 % — SIGNIFICANT CHANGE UP (ref 0–0.9)
INR BLD: 1.01 — SIGNIFICANT CHANGE UP (ref 0.88–1.16)
LYMPHOCYTES # BLD AUTO: 2.36 K/UL — SIGNIFICANT CHANGE UP (ref 1–3.3)
LYMPHOCYTES # BLD AUTO: 27.1 % — SIGNIFICANT CHANGE UP (ref 13–44)
MAGNESIUM SERPL-MCNC: 1.8 MG/DL — SIGNIFICANT CHANGE UP (ref 1.6–2.6)
MCHC RBC-ENTMCNC: 28.2 PG — SIGNIFICANT CHANGE UP (ref 27–34)
MCHC RBC-ENTMCNC: 33.1 GM/DL — SIGNIFICANT CHANGE UP (ref 32–36)
MCV RBC AUTO: 85.2 FL — SIGNIFICANT CHANGE UP (ref 80–100)
MONOCYTES # BLD AUTO: 0.63 K/UL — SIGNIFICANT CHANGE UP (ref 0–0.9)
MONOCYTES NFR BLD AUTO: 7.2 % — SIGNIFICANT CHANGE UP (ref 2–14)
NEUTROPHILS # BLD AUTO: 5.48 K/UL — SIGNIFICANT CHANGE UP (ref 1.8–7.4)
NEUTROPHILS NFR BLD AUTO: 62.9 % — SIGNIFICANT CHANGE UP (ref 43–77)
NRBC # BLD: 0 /100 WBCS — SIGNIFICANT CHANGE UP (ref 0–0)
PLATELET # BLD AUTO: 463 K/UL — HIGH (ref 150–400)
POTASSIUM SERPL-MCNC: 4.4 MMOL/L — SIGNIFICANT CHANGE UP (ref 3.5–5.3)
POTASSIUM SERPL-SCNC: 4.4 MMOL/L — SIGNIFICANT CHANGE UP (ref 3.5–5.3)
PROT SERPL-MCNC: 7.5 G/DL — SIGNIFICANT CHANGE UP (ref 6–8.3)
PROTHROM AB SERPL-ACNC: 12 SEC — SIGNIFICANT CHANGE UP (ref 10.5–13.4)
RBC # BLD: 4.26 M/UL — SIGNIFICANT CHANGE UP (ref 3.8–5.2)
RBC # FLD: 13.3 % — SIGNIFICANT CHANGE UP (ref 10.3–14.5)
SODIUM SERPL-SCNC: 135 MMOL/L — SIGNIFICANT CHANGE UP (ref 135–145)
WBC # BLD: 8.72 K/UL — SIGNIFICANT CHANGE UP (ref 3.8–10.5)
WBC # FLD AUTO: 8.72 K/UL — SIGNIFICANT CHANGE UP (ref 3.8–10.5)

## 2023-01-24 PROCEDURE — 93010 ELECTROCARDIOGRAM REPORT: CPT

## 2023-01-24 PROCEDURE — 99222 1ST HOSP IP/OBS MODERATE 55: CPT

## 2023-01-24 PROCEDURE — 37241 VASC EMBOLIZE/OCCLUDE VENOUS: CPT

## 2023-01-24 RX ORDER — VANCOMYCIN HCL 1 G
VIAL (EA) INTRAVENOUS
Refills: 0 | Status: DISCONTINUED | OUTPATIENT
Start: 2023-01-24 | End: 2023-01-24

## 2023-01-24 RX ORDER — ONDANSETRON 8 MG/1
4 TABLET, FILM COATED ORAL ONCE
Refills: 0 | Status: DISCONTINUED | OUTPATIENT
Start: 2023-01-24 | End: 2023-02-07

## 2023-01-24 RX ORDER — VANCOMYCIN HCL 1 G
1250 VIAL (EA) INTRAVENOUS EVERY 8 HOURS
Refills: 0 | Status: DISCONTINUED | OUTPATIENT
Start: 2023-01-24 | End: 2023-01-25

## 2023-01-24 RX ORDER — HYDROMORPHONE HYDROCHLORIDE 2 MG/ML
1 INJECTION INTRAMUSCULAR; INTRAVENOUS; SUBCUTANEOUS
Refills: 0 | Status: DISCONTINUED | OUTPATIENT
Start: 2023-01-24 | End: 2023-01-25

## 2023-01-24 RX ORDER — GABAPENTIN 400 MG/1
1 CAPSULE ORAL
Qty: 0 | Refills: 0 | DISCHARGE

## 2023-01-24 RX ORDER — VANCOMYCIN HCL 1 G
1250 VIAL (EA) INTRAVENOUS ONCE
Refills: 0 | Status: DISCONTINUED | OUTPATIENT
Start: 2023-01-24 | End: 2023-01-24

## 2023-01-24 RX ORDER — NALOXONE HYDROCHLORIDE 4 MG/.1ML
0.1 SPRAY NASAL ONCE
Refills: 0 | Status: DISCONTINUED | OUTPATIENT
Start: 2023-01-24 | End: 2023-02-07

## 2023-01-24 RX ORDER — NORETHINDRONE AND ETHINYL ESTRADIOL 0.4-0.035
1 KIT ORAL
Qty: 0 | Refills: 0 | DISCHARGE

## 2023-01-24 RX ORDER — ACETAMINOPHEN 500 MG
1000 TABLET ORAL ONCE
Refills: 0 | Status: DISCONTINUED | OUTPATIENT
Start: 2023-01-24 | End: 2023-01-25

## 2023-01-24 RX ORDER — HYDROMORPHONE HYDROCHLORIDE 2 MG/ML
2 INJECTION INTRAMUSCULAR; INTRAVENOUS; SUBCUTANEOUS EVERY 6 HOURS
Refills: 0 | Status: DISCONTINUED | OUTPATIENT
Start: 2023-01-24 | End: 2023-01-25

## 2023-01-24 RX ORDER — MEROPENEM 1 G/30ML
INJECTION INTRAVENOUS
Refills: 0 | Status: COMPLETED | OUTPATIENT
Start: 2023-01-24 | End: 2023-01-24

## 2023-01-24 RX ORDER — NEBIVOLOL HYDROCHLORIDE 5 MG/1
1 TABLET ORAL
Qty: 0 | Refills: 0 | DISCHARGE

## 2023-01-24 RX ORDER — TIZANIDINE 4 MG/1
0 TABLET ORAL
Qty: 0 | Refills: 0 | DISCHARGE

## 2023-01-24 RX ORDER — KETOROLAC TROMETHAMINE 30 MG/ML
15 SYRINGE (ML) INJECTION ONCE
Refills: 0 | Status: DISCONTINUED | OUTPATIENT
Start: 2023-01-24 | End: 2023-01-25

## 2023-01-24 RX ORDER — VANCOMYCIN HCL 1 G
2000 VIAL (EA) INTRAVENOUS ONCE
Refills: 0 | Status: DISCONTINUED | OUTPATIENT
Start: 2023-01-24 | End: 2023-01-24

## 2023-01-24 RX ORDER — SODIUM CHLORIDE 9 MG/ML
1000 INJECTION, SOLUTION INTRAVENOUS
Refills: 0 | Status: DISCONTINUED | OUTPATIENT
Start: 2023-01-24 | End: 2023-01-25

## 2023-01-24 RX ORDER — MEROPENEM 1 G/30ML
2000 INJECTION INTRAVENOUS ONCE
Refills: 0 | Status: COMPLETED | OUTPATIENT
Start: 2023-01-24 | End: 2023-01-24

## 2023-01-24 RX ORDER — MEROPENEM 1 G/30ML
2000 INJECTION INTRAVENOUS EVERY 8 HOURS
Refills: 0 | Status: COMPLETED | OUTPATIENT
Start: 2023-01-24 | End: 2023-01-24

## 2023-01-24 RX ADMIN — HYDROMORPHONE HYDROCHLORIDE 2 MILLIGRAM(S): 2 INJECTION INTRAMUSCULAR; INTRAVENOUS; SUBCUTANEOUS at 23:55

## 2023-01-24 RX ADMIN — HYDROMORPHONE HYDROCHLORIDE 1 MILLIGRAM(S): 2 INJECTION INTRAMUSCULAR; INTRAVENOUS; SUBCUTANEOUS at 16:52

## 2023-01-24 RX ADMIN — SODIUM CHLORIDE 75 MILLILITER(S): 9 INJECTION, SOLUTION INTRAVENOUS at 19:22

## 2023-01-24 RX ADMIN — HYDROMORPHONE HYDROCHLORIDE 2 MILLIGRAM(S): 2 INJECTION INTRAMUSCULAR; INTRAVENOUS; SUBCUTANEOUS at 19:49

## 2023-01-24 RX ADMIN — MEROPENEM 280 MILLIGRAM(S): 1 INJECTION INTRAVENOUS at 21:30

## 2023-01-24 RX ADMIN — Medication 166.67 MILLIGRAM(S): at 22:40

## 2023-01-24 RX ADMIN — HYDROMORPHONE HYDROCHLORIDE 1 MILLIGRAM(S): 2 INJECTION INTRAMUSCULAR; INTRAVENOUS; SUBCUTANEOUS at 15:20

## 2023-01-24 RX ADMIN — HYDROMORPHONE HYDROCHLORIDE 1 MILLIGRAM(S): 2 INJECTION INTRAMUSCULAR; INTRAVENOUS; SUBCUTANEOUS at 15:35

## 2023-01-24 RX ADMIN — MEROPENEM 280 MILLIGRAM(S): 1 INJECTION INTRAVENOUS at 17:35

## 2023-01-24 RX ADMIN — HYDROMORPHONE HYDROCHLORIDE 2 MILLIGRAM(S): 2 INJECTION INTRAMUSCULAR; INTRAVENOUS; SUBCUTANEOUS at 19:34

## 2023-01-24 NOTE — DISCHARGE NOTE PROVIDER - CARE PROVIDERS DIRECT ADDRESSES
[email or web address]

## 2023-01-24 NOTE — DISCHARGE NOTE PROVIDER - NSDCHHNEEDSERVICE_GEN_ALL_CORE
Observation and assessment/Rehabilitation services/Teaching and training/Wound care and assessment Medication teaching and assessment/Observation and assessment/Wound care and assessment

## 2023-01-24 NOTE — DISCHARGE NOTE PROVIDER - NSDCCPCAREPLAN_GEN_ALL_CORE_FT
PRINCIPAL DISCHARGE DIAGNOSIS  Diagnosis: AVM (arteriovenous malformation)  Assessment and Plan of Treatment: You came into the hospital for direct stick embolization of the left foot AVM on 1/24/2023 with Dr Teran. You were seen by infectious disease Dr Rahman during hospitalization due to complex wound      SECONDARY DISCHARGE DIAGNOSES  Diagnosis: HTN (hypertension)  Assessment and Plan of Treatment: Continue taking your blood pressure medication Bystolic 10mg twice a day.     PRINCIPAL DISCHARGE DIAGNOSIS  Diagnosis: AVM (arteriovenous malformation)  Assessment and Plan of Treatment: You came into the hospital for direct stick embolization of the left foot AVM on 1/24/2023 with Dr Teran. You were seen by infectious disease Dr Rahman during hospitalization due to complex wound antibiotic management. You had a PICC line placed on 2/3/2023 and will remain on intraveous antibiotics Cefepime and Daptomycin for a 4 weeks course until 3/3/2023. You will need weekly blood work (CBC, CMP, ESR, CRP, CPK) while on IV antibiotics, to be performed next on 2/8/2023 by the home infusion company. Follow up with Dr Rahman for further antibiotic management. Follow up with your pain management provider after hospital discharge for medications if needed to help control your foot pain.      SECONDARY DISCHARGE DIAGNOSES  Diagnosis: HTN (hypertension)  Assessment and Plan of Treatment: Continue taking your blood pressure medication Bystolic 10mg twice a day.

## 2023-01-24 NOTE — BRIEF OPERATIVE NOTE - OPERATION/FINDINGS
Direct stick embolization for AVM in left foot using 7cc STS performed under ultrasound and fluoroscopic guidance.

## 2023-01-24 NOTE — PATIENT PROFILE ADULT - FALL HARM RISK - HARM RISK INTERVENTIONS
Assistance with ambulation/Assistance OOB with selected safe patient handling equipment/Communicate Risk of Fall with Harm to all staff/Discuss with provider need for PT consult/Monitor gait and stability/Reinforce activity limits and safety measures with patient and family/Tailored Fall Risk Interventions/Visual Cue: Yellow wristband and red socks/Bed in lowest position, wheels locked, appropriate side rails in place/Call bell, personal items and telephone in reach/Instruct patient to call for assistance before getting out of bed or chair/Non-slip footwear when patient is out of bed/[place] to call system/Physically safe environment - no spills, clutter or unnecessary equipment/Purposeful Proactive Rounding/Room/bathroom lighting operational, light cord in reach

## 2023-01-24 NOTE — DISCHARGE NOTE PROVIDER - CARE PROVIDER_API CALL
J Carlos Teran)  Diagnostic Radiology  [address] [place] [address]  Phone: [phone]  Fax: [phone]  Established Patient  Follow Up Time: 2 weeks   J Carlos Teran)  Diagnostic Radiology  [address], [address] [place] [address]  Phone: [phone]  Fax: [phone]  Established Patient  Follow Up Time: Routine    Mila Rahman)  Infectious Disease; Internal Medicine  [address] [place] [address]  Phone: [phone]  Fax: [phone]  Established Patient  Follow Up Time: 2 weeks

## 2023-01-24 NOTE — CONSULT NOTE ADULT - SUBJECTIVE AND OBJECTIVE BOX
HPI:  36 yo F with HTN, MO and AVM L foot with recurrent L foot ulcer.  ID consult for assistance with antibiotics.    She has undergone multiple direct stick and transcatheter embolizations for ulcers related to AVM.  Initially, she had been treated with courses of clindamycin.  She had been admitted on 10/10, underwent transcatheter embolization and OR wound debridement 10/11.  Wound culture from OR grew Pseudomonas aeruginosa.  She was treated with a 5 d course of vancomycin and cefepime.  She was readmitted on 10/31 with fever and L lateral ankle erythema/swelling/pain.  She initially was treated with vanc and cefepime, worsened after vanc was d/osorio and did not improve until cefepime was changed to meropenem.  She was d/osorio on 11/10 with close ID follow-up, steady improvement.  She was readmitted on 12/4 with fever.  Labs were notable for WBC 10.7 with 78% PMNs, ESR 34 and CRP 29.  Repeat MRI showed phlegmon underlying ulcer surrounding area of contrast extravasation without improvement from 11/2.  PICC was removed and she symptomatically improved.  Blood cultures were negative.  PICC was replaced and she was d/osorio on 12/9 on vancomycin 1250 mg IV 	q12h and meropenem 1 g IV q8h.  Her ulcer closed completely on 12/23 and erythema resolved.  She was continued on vanc and meropenem through 12/29.  At that time, she had been having increased pulsing sensation in her foot.  She saw Dr. Teran who suggested additional embolization.  On ~1/20 wound on foot again opened.  Further embolization was planned.  ~2 d ago, she developed mild erythema surrounding ulcer.  She underwent direct stick embolization and is admitted for further management.  Per Ms. Kearney, PTA, no fever, chills, sweats.  No drainage from ulcer.      PAST MEDICAL & SURGICAL HISTORY:  HTN (hypertension)      AVM (arteriovenous malformation)  of left foot      Foot ulceration  left foot      S/P debridement  LLE area overlying AVM      Elective surgery  transcatheter therapy vascular embolization x5      Morbid obesity              MEDICATIONS  (STANDING):  acetaminophen   IVPB .. 1000 milliGRAM(s) IV Intermittent once  chlorhexidine 4% Liquid 1 Application(s) Topical once  HYDROmorphone  Injectable 2 milliGRAM(s) IV Push every 6 hours  lactated ringers. 1000 milliLiter(s) (75 mL/Hr) IV Continuous <Continuous>  meropenem  IVPB      meropenem  IVPB 2000 milliGRAM(s) IV Intermittent every 8 hours    MEDICATIONS  (PRN):  HYDROmorphone  Injectable 1 milliGRAM(s) IV Push every 10 minutes PRN Severe Pain (7 - 10)  ketorolac   Injectable 15 milliGRAM(s) IV Push Once PRN Moderate Pain (4 - 6)  naloxone Injectable 0.1 milliGRAM(s) IV Push Once PRN For ANY of the following changes in patient status:  A. RR LESS THAN 10 breaths per minute, B. Oxygen saturation LESS THAN 90%, C. Sedation score of 6  ondansetron Injectable 4 milliGRAM(s) IV Push Once PRN Nausea      Allergies    amoxicillin (Angioedema)  ciprofloxacin (Rash)  hydrochlorothiazide (Hives)  lisinopril (Angioedema; Rash; Hives)  morphine (Rash)  penicillin (Rash)    Intolerances        SOCIAL HISTORY:  Born in [place], lives there now with her mother and boyfriend.  Has 2 dogs, no children.  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  No tobacco, alcohol or recreational drug use.      FAMILY HISTORY:  Family history of congenital malformation (Sibling)  AVMs: siblings    ROS:  No HA, photophobia, neck stiffness, rhinorrhea, sore throat, cough, CP, SOB, N, V, diarrhea, abd pain, dysuria, hematuria, frequency, muscle/joint symptoms, bruising/bleeding.      Vital Signs Last 24 Hrs  T(C): 36.7 (24 Jan 2023 13:01), Max: 36.7 (24 Jan 2023 13:01)  T(F): --  HR: 86 (24 Jan 2023 13:01) (86 - 86)  BP: 138/71 (24 Jan 2023 13:01) (138/71 - 138/71)  BP(mean): --  RR: 18 (24 Jan 2023 13:01) (18 - 18)  SpO2: 96% (24 Jan 2023 13:01) (96% - 96%)        PE:  Alert, in no distress  HEENT:  NC, PERRL, sclerae anicteric, conjunctivae clear.  Sinuses nontender, no nasal exudate.  No buccal or pharyngeal lesions, erythema or exudate  Neck:  Supple, no adenopathy  Lungs:  Clear to auscultation  Cor:  RRR, S1, S2, no murmur appreciated  Abd:  Symmetric, normoactive BS.  Soft, nontender, no masses, guarding or rebound.  Liver and spleen not enlarged  Extrem:  No cyanosis or edema.  L foot dressed, mild warmth proximally on LE  Skin:  No rashes.    LABS:                        12.0   8.72  )-----------( 463      ( 24 Jan 2023 10:08 )             36.3     01-24    135  |  99  |  13  ----------------------------<  98  4.4   |  25  |  0.74    Ca    9.4      24 Jan 2023 10:08  Mg     1.8     01-24    TPro  7.5  /  Alb  4.0  /  TBili  0.6  /  DBili  x   /  AST  18  /  ALT  17  /  AlkPhos  86  01-24        RADIOLOGY & ADDITIONAL STUDIES:

## 2023-01-24 NOTE — CONSULT NOTE ADULT - ASSESSMENT
38 yo F with HTN, MO and AVM L foot with recurrent L foot ulcer s/p direct stick embolization today.  She recently required very prolonged course of very broad spectrum antibiotics (vancomycin and meropenem) to reduce infection and allow ulcer to heal but then again with recurrence.  She was developing very mild signs of cellulitis prior to today's procedure, no systemic symptoms a/c infection.  Unfortunately, there is no po equivalent.  Will be difficult to determine duration of antibiotics needed - would like to keep as brief as possible.  Would not establish infusion services at home unless expected duration >5 d, which would only be required if active infection.  Suggest:  - Start vancomycin 1250 mg IV q8h.  Trough prior to 4th dose.  Goal:  11-16  - Start meropenem 2 g IV q8h  - Will evaluate appearance of wound tomorrow to try to determine duration.  Recommendations discussed with primary team.  Will follow with you - team 2.

## 2023-01-24 NOTE — DISCHARGE NOTE PROVIDER - HOSPITAL COURSE
INCOMPLETE    36 y/o female, morbidly obese, w/ PMHx HTN and left foot AVM w/ chronic left foot ulcer (s/p multiple embolizations, previously requiring IV antibiotics, s/p prior OR debridement 10/11/2022, and subsequently treated w/ IV Meropenem/Vancomycin from 11/07/2022 to 12/29/2022 w/ wound closure on 12/23/2022) who presented to outpatient provider for continued follow up. Patient noted recent increased pulsing sensation in her foot and was told she may require additional embolization, though patient was initially hesitant to pursue this given recent infection. Patient denied any additional complaints.     Patient now presented for repeat direct stick embolization of the left foot.     s/p Direct stick embolization for AVM in left foot using 7cc STS performed under ultrasound and fluoroscopic guidance    Prior to procedure, patient was on IV antibiotics (vanco and meripenem) and also with signs of mild cellulitis. ID consulted, suggesting Start vancomycin 1250 mg IV q8h.  Trough prior to 4th dose.  Goal:  11-16. Start meropenem 2 g IV q8h.     Pt is asymptomatic at this time and denies chest pain, SOB, AGUILAR, palpitations, dizziness, LOC, N/V, diaphoresis, orthopnea/PND, and leg swelling. Pt able to ambulate and void without complication. VSS. Labs and telemetry reviewed. ___________ access site stable and dressing C/D/I.  Pt is a candidate for discharge per . ________. Pt given appropriate discharge instructions, pt states they have an appropriate amount of their previous home meds unchanged from this visit at home, and any new medications were sent to their pharmacy. Pt instructed to f/u with ________ in 1-2 weeks. 36 y/o female, morbidly obese, w/ PMHx HTN and left foot AVM w/ chronic left foot ulcer (s/p multiple embolizations, previously requiring IV antibiotics, s/p prior OR debridement 10/11/2022, and subsequently treated w/ IV Meropenem/Vancomycin from 11/07/2022 to 12/29/2022 w/ wound closure on 12/23/2022) who presented to outpatient provider for continued follow up. Patient noted recent increased pulsing sensation in her foot and was told she may require additional embolization, though patient was initially hesitant to pursue this given recent infection. Patient denied any additional complaints.     Patient now presented for repeat direct stick embolization of the left foot.     s/p Direct stick embolization for AVM in left foot using 7cc STS performed under ultrasound and fluoroscopic guidance    Prior to procedure, patient was on IV antibiotics (vanco and meripenem) and also with signs of mild cellulitis. ID consulted, suggesting Start vancomycin 1250 mg IV q8h.  Trough prior to 4th dose.  Goal:  11-16. Start meropenem 2 g IV q8h.     Pt is asymptomatic at this time and denies chest pain, SOB, AGUILAR, palpitations, dizziness, LOC, N/V, diaphoresis, orthopnea/PND, and leg swelling. Pt able to ambulate and void without complication. VSS. Labs and telemetry reviewed. ___________ access site stable and dressing C/D/I.  Pt is a candidate for discharge per  ________. Pt given appropriate discharge instructions, pt states they have an appropriate amount of their previous home meds unchanged from this visit at home, and any new medications were sent to their pharmacy. Pt instructed to f/u with ________ in 1-2 weeks. 36 y/o female, morbidly obese, w/ PMHx HTN and left foot AVM w/ chronic left foot ulcer (s/p multiple embolizations, previously requiring IV antibiotics, s/p prior OR debridement 10/11/2022, and subsequently treated w/ IV Meropenem/Vancomycin from 11/07/2022 to 12/29/2022 w/ wound closure on 12/23/2022) who presented to outpatient provider for continued follow up. Patient noted recent increased pulsing sensation in her foot and was told she may require additional embolization. Patient now presented for repeat direct stick embolization of the left foot w/ Dr Teran. S/p Direct stick embolization for AVM in left foot using 7cc STS performed under ultrasound and fluoroscopic guidance. Prior to procedure, patient was on IV antibiotics (vanco and meripenem) and also with signs of mild cellulitis. ID consulted, started vancomycin 1250mg IV q8h and meropenem 2g IV q8h. Hospital course c/b fever 100.5 on 1/31 and WBC 13. BC x 2 sent - negative. Per ID, IV Abx switched to Daptomycin 800mg q24h and Cefepime 2g IV q8h x 4 weeks course (2/3-3/3). PICC line placed 2/3/2023.  Pt to have weekly labs while on antibiotics: CBC, CMP, ESR, CRP, CPK w/ results faxed to ID Dr Rahman.     Social work/case management assisted w/ complex dispo planning as pt required home infusion w/ substantial copay $800-1000 per week vs [facility] w/ insurance coverage. Decision made for home infusion and set up on day of discharge. Pt able to ambulate without complication. VSS. Labs and telemetry reviewed. L foot site stable and Kerlix dressing C/D/I.  Pt given appropriate discharge instructions. Will follow up with outpatient pain management provider for pain control. Pt will follow up with Dr Teran and Dr Rahman accordingly.

## 2023-01-24 NOTE — DISCHARGE NOTE PROVIDER - NSDCMRMEDTOKEN_GEN_ALL_CORE_FT
Bystolic 10 mg oral tablet: 1 tab(s) orally 2 times a day  gabapentin 100 mg oral tablet: 1 tab(s) orally once a day (in the morning)  gabapentin 800 mg oral tablet: 1 tab(s) orally once a day (in the afternoon)  Junel 1.5/30 oral tablet: 1 tab(s) orally once a day  Percocet 10/325 oral tablet: 1 tab(s) orally 5 times a day, As Needed   Bystolic 10 mg oral tablet: 1 tab(s) orally 2 times a day  cefepime 2 g/100 mL intravenous solution: 2 gram(s) intravenously every 8 hours. End date 3/3/23.   DAPTOmycin 500 mg intravenous injection: Please administer 800 milligram(s) intravenously every 24 hours. End date 3/3/23.   gabapentin 100 mg oral tablet: 1 tab(s) orally once a day (in the morning)  gabapentin 800 mg oral tablet: 1 tab(s) orally once a day (in the afternoon)  heparin flush 10 units/mL intravenous solution: 3 milliliter(s) intravenously post infusion.   Junel 1.5/30 oral tablet: 1 tab(s) orally once a day  Normal Saline Flush 0.9% injectable solution: 10 milliliter(s) injectable pre/post infusion.   Obtain weekly labs while on antibiotics: CBC, CMP, ESR, CRP, CPK. Fax results to Dr Rahman at [phone].:   Percocet 10/325 oral tablet: 1 tab(s) orally 5 times a day, As Needed

## 2023-01-24 NOTE — DISCHARGE NOTE PROVIDER - DETAILS OF MALNUTRITION DIAGNOSIS/DIAGNOSES
This patient has been assessed with a concern for Malnutrition and was treated during this hospitalization for the following Nutrition diagnosis/diagnoses:     -  01/27/2023: Morbid obesity (BMI > 40)

## 2023-01-24 NOTE — DISCHARGE NOTE PROVIDER - PROVIDER TOKENS
PROVIDER:[TOKEN:[[id number]],FOLLOWUP:[2 weeks],ESTABLISHEDPATIENT:[T]] PROVIDER:[TOKEN:[[id number]],FOLLOWUP:[Routine],ESTABLISHEDPATIENT:[T]],PROVIDER:[TOKEN:[[id number]],FOLLOWUP:[2 weeks],ESTABLISHEDPATIENT:[T]]

## 2023-01-25 DIAGNOSIS — Q27.30 ARTERIOVENOUS MALFORMATION, SITE UNSPECIFIED: ICD-10-CM

## 2023-01-25 DIAGNOSIS — I10 ESSENTIAL (PRIMARY) HYPERTENSION: ICD-10-CM

## 2023-01-25 LAB
ANION GAP SERPL CALC-SCNC: 11 MMOL/L — SIGNIFICANT CHANGE UP (ref 5–17)
BASOPHILS # BLD AUTO: 0 K/UL — SIGNIFICANT CHANGE UP (ref 0–0.2)
BASOPHILS NFR BLD AUTO: 0 % — SIGNIFICANT CHANGE UP (ref 0–2)
BUN SERPL-MCNC: 14 MG/DL — SIGNIFICANT CHANGE UP (ref 7–23)
CALCIUM SERPL-MCNC: 9.3 MG/DL — SIGNIFICANT CHANGE UP (ref 8.4–10.5)
CHLORIDE SERPL-SCNC: 103 MMOL/L — SIGNIFICANT CHANGE UP (ref 96–108)
CO2 SERPL-SCNC: 23 MMOL/L — SIGNIFICANT CHANGE UP (ref 22–31)
CREAT SERPL-MCNC: 0.68 MG/DL — SIGNIFICANT CHANGE UP (ref 0.5–1.3)
EGFR: 114 ML/MIN/1.73M2 — SIGNIFICANT CHANGE UP
EOSINOPHIL # BLD AUTO: 0 K/UL — SIGNIFICANT CHANGE UP (ref 0–0.5)
EOSINOPHIL NFR BLD AUTO: 0 % — SIGNIFICANT CHANGE UP (ref 0–6)
GLUCOSE SERPL-MCNC: 142 MG/DL — HIGH (ref 70–99)
HCT VFR BLD CALC: 37.4 % — SIGNIFICANT CHANGE UP (ref 34.5–45)
HGB BLD-MCNC: 11.9 G/DL — SIGNIFICANT CHANGE UP (ref 11.5–15.5)
IMM GRANULOCYTES NFR BLD AUTO: 0.2 % — SIGNIFICANT CHANGE UP (ref 0–0.9)
LYMPHOCYTES # BLD AUTO: 0.91 K/UL — LOW (ref 1–3.3)
LYMPHOCYTES # BLD AUTO: 11.3 % — LOW (ref 13–44)
MCHC RBC-ENTMCNC: 28.3 PG — SIGNIFICANT CHANGE UP (ref 27–34)
MCHC RBC-ENTMCNC: 31.8 GM/DL — LOW (ref 32–36)
MCV RBC AUTO: 88.8 FL — SIGNIFICANT CHANGE UP (ref 80–100)
MONOCYTES # BLD AUTO: 0.58 K/UL — SIGNIFICANT CHANGE UP (ref 0–0.9)
MONOCYTES NFR BLD AUTO: 7.2 % — SIGNIFICANT CHANGE UP (ref 2–14)
NEUTROPHILS # BLD AUTO: 6.54 K/UL — SIGNIFICANT CHANGE UP (ref 1.8–7.4)
NEUTROPHILS NFR BLD AUTO: 81.3 % — HIGH (ref 43–77)
NRBC # BLD: 0 /100 WBCS — SIGNIFICANT CHANGE UP (ref 0–0)
PLATELET # BLD AUTO: 468 K/UL — HIGH (ref 150–400)
POTASSIUM SERPL-MCNC: 4.9 MMOL/L — SIGNIFICANT CHANGE UP (ref 3.5–5.3)
POTASSIUM SERPL-SCNC: 4.9 MMOL/L — SIGNIFICANT CHANGE UP (ref 3.5–5.3)
RBC # BLD: 4.21 M/UL — SIGNIFICANT CHANGE UP (ref 3.8–5.2)
RBC # FLD: 13 % — SIGNIFICANT CHANGE UP (ref 10.3–14.5)
SODIUM SERPL-SCNC: 137 MMOL/L — SIGNIFICANT CHANGE UP (ref 135–145)
VANCOMYCIN TROUGH SERPL-MCNC: 8.1 UG/ML — LOW (ref 10–20)
WBC # BLD: 8.05 K/UL — SIGNIFICANT CHANGE UP (ref 3.8–10.5)
WBC # FLD AUTO: 8.05 K/UL — SIGNIFICANT CHANGE UP (ref 3.8–10.5)

## 2023-01-25 PROCEDURE — 99232 SBSQ HOSP IP/OBS MODERATE 35: CPT

## 2023-01-25 RX ORDER — HYDROMORPHONE HYDROCHLORIDE 2 MG/ML
2 INJECTION INTRAMUSCULAR; INTRAVENOUS; SUBCUTANEOUS EVERY 4 HOURS
Refills: 0 | Status: DISCONTINUED | OUTPATIENT
Start: 2023-01-25 | End: 2023-01-30

## 2023-01-25 RX ORDER — HYDROMORPHONE HYDROCHLORIDE 2 MG/ML
1 INJECTION INTRAMUSCULAR; INTRAVENOUS; SUBCUTANEOUS ONCE
Refills: 0 | Status: DISCONTINUED | OUTPATIENT
Start: 2023-01-25 | End: 2023-01-25

## 2023-01-25 RX ORDER — VANCOMYCIN HCL 1 G
1500 VIAL (EA) INTRAVENOUS EVERY 8 HOURS
Refills: 0 | Status: DISCONTINUED | OUTPATIENT
Start: 2023-01-25 | End: 2023-01-28

## 2023-01-25 RX ORDER — MEROPENEM 1 G/30ML
2000 INJECTION INTRAVENOUS EVERY 8 HOURS
Refills: 0 | Status: DISCONTINUED | OUTPATIENT
Start: 2023-01-26 | End: 2023-01-31

## 2023-01-25 RX ORDER — MEROPENEM 1 G/30ML
INJECTION INTRAVENOUS
Refills: 0 | Status: DISCONTINUED | OUTPATIENT
Start: 2023-01-25 | End: 2023-01-31

## 2023-01-25 RX ORDER — MEROPENEM 1 G/30ML
2000 INJECTION INTRAVENOUS ONCE
Refills: 0 | Status: COMPLETED | OUTPATIENT
Start: 2023-01-25 | End: 2023-01-25

## 2023-01-25 RX ADMIN — Medication 166.67 MILLIGRAM(S): at 06:04

## 2023-01-25 RX ADMIN — HYDROMORPHONE HYDROCHLORIDE 2 MILLIGRAM(S): 2 INJECTION INTRAMUSCULAR; INTRAVENOUS; SUBCUTANEOUS at 00:05

## 2023-01-25 RX ADMIN — MEROPENEM 280 MILLIGRAM(S): 1 INJECTION INTRAVENOUS at 18:52

## 2023-01-25 RX ADMIN — HYDROMORPHONE HYDROCHLORIDE 2 MILLIGRAM(S): 2 INJECTION INTRAMUSCULAR; INTRAVENOUS; SUBCUTANEOUS at 18:51

## 2023-01-25 RX ADMIN — HYDROMORPHONE HYDROCHLORIDE 2 MILLIGRAM(S): 2 INJECTION INTRAMUSCULAR; INTRAVENOUS; SUBCUTANEOUS at 06:03

## 2023-01-25 RX ADMIN — HYDROMORPHONE HYDROCHLORIDE 2 MILLIGRAM(S): 2 INJECTION INTRAMUSCULAR; INTRAVENOUS; SUBCUTANEOUS at 10:53

## 2023-01-25 RX ADMIN — HYDROMORPHONE HYDROCHLORIDE 2 MILLIGRAM(S): 2 INJECTION INTRAMUSCULAR; INTRAVENOUS; SUBCUTANEOUS at 19:20

## 2023-01-25 RX ADMIN — HYDROMORPHONE HYDROCHLORIDE 2 MILLIGRAM(S): 2 INJECTION INTRAMUSCULAR; INTRAVENOUS; SUBCUTANEOUS at 11:30

## 2023-01-25 RX ADMIN — HYDROMORPHONE HYDROCHLORIDE 2 MILLIGRAM(S): 2 INJECTION INTRAMUSCULAR; INTRAVENOUS; SUBCUTANEOUS at 06:18

## 2023-01-25 RX ADMIN — HYDROMORPHONE HYDROCHLORIDE 2 MILLIGRAM(S): 2 INJECTION INTRAMUSCULAR; INTRAVENOUS; SUBCUTANEOUS at 22:57

## 2023-01-25 RX ADMIN — HYDROMORPHONE HYDROCHLORIDE 1 MILLIGRAM(S): 2 INJECTION INTRAMUSCULAR; INTRAVENOUS; SUBCUTANEOUS at 04:12

## 2023-01-25 RX ADMIN — HYDROMORPHONE HYDROCHLORIDE 2 MILLIGRAM(S): 2 INJECTION INTRAMUSCULAR; INTRAVENOUS; SUBCUTANEOUS at 14:55

## 2023-01-25 RX ADMIN — Medication 300 MILLIGRAM(S): at 17:49

## 2023-01-25 RX ADMIN — HYDROMORPHONE HYDROCHLORIDE 2 MILLIGRAM(S): 2 INJECTION INTRAMUSCULAR; INTRAVENOUS; SUBCUTANEOUS at 15:10

## 2023-01-25 RX ADMIN — HYDROMORPHONE HYDROCHLORIDE 1 MILLIGRAM(S): 2 INJECTION INTRAMUSCULAR; INTRAVENOUS; SUBCUTANEOUS at 05:59

## 2023-01-25 RX ADMIN — HYDROMORPHONE HYDROCHLORIDE 2 MILLIGRAM(S): 2 INJECTION INTRAMUSCULAR; INTRAVENOUS; SUBCUTANEOUS at 23:13

## 2023-01-25 NOTE — PROGRESS NOTE ADULT - SUBJECTIVE AND OBJECTIVE BOX
INTERVAL HPI/OVERNIGHT EVENTS:  Afebrile, significant pain L foot.      CONSTITUTIONAL:  No fever, chills, night sweats  EYES:  No photophobia or visual changes  CARDIOVASCULAR:  No chest pain  RESPIRATORY:  No cough, wheezing, or SOB   GASTROINTESTINAL:  No nausea, vomiting, diarrhea, constipation, or abdominal pain  GENITOURINARY:  No frequency, urgency, dysuria or hematuria  NEUROLOGIC:  No headache, lightheadedness      ANTIBIOTICS/RELEVANT:    Vancomycin 1250 mg IV q8h (1/24-present)  Meropenem 2 g IV q8h (1/24-present)      Vital Signs Last 24 Hrs  T(C): 36.4 (25 Jan 2023 12:48), Max: 36.7 (24 Jan 2023 22:06)  T(F): 97.5 (25 Jan 2023 12:48), Max: 98 (24 Jan 2023 22:06)  HR: 99 (25 Jan 2023 11:00) (87 - 99)  BP: 148/69 (25 Jan 2023 11:00) (124/67 - 148/69)  BP(mean): 89 (24 Jan 2023 21:00) (87 - 89)  RR: 16 (25 Jan 2023 11:00) (15 - 18)  SpO2: 98% (25 Jan 2023 11:00) (97% - 98%)    Parameters below as of 25 Jan 2023 11:00  Patient On (Oxygen Delivery Method): room air        PHYSICAL EXAM:  Constitutional:  Alert, conversant  Eyes:  Sclerae anicteric, conjunctivae clear, PERRL  Ear/Nose/Throat:  No nasal exudate or sinus tenderness;  No buccal mucosal lesions, no pharyngeal erythema or exudate	  Neck:  Supple, no adenopathy  Respiratory:  Clear bilaterally  Cardiovascular:  RRR, S1S2, no murmur appreciated  Gastrointestinal:  Symmetric, normoactive BS, soft, NT, no masses, guarding or rebound.  No HSM  Extremities:  No edema.  L lateral foot with <1 cm ulcer with some surrounding erythema distally, no purulence. Foot is warm.      LABS:                        11.9   8.05  )-----------( 468      ( 25 Jan 2023 08:14 )             37.4         01-25    137  |  103  |  14  ----------------------------<  142<H>  4.9   |  23  |  0.68    Ca    9.3      25 Jan 2023 08:14  Mg     1.8     01-24    TPro  7.5  /  Alb  4.0  /  TBili  0.6  /  DBili  x   /  AST  18  /  ALT  17  /  AlkPhos  86  01-24          MICROBIOLOGY:        RADIOLOGY & ADDITIONAL STUDIES:

## 2023-01-25 NOTE — PROGRESS NOTE ADULT - ASSESSMENT
37 y/oF, morbidly obese, PMHx HTN and left foot AVM w/ chronic left foot ulcer (s/p multiple embolizations, previously requiring IV antibiotics, s/p prior OR debridement 10/11/2022, and subsequently treated w/ IV Meropenem/Vancomycin from 11/07/2022 to 12/29/2022 w/ wound closure on 12/23/2022) presented 1/24 for planned procedure as she is now s/p Direct stick embolization for AVM in left foot, on IV abx till 1/27. ID following

## 2023-01-25 NOTE — PROGRESS NOTE ADULT - PROBLEM SELECTOR PLAN 1
L foot AVM w/chronic L foot Ulcer s/p multiple embolizations and OR debridement 10/11/22. S/P IV ABX via PICC (11/7-12/29) w/wound closure   - s/p Direct stick embolization for AVM in left foot 1/24  - ID following, appreciate recs.   - c/w IV Vanc/ Meropenem till 1/27   - c/w IV Hydromorphone 2mg Q4H PRN severe pain

## 2023-01-25 NOTE — PROGRESS NOTE ADULT - PROBLEM SELECTOR PLAN 2
- SBPs 120s-130s  - continue to HOLD home Bystolic as receiving IV Hydromorphone     F: No IVF  N: Reg diet   E: Replete lytes PRN K<4, Mg<2  P: DVT PPX: on HSQ  C: FULL CODE  Dispo: IV ABX till 1/27

## 2023-01-25 NOTE — PROGRESS NOTE ADULT - SUBJECTIVE AND OBJECTIVE BOX
CARDIOLOGY NP PROGRESS NOTE    Subjective:   Received pt awake in bed in NAD. States feeling ok and is ok with plan to stay till Friday for IV abx per ID recs. Remainder ROS otherwise negative.    Overnight Events: No acute events overnight           VITAL SIGNS:  T(C): 36.4 (01-25-23 @ 12:48), Max: 36.7 (01-24-23 @ 22:06)  HR: 99 (01-25-23 @ 11:00) (87 - 99)  BP: 148/69 (01-25-23 @ 11:00) (124/67 - 148/69)  RR: 16 (01-25-23 @ 11:00) (15 - 18)  SpO2: 98% (01-25-23 @ 11:00) (97% - 98%)  Wt(kg): --    I&O's Summary    24 Jan 2023 07:01  -  25 Jan 2023 07:00  --------------------------------------------------------  IN: 675 mL / OUT: 250 mL / NET: 425 mL    25 Jan 2023 07:01  -  25 Jan 2023 14:12  --------------------------------------------------------  IN: 180 mL / OUT: 0 mL / NET: 180 mL          PHYSICAL EXAM:    General: A/ox 3, No acute Distress  Neck: Supple, NO JVD  Cardiac: S1 S2, No M/R/G  Pulmonary: CTAB, Breathing unlabored, No Rhonchi/Rales/Wheezing  Abdomen: Soft, Non -tender, +BS x 4 quads  Extremities: No Rashes, No edema  Neuro: A/o x 3, No focal deficits          LABS:                          11.9   8.05  )-----------( 468      ( 25 Jan 2023 08:14 )             37.4                              01-25    137  |  103  |  14  ----------------------------<  142<H>  4.9   |  23  |  0.68    Ca    9.3      25 Jan 2023 08:14  Mg     1.8     01-24    TPro  7.5  /  Alb  4.0  /  TBili  0.6  /  DBili  x   /  AST  18  /  ALT  17  /  AlkPhos  86  01-24    LIVER FUNCTIONS - ( 24 Jan 2023 10:08 )  Alb: 4.0 g/dL / Pro: 7.5 g/dL / ALK PHOS: 86 U/L / ALT: 17 U/L / AST: 18 U/L / GGT: x                                 PT/INR - ( 24 Jan 2023 10:08 )   PT: 12.0 sec;   INR: 1.01          PTT - ( 24 Jan 2023 10:08 )  PTT:32.9 sec  CAPILLARY BLOOD GLUCOSE                Allergies:  amoxicillin (Angioedema)  ciprofloxacin (Rash)  hydrochlorothiazide (Hives)  lisinopril (Angioedema; Rash; Hives)  morphine (Rash)  penicillin (Rash)    MEDICATIONS  (STANDING):  chlorhexidine 4% Liquid 1 Application(s) Topical once  vancomycin  IVPB 1250 milliGRAM(s) IV Intermittent every 8 hours    MEDICATIONS  (PRN):  HYDROmorphone  Injectable 2 milliGRAM(s) IV Push every 4 hours PRN Severe Pain (7 - 10)  naloxone Injectable 0.1 milliGRAM(s) IV Push Once PRN For ANY of the following changes in patient status:  A. RR LESS THAN 10 breaths per minute, B. Oxygen saturation LESS THAN 90%, C. Sedation score of 6  ondansetron Injectable 4 milliGRAM(s) IV Push Once PRN Nausea        DIAGNOSTIC TESTS:

## 2023-01-25 NOTE — PROGRESS NOTE ADULT - ASSESSMENT
38 yo F with HTN, MO and AVM L foot with recurrent L foot ulcer s/p direct stick embolization today.  She recently required very prolonged course of very broad spectrum antibiotics (vancomycin and meropenem) to reduce infection and allow ulcer to heal but then again with recurrence.  She was developing very mild signs of cellulitis prior to yesterday's procedure, no systemic symptoms a/c infection.  Unfortunately, there is no po equivalent.  Will be difficult to determine duration of antibiotics needed - would like to keep as brief as possible, but would plan for at least until 1/27.  Would not establish infusion services at home unless expected duration >5 d, which would only be required if active infection.  Suggest:  - Continue vancomycin 1250 mg IV q8h.  Trough scheduled at 2 pm (prior dose 06:04).  Goal:  11-16  - Continue meropenem 2 g IV q8h  - Will evaluate appearance of wound daily to determine duration.  Recommendations discussed with primary team.  Will follow with you - team 2.

## 2023-01-26 LAB
ANION GAP SERPL CALC-SCNC: 10 MMOL/L — SIGNIFICANT CHANGE UP (ref 5–17)
BUN SERPL-MCNC: 14 MG/DL — SIGNIFICANT CHANGE UP (ref 7–23)
CALCIUM SERPL-MCNC: 8.5 MG/DL — SIGNIFICANT CHANGE UP (ref 8.4–10.5)
CHLORIDE SERPL-SCNC: 104 MMOL/L — SIGNIFICANT CHANGE UP (ref 96–108)
CO2 SERPL-SCNC: 24 MMOL/L — SIGNIFICANT CHANGE UP (ref 22–31)
CREAT SERPL-MCNC: 0.61 MG/DL — SIGNIFICANT CHANGE UP (ref 0.5–1.3)
EGFR: 117 ML/MIN/1.73M2 — SIGNIFICANT CHANGE UP
GLUCOSE SERPL-MCNC: 96 MG/DL — SIGNIFICANT CHANGE UP (ref 70–99)
HCT VFR BLD CALC: 33.2 % — LOW (ref 34.5–45)
HGB BLD-MCNC: 10.6 G/DL — LOW (ref 11.5–15.5)
MAGNESIUM SERPL-MCNC: 1.7 MG/DL — SIGNIFICANT CHANGE UP (ref 1.6–2.6)
MCHC RBC-ENTMCNC: 28.5 PG — SIGNIFICANT CHANGE UP (ref 27–34)
MCHC RBC-ENTMCNC: 31.9 GM/DL — LOW (ref 32–36)
MCV RBC AUTO: 89.2 FL — SIGNIFICANT CHANGE UP (ref 80–100)
NRBC # BLD: 0 /100 WBCS — SIGNIFICANT CHANGE UP (ref 0–0)
PLATELET # BLD AUTO: 398 K/UL — SIGNIFICANT CHANGE UP (ref 150–400)
POTASSIUM SERPL-MCNC: 4 MMOL/L — SIGNIFICANT CHANGE UP (ref 3.5–5.3)
POTASSIUM SERPL-SCNC: 4 MMOL/L — SIGNIFICANT CHANGE UP (ref 3.5–5.3)
RBC # BLD: 3.72 M/UL — LOW (ref 3.8–5.2)
RBC # FLD: 13.2 % — SIGNIFICANT CHANGE UP (ref 10.3–14.5)
SODIUM SERPL-SCNC: 138 MMOL/L — SIGNIFICANT CHANGE UP (ref 135–145)
VANCOMYCIN TROUGH SERPL-MCNC: 11.9 UG/ML — SIGNIFICANT CHANGE UP (ref 10–20)
WBC # BLD: 11.66 K/UL — HIGH (ref 3.8–10.5)
WBC # FLD AUTO: 11.66 K/UL — HIGH (ref 3.8–10.5)

## 2023-01-26 PROCEDURE — 99232 SBSQ HOSP IP/OBS MODERATE 35: CPT

## 2023-01-26 RX ORDER — NEBIVOLOL HYDROCHLORIDE 5 MG/1
5 TABLET ORAL ONCE
Refills: 0 | Status: COMPLETED | OUTPATIENT
Start: 2023-01-26 | End: 2023-01-26

## 2023-01-26 RX ORDER — MAGNESIUM SULFATE 500 MG/ML
2 VIAL (ML) INJECTION ONCE
Refills: 0 | Status: COMPLETED | OUTPATIENT
Start: 2023-01-26 | End: 2023-01-26

## 2023-01-26 RX ORDER — NEBIVOLOL HYDROCHLORIDE 5 MG/1
5 TABLET ORAL
Refills: 0 | Status: DISCONTINUED | OUTPATIENT
Start: 2023-01-26 | End: 2023-02-01

## 2023-01-26 RX ORDER — MEROPENEM 1 G/30ML
2000 INJECTION INTRAVENOUS EVERY 8 HOURS
Refills: 0 | Status: COMPLETED | OUTPATIENT
Start: 2023-01-26 | End: 2023-01-26

## 2023-01-26 RX ADMIN — NEBIVOLOL HYDROCHLORIDE 5 MILLIGRAM(S): 5 TABLET ORAL at 23:08

## 2023-01-26 RX ADMIN — HYDROMORPHONE HYDROCHLORIDE 2 MILLIGRAM(S): 2 INJECTION INTRAMUSCULAR; INTRAVENOUS; SUBCUTANEOUS at 04:00

## 2023-01-26 RX ADMIN — Medication 25 GRAM(S): at 12:40

## 2023-01-26 RX ADMIN — HYDROMORPHONE HYDROCHLORIDE 2 MILLIGRAM(S): 2 INJECTION INTRAMUSCULAR; INTRAVENOUS; SUBCUTANEOUS at 16:00

## 2023-01-26 RX ADMIN — HYDROMORPHONE HYDROCHLORIDE 2 MILLIGRAM(S): 2 INJECTION INTRAMUSCULAR; INTRAVENOUS; SUBCUTANEOUS at 23:30

## 2023-01-26 RX ADMIN — HYDROMORPHONE HYDROCHLORIDE 2 MILLIGRAM(S): 2 INJECTION INTRAMUSCULAR; INTRAVENOUS; SUBCUTANEOUS at 15:42

## 2023-01-26 RX ADMIN — MEROPENEM 280 MILLIGRAM(S): 1 INJECTION INTRAVENOUS at 03:16

## 2023-01-26 RX ADMIN — Medication 300 MILLIGRAM(S): at 19:01

## 2023-01-26 RX ADMIN — Medication 300 MILLIGRAM(S): at 01:30

## 2023-01-26 RX ADMIN — HYDROMORPHONE HYDROCHLORIDE 2 MILLIGRAM(S): 2 INJECTION INTRAMUSCULAR; INTRAVENOUS; SUBCUTANEOUS at 03:28

## 2023-01-26 RX ADMIN — HYDROMORPHONE HYDROCHLORIDE 2 MILLIGRAM(S): 2 INJECTION INTRAMUSCULAR; INTRAVENOUS; SUBCUTANEOUS at 07:55

## 2023-01-26 RX ADMIN — MEROPENEM 280 MILLIGRAM(S): 1 INJECTION INTRAVENOUS at 23:08

## 2023-01-26 RX ADMIN — HYDROMORPHONE HYDROCHLORIDE 2 MILLIGRAM(S): 2 INJECTION INTRAMUSCULAR; INTRAVENOUS; SUBCUTANEOUS at 20:45

## 2023-01-26 RX ADMIN — HYDROMORPHONE HYDROCHLORIDE 2 MILLIGRAM(S): 2 INJECTION INTRAMUSCULAR; INTRAVENOUS; SUBCUTANEOUS at 07:38

## 2023-01-26 RX ADMIN — HYDROMORPHONE HYDROCHLORIDE 2 MILLIGRAM(S): 2 INJECTION INTRAMUSCULAR; INTRAVENOUS; SUBCUTANEOUS at 20:04

## 2023-01-26 RX ADMIN — Medication 300 MILLIGRAM(S): at 08:47

## 2023-01-26 RX ADMIN — MEROPENEM 280 MILLIGRAM(S): 1 INJECTION INTRAVENOUS at 10:59

## 2023-01-26 RX ADMIN — HYDROMORPHONE HYDROCHLORIDE 2 MILLIGRAM(S): 2 INJECTION INTRAMUSCULAR; INTRAVENOUS; SUBCUTANEOUS at 11:40

## 2023-01-26 RX ADMIN — HYDROMORPHONE HYDROCHLORIDE 2 MILLIGRAM(S): 2 INJECTION INTRAMUSCULAR; INTRAVENOUS; SUBCUTANEOUS at 12:21

## 2023-01-26 NOTE — PROGRESS NOTE ADULT - PROBLEM SELECTOR PLAN 1
L foot AVM w/chronic L foot Ulcer s/p multiple embolizations and OR debridement 10/11/22. S/P IV ABX via PICC (11/7-12/29) w/wound closure   - s/p Direct stick embolization for AVM in left foot 1/24  - ID following, appreciate recs.   - c/w IV Vanc/ Meropenem till 1/27   - c/w IV Hydromorphone 2mg Q4H PRN severe pain L foot AVM w/chronic L foot Ulcer s/p multiple embolizations and OR debridement 10/11/22. S/P IV ABX via PICC (11/7-12/29) w/ wound closure.  - s/p Direct stick embolization for AVM in left foot 1/24 w/ Dr Teran  - ID following, appreciate recs.   - c/w IV Vanco/ Meropenem till 1/27  - F/u vanco trough 4pm today prior to 4th dose  - c/w IV Hydromorphone 2mg Q4H PRN severe pain

## 2023-01-26 NOTE — PROGRESS NOTE ADULT - ASSESSMENT
37 y/oF, morbidly obese, PMHx HTN and left foot AVM w/ chronic left foot ulcer (s/p multiple embolizations, previously requiring IV antibiotics, s/p prior OR debridement 10/11/2022, and subsequently treated w/ IV Meropenem/Vancomycin from 11/07/2022 to 12/29/2022 w/ wound closure on 12/23/2022) presented 1/24 for planned procedure as she is now s/p Direct stick embolization for AVM in left foot, on IV abx till 1/27. ID following     				 38 y/o F, morbidly obese, PMHx HTN and left foot AVM w/ chronic left foot ulcer (s/p multiple embolizations, previously requiring IV antibiotics, s/p prior OR debridement 10/11/2022, and subsequently treated w/ IV Meropenem/Vancomycin from 11/07/2022 to 12/29/2022 w/ wound closure on 12/23/2022) presented 1/24 for planned procedure, now s/p Direct stick embolization for AVM in left foot, on IV Abx till 1/27. ID following.

## 2023-01-26 NOTE — PROGRESS NOTE ADULT - SUBJECTIVE AND OBJECTIVE BOX
INTERVAL HPI/OVERNIGHT EVENTS:  Afebrile, throbbing pain in L foot, typical post-procedure    CONSTITUTIONAL:  No fever, chills, night sweats  EYES:  No photophobia or visual changes  CARDIOVASCULAR:  No chest pain  RESPIRATORY:  No cough, wheezing, or SOB   GASTROINTESTINAL:  No nausea, vomiting, diarrhea, constipation, or abdominal pain  GENITOURINARY:  No frequency, urgency, dysuria or hematuria  NEUROLOGIC:  No headache, lightheadedness      ANTIBIOTICS/RELEVANT:    Vancomycin 1500 mg IV q8h (1/24-present)  Meropenem 2 g IV q8h (1/24-present)      Vital Signs Last 24 Hrs  T(C): 36.1 (26 Jan 2023 08:38), Max: 36.8 (26 Jan 2023 06:19)  T(F): 97 (26 Jan 2023 08:38), Max: 98.3 (26 Jan 2023 06:19)  HR: 87 (26 Jan 2023 08:47) (84 - 101)  BP: 141/83 (26 Jan 2023 08:47) (141/83 - 165/84)  BP(mean): 104 (26 Jan 2023 08:47) (104 - 104)  RR: 18 (26 Jan 2023 08:47) (16 - 18)  SpO2: 95% (26 Jan 2023 05:30) (95% - 98%)    Parameters below as of 26 Jan 2023 08:47  Patient On (Oxygen Delivery Method): room air        PHYSICAL EXAM:  Constitutional:  Alert  Eyes:  Sclerae anicteric, conjunctivae clear, PERRL  Ear/Nose/Throat:  No nasal exudate or sinus tenderness;  No buccal mucosal lesions, no pharyngeal erythema or exudate	  Neck:  Supple, no adenopathy  Respiratory:  Clear bilaterally  Cardiovascular:  RRR, S1S2, no murmur appreciated  Gastrointestinal:  Symmetric, normoactive BS, soft, NT, no masses, guarding or rebound.  No HSM  Extremities:  No edema.  L lateral foot ulcer ~0.5 cm with fibrinous base, small area of erythema at proximal border, foot warm      LABS:                        10.6   11.66 )-----------( 398      ( 26 Jan 2023 05:30 )             33.2         01-26    138  |  104  |  14  ----------------------------<  96  4.0   |  24  |  0.61    Ca    8.5      26 Jan 2023 05:30  Mg     1.7     01-26            MICROBIOLOGY:        RADIOLOGY & ADDITIONAL STUDIES:

## 2023-01-26 NOTE — PROGRESS NOTE ADULT - SUBJECTIVE AND OBJECTIVE BOX
CARDIOLOGY NP PROGRESS NOTE    Subjective: Pt seen and examined at bedside. Reports feeling L foot pain that is being managed with pain meds. Ambulatory in bedroom and hallway. Denies chest pain, sob, lightheadedness, dizziness, palpitations, fever, chills.  Remainder ROS otherwise negative.    Overnight Events: none    TELEMETRY: SR 80s         VITAL SIGNS:  T(C): 36.1 (01-26-23 @ 08:38), Max: 36.8 (01-26-23 @ 06:19)  HR: 87 (01-26-23 @ 08:47) (84 - 101)  BP: 141/83 (01-26-23 @ 08:47) (141/83 - 165/84)  RR: 18 (01-26-23 @ 08:47) (16 - 18)  SpO2: 95% (01-26-23 @ 05:30) (95% - 98%)  Wt(kg): --    I&O's Summary    25 Jan 2023 07:01  -  26 Jan 2023 07:00  --------------------------------------------------------  IN: 1540 mL / OUT: 0 mL / NET: 1540 mL    26 Jan 2023 07:01  -  26 Jan 2023 10:10  --------------------------------------------------------  IN: 150 mL / OUT: 0 mL / NET: 150 mL          PHYSICAL EXAM:    General: A/ox 3, No acute Distress  Neck: Supple, NO JVD  Cardiac: S1 S2, No M/R/G  Pulmonary: CTAB, Breathing unlabored, No Rhonchi/Rales/Wheezing  Abdomen: Soft, Non -tender, +BS x 4 quads  Extremities: No Rashes, No edema. L foot Kerlix dressing in place. L leg warm to touch.  Neuro: A/o x 3, No focal deficits          LABS:                          10.6   11.66 )-----------( 398      ( 26 Jan 2023 05:30 )             33.2                              01-26    138  |  104  |  14  ----------------------------<  96  4.0   |  24  |  0.61    Ca    8.5      26 Jan 2023 05:30  Mg     1.7     01-26                                CAPILLARY BLOOD GLUCOSE                Allergies:  amoxicillin (Angioedema)  ciprofloxacin (Rash)  hydrochlorothiazide (Hives)  lisinopril (Angioedema; Rash; Hives)  morphine (Rash)  penicillin (Rash)    MEDICATIONS  (STANDING):  chlorhexidine 4% Liquid 1 Application(s) Topical once  magnesium sulfate  IVPB 2 Gram(s) IV Intermittent once  meropenem  IVPB      meropenem  IVPB 2000 milliGRAM(s) IV Intermittent every 8 hours  vancomycin  IVPB 1500 milliGRAM(s) IV Intermittent every 8 hours    MEDICATIONS  (PRN):  HYDROmorphone  Injectable 2 milliGRAM(s) IV Push every 4 hours PRN Severe Pain (7 - 10)  naloxone Injectable 0.1 milliGRAM(s) IV Push Once PRN For ANY of the following changes in patient status:  A. RR LESS THAN 10 breaths per minute, B. Oxygen saturation LESS THAN 90%, C. Sedation score of 6  ondansetron Injectable 4 milliGRAM(s) IV Push Once PRN Nausea        DIAGNOSTIC TESTS:

## 2023-01-26 NOTE — PROGRESS NOTE ADULT - ASSESSMENT
38 yo F with HTN, MO and AVM L foot with recurrent L foot ulcer s/p direct stick embolization 1/24.  Ulcer still with mild surrounding erythema but improving. Afebrile, mild leukocytosis today, reason unclear. Vancomycin trough was very subtherapeutic yesterday and was only a little late, dose increased appropriately.    Suggest:  - Continue vancomycin 1500 mg IV q8h - f/u trough scheduled for 4 pm (3rd administration at this dose was 08:47), goal 11- 16.  - Continue meropenem 2 g IV q8h  - Will evaluate appearance of wound daily to determine duration.  Will follow with you - team 2.

## 2023-01-27 LAB
ANION GAP SERPL CALC-SCNC: 7 MMOL/L — SIGNIFICANT CHANGE UP (ref 5–17)
BASOPHILS # BLD AUTO: 0.07 K/UL — SIGNIFICANT CHANGE UP (ref 0–0.2)
BASOPHILS NFR BLD AUTO: 0.7 % — SIGNIFICANT CHANGE UP (ref 0–2)
BUN SERPL-MCNC: 13 MG/DL — SIGNIFICANT CHANGE UP (ref 7–23)
CALCIUM SERPL-MCNC: 8.8 MG/DL — SIGNIFICANT CHANGE UP (ref 8.4–10.5)
CHLORIDE SERPL-SCNC: 103 MMOL/L — SIGNIFICANT CHANGE UP (ref 96–108)
CO2 SERPL-SCNC: 27 MMOL/L — SIGNIFICANT CHANGE UP (ref 22–31)
CREAT SERPL-MCNC: 0.69 MG/DL — SIGNIFICANT CHANGE UP (ref 0.5–1.3)
EGFR: 113 ML/MIN/1.73M2 — SIGNIFICANT CHANGE UP
EOSINOPHIL # BLD AUTO: 0.28 K/UL — SIGNIFICANT CHANGE UP (ref 0–0.5)
EOSINOPHIL NFR BLD AUTO: 3 % — SIGNIFICANT CHANGE UP (ref 0–6)
GLUCOSE SERPL-MCNC: 115 MG/DL — HIGH (ref 70–99)
HCT VFR BLD CALC: 33.4 % — LOW (ref 34.5–45)
HGB BLD-MCNC: 10.8 G/DL — LOW (ref 11.5–15.5)
IMM GRANULOCYTES NFR BLD AUTO: 0.5 % — SIGNIFICANT CHANGE UP (ref 0–0.9)
ISTAT INR: 1.4 — HIGH (ref 0.88–1.16)
ISTAT PT: 16.6 SEC — HIGH (ref 10–12.9)
LYMPHOCYTES # BLD AUTO: 1.66 K/UL — SIGNIFICANT CHANGE UP (ref 1–3.3)
LYMPHOCYTES # BLD AUTO: 17.8 % — SIGNIFICANT CHANGE UP (ref 13–44)
MAGNESIUM SERPL-MCNC: 1.8 MG/DL — SIGNIFICANT CHANGE UP (ref 1.6–2.6)
MCHC RBC-ENTMCNC: 28.6 PG — SIGNIFICANT CHANGE UP (ref 27–34)
MCHC RBC-ENTMCNC: 32.3 GM/DL — SIGNIFICANT CHANGE UP (ref 32–36)
MCV RBC AUTO: 88.4 FL — SIGNIFICANT CHANGE UP (ref 80–100)
MONOCYTES # BLD AUTO: 0.84 K/UL — SIGNIFICANT CHANGE UP (ref 0–0.9)
MONOCYTES NFR BLD AUTO: 9 % — SIGNIFICANT CHANGE UP (ref 2–14)
NEUTROPHILS # BLD AUTO: 6.45 K/UL — SIGNIFICANT CHANGE UP (ref 1.8–7.4)
NEUTROPHILS NFR BLD AUTO: 69 % — SIGNIFICANT CHANGE UP (ref 43–77)
NRBC # BLD: 0 /100 WBCS — SIGNIFICANT CHANGE UP (ref 0–0)
PLATELET # BLD AUTO: 387 K/UL — SIGNIFICANT CHANGE UP (ref 150–400)
POTASSIUM SERPL-MCNC: 4.2 MMOL/L — SIGNIFICANT CHANGE UP (ref 3.5–5.3)
POTASSIUM SERPL-SCNC: 4.2 MMOL/L — SIGNIFICANT CHANGE UP (ref 3.5–5.3)
RBC # BLD: 3.78 M/UL — LOW (ref 3.8–5.2)
RBC # FLD: 13.4 % — SIGNIFICANT CHANGE UP (ref 10.3–14.5)
SODIUM SERPL-SCNC: 137 MMOL/L — SIGNIFICANT CHANGE UP (ref 135–145)
VANCOMYCIN TROUGH SERPL-MCNC: 15.8 UG/ML — SIGNIFICANT CHANGE UP (ref 10–20)
WBC # BLD: 9.35 K/UL — SIGNIFICANT CHANGE UP (ref 3.8–10.5)
WBC # FLD AUTO: 9.35 K/UL — SIGNIFICANT CHANGE UP (ref 3.8–10.5)

## 2023-01-27 PROCEDURE — 76937 US GUIDE VASCULAR ACCESS: CPT | Mod: 26

## 2023-01-27 PROCEDURE — 36000 PLACE NEEDLE IN VEIN: CPT

## 2023-01-27 PROCEDURE — 99232 SBSQ HOSP IP/OBS MODERATE 35: CPT

## 2023-01-27 RX ORDER — MAGNESIUM OXIDE 400 MG ORAL TABLET 241.3 MG
400 TABLET ORAL ONCE
Refills: 0 | Status: COMPLETED | OUTPATIENT
Start: 2023-01-27 | End: 2023-01-27

## 2023-01-27 RX ADMIN — HYDROMORPHONE HYDROCHLORIDE 2 MILLIGRAM(S): 2 INJECTION INTRAMUSCULAR; INTRAVENOUS; SUBCUTANEOUS at 22:10

## 2023-01-27 RX ADMIN — HYDROMORPHONE HYDROCHLORIDE 2 MILLIGRAM(S): 2 INJECTION INTRAMUSCULAR; INTRAVENOUS; SUBCUTANEOUS at 01:30

## 2023-01-27 RX ADMIN — Medication 300 MILLIGRAM(S): at 21:23

## 2023-01-27 RX ADMIN — NEBIVOLOL HYDROCHLORIDE 5 MILLIGRAM(S): 5 TABLET ORAL at 17:07

## 2023-01-27 RX ADMIN — MEROPENEM 280 MILLIGRAM(S): 1 INJECTION INTRAVENOUS at 17:10

## 2023-01-27 RX ADMIN — Medication 300 MILLIGRAM(S): at 05:22

## 2023-01-27 RX ADMIN — MEROPENEM 280 MILLIGRAM(S): 1 INJECTION INTRAVENOUS at 08:47

## 2023-01-27 RX ADMIN — HYDROMORPHONE HYDROCHLORIDE 2 MILLIGRAM(S): 2 INJECTION INTRAMUSCULAR; INTRAVENOUS; SUBCUTANEOUS at 08:47

## 2023-01-27 RX ADMIN — HYDROMORPHONE HYDROCHLORIDE 2 MILLIGRAM(S): 2 INJECTION INTRAMUSCULAR; INTRAVENOUS; SUBCUTANEOUS at 13:13

## 2023-01-27 RX ADMIN — HYDROMORPHONE HYDROCHLORIDE 2 MILLIGRAM(S): 2 INJECTION INTRAMUSCULAR; INTRAVENOUS; SUBCUTANEOUS at 09:10

## 2023-01-27 RX ADMIN — HYDROMORPHONE HYDROCHLORIDE 2 MILLIGRAM(S): 2 INJECTION INTRAMUSCULAR; INTRAVENOUS; SUBCUTANEOUS at 13:36

## 2023-01-27 RX ADMIN — HYDROMORPHONE HYDROCHLORIDE 2 MILLIGRAM(S): 2 INJECTION INTRAMUSCULAR; INTRAVENOUS; SUBCUTANEOUS at 00:42

## 2023-01-27 RX ADMIN — NEBIVOLOL HYDROCHLORIDE 5 MILLIGRAM(S): 5 TABLET ORAL at 07:38

## 2023-01-27 RX ADMIN — HYDROMORPHONE HYDROCHLORIDE 2 MILLIGRAM(S): 2 INJECTION INTRAMUSCULAR; INTRAVENOUS; SUBCUTANEOUS at 17:42

## 2023-01-27 RX ADMIN — HYDROMORPHONE HYDROCHLORIDE 2 MILLIGRAM(S): 2 INJECTION INTRAMUSCULAR; INTRAVENOUS; SUBCUTANEOUS at 17:16

## 2023-01-27 RX ADMIN — MAGNESIUM OXIDE 400 MG ORAL TABLET 400 MILLIGRAM(S): 241.3 TABLET ORAL at 08:47

## 2023-01-27 RX ADMIN — HYDROMORPHONE HYDROCHLORIDE 2 MILLIGRAM(S): 2 INJECTION INTRAMUSCULAR; INTRAVENOUS; SUBCUTANEOUS at 04:55

## 2023-01-27 RX ADMIN — HYDROMORPHONE HYDROCHLORIDE 2 MILLIGRAM(S): 2 INJECTION INTRAMUSCULAR; INTRAVENOUS; SUBCUTANEOUS at 21:22

## 2023-01-27 RX ADMIN — HYDROMORPHONE HYDROCHLORIDE 2 MILLIGRAM(S): 2 INJECTION INTRAMUSCULAR; INTRAVENOUS; SUBCUTANEOUS at 05:45

## 2023-01-27 RX ADMIN — Medication 300 MILLIGRAM(S): at 13:13

## 2023-01-27 NOTE — PROGRESS NOTE ADULT - SUBJECTIVE AND OBJECTIVE BOX
INTERVAL HPI/OVERNIGHT EVENTS:  Afebrile, L foot continues to throb.  Had some sweats overnight/felt clammy    CONSTITUTIONAL:  No fever, chills  EYES:  No photophobia or visual changes  CARDIOVASCULAR:  No chest pain  RESPIRATORY:  No cough, wheezing, or SOB   GASTROINTESTINAL:  No nausea, vomiting, diarrhea, constipation, or abdominal pain  GENITOURINARY:  No frequency, urgency, dysuria or hematuria  NEUROLOGIC:  No headache, lightheadedness      ANTIBIOTICS/RELEVANT:    Vancomycin 1500 mg IV q8h (1/24-present)  Meropenem 2 g IV q8h (1/24-present)      Vital Signs Last 24 Hrs  T(C): 36.5 (27 Jan 2023 13:05), Max: 36.6 (26 Jan 2023 21:07)  T(F): 97.7 (27 Jan 2023 13:05), Max: 97.9 (26 Jan 2023 21:07)  HR: 88 (27 Jan 2023 13:18) (68 - 93)  BP: 132/90 (27 Jan 2023 13:18) (103/86 - 172/122)  BP(mean): 91 (27 Jan 2023 07:30) (86 - 115)  RR: 18 (27 Jan 2023 13:18) (16 - 18)  SpO2: 98% (27 Jan 2023 09:00) (95% - 98%)    Parameters below as of 27 Jan 2023 13:18  Patient On (Oxygen Delivery Method): room air        PHYSICAL EXAM:  Constitutional:  Alert  Eyes:  Sclerae anicteric, conjunctivae clear, PERRL  Ear/Nose/Throat:  No nasal exudate or sinus tenderness;  No buccal mucosal lesions, no pharyngeal erythema or exudate	  Neck:  Supple, no adenopathy  Respiratory:  Clear bilaterally  Cardiovascular:  RRR, S1S2, no murmur appreciated  Gastrointestinal:  Symmetric, normoactive BS, soft, NT, no masses, guarding or rebound.  No HSM  Extremities:  No edema; L lateral foot ulcer ~0.5 cm with more granular base, small area of surrounding erythema, foot warm        LABS:                        10.8   9.35  )-----------( 387      ( 27 Jan 2023 06:41 )             33.4         01-27    137  |  103  |  13  ----------------------------<  115<H>  4.2   |  27  |  0.69    Ca    8.8      27 Jan 2023 06:41  Mg     1.8     01-27      Vancomycin trough 11.9 (1/26 @ 16:45;  prior dose 08:47)      MICROBIOLOGY:        RADIOLOGY & ADDITIONAL STUDIES:

## 2023-01-27 NOTE — DIETITIAN INITIAL EVALUATION ADULT - OTHER INFO
38 y/o female, morbidly obese, PMHx HTN and left foot AVM with chronic left foot ulcer (s/p multiple embolizations, previously requiring IV antibiotics, s/p prior OR debridement 10/11/2022, and subsequently treated with IV Meropenem/Vancomycin from 11/07/2022 to 12/29/2022 with wound closure on 12/23/2022 who presented to outpatient provider for continued follow up. Pt noted recent increased pulsing sensation in her foot and was told she may require additional embolization, though pt was initially hesitant to pursue this given recent infection. Pt now presented for repeat direct stick embolization of the left foot 1/24. Pt on IV Abx till 1/27. ID following.    Pt seen this AM on 5UR. Known to RD from prior admit. Ordered for Regular Diet now-good PO. PTA Tries to watch PO intake for wt loss. Eats things like chicken fish meat eggs salads. Does have CHO but limited intake. Reports use of qsymia x8 months for wt loss. Has lost 60 pounds thus far. Admit wt 308 pounds, BMI 48.2 - BMI note placed today. NKFA. No issues chewing/swallowing. Michael 19. No pressure ulcers. SX site noted. +Edema: (left ankle; left foot). BM+ 1/25.   Please see below for nutritions recommendations.

## 2023-01-27 NOTE — PROGRESS NOTE ADULT - PROBLEM SELECTOR PLAN 2
- SBPs 120s-130s  - Resumed 1/2 dose home Bystolic 5mg BID, as pt also receiving IV Hydromorphone     F: No IVF  N: Reg diet   E: Replete lytes PRN K<4, Mg<2  P: DVT PPX: on HSQ  C: FULL CODE  Dispo: admit to cardiac tele, on IV ABX - SBPs 120s-130s  - Resumed 1/2 dose home Bystolic at 5mg BID, as pt also receiving IV Hydromorphone     F: No IVF  N: Reg diet   E: Replete lytes PRN K<4, Mg<2  P: DVT PPX: on HSQ  C: FULL CODE  Dispo: admit to cardiac tele, on IV ABX

## 2023-01-27 NOTE — DIETITIAN INITIAL EVALUATION ADULT - PERTINENT LABORATORY DATA
01-27    137  |  103  |  13  ----------------------------<  115<H>  4.2   |  27  |  0.69    Ca    8.8      27 Jan 2023 06:41  Mg     1.8     01-27    A1C with Estimated Average Glucose Result: 5.2 % (11-02-22 @ 05:30)  A1C with Estimated Average Glucose Result: 5.1 % (08-16-22 @ 08:54)

## 2023-01-27 NOTE — PROGRESS NOTE ADULT - PROBLEM SELECTOR PLAN 1
L foot AVM w/chronic L foot Ulcer s/p multiple embolizations and OR debridement 10/11/22. S/P IV ABX via PICC (11/7-12/29) w/ wound closure.  - s/p Direct stick embolization for AVM in left foot 1/24 w/ Dr Teran  - ID following, appreciate recs.   - c/w IV Vanco 1500mg q8h/ Meropenem 2g q8h per ID recs  - F/u vanco trough 7pm today prior to 4th dose. Goal 11-16  - c/w IV Hydromorphone 2mg Q4H PRN severe pain

## 2023-01-27 NOTE — PROGRESS NOTE ADULT - ASSESSMENT
36 yo F with HTN, MO and AVM L foot with recurrent L foot ulcer s/p direct stick embolization 1/24.  Ulcer still with mild surrounding erythema but improving. Afebrile, leukocytosis resolved.  Vancomycin trough was appropriately timed and in therapeutic range.  Suggest:  - Continue vancomycin 1500 mg IV q8h (trough goal 11- 16).  - Continue meropenem 2 g IV q8h  - Will evaluate appearance of wound daily to determine duration.  Recommendations discussed with primary team.  Will follow with you - team 2.

## 2023-01-27 NOTE — PROGRESS NOTE ADULT - ASSESSMENT
36 y/o F, morbidly obese, PMHx HTN and left foot AVM w/ chronic left foot ulcer (s/p multiple embolizations, previously requiring IV antibiotics, s/p prior OR debridement 10/11/2022, and subsequently treated w/ IV Meropenem/Vancomycin from 11/07/2022 to 12/29/2022 w/ wound closure on 12/23/2022) presented 1/24 for planned procedure, now s/p Direct stick embolization for AVM in left foot, currently still on IV Abx per ID.

## 2023-01-27 NOTE — DIETITIAN INITIAL EVALUATION ADULT - PERTINENT MEDS FT
MEDICATIONS  (STANDING):  chlorhexidine 4% Liquid 1 Application(s) Topical once  meropenem  IVPB      meropenem  IVPB 2000 milliGRAM(s) IV Intermittent every 8 hours  nebivolol 5 milliGRAM(s) Oral <User Schedule>  vancomycin  IVPB 1500 milliGRAM(s) IV Intermittent every 8 hours    MEDICATIONS  (PRN):  HYDROmorphone  Injectable 2 milliGRAM(s) IV Push every 4 hours PRN Severe Pain (7 - 10)  naloxone Injectable 0.1 milliGRAM(s) IV Push Once PRN For ANY of the following changes in patient status:  A. RR LESS THAN 10 breaths per minute, B. Oxygen saturation LESS THAN 90%, C. Sedation score of 6  ondansetron Injectable 4 milliGRAM(s) IV Push Once PRN Nausea

## 2023-01-27 NOTE — DIETITIAN INITIAL EVALUATION ADULT - OTHER CALCULATIONS
pounds+-10%; %[id number]  IBW used to calculate energy needs due to pt's current body weight exceeding 120% of IBW  Adjust for age/BMI, s/p embolization, Skin

## 2023-01-27 NOTE — PROGRESS NOTE ADULT - SUBJECTIVE AND OBJECTIVE BOX
CARDIOLOGY NP PROGRESS NOTE    Subjective: Pt seen and examined at bedside. Reports feeling well. Denies chest pain, sob, lightheadedness, dizziness, palpitations, fever, chills.  Remainder ROS otherwise negative.    Overnight Events: none    TELEMETRY: SR 80s           VITAL SIGNS:  T(C): 36.5 (01-27-23 @ 13:05), Max: 36.6 (01-26-23 @ 21:07)  HR: 88 (01-27-23 @ 13:18) (68 - 93)  BP: 132/90 (01-27-23 @ 13:18) (103/86 - 172/122)  RR: 18 (01-27-23 @ 13:18) (16 - 18)  SpO2: 98% (01-27-23 @ 09:00) (95% - 98%)  Wt(kg): --    I&O's Summary    26 Jan 2023 07:01  -  27 Jan 2023 07:00  --------------------------------------------------------  IN: 830 mL / OUT: 430 mL / NET: 400 mL    27 Jan 2023 07:01  -  27 Jan 2023 15:37  --------------------------------------------------------  IN: 420 mL / OUT: 0 mL / NET: 420 mL          PHYSICAL EXAM:    General: A/ox 3, No acute Distress  Neck: Supple, NO JVD  Cardiac: S1 S2, No M/R/G  Pulmonary: CTAB, Breathing unlabored on RA, No Rhonchi/Rales/Wheezing  Abdomen: Soft, Non -tender, +BS x 4 quads  Extremities: No Rashes, No edema. L foot Kerlix dressing in place. Damian CHANNING BRYAN  Neuro: A/o x 3, No focal deficits          LABS:                          10.8   9.35  )-----------( 387      ( 27 Jan 2023 06:41 )             33.4                              01-27    137  |  103  |  13  ----------------------------<  115<H>  4.2   |  27  |  0.69    Ca    8.8      27 Jan 2023 06:41  Mg     1.8     01-27                                CAPILLARY BLOOD GLUCOSE             Allergies:  amoxicillin (Angioedema)  ciprofloxacin (Rash)  hydrochlorothiazide (Hives)  lisinopril (Angioedema; Rash; Hives)  morphine (Rash)  penicillin (Rash)    MEDICATIONS  (STANDING):  chlorhexidine 4% Liquid 1 Application(s) Topical once  meropenem  IVPB      meropenem  IVPB 2000 milliGRAM(s) IV Intermittent every 8 hours  nebivolol 5 milliGRAM(s) Oral <User Schedule>  vancomycin  IVPB 1500 milliGRAM(s) IV Intermittent every 8 hours    MEDICATIONS  (PRN):  HYDROmorphone  Injectable 2 milliGRAM(s) IV Push every 4 hours PRN Severe Pain (7 - 10)  naloxone Injectable 0.1 milliGRAM(s) IV Push Once PRN For ANY of the following changes in patient status:  A. RR LESS THAN 10 breaths per minute, B. Oxygen saturation LESS THAN 90%, C. Sedation score of 6  ondansetron Injectable 4 milliGRAM(s) IV Push Once PRN Nausea        DIAGNOSTIC TESTS:

## 2023-01-28 LAB
ANION GAP SERPL CALC-SCNC: 10 MMOL/L — SIGNIFICANT CHANGE UP (ref 5–17)
ANION GAP SERPL CALC-SCNC: 8 MMOL/L — SIGNIFICANT CHANGE UP (ref 5–17)
BUN SERPL-MCNC: 10 MG/DL — SIGNIFICANT CHANGE UP (ref 7–23)
BUN SERPL-MCNC: 12 MG/DL — SIGNIFICANT CHANGE UP (ref 7–23)
CALCIUM SERPL-MCNC: 8.9 MG/DL — SIGNIFICANT CHANGE UP (ref 8.4–10.5)
CALCIUM SERPL-MCNC: 9.2 MG/DL — SIGNIFICANT CHANGE UP (ref 8.4–10.5)
CHLORIDE SERPL-SCNC: 100 MMOL/L — SIGNIFICANT CHANGE UP (ref 96–108)
CHLORIDE SERPL-SCNC: 103 MMOL/L — SIGNIFICANT CHANGE UP (ref 96–108)
CO2 SERPL-SCNC: 26 MMOL/L — SIGNIFICANT CHANGE UP (ref 22–31)
CO2 SERPL-SCNC: 27 MMOL/L — SIGNIFICANT CHANGE UP (ref 22–31)
CREAT SERPL-MCNC: 0.64 MG/DL — SIGNIFICANT CHANGE UP (ref 0.5–1.3)
CREAT SERPL-MCNC: 0.73 MG/DL — SIGNIFICANT CHANGE UP (ref 0.5–1.3)
EGFR: 107 ML/MIN/1.73M2 — SIGNIFICANT CHANGE UP
EGFR: 115 ML/MIN/1.73M2 — SIGNIFICANT CHANGE UP
GLUCOSE SERPL-MCNC: 104 MG/DL — HIGH (ref 70–99)
GLUCOSE SERPL-MCNC: 106 MG/DL — HIGH (ref 70–99)
HCT VFR BLD CALC: 33.4 % — LOW (ref 34.5–45)
HGB BLD-MCNC: 10.7 G/DL — LOW (ref 11.5–15.5)
MAGNESIUM SERPL-MCNC: 1.8 MG/DL — SIGNIFICANT CHANGE UP (ref 1.6–2.6)
MCHC RBC-ENTMCNC: 28.4 PG — SIGNIFICANT CHANGE UP (ref 27–34)
MCHC RBC-ENTMCNC: 32 GM/DL — SIGNIFICANT CHANGE UP (ref 32–36)
MCV RBC AUTO: 88.6 FL — SIGNIFICANT CHANGE UP (ref 80–100)
NRBC # BLD: 0 /100 WBCS — SIGNIFICANT CHANGE UP (ref 0–0)
PLATELET # BLD AUTO: 391 K/UL — SIGNIFICANT CHANGE UP (ref 150–400)
POTASSIUM SERPL-MCNC: 4.8 MMOL/L — SIGNIFICANT CHANGE UP (ref 3.5–5.3)
POTASSIUM SERPL-MCNC: 5.4 MMOL/L — HIGH (ref 3.5–5.3)
POTASSIUM SERPL-SCNC: 4.8 MMOL/L — SIGNIFICANT CHANGE UP (ref 3.5–5.3)
POTASSIUM SERPL-SCNC: 5.4 MMOL/L — HIGH (ref 3.5–5.3)
RBC # BLD: 3.77 M/UL — LOW (ref 3.8–5.2)
RBC # FLD: 13.5 % — SIGNIFICANT CHANGE UP (ref 10.3–14.5)
SODIUM SERPL-SCNC: 135 MMOL/L — SIGNIFICANT CHANGE UP (ref 135–145)
SODIUM SERPL-SCNC: 139 MMOL/L — SIGNIFICANT CHANGE UP (ref 135–145)
VANCOMYCIN TROUGH SERPL-MCNC: 18.2 UG/ML — SIGNIFICANT CHANGE UP (ref 10–20)
WBC # BLD: 13.6 K/UL — HIGH (ref 3.8–10.5)
WBC # FLD AUTO: 13.6 K/UL — HIGH (ref 3.8–10.5)

## 2023-01-28 PROCEDURE — 99232 SBSQ HOSP IP/OBS MODERATE 35: CPT

## 2023-01-28 RX ORDER — MAGNESIUM OXIDE 400 MG ORAL TABLET 241.3 MG
400 TABLET ORAL ONCE
Refills: 0 | Status: COMPLETED | OUTPATIENT
Start: 2023-01-28 | End: 2023-01-28

## 2023-01-28 RX ORDER — VANCOMYCIN HCL 1 G
1250 VIAL (EA) INTRAVENOUS EVERY 8 HOURS
Refills: 0 | Status: DISCONTINUED | OUTPATIENT
Start: 2023-01-28 | End: 2023-01-31

## 2023-01-28 RX ADMIN — NEBIVOLOL HYDROCHLORIDE 5 MILLIGRAM(S): 5 TABLET ORAL at 07:51

## 2023-01-28 RX ADMIN — MEROPENEM 280 MILLIGRAM(S): 1 INJECTION INTRAVENOUS at 00:08

## 2023-01-28 RX ADMIN — HYDROMORPHONE HYDROCHLORIDE 2 MILLIGRAM(S): 2 INJECTION INTRAMUSCULAR; INTRAVENOUS; SUBCUTANEOUS at 18:03

## 2023-01-28 RX ADMIN — MAGNESIUM OXIDE 400 MG ORAL TABLET 400 MILLIGRAM(S): 241.3 TABLET ORAL at 09:55

## 2023-01-28 RX ADMIN — HYDROMORPHONE HYDROCHLORIDE 2 MILLIGRAM(S): 2 INJECTION INTRAMUSCULAR; INTRAVENOUS; SUBCUTANEOUS at 23:30

## 2023-01-28 RX ADMIN — Medication 300 MILLIGRAM(S): at 21:53

## 2023-01-28 RX ADMIN — HYDROMORPHONE HYDROCHLORIDE 2 MILLIGRAM(S): 2 INJECTION INTRAMUSCULAR; INTRAVENOUS; SUBCUTANEOUS at 13:58

## 2023-01-28 RX ADMIN — HYDROMORPHONE HYDROCHLORIDE 2 MILLIGRAM(S): 2 INJECTION INTRAMUSCULAR; INTRAVENOUS; SUBCUTANEOUS at 05:47

## 2023-01-28 RX ADMIN — Medication 300 MILLIGRAM(S): at 05:48

## 2023-01-28 RX ADMIN — HYDROMORPHONE HYDROCHLORIDE 2 MILLIGRAM(S): 2 INJECTION INTRAMUSCULAR; INTRAVENOUS; SUBCUTANEOUS at 10:10

## 2023-01-28 RX ADMIN — HYDROMORPHONE HYDROCHLORIDE 2 MILLIGRAM(S): 2 INJECTION INTRAMUSCULAR; INTRAVENOUS; SUBCUTANEOUS at 01:32

## 2023-01-28 RX ADMIN — HYDROMORPHONE HYDROCHLORIDE 2 MILLIGRAM(S): 2 INJECTION INTRAMUSCULAR; INTRAVENOUS; SUBCUTANEOUS at 17:48

## 2023-01-28 RX ADMIN — NEBIVOLOL HYDROCHLORIDE 5 MILLIGRAM(S): 5 TABLET ORAL at 17:47

## 2023-01-28 RX ADMIN — MEROPENEM 280 MILLIGRAM(S): 1 INJECTION INTRAVENOUS at 07:51

## 2023-01-28 RX ADMIN — Medication 166.67 MILLIGRAM(S): at 22:28

## 2023-01-28 RX ADMIN — HYDROMORPHONE HYDROCHLORIDE 2 MILLIGRAM(S): 2 INJECTION INTRAMUSCULAR; INTRAVENOUS; SUBCUTANEOUS at 02:15

## 2023-01-28 RX ADMIN — MEROPENEM 280 MILLIGRAM(S): 1 INJECTION INTRAVENOUS at 15:06

## 2023-01-28 RX ADMIN — HYDROMORPHONE HYDROCHLORIDE 2 MILLIGRAM(S): 2 INJECTION INTRAMUSCULAR; INTRAVENOUS; SUBCUTANEOUS at 22:28

## 2023-01-28 RX ADMIN — Medication 300 MILLIGRAM(S): at 11:45

## 2023-01-28 RX ADMIN — HYDROMORPHONE HYDROCHLORIDE 2 MILLIGRAM(S): 2 INJECTION INTRAMUSCULAR; INTRAVENOUS; SUBCUTANEOUS at 09:55

## 2023-01-28 RX ADMIN — HYDROMORPHONE HYDROCHLORIDE 2 MILLIGRAM(S): 2 INJECTION INTRAMUSCULAR; INTRAVENOUS; SUBCUTANEOUS at 06:27

## 2023-01-28 RX ADMIN — HYDROMORPHONE HYDROCHLORIDE 2 MILLIGRAM(S): 2 INJECTION INTRAMUSCULAR; INTRAVENOUS; SUBCUTANEOUS at 13:43

## 2023-01-28 NOTE — PROGRESS NOTE ADULT - PROBLEM SELECTOR PLAN 1
L foot AVM w/chronic L foot Ulcer s/p multiple embolizations and OR debridement 10/11/22. S/P IV ABX via PICC (11/7-12/29) w/ wound closure.  - s/p Direct stick embolization for AVM in left foot 1/24/23 w/ Dr Teran  - ID following, appreciate recs  - c/w IV Vanco 1500mg q8h/ Meropenem 2g q8h per ID recs  - F/u vanco trough 7pm today prior to 4th dose. Goal 11-16  - Per ID, will evaluate appearance of wound daily to determine duration, plan to hopefully complete course over next few days and not require long term Abx duration.  - c/w IV Hydromorphone 2mg Q4H PRN severe pain L foot AVM w/chronic L foot Ulcer s/p multiple embolizations and OR debridement 10/11/22. S/P IV ABX via PICC (11/7-12/29) w/ wound closure.   - s/p Direct stick embolization for AVM in left foot 1/24/23 w/ Dr Teran  - ID following, appreciate recs  - c/w IV Vanco 1500mg q8h/ Meropenem 2g q8h per ID recs  - F/u vanco trough 7pm today prior to 4th dose. Goal 11-16  - WBC 14. Afebrile. If becomes febrile or if WBC continues to trend upward, obtain blood cultures x 2 sets  - Per ID, will evaluate appearance of wound daily to determine duration, plan to hopefully complete course over next few days and not require long term Abx duration.  - c/w IV Hydromorphone 2mg Q4H PRN severe pain

## 2023-01-28 NOTE — PROGRESS NOTE ADULT - ASSESSMENT
36 y/o F, morbidly obese, PMHx HTN and left foot AVM w/ chronic left foot ulcer (s/p multiple embolizations, previously requiring IV antibiotics, s/p prior OR debridement 10/11/2022, and subsequently treated w/ IV Meropenem/Vancomycin from 11/07/2022 to 12/29/2022 w/ wound closure on 12/23/2022) presented 1/24 for planned procedure, now s/p Direct stick embolization for AVM in left foot, currently remains on IV Abx per ID recs.

## 2023-01-28 NOTE — PROGRESS NOTE ADULT - SUBJECTIVE AND OBJECTIVE BOX
INTERVAL HPI/OVERNIGHT EVENTS:  Afebrile, had some sweats overnight, ongoing foot throbbing    CONSTITUTIONAL:  No fever, chills  EYES:  No photophobia or visual changes  CARDIOVASCULAR:  No chest pain  RESPIRATORY:  No cough, wheezing, or SOB   GASTROINTESTINAL:  No nausea, vomiting, diarrhea, constipation, or abdominal pain  GENITOURINARY:  No frequency, urgency, dysuria or hematuria  NEUROLOGIC:  No headache, lightheadedness      ANTIBIOTICS/RELEVANT:    Vancomycin 1500 mg IV q8h (1/24-present)  Meropenem 2 g IV q8h (1/24-present)    Vital Signs Last 24 Hrs  T(C): 36.1 (28 Jan 2023 16:43), Max: 37.1 (27 Jan 2023 22:17)  T(F): 97 (28 Jan 2023 16:43), Max: 98.7 (27 Jan 2023 22:17)  HR: 89 (28 Jan 2023 15:00) (82 - 102)  BP: 142/84 (28 Jan 2023 15:00) (124/83 - 161/111)  BP(mean): 100 (28 Jan 2023 04:50) (100 - 100)  RR: 16 (28 Jan 2023 15:00) (16 - 18)  SpO2: 98% (28 Jan 2023 15:00) (97% - 100%)    Parameters below as of 28 Jan 2023 15:00  Patient On (Oxygen Delivery Method): room air        PHYSICAL EXAM:  Constitutional:  Alert  Eyes:  Sclerae anicteric, conjunctivae clear, PERRL  Ear/Nose/Throat:  No nasal exudate or sinus tenderness;  No buccal mucosal lesions, no pharyngeal erythema or exudate	  Neck:  Supple, no adenopathy  Respiratory:  Clear bilaterally  Cardiovascular:  RRR, S1S2, no murmur appreciated  Gastrointestinal:  Symmetric, normoactive BS, soft, NT, no masses, guarding or rebound.  No HSM  Extremities:  No edema.  L lateral foot ulcer ~0.5 cm with more granular base, small area of surrounding erythema, foot warm      LABS:                        10.7   13.60 )-----------( 391      ( 28 Jan 2023 05:30 )             33.4         01-28    135  |  100  |  10  ----------------------------<  106<H>  5.4<H>   |  27  |  0.64    Ca    8.9      28 Jan 2023 05:30  Mg     1.8     01-28            MICROBIOLOGY:        RADIOLOGY & ADDITIONAL STUDIES:

## 2023-01-28 NOTE — PROVIDER CONTACT NOTE (CHANGE IN STATUS NOTIFICATION) - BACKGROUND
Patient was initially in sinus bradycardia, converted to AFib with RVR (130s). 3 doses of metoprolol 5 mg IVPush were prescribed to be given 15 minutes apart. While pushing second dose, patient heart rate dropped. Heart rate pause of 5 seconds on monitor. SAMM Serrato made aware.

## 2023-01-28 NOTE — PROVIDER CONTACT NOTE (CHANGE IN STATUS NOTIFICATION) - ACTION/TREATMENT ORDERED:
Monitor for conversion back to AFIB with RVR. Give IVPush metoprolol 5 mg if AFib with RVR per SAMM Serrato.

## 2023-01-28 NOTE — PROGRESS NOTE ADULT - SUBJECTIVE AND OBJECTIVE BOX
CARDIOLOGY NP PROGRESS NOTE    Subjective: Pt seen and examined at bedside.  Reports some chills/sweating last night. Denies chest pain, sob, lightheadedness, dizziness, palpitations, fever. Remainder ROS otherwise negative.    Overnight Events: WBC 13, remains afebrile.    TELEMETRY: SR       VITAL SIGNS:  T(C): 36.7 (01-28-23 @ 13:51), Max: 37.1 (01-27-23 @ 22:17)  HR: 88 (01-28-23 @ 09:10) (82 - 102)  BP: 141/89 (01-28-23 @ 09:10) (124/83 - 161/111)  RR: 16 (01-28-23 @ 09:10) (16 - 18)  SpO2: 100% (01-28-23 @ 09:10) (97% - 100%)  Wt(kg): --    I&O's Summary    27 Jan 2023 07:01  -  28 Jan 2023 07:00  --------------------------------------------------------  IN: 1740 mL / OUT: 550 mL / NET: 1190 mL    28 Jan 2023 07:01  -  28 Jan 2023 14:54  --------------------------------------------------------  IN: 590 mL / OUT: 0 mL / NET: 590 mL          PHYSICAL EXAM:    General: A/ox 3, No acute Distress  Neck: Supple, NO JVD  Cardiac: S1 S2, No M/R/G  Pulmonary: CTAB, Breathing unlabored on RA, No Rhonchi/Rales/Wheezing  Abdomen: Soft, Non -tender, +BS x 4 quads  Extremities: No Rashes, No edema. L foot Kerlix dressing in place. Damian LE WWP  Neuro: A/o x 3, No focal deficits          LABS:                          10.7   13.60 )-----------( 391      ( 28 Jan 2023 05:30 )             33.4                              01-28    135  |  100  |  10  ----------------------------<  106<H>  5.4<H>   |  27  |  0.64    Ca    8.9      28 Jan 2023 05:30  Mg     1.8     01-28                                CAPILLARY BLOOD GLUCOSE                Allergies:  amoxicillin (Angioedema)  ciprofloxacin (Rash)  hydrochlorothiazide (Hives)  lisinopril (Angioedema; Rash; Hives)  morphine (Rash)  penicillin (Rash)    MEDICATIONS  (STANDING):  chlorhexidine 4% Liquid 1 Application(s) Topical once  meropenem  IVPB      meropenem  IVPB 2000 milliGRAM(s) IV Intermittent every 8 hours  nebivolol 5 milliGRAM(s) Oral <User Schedule>  vancomycin  IVPB 1500 milliGRAM(s) IV Intermittent every 8 hours    MEDICATIONS  (PRN):  HYDROmorphone  Injectable 2 milliGRAM(s) IV Push every 4 hours PRN Severe Pain (7 - 10)  naloxone Injectable 0.1 milliGRAM(s) IV Push Once PRN For ANY of the following changes in patient status:  A. RR LESS THAN 10 breaths per minute, B. Oxygen saturation LESS THAN 90%, C. Sedation score of 6  ondansetron Injectable 4 milliGRAM(s) IV Push Once PRN Nausea        DIAGNOSTIC TESTS:

## 2023-01-28 NOTE — PROGRESS NOTE ADULT - PROBLEM SELECTOR PLAN 2
- SBPs 120s-130s  - Resumed 1/2 dose of home Bystolic at 5mg BID, as pt also receiving IV Hydromorphone     F: No IVF  N: Reg diet   E: Replete lytes PRN K<4, Mg<2  P: DVT PPX: on HSQ  C: FULL CODE  Dispo: admit to cardiac tele, on IV ABX

## 2023-01-28 NOTE — PROGRESS NOTE ADULT - ASSESSMENT
36 yo F with HTN, MO and AVM L foot with recurrent L foot ulcer s/p direct stick embolization 1/24.  Ulcer still with mild surrounding erythema.  Afebrile, leukocytosis again today.  Suggest:  - If she is febrile or if WBC continues to trend upward, please obtain blood cultures X 2 sets  - Continue vancomycin 1500 mg IV q8h (trough goal 11- 16).  Trough at 7 pm  - Continue meropenem 2 g IV q8h  - Will evaluate appearance of wound daily to determine duration - hopefully will improve in the next couple of days  Recommendations discussed with primary team.  Will follow with you - team 2.

## 2023-01-29 LAB
ANION GAP SERPL CALC-SCNC: 10 MMOL/L — SIGNIFICANT CHANGE UP (ref 5–17)
BASOPHILS # BLD AUTO: 0.05 K/UL — SIGNIFICANT CHANGE UP (ref 0–0.2)
BASOPHILS NFR BLD AUTO: 0.4 % — SIGNIFICANT CHANGE UP (ref 0–2)
BUN SERPL-MCNC: 14 MG/DL — SIGNIFICANT CHANGE UP (ref 7–23)
CALCIUM SERPL-MCNC: 8.5 MG/DL — SIGNIFICANT CHANGE UP (ref 8.4–10.5)
CHLORIDE SERPL-SCNC: 101 MMOL/L — SIGNIFICANT CHANGE UP (ref 96–108)
CO2 SERPL-SCNC: 24 MMOL/L — SIGNIFICANT CHANGE UP (ref 22–31)
CREAT SERPL-MCNC: 0.62 MG/DL — SIGNIFICANT CHANGE UP (ref 0.5–1.3)
EGFR: 116 ML/MIN/1.73M2 — SIGNIFICANT CHANGE UP
EOSINOPHIL # BLD AUTO: 0.25 K/UL — SIGNIFICANT CHANGE UP (ref 0–0.5)
EOSINOPHIL NFR BLD AUTO: 2 % — SIGNIFICANT CHANGE UP (ref 0–6)
GLUCOSE SERPL-MCNC: 127 MG/DL — HIGH (ref 70–99)
HCT VFR BLD CALC: 33.5 % — LOW (ref 34.5–45)
HGB BLD-MCNC: 10.9 G/DL — LOW (ref 11.5–15.5)
IMM GRANULOCYTES NFR BLD AUTO: 0.4 % — SIGNIFICANT CHANGE UP (ref 0–0.9)
LYMPHOCYTES # BLD AUTO: 1.7 K/UL — SIGNIFICANT CHANGE UP (ref 1–3.3)
LYMPHOCYTES # BLD AUTO: 13.8 % — SIGNIFICANT CHANGE UP (ref 13–44)
MAGNESIUM SERPL-MCNC: 1.8 MG/DL — SIGNIFICANT CHANGE UP (ref 1.6–2.6)
MCHC RBC-ENTMCNC: 28.2 PG — SIGNIFICANT CHANGE UP (ref 27–34)
MCHC RBC-ENTMCNC: 32.5 GM/DL — SIGNIFICANT CHANGE UP (ref 32–36)
MCV RBC AUTO: 86.6 FL — SIGNIFICANT CHANGE UP (ref 80–100)
MONOCYTES # BLD AUTO: 0.75 K/UL — SIGNIFICANT CHANGE UP (ref 0–0.9)
MONOCYTES NFR BLD AUTO: 6.1 % — SIGNIFICANT CHANGE UP (ref 2–14)
NEUTROPHILS # BLD AUTO: 9.53 K/UL — HIGH (ref 1.8–7.4)
NEUTROPHILS NFR BLD AUTO: 77.3 % — HIGH (ref 43–77)
NRBC # BLD: 0 /100 WBCS — SIGNIFICANT CHANGE UP (ref 0–0)
PLATELET # BLD AUTO: 339 K/UL — SIGNIFICANT CHANGE UP (ref 150–400)
POTASSIUM SERPL-MCNC: 3.9 MMOL/L — SIGNIFICANT CHANGE UP (ref 3.5–5.3)
POTASSIUM SERPL-SCNC: 3.9 MMOL/L — SIGNIFICANT CHANGE UP (ref 3.5–5.3)
RBC # BLD: 3.87 M/UL — SIGNIFICANT CHANGE UP (ref 3.8–5.2)
RBC # FLD: 13.4 % — SIGNIFICANT CHANGE UP (ref 10.3–14.5)
SODIUM SERPL-SCNC: 135 MMOL/L — SIGNIFICANT CHANGE UP (ref 135–145)
VANCOMYCIN TROUGH SERPL-MCNC: 14.8 UG/ML — SIGNIFICANT CHANGE UP (ref 10–20)
WBC # BLD: 12.33 K/UL — HIGH (ref 3.8–10.5)
WBC # FLD AUTO: 12.33 K/UL — HIGH (ref 3.8–10.5)

## 2023-01-29 PROCEDURE — 76937 US GUIDE VASCULAR ACCESS: CPT | Mod: 26

## 2023-01-29 PROCEDURE — 99232 SBSQ HOSP IP/OBS MODERATE 35: CPT

## 2023-01-29 PROCEDURE — 36000 PLACE NEEDLE IN VEIN: CPT

## 2023-01-29 RX ORDER — POTASSIUM CHLORIDE 20 MEQ
20 PACKET (EA) ORAL ONCE
Refills: 0 | Status: COMPLETED | OUTPATIENT
Start: 2023-01-29 | End: 2023-01-29

## 2023-01-29 RX ORDER — ENOXAPARIN SODIUM 100 MG/ML
40 INJECTION SUBCUTANEOUS EVERY 12 HOURS
Refills: 0 | Status: DISCONTINUED | OUTPATIENT
Start: 2023-01-29 | End: 2023-02-07

## 2023-01-29 RX ORDER — MAGNESIUM OXIDE 400 MG ORAL TABLET 241.3 MG
800 TABLET ORAL ONCE
Refills: 0 | Status: COMPLETED | OUTPATIENT
Start: 2023-01-29 | End: 2023-01-29

## 2023-01-29 RX ADMIN — HYDROMORPHONE HYDROCHLORIDE 2 MILLIGRAM(S): 2 INJECTION INTRAMUSCULAR; INTRAVENOUS; SUBCUTANEOUS at 11:34

## 2023-01-29 RX ADMIN — NEBIVOLOL HYDROCHLORIDE 5 MILLIGRAM(S): 5 TABLET ORAL at 07:43

## 2023-01-29 RX ADMIN — HYDROMORPHONE HYDROCHLORIDE 2 MILLIGRAM(S): 2 INJECTION INTRAMUSCULAR; INTRAVENOUS; SUBCUTANEOUS at 20:30

## 2023-01-29 RX ADMIN — ENOXAPARIN SODIUM 40 MILLIGRAM(S): 100 INJECTION SUBCUTANEOUS at 18:07

## 2023-01-29 RX ADMIN — MEROPENEM 280 MILLIGRAM(S): 1 INJECTION INTRAVENOUS at 09:31

## 2023-01-29 RX ADMIN — HYDROMORPHONE HYDROCHLORIDE 2 MILLIGRAM(S): 2 INJECTION INTRAMUSCULAR; INTRAVENOUS; SUBCUTANEOUS at 03:45

## 2023-01-29 RX ADMIN — HYDROMORPHONE HYDROCHLORIDE 2 MILLIGRAM(S): 2 INJECTION INTRAMUSCULAR; INTRAVENOUS; SUBCUTANEOUS at 19:57

## 2023-01-29 RX ADMIN — HYDROMORPHONE HYDROCHLORIDE 2 MILLIGRAM(S): 2 INJECTION INTRAMUSCULAR; INTRAVENOUS; SUBCUTANEOUS at 07:16

## 2023-01-29 RX ADMIN — MAGNESIUM OXIDE 400 MG ORAL TABLET 800 MILLIGRAM(S): 241.3 TABLET ORAL at 09:31

## 2023-01-29 RX ADMIN — HYDROMORPHONE HYDROCHLORIDE 2 MILLIGRAM(S): 2 INJECTION INTRAMUSCULAR; INTRAVENOUS; SUBCUTANEOUS at 11:29

## 2023-01-29 RX ADMIN — Medication 20 MILLIEQUIVALENT(S): at 09:30

## 2023-01-29 RX ADMIN — Medication 166.67 MILLIGRAM(S): at 14:16

## 2023-01-29 RX ADMIN — MEROPENEM 280 MILLIGRAM(S): 1 INJECTION INTRAVENOUS at 02:06

## 2023-01-29 RX ADMIN — HYDROMORPHONE HYDROCHLORIDE 2 MILLIGRAM(S): 2 INJECTION INTRAMUSCULAR; INTRAVENOUS; SUBCUTANEOUS at 08:01

## 2023-01-29 RX ADMIN — NEBIVOLOL HYDROCHLORIDE 5 MILLIGRAM(S): 5 TABLET ORAL at 18:07

## 2023-01-29 RX ADMIN — MEROPENEM 280 MILLIGRAM(S): 1 INJECTION INTRAVENOUS at 21:44

## 2023-01-29 RX ADMIN — Medication 166.67 MILLIGRAM(S): at 06:15

## 2023-01-29 RX ADMIN — HYDROMORPHONE HYDROCHLORIDE 2 MILLIGRAM(S): 2 INJECTION INTRAMUSCULAR; INTRAVENOUS; SUBCUTANEOUS at 02:37

## 2023-01-29 RX ADMIN — Medication 166.67 MILLIGRAM(S): at 22:39

## 2023-01-29 RX ADMIN — HYDROMORPHONE HYDROCHLORIDE 2 MILLIGRAM(S): 2 INJECTION INTRAMUSCULAR; INTRAVENOUS; SUBCUTANEOUS at 15:45

## 2023-01-29 RX ADMIN — HYDROMORPHONE HYDROCHLORIDE 2 MILLIGRAM(S): 2 INJECTION INTRAMUSCULAR; INTRAVENOUS; SUBCUTANEOUS at 16:00

## 2023-01-29 NOTE — PROCEDURE NOTE - NSICDXPROCEDURE_GEN_ALL_CORE_FT
PROCEDURES:  Insertion of intravenous catheter with ultrasound guidance 27-Jan-2023 16:29:27  Eryn Hunter  
PROCEDURES:  Insertion of intravenous catheter with ultrasound guidance 27-Jan-2023 16:29:27  Eryn Hunter

## 2023-01-29 NOTE — PROGRESS NOTE ADULT - ASSESSMENT
36 yo F with HTN, MO and AVM L foot with recurrent L foot ulcer s/p direct stick embolization 1/24.  Ulcer still with mild surrounding erythema.  Afebrile, leukocytosis again today.  Vancomycin trough was appropriately timed and higher than desired.  Agree with decrease in dose by primary team.  Suggest:  - If she is febrile or if WBC continues to trend upward, please obtain blood cultures X 2 sets  - Continue vancomycin 1250 mg IV q8h (trough goal 11- 16).  Trough at 7 pm  - Continue meropenem 2 g IV q8h  - Will evaluate appearance of wound daily to determine duration - hopefully will improve in the next couple of days  Will follow with you - team 2.

## 2023-01-29 NOTE — PROGRESS NOTE ADULT - SUBJECTIVE AND OBJECTIVE BOX
Interventional Cardiology PA Adult Progress Note    C.C.: Left Foot AVM     Subjective Assessment: Patient seen and examined at bedside this morning. Patient continued to endorse slight pain and burning in the left foot from prior procedure as well as ongoing redness; however, patient denied symptoms worsening and reported her pain has been well controlled w/ current medications. Patient denied any new or additional complaints and was in no acute distress.     ROS Negative except as per Subjective and HPI  	  MEDICATIONS:  nebivolol 5 milliGRAM(s) Oral <User Schedule>  meropenem  IVPB      meropenem  IVPB 2000 milliGRAM(s) IV Intermittent every 8 hours  vancomycin  IVPB 1250 milliGRAM(s) IV Intermittent every 8 hours  HYDROmorphone  Injectable 2 milliGRAM(s) IV Push every 4 hours PRN  ondansetron Injectable 4 milliGRAM(s) IV Push Once PRN  chlorhexidine 4% Liquid 1 Application(s) Topical once      PHYSICAL EXAM:  TELEMETRY: No significant events.   T(C): 36.6 (01-29-23 @ 10:16), Max: 36.7 (01-28-23 @ 13:51)  HR: 91 (01-29-23 @ 09:15) (88 - 99)  BP: 146/78 (01-29-23 @ 09:15) (127/66 - 154/89)  RR: 16 (01-29-23 @ 09:15) (15 - 16)  SpO2: 98% (01-29-23 @ 09:15) (97% - 99%)    I&O's Summary    28 Jan 2023 07:01  -  29 Jan 2023 07:00  --------------------------------------------------------  IN: 840 mL / OUT: 540 mL / NET: 300 mL    29 Jan 2023 07:01  -  29 Jan 2023 11:17  --------------------------------------------------------  IN: 240 mL / OUT: 0 mL / NET: 240 mL      Appearance: NAD	  HEENT: Normal oral mucosa, PERRL, EOMI	  Neck: Supple, - JVD  Cardiovascular: Normal S1/S2, No JVD, No murmurs  Respiratory: Lungs clear to auscultation, No Rales, Rhonchi, Wheezing	  Gastrointestinal:  Soft, Non-tender	  Skin: No rashes, No ecchymoses, No cyanosis  Extremities: Normal range of motion, No clubbing, cyanosis or edema, left foot dressing in place and clean/dry/intact  Vascular: Peripheral pulses palpable 2+ bilaterally  Neurologic: Non-focal  Psychiatry: A & O x 3, Mood & affect appropriate    	    ECG:  	  RADIOLOGY:   DIAGNOSTIC TESTING:  [ ] Echocardiogram:  [ ] Catheterization:  [ ] Stress Test:    [ ] CHARLEEN  	    LABS:	 	               10.9   12.33 )-----------( 339      ( 29 Jan 2023 05:30 )             33.5     01-29    135  |  101  |  14  ----------------------------<  127<H>  3.9   |  24  |  0.62    Ca    8.5      29 Jan 2023 05:30  Mg     1.8     01-29

## 2023-01-29 NOTE — PROGRESS NOTE ADULT - SUBJECTIVE AND OBJECTIVE BOX
INTERVAL HPI/OVERNIGHT EVENTS:  Sweats and chills overnight, no fever.  L foot continues to sting and throb    CONSTITUTIONAL:  As above  EYES:  No photophobia or visual changes  CARDIOVASCULAR:  No chest pain  RESPIRATORY:  No cough, wheezing, or SOB   GASTROINTESTINAL:  No nausea, vomiting, diarrhea, constipation, or abdominal pain  GENITOURINARY:  No frequency, urgency, dysuria or hematuria  NEUROLOGIC:  No headache, lightheadedness      ANTIBIOTICS/RELEVANT:    Vancomycin 1500 mg IV q8h (1/24-present)  Meropenem 2 g IV q8h (1/24-present)      Vital Signs Last 24 Hrs  T(C): 35.9 (29 Jan 2023 13:05), Max: 36.6 (29 Jan 2023 10:16)  T(F): 96.6 (29 Jan 2023 13:05), Max: 97.8 (29 Jan 2023 10:16)  HR: 91 (29 Jan 2023 09:15) (88 - 99)  BP: 146/78 (29 Jan 2023 09:15) (127/66 - 154/89)  BP(mean): --  RR: 16 (29 Jan 2023 09:15) (15 - 16)  SpO2: 98% (29 Jan 2023 09:15) (97% - 99%)    Parameters below as of 29 Jan 2023 09:15  Patient On (Oxygen Delivery Method): room air        PHYSICAL EXAM:  Constitutional:  Alert  Eyes:  Sclerae anicteric, conjunctivae clear, PERRL  Ear/Nose/Throat:  No nasal exudate or sinus tenderness;  No buccal mucosal lesions, no pharyngeal erythema or exudate	  Neck:  Supple, no adenopathy  Respiratory:  Clear bilaterally  Cardiovascular:  RRR, S1S2, no murmur appreciated  Gastrointestinal:  Symmetric, normoactive BS, soft, NT, no masses, guarding or rebound.  No HSM  Extremities:  No edema. L lateral foot ulcer ~0.5 cm diam with more granular base, possibly shallower, small area of surrounding erythema (not improved), foot warm      LABS:                        10.9   12.33 )-----------( 339      ( 29 Jan 2023 05:30 )             33.5         01-29    135  |  101  |  14  ----------------------------<  127<H>  3.9   |  24  |  0.62    Ca    8.5      29 Jan 2023 05:30  Mg     1.8     01-29    Vancomycin trough 18.2 (1/28 @ 19:07;  prior dose 11:45)      MICROBIOLOGY:        RADIOLOGY & ADDITIONAL STUDIES:

## 2023-01-29 NOTE — PROGRESS NOTE ADULT - ASSESSMENT
38 y/o female, morbidly obese, w/ PMHx HTN and left foot AVM w/ chronic left foot ulcer (s/p multiple embolizations, s/p OR debridement 10/2022, and s/p prior antibotic treatment w/ recent treatment from 1107/2022 to 12/29/2022 w/ Meropenem/Vancomycin and wound closure on 12/23/2022) who presented for additional direct stick embolization procedure on 01/24/2023, s/p direct stick embolization of left AVM, and currently being treated w/ IV Meropenem/Vancomycin w/ ID following.

## 2023-01-29 NOTE — PROGRESS NOTE ADULT - PROBLEM SELECTOR PLAN 2
SBP ranging 120's to 150's  - home medication: Bystolic 5 mg BID  - continue home medication       VTE PPX: Lovenox  Dispo: pending ID recs, antibiotics

## 2023-01-30 ENCOUNTER — TRANSCRIPTION ENCOUNTER (OUTPATIENT)
Age: 38
End: 2023-01-30

## 2023-01-30 LAB
ANION GAP SERPL CALC-SCNC: 8 MMOL/L — SIGNIFICANT CHANGE UP (ref 5–17)
BUN SERPL-MCNC: 13 MG/DL — SIGNIFICANT CHANGE UP (ref 7–23)
CALCIUM SERPL-MCNC: 8.6 MG/DL — SIGNIFICANT CHANGE UP (ref 8.4–10.5)
CHLORIDE SERPL-SCNC: 103 MMOL/L — SIGNIFICANT CHANGE UP (ref 96–108)
CO2 SERPL-SCNC: 26 MMOL/L — SIGNIFICANT CHANGE UP (ref 22–31)
CREAT SERPL-MCNC: 0.58 MG/DL — SIGNIFICANT CHANGE UP (ref 0.5–1.3)
EGFR: 118 ML/MIN/1.73M2 — SIGNIFICANT CHANGE UP
GLUCOSE SERPL-MCNC: 102 MG/DL — HIGH (ref 70–99)
HCT VFR BLD CALC: 34.2 % — LOW (ref 34.5–45)
HGB BLD-MCNC: 10.9 G/DL — LOW (ref 11.5–15.5)
MAGNESIUM SERPL-MCNC: 1.8 MG/DL — SIGNIFICANT CHANGE UP (ref 1.6–2.6)
MCHC RBC-ENTMCNC: 28.5 PG — SIGNIFICANT CHANGE UP (ref 27–34)
MCHC RBC-ENTMCNC: 31.9 GM/DL — LOW (ref 32–36)
MCV RBC AUTO: 89.5 FL — SIGNIFICANT CHANGE UP (ref 80–100)
NRBC # BLD: 0 /100 WBCS — SIGNIFICANT CHANGE UP (ref 0–0)
PLATELET # BLD AUTO: 437 K/UL — HIGH (ref 150–400)
POTASSIUM SERPL-MCNC: 4.4 MMOL/L — SIGNIFICANT CHANGE UP (ref 3.5–5.3)
POTASSIUM SERPL-SCNC: 4.4 MMOL/L — SIGNIFICANT CHANGE UP (ref 3.5–5.3)
RBC # BLD: 3.82 M/UL — SIGNIFICANT CHANGE UP (ref 3.8–5.2)
RBC # FLD: 13.2 % — SIGNIFICANT CHANGE UP (ref 10.3–14.5)
SODIUM SERPL-SCNC: 137 MMOL/L — SIGNIFICANT CHANGE UP (ref 135–145)
WBC # BLD: 13.6 K/UL — HIGH (ref 3.8–10.5)
WBC # FLD AUTO: 13.6 K/UL — HIGH (ref 3.8–10.5)

## 2023-01-30 PROCEDURE — 99232 SBSQ HOSP IP/OBS MODERATE 35: CPT

## 2023-01-30 RX ORDER — HYDROMORPHONE HYDROCHLORIDE 2 MG/ML
2 INJECTION INTRAMUSCULAR; INTRAVENOUS; SUBCUTANEOUS EVERY 4 HOURS
Refills: 0 | Status: DISCONTINUED | OUTPATIENT
Start: 2023-01-30 | End: 2023-02-05

## 2023-01-30 RX ORDER — HYDROMORPHONE HYDROCHLORIDE 2 MG/ML
2 INJECTION INTRAMUSCULAR; INTRAVENOUS; SUBCUTANEOUS EVERY 6 HOURS
Refills: 0 | Status: DISCONTINUED | OUTPATIENT
Start: 2023-01-30 | End: 2023-01-30

## 2023-01-30 RX ORDER — MAGNESIUM OXIDE 400 MG ORAL TABLET 241.3 MG
800 TABLET ORAL ONCE
Refills: 0 | Status: COMPLETED | OUTPATIENT
Start: 2023-01-30 | End: 2023-01-30

## 2023-01-30 RX ADMIN — HYDROMORPHONE HYDROCHLORIDE 2 MILLIGRAM(S): 2 INJECTION INTRAMUSCULAR; INTRAVENOUS; SUBCUTANEOUS at 12:26

## 2023-01-30 RX ADMIN — ENOXAPARIN SODIUM 40 MILLIGRAM(S): 100 INJECTION SUBCUTANEOUS at 05:22

## 2023-01-30 RX ADMIN — HYDROMORPHONE HYDROCHLORIDE 2 MILLIGRAM(S): 2 INJECTION INTRAMUSCULAR; INTRAVENOUS; SUBCUTANEOUS at 19:07

## 2023-01-30 RX ADMIN — ENOXAPARIN SODIUM 40 MILLIGRAM(S): 100 INJECTION SUBCUTANEOUS at 17:17

## 2023-01-30 RX ADMIN — Medication 166.67 MILLIGRAM(S): at 05:32

## 2023-01-30 RX ADMIN — HYDROMORPHONE HYDROCHLORIDE 2 MILLIGRAM(S): 2 INJECTION INTRAMUSCULAR; INTRAVENOUS; SUBCUTANEOUS at 08:12

## 2023-01-30 RX ADMIN — MAGNESIUM OXIDE 400 MG ORAL TABLET 800 MILLIGRAM(S): 241.3 TABLET ORAL at 09:50

## 2023-01-30 RX ADMIN — NEBIVOLOL HYDROCHLORIDE 5 MILLIGRAM(S): 5 TABLET ORAL at 17:17

## 2023-01-30 RX ADMIN — HYDROMORPHONE HYDROCHLORIDE 2 MILLIGRAM(S): 2 INJECTION INTRAMUSCULAR; INTRAVENOUS; SUBCUTANEOUS at 08:48

## 2023-01-30 RX ADMIN — HYDROMORPHONE HYDROCHLORIDE 2 MILLIGRAM(S): 2 INJECTION INTRAMUSCULAR; INTRAVENOUS; SUBCUTANEOUS at 04:00

## 2023-01-30 RX ADMIN — MEROPENEM 280 MILLIGRAM(S): 1 INJECTION INTRAVENOUS at 15:52

## 2023-01-30 RX ADMIN — HYDROMORPHONE HYDROCHLORIDE 2 MILLIGRAM(S): 2 INJECTION INTRAMUSCULAR; INTRAVENOUS; SUBCUTANEOUS at 00:02

## 2023-01-30 RX ADMIN — HYDROMORPHONE HYDROCHLORIDE 2 MILLIGRAM(S): 2 INJECTION INTRAMUSCULAR; INTRAVENOUS; SUBCUTANEOUS at 22:53

## 2023-01-30 RX ADMIN — HYDROMORPHONE HYDROCHLORIDE 2 MILLIGRAM(S): 2 INJECTION INTRAMUSCULAR; INTRAVENOUS; SUBCUTANEOUS at 04:30

## 2023-01-30 RX ADMIN — HYDROMORPHONE HYDROCHLORIDE 2 MILLIGRAM(S): 2 INJECTION INTRAMUSCULAR; INTRAVENOUS; SUBCUTANEOUS at 23:08

## 2023-01-30 RX ADMIN — NEBIVOLOL HYDROCHLORIDE 5 MILLIGRAM(S): 5 TABLET ORAL at 05:23

## 2023-01-30 RX ADMIN — Medication 166.67 MILLIGRAM(S): at 14:07

## 2023-01-30 RX ADMIN — HYDROMORPHONE HYDROCHLORIDE 2 MILLIGRAM(S): 2 INJECTION INTRAMUSCULAR; INTRAVENOUS; SUBCUTANEOUS at 12:56

## 2023-01-30 RX ADMIN — Medication 166.67 MILLIGRAM(S): at 21:56

## 2023-01-30 RX ADMIN — MEROPENEM 280 MILLIGRAM(S): 1 INJECTION INTRAVENOUS at 07:20

## 2023-01-30 RX ADMIN — MEROPENEM 280 MILLIGRAM(S): 1 INJECTION INTRAVENOUS at 23:37

## 2023-01-30 RX ADMIN — HYDROMORPHONE HYDROCHLORIDE 2 MILLIGRAM(S): 2 INJECTION INTRAMUSCULAR; INTRAVENOUS; SUBCUTANEOUS at 18:37

## 2023-01-30 NOTE — DISCHARGE NOTE NURSING/CASE MANAGEMENT/SOCIAL WORK - PATIENT PORTAL LINK FT
You can access the FollowMyHealth Patient Portal offered by [facility] by registering at the following website: [email or web address]. By joining [employer]’s FollowMyHealth portal, you will also be able to view your health information using other applications (apps) compatible with our system.

## 2023-01-30 NOTE — PROGRESS NOTE ADULT - PROBLEM SELECTOR PLAN 2
SBP ranging 130's to 150's   - home medication: Bystolic 5 mg BID  - continue home medication       VTE PPX: Lovenox  Dispo: pending ID recs, antibiotics, weaning pain medications

## 2023-01-30 NOTE — PROGRESS NOTE ADULT - ASSESSMENT
38 yo F with HTN, MO and AVM L foot with recurrent L foot ulcer s/p direct stick embolization 1/24.  Ulcer still with mild surrounding erythema but shallower - is improving.  Afebrile, leukocytosis again today.  Vancomycin trough was in therapeutic range but was drawn 2 h early.  Regardless, would leave dosing where it is.  Suggest:  - If she is febrile or if WBC trends upward, please obtain blood cultures X 2 sets  - Continue vancomycin 1250 mg IV q8h (trough goal 11- 16).    - Continue meropenem 2 g IV q8h  - Will evaluate appearance of wound daily to determine duration - if ongoing improvement, anticipate discharge late tomorrow.  Recommendations discussed with primary team.  Will follow with you - team 2.

## 2023-01-30 NOTE — PROGRESS NOTE ADULT - ASSESSMENT
38 y/o female, morbidly obese, w/ PMHx HTN and left foot AVM w/ chronic left foot ulcer (s/p multiple embolizations, s/p OR debridement 10/2022, and s/p prior antibotic treatment w/ recent treatment from 1107/2022 to 12/29/2022 w/ Meropenem/Vancomycin and wound closure on 12/23/2022) who presented for additional embolization on 01/24/2023. Patient s/p direct stick embolization of the left foot, remains admitted and on IV Vancomyocin/Meropenem w/ ID following and plan to continue antibiotics today, wean pain medications, and possible plan for discharge later on 01/31/2023.

## 2023-01-30 NOTE — DISCHARGE NOTE NURSING/CASE MANAGEMENT/SOCIAL WORK - NSDCPEFALRISK_GEN_ALL_CORE
For information on Fall & Injury Prevention, visit: [email or web address] OR  [email or web address] OR  https://www.cdc.gov/steadi/patient.html

## 2023-01-30 NOTE — PROGRESS NOTE ADULT - SUBJECTIVE AND OBJECTIVE BOX
INTERVAL HPI/OVERNIGHT EVENTS:  Tm 98.2  Still having intermittent sweats, Tm 99 by bedside thermometer.  L foot still with throbbing/stinging pain.    CONSTITUTIONAL:  As above  EYES:  No photophobia or visual changes  CARDIOVASCULAR:  No chest pain  RESPIRATORY:  No cough, wheezing, or SOB   GASTROINTESTINAL:  No nausea, vomiting, diarrhea, constipation, or abdominal pain  GENITOURINARY:  No frequency, urgency, dysuria or hematuria  NEUROLOGIC:  No headache, lightheadedness      ANTIBIOTICS/RELEVANT:    Vancomycin 1500 mg IV q8h (1/24-present)  Meropenem 2 g IV q8h (1/24-present)      Vital Signs Last 24 Hrs  T(C): 36.3 (30 Jan 2023 09:19), Max: 36.8 (29 Jan 2023 17:56)  T(F): 97.3 (30 Jan 2023 09:19), Max: 98.2 (29 Jan 2023 17:56)  HR: 96 (30 Jan 2023 04:04) (92 - 98)  BP: 135/78 (30 Jan 2023 08:48) (135/78 - 172/86)  BP(mean): 93 (30 Jan 2023 04:04) (93 - 100)  RR: 18 (30 Jan 2023 08:48) (16 - 20)  SpO2: 98% (30 Jan 2023 08:48) (94% - 98%)    Parameters below as of 30 Jan 2023 08:48  Patient On (Oxygen Delivery Method): room air        PHYSICAL EXAM:  Constitutional:  Alert  Eyes:  Sclerae anicteric, conjunctivae clear, PERRL  Ear/Nose/Throat:  No nasal exudate or sinus tenderness;  No buccal mucosal lesions, no pharyngeal erythema or exudate	  Neck:  Supple, no adenopathy  Respiratory:  Clear bilaterally  Cardiovascular:  RRR, S1S2, no murmur appreciated  Gastrointestinal:  Symmetric, normoactive BS, soft, NT, no masses, guarding or rebound.  No HSM  Extremities:  No edema. L lateral foot ulcer ~0.5 cm diam with more granular base, shallower, small area of surrounding erythema (not improved), foot warm      LABS:                        10.9   13.60 )-----------( 437      ( 30 Jan 2023 05:30 )             34.2         01-30    137  |  103  |  13  ----------------------------<  102<H>  4.4   |  26  |  0.58    Ca    8.6      30 Jan 2023 05:30  Mg     1.8     01-30    Vancomycin trough 14.8 (1/29@19:00, prior doses at 14:16 and 06:15)        MICROBIOLOGY:        RADIOLOGY & ADDITIONAL STUDIES:

## 2023-01-30 NOTE — DISCHARGE NOTE NURSING/CASE MANAGEMENT/SOCIAL WORK - NSDCVIVACCINE_GEN_ALL_CORE_FT
influenza, injectable, quadrivalent, preservative free; 22-Oct-2020 10:12; Miley Hebert (RN); [employer]; [id number] (Exp. Date: 30-Jun-2021); IntraMuscular; Deltoid Left.; 0.5 milliLiter(s); VIS (VIS Published: 15-Aug-2019, VIS Presented: 22-Oct-2020);   influenza, injectable, quadrivalent, preservative free; 14-Oct-2021 09:19; Margie Taveras (RN); [employer]; [id number] (Exp. Date: 30-Jun-2022); IntraMuscular; Deltoid Right.; 0.5 milliLiter(s); VIS (VIS Published: 06-Aug-2021, VIS Presented: 14-Oct-2021);   influenza, injectable, quadrivalent, preservative free; 09-Dec-2022 14:43; Melinda Sesay (RN); [employer]; [id number] (Exp. Date: 29-May-2022); IntraMuscular; Deltoid Right.; 0.5 milliLiter(s); VIS (VIS Published: 06-Aug-2021, VIS Presented: 09-Dec-2022);

## 2023-01-30 NOTE — PROGRESS NOTE ADULT - PROBLEM SELECTOR PLAN 1
H/o left foot AVM w/ chronic foot ulcer w/ multiple prior embolizations and a prior OR debridement 10/2022, s/p IV Meropenem/Vanco from 11/07/2022 to 12/29/2022  - s/p additional direct stick embolization 01/24/2023 w/ Dr. Teran   - ID consulted post-procedure for co-management and recommended IV Meropenem/Vancomycin  - most recent Vanc trough 14.8 on 01/29/2023 PM  - continued on Vancomyocin 1250 mg IV q8 hours and Meropenem 2 g IV q8 hours   - WBC 13.60, fluctuating throughout this admission  - ID continuing to follow and evaluating wound daily to determine antibiotic durations, continue to appreciate recs   - weaning pain medications, now continued on Hydromorphone 2 mg IV q6 hours PRN and home Percocet dose PRN  - ordered for Naloxone IV PRN

## 2023-01-30 NOTE — PROGRESS NOTE ADULT - SUBJECTIVE AND OBJECTIVE BOX
Interventional Cardiology PA Adult Progress Note    C.C.: Direct Stick Embolization     Subjective Assessment: Patient was seen and examined at bedside this morning. Patient continues to endorse left foot pain, slightly improved from yesterday, and reported on track with her normal cycle of pain following her embolization procedures. Patient also reported feeling warm overnight and consistently checked her temperature, but reported maximum was 99 degrees F overnight. Patient in no acute distress on exam.     ROS Negative except as per Subjective and HPI  	  MEDICATIONS:  nebivolol 5 milliGRAM(s) Oral <User Schedule>  meropenem  IVPB 2000 milliGRAM(s) IV Intermittent every 8 hours  meropenem  IVPB      vancomycin  IVPB 1250 milliGRAM(s) IV Intermittent every 8 hours  HYDROmorphone  Injectable 2 milliGRAM(s) IV Push every 6 hours PRN  ondansetron Injectable 4 milliGRAM(s) IV Push Once PRN  oxycodone    5 mG/acetaminophen 325 mG 2 Tablet(s) Oral every 4 hours PRN  chlorhexidine 4% Liquid 1 Application(s) Topical once  enoxaparin Injectable 40 milliGRAM(s) SubCutaneous every 12 hours      PHYSICAL EXAM:  TELEMETRY: No significant events.   T(C): 36.3 (01-30-23 @ 09:19), Max: 36.8 (01-29-23 @ 17:56)  HR: 96 (01-30-23 @ 04:04) (92 - 98)  BP: 135/78 (01-30-23 @ 08:48) (135/78 - 172/86)  RR: 18 (01-30-23 @ 08:48) (16 - 20)  SpO2: 98% (01-30-23 @ 08:48) (94% - 98%)    I&O's Summary    29 Jan 2023 07:01  -  30 Jan 2023 07:00  --------------------------------------------------------  IN: 1260 mL / OUT: 0 mL / NET: 1260 mL    30 Jan 2023 07:01  -  30 Jan 2023 11:15  --------------------------------------------------------  IN: 280 mL / OUT: 0 mL / NET: 280 mL                                         Appearance: NAD 	  HEENT: Normal oral mucosa, PERRL, EOMI	  Neck: Supple, - JVD  Cardiovascular: Normal S1/S2, No JVD, No murmurs  Respiratory: Lungs clear to auscultation, No Rales, Rhonchi, Wheezing	  Gastrointestinal:  Soft, Non-tender	  Skin: No rashes, No ecchymoses, No cyanosis  Extremities: Normal range of motion, No clubbing, cyanosis or edema, left foot dressing in place and clean/dry/intact  Vascular: Peripheral pulses palpable 2+ bilaterally  Neurologic: Non-focal  Psychiatry: A & O x 3, Mood & affect appropriate    	    ECG:  	  RADIOLOGY:   DIAGNOSTIC TESTING:  [ ] Echocardiogram:  [ ] Catheterization:  [ ] Stress Test:    [ ] CHARLEEN    LABS:	                         10.9   13.60 )-----------( 437      ( 30 Jan 2023 05:30 )             34.2     01-30    137  |  103  |  13  ----------------------------<  102<H>  4.4   |  26  |  0.58    Ca    8.6      30 Jan 2023 05:30  Mg     1.8     01-30

## 2023-01-31 LAB
ANION GAP SERPL CALC-SCNC: 11 MMOL/L — SIGNIFICANT CHANGE UP (ref 5–17)
BUN SERPL-MCNC: 12 MG/DL — SIGNIFICANT CHANGE UP (ref 7–23)
CALCIUM SERPL-MCNC: 8.8 MG/DL — SIGNIFICANT CHANGE UP (ref 8.4–10.5)
CHLORIDE SERPL-SCNC: 98 MMOL/L — SIGNIFICANT CHANGE UP (ref 96–108)
CHOLEST SERPL-MCNC: 151 MG/DL — SIGNIFICANT CHANGE UP
CO2 SERPL-SCNC: 25 MMOL/L — SIGNIFICANT CHANGE UP (ref 22–31)
CREAT SERPL-MCNC: 0.57 MG/DL — SIGNIFICANT CHANGE UP (ref 0.5–1.3)
EGFR: 118 ML/MIN/1.73M2 — SIGNIFICANT CHANGE UP
GLUCOSE SERPL-MCNC: 106 MG/DL — HIGH (ref 70–99)
HCT VFR BLD CALC: 36.5 % — SIGNIFICANT CHANGE UP (ref 34.5–45)
HDLC SERPL-MCNC: 62 MG/DL — SIGNIFICANT CHANGE UP
HGB BLD-MCNC: 11.6 G/DL — SIGNIFICANT CHANGE UP (ref 11.5–15.5)
LIPID PNL WITH DIRECT LDL SERPL: 64 MG/DL — SIGNIFICANT CHANGE UP
MCHC RBC-ENTMCNC: 28.4 PG — SIGNIFICANT CHANGE UP (ref 27–34)
MCHC RBC-ENTMCNC: 31.8 GM/DL — LOW (ref 32–36)
MCV RBC AUTO: 89.2 FL — SIGNIFICANT CHANGE UP (ref 80–100)
NON HDL CHOLESTEROL: 89 MG/DL — SIGNIFICANT CHANGE UP
NRBC # BLD: 0 /100 WBCS — SIGNIFICANT CHANGE UP (ref 0–0)
PLATELET # BLD AUTO: 451 K/UL — HIGH (ref 150–400)
POTASSIUM SERPL-MCNC: 4.4 MMOL/L — SIGNIFICANT CHANGE UP (ref 3.5–5.3)
POTASSIUM SERPL-SCNC: 4.4 MMOL/L — SIGNIFICANT CHANGE UP (ref 3.5–5.3)
RBC # BLD: 4.09 M/UL — SIGNIFICANT CHANGE UP (ref 3.8–5.2)
RBC # FLD: 13.2 % — SIGNIFICANT CHANGE UP (ref 10.3–14.5)
SODIUM SERPL-SCNC: 134 MMOL/L — LOW (ref 135–145)
TRIGL SERPL-MCNC: 123 MG/DL — SIGNIFICANT CHANGE UP
WBC # BLD: 12.79 K/UL — HIGH (ref 3.8–10.5)
WBC # FLD AUTO: 12.79 K/UL — HIGH (ref 3.8–10.5)

## 2023-01-31 PROCEDURE — 99232 SBSQ HOSP IP/OBS MODERATE 35: CPT

## 2023-01-31 RX ORDER — DAPTOMYCIN 500 MG/10ML
800 INJECTION, POWDER, LYOPHILIZED, FOR SOLUTION INTRAVENOUS ONCE
Refills: 0 | Status: COMPLETED | OUTPATIENT
Start: 2023-01-31 | End: 2023-01-31

## 2023-01-31 RX ORDER — DAPTOMYCIN 500 MG/10ML
INJECTION, POWDER, LYOPHILIZED, FOR SOLUTION INTRAVENOUS
Refills: 0 | Status: COMPLETED | OUTPATIENT
Start: 2023-01-31 | End: 2023-02-08

## 2023-01-31 RX ORDER — CEFEPIME 1 G/1
INJECTION, POWDER, FOR SOLUTION INTRAMUSCULAR; INTRAVENOUS
Refills: 0 | Status: DISCONTINUED | OUTPATIENT
Start: 2023-01-31 | End: 2023-02-07

## 2023-01-31 RX ORDER — CEFEPIME 1 G/1
2000 INJECTION, POWDER, FOR SOLUTION INTRAMUSCULAR; INTRAVENOUS ONCE
Refills: 0 | Status: COMPLETED | OUTPATIENT
Start: 2023-01-31 | End: 2023-01-31

## 2023-01-31 RX ORDER — DAPTOMYCIN 500 MG/10ML
800 INJECTION, POWDER, LYOPHILIZED, FOR SOLUTION INTRAVENOUS EVERY 24 HOURS
Refills: 0 | Status: COMPLETED | OUTPATIENT
Start: 2023-02-01 | End: 2023-02-07

## 2023-01-31 RX ORDER — CEFEPIME 1 G/1
2000 INJECTION, POWDER, FOR SOLUTION INTRAMUSCULAR; INTRAVENOUS EVERY 8 HOURS
Refills: 0 | Status: DISCONTINUED | OUTPATIENT
Start: 2023-01-31 | End: 2023-02-07

## 2023-01-31 RX ADMIN — HYDROMORPHONE HYDROCHLORIDE 2 MILLIGRAM(S): 2 INJECTION INTRAMUSCULAR; INTRAVENOUS; SUBCUTANEOUS at 05:38

## 2023-01-31 RX ADMIN — HYDROMORPHONE HYDROCHLORIDE 2 MILLIGRAM(S): 2 INJECTION INTRAMUSCULAR; INTRAVENOUS; SUBCUTANEOUS at 09:52

## 2023-01-31 RX ADMIN — HYDROMORPHONE HYDROCHLORIDE 2 MILLIGRAM(S): 2 INJECTION INTRAMUSCULAR; INTRAVENOUS; SUBCUTANEOUS at 10:14

## 2023-01-31 RX ADMIN — HYDROMORPHONE HYDROCHLORIDE 2 MILLIGRAM(S): 2 INJECTION INTRAMUSCULAR; INTRAVENOUS; SUBCUTANEOUS at 22:30

## 2023-01-31 RX ADMIN — NEBIVOLOL HYDROCHLORIDE 5 MILLIGRAM(S): 5 TABLET ORAL at 17:50

## 2023-01-31 RX ADMIN — DAPTOMYCIN 132 MILLIGRAM(S): 500 INJECTION, POWDER, LYOPHILIZED, FOR SOLUTION INTRAVENOUS at 18:51

## 2023-01-31 RX ADMIN — CEFEPIME 100 MILLIGRAM(S): 1 INJECTION, POWDER, FOR SOLUTION INTRAMUSCULAR; INTRAVENOUS at 17:49

## 2023-01-31 RX ADMIN — MEROPENEM 280 MILLIGRAM(S): 1 INJECTION INTRAVENOUS at 07:55

## 2023-01-31 RX ADMIN — NEBIVOLOL HYDROCHLORIDE 5 MILLIGRAM(S): 5 TABLET ORAL at 05:38

## 2023-01-31 RX ADMIN — HYDROMORPHONE HYDROCHLORIDE 2 MILLIGRAM(S): 2 INJECTION INTRAMUSCULAR; INTRAVENOUS; SUBCUTANEOUS at 17:55

## 2023-01-31 RX ADMIN — HYDROMORPHONE HYDROCHLORIDE 2 MILLIGRAM(S): 2 INJECTION INTRAMUSCULAR; INTRAVENOUS; SUBCUTANEOUS at 18:20

## 2023-01-31 RX ADMIN — Medication 166.67 MILLIGRAM(S): at 06:49

## 2023-01-31 RX ADMIN — Medication 166.67 MILLIGRAM(S): at 13:55

## 2023-01-31 RX ADMIN — HYDROMORPHONE HYDROCHLORIDE 2 MILLIGRAM(S): 2 INJECTION INTRAMUSCULAR; INTRAVENOUS; SUBCUTANEOUS at 13:55

## 2023-01-31 RX ADMIN — HYDROMORPHONE HYDROCHLORIDE 2 MILLIGRAM(S): 2 INJECTION INTRAMUSCULAR; INTRAVENOUS; SUBCUTANEOUS at 14:15

## 2023-01-31 RX ADMIN — ENOXAPARIN SODIUM 40 MILLIGRAM(S): 100 INJECTION SUBCUTANEOUS at 05:38

## 2023-01-31 RX ADMIN — ENOXAPARIN SODIUM 40 MILLIGRAM(S): 100 INJECTION SUBCUTANEOUS at 17:50

## 2023-01-31 RX ADMIN — HYDROMORPHONE HYDROCHLORIDE 2 MILLIGRAM(S): 2 INJECTION INTRAMUSCULAR; INTRAVENOUS; SUBCUTANEOUS at 21:58

## 2023-01-31 NOTE — CONSULT NOTE ADULT - ASSESSMENT
37F PMH obesity, HTN, peripheral AVMs, who presented for direct stick embolization of L foot AVM on 1/24/23. Medicine now consulted for evaluation for transfer to medicine team.    #Fevers  #L foot AVM s/p direct stick embolization  #L foot cellulitis  Recently on vanc/meropenem, last doses for both on 1/31, now transitioned to daptomycin and cefepime per ID  - f/u blood cultures  - send baseline CPK and weekly thereafter while on daptomycin  - f/u ID recs    #HTN  On nebivolol 5 BID at home, recently 10mg qD. Per EHR has allergies to HCTZ (hives) and lisinopril (angioedema, rash, hives).  BPs predominantly 140s-160s this hospitalization, though has had some 120s-130s, and BP may be recently elevated in setting of pain.  - continue to monitor BP and association with pain. May warrant starting CCB such as low dose amlodipine      Rest of care per primary team.  **Pt deemed appropriate on current service - medicine will continue to follow for further assistance.     37F PMH obesity, HTN, peripheral AVMs, who presented for direct stick embolization of L foot AVM on 1/24/23. Medicine now consulted for evaluation for transfer to medicine team.    #Fevers  #L foot AVM s/p direct stick embolization  #L foot cellulitis  Recently on vanc/meropenem, last doses for both on 1/31, now transitioned to daptomycin and cefepime per ID  - f/u blood cultures  - send baseline CPK and weekly thereafter while on daptomycin  - f/u ID recs    #HTN  - On nebivolol 10 BID at home, during this admission has been on 5 BID as her pain medications were dropping her blood pressures too much. She has been on nebivolol for a few years and was previously on lisinopril-HCTZ for years but woke up one day with angioedema, rash, hives and has been off those meds since then. Pt does not believe she has been on CCB, though she had extensive allergy testing and will try to find the results.   - BPs predominantly 140s-160s this hospitalization, though has had some 120s-130s, and BP per pt elevated in setting of pain, tends to run lower after pain medications.  Recommend:  - continue to monitor BP and increase nebivolol back to home dose 10 BID pending pain trajectory and BP trend. Alternatively may warrant starting CCB such as low dose amlodipine as this is typically preferred over BB therapy for HTN in absence of other cardiac history      Rest of care per primary team.  **Pt deemed appropriate on current service - medicine will continue to follow for further assistance.

## 2023-01-31 NOTE — CONSULT NOTE ADULT - SUBJECTIVE AND OBJECTIVE BOX
LISA KEARNEY  37y  Female      Patient is a 37y old  Female who presents with a chief complaint of Direct Stick Embolization (31 Jan 2023 17:29)    Ms. Kearney is a 37 yr old woman with a PMH of obesity, HTN, peripheral AVMs, who presented for direct stick embolization of L foot AVM on 1/24/23. Medicine now consulted for evaluation for transfer to medicine team.    Pt was admitted for embolization of her L foot AVM on 1/24/23; procedure was well tolerated, but she her hospitalization has been c/b prolonged healing and recent erythema around ulcer, which typically improves this far out after intervention. She follows with Dr. Teran for her peripheral AVMs and was recently seeing ID Dr. Rahman for infectious complications related to her L foot AVM. She reports that in early December she had pulsating of her L foot AVM, was admitted 12/4-12/9/22 and remained on IV vanc/meropenem at home until mid-end of December, which she completed and felt well until Hardy Eve when her wound opened again, and she was subsequently scheduled for embolization on 1/24/23, which she returned for this hospitalization. Symptoms have included warm, pulsating, throbbing sensation in her L foot. Her wound usually closes a few weeks after surgery but now wound has increased redness and she is still having a lot of pain and has been overall worse in last last few days. Today was febrile to max 100.8.     PAST MEDICAL HISTORY:  Severe obesity  HTN (hypertension)  AVM (arteriovenous malformation) of left foot  Foot ulceration left foot S/P debridement  LLE area overlying AVM    PAST SURGICAL HISTORY:  Elective surgery transcatheter therapy vascular embolization x5 (pt reports has had 21 AVM procedures)    REVIEW OF SYSTEMS:  General: (+) intermittent  chills and sweats. No fevers. No pain anywhere  Neuro: no headaches  CV: no chest pain  Pulm: no SOB or cough  GI: no constipation or diarrhea  : no dysuria or polyuria  Rest per HPI.        T(C): 36.1 (01-31-23 @ 14:08), Max: 36.4 (01-31-23 @ 05:02)  HR: 92 (01-31-23 @ 14:00) (82 - 94)  BP: 157/82 (01-31-23 @ 14:00) (137/66 - 167/80)  RR: 18 (01-31-23 @ 14:00) (16 - 18)  SpO2: 94% (01-31-23 @ 14:00) (94% - 97%)  Wt(kg): --Vital Signs Last 24 Hrs  T(C): 36.1 (31 Jan 2023 14:08), Max: 36.4 (31 Jan 2023 05:02)  T(F): 96.9 (31 Jan 2023 14:08), Max: 97.6 (31 Jan 2023 05:02)  HR: 92 (31 Jan 2023 14:00) (82 - 94)  BP: 157/82 (31 Jan 2023 14:00) (137/66 - 167/80)  BP(mean): --  RR: 18 (31 Jan 2023 14:00) (16 - 18)  SpO2: 94% (31 Jan 2023 14:00) (94% - 97%)    Parameters below as of 31 Jan 2023 14:00  Patient On (Oxygen Delivery Method): room air    PHYSICAL EXAM:  Constitutional: resting comfortably in bed; NAD  HEENT: NC/AT, PERRL, EOMI, anicteric sclera, no nasal discharge   Respiratory: CTA B/L; no W/R/R, no retractions  Cardiac: +S1/S2; RRR; no M/R/G appreciated  Gastrointestinal: abdomen soft, NT/ND,   Extremities: legs WWP, no peripheral edema noted; recently wrapped L foot AVM, reviewed recent imaging with patient showing small erythematous area of skin around AVM at lateral aspect of L heel w/ no active purulence/drainage  Dermatologic: skin warm, dry and intact; no rashes, wounds, or scars noted  Neurologic: alert and oriented, no focal deficits noted      Care Discussed with Consultants/Other Providers [ x] YES  [ ] NO    LABS:  CBC   01-31-23 @ 05:30  Hematcorit 36.5  Hemoglobin 11.6  Mean Cell Hemoglobin 28.4  Platelet Count-Automated 451  RBC Count 4.09  Red Cell Distrib Width 13.2  Wbc Count 12.79      BMP  01-31-23 @ 05:30  Anion Gap. Serum 11  Blood Urea Nitrogen,Serm 12  Calcium, Total Serum 8.8  Carbon Dioxide, Serum 25  Chloride, Serum 98  Creatinine, Serum 0.57  eGFR in  --  eGFR in Non Afican American --  Gloucose, serum 106  Potassium, Serum 4.4  Sodium, Serum 134              01-30-23 @ 05:30  Anion Gap. Serum 8  Blood Urea Nitrogen,Serm 13  Calcium, Total Serum 8.6  Carbon Dioxide, Serum 26  Chloride, Serum 103  Creatinine, Serum 0.58  eGFR in  --  eGFR in Non Afican American --  Gloucose, serum 102  Potassium, Serum 4.4  Sodium, Serum 137              01-29-23 @ 05:30  Anion Gap. Serum 10  Blood Urea Nitrogen,Serm 14  Calcium, Total Serum 8.5  Carbon Dioxide, Serum 24  Chloride, Serum 101  Creatinine, Serum 0.62  eGFR in  --  eGFR in Non Afican American --  Gloucose, serum 127  Potassium, Serum 3.9  Sodium, Serum 135              01-28-23 @ 19:07  Anion Gap. Serum 10  Blood Urea Nitrogen,Serm 12  Calcium, Total Serum 9.2  Carbon Dioxide, Serum 26  Chloride, Serum 103  Creatinine, Serum 0.73  eGFR in  --  eGFR in Non Afican American --  Gloucose, serum 104  Potassium, Serum 4.8  Sodium, Serum 139                  CMP  01-31-23 @ 05:30  Wendy Aminotransferase(ALT/SGPT)--  Albumin, Serum --  Alkaline Phosphatase, Serum --  Anion Gap, Serum 11  Aspartate Aminotransferase (AST/SGOT)--  Bilirubin Total, Serum --  Blood Urea Nitrogen, Serum 12  Calcium,Total Serum 8.8  Carbon Dioxide, Serum 25  Chloride, Serum 98  Creatinine, Serum 0.57  eGFR if  --  eGFR if Non African American --  Glucose, Serum 106  Potassium, Serum 4.4  Protein Total, Serum --  Sodium, Serum 134                      01-30-23 @ 05:30  Wendy Aminotransferase(ALT/SGPT)--  Albumin, Serum --  Alkaline Phosphatase, Serum --  Anion Gap, Serum 8  Aspartate Aminotransferase (AST/SGOT)--  Bilirubin Total, Serum --  Blood Urea Nitrogen, Serum 13  Calcium,Total Serum 8.6  Carbon Dioxide, Serum 26  Chloride, Serum 103  Creatinine, Serum 0.58  eGFR if  --  eGFR if Non African American --  Glucose, Serum 102  Potassium, Serum 4.4  Protein Total, Serum --  Sodium, Serum 137                      01-29-23 @ 05:30  Wendy Aminotransferase(ALT/SGPT)--  Albumin, Serum --  Alkaline Phosphatase, Serum --  Anion Gap, Serum 10  Aspartate Aminotransferase (AST/SGOT)--  Bilirubin Total, Serum --  Blood Urea Nitrogen, Serum 14  Calcium,Total Serum 8.5  Carbon Dioxide, Serum 24  Chloride, Serum 101  Creatinine, Serum 0.62  eGFR if  --  eGFR if Non African American --  Glucose, Serum 127  Potassium, Serum 3.9  Protein Total, Serum --  Sodium, Serum 135                      01-28-23 @ 19:07  Wendy Aminotransferase(ALT/SGPT)--  Albumin, Serum --  Alkaline Phosphatase, Serum --  Anion Gap, Serum 10  Aspartate Aminotransferase (AST/SGOT)--  Bilirubin Total, Serum --  Blood Urea Nitrogen, Serum 12  Calcium,Total Serum 9.2  Carbon Dioxide, Serum 26  Chloride, Serum 103  Creatinine, Serum 0.73  eGFR if  --  eGFR if Non African American --  Glucose, Serum 104  Potassium, Serum 4.8  Protein Total, Serum --  Sodium, Serum 139                          PT/INR      Amylase/Lipase            RADIOLOGY & ADDITIONAL TESTS:    Imaging Personally Reviewed:  [ ] YES  [ ] NO

## 2023-01-31 NOTE — PROGRESS NOTE ADULT - ASSESSMENT
38 yo F with HTN, MO and AVM L foot with recurrent L foot ulcer s/p direct stick embolization 1/24.  She still has cellulitis surrounding ulcer - not improving.  No microbiology to guide.  Initially with pan-susceptible Pseudomonas (10/2022), which can become resistant to carbapenems and remain susceptible to cephalosporins.  She has PCN & cipro allergy.   Had fever - not documented, from bedside thermometer, then sweats, still with mild leukocytosis.  Will attempt empiric antibiotic change.  Suggest:  - Please obtain blood cultures X 2 sets  - Start daptomycin 800 mg IV q24h.  Baseline CPK with next blood draw  - Start cefepime 2 g IV q8h  - Stop vancomycin and meropenem  - Will evaluate appearance of wound daily to determine duration   Recommendations discussed with primary team.  Will follow with you - team 2.

## 2023-01-31 NOTE — PROGRESS NOTE ADULT - SUBJECTIVE AND OBJECTIVE BOX
INTERVAL HPI/OVERNIGHT EVENTS:  Had T 100.5 earlier (bedside thermometer), then 99.5, then sweats --> normal temperature.  Foot is still stinging/throbbing    CONSTITUTIONAL:  As above  EYES:  No photophobia or visual changes  CARDIOVASCULAR:  No chest pain  RESPIRATORY:  No cough, wheezing, or SOB   GASTROINTESTINAL:  No nausea, vomiting, diarrhea, constipation, or abdominal pain  GENITOURINARY:  No frequency, urgency, dysuria or hematuria  NEUROLOGIC:  No headache, lightheadedness      ANTIBIOTICS/RELEVANT:    Vancomycin 1500 mg IV q8h (1/24-present)  Meropenem 2 g IV q8h (1/24-present)      Vital Signs Last 24 Hrs  T(C): 36.1 (31 Jan 2023 14:08), Max: 36.4 (31 Jan 2023 05:02)  T(F): 96.9 (31 Jan 2023 14:08), Max: 97.6 (31 Jan 2023 05:02)  HR: 92 (31 Jan 2023 14:00) (82 - 94)  BP: 157/82 (31 Jan 2023 14:00) (137/66 - 167/80)  BP(mean): --  RR: 18 (31 Jan 2023 14:00) (16 - 18)  SpO2: 94% (31 Jan 2023 14:00) (94% - 97%)    Parameters below as of 31 Jan 2023 14:00  Patient On (Oxygen Delivery Method): room air        PHYSICAL EXAM:  Constitutional:  Alert  Eyes:  Sclerae anicteric, conjunctivae clear, PERRL  Ear/Nose/Throat:  No nasal exudate or sinus tenderness;  No buccal mucosal lesions, no pharyngeal erythema or exudate	  Neck:  Supple, no adenopathy  Respiratory:  Clear bilaterally  Cardiovascular:  RRR, S1S2, no murmur appreciated  Gastrointestinal:  Symmetric, normoactive BS, soft, NT, no masses, guarding or rebound.  No HSM  Extremities:  No edema.  L lateral foot ulcer ~0.5 cm diam, appears more superficial, small area of surrounding erythema - remains painful (not improved), foot warm      LABS:                        11.6   12.79 )-----------( 451      ( 31 Jan 2023 05:30 )             36.5         01-31    134<L>  |  98  |  12  ----------------------------<  106<H>  4.4   |  25  |  0.57    Ca    8.8      31 Jan 2023 05:30  Mg     1.8     01-30            MICROBIOLOGY:        RADIOLOGY & ADDITIONAL STUDIES:

## 2023-01-31 NOTE — CONSULT NOTE ADULT - ATTENDING COMMENTS
37F PMH obesity, HTN, peripheral AVMs, who presented for direct stick embolization of L foot AVM on 1/24/23, post-procedure started on Vanc and Meropenem but changed to Daptomycin and Cefepime ( 1/31-) per ID Dr. Rahman. Medicine now consulted for evaluation for transfer to medicine team.    Pt seen and examined     PE: L heel with erythema, no discharge    - c/w Daptomycin/cefepime per ID, check baseline CPK  - trend WBC count   - pain control  - no need for Medicine transfer  - med consult team continue to follow with you, thank you!

## 2023-01-31 NOTE — PROGRESS NOTE ADULT - SUBJECTIVE AND OBJECTIVE BOX
CARDIOLOGY NP PROGRESS NOTE    Subjective: Pt seen and examined at bedside. Reports having chills overnight. Denies chest pain, sob, fever, lightheadedness, dizziness, palpitations. Pt reports sweats and fever 100.5 around 12:30pm today that she checked herself w/ bedside thermometer, few minutes late was in 99 range.  Remainder ROS otherwise negative.    Overnight Events: wbc 13.6 ->12    TELEMETRY: SR 80-90s         VITAL SIGNS:  T(C): 36.1 (01-31-23 @ 14:08), Max: 36.4 (01-31-23 @ 05:02)  HR: 92 (01-31-23 @ 14:00) (82 - 98)  BP: 157/82 (01-31-23 @ 14:00) (137/66 - 167/80)  RR: 18 (01-31-23 @ 14:00) (16 - 18)  SpO2: 94% (01-31-23 @ 14:00) (94% - 99%)  Wt(kg): --    I&O's Summary    30 Jan 2023 07:01  -  31 Jan 2023 07:00  --------------------------------------------------------  IN: 640 mL / OUT: 0 mL / NET: 640 mL    31 Jan 2023 07:01  -  31 Jan 2023 16:25  --------------------------------------------------------  IN: 200 mL / OUT: 0 mL / NET: 200 mL          PHYSICAL EXAM:    General: A/ox 3, No acute Distress  Neck: Supple, NO JVD  Cardiac: S1 S2, No M/R/G  Pulmonary: CTAB, Breathing unlabored, No Rhonchi/Rales/Wheezing  Abdomen: Soft, Non -tender, +BS x 4 quads  Extremities: No Rashes, No edema.  L foot Kerlix dressing in place. Increased LLE warmth  Neuro: A/o x 3, No focal deficits          LABS:                          11.6   12.79 )-----------( 451      ( 31 Jan 2023 05:30 )             36.5                              01-31    134<L>  |  98  |  12  ----------------------------<  106<H>  4.4   |  25  |  0.57    Ca    8.8      31 Jan 2023 05:30  Mg     1.8     01-30                                CAPILLARY BLOOD GLUCOSE             Allergies:  amoxicillin (Angioedema)  ciprofloxacin (Rash)  hydrochlorothiazide (Hives)  lisinopril (Angioedema; Rash; Hives)  morphine (Rash)  penicillin (Rash)    MEDICATIONS  (STANDING):  cefepime   IVPB      cefepime   IVPB 2000 milliGRAM(s) IV Intermittent once  cefepime   IVPB 2000 milliGRAM(s) IV Intermittent every 8 hours  chlorhexidine 4% Liquid 1 Application(s) Topical once  DAPTOmycin IVPB 800 milliGRAM(s) IV Intermittent once  DAPTOmycin IVPB      enoxaparin Injectable 40 milliGRAM(s) SubCutaneous every 12 hours  nebivolol 5 milliGRAM(s) Oral <User Schedule>    MEDICATIONS  (PRN):  HYDROmorphone  Injectable 2 milliGRAM(s) IV Push every 4 hours PRN Severe Pain (7 - 10)  naloxone Injectable 0.1 milliGRAM(s) IV Push Once PRN For ANY of the following changes in patient status:  A. RR LESS THAN 10 breaths per minute, B. Oxygen saturation LESS THAN 90%, C. Sedation score of 6  ondansetron Injectable 4 milliGRAM(s) IV Push Once PRN Nausea        DIAGNOSTIC TESTS:

## 2023-01-31 NOTE — PROGRESS NOTE ADULT - PROBLEM SELECTOR PLAN 1
H/o left foot AVM w/ chronic foot ulcer w/ multiple prior embolizations and a prior OR debridement 10/2022, s/p IV Meropenem/Vanco from 11/07/2022 to 12/29/2022  - s/p additional direct stick embolization 01/24/2023 w/ Dr. Teran   - ID following post-procedure for co-management and recommended IV Meropenem/Vancomycin  - most recent Vanc trough 14.8 on 01/29/2023 PM  - continued on Vancomyocin 1250 mg IV q8 hours and Meropenem 2 g IV q8 hours   - WBC 13.60, fluctuating throughout this admission  - ID continuing to follow and evaluating wound daily to determine antibiotic durations, continue to appreciate recs   - weaning pain medications, now continued on Hydromorphone 2 mg IV q6 hours PRN and home Percocet dose PRN  - ordered for Naloxone IV PRN H/o left foot AVM w/ chronic foot ulcer w/ multiple prior embolizations and a prior OR debridement 10/2022, s/p IV Meropenem/Vanco from 11/07/2022 to 12/29/2022.  - s/p additional direct stick embolization 01/24/2023 w/ Dr. Teran   - ID following post-procedure for co-management and recommended IV Meropenem/Vancomycin  - continued on Vancomyocin 1250 mg IV q8 hours and Meropenem 2 g IV q8 hours   - WBC 13.60 -> 12, fluctuating throughout this admission  - ID continuing to follow and evaluating wound daily to determine antibiotic durations, continue to appreciate recs   - continued on Hydromorphone 2 mg IV q4h PRN and home Percocet dose PRN  - ordered for Naloxone IV PRN H/o left foot AVM w/ chronic foot ulcer w/ multiple prior embolizations and a prior OR debridement 10/2022, s/p IV Meropenem/Vanco from 11/07/2022 to 12/29/2022.  - s/p additional direct stick embolization 01/24/2023 w/ Dr. Teran   - ID following post-procedure for co-management    - WBC 13.60 -> 12, fluctuating throughout this admission. Reported fever 100.5 on 1/31  - D/c'ed Vancomycin and Meropenem   - Sent BC x 2 sets 1/31  - Start IV Daptomycin 800mg q24h and IV Cefepime 2g q8h  - ID continuing to follow and evaluating wound daily to determine antibiotic durations, continue to appreciate recs   - continued on Hydromorphone 2 mg IV q4h PRN and home Percocet dose PRN  - ordered for Naloxone IV PRN

## 2023-01-31 NOTE — PROGRESS NOTE ADULT - ASSESSMENT
36 y/o female, morbidly obese, w/ PMHx HTN and left foot AVM w/ chronic left foot ulcer (s/p multiple embolizations, s/p OR debridement 10/2022, and s/p prior antibotic treatment w/ recent treatment from 1107/2022 to 12/29/2022 w/ Meropenem/Vancomycin and wound closure on 12/23/2022) who presented for additional embolization on 01/24/2023. Patient s/p direct stick embolization of the left foot, remains admitted to cardiac tele and on IV Abx per ID recs. Medicine consulted for possible transfer.

## 2023-01-31 NOTE — PROGRESS NOTE ADULT - PROBLEM SELECTOR PLAN 2
SBP ranging 130's to 150's   - home medication: Bystolic 5 mg BID  - continue home medication       VTE PPX: Lovenox  Dispo: pending ID recs, antibiotics, weaning pain medications SBP ranging 130's to 150's   - home medication: Bystolic 5 mg BID  - continue home medication     VTE PPX: Lovenox  Dispo: pending ID recs, antibiotics

## 2023-02-01 LAB
ALBUMIN SERPL ELPH-MCNC: 3 G/DL — LOW (ref 3.3–5)
ALP SERPL-CCNC: 78 U/L — SIGNIFICANT CHANGE UP (ref 40–120)
ALT FLD-CCNC: 14 U/L — SIGNIFICANT CHANGE UP (ref 10–45)
ANION GAP SERPL CALC-SCNC: 8 MMOL/L — SIGNIFICANT CHANGE UP (ref 5–17)
AST SERPL-CCNC: 13 U/L — SIGNIFICANT CHANGE UP (ref 10–40)
BASOPHILS # BLD AUTO: 0.06 K/UL — SIGNIFICANT CHANGE UP (ref 0–0.2)
BASOPHILS NFR BLD AUTO: 0.4 % — SIGNIFICANT CHANGE UP (ref 0–2)
BILIRUB SERPL-MCNC: 0.6 MG/DL — SIGNIFICANT CHANGE UP (ref 0.2–1.2)
BUN SERPL-MCNC: 12 MG/DL — SIGNIFICANT CHANGE UP (ref 7–23)
CALCIUM SERPL-MCNC: 8.9 MG/DL — SIGNIFICANT CHANGE UP (ref 8.4–10.5)
CHLORIDE SERPL-SCNC: 100 MMOL/L — SIGNIFICANT CHANGE UP (ref 96–108)
CK SERPL-CCNC: 25 U/L — SIGNIFICANT CHANGE UP (ref 25–170)
CO2 SERPL-SCNC: 25 MMOL/L — SIGNIFICANT CHANGE UP (ref 22–31)
CREAT SERPL-MCNC: 0.63 MG/DL — SIGNIFICANT CHANGE UP (ref 0.5–1.3)
EGFR: 116 ML/MIN/1.73M2 — SIGNIFICANT CHANGE UP
EOSINOPHIL # BLD AUTO: 0.22 K/UL — SIGNIFICANT CHANGE UP (ref 0–0.5)
EOSINOPHIL NFR BLD AUTO: 1.6 % — SIGNIFICANT CHANGE UP (ref 0–6)
GLUCOSE SERPL-MCNC: 94 MG/DL — SIGNIFICANT CHANGE UP (ref 70–99)
HCT VFR BLD CALC: 33.1 % — LOW (ref 34.5–45)
HGB BLD-MCNC: 10.4 G/DL — LOW (ref 11.5–15.5)
IMM GRANULOCYTES NFR BLD AUTO: 0.5 % — SIGNIFICANT CHANGE UP (ref 0–0.9)
LYMPHOCYTES # BLD AUTO: 1.89 K/UL — SIGNIFICANT CHANGE UP (ref 1–3.3)
LYMPHOCYTES # BLD AUTO: 13.8 % — SIGNIFICANT CHANGE UP (ref 13–44)
MAGNESIUM SERPL-MCNC: 1.8 MG/DL — SIGNIFICANT CHANGE UP (ref 1.6–2.6)
MCHC RBC-ENTMCNC: 28.1 PG — SIGNIFICANT CHANGE UP (ref 27–34)
MCHC RBC-ENTMCNC: 31.4 GM/DL — LOW (ref 32–36)
MCV RBC AUTO: 89.5 FL — SIGNIFICANT CHANGE UP (ref 80–100)
MONOCYTES # BLD AUTO: 0.9 K/UL — SIGNIFICANT CHANGE UP (ref 0–0.9)
MONOCYTES NFR BLD AUTO: 6.6 % — SIGNIFICANT CHANGE UP (ref 2–14)
NEUTROPHILS # BLD AUTO: 10.58 K/UL — HIGH (ref 1.8–7.4)
NEUTROPHILS NFR BLD AUTO: 77.1 % — HIGH (ref 43–77)
NRBC # BLD: 0 /100 WBCS — SIGNIFICANT CHANGE UP (ref 0–0)
PLATELET # BLD AUTO: 405 K/UL — HIGH (ref 150–400)
POTASSIUM SERPL-MCNC: 4.4 MMOL/L — SIGNIFICANT CHANGE UP (ref 3.5–5.3)
POTASSIUM SERPL-SCNC: 4.4 MMOL/L — SIGNIFICANT CHANGE UP (ref 3.5–5.3)
PROT SERPL-MCNC: 6.4 G/DL — SIGNIFICANT CHANGE UP (ref 6–8.3)
RBC # BLD: 3.7 M/UL — LOW (ref 3.8–5.2)
RBC # FLD: 13.1 % — SIGNIFICANT CHANGE UP (ref 10.3–14.5)
SODIUM SERPL-SCNC: 133 MMOL/L — LOW (ref 135–145)
WBC # BLD: 13.72 K/UL — HIGH (ref 3.8–10.5)
WBC # FLD AUTO: 13.72 K/UL — HIGH (ref 3.8–10.5)

## 2023-02-01 PROCEDURE — 99221 1ST HOSP IP/OBS SF/LOW 40: CPT

## 2023-02-01 PROCEDURE — 99232 SBSQ HOSP IP/OBS MODERATE 35: CPT

## 2023-02-01 RX ORDER — MAGNESIUM OXIDE 400 MG ORAL TABLET 241.3 MG
400 TABLET ORAL ONCE
Refills: 0 | Status: COMPLETED | OUTPATIENT
Start: 2023-02-01 | End: 2023-02-01

## 2023-02-01 RX ORDER — NEBIVOLOL HYDROCHLORIDE 5 MG/1
10 TABLET ORAL
Refills: 0 | Status: DISCONTINUED | OUTPATIENT
Start: 2023-02-01 | End: 2023-02-07

## 2023-02-01 RX ORDER — NEBIVOLOL HYDROCHLORIDE 5 MG/1
5 TABLET ORAL ONCE
Refills: 0 | Status: COMPLETED | OUTPATIENT
Start: 2023-02-01 | End: 2023-02-01

## 2023-02-01 RX ADMIN — NEBIVOLOL HYDROCHLORIDE 10 MILLIGRAM(S): 5 TABLET ORAL at 18:00

## 2023-02-01 RX ADMIN — CEFEPIME 100 MILLIGRAM(S): 1 INJECTION, POWDER, FOR SOLUTION INTRAMUSCULAR; INTRAVENOUS at 10:33

## 2023-02-01 RX ADMIN — ENOXAPARIN SODIUM 40 MILLIGRAM(S): 100 INJECTION SUBCUTANEOUS at 06:35

## 2023-02-01 RX ADMIN — HYDROMORPHONE HYDROCHLORIDE 2 MILLIGRAM(S): 2 INJECTION INTRAMUSCULAR; INTRAVENOUS; SUBCUTANEOUS at 23:21

## 2023-02-01 RX ADMIN — HYDROMORPHONE HYDROCHLORIDE 2 MILLIGRAM(S): 2 INJECTION INTRAMUSCULAR; INTRAVENOUS; SUBCUTANEOUS at 06:44

## 2023-02-01 RX ADMIN — HYDROMORPHONE HYDROCHLORIDE 2 MILLIGRAM(S): 2 INJECTION INTRAMUSCULAR; INTRAVENOUS; SUBCUTANEOUS at 06:16

## 2023-02-01 RX ADMIN — ENOXAPARIN SODIUM 40 MILLIGRAM(S): 100 INJECTION SUBCUTANEOUS at 18:00

## 2023-02-01 RX ADMIN — HYDROMORPHONE HYDROCHLORIDE 2 MILLIGRAM(S): 2 INJECTION INTRAMUSCULAR; INTRAVENOUS; SUBCUTANEOUS at 14:44

## 2023-02-01 RX ADMIN — DAPTOMYCIN 132 MILLIGRAM(S): 500 INJECTION, POWDER, LYOPHILIZED, FOR SOLUTION INTRAVENOUS at 19:18

## 2023-02-01 RX ADMIN — CEFEPIME 100 MILLIGRAM(S): 1 INJECTION, POWDER, FOR SOLUTION INTRAMUSCULAR; INTRAVENOUS at 02:00

## 2023-02-01 RX ADMIN — HYDROMORPHONE HYDROCHLORIDE 2 MILLIGRAM(S): 2 INJECTION INTRAMUSCULAR; INTRAVENOUS; SUBCUTANEOUS at 11:00

## 2023-02-01 RX ADMIN — HYDROMORPHONE HYDROCHLORIDE 2 MILLIGRAM(S): 2 INJECTION INTRAMUSCULAR; INTRAVENOUS; SUBCUTANEOUS at 02:30

## 2023-02-01 RX ADMIN — NEBIVOLOL HYDROCHLORIDE 5 MILLIGRAM(S): 5 TABLET ORAL at 11:44

## 2023-02-01 RX ADMIN — CEFEPIME 100 MILLIGRAM(S): 1 INJECTION, POWDER, FOR SOLUTION INTRAMUSCULAR; INTRAVENOUS at 18:00

## 2023-02-01 RX ADMIN — NEBIVOLOL HYDROCHLORIDE 5 MILLIGRAM(S): 5 TABLET ORAL at 06:35

## 2023-02-01 RX ADMIN — HYDROMORPHONE HYDROCHLORIDE 2 MILLIGRAM(S): 2 INJECTION INTRAMUSCULAR; INTRAVENOUS; SUBCUTANEOUS at 15:09

## 2023-02-01 RX ADMIN — HYDROMORPHONE HYDROCHLORIDE 2 MILLIGRAM(S): 2 INJECTION INTRAMUSCULAR; INTRAVENOUS; SUBCUTANEOUS at 10:33

## 2023-02-01 RX ADMIN — HYDROMORPHONE HYDROCHLORIDE 2 MILLIGRAM(S): 2 INJECTION INTRAMUSCULAR; INTRAVENOUS; SUBCUTANEOUS at 19:30

## 2023-02-01 RX ADMIN — HYDROMORPHONE HYDROCHLORIDE 2 MILLIGRAM(S): 2 INJECTION INTRAMUSCULAR; INTRAVENOUS; SUBCUTANEOUS at 02:07

## 2023-02-01 RX ADMIN — HYDROMORPHONE HYDROCHLORIDE 2 MILLIGRAM(S): 2 INJECTION INTRAMUSCULAR; INTRAVENOUS; SUBCUTANEOUS at 23:36

## 2023-02-01 RX ADMIN — MAGNESIUM OXIDE 400 MG ORAL TABLET 400 MILLIGRAM(S): 241.3 TABLET ORAL at 11:44

## 2023-02-01 RX ADMIN — HYDROMORPHONE HYDROCHLORIDE 2 MILLIGRAM(S): 2 INJECTION INTRAMUSCULAR; INTRAVENOUS; SUBCUTANEOUS at 19:18

## 2023-02-01 NOTE — PROGRESS NOTE ADULT - PROBLEM SELECTOR PLAN 1
H/o left foot AVM w/ chronic foot ulcer w/ multiple prior embolizations and a prior OR debridement 10/2022, s/p IV Meropenem/Vanco from 11/07/2022 to 12/29/2022.  - s/p additional direct stick embolization 01/24/2023 w/ Dr. Teran   - ID following post-procedure for co-management    - WBC 12-13, fluctuating throughout this admission. Reported fever 100.5 on 1/31  - D/c'ed Vancomycin and Meropenem 1/31  - Blood Cultures x 2 sets 1/31 - NGTD  - c/w IV Daptomycin 800mg q24h and IV Cefepime 2g q8h  - ID continuing to follow and evaluating wound daily to determine antibiotic durations, continue to appreciate recs   - continued on Hydromorphone 2 mg IV q4h PRN and home Percocet dose PRN  - Pt reports follows closely w/ pain management and have been in contact with NP, will f/u outpt for discharge home regimen  - ordered for Naloxone IV PRN  - Medicine consulted for possible transfer, but rejected

## 2023-02-01 NOTE — PROGRESS NOTE ADULT - SUBJECTIVE AND OBJECTIVE BOX
INTERVAL HPI/OVERNIGHT EVENTS:  O/N: no acute events noted overnight  This morning: Patient was seen and examined at bedside. Feeling well, pain 8-9 when due for pain meds, goes down to 3-4 with pain meds. No chest pain, palpitations, dizziness, or lightheadedness.     VITAL SIGNS:  T(F): 96.9 (02-01-23 @ 09:49)  HR: 87 (02-01-23 @ 11:35)  BP: 149/75 (02-01-23 @ 11:35)  RR: 18 (02-01-23 @ 11:35)  SpO2: 94% (02-01-23 @ 11:35)  Wt(kg): --    PHYSICAL EXAM:    Constitutional: resting comfortably in bed; NAD  HEENT: NC/AT, PERRL, EOMI, anicteric sclera, no nasal discharge   Respiratory: breathing comfortably on lRA  Extremities: recently wrapped L foot AVM, reviewed recent imaging again with patient showing small erythematous area of skin around AVM at lateral aspect of L heel w/ no active purulence/drainage, improving compared to prior  Dermatologic: skin warm, dry and intact; no rashes, wounds, or scars noted  Neurologic: alert and oriented, no focal deficits noted    MEDICATIONS  (STANDING):  cefepime   IVPB      cefepime   IVPB 2000 milliGRAM(s) IV Intermittent every 8 hours  chlorhexidine 4% Liquid 1 Application(s) Topical once  DAPTOmycin IVPB 800 milliGRAM(s) IV Intermittent every 24 hours  DAPTOmycin IVPB      enoxaparin Injectable 40 milliGRAM(s) SubCutaneous every 12 hours  nebivolol 10 milliGRAM(s) Oral <User Schedule>    MEDICATIONS  (PRN):  HYDROmorphone  Injectable 2 milliGRAM(s) IV Push every 4 hours PRN Severe Pain (7 - 10)  naloxone Injectable 0.1 milliGRAM(s) IV Push Once PRN For ANY of the following changes in patient status:  A. RR LESS THAN 10 breaths per minute, B. Oxygen saturation LESS THAN 90%, C. Sedation score of 6  ondansetron Injectable 4 milliGRAM(s) IV Push Once PRN Nausea      Allergies    amoxicillin (Angioedema)  ciprofloxacin (Rash)  hydrochlorothiazide (Hives)  latex (Rash)  lisinopril (Angioedema; Rash; Hives)  morphine (Rash)  penicillin (Rash)    Intolerances        LABS:                        10.4   13.72 )-----------( 405      ( 01 Feb 2023 05:30 )             33.1     02-01    133<L>  |  100  |  12  ----------------------------<  94  4.4   |  25  |  0.63    Ca    8.9      01 Feb 2023 05:30  Mg     1.8     02-01    TPro  6.4  /  Alb  3.0<L>  /  TBili  0.6  /  DBili  x   /  AST  13  /  ALT  14  /  AlkPhos  78  02-01              Culture - Blood (collected 01-31-23 @ 17:35)  Source: .Blood Blood-Peripheral  Preliminary Report (02-01-23 @ 06:01):    No growth at 12 hours    Culture - Blood (collected 01-31-23 @ 17:14)  Source: .Blood Blood-Peripheral  Preliminary Report (02-01-23 @ 06:01):    No growth at 12 hours        RADIOLOGY & ADDITIONAL TESTS:  Reviewed

## 2023-02-01 NOTE — PROGRESS NOTE ADULT - ASSESSMENT
38 yo F with HTN, MO and AVM L foot with recurrent L foot ulcer s/p direct stick embolization 1/24.  She still has cellulitis surrounding ulcer - not improving.  No microbiology to guide.  Initially with pan-susceptible Pseudomonas (10/2022), which can become resistant to carbapenems and remain susceptible to cephalosporins.  She has PCN & cipro allergy.  Still with sweats but may be milder, cellulitis may be less red.  Suggest:  - F/U blood cultures X 2 sets from 1/31  - Continue daptomycin 800 mg IV q24h.  Baseline CPK with next blood draw  - Continue cefepime 2 g IV q8h  - Will evaluate appearance of wound daily to determine duration, consideration for PICC placement tomorrow based upon appearance at that time  Recommendations discussed with primary team.  Will follow with you - team 2.

## 2023-02-01 NOTE — PROGRESS NOTE ADULT - ASSESSMENT
38 y/o female, morbidly obese, w/ PMHx HTN and left foot AVM w/ chronic left foot ulcer (s/p multiple embolizations, s/p OR debridement 10/2022, and s/p prior antibotic treatment w/ recent treatment from 11/7/2022 to 12/29/2022 w/ Meropenem/Vancomycin and wound closure on 12/23/2022) who presented for additional embolization. Patient s/p direct stick embolization of the left foot 1/24/2023., remains admitted to cardiac tele and on IV Abx per ID recs.

## 2023-02-01 NOTE — PROGRESS NOTE ADULT - ATTENDING COMMENTS
Pt seen with    Pt reports L heel erythema appears slightly improving but up trending WBC 13.72K, pain is about the same   L heel erythema   - c/w Daptomycin 800mg iv q24hr and Cefepime 2gm iv q8hr ( 1/31-) per ID    -check baseline CPK and weekly  - trend WBC  - monitor L heel wound   - pain control: Dilaudid prn   - HTN: c/w Nebivolol 10mg daily, consider adding low dose Norvasc 5mg daily

## 2023-02-01 NOTE — PROGRESS NOTE ADULT - SUBJECTIVE AND OBJECTIVE BOX
CARDIOLOGY NP PROGRESS NOTE    Subjective: Pt seen and examined at bedside. Reports decreased chills overnight. Denies chest pain, sob, lightheadedness, dizziness, palpitations, fever.  Remainder ROS otherwise negative.    Overnight Events: none    TELEMETRY: SR 80-90s           VITAL SIGNS:  T(C): 36.1 (02-01-23 @ 09:49), Max: 36.6 (01-31-23 @ 21:41)  HR: 86 (02-01-23 @ 08:52) (82 - 96)  BP: 164/74 (02-01-23 @ 08:52) (146/68 - 190/90)  RR: 18 (02-01-23 @ 08:52) (16 - 18)  SpO2: 94% (02-01-23 @ 08:52) (93% - 98%)  Wt(kg): --    I&O's Summary    31 Jan 2023 07:01  -  01 Feb 2023 07:00  --------------------------------------------------------  IN: 380 mL / OUT: 0 mL / NET: 380 mL    01 Feb 2023 07:01  -  01 Feb 2023 11:05  --------------------------------------------------------  IN: 250 mL / OUT: 0 mL / NET: 250 mL          PHYSICAL EXAM:    General: A/ox 3, No acute Distress  Neck: Supple, NO JVD  Cardiac: S1 S2, No M/R/G  Pulmonary: CTAB, Breathing unlabored, No Rhonchi/Rales/Wheezing  Abdomen: Soft, Non -tender, +BS x 4 quads  Extremities: No Rashes, No edema.  L foot Kerlix dressing in place.   Neuro: A/o x 3, No focal deficits          LABS:                          10.4   13.72 )-----------( 405      ( 01 Feb 2023 05:30 )             33.1                              02-01    133<L>  |  100  |  12  ----------------------------<  94  4.4   |  25  |  0.63    Ca    8.9      01 Feb 2023 05:30  Mg     1.8     02-01    TPro  6.4  /  Alb  3.0<L>  /  TBili  0.6  /  DBili  x   /  AST  13  /  ALT  14  /  AlkPhos  78  02-01    LIVER FUNCTIONS - ( 01 Feb 2023 05:30 )  Alb: 3.0 g/dL / Pro: 6.4 g/dL / ALK PHOS: 78 U/L / ALT: 14 U/L / AST: 13 U/L / GGT: x                                   CAPILLARY BLOOD GLUCOSE        CARDIAC MARKERS ( 01 Feb 2023 05:30 )  x     / x     / 25 U/L / x     / x              Allergies:  amoxicillin (Angioedema)  ciprofloxacin (Rash)  hydrochlorothiazide (Hives)  latex (Rash)  lisinopril (Angioedema; Rash; Hives)  morphine (Rash)  penicillin (Rash)    MEDICATIONS  (STANDING):  cefepime   IVPB      cefepime   IVPB 2000 milliGRAM(s) IV Intermittent every 8 hours  chlorhexidine 4% Liquid 1 Application(s) Topical once  DAPTOmycin IVPB 800 milliGRAM(s) IV Intermittent every 24 hours  DAPTOmycin IVPB      enoxaparin Injectable 40 milliGRAM(s) SubCutaneous every 12 hours  magnesium oxide 400 milliGRAM(s) Oral once  nebivolol 5 milliGRAM(s) Oral once  nebivolol 10 milliGRAM(s) Oral <User Schedule>    MEDICATIONS  (PRN):  HYDROmorphone  Injectable 2 milliGRAM(s) IV Push every 4 hours PRN Severe Pain (7 - 10)  naloxone Injectable 0.1 milliGRAM(s) IV Push Once PRN For ANY of the following changes in patient status:  A. RR LESS THAN 10 breaths per minute, B. Oxygen saturation LESS THAN 90%, C. Sedation score of 6  ondansetron Injectable 4 milliGRAM(s) IV Push Once PRN Nausea        DIAGNOSTIC TESTS:

## 2023-02-01 NOTE — PROGRESS NOTE ADULT - ASSESSMENT
37F PMH obesity, HTN, peripheral AVMs, who presented for direct stick embolization of L foot AVM on 1/24/23. Medicine now consulted for evaluation for transfer to medicine team.    #Fevers  #L foot AVM s/p direct stick embolization  #L foot cellulitis  Recently on vanc/meropenem, last doses for both on 1/31, now transitioned to daptomycin and cefepime per ID  - f/u blood cultures  - send baseline CPK and weekly thereafter while on daptomycin  - f/u ID recs  - pain management per primary team    #HTN  - On nebivolol 10 BID at home, during this admission has been on 5 BID as her pain medications were dropping her blood pressures too much. She has been on nebivolol for a few years and was previously on lisinopril-HCTZ for years but woke up one day with angioedema, rash, hives and has been off those meds since then. Pt does not believe she has been on CCB, though she had extensive allergy testing which was reviewed with her at bedside on 2/1/23, no evidence of CCB allergy  - BPs predominantly 140s-160s this hospitalization, though has had some 120s-130s, and as high as 190s  - BP per pt elevated in setting of pain, tends to run lower after pain medications.  Recommend:  - continue to monitor BP and monitor on nebivolol increased back to home dose 10 BID on 2/1   - if still hypertensive would recommend starting CCB such as amlodipine 5mg qD as this is typically preferred over BB therapy for HTN in absence of other cardiac history as is the case with her, especially as her BP fluctuates at home on BB      Rest of care per primary team.

## 2023-02-01 NOTE — PROGRESS NOTE ADULT - PROBLEM SELECTOR PLAN 2
SBP ranging 150's to 190's   - home medication: Bystolic 10mg BID  - Increase Bystolic 5mg BID back to home dose 10mg BID     VTE PPX: Lovenox SQ  Dispo: pending ID recs, antibiotics

## 2023-02-01 NOTE — PROGRESS NOTE ADULT - SUBJECTIVE AND OBJECTIVE BOX
INTERVAL HPI/OVERNIGHT EVENTS:  No further fever, still sweats but less.  Ongoing throbbing/stinging pain L foot    CONSTITUTIONAL:  As above  EYES:  No photophobia or visual changes  CARDIOVASCULAR:  No chest pain  RESPIRATORY:  No cough, wheezing, or SOB   GASTROINTESTINAL:  No nausea, vomiting, diarrhea, constipation, or abdominal pain  GENITOURINARY:  No frequency, urgency, dysuria or hematuria  NEUROLOGIC:  No headache, lightheadedness      ANTIBIOTICS/RELEVANT:    Daptomycin 800 mg IV q24h (1/31-present)  Cefepime 2 g IV q8h (1/31-present)    Vancomycin 1/24-1/31  Meropenem 1/24-1/31    Vital Signs Last 24 Hrs  T(C): 36.2 (01 Feb 2023 13:48), Max: 36.6 (31 Jan 2023 21:41)  T(F): 97.2 (01 Feb 2023 13:48), Max: 97.9 (31 Jan 2023 21:41)  HR: 87 (01 Feb 2023 11:35) (82 - 96)  BP: 149/75 (01 Feb 2023 11:35) (146/68 - 190/90)  BP(mean): 116 (01 Feb 2023 06:20) (97 - 129)  RR: 18 (01 Feb 2023 11:35) (16 - 18)  SpO2: 94% (01 Feb 2023 11:35) (93% - 98%)    Parameters below as of 01 Feb 2023 11:35  Patient On (Oxygen Delivery Method): room air        PHYSICAL EXAM:  Constitutional:  Alert  Eyes:  Sclerae anicteric, conjunctivae clear, PERRL  Ear/Nose/Throat:  No nasal exudate or sinus tenderness;  No buccal mucosal lesions, no pharyngeal erythema or exudate	  Neck:  Supple, no adenopathy  Respiratory:  Clear bilaterally  Cardiovascular:  RRR, S1S2, no murmur appreciated  Gastrointestinal:  Symmetric, normoactive BS, soft, NT, no masses, guarding or rebound.  No HSM  Extremities:  No edema.  L lateral foot ulcer ~0.5 cm diam, appears more superficial, small area of surrounding erythema - remains painful mildly improved, foot warm        LABS:                        10.4   13.72 )-----------( 405      ( 01 Feb 2023 05:30 )             33.1         02-01    133<L>  |  100  |  12  ----------------------------<  94  4.4   |  25  |  0.63    Ca    8.9      01 Feb 2023 05:30  Mg     1.8     02-01    TPro  6.4  /  Alb  3.0<L>  /  TBili  0.6  /  DBili  x   /  AST  13  /  ALT  14  /  AlkPhos  78  02-01          MICROBIOLOGY:    Blood cultures:  1/31 X 2 - NGTD        RADIOLOGY & ADDITIONAL STUDIES:

## 2023-02-02 LAB
ANION GAP SERPL CALC-SCNC: 9 MMOL/L — SIGNIFICANT CHANGE UP (ref 5–17)
BASOPHILS # BLD AUTO: 0.04 K/UL — SIGNIFICANT CHANGE UP (ref 0–0.2)
BASOPHILS NFR BLD AUTO: 0.4 % — SIGNIFICANT CHANGE UP (ref 0–2)
BUN SERPL-MCNC: 15 MG/DL — SIGNIFICANT CHANGE UP (ref 7–23)
CALCIUM SERPL-MCNC: 8.6 MG/DL — SIGNIFICANT CHANGE UP (ref 8.4–10.5)
CHLORIDE SERPL-SCNC: 104 MMOL/L — SIGNIFICANT CHANGE UP (ref 96–108)
CO2 SERPL-SCNC: 24 MMOL/L — SIGNIFICANT CHANGE UP (ref 22–31)
CREAT SERPL-MCNC: 0.63 MG/DL — SIGNIFICANT CHANGE UP (ref 0.5–1.3)
EGFR: 116 ML/MIN/1.73M2 — SIGNIFICANT CHANGE UP
EOSINOPHIL # BLD AUTO: 0.2 K/UL — SIGNIFICANT CHANGE UP (ref 0–0.5)
EOSINOPHIL NFR BLD AUTO: 1.9 % — SIGNIFICANT CHANGE UP (ref 0–6)
GLUCOSE SERPL-MCNC: 110 MG/DL — HIGH (ref 70–99)
HCT VFR BLD CALC: 35.3 % — SIGNIFICANT CHANGE UP (ref 34.5–45)
HGB BLD-MCNC: 11.2 G/DL — LOW (ref 11.5–15.5)
IMM GRANULOCYTES NFR BLD AUTO: 0.6 % — SIGNIFICANT CHANGE UP (ref 0–0.9)
LYMPHOCYTES # BLD AUTO: 1.83 K/UL — SIGNIFICANT CHANGE UP (ref 1–3.3)
LYMPHOCYTES # BLD AUTO: 17.3 % — SIGNIFICANT CHANGE UP (ref 13–44)
MAGNESIUM SERPL-MCNC: 1.8 MG/DL — SIGNIFICANT CHANGE UP (ref 1.6–2.6)
MCHC RBC-ENTMCNC: 27.9 PG — SIGNIFICANT CHANGE UP (ref 27–34)
MCHC RBC-ENTMCNC: 31.7 GM/DL — LOW (ref 32–36)
MCV RBC AUTO: 87.8 FL — SIGNIFICANT CHANGE UP (ref 80–100)
MONOCYTES # BLD AUTO: 0.74 K/UL — SIGNIFICANT CHANGE UP (ref 0–0.9)
MONOCYTES NFR BLD AUTO: 7 % — SIGNIFICANT CHANGE UP (ref 2–14)
NEUTROPHILS # BLD AUTO: 7.7 K/UL — HIGH (ref 1.8–7.4)
NEUTROPHILS NFR BLD AUTO: 72.8 % — SIGNIFICANT CHANGE UP (ref 43–77)
NRBC # BLD: 0 /100 WBCS — SIGNIFICANT CHANGE UP (ref 0–0)
PLATELET # BLD AUTO: 431 K/UL — HIGH (ref 150–400)
POTASSIUM SERPL-MCNC: 4.1 MMOL/L — SIGNIFICANT CHANGE UP (ref 3.5–5.3)
POTASSIUM SERPL-SCNC: 4.1 MMOL/L — SIGNIFICANT CHANGE UP (ref 3.5–5.3)
RBC # BLD: 4.02 M/UL — SIGNIFICANT CHANGE UP (ref 3.8–5.2)
RBC # FLD: 13.1 % — SIGNIFICANT CHANGE UP (ref 10.3–14.5)
SODIUM SERPL-SCNC: 137 MMOL/L — SIGNIFICANT CHANGE UP (ref 135–145)
WBC # BLD: 10.57 K/UL — HIGH (ref 3.8–10.5)
WBC # FLD AUTO: 10.57 K/UL — HIGH (ref 3.8–10.5)

## 2023-02-02 PROCEDURE — 99232 SBSQ HOSP IP/OBS MODERATE 35: CPT | Mod: GC

## 2023-02-02 PROCEDURE — 99232 SBSQ HOSP IP/OBS MODERATE 35: CPT

## 2023-02-02 RX ORDER — MAGNESIUM OXIDE 400 MG ORAL TABLET 241.3 MG
400 TABLET ORAL ONCE
Refills: 0 | Status: COMPLETED | OUTPATIENT
Start: 2023-02-02 | End: 2023-02-02

## 2023-02-02 RX ORDER — DAPTOMYCIN 500 MG/10ML
800 INJECTION, POWDER, LYOPHILIZED, FOR SOLUTION INTRAVENOUS
Qty: 30 | Refills: 0
Start: 2023-02-02 | End: 2023-03-03

## 2023-02-02 RX ORDER — CEFEPIME 1 G/1
2 INJECTION, POWDER, FOR SOLUTION INTRAMUSCULAR; INTRAVENOUS
Qty: 180 | Refills: 0
Start: 2023-02-02 | End: 2023-03-03

## 2023-02-02 RX ORDER — SODIUM CHLORIDE 9 MG/ML
10 INJECTION INTRAMUSCULAR; INTRAVENOUS; SUBCUTANEOUS
Qty: 1000 | Refills: 0
Start: 2023-02-02

## 2023-02-02 RX ADMIN — HYDROMORPHONE HYDROCHLORIDE 2 MILLIGRAM(S): 2 INJECTION INTRAMUSCULAR; INTRAVENOUS; SUBCUTANEOUS at 21:45

## 2023-02-02 RX ADMIN — DAPTOMYCIN 132 MILLIGRAM(S): 500 INJECTION, POWDER, LYOPHILIZED, FOR SOLUTION INTRAVENOUS at 19:37

## 2023-02-02 RX ADMIN — CEFEPIME 100 MILLIGRAM(S): 1 INJECTION, POWDER, FOR SOLUTION INTRAMUSCULAR; INTRAVENOUS at 02:30

## 2023-02-02 RX ADMIN — HYDROMORPHONE HYDROCHLORIDE 2 MILLIGRAM(S): 2 INJECTION INTRAMUSCULAR; INTRAVENOUS; SUBCUTANEOUS at 21:27

## 2023-02-02 RX ADMIN — HYDROMORPHONE HYDROCHLORIDE 2 MILLIGRAM(S): 2 INJECTION INTRAMUSCULAR; INTRAVENOUS; SUBCUTANEOUS at 13:30

## 2023-02-02 RX ADMIN — HYDROMORPHONE HYDROCHLORIDE 2 MILLIGRAM(S): 2 INJECTION INTRAMUSCULAR; INTRAVENOUS; SUBCUTANEOUS at 07:50

## 2023-02-02 RX ADMIN — NEBIVOLOL HYDROCHLORIDE 10 MILLIGRAM(S): 5 TABLET ORAL at 19:36

## 2023-02-02 RX ADMIN — HYDROMORPHONE HYDROCHLORIDE 2 MILLIGRAM(S): 2 INJECTION INTRAMUSCULAR; INTRAVENOUS; SUBCUTANEOUS at 07:35

## 2023-02-02 RX ADMIN — NEBIVOLOL HYDROCHLORIDE 10 MILLIGRAM(S): 5 TABLET ORAL at 07:35

## 2023-02-02 RX ADMIN — HYDROMORPHONE HYDROCHLORIDE 2 MILLIGRAM(S): 2 INJECTION INTRAMUSCULAR; INTRAVENOUS; SUBCUTANEOUS at 03:30

## 2023-02-02 RX ADMIN — HYDROMORPHONE HYDROCHLORIDE 2 MILLIGRAM(S): 2 INJECTION INTRAMUSCULAR; INTRAVENOUS; SUBCUTANEOUS at 18:00

## 2023-02-02 RX ADMIN — MAGNESIUM OXIDE 400 MG ORAL TABLET 400 MILLIGRAM(S): 241.3 TABLET ORAL at 09:55

## 2023-02-02 RX ADMIN — CEFEPIME 100 MILLIGRAM(S): 1 INJECTION, POWDER, FOR SOLUTION INTRAMUSCULAR; INTRAVENOUS at 18:00

## 2023-02-02 RX ADMIN — CEFEPIME 100 MILLIGRAM(S): 1 INJECTION, POWDER, FOR SOLUTION INTRAMUSCULAR; INTRAVENOUS at 09:57

## 2023-02-02 RX ADMIN — ENOXAPARIN SODIUM 40 MILLIGRAM(S): 100 INJECTION SUBCUTANEOUS at 18:01

## 2023-02-02 RX ADMIN — ENOXAPARIN SODIUM 40 MILLIGRAM(S): 100 INJECTION SUBCUTANEOUS at 05:56

## 2023-02-02 RX ADMIN — HYDROMORPHONE HYDROCHLORIDE 2 MILLIGRAM(S): 2 INJECTION INTRAMUSCULAR; INTRAVENOUS; SUBCUTANEOUS at 16:46

## 2023-02-02 RX ADMIN — HYDROMORPHONE HYDROCHLORIDE 2 MILLIGRAM(S): 2 INJECTION INTRAMUSCULAR; INTRAVENOUS; SUBCUTANEOUS at 03:45

## 2023-02-02 RX ADMIN — HYDROMORPHONE HYDROCHLORIDE 2 MILLIGRAM(S): 2 INJECTION INTRAMUSCULAR; INTRAVENOUS; SUBCUTANEOUS at 12:16

## 2023-02-02 NOTE — PROGRESS NOTE ADULT - SUBJECTIVE AND OBJECTIVE BOX
INTERVAL HPI/OVERNIGHT EVENTS:  Less sweats.  No fever.  Foot still throbbing/burning    CONSTITUTIONAL:  No fever, chills, night sweats  EYES:  No photophobia or visual changes  CARDIOVASCULAR:  No chest pain  RESPIRATORY:  No cough, wheezing, or SOB   GASTROINTESTINAL:  No nausea, vomiting, diarrhea, constipation, or abdominal pain  GENITOURINARY:  No frequency, urgency, dysuria or hematuria  NEUROLOGIC:  No headache, lightheadedness      ANTIBIOTICS/RELEVANT:    Daptomycin 800 mg IV q24h (1/31-present)  Cefepime 2 g IV q8h (1/31-present)    Vancomycin 1/24-1/31  Meropenem 1/24-1/31      Vital Signs Last 24 Hrs  T(C): 36.4 (02 Feb 2023 10:09), Max: 36.6 (01 Feb 2023 18:00)  T(F): 97.5 (02 Feb 2023 10:09), Max: 97.9 (01 Feb 2023 18:00)  HR: 88 (02 Feb 2023 09:10) (80 - 92)  BP: 149/79 (02 Feb 2023 09:10) (133/62 - 168/87)  BP(mean): 89 (02 Feb 2023 05:45) (89 - 100)  RR: 17 (02 Feb 2023 09:10) (17 - 18)  SpO2: 97% (02 Feb 2023 09:10) (97% - 99%)    Parameters below as of 02 Feb 2023 09:10  Patient On (Oxygen Delivery Method): room air        PHYSICAL EXAM:  Constitutional:  Alert  Eyes:  Sclerae anicteric, conjunctivae clear, PERRL  Ear/Nose/Throat:  No nasal exudate or sinus tenderness;  No buccal mucosal lesions, no pharyngeal erythema or exudate	  Neck:  Supple, no adenopathy  Respiratory:  Clear bilaterally  Cardiovascular:  RRR, S1S2, no murmur appreciated  Gastrointestinal:  Symmetric, normoactive BS, soft, NT, no masses, guarding or rebound.  No HSM  Extremities:  No edema.  L lateral foot ulcer ~0.5 cm diam with small tail distally, appears more superficial, small area of surrounding erythema - remains painful mildly improved, foot less warm        LABS:                        11.2   10.57 )-----------( 431      ( 02 Feb 2023 05:30 )             35.3         02-02    137  |  104  |  15  ----------------------------<  110<H>  4.1   |  24  |  0.63    Ca    8.6      02 Feb 2023 05:30  Mg     1.8     02-02    TPro  6.4  /  Alb  3.0<L>  /  TBili  0.6  /  DBili  x   /  AST  13  /  ALT  14  /  AlkPhos  78  02-01          MICROBIOLOGY:    Blood cultures:  1/31 X 2 - NGTD    RADIOLOGY & ADDITIONAL STUDIES:

## 2023-02-02 NOTE — PROGRESS NOTE ADULT - PROBLEM SELECTOR PLAN 1
H/o left foot AVM w/ chronic foot ulcer w/ multiple prior embolizations and a prior OR debridement 10/2022, s/p IV Meropenem/Vanco from 11/07/2022 to 12/29/2022.  - s/p additional direct stick embolization 01/24/2023 w/ Dr. Teran   - ID following post-procedure for co-management    - WBC 12-13, fluctuating throughout this admission. Reported fever 100.5 on 1/31  - D/c'ed Vancomycin and Meropenem 1/31  - Blood Cultures x 2 sets 1/31 - NGTD  - c/w IV Daptomycin 800mg q24h and IV Cefepime 2g q8h  - ID continuing to follow and evaluating wound daily to determine antibiotic durations, continue to appreciate recs   - continued on Hydromorphone 2 mg IV q4h PRN and home Percocet dose PRN  - Pt reports follows closely w/ pain management and have been in contact with NP, will f/u outpt for discharge home regimen  - ordered for Naloxone IV PRN  - Medicine consulted for possible transfer, but rejected H/o left foot AVM w/ chronic foot ulcer w/ multiple prior embolizations and a prior OR debridement 10/2022, s/p IV Meropenem/Vanco from 11/07/2022 to 12/29/2022.  - s/p additional direct stick embolization 01/24/2023 w/ Dr. Teran   - ID following post-procedure for co-management    - WBC 12-13, fluctuating throughout this admission. Reported fever 100.5 on 1/31  - D/c'ed Vancomycin and Meropenem 1/31  - Blood Cultures x 2 sets 1/31 - NGTD  - ID following. Recs appreciated  - c/w IV Daptomycin 800mg q24h and IV Cefepime 2g q8h x 4 wks (2/3-3/3)  - Will need PICC line placement 2/3 (>48hr after last blood culture)  - Weekly labs while on antibiotics: CBC, CMP, ESR, CRP, CPK.  Fax to Dr Rahman at [phone]  - continued on Hydromorphone 2 mg IV q4h PRN and home Percocet dose PRN  - Pt reports follows closely w/ pain management and have been in contact with NP, will f/u outpt for discharge home regimen  - ordered for Naloxone IV PRN  - Medicine consulted for possible transfer, but rejected H/o left foot AVM w/ chronic foot ulcer w/ multiple prior embolizations and a prior OR debridement 10/2022, s/p IV Meropenem/Vanco from 11/07/2022 to 12/29/2022.  - s/p additional direct stick embolization 01/24/2023 w/ Dr. Teran   - ID following post-procedure for co-management    - WBC 12-13, fluctuating throughout this admission. Reported fever 100.5 on 1/31  - D/c'ed Vancomycin and Meropenem 1/31  - Blood Cultures x 2 sets 1/31 - NGTD  - ID following. Recs appreciated  - c/w IV Daptomycin 800mg q24h and IV Cefepime 2g q8h x 4 wks (2/3-3/3)  - Will need PICC line placement 2/3 (>48hr after last blood culture)  - Weekly labs while on antibiotics: CBC, CMP, ESR, CRP, CPK. Fax to Dr Rahman at [phone]  - continued on Hydromorphone 2 mg IV q4h PRN and home Percocet dose PRN  - Pt reports follows closely w/ pain management and have been in contact with NP, will f/u pain management outpt for discharge home regimen  - ordered for Naloxone IV PRN  - Medicine consulted for possible transfer, but rejected

## 2023-02-02 NOTE — PROGRESS NOTE ADULT - PROBLEM SELECTOR PLAN 2
SBP ranging 150's to 190's   - home medication: Bystolic 10mg BID  - Increase Bystolic 5mg BID back to home dose 10mg BID     VTE PPX: Lovenox SQ  Dispo: pending ID recs, antibiotics SBP ranging 140s   - resumed home medication: Bystolic 10mg BID     VTE PPX: Lovenox SQ  Dispo: pending PICC line and home infusion setup

## 2023-02-02 NOTE — PROGRESS NOTE ADULT - ASSESSMENT
37F PMH obesity, HTN, peripheral AVMs, who presented for direct stick embolization of L foot AVM on 1/24/23. Medicine consulted for evaluation for transfer to medicine team / comanagement    #Fevers  #L foot AVM s/p direct stick embolization  #L foot cellulitis  Recently on vanc/meropenem, last doses for both on 1/31, now transitioned to daptomycin and cefepime per ID  - f/u blood cultures  - send baseline CPK and weekly thereafter while on daptomycin  - f/u ID recs  - plan for PICC and prolonged Abx course  - pain management per primary team  - wound care consult for further evaluation of care of L foot AVM    #HTN  - On nebivolol 10 BID at home, during this admission has been on 5 BID as her pain medications were dropping her blood pressures too much. She has been on nebivolol for a few years and was previously on lisinopril-HCTZ for years but woke up one day with angioedema, rash, hives and has been off those meds since then. Pt does not believe she has been on CCB, though she had extensive allergy testing which was reviewed with her at bedside on 2/1/23, no evidence of CCB allergy  - BPs predominantly 140s-160s this hospitalization, though has had some 120s-130s, and as high as 190s  - BP per pt elevated in setting of pain, tends to run lower after pain medications  - BPs better controlled 2/2 with sBP range 130s-140s  Recommend:  - continue to monitor BP and monitor on nebivolol increased back to home dose 10 BID on 2/1   - if still hypertensive would recommend starting CCB such as amlodipine 5mg qD as this is typically preferred over BB therapy for HTN in absence of other cardiac history as is the case with her, especially as her BP fluctuates at home on BB      Rest of care per primary team.

## 2023-02-02 NOTE — PROGRESS NOTE ADULT - ASSESSMENT
36 yo F with HTN, MO and AVM L foot with recurrent L foot ulcer s/p direct stick embolization 1/24.  She still has cellulitis surrounding ulcer - not improving.  No microbiology to guide.  Initially with pan-susceptible Pseudomonas (10/2022), which can become resistant to carbapenems and remain susceptible to cephalosporins.  She has PCN & cipro allergy.  Still with sweats but milder, cellulitis may be less red, WBC lower today  Suggest:  - F/U blood cultures X 2 sets from 1/31  - Continue daptomycin 800 mg IV q24h.    - Continue cefepime 2 g IV q8h  - Would place PICC, set up for 4 week course of antibiotics (2/3-3/3) though I will be in contact with Ms. Kearney at least weekly and as needed to d/c as soon as possible.  - Weekly labs while on antibiotics:   CBC, CMP, ESR, CRP, CPK  Recommendations discussed with primary team.  Please recall if further ID input is desired - team 2. 36 yo F with HTN, MO and AVM L foot with recurrent L foot ulcer s/p direct stick embolization 1/24.  She still has cellulitis surrounding ulcer - not improving.  No microbiology to guide.  Initially with pan-susceptible Pseudomonas (10/2022), which can become resistant to carbapenems and remain susceptible to cephalosporins.  She has PCN & cipro allergy.  Still with sweats but milder, cellulitis may be less red, WBC lower today  Suggest:  - F/U blood cultures X 2 sets from 1/31  - Continue daptomycin 800 mg IV q24h.    - Continue cefepime 2 g IV q8h  - Would place PICC, set up for 4 week course of antibiotics (2/3-3/3) though I will be in contact with Ms. Kearney at least weekly and as needed to d/c as soon as possible.  - Weekly labs while on antibiotics:   CBC, CMP, ESR, CRP, CPK.  Fax to me at [phone]  - I will arrange for outpatient f/u with me  Recommendations discussed with primary team.  Please recall if further ID input is desired - team 2.

## 2023-02-02 NOTE — PROGRESS NOTE ADULT - SUBJECTIVE AND OBJECTIVE BOX
CARDIOLOGY NP PROGRESS NOTE    Subjective:   Remainder ROS otherwise negative.    Overnight Events:     TELEMETRY:    EKG:      VITAL SIGNS:  T(C): 36.8 (02-02-23 @ 14:13), Max: 36.8 (02-02-23 @ 14:13)  HR: 88 (02-02-23 @ 14:21) (80 - 92)  BP: 140/65 (02-02-23 @ 14:21) (133/62 - 168/87)  RR: 17 (02-02-23 @ 14:21) (17 - 18)  SpO2: 97% (02-02-23 @ 14:21) (97% - 99%)  Wt(kg): --    I&O's Summary    01 Feb 2023 07:01  -  02 Feb 2023 07:00  --------------------------------------------------------  IN: 690 mL / OUT: 0 mL / NET: 690 mL    02 Feb 2023 07:01  -  02 Feb 2023 14:55  --------------------------------------------------------  IN: 420 mL / OUT: 0 mL / NET: 420 mL          PHYSICAL EXAM:    General: A/ox 3, No acute Distress  Neck: Supple, NO JVD  Cardiac: S1 S2, No M/R/G  Pulmonary: CTAB, Breathing unlabored, No Rhonchi/Rales/Wheezing  Abdomen: Soft, Non -tender, +BS x 4 quads  Extremities: No Rashes, No edema  Neuro: A/o x 3, No focal deficits          LABS:                          11.2   10.57 )-----------( 431      ( 02 Feb 2023 05:30 )             35.3                              02-02    137  |  104  |  15  ----------------------------<  110<H>  4.1   |  24  |  0.63    Ca    8.6      02 Feb 2023 05:30  Mg     1.8     02-02    TPro  6.4  /  Alb  3.0<L>  /  TBili  0.6  /  DBili  x   /  AST  13  /  ALT  14  /  AlkPhos  78  02-01    LIVER FUNCTIONS - ( 01 Feb 2023 05:30 )  Alb: 3.0 g/dL / Pro: 6.4 g/dL / ALK PHOS: 78 U/L / ALT: 14 U/L / AST: 13 U/L / GGT: x                                   CAPILLARY BLOOD GLUCOSE        CARDIAC MARKERS ( 01 Feb 2023 05:30 )  x     / x     / 25 U/L / x     / x              Allergies:  amoxicillin (Angioedema)  ciprofloxacin (Rash)  hydrochlorothiazide (Hives)  latex (Rash)  lisinopril (Angioedema; Rash; Hives)  morphine (Rash)  penicillin (Rash)    MEDICATIONS  (STANDING):  cefepime   IVPB      cefepime   IVPB 2000 milliGRAM(s) IV Intermittent every 8 hours  chlorhexidine 4% Liquid 1 Application(s) Topical once  DAPTOmycin IVPB 800 milliGRAM(s) IV Intermittent every 24 hours  DAPTOmycin IVPB      enoxaparin Injectable 40 milliGRAM(s) SubCutaneous every 12 hours  nebivolol 10 milliGRAM(s) Oral <User Schedule>    MEDICATIONS  (PRN):  HYDROmorphone  Injectable 2 milliGRAM(s) IV Push every 4 hours PRN Severe Pain (7 - 10)  naloxone Injectable 0.1 milliGRAM(s) IV Push Once PRN For ANY of the following changes in patient status:  A. RR LESS THAN 10 breaths per minute, B. Oxygen saturation LESS THAN 90%, C. Sedation score of 6  ondansetron Injectable 4 milliGRAM(s) IV Push Once PRN Nausea        DIAGNOSTIC TESTS:        CARDIOLOGY NP PROGRESS NOTE    Subjective: Pt seen and examined at bedside. Reports feeling decreased sweats, still has L foot pain. Denies chest pain, sob, lightheadedness, dizziness, palpitations.  Remainder ROS otherwise negative.    Overnight Events: none    TELEMETRY: SR 90s           VITAL SIGNS:  T(C): 36.8 (02-02-23 @ 14:13), Max: 36.8 (02-02-23 @ 14:13)  HR: 88 (02-02-23 @ 14:21) (80 - 92)  BP: 140/65 (02-02-23 @ 14:21) (133/62 - 168/87)  RR: 17 (02-02-23 @ 14:21) (17 - 18)  SpO2: 97% (02-02-23 @ 14:21) (97% - 99%)  Wt(kg): --    I&O's Summary    01 Feb 2023 07:01  -  02 Feb 2023 07:00  --------------------------------------------------------  IN: 690 mL / OUT: 0 mL / NET: 690 mL    02 Feb 2023 07:01  -  02 Feb 2023 14:55  --------------------------------------------------------  IN: 420 mL / OUT: 0 mL / NET: 420 mL          PHYSICAL EXAM:    General: A/ox 3, No acute Distress  Neck: Supple, NO JVD  Cardiac: S1 S2, No M/R/G  Pulmonary: CTAB, Breathing unlabored, No Rhonchi/Rales/Wheezing  Abdomen: Soft, Non -tender, +BS x 4 quads  Extremities: No Rashes, No edema.  L foot Kerlix dressing in place.   Neuro: A/o x 3, No focal deficits            LABS:                          11.2   10.57 )-----------( 431      ( 02 Feb 2023 05:30 )             35.3                              02-02    137  |  104  |  15  ----------------------------<  110<H>  4.1   |  24  |  0.63    Ca    8.6      02 Feb 2023 05:30  Mg     1.8     02-02    TPro  6.4  /  Alb  3.0<L>  /  TBili  0.6  /  DBili  x   /  AST  13  /  ALT  14  /  AlkPhos  78  02-01    LIVER FUNCTIONS - ( 01 Feb 2023 05:30 )  Alb: 3.0 g/dL / Pro: 6.4 g/dL / ALK PHOS: 78 U/L / ALT: 14 U/L / AST: 13 U/L / GGT: x                                   CAPILLARY BLOOD GLUCOSE        CARDIAC MARKERS ( 01 Feb 2023 05:30 )  x     / x     / 25 U/L / x     / x              Allergies:  amoxicillin (Angioedema)  ciprofloxacin (Rash)  hydrochlorothiazide (Hives)  latex (Rash)  lisinopril (Angioedema; Rash; Hives)  morphine (Rash)  penicillin (Rash)    MEDICATIONS  (STANDING):  cefepime   IVPB      cefepime   IVPB 2000 milliGRAM(s) IV Intermittent every 8 hours  chlorhexidine 4% Liquid 1 Application(s) Topical once  DAPTOmycin IVPB 800 milliGRAM(s) IV Intermittent every 24 hours  DAPTOmycin IVPB      enoxaparin Injectable 40 milliGRAM(s) SubCutaneous every 12 hours  nebivolol 10 milliGRAM(s) Oral <User Schedule>    MEDICATIONS  (PRN):  HYDROmorphone  Injectable 2 milliGRAM(s) IV Push every 4 hours PRN Severe Pain (7 - 10)  naloxone Injectable 0.1 milliGRAM(s) IV Push Once PRN For ANY of the following changes in patient status:  A. RR LESS THAN 10 breaths per minute, B. Oxygen saturation LESS THAN 90%, C. Sedation score of 6  ondansetron Injectable 4 milliGRAM(s) IV Push Once PRN Nausea        DIAGNOSTIC TESTS:

## 2023-02-02 NOTE — PROGRESS NOTE ADULT - ASSESSMENT
36 y/o female, morbidly obese, w/ PMHx HTN and left foot AVM w/ chronic left foot ulcer (s/p multiple embolizations, s/p OR debridement 10/2022, and s/p prior antibotic treatment w/ recent treatment from 11/7/2022 to 12/29/2022 w/ Meropenem/Vancomycin and wound closure on 12/23/2022) who presented for additional embolization. Patient s/p direct stick embolization of the left foot 1/24/2023., remains admitted to cardiac tele and on IV Abx per ID recs. 36 y/o female, morbidly obese, w/ PMHx HTN and left foot AVM w/ chronic left foot ulcer (s/p multiple embolizations, s/p OR debridement 10/2022, and s/p prior antibotic treatment w/ recent treatment from 11/7/2022 to 12/29/2022 w/ Meropenem/Vancomycin and wound closure on 12/23/2022) who presented for additional embolization. Patient s/p direct stick embolization of the left foot 1/24/2023. Admitted to cardiac tele and currently on IV Abx x 4 wks per ID recs. Awaiting PICC placement 2/3.

## 2023-02-02 NOTE — PROGRESS NOTE ADULT - ATTENDING COMMENTS
Pt seen and examined with Dr. Connors     Pt reports L heel pain better, WBC normalized 13.72K-> 10.57K,baseline CK 25    - c/w Daptomycin and Cefepime iv per ID Dr. Rahman, pt will need PICC line placement for prolonged iv abx on Daptomycin and Cefepime for 4 weeks ( 2/3-3/3)  - WOCN consult for superficial wound over L heel     Med consult team continues to follow, thank you.

## 2023-02-02 NOTE — PROGRESS NOTE ADULT - SUBJECTIVE AND OBJECTIVE BOX
INTERVAL HPI/OVERNIGHT EVENTS:  O/N: no acute events noted.  This morning: Patient was seen and examined at bedside. Feeling well, no complaints.     VITAL SIGNS:  T(F): 98.3 (02-02-23 @ 14:13)  HR: 88 (02-02-23 @ 14:21)  BP: 140/65 (02-02-23 @ 14:21)  RR: 17 (02-02-23 @ 14:21)  SpO2: 97% (02-02-23 @ 14:21)  Wt(kg): --    PHYSICAL EXAM:    Constitutional: resting comfortably in bed; NAD  HEENT: NC/AT, PERRL, EOMI, anicteric sclera, no nasal discharge   Respiratory: breathing comfortably on RA  Extremities: recently wrapped L foot AVM, reviewed recent imaging again with patient showing small erythematous area of skin around AVM at lateral aspect of L heel w/ no active purulence/drainage, similar compared to prior  Neurologic: alert and oriented, no focal deficits noted    MEDICATIONS  (STANDING):  cefepime   IVPB      cefepime   IVPB 2000 milliGRAM(s) IV Intermittent every 8 hours  chlorhexidine 4% Liquid 1 Application(s) Topical once  DAPTOmycin IVPB 800 milliGRAM(s) IV Intermittent every 24 hours  DAPTOmycin IVPB      enoxaparin Injectable 40 milliGRAM(s) SubCutaneous every 12 hours  nebivolol 10 milliGRAM(s) Oral <User Schedule>    MEDICATIONS  (PRN):  HYDROmorphone  Injectable 2 milliGRAM(s) IV Push every 4 hours PRN Severe Pain (7 - 10)  naloxone Injectable 0.1 milliGRAM(s) IV Push Once PRN For ANY of the following changes in patient status:  A. RR LESS THAN 10 breaths per minute, B. Oxygen saturation LESS THAN 90%, C. Sedation score of 6  ondansetron Injectable 4 milliGRAM(s) IV Push Once PRN Nausea      Allergies    amoxicillin (Angioedema)  ciprofloxacin (Rash)  hydrochlorothiazide (Hives)  latex (Rash)  lisinopril (Angioedema; Rash; Hives)  morphine (Rash)  penicillin (Rash)    Intolerances        LABS:                        11.2   10.57 )-----------( 431      ( 02 Feb 2023 05:30 )             35.3     02-02    137  |  104  |  15  ----------------------------<  110<H>  4.1   |  24  |  0.63    Ca    8.6      02 Feb 2023 05:30  Mg     1.8     02-02    TPro  6.4  /  Alb  3.0<L>  /  TBili  0.6  /  DBili  x   /  AST  13  /  ALT  14  /  AlkPhos  78  02-01                RADIOLOGY & ADDITIONAL TESTS:  Reviewed

## 2023-02-03 ENCOUNTER — TRANSCRIPTION ENCOUNTER (OUTPATIENT)
Age: 38
End: 2023-02-03

## 2023-02-03 LAB
ANION GAP SERPL CALC-SCNC: 11 MMOL/L — SIGNIFICANT CHANGE UP (ref 5–17)
BASOPHILS # BLD AUTO: 0.06 K/UL — SIGNIFICANT CHANGE UP (ref 0–0.2)
BASOPHILS NFR BLD AUTO: 0.5 % — SIGNIFICANT CHANGE UP (ref 0–2)
BUN SERPL-MCNC: 15 MG/DL — SIGNIFICANT CHANGE UP (ref 7–23)
CALCIUM SERPL-MCNC: 9 MG/DL — SIGNIFICANT CHANGE UP (ref 8.4–10.5)
CHLORIDE SERPL-SCNC: 102 MMOL/L — SIGNIFICANT CHANGE UP (ref 96–108)
CO2 SERPL-SCNC: 25 MMOL/L — SIGNIFICANT CHANGE UP (ref 22–31)
CREAT SERPL-MCNC: 0.62 MG/DL — SIGNIFICANT CHANGE UP (ref 0.5–1.3)
EGFR: 116 ML/MIN/1.73M2 — SIGNIFICANT CHANGE UP
EOSINOPHIL # BLD AUTO: 0.23 K/UL — SIGNIFICANT CHANGE UP (ref 0–0.5)
EOSINOPHIL NFR BLD AUTO: 1.9 % — SIGNIFICANT CHANGE UP (ref 0–6)
GLUCOSE SERPL-MCNC: 95 MG/DL — SIGNIFICANT CHANGE UP (ref 70–99)
HCT VFR BLD CALC: 33.8 % — LOW (ref 34.5–45)
HGB BLD-MCNC: 10.7 G/DL — LOW (ref 11.5–15.5)
IMM GRANULOCYTES NFR BLD AUTO: 0.4 % — SIGNIFICANT CHANGE UP (ref 0–0.9)
LYMPHOCYTES # BLD AUTO: 1.87 K/UL — SIGNIFICANT CHANGE UP (ref 1–3.3)
LYMPHOCYTES # BLD AUTO: 15.3 % — SIGNIFICANT CHANGE UP (ref 13–44)
MAGNESIUM SERPL-MCNC: 1.8 MG/DL — SIGNIFICANT CHANGE UP (ref 1.6–2.6)
MCHC RBC-ENTMCNC: 28.2 PG — SIGNIFICANT CHANGE UP (ref 27–34)
MCHC RBC-ENTMCNC: 31.7 GM/DL — LOW (ref 32–36)
MCV RBC AUTO: 88.9 FL — SIGNIFICANT CHANGE UP (ref 80–100)
MONOCYTES # BLD AUTO: 0.86 K/UL — SIGNIFICANT CHANGE UP (ref 0–0.9)
MONOCYTES NFR BLD AUTO: 7 % — SIGNIFICANT CHANGE UP (ref 2–14)
NEUTROPHILS # BLD AUTO: 9.15 K/UL — HIGH (ref 1.8–7.4)
NEUTROPHILS NFR BLD AUTO: 74.9 % — SIGNIFICANT CHANGE UP (ref 43–77)
NRBC # BLD: 0 /100 WBCS — SIGNIFICANT CHANGE UP (ref 0–0)
PLATELET # BLD AUTO: 456 K/UL — HIGH (ref 150–400)
POTASSIUM SERPL-MCNC: 4.4 MMOL/L — SIGNIFICANT CHANGE UP (ref 3.5–5.3)
POTASSIUM SERPL-SCNC: 4.4 MMOL/L — SIGNIFICANT CHANGE UP (ref 3.5–5.3)
RBC # BLD: 3.8 M/UL — SIGNIFICANT CHANGE UP (ref 3.8–5.2)
RBC # FLD: 13 % — SIGNIFICANT CHANGE UP (ref 10.3–14.5)
SODIUM SERPL-SCNC: 138 MMOL/L — SIGNIFICANT CHANGE UP (ref 135–145)
WBC # BLD: 12.22 K/UL — HIGH (ref 3.8–10.5)
WBC # FLD AUTO: 12.22 K/UL — HIGH (ref 3.8–10.5)

## 2023-02-03 PROCEDURE — 76937 US GUIDE VASCULAR ACCESS: CPT | Mod: 26,59

## 2023-02-03 PROCEDURE — 36569 INSJ PICC 5 YR+ W/O IMAGING: CPT

## 2023-02-03 PROCEDURE — 99232 SBSQ HOSP IP/OBS MODERATE 35: CPT

## 2023-02-03 RX ORDER — SODIUM CHLORIDE 9 MG/ML
10 INJECTION INTRAMUSCULAR; INTRAVENOUS; SUBCUTANEOUS
Refills: 0 | Status: DISCONTINUED | OUTPATIENT
Start: 2023-02-03 | End: 2023-02-07

## 2023-02-03 RX ORDER — CHLORHEXIDINE GLUCONATE 213 G/1000ML
1 SOLUTION TOPICAL
Refills: 0 | Status: DISCONTINUED | OUTPATIENT
Start: 2023-02-03 | End: 2023-02-07

## 2023-02-03 RX ADMIN — HYDROMORPHONE HYDROCHLORIDE 2 MILLIGRAM(S): 2 INJECTION INTRAMUSCULAR; INTRAVENOUS; SUBCUTANEOUS at 18:45

## 2023-02-03 RX ADMIN — NEBIVOLOL HYDROCHLORIDE 10 MILLIGRAM(S): 5 TABLET ORAL at 06:01

## 2023-02-03 RX ADMIN — CEFEPIME 100 MILLIGRAM(S): 1 INJECTION, POWDER, FOR SOLUTION INTRAMUSCULAR; INTRAVENOUS at 01:53

## 2023-02-03 RX ADMIN — DAPTOMYCIN 132 MILLIGRAM(S): 500 INJECTION, POWDER, LYOPHILIZED, FOR SOLUTION INTRAVENOUS at 18:32

## 2023-02-03 RX ADMIN — HYDROMORPHONE HYDROCHLORIDE 2 MILLIGRAM(S): 2 INJECTION INTRAMUSCULAR; INTRAVENOUS; SUBCUTANEOUS at 23:11

## 2023-02-03 RX ADMIN — ENOXAPARIN SODIUM 40 MILLIGRAM(S): 100 INJECTION SUBCUTANEOUS at 18:32

## 2023-02-03 RX ADMIN — HYDROMORPHONE HYDROCHLORIDE 2 MILLIGRAM(S): 2 INJECTION INTRAMUSCULAR; INTRAVENOUS; SUBCUTANEOUS at 22:56

## 2023-02-03 RX ADMIN — HYDROMORPHONE HYDROCHLORIDE 2 MILLIGRAM(S): 2 INJECTION INTRAMUSCULAR; INTRAVENOUS; SUBCUTANEOUS at 18:31

## 2023-02-03 RX ADMIN — HYDROMORPHONE HYDROCHLORIDE 2 MILLIGRAM(S): 2 INJECTION INTRAMUSCULAR; INTRAVENOUS; SUBCUTANEOUS at 10:25

## 2023-02-03 RX ADMIN — HYDROMORPHONE HYDROCHLORIDE 2 MILLIGRAM(S): 2 INJECTION INTRAMUSCULAR; INTRAVENOUS; SUBCUTANEOUS at 01:53

## 2023-02-03 RX ADMIN — HYDROMORPHONE HYDROCHLORIDE 2 MILLIGRAM(S): 2 INJECTION INTRAMUSCULAR; INTRAVENOUS; SUBCUTANEOUS at 14:29

## 2023-02-03 RX ADMIN — CEFEPIME 100 MILLIGRAM(S): 1 INJECTION, POWDER, FOR SOLUTION INTRAMUSCULAR; INTRAVENOUS at 10:25

## 2023-02-03 RX ADMIN — HYDROMORPHONE HYDROCHLORIDE 2 MILLIGRAM(S): 2 INJECTION INTRAMUSCULAR; INTRAVENOUS; SUBCUTANEOUS at 02:08

## 2023-02-03 RX ADMIN — HYDROMORPHONE HYDROCHLORIDE 2 MILLIGRAM(S): 2 INJECTION INTRAMUSCULAR; INTRAVENOUS; SUBCUTANEOUS at 14:45

## 2023-02-03 RX ADMIN — HYDROMORPHONE HYDROCHLORIDE 2 MILLIGRAM(S): 2 INJECTION INTRAMUSCULAR; INTRAVENOUS; SUBCUTANEOUS at 06:02

## 2023-02-03 RX ADMIN — NEBIVOLOL HYDROCHLORIDE 10 MILLIGRAM(S): 5 TABLET ORAL at 18:31

## 2023-02-03 RX ADMIN — ENOXAPARIN SODIUM 40 MILLIGRAM(S): 100 INJECTION SUBCUTANEOUS at 06:04

## 2023-02-03 RX ADMIN — HYDROMORPHONE HYDROCHLORIDE 2 MILLIGRAM(S): 2 INJECTION INTRAMUSCULAR; INTRAVENOUS; SUBCUTANEOUS at 10:45

## 2023-02-03 RX ADMIN — HYDROMORPHONE HYDROCHLORIDE 2 MILLIGRAM(S): 2 INJECTION INTRAMUSCULAR; INTRAVENOUS; SUBCUTANEOUS at 06:17

## 2023-02-03 RX ADMIN — CEFEPIME 100 MILLIGRAM(S): 1 INJECTION, POWDER, FOR SOLUTION INTRAMUSCULAR; INTRAVENOUS at 18:32

## 2023-02-03 NOTE — PROGRESS NOTE ADULT - PROBLEM SELECTOR PLAN 1
H/o left foot AVM w/ chronic foot ulcer w/ multiple prior embolizations and a prior OR debridement 10/2022, s/p IV Meropenem/Vanco from 11/07/2022 to 12/29/2022.  - s/p additional direct stick embolization 01/24/2023 w/ Dr. Teran   - ID following post-procedure for co-management    - WBC 12-13, fluctuating throughout this admission. Reported fever 100.5 on 1/31  - D/c'ed Vancomycin and Meropenem 1/31  - Blood Cultures x 2 sets 1/31 - NGTD  - ID following. Recs appreciated  - c/w IV Daptomycin 800mg q24h and IV Cefepime 2g q8h x 4 wks (2/3-3/3)  - Will need PICC line placement 2/3 (>48hr after last blood culture)  - Weekly labs while on antibiotics: CBC, CMP, ESR, CRP, CPK. Fax to Dr Rahman at [phone]  - continued on Hydromorphone 2 mg IV q4h PRN and home Percocet dose PRN  - Pt reports follows closely w/ pain management and have been in contact with NP, will f/u pain management outpt for discharge home regimen  - ordered for Naloxone IV PRN  - Medicine consulted for possible transfer, but rejected H/o left foot AVM w/ chronic foot ulcer w/ multiple prior embolizations and a prior OR debridement 10/2022, s/p IV Meropenem/Vanco from 11/07/2022 to 12/29/2022.   - s/p additional direct stick embolization 01/24/2023 w/ Dr. Teran   - ID following post-procedure for co-management    - WBC 12-13, fluctuating throughout this admission. Reported fever 100.5 on 1/31  - D/c'ed Vancomycin and Meropenem 1/31  - Blood Cultures x 2 sets 1/31 - NGTD  - ID following. Recs appreciated  - c/w IV Daptomycin 800mg q24h and IV Cefepime 2g q8h x 4 wks (2/3-3/3)  - Will need PICC line placement 2/3 (>48hr after last blood culture)  - Weekly labs while on antibiotics: CBC, CMP, ESR, CRP, CPK. Fax to Dr Rahman at [phone]  - continued on Hydromorphone 2 mg IV q4h PRN and home Percocet dose PRN  - Pt reports follows closely w/ pain management and have been in contact with NP, will f/u pain management outpt for discharge home regimen  - ordered for Naloxone IV PRN  - Medicine consulted for possible transfer, but rejected H/o left foot AVM w/ chronic foot ulcer w/ multiple prior embolizations and a prior OR debridement 10/2022, s/p IV Meropenem/Vanco from 11/07/2022 to 12/29/2022.   - s/p additional direct stick embolization 01/24/2023 w/ Dr. Teran   - ID following post-procedure for co-management    - WBC 12-13, fluctuating throughout this admission. Reported fever 100.5 F on 1/31. Patient afebrile.   - D/c'ed Vancomycin and Meropenem 1/31  - Blood Cultures x 2 sets 1/31 - NGTD  - ID following. Recs appreciated  - c/w IV Daptomycin 800mg q 24h and IV Cefepime 2g q 8h x 4 wks (2/3-3/3)  - s/p PICC line placement 2/3/23 (>48hr after last blood culture)  - Weekly labs while on antibiotics: CBC, CMP, ESR, CRP, CPK. Fax to Dr Rahman at [phone]  - continued on Hydromorphone 2 mg IV q4h PRN and home Percocet dose PRN  - Pt reports follows closely w/ pain management and have been in contact with NP, will f/u pain management outpt for discharge home regimen  - ordered for Naloxone IV PRN  - Medicine consulted for possible transfer, but rejected

## 2023-02-03 NOTE — PROGRESS NOTE ADULT - PROBLEM SELECTOR PLAN 2
SBP ranging 140s   - resumed home medication: Bystolic 10mg BID     VTE PPX: Lovenox SQ  Dispo: pending PICC line and home infusion setup SBP ranging 140s   - resumed home medication: Bystolic 10mg BID     VTE PPX: Lovenox SQ    Dispo: Complex discharge planning- Patient would have an out of pocket cost of ~ $1000 a week after what insurance covers for ABX and supplies. SW and Case management provided patient with option of going to [facility] as she has Medicare which will cover 20 days at 100% at a [facility]. Patient wants to discuss with family and provided list of choices by CM and SW.

## 2023-02-03 NOTE — PROGRESS NOTE ADULT - ASSESSMENT
38 y/o female, morbidly obese, w/ PMHx HTN and left foot AVM w/ chronic left foot ulcer (s/p multiple embolizations, s/p OR debridement 10/2022, and s/p prior antibotic treatment w/ recent treatment from 11/7/2022 to 12/29/2022 w/ Meropenem/Vancomycin and wound closure on 12/23/2022) who presented for additional embolization. Patient s/p direct stick embolization of the left foot 1/24/2023. Admitted to cardiac tele and currently on IV Abx x 4 wks per ID recs. Awaiting PICC placement 2/3. 38 y/o female, morbidly obese, w/ PMHx HTN and left foot AVM w/ chronic left foot ulcer (s/p multiple embolizations, s/p OR debridement 10/2022, and s/p prior antibotic treatment w/ recent treatment from 11/7/2022 to 12/29/2022 w/ Meropenem/Vancomycin and wound closure on 12/23/2022) who presented for additional embolization. Patient s/p direct stick embolization of the left foot 1/24/2023. Admitted to cardiac tele and currently on IV Abx x 4 wks per ID recs. S/P PICC placement 2/3/23. Dispo planning in progress

## 2023-02-03 NOTE — PROGRESS NOTE ADULT - SUBJECTIVE AND OBJECTIVE BOX
INTERVAL HPI/OVERNIGHT EVENTS:  O/N: no acute events noted  This morning: Patient was seen and examined at bedside. Feeling well, pain about the same ~7/10 (reports is due for meds, gets better to ~3 after pain meds). Feels well otherwise.    VITAL SIGNS:  T(F): 97 (02-03-23 @ 13:47)  HR: 80 (02-03-23 @ 12:44)  BP: 170/87 (02-03-23 @ 12:44)  RR: 18 (02-03-23 @ 12:44)  SpO2: 100% (02-03-23 @ 12:44)  Wt(kg): --    PHYSICAL EXAM:    Constitutional: resting comfortably in bed; NAD  HEENT: NC/AT, PERRL, EOMI, anicteric sclera, no nasal discharge   Respiratory: breathing comfortably on RA  Extremities: recently wrapped L foot AVM, reviewed recent imaging again with patient showing small erythematous area of skin around AVM at lateral aspect of L heel w/ no active purulence/drainage, similar compared to prior  Neurologic: alert and oriented, no focal deficits noted    MEDICATIONS  (STANDING):  cefepime   IVPB      cefepime   IVPB 2000 milliGRAM(s) IV Intermittent every 8 hours  chlorhexidine 2% Cloths 1 Application(s) Topical <User Schedule>  chlorhexidine 4% Liquid 1 Application(s) Topical once  DAPTOmycin IVPB      DAPTOmycin IVPB 800 milliGRAM(s) IV Intermittent every 24 hours  enoxaparin Injectable 40 milliGRAM(s) SubCutaneous every 12 hours  nebivolol 10 milliGRAM(s) Oral <User Schedule>    MEDICATIONS  (PRN):  HYDROmorphone  Injectable 2 milliGRAM(s) IV Push every 4 hours PRN Severe Pain (7 - 10)  naloxone Injectable 0.1 milliGRAM(s) IV Push Once PRN For ANY of the following changes in patient status:  A. RR LESS THAN 10 breaths per minute, B. Oxygen saturation LESS THAN 90%, C. Sedation score of 6  ondansetron Injectable 4 milliGRAM(s) IV Push Once PRN Nausea  sodium chloride 0.9% lock flush 10 milliLiter(s) IV Push every 1 hour PRN Pre/post blood products, medications, blood draw, and to maintain line patency      Allergies    amoxicillin (Angioedema)  ciprofloxacin (Rash)  hydrochlorothiazide (Hives)  latex (Rash)  lisinopril (Angioedema; Rash; Hives)  morphine (Rash)  penicillin (Rash)    Intolerances        LABS:                        10.7   12.22 )-----------( 456      ( 03 Feb 2023 07:57 )             33.8     02-03    138  |  102  |  15  ----------------------------<  95  4.4   |  25  |  0.62    Ca    9.0      03 Feb 2023 07:57  Mg     1.8     02-03                  RADIOLOGY & ADDITIONAL TESTS:  Reviewed

## 2023-02-03 NOTE — PROGRESS NOTE ADULT - ASSESSMENT
37F PMH obesity, HTN, peripheral AVMs, who presented for direct stick embolization of L foot AVM on 1/24/23. Medicine following for comanagement    #Fevers  #L foot AVM s/p direct stick embolization  #L foot cellulitis  Recently on vanc/meropenem, last doses for both on 1/31, now transitioned to daptomycin and cefepime per ID, and PICC placed 2/3/23.  - f/u blood cultures  - baseline CPK 25, monitor weekly thereafter while on daptomycin  - f/u ID recs  - pain management per primary team  - wound care consult for further evaluation of care of L foot AVM    #HTN  - On nebivolol 10 BID at home, during this admission has been on 5 BID as her pain medications were dropping her blood pressures too much. She has been on nebivolol for a few years and was previously on lisinopril-HCTZ for years but woke up one day with angioedema, rash, hives and has been off those meds since then. Pt does not believe she has been on CCB, though she had extensive allergy testing which was reviewed with her at bedside on 2/1/23, no evidence of CCB allergy  - BPs predominantly 140s-160s this hospitalization, though has had some 120s-130s, and as high as 190s  - BP per pt elevated in setting of pain, tends to run lower after pain medications  - BPs better controlled since being on nebivolol 10 BID  Recommend:  - continue to monitor BP and monitor on nebivolol increased back to home dose 10 BID on 2/1   - if still hypertensive would recommend starting CCB such as amlodipine 5mg qD as this is typically preferred over BB therapy for HTN in absence of other cardiac history as is the case with her, especially as her BP fluctuates at home on BB      Rest of care per primary team.

## 2023-02-03 NOTE — PROCEDURE NOTE - NSPROCDETAILS_GEN_ALL_CORE
location identified, draped/prepped, sterile technique used/sterile dressing applied/sterile technique, catheter placed/supine position
ultrasound-guidance/location identified, draped/prepped, sterile technique used/blood seen on insertion/dressing applied/flushes easily/secured in place/sterile technique, catheter placed
location identified, draped/prepped, sterile technique used/blood seen on insertion/dressing applied/flushes easily/secured in place/sterile technique, catheter placed

## 2023-02-03 NOTE — PROGRESS NOTE ADULT - SUBJECTIVE AND OBJECTIVE BOX
Interventional Cardiology PA Adult Progress Note    Subjective Assessment: Patient seen and examined at bedside and continues to c/o left foot pain. Patient denies fever, chills, diarrhea, N/V, abdominal pain.    ROS otherwise negative unless otherwise stated except per HPI and SUbjective  	  MEDICATIONS:  nebivolol 10 milliGRAM(s) Oral <User Schedule>  cefepime   IVPB      cefepime   IVPB 2000 milliGRAM(s) IV Intermittent every 8 hours  DAPTOmycin IVPB      DAPTOmycin IVPB 800 milliGRAM(s) IV Intermittent every 24 hours  HYDROmorphone  Injectable 2 milliGRAM(s) IV Push every 4 hours PRN  ondansetron Injectable 4 milliGRAM(s) IV Push Once PRN  chlorhexidine 2% Cloths 1 Application(s) Topical <User Schedule>  chlorhexidine 4% Liquid 1 Application(s) Topical once  enoxaparin Injectable 40 milliGRAM(s) SubCutaneous every 12 hours  sodium chloride 0.9% lock flush 10 milliLiter(s) IV Push every 1 hour PRN    [PHYSICAL EXAM:  TELEMETRY:  T(C): 36.6 (02-03-23 @ 18:07), Max: 36.6 (02-03-23 @ 18:07)  HR: 80 (02-03-23 @ 12:44) (79 - 89)  BP: 170/87 (02-03-23 @ 12:44) (145/76 - 170/87)  RR: 18 (02-03-23 @ 12:44) (18 - 18)  SpO2: 100% (02-03-23 @ 12:44) (96% - 100%)  Wt(kg): --  I&O's Summary    02 Feb 2023 07:01  -  03 Feb 2023 07:00  --------------------------------------------------------  IN: 420 mL / OUT: 1 mL / NET: 419 mL    03 Feb 2023 07:01  -  03 Feb 2023 18:31  --------------------------------------------------------  IN: 480 mL / OUT: 0 mL / NET: 480 mL    Robles: No  Central/PICC/Mid Line:   No                                      PHYSICAL EXAM:    General: A/ox 3, No acute Distress  Neck: Supple, NO JVD  Cardiac: S1 S2, No M/R/G  Pulmonary: CTAB, Breathing unlabored, No Rhonchi/Rales/Wheezing  Abdomen: Soft, Non -tender, +BS x 4 quads  Extremities: No Rashes, No edema.  L foot Kerlix dressing in place.   Neuro: A/o x 3, No focal deficits    LABS:	 	  CARDIAC MARKERS:                      10.7   12.22 )-----------( 456      ( 03 Feb 2023 07:57 )             33.8     02-03    138  |  102  |  15  ----------------------------<  95  4.4   |  25  |  0.62    Ca    9.0      03 Feb 2023 07:57  Mg     1.8     02-03      ASSESSMENT/PLAN: 	           Interventional Cardiology PA Adult Progress Note    Subjective Assessment: Patient seen and examined at bedside and continues to c/o left foot pain. Patient denies fever, chills, diarrhea, N/V, abdominal pain, C/P, palpitations, dizziness.    ROS otherwise negative unless otherwise stated except per HPI and SUbjective  	  MEDICATIONS:  nebivolol 10 milliGRAM(s) Oral <User Schedule>  cefepime   IVPB      cefepime   IVPB 2000 milliGRAM(s) IV Intermittent every 8 hours  DAPTOmycin IVPB      DAPTOmycin IVPB 800 milliGRAM(s) IV Intermittent every 24 hours  HYDROmorphone  Injectable 2 milliGRAM(s) IV Push every 4 hours PRN  ondansetron Injectable 4 milliGRAM(s) IV Push Once PRN  chlorhexidine 2% Cloths 1 Application(s) Topical <User Schedule>  chlorhexidine 4% Liquid 1 Application(s) Topical once  enoxaparin Injectable 40 milliGRAM(s) SubCutaneous every 12 hours  sodium chloride 0.9% lock flush 10 milliLiter(s) IV Push every 1 hour PRN    [PHYSICAL EXAM:  TELEMETRY:  T(C): 36.6 (02-03-23 @ 18:07), Max: 36.6 (02-03-23 @ 18:07)  HR: 80 (02-03-23 @ 12:44) (79 - 89)  BP: 170/87 (02-03-23 @ 12:44) (145/76 - 170/87)  RR: 18 (02-03-23 @ 12:44) (18 - 18)  SpO2: 100% (02-03-23 @ 12:44) (96% - 100%)  Wt(kg): --  I&O's Summary    02 Feb 2023 07:01  -  03 Feb 2023 07:00  --------------------------------------------------------  IN: 420 mL / OUT: 1 mL / NET: 419 mL    03 Feb 2023 07:01  -  03 Feb 2023 18:31  --------------------------------------------------------  IN: 480 mL / OUT: 0 mL / NET: 480 mL    Robles: No  Central/PICC/Mid Line:   No                                      PHYSICAL EXAM:    General: A/ox 3, No acute Distress  Neck: Supple, NO JVD  Cardiac: S1 S2, No M/R/G  Pulmonary: CTAB, Breathing unlabored, No Rhonchi/Rales/Wheezing  Abdomen: Soft, Non -tender, +BS x 4 quads  Extremities: No Rashes, No edema.  L foot Kerlix dressing in place.   Neuro: A/o x 3, No focal deficits    LABS:	 	  CARDIAC MARKERS:                      10.7   12.22 )-----------( 456      ( 03 Feb 2023 07:57 )             33.8     02-03    138  |  102  |  15  ----------------------------<  95  4.4   |  25  |  0.62    Ca    9.0      03 Feb 2023 07:57  Mg     1.8     02-03      ASSESSMENT/PLAN:

## 2023-02-03 NOTE — PROGRESS NOTE ADULT - ATTENDING COMMENTS
Pt seen and examined with Dr. Connors     PE: L heel appearing less erythematous compared to yesterday form pt's iphone picture     Pt reports L heel pain better, WBC 13.72K-> 10.57K-> 12.22K today  baseline CK 25    - c/w Daptomycin and Cefepime iv per ID Dr. Rahman, s/p PICC line placement(2/3) for prolonged iv abx on Daptomycin and Cefepime for 4 weeks ( 2/3-3/3), weekly labs as delineated at 's note   - WOCN consult for superficial wound over L heel   - SW to arrange home infusion for iv abx( Please make sure it's in network with pt's insurance, pt reports he was given a bill for 7000 dollars from infusion compancy which she is a process of appeal)     Med consult team continues to follow, thank you. Pt seen and examined with Dr. Connors     PE: L heel appearing less erythematous compared to yesterday form pt's iphone picture     Pt reports L heel pain better, WBC 13.72K-> 10.57K-> 12.22K today  baseline CK 25    - c/w Daptomycin and Cefepime iv per ID Dr. Rahman, s/p PICC line placement(2/3) for prolonged iv abx on Daptomycin and Cefepime for 4 weeks ( 2/3-3/3), weekly labs as delineated at 's note   - WOCN consult for superficial wound over L heel   - SW to arrange home infusion for iv abx( Please make sure it's in network with pt's insurance, pt reports she was given a bill for 7000 dollars from infusion compancy which she is a process of appeal)     Med consult team continues to follow, thank you.

## 2023-02-04 LAB
ALBUMIN SERPL ELPH-MCNC: 3.2 G/DL — LOW (ref 3.3–5)
ALP SERPL-CCNC: 92 U/L — SIGNIFICANT CHANGE UP (ref 40–120)
ALT FLD-CCNC: 13 U/L — SIGNIFICANT CHANGE UP (ref 10–45)
ANION GAP SERPL CALC-SCNC: 10 MMOL/L — SIGNIFICANT CHANGE UP (ref 5–17)
AST SERPL-CCNC: 13 U/L — SIGNIFICANT CHANGE UP (ref 10–40)
BASOPHILS # BLD AUTO: 0.06 K/UL — SIGNIFICANT CHANGE UP (ref 0–0.2)
BASOPHILS NFR BLD AUTO: 0.6 % — SIGNIFICANT CHANGE UP (ref 0–2)
BILIRUB SERPL-MCNC: 0.3 MG/DL — SIGNIFICANT CHANGE UP (ref 0.2–1.2)
BUN SERPL-MCNC: 14 MG/DL — SIGNIFICANT CHANGE UP (ref 7–23)
CALCIUM SERPL-MCNC: 8.9 MG/DL — SIGNIFICANT CHANGE UP (ref 8.4–10.5)
CHLORIDE SERPL-SCNC: 103 MMOL/L — SIGNIFICANT CHANGE UP (ref 96–108)
CO2 SERPL-SCNC: 25 MMOL/L — SIGNIFICANT CHANGE UP (ref 22–31)
CREAT SERPL-MCNC: 0.59 MG/DL — SIGNIFICANT CHANGE UP (ref 0.5–1.3)
EGFR: 118 ML/MIN/1.73M2 — SIGNIFICANT CHANGE UP
EOSINOPHIL # BLD AUTO: 0.2 K/UL — SIGNIFICANT CHANGE UP (ref 0–0.5)
EOSINOPHIL NFR BLD AUTO: 1.9 % — SIGNIFICANT CHANGE UP (ref 0–6)
GLUCOSE SERPL-MCNC: 99 MG/DL — SIGNIFICANT CHANGE UP (ref 70–99)
HCT VFR BLD CALC: 31.5 % — LOW (ref 34.5–45)
HGB BLD-MCNC: 10.2 G/DL — LOW (ref 11.5–15.5)
IMM GRANULOCYTES NFR BLD AUTO: 0.6 % — SIGNIFICANT CHANGE UP (ref 0–0.9)
LYMPHOCYTES # BLD AUTO: 1.88 K/UL — SIGNIFICANT CHANGE UP (ref 1–3.3)
LYMPHOCYTES # BLD AUTO: 18.1 % — SIGNIFICANT CHANGE UP (ref 13–44)
MAGNESIUM SERPL-MCNC: 1.8 MG/DL — SIGNIFICANT CHANGE UP (ref 1.6–2.6)
MCHC RBC-ENTMCNC: 28 PG — SIGNIFICANT CHANGE UP (ref 27–34)
MCHC RBC-ENTMCNC: 32.4 GM/DL — SIGNIFICANT CHANGE UP (ref 32–36)
MCV RBC AUTO: 86.5 FL — SIGNIFICANT CHANGE UP (ref 80–100)
MONOCYTES # BLD AUTO: 0.83 K/UL — SIGNIFICANT CHANGE UP (ref 0–0.9)
MONOCYTES NFR BLD AUTO: 8 % — SIGNIFICANT CHANGE UP (ref 2–14)
NEUTROPHILS # BLD AUTO: 7.34 K/UL — SIGNIFICANT CHANGE UP (ref 1.8–7.4)
NEUTROPHILS NFR BLD AUTO: 70.8 % — SIGNIFICANT CHANGE UP (ref 43–77)
NRBC # BLD: 0 /100 WBCS — SIGNIFICANT CHANGE UP (ref 0–0)
PLATELET # BLD AUTO: 438 K/UL — HIGH (ref 150–400)
POTASSIUM SERPL-MCNC: 4.2 MMOL/L — SIGNIFICANT CHANGE UP (ref 3.5–5.3)
POTASSIUM SERPL-SCNC: 4.2 MMOL/L — SIGNIFICANT CHANGE UP (ref 3.5–5.3)
PROT SERPL-MCNC: 6.7 G/DL — SIGNIFICANT CHANGE UP (ref 6–8.3)
RBC # BLD: 3.64 M/UL — LOW (ref 3.8–5.2)
RBC # FLD: 13 % — SIGNIFICANT CHANGE UP (ref 10.3–14.5)
SODIUM SERPL-SCNC: 138 MMOL/L — SIGNIFICANT CHANGE UP (ref 135–145)
WBC # BLD: 10.37 K/UL — SIGNIFICANT CHANGE UP (ref 3.8–10.5)
WBC # FLD AUTO: 10.37 K/UL — SIGNIFICANT CHANGE UP (ref 3.8–10.5)

## 2023-02-04 PROCEDURE — 99232 SBSQ HOSP IP/OBS MODERATE 35: CPT | Mod: GC

## 2023-02-04 RX ORDER — MAGNESIUM OXIDE 400 MG ORAL TABLET 241.3 MG
800 TABLET ORAL ONCE
Refills: 0 | Status: COMPLETED | OUTPATIENT
Start: 2023-02-04 | End: 2023-02-04

## 2023-02-04 RX ADMIN — DAPTOMYCIN 132 MILLIGRAM(S): 500 INJECTION, POWDER, LYOPHILIZED, FOR SOLUTION INTRAVENOUS at 20:26

## 2023-02-04 RX ADMIN — MAGNESIUM OXIDE 400 MG ORAL TABLET 800 MILLIGRAM(S): 241.3 TABLET ORAL at 10:22

## 2023-02-04 RX ADMIN — HYDROMORPHONE HYDROCHLORIDE 2 MILLIGRAM(S): 2 INJECTION INTRAMUSCULAR; INTRAVENOUS; SUBCUTANEOUS at 15:32

## 2023-02-04 RX ADMIN — HYDROMORPHONE HYDROCHLORIDE 2 MILLIGRAM(S): 2 INJECTION INTRAMUSCULAR; INTRAVENOUS; SUBCUTANEOUS at 07:00

## 2023-02-04 RX ADMIN — HYDROMORPHONE HYDROCHLORIDE 2 MILLIGRAM(S): 2 INJECTION INTRAMUSCULAR; INTRAVENOUS; SUBCUTANEOUS at 16:28

## 2023-02-04 RX ADMIN — HYDROMORPHONE HYDROCHLORIDE 2 MILLIGRAM(S): 2 INJECTION INTRAMUSCULAR; INTRAVENOUS; SUBCUTANEOUS at 23:36

## 2023-02-04 RX ADMIN — CEFEPIME 100 MILLIGRAM(S): 1 INJECTION, POWDER, FOR SOLUTION INTRAMUSCULAR; INTRAVENOUS at 18:05

## 2023-02-04 RX ADMIN — ENOXAPARIN SODIUM 40 MILLIGRAM(S): 100 INJECTION SUBCUTANEOUS at 18:06

## 2023-02-04 RX ADMIN — CEFEPIME 100 MILLIGRAM(S): 1 INJECTION, POWDER, FOR SOLUTION INTRAMUSCULAR; INTRAVENOUS at 02:45

## 2023-02-04 RX ADMIN — HYDROMORPHONE HYDROCHLORIDE 2 MILLIGRAM(S): 2 INJECTION INTRAMUSCULAR; INTRAVENOUS; SUBCUTANEOUS at 11:58

## 2023-02-04 RX ADMIN — ENOXAPARIN SODIUM 40 MILLIGRAM(S): 100 INJECTION SUBCUTANEOUS at 06:45

## 2023-02-04 RX ADMIN — HYDROMORPHONE HYDROCHLORIDE 2 MILLIGRAM(S): 2 INJECTION INTRAMUSCULAR; INTRAVENOUS; SUBCUTANEOUS at 10:59

## 2023-02-04 RX ADMIN — HYDROMORPHONE HYDROCHLORIDE 2 MILLIGRAM(S): 2 INJECTION INTRAMUSCULAR; INTRAVENOUS; SUBCUTANEOUS at 19:35

## 2023-02-04 RX ADMIN — HYDROMORPHONE HYDROCHLORIDE 2 MILLIGRAM(S): 2 INJECTION INTRAMUSCULAR; INTRAVENOUS; SUBCUTANEOUS at 06:45

## 2023-02-04 RX ADMIN — HYDROMORPHONE HYDROCHLORIDE 2 MILLIGRAM(S): 2 INJECTION INTRAMUSCULAR; INTRAVENOUS; SUBCUTANEOUS at 02:45

## 2023-02-04 RX ADMIN — CEFEPIME 100 MILLIGRAM(S): 1 INJECTION, POWDER, FOR SOLUTION INTRAMUSCULAR; INTRAVENOUS at 10:22

## 2023-02-04 RX ADMIN — HYDROMORPHONE HYDROCHLORIDE 2 MILLIGRAM(S): 2 INJECTION INTRAMUSCULAR; INTRAVENOUS; SUBCUTANEOUS at 19:57

## 2023-02-04 RX ADMIN — NEBIVOLOL HYDROCHLORIDE 10 MILLIGRAM(S): 5 TABLET ORAL at 06:45

## 2023-02-04 RX ADMIN — CHLORHEXIDINE GLUCONATE 1 APPLICATION(S): 213 SOLUTION TOPICAL at 06:45

## 2023-02-04 RX ADMIN — NEBIVOLOL HYDROCHLORIDE 10 MILLIGRAM(S): 5 TABLET ORAL at 18:05

## 2023-02-04 RX ADMIN — HYDROMORPHONE HYDROCHLORIDE 2 MILLIGRAM(S): 2 INJECTION INTRAMUSCULAR; INTRAVENOUS; SUBCUTANEOUS at 03:00

## 2023-02-04 NOTE — PROGRESS NOTE ADULT - SUBJECTIVE AND OBJECTIVE BOX
Interventional Cardiology PA Adult Progress Note    Subjective Assessment: Patient seen and examined at bedside. Patient reports feeling well and states  . She denies fever, chills, abdominal pain, diarrhea, C/P, SOB, palpitations, dizziness, N.V    ROS otherwise negative unless otherwise stated except per HPI and Subjective   	  MEDICATIONS:  nebivolol 10 milliGRAM(s) Oral <User Schedule>  cefepime   IVPB      cefepime   IVPB 2000 milliGRAM(s) IV Intermittent every 8 hours  DAPTOmycin IVPB 800 milliGRAM(s) IV Intermittent every 24 hours  DAPTOmycin IVPB      HYDROmorphone  Injectable 2 milliGRAM(s) IV Push every 4 hours PRN  ondansetron Injectable 4 milliGRAM(s) IV Push Once PRN  chlorhexidine 2% Cloths 1 Application(s) Topical <User Schedule>  chlorhexidine 4% Liquid 1 Application(s) Topical once  enoxaparin Injectable 40 milliGRAM(s) SubCutaneous every 12 hours  sodium chloride 0.9% lock flush 10 milliLiter(s) IV Push every 1 hour PRN    [PHYSICAL EXAM:  TELEMETRY:  T(C): 36.1 (02-04-23 @ 14:15), Max: 36.6 (02-03-23 @ 18:07)  HR: 81 (02-04-23 @ 13:25) (70 - 107)  BP: 131/60 (02-04-23 @ 13:25) (125/63 - 176/87)  RR: 19 (02-04-23 @ 13:25) (18 - 20)  SpO2: 99% (02-04-23 @ 13:25) (96% - 99%)  Wt(kg): --  I&O's Summary    03 Feb 2023 07:01  -  04 Feb 2023 07:00  --------------------------------------------------------  IN: 480 mL / OUT: 0 mL / NET: 480 mL    04 Feb 2023 07:01  -  04 Feb 2023 17:41  --------------------------------------------------------  IN: 420 mL / OUT: 0 mL / NET: 420 mL    Robles: No  Central/PICC/Mid Line: No                                          General: A/ox 3, No acute Distress  Neck: Supple, NO JVD  Cardiac: S1 S2, No M/R/G  Pulmonary: CTAB, Breathing unlabored, No Rhonchi/Rales/Wheezing  Abdomen: Soft, Non -tender, +BS x 4 quads  Extremities: No Rashes, No edema.  L foot Kerlix dressing in place.   Neuro: A/o x 3, No focal deficits    LABS:	 	  CARDIAC MARKERS:                       10.2   10.37 )-----------( 438      ( 04 Feb 2023 05:30 )             31.5     02-04    138  |  103  |  14  ----------------------------<  99  4.2   |  25  |  0.59    Ca    8.9      04 Feb 2023 05:30  Mg     1.8     02-04    TPro  6.7  /  Alb  3.2<L>  /  TBili  0.3  /  DBili  x   /  AST  13  /  ALT  13  /  AlkPhos  92  02-04                 Interventional Cardiology PA Adult Progress Note    Subjective Assessment: Patient seen and examined at bedside. Patient reports feeling well overall. She denies fever, chills, abdominal pain, diarrhea, C/P, SOB, palpitations, dizziness, N.V    ROS otherwise negative unless otherwise stated except per HPI and Subjective   	  MEDICATIONS:  nebivolol 10 milliGRAM(s) Oral <User Schedule>  cefepime   IVPB      cefepime   IVPB 2000 milliGRAM(s) IV Intermittent every 8 hours  DAPTOmycin IVPB 800 milliGRAM(s) IV Intermittent every 24 hours  DAPTOmycin IVPB      HYDROmorphone  Injectable 2 milliGRAM(s) IV Push every 4 hours PRN  ondansetron Injectable 4 milliGRAM(s) IV Push Once PRN  chlorhexidine 2% Cloths 1 Application(s) Topical <User Schedule>  chlorhexidine 4% Liquid 1 Application(s) Topical once  enoxaparin Injectable 40 milliGRAM(s) SubCutaneous every 12 hours  sodium chloride 0.9% lock flush 10 milliLiter(s) IV Push every 1 hour PRN    [PHYSICAL EXAM:  TELEMETRY:  T(C): 36.1 (02-04-23 @ 14:15), Max: 36.6 (02-03-23 @ 18:07)  HR: 81 (02-04-23 @ 13:25) (70 - 107)  BP: 131/60 (02-04-23 @ 13:25) (125/63 - 176/87)  RR: 19 (02-04-23 @ 13:25) (18 - 20)  SpO2: 99% (02-04-23 @ 13:25) (96% - 99%)  Wt(kg): --  I&O's Summary    03 Feb 2023 07:01  -  04 Feb 2023 07:00  --------------------------------------------------------  IN: 480 mL / OUT: 0 mL / NET: 480 mL    04 Feb 2023 07:01  -  04 Feb 2023 17:41  --------------------------------------------------------  IN: 420 mL / OUT: 0 mL / NET: 420 mL    Robles: No  Central/PICC/Mid Line: No                                          General: A/ox 3, No acute Distress  Neck: Supple, NO JVD  Cardiac: S1 S2, No M/R/G  Pulmonary: CTAB, Breathing unlabored, No Rhonchi/Rales/Wheezing  Abdomen: Soft, Non -tender, +BS x 4 quads  Extremities: No Rashes, No edema.  L foot Kerlix dressing in place.   Neuro: A/o x 3, No focal deficits    LABS:	 	  CARDIAC MARKERS:                       10.2   10.37 )-----------( 438      ( 04 Feb 2023 05:30 )             31.5     02-04    138  |  103  |  14  ----------------------------<  99  4.2   |  25  |  0.59    Ca    8.9      04 Feb 2023 05:30  Mg     1.8     02-04    TPro  6.7  /  Alb  3.2<L>  /  TBili  0.3  /  DBili  x   /  AST  13  /  ALT  13  /  AlkPhos  92  02-04

## 2023-02-04 NOTE — PROGRESS NOTE ADULT - ASSESSMENT
38 y/o female, morbidly obese, w/ PMHx HTN and left foot AVM w/ chronic left foot ulcer (s/p multiple embolizations, s/p OR debridement 10/2022, and s/p prior antibotic treatment w/ recent treatment from 11/7/2022 to 12/29/2022 w/ Meropenem/Vancomycin and wound closure on 12/23/2022) who presented for additional embolization. Patient s/p direct stick embolization of the left foot 1/24/2023. Admitted to cardiac tele and currently on IV Abx x 4 wks per ID recs. S/P PICC placement 2/3/23. Dispo planning in progress

## 2023-02-04 NOTE — PROGRESS NOTE ADULT - ASSESSMENT
37F PMH obesity, HTN, peripheral AVMs, who presented for direct stick embolization of L foot AVM on 1/24/23. Medicine following for comanagement    #Fevers  #L foot AVM s/p direct stick embolization  #L foot cellulitis  Recently on vanc/meropenem, last doses for both on 1/31, now transitioned to daptomycin and cefepime per ID, and PICC placed 2/3/23.  - f/u blood cultures (negative to date)  - baseline CPK 25, monitor weekly thereafter while on daptomycin  - f/u ID recs  - pain management per primary team  - wound care consult for further evaluation of care of L foot AVM    #HTN  - On nebivolol 10 BID at home, during this admission has been on 5 BID as her pain medications were dropping her blood pressures too much. She has been on nebivolol for a few years and was previously on lisinopril-HCTZ for years but woke up one day with angioedema, rash, hives and has been off those meds since then. Pt does not believe she has been on CCB, though she had extensive allergy testing which was reviewed with her at bedside on 2/1/23, no evidence of CCB allergy  - BPs predominantly 140s-160s this hospitalization, though has had some 120s-130s, and as high as 190s  - BP per pt elevated in setting of pain, tends to run lower after pain medications  - BPs better controlled since being on nebivolol 10 BID  Recommend:  - continue to monitor BP and monitor on nebivolol increased back to home dose 10 BID on 2/1   - if still hypertensive would recommend starting CCB such as amlodipine 5mg qD as this is typically preferred over BB therapy for HTN in absence of other cardiac history as is the case with her, especially as her BP fluctuates at home on BB      Rest of care per primary team.

## 2023-02-04 NOTE — PROGRESS NOTE ADULT - SUBJECTIVE AND OBJECTIVE BOX
INTERVAL HPI/OVERNIGHT EVENTS:  O/N: No acute events noted overnight. PICC placed yesterday.  This morning: Patient was seen and examined at bedside. Feels well overall. Concerned about healthcare costs from poor insurance coverage for IV antibiotics.    VITAL SIGNS:  T(F): 97 (02-04-23 @ 09:39)  HR: 81 (02-04-23 @ 13:25)  BP: 131/60 (02-04-23 @ 13:25)  RR: 19 (02-04-23 @ 13:25)  SpO2: 99% (02-04-23 @ 13:25)  Wt(kg): --    PHYSICAL EXAM:    Constitutional: resting comfortably in bed; NAD  HEENT: NC/AT, PERRL, EOMI, anicteric sclera, no nasal discharge   Respiratory: breathing comfortably on RA  Extremities: recently wrapped L foot AVM, reviewed recent imaging again with patient showing small erythematous area of skin around AVM at lateral aspect of L heel w/ no active purulence/drainage, appears improved compared to prior  Neurologic: alert and oriented, no focal deficits noted      MEDICATIONS  (STANDING):  cefepime   IVPB      cefepime   IVPB 2000 milliGRAM(s) IV Intermittent every 8 hours  chlorhexidine 2% Cloths 1 Application(s) Topical <User Schedule>  chlorhexidine 4% Liquid 1 Application(s) Topical once  DAPTOmycin IVPB 800 milliGRAM(s) IV Intermittent every 24 hours  DAPTOmycin IVPB      enoxaparin Injectable 40 milliGRAM(s) SubCutaneous every 12 hours  nebivolol 10 milliGRAM(s) Oral <User Schedule>    MEDICATIONS  (PRN):  HYDROmorphone  Injectable 2 milliGRAM(s) IV Push every 4 hours PRN Severe Pain (7 - 10)  naloxone Injectable 0.1 milliGRAM(s) IV Push Once PRN For ANY of the following changes in patient status:  A. RR LESS THAN 10 breaths per minute, B. Oxygen saturation LESS THAN 90%, C. Sedation score of 6  ondansetron Injectable 4 milliGRAM(s) IV Push Once PRN Nausea  sodium chloride 0.9% lock flush 10 milliLiter(s) IV Push every 1 hour PRN Pre/post blood products, medications, blood draw, and to maintain line patency      Allergies    amoxicillin (Angioedema)  ciprofloxacin (Rash)  hydrochlorothiazide (Hives)  latex (Rash)  lisinopril (Angioedema; Rash; Hives)  morphine (Rash)  penicillin (Rash)    Intolerances        LABS:                        10.2   10.37 )-----------( 438      ( 04 Feb 2023 05:30 )             31.5     02-04    138  |  103  |  14  ----------------------------<  99  4.2   |  25  |  0.59    Ca    8.9      04 Feb 2023 05:30  Mg     1.8     02-04    TPro  6.7  /  Alb  3.2<L>  /  TBili  0.3  /  DBili  x   /  AST  13  /  ALT  13  /  AlkPhos  92  02-04                RADIOLOGY & ADDITIONAL TESTS:  Reviewed

## 2023-02-04 NOTE — PROGRESS NOTE ADULT - PROBLEM SELECTOR PLAN 1
H/o left foot AVM w/ chronic foot ulcer w/ multiple prior embolizations and a prior OR debridement 10/2022, s/p IV Meropenem/Vanco from 11/07/2022 to 12/29/2022.   - s/p additional direct stick embolization 01/24/2023 w/ Dr. Teran   - ID following post-procedure for co-management    - WBC 12-13, fluctuating throughout this admission. Reported fever 100.5 F on 1/31. Patient afebrile.   - D/c'ed Vancomycin and Meropenem 1/31  - Blood Cultures x 2 sets 1/31 - NGTD  - ID following. Recs appreciated  - c/w IV Daptomycin 800mg q 24h and IV Cefepime 2g q 8h x 4 wks (2/3-3/3)  - s/p PICC line placement 2/3/23 (>48hr after last blood culture)  - Weekly labs while on antibiotics: CBC, CMP, ESR, CRP, CPK. Fax to Dr Rahman at [phone]  - continued on Hydromorphone 2 mg IV q4h PRN and home Percocet dose PRN  - Pt reports follows closely w/ pain management and have been in contact with NP, will f/u pain management outpt for discharge home regimen  - ordered for Naloxone IV PRN  - Medicine consulted for possible transfer, but rejected H/o left foot AVM w/ chronic foot ulcer w/ multiple prior embolizations and a prior OR debridement 10/2022, s/p IV Meropenem/Vanco from 11/07/2022 to 12/29/2022.   - s/p additional direct stick embolization 01/24/2023 w/ Dr. Teran   - ID following post-procedure for co-management   - Leukocytosis WBC 12-13, fluctuating throughout this admission.  Leukocytosis resolved 2/4/23. Reported fever 100.5 F on 1/31. Patient afebrile.   - D/c'ed Vancomycin and Meropenem 1/31  - Blood Cultures x 2 sets 1/31 - NGTD  - ID following. Recs appreciated  - c/w IV Daptomycin 800mg q 24h and IV Cefepime 2g q 8h x 4 wks (2/3-3/3)  - s/p PICC line placement 2/3/23 (>48hr after last blood culture)  - Weekly labs while on antibiotics: CBC, CMP, ESR, CRP, CPK. Fax to Dr Rahman at [phone]  - continued on Hydromorphone 2 mg IV q4h PRN and home Percocet dose PRN  - Pt reports follows closely w/ pain management and have been in contact with NP, will f/u pain management outpt for discharge home regimen  - ordered for Naloxone IV PRN  - Medicine consulted for possible transfer, but rejected

## 2023-02-04 NOTE — PROGRESS NOTE ADULT - ATTENDING COMMENTS
Pt seen and examined with Dr. Connors     PE: L heel appearing less erythematous compared to yesterday from pt's iphone picture . Pt reports L heel pain better, more or less the same. WBC 13.72K-> 10.57K-> 12.22K -> 10.37K (2/4) today  baseline CK 25    - c/w Daptomycin and Cefepime iv per ID Dr. Rahman, s/p PICC line placement(2/3) for prolonged iv abx on Daptomycin and Cefepime for 4 weeks ( 2/3-3/3), Day 2 , weekly labs as delineated at 's note   - local wound care ( saline/DSG )superficial ulcer over medial aspect of L heel   - SW to arrange [facility], apparently pt's insurance not covering iv abx Ertapenem cost $ 1000 per week that pt cannot afford as she was already dealing with the last bill for $ 7000 dollars from infusion company which she is a process of appeal)     Med consult team continues to follow, thank you.

## 2023-02-04 NOTE — PROGRESS NOTE ADULT - PROBLEM SELECTOR PLAN 2
SBP ranging 140s   - resumed home medication: Bystolic 10mg BID     VTE PPX: Lovenox SQ    Dispo: Complex discharge planning- Patient would have an out of pocket cost of ~ $1000 a week after what insurance covers for ABX and supplies. SW and Case management provided patient with option of going to [facility] as she has Medicare which will cover 20 days at 100% at a [facility]. Patient wants to discuss with family and provided list of choices by CM and SW. SBP ranging 140s   - resumed home medication: Bystolic 10mg BID     VTE PPX: Lovenox SQ    Dispo: Complex discharge planning- Patient would have an out of pocket cost of ~ $1000 a week after what insurance covers for ABX and supplies. SW and Case management provided patient with option of going to [facility] as she has Medicare which will cover 20 days at 100% at a [facility]. Patient has agreed to go to [facility] and has selected facilities. SW and Case Management to follow-up

## 2023-02-05 LAB
ANION GAP SERPL CALC-SCNC: 10 MMOL/L — SIGNIFICANT CHANGE UP (ref 5–17)
BASOPHILS # BLD AUTO: 0.05 K/UL — SIGNIFICANT CHANGE UP (ref 0–0.2)
BASOPHILS NFR BLD AUTO: 0.5 % — SIGNIFICANT CHANGE UP (ref 0–2)
BUN SERPL-MCNC: 14 MG/DL — SIGNIFICANT CHANGE UP (ref 7–23)
CALCIUM SERPL-MCNC: 8.8 MG/DL — SIGNIFICANT CHANGE UP (ref 8.4–10.5)
CHLORIDE SERPL-SCNC: 104 MMOL/L — SIGNIFICANT CHANGE UP (ref 96–108)
CO2 SERPL-SCNC: 25 MMOL/L — SIGNIFICANT CHANGE UP (ref 22–31)
CREAT SERPL-MCNC: 0.62 MG/DL — SIGNIFICANT CHANGE UP (ref 0.5–1.3)
CULTURE RESULTS: SIGNIFICANT CHANGE UP
CULTURE RESULTS: SIGNIFICANT CHANGE UP
EGFR: 116 ML/MIN/1.73M2 — SIGNIFICANT CHANGE UP
EOSINOPHIL # BLD AUTO: 0.2 K/UL — SIGNIFICANT CHANGE UP (ref 0–0.5)
EOSINOPHIL NFR BLD AUTO: 2 % — SIGNIFICANT CHANGE UP (ref 0–6)
GLUCOSE SERPL-MCNC: 104 MG/DL — HIGH (ref 70–99)
HCT VFR BLD CALC: 33.5 % — LOW (ref 34.5–45)
HGB BLD-MCNC: 10.6 G/DL — LOW (ref 11.5–15.5)
IMM GRANULOCYTES NFR BLD AUTO: 0.5 % — SIGNIFICANT CHANGE UP (ref 0–0.9)
LYMPHOCYTES # BLD AUTO: 1.88 K/UL — SIGNIFICANT CHANGE UP (ref 1–3.3)
LYMPHOCYTES # BLD AUTO: 18.5 % — SIGNIFICANT CHANGE UP (ref 13–44)
MAGNESIUM SERPL-MCNC: 1.8 MG/DL — SIGNIFICANT CHANGE UP (ref 1.6–2.6)
MCHC RBC-ENTMCNC: 27.9 PG — SIGNIFICANT CHANGE UP (ref 27–34)
MCHC RBC-ENTMCNC: 31.6 GM/DL — LOW (ref 32–36)
MCV RBC AUTO: 88.2 FL — SIGNIFICANT CHANGE UP (ref 80–100)
MONOCYTES # BLD AUTO: 0.74 K/UL — SIGNIFICANT CHANGE UP (ref 0–0.9)
MONOCYTES NFR BLD AUTO: 7.3 % — SIGNIFICANT CHANGE UP (ref 2–14)
NEUTROPHILS # BLD AUTO: 7.22 K/UL — SIGNIFICANT CHANGE UP (ref 1.8–7.4)
NEUTROPHILS NFR BLD AUTO: 71.2 % — SIGNIFICANT CHANGE UP (ref 43–77)
NRBC # BLD: 0 /100 WBCS — SIGNIFICANT CHANGE UP (ref 0–0)
PLATELET # BLD AUTO: 474 K/UL — HIGH (ref 150–400)
POTASSIUM SERPL-MCNC: 4.2 MMOL/L — SIGNIFICANT CHANGE UP (ref 3.5–5.3)
POTASSIUM SERPL-SCNC: 4.2 MMOL/L — SIGNIFICANT CHANGE UP (ref 3.5–5.3)
RBC # BLD: 3.8 M/UL — SIGNIFICANT CHANGE UP (ref 3.8–5.2)
RBC # FLD: 12.7 % — SIGNIFICANT CHANGE UP (ref 10.3–14.5)
SODIUM SERPL-SCNC: 139 MMOL/L — SIGNIFICANT CHANGE UP (ref 135–145)
SPECIMEN SOURCE: SIGNIFICANT CHANGE UP
SPECIMEN SOURCE: SIGNIFICANT CHANGE UP
WBC # BLD: 10.14 K/UL — SIGNIFICANT CHANGE UP (ref 3.8–10.5)
WBC # FLD AUTO: 10.14 K/UL — SIGNIFICANT CHANGE UP (ref 3.8–10.5)

## 2023-02-05 PROCEDURE — 99233 SBSQ HOSP IP/OBS HIGH 50: CPT

## 2023-02-05 RX ORDER — SENNA PLUS 8.6 MG/1
2 TABLET ORAL AT BEDTIME
Refills: 0 | Status: DISCONTINUED | OUTPATIENT
Start: 2023-02-05 | End: 2023-02-07

## 2023-02-05 RX ORDER — HYDROMORPHONE HYDROCHLORIDE 2 MG/ML
1 INJECTION INTRAMUSCULAR; INTRAVENOUS; SUBCUTANEOUS
Refills: 0 | Status: DISCONTINUED | OUTPATIENT
Start: 2023-02-05 | End: 2023-02-06

## 2023-02-05 RX ORDER — KETOROLAC TROMETHAMINE 30 MG/ML
15 SYRINGE (ML) INJECTION ONCE
Refills: 0 | Status: DISCONTINUED | OUTPATIENT
Start: 2023-02-05 | End: 2023-02-05

## 2023-02-05 RX ORDER — POLYETHYLENE GLYCOL 3350 17 G/17G
17 POWDER, FOR SOLUTION ORAL DAILY
Refills: 0 | Status: DISCONTINUED | OUTPATIENT
Start: 2023-02-05 | End: 2023-02-07

## 2023-02-05 RX ADMIN — HYDROMORPHONE HYDROCHLORIDE 2 MILLIGRAM(S): 2 INJECTION INTRAMUSCULAR; INTRAVENOUS; SUBCUTANEOUS at 12:01

## 2023-02-05 RX ADMIN — HYDROMORPHONE HYDROCHLORIDE 1 MILLIGRAM(S): 2 INJECTION INTRAMUSCULAR; INTRAVENOUS; SUBCUTANEOUS at 16:00

## 2023-02-05 RX ADMIN — HYDROMORPHONE HYDROCHLORIDE 1 MILLIGRAM(S): 2 INJECTION INTRAMUSCULAR; INTRAVENOUS; SUBCUTANEOUS at 20:00

## 2023-02-05 RX ADMIN — ENOXAPARIN SODIUM 40 MILLIGRAM(S): 100 INJECTION SUBCUTANEOUS at 19:10

## 2023-02-05 RX ADMIN — HYDROMORPHONE HYDROCHLORIDE 2 MILLIGRAM(S): 2 INJECTION INTRAMUSCULAR; INTRAVENOUS; SUBCUTANEOUS at 07:36

## 2023-02-05 RX ADMIN — NEBIVOLOL HYDROCHLORIDE 10 MILLIGRAM(S): 5 TABLET ORAL at 05:39

## 2023-02-05 RX ADMIN — HYDROMORPHONE HYDROCHLORIDE 2 MILLIGRAM(S): 2 INJECTION INTRAMUSCULAR; INTRAVENOUS; SUBCUTANEOUS at 03:52

## 2023-02-05 RX ADMIN — DAPTOMYCIN 132 MILLIGRAM(S): 500 INJECTION, POWDER, LYOPHILIZED, FOR SOLUTION INTRAVENOUS at 22:28

## 2023-02-05 RX ADMIN — ENOXAPARIN SODIUM 40 MILLIGRAM(S): 100 INJECTION SUBCUTANEOUS at 05:39

## 2023-02-05 RX ADMIN — HYDROMORPHONE HYDROCHLORIDE 2 MILLIGRAM(S): 2 INJECTION INTRAMUSCULAR; INTRAVENOUS; SUBCUTANEOUS at 00:00

## 2023-02-05 RX ADMIN — CEFEPIME 100 MILLIGRAM(S): 1 INJECTION, POWDER, FOR SOLUTION INTRAMUSCULAR; INTRAVENOUS at 10:32

## 2023-02-05 RX ADMIN — NEBIVOLOL HYDROCHLORIDE 10 MILLIGRAM(S): 5 TABLET ORAL at 19:10

## 2023-02-05 RX ADMIN — HYDROMORPHONE HYDROCHLORIDE 2 MILLIGRAM(S): 2 INJECTION INTRAMUSCULAR; INTRAVENOUS; SUBCUTANEOUS at 07:45

## 2023-02-05 RX ADMIN — HYDROMORPHONE HYDROCHLORIDE 2 MILLIGRAM(S): 2 INJECTION INTRAMUSCULAR; INTRAVENOUS; SUBCUTANEOUS at 11:39

## 2023-02-05 RX ADMIN — CEFEPIME 100 MILLIGRAM(S): 1 INJECTION, POWDER, FOR SOLUTION INTRAMUSCULAR; INTRAVENOUS at 01:49

## 2023-02-05 RX ADMIN — CEFEPIME 100 MILLIGRAM(S): 1 INJECTION, POWDER, FOR SOLUTION INTRAMUSCULAR; INTRAVENOUS at 19:09

## 2023-02-05 RX ADMIN — HYDROMORPHONE HYDROCHLORIDE 1 MILLIGRAM(S): 2 INJECTION INTRAMUSCULAR; INTRAVENOUS; SUBCUTANEOUS at 15:39

## 2023-02-05 RX ADMIN — HYDROMORPHONE HYDROCHLORIDE 1 MILLIGRAM(S): 2 INJECTION INTRAMUSCULAR; INTRAVENOUS; SUBCUTANEOUS at 20:15

## 2023-02-05 RX ADMIN — CHLORHEXIDINE GLUCONATE 1 APPLICATION(S): 213 SOLUTION TOPICAL at 05:44

## 2023-02-05 RX ADMIN — HYDROMORPHONE HYDROCHLORIDE 2 MILLIGRAM(S): 2 INJECTION INTRAMUSCULAR; INTRAVENOUS; SUBCUTANEOUS at 03:35

## 2023-02-05 NOTE — PROGRESS NOTE ADULT - ASSESSMENT
36 y/o female, morbidly obese, w/ PMHx HTN and left foot AVM w/ chronic left foot ulcer (s/p multiple embolizations, s/p OR debridement 10/2022, and s/p prior antibotic treatment w/ recent treatment from 11/7/2022 to 12/29/2022 w/ Meropenem/Vancomycin and wound closure on 12/23/2022) who presented for additional embolization. Patient s/p direct stick embolization of the left foot 1/24/2023. Admitted to cardiac tele and currently on IV Abx x 4 wks per ID recs. S/P PICC placement 2/3/23. Dispo planning in progress  36 y/o female, morbidly obese, w/ PMHx HTN and left foot AVM w/ chronic left foot ulcer (s/p multiple embolizations, s/p OR debridement 10/2022, and s/p prior antibotic treatment w/ recent treatment from 11/7/2022 to 12/29/2022 w/ Meropenem/Vancomycin and wound closure on 12/23/2022) who presented for additional embolization. Patient s/p direct stick embolization of the left foot 1/24/2023. Admitted to cardiac tele and currently on IV Abx x 4 wks per ID recs. S/P PICC placement 2/3/23. Awaiting [facility] placement.

## 2023-02-05 NOTE — PROGRESS NOTE ADULT - SUBJECTIVE AND OBJECTIVE BOX
INCOMPLETE    S: Pt seen and examined bedside.  Patient denies C/P, SOB, N/V, dizziness, palpitations, and diaphoresis.  Pt denies fever/chills, dysuria, abdominal pain, diarrhea, and cough  12 Point ROS otherwise negative except as per HPI/subjective.     O: Vital Signs Last 24 Hrs  T(C): 36.1 (05 Feb 2023 09:40), Max: 36.6 (05 Feb 2023 05:09)  T(F): 97 (05 Feb 2023 09:40), Max: 97.8 (05 Feb 2023 05:09)  HR: 92 (05 Feb 2023 11:32) (81 - 95)  BP: 166/75 (05 Feb 2023 11:32) (131/60 - 166/75)  BP(mean): 108 (05 Feb 2023 11:32) (94 - 110)  RR: 18 (05 Feb 2023 11:32) (16 - 19)  SpO2: 97% (05 Feb 2023 11:32) (96% - 99%)    Parameters below as of 05 Feb 2023 11:32  Patient On (Oxygen Delivery Method): room air        PHYSICAL EXAM:  GEN: NAD  HEENT: No JVD  PULM:  CTA B/L  CARD:  RRR, S1 and S2   ABD: +BS, NT, soft/ND	  EXT: No Edema B/L LE  NEURO: A+Ox3, no focal deficit  PSYCH: Mood Appropriate    LABS:                        10.6   10.14 )-----------( 474      ( 05 Feb 2023 06:33 )             33.5     02-05    139  |  104  |  14  ----------------------------<  104<H>  4.2   |  25  |  0.62    Ca    8.8      05 Feb 2023 06:33  Mg     1.8     02-05    TPro  6.7  /  Alb  3.2<L>  /  TBili  0.3  /  DBili  x   /  AST  13  /  ALT  13  /  AlkPhos  92  02-04          02-04 @ 07:01  -  02-05 @ 07:00  --------------------------------------------------------  IN: 1120 mL / OUT: 0 mL / NET: 1120 mL    02-05 @ 07:01 - 02-05 @ 11:43  --------------------------------------------------------  IN: 200 mL / OUT: 0 mL / NET: 200 mL      Daily     Daily    S: Pt seen and examined bedside. Pt reports he pain is improved today and the LLE wound appears improved from yesterday.   Patient denies C/P, SOB, N/V, dizziness, palpitations, and diaphoresis.  Pt denies fever/chills, dysuria, abdominal pain, diarrhea, and cough  12 Point ROS otherwise negative except as per HPI/subjective.     O: Vital Signs Last 24 Hrs  T(C): 36.1 (05 Feb 2023 09:40), Max: 36.6 (05 Feb 2023 05:09)  T(F): 97 (05 Feb 2023 09:40), Max: 97.8 (05 Feb 2023 05:09)  HR: 92 (05 Feb 2023 11:32) (81 - 95)  BP: 166/75 (05 Feb 2023 11:32) (131/60 - 166/75)  BP(mean): 108 (05 Feb 2023 11:32) (94 - 110)  RR: 18 (05 Feb 2023 11:32) (16 - 19)  SpO2: 97% (05 Feb 2023 11:32) (96% - 99%)    Parameters below as of 05 Feb 2023 11:32  Patient On (Oxygen Delivery Method): room air        PHYSICAL EXAM:  GEN: NAD  HEENT: No JVD  PULM:  CTA B/L  CARD:  RRR, S1 and S2   ABD: +BS, NT, soft/ND	  EXT: No Edema B/L LE  NEURO: A+Ox3, no focal deficit  PSYCH: Mood Appropriate    LABS:                        10.6   10.14 )-----------( 474      ( 05 Feb 2023 06:33 )             33.5     02-05    139  |  104  |  14  ----------------------------<  104<H>  4.2   |  25  |  0.62    Ca    8.8      05 Feb 2023 06:33  Mg     1.8     02-05    TPro  6.7  /  Alb  3.2<L>  /  TBili  0.3  /  DBili  x   /  AST  13  /  ALT  13  /  AlkPhos  92  02-04          02-04 @ 07:01  -  02-05 @ 07:00  --------------------------------------------------------  IN: 1120 mL / OUT: 0 mL / NET: 1120 mL    02-05 @ 07:01 - 02-05 @ 11:43  --------------------------------------------------------  IN: 200 mL / OUT: 0 mL / NET: 200 mL      Daily     Daily    S: Pt seen and examined bedside. Pt reports he pain is improved today and the LLE wound appears improved from yesterday.   Patient denies C/P, SOB, N/V, dizziness, palpitations, and diaphoresis.  Pt denies fever/chills, dysuria, abdominal pain, diarrhea, and cough  12 Point ROS otherwise negative except as per HPI/subjective.     O: Vital Signs Last 24 Hrs  T(C): 36.1 (05 Feb 2023 09:40), Max: 36.6 (05 Feb 2023 05:09)  T(F): 97 (05 Feb 2023 09:40), Max: 97.8 (05 Feb 2023 05:09)  HR: 92 (05 Feb 2023 11:32) (81 - 95)  BP: 166/75 (05 Feb 2023 11:32) (131/60 - 166/75)  BP(mean): 108 (05 Feb 2023 11:32) (94 - 110)  RR: 18 (05 Feb 2023 11:32) (16 - 19)  SpO2: 97% (05 Feb 2023 11:32) (96% - 99%)    Parameters below as of 05 Feb 2023 11:32  Patient On (Oxygen Delivery Method): room air        PHYSICAL EXAM:  General: A/ox 3, No acute Distress  Neck: Supple, NO JVD  Cardiac: S1 S2, No M/R/G  Pulmonary: CTAB, Breathing unlabored, No Rhonchi/Rales/Wheezing  Abdomen: Soft, Non -tender, +BS x 4 quads  Extremities: No Rashes, No edema.  L foot Kerlix dressing in place.   Neuro: A/o x 3, No focal deficits    LABS:                        10.6   10.14 )-----------( 474      ( 05 Feb 2023 06:33 )             33.5     02-05    139  |  104  |  14  ----------------------------<  104<H>  4.2   |  25  |  0.62    Ca    8.8      05 Feb 2023 06:33  Mg     1.8     02-05    TPro  6.7  /  Alb  3.2<L>  /  TBili  0.3  /  DBili  x   /  AST  13  /  ALT  13  /  AlkPhos  92  02-04          02-04 @ 07:01 - 02-05 @ 07:00  --------------------------------------------------------  IN: 1120 mL / OUT: 0 mL / NET: 1120 mL    02-05 @ 07:01 - 02-05 @ 11:43  --------------------------------------------------------  IN: 200 mL / OUT: 0 mL / NET: 200 mL

## 2023-02-05 NOTE — PROGRESS NOTE ADULT - SUBJECTIVE AND OBJECTIVE BOX
Patient is a 37y old  Female who presents with a chief complaint of Direct Stick Embolization (05 Feb 2023 11:42)    INTERVAL EVENTS: NAEON     SUBJECTIVE:  Patient was seen and examined at bedside. Reports L heel pain better with pain 8/10->3/10 with Dilaudid 2mg iv this am. Pt reports she normally takes percocet prn for pain at home, at times required dilaudid that her family doctor will prescribe. Pain requirement reviewed, Pt required dilaudid 2mg iv q4hr prn average 5-6 doses a day ( 10mg-12mg dilaudid iv)  since 2/1     Review of systems: No fever, chills, dizziness, HA, Changes in vision, CP, dyspnea, nausea or vomiting, dysuria, changes in bowel movements, LE edema. Rest of 12 point Review of systems negative unless otherwise documented elsewhere in note.     Diet, DASH/TLC:   Sodium & Cholesterol Restricted (01-27-23 @ 13:31) [Active]      MEDICATIONS:  MEDICATIONS  (STANDING):  cefepime   IVPB      cefepime   IVPB 2000 milliGRAM(s) IV Intermittent every 8 hours  chlorhexidine 2% Cloths 1 Application(s) Topical <User Schedule>  chlorhexidine 4% Liquid 1 Application(s) Topical once  DAPTOmycin IVPB      DAPTOmycin IVPB 800 milliGRAM(s) IV Intermittent every 24 hours  enoxaparin Injectable 40 milliGRAM(s) SubCutaneous every 12 hours  nebivolol 10 milliGRAM(s) Oral <User Schedule>    MEDICATIONS  (PRN):  HYDROmorphone  Injectable 2 milliGRAM(s) IV Push every 4 hours PRN Severe Pain (7 - 10)  naloxone Injectable 0.1 milliGRAM(s) IV Push Once PRN For ANY of the following changes in patient status:  A. RR LESS THAN 10 breaths per minute, B. Oxygen saturation LESS THAN 90%, C. Sedation score of 6  ondansetron Injectable 4 milliGRAM(s) IV Push Once PRN Nausea  sodium chloride 0.9% lock flush 10 milliLiter(s) IV Push every 1 hour PRN Pre/post blood products, medications, blood draw, and to maintain line patency      Allergies    amoxicillin (Angioedema)  ciprofloxacin (Rash)  hydrochlorothiazide (Hives)  latex (Rash)  lisinopril (Angioedema; Rash; Hives)  morphine (Rash)  penicillin (Rash)    Intolerances        OBJECTIVE:  Vital Signs Last 24 Hrs  T(C): 36.1 (05 Feb 2023 09:40), Max: 36.6 (05 Feb 2023 05:09)  T(F): 97 (05 Feb 2023 09:40), Max: 97.8 (05 Feb 2023 05:09)  HR: 92 (05 Feb 2023 11:32) (81 - 95)  BP: 166/75 (05 Feb 2023 11:32) (131/60 - 166/75)  BP(mean): 108 (05 Feb 2023 11:32) (94 - 110)  RR: 18 (05 Feb 2023 11:32) (16 - 19)  SpO2: 97% (05 Feb 2023 11:32) (96% - 99%)    Parameters below as of 05 Feb 2023 11:32  Patient On (Oxygen Delivery Method): room air      I&O's Summary    04 Feb 2023 07:01  -  05 Feb 2023 07:00  --------------------------------------------------------  IN: 1120 mL / OUT: 0 mL / NET: 1120 mL    05 Feb 2023 07:01  -  05 Feb 2023 12:17  --------------------------------------------------------  IN: 200 mL / OUT: 0 mL / NET: 200 mL        PHYSICAL EXAM:  Gen: Reclining in bed at time of exam, appears stated age  HEENT: NCAT, MMM, clear OP  Neck: supple, trachea at midline  CV: RRR, +S1/S2  Pulm: adequate respiratory effort, no increase in work of breathing  Abd: soft, NTND  Skin: warm and dry,   Ext: WWP, no LE edema, L heel erythema improving ,and sub-cm ulcer medial L heel stable   Neuro: AOx3, no gross focal neurological deficits  Psych: affect and behavior appropriate, pleasant at time of interview    LABS:                        10.6   10.14 )-----------( 474      ( 05 Feb 2023 06:33 )             33.5     02-05    139  |  104  |  14  ----------------------------<  104<H>  4.2   |  25  |  0.62    Ca    8.8      05 Feb 2023 06:33  Mg     1.8     02-05    TPro  6.7  /  Alb  3.2<L>  /  TBili  0.3  /  DBili  x   /  AST  13  /  ALT  13  /  AlkPhos  92  02-04    LIVER FUNCTIONS - ( 04 Feb 2023 05:30 )  Alb: 3.2 g/dL / Pro: 6.7 g/dL / ALK PHOS: 92 U/L / ALT: 13 U/L / AST: 13 U/L / GGT: x             CAPILLARY BLOOD GLUCOSE            MICRODATA:      RADIOLOGY/OTHER STUDIES:

## 2023-02-05 NOTE — PROGRESS NOTE ADULT - ASSESSMENT
37F PMH obesity, HTN, peripheral AVMs, who presented for direct stick embolization of L foot AVM on 1/24/23. Medicine following for comanagement    #Fevers  #L foot AVM s/p direct stick embolization  #L foot cellulitis  # L foot pain   - recently on vanc/meropenem, last doses for both on 1/31, now transitioned to daptomycin and cefepime (2/3-3/3) for 4 weeks per ID , and PICC placed 2/3/23.  - WBC 10.14K, normalized   - f/u blood cultures (negative to date)  - baseline CPK 25, monitor weekly thereafter while on daptomycin given risk of Rhabdomyolysis   - Pain requirement reviewed: Pt has been on increased dose Dilaudid 2mg iv q4hr prn pain 7/10 or greater, apparently pt has been taking dilaudid 2mg iv ~ 5-6 doses daily since 2/1( dilaudid dose of 10-12mg iv )  - d/w Cardiology NP: to decrease Dilaudid back to 1mg iv q4hr prn pain 7/10 or greater ( 2/5) - 50% reduction, and re-access and further decrease doses in next dew days , watch for narcotic withdrawal  - add bowel regimen: senna and miralax       #HTN  - nebivolol 10 BID   - monitor BP     # Disposition: SW to arrange [facility] for skilled needs for completion of iv abx ( Daptomycin and Cefepime till 3/3) , apparently pt's insurance not covering iv abx Ertapenem cost $ 1000 per week that pt cannot afford as she was already dealing with the last bill for $ 7000 dollars from infusion company which she is a process of appeal)     Med consult team continues to follow with you.    POC as d/w Cardiology NP

## 2023-02-05 NOTE — PROGRESS NOTE ADULT - PROBLEM SELECTOR PLAN 2
SBP ranging 140s   - resumed home medication: Bystolic 10mg BID     VTE PPX: Lovenox SQ    Dispo: Complex discharge planning- Patient would have an out of pocket cost of ~ $1000 a week after what insurance covers for ABX and supplies. SW and Case management provided patient with option of going to [facility] as she has Medicare which will cover 20 days at 100% at a [facility]. Patient has agreed to go to [facility] and has selected facilities. SW and Case Management to follow-up SBP ranging 130s- 150's   - resumed home medication: Bystolic 10mg BID     VTE PPX: Lovenox SQ    Dispo: Complex discharge planning- Patient would have an out of pocket cost of ~ $1000 a week after what insurance covers for ABX and supplies. SW and Case management provided patient with option of going to [facility] as she has Medicare which will cover 20 days at 100% at a [facility]. Patient has agreed to go to [facility] and has selected facilities. SW and Case Management to follow-up

## 2023-02-05 NOTE — PROGRESS NOTE ADULT - PROBLEM SELECTOR PLAN 1
H/o left foot AVM w/ chronic foot ulcer w/ multiple prior embolizations and a prior OR debridement 10/2022, s/p IV Meropenem/Vanco from 11/07/2022 to 12/29/2022.   - s/p additional direct stick embolization 01/24/2023 w/ Dr. Teran   - ID following post-procedure for co-management   - Leukocytosis WBC 12-13, fluctuating throughout this admission.  Leukocytosis resolved 2/4/23. Reported fever 100.5 F on 1/31. Patient afebrile.   - D/c'ed Vancomycin and Meropenem 1/31  - Blood Cultures x 2 sets 1/31 - NGTD  - ID following. Recs appreciated  - c/w IV Daptomycin 800mg q 24h and IV Cefepime 2g q 8h x 4 wks (2/3-3/3)  - s/p PICC line placement 2/3/23 (>48hr after last blood culture)  - Weekly labs while on antibiotics: CBC, CMP, ESR, CRP, CPK. Fax to Dr Rahman at [phone]  - continued on Hydromorphone 2 mg IV q4h PRN and home Percocet dose PRN  - Pt reports follows closely w/ pain management and have been in contact with NP, will f/u pain management outpt for discharge home regimen  - ordered for Naloxone IV PRN  - Medicine consulted for possible transfer, but rejected H/o left foot AVM w/ chronic foot ulcer w/ multiple prior embolizations and a prior OR debridement 10/2022, s/p IV Meropenem/Vanco from 11/07/2022 to 12/29/2022.   - s/p additional direct stick embolization 01/24/2023 w/ Dr. Teran   - ID following post-procedure for co-management   - Leukocytosis now resolved. Reported fever 100.5 F on 1/31. Currently afebrile.   - D/c'ed Vancomycin and Meropenem 1/31  - Blood Cultures x 2 sets 1/31 - NGTD  - ID following. Recs appreciated  - c/w IV Daptomycin 800mg q 24h and IV Cefepime 2g q 8h x 4 wks (2/3-3/3)  - s/p PICC line placement 2/3/23  - Weekly labs while on antibiotics: CBC, CMP, ESR, CRP, CPK. Fax to Dr Rahman at [phone]  - Decreased Hydromorphone from 2mg to 1mg IV q4h PRN severe pain on 2/5/23   - Pt reports follows closely w/ pain management and have been in contact with NP, will f/u pain management outpt for discharge home regimen  - ordered for Naloxone IV PRN  - Medicine consulted for possible transfer, but rejected. Appreciate service recs.   - Bowel regimen ordered

## 2023-02-06 LAB
ANION GAP SERPL CALC-SCNC: 13 MMOL/L — SIGNIFICANT CHANGE UP (ref 5–17)
BASOPHILS # BLD AUTO: 0.07 K/UL — SIGNIFICANT CHANGE UP (ref 0–0.2)
BASOPHILS NFR BLD AUTO: 0.6 % — SIGNIFICANT CHANGE UP (ref 0–2)
BUN SERPL-MCNC: 13 MG/DL — SIGNIFICANT CHANGE UP (ref 7–23)
CALCIUM SERPL-MCNC: 9.2 MG/DL — SIGNIFICANT CHANGE UP (ref 8.4–10.5)
CHLORIDE SERPL-SCNC: 100 MMOL/L — SIGNIFICANT CHANGE UP (ref 96–108)
CO2 SERPL-SCNC: 26 MMOL/L — SIGNIFICANT CHANGE UP (ref 22–31)
CREAT SERPL-MCNC: 0.63 MG/DL — SIGNIFICANT CHANGE UP (ref 0.5–1.3)
EGFR: 116 ML/MIN/1.73M2 — SIGNIFICANT CHANGE UP
EOSINOPHIL # BLD AUTO: 0.18 K/UL — SIGNIFICANT CHANGE UP (ref 0–0.5)
EOSINOPHIL NFR BLD AUTO: 1.5 % — SIGNIFICANT CHANGE UP (ref 0–6)
GLUCOSE SERPL-MCNC: 93 MG/DL — SIGNIFICANT CHANGE UP (ref 70–99)
HCT VFR BLD CALC: 34.7 % — SIGNIFICANT CHANGE UP (ref 34.5–45)
HGB BLD-MCNC: 11.2 G/DL — LOW (ref 11.5–15.5)
IMM GRANULOCYTES NFR BLD AUTO: 0.6 % — SIGNIFICANT CHANGE UP (ref 0–0.9)
LYMPHOCYTES # BLD AUTO: 18.2 % — SIGNIFICANT CHANGE UP (ref 13–44)
LYMPHOCYTES # BLD AUTO: 2.13 K/UL — SIGNIFICANT CHANGE UP (ref 1–3.3)
MAGNESIUM SERPL-MCNC: 1.8 MG/DL — SIGNIFICANT CHANGE UP (ref 1.6–2.6)
MCHC RBC-ENTMCNC: 28.2 PG — SIGNIFICANT CHANGE UP (ref 27–34)
MCHC RBC-ENTMCNC: 32.3 GM/DL — SIGNIFICANT CHANGE UP (ref 32–36)
MCV RBC AUTO: 87.4 FL — SIGNIFICANT CHANGE UP (ref 80–100)
MONOCYTES # BLD AUTO: 0.8 K/UL — SIGNIFICANT CHANGE UP (ref 0–0.9)
MONOCYTES NFR BLD AUTO: 6.8 % — SIGNIFICANT CHANGE UP (ref 2–14)
NEUTROPHILS # BLD AUTO: 8.44 K/UL — HIGH (ref 1.8–7.4)
NEUTROPHILS NFR BLD AUTO: 72.3 % — SIGNIFICANT CHANGE UP (ref 43–77)
NRBC # BLD: 0 /100 WBCS — SIGNIFICANT CHANGE UP (ref 0–0)
PLATELET # BLD AUTO: 486 K/UL — HIGH (ref 150–400)
POTASSIUM SERPL-MCNC: 4.2 MMOL/L — SIGNIFICANT CHANGE UP (ref 3.5–5.3)
POTASSIUM SERPL-SCNC: 4.2 MMOL/L — SIGNIFICANT CHANGE UP (ref 3.5–5.3)
RBC # BLD: 3.97 M/UL — SIGNIFICANT CHANGE UP (ref 3.8–5.2)
RBC # FLD: 12.6 % — SIGNIFICANT CHANGE UP (ref 10.3–14.5)
SODIUM SERPL-SCNC: 139 MMOL/L — SIGNIFICANT CHANGE UP (ref 135–145)
WBC # BLD: 11.69 K/UL — HIGH (ref 3.8–10.5)
WBC # FLD AUTO: 11.69 K/UL — HIGH (ref 3.8–10.5)

## 2023-02-06 PROCEDURE — 99233 SBSQ HOSP IP/OBS HIGH 50: CPT | Mod: GC

## 2023-02-06 RX ORDER — DAPTOMYCIN 500 MG/10ML
800 INJECTION, POWDER, LYOPHILIZED, FOR SOLUTION INTRAVENOUS
Qty: 30 | Refills: 0
Start: 2023-02-06 | End: 2023-03-07

## 2023-02-06 RX ORDER — GABAPENTIN 400 MG/1
300 CAPSULE ORAL
Refills: 0 | Status: DISCONTINUED | OUTPATIENT
Start: 2023-02-06 | End: 2023-02-07

## 2023-02-06 RX ORDER — HYDROMORPHONE HYDROCHLORIDE 2 MG/ML
1 INJECTION INTRAMUSCULAR; INTRAVENOUS; SUBCUTANEOUS EVERY 4 HOURS
Refills: 0 | Status: DISCONTINUED | OUTPATIENT
Start: 2023-02-06 | End: 2023-02-07

## 2023-02-06 RX ADMIN — CHLORHEXIDINE GLUCONATE 1 APPLICATION(S): 213 SOLUTION TOPICAL at 05:35

## 2023-02-06 RX ADMIN — HYDROMORPHONE HYDROCHLORIDE 1 MILLIGRAM(S): 2 INJECTION INTRAMUSCULAR; INTRAVENOUS; SUBCUTANEOUS at 11:22

## 2023-02-06 RX ADMIN — NEBIVOLOL HYDROCHLORIDE 10 MILLIGRAM(S): 5 TABLET ORAL at 05:31

## 2023-02-06 RX ADMIN — HYDROMORPHONE HYDROCHLORIDE 1 MILLIGRAM(S): 2 INJECTION INTRAMUSCULAR; INTRAVENOUS; SUBCUTANEOUS at 23:52

## 2023-02-06 RX ADMIN — HYDROMORPHONE HYDROCHLORIDE 1 MILLIGRAM(S): 2 INJECTION INTRAMUSCULAR; INTRAVENOUS; SUBCUTANEOUS at 00:21

## 2023-02-06 RX ADMIN — HYDROMORPHONE HYDROCHLORIDE 1 MILLIGRAM(S): 2 INJECTION INTRAMUSCULAR; INTRAVENOUS; SUBCUTANEOUS at 19:39

## 2023-02-06 RX ADMIN — CEFEPIME 100 MILLIGRAM(S): 1 INJECTION, POWDER, FOR SOLUTION INTRAMUSCULAR; INTRAVENOUS at 10:29

## 2023-02-06 RX ADMIN — SODIUM CHLORIDE 10 MILLILITER(S): 9 INJECTION INTRAMUSCULAR; INTRAVENOUS; SUBCUTANEOUS at 23:58

## 2023-02-06 RX ADMIN — HYDROMORPHONE HYDROCHLORIDE 1 MILLIGRAM(S): 2 INJECTION INTRAMUSCULAR; INTRAVENOUS; SUBCUTANEOUS at 15:32

## 2023-02-06 RX ADMIN — HYDROMORPHONE HYDROCHLORIDE 1 MILLIGRAM(S): 2 INJECTION INTRAMUSCULAR; INTRAVENOUS; SUBCUTANEOUS at 15:47

## 2023-02-06 RX ADMIN — ENOXAPARIN SODIUM 40 MILLIGRAM(S): 100 INJECTION SUBCUTANEOUS at 05:33

## 2023-02-06 RX ADMIN — CEFEPIME 100 MILLIGRAM(S): 1 INJECTION, POWDER, FOR SOLUTION INTRAMUSCULAR; INTRAVENOUS at 02:14

## 2023-02-06 RX ADMIN — HYDROMORPHONE HYDROCHLORIDE 1 MILLIGRAM(S): 2 INJECTION INTRAMUSCULAR; INTRAVENOUS; SUBCUTANEOUS at 06:30

## 2023-02-06 RX ADMIN — HYDROMORPHONE HYDROCHLORIDE 1 MILLIGRAM(S): 2 INJECTION INTRAMUSCULAR; INTRAVENOUS; SUBCUTANEOUS at 11:50

## 2023-02-06 RX ADMIN — NEBIVOLOL HYDROCHLORIDE 10 MILLIGRAM(S): 5 TABLET ORAL at 17:32

## 2023-02-06 RX ADMIN — ENOXAPARIN SODIUM 40 MILLIGRAM(S): 100 INJECTION SUBCUTANEOUS at 17:33

## 2023-02-06 RX ADMIN — HYDROMORPHONE HYDROCHLORIDE 1 MILLIGRAM(S): 2 INJECTION INTRAMUSCULAR; INTRAVENOUS; SUBCUTANEOUS at 00:30

## 2023-02-06 RX ADMIN — GABAPENTIN 300 MILLIGRAM(S): 400 CAPSULE ORAL at 15:32

## 2023-02-06 RX ADMIN — DAPTOMYCIN 132 MILLIGRAM(S): 500 INJECTION, POWDER, LYOPHILIZED, FOR SOLUTION INTRAVENOUS at 18:29

## 2023-02-06 RX ADMIN — CEFEPIME 100 MILLIGRAM(S): 1 INJECTION, POWDER, FOR SOLUTION INTRAMUSCULAR; INTRAVENOUS at 17:32

## 2023-02-06 RX ADMIN — HYDROMORPHONE HYDROCHLORIDE 1 MILLIGRAM(S): 2 INJECTION INTRAMUSCULAR; INTRAVENOUS; SUBCUTANEOUS at 20:00

## 2023-02-06 RX ADMIN — HYDROMORPHONE HYDROCHLORIDE 1 MILLIGRAM(S): 2 INJECTION INTRAMUSCULAR; INTRAVENOUS; SUBCUTANEOUS at 06:50

## 2023-02-06 NOTE — PROVIDER CONTACT NOTE (MEDICATION) - SITUATION
Pt A/OX4, Pt complaining of pain related to left foot AVM. Pt s/p direct foot embolization on 1/24/23. Pt on antibiotic therapy. Pt unhappy with new pain management dosing. Pt educated on pain management regimen.

## 2023-02-06 NOTE — PROGRESS NOTE ADULT - SUBJECTIVE AND OBJECTIVE BOX
INTERVAL HPI/OVERNIGHT EVENTS:  O/N: BPs 140s-180, and one BP as low as 126/75. Started on miralax and senna; dilaudid downtitrated and received toradol.  This morning: Patient was seen and examined at bedside. Unhappy with dilaudid reduction. Says she was on on higher dose of dilaudid at home (?2 PO q4 hrs, and percocet )  and sees pain management for higher pain med requirements.     VITAL SIGNS:  T(F): 97.3 (02-06-23 @ 09:33)  HR: 90 (02-06-23 @ 11:50)  BP: 113/62 (02-06-23 @ 11:50)  RR: 18 (02-06-23 @ 11:50)  SpO2: 97% (02-06-23 @ 08:45)  Wt(kg): --    PHYSICAL EXAM:    Constitutional: resting comfortably in bed; NAD  HEENT: NC/AT, EOMI, anicteric sclera, no nasal discharge   Respiratory: breathing comfortably on RA  Extremities: recently wrapped L foot AVM, reviewed recent imaging again with patient showing small erythematous area of skin around AVM at lateral aspect of L heel w/ no active purulence/drainage, appears similar compared to prior  Neurologic: alert and oriented, no focal deficits noted    MEDICATIONS  (STANDING):  cefepime   IVPB      cefepime   IVPB 2000 milliGRAM(s) IV Intermittent every 8 hours  chlorhexidine 2% Cloths 1 Application(s) Topical <User Schedule>  chlorhexidine 4% Liquid 1 Application(s) Topical once  DAPTOmycin IVPB      DAPTOmycin IVPB 800 milliGRAM(s) IV Intermittent every 24 hours  enoxaparin Injectable 40 milliGRAM(s) SubCutaneous every 12 hours  nebivolol 10 milliGRAM(s) Oral <User Schedule>  polyethylene glycol 3350 17 Gram(s) Oral daily  senna 2 Tablet(s) Oral at bedtime    MEDICATIONS  (PRN):  HYDROmorphone  Injectable 1 milliGRAM(s) IV Push every 4 hours PRN Severe Pain (7 - 10)  naloxone Injectable 0.1 milliGRAM(s) IV Push Once PRN For ANY of the following changes in patient status:  A. RR LESS THAN 10 breaths per minute, B. Oxygen saturation LESS THAN 90%, C. Sedation score of 6  ondansetron Injectable 4 milliGRAM(s) IV Push Once PRN Nausea  sodium chloride 0.9% lock flush 10 milliLiter(s) IV Push every 1 hour PRN Pre/post blood products, medications, blood draw, and to maintain line patency      Allergies    amoxicillin (Angioedema)  ciprofloxacin (Rash)  hydrochlorothiazide (Hives)  latex (Rash)  lisinopril (Angioedema; Rash; Hives)  morphine (Rash)  penicillin (Rash)    Intolerances        LABS:                        11.2   11.69 )-----------( 486      ( 06 Feb 2023 05:30 )             34.7     02-06    139  |  100  |  13  ----------------------------<  93  4.2   |  26  |  0.63    Ca    9.2      06 Feb 2023 05:30  Mg     1.8     02-06                  RADIOLOGY & ADDITIONAL TESTS:  Reviewed

## 2023-02-06 NOTE — PROGRESS NOTE ADULT - ASSESSMENT
36 y/o female, morbidly obese, w/ PMHx HTN and left foot AVM w/ chronic left foot ulcer (s/p multiple embolizations, s/p OR debridement 10/2022, and s/p prior antibotic treatment w/ recent treatment from 11/7/2022 to 12/29/2022 w/ Meropenem/Vancomycin and wound closure on 12/23/2022) who presented for additional embolization. Patient s/p direct stick embolization of the left foot 1/24/2023. Admitted to cardiac tele and currently on IV Abx x 4 wks per ID recs. S/p PICC placement 2/3/23. Awaiting [facility] placement.

## 2023-02-06 NOTE — PROGRESS NOTE ADULT - ASSESSMENT
37F PMH obesity, HTN, peripheral AVMs, who presented for direct stick embolization of L foot AVM on 1/24/23. Medicine following for comanagement    #Fevers  #L foot AVM s/p direct stick embolization  #L foot cellulitis  Recently on vanc/meropenem, last doses for both on 1/31, now transitioned to daptomycin and cefepime per ID, and PICC placed 2/3/23.  - f/u blood cultures (negative to date)  - baseline CPK 25 (2/1), monitor weekly thereafter while on daptomycin -> next on 2/8  - f/u ID recs  - pain management per primary team -> would continue with dilaudid 1mg IV q4 prn (was getting 2IV q4 prn until 2/5) and gabapentin 300mg BID at 8am and 4pm similar to her home schedule to help lower opioid exposure  - monitor for opioid withdrawal as wean dilaudid  - continue senna and miralax while on high opioid regimen to avoid opioid-induced constipation  - wound care consult for further evaluation of care of L foot AVM    #HTN  - On nebivolol 10 BID at home, during this admission has been on 5 BID as her pain medications were dropping her blood pressures too much. She has been on nebivolol for a few years and was previously on lisinopril-HCTZ for years but woke up one day with angioedema, rash, hives and has been off those meds since then. Pt does not believe she has been on CCB, though she had extensive allergy testing which was reviewed with her at bedside on 2/1/23, no evidence of CCB allergy  - BPs predominantly 140s-160s this hospitalization, though has had some 120s-130s, and as high as 190s  - BP per pt elevated in setting of pain, tends to run lower after pain medications  - BPs better controlled since being on nebivolol 10 BID  Recommend:  - continue to monitor BP and monitor on nebivolol increased back to home dose 10 BID on 2/1   - if still hypertensive would recommend starting CCB such as amlodipine 5mg qD as this is typically preferred over BB therapy for HTN in absence of other cardiac history as is the case with her, especially as her BP fluctuates at home on BB      Rest of care per primary team.

## 2023-02-06 NOTE — PROGRESS NOTE ADULT - PROBLEM SELECTOR PLAN 1
H/o left foot AVM w/ chronic foot ulcer w/ multiple prior embolizations and a prior OR debridement 10/2022, s/p IV Meropenem/Vanco from 11/07/2022 to 12/29/2022.   - s/p additional direct stick embolization 01/24/2023 w/ Dr. Teran   - ID following post-procedure for co-management   - Leukocytosis now resolved. Reported fever 100.5 F on 1/31. Currently afebrile.   - D/c'ed Vancomycin and Meropenem 1/31  - Blood Cultures x 2 sets 1/31 - NGTD  - ID following. Recs appreciated  - c/w IV Daptomycin 800mg q 24h and IV Cefepime 2g q 8h x 4 wks (2/3-3/3)  - s/p PICC line placement 2/3/23  - Weekly labs while on antibiotics: CBC, CMP, ESR, CRP, CPK - next on 2/8. Fax to Dr Rahman at [phone]  - c/w decreased Hydromorphone from 2mg to 1mg IV q4h PRN severe pain on 2/5/23   - Pt reports follows closely w/ pain management and have been in contact with NP, will f/u pain management outpt for discharge home regimen  -Consider inpatient Pain Management consult  - ordered for Naloxone IV PRN  - Medicine consulted for possible transfer, but rejected. Appreciate service recs.   - Bowel regimen ordered

## 2023-02-06 NOTE — PROVIDER CONTACT NOTE (MEDICATION) - ACTION/TREATMENT ORDERED:
Patient offered alternative medications. Pt declined Toradol/ IV Tylenol. To be addressed by primary medicine team in am.

## 2023-02-06 NOTE — PROGRESS NOTE ADULT - SUBJECTIVE AND OBJECTIVE BOX
CARDIOLOGY NP PROGRESS NOTE    Subjective: Pt seen and examined at bedside. Reports feeling ongoing L foot pain, pain not controlled w/ decreased dose of IV Dilaudid. Denies chest pain, sob, lightheadedness, dizziness, palpitations, fever, chills.  Remainder ROS otherwise negative.    Overnight Events: none    TELEMETRY: SR 80-90s      VITAL SIGNS:  T(C): 36.3 (02-06-23 @ 09:33), Max: 36.4 (02-05-23 @ 22:22)  HR: 92 (02-06-23 @ 08:45) (88 - 94)  BP: 162/92 (02-06-23 @ 08:45) (126/75 - 193/91)  RR: 18 (02-06-23 @ 08:45) (16 - 18)  SpO2: 97% (02-06-23 @ 08:45) (95% - 97%)  Wt(kg): --    I&O's Summary    05 Feb 2023 07:01  -  06 Feb 2023 07:00  --------------------------------------------------------  IN: 1260 mL / OUT: 2 mL / NET: 1258 mL          PHYSICAL EXAM:    General: A/ox 3, No acute Distress  Neck: Supple, NO JVD  Cardiac: S1 S2, No M/R/G  Pulmonary: CTAB, Breathing unlabored, No Rhonchi/Rales/Wheezing  Abdomen: Soft, Non -tender, +BS x 4 quads  Extremities: No Rashes, No edema.  L foot Kerlix dressing in place.   Neuro: A/o x 3, No focal deficits          LABS:                          11.2   11.69 )-----------( 486      ( 06 Feb 2023 05:30 )             34.7                              02-06    139  |  100  |  13  ----------------------------<  93  4.2   |  26  |  0.63    Ca    9.2      06 Feb 2023 05:30  Mg     1.8     02-06                                CAPILLARY BLOOD GLUCOSE                Allergies:  amoxicillin (Angioedema)  ciprofloxacin (Rash)  hydrochlorothiazide (Hives)  latex (Rash)  lisinopril (Angioedema; Rash; Hives)  morphine (Rash)  penicillin (Rash)    MEDICATIONS  (STANDING):  cefepime   IVPB      cefepime   IVPB 2000 milliGRAM(s) IV Intermittent every 8 hours  chlorhexidine 2% Cloths 1 Application(s) Topical <User Schedule>  chlorhexidine 4% Liquid 1 Application(s) Topical once  DAPTOmycin IVPB      DAPTOmycin IVPB 800 milliGRAM(s) IV Intermittent every 24 hours  enoxaparin Injectable 40 milliGRAM(s) SubCutaneous every 12 hours  nebivolol 10 milliGRAM(s) Oral <User Schedule>  polyethylene glycol 3350 17 Gram(s) Oral daily  senna 2 Tablet(s) Oral at bedtime    MEDICATIONS  (PRN):  HYDROmorphone  Injectable 1 milliGRAM(s) IV Push four times a day PRN Severe Pain (7 - 10)  naloxone Injectable 0.1 milliGRAM(s) IV Push Once PRN For ANY of the following changes in patient status:  A. RR LESS THAN 10 breaths per minute, B. Oxygen saturation LESS THAN 90%, C. Sedation score of 6  ondansetron Injectable 4 milliGRAM(s) IV Push Once PRN Nausea  sodium chloride 0.9% lock flush 10 milliLiter(s) IV Push every 1 hour PRN Pre/post blood products, medications, blood draw, and to maintain line patency        DIAGNOSTIC TESTS:

## 2023-02-06 NOTE — PROGRESS NOTE ADULT - PROBLEM SELECTOR PLAN 2
SBP ranging 130s- 150's   - resumed home medication: Bystolic 10mg BID     VTE PPX: Lovenox SQ    Dispo: Complex discharge planning- Patient would have an out of pocket cost of ~ $1000 a week after what insurance covers for ABX and supplies. SW and Case management provided patient with option of going to [facility] as she has Medicare which will cover 20 days at 100% at a [facility]. Patient has agreed to go to [facility] and has selected facilities. SW and Case Management to follow-up

## 2023-02-06 NOTE — PROGRESS NOTE ADULT - ATTENDING COMMENTS
37F PMH obesity, HTN, peripheral AVMs, who presented for direct stick embolization of L foot AVM on 1/24/23. Medicine following for comanagement    CC: Pt reports L foot pain since Dilaudid decreased from 2mg iv q4hr prn to 1mg iv 4 times a day which shall be q4hr prn. Discussed with pt at length and learned about his pain history and requirement. Pt normally on Percocet 10/325 tab ~ 5-6 tabs a day as maintainance and if pain increased that her pain doctor would prescribe her dilaudid 2mg po q4hr prn and she is also taking gabapentin 100mg po qAM and 400mg po q4PM at home. She required oxycodone dose ~ 60mg and is equivalent to Dilaudid 22.5mg po. Pt now takes average Dilaudid 10-12mg iv daily since 1/31; equivalent to Dilaudid 50-60mg po daily which is double her home requirement more of less. Pt prefers to keep Dilaudid iv at dose 1mg iv q4hr prn pain 7/10 or greater, and add gabapentin 300mg po bid ( 8am and 4pm).     #Fevers  #L foot AVM s/p direct stick embolization  #L foot cellulitis  # L foot pain   - recently on Vanc/Meropenem, last doses for both on 1/31, now transitioned to daptomycin and cefepime (2/3-3/3) for 4 weeks per ID , and PICC placed 2/3/23.  - WBC 10.14K, normalized   - f/u blood cultures (negative to date)  - baseline CPK 25, monitor weekly thereafter while on daptomycin given risk of Rhabdomyolysis   - Pain requirement reviewed: Discussed with pt at length and learned about his pain history and requirement. Pt normally on Percocet 10/325 tab ~ 5-6 tabs a day as maintainance and if pain increased that her pain doctor would prescribe her dilaudid 2mg po q4hr prn and she is also taking gabapentin 100mg po qAM and 400mg po q4PM at home. She required oxycodone dose ~ 60mg and is equivalent to Dilaudid 22.5mg po. Pt now takes average Dilaudid 10-12mg iv daily since 1/31; equivalent to Dilaudid 50-60mg po daily which is double her home requirement more of less. Pt prefers to keep Dilaudid iv at dose 1mg iv q4hr prn pain 7/10 or greater, and add gabapentin 300mg po bid ( 8am and 4pm).     - add bowel regimen: senna and miralax       #HTN  - nebivolol 10 BID   - monitor BP     # Disposition: SW to arrange [facility] for skilled needs for completion of iv abx ( Daptomycin and Cefepime till 3/3) , apparently pt's insurance not covering iv abx Ertapenem cost $ 1000 per week that pt cannot afford as she was already dealing with the last bill for $ 7000 dollars from infusion company which she is a process of appeal)     Med consult team continues to follow with you.    POC as d/w Cardiology NP

## 2023-02-07 VITALS — TEMPERATURE: 97 F

## 2023-02-07 LAB
ANION GAP SERPL CALC-SCNC: 11 MMOL/L — SIGNIFICANT CHANGE UP (ref 5–17)
BASOPHILS # BLD AUTO: 0.06 K/UL — SIGNIFICANT CHANGE UP (ref 0–0.2)
BASOPHILS NFR BLD AUTO: 0.5 % — SIGNIFICANT CHANGE UP (ref 0–2)
BUN SERPL-MCNC: 15 MG/DL — SIGNIFICANT CHANGE UP (ref 7–23)
CALCIUM SERPL-MCNC: 9.6 MG/DL — SIGNIFICANT CHANGE UP (ref 8.4–10.5)
CHLORIDE SERPL-SCNC: 100 MMOL/L — SIGNIFICANT CHANGE UP (ref 96–108)
CO2 SERPL-SCNC: 25 MMOL/L — SIGNIFICANT CHANGE UP (ref 22–31)
CREAT SERPL-MCNC: 0.64 MG/DL — SIGNIFICANT CHANGE UP (ref 0.5–1.3)
EGFR: 115 ML/MIN/1.73M2 — SIGNIFICANT CHANGE UP
EOSINOPHIL # BLD AUTO: 0.2 K/UL — SIGNIFICANT CHANGE UP (ref 0–0.5)
EOSINOPHIL NFR BLD AUTO: 1.7 % — SIGNIFICANT CHANGE UP (ref 0–6)
GLUCOSE SERPL-MCNC: 92 MG/DL — SIGNIFICANT CHANGE UP (ref 70–99)
HCT VFR BLD CALC: 36.3 % — SIGNIFICANT CHANGE UP (ref 34.5–45)
HGB BLD-MCNC: 11.6 G/DL — SIGNIFICANT CHANGE UP (ref 11.5–15.5)
IMM GRANULOCYTES NFR BLD AUTO: 0.6 % — SIGNIFICANT CHANGE UP (ref 0–0.9)
LYMPHOCYTES # BLD AUTO: 17.5 % — SIGNIFICANT CHANGE UP (ref 13–44)
LYMPHOCYTES # BLD AUTO: 2.08 K/UL — SIGNIFICANT CHANGE UP (ref 1–3.3)
MAGNESIUM SERPL-MCNC: 1.8 MG/DL — SIGNIFICANT CHANGE UP (ref 1.6–2.6)
MCHC RBC-ENTMCNC: 28 PG — SIGNIFICANT CHANGE UP (ref 27–34)
MCHC RBC-ENTMCNC: 32 GM/DL — SIGNIFICANT CHANGE UP (ref 32–36)
MCV RBC AUTO: 87.5 FL — SIGNIFICANT CHANGE UP (ref 80–100)
MONOCYTES # BLD AUTO: 0.82 K/UL — SIGNIFICANT CHANGE UP (ref 0–0.9)
MONOCYTES NFR BLD AUTO: 6.9 % — SIGNIFICANT CHANGE UP (ref 2–14)
NEUTROPHILS # BLD AUTO: 8.64 K/UL — HIGH (ref 1.8–7.4)
NEUTROPHILS NFR BLD AUTO: 72.8 % — SIGNIFICANT CHANGE UP (ref 43–77)
NRBC # BLD: 0 /100 WBCS — SIGNIFICANT CHANGE UP (ref 0–0)
PLATELET # BLD AUTO: 505 K/UL — HIGH (ref 150–400)
POTASSIUM SERPL-MCNC: 4.7 MMOL/L — SIGNIFICANT CHANGE UP (ref 3.5–5.3)
POTASSIUM SERPL-SCNC: 4.7 MMOL/L — SIGNIFICANT CHANGE UP (ref 3.5–5.3)
RBC # BLD: 4.15 M/UL — SIGNIFICANT CHANGE UP (ref 3.8–5.2)
RBC # FLD: 12.9 % — SIGNIFICANT CHANGE UP (ref 10.3–14.5)
SODIUM SERPL-SCNC: 136 MMOL/L — SIGNIFICANT CHANGE UP (ref 135–145)
WBC # BLD: 11.87 K/UL — HIGH (ref 3.8–10.5)
WBC # FLD AUTO: 11.87 K/UL — HIGH (ref 3.8–10.5)

## 2023-02-07 PROCEDURE — 83735 ASSAY OF MAGNESIUM: CPT

## 2023-02-07 PROCEDURE — 99232 SBSQ HOSP IP/OBS MODERATE 35: CPT | Mod: GC

## 2023-02-07 PROCEDURE — 85025 COMPLETE CBC W/AUTO DIFF WBC: CPT

## 2023-02-07 PROCEDURE — 84702 CHORIONIC GONADOTROPIN TEST: CPT

## 2023-02-07 PROCEDURE — 80202 ASSAY OF VANCOMYCIN: CPT

## 2023-02-07 PROCEDURE — 81025 URINE PREGNANCY TEST: CPT

## 2023-02-07 PROCEDURE — C1889: CPT

## 2023-02-07 PROCEDURE — 85730 THROMBOPLASTIN TIME PARTIAL: CPT

## 2023-02-07 PROCEDURE — 80061 LIPID PANEL: CPT

## 2023-02-07 PROCEDURE — C1894: CPT

## 2023-02-07 PROCEDURE — 80053 COMPREHEN METABOLIC PANEL: CPT

## 2023-02-07 PROCEDURE — 80048 BASIC METABOLIC PNL TOTAL CA: CPT

## 2023-02-07 PROCEDURE — 36573 INSJ PICC RS&I 5 YR+: CPT

## 2023-02-07 PROCEDURE — 36415 COLL VENOUS BLD VENIPUNCTURE: CPT

## 2023-02-07 PROCEDURE — 93005 ELECTROCARDIOGRAM TRACING: CPT

## 2023-02-07 PROCEDURE — 85610 PROTHROMBIN TIME: CPT

## 2023-02-07 PROCEDURE — 82550 ASSAY OF CK (CPK): CPT

## 2023-02-07 PROCEDURE — 85027 COMPLETE CBC AUTOMATED: CPT

## 2023-02-07 PROCEDURE — 87040 BLOOD CULTURE FOR BACTERIA: CPT

## 2023-02-07 RX ADMIN — HYDROMORPHONE HYDROCHLORIDE 1 MILLIGRAM(S): 2 INJECTION INTRAMUSCULAR; INTRAVENOUS; SUBCUTANEOUS at 04:05

## 2023-02-07 RX ADMIN — CHLORHEXIDINE GLUCONATE 1 APPLICATION(S): 213 SOLUTION TOPICAL at 05:41

## 2023-02-07 RX ADMIN — CEFEPIME 100 MILLIGRAM(S): 1 INJECTION, POWDER, FOR SOLUTION INTRAMUSCULAR; INTRAVENOUS at 10:10

## 2023-02-07 RX ADMIN — GABAPENTIN 300 MILLIGRAM(S): 400 CAPSULE ORAL at 15:59

## 2023-02-07 RX ADMIN — HYDROMORPHONE HYDROCHLORIDE 1 MILLIGRAM(S): 2 INJECTION INTRAMUSCULAR; INTRAVENOUS; SUBCUTANEOUS at 03:50

## 2023-02-07 RX ADMIN — CEFEPIME 100 MILLIGRAM(S): 1 INJECTION, POWDER, FOR SOLUTION INTRAMUSCULAR; INTRAVENOUS at 02:21

## 2023-02-07 RX ADMIN — HYDROMORPHONE HYDROCHLORIDE 1 MILLIGRAM(S): 2 INJECTION INTRAMUSCULAR; INTRAVENOUS; SUBCUTANEOUS at 08:25

## 2023-02-07 RX ADMIN — NEBIVOLOL HYDROCHLORIDE 10 MILLIGRAM(S): 5 TABLET ORAL at 06:49

## 2023-02-07 RX ADMIN — ENOXAPARIN SODIUM 40 MILLIGRAM(S): 100 INJECTION SUBCUTANEOUS at 06:49

## 2023-02-07 RX ADMIN — HYDROMORPHONE HYDROCHLORIDE 1 MILLIGRAM(S): 2 INJECTION INTRAMUSCULAR; INTRAVENOUS; SUBCUTANEOUS at 12:37

## 2023-02-07 RX ADMIN — HYDROMORPHONE HYDROCHLORIDE 1 MILLIGRAM(S): 2 INJECTION INTRAMUSCULAR; INTRAVENOUS; SUBCUTANEOUS at 16:44

## 2023-02-07 RX ADMIN — HYDROMORPHONE HYDROCHLORIDE 1 MILLIGRAM(S): 2 INJECTION INTRAMUSCULAR; INTRAVENOUS; SUBCUTANEOUS at 00:07

## 2023-02-07 RX ADMIN — SODIUM CHLORIDE 10 MILLILITER(S): 9 INJECTION INTRAMUSCULAR; INTRAVENOUS; SUBCUTANEOUS at 02:30

## 2023-02-07 RX ADMIN — GABAPENTIN 300 MILLIGRAM(S): 400 CAPSULE ORAL at 07:17

## 2023-02-07 RX ADMIN — HYDROMORPHONE HYDROCHLORIDE 1 MILLIGRAM(S): 2 INJECTION INTRAMUSCULAR; INTRAVENOUS; SUBCUTANEOUS at 08:10

## 2023-02-07 RX ADMIN — DAPTOMYCIN 132 MILLIGRAM(S): 500 INJECTION, POWDER, LYOPHILIZED, FOR SOLUTION INTRAVENOUS at 17:06

## 2023-02-07 NOTE — PROGRESS NOTE ADULT - ATTENDING COMMENTS
37F PMH obesity, HTN, peripheral AVMs, who presented for direct stick embolization of L foot AVM on 1/24/23. Medicine following for comanagement    CC: Pt reports her L heel pain is tolerable on current pain management on dilaudid 1mg iv q4hr prn and Gabapentin 300mg po bid ( 8am and 4pm).     #Fevers  #L foot AVM s/p direct stick embolization  #L foot cellulitis  # L foot pain   - recently on Vanc/Meropenem, last doses for both on 1/31, now transitioned to daptomycin and cefepime (2/3-3/3) for 4 weeks per ID , and PICC placed 2/3/23.  - WBC 10.14K-> 11.87K, normalized   - f/u blood cultures (negative to date)  - baseline CPK 25, monitor weekly thereafter while on daptomycin given risk of Rhabdomyolysis   - Pain requirement reviewed: Discussed with pt at length and learned about his pain history and requirement. Pt normally on Percocet 10/325 tab ~ 5-6 tabs a day as maintainance and if pain increased that her pain doctor would prescribe her dilaudid 2mg po q4hr prn and she is also taking gabapentin 100mg po qAM and 400mg po q4PM at home. She required oxycodone dose ~ 60mg and is equivalent to Dilaudid 22.5mg po. Pt now takes average Dilaudid 10-12mg iv daily since 1/31; equivalent to Dilaudid 50-60mg po daily which is double her home requirement more of less. Pt prefers to keep Dilaudid iv at dose 1mg iv q4hr prn pain 7/10 or greater, and add gabapentin 300mg po bid ( 8am and 4pm).   - 2/7: Pain controlled on Dilaudid 1mg iv q4hr prn that pt's pain doctor will arrange dilaudid 4mg po q4hr prn outpt per pt since now sounds like she can go home and her family will help her paying cost of abx ( ~$400 a week instead with different vendor)     - add bowel regimen: senna and miralax       #HTN  - nebivolol 10 BID   - monitor BP     # Disposition: SW to arrange home iv abx infusion till 3/3 last day, f/u ID Dr. Rahman and Dr. Teran     Med consult team continues to follow with you.    POC as d/w Cardiology NP .      Electronic Signatures:

## 2023-02-07 NOTE — PROGRESS NOTE ADULT - PROBLEM SELECTOR PLAN 2
SBP ranging 130s- 150's   - resumed home medication: Bystolic 10mg BID     VTE PPX: Lovenox SQ    Dispo: Complex discharge planning- Patient would have an out of pocket cost of ~ $800-1000 a week after what insurance covers for ABX and supplies. Pt agreed to home infusion, awaiting set up. SW and Case Management following.

## 2023-02-07 NOTE — PROGRESS NOTE ADULT - PROVIDER SPECIALTY LIST ADULT
Cardiology
Internal Medicine
Hospitalist
Infectious Disease
Internal Medicine
Cardiology
Infectious Disease
Infectious Disease
Cardiology
Cardiology
Infectious Disease
Internal Medicine
Intervent Cardiology
Cardiology
Intervent Cardiology
Cardiology

## 2023-02-07 NOTE — PROGRESS NOTE ADULT - PROBLEM SELECTOR PROBLEM 1
AVM (arteriovenous malformation)

## 2023-02-07 NOTE — PROGRESS NOTE ADULT - REASON FOR ADMISSION
Direct Stick Embolization

## 2023-02-07 NOTE — PROGRESS NOTE ADULT - PROBLEM SELECTOR PLAN 1
H/o left foot AVM w/ chronic foot ulcer w/ multiple prior embolizations and a prior OR debridement 10/2022, s/p IV Meropenem/Vanco from 11/07/2022 to 12/29/2022.   - s/p additional direct stick embolization 01/24/2023 w/ Dr. Teran   - ID following post-procedure for co-management   - Leukocytosis now resolved. Reported fever 100.5 F on 1/31. Currently afebrile.   - D/c'ed Vancomycin and Meropenem 1/31  - Blood Cultures x 2 sets 1/31 - NGTD  - ID following. Recs appreciated  - c/w IV Daptomycin 800mg q 24h and IV Cefepime 2g q 8h x 4 wks (2/3-3/3)  - s/p PICC line placement 2/3/23  - Weekly labs while on antibiotics: CBC, CMP, ESR, CRP, CPK - next on 2/8. Fax to Dr Rahman at [phone]  - c/w Hydromorphone 1mg IV q4h PRN severe pain while inpatient. c/w Gabapentin 300mg BID  - Pt reports follows closely w/ pain management and have been in contact with NP, will f/u pain management outpt for discharge home regimen  - ordered for Naloxone IV PRN  - Medicine consulted for possible transfer, but rejected. Appreciate service recs.   - Bowel regimen ordered

## 2023-02-07 NOTE — PROGRESS NOTE ADULT - ASSESSMENT
37F PMH obesity, HTN, peripheral AVMs, who presented for direct stick embolization of L foot AVM on 1/24/23. Medicine following for comanagement.     #Fevers  #L foot AVM s/p direct stick embolization  #L foot cellulitis  Recently on vanc/meropenem, last doses for both on 1/31, now transitioned to daptomycin and cefepime per ID, and PICC placed 2/3/23.  - Blood cultures negative to date  - baseline CPK 25 (2/1), monitor weekly thereafter while on daptomycin -> next on 2/8  - Pain management per primary team -> would continue with Dilaudid 1mg IV q4 prn while inpatient and gabapentin 300mg BID at 8am and 4pm. Per patient, she's been in contact with her outpatient Pain Management doctor who is aware of her discharge and will place her on the proper pain regimen upon discharge   - Continue senna and miralax while on high opioid regimen to avoid opioid-induced constipation  - Wound care   - Plan for discharge today vs. tomorrow per Primary team. Will follow up with ID and pain management outpatient     #HTN  - On nebivolol 10 BID at home, during this admission has been on 5 BID as her pain medications were dropping her blood pressures too much. She has been on nebivolol for a few years and was previously on lisinopril-HCTZ for years but woke up one day with angioedema, rash, hives and has been off those meds since then. Pt does not believe she has been on CCB, though she had extensive allergy testing which was reviewed with her at bedside on 2/1/23, no evidence of CCB allergy  - BPs predominantly 140s-160s this hospitalization, though has had some 120s-130s, and as high as 190s  - BP per pt elevated in setting of pain, tends to run lower after pain medications  - BPs better controlled since being on nebivolol 10 BID  Recommend:  - Continue to monitor BP and monitor on nebivolol increased back to home dose 10 BID on 2/1       Rest of care per primary team.

## 2023-02-07 NOTE — PROGRESS NOTE ADULT - ASSESSMENT
36 y/o female, morbidly obese, w/ PMHx HTN and left foot AVM w/ chronic left foot ulcer (s/p multiple embolizations, s/p OR debridement 10/2022, and s/p prior antibotic treatment w/ recent treatment from 11/7/2022 to 12/29/2022 w/ Meropenem/Vancomycin and wound closure on 12/23/2022) who presented for additional embolization. Patient s/p direct stick embolization of the left foot 1/24/2023. Admitted to cardiac tele and currently on IV Abx x 4 wks per ID recs. S/p PICC placement 2/3/23. Awaiting home infusion set up.

## 2023-02-07 NOTE — PROGRESS NOTE ADULT - NUTRITIONAL ASSESSMENT
This patient has been assessed with a concern for Malnutrition and has been determined to have a diagnosis/diagnoses of Morbid obesity (BMI > 40).    This patient is being managed with:   Diet DASH/TLC-  Sodium & Cholesterol Restricted  Entered: Jan 27 2023  1:31PM    

## 2023-02-07 NOTE — PROGRESS NOTE ADULT - SUBJECTIVE AND OBJECTIVE BOX
CARDIOLOGY NP PROGRESS NOTE    Subjective: Pt seen and examined at bedside. Reports feeling pain is controlled with current regimen. Denies chest pain, sob, lightheadedness, dizziness, palpitations, fever, chills.  Remainder ROS otherwise negative.    Overnight Events: none    TELEMETRY: SR 70s         VITAL SIGNS:  T(C): 36.7 (02-07-23 @ 09:53), Max: 36.8 (02-06-23 @ 22:00)  HR: 84 (02-07-23 @ 09:30) (76 - 91)  BP: 157/72 (02-07-23 @ 09:30) (114/61 - 168/82)  RR: 17 (02-07-23 @ 09:30) (17 - 20)  SpO2: 99% (02-07-23 @ 09:30) (97% - 99%)  Wt(kg): --    I&O's Summary    06 Feb 2023 07:01  -  07 Feb 2023 07:00  --------------------------------------------------------  IN: 360 mL / OUT: 0 mL / NET: 360 mL    07 Feb 2023 07:01  -  07 Feb 2023 12:44  --------------------------------------------------------  IN: 240 mL / OUT: 0 mL / NET: 240 mL          PHYSICAL EXAM:    General: A/ox 3, No acute Distress  Neck: Supple, NO JVD  Cardiac: S1 S2, No M/R/G  Pulmonary: CTAB, Breathing unlabored, No Rhonchi/Rales/Wheezing  Abdomen: Soft, Non -tender, +BS x 4 quads  Extremities: No Rashes, No edema.  L foot Kerlix dressing in place.   Neuro: A/o x 3, No focal deficits          LABS:                          11.6   11.87 )-----------( 505      ( 07 Feb 2023 05:30 )             36.3                              02-07    136  |  100  |  15  ----------------------------<  92  4.7   |  25  |  0.64    Ca    9.6      07 Feb 2023 05:30  Mg     1.8     02-07                                CAPILLARY BLOOD GLUCOSE             Allergies:  amoxicillin (Angioedema)  ciprofloxacin (Rash)  hydrochlorothiazide (Hives)  latex (Rash)  lisinopril (Angioedema; Rash; Hives)  morphine (Rash)  penicillin (Rash)    MEDICATIONS  (STANDING):  cefepime   IVPB      cefepime   IVPB 2000 milliGRAM(s) IV Intermittent every 8 hours  chlorhexidine 2% Cloths 1 Application(s) Topical <User Schedule>  chlorhexidine 4% Liquid 1 Application(s) Topical once  DAPTOmycin IVPB 800 milliGRAM(s) IV Intermittent every 24 hours  DAPTOmycin IVPB      enoxaparin Injectable 40 milliGRAM(s) SubCutaneous every 12 hours  gabapentin 300 milliGRAM(s) Oral <User Schedule>  nebivolol 10 milliGRAM(s) Oral <User Schedule>  polyethylene glycol 3350 17 Gram(s) Oral daily  senna 2 Tablet(s) Oral at bedtime    MEDICATIONS  (PRN):  HYDROmorphone  Injectable 1 milliGRAM(s) IV Push every 4 hours PRN Severe Pain (7 - 10)  naloxone Injectable 0.1 milliGRAM(s) IV Push Once PRN For ANY of the following changes in patient status:  A. RR LESS THAN 10 breaths per minute, B. Oxygen saturation LESS THAN 90%, C. Sedation score of 6  ondansetron Injectable 4 milliGRAM(s) IV Push Once PRN Nausea  sodium chloride 0.9% lock flush 10 milliLiter(s) IV Push every 1 hour PRN Pre/post blood products, medications, blood draw, and to maintain line patency        DIAGNOSTIC TESTS:

## 2023-02-07 NOTE — PROGRESS NOTE ADULT - SUBJECTIVE AND OBJECTIVE BOX
MEDICINE CONSULT    OVERNIGHT EVENTS: No acute overnight events.     SUBJECTIVE / INTERVAL HPI: Patient seen and examined at bedside. She is doing well and very excited about possible discharge home today vs. tomorrow. No acute complaints.     VITAL SIGNS:  Vital Signs Last 24 Hrs  T(C): 36.7 (07 Feb 2023 09:53), Max: 36.8 (06 Feb 2023 22:00)  T(F): 98.1 (07 Feb 2023 09:53), Max: 98.3 (06 Feb 2023 22:00)  HR: 84 (07 Feb 2023 09:30) (76 - 91)  BP: 157/72 (07 Feb 2023 09:30) (113/62 - 168/82)  BP(mean): 83 (07 Feb 2023 04:26) (83 - 122)  RR: 17 (07 Feb 2023 09:30) (17 - 20)  SpO2: 99% (07 Feb 2023 09:30) (97% - 99%)    Parameters below as of 07 Feb 2023 09:30  Patient On (Oxygen Delivery Method): room air      PHYSICAL EXAM:  Constitutional: Appears very well, no acute distress   HEENT: NC/AT, EOMI, anicteric sclera, no nasal discharge   Respiratory: Breathing comfortably on RA  Extremities: Wrapped L foot AVM, reviewed recent imaging again with patient showing small erythematous area of skin around AVM at lateral aspect of L heel w/ no active purulence/drainage, appears similar compared to prior  Neurologic: alert and oriented, no focal deficits noted    MEDICATIONS:  MEDICATIONS  (STANDING):  cefepime   IVPB      cefepime   IVPB 2000 milliGRAM(s) IV Intermittent every 8 hours  chlorhexidine 2% Cloths 1 Application(s) Topical <User Schedule>  chlorhexidine 4% Liquid 1 Application(s) Topical once  DAPTOmycin IVPB 800 milliGRAM(s) IV Intermittent every 24 hours  DAPTOmycin IVPB      enoxaparin Injectable 40 milliGRAM(s) SubCutaneous every 12 hours  gabapentin 300 milliGRAM(s) Oral <User Schedule>  nebivolol 10 milliGRAM(s) Oral <User Schedule>  polyethylene glycol 3350 17 Gram(s) Oral daily  senna 2 Tablet(s) Oral at bedtime    MEDICATIONS  (PRN):  HYDROmorphone  Injectable 1 milliGRAM(s) IV Push every 4 hours PRN Severe Pain (7 - 10)  naloxone Injectable 0.1 milliGRAM(s) IV Push Once PRN For ANY of the following changes in patient status:  A. RR LESS THAN 10 breaths per minute, B. Oxygen saturation LESS THAN 90%, C. Sedation score of 6  ondansetron Injectable 4 milliGRAM(s) IV Push Once PRN Nausea  sodium chloride 0.9% lock flush 10 milliLiter(s) IV Push every 1 hour PRN Pre/post blood products, medications, blood draw, and to maintain line patency      ALLERGIES:  Allergies    amoxicillin (Angioedema)  ciprofloxacin (Rash)  hydrochlorothiazide (Hives)  latex (Rash)  lisinopril (Angioedema; Rash; Hives)  morphine (Rash)  penicillin (Rash)    Intolerances        LABS:                        11.6   11.87 )-----------( 505      ( 07 Feb 2023 05:30 )             36.3     02-07    136  |  100  |  15  ----------------------------<  92  4.7   |  25  |  0.64    Ca    9.6      07 Feb 2023 05:30  Mg     1.8     02-07      CAPILLARY BLOOD GLUCOSE      RADIOLOGY & ADDITIONAL TESTS: Reviewed.

## 2023-02-14 DIAGNOSIS — Q27.32 ARTERIOVENOUS MALFORMATION OF VESSEL OF LOWER LIMB: ICD-10-CM

## 2023-02-14 DIAGNOSIS — L03.116 CELLULITIS OF LEFT LOWER LIMB: ICD-10-CM

## 2023-02-14 DIAGNOSIS — Z88.8 ALLERGY STATUS TO OTHER DRUGS, MEDICAMENTS AND BIOLOGICAL SUBSTANCES STATUS: ICD-10-CM

## 2023-02-14 DIAGNOSIS — Z98.890 OTHER SPECIFIED POSTPROCEDURAL STATES: ICD-10-CM

## 2023-02-14 DIAGNOSIS — Z88.1 ALLERGY STATUS TO OTHER ANTIBIOTIC AGENTS STATUS: ICD-10-CM

## 2023-02-14 DIAGNOSIS — I10 ESSENTIAL (PRIMARY) HYPERTENSION: ICD-10-CM

## 2023-02-14 DIAGNOSIS — R50.9 FEVER, UNSPECIFIED: ICD-10-CM

## 2023-02-14 DIAGNOSIS — L97.529 NON-PRESSURE CHRONIC ULCER OF OTHER PART OF LEFT FOOT WITH UNSPECIFIED SEVERITY: ICD-10-CM

## 2023-02-14 DIAGNOSIS — E66.01 MORBID (SEVERE) OBESITY DUE TO EXCESS CALORIES: ICD-10-CM

## 2023-02-14 DIAGNOSIS — Z88.5 ALLERGY STATUS TO NARCOTIC AGENT: ICD-10-CM

## 2023-02-14 DIAGNOSIS — Z88.0 ALLERGY STATUS TO PENICILLIN: ICD-10-CM

## 2023-02-27 ENCOUNTER — INPATIENT (INPATIENT)
Facility: HOSPITAL | Age: 38
LOS: 16 days | Discharge: HOME HEALTH | DRG: 854 | End: 2023-03-16
Attending: INTERNAL MEDICINE | Admitting: HOSPITALIST
Payer: MEDICARE

## 2023-02-27 ENCOUNTER — NON-APPOINTMENT (OUTPATIENT)
Age: 38
End: 2023-02-27

## 2023-02-27 VITALS
HEART RATE: 110 BPM | OXYGEN SATURATION: 100 % | RESPIRATION RATE: 17 BRPM | TEMPERATURE: 98 F | WEIGHT: 293 LBS | HEIGHT: 67 IN

## 2023-02-27 DIAGNOSIS — Z98.89 OTHER SPECIFIED POSTPROCEDURAL STATES: Chronic | ICD-10-CM

## 2023-02-27 DIAGNOSIS — Z41.9 ENCOUNTER FOR PROCEDURE FOR PURPOSES OTHER THAN REMEDYING HEALTH STATE, UNSPECIFIED: Chronic | ICD-10-CM

## 2023-02-27 DIAGNOSIS — E66.01 MORBID (SEVERE) OBESITY DUE TO EXCESS CALORIES: Chronic | ICD-10-CM

## 2023-02-27 LAB
ALBUMIN SERPL ELPH-MCNC: 4.2 G/DL — SIGNIFICANT CHANGE UP (ref 3.3–5)
ALP SERPL-CCNC: 66 U/L — SIGNIFICANT CHANGE UP (ref 40–120)
ALT FLD-CCNC: 10 U/L — SIGNIFICANT CHANGE UP (ref 10–45)
ANION GAP SERPL CALC-SCNC: 13 MMOL/L — SIGNIFICANT CHANGE UP (ref 5–17)
APTT BLD: 32.4 SEC — SIGNIFICANT CHANGE UP (ref 27.5–35.5)
AST SERPL-CCNC: 12 U/L — SIGNIFICANT CHANGE UP (ref 10–40)
BASOPHILS # BLD AUTO: 0.05 K/UL — SIGNIFICANT CHANGE UP (ref 0–0.2)
BASOPHILS NFR BLD AUTO: 0.4 % — SIGNIFICANT CHANGE UP (ref 0–2)
BILIRUB SERPL-MCNC: 0.8 MG/DL — SIGNIFICANT CHANGE UP (ref 0.2–1.2)
BUN SERPL-MCNC: 13 MG/DL — SIGNIFICANT CHANGE UP (ref 7–23)
CALCIUM SERPL-MCNC: 9.7 MG/DL — SIGNIFICANT CHANGE UP (ref 8.4–10.5)
CHLORIDE SERPL-SCNC: 102 MMOL/L — SIGNIFICANT CHANGE UP (ref 96–108)
CO2 SERPL-SCNC: 21 MMOL/L — LOW (ref 22–31)
CREAT SERPL-MCNC: 0.73 MG/DL — SIGNIFICANT CHANGE UP (ref 0.5–1.3)
EGFR: 107 ML/MIN/1.73M2 — SIGNIFICANT CHANGE UP
EOSINOPHIL # BLD AUTO: 0.02 K/UL — SIGNIFICANT CHANGE UP (ref 0–0.5)
EOSINOPHIL NFR BLD AUTO: 0.2 % — SIGNIFICANT CHANGE UP (ref 0–6)
GLUCOSE SERPL-MCNC: 105 MG/DL — HIGH (ref 70–99)
HCG SERPL-ACNC: <0 MIU/ML — SIGNIFICANT CHANGE UP
HCT VFR BLD CALC: 35.8 % — SIGNIFICANT CHANGE UP (ref 34.5–45)
HGB BLD-MCNC: 12 G/DL — SIGNIFICANT CHANGE UP (ref 11.5–15.5)
IMM GRANULOCYTES NFR BLD AUTO: 0.3 % — SIGNIFICANT CHANGE UP (ref 0–0.9)
INR BLD: 1.06 — SIGNIFICANT CHANGE UP (ref 0.88–1.16)
LACTATE SERPL-SCNC: 1 MMOL/L — SIGNIFICANT CHANGE UP (ref 0.5–2)
LYMPHOCYTES # BLD AUTO: 15.4 % — SIGNIFICANT CHANGE UP (ref 13–44)
LYMPHOCYTES # BLD AUTO: 2.03 K/UL — SIGNIFICANT CHANGE UP (ref 1–3.3)
MCHC RBC-ENTMCNC: 28.6 PG — SIGNIFICANT CHANGE UP (ref 27–34)
MCHC RBC-ENTMCNC: 33.5 GM/DL — SIGNIFICANT CHANGE UP (ref 32–36)
MCV RBC AUTO: 85.2 FL — SIGNIFICANT CHANGE UP (ref 80–100)
MONOCYTES # BLD AUTO: 0.95 K/UL — HIGH (ref 0–0.9)
MONOCYTES NFR BLD AUTO: 7.2 % — SIGNIFICANT CHANGE UP (ref 2–14)
NEUTROPHILS # BLD AUTO: 10.08 K/UL — HIGH (ref 1.8–7.4)
NEUTROPHILS NFR BLD AUTO: 76.5 % — SIGNIFICANT CHANGE UP (ref 43–77)
NRBC # BLD: 0 /100 WBCS — SIGNIFICANT CHANGE UP (ref 0–0)
PLATELET # BLD AUTO: 537 K/UL — HIGH (ref 150–400)
POTASSIUM SERPL-MCNC: 3.5 MMOL/L — SIGNIFICANT CHANGE UP (ref 3.5–5.3)
POTASSIUM SERPL-SCNC: 3.5 MMOL/L — SIGNIFICANT CHANGE UP (ref 3.5–5.3)
PROT SERPL-MCNC: 7.9 G/DL — SIGNIFICANT CHANGE UP (ref 6–8.3)
PROTHROM AB SERPL-ACNC: 12.6 SEC — SIGNIFICANT CHANGE UP (ref 10.5–13.4)
RBC # BLD: 4.2 M/UL — SIGNIFICANT CHANGE UP (ref 3.8–5.2)
RBC # FLD: 13.3 % — SIGNIFICANT CHANGE UP (ref 10.3–14.5)
SARS-COV-2 RNA SPEC QL NAA+PROBE: NEGATIVE — SIGNIFICANT CHANGE UP
SODIUM SERPL-SCNC: 136 MMOL/L — SIGNIFICANT CHANGE UP (ref 135–145)
WBC # BLD: 13.17 K/UL — HIGH (ref 3.8–10.5)
WBC # FLD AUTO: 13.17 K/UL — HIGH (ref 3.8–10.5)

## 2023-02-27 PROCEDURE — 99285 EMERGENCY DEPT VISIT HI MDM: CPT

## 2023-02-27 PROCEDURE — 99223 1ST HOSP IP/OBS HIGH 75: CPT | Mod: GC

## 2023-02-27 RX ORDER — ACETAMINOPHEN 500 MG
650 TABLET ORAL EVERY 6 HOURS
Refills: 0 | Status: DISCONTINUED | OUTPATIENT
Start: 2023-02-27 | End: 2023-02-28

## 2023-02-27 RX ORDER — HYDROMORPHONE HYDROCHLORIDE 2 MG/ML
1 INJECTION INTRAMUSCULAR; INTRAVENOUS; SUBCUTANEOUS ONCE
Refills: 0 | Status: DISCONTINUED | OUTPATIENT
Start: 2023-02-27 | End: 2023-02-27

## 2023-02-27 RX ADMIN — HYDROMORPHONE HYDROCHLORIDE 1 MILLIGRAM(S): 2 INJECTION INTRAMUSCULAR; INTRAVENOUS; SUBCUTANEOUS at 23:19

## 2023-02-27 NOTE — H&P ADULT - PROBLEM SELECTOR PLAN 3
Hx of Left foot AVM w/ chronic foot ulcer w/ multiple prior embolizations w/ most recent embolization on 01/24/2023 w/ Dr. Teran   -see sepsis for plan Hx of Left foot AVM w/ chronic foot ulcer w/ multiple prior embolizations w/ most recent embolization on 01/24/2023 w/ Dr. Teran   -see sepsis /foor ulcer for plan

## 2023-02-27 NOTE — H&P ADULT - PROBLEM SELECTOR PLAN 2
Chronic foot ulcer in the setting of AVM malformation.   -see sepsis for plan Chronic foot ulcer in the setting of AVM malformation. L foot + new redness around shallow ulcer on L foot X 1 day w/ ángel pain foot pain today. Home meds: Dapto/ Cefepime, Gabapentin 100 in am and 800 at 4pm. Percocet 10 q4hrs PRN   -c/w Dilaudid 1mg IV q4hrs PRN for sever pain   -Tylenol 650 PRN for mild pain   -bowel reg   -see sepsis for plan Chronic foot ulcer in the setting of AVM malformation. L foot + new redness around shallow ulcer on L foot X 1 day w/ ángel pain foot pain today. Home meds: Dapto/ Cefepime, Gabapentin 100 in am and 800 at 4pm. Percocet 10 q4hrs PRN   -c/w home meds   -bowel reg   -see sepsis for plan Chronic foot ulcer in the setting of AVM malformation. L foot + new redness around shallow ulcer on L foot X 1 day w/ ángel pain foot pain today. Home meds: Dapto/ Cefepime, Gabapentin 100 in am and 800 at 4pm. Percocet 10 q4hrs PRN   -c/w home pain meds   -bowel reg   -see sepsis for plan

## 2023-02-27 NOTE — ED PROVIDER NOTE - MUSCULOSKELETAL, MLM
left lower ext - lateral malleolus with some surrounding erythema around wound, no drainage, no ascending erythema , + swelling

## 2023-02-27 NOTE — ED ADULT TRIAGE NOTE - CHIEF COMPLAINT QUOTE
pt sent to ED by MD Rahman for admission. pt stated " she had an AMV on the LLE, had embolization procedure 4 weeks ago, PICC line place for antibiotics, seen by visiting nurse  today pt reported fever 101 at home, took tylenol at 6 pm, advised to come for evaluation'

## 2023-02-27 NOTE — ED ADULT NURSE REASSESSMENT NOTE - NS ED NURSE REASSESS COMMENT FT1
Unable to get blood work and IV access d/t patient being a difficult stick, provider made aware and is at bedside for USIV placement.

## 2023-02-27 NOTE — H&P ADULT - NSHPPHYSICALEXAM_GEN_ALL_CORE
VITAL SIGNS:  T(C): 36.8 (02-27-23 @ 22:28), Max: 36.8 (02-27-23 @ 19:59)  T(F): 98.3 (02-27-23 @ 22:28), Max: 98.3 (02-27-23 @ 19:59)  HR: 85 (02-27-23 @ 22:28) (85 - 110)  BP: 154/86 (02-27-23 @ 22:28) (154/86 - 154/86)  BP(mean): --  RR: 18 (02-27-23 @ 22:28) (17 - 18)  SpO2: 95% (02-27-23 @ 22:28) (95% - 100%)  Wt(kg): --    PHYSICAL EXAM:  Constitutional: WDWN resting comfortably in bed; NAD  Head: NC/AT  Eyes: PERRL, EOMI, anicteric sclera  ENT: no nasal discharge; uvula midline, no oropharyngeal erythema or exudates; MMM  Neck: supple; no JVD or thyromegaly  Respiratory: CTA B/L; no W/R/R, no retractions  Cardiac: +S1/S2; RRR; no M/R/G; PMI non-displaced  Gastrointestinal: abdomen soft, NT/ND; no rebound or guarding; +BSx4  Back: spine midline, no bony tenderness or step-offs; no CVAT B/L  Extremities: WWP, no clubbing or cyanosis; no peripheral edema  Musculoskeletal: NROM x4; no joint swelling, tenderness or erythema  Vascular: 2+ radial, femoral, DP/PT pulses B/L  Dermatologic: skin warm, dry and intact; no rashes, wounds, or scars  Lymphatic: no submandibular or cervical LAD  Neurologic: AAOx3; CNII-XII grossly intact; no focal deficits  Psychiatric: affect and characteristics of appearance, verbalizations, behaviors are appropriate VITAL SIGNS:  T(C): 36.8 (02-27-23 @ 22:28), Max: 36.8 (02-27-23 @ 19:59)  T(F): 98.3 (02-27-23 @ 22:28), Max: 98.3 (02-27-23 @ 19:59)  HR: 85 (02-27-23 @ 22:28) (85 - 110)  BP: 154/86 (02-27-23 @ 22:28) (154/86 - 154/86)  BP(mean): --  RR: 18 (02-27-23 @ 22:28) (17 - 18)  SpO2: 95% (02-27-23 @ 22:28) (95% - 100%)  Wt(kg): --    PHYSICAL EXAM:  Constitutional:  NAD  Neck: supple; no JVD   Respiratory: CTA B/L; no W/R/R, no retractions  Cardiac: +S1/S2; RRR; no M/R/G   Gastrointestinal: abdomen soft, NT/ND; no rebound or guarding; +BSx4  Extremities: + L foot shallow clean base ulcer w/ mild surrounding erythema. WWP, no clubbing or cyanosis; no peripheral edema  Vascular: 2+ radial, femoral, DP/PT pulses B/L  Neurologic: AAOx3;  no focal deficits

## 2023-02-27 NOTE — H&P ADULT - HISTORY OF PRESENT ILLNESS
37F PMH  morbidly obese, w/ PMHx HTN and left foot AVM w/ chronic left foot ulcer (s/p multiple embolizations, last 1/24) and recent dc on 2/7/23 on IV abx via PICC of L foot infx sent by Dr. Rahman for septic wok up. Pt was admitted on 1/24-2/7 for another AVM embolization and was dc'ed on 4wks of abx  Dapto 800 q24/Cefepime 2g q8hrs (2/3/23-3/3/23) via PICC (placed on 2/3/23) and f/up w/ Dr. Rahman. Pt was sent to ED by Dr. Rahman for septic wok up in the setting of new fever of 101 at home on 2/27 by visiting nurse. Per ED provider who spoke w/ / Lacey possible source is PICC line > Left foot and recommended to c/w same abx regiment pending re-eval in am by ID to finalize abx and decided on wether to keep/ remove PICC line.     ED Course:   VS: 98.3, 110->85, 154/86, 18 100RA   Labs: WBC 13, Plt 537, K 3.5   Rx: Dilaudid    37F PMH left foot AVM w/ chronic left foot ulcer (s/p multiple embolizations, last 1/24), HTN,  morbid obesity and recent admission for AVM L foot re-embolization and dc'ed  on 2/7/23 on IV abx via PICC for L foot infx sent by Dr. Rahman for septic wok up. Pt was admitted on 1/24-2/7 for another AVM embolization and was dc'ed on 4wks of abx  Dapto 800 q24/Cefepime 2g q8hrs (2/3/23-3/3/23) via PICC (placed on 2/3/23) and f/up w/ Dr. Rahman. Pt was sent to ED by Dr. Rahman for septic wok up in the setting of new fever of 101 at home on 2/27 by visiting nurse. Took Tylenol at home prior to ED presentation. Per ED provider who spoke w/ / Lacey possible source is PICC line > Left foot ulcer and recommended to c/w same abx regiment pending re-eval in am by ID to finalize abx and decided on wether to keep/ remove PICC line. On ROS denies URI s/s, cough, CP, SOB, abd pain, n/v/d/c and dysuria       ED Course:   VS: 98.3, 110->85, 154/86, 18 100RA   Labs: WBC 13, Plt 537, K 3.5. COVID -  Rx: Dilaudid    37F PMH left foot AVM w/ chronic left foot ulcer (s/p multiple embolizations, last 1/24), HTN,  morbid obesity and recent admission for AVM L foot re-embolization and dc'ed  on 2/7/23 on IV abx via PICC for L foot infx sent by Dr. Rahman for fever wok up. Pt was admitted on 1/24-2/7 for another AVM embolization and was dc'ed on 4wks of abx  Dapto 800 q24/Cefepime 2g q8hrs (2/3/23-3/3/23) via PICC (placed on 2/3/23) and f/up w/ Dr. Rahman. Pt was sent to ED by Dr. Rahman for septic wok up in the setting of new fever of 101 at home on 2/27 by visiting nurse. Took Tylenol at home prior to ED presentation. Per ED provider who spoke w/ / Lacey possible source is PICC line > Left foot ulcer and recommended to c/w same abx regiment pending re-eval in am by ID to finalize abx and decided on wether to keep/ remove PICC line. On ROS denies URI s/s, cough, CP, SOB, abd pain, n/v/d/c and dysuria       ED Course:   VS: 98.3, 110->85, 154/86, 18 100RA   Labs: WBC 13, Plt 537, K 3.5. COVID -  Rx: Dilaudid    37F PMH left foot AVM w/ chronic left foot ulcer (s/p multiple embolizations, last 1/24), HTN,  morbid obesity and recent admission for AVM L foot re-embolization and dc'ed  on 2/7/23 on IV abx via PICC for L foot infx sent by Dr. Rahman for fever wok up. Pt was admitted on 1/24-2/7 for another AVM embolization and was dc'ed on 4wks of abx  Dapto 800 q24/Cefepime 2g q8hrs (2/3/23-3/3/23) via PICC (placed on 2/3/23) and f/up w/ Dr. Rahman. Pt was sent to ED by Dr. Rahman for septic wok up in the setting of new fever of 101 at home on 2/27 by visiting nurse. Took Tylenol at home prior to ED presentation. Per ED provider who spoke w/ / Lacey possible source is PICC line and/or Left foot ulcer and recommended to c/w same abx regiment pending re-eval in am by ID to finalize abx and decided on wether to keep/ remove PICC line. ROS + subjective f/c and fatigue X 1 day and 1 episode of bilious emesis today. Also reports new  redness around shallow ulcer on L foot X1 day. Also reports pain in L foot is worse today.  Denies URI s/s, cough, CP, SOB, abd pain, n/d/c and dysuria.       ED Course:   VS: 98.3, 110->85, 154/86, 18 100RA   Labs: WBC 13, Plt 537, K 3.5. COVID -  Rx: Dilaudid

## 2023-02-27 NOTE — ED PROVIDER NOTE - CLINICAL SUMMARY MEDICAL DECISION MAKING FREE TEXT BOX
Pt with hx of multiple AVM embolizations to left lower ext - most recently Feb 2023 - d.c with PICC Line and iv antibiotics and now presenting with fever today as high as 101 - sent by Dr. Gallegos - in ED pt afebrile, well appearing no definitive source of fever, full septic workup done including blood cultures.  Pt difficult stick and only IV access on arm of PICC line - Dr. Gallegos contacted and will hold off on changing antibiotics for now - will see in AM.  Discussed with MARINA and admitted to medicine.

## 2023-02-27 NOTE — H&P ADULT - ATTENDING COMMENTS
#SIRS: 3/4 SIRS (Fever 101 at home, Tachy, elevated WBC). unclear source, PICC vs less likley recurrent wound infxn vs  reactive. Currently afebrile. F/up cxr, UA, blood cx, foot xray, ESR/CRP, ID recs in AM, IR , c/w dapto/cefepime, continue to monitor.    #AVM: Hx of Left foot AVM w/ chronic foot ulcer w/ multiple prior embolizations w/ most recent embolization on 01/24/2023 w/ Dr. Teran. Plan as above.

## 2023-02-27 NOTE — ED ADULT NURSE NOTE - OBJECTIVE STATEMENT
Patient is a 37yoF presenting to the ED for left foot pain. Patient is axox3, spont breathing on RA, ambulated well without assistance. Patient states that for today, she had a fever, tmax 101 took tylenol pta. Denies chest pain/sob, denies nausea/vomiting. Was told to come in for possible infection from post op complications, and admission.

## 2023-02-27 NOTE — H&P ADULT - PROBLEM SELECTOR PLAN 1
3/4 SIRS (Fever 101 at home, Tachy, elevated WBC). Possible source (s) PICC line > Left foot ulcer. Pt s/p recent dc on 2/7/23 on IV abx (Dapto 800 q24hrs and Cefepime 2g q8hrs) via PICC (placed on 2/3/23) for L foot infx.   Afebrile, HD stable on admission. Reports taking Tylenol at home prior to presentation. LA wnl.  PICC site c/d/i. PICC. Denies URI s/s, cough, CP, SOB, abd pain, n/v/d/c and dysuria     Plan:   -f/u UA, Ucx, Bcx, CXR  -f/u ESR, CRP, CK as on dapto   -c/w Dapto 800 q24hrs and Cefepime 2g q8hrs via peripheral IV (do not use PICC pending ID eval in am)  -f/u ID recs in am 3/4 SIRS (Fever 101 at home, Tachy, elevated WBC). Possible source (s) PICC line and/or Left foot ulcer. Pt s/p recent dc on 2/7/23 on IV abx (Dapto 800 q24hrs and Cefepime 2g q8hrs) via PICC (placed on 2/3/23) for L foot infx.   Afebrile, HD stable on admission. Reports taking Tylenol at home prior to presentation. LA wnl.  PICC site c/d/i. PICC. L foot + new redness around shallow ulcer on L foot X 1 day. Denies URI s/s, cough, CP, SOB, abd pain, n/v/d/c and dysuria     Plan:   -f/u UA, Ucx, Bcx, CXR  -f/u ESR, CRP, CK as on dapto   -c/w Dapto 800 q24hrs and Cefepime 2g q8hrs via peripheral IV (do not use PICC pending ID eval in am)  -f/u ID recs in am

## 2023-02-27 NOTE — H&P ADULT - ASSESSMENT
37F PMH  morbidly obese, w/ PMHx HTN and left foot AVM w/ chronic left foot ulcer (s/p multiple embolizations, last 1/24) and recent dc on 2/7/23 on IV abx via PICC of L foot infx sent by Dr. Rahman for septic wok up.    37F PMH left foot AVM w/ chronic left foot ulcer (s/p multiple embolizations, last 1/24), HTN,  morbid obesity and recent admission for AVM L foot re-embolization and dc'ed  on 2/7/23 on IV abx via PICC for L foot infx sent by Dr. Rahman for septic wok up.    37F PMH left foot AVM w/ chronic left foot ulcer (s/p multiple embolizations, last 1/24), HTN,  morbid obesity and recent admission for AVM L foot re-embolization and dc'ed  on 2/7/23 on IV abx via PICC for L foot infx sent by Dr. Rahman for fever wok up. Found to be septic w/ possible source(s) being PICC line > Left foot ulcer.    37F PMH left foot AVM w/ chronic left foot ulcer (s/p multiple embolizations, last 1/24), HTN,  morbid obesity and recent admission for AVM L foot re-embolization and dc'ed  on 2/7/23 on IV abx via PICC for L foot infx sent by Dr. Rahman for fever wok up. Found to be septic w/ possible source(s) being PICC line and/or Left foot ulcer.

## 2023-02-27 NOTE — H&P ADULT - NSHPLABSRESULTS_GEN_ALL_CORE
LABS:                         12.0   13.17 )-----------( 537      ( 27 Feb 2023 22:40 )             35.8     02-27    136  |  102  |  13  ----------------------------<  105<H>  3.5   |  21<L>  |  0.73    Ca    9.7      27 Feb 2023 22:40    TPro  7.9  /  Alb  4.2  /  TBili  0.8  /  DBili  x   /  AST  12  /  ALT  10  /  AlkPhos  66  02-27    PT/INR - ( 27 Feb 2023 22:40 )   PT: 12.6 sec;   INR: 1.06          PTT - ( 27 Feb 2023 22:40 )  PTT:32.4 sec          Lactate, Blood: 1.0 mmol/L (02-27 @ 22:40)      RADIOLOGY, EKG & ADDITIONAL TESTS: Reviewed.

## 2023-02-27 NOTE — ED PROVIDER NOTE - OBJECTIVE STATEMENT
Last discharge summary 1/24-2/7 38 y/o female, morbidly obese, w/ PMHx HTN and left foot AVM w/ chronic left foot ulcer (s/p multiple embolizations, previously requiring IV antibiotics, s/p prior OR debridement 10/11/2022, and subsequently treated w/ IV Meropenem/Vancomycin from 11/07/2022 to 12/29/2022 w/ wound closure on 12/23/2022) who presented to outpatient provider for continued follow up. Patient noted recent increased pulsing sensation in her foot and was told she may require additional embolization. Patient now presented for repeat direct stick embolization of the left foot w/ Dr Teran. S/p Direct stick embolization for AVM in left foot using 7cc STS performed under ultrasound and fluoroscopic guidance. Prior to procedure, patient was on IV antibiotics (vanco and meripenem) and also with signs of mild cellulitis. ID consulted, started vancomycin 1250mg IV q8h and meropenem 2g IV q8h. Hospital course c/b fever 100.5 on 1/31 and WBC 13. BC x 2 sent - negative. Per ID, IV Abx switched to Daptomycin 800mg q24h and Cefepime 2g IV q8h x 4 weeks course (2/3-3/3). PICC line placed 2/3/2023.  Pt to have weekly labs while on antibiotics: CBC, CMP, ESR, CRP, CPK w/ results faxed to ID Dr Rahman.     Social work/case management assisted w/ complex dispo planning as pt required home infusion w/ substantial copay $800-1000 per week vs [facility] w/ insurance coverage. Decision made for home infusion and set up on day of discharge. Pt able to ambulate without complication. VSS. Labs and telemetry reviewed. L foot site stable and Kerlix dressing C/D/I.  Pt given appropriate discharge instructions. Will follow up with outpatient pain management provider for pain control. Pt will follow up with Dr Teran and Dr Rahman accordingly.     Pt presents today with fever Last discharge summary 1/24-2/7 38 y/o female, morbidly obese, w/ PMHx HTN and left foot AVM w/ chronic left foot ulcer (s/p multiple embolizations, previously requiring IV antibiotics, s/p prior OR debridement 10/11/2022, and subsequently treated w/ IV Meropenem/Vancomycin from 11/07/2022 to 12/29/2022 w/ wound closure on 12/23/2022) who presented to outpatient provider for continued follow up. Patient noted recent increased pulsing sensation in her foot and was told she may require additional embolization. Patient now presented for repeat direct stick embolization of the left foot w/ Dr Teran. S/p Direct stick embolization for AVM in left foot using 7cc STS performed under ultrasound and fluoroscopic guidance. Prior to procedure, patient was on IV antibiotics (vanco and meripenem) and also with signs of mild cellulitis. ID consulted, started vancomycin 1250mg IV q8h and meropenem 2g IV q8h. Hospital course c/b fever 100.5 on 1/31 and WBC 13. BC x 2 sent - negative. Per ID, IV Abx switched to Daptomycin 800mg q24h and Cefepime 2g IV q8h x 4 weeks course (2/3-3/3). PICC line placed 2/3/2023.  Pt to have weekly labs while on antibiotics: CBC, CMP, ESR, CRP, CPK w/ results faxed to ID Dr Rahman.     Social work/case management assisted w/ complex dispo planning as pt required home infusion w/ substantial copay $800-1000 per week vs [facility] w/ insurance coverage. Decision made for home infusion and set up on day of discharge. Pt able to ambulate without complication. VSS. Labs and telemetry reviewed. L foot site stable and Kerlix dressing C/D/I.  Pt given appropriate discharge instructions. Will follow up with outpatient pain management provider for pain control. Pt will follow up with Dr Teran and Dr Rahman accordingly.     Pt presents today with fever today of 101 and chills.  Pt contacted Dr. Gallegos who advised pt present to ED.  Pt denies URI symptoms, urinary symptoms.  States she recenntly changed bandage around right ankle prior to arrival and there was mild erythema around wound which has been improving. She denies any changes to skin around PICC line.

## 2023-02-27 NOTE — ED ADULT TRIAGE NOTE - WEIGHT IN LBS
----- Message from Tim Flower DO sent at 4/26/2017  2:27 PM CDT -----  Pregnancy test is negative   296.9

## 2023-02-27 NOTE — ED ADULT NURSE NOTE - NS ED NURSE LEVEL OF CONSCIOUSNESS ORIENTATION
----- Message from Julian Wilkins MD sent at 4/11/2017  3:07 PM CDT -----  Let patient know that sugar levels are too high. Prescription for Pravachol and metformin called into the pharmacy. Have him return in 2 months for repeat blood work  
MA left detailed message for pt regarding MD notes  
Oriented - self; Oriented - place; Oriented - time

## 2023-02-27 NOTE — ED ADULT NURSE NOTE - NS ED NURSE REPORT GIVEN TO FT
10/5/2022         RE: Bonny Raymundo  [address]  [facility] [id number]        Dear Colleague,    Thank you for referring your patient, Bonny Raymundo, to the [facility]. Please see a copy of my visit note below.    Patient presents to the [facility] for Aranesp injection.  Order written by Dr. Spann was completed today. Name and  verified with patient. See MAR for medication details. Medication was divided into 1 syringes by pharmacy and given in the following sites abdominal tissues, RLQ. Patient tolerated injection well and was discharged to home. Patient to return back in 21 days.    LEONEL Delcid LPN    Administrations This Visit     darbepoetin karolina-polysorbate (ARANESP) injection 100 mcg     Admin Date  10/05/2022 Action  Given Dose  100 mcg Route  Subcutaneous Administered By  Desiree Delcid LPN                      Again, thank you for allowing me to participate in the care of your patient.        Sincerely,        [facility]     jossy Martinez

## 2023-02-28 DIAGNOSIS — I10 ESSENTIAL (PRIMARY) HYPERTENSION: ICD-10-CM

## 2023-02-28 DIAGNOSIS — L97.529 NON-PRESSURE CHRONIC ULCER OF OTHER PART OF LEFT FOOT WITH UNSPECIFIED SEVERITY: ICD-10-CM

## 2023-02-28 DIAGNOSIS — A41.9 SEPSIS, UNSPECIFIED ORGANISM: ICD-10-CM

## 2023-02-28 DIAGNOSIS — Q27.30 ARTERIOVENOUS MALFORMATION, SITE UNSPECIFIED: ICD-10-CM

## 2023-02-28 DIAGNOSIS — R63.8 OTHER SYMPTOMS AND SIGNS CONCERNING FOOD AND FLUID INTAKE: ICD-10-CM

## 2023-02-28 DIAGNOSIS — E87.6 HYPOKALEMIA: ICD-10-CM

## 2023-02-28 LAB
ALBUMIN SERPL ELPH-MCNC: 3.5 G/DL — SIGNIFICANT CHANGE UP (ref 3.3–5)
ALP SERPL-CCNC: 58 U/L — SIGNIFICANT CHANGE UP (ref 40–120)
ALT FLD-CCNC: 9 U/L — LOW (ref 10–45)
ANION GAP SERPL CALC-SCNC: 11 MMOL/L — SIGNIFICANT CHANGE UP (ref 5–17)
APPEARANCE UR: CLEAR — SIGNIFICANT CHANGE UP
AST SERPL-CCNC: 13 U/L — SIGNIFICANT CHANGE UP (ref 10–40)
BACTERIA # UR AUTO: PRESENT /HPF
BASOPHILS # BLD AUTO: 0.05 K/UL — SIGNIFICANT CHANGE UP (ref 0–0.2)
BASOPHILS NFR BLD AUTO: 0.5 % — SIGNIFICANT CHANGE UP (ref 0–2)
BILIRUB SERPL-MCNC: 1 MG/DL — SIGNIFICANT CHANGE UP (ref 0.2–1.2)
BILIRUB UR-MCNC: NEGATIVE — SIGNIFICANT CHANGE UP
BUN SERPL-MCNC: 14 MG/DL — SIGNIFICANT CHANGE UP (ref 7–23)
CALCIUM SERPL-MCNC: 8.9 MG/DL — SIGNIFICANT CHANGE UP (ref 8.4–10.5)
CHLORIDE SERPL-SCNC: 103 MMOL/L — SIGNIFICANT CHANGE UP (ref 96–108)
CK SERPL-CCNC: 132 U/L — SIGNIFICANT CHANGE UP (ref 25–170)
CO2 SERPL-SCNC: 19 MMOL/L — LOW (ref 22–31)
COLOR SPEC: YELLOW — SIGNIFICANT CHANGE UP
COMMENT - URINE: SIGNIFICANT CHANGE UP
CREAT SERPL-MCNC: 0.64 MG/DL — SIGNIFICANT CHANGE UP (ref 0.5–1.3)
CRP SERPL-MCNC: 16.9 MG/L — HIGH (ref 0–4)
CRP SERPL-MCNC: 19.6 MG/L — HIGH (ref 0–4)
DIFF PNL FLD: NEGATIVE — SIGNIFICANT CHANGE UP
EGFR: 115 ML/MIN/1.73M2 — SIGNIFICANT CHANGE UP
EOSINOPHIL # BLD AUTO: 0.04 K/UL — SIGNIFICANT CHANGE UP (ref 0–0.5)
EOSINOPHIL NFR BLD AUTO: 0.4 % — SIGNIFICANT CHANGE UP (ref 0–6)
EPI CELLS # UR: SIGNIFICANT CHANGE UP /HPF (ref 0–5)
ERYTHROCYTE [SEDIMENTATION RATE] IN BLOOD: 30 MM/HR — HIGH
ERYTHROCYTE [SEDIMENTATION RATE] IN BLOOD: 40 MM/HR — HIGH
GLUCOSE SERPL-MCNC: 115 MG/DL — HIGH (ref 70–99)
GLUCOSE UR QL: NEGATIVE — SIGNIFICANT CHANGE UP
HCT VFR BLD CALC: 34.3 % — LOW (ref 34.5–45)
HGB BLD-MCNC: 11 G/DL — LOW (ref 11.5–15.5)
IMM GRANULOCYTES NFR BLD AUTO: 0.3 % — SIGNIFICANT CHANGE UP (ref 0–0.9)
KETONES UR-MCNC: NEGATIVE — SIGNIFICANT CHANGE UP
LEUKOCYTE ESTERASE UR-ACNC: NEGATIVE — SIGNIFICANT CHANGE UP
LYMPHOCYTES # BLD AUTO: 1.85 K/UL — SIGNIFICANT CHANGE UP (ref 1–3.3)
LYMPHOCYTES # BLD AUTO: 17 % — SIGNIFICANT CHANGE UP (ref 13–44)
MAGNESIUM SERPL-MCNC: 1.9 MG/DL — SIGNIFICANT CHANGE UP (ref 1.6–2.6)
MCHC RBC-ENTMCNC: 28.9 PG — SIGNIFICANT CHANGE UP (ref 27–34)
MCHC RBC-ENTMCNC: 32.1 GM/DL — SIGNIFICANT CHANGE UP (ref 32–36)
MCV RBC AUTO: 90.3 FL — SIGNIFICANT CHANGE UP (ref 80–100)
MONOCYTES # BLD AUTO: 0.94 K/UL — HIGH (ref 0–0.9)
MONOCYTES NFR BLD AUTO: 8.6 % — SIGNIFICANT CHANGE UP (ref 2–14)
NEUTROPHILS # BLD AUTO: 7.96 K/UL — HIGH (ref 1.8–7.4)
NEUTROPHILS NFR BLD AUTO: 73.2 % — SIGNIFICANT CHANGE UP (ref 43–77)
NITRITE UR-MCNC: NEGATIVE — SIGNIFICANT CHANGE UP
NRBC # BLD: 0 /100 WBCS — SIGNIFICANT CHANGE UP (ref 0–0)
PH UR: 7 — SIGNIFICANT CHANGE UP (ref 5–8)
PHOSPHATE SERPL-MCNC: 3.7 MG/DL — SIGNIFICANT CHANGE UP (ref 2.5–4.5)
PLATELET # BLD AUTO: 436 K/UL — HIGH (ref 150–400)
POTASSIUM SERPL-MCNC: 3.6 MMOL/L — SIGNIFICANT CHANGE UP (ref 3.5–5.3)
POTASSIUM SERPL-SCNC: 3.6 MMOL/L — SIGNIFICANT CHANGE UP (ref 3.5–5.3)
PROT SERPL-MCNC: 7 G/DL — SIGNIFICANT CHANGE UP (ref 6–8.3)
PROT UR-MCNC: 30 MG/DL
RBC # BLD: 3.8 M/UL — SIGNIFICANT CHANGE UP (ref 3.8–5.2)
RBC # FLD: 13.4 % — SIGNIFICANT CHANGE UP (ref 10.3–14.5)
RBC CASTS # UR COMP ASSIST: < 5 /HPF — SIGNIFICANT CHANGE UP
SODIUM SERPL-SCNC: 133 MMOL/L — LOW (ref 135–145)
SP GR SPEC: 1.02 — SIGNIFICANT CHANGE UP (ref 1–1.03)
UROBILINOGEN FLD QL: 0.2 E.U./DL — SIGNIFICANT CHANGE UP
WBC # BLD: 10.87 K/UL — HIGH (ref 3.8–10.5)
WBC # FLD AUTO: 10.87 K/UL — HIGH (ref 3.8–10.5)
WBC UR QL: < 5 /HPF — SIGNIFICANT CHANGE UP

## 2023-02-28 PROCEDURE — 99223 1ST HOSP IP/OBS HIGH 75: CPT

## 2023-02-28 PROCEDURE — 99233 SBSQ HOSP IP/OBS HIGH 50: CPT | Mod: GC

## 2023-02-28 PROCEDURE — 71045 X-RAY EXAM CHEST 1 VIEW: CPT | Mod: 26

## 2023-02-28 RX ORDER — DAPTOMYCIN 500 MG/10ML
800 INJECTION, POWDER, LYOPHILIZED, FOR SOLUTION INTRAVENOUS EVERY 24 HOURS
Refills: 0 | Status: DISCONTINUED | OUTPATIENT
Start: 2023-02-28 | End: 2023-03-02

## 2023-02-28 RX ORDER — MEROPENEM 1 G/30ML
2000 INJECTION INTRAVENOUS EVERY 8 HOURS
Refills: 0 | Status: DISCONTINUED | OUTPATIENT
Start: 2023-03-01 | End: 2023-03-01

## 2023-02-28 RX ORDER — POTASSIUM CHLORIDE 20 MEQ
40 PACKET (EA) ORAL ONCE
Refills: 0 | Status: COMPLETED | OUTPATIENT
Start: 2023-02-28 | End: 2023-02-28

## 2023-02-28 RX ORDER — MEROPENEM 1 G/30ML
INJECTION INTRAVENOUS
Refills: 0 | Status: DISCONTINUED | OUTPATIENT
Start: 2023-02-28 | End: 2023-02-28

## 2023-02-28 RX ORDER — MEROPENEM 1 G/30ML
INJECTION INTRAVENOUS
Refills: 0 | Status: DISCONTINUED | OUTPATIENT
Start: 2023-02-28 | End: 2023-03-01

## 2023-02-28 RX ORDER — GABAPENTIN 400 MG/1
100 CAPSULE ORAL DAILY
Refills: 0 | Status: DISCONTINUED | OUTPATIENT
Start: 2023-02-28 | End: 2023-03-16

## 2023-02-28 RX ORDER — OXYCODONE HYDROCHLORIDE 5 MG/1
5 TABLET ORAL EVERY 4 HOURS
Refills: 0 | Status: DISCONTINUED | OUTPATIENT
Start: 2023-02-28 | End: 2023-02-28

## 2023-02-28 RX ORDER — SENNA PLUS 8.6 MG/1
2 TABLET ORAL AT BEDTIME
Refills: 0 | Status: DISCONTINUED | OUTPATIENT
Start: 2023-02-28 | End: 2023-03-16

## 2023-02-28 RX ORDER — HYDROMORPHONE HYDROCHLORIDE 2 MG/ML
1 INJECTION INTRAMUSCULAR; INTRAVENOUS; SUBCUTANEOUS ONCE
Refills: 0 | Status: DISCONTINUED | OUTPATIENT
Start: 2023-02-28 | End: 2023-02-28

## 2023-02-28 RX ORDER — MEROPENEM 1 G/30ML
1000 INJECTION INTRAVENOUS ONCE
Refills: 0 | Status: DISCONTINUED | OUTPATIENT
Start: 2023-02-28 | End: 2023-02-28

## 2023-02-28 RX ORDER — GABAPENTIN 400 MG/1
800 CAPSULE ORAL DAILY
Refills: 0 | Status: DISCONTINUED | OUTPATIENT
Start: 2023-02-28 | End: 2023-03-16

## 2023-02-28 RX ORDER — MEROPENEM 1 G/30ML
2000 INJECTION INTRAVENOUS ONCE
Refills: 0 | Status: COMPLETED | OUTPATIENT
Start: 2023-02-28 | End: 2023-02-28

## 2023-02-28 RX ORDER — POTASSIUM CHLORIDE 20 MEQ
20 PACKET (EA) ORAL
Refills: 0 | Status: COMPLETED | OUTPATIENT
Start: 2023-02-28 | End: 2023-02-28

## 2023-02-28 RX ORDER — ENOXAPARIN SODIUM 100 MG/ML
40 INJECTION SUBCUTANEOUS EVERY 12 HOURS
Refills: 0 | Status: DISCONTINUED | OUTPATIENT
Start: 2023-02-28 | End: 2023-03-14

## 2023-02-28 RX ORDER — HYDROMORPHONE HYDROCHLORIDE 2 MG/ML
1 INJECTION INTRAMUSCULAR; INTRAVENOUS; SUBCUTANEOUS EVERY 6 HOURS
Refills: 0 | Status: DISCONTINUED | OUTPATIENT
Start: 2023-02-28 | End: 2023-02-28

## 2023-02-28 RX ORDER — MEROPENEM 1 G/30ML
1000 INJECTION INTRAVENOUS EVERY 8 HOURS
Refills: 0 | Status: DISCONTINUED | OUTPATIENT
Start: 2023-02-28 | End: 2023-02-28

## 2023-02-28 RX ORDER — ENOXAPARIN SODIUM 100 MG/ML
40 INJECTION SUBCUTANEOUS EVERY 24 HOURS
Refills: 0 | Status: DISCONTINUED | OUTPATIENT
Start: 2023-02-28 | End: 2023-02-28

## 2023-02-28 RX ORDER — OXYCODONE AND ACETAMINOPHEN 5; 325 MG/1; MG/1
2 TABLET ORAL EVERY 4 HOURS
Refills: 0 | Status: DISCONTINUED | OUTPATIENT
Start: 2023-02-28 | End: 2023-02-28

## 2023-02-28 RX ORDER — HYDROMORPHONE HYDROCHLORIDE 2 MG/ML
1 INJECTION INTRAMUSCULAR; INTRAVENOUS; SUBCUTANEOUS EVERY 4 HOURS
Refills: 0 | Status: DISCONTINUED | OUTPATIENT
Start: 2023-02-28 | End: 2023-03-07

## 2023-02-28 RX ORDER — CEFEPIME 1 G/1
2000 INJECTION, POWDER, FOR SOLUTION INTRAMUSCULAR; INTRAVENOUS EVERY 8 HOURS
Refills: 0 | Status: DISCONTINUED | OUTPATIENT
Start: 2023-02-28 | End: 2023-02-28

## 2023-02-28 RX ORDER — POLYETHYLENE GLYCOL 3350 17 G/17G
17 POWDER, FOR SOLUTION ORAL DAILY
Refills: 0 | Status: DISCONTINUED | OUTPATIENT
Start: 2023-02-28 | End: 2023-03-16

## 2023-02-28 RX ORDER — HYDROMORPHONE HYDROCHLORIDE 2 MG/ML
1 INJECTION INTRAMUSCULAR; INTRAVENOUS; SUBCUTANEOUS EVERY 4 HOURS
Refills: 0 | Status: DISCONTINUED | OUTPATIENT
Start: 2023-02-28 | End: 2023-02-28

## 2023-02-28 RX ADMIN — HYDROMORPHONE HYDROCHLORIDE 1 MILLIGRAM(S): 2 INJECTION INTRAMUSCULAR; INTRAVENOUS; SUBCUTANEOUS at 03:12

## 2023-02-28 RX ADMIN — GABAPENTIN 100 MILLIGRAM(S): 400 CAPSULE ORAL at 12:16

## 2023-02-28 RX ADMIN — HYDROMORPHONE HYDROCHLORIDE 1 MILLIGRAM(S): 2 INJECTION INTRAMUSCULAR; INTRAVENOUS; SUBCUTANEOUS at 09:30

## 2023-02-28 RX ADMIN — HYDROMORPHONE HYDROCHLORIDE 1 MILLIGRAM(S): 2 INJECTION INTRAMUSCULAR; INTRAVENOUS; SUBCUTANEOUS at 04:12

## 2023-02-28 RX ADMIN — CEFEPIME 2000 MILLIGRAM(S): 1 INJECTION, POWDER, FOR SOLUTION INTRAMUSCULAR; INTRAVENOUS at 03:45

## 2023-02-28 RX ADMIN — GABAPENTIN 800 MILLIGRAM(S): 400 CAPSULE ORAL at 15:25

## 2023-02-28 RX ADMIN — MEROPENEM 100 MILLIGRAM(S): 1 INJECTION INTRAVENOUS at 14:41

## 2023-02-28 RX ADMIN — Medication 20 MILLIEQUIVALENT(S): at 10:11

## 2023-02-28 RX ADMIN — ENOXAPARIN SODIUM 40 MILLIGRAM(S): 100 INJECTION SUBCUTANEOUS at 22:22

## 2023-02-28 RX ADMIN — CEFEPIME 2000 MILLIGRAM(S): 1 INJECTION, POWDER, FOR SOLUTION INTRAMUSCULAR; INTRAVENOUS at 10:11

## 2023-02-28 RX ADMIN — HYDROMORPHONE HYDROCHLORIDE 1 MILLIGRAM(S): 2 INJECTION INTRAMUSCULAR; INTRAVENOUS; SUBCUTANEOUS at 15:24

## 2023-02-28 RX ADMIN — DAPTOMYCIN 132 MILLIGRAM(S): 500 INJECTION, POWDER, LYOPHILIZED, FOR SOLUTION INTRAVENOUS at 14:41

## 2023-02-28 RX ADMIN — HYDROMORPHONE HYDROCHLORIDE 1 MILLIGRAM(S): 2 INJECTION INTRAMUSCULAR; INTRAVENOUS; SUBCUTANEOUS at 09:06

## 2023-02-28 RX ADMIN — HYDROMORPHONE HYDROCHLORIDE 1 MILLIGRAM(S): 2 INJECTION INTRAMUSCULAR; INTRAVENOUS; SUBCUTANEOUS at 20:20

## 2023-02-28 RX ADMIN — ENOXAPARIN SODIUM 40 MILLIGRAM(S): 100 INJECTION SUBCUTANEOUS at 10:12

## 2023-02-28 RX ADMIN — HYDROMORPHONE HYDROCHLORIDE 1 MILLIGRAM(S): 2 INJECTION INTRAMUSCULAR; INTRAVENOUS; SUBCUTANEOUS at 19:55

## 2023-02-28 RX ADMIN — Medication 20 MILLIEQUIVALENT(S): at 09:06

## 2023-02-28 NOTE — PATIENT PROFILE ADULT - FALL HARM RISK - HARM RISK INTERVENTIONS
Assistance with ambulation/Assistance OOB with selected safe patient handling equipment/Communicate Risk of Fall with Harm to all staff/Discuss with provider need for PT consult/Monitor gait and stability/Reinforce activity limits and safety measures with patient and family/Tailored Fall Risk Interventions/Visual Cue: Yellow wristband and red socks/Bed in lowest position, wheels locked, appropriate side rails in place/Call bell, personal items and telephone in reach/Instruct patient to call for assistance before getting out of bed or chair/Non-slip footwear when patient is out of bed/[place] to call system/Physically safe environment - no spills, clutter or unnecessary equipment/Purposeful Proactive Rounding/Room/bathroom lighting operational, light cord in reach

## 2023-02-28 NOTE — CONSULT NOTE ADULT - ASSESSMENT
38 yo F with HTN, MO and AVM L foot with recurrent L foot ulcer.  In the past, she has improved with antibiotics following embolization procedures, more recently requiring prolonged antibiotic courses.  She had been doing well until yesterday when she experienced fever at home with increased foot pain and size of ulcer, here with mild leukocytosis that improved with hydration, mildly elevated inflammatory markers.  She had grown Pseudomonas from an OR culture in 10/2022 but no more recent microbiology data are available.  She has significant allergies to PCN and FQs.  She had responded initially to vanc and hugh, then dapto and cefepime, now worsening on present regimen.  Can only make empiric changes at this time.  Fever unlikely related to PICC.  Suggest:  - F/U blood cultures X 2 sets from 2/27  - Continue daptomycin 800 mg IV q24h  - Start meropenem 2 g IV q8h  - D/C cefepime  Recommendations discussed with primary team.  Will follow with you - team 1.

## 2023-02-28 NOTE — PROGRESS NOTE ADULT - PROBLEM SELECTOR PLAN 3
Hx of Left foot AVM w/ chronic foot ulcer w/ multiple prior embolizations w/ most recent embolization on 01/24/2023 w/ Dr. Teran   -see sepsis /foor ulcer for plan

## 2023-02-28 NOTE — PROGRESS NOTE ADULT - SUBJECTIVE AND OBJECTIVE BOX
INTERVAL HPI/OVERNIGHT EVENTS:  Patient seen and examined at bedside. C/o pain to the foot. Patient denies fever, chills, dizziness, weakness, HA, CP, SOB, N/V/D/C    VITALS  Vital Signs Last 24 Hrs  T(C): 36.8 (28 Feb 2023 10:08), Max: 36.8 (27 Feb 2023 19:59)  T(F): 98.2 (28 Feb 2023 10:08), Max: 98.3 (27 Feb 2023 19:59)  HR: 92 (28 Feb 2023 10:08) (82 - 110)  BP: 147/83 (28 Feb 2023 10:08) (145/89 - 162/91)  BP(mean): --  RR: 20 (28 Feb 2023 10:08) (17 - 20)  SpO2: 97% (28 Feb 2023 10:08) (95% - 100%)    Parameters below as of 28 Feb 2023 10:08  Patient On (Oxygen Delivery Method): room air        CAPILLARY BLOOD GLUCOSE          PHYSICAL EXAM  General: NAD, sitting comfortably in bed   HEENT: PERRL/ EOMI, no scleral icterus, MMM  Neck: Supple, no JVD  Respiratory: lungs CTA b/l, no wheezes/crackles, no accessory muscle use  Cardiovascular: Regular rhythm/rate; +S1 +S2, no murmurs  Gastrointestinal: Soft, NTND, normoactive BS, no rebound, no guarding  Genitourinary: no suprapubic tenderness  Extremities: WWP, no cyanosis, no clubbing, no edema, pulses equal  Neurological: A&Ox3, no gross focal deficits, follows commands  Skin: Normal temperature, warm, dry    MEDICATIONS  (STANDING):  DAPTOmycin IVPB 800 milliGRAM(s) IV Intermittent every 24 hours  enoxaparin Injectable 40 milliGRAM(s) SubCutaneous every 12 hours  gabapentin 100 milliGRAM(s) Oral daily  gabapentin 800 milliGRAM(s) Oral daily  meropenem  IVPB 1000 milliGRAM(s) IV Intermittent once  meropenem  IVPB      meropenem  IVPB 1000 milliGRAM(s) IV Intermittent every 8 hours  polyethylene glycol 3350 17 Gram(s) Oral daily  senna 2 Tablet(s) Oral at bedtime    MEDICATIONS  (PRN):  HYDROmorphone  Injectable 1 milliGRAM(s) IV Push every 6 hours PRN breakthrough  oxyCODONE    IR 5 milliGRAM(s) Oral every 4 hours PRN Severe Pain (7 - 10)      amoxicillin (Angioedema)  ciprofloxacin (Rash)  hydrochlorothiazide (Hives)  latex (Rash)  lisinopril (Angioedema; Rash; Hives)  morphine (Rash)  penicillin (Rash)      LABS                        11.0   10.87 )-----------( 436      ( 28 Feb 2023 05:30 )             34.3     02-28    133<L>  |  103  |  14  ----------------------------<  115<H>  3.6   |  19<L>  |  0.64    Ca    8.9      28 Feb 2023 05:30  Phos  3.7     02-28  Mg     1.9     02-28    TPro  7.0  /  Alb  3.5  /  TBili  1.0  /  DBili  x   /  AST  13  /  ALT  9<L>  /  AlkPhos  58  02-28    PT/INR - ( 27 Feb 2023 22:40 )   PT: 12.6 sec;   INR: 1.06          PTT - ( 27 Feb 2023 22:40 )  PTT:32.4 sec    CARDIAC MARKERS ( 27 Feb 2023 22:40 )  x     / x     / 132 U/L / x     / x            RADIOLOGY & ADDITIONAL TESTS: Reviewed INTERVAL HPI/OVERNIGHT EVENTS:  Patient seen and examined at bedside. C/o pain to the foot. Patient denies fever, chills, dizziness, weakness, HA, CP, SOB, N/V/D/C    VITALS  Vital Signs Last 24 Hrs  T(C): 36.8 (28 Feb 2023 10:08), Max: 36.8 (27 Feb 2023 19:59)  T(F): 98.2 (28 Feb 2023 10:08), Max: 98.3 (27 Feb 2023 19:59)  HR: 92 (28 Feb 2023 10:08) (82 - 110)  BP: 147/83 (28 Feb 2023 10:08) (145/89 - 162/91)  BP(mean): --  RR: 20 (28 Feb 2023 10:08) (17 - 20)  SpO2: 97% (28 Feb 2023 10:08) (95% - 100%)    Parameters below as of 28 Feb 2023 10:08  Patient On (Oxygen Delivery Method): room air        CAPILLARY BLOOD GLUCOSE          PHYSICAL EXAM  General: NAD, sitting comfortably in bed   HEENT: PERRL/ EOMI, no scleral icterus, MMM  Neck: Supple, no JVD  Respiratory: lungs CTA b/l, no wheezes/crackles, no accessory muscle use  Cardiovascular: Regular rhythm/rate; +S1 +S2, no murmurs  Gastrointestinal: Soft, NTND, normoactive BS, no rebound, no guarding  Genitourinary: no suprapubic tenderness  Extremities: + L foot shallow clean base ulcer w/ mild surrounding erythema. WWP, no clubbing or cyanosis; no peripheral edema  Neurological: A&Ox3, no gross focal deficits, follows commands  Skin: Normal temperature, warm, dry    MEDICATIONS  (STANDING):  DAPTOmycin IVPB 800 milliGRAM(s) IV Intermittent every 24 hours  enoxaparin Injectable 40 milliGRAM(s) SubCutaneous every 12 hours  gabapentin 100 milliGRAM(s) Oral daily  gabapentin 800 milliGRAM(s) Oral daily  meropenem  IVPB 1000 milliGRAM(s) IV Intermittent once  meropenem  IVPB      meropenem  IVPB 1000 milliGRAM(s) IV Intermittent every 8 hours  polyethylene glycol 3350 17 Gram(s) Oral daily  senna 2 Tablet(s) Oral at bedtime    MEDICATIONS  (PRN):  HYDROmorphone  Injectable 1 milliGRAM(s) IV Push every 6 hours PRN breakthrough  oxyCODONE    IR 5 milliGRAM(s) Oral every 4 hours PRN Severe Pain (7 - 10)      amoxicillin (Angioedema)  ciprofloxacin (Rash)  hydrochlorothiazide (Hives)  latex (Rash)  lisinopril (Angioedema; Rash; Hives)  morphine (Rash)  penicillin (Rash)      LABS                        11.0   10.87 )-----------( 436      ( 28 Feb 2023 05:30 )             34.3     02-28    133<L>  |  103  |  14  ----------------------------<  115<H>  3.6   |  19<L>  |  0.64    Ca    8.9      28 Feb 2023 05:30  Phos  3.7     02-28  Mg     1.9     02-28    TPro  7.0  /  Alb  3.5  /  TBili  1.0  /  DBili  x   /  AST  13  /  ALT  9<L>  /  AlkPhos  58  02-28    PT/INR - ( 27 Feb 2023 22:40 )   PT: 12.6 sec;   INR: 1.06          PTT - ( 27 Feb 2023 22:40 )  PTT:32.4 sec    CARDIAC MARKERS ( 27 Feb 2023 22:40 )  x     / x     / 132 U/L / x     / x            RADIOLOGY & ADDITIONAL TESTS: Reviewed

## 2023-02-28 NOTE — PROGRESS NOTE ADULT - PROBLEM SELECTOR PLAN 1
3/4 SIRS (Fever 101 at home, Tachy, elevated WBC). Possible source (s) PICC line and/or Left foot ulcer. Pt s/p recent dc on 2/7/23 on IV abx (Dapto 800 q24hrs and Cefepime 2g q8hrs) via PICC (placed on 2/3/23) for L foot infx.   Afebrile, HD stable on admission. Reports taking Tylenol at home prior to presentation. LA wnl.  PICC site c/d/i. PICC. L foot + new redness around shallow ulcer on L foot X 1 day. Denies URI s/s, cough, CP, SOB, abd pain, n/v/d/c and dysuria. CXR neg.  Per ID source is likely the foot and PICC cane stay in.     Plan:   -f/u UA, Ucx, Bcx,  -f/u ESR, CRP, CK as on dapto   -c/w Dapto 800 q24hrs and Annie 1gm   -f/u ID recs

## 2023-02-28 NOTE — CONSULT NOTE ADULT - SUBJECTIVE AND OBJECTIVE BOX
HPI:  36 yo F with HTN, MO and AVM L foot with recurrent L foot ulcer with fever.  ID consult for assistance with evaluation and antibiotic management.    She has undergone multiple direct stick and transcatheter embolizations for ulcers related to AVM.  Initially, she had been treated with courses of clindamycin.  She had been admitted on 10/10/22, underwent transcatheter embolization and OR wound debridement 10/11.  Wound culture from OR grew Pseudomonas aeruginosa.  She was treated with a 5 d course of vancomycin and cefepime.  She was readmitted on 10/31 with fever and L lateral ankle erythema/swelling/pain.  She initially was treated with vanc and cefepime, worsened after vanc was d/osorio and did not improve until cefepime was changed to meropenem.  She was d/osorio on 11/10 with close ID follow-up, steady improvement.  She was readmitted on  with fever.  Labs were notable for WBC 10.7 with 78% PMNs, ESR 34 and CRP 29.  Repeat MRI showed phlegmon underlying ulcer surrounding area of contrast extravasation without improvement from .  PICC was removed and she symptomatically improved.  Blood cultures were negative.  PICC was replaced and she was d/osorio on  on vancomycin 1250 mg IV q12h and meropenem 1 g IV q8h.  Her ulcer closed completely on  and erythema resolved.  She was continued on vanc and meropenem through .  At that time, she had been having increased pulsing sensation in her foot.  She saw Dr. Teran who suggested additional embolization.  On ~ wound on foot again opened.  Further embolization was planned.  On , she developed mild erythema surrounding ulcer.  She underwent direct stick embolization on  and was admitted for further management.  She was initially started on vancomycin and meropenem but was without improvement with intermittent low grade temp increases and sweats ongoing mild leukocytosis.  On , vanc and meropenem were d/osorio and she was started on daptomycin and cefepime.  She began to improve and was d/osorio on  – was set up with 4 week course (2/3-3/3) with plan to d/c earlier if possible based upon clinical appearance.  Per Ms. Kearney, she had ongoing improvement with small residual opening and very mild peripheral erythema, no drainage.  By 3-4 weeks after embolization, she had felt the standard result of embolization with baseline 4/10 throbbing pain.  On , she began feeling under the weather, on , she noted increased fatigue and then in the afternoon, began having fever with Tm 101 (o), chills without rigors, then sweats.  She called infusion company nurse who told her to take Tylenol, which she did around 6 pm.  She called me and I referred her to the ED.  She then took a shower and noticed that her foot ulcer had become enlarged and she had more erythema.  She came to the ED, where she had T 98.3, .  Labs with WBC 13.2 with 77% PMNs, plt 537, ESR 40, CRP 19.6.  She has remained afebrile, WBC 10.9 today.  Foot pain had been baseline 4/10, now 7-8/10 since last night.  Ongoing feeling of intermittent fever, chills and sweats.        PAST MEDICAL & SURGICAL HISTORY:  HTN (hypertension)      AVM (arteriovenous malformation)  of left foot      Foot ulceration  left foot      S/P debridement  LLE area overlying AVM      Elective surgery  transcatheter therapy vascular embolization x5      Morbid obesity              MEDICATIONS  (STANDING):  DAPTOmycin IVPB 800 milliGRAM(s) IV Intermittent every 24 hours  enoxaparin Injectable 40 milliGRAM(s) SubCutaneous every 12 hours  gabapentin 100 milliGRAM(s) Oral daily  gabapentin 800 milliGRAM(s) Oral daily  meropenem  IVPB      polyethylene glycol 3350 17 Gram(s) Oral daily  senna 2 Tablet(s) Oral at bedtime    MEDICATIONS  (PRN):  HYDROmorphone  Injectable 1 milliGRAM(s) IV Push every 4 hours PRN Severe Pain (7 - 10)      Allergies    amoxicillin (Angioedema)  ciprofloxacin (Rash)  hydrochlorothiazide (Hives)  latex (Rash)  lisinopril (Angioedema; Rash; Hives)  morphine (Rash)  penicillin (Rash)    Intolerances        SOCIAL HISTORY:  Born in [place], lives there now with her mother and boyfriend.  Has 2 dogs, no children.  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  No tobacco, alcohol or recreational drug use.    FAMILY HISTORY:  Family history of congenital malformation (Sibling)  AVMs: siblings    ROS:  Has HA that she associates with BP elevation, no photophobia, neck stiffness, rhinorrhea, sore throat, cough, CP, SOB, N, V, diarrhea, abd pain, dysuria, hematuria, frequency, bruising/bleeding      Vital Signs Last 24 Hrs  T(C): 36.7 (2023 15:43), Max: 36.8 (2023 19:59)  T(F): 98 (2023 15:43), Max: 98.3 (2023 19:59)  HR: 93 (2023 15:43) (82 - 110)  BP: 149/91 (2023 15:43) (145/89 - 162/91)  BP(mean): --  RR: 18 (2023 15:43) (17 - 20)  SpO2: 97% (2023 15:43) (95% - 100%)    Parameters below as of 2023 15:43  Patient On (Oxygen Delivery Method): room air        PE:  Alert, in no distress  HEENT:  NC, PERRL, sclerae anicteric, conjunctivae clear.  Sinuses nontender, no nasal exudate.  No buccal or pharyngeal lesions, erythema or exudate  Neck:  Supple, no adenopathy  Lungs:  Clear to auscultation  Cor:  RRR, S1, S2, no murmur appreciated  Abd:  Symmetric, normoactive BS.  Soft, nontender, no masses, guarding or rebound.  Liver and spleen not enlarged  Extrem:  No cyanosis or edema.  L foot with lateral ulcer ~2 X 0.5 cm with granular base, erythema superiorly, foot is warm.  RUE PICC exit site with minimal surrounding erythema.  Skin:  No rashes.    LABS:                        11.0   10.87 )-----------( 436      ( 2023 05:30 )             34.3         133<L>  |  103  |  14  ----------------------------<  115<H>  3.6   |  19<L>  |  0.64    Ca    8.9      2023 05:30  Phos  3.7       Mg     1.9         TPro  7.0  /  Alb  3.5  /  TBili  1.0  /  DBili  x   /  AST  13  /  ALT  9<L>  /  AlkPhos  58      Urinalysis Basic - ( 2023 17:05 )    Color: Yellow / Appearance: Clear / S.020 / pH: x  Gluc: x / Ketone: NEGATIVE  / Bili: Negative / Urobili: 0.2 E.U./dL   Blood: x / Protein: 30 mg/dL / Nitrite: NEGATIVE   Leuk Esterase: NEGATIVE / RBC: < 5 /HPF / WBC < 5 /HPF   Sq Epi: x / Non Sq Epi: 0-5 /HPF / Bacteria: Present /HPF        RADIOLOGY & ADDITIONAL STUDIES:    < from: Xray Chest 1 View- PORTABLE-Urgent (Xray Chest 1 View- PORTABLE-Urgent .) (23 @ 01:33) >  FINDINGS:  Right-sided upper extremity PICC with tip terminating in the region of   the superior vena cava. No change in elevation of the right   hemidiaphragm. There is no focal consolidation, pleural effusion or   pneumothorax. The cardiomediastinal silhouette and osseous structures are   unchanged.    IMPRESSION:  The lungs are clear.      < end of copied text >

## 2023-02-28 NOTE — PATIENT PROFILE ADULT - FALL HARM RISK - FALLEN IN PAST
Spoke with patient concerning sore throat that began 5 days ago with a sore throat. Noted she is much worse today with severe sore throat/bilateral glands swollen in neck/severe spasmatic dry coughing without chest pain-slight wheezing/increased fatigue/no SOB/low grade fever-no chills/no sinus pressure/slight nasal congestion at night. Was exposed to  who developed similar symptoms and is much worse today.  is patient with [facility] and will schedule appointment with his PCP.   Plan:  Given appointment today at 9:30-will arrive 15 minutes prior to appointment.   Routed to PCP   No

## 2023-02-28 NOTE — PROGRESS NOTE ADULT - PROBLEM SELECTOR PLAN 2
Chronic foot ulcer in the setting of AVM malformation. L foot + new redness around shallow ulcer on L foot X 1 day w/ ángel pain foot pain today. Home meds: Dapto/ Cefepime, Gabapentin 100 in am and 800 at 4pm. Percocet 10 q4hrs PRN. Dr Teran office notified, will follow patient.   -c/w home pain meds   -bowel reg   -see sepsis for plan Chronic foot ulcer in the setting of AVM malformation. L foot + new redness around shallow ulcer on L foot X 1 day w/ ángel pain foot pain today. Home meds: Dapto/ Cefepime, Gabapentin 100 in am and 800 at 4pm. Percocet 10 q4hrs PRN. Dr Teran office notified, will follow patient.   -Transition to Dilaudid q6hrs   -bowel reg   -see sepsis for plan

## 2023-02-28 NOTE — CONSULT NOTE ADULT - TIME BILLING
preparing to see patient, obtaining history, performing physical exam, counseling and educating the patient, communicating with other health care providers, documenting in the EMR, independently interpreting results.

## 2023-02-28 NOTE — PROGRESS NOTE ADULT - ASSESSMENT
37F PMH left foot AVM w/ chronic left foot ulcer (s/p multiple embolizations, last 1/24), HTN,  morbid obesity and recent admission for AVM L foot re-embolization and dc'ed  on 2/7/23 on IV abx via PICC for L foot infx sent by Dr. aRhman for fever wok up. Found to be septic w/ possible source(s) being PICC line and/or Left foot ulcer.

## 2023-03-01 ENCOUNTER — TRANSCRIPTION ENCOUNTER (OUTPATIENT)
Age: 38
End: 2023-03-01

## 2023-03-01 LAB
ALBUMIN SERPL ELPH-MCNC: 3.5 G/DL — SIGNIFICANT CHANGE UP (ref 3.3–5)
ALP SERPL-CCNC: 61 U/L — SIGNIFICANT CHANGE UP (ref 40–120)
ALT FLD-CCNC: 8 U/L — LOW (ref 10–45)
ANION GAP SERPL CALC-SCNC: 10 MMOL/L — SIGNIFICANT CHANGE UP (ref 5–17)
AST SERPL-CCNC: 9 U/L — LOW (ref 10–40)
BASOPHILS # BLD AUTO: 0.04 K/UL — SIGNIFICANT CHANGE UP (ref 0–0.2)
BASOPHILS NFR BLD AUTO: 0.4 % — SIGNIFICANT CHANGE UP (ref 0–2)
BILIRUB SERPL-MCNC: 0.6 MG/DL — SIGNIFICANT CHANGE UP (ref 0.2–1.2)
BUN SERPL-MCNC: 13 MG/DL — SIGNIFICANT CHANGE UP (ref 7–23)
CALCIUM SERPL-MCNC: 8.8 MG/DL — SIGNIFICANT CHANGE UP (ref 8.4–10.5)
CHLORIDE SERPL-SCNC: 107 MMOL/L — SIGNIFICANT CHANGE UP (ref 96–108)
CK SERPL-CCNC: 72 U/L — SIGNIFICANT CHANGE UP (ref 25–170)
CO2 SERPL-SCNC: 21 MMOL/L — LOW (ref 22–31)
CREAT SERPL-MCNC: 0.61 MG/DL — SIGNIFICANT CHANGE UP (ref 0.5–1.3)
EGFR: 117 ML/MIN/1.73M2 — SIGNIFICANT CHANGE UP
EOSINOPHIL # BLD AUTO: 0.12 K/UL — SIGNIFICANT CHANGE UP (ref 0–0.5)
EOSINOPHIL NFR BLD AUTO: 1.2 % — SIGNIFICANT CHANGE UP (ref 0–6)
GLUCOSE SERPL-MCNC: 108 MG/DL — HIGH (ref 70–99)
HCT VFR BLD CALC: 35.2 % — SIGNIFICANT CHANGE UP (ref 34.5–45)
HGB BLD-MCNC: 11.3 G/DL — LOW (ref 11.5–15.5)
IMM GRANULOCYTES NFR BLD AUTO: 0.3 % — SIGNIFICANT CHANGE UP (ref 0–0.9)
LYMPHOCYTES # BLD AUTO: 1.82 K/UL — SIGNIFICANT CHANGE UP (ref 1–3.3)
LYMPHOCYTES # BLD AUTO: 18.1 % — SIGNIFICANT CHANGE UP (ref 13–44)
MAGNESIUM SERPL-MCNC: 1.8 MG/DL — SIGNIFICANT CHANGE UP (ref 1.6–2.6)
MCHC RBC-ENTMCNC: 28.5 PG — SIGNIFICANT CHANGE UP (ref 27–34)
MCHC RBC-ENTMCNC: 32.1 GM/DL — SIGNIFICANT CHANGE UP (ref 32–36)
MCV RBC AUTO: 88.9 FL — SIGNIFICANT CHANGE UP (ref 80–100)
MONOCYTES # BLD AUTO: 0.91 K/UL — HIGH (ref 0–0.9)
MONOCYTES NFR BLD AUTO: 9.1 % — SIGNIFICANT CHANGE UP (ref 2–14)
NEUTROPHILS # BLD AUTO: 7.13 K/UL — SIGNIFICANT CHANGE UP (ref 1.8–7.4)
NEUTROPHILS NFR BLD AUTO: 70.9 % — SIGNIFICANT CHANGE UP (ref 43–77)
NRBC # BLD: 0 /100 WBCS — SIGNIFICANT CHANGE UP (ref 0–0)
PHOSPHATE SERPL-MCNC: 3.9 MG/DL — SIGNIFICANT CHANGE UP (ref 2.5–4.5)
PLATELET # BLD AUTO: 423 K/UL — HIGH (ref 150–400)
POTASSIUM SERPL-MCNC: 3.9 MMOL/L — SIGNIFICANT CHANGE UP (ref 3.5–5.3)
POTASSIUM SERPL-SCNC: 3.9 MMOL/L — SIGNIFICANT CHANGE UP (ref 3.5–5.3)
PROT SERPL-MCNC: 6.9 G/DL — SIGNIFICANT CHANGE UP (ref 6–8.3)
RBC # BLD: 3.96 M/UL — SIGNIFICANT CHANGE UP (ref 3.8–5.2)
RBC # FLD: 13.6 % — SIGNIFICANT CHANGE UP (ref 10.3–14.5)
SODIUM SERPL-SCNC: 138 MMOL/L — SIGNIFICANT CHANGE UP (ref 135–145)
WBC # BLD: 10.05 K/UL — SIGNIFICANT CHANGE UP (ref 3.8–10.5)
WBC # FLD AUTO: 10.05 K/UL — SIGNIFICANT CHANGE UP (ref 3.8–10.5)

## 2023-03-01 PROCEDURE — 99232 SBSQ HOSP IP/OBS MODERATE 35: CPT | Mod: GC

## 2023-03-01 PROCEDURE — 99233 SBSQ HOSP IP/OBS HIGH 50: CPT | Mod: GC

## 2023-03-01 RX ORDER — MEROPENEM 1 G/30ML
2000 INJECTION INTRAVENOUS EVERY 8 HOURS
Refills: 0 | Status: COMPLETED | OUTPATIENT
Start: 2023-03-01 | End: 2023-03-07

## 2023-03-01 RX ORDER — DAPTOMYCIN 500 MG/10ML
800 INJECTION, POWDER, LYOPHILIZED, FOR SOLUTION INTRAVENOUS
Qty: 0 | Refills: 0 | DISCHARGE
Start: 2023-03-01

## 2023-03-01 RX ORDER — NEBIVOLOL HYDROCHLORIDE 5 MG/1
10 TABLET ORAL
Refills: 0 | Status: DISCONTINUED | OUTPATIENT
Start: 2023-03-01 | End: 2023-03-16

## 2023-03-01 RX ORDER — MEROPENEM 1 G/30ML
2 INJECTION INTRAVENOUS
Qty: 0 | Refills: 0 | DISCHARGE
Start: 2023-03-01

## 2023-03-01 RX ADMIN — NEBIVOLOL HYDROCHLORIDE 10 MILLIGRAM(S): 5 TABLET ORAL at 18:09

## 2023-03-01 RX ADMIN — HYDROMORPHONE HYDROCHLORIDE 1 MILLIGRAM(S): 2 INJECTION INTRAMUSCULAR; INTRAVENOUS; SUBCUTANEOUS at 12:55

## 2023-03-01 RX ADMIN — GABAPENTIN 100 MILLIGRAM(S): 400 CAPSULE ORAL at 12:11

## 2023-03-01 RX ADMIN — MEROPENEM 280 MILLIGRAM(S): 1 INJECTION INTRAVENOUS at 06:31

## 2023-03-01 RX ADMIN — HYDROMORPHONE HYDROCHLORIDE 1 MILLIGRAM(S): 2 INJECTION INTRAMUSCULAR; INTRAVENOUS; SUBCUTANEOUS at 21:23

## 2023-03-01 RX ADMIN — MEROPENEM 280 MILLIGRAM(S): 1 INJECTION INTRAVENOUS at 18:00

## 2023-03-01 RX ADMIN — HYDROMORPHONE HYDROCHLORIDE 1 MILLIGRAM(S): 2 INJECTION INTRAMUSCULAR; INTRAVENOUS; SUBCUTANEOUS at 08:50

## 2023-03-01 RX ADMIN — GABAPENTIN 800 MILLIGRAM(S): 400 CAPSULE ORAL at 12:10

## 2023-03-01 RX ADMIN — HYDROMORPHONE HYDROCHLORIDE 1 MILLIGRAM(S): 2 INJECTION INTRAMUSCULAR; INTRAVENOUS; SUBCUTANEOUS at 00:26

## 2023-03-01 RX ADMIN — HYDROMORPHONE HYDROCHLORIDE 1 MILLIGRAM(S): 2 INJECTION INTRAMUSCULAR; INTRAVENOUS; SUBCUTANEOUS at 17:50

## 2023-03-01 RX ADMIN — HYDROMORPHONE HYDROCHLORIDE 1 MILLIGRAM(S): 2 INJECTION INTRAMUSCULAR; INTRAVENOUS; SUBCUTANEOUS at 04:44

## 2023-03-01 RX ADMIN — HYDROMORPHONE HYDROCHLORIDE 1 MILLIGRAM(S): 2 INJECTION INTRAMUSCULAR; INTRAVENOUS; SUBCUTANEOUS at 00:02

## 2023-03-01 RX ADMIN — HYDROMORPHONE HYDROCHLORIDE 1 MILLIGRAM(S): 2 INJECTION INTRAMUSCULAR; INTRAVENOUS; SUBCUTANEOUS at 05:17

## 2023-03-01 RX ADMIN — ENOXAPARIN SODIUM 40 MILLIGRAM(S): 100 INJECTION SUBCUTANEOUS at 22:43

## 2023-03-01 RX ADMIN — HYDROMORPHONE HYDROCHLORIDE 1 MILLIGRAM(S): 2 INJECTION INTRAMUSCULAR; INTRAVENOUS; SUBCUTANEOUS at 17:04

## 2023-03-01 RX ADMIN — ENOXAPARIN SODIUM 40 MILLIGRAM(S): 100 INJECTION SUBCUTANEOUS at 12:11

## 2023-03-01 RX ADMIN — HYDROMORPHONE HYDROCHLORIDE 1 MILLIGRAM(S): 2 INJECTION INTRAMUSCULAR; INTRAVENOUS; SUBCUTANEOUS at 13:20

## 2023-03-01 RX ADMIN — DAPTOMYCIN 132 MILLIGRAM(S): 500 INJECTION, POWDER, LYOPHILIZED, FOR SOLUTION INTRAVENOUS at 16:37

## 2023-03-01 RX ADMIN — HYDROMORPHONE HYDROCHLORIDE 1 MILLIGRAM(S): 2 INJECTION INTRAMUSCULAR; INTRAVENOUS; SUBCUTANEOUS at 21:45

## 2023-03-01 RX ADMIN — HYDROMORPHONE HYDROCHLORIDE 1 MILLIGRAM(S): 2 INJECTION INTRAMUSCULAR; INTRAVENOUS; SUBCUTANEOUS at 09:20

## 2023-03-01 NOTE — PROGRESS NOTE ADULT - SUBJECTIVE AND OBJECTIVE BOX
INTERVAL HPI/OVERNIGHT EVENTS:  Patient was seen and examined at bedside. Patient reports she does not feel at her baseline yet, reports she continues to feel weak, tired, and febrile.      VITAL SIGNS:  T(F): 97.9 (23 @ 15:20)  HR: 97 (23 @ 15:20)  BP: 152/90 (23 @ 15:20)  RR: 18 (23 @ 15:20)  SpO2: 97% (23 @ 15:20)  Wt(kg): --    PHYSICAL EXAM:  Constitutional: resting comfortably, no acute distress   HEENT: PERRL, EOMI, sclera non-icteric, neck supple, trachea midline, no masses, no JVD, MMM, good dentition  Respiratory: CTA b/l, good air entry b/l, no wheezing, no rhonchi, no rales, without accessory muscle use and no intercostal retractions  Cardiovascular: RRR, normal S1S2, no M/R/G  Gastrointestinal: soft, NTND, no masses palpable, BS normal  Extremities: left foot with small open ulcer with granular base, improved from yesterday, no purulence or drainage   Neurological: AAOx3, CN Grossly intact  Skin: Normal temperature, warm, dry    MEDICATIONS  (STANDING):  DAPTOmycin IVPB 800 milliGRAM(s) IV Intermittent every 24 hours  enoxaparin Injectable 40 milliGRAM(s) SubCutaneous every 12 hours  gabapentin 100 milliGRAM(s) Oral daily  gabapentin 800 milliGRAM(s) Oral daily  meropenem  IVPB 2000 milliGRAM(s) IV Intermittent every 8 hours  nebivolol 10 milliGRAM(s) Oral <User Schedule>  polyethylene glycol 3350 17 Gram(s) Oral daily  senna 2 Tablet(s) Oral at bedtime    MEDICATIONS  (PRN):  HYDROmorphone  Injectable 1 milliGRAM(s) IV Push every 4 hours PRN Severe Pain (7 - 10)    Allergies    amoxicillin (Angioedema)  ciprofloxacin (Rash)  hydrochlorothiazide (Hives)  latex (Rash)  lisinopril (Angioedema; Rash; Hives)  morphine (Rash)  penicillin (Rash)    Intolerances    LABS:                        11.3   10.05 )-----------( 423      ( 01 Mar 2023 05:30 )             35.2         138  |  107  |  13  ----------------------------<  108<H>  3.9   |  21<L>  |  0.61    Ca    8.8      01 Mar 2023 05:30  Phos  3.9       Mg     1.8         TPro  6.9  /  Alb  3.5  /  TBili  0.6  /  DBili  x   /  AST  9<L>  /  ALT  8<L>  /  AlkPhos  61      PT/INR - ( 2023 22:40 )   PT: 12.6 sec;   INR: 1.06          PTT - ( 2023 22:40 )  PTT:32.4 sec  Urinalysis Basic - ( 2023 17:05 )    Color: Yellow / Appearance: Clear / S.020 / pH: x  Gluc: x / Ketone: NEGATIVE  / Bili: Negative / Urobili: 0.2 E.U./dL   Blood: x / Protein: 30 mg/dL / Nitrite: NEGATIVE   Leuk Esterase: NEGATIVE / RBC: < 5 /HPF / WBC < 5 /HPF   Sq Epi: x / Non Sq Epi: 0-5 /HPF / Bacteria: Present /HPF    RADIOLOGY & ADDITIONAL TESTS:  Reviewed

## 2023-03-01 NOTE — DISCHARGE NOTE PROVIDER - CARE PROVIDERS DIRECT ADDRESSES
[email or web address]

## 2023-03-01 NOTE — DISCHARGE NOTE PROVIDER - HOSPITAL COURSE
#Discharge: do not delete    Patient is a 37F PMH left foot AVM w/ chronic left foot ulcer (s/p multiple embolizations, last 1/24), HTN,  morbid obesity and recent admission for AVM L foot re-embolization and dc'ed  on 2/7/23 on IV abx via PICC for L foot infx sent by Dr. Rahman for fever wok up. Found to be septic 2/2  Left foot ulcer.     Inpatient treatment course:     Problem List/Main Diagnoses:        Problem/Plan - 1:  ·  Problem: Sepsis.   ·  Plan: 3/4 SIRS (Fever 101 at home, Tachy, elevated WBC). Possible source (s) PICC line and/or Left foot ulcer. Pt s/p recent dc on 2/7/23 on IV abx (Dapto 800 q24hrs and Cefepime 2g q8hrs) via PICC (placed on 2/3/23) for L foot infx.   Afebrile, HD stable on admission. Reports taking Tylenol at home prior to presentation. LA wnl.  PICC site c/d/i. PICC. L foot + new redness around shallow ulcer on L foot X 1 day. Denies URI s/s, cough, CP, SOB, abd pain, n/v/d/c and dysuria. CXR neg.  Per ID source is likely the foot and PICC can stay in. UA/UCx no evidence of infx.      Plan:   -f/u ESR, CRP, CK as on dapto   -c/w Dapto 800 q24hrs and Meropenem 2g q8hr.   -outpatient f/u with ID     Problem/Plan - 2:  ·  Problem: Ulcer of left foot.   ·  Plan: Chronic foot ulcer in the setting of AVM malformation. L foot + new redness around shallow ulcer on L foot X 1 day w/ ángel pain foot pain today. Home meds: Dapto/ Cefepime, Gabapentin 100 in am and 800 at 4pm. Percocet 10 q4hrs PRN. Dr Teran office notified, will follow patient.   - S/p Dilaudid q4hrs -> Transition back to home Percocet  - bowel reg   - Outpatient f/u with Dr. Teran     Problem/Plan - 3:  ·  Problem: AVM (congenital arteriovenous malformation).   ·  Plan: Hx of Left foot AVM w/ chronic foot ulcer w/ multiple prior embolizations w/ most recent embolization on 01/24/2023 w/ Dr. Teran   - see sepsis /foor ulcer for plan.     Problem/Plan - 5:  ·  Problem: HTN (hypertension).   ·  Plan: Home meds: Bystolic 10mg BID   - Resume johan med.        New medications/therapies:     Discharge plan: discharge to home       #Discharge: do not delete    Patient is a 37F PMH left foot AVM w/ chronic left foot ulcer (s/p multiple embolizations, last 1/24), HTN,  morbid obesity and recent admission for AVM L foot re-embolization and dc'ed  on 2/7/23 on IV abx via PICC for L foot infx sent by Dr. Rahman for fever wok up. Found to be septic 2/2  Left foot ulcer.     Inpatient treatment course:     Problem List/Main Diagnoses:    Problem/Plan - 1:  ·  Problem: Sepsis.   ·  Plan: 3/4 SIRS (Fever 101 at home, Tachy, elevated WBC). Possible source (s) PICC line and/or Left foot ulcer. Pt s/p recent dc on 2/7/23 on IV abx (Dapto 800 q24hrs and Cefepime 2g q8hrs) via PICC (placed on 2/3/23) for L foot infx.   Afebrile, HD stable on admission. Reports taking Tylenol at home prior to presentation. LA wnl.  PICC site c/d/i. PICC. L foot + new redness around shallow ulcer on L foot X 1 day. Denies URI s/s, cough, CP, SOB, abd pain, n/v/d/c and dysuria. CXR neg.  Per ID source is likely the foot and PICC can stay in. UA/UCx no evidence of infx.    Plan:   -f/u ESR, CRP, CK as on dapto   -c/w Dapto 800 q24hrs and Meropenem 2g q8hr for total ______.   -outpatient f/u with ID     Problem/Plan - 2:  ·  Problem: Ulcer of left foot.   ·  Plan: Chronic foot ulcer in the setting of AVM malformation. L foot + new redness around shallow ulcer on L foot X 1 day w/ ángel pain foot pain today. Home meds: Dapto/ Cefepime, Gabapentin 100 in am and 800 at 4pm. Percocet 10 q4hrs PRN. Dr Teran office notified, will follow patient.   - S/p Dilaudid q4hrs -> Transition back to home Percocet  - bowel reg   - Outpatient f/u with Dr. Teran     Problem/Plan - 3:  ·  Problem: AVM (congenital arteriovenous malformation).   ·  Plan: Hx of Left foot AVM w/ chronic foot ulcer w/ multiple prior embolizations w/ most recent embolization on 01/24/2023 w/ Dr. Teran   - see sepsis /foor ulcer for plan.     Problem/Plan - 5:  ·  Problem: HTN (hypertension).   ·  Plan: Home meds: Bystolic 10mg BID   - Resume johan med.        New medications/therapies: Dapto 800 q24hrs and Meropenem 2g q8hr    Discharge plan: discharge to home       #Discharge: do not delete    Patient is a 37F PMH left foot AVM w/ chronic left foot ulcer (s/p multiple embolizations, last 1/24), HTN,  morbid obesity and recent admission for AVM L foot re-embolization and dc'ed  on 2/7/23 on IV abx via PICC for L foot infx sent by Dr. Rahman for fever wok up. Found to be septic 2/2  Left foot ulcer.     Inpatient treatment course:     Problem List/Main Diagnoses:    Problem/Plan - 1:  ·  Problem: Sepsis.   ·  Plan: 3/4 SIRS (Fever 101 at home, Tachy, elevated WBC). Possible source (s) PICC line and/or Left foot ulcer. Pt s/p recent dc on 2/7/23 on IV abx (Dapto 800 q24hrs and Cefepime 2g q8hrs) via PICC (placed on 2/3/23) for L foot infx.   Afebrile, HD stable on admission. Reports taking Tylenol at home prior to presentation. LA wnl.  PICC site c/d/i. PICC. L foot + new redness around shallow ulcer on L foot X 1 day. Denies URI s/s, cough, CP, SOB, abd pain, n/v/d/c and dysuria. CXR neg.  Per ID source is likely the foot and PICC can stay in. UA/UCx no evidence of infx.    Plan:   -f/u ESR, CRP, CK as on dapto   - s/p  Dapto 800 q24hrs and Meropenem 2g q8hr. -> Transition to Linezolid and Meropenem on discharge  -outpatient f/u with ID     Problem/Plan - 2:  ·  Problem: Ulcer of left foot.   ·  Plan: Chronic foot ulcer in the setting of AVM malformation. L foot + new redness around shallow ulcer on L foot X 1 day w/ ángel pain foot pain today. Home meds: Dapto/ Cefepime, Gabapentin 100 in am and 800 at 4pm. Percocet 10 q4hrs PRN. Dr Teran office notified, will follow patient.   - S/p Dilaudid q4hrs -> Transition back to home Percocet  - bowel reg   - Outpatient f/u with Dr. Teran     Problem/Plan - 3:  ·  Problem: AVM (congenital arteriovenous malformation).   ·  Plan: Hx of Left foot AVM w/ chronic foot ulcer w/ multiple prior embolizations w/ most recent embolization on 01/24/2023 w/ Dr. Teran   - see sepsis /foor ulcer for plan.     Problem/Plan - 5:  ·  Problem: HTN (hypertension).   ·  Plan: Home meds: Bystolic 10mg BID   - Resume johan med.        New medications/therapies: Linezolid 600mg PO BID. and Meropenem 2g q8hr    Discharge plan: discharge to home       #Discharge: do not delete    Patient is a 37F PMH left foot AVM w/ chronic left foot ulcer (s/p multiple embolizations, last 1/24), HTN,  morbid obesity and recent admission for AVM L foot re-embolization and dc'ed  on 2/7/23 on IV abx via PICC for L foot infx sent by Dr. Rahman for fever wok up. Found to be septic 2/2  Left foot ulcer.     Inpatient treatment course:     Problem List/Main Diagnoses:    Problem/Plan - 1:  ·  Problem: Sepsis.   ·  Plan: 3/4 SIRS (Fever 101 at home, Tachy, elevated WBC). Possible source (s) PICC line and/or Left foot ulcer. Pt s/p recent dc on 2/7/23 on IV abx (Dapto 800 q24hrs and Cefepime 2g q8hrs) via PICC (placed on 2/3/23) for L foot infx.   Afebrile, HD stable on admission. Reports taking Tylenol at home prior to presentation. LA wnl.  PICC site c/d/i. PICC. L foot + new redness around shallow ulcer on L foot X 1 day. Denies URI s/s, cough, CP, SOB, abd pain, n/v/d/c and dysuria. CXR neg.  Per ID source is likely the foot and PICC can stay in. UA/UCx no evidence of infx.    Plan:   -f/u ESR, CRP, CK as on dapto   - s/p  Dapto 800 q24hrs and Meropenem 2g q8hr. -> Transitioned to Linezolid and Meropenem without signifcant improvement -> transitioned to Vanc and Meropenem on discharge  -outpatient f/u with ID     Problem/Plan - 2:  ·  Problem: Ulcer of left foot.   ·  Plan: Chronic foot ulcer in the setting of AVM malformation. L foot + new redness around shallow ulcer on L foot X 1 day w/ ángel pain foot pain today. Home meds: Dapto/ Cefepime, Gabapentin 100 in am and 800 at 4pm. Percocet 10 q4hrs PRN. Dr Teran office notified, will follow patient.   - S/p Dilaudid q4hrs -> Transition back to home Percocet  - bowel reg   - Outpatient f/u with Dr. Teran     Problem/Plan - 3:  ·  Problem: AVM (congenital arteriovenous malformation).   ·  Plan: Hx of Left foot AVM w/ chronic foot ulcer w/ multiple prior embolizations w/ most recent embolization on 01/24/2023 w/ Dr. Teran   - see sepsis /foor ulcer for plan.     Problem/Plan - 5:  ·  Problem: HTN (hypertension).   ·  Plan: Home meds: Bystolic 10mg BID   - Resume johan med.        New medications/therapies: Linezolid 600mg PO BID. and Meropenem 2g q8hr    Discharge plan: discharge to home       #Discharge: do not delete    Patient is a 37F PMH left foot AVM w/ chronic left foot ulcer (s/p multiple embolizations, last 1/24), HTN,  morbid obesity and recent admission for AVM L foot re-embolization and dc'ed  on 2/7/23 on IV abx via PICC for L foot infx sent by Dr. Rahman for fever wok up. Found to be septic 2/2  Left foot ulcer.     Inpatient treatment course:     Problem List/Main Diagnoses:    Problem/Plan - 1:  ·  Problem: Sepsis.   ·  Plan: 3/4 SIRS (Fever 101 at home, Tachy, elevated WBC). Possible source (s) PICC line and/or Left foot ulcer. Pt s/p recent dc on 2/7/23 on IV abx (Dapto 800 q24hrs and Cefepime 2g q8hrs) via PICC (placed on 2/3/23) for L foot infx.   Afebrile, HD stable on admission. Reports taking Tylenol at home prior to presentation. LA wnl.  PICC site c/d/i. PICC. L foot + new redness around shallow ulcer on L foot X 1 day. Denies URI s/s, cough, CP, SOB, abd pain, n/v/d/c and dysuria. CXR neg.  Per ID source is likely the foot and PICC can stay in. UA/UCx no evidence of infx.    Plan:   - s/p  Dapto 800 q24hrs and Meropenem 2g q8hr. -> Transitioned to Linezolid and Meropenem without signifcant improvement -> transitioned to Vanc and Meropenem on discharge  -outpatient f/u with ID     Problem/Plan - 2:  ·  Problem: Ulcer of left foot.   ·  Plan: Chronic foot ulcer in the setting of AVM malformation. L foot + new redness around shallow ulcer on L foot X 1 day w/ ángel pain foot pain today. Home meds: Dapto/ Cefepime, Gabapentin 100 in am and 800 at 4pm. Percocet 10 q4hrs PRN. Dr Teran office notified, will follow patient.   - S/p Dilaudid q4hrs -> Transition back to home Percocet  - bowel reg   - Outpatient f/u with Dr. Teran     Problem/Plan - 3:  ·  Problem: AVM (congenital arteriovenous malformation).   ·  Plan: Hx of Left foot AVM w/ chronic foot ulcer w/ multiple prior embolizations w/ most recent embolization on 01/24/2023 w/ Dr. Teran   - see sepsis /foor ulcer for plan.     Problem/Plan - 5:  ·  Problem: HTN (hypertension).   ·  Plan: Home meds: Bystolic 10mg BID   - Resume johan med.        New medications/therapies: Linezolid 600mg PO BID. and Meropenem 2g q8hr    Discharge plan: discharge to home       #Discharge: do not delete    37F PMH left foot AVM w/ chronic left foot ulcer (s/p multiple embolizations, last 1/24), HTN, morbid obesity and recent admission for AVM L foot re-embolization and dc'ed  on 2/7/23 on IV abx via PICC for L foot infx sent by Dr. Rahman for fever wok up. Found to be septic likely 2/2 left foot ulcer.    #Ulcer of left foot.   3/4 SIRS (Fever 101 at home, Tachy, elevated WBC). Pt s/p recent dc on 2/7/23 on IV abx (Dapto 800 q24hrs and Cefepime 2g q8hrs) via PICC (placed on 2/3/23) for L foot infx.   Afebrile, HD stable on admission. Reports taking Tylenol at home prior to presentation. LA wnl. PICC site c/d/i. PICC. L foot + new redness around shallow ulcer on L foot X 1 day. Per ID source is likely the foot and PICC cane stay in. Chronic foot ulcer in the setting of AVM malformation. Home meds: Dapto/ Cefepime, Gabapentin 100 in am and 800 at 4pm. Percocet 10 q4hrs PRN.  - Dr. Teran of interventional cardiology was consulted for evaluation of nonhealing left ulcer despite coverage with appropriate antibiotics  - s/p angiogram and transcatheter embolization via right femoral groin access with improved perfusion 3/14   - C/w Meropenem 2g q8h  - c/w Vancomycin 1250mg q8h  - total antibiotic course for 4 weeks (until 4/3); already has PICC    #AVM (congenital arteriovenous malformation).   Hx of Left foot AVM w/ chronic foot ulcer w/ multiple prior embolizations w/ most recent embolization on 01/24/2023 w/ Dr. Teran. s/o transcatheter embolization via R groin access this admission 3/14 with improved perfusion.   - see plan for left foot ulcer    #HTN (hypertension)  Home meds: Bystolic 10mg BID   -c/w home med.    #Morbid obesity  BMI 46.5. Patient is taking Qsymmia with dietary modifications. She is considering Wegovy with her outpatient provider.   - Discussed lifestyle modifications with patient and continued outpatient followup.    Patient was discharged to: home    New medications: Meropenem 2g IV q8h and Vancomycin 1250mg IV q8h until 4/3  Changes to old medications: none  Medications that were stopped: none    Items to follow up as outpatient: F/u with Dr. Rahman    Physical exam at the time of discharge:    General: NAD  HEENT: PERRL/ EOMI, no scleral icterus, MMM  Neck: Supple, no JVD  Respiratory: lungs CTA b/l, no wheezes/crackles, no accessory muscle use  Cardiovascular: Regular rhythm/rate; +S1 +S2, no murmurs  Gastrointestinal: Soft, NTND, normoactive BS, no rebound, no guarding  Genitourinary: no suprapubic tenderness  Extremities: + L foot shallow clean base ulcer w/ mild surrounding erythema, WWP, no clubbing or cyanosis; no peripheral edema; R groin access c/d/i  Neurological: A&Ox3, no gross focal deficits, follows commands  Skin: Normal temperature, warm, dry

## 2023-03-01 NOTE — DISCHARGE NOTE PROVIDER - NSDCFUSCHEDAPPT_GEN_ALL_CORE_FT
[facility]  INFD[facility] [id number]  Scheduled Appointment: 03/03/2023

## 2023-03-01 NOTE — PROGRESS NOTE ADULT - PROBLEM SELECTOR PLAN 2
Chronic foot ulcer in the setting of AVM malformation. L foot + new redness around shallow ulcer on L foot X 1 day w/ ángel pain foot pain today. Home meds: Dapto/ Cefepime, Gabapentin 100 in am and 800 at 4pm. Percocet 10 q4hrs PRN. Dr Teran office notified, will follow patient.   -Transition to Dilaudid q6hrs   -bowel reg   -see sepsis for plan Chronic foot ulcer in the setting of AVM malformation. L foot + new redness around shallow ulcer on L foot X 1 day w/ ángel pain foot pain today. Home meds: Dapto/ Cefepime, Gabapentin 100 in am and 800 at 4pm. Percocet 10 q4hrs PRN. Dr Teran office notified, will follow patient.   -Transition to Dilaudid q4hrs   -bowel reg   -see sepsis for plan

## 2023-03-01 NOTE — PROGRESS NOTE ADULT - PROBLEM SELECTOR PLAN 1
3/4 SIRS (Fever 101 at home, Tachy, elevated WBC). Possible source (s) PICC line and/or Left foot ulcer. Pt s/p recent dc on 2/7/23 on IV abx (Dapto 800 q24hrs and Cefepime 2g q8hrs) via PICC (placed on 2/3/23) for L foot infx.   Afebrile, HD stable on admission. Reports taking Tylenol at home prior to presentation. LA wnl.  PICC site c/d/i. PICC. L foot + new redness around shallow ulcer on L foot X 1 day. Denies URI s/s, cough, CP, SOB, abd pain, n/v/d/c and dysuria. CXR neg.  Per ID source is likely the foot and PICC cane stay in.     Plan:   -f/u UA, Ucx, Bcx,  -f/u ESR, CRP, CK as on dapto   -c/w Dapto 800 q24hrs and Annie 1gm   -f/u ID recs 3/4 SIRS (Fever 101 at home, Tachy, elevated WBC). Possible source (s) PICC line and/or Left foot ulcer. Pt s/p recent dc on 2/7/23 on IV abx (Dapto 800 q24hrs and Cefepime 2g q8hrs) via PICC (placed on 2/3/23) for L foot infx.   Afebrile, HD stable on admission. Reports taking Tylenol at home prior to presentation. LA wnl.  PICC site c/d/i. PICC. L foot + new redness around shallow ulcer on L foot X 1 day. Denies URI s/s, cough, CP, SOB, abd pain, n/v/d/c and dysuria. CXR neg.  Per ID source is likely the foot and PICC cane stay in.     Plan:   -f/u  Bcx,  -f/u ESR, CRP, CK as on dapto   -c/w Dapto 800 q24hrs and Annie 2gm   -f/u ID recs

## 2023-03-01 NOTE — PROGRESS NOTE ADULT - ASSESSMENT
36 yo F with HTN, MO and AVM L foot with recurrent L foot ulcer which has previously improved with antibiotics and embolization procedures, more recently requiring prolonged antibiotic courses, presented after being febrile at home with more foot pain and fever at home.  Patient noted to have mild leukocytosis that improved with hydration, mildly elevated inflammatory markers.  She had grown Pseudomonas from an OR culture in 10/2022 but no more recent microbiology data are available.  She has significant allergies to PCN and FQs.  She had responded initially to vanc and hugh, then dapto and cefepime, now worsening on present regimen.  Fever unlikely related to PICC.  Patient now afebrile since admission and improving leukocytosis on meropenem and daptomycin. Blood culture 2/27 negative.       Suggest:  - F/U blood cultures X 2 sets from 2/27  - Continue daptomycin 800 mg IV q24h  - Start meropenem 2 g IV q8h    Recommendations discussed with primary team.  Team 1 will continue to follow, please call or page with any questions.   36 yo F with HTN, MO and AVM L foot with recurrent L foot ulcer which has previously improved with antibiotics and embolization procedures, more recently requiring prolonged antibiotic courses, presented after being febrile at home with more foot pain and fever at home.  Patient noted to have mild leukocytosis that improved with hydration, mildly elevated inflammatory markers.  She had grown Pseudomonas from an OR culture in 10/2022 but no more recent microbiology data are available.  She has significant allergies to PCN and FQs.  She had responded initially to vanc and hugh, then dapto and cefepime, now worsening on present regimen.  Fever unlikely related to PICC.  Patient now afebrile since admission and improving leukocytosis on meropenem and daptomycin. Blood culture 2/27 negative.       Suggest:  - F/U blood cultures X 2 sets from 2/27  - Continue daptomycin 800 mg IV q24h  - Continue meropenem 2 g IV q8h    Recommendations discussed with primary team.  Team 1 will continue to follow, please call or page with any questions.

## 2023-03-01 NOTE — PROGRESS NOTE ADULT - PROBLEM SELECTOR PLAN 5
Home meds: Bystolic 10mg BID   -hold in the setting of sepsis Home meds: Bystolic 10mg BID   -c/w home med

## 2023-03-01 NOTE — DISCHARGE NOTE PROVIDER - ATTENDING DISCHARGE PHYSICAL EXAMINATION:
General: no acute distress, lying in bed  HEENT: normocephalic, atraumatic, MMM  Cards: RRR, normal S1/S2, no murmurs, rubs or gallops  Pulm: CTAB, no wheeze crackles, rales or rhonchi  Ab: soft, nontender, nondistended, normoactive bowel sounds  Ext: R foot is wrapped, no pitting edema in extremities, warm and well perfused  Neuro: grossly nonfocal exam, speech is fluent, follows commands, moves all extremities    - anticipating discharge today  - had embolization procedure with her outpatient provider who embolizes AVM, with groin approach.  Groin site appears stable, without any bleeding or surrounding hematoma formation.  - outpatient ID and vascular follow up  - awaiting final input from vascular about topical solutions to apply to the wound to encourage healing  - continue home medications

## 2023-03-01 NOTE — DISCHARGE NOTE PROVIDER - NSDCCPCAREPLAN_GEN_ALL_CORE_FT
PRINCIPAL DISCHARGE DIAGNOSIS  Diagnosis: Ulcer of left foot  Assessment and Plan of Treatment: Cellulitis is a common, potentially serious bacterial skin infection. The affected skin appears swollen and red and is typically painful and warm to the touch. Cellulitis usually affects the skin on the lower legs, but it can occur in the face, arms and other areas. It occurs when a crack or break in your skin allows bacteria to enter. Left untreated, the infection can spread to your lymph nodes and bloodstream and rapidly become life-threatening. It isn't usually spread from person to person.  You were treated for your cellulitis with antibiotics called Daptomycin and Meropenem. and will be discharged on those antibiotics.PLEASE CONTINUE TAKING THE FULL COURSE OF YOUR ANTIBIOTICS UNTIL IT IS COMPLETE. You will need weekly blood work (CBC, CMP, ESR, CRP, CPK) while on IV antibiotics by the home infusion company. Follow up with Dr Rahman for further antibiotic management. Follow up with your pain management provider after hospital discharge for medications if needed to help control your foot pain.   If you begin to feel fevers, chills, worsening swelling or redness in your legs, weakness, dizziness, or any other symptoms, please contact your doctor immediately or return to the emergency department.         PRINCIPAL DISCHARGE DIAGNOSIS  Diagnosis: Ulcer of left foot  Assessment and Plan of Treatment: Cellulitis is a common, potentially serious bacterial skin infection. The affected skin appears swollen and red and is typically painful and warm to the touch. Cellulitis usually affects the skin on the lower legs, but it can occur in the face, arms and other areas. It occurs when a crack or break in your skin allows bacteria to enter. Left untreated, the infection can spread to your lymph nodes and bloodstream and rapidly become life-threatening. It isn't usually spread from person to person.  You were treated for your cellulitis with antibiotics called Daptomycin and Meropenem. and during the course of your hospital stay the Daptomycin was swithced to Linezolid. You are being discharged on the Meropenem and Linezolid.  PLEASE CONTINUE TAKING THE FULL COURSE OF YOUR ANTIBIOTICS UNTIL IT IS COMPLETE. You will need weekly blood work (CBC, CMP, ESR, CRP, CPK) while on IV antibiotics by the home infusion company. Follow up with Dr Rahman for further antibiotic management. Follow up with your pain management provider after hospital discharge for medications if needed to help control your foot pain.   If you begin to feel fevers, chills, worsening swelling or redness in your legs, weakness, dizziness, or any other symptoms, please contact your doctor immediately or return to the emergency department.         PRINCIPAL DISCHARGE DIAGNOSIS  Diagnosis: Ulcer of left foot  Assessment and Plan of Treatment: Cellulitis is a common, potentially serious bacterial skin infection. The affected skin appears swollen and red and is typically painful and warm to the touch. Cellulitis usually affects the skin on the lower legs, but it can occur in the face, arms and other areas. It occurs when a crack or break in your skin allows bacteria to enter. Left untreated, the infection can spread to your lymph nodes and bloodstream and rapidly become life-threatening. It isn't usually spread from person to person.  You were treated for your cellulitis with antibiotics called Daptomycin and Meropenem. and during the course of your hospital stay the Daptomycin was swithced to Linezolid. You are being discharged on the Meropenem and Vancomycin.  PLEASE CONTINUE TAKING THE FULL COURSE OF YOUR ANTIBIOTICS UNTIL IT IS COMPLETE. You will need weekly blood work (CBC, CMP, ESR, CRP, CPK) while on IV antibiotics by the home infusion company. Follow up with Dr Rahman for further antibiotic management. Follow up with your pain management provider after hospital discharge for medications if needed to help control your foot pain.   If you begin to feel fevers, chills, worsening swelling or redness in your legs, weakness, dizziness, or any other symptoms, please contact your doctor immediately or return to the emergency department.         PRINCIPAL DISCHARGE DIAGNOSIS  Diagnosis: Ulcer of left foot  Assessment and Plan of Treatment: Cellulitis is a common, potentially serious bacterial skin infection. The affected skin appears swollen and red and is typically painful and warm to the touch. Cellulitis usually affects the skin on the lower legs, but it can occur in the face, arms and other areas. It occurs when a crack or break in your skin allows bacteria to enter. Left untreated, the infection can spread to your lymph nodes and bloodstream and rapidly become life-threatening. It isn't usually spread from person to person.  You were treated for your cellulitis with antibiotics called Daptomycin and Meropenem. and during the course of your hospital stay the Daptomycin was swithced to Linezolid, and then to vancomycin. You are being discharged on the Meropenem and Vancomycin.  PLEASE CONTINUE TAKING THE FULL COURSE OF YOUR ANTIBIOTICS UNTIL IT IS COMPLETE. You will need weekly blood work (CBC, CMP, ESR, CRP, CPK) while on IV antibiotics by the home infusion company. Follow up with Dr Rahman for further antibiotic management. Follow up with your pain management provider after hospital discharge for medications if needed to help control your foot pain.   If you begin to feel fevers, chills, worsening swelling or redness in your legs, weakness, dizziness, or any other symptoms, please contact your doctor immediately or return to the emergency department.         PRINCIPAL DISCHARGE DIAGNOSIS  Diagnosis: Ulcer of left foot  Assessment and Plan of Treatment: Cellulitis is a common, potentially serious bacterial skin infection. The affected skin appears swollen and red and is typically painful and warm to the touch. Cellulitis usually affects the skin on the lower legs, but it can occur in the face, arms and other areas. It occurs when a crack or break in your skin allows bacteria to enter. Left untreated, the infection can spread to your lymph nodes and bloodstream and rapidly become life-threatening. It isn't usually spread from person to person.  You were treated for your cellulitis with antibiotics called Daptomycin and Meropenem. and during the course of your hospital stay the Daptomycin was swithced to Linezolid, and then to vancomycin. You are being discharged on the Meropenem and Vancomycin.  PLEASE CONTINUE TAKING THE FULL COURSE OF YOUR ANTIBIOTICS UNTIL IT IS COMPLETE. You will need weekly blood work (CBC, CMP, ESR, CRP, CPK) while on IV antibiotics by the home infusion company. Follow up with Dr Rahman for further antibiotic management. Follow up with your pain management provider after hospital discharge for medications if needed to help control your foot pain.  Wound care: Cleanse with normal saline, apply Medihoney to periwound, cover with 4x4 gauze, wrap with kerlix, secure with tape every day.   If you begin to feel fevers, chills, worsening swelling or redness in your legs, weakness, dizziness, or any other symptoms, please contact your doctor immediately or return to the emergency department.         PRINCIPAL DISCHARGE DIAGNOSIS  Diagnosis: Ulcer of left foot  Assessment and Plan of Treatment: Cellulitis is a common, potentially serious bacterial skin infection. The affected skin appears swollen and red and is typically painful and warm to the touch. Cellulitis usually affects the skin on the lower legs, but it can occur in the face, arms and other areas. It occurs when a crack or break in your skin allows bacteria to enter. Left untreated, the infection can spread to your lymph nodes and bloodstream and rapidly become life-threatening. It isn't usually spread from person to person.  You were treated for your cellulitis with antibiotics called Daptomycin and Meropenem. and during the course of your hospital stay the Daptomycin was swithced to Linezolid, and then to vancomycin. You are being discharged on the Meropenem and Vancomycin.  PLEASE CONTINUE TAKING THE FULL COURSE OF YOUR ANTIBIOTICS UNTIL IT IS COMPLETE. You will need weekly blood work (CBC, CMP, ESR, CRP, CPK) while on IV antibiotics by the home infusion company. Follow up with Dr Rahman for further antibiotic management. Follow up with your pain management provider after hospital discharge for medications if needed to help control your foot pain.  Wound care: Cleanse with normal saline, apply Cavilon skin prep to periwound, apply Medihoney to wound bed, cover with 4x4 gauze, wrap with kerlix, secure with tape every other day.   If you begin to feel fevers, chills, worsening swelling or redness in your legs, weakness, dizziness, or any other symptoms, please contact your doctor immediately or return to the emergency department.         PRINCIPAL DISCHARGE DIAGNOSIS  Diagnosis: Ulcer of left foot  Assessment and Plan of Treatment: Cellulitis is a common, potentially serious bacterial skin infection. The affected skin appears swollen and red and is typically painful and warm to the touch. Cellulitis usually affects the skin on the lower legs, but it can occur in the face, arms and other areas. It occurs when a crack or break in your skin allows bacteria to enter. Left untreated, the infection can spread to your lymph nodes and bloodstream and rapidly become life-threatening. It isn't usually spread from person to person.  You were treated for your cellulitis with antibiotics called Daptomycin and Meropenem. and during the course of your hospital stay the Daptomycin was swithced to Linezolid, and then to vancomycin. You are being discharged on the Meropenem and Vancomycin.  PLEASE CONTINUE TAKING THE FULL COURSE OF YOUR ANTIBIOTICS UNTIL IT IS COMPLETE. You will need weekly blood work (CBC, CMP, ESR, CRP, CPK) while on IV antibiotics by the home infusion company. Follow up with Dr Rahman for further antibiotic management. Follow up with your pain management provider after hospital discharge for medications if needed to help control your foot pain.  Wound care: Cleanse with normal saline, apply Cavilon skin prep to periwound, apply Medihoney to wound bed, cover with 4x4 gauze, wrap with kerlix, secure with tape every day.   If you begin to feel fevers, chills, worsening swelling or redness in your legs, weakness, dizziness, or any other symptoms, please contact your doctor immediately or return to the emergency department.

## 2023-03-01 NOTE — PROGRESS NOTE ADULT - ASSESSMENT
37F PMH left foot AVM w/ chronic left foot ulcer (s/p multiple embolizations, last 1/24), HTN,  morbid obesity and recent admission for AVM L foot re-embolization and dc'ed  on 2/7/23 on IV abx via PICC for L foot infx sent by Dr. Rahman for fever wok up. Found to be septic w/ possible source(s) being PICC line and/or Left foot ulcer.

## 2023-03-01 NOTE — DISCHARGE NOTE PROVIDER - CARE PROVIDER_API CALL
Mila Rahman)  Infectious Disease; Internal Medicine  [address] [place] [address]  Phone: [phone]  Fax: [phone]  Scheduled Appointment: 03/03/2023 11:00 AM   Mila Rahman)  Infectious Disease; Internal Medicine  [address], [address] [place] [address]  Phone: [phone]  Fax: [phone]  Follow Up Time: 2 weeks

## 2023-03-01 NOTE — DISCHARGE NOTE PROVIDER - PROVIDER TOKENS
PROVIDER:[TOKEN:[[id number]],SCHEDULEDAPPT:[03/03/2023],SCHEDULEDAPPTTIME:[11:00 AM]] PROVIDER:[TOKEN:[[id number]],FOLLOWUP:[2 weeks],SCHEDULEDAPPTTIME:[11:00 AM]]

## 2023-03-01 NOTE — PROGRESS NOTE ADULT - SUBJECTIVE AND OBJECTIVE BOX
INTERVAL HPI/OVERNIGHT EVENTS:  As per night team, no overnight events. Patient seen and examined at bedside. Patient denies fever, chills, dizziness, weakness, HA, CP, SOB, N/V/D/C    VITALS  Vital Signs Last 24 Hrs  T(C): 36.6 (01 Mar 2023 09:31), Max: 36.7 (2023 15:43)  T(F): 97.9 (01 Mar 2023 09:31), Max: 98.1 (2023 20:50)  HR: 88 (01 Mar 2023 09:31) (81 - 93)  BP: 143/91 (01 Mar 2023 09:) (143/91 - 170/92)  BP(mean): --  RR: 18 (01 Mar 2023 09:) (17 - 18)  SpO2: 98% (01 Mar 2023 09:) (97% - 99%)    Parameters below as of 01 Mar 2023 09:31  Patient On (Oxygen Delivery Method): room air        CAPILLARY BLOOD GLUCOSE      PHYSICAL EXAM  General: NAD, sitting comfortably in bed   HEENT: PERRL/ EOMI, no scleral icterus, MMM  Neck: Supple, no JVD  Respiratory: lungs CTA b/l, no wheezes/crackles, no accessory muscle use  Cardiovascular: Regular rhythm/rate; +S1 +S2, no murmurs  Gastrointestinal: Soft, NTND, normoactive BS, no rebound, no guarding  Genitourinary: no suprapubic tenderness  Extremities: + L foot shallow clean base ulcer w/ mild surrounding erythema. WWP, no clubbing or cyanosis; no peripheral edema  Neurological: A&Ox3, no gross focal deficits, follows commands  Skin: Normal temperature, warm, dry      MEDICATIONS  (STANDING):  DAPTOmycin IVPB 800 milliGRAM(s) IV Intermittent every 24 hours  enoxaparin Injectable 40 milliGRAM(s) SubCutaneous every 12 hours  gabapentin 100 milliGRAM(s) Oral daily  gabapentin 800 milliGRAM(s) Oral daily  meropenem  IVPB      meropenem  IVPB 2000 milliGRAM(s) IV Intermittent every 8 hours  nebivolol 10 milliGRAM(s) Oral <User Schedule>  polyethylene glycol 3350 17 Gram(s) Oral daily  senna 2 Tablet(s) Oral at bedtime    MEDICATIONS  (PRN):  HYDROmorphone  Injectable 1 milliGRAM(s) IV Push every 4 hours PRN Severe Pain (7 - 10)      amoxicillin (Angioedema)  ciprofloxacin (Rash)  hydrochlorothiazide (Hives)  latex (Rash)  lisinopril (Angioedema; Rash; Hives)  morphine (Rash)  penicillin (Rash)      LABS                        11.3   10.05 )-----------( 423      ( 01 Mar 2023 05:30 )             35.2     03-    138  |  107  |  13  ----------------------------<  108<H>  3.9   |  21<L>  |  0.61    Ca    8.8      01 Mar 2023 05:30  Phos  3.9     03-  Mg     1.8         TPro  6.9  /  Alb  3.5  /  TBili  0.6  /  DBili  x   /  AST  9<L>  /  ALT  8<L>  /  AlkPhos  61  03-01    PT/INR - ( 2023 22:40 )   PT: 12.6 sec;   INR: 1.06          PTT - ( 2023 22:40 )  PTT:32.4 sec  Urinalysis Basic - ( 2023 17:05 )    Color: Yellow / Appearance: Clear / S.020 / pH: x  Gluc: x / Ketone: NEGATIVE  / Bili: Negative / Urobili: 0.2 E.U./dL   Blood: x / Protein: 30 mg/dL / Nitrite: NEGATIVE   Leuk Esterase: NEGATIVE / RBC: < 5 /HPF / WBC < 5 /HPF   Sq Epi: x / Non Sq Epi: 0-5 /HPF / Bacteria: Present /HPF      CARDIAC MARKERS ( 01 Mar 2023 05:30 )  x     / x     / 72 U/L / x     / x      CARDIAC MARKERS ( 2023 22:40 )  x     / x     / 132 U/L / x     / x            RADIOLOGY & ADDITIONAL TESTS: Reviewed   INTERVAL HPI/OVERNIGHT EVENTS:  As per night team, no overnight events. Patient seen and examined at bedside. Patient denies fever, chills, dizziness, weakness, HA, CP, SOB, N/V/D/C    VITALS  Vital Signs Last 24 Hrs  T(C): 36.6 (01 Mar 2023 09:31), Max: 36.7 (2023 15:43)  T(F): 97.9 (01 Mar 2023 09:31), Max: 98.1 (2023 20:50)  HR: 88 (01 Mar 2023 09:31) (81 - 93)  BP: 143/91 (01 Mar 2023 09:) (143/91 - 170/92)  BP(mean): --  RR: 18 (01 Mar 2023 09:) (17 - 18)  SpO2: 98% (01 Mar 2023 09:) (97% - 99%)    Parameters below as of 01 Mar 2023 09:31  Patient On (Oxygen Delivery Method): room air         CAPILLARY BLOOD GLUCOSE      PHYSICAL EXAM  General: NAD, sitting comfortably in bed   HEENT: PERRL/ EOMI, no scleral icterus, MMM  Neck: Supple, no JVD  Respiratory: lungs CTA b/l, no wheezes/crackles, no accessory muscle use  Cardiovascular: Regular rhythm/rate; +S1 +S2, no murmurs  Gastrointestinal: Soft, NTND, normoactive BS, no rebound, no guarding  Genitourinary: no suprapubic tenderness  Extremities: + L foot shallow clean base ulcer w/ mild surrounding erythema. WWP, no clubbing or cyanosis; no peripheral edema  Neurological: A&Ox3, no gross focal deficits, follows commands  Skin: Normal temperature, warm, dry      MEDICATIONS  (STANDING):  DAPTOmycin IVPB 800 milliGRAM(s) IV Intermittent every 24 hours  enoxaparin Injectable 40 milliGRAM(s) SubCutaneous every 12 hours  gabapentin 100 milliGRAM(s) Oral daily  gabapentin 800 milliGRAM(s) Oral daily  meropenem  IVPB      meropenem  IVPB 2000 milliGRAM(s) IV Intermittent every 8 hours  nebivolol 10 milliGRAM(s) Oral <User Schedule>  polyethylene glycol 3350 17 Gram(s) Oral daily  senna 2 Tablet(s) Oral at bedtime    MEDICATIONS  (PRN):  HYDROmorphone  Injectable 1 milliGRAM(s) IV Push every 4 hours PRN Severe Pain (7 - 10)      amoxicillin (Angioedema)  ciprofloxacin (Rash)  hydrochlorothiazide (Hives)  latex (Rash)  lisinopril (Angioedema; Rash; Hives)  morphine (Rash)  penicillin (Rash)      LABS                        11.3   10.05 )-----------( 423      ( 01 Mar 2023 05:30 )             35.2     03-    138  |  107  |  13  ----------------------------<  108<H>  3.9   |  21<L>  |  0.61    Ca    8.8      01 Mar 2023 05:30  Phos  3.9     03-  Mg     1.8         TPro  6.9  /  Alb  3.5  /  TBili  0.6  /  DBili  x   /  AST  9<L>  /  ALT  8<L>  /  AlkPhos  61  03-01    PT/INR - ( 2023 22:40 )   PT: 12.6 sec;   INR: 1.06          PTT - ( 2023 22:40 )  PTT:32.4 sec  Urinalysis Basic - ( 2023 17:05 )    Color: Yellow / Appearance: Clear / S.020 / pH: x  Gluc: x / Ketone: NEGATIVE  / Bili: Negative / Urobili: 0.2 E.U./dL   Blood: x / Protein: 30 mg/dL / Nitrite: NEGATIVE   Leuk Esterase: NEGATIVE / RBC: < 5 /HPF / WBC < 5 /HPF   Sq Epi: x / Non Sq Epi: 0-5 /HPF / Bacteria: Present /HPF      CARDIAC MARKERS ( 01 Mar 2023 05:30 )  x     / x     / 72 U/L / x     / x      CARDIAC MARKERS ( 2023 22:40 )  x     / x     / 132 U/L / x     / x            RADIOLOGY & ADDITIONAL TESTS: Reviewed

## 2023-03-01 NOTE — DISCHARGE NOTE PROVIDER - NSDCMRMEDTOKEN_GEN_ALL_CORE_FT
Bystolic 10 mg oral tablet: 1 tab(s) orally 2 times a day  cefepime 2 g/100 mL intravenous solution: 2 gram(s) intravenously every 8 hours. End date 3/3/23.   DAPTOmycin 500 mg intravenous injection: Please administer 800 milligram(s) intravenously every 24 hours. End date 3/3/23.   gabapentin 100 mg oral tablet: 1 tab(s) orally once a day (in the morning)  gabapentin 800 mg oral tablet: 1 tab(s) orally once a day (in the afternoon)  Junel 1.5/30 oral tablet: 1 tab(s) orally once a day  Percocet 10/325 oral tablet: 1 tab(s) orally 5 times a day, As Needed   Bystolic 10 mg oral tablet: 1 tab(s) orally 2 times a day  DAPTOmycin 500 mg intravenous injection: 800 milligram(s) intravenous every 24 hours  gabapentin 100 mg oral tablet: 1 tab(s) orally once a day (in the morning)  gabapentin 800 mg oral tablet: 1 tab(s) orally once a day (in the afternoon)  Junel 1.5/30 oral tablet: 1 tab(s) orally once a day  meropenem: 2 gram(s) intravenously once a day  Percocet 10/325 oral tablet: 1 tab(s) orally 5 times a day, As Needed   Bystolic 10 mg oral tablet: 1 tab(s) orally 2 times a day  gabapentin 100 mg oral tablet: 1 tab(s) orally once a day (in the morning)  gabapentin 800 mg oral tablet: 1 tab(s) orally once a day (in the afternoon)  Junel 1.5/30 oral tablet: 1 tab(s) orally once a day  meropenem 1000 mg intravenous injection: 2000 milligram(s) intravenous every 8 hours  Percocet 10/325 oral tablet: 1 tab(s) orally 5 times a day, As Needed  vancomycin 1.25 g intravenous injection: 1.25 gram(s) intravenous every 8 hours

## 2023-03-01 NOTE — DISCHARGE NOTE PROVIDER - DETAILS OF MALNUTRITION DIAGNOSIS/DIAGNOSES
This patient has been assessed with a concern for Malnutrition and was treated during this hospitalization for the following Nutrition diagnosis/diagnoses:     -  03/03/2023: Morbid obesity (BMI > 40)

## 2023-03-02 LAB
ALBUMIN SERPL ELPH-MCNC: 3.9 G/DL — SIGNIFICANT CHANGE UP (ref 3.3–5)
ALP SERPL-CCNC: 75 U/L — SIGNIFICANT CHANGE UP (ref 40–120)
ALT FLD-CCNC: 8 U/L — LOW (ref 10–45)
ANION GAP SERPL CALC-SCNC: 12 MMOL/L — SIGNIFICANT CHANGE UP (ref 5–17)
AST SERPL-CCNC: 11 U/L — SIGNIFICANT CHANGE UP (ref 10–40)
BASOPHILS # BLD AUTO: 0.06 K/UL — SIGNIFICANT CHANGE UP (ref 0–0.2)
BASOPHILS NFR BLD AUTO: 0.6 % — SIGNIFICANT CHANGE UP (ref 0–2)
BILIRUB SERPL-MCNC: 0.5 MG/DL — SIGNIFICANT CHANGE UP (ref 0.2–1.2)
BUN SERPL-MCNC: 11 MG/DL — SIGNIFICANT CHANGE UP (ref 7–23)
CALCIUM SERPL-MCNC: 9 MG/DL — SIGNIFICANT CHANGE UP (ref 8.4–10.5)
CHLORIDE SERPL-SCNC: 100 MMOL/L — SIGNIFICANT CHANGE UP (ref 96–108)
CO2 SERPL-SCNC: 23 MMOL/L — SIGNIFICANT CHANGE UP (ref 22–31)
CREAT SERPL-MCNC: 0.61 MG/DL — SIGNIFICANT CHANGE UP (ref 0.5–1.3)
CRP SERPL-MCNC: 21.8 MG/L — HIGH (ref 0–4)
EGFR: 117 ML/MIN/1.73M2 — SIGNIFICANT CHANGE UP
EOSINOPHIL # BLD AUTO: 0.08 K/UL — SIGNIFICANT CHANGE UP (ref 0–0.5)
EOSINOPHIL NFR BLD AUTO: 0.8 % — SIGNIFICANT CHANGE UP (ref 0–6)
ERYTHROCYTE [SEDIMENTATION RATE] IN BLOOD: 35 MM/HR — HIGH
GLUCOSE SERPL-MCNC: 149 MG/DL — HIGH (ref 70–99)
HCT VFR BLD CALC: 37.3 % — SIGNIFICANT CHANGE UP (ref 34.5–45)
HGB BLD-MCNC: 12.2 G/DL — SIGNIFICANT CHANGE UP (ref 11.5–15.5)
IMM GRANULOCYTES NFR BLD AUTO: 0.3 % — SIGNIFICANT CHANGE UP (ref 0–0.9)
LYMPHOCYTES # BLD AUTO: 1.66 K/UL — SIGNIFICANT CHANGE UP (ref 1–3.3)
LYMPHOCYTES # BLD AUTO: 16.3 % — SIGNIFICANT CHANGE UP (ref 13–44)
MAGNESIUM SERPL-MCNC: 1.7 MG/DL — SIGNIFICANT CHANGE UP (ref 1.6–2.6)
MCHC RBC-ENTMCNC: 28.8 PG — SIGNIFICANT CHANGE UP (ref 27–34)
MCHC RBC-ENTMCNC: 32.7 GM/DL — SIGNIFICANT CHANGE UP (ref 32–36)
MCV RBC AUTO: 88 FL — SIGNIFICANT CHANGE UP (ref 80–100)
MONOCYTES # BLD AUTO: 0.56 K/UL — SIGNIFICANT CHANGE UP (ref 0–0.9)
MONOCYTES NFR BLD AUTO: 5.5 % — SIGNIFICANT CHANGE UP (ref 2–14)
NEUTROPHILS # BLD AUTO: 7.82 K/UL — HIGH (ref 1.8–7.4)
NEUTROPHILS NFR BLD AUTO: 76.5 % — SIGNIFICANT CHANGE UP (ref 43–77)
NRBC # BLD: 0 /100 WBCS — SIGNIFICANT CHANGE UP (ref 0–0)
PHOSPHATE SERPL-MCNC: 3.2 MG/DL — SIGNIFICANT CHANGE UP (ref 2.5–4.5)
PLATELET # BLD AUTO: 477 K/UL — HIGH (ref 150–400)
POTASSIUM SERPL-MCNC: 4.1 MMOL/L — SIGNIFICANT CHANGE UP (ref 3.5–5.3)
POTASSIUM SERPL-SCNC: 4.1 MMOL/L — SIGNIFICANT CHANGE UP (ref 3.5–5.3)
PROT SERPL-MCNC: 7 G/DL — SIGNIFICANT CHANGE UP (ref 6–8.3)
RBC # BLD: 4.24 M/UL — SIGNIFICANT CHANGE UP (ref 3.8–5.2)
RBC # FLD: 13.6 % — SIGNIFICANT CHANGE UP (ref 10.3–14.5)
SODIUM SERPL-SCNC: 135 MMOL/L — SIGNIFICANT CHANGE UP (ref 135–145)
WBC # BLD: 10.21 K/UL — SIGNIFICANT CHANGE UP (ref 3.8–10.5)
WBC # FLD AUTO: 10.21 K/UL — SIGNIFICANT CHANGE UP (ref 3.8–10.5)

## 2023-03-02 PROCEDURE — 99233 SBSQ HOSP IP/OBS HIGH 50: CPT | Mod: GC

## 2023-03-02 PROCEDURE — 99232 SBSQ HOSP IP/OBS MODERATE 35: CPT | Mod: GC

## 2023-03-02 RX ORDER — MAGNESIUM SULFATE 500 MG/ML
2 VIAL (ML) INJECTION ONCE
Refills: 0 | Status: COMPLETED | OUTPATIENT
Start: 2023-03-02 | End: 2023-03-02

## 2023-03-02 RX ORDER — LINEZOLID 600 MG/300ML
600 INJECTION, SOLUTION INTRAVENOUS EVERY 12 HOURS
Refills: 0 | Status: DISCONTINUED | OUTPATIENT
Start: 2023-03-02 | End: 2023-03-06

## 2023-03-02 RX ADMIN — HYDROMORPHONE HYDROCHLORIDE 1 MILLIGRAM(S): 2 INJECTION INTRAMUSCULAR; INTRAVENOUS; SUBCUTANEOUS at 22:17

## 2023-03-02 RX ADMIN — HYDROMORPHONE HYDROCHLORIDE 1 MILLIGRAM(S): 2 INJECTION INTRAMUSCULAR; INTRAVENOUS; SUBCUTANEOUS at 06:15

## 2023-03-02 RX ADMIN — HYDROMORPHONE HYDROCHLORIDE 1 MILLIGRAM(S): 2 INJECTION INTRAMUSCULAR; INTRAVENOUS; SUBCUTANEOUS at 18:06

## 2023-03-02 RX ADMIN — HYDROMORPHONE HYDROCHLORIDE 1 MILLIGRAM(S): 2 INJECTION INTRAMUSCULAR; INTRAVENOUS; SUBCUTANEOUS at 14:04

## 2023-03-02 RX ADMIN — ENOXAPARIN SODIUM 40 MILLIGRAM(S): 100 INJECTION SUBCUTANEOUS at 22:17

## 2023-03-02 RX ADMIN — GABAPENTIN 800 MILLIGRAM(S): 400 CAPSULE ORAL at 11:01

## 2023-03-02 RX ADMIN — HYDROMORPHONE HYDROCHLORIDE 1 MILLIGRAM(S): 2 INJECTION INTRAMUSCULAR; INTRAVENOUS; SUBCUTANEOUS at 10:41

## 2023-03-02 RX ADMIN — MEROPENEM 280 MILLIGRAM(S): 1 INJECTION INTRAVENOUS at 01:45

## 2023-03-02 RX ADMIN — MEROPENEM 280 MILLIGRAM(S): 1 INJECTION INTRAVENOUS at 20:11

## 2023-03-02 RX ADMIN — HYDROMORPHONE HYDROCHLORIDE 1 MILLIGRAM(S): 2 INJECTION INTRAMUSCULAR; INTRAVENOUS; SUBCUTANEOUS at 10:03

## 2023-03-02 RX ADMIN — NEBIVOLOL HYDROCHLORIDE 10 MILLIGRAM(S): 5 TABLET ORAL at 18:06

## 2023-03-02 RX ADMIN — HYDROMORPHONE HYDROCHLORIDE 1 MILLIGRAM(S): 2 INJECTION INTRAMUSCULAR; INTRAVENOUS; SUBCUTANEOUS at 14:40

## 2023-03-02 RX ADMIN — HYDROMORPHONE HYDROCHLORIDE 1 MILLIGRAM(S): 2 INJECTION INTRAMUSCULAR; INTRAVENOUS; SUBCUTANEOUS at 19:06

## 2023-03-02 RX ADMIN — Medication 25 GRAM(S): at 16:22

## 2023-03-02 RX ADMIN — HYDROMORPHONE HYDROCHLORIDE 1 MILLIGRAM(S): 2 INJECTION INTRAMUSCULAR; INTRAVENOUS; SUBCUTANEOUS at 05:49

## 2023-03-02 RX ADMIN — HYDROMORPHONE HYDROCHLORIDE 1 MILLIGRAM(S): 2 INJECTION INTRAMUSCULAR; INTRAVENOUS; SUBCUTANEOUS at 01:44

## 2023-03-02 RX ADMIN — HYDROMORPHONE HYDROCHLORIDE 1 MILLIGRAM(S): 2 INJECTION INTRAMUSCULAR; INTRAVENOUS; SUBCUTANEOUS at 02:00

## 2023-03-02 RX ADMIN — HYDROMORPHONE HYDROCHLORIDE 1 MILLIGRAM(S): 2 INJECTION INTRAMUSCULAR; INTRAVENOUS; SUBCUTANEOUS at 22:45

## 2023-03-02 RX ADMIN — LINEZOLID 600 MILLIGRAM(S): 600 INJECTION, SOLUTION INTRAVENOUS at 18:10

## 2023-03-02 RX ADMIN — GABAPENTIN 100 MILLIGRAM(S): 400 CAPSULE ORAL at 11:01

## 2023-03-02 RX ADMIN — ENOXAPARIN SODIUM 40 MILLIGRAM(S): 100 INJECTION SUBCUTANEOUS at 11:01

## 2023-03-02 RX ADMIN — NEBIVOLOL HYDROCHLORIDE 10 MILLIGRAM(S): 5 TABLET ORAL at 05:50

## 2023-03-02 RX ADMIN — MEROPENEM 280 MILLIGRAM(S): 1 INJECTION INTRAVENOUS at 11:01

## 2023-03-02 NOTE — PROGRESS NOTE ADULT - SUBJECTIVE AND OBJECTIVE BOX
INTERVAL HPI/OVERNIGHT EVENTS:  As per night team, no overnight events. Patient seen and examined at bedside. Pain slightly improved but continuing. C/o subjective chills, no documented fevers. Patient denies dizziness, weakness, HA, CP, SOB, N/V/D/C    VITALS  Vital Signs Last 24 Hrs  T(C): 36.6 (02 Mar 2023 09:40), Max: 36.7 (02 Mar 2023 05:17)  T(F): 97.9 (02 Mar 2023 09:40), Max: 98 (02 Mar 2023 05:17)  HR: 88 (02 Mar 2023 09:40) (77 - 97)  BP: 139/89 (02 Mar 2023 09:40) (123/82 - 155/95)  BP(mean): --  RR: 16 (02 Mar 2023 09:40) (16 - 18)  SpO2: 96% (02 Mar 2023 09:40) (96% - 99%)    Parameters below as of 02 Mar 2023 09:40  Patient On (Oxygen Delivery Method): room air        CAPILLARY BLOOD GLUCOSE    PHYSICAL EXAM  General: NAD, sitting comfortably in bed   HEENT: PERRL/ EOMI, no scleral icterus, MMM  Neck: Supple, no JVD  Respiratory: lungs CTA b/l, no wheezes/crackles, no accessory muscle use  Cardiovascular: Regular rhythm/rate; +S1 +S2, no murmurs  Gastrointestinal: Soft, NTND, normoactive BS, no rebound, no guarding  Genitourinary: no suprapubic tenderness  Extremities: + L foot shallow clean base ulcer w/ mild surrounding erythema. WWP, no clubbing or cyanosis; no peripheral edema  Neurological: A&Ox3, no gross focal deficits, follows commands  Skin: Normal temperature, warm, dry    MEDICATIONS  (STANDING):  DAPTOmycin IVPB 800 milliGRAM(s) IV Intermittent every 24 hours  enoxaparin Injectable 40 milliGRAM(s) SubCutaneous every 12 hours  gabapentin 100 milliGRAM(s) Oral daily  gabapentin 800 milliGRAM(s) Oral daily  meropenem  IVPB 2000 milliGRAM(s) IV Intermittent every 8 hours  nebivolol 10 milliGRAM(s) Oral <User Schedule>  polyethylene glycol 3350 17 Gram(s) Oral daily  senna 2 Tablet(s) Oral at bedtime    MEDICATIONS  (PRN):  HYDROmorphone  Injectable 1 milliGRAM(s) IV Push every 4 hours PRN Severe Pain (7 - 10)      amoxicillin (Angioedema)  ciprofloxacin (Rash)  hydrochlorothiazide (Hives)  latex (Rash)  lisinopril (Angioedema; Rash; Hives)  morphine (Rash)  penicillin (Rash)      LABS                        11.3   10.05 )-----------( 423      ( 01 Mar 2023 05:30 )             35.2     03-    138  |  107  |  13  ----------------------------<  108<H>  3.9   |  21<L>  |  0.61    Ca    8.8      01 Mar 2023 05:30  Phos  3.9     03-  Mg     1.8     -    TPro  6.9  /  Alb  3.5  /  TBili  0.6  /  DBili  x   /  AST  9<L>  /  ALT  8<L>  /  AlkPhos  61  03-01      Urinalysis Basic - ( 2023 17:05 )    Color: Yellow / Appearance: Clear / S.020 / pH: x  Gluc: x / Ketone: NEGATIVE  / Bili: Negative / Urobili: 0.2 E.U./dL   Blood: x / Protein: 30 mg/dL / Nitrite: NEGATIVE   Leuk Esterase: NEGATIVE / RBC: < 5 /HPF / WBC < 5 /HPF   Sq Epi: x / Non Sq Epi: 0-5 /HPF / Bacteria: Present /HPF      CARDIAC MARKERS ( 01 Mar 2023 05:30 )  x     / x     / 72 U/L / x     / x            RADIOLOGY & ADDITIONAL TESTS: Reviewed

## 2023-03-02 NOTE — ADVANCED PRACTICE NURSE CONSULT - RECOMMEDATIONS
Left lateral malleolus - cleanse with normal saline, apply Cavilon skin prep to periwound, Aquacel AG to wound bed only, cover with 4x4 gauze, wrap with lorna, secure with tape every other day.   WOCN discussed assessment and recommendations with the patient and house staff Dr Stephens.

## 2023-03-02 NOTE — PROGRESS NOTE ADULT - ASSESSMENT
37F PMH left foot AVM w/ chronic left foot ulcer (s/p multiple embolizations, last 1/24), HTN,  morbid obesity and recent admission for AVM L foot re-embolization and dc'ed  on 2/7/23 on IV abx via PICC for L foot infx sent by Dr. Rahman for fever wok up. Found to be septic likely 2/2 left foot ulcer.

## 2023-03-02 NOTE — PROGRESS NOTE ADULT - PROBLEM SELECTOR PLAN 2
Chronic foot ulcer in the setting of AVM malformation. L foot + new redness around shallow ulcer on L foot X 1 day w/ ángel pain foot pain today. Home meds: Dapto/ Cefepime, Gabapentin 100 in am and 800 at 4pm. Percocet 10 q4hrs PRN. Dr Teran office notified, will follow patient.   -Transition to Dilaudid q4hrs   -bowel reg   -see sepsis for plan

## 2023-03-02 NOTE — PROGRESS NOTE ADULT - ASSESSMENT
38 yo F with HTN, MO and AVM L foot with recurrent L foot nonpurulent ulcer presenting with worsening pain at ulcer site, has been treated with Meropenem and Daptomycin for 3 days without improvement in symptoms. She has significant allergies to PCN and FQs.  Will plan to  continue Meropenem and will change from Daptomycin to Linezolid.  Fever unlikely related to PICC.  Blood culture 2/27 negative.       Suggest:  - F/U blood cultures X 2 sets from 2/27  - F/u WBC, ESR, CRP, CK.  - Discontinue Daptomycin and start Linezolid 600mg po q6hrs    - Continue meropenem 2 g IV q8h    Recommendations discussed with primary team.  Team 1 will continue to follow, please call or page with any questions.

## 2023-03-02 NOTE — ADVANCED PRACTICE NURSE CONSULT - REASON FOR CONSULT
WOC nurse consult to assess left lateral malleolus wound. Per the H&P, the patient is a 37F PMH left foot AVM w/ chronic left foot ulcer (s/p multiple embolizations, last 1/24), HTN,  morbid obesity and recent admission for AVM L foot re-embolization and dc'd  on 2/7/23 on IV abx via PICC for L foot infx sent by Dr. Rahman for fever wok up. WOC nurse consult to assess left lateral malleolus wound. Per the H&P, the patient is a 37F PMH left foot AVM w/ chronic left foot ulcer (s/p multiple embolizations, last 1/24), HTN,  morbid obesity and recent admission for AVM L foot re-embolization and dc'd  on 2/7/23 on IV abx via PICC for L foot infx sent by Dr. Rahman for fever work up.

## 2023-03-02 NOTE — PROGRESS NOTE ADULT - PROBLEM SELECTOR PLAN 1
3/4 SIRS (Fever 101 at home, Tachy, elevated WBC). Possible source (s) PICC line and/or Left foot ulcer. Pt s/p recent dc on 2/7/23 on IV abx (Dapto 800 q24hrs and Cefepime 2g q8hrs) via PICC (placed on 2/3/23) for L foot infx.   Afebrile, HD stable on admission. Reports taking Tylenol at home prior to presentation. LA wnl.  PICC site c/d/i. PICC. L foot + new redness around shallow ulcer on L foot X 1 day. Denies URI s/s, cough, CP, SOB, abd pain, n/v/d/c and dysuria. CXR neg.  Per ID source is likely the foot and PICC cane stay in. Per ID switch from dapto to linezolid    Plan:   -f/u  Bcx,  -f/u ESR, CRP, CK as on dapto   -c/w linezolid 600mg PO q12hr  - c/w Meropenem 2gm IV QD  -f/u ID recs

## 2023-03-02 NOTE — PROGRESS NOTE ADULT - SUBJECTIVE AND OBJECTIVE BOX
INTERVAL HPI/OVERNIGHT EVENTS:  Patient was seen and examined at bedside. As per nurse and patient, no o/n events, patient resting comfortably. She reports worsening pain at ulcer site, describes pain as sharp and shooting to her ankle.  ROS otherwise negative.    VITAL SIGNS:  T(F): 98.1 (03-02-23 @ 15:42)  HR: 95 (03-02-23 @ 15:42)  BP: 129/75 (03-02-23 @ 15:42)  RR: 18 (03-02-23 @ 15:42)  SpO2: 98% (03-02-23 @ 15:42)  Wt(kg): --    PHYSICAL EXAM:  Constitutional: resting comfortably, no acute distress   HEENT: PERRL, EOMI, sclera non-icteric, neck supple, trachea midline, no masses, no JVD, MMM, good dentition  Respiratory: CTA b/l, good air entry b/l, no wheezing, no rhonchi, no rales, without accessory muscle use and no intercostal retractions  Cardiovascular: RRR, normal S1S2, no M/R/G  Gastrointestinal: soft, NTND, no masses palpable, BS normal  Extremities: left foot with small open ulcer with granular base, no purulence or drainage   Neurological: AAOx3, CN Grossly intact  Skin: Normal temperature, warm, dry    MEDICATIONS  (STANDING):  enoxaparin Injectable 40 milliGRAM(s) SubCutaneous every 12 hours  gabapentin 100 milliGRAM(s) Oral daily  gabapentin 800 milliGRAM(s) Oral daily  linezolid    Tablet 600 milliGRAM(s) Oral every 12 hours  meropenem  IVPB 2000 milliGRAM(s) IV Intermittent every 8 hours  nebivolol 10 milliGRAM(s) Oral <User Schedule>  polyethylene glycol 3350 17 Gram(s) Oral daily  senna 2 Tablet(s) Oral at bedtime    MEDICATIONS  (PRN):  HYDROmorphone  Injectable 1 milliGRAM(s) IV Push every 4 hours PRN Severe Pain (7 - 10)    Allergies    amoxicillin (Angioedema)  ciprofloxacin (Rash)  hydrochlorothiazide (Hives)  latex (Rash)  lisinopril (Angioedema; Rash; Hives)  morphine (Rash)  penicillin (Rash)    Intolerances    LABS:                        12.2   10.21 )-----------( 477      ( 02 Mar 2023 15:11 )             37.3     03-02    135  |  100  |  11  ----------------------------<  149<H>  4.1   |  23  |  0.61    Ca    9.0      02 Mar 2023 15:11  Phos  3.2     03-02  Mg     1.7     03-02    TPro  7.0  /  Alb  3.9  /  TBili  0.5  /  DBili  x   /  AST  11  /  ALT  8<L>  /  AlkPhos  75  03-02      RADIOLOGY & ADDITIONAL TESTS:  Reviewed

## 2023-03-02 NOTE — ADVANCED PRACTICE NURSE CONSULT - ASSESSMENT
Left lateral malleolus wound with pink non-granulating wound bed, measuring 2.4 cm x 1.5 cm x 0.2 cm draining small amount of serosanguinous exudate. The patient observed the WOCN apply dressing. Patient verbalized understanding regarding wound care.

## 2023-03-02 NOTE — ED ADULT NURSE NOTE - IN THE PAST 12 MONTHS HAVE YOU USED DRUGS OTHER THAN THOSE REQUIRED FOR MEDICAL REASON?
Patient is seeing a Dr. Linda Wiley Prior in [place] [id number]. He's an ortho doctor.  Can you place a referral so I can send everything along with this referral?
Per Saba Resendez (referral specialist) she spoke to Dr De La Cruz's office, they only see pts for back/spinal issues. I called pt and informed her of this.    She would now like referral to go to Dr. Kayden Liu in [place]     Thanks
No

## 2023-03-03 ENCOUNTER — APPOINTMENT (OUTPATIENT)
Dept: INFECTIOUS DISEASE | Facility: CLINIC | Age: 38
End: 2023-03-03

## 2023-03-03 LAB
ALBUMIN SERPL ELPH-MCNC: 3.3 G/DL — SIGNIFICANT CHANGE UP (ref 3.3–5)
ALP SERPL-CCNC: 67 U/L — SIGNIFICANT CHANGE UP (ref 40–120)
ALT FLD-CCNC: 9 U/L — LOW (ref 10–45)
ANION GAP SERPL CALC-SCNC: 14 MMOL/L — SIGNIFICANT CHANGE UP (ref 5–17)
AST SERPL-CCNC: 20 U/L — SIGNIFICANT CHANGE UP (ref 10–40)
BASOPHILS # BLD AUTO: 0.07 K/UL — SIGNIFICANT CHANGE UP (ref 0–0.2)
BASOPHILS NFR BLD AUTO: 0.7 % — SIGNIFICANT CHANGE UP (ref 0–2)
BILIRUB SERPL-MCNC: 0.6 MG/DL — SIGNIFICANT CHANGE UP (ref 0.2–1.2)
BUN SERPL-MCNC: 15 MG/DL — SIGNIFICANT CHANGE UP (ref 7–23)
CALCIUM SERPL-MCNC: 9 MG/DL — SIGNIFICANT CHANGE UP (ref 8.4–10.5)
CHLORIDE SERPL-SCNC: 104 MMOL/L — SIGNIFICANT CHANGE UP (ref 96–108)
CK SERPL-CCNC: 55 U/L — SIGNIFICANT CHANGE UP (ref 25–170)
CO2 SERPL-SCNC: 16 MMOL/L — LOW (ref 22–31)
CREAT SERPL-MCNC: 0.6 MG/DL — SIGNIFICANT CHANGE UP (ref 0.5–1.3)
EGFR: 117 ML/MIN/1.73M2 — SIGNIFICANT CHANGE UP
EOSINOPHIL # BLD AUTO: 0.19 K/UL — SIGNIFICANT CHANGE UP (ref 0–0.5)
EOSINOPHIL NFR BLD AUTO: 1.9 % — SIGNIFICANT CHANGE UP (ref 0–6)
GLUCOSE SERPL-MCNC: 77 MG/DL — SIGNIFICANT CHANGE UP (ref 70–99)
HCT VFR BLD CALC: 39.3 % — SIGNIFICANT CHANGE UP (ref 34.5–45)
HGB BLD-MCNC: 12 G/DL — SIGNIFICANT CHANGE UP (ref 11.5–15.5)
IMM GRANULOCYTES NFR BLD AUTO: 0.3 % — SIGNIFICANT CHANGE UP (ref 0–0.9)
LYMPHOCYTES # BLD AUTO: 2.34 K/UL — SIGNIFICANT CHANGE UP (ref 1–3.3)
LYMPHOCYTES # BLD AUTO: 23.4 % — SIGNIFICANT CHANGE UP (ref 13–44)
MAGNESIUM SERPL-MCNC: 2 MG/DL — SIGNIFICANT CHANGE UP (ref 1.6–2.6)
MCHC RBC-ENTMCNC: 28.4 PG — SIGNIFICANT CHANGE UP (ref 27–34)
MCHC RBC-ENTMCNC: 30.5 GM/DL — LOW (ref 32–36)
MCV RBC AUTO: 92.9 FL — SIGNIFICANT CHANGE UP (ref 80–100)
MONOCYTES # BLD AUTO: 0.84 K/UL — SIGNIFICANT CHANGE UP (ref 0–0.9)
MONOCYTES NFR BLD AUTO: 8.4 % — SIGNIFICANT CHANGE UP (ref 2–14)
NEUTROPHILS # BLD AUTO: 6.55 K/UL — SIGNIFICANT CHANGE UP (ref 1.8–7.4)
NEUTROPHILS NFR BLD AUTO: 65.3 % — SIGNIFICANT CHANGE UP (ref 43–77)
NRBC # BLD: 0 /100 WBCS — SIGNIFICANT CHANGE UP (ref 0–0)
PHOSPHATE SERPL-MCNC: 3.2 MG/DL — SIGNIFICANT CHANGE UP (ref 2.5–4.5)
PLATELET # BLD AUTO: 442 K/UL — HIGH (ref 150–400)
POTASSIUM SERPL-MCNC: 4.7 MMOL/L — SIGNIFICANT CHANGE UP (ref 3.5–5.3)
POTASSIUM SERPL-SCNC: 4.7 MMOL/L — SIGNIFICANT CHANGE UP (ref 3.5–5.3)
PROT SERPL-MCNC: 6.8 G/DL — SIGNIFICANT CHANGE UP (ref 6–8.3)
RBC # BLD: 4.23 M/UL — SIGNIFICANT CHANGE UP (ref 3.8–5.2)
RBC # FLD: 13.2 % — SIGNIFICANT CHANGE UP (ref 10.3–14.5)
SODIUM SERPL-SCNC: 134 MMOL/L — LOW (ref 135–145)
WBC # BLD: 10.02 K/UL — SIGNIFICANT CHANGE UP (ref 3.8–10.5)
WBC # FLD AUTO: 10.02 K/UL — SIGNIFICANT CHANGE UP (ref 3.8–10.5)

## 2023-03-03 PROCEDURE — 99233 SBSQ HOSP IP/OBS HIGH 50: CPT | Mod: GC

## 2023-03-03 PROCEDURE — 99232 SBSQ HOSP IP/OBS MODERATE 35: CPT | Mod: GC

## 2023-03-03 RX ADMIN — ENOXAPARIN SODIUM 40 MILLIGRAM(S): 100 INJECTION SUBCUTANEOUS at 10:54

## 2023-03-03 RX ADMIN — HYDROMORPHONE HYDROCHLORIDE 1 MILLIGRAM(S): 2 INJECTION INTRAMUSCULAR; INTRAVENOUS; SUBCUTANEOUS at 10:54

## 2023-03-03 RX ADMIN — HYDROMORPHONE HYDROCHLORIDE 1 MILLIGRAM(S): 2 INJECTION INTRAMUSCULAR; INTRAVENOUS; SUBCUTANEOUS at 06:47

## 2023-03-03 RX ADMIN — MEROPENEM 280 MILLIGRAM(S): 1 INJECTION INTRAVENOUS at 19:05

## 2023-03-03 RX ADMIN — NEBIVOLOL HYDROCHLORIDE 10 MILLIGRAM(S): 5 TABLET ORAL at 19:03

## 2023-03-03 RX ADMIN — HYDROMORPHONE HYDROCHLORIDE 1 MILLIGRAM(S): 2 INJECTION INTRAMUSCULAR; INTRAVENOUS; SUBCUTANEOUS at 15:48

## 2023-03-03 RX ADMIN — GABAPENTIN 100 MILLIGRAM(S): 400 CAPSULE ORAL at 12:30

## 2023-03-03 RX ADMIN — MEROPENEM 280 MILLIGRAM(S): 1 INJECTION INTRAVENOUS at 12:30

## 2023-03-03 RX ADMIN — GABAPENTIN 800 MILLIGRAM(S): 400 CAPSULE ORAL at 12:30

## 2023-03-03 RX ADMIN — HYDROMORPHONE HYDROCHLORIDE 1 MILLIGRAM(S): 2 INJECTION INTRAMUSCULAR; INTRAVENOUS; SUBCUTANEOUS at 20:03

## 2023-03-03 RX ADMIN — HYDROMORPHONE HYDROCHLORIDE 1 MILLIGRAM(S): 2 INJECTION INTRAMUSCULAR; INTRAVENOUS; SUBCUTANEOUS at 02:50

## 2023-03-03 RX ADMIN — MEROPENEM 280 MILLIGRAM(S): 1 INJECTION INTRAVENOUS at 03:48

## 2023-03-03 RX ADMIN — HYDROMORPHONE HYDROCHLORIDE 1 MILLIGRAM(S): 2 INJECTION INTRAMUSCULAR; INTRAVENOUS; SUBCUTANEOUS at 14:53

## 2023-03-03 RX ADMIN — ENOXAPARIN SODIUM 40 MILLIGRAM(S): 100 INJECTION SUBCUTANEOUS at 21:56

## 2023-03-03 RX ADMIN — HYDROMORPHONE HYDROCHLORIDE 1 MILLIGRAM(S): 2 INJECTION INTRAMUSCULAR; INTRAVENOUS; SUBCUTANEOUS at 07:18

## 2023-03-03 RX ADMIN — HYDROMORPHONE HYDROCHLORIDE 1 MILLIGRAM(S): 2 INJECTION INTRAMUSCULAR; INTRAVENOUS; SUBCUTANEOUS at 23:27

## 2023-03-03 RX ADMIN — NEBIVOLOL HYDROCHLORIDE 10 MILLIGRAM(S): 5 TABLET ORAL at 05:33

## 2023-03-03 RX ADMIN — LINEZOLID 600 MILLIGRAM(S): 600 INJECTION, SOLUTION INTRAVENOUS at 05:33

## 2023-03-03 RX ADMIN — HYDROMORPHONE HYDROCHLORIDE 1 MILLIGRAM(S): 2 INJECTION INTRAMUSCULAR; INTRAVENOUS; SUBCUTANEOUS at 02:26

## 2023-03-03 RX ADMIN — LINEZOLID 600 MILLIGRAM(S): 600 INJECTION, SOLUTION INTRAVENOUS at 19:07

## 2023-03-03 RX ADMIN — HYDROMORPHONE HYDROCHLORIDE 1 MILLIGRAM(S): 2 INJECTION INTRAMUSCULAR; INTRAVENOUS; SUBCUTANEOUS at 19:03

## 2023-03-03 RX ADMIN — HYDROMORPHONE HYDROCHLORIDE 1 MILLIGRAM(S): 2 INJECTION INTRAMUSCULAR; INTRAVENOUS; SUBCUTANEOUS at 11:54

## 2023-03-03 NOTE — DIETITIAN INITIAL EVALUATION ADULT - OTHER INFO
37F PMH left foot AVM w/ chronic left foot ulcer (s/p multiple embolizations, last 1/24), HTN,  morbid obesity and recent admission for AVM L foot re-embolization and dc'ed  on 2/7/23 on IV abx via PICC for L foot infx sent by Dr. Rahman for fever wok up. Pt was admitted on 1/24-2/7 for another AVM embolization and was dc'ed on 4wks of abx  Dapto 800 q24/Cefepime 2g q8hrs (2/3/23-3/3/23) via PICC (placed on 2/3/23) and f/up w/ Dr. Rahman. Pt was sent to ED by Dr. Rahman for septic wok up in the setting of new fever of 101 at home on 2/27 by visiting nurse. Took Tylenol at home prior to ED presentation. Per ED provider who spoke w/ / Lacey possible source is PICC line and/or Left foot ulcer and recommended to c/w same abx regiment pending re-eval in am by ID to finalize abx and decided on wether to keep/ remove PICC line. ROS + subjective f/c and fatigue X 1 day and 1 episode of bilious emesis today. Also reports new  redness around shallow ulcer on L foot X1 day. Also reports pain in L foot is worse today.  Denies URI s/s, cough, CP, SOB, abd pain, n/d/c and dysuria.     Patient seen at bedside for length of stay assessment. Current diet order: DASH/TLC. NKFA however reports GI intolerance to broccoli. No difficulty chewing/swallowing reported. Reports good appetite. Pt consumed 100 % of breakfast which included 2 hard boiled eggs, potatoes, english muffin. Pt reports that she was eating well PTA. Dosing weight: 296.3 pounds, BMI 46.5, pt reports that she has been on a weight loss medication for 8 months and has lost ~60# in that time. Michael score 21. No pressure injuries documented. No edema documented. Denies N/V/D/C, on bowel regimen. Observed with no overt signs and symptoms of muscle or fat wasting. Based on ASPEN guidelines, pt does not meet criteria for malnutrition. No cultural ethnic, Congregation food preferences noted. Pt made aware RD remains available. See nutrition recommendations below.

## 2023-03-03 NOTE — DIETITIAN INITIAL EVALUATION ADULT - PERTINENT LABORATORY DATA
03-03    134<L>  |  104  |  15  ----------------------------<  77  4.7   |  16<L>  |  0.60    Ca    9.0      03 Mar 2023 06:56  Phos  3.2     03-03  Mg     2.0     03-03    TPro  6.8  /  Alb  3.3  /  TBili  0.6  /  DBili  x   /  AST  20  /  ALT  9<L>  /  AlkPhos  67  03-03  A1C with Estimated Average Glucose Result: 5.2 % (11-02-22 @ 05:30)  A1C with Estimated Average Glucose Result: 5.1 % (08-16-22 @ 08:54)

## 2023-03-03 NOTE — PROGRESS NOTE ADULT - ASSESSMENT
36 yo F with HTN, MO and AVM L foot with recurrent L foot nonpurulent ulcer presenting with worsening pain at ulcer site, has been treated with Meropenem and Daptomycin for 3 days without improvement in symptoms. She has significant allergies to PCN and FQs.  Will plan to  continue Meropenem and Daptomycin was changed to Linezolid.  Fever unlikely related to PICC.  Blood culture 2/27 negative.  She remains afebrile, leukocytosis improved from admission.     Suggest:  - Continue Linezolid 600mg po q6hrs    - Continue meropenem 2 g IV q8h    Recommendations discussed with primary team.  Team 1 will continue to follow, please call or page with any questions.   36 yo F with HTN, MO and AVM L foot with recurrent L foot nonpurulent ulcer infection presenting with worsening pain at ulcer site, has been treated with Meropenem and Daptomycin for 3 days without improvement in symptoms. She has significant allergies to PCN and FQs.  Will plan to  continue Meropenem and Daptomycin was changed to Linezolid.  Fever unlikely related to PICC.  Blood culture 2/27 negative.  She remains afebrile, leukocytosis improved from admission.     Suggest:  - Continue Linezolid 600mg po q6hrs    - Continue meropenem 2 g IV q8h    Recommendations discussed with primary team.  Team 1 will continue to follow, please call or page with any questions.

## 2023-03-03 NOTE — PROGRESS NOTE ADULT - SUBJECTIVE AND OBJECTIVE BOX
INTERVAL HPI/OVERNIGHT EVENTS:  As per night team, no overnight events. Patient seen and examined at bedside. Patient denies fever, chills, dizziness, weakness, HA, CP, SOB, N/V/D/C    VITALS  Vital Signs Last 24 Hrs  T(C): 36.6 (03 Mar 2023 09:40), Max: 36.9 (02 Mar 2023 21:19)  T(F): 97.9 (03 Mar 2023 09:40), Max: 98.4 (02 Mar 2023 21:19)  HR: 78 (03 Mar 2023 09:40) (73 - 96)  BP: 142/90 (03 Mar 2023 09:40) (114/68 - 163/98)  BP(mean): --  RR: 18 (03 Mar 2023 09:40) (16 - 18)  SpO2: 96% (03 Mar 2023 09:40) (96% - 99%)    Parameters below as of 03 Mar 2023 09:40  Patient On (Oxygen Delivery Method): room air        CAPILLARY BLOOD GLUCOSE        PHYSICAL EXAM  General: NAD, sitting comfortably in bed   HEENT: PERRL/ EOMI, no scleral icterus, MMM  Neck: Supple, no JVD  Respiratory: lungs CTA b/l, no wheezes/crackles, no accessory muscle use  Cardiovascular: Regular rhythm/rate; +S1 +S2, no murmurs  Gastrointestinal: Soft, NTND, normoactive BS, no rebound, no guarding  Genitourinary: no suprapubic tenderness  Extremities: + L foot shallow clean base ulcer w/ mild surrounding erythema. WWP, no clubbing or cyanosis; no peripheral edema  Neurological: A&Ox3, no gross focal deficits, follows commands  Skin: Normal temperature, warm, dry    MEDICATIONS  (STANDING):  enoxaparin Injectable 40 milliGRAM(s) SubCutaneous every 12 hours  gabapentin 100 milliGRAM(s) Oral daily  gabapentin 800 milliGRAM(s) Oral daily  linezolid    Tablet 600 milliGRAM(s) Oral every 12 hours  meropenem  IVPB 2000 milliGRAM(s) IV Intermittent every 8 hours  nebivolol 10 milliGRAM(s) Oral <User Schedule>  polyethylene glycol 3350 17 Gram(s) Oral daily  senna 2 Tablet(s) Oral at bedtime    MEDICATIONS  (PRN):  HYDROmorphone  Injectable 1 milliGRAM(s) IV Push every 4 hours PRN Severe Pain (7 - 10)      amoxicillin (Angioedema)  ciprofloxacin (Rash)  hydrochlorothiazide (Hives)  latex (Rash)  lisinopril (Angioedema; Rash; Hives)  morphine (Rash)  penicillin (Rash)      LABS                        12.0   10.02 )-----------( 442      ( 03 Mar 2023 06:56 )             39.3     03-03    134<L>  |  104  |  15  ----------------------------<  77  4.7   |  16<L>  |  0.60    Ca    9.0      03 Mar 2023 06:56  Phos  3.2     03-03  Mg     2.0     03-03    TPro  6.8  /  Alb  3.3  /  TBili  0.6  /  DBili  x   /  AST  20  /  ALT  9<L>  /  AlkPhos  67  03-03        CARDIAC MARKERS ( 03 Mar 2023 06:56 )  x     / x     / 55 U/L / x     / x            RADIOLOGY & ADDITIONAL TESTS: Reviewed

## 2023-03-03 NOTE — PROGRESS NOTE ADULT - PROBLEM SELECTOR PLAN 1
3/4 SIRS (Fever 101 at home, Tachy, elevated WBC). Possible source (s) PICC line and/or Left foot ulcer. Pt s/p recent dc on 2/7/23 on IV abx (Dapto 800 q24hrs and Cefepime 2g q8hrs) via PICC (placed on 2/3/23) for L foot infx.   Afebrile, HD stable on admission. Reports taking Tylenol at home prior to presentation. LA wnl.  PICC site c/d/i. PICC. L foot + new redness around shallow ulcer on L foot X 1 day. Denies URI s/s, cough, CP, SOB, abd pain, n/v/d/c and dysuria. CXR neg.  Per ID source is likely the foot and PICC cane stay in. Per ID switch from dapto to linezolid    Plan:   -f/u  Bcx,  -c/w linezolid 600mg PO q12hr  - c/w Meropenem 2gm IV QD  -f/u ID recs

## 2023-03-03 NOTE — DIETITIAN INITIAL EVALUATION ADULT - ADD RECOMMEND
1. Continue with diet order   2. Encourage pt to meed nutritional needs as able   3. Monitor labs: electrolytes, cmp  4. Monitor weights   5. Pain and bowel regimen per team   6. Will continue to assess/honor food preferences as able

## 2023-03-03 NOTE — DIETITIAN NUTRITION RISK NOTIFICATION - TREATMENT: THE FOLLOWING DIET HAS BEEN RECOMMENDED
Diet, DASH/TLC:   Sodium & Cholesterol Restricted  Supplement Feeding Modality:  Oral  Probiotic Yogurt/Smoothie Cans or Servings Per Day:  3       Frequency:  Three Times a day (02-28-23 @ 16:06) [Active]

## 2023-03-03 NOTE — DIETITIAN INITIAL EVALUATION ADULT - PERTINENT MEDS FT
MEDICATIONS  (STANDING):  enoxaparin Injectable 40 milliGRAM(s) SubCutaneous every 12 hours  gabapentin 100 milliGRAM(s) Oral daily  gabapentin 800 milliGRAM(s) Oral daily  linezolid    Tablet 600 milliGRAM(s) Oral every 12 hours  meropenem  IVPB 2000 milliGRAM(s) IV Intermittent every 8 hours  nebivolol 10 milliGRAM(s) Oral <User Schedule>  polyethylene glycol 3350 17 Gram(s) Oral daily  senna 2 Tablet(s) Oral at bedtime    MEDICATIONS  (PRN):  HYDROmorphone  Injectable 1 milliGRAM(s) IV Push every 4 hours PRN Severe Pain (7 - 10)

## 2023-03-03 NOTE — PROGRESS NOTE ADULT - SUBJECTIVE AND OBJECTIVE BOX
INTERVAL HPI/OVERNIGHT EVENTS:  Patient was seen and examined at bedside. As per nurse and patient, no o/n events, patient resting comfortably. Reports pain stable from yesterday, slightly improved from admission.  Does not report, chills, dizziness, weakness, HA, Changes in vision, CP, palpitations, SOB, cough, N/V/D/C, dysuria, changes in bowel movements, LE edema. ROS otherwise negative.    VITAL SIGNS:  T(F): 99 (03-03-23 @ 15:30)  HR: 84 (03-03-23 @ 15:30)  BP: 131/85 (03-03-23 @ 15:30)  RR: 18 (03-03-23 @ 15:30)  SpO2: 95% (03-03-23 @ 15:30)  Wt(kg): --    PHYSICAL EXAM:  Constitutional: resting comfortably, no acute distress   HEENT: PERRL, EOMI, sclera non-icteric, neck supple, trachea midline, no masses, no JVD, MMM, good dentition  Respiratory: CTA b/l, good air entry b/l, no wheezing, no rhonchi, no rales, without accessory muscle use and no intercostal retractions  Cardiovascular: RRR, normal S1S2, no M/R/G  Gastrointestinal: soft, NTND, no masses palpable, BS normal  Extremities: left foot with small open ulcer with granular base, no purulence or drainage   Neurological: AAOx3, CN Grossly intact  Skin: Normal temperature, warm, dry    MEDICATIONS  (STANDING):  enoxaparin Injectable 40 milliGRAM(s) SubCutaneous every 12 hours  gabapentin 100 milliGRAM(s) Oral daily  gabapentin 800 milliGRAM(s) Oral daily  linezolid    Tablet 600 milliGRAM(s) Oral every 12 hours  meropenem  IVPB 2000 milliGRAM(s) IV Intermittent every 8 hours  nebivolol 10 milliGRAM(s) Oral <User Schedule>  polyethylene glycol 3350 17 Gram(s) Oral daily  senna 2 Tablet(s) Oral at bedtime    MEDICATIONS  (PRN):  HYDROmorphone  Injectable 1 milliGRAM(s) IV Push every 4 hours PRN Severe Pain (7 - 10)    Allergies    amoxicillin (Angioedema)  ciprofloxacin (Rash)  hydrochlorothiazide (Hives)  latex (Rash)  lisinopril (Angioedema; Rash; Hives)  morphine (Rash)  penicillin (Rash)    Intolerances    LABS:                        12.0   10.02 )-----------( 442      ( 03 Mar 2023 06:56 )             39.3     03-03    134<L>  |  104  |  15  ----------------------------<  77  4.7   |  16<L>  |  0.60    Ca    9.0      03 Mar 2023 06:56  Phos  3.2     03-03  Mg     2.0     03-03    TPro  6.8  /  Alb  3.3  /  TBili  0.6  /  DBili  x   /  AST  20  /  ALT  9<L>  /  AlkPhos  67  03-03    RADIOLOGY & ADDITIONAL TESTS:  Reviewed

## 2023-03-04 LAB
ALBUMIN SERPL ELPH-MCNC: 3.3 G/DL — SIGNIFICANT CHANGE UP (ref 3.3–5)
ALP SERPL-CCNC: 80 U/L — SIGNIFICANT CHANGE UP (ref 40–120)
ALT FLD-CCNC: 6 U/L — LOW (ref 10–45)
ANION GAP SERPL CALC-SCNC: 7 MMOL/L — SIGNIFICANT CHANGE UP (ref 5–17)
AST SERPL-CCNC: 10 U/L — SIGNIFICANT CHANGE UP (ref 10–40)
BASOPHILS # BLD AUTO: 0.06 K/UL — SIGNIFICANT CHANGE UP (ref 0–0.2)
BASOPHILS NFR BLD AUTO: 0.7 % — SIGNIFICANT CHANGE UP (ref 0–2)
BILIRUB SERPL-MCNC: 0.3 MG/DL — SIGNIFICANT CHANGE UP (ref 0.2–1.2)
BUN SERPL-MCNC: 14 MG/DL — SIGNIFICANT CHANGE UP (ref 7–23)
CALCIUM SERPL-MCNC: 8.7 MG/DL — SIGNIFICANT CHANGE UP (ref 8.4–10.5)
CHLORIDE SERPL-SCNC: 105 MMOL/L — SIGNIFICANT CHANGE UP (ref 96–108)
CO2 SERPL-SCNC: 25 MMOL/L — SIGNIFICANT CHANGE UP (ref 22–31)
CREAT SERPL-MCNC: 0.62 MG/DL — SIGNIFICANT CHANGE UP (ref 0.5–1.3)
EGFR: 116 ML/MIN/1.73M2 — SIGNIFICANT CHANGE UP
EOSINOPHIL # BLD AUTO: 0.2 K/UL — SIGNIFICANT CHANGE UP (ref 0–0.5)
EOSINOPHIL NFR BLD AUTO: 2.3 % — SIGNIFICANT CHANGE UP (ref 0–6)
GLUCOSE SERPL-MCNC: 103 MG/DL — HIGH (ref 70–99)
HCT VFR BLD CALC: 36.7 % — SIGNIFICANT CHANGE UP (ref 34.5–45)
HGB BLD-MCNC: 11.7 G/DL — SIGNIFICANT CHANGE UP (ref 11.5–15.5)
IMM GRANULOCYTES NFR BLD AUTO: 0.5 % — SIGNIFICANT CHANGE UP (ref 0–0.9)
LYMPHOCYTES # BLD AUTO: 1.87 K/UL — SIGNIFICANT CHANGE UP (ref 1–3.3)
LYMPHOCYTES # BLD AUTO: 21.2 % — SIGNIFICANT CHANGE UP (ref 13–44)
MAGNESIUM SERPL-MCNC: 1.8 MG/DL — SIGNIFICANT CHANGE UP (ref 1.6–2.6)
MCHC RBC-ENTMCNC: 28.4 PG — SIGNIFICANT CHANGE UP (ref 27–34)
MCHC RBC-ENTMCNC: 31.9 GM/DL — LOW (ref 32–36)
MCV RBC AUTO: 89.1 FL — SIGNIFICANT CHANGE UP (ref 80–100)
MONOCYTES # BLD AUTO: 0.83 K/UL — SIGNIFICANT CHANGE UP (ref 0–0.9)
MONOCYTES NFR BLD AUTO: 9.4 % — SIGNIFICANT CHANGE UP (ref 2–14)
NEUTROPHILS # BLD AUTO: 5.83 K/UL — SIGNIFICANT CHANGE UP (ref 1.8–7.4)
NEUTROPHILS NFR BLD AUTO: 65.9 % — SIGNIFICANT CHANGE UP (ref 43–77)
NRBC # BLD: 0 /100 WBCS — SIGNIFICANT CHANGE UP (ref 0–0)
PHOSPHATE SERPL-MCNC: 2.9 MG/DL — SIGNIFICANT CHANGE UP (ref 2.5–4.5)
PLATELET # BLD AUTO: 445 K/UL — HIGH (ref 150–400)
POTASSIUM SERPL-MCNC: 4 MMOL/L — SIGNIFICANT CHANGE UP (ref 3.5–5.3)
POTASSIUM SERPL-SCNC: 4 MMOL/L — SIGNIFICANT CHANGE UP (ref 3.5–5.3)
PROT SERPL-MCNC: 6.5 G/DL — SIGNIFICANT CHANGE UP (ref 6–8.3)
RBC # BLD: 4.12 M/UL — SIGNIFICANT CHANGE UP (ref 3.8–5.2)
RBC # FLD: 13.2 % — SIGNIFICANT CHANGE UP (ref 10.3–14.5)
SODIUM SERPL-SCNC: 137 MMOL/L — SIGNIFICANT CHANGE UP (ref 135–145)
WBC # BLD: 8.83 K/UL — SIGNIFICANT CHANGE UP (ref 3.8–10.5)
WBC # FLD AUTO: 8.83 K/UL — SIGNIFICANT CHANGE UP (ref 3.8–10.5)

## 2023-03-04 PROCEDURE — 99232 SBSQ HOSP IP/OBS MODERATE 35: CPT

## 2023-03-04 PROCEDURE — 99232 SBSQ HOSP IP/OBS MODERATE 35: CPT | Mod: GC

## 2023-03-04 RX ADMIN — MEROPENEM 280 MILLIGRAM(S): 1 INJECTION INTRAVENOUS at 14:07

## 2023-03-04 RX ADMIN — SENNA PLUS 2 TABLET(S): 8.6 TABLET ORAL at 21:48

## 2023-03-04 RX ADMIN — HYDROMORPHONE HYDROCHLORIDE 1 MILLIGRAM(S): 2 INJECTION INTRAMUSCULAR; INTRAVENOUS; SUBCUTANEOUS at 14:01

## 2023-03-04 RX ADMIN — ENOXAPARIN SODIUM 40 MILLIGRAM(S): 100 INJECTION SUBCUTANEOUS at 12:57

## 2023-03-04 RX ADMIN — HYDROMORPHONE HYDROCHLORIDE 1 MILLIGRAM(S): 2 INJECTION INTRAMUSCULAR; INTRAVENOUS; SUBCUTANEOUS at 22:24

## 2023-03-04 RX ADMIN — LINEZOLID 600 MILLIGRAM(S): 600 INJECTION, SOLUTION INTRAVENOUS at 05:43

## 2023-03-04 RX ADMIN — HYDROMORPHONE HYDROCHLORIDE 1 MILLIGRAM(S): 2 INJECTION INTRAMUSCULAR; INTRAVENOUS; SUBCUTANEOUS at 09:58

## 2023-03-04 RX ADMIN — HYDROMORPHONE HYDROCHLORIDE 1 MILLIGRAM(S): 2 INJECTION INTRAMUSCULAR; INTRAVENOUS; SUBCUTANEOUS at 21:48

## 2023-03-04 RX ADMIN — HYDROMORPHONE HYDROCHLORIDE 1 MILLIGRAM(S): 2 INJECTION INTRAMUSCULAR; INTRAVENOUS; SUBCUTANEOUS at 05:00

## 2023-03-04 RX ADMIN — MEROPENEM 280 MILLIGRAM(S): 1 INJECTION INTRAVENOUS at 05:42

## 2023-03-04 RX ADMIN — LINEZOLID 600 MILLIGRAM(S): 600 INJECTION, SOLUTION INTRAVENOUS at 18:54

## 2023-03-04 RX ADMIN — MEROPENEM 280 MILLIGRAM(S): 1 INJECTION INTRAVENOUS at 21:47

## 2023-03-04 RX ADMIN — NEBIVOLOL HYDROCHLORIDE 10 MILLIGRAM(S): 5 TABLET ORAL at 18:55

## 2023-03-04 RX ADMIN — HYDROMORPHONE HYDROCHLORIDE 1 MILLIGRAM(S): 2 INJECTION INTRAMUSCULAR; INTRAVENOUS; SUBCUTANEOUS at 13:31

## 2023-03-04 RX ADMIN — ENOXAPARIN SODIUM 40 MILLIGRAM(S): 100 INJECTION SUBCUTANEOUS at 21:55

## 2023-03-04 RX ADMIN — HYDROMORPHONE HYDROCHLORIDE 1 MILLIGRAM(S): 2 INJECTION INTRAMUSCULAR; INTRAVENOUS; SUBCUTANEOUS at 09:28

## 2023-03-04 RX ADMIN — GABAPENTIN 100 MILLIGRAM(S): 400 CAPSULE ORAL at 12:57

## 2023-03-04 RX ADMIN — HYDROMORPHONE HYDROCHLORIDE 1 MILLIGRAM(S): 2 INJECTION INTRAMUSCULAR; INTRAVENOUS; SUBCUTANEOUS at 18:10

## 2023-03-04 RX ADMIN — HYDROMORPHONE HYDROCHLORIDE 1 MILLIGRAM(S): 2 INJECTION INTRAMUSCULAR; INTRAVENOUS; SUBCUTANEOUS at 17:40

## 2023-03-04 RX ADMIN — GABAPENTIN 800 MILLIGRAM(S): 400 CAPSULE ORAL at 12:57

## 2023-03-04 RX ADMIN — NEBIVOLOL HYDROCHLORIDE 10 MILLIGRAM(S): 5 TABLET ORAL at 05:42

## 2023-03-04 RX ADMIN — HYDROMORPHONE HYDROCHLORIDE 1 MILLIGRAM(S): 2 INJECTION INTRAMUSCULAR; INTRAVENOUS; SUBCUTANEOUS at 06:08

## 2023-03-04 RX ADMIN — HYDROMORPHONE HYDROCHLORIDE 1 MILLIGRAM(S): 2 INJECTION INTRAMUSCULAR; INTRAVENOUS; SUBCUTANEOUS at 00:40

## 2023-03-04 NOTE — PROGRESS NOTE ADULT - PROBLEM SELECTOR PLAN 2
Chronic foot ulcer in the setting of AVM malformation. L foot + new redness around shallow ulcer on L foot X 1 day w/ ángel pain foot pain today. Home meds: Dapto/ Cefepime, Gabapentin 100 in am and 800 at 4pm. Percocet 10 q4hrs PRN. Dr Teran office notified, will follow patient.   -Transition to Dilaudid q4hrs   -bowel reg   -see sepsis for plan Chronic foot ulcer in the setting of AVM malformation. L foot + new redness around shallow ulcer on L foot X 1 day w/ ángel pain foot pain today. Home meds: Dapto/ Cefepime, Gabapentin 100 in am and 800 at 4pm. Percocet 10 q4hrs PRN. Dr Teran office notified, will follow patient.     -Transition to Dilaudid q4hrs   -bowel reg   -see sepsis for plan

## 2023-03-04 NOTE — PROGRESS NOTE ADULT - SUBJECTIVE AND OBJECTIVE BOX
**Incomplete Note**   CC: Patient is a 37y old  Female who presents with a chief complaint of fever (04 Mar 2023 07:42)      INTERVAL EVENTS: ISABELLE    SUBJECTIVE / INTERVAL HPI: Patient seen and examined at bedside.     ROS: negative unless otherwise stated above.    VITAL SIGNS:  Vital Signs Last 24 Hrs  T(C): 36.6 (04 Mar 2023 05:19), Max: 37.2 (03 Mar 2023 15:30)  T(F): 97.8 (04 Mar 2023 05:19), Max: 99 (03 Mar 2023 15:30)  HR: 81 (04 Mar 2023 05:19) (78 - 85)  BP: 133/82 (04 Mar 2023 05:19) (131/85 - 143/83)  BP(mean): --  RR: 18 (04 Mar 2023 05:19) (18 - 18)  SpO2: 96% (04 Mar 2023 05:19) (95% - 96%)    Parameters below as of 04 Mar 2023 05:19  Patient On (Oxygen Delivery Method): room air            PHYSICAL EXAM:    General: NAD  HEENT: MMM  Neck: supple  Cardiovascular: +S1/S2; RRR  Respiratory: CTA B/L; no W/R/R  Gastrointestinal: soft, NT/ND  Extremities: WWP; no edema, clubbing or cyanosis  Vascular: 2+ radial, DP/PT pulses B/L  Neurological: AAOx3; no focal deficits    MEDICATIONS:  MEDICATIONS  (STANDING):  enoxaparin Injectable 40 milliGRAM(s) SubCutaneous every 12 hours  gabapentin 100 milliGRAM(s) Oral daily  gabapentin 800 milliGRAM(s) Oral daily  linezolid    Tablet 600 milliGRAM(s) Oral every 12 hours  meropenem  IVPB 2000 milliGRAM(s) IV Intermittent every 8 hours  nebivolol 10 milliGRAM(s) Oral <User Schedule>  polyethylene glycol 3350 17 Gram(s) Oral daily  senna 2 Tablet(s) Oral at bedtime    MEDICATIONS  (PRN):  HYDROmorphone  Injectable 1 milliGRAM(s) IV Push every 4 hours PRN Severe Pain (7 - 10)      ALLERGIES:  Allergies    amoxicillin (Angioedema)  ciprofloxacin (Rash)  hydrochlorothiazide (Hives)  latex (Rash)  lisinopril (Angioedema; Rash; Hives)  morphine (Rash)  penicillin (Rash)    Intolerances        LABS:                        11.7   8.83  )-----------( 445      ( 04 Mar 2023 05:56 )             36.7     03-04    137  |  105  |  14  ----------------------------<  103<H>  4.0   |  25  |  0.62    Ca    8.7      04 Mar 2023 05:56  Phos  2.9     03-04  Mg     1.8     03-04    TPro  6.5  /  Alb  3.3  /  TBili  0.3  /  DBili  x   /  AST  10  /  ALT  6<L>  /  AlkPhos  80  03-04        CAPILLARY BLOOD GLUCOSE          RADIOLOGY & ADDITIONAL TESTS: Reviewed.   CC: Patient is a 37y old  Female who presents with a chief complaint of fever (04 Mar 2023 07:42)      INTERVAL EVENTS: ISABELLE    SUBJECTIVE / INTERVAL HPI: Patient seen and examined at bedside. Denies fever, chills, chest pain, and abdominal pain. Reports pain at site of ulcer.     ROS: negative unless otherwise stated above.    VITAL SIGNS:  Vital Signs Last 24 Hrs  T(C): 36.6 (04 Mar 2023 05:19), Max: 37.2 (03 Mar 2023 15:30)  T(F): 97.8 (04 Mar 2023 05:19), Max: 99 (03 Mar 2023 15:30)  HR: 81 (04 Mar 2023 05:19) (78 - 85)  BP: 133/82 (04 Mar 2023 05:19) (131/85 - 143/83)  BP(mean): --  RR: 18 (04 Mar 2023 05:19) (18 - 18)  SpO2: 96% (04 Mar 2023 05:19) (95% - 96%)    Parameters below as of 04 Mar 2023 05:19  Patient On (Oxygen Delivery Method): room air            PHYSICAL EXAM:    General: NAD  HEENT: MMM  Neck: supple  Cardiovascular: +S1/S2; RRR  Respiratory: CTA B/L; no W/R/R  Gastrointestinal: soft, NT/ND  Extremities: WWP; no edema, clubbing or cyanosis  Vascular: 2+ radial, DP/PT pulses B/L  Neurological: AAOx3; no focal deficits    MEDICATIONS:  MEDICATIONS  (STANDING):  enoxaparin Injectable 40 milliGRAM(s) SubCutaneous every 12 hours  gabapentin 100 milliGRAM(s) Oral daily  gabapentin 800 milliGRAM(s) Oral daily  linezolid    Tablet 600 milliGRAM(s) Oral every 12 hours  meropenem  IVPB 2000 milliGRAM(s) IV Intermittent every 8 hours  nebivolol 10 milliGRAM(s) Oral <User Schedule>  polyethylene glycol 3350 17 Gram(s) Oral daily  senna 2 Tablet(s) Oral at bedtime    MEDICATIONS  (PRN):  HYDROmorphone  Injectable 1 milliGRAM(s) IV Push every 4 hours PRN Severe Pain (7 - 10)      ALLERGIES:  Allergies    amoxicillin (Angioedema)  ciprofloxacin (Rash)  hydrochlorothiazide (Hives)  latex (Rash)  lisinopril (Angioedema; Rash; Hives)  morphine (Rash)  penicillin (Rash)    Intolerances        LABS:                        11.7   8.83  )-----------( 445      ( 04 Mar 2023 05:56 )             36.7     03-04    137  |  105  |  14  ----------------------------<  103<H>  4.0   |  25  |  0.62    Ca    8.7      04 Mar 2023 05:56  Phos  2.9     03-04  Mg     1.8     03-04    TPro  6.5  /  Alb  3.3  /  TBili  0.3  /  DBili  x   /  AST  10  /  ALT  6<L>  /  AlkPhos  80  03-04        CAPILLARY BLOOD GLUCOSE          RADIOLOGY & ADDITIONAL TESTS: Reviewed.

## 2023-03-04 NOTE — PROGRESS NOTE ADULT - ASSESSMENT
37F h/o AVM of L foot with recurrent L foot ulcer infection/cellulitis p/w worsening of pain and erythema of L ulcer c/w cellulitis and possible deep SSTI in setting of AVM.  Abx initially hugh/dapto, then switched to hugh/linezolid.  So far clinically uncahnged, leukocytosis resolved.    - cont meropenem 2g IV q8h  - cont linezolid 600mg PO q12h   - obtain CRP and ESR on Monday AM with CBC w/ diff    Team 1 will follow you.  Case d/w primary team.    Génesis Khan MD, MS  Infectious Disease attending  work cell [phone]   For any questions during evening/weekend/holiday, please page ID on call

## 2023-03-04 NOTE — PROGRESS NOTE ADULT - SUBJECTIVE AND OBJECTIVE BOX
INFECTIOUS DISEASES CONSULT FOLLOW-UP NOTE    INTERVAL HPI/OVERNIGHT EVENTS:  no event overnight  patient feels the same, pain at L foot ulcer    ROS:   Constitutional, eyes, ENT, cardiovascular, respiratory, gastrointestinal, genitourinary, integumentary, neurological, psychiatric and heme/lymph are otherwise negative other than noted above       ANTIBIOTICS/RELEVANT:    MEDICATIONS  (STANDING):  enoxaparin Injectable 40 milliGRAM(s) SubCutaneous every 12 hours  gabapentin 100 milliGRAM(s) Oral daily  gabapentin 800 milliGRAM(s) Oral daily  linezolid    Tablet 600 milliGRAM(s) Oral every 12 hours  meropenem  IVPB 2000 milliGRAM(s) IV Intermittent every 8 hours  nebivolol 10 milliGRAM(s) Oral <User Schedule>  polyethylene glycol 3350 17 Gram(s) Oral daily  senna 2 Tablet(s) Oral at bedtime    MEDICATIONS  (PRN):  HYDROmorphone  Injectable 1 milliGRAM(s) IV Push every 4 hours PRN Severe Pain (7 - 10)        Vital Signs Last 24 Hrs  T(C): 36.9 (04 Mar 2023 09:50), Max: 36.9 (04 Mar 2023 09:50)  T(F): 98.5 (04 Mar 2023 09:50), Max: 98.5 (04 Mar 2023 09:50)  HR: 78 (04 Mar 2023 09:50) (78 - 85)  BP: 122/86 (04 Mar 2023 09:50) (122/86 - 143/83)  BP(mean): --  RR: 18 (04 Mar 2023 09:50) (18 - 18)  SpO2: 96% (04 Mar 2023 09:50) (96% - 96%)    Parameters below as of 04 Mar 2023 09:50  Patient On (Oxygen Delivery Method): room air        PHYSICAL EXAM:  Constitutional: alert, NAD  Eyes: the sclera and conjunctiva were normal.   ENT: the ears and nose were normal in appearance.   Neck: the appearance of the neck was normal and the neck was supple.   Pulmonary: no respiratory distress and lungs were clear to auscultation bilaterally.   Heart: heart rate was normal and rhythm regular, normal S1 and S2  Vascular:. there was no peripheral edema  Abdomen: normal bowel sounds, soft, non-tender  Ext: L foot lateral side with erythematous ulcer, unchanged         LABS:                        11.7   8.83  )-----------( 445      ( 04 Mar 2023 05:56 )             36.7     03-04    137  |  105  |  14  ----------------------------<  103<H>  4.0   |  25  |  0.62    Ca    8.7      04 Mar 2023 05:56  Phos  2.9     03-04  Mg     1.8     03-04    TPro  6.5  /  Alb  3.3  /  TBili  0.3  /  DBili  x   /  AST  10  /  ALT  6<L>  /  AlkPhos  80  03-04          MICROBIOLOGY:      RADIOLOGY & ADDITIONAL STUDIES:  Reviewed

## 2023-03-05 LAB
ALBUMIN SERPL ELPH-MCNC: 3.4 G/DL — SIGNIFICANT CHANGE UP (ref 3.3–5)
ALP SERPL-CCNC: 71 U/L — SIGNIFICANT CHANGE UP (ref 40–120)
ALT FLD-CCNC: 10 U/L — SIGNIFICANT CHANGE UP (ref 10–45)
ANION GAP SERPL CALC-SCNC: 11 MMOL/L — SIGNIFICANT CHANGE UP (ref 5–17)
AST SERPL-CCNC: 12 U/L — SIGNIFICANT CHANGE UP (ref 10–40)
BASOPHILS # BLD AUTO: 0.06 K/UL — SIGNIFICANT CHANGE UP (ref 0–0.2)
BASOPHILS NFR BLD AUTO: 0.7 % — SIGNIFICANT CHANGE UP (ref 0–2)
BILIRUB SERPL-MCNC: 0.5 MG/DL — SIGNIFICANT CHANGE UP (ref 0.2–1.2)
BUN SERPL-MCNC: 14 MG/DL — SIGNIFICANT CHANGE UP (ref 7–23)
CALCIUM SERPL-MCNC: 9.1 MG/DL — SIGNIFICANT CHANGE UP (ref 8.4–10.5)
CHLORIDE SERPL-SCNC: 102 MMOL/L — SIGNIFICANT CHANGE UP (ref 96–108)
CO2 SERPL-SCNC: 25 MMOL/L — SIGNIFICANT CHANGE UP (ref 22–31)
CREAT SERPL-MCNC: 0.54 MG/DL — SIGNIFICANT CHANGE UP (ref 0.5–1.3)
CULTURE RESULTS: SIGNIFICANT CHANGE UP
CULTURE RESULTS: SIGNIFICANT CHANGE UP
EGFR: 120 ML/MIN/1.73M2 — SIGNIFICANT CHANGE UP
EOSINOPHIL # BLD AUTO: 0.19 K/UL — SIGNIFICANT CHANGE UP (ref 0–0.5)
EOSINOPHIL NFR BLD AUTO: 2.3 % — SIGNIFICANT CHANGE UP (ref 0–6)
GLUCOSE SERPL-MCNC: 92 MG/DL — SIGNIFICANT CHANGE UP (ref 70–99)
HCT VFR BLD CALC: 36.5 % — SIGNIFICANT CHANGE UP (ref 34.5–45)
HGB BLD-MCNC: 11.7 G/DL — SIGNIFICANT CHANGE UP (ref 11.5–15.5)
IMM GRANULOCYTES NFR BLD AUTO: 0.2 % — SIGNIFICANT CHANGE UP (ref 0–0.9)
LYMPHOCYTES # BLD AUTO: 2.29 K/UL — SIGNIFICANT CHANGE UP (ref 1–3.3)
LYMPHOCYTES # BLD AUTO: 27.4 % — SIGNIFICANT CHANGE UP (ref 13–44)
MAGNESIUM SERPL-MCNC: 1.7 MG/DL — SIGNIFICANT CHANGE UP (ref 1.6–2.6)
MCHC RBC-ENTMCNC: 28.4 PG — SIGNIFICANT CHANGE UP (ref 27–34)
MCHC RBC-ENTMCNC: 32.1 GM/DL — SIGNIFICANT CHANGE UP (ref 32–36)
MCV RBC AUTO: 88.6 FL — SIGNIFICANT CHANGE UP (ref 80–100)
MONOCYTES # BLD AUTO: 0.87 K/UL — SIGNIFICANT CHANGE UP (ref 0–0.9)
MONOCYTES NFR BLD AUTO: 10.4 % — SIGNIFICANT CHANGE UP (ref 2–14)
NEUTROPHILS # BLD AUTO: 4.92 K/UL — SIGNIFICANT CHANGE UP (ref 1.8–7.4)
NEUTROPHILS NFR BLD AUTO: 59 % — SIGNIFICANT CHANGE UP (ref 43–77)
NRBC # BLD: 0 /100 WBCS — SIGNIFICANT CHANGE UP (ref 0–0)
PHOSPHATE SERPL-MCNC: 3.4 MG/DL — SIGNIFICANT CHANGE UP (ref 2.5–4.5)
PLATELET # BLD AUTO: 459 K/UL — HIGH (ref 150–400)
POTASSIUM SERPL-MCNC: 4.5 MMOL/L — SIGNIFICANT CHANGE UP (ref 3.5–5.3)
POTASSIUM SERPL-SCNC: 4.5 MMOL/L — SIGNIFICANT CHANGE UP (ref 3.5–5.3)
PROT SERPL-MCNC: 6.7 G/DL — SIGNIFICANT CHANGE UP (ref 6–8.3)
RBC # BLD: 4.12 M/UL — SIGNIFICANT CHANGE UP (ref 3.8–5.2)
RBC # FLD: 13.1 % — SIGNIFICANT CHANGE UP (ref 10.3–14.5)
SODIUM SERPL-SCNC: 138 MMOL/L — SIGNIFICANT CHANGE UP (ref 135–145)
SPECIMEN SOURCE: SIGNIFICANT CHANGE UP
SPECIMEN SOURCE: SIGNIFICANT CHANGE UP
WBC # BLD: 8.35 K/UL — SIGNIFICANT CHANGE UP (ref 3.8–10.5)
WBC # FLD AUTO: 8.35 K/UL — SIGNIFICANT CHANGE UP (ref 3.8–10.5)

## 2023-03-05 PROCEDURE — 99232 SBSQ HOSP IP/OBS MODERATE 35: CPT | Mod: GC

## 2023-03-05 PROCEDURE — 99232 SBSQ HOSP IP/OBS MODERATE 35: CPT

## 2023-03-05 RX ADMIN — HYDROMORPHONE HYDROCHLORIDE 1 MILLIGRAM(S): 2 INJECTION INTRAMUSCULAR; INTRAVENOUS; SUBCUTANEOUS at 14:30

## 2023-03-05 RX ADMIN — MEROPENEM 280 MILLIGRAM(S): 1 INJECTION INTRAVENOUS at 14:03

## 2023-03-05 RX ADMIN — MEROPENEM 280 MILLIGRAM(S): 1 INJECTION INTRAVENOUS at 23:06

## 2023-03-05 RX ADMIN — LINEZOLID 600 MILLIGRAM(S): 600 INJECTION, SOLUTION INTRAVENOUS at 18:06

## 2023-03-05 RX ADMIN — HYDROMORPHONE HYDROCHLORIDE 1 MILLIGRAM(S): 2 INJECTION INTRAMUSCULAR; INTRAVENOUS; SUBCUTANEOUS at 18:34

## 2023-03-05 RX ADMIN — HYDROMORPHONE HYDROCHLORIDE 1 MILLIGRAM(S): 2 INJECTION INTRAMUSCULAR; INTRAVENOUS; SUBCUTANEOUS at 06:41

## 2023-03-05 RX ADMIN — HYDROMORPHONE HYDROCHLORIDE 1 MILLIGRAM(S): 2 INJECTION INTRAMUSCULAR; INTRAVENOUS; SUBCUTANEOUS at 10:30

## 2023-03-05 RX ADMIN — LINEZOLID 600 MILLIGRAM(S): 600 INJECTION, SOLUTION INTRAVENOUS at 06:58

## 2023-03-05 RX ADMIN — HYDROMORPHONE HYDROCHLORIDE 1 MILLIGRAM(S): 2 INJECTION INTRAMUSCULAR; INTRAVENOUS; SUBCUTANEOUS at 09:58

## 2023-03-05 RX ADMIN — NEBIVOLOL HYDROCHLORIDE 10 MILLIGRAM(S): 5 TABLET ORAL at 06:58

## 2023-03-05 RX ADMIN — NEBIVOLOL HYDROCHLORIDE 10 MILLIGRAM(S): 5 TABLET ORAL at 18:05

## 2023-03-05 RX ADMIN — HYDROMORPHONE HYDROCHLORIDE 1 MILLIGRAM(S): 2 INJECTION INTRAMUSCULAR; INTRAVENOUS; SUBCUTANEOUS at 05:54

## 2023-03-05 RX ADMIN — MEROPENEM 280 MILLIGRAM(S): 1 INJECTION INTRAVENOUS at 06:58

## 2023-03-05 RX ADMIN — GABAPENTIN 800 MILLIGRAM(S): 400 CAPSULE ORAL at 12:19

## 2023-03-05 RX ADMIN — HYDROMORPHONE HYDROCHLORIDE 1 MILLIGRAM(S): 2 INJECTION INTRAMUSCULAR; INTRAVENOUS; SUBCUTANEOUS at 18:04

## 2023-03-05 RX ADMIN — HYDROMORPHONE HYDROCHLORIDE 1 MILLIGRAM(S): 2 INJECTION INTRAMUSCULAR; INTRAVENOUS; SUBCUTANEOUS at 14:00

## 2023-03-05 RX ADMIN — ENOXAPARIN SODIUM 40 MILLIGRAM(S): 100 INJECTION SUBCUTANEOUS at 22:08

## 2023-03-05 RX ADMIN — HYDROMORPHONE HYDROCHLORIDE 1 MILLIGRAM(S): 2 INJECTION INTRAMUSCULAR; INTRAVENOUS; SUBCUTANEOUS at 01:50

## 2023-03-05 RX ADMIN — GABAPENTIN 100 MILLIGRAM(S): 400 CAPSULE ORAL at 12:20

## 2023-03-05 RX ADMIN — HYDROMORPHONE HYDROCHLORIDE 1 MILLIGRAM(S): 2 INJECTION INTRAMUSCULAR; INTRAVENOUS; SUBCUTANEOUS at 02:44

## 2023-03-05 RX ADMIN — HYDROMORPHONE HYDROCHLORIDE 1 MILLIGRAM(S): 2 INJECTION INTRAMUSCULAR; INTRAVENOUS; SUBCUTANEOUS at 22:23

## 2023-03-05 RX ADMIN — HYDROMORPHONE HYDROCHLORIDE 1 MILLIGRAM(S): 2 INJECTION INTRAMUSCULAR; INTRAVENOUS; SUBCUTANEOUS at 22:08

## 2023-03-05 RX ADMIN — ENOXAPARIN SODIUM 40 MILLIGRAM(S): 100 INJECTION SUBCUTANEOUS at 12:19

## 2023-03-05 NOTE — PROGRESS NOTE ADULT - ASSESSMENT
37F h/o AVM of L foot with recurrent L foot ulcer infection/cellulitis p/w worsening of pain and erythema of L ulcer c/w cellulitis and possible deep SSTI in setting of AVM.  Abx initially hugh/dapto, then switched to hugh/linezolid.  So far clinically unchanged, but leukocytosis resolved.    - cont meropenem 2g IV q8h  - cont linezolid 600mg PO q12h   - obtain CRP and ESR on Monday AM with CBC w/ diff    Team 1 will follow you.  Case d/w primary team.    Génesis Khan MD, MS  Infectious Disease attending  work cell [phone]   For any questions during evening/weekend/holiday, please page ID on call

## 2023-03-05 NOTE — PROGRESS NOTE ADULT - SUBJECTIVE AND OBJECTIVE BOX
INFECTIOUS DISEASES CONSULT FOLLOW-UP NOTE    INTERVAL HPI/OVERNIGHT EVENTS:  No event overnight  reports same pain   no new symptoms     ROS:   Constitutional, eyes, ENT, cardiovascular, respiratory, gastrointestinal, genitourinary, integumentary, neurological, psychiatric and heme/lymph are otherwise negative other than noted above       ANTIBIOTICS/RELEVANT:    MEDICATIONS  (STANDING):  enoxaparin Injectable 40 milliGRAM(s) SubCutaneous every 12 hours  gabapentin 100 milliGRAM(s) Oral daily  gabapentin 800 milliGRAM(s) Oral daily  linezolid    Tablet 600 milliGRAM(s) Oral every 12 hours  meropenem  IVPB 2000 milliGRAM(s) IV Intermittent every 8 hours  nebivolol 10 milliGRAM(s) Oral <User Schedule>  polyethylene glycol 3350 17 Gram(s) Oral daily  senna 2 Tablet(s) Oral at bedtime    MEDICATIONS  (PRN):  HYDROmorphone  Injectable 1 milliGRAM(s) IV Push every 4 hours PRN Severe Pain (7 - 10)        Vital Signs Last 24 Hrs  T(C): 36.8 (05 Mar 2023 04:49), Max: 37.2 (04 Mar 2023 21:08)  T(F): 98.3 (05 Mar 2023 04:49), Max: 98.9 (04 Mar 2023 21:08)  HR: 86 (05 Mar 2023 04:49) (86 - 90)  BP: 148/87 (05 Mar 2023 04:49) (131/82 - 148/87)  BP(mean): --  RR: 18 (05 Mar 2023 04:49) (18 - 18)  SpO2: 98% (05 Mar 2023 04:49) (96% - 98%)    Parameters below as of 05 Mar 2023 04:49  Patient On (Oxygen Delivery Method): room air        PHYSICAL EXAM:  Constitutional: alert, NAD  Eyes: the sclera and conjunctiva were normal.   ENT: the ears and nose were normal in appearance.   Neck: the appearance of the neck was normal and the neck was supple.   Pulmonary: no respiratory distress and lungs were clear to auscultation bilaterally.   Heart: heart rate was normal and rhythm regular, normal S1 and S2  Vascular:. there was no peripheral edema  Abdomen: normal bowel sounds, soft, non-tender  Ext: L foot lateral side with erythematous ulcer, unchanged         LABS:                        11.7   8.35  )-----------( 459      ( 05 Mar 2023 07:01 )             36.5     03-05    138  |  102  |  14  ----------------------------<  92  4.5   |  25  |  0.54    Ca    9.1      05 Mar 2023 07:01  Phos  3.4     03-05  Mg     1.7     03-05    TPro  6.7  /  Alb  3.4  /  TBili  0.5  /  DBili  x   /  AST  12  /  ALT  10  /  AlkPhos  71  03-05          MICROBIOLOGY:      RADIOLOGY & ADDITIONAL STUDIES:  Reviewed

## 2023-03-05 NOTE — PROGRESS NOTE ADULT - SUBJECTIVE AND OBJECTIVE BOX
CC: Patient is a 37y old  Female who presents with a chief complaint of fever     INTERVAL EVENTS: ISABELLE    SUBJECTIVE / INTERVAL HPI: Patient seen and examined at bedside. Denies fever, chills, chest pain, and abdominal pain. Reports pain at site of ulcer.     ROS: negative unless otherwise stated above.    Vital Signs Last 24 Hrs  T(C): 36.5 (05 Mar 2023 15:24), Max: 36.8 (05 Mar 2023 04:49)  T(F): 97.7 (05 Mar 2023 15:24), Max: 98.3 (05 Mar 2023 04:49)  HR: 94 (05 Mar 2023 15:24) (86 - 94)  BP: 151/89 (05 Mar 2023 15:24) (148/87 - 151/89)  BP(mean): --  RR: 18 (05 Mar 2023 15:24) (18 - 18)  SpO2: 97% (05 Mar 2023 15:24) (97% - 98%)    Parameters below as of 05 Mar 2023 15:24  Patient On (Oxygen Delivery Method): room air                PHYSICAL EXAM:    General: NAD  HEENT: MMM  Neck: supple  Cardiovascular: +S1/S2; RRR  Respiratory: CTA B/L; no W/R/R  Gastrointestinal: soft, NT/ND  Extremities: WWP; no edema, clubbing or cyanosis  Vascular: 2+ radial, DP/PT pulses B/L  Neurological: AAOx3; no focal deficits  Skin _ wound on medial aspect of left heel     MEDICATIONS:  MEDICATIONS  (STANDING):  enoxaparin Injectable 40 milliGRAM(s) SubCutaneous every 12 hours  gabapentin 100 milliGRAM(s) Oral daily  gabapentin 800 milliGRAM(s) Oral daily  linezolid    Tablet 600 milliGRAM(s) Oral every 12 hours  meropenem  IVPB 2000 milliGRAM(s) IV Intermittent every 8 hours  nebivolol 10 milliGRAM(s) Oral <User Schedule>  polyethylene glycol 3350 17 Gram(s) Oral daily  senna 2 Tablet(s) Oral at bedtime    MEDICATIONS  (PRN):  HYDROmorphone  Injectable 1 milliGRAM(s) IV Push every 4 hours PRN Severe Pain (7 - 10)      ALLERGIES:  Allergies    amoxicillin (Angioedema)  ciprofloxacin (Rash)  hydrochlorothiazide (Hives)  latex (Rash)  lisinopril (Angioedema; Rash; Hives)  morphine (Rash)  penicillin (Rash)    Intolerances        LABS:                        11.7   8.83  )-----------( 445      ( 04 Mar 2023 05:56 )             36.7     03-04    137  |  105  |  14  ----------------------------<  103<H>  4.0   |  25  |  0.62    Ca    8.7      04 Mar 2023 05:56  Phos  2.9     03-04  Mg     1.8     03-04    TPro  6.5  /  Alb  3.3  /  TBili  0.3  /  DBili  x   /  AST  10  /  ALT  6<L>  /  AlkPhos  80  03-04        CAPILLARY BLOOD GLUCOSE          RADIOLOGY & ADDITIONAL TESTS: Reviewed.

## 2023-03-06 LAB
ALBUMIN SERPL ELPH-MCNC: 3.5 G/DL — SIGNIFICANT CHANGE UP (ref 3.3–5)
ALP SERPL-CCNC: 81 U/L — SIGNIFICANT CHANGE UP (ref 40–120)
ALT FLD-CCNC: 12 U/L — SIGNIFICANT CHANGE UP (ref 10–45)
ANION GAP SERPL CALC-SCNC: 10 MMOL/L — SIGNIFICANT CHANGE UP (ref 5–17)
AST SERPL-CCNC: 11 U/L — SIGNIFICANT CHANGE UP (ref 10–40)
BASOPHILS # BLD AUTO: 0.08 K/UL — SIGNIFICANT CHANGE UP (ref 0–0.2)
BASOPHILS NFR BLD AUTO: 0.9 % — SIGNIFICANT CHANGE UP (ref 0–2)
BILIRUB SERPL-MCNC: 0.5 MG/DL — SIGNIFICANT CHANGE UP (ref 0.2–1.2)
BUN SERPL-MCNC: 18 MG/DL — SIGNIFICANT CHANGE UP (ref 7–23)
CALCIUM SERPL-MCNC: 9.5 MG/DL — SIGNIFICANT CHANGE UP (ref 8.4–10.5)
CHLORIDE SERPL-SCNC: 101 MMOL/L — SIGNIFICANT CHANGE UP (ref 96–108)
CO2 SERPL-SCNC: 28 MMOL/L — SIGNIFICANT CHANGE UP (ref 22–31)
CREAT SERPL-MCNC: 0.68 MG/DL — SIGNIFICANT CHANGE UP (ref 0.5–1.3)
CRP SERPL-MCNC: 12.4 MG/L — HIGH (ref 0–4)
EGFR: 114 ML/MIN/1.73M2 — SIGNIFICANT CHANGE UP
EOSINOPHIL # BLD AUTO: 0.19 K/UL — SIGNIFICANT CHANGE UP (ref 0–0.5)
EOSINOPHIL NFR BLD AUTO: 2.1 % — SIGNIFICANT CHANGE UP (ref 0–6)
ERYTHROCYTE [SEDIMENTATION RATE] IN BLOOD: 24 MM/HR — HIGH
GLUCOSE SERPL-MCNC: 97 MG/DL — SIGNIFICANT CHANGE UP (ref 70–99)
HCT VFR BLD CALC: 39.4 % — SIGNIFICANT CHANGE UP (ref 34.5–45)
HGB BLD-MCNC: 12.2 G/DL — SIGNIFICANT CHANGE UP (ref 11.5–15.5)
IMM GRANULOCYTES NFR BLD AUTO: 0.3 % — SIGNIFICANT CHANGE UP (ref 0–0.9)
LYMPHOCYTES # BLD AUTO: 2.43 K/UL — SIGNIFICANT CHANGE UP (ref 1–3.3)
LYMPHOCYTES # BLD AUTO: 27 % — SIGNIFICANT CHANGE UP (ref 13–44)
MCHC RBC-ENTMCNC: 28.2 PG — SIGNIFICANT CHANGE UP (ref 27–34)
MCHC RBC-ENTMCNC: 31 GM/DL — LOW (ref 32–36)
MCV RBC AUTO: 91 FL — SIGNIFICANT CHANGE UP (ref 80–100)
MONOCYTES # BLD AUTO: 0.86 K/UL — SIGNIFICANT CHANGE UP (ref 0–0.9)
MONOCYTES NFR BLD AUTO: 9.5 % — SIGNIFICANT CHANGE UP (ref 2–14)
NEUTROPHILS # BLD AUTO: 5.42 K/UL — SIGNIFICANT CHANGE UP (ref 1.8–7.4)
NEUTROPHILS NFR BLD AUTO: 60.2 % — SIGNIFICANT CHANGE UP (ref 43–77)
NRBC # BLD: 0 /100 WBCS — SIGNIFICANT CHANGE UP (ref 0–0)
PLATELET # BLD AUTO: 466 K/UL — HIGH (ref 150–400)
POTASSIUM SERPL-MCNC: 5 MMOL/L — SIGNIFICANT CHANGE UP (ref 3.5–5.3)
POTASSIUM SERPL-SCNC: 5 MMOL/L — SIGNIFICANT CHANGE UP (ref 3.5–5.3)
PROT SERPL-MCNC: 7 G/DL — SIGNIFICANT CHANGE UP (ref 6–8.3)
RBC # BLD: 4.33 M/UL — SIGNIFICANT CHANGE UP (ref 3.8–5.2)
RBC # FLD: 13.2 % — SIGNIFICANT CHANGE UP (ref 10.3–14.5)
SODIUM SERPL-SCNC: 139 MMOL/L — SIGNIFICANT CHANGE UP (ref 135–145)
WBC # BLD: 9.01 K/UL — SIGNIFICANT CHANGE UP (ref 3.8–10.5)
WBC # FLD AUTO: 9.01 K/UL — SIGNIFICANT CHANGE UP (ref 3.8–10.5)

## 2023-03-06 PROCEDURE — 99232 SBSQ HOSP IP/OBS MODERATE 35: CPT

## 2023-03-06 PROCEDURE — 99232 SBSQ HOSP IP/OBS MODERATE 35: CPT | Mod: GC

## 2023-03-06 RX ORDER — CHLORHEXIDINE GLUCONATE 213 G/1000ML
1 SOLUTION TOPICAL DAILY
Refills: 0 | Status: DISCONTINUED | OUTPATIENT
Start: 2023-03-06 | End: 2023-03-16

## 2023-03-06 RX ORDER — VANCOMYCIN HCL 1 G
1250 VIAL (EA) INTRAVENOUS EVERY 8 HOURS
Refills: 0 | Status: DISCONTINUED | OUTPATIENT
Start: 2023-03-06 | End: 2023-03-07

## 2023-03-06 RX ADMIN — MEROPENEM 280 MILLIGRAM(S): 1 INJECTION INTRAVENOUS at 06:21

## 2023-03-06 RX ADMIN — HYDROMORPHONE HYDROCHLORIDE 1 MILLIGRAM(S): 2 INJECTION INTRAMUSCULAR; INTRAVENOUS; SUBCUTANEOUS at 06:22

## 2023-03-06 RX ADMIN — MEROPENEM 280 MILLIGRAM(S): 1 INJECTION INTRAVENOUS at 22:21

## 2023-03-06 RX ADMIN — HYDROMORPHONE HYDROCHLORIDE 1 MILLIGRAM(S): 2 INJECTION INTRAMUSCULAR; INTRAVENOUS; SUBCUTANEOUS at 14:47

## 2023-03-06 RX ADMIN — HYDROMORPHONE HYDROCHLORIDE 1 MILLIGRAM(S): 2 INJECTION INTRAMUSCULAR; INTRAVENOUS; SUBCUTANEOUS at 10:46

## 2023-03-06 RX ADMIN — HYDROMORPHONE HYDROCHLORIDE 1 MILLIGRAM(S): 2 INJECTION INTRAMUSCULAR; INTRAVENOUS; SUBCUTANEOUS at 23:15

## 2023-03-06 RX ADMIN — ENOXAPARIN SODIUM 40 MILLIGRAM(S): 100 INJECTION SUBCUTANEOUS at 10:46

## 2023-03-06 RX ADMIN — HYDROMORPHONE HYDROCHLORIDE 1 MILLIGRAM(S): 2 INJECTION INTRAMUSCULAR; INTRAVENOUS; SUBCUTANEOUS at 02:30

## 2023-03-06 RX ADMIN — GABAPENTIN 800 MILLIGRAM(S): 400 CAPSULE ORAL at 14:48

## 2023-03-06 RX ADMIN — HYDROMORPHONE HYDROCHLORIDE 1 MILLIGRAM(S): 2 INJECTION INTRAMUSCULAR; INTRAVENOUS; SUBCUTANEOUS at 23:33

## 2023-03-06 RX ADMIN — HYDROMORPHONE HYDROCHLORIDE 1 MILLIGRAM(S): 2 INJECTION INTRAMUSCULAR; INTRAVENOUS; SUBCUTANEOUS at 02:15

## 2023-03-06 RX ADMIN — HYDROMORPHONE HYDROCHLORIDE 1 MILLIGRAM(S): 2 INJECTION INTRAMUSCULAR; INTRAVENOUS; SUBCUTANEOUS at 15:25

## 2023-03-06 RX ADMIN — MEROPENEM 280 MILLIGRAM(S): 1 INJECTION INTRAVENOUS at 14:48

## 2023-03-06 RX ADMIN — HYDROMORPHONE HYDROCHLORIDE 1 MILLIGRAM(S): 2 INJECTION INTRAMUSCULAR; INTRAVENOUS; SUBCUTANEOUS at 11:28

## 2023-03-06 RX ADMIN — HYDROMORPHONE HYDROCHLORIDE 1 MILLIGRAM(S): 2 INJECTION INTRAMUSCULAR; INTRAVENOUS; SUBCUTANEOUS at 19:11

## 2023-03-06 RX ADMIN — NEBIVOLOL HYDROCHLORIDE 10 MILLIGRAM(S): 5 TABLET ORAL at 06:21

## 2023-03-06 RX ADMIN — HYDROMORPHONE HYDROCHLORIDE 1 MILLIGRAM(S): 2 INJECTION INTRAMUSCULAR; INTRAVENOUS; SUBCUTANEOUS at 06:37

## 2023-03-06 RX ADMIN — HYDROMORPHONE HYDROCHLORIDE 1 MILLIGRAM(S): 2 INJECTION INTRAMUSCULAR; INTRAVENOUS; SUBCUTANEOUS at 20:00

## 2023-03-06 RX ADMIN — NEBIVOLOL HYDROCHLORIDE 10 MILLIGRAM(S): 5 TABLET ORAL at 17:41

## 2023-03-06 RX ADMIN — Medication 166.67 MILLIGRAM(S): at 17:42

## 2023-03-06 RX ADMIN — ENOXAPARIN SODIUM 40 MILLIGRAM(S): 100 INJECTION SUBCUTANEOUS at 22:25

## 2023-03-06 RX ADMIN — GABAPENTIN 100 MILLIGRAM(S): 400 CAPSULE ORAL at 11:34

## 2023-03-06 RX ADMIN — LINEZOLID 600 MILLIGRAM(S): 600 INJECTION, SOLUTION INTRAVENOUS at 06:21

## 2023-03-06 RX ADMIN — CHLORHEXIDINE GLUCONATE 1 APPLICATION(S): 213 SOLUTION TOPICAL at 11:35

## 2023-03-06 NOTE — PROGRESS NOTE ADULT - SUBJECTIVE AND OBJECTIVE BOX
INTERVAL HPI/OVERNIGHT EVENTS:  As per night team, no overnight events. Patient seen and examined at bedside. Says pain is unchanged. Patient denies fever, chills, dizziness, weakness, HA, CP, SOB, N/V/D/C    VITALS  Vital Signs Last 24 Hrs  T(C): 37 (06 Mar 2023 06:00), Max: 37 (06 Mar 2023 06:00)  T(F): 98.6 (06 Mar 2023 06:00), Max: 98.6 (06 Mar 2023 06:00)  HR: 91 (06 Mar 2023 06:00) (83 - 94)  BP: 133/82 (06 Mar 2023 06:00) (118/77 - 169/96)  BP(mean): --  RR: 17 (06 Mar 2023 06:00) (17 - 18)  SpO2: 98% (06 Mar 2023 06:00) (97% - 98%)    Parameters below as of 06 Mar 2023 06:00  Patient On (Oxygen Delivery Method): room air        CAPILLARY BLOOD GLUCOSE      PHYSICAL EXAM  General: NAD, sitting comfortably in bed   HEENT: PERRL/ EOMI, no scleral icterus, MMM  Neck: Supple, no JVD  Respiratory: lungs CTA b/l, no wheezes/crackles, no accessory muscle use  Cardiovascular: Regular rhythm/rate; +S1 +S2, no murmurs  Gastrointestinal: Soft, NTND, normoactive BS, no rebound, no guarding  Genitourinary: no suprapubic tenderness  Extremities: + L foot shallow clean base ulcer w/ mild surrounding erythema. WWP, no clubbing or cyanosis; no peripheral edema  Neurological: A&Ox3, no gross focal deficits, follows commands  Skin: Normal temperature, warm, dry      MEDICATIONS  (STANDING):  chlorhexidine 2% Cloths 1 Application(s) Topical daily  enoxaparin Injectable 40 milliGRAM(s) SubCutaneous every 12 hours  gabapentin 100 milliGRAM(s) Oral daily  gabapentin 800 milliGRAM(s) Oral daily  linezolid    Tablet 600 milliGRAM(s) Oral every 12 hours  meropenem  IVPB 2000 milliGRAM(s) IV Intermittent every 8 hours  nebivolol 10 milliGRAM(s) Oral <User Schedule>  polyethylene glycol 3350 17 Gram(s) Oral daily  senna 2 Tablet(s) Oral at bedtime    MEDICATIONS  (PRN):  HYDROmorphone  Injectable 1 milliGRAM(s) IV Push every 4 hours PRN Severe Pain (7 - 10)      amoxicillin (Angioedema)  ciprofloxacin (Rash)  hydrochlorothiazide (Hives)  latex (Rash)  lisinopril (Angioedema; Rash; Hives)  morphine (Rash)  penicillin (Rash)      LABS                        12.2   9.01  )-----------( 466      ( 06 Mar 2023 05:30 )             39.4     03-06    139  |  101  |  18  ----------------------------<  97  5.0   |  28  |  0.68    Ca    9.5      06 Mar 2023 05:30  Phos  3.4     03-05  Mg     1.7     03-05    TPro  7.0  /  Alb  3.5  /  TBili  0.5  /  DBili  x   /  AST  11  /  ALT  12  /  AlkPhos  81  03-06              RADIOLOGY & ADDITIONAL TESTS: Reviewed

## 2023-03-06 NOTE — PROGRESS NOTE ADULT - SUBJECTIVE AND OBJECTIVE BOX
INTERVAL HPI/OVERNIGHT EVENTS:  Patient was seen and examined at bedside. Patient reports she does not feel significantly improved since admission.  She still does not report fever, chills, dizziness, weakness, HA, Changes in vision, CP, palpitations, SOB, cough, N/V/D/C, dysuria, changes in bowel movements, LE edema. ROS otherwise negative.    VITAL SIGNS:  T(F): 98.7 (03-06-23 @ 16:15)  HR: 94 (03-06-23 @ 16:15)  BP: 118/76 (03-06-23 @ 16:15)  RR: 18 (03-06-23 @ 16:15)  SpO2: 95% (03-06-23 @ 16:15)  Wt(kg): --    PHYSICAL EXAM:  Constitutional: resting comfortable, no acute distress   HEENT: PERRL, EOMI, sclera non-icteric, neck supple, trachea midline, no masses, no JVD, MMM, good dentition  Respiratory: CTA b/l, good air entry b/l, no wheezing, no rhonchi, no rales, without accessory muscle use and no intercostal retractions  Cardiovascular: RRR, normal S1S2, no M/R/G  Gastrointestinal: soft, NTND, no masses palpable, BS normal  Extremities: ulcer near left heel reduced erythema, no purulence, pink granulation tissue at base  Neurological: AAOx3, CN Grossly intact  Skin: Normal temperature, warm, dry    MEDICATIONS  (STANDING):  chlorhexidine 2% Cloths 1 Application(s) Topical daily  enoxaparin Injectable 40 milliGRAM(s) SubCutaneous every 12 hours  gabapentin 100 milliGRAM(s) Oral daily  gabapentin 800 milliGRAM(s) Oral daily  meropenem  IVPB 2000 milliGRAM(s) IV Intermittent every 8 hours  nebivolol 10 milliGRAM(s) Oral <User Schedule>  polyethylene glycol 3350 17 Gram(s) Oral daily  senna 2 Tablet(s) Oral at bedtime  vancomycin  IVPB 1250 milliGRAM(s) IV Intermittent every 8 hours    MEDICATIONS  (PRN):  HYDROmorphone  Injectable 1 milliGRAM(s) IV Push every 4 hours PRN Severe Pain (7 - 10)      Allergies    amoxicillin (Angioedema)  ciprofloxacin (Rash)  hydrochlorothiazide (Hives)  latex (Rash)  lisinopril (Angioedema; Rash; Hives)  morphine (Rash)  penicillin (Rash)    Intolerances        LABS:                        12.2   9.01  )-----------( 466      ( 06 Mar 2023 05:30 )             39.4     03-06    139  |  101  |  18  ----------------------------<  97  5.0   |  28  |  0.68    Ca    9.5      06 Mar 2023 05:30  Phos  3.4     03-05  Mg     1.7     03-05    TPro  7.0  /  Alb  3.5  /  TBili  0.5  /  DBili  x   /  AST  11  /  ALT  12  /  AlkPhos  81  03-06            RADIOLOGY & ADDITIONAL TESTS:  Reviewed

## 2023-03-06 NOTE — PROGRESS NOTE ADULT - ASSESSMENT
38 y/o female with HTN and AVM L foot with recurrent L foot nonpurulent ulcer infection presenting with worsening pain at ulcer site, was initially treated with Meropenem and Daptomycin, transitioned to Annie and Linezolid, and now on Annie and Vanc. She has significant allergies to PCN and FQs.  Blood culture 2/27 negative.  She remains afebrile, leukocytosis improved from admission, ESR and CRP improving..     Suggest:  - Continue Vancomycin 1250 q8hrs, obtain trough before 4th dose and monitor renal function while on vanc  - Continue meropenem 2 g IV q8h    Recommendations discussed with primary team.  Team 1 will continue to follow, please call or page with any questions.

## 2023-03-07 LAB
ALBUMIN SERPL ELPH-MCNC: 3.4 G/DL — SIGNIFICANT CHANGE UP (ref 3.3–5)
ALP SERPL-CCNC: 71 U/L — SIGNIFICANT CHANGE UP (ref 40–120)
ALT FLD-CCNC: 11 U/L — SIGNIFICANT CHANGE UP (ref 10–45)
ANION GAP SERPL CALC-SCNC: 9 MMOL/L — SIGNIFICANT CHANGE UP (ref 5–17)
AST SERPL-CCNC: 12 U/L — SIGNIFICANT CHANGE UP (ref 10–40)
BASOPHILS # BLD AUTO: 0.07 K/UL — SIGNIFICANT CHANGE UP (ref 0–0.2)
BASOPHILS NFR BLD AUTO: 0.9 % — SIGNIFICANT CHANGE UP (ref 0–2)
BILIRUB SERPL-MCNC: 0.4 MG/DL — SIGNIFICANT CHANGE UP (ref 0.2–1.2)
BUN SERPL-MCNC: 15 MG/DL — SIGNIFICANT CHANGE UP (ref 7–23)
CALCIUM SERPL-MCNC: 9 MG/DL — SIGNIFICANT CHANGE UP (ref 8.4–10.5)
CHLORIDE SERPL-SCNC: 101 MMOL/L — SIGNIFICANT CHANGE UP (ref 96–108)
CO2 SERPL-SCNC: 27 MMOL/L — SIGNIFICANT CHANGE UP (ref 22–31)
CREAT SERPL-MCNC: 0.55 MG/DL — SIGNIFICANT CHANGE UP (ref 0.5–1.3)
EGFR: 120 ML/MIN/1.73M2 — SIGNIFICANT CHANGE UP
EOSINOPHIL # BLD AUTO: 0.19 K/UL — SIGNIFICANT CHANGE UP (ref 0–0.5)
EOSINOPHIL NFR BLD AUTO: 2.4 % — SIGNIFICANT CHANGE UP (ref 0–6)
GLUCOSE SERPL-MCNC: 91 MG/DL — SIGNIFICANT CHANGE UP (ref 70–99)
HCT VFR BLD CALC: 36.5 % — SIGNIFICANT CHANGE UP (ref 34.5–45)
HGB BLD-MCNC: 11.5 G/DL — SIGNIFICANT CHANGE UP (ref 11.5–15.5)
IMM GRANULOCYTES NFR BLD AUTO: 0.4 % — SIGNIFICANT CHANGE UP (ref 0–0.9)
LYMPHOCYTES # BLD AUTO: 1.94 K/UL — SIGNIFICANT CHANGE UP (ref 1–3.3)
LYMPHOCYTES # BLD AUTO: 24.4 % — SIGNIFICANT CHANGE UP (ref 13–44)
MAGNESIUM SERPL-MCNC: 1.9 MG/DL — SIGNIFICANT CHANGE UP (ref 1.6–2.6)
MCHC RBC-ENTMCNC: 28.2 PG — SIGNIFICANT CHANGE UP (ref 27–34)
MCHC RBC-ENTMCNC: 31.5 GM/DL — LOW (ref 32–36)
MCV RBC AUTO: 89.5 FL — SIGNIFICANT CHANGE UP (ref 80–100)
MONOCYTES # BLD AUTO: 0.76 K/UL — SIGNIFICANT CHANGE UP (ref 0–0.9)
MONOCYTES NFR BLD AUTO: 9.6 % — SIGNIFICANT CHANGE UP (ref 2–14)
NEUTROPHILS # BLD AUTO: 4.96 K/UL — SIGNIFICANT CHANGE UP (ref 1.8–7.4)
NEUTROPHILS NFR BLD AUTO: 62.3 % — SIGNIFICANT CHANGE UP (ref 43–77)
NRBC # BLD: 0 /100 WBCS — SIGNIFICANT CHANGE UP (ref 0–0)
PHOSPHATE SERPL-MCNC: 3 MG/DL — SIGNIFICANT CHANGE UP (ref 2.5–4.5)
PLATELET # BLD AUTO: 430 K/UL — HIGH (ref 150–400)
POTASSIUM SERPL-MCNC: 4.2 MMOL/L — SIGNIFICANT CHANGE UP (ref 3.5–5.3)
POTASSIUM SERPL-SCNC: 4.2 MMOL/L — SIGNIFICANT CHANGE UP (ref 3.5–5.3)
PROT SERPL-MCNC: 6.2 G/DL — SIGNIFICANT CHANGE UP (ref 6–8.3)
RBC # BLD: 4.08 M/UL — SIGNIFICANT CHANGE UP (ref 3.8–5.2)
RBC # FLD: 13 % — SIGNIFICANT CHANGE UP (ref 10.3–14.5)
SODIUM SERPL-SCNC: 137 MMOL/L — SIGNIFICANT CHANGE UP (ref 135–145)
VANCOMYCIN TROUGH SERPL-MCNC: 12 UG/ML — SIGNIFICANT CHANGE UP (ref 10–20)
WBC # BLD: 7.95 K/UL — SIGNIFICANT CHANGE UP (ref 3.8–10.5)
WBC # FLD AUTO: 7.95 K/UL — SIGNIFICANT CHANGE UP (ref 3.8–10.5)

## 2023-03-07 PROCEDURE — 99232 SBSQ HOSP IP/OBS MODERATE 35: CPT

## 2023-03-07 PROCEDURE — 99232 SBSQ HOSP IP/OBS MODERATE 35: CPT | Mod: GC

## 2023-03-07 RX ORDER — VANCOMYCIN HCL 1 G
1500 VIAL (EA) INTRAVENOUS EVERY 8 HOURS
Refills: 0 | Status: DISCONTINUED | OUTPATIENT
Start: 2023-03-08 | End: 2023-03-08

## 2023-03-07 RX ORDER — HYDROMORPHONE HYDROCHLORIDE 2 MG/ML
1 INJECTION INTRAMUSCULAR; INTRAVENOUS; SUBCUTANEOUS EVERY 4 HOURS
Refills: 0 | Status: DISCONTINUED | OUTPATIENT
Start: 2023-03-07 | End: 2023-03-14

## 2023-03-07 RX ADMIN — MEROPENEM 280 MILLIGRAM(S): 1 INJECTION INTRAVENOUS at 05:59

## 2023-03-07 RX ADMIN — HYDROMORPHONE HYDROCHLORIDE 1 MILLIGRAM(S): 2 INJECTION INTRAMUSCULAR; INTRAVENOUS; SUBCUTANEOUS at 19:37

## 2023-03-07 RX ADMIN — HYDROMORPHONE HYDROCHLORIDE 1 MILLIGRAM(S): 2 INJECTION INTRAMUSCULAR; INTRAVENOUS; SUBCUTANEOUS at 11:37

## 2023-03-07 RX ADMIN — GABAPENTIN 800 MILLIGRAM(S): 400 CAPSULE ORAL at 15:28

## 2023-03-07 RX ADMIN — HYDROMORPHONE HYDROCHLORIDE 1 MILLIGRAM(S): 2 INJECTION INTRAMUSCULAR; INTRAVENOUS; SUBCUTANEOUS at 07:07

## 2023-03-07 RX ADMIN — Medication 166.67 MILLIGRAM(S): at 10:07

## 2023-03-07 RX ADMIN — HYDROMORPHONE HYDROCHLORIDE 1 MILLIGRAM(S): 2 INJECTION INTRAMUSCULAR; INTRAVENOUS; SUBCUTANEOUS at 15:28

## 2023-03-07 RX ADMIN — HYDROMORPHONE HYDROCHLORIDE 1 MILLIGRAM(S): 2 INJECTION INTRAMUSCULAR; INTRAVENOUS; SUBCUTANEOUS at 15:42

## 2023-03-07 RX ADMIN — HYDROMORPHONE HYDROCHLORIDE 1 MILLIGRAM(S): 2 INJECTION INTRAMUSCULAR; INTRAVENOUS; SUBCUTANEOUS at 03:45

## 2023-03-07 RX ADMIN — ENOXAPARIN SODIUM 40 MILLIGRAM(S): 100 INJECTION SUBCUTANEOUS at 22:19

## 2023-03-07 RX ADMIN — Medication 166.67 MILLIGRAM(S): at 01:01

## 2023-03-07 RX ADMIN — CHLORHEXIDINE GLUCONATE 1 APPLICATION(S): 213 SOLUTION TOPICAL at 11:22

## 2023-03-07 RX ADMIN — Medication 166.67 MILLIGRAM(S): at 18:57

## 2023-03-07 RX ADMIN — GABAPENTIN 100 MILLIGRAM(S): 400 CAPSULE ORAL at 11:22

## 2023-03-07 RX ADMIN — ENOXAPARIN SODIUM 40 MILLIGRAM(S): 100 INJECTION SUBCUTANEOUS at 11:22

## 2023-03-07 RX ADMIN — HYDROMORPHONE HYDROCHLORIDE 1 MILLIGRAM(S): 2 INJECTION INTRAMUSCULAR; INTRAVENOUS; SUBCUTANEOUS at 03:30

## 2023-03-07 RX ADMIN — HYDROMORPHONE HYDROCHLORIDE 1 MILLIGRAM(S): 2 INJECTION INTRAMUSCULAR; INTRAVENOUS; SUBCUTANEOUS at 07:28

## 2023-03-07 RX ADMIN — NEBIVOLOL HYDROCHLORIDE 10 MILLIGRAM(S): 5 TABLET ORAL at 05:59

## 2023-03-07 RX ADMIN — MEROPENEM 280 MILLIGRAM(S): 1 INJECTION INTRAVENOUS at 15:05

## 2023-03-07 RX ADMIN — HYDROMORPHONE HYDROCHLORIDE 1 MILLIGRAM(S): 2 INJECTION INTRAMUSCULAR; INTRAVENOUS; SUBCUTANEOUS at 19:52

## 2023-03-07 RX ADMIN — NEBIVOLOL HYDROCHLORIDE 10 MILLIGRAM(S): 5 TABLET ORAL at 18:13

## 2023-03-07 RX ADMIN — HYDROMORPHONE HYDROCHLORIDE 1 MILLIGRAM(S): 2 INJECTION INTRAMUSCULAR; INTRAVENOUS; SUBCUTANEOUS at 11:22

## 2023-03-07 NOTE — PROGRESS NOTE ADULT - SUBJECTIVE AND OBJECTIVE BOX
INTERVAL HPI/OVERNIGHT EVENTS:  As per night team, no overnight events. Patient seen and examined at bedside. Says pain is unchanged. Patient denies fever, chills, dizziness, weakness, HA, CP, SOB, N/V/D/C  Vital Signs Last 24 Hrs  T(C): 36.6 (07 Mar 2023 08:34), Max: 37.1 (06 Mar 2023 16:15)  T(F): 97.9 (07 Mar 2023 08:34), Max: 98.7 (06 Mar 2023 16:15)  HR: 77 (07 Mar 2023 08:34) (77 - 94)  BP: 153/93 (07 Mar 2023 08:34) (108/65 - 153/93)  BP(mean): 80 (07 Mar 2023 05:38) (80 - 85)  RR: 17 (07 Mar 2023 08:34) (17 - 18)  SpO2: 96% (07 Mar 2023 08:34) (95% - 98%)    Parameters below as of 07 Mar 2023 08:34  Patient On (Oxygen Delivery Method): room air          PHYSICAL EXAM  General: NAD, sitting comfortably in bed   HEENT: PERRL/ EOMI, no scleral icterus, MMM  Neck: Supple, no JVD  Respiratory: lungs CTA b/l, no wheezes/crackles, no accessory muscle use  Cardiovascular: Regular rhythm/rate; +S1 +S2, no murmurs  Gastrointestinal: Soft, NTND, normoactive BS, no rebound, no guarding  Genitourinary: no suprapubic tenderness  Extremities: + L foot shallow clean base ulcer w/ mild surrounding erythema, slightly improved from prior. WWP, no clubbing or cyanosis; no peripheral edema  Neurological: A&Ox3, no gross focal deficits, follows commands  Skin: Normal temperature, warm, dry      MEDICATIONS  (STANDING):  chlorhexidine 2% Cloths 1 Application(s) Topical daily  enoxaparin Injectable 40 milliGRAM(s) SubCutaneous every 12 hours  gabapentin 100 milliGRAM(s) Oral daily  gabapentin 800 milliGRAM(s) Oral daily  linezolid    Tablet 600 milliGRAM(s) Oral every 12 hours  meropenem  IVPB 2000 milliGRAM(s) IV Intermittent every 8 hours  nebivolol 10 milliGRAM(s) Oral <User Schedule>  polyethylene glycol 3350 17 Gram(s) Oral daily  senna 2 Tablet(s) Oral at bedtime    MEDICATIONS  (PRN):  HYDROmorphone  Injectable 1 milliGRAM(s) IV Push every 4 hours PRN Severe Pain (7 - 10)      amoxicillin (Angioedema)  ciprofloxacin (Rash)  hydrochlorothiazide (Hives)  latex (Rash)  lisinopril (Angioedema; Rash; Hives)  morphine (Rash)  penicillin (Rash)      LABS                        12.2   9.01  )-----------( 466      ( 06 Mar 2023 05:30 )             39.4     03-06    139  |  101  |  18  ----------------------------<  97  5.0   |  28  |  0.68    Ca    9.5      06 Mar 2023 05:30  Phos  3.4     03-05  Mg     1.7     03-05    TPro  7.0  /  Alb  3.5  /  TBili  0.5  /  DBili  x   /  AST  11  /  ALT  12  /  AlkPhos  81  03-06              RADIOLOGY & ADDITIONAL TESTS: Reviewed

## 2023-03-07 NOTE — PROGRESS NOTE ADULT - PROBLEM SELECTOR PLAN 1
3/4 SIRS (Fever 101 at home, Tachy, elevated WBC). Possible source (s) PICC line and/or Left foot ulcer. Pt s/p recent dc on 2/7/23 on IV abx (Dapto 800 q24hrs and Cefepime 2g q8hrs) via PICC (placed on 2/3/23) for L foot infx.   Afebrile, HD stable on admission. Reports taking Tylenol at home prior to presentation. LA wnl.  PICC site c/d/i. PICC. L foot + new redness around shallow ulcer on L foot X 1 day. Denies URI s/s, cough, CP, SOB, abd pain, n/v/d/c and dysuria. CXR neg.  Per ID source is likely the foot and PICC cane stay in. Per ID switch from dapto to linezolid BCx ngtd. on 3/6, switched from linezolid to vanc.     Plan:   -c/w vanc 1250mg q8hr, trough today before 4th dose  - c/w Meropenem 2gm IV QD  -f/u ID recs

## 2023-03-07 NOTE — PROGRESS NOTE ADULT - ASSESSMENT
36 y/o female with HTN and AVM L foot with recurrent L foot nonpurulent ulcer infection presenting with worsening pain at ulcer site, was initially treated with Meropenem and Daptomycin, transitioned to Annie and Linezolid, and now on Annie and Vanc. She has significant allergies to PCN and FQs.  Blood culture 2/27 negative.  She remains afebrile, leukocytosis improved from admission, ESR and CRP improving..     Suggest:  - Continue Vancomycin 1250 q8hrs, obtain trough before 4th dose and monitor renal function while on vanc  - Continue meropenem 2 g IV q8h    Recommendations discussed with primary team.  Team 1 will continue to follow, please call or page with any questions.   36 y/o female with HTN and AVM L foot with recurrent L foot nonpurulent ulcer infection presenting with worsening pain at ulcer site, was initially treated with Meropenem and Daptomycin, transitioned to Annie and Linezolid, and now on Annie and Vanc. She has significant allergies to PCN and FQs.  Blood culture 2/27 negative.  She remains afebrile, leukocytosis improved from admission, ESR and CRP improving..  - Continue Vancomycin 1250 q8hrs, obtain trough before 4th dose and monitor renal function while on vanc  - Continue meropenem 2 g IV q8h    Recommendations discussed with primary team.  Team 1 will continue to follow, please call or page with any questions.

## 2023-03-07 NOTE — PROGRESS NOTE ADULT - SUBJECTIVE AND OBJECTIVE BOX
**INCOMPLETE NOTE    OVERNIGHT EVENTS:    SUBJECTIVE:  Patient seen and examined at bedside.    Vital Signs Last 12 Hrs  T(F): 98.4 (03-07-23 @ 05:38), Max: 98.7 (03-06-23 @ 20:58)  HR: 80 (03-07-23 @ 05:38) (80 - 88)  BP: 108/65 (03-07-23 @ 05:38) (108/65 - 151/97)  BP(mean): 80 (03-07-23 @ 05:38) (80 - 85)  RR: 18 (03-07-23 @ 05:38) (18 - 18)  SpO2: 97% (03-07-23 @ 05:38) (97% - 98%)  I&O's Summary      PHYSICAL EXAM:  Constitutional: NAD, comfortable in bed.  HEENT: NC/AT, PERRLA, EOMI, no conjunctival pallor or scleral icterus, MMM  Neck: Supple, no JVD  Respiratory: CTA B/L. No w/r/r.   Cardiovascular: RRR, normal S1 and S2, no m/r/g.   Gastrointestinal: +BS, soft NTND, no guarding or rebound tenderness, no palpable masses   Extremities: wwp; no cyanosis, clubbing or edema.   Vascular: Pulses equal and strong throughout.   Neurological: AAOx3, no CN deficits, strength and sensation intact throughout.   Skin: No gross skin abnormalities or rashes        LABS:                        11.5   7.95  )-----------( 430      ( 07 Mar 2023 05:30 )             36.5     03-07    137  |  101  |  15  ----------------------------<  91  4.2   |  27  |  0.55    Ca    9.0      07 Mar 2023 05:30  Phos  3.0     03-07  Mg     1.9     03-07    TPro  6.2  /  Alb  3.4  /  TBili  0.4  /  DBili  x   /  AST  12  /  ALT  11  /  AlkPhos  71  03-07            RADIOLOGY & ADDITIONAL TESTS:    MEDICATIONS  (STANDING):  chlorhexidine 2% Cloths 1 Application(s) Topical daily  enoxaparin Injectable 40 milliGRAM(s) SubCutaneous every 12 hours  gabapentin 100 milliGRAM(s) Oral daily  gabapentin 800 milliGRAM(s) Oral daily  meropenem  IVPB 2000 milliGRAM(s) IV Intermittent every 8 hours  nebivolol 10 milliGRAM(s) Oral <User Schedule>  polyethylene glycol 3350 17 Gram(s) Oral daily  senna 2 Tablet(s) Oral at bedtime  vancomycin  IVPB 1250 milliGRAM(s) IV Intermittent every 8 hours    MEDICATIONS  (PRN):  HYDROmorphone  Injectable 1 milliGRAM(s) IV Push every 4 hours PRN Severe Pain (7 - 10)   INFECTIOUS DISEASES CONSULT FOLLOW-UP NOTE    INTERVAL HPI/OVERNIGHT EVENTS: ISABELLE. Patient c/o unchanged L foot pain. Per patient, L foot ulcer appears relatively worse.    ROS:   Constitutional, eyes, ENT, cardiovascular, respiratory, gastrointestinal, genitourinary, integumentary, neurological, psychiatric and heme/lymph are otherwise negative other than noted above     ANTIBIOTICS/RELEVANT:    MEDICATIONS  (STANDING):  chlorhexidine 2% Cloths 1 Application(s) Topical daily  enoxaparin Injectable 40 milliGRAM(s) SubCutaneous every 12 hours  gabapentin 100 milliGRAM(s) Oral daily  gabapentin 800 milliGRAM(s) Oral daily  nebivolol 10 milliGRAM(s) Oral <User Schedule>  polyethylene glycol 3350 17 Gram(s) Oral daily  senna 2 Tablet(s) Oral at bedtime  vancomycin  IVPB 1250 milliGRAM(s) IV Intermittent every 8 hours    MEDICATIONS  (PRN):  HYDROmorphone  Injectable 1 milliGRAM(s) IV Push every 4 hours PRN Severe Pain (7 - 10)    Vital Signs Last 24 Hrs  T(C): 36.6 (07 Mar 2023 08:34), Max: 37.1 (06 Mar 2023 16:15)  T(F): 97.9 (07 Mar 2023 08:34), Max: 98.7 (06 Mar 2023 16:15)  HR: 77 (07 Mar 2023 08:34) (77 - 94)  BP: 153/93 (07 Mar 2023 08:34) (108/65 - 153/93)  BP(mean): 80 (07 Mar 2023 05:38) (80 - 85)  RR: 17 (07 Mar 2023 08:34) (17 - 18)  SpO2: 96% (07 Mar 2023 08:34) (95% - 98%)    Parameters below as of 07 Mar 2023 08:34  Patient On (Oxygen Delivery Method): room air        03-07-23 @ 07:01  -  03-07-23 @ 15:35  --------------------------------------------------------  IN: 390 mL / OUT: 0 mL / NET: 390 mL      PHYSICAL EXAM:  Constitutional: alert, NAD  Eyes: the sclera and conjunctiva were normal.   ENT: the ears and nose were normal in appearance.   Neck: the appearance of the neck was normal and the neck was supple.   Pulmonary: no respiratory distress and lungs were clear to auscultation bilaterally.   Heart: heart rate was normal and rhythm regular, normal S1 and S2  Vascular:. there was no peripheral edema  Abdomen: normal bowel sounds, soft, non-tender  Neurological: no focal deficits.   Psychiatric: the affect was normal        LABS:                        11.5   7.95  )-----------( 430      ( 07 Mar 2023 05:30 )             36.5     03-07    137  |  101  |  15  ----------------------------<  91  4.2   |  27  |  0.55    Ca    9.0      07 Mar 2023 05:30  Phos  3.0     03-07  Mg     1.9     03-07    TPro  6.2  /  Alb  3.4  /  TBili  0.4  /  DBili  x   /  AST  12  /  ALT  11  /  AlkPhos  71  03-07          MICROBIOLOGY:      RADIOLOGY & ADDITIONAL STUDIES:  Reviewed INFECTIOUS DISEASES CONSULT FOLLOW-UP NOTE    INTERVAL HPI/OVERNIGHT EVENTS: ISABELLE. Patient c/o unchanged L foot pain. Per patient, L foot ulcer appears relatively worse.    ROS:   Constitutional, eyes, ENT, cardiovascular, respiratory, gastrointestinal, genitourinary, integumentary, neurological, psychiatric and heme/lymph are otherwise negative other than noted above     ANTIBIOTICS/RELEVANT:    MEDICATIONS  (STANDING):  chlorhexidine 2% Cloths 1 Application(s) Topical daily  enoxaparin Injectable 40 milliGRAM(s) SubCutaneous every 12 hours  gabapentin 100 milliGRAM(s) Oral daily  gabapentin 800 milliGRAM(s) Oral daily  nebivolol 10 milliGRAM(s) Oral <User Schedule>  polyethylene glycol 3350 17 Gram(s) Oral daily  senna 2 Tablet(s) Oral at bedtime  vancomycin  IVPB 1250 milliGRAM(s) IV Intermittent every 8 hours    MEDICATIONS  (PRN):  HYDROmorphone  Injectable 1 milliGRAM(s) IV Push every 4 hours PRN Severe Pain (7 - 10)    Vital Signs Last 24 Hrs  T(C): 36.6 (07 Mar 2023 08:34), Max: 37.1 (06 Mar 2023 16:15)  T(F): 97.9 (07 Mar 2023 08:34), Max: 98.7 (06 Mar 2023 16:15)  HR: 77 (07 Mar 2023 08:34) (77 - 94)  BP: 153/93 (07 Mar 2023 08:34) (108/65 - 153/93)  BP(mean): 80 (07 Mar 2023 05:38) (80 - 85)  RR: 17 (07 Mar 2023 08:34) (17 - 18)  SpO2: 96% (07 Mar 2023 08:34) (95% - 98%)    Parameters below as of 07 Mar 2023 08:34  Patient On (Oxygen Delivery Method): room air    03-07-23 @ 07:01  -  03-07-23 @ 15:35  --------------------------------------------------------  IN: 390 mL / OUT: 0 mL / NET: 390 mL    PHYSICAL EXAM:  General: NAD, sitting comfortably in bed   HEENT: PERRL/ EOMI, no scleral icterus, MMM  Neck: Supple, no JVD  Respiratory: lungs CTA b/l, no wheezes/crackles, no accessory muscle use  Cardiovascular: Regular rhythm/rate; +S1 +S2, no murmurs  Gastrointestinal: Soft, NTND, normoactive BS, no rebound, no guarding  Genitourinary: no suprapubic tenderness  Extremities: + L foot shallow clean base ulcer w/ mild surrounding erythema, slightly improved from prior. WWP, no clubbing or cyanosis; no peripheral edema  Neurological: A&Ox3, no gross focal deficits, follows commands  Skin: Normal temperature, warm, dry    LABS:                        11.5   7.95  )-----------( 430      ( 07 Mar 2023 05:30 )             36.5     03-07    137  |  101  |  15  ----------------------------<  91  4.2   |  27  |  0.55    Ca    9.0      07 Mar 2023 05:30  Phos  3.0     03-07  Mg     1.9     03-07    TPro  6.2  /  Alb  3.4  /  TBili  0.4  /  DBili  x   /  AST  12  /  ALT  11  /  AlkPhos  71  03-07    MICROBIOLOGY:    RADIOLOGY & ADDITIONAL STUDIES:  Reviewed

## 2023-03-07 NOTE — ASSESSMENT
[FreeTextEntry1] : Patient is Bell Kearney. 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 This has been treated here several times with embolization for pain, swelling and recurrent ulceration. Her last procedure was 6 months ago and she was doing well until the last 2 weeks when she developed a recurrent ulcer in the same spot over the lateral heel where she tends to develop these. The ulcer leaks clear fluid and there has been no bleeding. On physical exam the area of diffuse soft tissue swelling around the lateral heel actually looks better than usual as far as color and she said she had good pain relief from the last procedure. We've done both trans-catheter and direct puncture embolization in the past and I reviewed her most recent study. I do think it's worth another arteriogram to see if we can find accessible feeders that can be catheterized. If not we will go ahead with direct embolization. We will schedule this in the near future. Detail Level: Generalized General Sunscreen Counseling: I recommended  SPF 30+ or sunprotective clothing.

## 2023-03-08 LAB
ALBUMIN SERPL ELPH-MCNC: 3.6 G/DL — SIGNIFICANT CHANGE UP (ref 3.3–5)
ALP SERPL-CCNC: 87 U/L — SIGNIFICANT CHANGE UP (ref 40–120)
ALT FLD-CCNC: 11 U/L — SIGNIFICANT CHANGE UP (ref 10–45)
ANION GAP SERPL CALC-SCNC: 13 MMOL/L — SIGNIFICANT CHANGE UP (ref 5–17)
AST SERPL-CCNC: 11 U/L — SIGNIFICANT CHANGE UP (ref 10–40)
BASOPHILS # BLD AUTO: 0.07 K/UL — SIGNIFICANT CHANGE UP (ref 0–0.2)
BASOPHILS NFR BLD AUTO: 0.9 % — SIGNIFICANT CHANGE UP (ref 0–2)
BILIRUB SERPL-MCNC: 0.3 MG/DL — SIGNIFICANT CHANGE UP (ref 0.2–1.2)
BUN SERPL-MCNC: 16 MG/DL — SIGNIFICANT CHANGE UP (ref 7–23)
CALCIUM SERPL-MCNC: 9 MG/DL — SIGNIFICANT CHANGE UP (ref 8.4–10.5)
CHLORIDE SERPL-SCNC: 101 MMOL/L — SIGNIFICANT CHANGE UP (ref 96–108)
CO2 SERPL-SCNC: 24 MMOL/L — SIGNIFICANT CHANGE UP (ref 22–31)
CREAT SERPL-MCNC: 0.53 MG/DL — SIGNIFICANT CHANGE UP (ref 0.5–1.3)
EGFR: 121 ML/MIN/1.73M2 — SIGNIFICANT CHANGE UP
EOSINOPHIL # BLD AUTO: 0.2 K/UL — SIGNIFICANT CHANGE UP (ref 0–0.5)
EOSINOPHIL NFR BLD AUTO: 2.5 % — SIGNIFICANT CHANGE UP (ref 0–6)
GLUCOSE SERPL-MCNC: 100 MG/DL — HIGH (ref 70–99)
HCT VFR BLD CALC: 35.7 % — SIGNIFICANT CHANGE UP (ref 34.5–45)
HGB BLD-MCNC: 11.6 G/DL — SIGNIFICANT CHANGE UP (ref 11.5–15.5)
IMM GRANULOCYTES NFR BLD AUTO: 0.6 % — SIGNIFICANT CHANGE UP (ref 0–0.9)
LYMPHOCYTES # BLD AUTO: 1.87 K/UL — SIGNIFICANT CHANGE UP (ref 1–3.3)
LYMPHOCYTES # BLD AUTO: 23.2 % — SIGNIFICANT CHANGE UP (ref 13–44)
MAGNESIUM SERPL-MCNC: 1.8 MG/DL — SIGNIFICANT CHANGE UP (ref 1.6–2.6)
MCHC RBC-ENTMCNC: 28.6 PG — SIGNIFICANT CHANGE UP (ref 27–34)
MCHC RBC-ENTMCNC: 32.5 GM/DL — SIGNIFICANT CHANGE UP (ref 32–36)
MCV RBC AUTO: 87.9 FL — SIGNIFICANT CHANGE UP (ref 80–100)
MONOCYTES # BLD AUTO: 0.81 K/UL — SIGNIFICANT CHANGE UP (ref 0–0.9)
MONOCYTES NFR BLD AUTO: 10.1 % — SIGNIFICANT CHANGE UP (ref 2–14)
NEUTROPHILS # BLD AUTO: 5.05 K/UL — SIGNIFICANT CHANGE UP (ref 1.8–7.4)
NEUTROPHILS NFR BLD AUTO: 62.7 % — SIGNIFICANT CHANGE UP (ref 43–77)
NRBC # BLD: 0 /100 WBCS — SIGNIFICANT CHANGE UP (ref 0–0)
PHOSPHATE SERPL-MCNC: 2.5 MG/DL — SIGNIFICANT CHANGE UP (ref 2.5–4.5)
PLATELET # BLD AUTO: 447 K/UL — HIGH (ref 150–400)
POTASSIUM SERPL-MCNC: 4.1 MMOL/L — SIGNIFICANT CHANGE UP (ref 3.5–5.3)
POTASSIUM SERPL-SCNC: 4.1 MMOL/L — SIGNIFICANT CHANGE UP (ref 3.5–5.3)
PROT SERPL-MCNC: 6.4 G/DL — SIGNIFICANT CHANGE UP (ref 6–8.3)
RBC # BLD: 4.06 M/UL — SIGNIFICANT CHANGE UP (ref 3.8–5.2)
RBC # FLD: 12.8 % — SIGNIFICANT CHANGE UP (ref 10.3–14.5)
SODIUM SERPL-SCNC: 138 MMOL/L — SIGNIFICANT CHANGE UP (ref 135–145)
WBC # BLD: 8.05 K/UL — SIGNIFICANT CHANGE UP (ref 3.8–10.5)
WBC # FLD AUTO: 8.05 K/UL — SIGNIFICANT CHANGE UP (ref 3.8–10.5)

## 2023-03-08 PROCEDURE — 99232 SBSQ HOSP IP/OBS MODERATE 35: CPT | Mod: GC

## 2023-03-08 PROCEDURE — 99232 SBSQ HOSP IP/OBS MODERATE 35: CPT

## 2023-03-08 RX ORDER — MEROPENEM 1 G/30ML
2000 INJECTION INTRAVENOUS EVERY 8 HOURS
Refills: 0 | Status: COMPLETED | OUTPATIENT
Start: 2023-03-08 | End: 2023-03-15

## 2023-03-08 RX ORDER — VANCOMYCIN HCL 1 G
1500 VIAL (EA) INTRAVENOUS ONCE
Refills: 0 | Status: COMPLETED | OUTPATIENT
Start: 2023-03-08 | End: 2023-03-08

## 2023-03-08 RX ADMIN — Medication 300 MILLIGRAM(S): at 19:07

## 2023-03-08 RX ADMIN — NEBIVOLOL HYDROCHLORIDE 10 MILLIGRAM(S): 5 TABLET ORAL at 18:19

## 2023-03-08 RX ADMIN — HYDROMORPHONE HYDROCHLORIDE 1 MILLIGRAM(S): 2 INJECTION INTRAMUSCULAR; INTRAVENOUS; SUBCUTANEOUS at 10:01

## 2023-03-08 RX ADMIN — MEROPENEM 280 MILLIGRAM(S): 1 INJECTION INTRAVENOUS at 06:15

## 2023-03-08 RX ADMIN — HYDROMORPHONE HYDROCHLORIDE 1 MILLIGRAM(S): 2 INJECTION INTRAMUSCULAR; INTRAVENOUS; SUBCUTANEOUS at 18:19

## 2023-03-08 RX ADMIN — Medication 300 MILLIGRAM(S): at 11:24

## 2023-03-08 RX ADMIN — HYDROMORPHONE HYDROCHLORIDE 1 MILLIGRAM(S): 2 INJECTION INTRAMUSCULAR; INTRAVENOUS; SUBCUTANEOUS at 05:36

## 2023-03-08 RX ADMIN — GABAPENTIN 800 MILLIGRAM(S): 400 CAPSULE ORAL at 15:16

## 2023-03-08 RX ADMIN — MEROPENEM 280 MILLIGRAM(S): 1 INJECTION INTRAVENOUS at 22:29

## 2023-03-08 RX ADMIN — GABAPENTIN 100 MILLIGRAM(S): 400 CAPSULE ORAL at 11:23

## 2023-03-08 RX ADMIN — Medication 300 MILLIGRAM(S): at 02:58

## 2023-03-08 RX ADMIN — HYDROMORPHONE HYDROCHLORIDE 1 MILLIGRAM(S): 2 INJECTION INTRAMUSCULAR; INTRAVENOUS; SUBCUTANEOUS at 18:34

## 2023-03-08 RX ADMIN — HYDROMORPHONE HYDROCHLORIDE 1 MILLIGRAM(S): 2 INJECTION INTRAMUSCULAR; INTRAVENOUS; SUBCUTANEOUS at 14:16

## 2023-03-08 RX ADMIN — HYDROMORPHONE HYDROCHLORIDE 1 MILLIGRAM(S): 2 INJECTION INTRAMUSCULAR; INTRAVENOUS; SUBCUTANEOUS at 10:16

## 2023-03-08 RX ADMIN — HYDROMORPHONE HYDROCHLORIDE 1 MILLIGRAM(S): 2 INJECTION INTRAMUSCULAR; INTRAVENOUS; SUBCUTANEOUS at 23:40

## 2023-03-08 RX ADMIN — HYDROMORPHONE HYDROCHLORIDE 1 MILLIGRAM(S): 2 INJECTION INTRAMUSCULAR; INTRAVENOUS; SUBCUTANEOUS at 00:11

## 2023-03-08 RX ADMIN — ENOXAPARIN SODIUM 40 MILLIGRAM(S): 100 INJECTION SUBCUTANEOUS at 23:30

## 2023-03-08 RX ADMIN — CHLORHEXIDINE GLUCONATE 1 APPLICATION(S): 213 SOLUTION TOPICAL at 11:23

## 2023-03-08 RX ADMIN — HYDROMORPHONE HYDROCHLORIDE 1 MILLIGRAM(S): 2 INJECTION INTRAMUSCULAR; INTRAVENOUS; SUBCUTANEOUS at 23:00

## 2023-03-08 RX ADMIN — MEROPENEM 280 MILLIGRAM(S): 1 INJECTION INTRAVENOUS at 14:01

## 2023-03-08 RX ADMIN — NEBIVOLOL HYDROCHLORIDE 10 MILLIGRAM(S): 5 TABLET ORAL at 06:15

## 2023-03-08 RX ADMIN — HYDROMORPHONE HYDROCHLORIDE 1 MILLIGRAM(S): 2 INJECTION INTRAMUSCULAR; INTRAVENOUS; SUBCUTANEOUS at 14:01

## 2023-03-08 RX ADMIN — ENOXAPARIN SODIUM 40 MILLIGRAM(S): 100 INJECTION SUBCUTANEOUS at 11:23

## 2023-03-08 NOTE — PROGRESS NOTE ADULT - SUBJECTIVE AND OBJECTIVE BOX
OVERNIGHT EVENTS: ISABELLE    SUBJECTIVE / INTERVAL HPI: Patient seen and examined at bedside. Still endorsing pain at site of foot although improving. ROS otherwise negative.     VITAL SIGNS:  Vital Signs Last 24 Hrs  T(C): 36.5 (08 Mar 2023 08:38), Max: 36.8 (07 Mar 2023 16:00)  T(F): 97.7 (08 Mar 2023 08:38), Max: 98.2 (07 Mar 2023 16:00)  HR: 96 (08 Mar 2023 10:36) (85 - 96)  BP: 159/95 (08 Mar 2023 10:36) (125/79 - 166/90)  BP(mean): --  RR: 16 (08 Mar 2023 08:38) (16 - 18)  SpO2: 99% (08 Mar 2023 08:38) (95% - 99%)    Parameters below as of 08 Mar 2023 08:38  Patient On (Oxygen Delivery Method): room air      I&O's Summary    07 Mar 2023 07:01  -  08 Mar 2023 07:00  --------------------------------------------------------  IN: 640 mL / OUT: 0 mL / NET: 640 mL    08 Mar 2023 07:01  -  08 Mar 2023 15:12  --------------------------------------------------------  IN: 640 mL / OUT: 0 mL / NET: 640 mL        PHYSICAL EXAM:    General: NAD, sitting comfortably in bed   HEENT: PERRL/ EOMI, no scleral icterus, MMM  Neck: Supple, no JVD  Respiratory: lungs CTA b/l, no wheezes/crackles, no accessory muscle use  Cardiovascular: Regular rhythm/rate; +S1 +S2, no murmurs  Gastrointestinal: Soft, NTND, normoactive BS, no rebound, no guarding  Genitourinary: no suprapubic tenderness  Extremities: + L foot shallow clean base ulcer w/ mild surrounding erythema, slightly improved from prior. WWP, no clubbing or cyanosis; no peripheral edema  Neurological: A&Ox3, no gross focal deficits, follows commands  Skin: Normal temperature, warm, dry    MEDICATIONS:  MEDICATIONS  (STANDING):  chlorhexidine 2% Cloths 1 Application(s) Topical daily  enoxaparin Injectable 40 milliGRAM(s) SubCutaneous every 12 hours  gabapentin 100 milliGRAM(s) Oral daily  gabapentin 800 milliGRAM(s) Oral daily  meropenem  IVPB 2000 milliGRAM(s) IV Intermittent every 8 hours  nebivolol 10 milliGRAM(s) Oral <User Schedule>  polyethylene glycol 3350 17 Gram(s) Oral daily  senna 2 Tablet(s) Oral at bedtime  vancomycin  IVPB 1500 milliGRAM(s) IV Intermittent every 8 hours    MEDICATIONS  (PRN):  HYDROmorphone  Injectable 1 milliGRAM(s) IV Push every 4 hours PRN Severe Pain (7 - 10)      ALLERGIES:  Allergies    amoxicillin (Angioedema)  ciprofloxacin (Rash)  hydrochlorothiazide (Hives)  latex (Rash)  lisinopril (Angioedema; Rash; Hives)  morphine (Rash)  penicillin (Rash)    Intolerances        LABS:                        11.6   8.05  )-----------( 447      ( 08 Mar 2023 05:30 )             35.7     03-08    138  |  101  |  16  ----------------------------<  100<H>  4.1   |  24  |  0.53    Ca    9.0      08 Mar 2023 05:30  Phos  2.5     03-08  Mg     1.8     03-08    TPro  6.4  /  Alb  3.6  /  TBili  0.3  /  DBili  x   /  AST  11  /  ALT  11  /  AlkPhos  87  03-08        CAPILLARY BLOOD GLUCOSE          RADIOLOGY & ADDITIONAL TESTS: Reviewed.

## 2023-03-08 NOTE — PROGRESS NOTE ADULT - PROBLEM SELECTOR PLAN 1
3/4 SIRS (Fever 101 at home, Tachy, elevated WBC). Possible source (s) PICC line and/or Left foot ulcer. Pt s/p recent dc on 2/7/23 on IV abx (Dapto 800 q24hrs and Cefepime 2g q8hrs) via PICC (placed on 2/3/23) for L foot infx.   Afebrile, HD stable on admission. Reports taking Tylenol at home prior to presentation. LA wnl.  PICC site c/d/i. PICC. L foot + new redness around shallow ulcer on L foot X 1 day. Denies URI s/s, cough, CP, SOB, abd pain, n/v/d/c and dysuria. CXR neg.  Per ID source is likely the foot and PICC cane stay in. Per ID switch from dapto to linezolid BCx ngtd. on 3/6, switched from linezolid to vanc.     Plan:   - c/w vanc 1250mg q8hr (goal 11-17)  - c/w Meropenem 2gm IV QD  -f/u ID recs

## 2023-03-08 NOTE — PROGRESS NOTE ADULT - PROBLEM SELECTOR PLAN 2
Chronic foot ulcer in the setting of AVM malformation. L foot + new redness around shallow ulcer on L foot X 1 day w/ ángel pain foot pain today. Home meds: Dapto/ Cefepime, Gabapentin 100 in am and 800 at 4pm. Percocet 10 q4hrs PRN. Dr Teran office notified, will follow patient.     - c/w dilaudid q4  -bowel reg   -see sepsis for plan

## 2023-03-08 NOTE — PROGRESS NOTE ADULT - SUBJECTIVE AND OBJECTIVE BOX
INFECTIOUS DISEASES CONSULT FOLLOW-UP NOTE    INTERVAL HPI/OVERNIGHT EVENTS: Missed 8pm Merrem. 5pm Vanc Tr 12.0. Primary team anticipates discharge today. C/o unchanged 8/10 "burning/pulsating" L foot pain. Endorses decreasing zone of erythema.    ROS:   Constitutional, eyes, ENT, cardiovascular, respiratory, gastrointestinal, genitourinary, integumentary, neurological, psychiatric and heme/lymph are otherwise negative other than noted above     ANTIBIOTICS/RELEVANT:    MEDICATIONS  (STANDING):  chlorhexidine 2% Cloths 1 Application(s) Topical daily  enoxaparin Injectable 40 milliGRAM(s) SubCutaneous every 12 hours  gabapentin 100 milliGRAM(s) Oral daily  gabapentin 800 milliGRAM(s) Oral daily  meropenem  IVPB 2000 milliGRAM(s) IV Intermittent every 8 hours  nebivolol 10 milliGRAM(s) Oral <User Schedule>  polyethylene glycol 3350 17 Gram(s) Oral daily  senna 2 Tablet(s) Oral at bedtime  vancomycin  IVPB 1500 milliGRAM(s) IV Intermittent every 8 hours    MEDICATIONS  (PRN):  HYDROmorphone  Injectable 1 milliGRAM(s) IV Push every 4 hours PRN Severe Pain (7 - 10)    Vital Signs Last 24 Hrs  T(C): 36.5 (08 Mar 2023 08:38), Max: 36.8 (07 Mar 2023 16:00)  T(F): 97.7 (08 Mar 2023 08:38), Max: 98.2 (07 Mar 2023 16:00)  HR: 96 (08 Mar 2023 10:36) (85 - 96)  BP: 159/95 (08 Mar 2023 10:36) (125/79 - 166/90)  BP(mean): --  RR: 16 (08 Mar 2023 08:38) (16 - 18)  SpO2: 99% (08 Mar 2023 08:38) (95% - 99%)    Parameters below as of 08 Mar 2023 08:38  Patient On (Oxygen Delivery Method): room air    03-07-23 @ 07:01  -  03-08-23 @ 07:00  --------------------------------------------------------  IN: 640 mL / OUT: 0 mL / NET: 640 mL    PHYSICAL EXAM:  General: NAD, sitting comfortably in bed   HEENT: PERRL/ EOMI, no scleral icterus, MMM  Neck: Supple, no JVD  Respiratory: lungs CTA b/l, no wheezes/crackles, no accessory muscle use  Cardiovascular: Regular rhythm/rate; +S1 +S2, no murmurs  Gastrointestinal: Soft, NTND, normoactive BS, no rebound, no guarding  Genitourinary: no suprapubic tenderness  Extremities: + L foot shallow clean base ulcer w/ mild surrounding erythema, slightly improved from prior. WWP, no clubbing or cyanosis; no peripheral edema  Neurological: A&Ox3, no gross focal deficits, follows commands  Skin: Normal temperature, warm, dry    LABS:                        11.6   8.05  )-----------( 447      ( 08 Mar 2023 05:30 )             35.7     03-08    138  |  101  |  16  ----------------------------<  100<H>  4.1   |  24  |  0.53    Ca    9.0      08 Mar 2023 05:30  Phos  2.5     03-08  Mg     1.8     03-08    TPro  6.4  /  Alb  3.6  /  TBili  0.3  /  DBili  x   /  AST  11  /  ALT  11  /  AlkPhos  87  03-08    MICROBIOLOGY:    RADIOLOGY & ADDITIONAL STUDIES:  Reviewed

## 2023-03-08 NOTE — PROGRESS NOTE ADULT - ASSESSMENT
38 y/o female with HTN and AVM L foot with recurrent L foot nonpurulent ulcer infection presenting with worsening pain at ulcer site, was initially treated with Meropenem and Daptomycin, transitioned to Annie and Linezolid, and now on Annie and Vanc. She has significant allergies to PCN and FQs.  Blood culture 2/27 negative.  She remains afebrile, leukocytosis improved from admission, ESR and CRP improving. Primary team anticipates discharge today.  - Continue Vancomycin 1250 q8hrs  - Continue meropenem 2 g IV q8h  - Will update final dosages prior to discharge    Recommendations discussed with primary team.  Team 1 will continue to follow, please call or page with any questions.

## 2023-03-09 LAB
ALBUMIN SERPL ELPH-MCNC: 3.6 G/DL — SIGNIFICANT CHANGE UP (ref 3.3–5)
ALP SERPL-CCNC: 80 U/L — SIGNIFICANT CHANGE UP (ref 40–120)
ALT FLD-CCNC: 14 U/L — SIGNIFICANT CHANGE UP (ref 10–45)
ANION GAP SERPL CALC-SCNC: 10 MMOL/L — SIGNIFICANT CHANGE UP (ref 5–17)
AST SERPL-CCNC: 12 U/L — SIGNIFICANT CHANGE UP (ref 10–40)
BASOPHILS # BLD AUTO: 0.07 K/UL — SIGNIFICANT CHANGE UP (ref 0–0.2)
BASOPHILS NFR BLD AUTO: 0.9 % — SIGNIFICANT CHANGE UP (ref 0–2)
BILIRUB SERPL-MCNC: 0.4 MG/DL — SIGNIFICANT CHANGE UP (ref 0.2–1.2)
BUN SERPL-MCNC: 12 MG/DL — SIGNIFICANT CHANGE UP (ref 7–23)
CALCIUM SERPL-MCNC: 9.4 MG/DL — SIGNIFICANT CHANGE UP (ref 8.4–10.5)
CHLORIDE SERPL-SCNC: 99 MMOL/L — SIGNIFICANT CHANGE UP (ref 96–108)
CO2 SERPL-SCNC: 25 MMOL/L — SIGNIFICANT CHANGE UP (ref 22–31)
CREAT SERPL-MCNC: 0.52 MG/DL — SIGNIFICANT CHANGE UP (ref 0.5–1.3)
CRP SERPL-MCNC: 7.2 MG/L — HIGH (ref 0–4)
EGFR: 121 ML/MIN/1.73M2 — SIGNIFICANT CHANGE UP
EOSINOPHIL # BLD AUTO: 0.22 K/UL — SIGNIFICANT CHANGE UP (ref 0–0.5)
EOSINOPHIL NFR BLD AUTO: 2.9 % — SIGNIFICANT CHANGE UP (ref 0–6)
ERYTHROCYTE [SEDIMENTATION RATE] IN BLOOD: 23 MM/HR — HIGH
GLUCOSE SERPL-MCNC: 87 MG/DL — SIGNIFICANT CHANGE UP (ref 70–99)
HCT VFR BLD CALC: 37.5 % — SIGNIFICANT CHANGE UP (ref 34.5–45)
HGB BLD-MCNC: 11.9 G/DL — SIGNIFICANT CHANGE UP (ref 11.5–15.5)
IMM GRANULOCYTES NFR BLD AUTO: 0.4 % — SIGNIFICANT CHANGE UP (ref 0–0.9)
LYMPHOCYTES # BLD AUTO: 2.11 K/UL — SIGNIFICANT CHANGE UP (ref 1–3.3)
LYMPHOCYTES # BLD AUTO: 27.5 % — SIGNIFICANT CHANGE UP (ref 13–44)
MAGNESIUM SERPL-MCNC: 1.8 MG/DL — SIGNIFICANT CHANGE UP (ref 1.6–2.6)
MCHC RBC-ENTMCNC: 28.3 PG — SIGNIFICANT CHANGE UP (ref 27–34)
MCHC RBC-ENTMCNC: 31.7 GM/DL — LOW (ref 32–36)
MCV RBC AUTO: 89.3 FL — SIGNIFICANT CHANGE UP (ref 80–100)
MONOCYTES # BLD AUTO: 0.67 K/UL — SIGNIFICANT CHANGE UP (ref 0–0.9)
MONOCYTES NFR BLD AUTO: 8.7 % — SIGNIFICANT CHANGE UP (ref 2–14)
NEUTROPHILS # BLD AUTO: 4.57 K/UL — SIGNIFICANT CHANGE UP (ref 1.8–7.4)
NEUTROPHILS NFR BLD AUTO: 59.6 % — SIGNIFICANT CHANGE UP (ref 43–77)
NRBC # BLD: 0 /100 WBCS — SIGNIFICANT CHANGE UP (ref 0–0)
PHOSPHATE SERPL-MCNC: 3.2 MG/DL — SIGNIFICANT CHANGE UP (ref 2.5–4.5)
PLATELET # BLD AUTO: 446 K/UL — HIGH (ref 150–400)
POTASSIUM SERPL-MCNC: 4.1 MMOL/L — SIGNIFICANT CHANGE UP (ref 3.5–5.3)
POTASSIUM SERPL-SCNC: 4.1 MMOL/L — SIGNIFICANT CHANGE UP (ref 3.5–5.3)
PROT SERPL-MCNC: 6.9 G/DL — SIGNIFICANT CHANGE UP (ref 6–8.3)
RBC # BLD: 4.2 M/UL — SIGNIFICANT CHANGE UP (ref 3.8–5.2)
RBC # FLD: 12.8 % — SIGNIFICANT CHANGE UP (ref 10.3–14.5)
SODIUM SERPL-SCNC: 134 MMOL/L — LOW (ref 135–145)
VANCOMYCIN TROUGH SERPL-MCNC: 11.2 UG/ML — SIGNIFICANT CHANGE UP (ref 10–20)
WBC # BLD: 7.67 K/UL — SIGNIFICANT CHANGE UP (ref 3.8–10.5)
WBC # FLD AUTO: 7.67 K/UL — SIGNIFICANT CHANGE UP (ref 3.8–10.5)

## 2023-03-09 PROCEDURE — 99232 SBSQ HOSP IP/OBS MODERATE 35: CPT | Mod: GC

## 2023-03-09 PROCEDURE — 99232 SBSQ HOSP IP/OBS MODERATE 35: CPT

## 2023-03-09 RX ORDER — VANCOMYCIN HCL 1 G
1500 VIAL (EA) INTRAVENOUS EVERY 8 HOURS
Refills: 0 | Status: COMPLETED | OUTPATIENT
Start: 2023-03-09 | End: 2023-03-09

## 2023-03-09 RX ADMIN — ENOXAPARIN SODIUM 40 MILLIGRAM(S): 100 INJECTION SUBCUTANEOUS at 13:40

## 2023-03-09 RX ADMIN — HYDROMORPHONE HYDROCHLORIDE 1 MILLIGRAM(S): 2 INJECTION INTRAMUSCULAR; INTRAVENOUS; SUBCUTANEOUS at 08:35

## 2023-03-09 RX ADMIN — HYDROMORPHONE HYDROCHLORIDE 1 MILLIGRAM(S): 2 INJECTION INTRAMUSCULAR; INTRAVENOUS; SUBCUTANEOUS at 08:15

## 2023-03-09 RX ADMIN — HYDROMORPHONE HYDROCHLORIDE 1 MILLIGRAM(S): 2 INJECTION INTRAMUSCULAR; INTRAVENOUS; SUBCUTANEOUS at 17:35

## 2023-03-09 RX ADMIN — HYDROMORPHONE HYDROCHLORIDE 1 MILLIGRAM(S): 2 INJECTION INTRAMUSCULAR; INTRAVENOUS; SUBCUTANEOUS at 22:26

## 2023-03-09 RX ADMIN — HYDROMORPHONE HYDROCHLORIDE 1 MILLIGRAM(S): 2 INJECTION INTRAMUSCULAR; INTRAVENOUS; SUBCUTANEOUS at 17:18

## 2023-03-09 RX ADMIN — GABAPENTIN 100 MILLIGRAM(S): 400 CAPSULE ORAL at 13:40

## 2023-03-09 RX ADMIN — HYDROMORPHONE HYDROCHLORIDE 1 MILLIGRAM(S): 2 INJECTION INTRAMUSCULAR; INTRAVENOUS; SUBCUTANEOUS at 04:03

## 2023-03-09 RX ADMIN — HYDROMORPHONE HYDROCHLORIDE 1 MILLIGRAM(S): 2 INJECTION INTRAMUSCULAR; INTRAVENOUS; SUBCUTANEOUS at 12:55

## 2023-03-09 RX ADMIN — MEROPENEM 280 MILLIGRAM(S): 1 INJECTION INTRAVENOUS at 15:11

## 2023-03-09 RX ADMIN — MEROPENEM 280 MILLIGRAM(S): 1 INJECTION INTRAVENOUS at 23:50

## 2023-03-09 RX ADMIN — MEROPENEM 280 MILLIGRAM(S): 1 INJECTION INTRAVENOUS at 06:11

## 2023-03-09 RX ADMIN — HYDROMORPHONE HYDROCHLORIDE 1 MILLIGRAM(S): 2 INJECTION INTRAMUSCULAR; INTRAVENOUS; SUBCUTANEOUS at 21:26

## 2023-03-09 RX ADMIN — GABAPENTIN 800 MILLIGRAM(S): 400 CAPSULE ORAL at 15:10

## 2023-03-09 RX ADMIN — NEBIVOLOL HYDROCHLORIDE 10 MILLIGRAM(S): 5 TABLET ORAL at 06:12

## 2023-03-09 RX ADMIN — Medication 300 MILLIGRAM(S): at 22:03

## 2023-03-09 RX ADMIN — NEBIVOLOL HYDROCHLORIDE 10 MILLIGRAM(S): 5 TABLET ORAL at 19:33

## 2023-03-09 RX ADMIN — HYDROMORPHONE HYDROCHLORIDE 1 MILLIGRAM(S): 2 INJECTION INTRAMUSCULAR; INTRAVENOUS; SUBCUTANEOUS at 12:38

## 2023-03-09 RX ADMIN — Medication 300 MILLIGRAM(S): at 15:11

## 2023-03-09 RX ADMIN — HYDROMORPHONE HYDROCHLORIDE 1 MILLIGRAM(S): 2 INJECTION INTRAMUSCULAR; INTRAVENOUS; SUBCUTANEOUS at 03:49

## 2023-03-09 RX ADMIN — CHLORHEXIDINE GLUCONATE 1 APPLICATION(S): 213 SOLUTION TOPICAL at 13:41

## 2023-03-09 RX ADMIN — Medication 300 MILLIGRAM(S): at 03:50

## 2023-03-09 NOTE — PROGRESS NOTE ADULT - SUBJECTIVE AND OBJECTIVE BOX
INFECTIOUS DISEASES CONSULT FOLLOW-UP NOTE    INTERVAL HPI/OVERNIGHT EVENTS: 2am Vanc Tr 11.2, resumed Vanc 1500mg q8h x3 before next Tr. Patient endorses L lateral foot wound is more inflammed and aggravated". Pain well controlled with current pain regimen. Other ROS negative.    ROS:   Constitutional, eyes, ENT, cardiovascular, respiratory, gastrointestinal, genitourinary, integumentary, neurological, psychiatric and heme/lymph are otherwise negative other than noted above     ANTIBIOTICS/RELEVANT:    MEDICATIONS  (STANDING):  chlorhexidine 2% Cloths 1 Application(s) Topical daily  enoxaparin Injectable 40 milliGRAM(s) SubCutaneous every 12 hours  gabapentin 100 milliGRAM(s) Oral daily  gabapentin 800 milliGRAM(s) Oral daily  meropenem  IVPB 2000 milliGRAM(s) IV Intermittent every 8 hours  nebivolol 10 milliGRAM(s) Oral <User Schedule>  polyethylene glycol 3350 17 Gram(s) Oral daily  senna 2 Tablet(s) Oral at bedtime  vancomycin  IVPB 1500 milliGRAM(s) IV Intermittent every 8 hours    MEDICATIONS  (PRN):  HYDROmorphone  Injectable 1 milliGRAM(s) IV Push every 4 hours PRN Severe Pain (7 - 10)    Vital Signs Last 24 Hrs  T(C): 36.7 (09 Mar 2023 05:30), Max: 37.2 (08 Mar 2023 22:00)  T(F): 98 (09 Mar 2023 05:30), Max: 98.9 (08 Mar 2023 22:00)  HR: 78 (09 Mar 2023 05:30) (78 - 96)  BP: 152/77 (09 Mar 2023 05:30) (139/80 - 175/100)  BP(mean): --  RR: 18 (09 Mar 2023 05:30) (16 - 18)  SpO2: 97% (09 Mar 2023 05:30) (95% - 97%)    Parameters below as of 09 Mar 2023 05:30  Patient On (Oxygen Delivery Method): room air    03-08-23 @ 07:01  -  03-09-23 @ 07:00  --------------------------------------------------------  IN: 1740 mL / OUT: 0 mL / NET: 1740 mL    PHYSICAL EXAM:  General: NAD, sitting comfortably in bed   HEENT: PERRL/ EOMI, no scleral icterus, MMM  Neck: Supple, no JVD  Respiratory: lungs CTA b/l, no wheezes/crackles, no accessory muscle use  Cardiovascular: Regular rhythm/rate; +S1 +S2, no murmurs  Gastrointestinal: Soft, NTND, normoactive BS, no rebound, no guarding  Genitourinary: no suprapubic tenderness  Extremities: + L foot shallow clean base ulcer w/ mild surrounding erythema, unchanged from prior. WWP, no clubbing or cyanosis; no peripheral edema  Neurological: A&Ox3, no gross focal deficits, follows commands  Skin: Normal temperature, warm, dry    LABS:                        11.9   7.67  )-----------( 446      ( 09 Mar 2023 08:39 )             37.5     03-09    134<L>  |  99  |  12  ----------------------------<  87  4.1   |  25  |  0.52    Ca    9.4      09 Mar 2023 08:39  Phos  3.2     03-09  Mg     1.8     03-09    TPro  6.9  /  Alb  3.6  /  TBili  0.4  /  DBili  x   /  AST  12  /  ALT  14  /  AlkPhos  80  03-09    MICROBIOLOGY:    RADIOLOGY & ADDITIONAL STUDIES:  Reviewed

## 2023-03-09 NOTE — PROGRESS NOTE ADULT - SUBJECTIVE AND OBJECTIVE BOX
OVERNIGHT EVENTS: ISABELLE    SUBJECTIVE / INTERVAL HPI: Patient seen and examined at bedside. Pain slightly improved. Overall concerned that she is getting the appropriate antibiotics because she has lost a lot of money due to receiving the wrong infusions. ROS otherwise negative.     VITAL SIGNS:  Vital Signs Last 24 Hrs  T(C): 37.2 (09 Mar 2023 12:38), Max: 37.2 (08 Mar 2023 22:00)  T(F): 98.9 (09 Mar 2023 12:38), Max: 98.9 (08 Mar 2023 22:00)  HR: 83 (09 Mar 2023 12:38) (78 - 93)  BP: 138/89 (09 Mar 2023 12:38) (138/89 - 175/100)  BP(mean): --  RR: 18 (09 Mar 2023 12:38) (16 - 18)  SpO2: 97% (09 Mar 2023 12:38) (95% - 97%)    Parameters below as of 09 Mar 2023 12:38  Patient On (Oxygen Delivery Method): room air      I&O's Summary    08 Mar 2023 07:01  -  09 Mar 2023 07:00  --------------------------------------------------------  IN: 1740 mL / OUT: 0 mL / NET: 1740 mL        PHYSICAL EXAM:    General: NAD, laying in bed  HEENT: PERRL/ EOMI, no scleral icterus, MMM  Neck: Supple, no JVD  Respiratory: lungs CTA b/l, no wheezes/crackles, no accessory muscle use  Cardiovascular: Regular rhythm/rate; +S1 +S2, no murmurs  Gastrointestinal: Soft, NTND, normoactive BS, no rebound, no guarding  Genitourinary: no suprapubic tenderness  Extremities: + L foot shallow clean base ulcer w/ mild surrounding erythema, slightly improved from prior. WWP, no clubbing or cyanosis; no peripheral edema  Neurological: A&Ox3, no gross focal deficits, follows commands  Skin: Normal temperature, warm, dry    MEDICATIONS:  MEDICATIONS  (STANDING):  chlorhexidine 2% Cloths 1 Application(s) Topical daily  enoxaparin Injectable 40 milliGRAM(s) SubCutaneous every 12 hours  gabapentin 100 milliGRAM(s) Oral daily  gabapentin 800 milliGRAM(s) Oral daily  meropenem  IVPB 2000 milliGRAM(s) IV Intermittent every 8 hours  nebivolol 10 milliGRAM(s) Oral <User Schedule>  polyethylene glycol 3350 17 Gram(s) Oral daily  senna 2 Tablet(s) Oral at bedtime  vancomycin  IVPB 1500 milliGRAM(s) IV Intermittent every 8 hours    MEDICATIONS  (PRN):  HYDROmorphone  Injectable 1 milliGRAM(s) IV Push every 4 hours PRN Severe Pain (7 - 10)      ALLERGIES:  Allergies    amoxicillin (Angioedema)  ciprofloxacin (Rash)  hydrochlorothiazide (Hives)  latex (Rash)  lisinopril (Angioedema; Rash; Hives)  morphine (Rash)  penicillin (Rash)    Intolerances        LABS:                        11.9   7.67  )-----------( 446      ( 09 Mar 2023 08:39 )             37.5     03-09    134<L>  |  99  |  12  ----------------------------<  87  4.1   |  25  |  0.52    Ca    9.4      09 Mar 2023 08:39  Phos  3.2     03-09  Mg     1.8     03-09    TPro  6.9  /  Alb  3.6  /  TBili  0.4  /  DBili  x   /  AST  12  /  ALT  14  /  AlkPhos  80  03-09        CAPILLARY BLOOD GLUCOSE          RADIOLOGY & ADDITIONAL TESTS: Reviewed.

## 2023-03-09 NOTE — PROGRESS NOTE ADULT - PROBLEM SELECTOR PLAN 1
3/4 SIRS (Fever 101 at home, Tachy, elevated WBC). Possible source (s) PICC line and/or Left foot ulcer. Pt s/p recent dc on 2/7/23 on IV abx (Dapto 800 q24hrs and Cefepime 2g q8hrs) via PICC (placed on 2/3/23) for L foot infx.   Afebrile, HD stable on admission. Reports taking Tylenol at home prior to presentation. LA wnl.  PICC site c/d/i. PICC. L foot + new redness around shallow ulcer on L foot X 1 day. Denies URI s/s, cough, CP, SOB, abd pain, n/v/d/c and dysuria. CXR neg.  Per ID source is likely the foot and PICC cane stay in. Per ID switch from dapto to linezolid BCx ngtd. on 3/6, switched from linezolid to vanc.     Plan:   - Vanc trough 11.2  - c/w vanc 1500mg q8hr (goal 11-17)  - Next vanc trough 3/10 12PM   - c/w Meropenem 2gm IV QD  -f/u ID recs  - if no clinical improvement tomorrow will recall Dr. Teran/Wound team

## 2023-03-09 NOTE — PROGRESS NOTE ADULT - ASSESSMENT
36 y/o female with HTN and AVM L foot with recurrent L foot nonpurulent ulcer infection presenting with worsening pain at ulcer site, was initially treated with Meropenem and Daptomycin, transitioned to Annie and Linezolid. She has significant allergies to PCN and FQs.  Blood culture 2/27 negative.  She remains afebrile, leukocytosis improved from admission, ESR and CRP improving. Primary team anticipates discharge today. Currently on Meropenem 2 g IV q8h since 2/28 & Vancomycin 1250 q8hrs since 3/6.  - Will discuss case with Dr. Teran and update primary team if decide to continue antibiotics     36 y/o female with HTN and AVM L foot with recurrent L foot nonpurulent ulcer infection presenting with worsening pain at ulcer site, was initially treated with Meropenem and Daptomycin, transitioned to Annie and Linezolid. She has significant allergies to PCN and FQs.  Blood culture 2/27 negative.  She remains afebrile, leukocytosis improved from admission, ESR and CRP improving. Primary team anticipates discharge today. Currently on Meropenem 2 g IV q8h since 2/28 & Vancomycin 1250 q8hrs since 3/6.  - Continue Vancomycin 1500 q8hrs  - Continue Meropenem 2g q8h  - Will update final dosages prior to discharge   36 y/o female with HTN and AVM L foot with recurrent L foot nonpurulent ulcer infection presenting with worsening pain at ulcer site, was initially treated with Meropenem and Daptomycin, transitioned to Annie and Linezolid. She has significant allergies to PCN and FQs.  Blood culture 2/27 negative.  She remains afebrile, leukocytosis improved from admission, ESR and CRP improving. Primary team anticipates discharge today. Currently on Meropenem 2 g IV q8h since 2/28 & Vancomycin 1250 q8hrs since 3/6.  - Continue Vancomycin 1500 q8hrs & Meropenem 2g q8h one more day. If no significant improvement recall Dr. Teran's team and Wound Care Service  - Will update final dosages prior to discharge

## 2023-03-09 NOTE — CHART NOTE - NSCHARTNOTEFT_GEN_A_CORE
Admitting Diagnosis:   Patient is a 37y old  Female who presents with a chief complaint of fever (09 Mar 2023 08:32)      PAST MEDICAL & SURGICAL HISTORY:  HTN (hypertension)  AVM (arteriovenous malformation) of left foot  Foot ulceration left foot  S/P debridement LLE area overlying AVM  Elective surgery transcatheter therapy vascular embolization x5  Morbid obesity    Current Nutrition Order: DASH/TLC, probiotic TID       PO Intake: Good (%) [ x  ]  Fair (50-75%) [   ] Poor (<25%) [   ]    GI Issues: none reported    Skin Integrity: no pressure injuries documented    Labs:   03-09    134<L>  |  99  |  12  ----------------------------<  87  4.1   |  25  |  0.52    Ca    9.4      09 Mar 2023 08:39  Phos  3.2     03-09  Mg     1.8     03-09    TPro  6.9  /  Alb  3.6  /  TBili  0.4  /  DBili  x   /  AST  12  /  ALT  14  /  AlkPhos  80  03-09      Medications:  MEDICATIONS  (STANDING):  chlorhexidine 2% Cloths 1 Application(s) Topical daily  enoxaparin Injectable 40 milliGRAM(s) SubCutaneous every 12 hours  gabapentin 100 milliGRAM(s) Oral daily  gabapentin 800 milliGRAM(s) Oral daily  meropenem  IVPB 2000 milliGRAM(s) IV Intermittent every 8 hours  nebivolol 10 milliGRAM(s) Oral <User Schedule>  polyethylene glycol 3350 17 Gram(s) Oral daily  senna 2 Tablet(s) Oral at bedtime  vancomycin  IVPB 1500 milliGRAM(s) IV Intermittent every 8 hours    MEDICATIONS  (PRN):  HYDROmorphone  Injectable 1 milliGRAM(s) IV Push every 4 hours PRN Severe Pain (7 - 10)      Weight:  Height for BMI (FEET)	5 Feet  Height for BMI (INCHES)	7 Inch(s)  Height for BMI (CENTIMETERS)	170.18 Centimeter(s)  Weight for BMI (lbs)	296.3 lb  Weight for BMI (kg)	134.4 kg  Body Mass Index	46.4    Weight Change: no new weights to assess    Estimated energy needs:   Estimated Energy Needs Weight (lbs)	135 lb  Estimated Energy Needs Weight (kg)	61.2 kg  Estimated Energy Needs From (reji/kg)	25  Estimated Energy Needs To (reji/kg)	30  Estimated Energy Needs Calculated From (reji/kg)	1530  Estimated Energy Needs Calculated To (reji/kg)	1836    Estimated Protein Needs Weight (lbs)	135 lb  Estimated Protein Needs Weight (kg)	61.2 kg  Estimated Protein Needs From (g/kg)	0.8  Estimated Protein Needs To (g/kg)	1  Estimated Protein Needs Calculated From (g/kg)	48.96  Estimated Protein Needs Calculated To (g/kg)	61.2    Subjective: 37F PMH left foot AVM w/ chronic left foot ulcer (s/p multiple embolizations, last 1/24), HTN,  morbid obesity and recent admission for AVM L foot re-embolization and dc'ed  on 2/7/23 on IV abx via PICC for L foot infx sent by Dr. Rahman for fever wok up. Pt was admitted on 1/24-2/7 for another AVM embolization and was dc'ed on 4wks of abx  Dapto 800 q24/Cefepime 2g q8hrs (2/3/23-3/3/23) via PICC (placed on 2/3/23) and f/up w/ Dr. Rahman. Pt was sent to ED by Dr. Rahman for septic wok up in the setting of new fever of 101 at home on 2/27 by visiting nurse. Took Tylenol at home prior to ED presentation. Per ED provider who spoke w/ Kassandra Rahman possible source is PICC line and/or Left foot ulcer and recommended to c/w same abx regiment pending re-eval in am by ID to finalize abx and decided on wether to keep/ remove PICC line. ROS + subjective f/c and fatigue X 1 day and 1 episode of bilious emesis today. Also reports new  redness around shallow ulcer on L foot X1 day. Also reports pain in L foot is worse today.  Denies URI s/s, cough, CP, SOB, abd pain, n/d/c and dysuria.     Pt seen at bedside for follow up assessment. Current nutrition order: DASH/TLC. Pt reports good appetite, ate two boiled eggs, english muffin, and potatoes at breakfast. Denies NV/D/C. RD to f/u prn.    Previous Nutrition Diagnosis: Overweight/obesity r/t excessive energy intake AEB BMI 46.5.    Active [ x  ]  Resolved [   ]    Goal: Pt to meet at least 75% of nutritional needs consistently     Recommendations:  1. Continue with diet order   2. Encourage pt to meed nutritional needs as able   3. Monitor labs: electrolytes, cmp  4. Monitor weights   5. Pain and bowel regimen per team   6. Will continue to assess/honor food preferences as able    Education: deferred at this time    Risk Level: High [   ] Moderate [ x  ] Low [   ]

## 2023-03-10 LAB
ANION GAP SERPL CALC-SCNC: 12 MMOL/L — SIGNIFICANT CHANGE UP (ref 5–17)
BUN SERPL-MCNC: 13 MG/DL — SIGNIFICANT CHANGE UP (ref 7–23)
CALCIUM SERPL-MCNC: 9.3 MG/DL — SIGNIFICANT CHANGE UP (ref 8.4–10.5)
CHLORIDE SERPL-SCNC: 98 MMOL/L — SIGNIFICANT CHANGE UP (ref 96–108)
CO2 SERPL-SCNC: 25 MMOL/L — SIGNIFICANT CHANGE UP (ref 22–31)
CREAT SERPL-MCNC: 0.58 MG/DL — SIGNIFICANT CHANGE UP (ref 0.5–1.3)
EGFR: 118 ML/MIN/1.73M2 — SIGNIFICANT CHANGE UP
GLUCOSE SERPL-MCNC: 93 MG/DL — SIGNIFICANT CHANGE UP (ref 70–99)
HCT VFR BLD CALC: 39.3 % — SIGNIFICANT CHANGE UP (ref 34.5–45)
HGB BLD-MCNC: 12.3 G/DL — SIGNIFICANT CHANGE UP (ref 11.5–15.5)
MAGNESIUM SERPL-MCNC: 1.9 MG/DL — SIGNIFICANT CHANGE UP (ref 1.6–2.6)
MCHC RBC-ENTMCNC: 28.4 PG — SIGNIFICANT CHANGE UP (ref 27–34)
MCHC RBC-ENTMCNC: 31.3 GM/DL — LOW (ref 32–36)
MCV RBC AUTO: 90.8 FL — SIGNIFICANT CHANGE UP (ref 80–100)
NRBC # BLD: 0 /100 WBCS — SIGNIFICANT CHANGE UP (ref 0–0)
PHOSPHATE SERPL-MCNC: 3.5 MG/DL — SIGNIFICANT CHANGE UP (ref 2.5–4.5)
PLATELET # BLD AUTO: 409 K/UL — HIGH (ref 150–400)
POTASSIUM SERPL-MCNC: 4.5 MMOL/L — SIGNIFICANT CHANGE UP (ref 3.5–5.3)
POTASSIUM SERPL-SCNC: 4.5 MMOL/L — SIGNIFICANT CHANGE UP (ref 3.5–5.3)
RBC # BLD: 4.33 M/UL — SIGNIFICANT CHANGE UP (ref 3.8–5.2)
RBC # FLD: 12.9 % — SIGNIFICANT CHANGE UP (ref 10.3–14.5)
SODIUM SERPL-SCNC: 135 MMOL/L — SIGNIFICANT CHANGE UP (ref 135–145)
VANCOMYCIN TROUGH SERPL-MCNC: 14 UG/ML — SIGNIFICANT CHANGE UP (ref 10–20)
WBC # BLD: 8.45 K/UL — SIGNIFICANT CHANGE UP (ref 3.8–10.5)
WBC # FLD AUTO: 8.45 K/UL — SIGNIFICANT CHANGE UP (ref 3.8–10.5)

## 2023-03-10 PROCEDURE — 99232 SBSQ HOSP IP/OBS MODERATE 35: CPT

## 2023-03-10 PROCEDURE — 99232 SBSQ HOSP IP/OBS MODERATE 35: CPT | Mod: GC

## 2023-03-10 RX ORDER — VANCOMYCIN HCL 1 G
1500 VIAL (EA) INTRAVENOUS EVERY 8 HOURS
Refills: 0 | Status: COMPLETED | OUTPATIENT
Start: 2023-03-10 | End: 2023-03-11

## 2023-03-10 RX ADMIN — ENOXAPARIN SODIUM 40 MILLIGRAM(S): 100 INJECTION SUBCUTANEOUS at 23:02

## 2023-03-10 RX ADMIN — MEROPENEM 280 MILLIGRAM(S): 1 INJECTION INTRAVENOUS at 15:42

## 2023-03-10 RX ADMIN — Medication 300 MILLIGRAM(S): at 16:44

## 2023-03-10 RX ADMIN — GABAPENTIN 800 MILLIGRAM(S): 400 CAPSULE ORAL at 15:42

## 2023-03-10 RX ADMIN — MEROPENEM 280 MILLIGRAM(S): 1 INJECTION INTRAVENOUS at 07:02

## 2023-03-10 RX ADMIN — HYDROMORPHONE HYDROCHLORIDE 1 MILLIGRAM(S): 2 INJECTION INTRAMUSCULAR; INTRAVENOUS; SUBCUTANEOUS at 01:31

## 2023-03-10 RX ADMIN — NEBIVOLOL HYDROCHLORIDE 10 MILLIGRAM(S): 5 TABLET ORAL at 17:48

## 2023-03-10 RX ADMIN — HYDROMORPHONE HYDROCHLORIDE 1 MILLIGRAM(S): 2 INJECTION INTRAMUSCULAR; INTRAVENOUS; SUBCUTANEOUS at 13:43

## 2023-03-10 RX ADMIN — HYDROMORPHONE HYDROCHLORIDE 1 MILLIGRAM(S): 2 INJECTION INTRAMUSCULAR; INTRAVENOUS; SUBCUTANEOUS at 02:01

## 2023-03-10 RX ADMIN — HYDROMORPHONE HYDROCHLORIDE 1 MILLIGRAM(S): 2 INJECTION INTRAMUSCULAR; INTRAVENOUS; SUBCUTANEOUS at 09:55

## 2023-03-10 RX ADMIN — HYDROMORPHONE HYDROCHLORIDE 1 MILLIGRAM(S): 2 INJECTION INTRAMUSCULAR; INTRAVENOUS; SUBCUTANEOUS at 06:07

## 2023-03-10 RX ADMIN — HYDROMORPHONE HYDROCHLORIDE 1 MILLIGRAM(S): 2 INJECTION INTRAMUSCULAR; INTRAVENOUS; SUBCUTANEOUS at 09:40

## 2023-03-10 RX ADMIN — GABAPENTIN 100 MILLIGRAM(S): 400 CAPSULE ORAL at 12:31

## 2023-03-10 RX ADMIN — HYDROMORPHONE HYDROCHLORIDE 1 MILLIGRAM(S): 2 INJECTION INTRAMUSCULAR; INTRAVENOUS; SUBCUTANEOUS at 17:49

## 2023-03-10 RX ADMIN — Medication 300 MILLIGRAM(S): at 07:50

## 2023-03-10 RX ADMIN — HYDROMORPHONE HYDROCHLORIDE 1 MILLIGRAM(S): 2 INJECTION INTRAMUSCULAR; INTRAVENOUS; SUBCUTANEOUS at 22:11

## 2023-03-10 RX ADMIN — ENOXAPARIN SODIUM 40 MILLIGRAM(S): 100 INJECTION SUBCUTANEOUS at 12:24

## 2023-03-10 RX ADMIN — MEROPENEM 280 MILLIGRAM(S): 1 INJECTION INTRAVENOUS at 23:01

## 2023-03-10 RX ADMIN — HYDROMORPHONE HYDROCHLORIDE 1 MILLIGRAM(S): 2 INJECTION INTRAMUSCULAR; INTRAVENOUS; SUBCUTANEOUS at 18:05

## 2023-03-10 RX ADMIN — HYDROMORPHONE HYDROCHLORIDE 1 MILLIGRAM(S): 2 INJECTION INTRAMUSCULAR; INTRAVENOUS; SUBCUTANEOUS at 05:37

## 2023-03-10 RX ADMIN — HYDROMORPHONE HYDROCHLORIDE 1 MILLIGRAM(S): 2 INJECTION INTRAMUSCULAR; INTRAVENOUS; SUBCUTANEOUS at 22:41

## 2023-03-10 RX ADMIN — CHLORHEXIDINE GLUCONATE 1 APPLICATION(S): 213 SOLUTION TOPICAL at 12:25

## 2023-03-10 RX ADMIN — NEBIVOLOL HYDROCHLORIDE 10 MILLIGRAM(S): 5 TABLET ORAL at 05:37

## 2023-03-10 RX ADMIN — HYDROMORPHONE HYDROCHLORIDE 1 MILLIGRAM(S): 2 INJECTION INTRAMUSCULAR; INTRAVENOUS; SUBCUTANEOUS at 14:00

## 2023-03-10 NOTE — PROGRESS NOTE ADULT - PROBLEM SELECTOR PLAN 1
3/4 SIRS (Fever 101 at home, Tachy, elevated WBC). Possible source (s) PICC line and/or Left foot ulcer. Pt s/p recent dc on 2/7/23 on IV abx (Dapto 800 q24hrs and Cefepime 2g q8hrs) via PICC (placed on 2/3/23) for L foot infx.   Afebrile, HD stable on admission. Reports taking Tylenol at home prior to presentation. LA wnl.  PICC site c/d/i. PICC. L foot + new redness around shallow ulcer on L foot X 1 day. Denies URI s/s, cough, CP, SOB, abd pain, n/v/d/c and dysuria. CXR neg.  Per ID source is likely the foot and PICC cane stay in. Per ID switch from dapto to linezolid BCx ngtd. on 3/6, switched from linezolid to vanc.     Plan:   - Vanc trough therapeutic  - c/w vanc 1500mg q8hr (goal 11-17)  - c/w Meropenem 2gm IV QD  - f/u ID recs  - if no clinical improvement will recall Dr. Teran/Wound team

## 2023-03-10 NOTE — PROGRESS NOTE ADULT - SUBJECTIVE AND OBJECTIVE BOX
INFECTIOUS DISEASES CONSULT FOLLOW-UP NOTE    INTERVAL HPI/OVERNIGHT EVENTS: ISABELLE. AM Vanc 14, resumed 1500mg q8h. Patient says she feels "the same" and pictures from her phone of her L foot ulcer appear unchanged. Primary team endorses she is feeling better and are anticipating discharge.    ROS:   Constitutional, eyes, ENT, cardiovascular, respiratory, gastrointestinal, genitourinary, integumentary, neurological, psychiatric and heme/lymph are otherwise negative other than noted above     ANTIBIOTICS/RELEVANT:    MEDICATIONS  (STANDING):  chlorhexidine 2% Cloths 1 Application(s) Topical daily  enoxaparin Injectable 40 milliGRAM(s) SubCutaneous every 12 hours  gabapentin 100 milliGRAM(s) Oral daily  gabapentin 800 milliGRAM(s) Oral daily  meropenem  IVPB 2000 milliGRAM(s) IV Intermittent every 8 hours  nebivolol 10 milliGRAM(s) Oral <User Schedule>  polyethylene glycol 3350 17 Gram(s) Oral daily  senna 2 Tablet(s) Oral at bedtime  vancomycin  IVPB 1500 milliGRAM(s) IV Intermittent every 8 hours    MEDICATIONS  (PRN):  HYDROmorphone  Injectable 1 milliGRAM(s) IV Push every 4 hours PRN Severe Pain (7 - 10)    Vital Signs Last 24 Hrs  T(C): 36.9 (10 Mar 2023 09:15), Max: 37.1 (09 Mar 2023 20:44)  T(F): 98.4 (10 Mar 2023 09:15), Max: 98.8 (09 Mar 2023 20:44)  HR: 80 (10 Mar 2023 09:15) (80 - 96)  BP: 124/85 (10 Mar 2023 09:15) (112/78 - 137/87)  BP(mean): 98 (10 Mar 2023 09:15) (98 - 98)  RR: 18 (10 Mar 2023 09:15) (17 - 18)  SpO2: 98% (10 Mar 2023 09:15) (96% - 98%)    Parameters below as of 10 Mar 2023 09:15  Patient On (Oxygen Delivery Method): room air    PHYSICAL EXAM:  General: NAD, sitting comfortably in bed   HEENT: PERRL/ EOMI, no scleral icterus, MMM  Neck: Supple, no JVD  Respiratory: lungs CTA b/l, no wheezes/crackles, no accessory muscle use  Cardiovascular: Regular rhythm/rate; +S1 +S2, no murmurs  Gastrointestinal: Soft, NTND, normoactive BS, no rebound, no guarding  Genitourinary: no suprapubic tenderness  Extremities: + L foot shallow clean base ulcer w/ mild surrounding erythema, unchanged from prior. WWP, no clubbing or cyanosis; no peripheral edema  Neurological: A&Ox3, no gross focal deficits, follows commands  Skin: Normal temperature, warm, dry    LABS:                        12.3   8.45  )-----------( 409      ( 10 Mar 2023 06:40 )             39.3     03-10    135  |  98  |  13  ----------------------------<  93  4.5   |  25  |  0.58    Ca    9.3      10 Mar 2023 06:40  Phos  3.5     03-10  Mg     1.9     03-10    TPro  6.9  /  Alb  3.6  /  TBili  0.4  /  DBili  x   /  AST  12  /  ALT  14  /  AlkPhos  80  03-09    MICROBIOLOGY:    RADIOLOGY & ADDITIONAL STUDIES:  Reviewed

## 2023-03-10 NOTE — PROGRESS NOTE ADULT - ASSESSMENT
38 y/o female with HTN and AVM L foot with recurrent L foot nonpurulent ulcer infection presenting with worsening pain at ulcer site, was initially treated with Meropenem and Daptomycin, transitioned to Annie and Linezolid. She has significant allergies to PCN and FQs.  Blood culture 2/27 negative.  She remains afebrile, leukocytosis improved from admission, ESR and CRP improving. Primary team anticipates discharge today. Currently on Meropenem 2 g IV q8h since 2/28 & Vancomycin 1250 q8hrs since 3/6.  - Continue Vancomycin 1500 q8hrs & Meropenem 2g q8h one more day. If no significant improvement recall Dr. Teran's team and Wound Care Service  - Will update final dosages prior to discharge 38 y/o female with HTN and AVM L foot with recurrent L foot nonpurulent ulcer infection presenting with worsening pain at ulcer site, was initially treated with Meropenem and Daptomycin, transitioned to Annie and Linezolid. She has significant allergies to PCN and FQs.  Blood culture 2/27 negative.  She remains afebrile, leukocytosis improved from admission, ESR and CRP improving. Primary team anticipates discharge today. Currently on Meropenem 2 g IV q8h since 2/28 & Vancomycin 1250 q8hrs since 3/6.  - Continue Vancomycin 1500 q8hrs & Meropenem 2g q8h one more day. If no significant improvement recall Dr. Teran's team and Wound Care Service  - please ask Dr. Teran to see patient

## 2023-03-10 NOTE — PROGRESS NOTE ADULT - SUBJECTIVE AND OBJECTIVE BOX
OVERNIGHT EVENTS: ISABELLE    SUBJECTIVE / INTERVAL HPI: Patient seen and examined at bedside. Pain stable from day prior, unsure if she feels she is improving but hopeful to go home soon.    VITAL SIGNS:  Vital Signs Last 24 Hrs  T(C): 36.9 (10 Mar 2023 09:15), Max: 37.1 (09 Mar 2023 20:44)  T(F): 98.4 (10 Mar 2023 09:15), Max: 98.8 (09 Mar 2023 20:44)  HR: 80 (10 Mar 2023 09:15) (80 - 96)  BP: 124/85 (10 Mar 2023 09:15) (112/78 - 137/87)  BP(mean): 98 (10 Mar 2023 09:15) (98 - 98)  RR: 18 (10 Mar 2023 09:15) (17 - 18)  SpO2: 98% (10 Mar 2023 09:15) (96% - 98%)    Parameters below as of 10 Mar 2023 09:15  Patient On (Oxygen Delivery Method): room air      I&O's Summary      PHYSICAL EXAM:    General: WDWN  HEENT: NC/AT; PERRL, anicteric sclera; MMM  Neck: supple  Cardiovascular: +S1/S2; RRR  Respiratory: CTA B/L; no W/R/R  Gastrointestinal: soft, NT/ND; +BSx4  Extremities: WWP; no edema, clubbing or cyanosis  Vascular: 2+ radial, DP/PT pulses B/L  Neurological: AAOx3; no focal deficits    MEDICATIONS:  MEDICATIONS  (STANDING):  chlorhexidine 2% Cloths 1 Application(s) Topical daily  enoxaparin Injectable 40 milliGRAM(s) SubCutaneous every 12 hours  gabapentin 100 milliGRAM(s) Oral daily  gabapentin 800 milliGRAM(s) Oral daily  meropenem  IVPB 2000 milliGRAM(s) IV Intermittent every 8 hours  nebivolol 10 milliGRAM(s) Oral <User Schedule>  polyethylene glycol 3350 17 Gram(s) Oral daily  senna 2 Tablet(s) Oral at bedtime  vancomycin  IVPB 1500 milliGRAM(s) IV Intermittent every 8 hours    MEDICATIONS  (PRN):  HYDROmorphone  Injectable 1 milliGRAM(s) IV Push every 4 hours PRN Severe Pain (7 - 10)      ALLERGIES:  Allergies    amoxicillin (Angioedema)  ciprofloxacin (Rash)  hydrochlorothiazide (Hives)  latex (Rash)  lisinopril (Angioedema; Rash; Hives)  morphine (Rash)  penicillin (Rash)    Intolerances        LABS:                        12.3   8.45  )-----------( 409      ( 10 Mar 2023 06:40 )             39.3     03-10    135  |  98  |  13  ----------------------------<  93  4.5   |  25  |  0.58    Ca    9.3      10 Mar 2023 06:40  Phos  3.5     03-10  Mg     1.9     03-10    TPro  6.9  /  Alb  3.6  /  TBili  0.4  /  DBili  x   /  AST  12  /  ALT  14  /  AlkPhos  80  03-09        CAPILLARY BLOOD GLUCOSE          RADIOLOGY & ADDITIONAL TESTS: Reviewed.   OVERNIGHT EVENTS: ISABELLE    SUBJECTIVE / INTERVAL HPI: Patient seen and examined at bedside. Pain stable from day prior, unsure if she feels she is improving but hopeful to go home soon. ROS negative.    VITAL SIGNS:  Vital Signs Last 24 Hrs  T(C): 36.9 (10 Mar 2023 09:15), Max: 37.1 (09 Mar 2023 20:44)  T(F): 98.4 (10 Mar 2023 09:15), Max: 98.8 (09 Mar 2023 20:44)  HR: 80 (10 Mar 2023 09:15) (80 - 96)  BP: 124/85 (10 Mar 2023 09:15) (112/78 - 137/87)  BP(mean): 98 (10 Mar 2023 09:15) (98 - 98)  RR: 18 (10 Mar 2023 09:15) (17 - 18)  SpO2: 98% (10 Mar 2023 09:15) (96% - 98%)    Parameters below as of 10 Mar 2023 09:15  Patient On (Oxygen Delivery Method): room air      I&O's Summary      PHYSICAL EXAM:    General: NAD, laying in bed  HEENT: PERRL/ EOMI, no scleral icterus, MMM  Neck: Supple, no JVD  Respiratory: lungs CTA b/l, no wheezes/crackles, no accessory muscle use  Cardiovascular: Regular rhythm/rate; +S1 +S2, no murmurs  Gastrointestinal: Soft, NTND, normoactive BS, no rebound, no guarding  Genitourinary: no suprapubic tenderness  Extremities: + L foot shallow clean base ulcer w/ mild surrounding erythema, slightly improved from prior. WWP, no clubbing or cyanosis; no peripheral edema  Neurological: A&Ox3, no gross focal deficits, follows commands  Skin: Normal temperature, warm, dry    MEDICATIONS:  MEDICATIONS  (STANDING):  chlorhexidine 2% Cloths 1 Application(s) Topical daily  enoxaparin Injectable 40 milliGRAM(s) SubCutaneous every 12 hours  gabapentin 100 milliGRAM(s) Oral daily  gabapentin 800 milliGRAM(s) Oral daily  meropenem  IVPB 2000 milliGRAM(s) IV Intermittent every 8 hours  nebivolol 10 milliGRAM(s) Oral <User Schedule>  polyethylene glycol 3350 17 Gram(s) Oral daily  senna 2 Tablet(s) Oral at bedtime  vancomycin  IVPB 1500 milliGRAM(s) IV Intermittent every 8 hours    MEDICATIONS  (PRN):  HYDROmorphone  Injectable 1 milliGRAM(s) IV Push every 4 hours PRN Severe Pain (7 - 10)      ALLERGIES:  Allergies    amoxicillin (Angioedema)  ciprofloxacin (Rash)  hydrochlorothiazide (Hives)  latex (Rash)  lisinopril (Angioedema; Rash; Hives)  morphine (Rash)  penicillin (Rash)    Intolerances        LABS:                        12.3   8.45  )-----------( 409      ( 10 Mar 2023 06:40 )             39.3     03-10    135  |  98  |  13  ----------------------------<  93  4.5   |  25  |  0.58    Ca    9.3      10 Mar 2023 06:40  Phos  3.5     03-10  Mg     1.9     03-10    TPro  6.9  /  Alb  3.6  /  TBili  0.4  /  DBili  x   /  AST  12  /  ALT  14  /  AlkPhos  80  03-09        CAPILLARY BLOOD GLUCOSE          RADIOLOGY & ADDITIONAL TESTS: Reviewed.

## 2023-03-11 LAB
ANION GAP SERPL CALC-SCNC: 12 MMOL/L — SIGNIFICANT CHANGE UP (ref 5–17)
BUN SERPL-MCNC: 18 MG/DL — SIGNIFICANT CHANGE UP (ref 7–23)
CALCIUM SERPL-MCNC: 9.2 MG/DL — SIGNIFICANT CHANGE UP (ref 8.4–10.5)
CHLORIDE SERPL-SCNC: 98 MMOL/L — SIGNIFICANT CHANGE UP (ref 96–108)
CO2 SERPL-SCNC: 26 MMOL/L — SIGNIFICANT CHANGE UP (ref 22–31)
CREAT SERPL-MCNC: 0.59 MG/DL — SIGNIFICANT CHANGE UP (ref 0.5–1.3)
EGFR: 118 ML/MIN/1.73M2 — SIGNIFICANT CHANGE UP
GLUCOSE SERPL-MCNC: 86 MG/DL — SIGNIFICANT CHANGE UP (ref 70–99)
HCT VFR BLD CALC: 39.3 % — SIGNIFICANT CHANGE UP (ref 34.5–45)
HGB BLD-MCNC: 12.4 G/DL — SIGNIFICANT CHANGE UP (ref 11.5–15.5)
MAGNESIUM SERPL-MCNC: 1.9 MG/DL — SIGNIFICANT CHANGE UP (ref 1.6–2.6)
MCHC RBC-ENTMCNC: 28.4 PG — SIGNIFICANT CHANGE UP (ref 27–34)
MCHC RBC-ENTMCNC: 31.6 GM/DL — LOW (ref 32–36)
MCV RBC AUTO: 89.9 FL — SIGNIFICANT CHANGE UP (ref 80–100)
NRBC # BLD: 0 /100 WBCS — SIGNIFICANT CHANGE UP (ref 0–0)
PHOSPHATE SERPL-MCNC: 3.6 MG/DL — SIGNIFICANT CHANGE UP (ref 2.5–4.5)
PLATELET # BLD AUTO: 438 K/UL — HIGH (ref 150–400)
POTASSIUM SERPL-MCNC: 4.2 MMOL/L — SIGNIFICANT CHANGE UP (ref 3.5–5.3)
POTASSIUM SERPL-SCNC: 4.2 MMOL/L — SIGNIFICANT CHANGE UP (ref 3.5–5.3)
RBC # BLD: 4.37 M/UL — SIGNIFICANT CHANGE UP (ref 3.8–5.2)
RBC # FLD: 12.8 % — SIGNIFICANT CHANGE UP (ref 10.3–14.5)
SODIUM SERPL-SCNC: 136 MMOL/L — SIGNIFICANT CHANGE UP (ref 135–145)
VANCOMYCIN TROUGH SERPL-MCNC: 19.7 UG/ML — SIGNIFICANT CHANGE UP (ref 10–20)
WBC # BLD: 9.28 K/UL — SIGNIFICANT CHANGE UP (ref 3.8–10.5)
WBC # FLD AUTO: 9.28 K/UL — SIGNIFICANT CHANGE UP (ref 3.8–10.5)

## 2023-03-11 PROCEDURE — 99232 SBSQ HOSP IP/OBS MODERATE 35: CPT

## 2023-03-11 RX ORDER — VANCOMYCIN HCL 1 G
1500 VIAL (EA) INTRAVENOUS EVERY 8 HOURS
Refills: 0 | Status: DISCONTINUED | OUTPATIENT
Start: 2023-03-11 | End: 2023-03-11

## 2023-03-11 RX ORDER — VANCOMYCIN HCL 1 G
1250 VIAL (EA) INTRAVENOUS EVERY 8 HOURS
Refills: 0 | Status: COMPLETED | OUTPATIENT
Start: 2023-03-11 | End: 2023-03-12

## 2023-03-11 RX ADMIN — HYDROMORPHONE HYDROCHLORIDE 1 MILLIGRAM(S): 2 INJECTION INTRAMUSCULAR; INTRAVENOUS; SUBCUTANEOUS at 11:06

## 2023-03-11 RX ADMIN — MEROPENEM 280 MILLIGRAM(S): 1 INJECTION INTRAVENOUS at 22:43

## 2023-03-11 RX ADMIN — NEBIVOLOL HYDROCHLORIDE 10 MILLIGRAM(S): 5 TABLET ORAL at 05:58

## 2023-03-11 RX ADMIN — HYDROMORPHONE HYDROCHLORIDE 1 MILLIGRAM(S): 2 INJECTION INTRAMUSCULAR; INTRAVENOUS; SUBCUTANEOUS at 19:09

## 2023-03-11 RX ADMIN — HYDROMORPHONE HYDROCHLORIDE 1 MILLIGRAM(S): 2 INJECTION INTRAMUSCULAR; INTRAVENOUS; SUBCUTANEOUS at 12:06

## 2023-03-11 RX ADMIN — HYDROMORPHONE HYDROCHLORIDE 1 MILLIGRAM(S): 2 INJECTION INTRAMUSCULAR; INTRAVENOUS; SUBCUTANEOUS at 19:53

## 2023-03-11 RX ADMIN — HYDROMORPHONE HYDROCHLORIDE 1 MILLIGRAM(S): 2 INJECTION INTRAMUSCULAR; INTRAVENOUS; SUBCUTANEOUS at 16:04

## 2023-03-11 RX ADMIN — MEROPENEM 280 MILLIGRAM(S): 1 INJECTION INTRAVENOUS at 07:07

## 2023-03-11 RX ADMIN — ENOXAPARIN SODIUM 40 MILLIGRAM(S): 100 INJECTION SUBCUTANEOUS at 22:19

## 2023-03-11 RX ADMIN — HYDROMORPHONE HYDROCHLORIDE 1 MILLIGRAM(S): 2 INJECTION INTRAMUSCULAR; INTRAVENOUS; SUBCUTANEOUS at 07:36

## 2023-03-11 RX ADMIN — HYDROMORPHONE HYDROCHLORIDE 1 MILLIGRAM(S): 2 INJECTION INTRAMUSCULAR; INTRAVENOUS; SUBCUTANEOUS at 23:41

## 2023-03-11 RX ADMIN — NEBIVOLOL HYDROCHLORIDE 10 MILLIGRAM(S): 5 TABLET ORAL at 18:28

## 2023-03-11 RX ADMIN — Medication 300 MILLIGRAM(S): at 00:04

## 2023-03-11 RX ADMIN — HYDROMORPHONE HYDROCHLORIDE 1 MILLIGRAM(S): 2 INJECTION INTRAMUSCULAR; INTRAVENOUS; SUBCUTANEOUS at 02:54

## 2023-03-11 RX ADMIN — HYDROMORPHONE HYDROCHLORIDE 1 MILLIGRAM(S): 2 INJECTION INTRAMUSCULAR; INTRAVENOUS; SUBCUTANEOUS at 02:24

## 2023-03-11 RX ADMIN — ENOXAPARIN SODIUM 40 MILLIGRAM(S): 100 INJECTION SUBCUTANEOUS at 11:06

## 2023-03-11 RX ADMIN — Medication 166.67 MILLIGRAM(S): at 23:41

## 2023-03-11 RX ADMIN — HYDROMORPHONE HYDROCHLORIDE 1 MILLIGRAM(S): 2 INJECTION INTRAMUSCULAR; INTRAVENOUS; SUBCUTANEOUS at 15:08

## 2023-03-11 RX ADMIN — CHLORHEXIDINE GLUCONATE 1 APPLICATION(S): 213 SOLUTION TOPICAL at 11:13

## 2023-03-11 RX ADMIN — GABAPENTIN 800 MILLIGRAM(S): 400 CAPSULE ORAL at 15:08

## 2023-03-11 RX ADMIN — Medication 300 MILLIGRAM(S): at 12:28

## 2023-03-11 RX ADMIN — MEROPENEM 280 MILLIGRAM(S): 1 INJECTION INTRAVENOUS at 15:09

## 2023-03-11 RX ADMIN — GABAPENTIN 100 MILLIGRAM(S): 400 CAPSULE ORAL at 11:06

## 2023-03-11 RX ADMIN — HYDROMORPHONE HYDROCHLORIDE 1 MILLIGRAM(S): 2 INJECTION INTRAMUSCULAR; INTRAVENOUS; SUBCUTANEOUS at 07:06

## 2023-03-11 NOTE — PROGRESS NOTE ADULT - PROBLEM SELECTOR PLAN 1
3/4 SIRS (Fever 101 at home, Tachy, elevated WBC). Possible source (s) PICC line and/or Left foot ulcer. Pt s/p recent dc on 2/7/23 on IV abx (Dapto 800 q24hrs and Cefepime 2g q8hrs) via PICC (placed on 2/3/23) for L foot infx.   Afebrile, HD stable on admission. Reports taking Tylenol at home prior to presentation. LA wnl.  PICC site c/d/i. PICC. L foot + new redness around shallow ulcer on L foot X 1 day. Denies URI s/s, cough, CP, SOB, abd pain, n/v/d/c and dysuria. CXR neg.  Per ID source is likely the foot and PICC cane stay in. Per ID switch from dapto to linezolid BCx ngtd. on 3/6, switched from linezolid to vanc.     Plan:   - Vanc trough therapeutic  - c/w vanc 1500mg q8hr (goal 11-17)  - c/w Meropenem 2gm IV QD  - f/u ID recs  - if no clinical improvement will recall Dr. Teran/Wound team 3/4 SIRS (Fever 101 at home, Tachy, elevated WBC). Possible source (s) PICC line and/or Left foot ulcer. Pt s/p recent dc on 2/7/23 on IV abx (Dapto 800 q24hrs and Cefepime 2g q8hrs) via PICC (placed on 2/3/23) for L foot infx.   Afebrile, HD stable on admission. Reports taking Tylenol at home prior to presentation. LA wnl.  PICC site c/d/i. PICC. L foot + new redness around shallow ulcer on L foot X 1 day. Denies URI s/s, cough, CP, SOB, abd pain, n/v/d/c and dysuria. CXR neg.  Per ID source is likely the foot and PICC cane stay in. Per ID switch from dapto to linezolid BCx ngtd. on 3/6, switched from linezolid to vanc.     Plan:   - Vanc trough therapeutic  - vanc dose adjustment per ID  - c/w Meropenem 2gm IV QD  - f/u ID recs  - if no clinical improvement will recall Dr. Teran/Wound team

## 2023-03-11 NOTE — PROGRESS NOTE ADULT - SUBJECTIVE AND OBJECTIVE BOX
INTERVAL HPI/OVERNIGHT EVENTS:    CONSTITUTIONAL:  Negative fever or chills, feels well, good appetite  EYES:  Negative  blurry vision or double vision  CARDIOVASCULAR:  Negative for chest pain or palpitations  RESPIRATORY:  Negative for cough, wheezing, or SOB   GASTROINTESTINAL:  Negative for nausea, vomiting, diarrhea, constipation, or abdominal pain  GENITOURINARY:  Negative frequency, urgency or dysuria  NEUROLOGIC:  No headache, confusion, dizziness, lightheadedness      ANTIBIOTICS/RELEVANT:    MEDICATIONS  (STANDING):  chlorhexidine 2% Cloths 1 Application(s) Topical daily  enoxaparin Injectable 40 milliGRAM(s) SubCutaneous every 12 hours  gabapentin 800 milliGRAM(s) Oral daily  gabapentin 100 milliGRAM(s) Oral daily  meropenem  IVPB 2000 milliGRAM(s) IV Intermittent every 8 hours  nebivolol 10 milliGRAM(s) Oral <User Schedule>  polyethylene glycol 3350 17 Gram(s) Oral daily  senna 2 Tablet(s) Oral at bedtime  vancomycin  IVPB 1500 milliGRAM(s) IV Intermittent every 8 hours    MEDICATIONS  (PRN):  HYDROmorphone  Injectable 1 milliGRAM(s) IV Push every 4 hours PRN Severe Pain (7 - 10)        Vital Signs Last 24 Hrs  T(C): 36.6 (11 Mar 2023 09:20), Max: 36.7 (10 Mar 2023 20:54)  T(F): 97.9 (11 Mar 2023 09:20), Max: 98 (10 Mar 2023 20:54)  HR: 77 (11 Mar 2023 09:20) (77 - 99)  BP: 133/89 (11 Mar 2023 09:20) (116/80 - 137/88)  BP(mean): --  RR: 18 (11 Mar 2023 09:20) (17 - 18)  SpO2: 95% (11 Mar 2023 09:20) (95% - 96%)    Parameters below as of 11 Mar 2023 09:20  Patient On (Oxygen Delivery Method): room air        PHYSICAL EXAM:  Constitutional:  non-toxic, no distress  Eyes:ANTOINETTE, EOMI  Ear/Nose/Throat: no oral lesion, no sinus tenderness on percussion	  Neck:  supple  Respiratory: CTA chanel  Cardiovascular: S1S2 RRR, no murmurs  Gastrointestinal:soft, (+) BS, no HSM  Extremities:  left ankle with ulceration, no active drainage, mild surrounding erythema   Vascular: DP Pulse:	right normal; left normal      LABS:                        12.4   9.28  )-----------( 438      ( 11 Mar 2023 07:52 )             39.3     03-11    136  |  98  |  18  ----------------------------<  86  4.2   |  26  |  0.59    Ca    9.2      11 Mar 2023 07:52  Phos  3.6     03-11  Mg     1.9     03-11            MICROBIOLOGY:    Culture - Blood (02.27.23 @ 23:41)    Specimen Source: .Blood Blood    Culture Results:   No growth at 5 days.        RADIOLOGY & ADDITIONAL STUDIES:

## 2023-03-11 NOTE — PROGRESS NOTE ADULT - SUBJECTIVE AND OBJECTIVE BOX
Still having pain in her foot.  Not sure if there has been much improvement.  Denies any symptoms from being on antibiotics, appears to be tolerating well.     Physical exam  Gen - no acute distress, lying in bed  HEENT- normocephalic, at, MMM  Cards- rrr, no mrg  Pulm - ctab, no wheeze  Ab- soft, ntnd, normoactive bowel sounds  Ext- improving redness around L foot ulcer, but has not closed yet Still having pain in her foot.  Not sure if there has been much improvement.  Denies any symptoms from being on antibiotics, appears to be tolerating well.     Physical exam  Gen - no acute distress, lying in bed  HEENT- normocephalic, at, MMM  Cards- rrr, no mrg  Pulm - ctab, no wheeze  Ab- soft, ntnd, normoactive bowel sounds  Ext- improving redness around L foot ulcer, but has not closed yet    MEDICATIONS  (STANDING):  chlorhexidine 2% Cloths 1 Application(s) Topical daily  enoxaparin Injectable 40 milliGRAM(s) SubCutaneous every 12 hours  gabapentin 800 milliGRAM(s) Oral daily  gabapentin 100 milliGRAM(s) Oral daily  meropenem  IVPB 2000 milliGRAM(s) IV Intermittent every 8 hours  nebivolol 10 milliGRAM(s) Oral <User Schedule>  polyethylene glycol 3350 17 Gram(s) Oral daily  senna 2 Tablet(s) Oral at bedtime  vancomycin  IVPB 1500 milliGRAM(s) IV Intermittent every 8 hours    MEDICATIONS  (PRN):  HYDROmorphone  Injectable 1 milliGRAM(s) IV Push every 4 hours PRN Severe Pain (7 - 10)        Vital Signs Last 24 Hrs  T(C): 36.6 (11 Mar 2023 09:20), Max: 36.7 (10 Mar 2023 20:54)  T(F): 97.9 (11 Mar 2023 09:20), Max: 98 (10 Mar 2023 20:54)  HR: 77 (11 Mar 2023 09:20) (77 - 99)  BP: 133/89 (11 Mar 2023 09:20) (116/80 - 137/88)  BP(mean): --  RR: 18 (11 Mar 2023 09:20) (17 - 18)  SpO2: 95% (11 Mar 2023 09:20) (95% - 96%)    Parameters below as of 11 Mar 2023 09:20  Patient On (Oxygen Delivery Method): room air

## 2023-03-11 NOTE — PROGRESS NOTE ADULT - NSPROGADDITIONALINFOA_GEN_ALL_CORE
>35 minutes spent on this encounter, including face to face with patient, care coordination and documentation.

## 2023-03-11 NOTE — PROGRESS NOTE ADULT - ASSESSMENT
IMPRESSION:  36 y/o female with HTN and AVM L foot with recurrent L foot nonpurulent ulcer infection presenting with worsening pain at ulcer site, was initially treated with Meropenem and Daptomycin, transitioned to Annie and Linezolid and now Meropenem and Vancomycin.  Comparing it to pictures, it appears to have improved today with resolution of drainage and improvement in erythema    Recommend:  1.  Continue Vancomycin. Given elevated trough can change dose to 1250 mg IV q8hrs  2. Continue Meropenem 2 grams IV q8hrs  3.  Monitor clinically     ID team 1 will follow.  I will cover the service this weekend.  Dr. Khan returns on 3/13

## 2023-03-11 NOTE — PROGRESS NOTE ADULT - PROBLEM SELECTOR PLAN 2
Chronic foot ulcer in the setting of AVM malformation. L foot + new redness around shallow ulcer on L foot X 1 day w/ ángel pain foot pain today. Home meds: Dapto/ Cefepime, Gabapentin 100 in am and 800 at 4pm. Percocet 10 q4hrs PRN. Dr Teran office notified, will follow patient.     - c/w dilaudid q4  -bowel reg   -see sepsis for plan Chronic foot ulcer in the setting of AVM malformation. L foot + new redness around shallow ulcer on L foot X 1 day w/ ángel pain foot pain today. Home meds: Dapto/ Cefepime, Gabapentin 100 in am and 800 at 4pm. Percocet 10 q4hrs PRN. Dr Teran office notified, will follow patient.     - c/w dilaudid q4  -bowel reg   -see sepsis for plan  - follow up with Dr. Teran's office on Monday so he can see her in the hospital.

## 2023-03-12 LAB
ANION GAP SERPL CALC-SCNC: 13 MMOL/L — SIGNIFICANT CHANGE UP (ref 5–17)
BASOPHILS # BLD AUTO: 0.07 K/UL — SIGNIFICANT CHANGE UP (ref 0–0.2)
BASOPHILS NFR BLD AUTO: 0.7 % — SIGNIFICANT CHANGE UP (ref 0–2)
BUN SERPL-MCNC: 17 MG/DL — SIGNIFICANT CHANGE UP (ref 7–23)
CALCIUM SERPL-MCNC: 9.3 MG/DL — SIGNIFICANT CHANGE UP (ref 8.4–10.5)
CHLORIDE SERPL-SCNC: 100 MMOL/L — SIGNIFICANT CHANGE UP (ref 96–108)
CO2 SERPL-SCNC: 24 MMOL/L — SIGNIFICANT CHANGE UP (ref 22–31)
CREAT SERPL-MCNC: 0.61 MG/DL — SIGNIFICANT CHANGE UP (ref 0.5–1.3)
EGFR: 117 ML/MIN/1.73M2 — SIGNIFICANT CHANGE UP
EOSINOPHIL # BLD AUTO: 0.2 K/UL — SIGNIFICANT CHANGE UP (ref 0–0.5)
EOSINOPHIL NFR BLD AUTO: 2.1 % — SIGNIFICANT CHANGE UP (ref 0–6)
GLUCOSE SERPL-MCNC: 95 MG/DL — SIGNIFICANT CHANGE UP (ref 70–99)
HCT VFR BLD CALC: 39.1 % — SIGNIFICANT CHANGE UP (ref 34.5–45)
HGB BLD-MCNC: 12.4 G/DL — SIGNIFICANT CHANGE UP (ref 11.5–15.5)
IMM GRANULOCYTES NFR BLD AUTO: 0.7 % — SIGNIFICANT CHANGE UP (ref 0–0.9)
LYMPHOCYTES # BLD AUTO: 2.62 K/UL — SIGNIFICANT CHANGE UP (ref 1–3.3)
LYMPHOCYTES # BLD AUTO: 28.1 % — SIGNIFICANT CHANGE UP (ref 13–44)
MAGNESIUM SERPL-MCNC: 1.9 MG/DL — SIGNIFICANT CHANGE UP (ref 1.6–2.6)
MCHC RBC-ENTMCNC: 28.5 PG — SIGNIFICANT CHANGE UP (ref 27–34)
MCHC RBC-ENTMCNC: 31.7 GM/DL — LOW (ref 32–36)
MCV RBC AUTO: 89.9 FL — SIGNIFICANT CHANGE UP (ref 80–100)
MONOCYTES # BLD AUTO: 0.79 K/UL — SIGNIFICANT CHANGE UP (ref 0–0.9)
MONOCYTES NFR BLD AUTO: 8.5 % — SIGNIFICANT CHANGE UP (ref 2–14)
NEUTROPHILS # BLD AUTO: 5.59 K/UL — SIGNIFICANT CHANGE UP (ref 1.8–7.4)
NEUTROPHILS NFR BLD AUTO: 59.9 % — SIGNIFICANT CHANGE UP (ref 43–77)
NRBC # BLD: 0 /100 WBCS — SIGNIFICANT CHANGE UP (ref 0–0)
PHOSPHATE SERPL-MCNC: 3.5 MG/DL — SIGNIFICANT CHANGE UP (ref 2.5–4.5)
PLATELET # BLD AUTO: 436 K/UL — HIGH (ref 150–400)
POTASSIUM SERPL-MCNC: 4.3 MMOL/L — SIGNIFICANT CHANGE UP (ref 3.5–5.3)
POTASSIUM SERPL-SCNC: 4.3 MMOL/L — SIGNIFICANT CHANGE UP (ref 3.5–5.3)
RBC # BLD: 4.35 M/UL — SIGNIFICANT CHANGE UP (ref 3.8–5.2)
RBC # FLD: 12.8 % — SIGNIFICANT CHANGE UP (ref 10.3–14.5)
SODIUM SERPL-SCNC: 137 MMOL/L — SIGNIFICANT CHANGE UP (ref 135–145)
VANCOMYCIN TROUGH SERPL-MCNC: 23.6 UG/ML — HIGH (ref 10–20)
WBC # BLD: 9.34 K/UL — SIGNIFICANT CHANGE UP (ref 3.8–10.5)
WBC # FLD AUTO: 9.34 K/UL — SIGNIFICANT CHANGE UP (ref 3.8–10.5)

## 2023-03-12 PROCEDURE — 99233 SBSQ HOSP IP/OBS HIGH 50: CPT

## 2023-03-12 PROCEDURE — 99232 SBSQ HOSP IP/OBS MODERATE 35: CPT

## 2023-03-12 RX ORDER — VANCOMYCIN HCL 1 G
1250 VIAL (EA) INTRAVENOUS EVERY 8 HOURS
Refills: 0 | Status: DISCONTINUED | OUTPATIENT
Start: 2023-03-12 | End: 2023-03-13

## 2023-03-12 RX ORDER — VANCOMYCIN HCL 1 G
1000 VIAL (EA) INTRAVENOUS EVERY 8 HOURS
Refills: 0 | Status: DISCONTINUED | OUTPATIENT
Start: 2023-03-12 | End: 2023-03-12

## 2023-03-12 RX ADMIN — GABAPENTIN 800 MILLIGRAM(S): 400 CAPSULE ORAL at 16:19

## 2023-03-12 RX ADMIN — GABAPENTIN 100 MILLIGRAM(S): 400 CAPSULE ORAL at 12:16

## 2023-03-12 RX ADMIN — Medication 166.67 MILLIGRAM(S): at 22:48

## 2023-03-12 RX ADMIN — ENOXAPARIN SODIUM 40 MILLIGRAM(S): 100 INJECTION SUBCUTANEOUS at 23:05

## 2023-03-12 RX ADMIN — CHLORHEXIDINE GLUCONATE 1 APPLICATION(S): 213 SOLUTION TOPICAL at 12:17

## 2023-03-12 RX ADMIN — HYDROMORPHONE HYDROCHLORIDE 1 MILLIGRAM(S): 2 INJECTION INTRAMUSCULAR; INTRAVENOUS; SUBCUTANEOUS at 03:59

## 2023-03-12 RX ADMIN — HYDROMORPHONE HYDROCHLORIDE 1 MILLIGRAM(S): 2 INJECTION INTRAMUSCULAR; INTRAVENOUS; SUBCUTANEOUS at 13:16

## 2023-03-12 RX ADMIN — HYDROMORPHONE HYDROCHLORIDE 1 MILLIGRAM(S): 2 INJECTION INTRAMUSCULAR; INTRAVENOUS; SUBCUTANEOUS at 17:34

## 2023-03-12 RX ADMIN — HYDROMORPHONE HYDROCHLORIDE 1 MILLIGRAM(S): 2 INJECTION INTRAMUSCULAR; INTRAVENOUS; SUBCUTANEOUS at 21:43

## 2023-03-12 RX ADMIN — HYDROMORPHONE HYDROCHLORIDE 1 MILLIGRAM(S): 2 INJECTION INTRAMUSCULAR; INTRAVENOUS; SUBCUTANEOUS at 12:16

## 2023-03-12 RX ADMIN — NEBIVOLOL HYDROCHLORIDE 10 MILLIGRAM(S): 5 TABLET ORAL at 18:44

## 2023-03-12 RX ADMIN — Medication 250 MILLIGRAM(S): at 14:07

## 2023-03-12 RX ADMIN — Medication 166.67 MILLIGRAM(S): at 07:42

## 2023-03-12 RX ADMIN — NEBIVOLOL HYDROCHLORIDE 10 MILLIGRAM(S): 5 TABLET ORAL at 06:48

## 2023-03-12 RX ADMIN — ENOXAPARIN SODIUM 40 MILLIGRAM(S): 100 INJECTION SUBCUTANEOUS at 12:17

## 2023-03-12 RX ADMIN — HYDROMORPHONE HYDROCHLORIDE 1 MILLIGRAM(S): 2 INJECTION INTRAMUSCULAR; INTRAVENOUS; SUBCUTANEOUS at 09:03

## 2023-03-12 RX ADMIN — HYDROMORPHONE HYDROCHLORIDE 1 MILLIGRAM(S): 2 INJECTION INTRAMUSCULAR; INTRAVENOUS; SUBCUTANEOUS at 16:18

## 2023-03-12 RX ADMIN — HYDROMORPHONE HYDROCHLORIDE 1 MILLIGRAM(S): 2 INJECTION INTRAMUSCULAR; INTRAVENOUS; SUBCUTANEOUS at 08:03

## 2023-03-12 RX ADMIN — HYDROMORPHONE HYDROCHLORIDE 1 MILLIGRAM(S): 2 INJECTION INTRAMUSCULAR; INTRAVENOUS; SUBCUTANEOUS at 21:22

## 2023-03-12 RX ADMIN — MEROPENEM 280 MILLIGRAM(S): 1 INJECTION INTRAVENOUS at 06:48

## 2023-03-12 RX ADMIN — HYDROMORPHONE HYDROCHLORIDE 1 MILLIGRAM(S): 2 INJECTION INTRAMUSCULAR; INTRAVENOUS; SUBCUTANEOUS at 00:48

## 2023-03-12 RX ADMIN — MEROPENEM 280 MILLIGRAM(S): 1 INJECTION INTRAVENOUS at 16:19

## 2023-03-12 NOTE — PROGRESS NOTE ADULT - PROBLEM SELECTOR PLAN 1
3/4 SIRS (Fever 101 at home, Tachy, elevated WBC). Possible source (s) PICC line and/or Left foot ulcer. Pt s/p recent dc on 2/7/23 on IV abx (Dapto 800 q24hrs and Cefepime 2g q8hrs) via PICC (placed on 2/3/23) for L foot infx.   Afebrile, HD stable on admission. Reports taking Tylenol at home prior to presentation. LA wnl.  PICC site c/d/i. PICC. L foot + new redness around shallow ulcer on L foot X 1 day. Denies URI s/s, cough, CP, SOB, abd pain, n/v/d/c and dysuria. CXR neg.  Per ID source is likely the foot and PICC cane stay in. Per ID switch from dapto to linezolid BCx ngtd. on 3/6, switched from linezolid to vanc.     Plan:   - Vanc trough elevated, switched to Vanc 1250 3/11  - f/u 10AM vanc trough and redose Vanc (goal 11-17)  - c/w Meropenem 2gm IV QD  - f/u ID recs  - Dr. Teran's team alerted to see patient on Monday

## 2023-03-12 NOTE — PROGRESS NOTE ADULT - SUBJECTIVE AND OBJECTIVE BOX
INTERVAL HPI/OVERNIGHT EVENTS:    Coverage for Dr. Khan (Team 1)    Patient was seen and examined at bedside.  Reports improvement in LLE cellulitis     CONSTITUTIONAL:  Negative fever or chills, feels well, good appetite  EYES:  Negative  blurry vision or double vision  CARDIOVASCULAR:  Negative for chest pain or palpitations  RESPIRATORY:  Negative for cough, wheezing, or SOB   GASTROINTESTINAL:  Negative for nausea, vomiting, diarrhea, constipation, or abdominal pain  GENITOURINARY:  Negative frequency, urgency or dysuria  NEUROLOGIC:  No headache, confusion, dizziness, lightheadedness      ANTIBIOTICS/RELEVANT:    MEDICATIONS  (STANDING):  chlorhexidine 2% Cloths 1 Application(s) Topical daily  enoxaparin Injectable 40 milliGRAM(s) SubCutaneous every 12 hours  gabapentin 800 milliGRAM(s) Oral daily  gabapentin 100 milliGRAM(s) Oral daily  meropenem  IVPB 2000 milliGRAM(s) IV Intermittent every 8 hours  nebivolol 10 milliGRAM(s) Oral <User Schedule>  polyethylene glycol 3350 17 Gram(s) Oral daily  senna 2 Tablet(s) Oral at bedtime  vancomycin  IVPB 1000 milliGRAM(s) IV Intermittent every 8 hours    MEDICATIONS  (PRN):  HYDROmorphone  Injectable 1 milliGRAM(s) IV Push every 4 hours PRN Severe Pain (7 - 10)        Vital Signs Last 24 Hrs  T(C): 36.8 (12 Mar 2023 10:09), Max: 37.1 (11 Mar 2023 21:19)  T(F): 98.2 (12 Mar 2023 10:09), Max: 98.8 (11 Mar 2023 21:19)  HR: 86 (12 Mar 2023 10:09) (86 - 96)  BP: 145/86 (12 Mar 2023 10:09) (103/75 - 145/86)  BP(mean): --  RR: 16 (12 Mar 2023 10:09) (16 - 18)  SpO2: 97% (12 Mar 2023 10:09) (95% - 97%)    Parameters below as of 12 Mar 2023 10:09  Patient On (Oxygen Delivery Method): room air        PHYSICAL EXAM:  Constitutional:  non-toxic, no distress  Eyes:  no icterus   Ear/Nose/Throat: no oral lesion, no sinus tenderness on percussion	  Neck:  supple  Respiratory: CTA chanel  Cardiovascular: S1S2 RRR, no murmurs  Gastrointestinal:soft, (+) BS, no HSM  Extremities:  left ankle with ulceration on lateral side. Faint surrounding erythema.  No warmth to touch, no drainage  Vascular: DP Pulse:	right normal; left normal      LABS:                        12.4   9.34  )-----------( 436      ( 12 Mar 2023 05:30 )             39.1     03-12    137  |  100  |  17  ----------------------------<  95  4.3   |  24  |  0.61    Ca    9.3      12 Mar 2023 05:30  Phos  3.5     03-12  Mg     1.9     03-12            MICROBIOLOGY:    Culture - Blood (02.27.23 @ 23:41)    Specimen Source: .Blood Blood    Culture Results:   No growth at 5 days.        RADIOLOGY & ADDITIONAL STUDIES:

## 2023-03-12 NOTE — PROGRESS NOTE ADULT - SUBJECTIVE AND OBJECTIVE BOX
OVERNIGHT EVENTS: ISABELLE    SUBJECTIVE / INTERVAL HPI: Patient seen and examined at bedside.     VITAL SIGNS:  Vital Signs Last 24 Hrs  T(C): 36.8 (12 Mar 2023 05:11), Max: 37.1 (11 Mar 2023 21:19)  T(F): 98.3 (12 Mar 2023 05:11), Max: 98.8 (11 Mar 2023 21:19)  HR: 88 (12 Mar 2023 05:11) (77 - 96)  BP: 103/75 (12 Mar 2023 05:11) (103/75 - 133/89)  BP(mean): --  RR: 18 (12 Mar 2023 05:11) (18 - 18)  SpO2: 95% (12 Mar 2023 05:11) (95% - 97%)    Parameters below as of 12 Mar 2023 05:11  Patient On (Oxygen Delivery Method): room air      I&O's Summary      PHYSICAL EXAM:    General: NAD, laying in bed  HEENT: PERRL/ EOMI, no scleral icterus, MMM  Neck: Supple, no JVD  Respiratory: lungs CTA b/l, no wheezes/crackles, no accessory muscle use  Cardiovascular: Regular rhythm/rate; +S1 +S2, no murmurs  Gastrointestinal: Soft, NTND, normoactive BS, no rebound, no guarding  Genitourinary: no suprapubic tenderness  Extremities: + L foot shallow clean base ulcer w/ mild surrounding erythema, WWP, no clubbing or cyanosis; no peripheral edema  Neurological: A&Ox3, no gross focal deficits, follows commands  Skin: Normal temperature, warm, dry      MEDICATIONS:  MEDICATIONS  (STANDING):  chlorhexidine 2% Cloths 1 Application(s) Topical daily  enoxaparin Injectable 40 milliGRAM(s) SubCutaneous every 12 hours  gabapentin 800 milliGRAM(s) Oral daily  gabapentin 100 milliGRAM(s) Oral daily  meropenem  IVPB 2000 milliGRAM(s) IV Intermittent every 8 hours  nebivolol 10 milliGRAM(s) Oral <User Schedule>  polyethylene glycol 3350 17 Gram(s) Oral daily  senna 2 Tablet(s) Oral at bedtime  vancomycin  IVPB 1250 milliGRAM(s) IV Intermittent every 8 hours    MEDICATIONS  (PRN):  HYDROmorphone  Injectable 1 milliGRAM(s) IV Push every 4 hours PRN Severe Pain (7 - 10)      ALLERGIES:  Allergies    amoxicillin (Angioedema)  ciprofloxacin (Rash)  hydrochlorothiazide (Hives)  latex (Rash)  lisinopril (Angioedema; Rash; Hives)  morphine (Rash)  penicillin (Rash)    Intolerances        LABS:                        12.4   9.28  )-----------( 438      ( 11 Mar 2023 07:52 )             39.3     03-11    136  |  98  |  18  ----------------------------<  86  4.2   |  26  |  0.59    Ca    9.2      11 Mar 2023 07:52  Phos  3.6     03-11  Mg     1.9     03-11          CAPILLARY BLOOD GLUCOSE          RADIOLOGY & ADDITIONAL TESTS: Reviewed.   OVERNIGHT EVENTS: ISABELLE    SUBJECTIVE / INTERVAL HPI: Patient seen and examined at bedside. Pain controlled. Patient herself reached out to Dr. Teran via email. Endorses consistent BMs. No acute complaints at this time. ROS otherwise negative.     VITAL SIGNS:  Vital Signs Last 24 Hrs  T(C): 36.8 (12 Mar 2023 05:11), Max: 37.1 (11 Mar 2023 21:19)  T(F): 98.3 (12 Mar 2023 05:11), Max: 98.8 (11 Mar 2023 21:19)  HR: 88 (12 Mar 2023 05:11) (77 - 96)  BP: 103/75 (12 Mar 2023 05:11) (103/75 - 133/89)  BP(mean): --  RR: 18 (12 Mar 2023 05:11) (18 - 18)  SpO2: 95% (12 Mar 2023 05:11) (95% - 97%)    Parameters below as of 12 Mar 2023 05:11  Patient On (Oxygen Delivery Method): room air      I&O's Summary      PHYSICAL EXAM:    General: NAD, laying in bed  HEENT: PERRL/ EOMI, no scleral icterus, MMM  Neck: Supple, no JVD  Respiratory: lungs CTA b/l, no wheezes/crackles, no accessory muscle use  Cardiovascular: Regular rhythm/rate; +S1 +S2, no murmurs  Gastrointestinal: Soft, NTND, normoactive BS, no rebound, no guarding  Genitourinary: no suprapubic tenderness  Extremities: + L foot shallow clean base ulcer w/ mild surrounding erythema, WWP, no clubbing or cyanosis; no peripheral edema  Neurological: A&Ox3, no gross focal deficits, follows commands  Skin: Normal temperature, warm, dry      MEDICATIONS:  MEDICATIONS  (STANDING):  chlorhexidine 2% Cloths 1 Application(s) Topical daily  enoxaparin Injectable 40 milliGRAM(s) SubCutaneous every 12 hours  gabapentin 800 milliGRAM(s) Oral daily  gabapentin 100 milliGRAM(s) Oral daily  meropenem  IVPB 2000 milliGRAM(s) IV Intermittent every 8 hours  nebivolol 10 milliGRAM(s) Oral <User Schedule>  polyethylene glycol 3350 17 Gram(s) Oral daily  senna 2 Tablet(s) Oral at bedtime  vancomycin  IVPB 1250 milliGRAM(s) IV Intermittent every 8 hours    MEDICATIONS  (PRN):  HYDROmorphone  Injectable 1 milliGRAM(s) IV Push every 4 hours PRN Severe Pain (7 - 10)      ALLERGIES:  Allergies    amoxicillin (Angioedema)  ciprofloxacin (Rash)  hydrochlorothiazide (Hives)  latex (Rash)  lisinopril (Angioedema; Rash; Hives)  morphine (Rash)  penicillin (Rash)    Intolerances        LABS:                        12.4   9.28  )-----------( 438      ( 11 Mar 2023 07:52 )             39.3     03-11    136  |  98  |  18  ----------------------------<  86  4.2   |  26  |  0.59    Ca    9.2      11 Mar 2023 07:52  Phos  3.6     03-11  Mg     1.9     03-11          CAPILLARY BLOOD GLUCOSE          RADIOLOGY & ADDITIONAL TESTS: Reviewed.

## 2023-03-12 NOTE — PROGRESS NOTE ADULT - ASSESSMENT
IMPRESSION:  38 y/o female with HTN and AVM L foot with recurrent L foot nonpurulent ulcer infection presenting with worsening pain at ulcer site, was initially treated with Meropenem and Daptomycin, transitioned to Annie and Linezolid and now Meropenem and Vancomycin.  Comparing it to pictures, it appears to have improved today with resolution of drainage and improvement in erythema    Recommend:  1.  Continue Vancomycin. Given elevated trough can change dose to 1250 mg IV q8hrs  2. Continue Meropenem 2 grams IV q8hrs  3.  Monitor clinically     ID team 1 will follow.  I will cover the service this weekend.  Dr. Khan returns on 3/13/23

## 2023-03-13 LAB
CRP SERPL-MCNC: 10 MG/L — HIGH (ref 0–4)
VANCOMYCIN TROUGH SERPL-MCNC: 16.2 UG/ML — SIGNIFICANT CHANGE UP (ref 10–20)

## 2023-03-13 PROCEDURE — 99232 SBSQ HOSP IP/OBS MODERATE 35: CPT | Mod: GC

## 2023-03-13 PROCEDURE — 99232 SBSQ HOSP IP/OBS MODERATE 35: CPT

## 2023-03-13 RX ORDER — VANCOMYCIN HCL 1 G
1250 VIAL (EA) INTRAVENOUS EVERY 8 HOURS
Refills: 0 | Status: COMPLETED | OUTPATIENT
Start: 2023-03-13 | End: 2023-03-14

## 2023-03-13 RX ORDER — VANCOMYCIN HCL 1 G
1250 VIAL (EA) INTRAVENOUS ONCE
Refills: 0 | Status: DISCONTINUED | OUTPATIENT
Start: 2023-03-13 | End: 2023-03-13

## 2023-03-13 RX ADMIN — HYDROMORPHONE HYDROCHLORIDE 1 MILLIGRAM(S): 2 INJECTION INTRAMUSCULAR; INTRAVENOUS; SUBCUTANEOUS at 14:30

## 2023-03-13 RX ADMIN — Medication 166.67 MILLIGRAM(S): at 15:48

## 2023-03-13 RX ADMIN — MEROPENEM 280 MILLIGRAM(S): 1 INJECTION INTRAVENOUS at 19:18

## 2023-03-13 RX ADMIN — MEROPENEM 280 MILLIGRAM(S): 1 INJECTION INTRAVENOUS at 00:34

## 2023-03-13 RX ADMIN — MEROPENEM 280 MILLIGRAM(S): 1 INJECTION INTRAVENOUS at 09:32

## 2023-03-13 RX ADMIN — HYDROMORPHONE HYDROCHLORIDE 1 MILLIGRAM(S): 2 INJECTION INTRAMUSCULAR; INTRAVENOUS; SUBCUTANEOUS at 01:33

## 2023-03-13 RX ADMIN — HYDROMORPHONE HYDROCHLORIDE 1 MILLIGRAM(S): 2 INJECTION INTRAMUSCULAR; INTRAVENOUS; SUBCUTANEOUS at 06:30

## 2023-03-13 RX ADMIN — HYDROMORPHONE HYDROCHLORIDE 1 MILLIGRAM(S): 2 INJECTION INTRAMUSCULAR; INTRAVENOUS; SUBCUTANEOUS at 10:19

## 2023-03-13 RX ADMIN — HYDROMORPHONE HYDROCHLORIDE 1 MILLIGRAM(S): 2 INJECTION INTRAMUSCULAR; INTRAVENOUS; SUBCUTANEOUS at 10:50

## 2023-03-13 RX ADMIN — HYDROMORPHONE HYDROCHLORIDE 1 MILLIGRAM(S): 2 INJECTION INTRAMUSCULAR; INTRAVENOUS; SUBCUTANEOUS at 18:46

## 2023-03-13 RX ADMIN — HYDROMORPHONE HYDROCHLORIDE 1 MILLIGRAM(S): 2 INJECTION INTRAMUSCULAR; INTRAVENOUS; SUBCUTANEOUS at 19:15

## 2023-03-13 RX ADMIN — NEBIVOLOL HYDROCHLORIDE 10 MILLIGRAM(S): 5 TABLET ORAL at 06:36

## 2023-03-13 RX ADMIN — ENOXAPARIN SODIUM 40 MILLIGRAM(S): 100 INJECTION SUBCUTANEOUS at 22:49

## 2023-03-13 RX ADMIN — GABAPENTIN 100 MILLIGRAM(S): 400 CAPSULE ORAL at 11:55

## 2023-03-13 RX ADMIN — GABAPENTIN 800 MILLIGRAM(S): 400 CAPSULE ORAL at 14:30

## 2023-03-13 RX ADMIN — HYDROMORPHONE HYDROCHLORIDE 1 MILLIGRAM(S): 2 INJECTION INTRAMUSCULAR; INTRAVENOUS; SUBCUTANEOUS at 06:09

## 2023-03-13 RX ADMIN — ENOXAPARIN SODIUM 40 MILLIGRAM(S): 100 INJECTION SUBCUTANEOUS at 11:55

## 2023-03-13 RX ADMIN — HYDROMORPHONE HYDROCHLORIDE 1 MILLIGRAM(S): 2 INJECTION INTRAMUSCULAR; INTRAVENOUS; SUBCUTANEOUS at 15:00

## 2023-03-13 RX ADMIN — HYDROMORPHONE HYDROCHLORIDE 1 MILLIGRAM(S): 2 INJECTION INTRAMUSCULAR; INTRAVENOUS; SUBCUTANEOUS at 22:49

## 2023-03-13 RX ADMIN — NEBIVOLOL HYDROCHLORIDE 10 MILLIGRAM(S): 5 TABLET ORAL at 17:42

## 2023-03-13 RX ADMIN — HYDROMORPHONE HYDROCHLORIDE 1 MILLIGRAM(S): 2 INJECTION INTRAMUSCULAR; INTRAVENOUS; SUBCUTANEOUS at 23:45

## 2023-03-13 RX ADMIN — CHLORHEXIDINE GLUCONATE 1 APPLICATION(S): 213 SOLUTION TOPICAL at 11:56

## 2023-03-13 RX ADMIN — Medication 166.67 MILLIGRAM(S): at 06:09

## 2023-03-13 RX ADMIN — HYDROMORPHONE HYDROCHLORIDE 1 MILLIGRAM(S): 2 INJECTION INTRAMUSCULAR; INTRAVENOUS; SUBCUTANEOUS at 01:48

## 2023-03-13 NOTE — PROGRESS NOTE ADULT - ASSESSMENT
36 y/o female with HTN and AVM L foot with recurrent L foot nonpurulent ulcer infection presenting with worsening pain at ulcer site, was initially treated with Meropenem and Daptomycin, transitioned to Annie and Linezolid. She has significant allergies to PCN and FQs.  Blood culture 2/27 negative.  She remains afebrile, leukocytosis improved from admission, ESR and CRP improving. Primary team anticipates discharge today. Currently on Meropenem 2 g IV q8h since 2/28 & Vancomycin 1250 q8hrs since 3/6.  - C/w Meropenem 2g q8h  - f/u 1pm Vancomycin trough and redose Vancomycin until next trough (before 4th dose)  - please ask Dr. Teran to see patient prior to discharge 36 y/o female with HTN and AVM L foot with recurrent L foot nonpurulent ulcer infection presenting with worsening pain at ulcer site, was initially treated with Meropenem and Daptomycin, transitioned to Annie and Linezolid. She has significant allergies to PCN and FQs.  Blood culture 2/27 negative.  She remains afebrile, leukocytosis improved from admission, ESR and CRP improving. Primary team anticipates discharge today. Currently on Meropenem 2 g IV q8h since 2/28 & Vancomycin 1250 q8hrs since 3/6.  - C/w Meropenem 2g q8h  - f/u 1pm Vancomycin trough and redose Vancomycin until next trough (before 4th dose)  - Total antibiotic course for 4 weeks (until 4/3)  - please ask Dr. Teran to see patient prior to discharge

## 2023-03-13 NOTE — PROGRESS NOTE ADULT - PROBLEM SELECTOR PLAN 1
3/4 SIRS (Fever 101 at home, Tachy, elevated WBC). Possible source (s) PICC line and/or Left foot ulcer. Pt s/p recent dc on 2/7/23 on IV abx (Dapto 800 q24hrs and Cefepime 2g q8hrs) via PICC (placed on 2/3/23) for L foot infx.   Afebrile, HD stable on admission. Reports taking Tylenol at home prior to presentation. LA wnl.  PICC site c/d/i. PICC. L foot + new redness around shallow ulcer on L foot X 1 day. Denies URI s/s, cough, CP, SOB, abd pain, n/v/d/c and dysuria. CXR neg.  Per ID source is likely the foot and PICC cane stay in. Per ID switch from dapto to linezolid BCx ngtd. on 3/6, switched from linezolid to vanc.     Plan:   - c/w Vanc [id number], f/u trough before 4th dose  - c/w Meropenem 2gm IV QD  - f/u ID recs  - While erythema is improving the ulcer still has not closed on appropriate antibiotics. Recommend Dr. Teran's team assess for possible further embolization during this hospital. Contacted office this morning 3/13

## 2023-03-13 NOTE — PROGRESS NOTE ADULT - SUBJECTIVE AND OBJECTIVE BOX
OVERNIGHT EVENTS:    SUBJECTIVE / INTERVAL HPI: Patient seen and examined at bedside.     VITAL SIGNS:  Vital Signs Last 24 Hrs  T(C): 36.5 (13 Mar 2023 04:54), Max: 36.7 (12 Mar 2023 20:52)  T(F): 97.7 (13 Mar 2023 04:54), Max: 98.1 (12 Mar 2023 20:52)  HR: 80 (13 Mar 2023 04:54) (80 - 95)  BP: 111/76 (13 Mar 2023 04:54) (111/76 - 130/79)  BP(mean): --  RR: 18 (13 Mar 2023 04:54) (17 - 18)  SpO2: 97% (13 Mar 2023 04:54) (96% - 97%)    Parameters below as of 13 Mar 2023 04:54  Patient On (Oxygen Delivery Method): room air      I&O's Summary      PHYSICAL EXAM:    General: WDWN  HEENT: NC/AT; PERRL, anicteric sclera; MMM  Neck: supple  Cardiovascular: +S1/S2; RRR  Respiratory: CTA B/L; no W/R/R  Gastrointestinal: soft, NT/ND; +BSx4  Extremities: WWP; no edema, clubbing or cyanosis  Vascular: 2+ radial, DP/PT pulses B/L  Neurological: AAOx3; no focal deficits    MEDICATIONS:  MEDICATIONS  (STANDING):  chlorhexidine 2% Cloths 1 Application(s) Topical daily  enoxaparin Injectable 40 milliGRAM(s) SubCutaneous every 12 hours  gabapentin 100 milliGRAM(s) Oral daily  gabapentin 800 milliGRAM(s) Oral daily  meropenem  IVPB 2000 milliGRAM(s) IV Intermittent every 8 hours  nebivolol 10 milliGRAM(s) Oral <User Schedule>  polyethylene glycol 3350 17 Gram(s) Oral daily  senna 2 Tablet(s) Oral at bedtime  vancomycin  IVPB 1250 milliGRAM(s) IV Intermittent every 8 hours    MEDICATIONS  (PRN):  HYDROmorphone  Injectable 1 milliGRAM(s) IV Push every 4 hours PRN Severe Pain (7 - 10)      ALLERGIES:  Allergies    amoxicillin (Angioedema)  ciprofloxacin (Rash)  hydrochlorothiazide (Hives)  latex (Rash)  lisinopril (Angioedema; Rash; Hives)  morphine (Rash)  penicillin (Rash)    Intolerances        LABS:                        12.4   9.34  )-----------( 436      ( 12 Mar 2023 05:30 )             39.1     03-12    137  |  100  |  17  ----------------------------<  95  4.3   |  24  |  0.61    Ca    9.3      12 Mar 2023 05:30  Phos  3.5     03-12  Mg     1.9     03-12          CAPILLARY BLOOD GLUCOSE          RADIOLOGY & ADDITIONAL TESTS: Reviewed.   OVERNIGHT EVENTS: ISABELLE    SUBJECTIVE / INTERVAL HPI: Patient seen and examined at bedside. Pain stable. Will send email to Dr. Teran's team this morning. No acute complaints at this time.    VITAL SIGNS:  Vital Signs Last 24 Hrs  T(C): 36.5 (13 Mar 2023 04:54), Max: 36.7 (12 Mar 2023 20:52)  T(F): 97.7 (13 Mar 2023 04:54), Max: 98.1 (12 Mar 2023 20:52)  HR: 80 (13 Mar 2023 04:54) (80 - 95)  BP: 111/76 (13 Mar 2023 04:54) (111/76 - 130/79)  BP(mean): --  RR: 18 (13 Mar 2023 04:54) (17 - 18)  SpO2: 97% (13 Mar 2023 04:54) (96% - 97%)    Parameters below as of 13 Mar 2023 04:54  Patient On (Oxygen Delivery Method): room air      I&O's Summary      PHYSICAL EXAM:    General: NAD, laying in bed  HEENT: PERRL/ EOMI, no scleral icterus, MMM  Neck: Supple, no JVD  Respiratory: lungs CTA b/l, no wheezes/crackles, no accessory muscle use  Cardiovascular: Regular rhythm/rate; +S1 +S2, no murmurs  Gastrointestinal: Soft, NTND, normoactive BS, no rebound, no guarding  Genitourinary: no suprapubic tenderness  Extremities: + L foot shallow clean base ulcer w/ mild surrounding erythema (improved), WWP, no clubbing or cyanosis; no peripheral edema  Neurological: A&Ox3, no gross focal deficits, follows commands  Skin: Normal temperature, warm, dry    MEDICATIONS:  MEDICATIONS  (STANDING):  chlorhexidine 2% Cloths 1 Application(s) Topical daily  enoxaparin Injectable 40 milliGRAM(s) SubCutaneous every 12 hours  gabapentin 100 milliGRAM(s) Oral daily  gabapentin 800 milliGRAM(s) Oral daily  meropenem  IVPB 2000 milliGRAM(s) IV Intermittent every 8 hours  nebivolol 10 milliGRAM(s) Oral <User Schedule>  polyethylene glycol 3350 17 Gram(s) Oral daily  senna 2 Tablet(s) Oral at bedtime  vancomycin  IVPB 1250 milliGRAM(s) IV Intermittent every 8 hours    MEDICATIONS  (PRN):  HYDROmorphone  Injectable 1 milliGRAM(s) IV Push every 4 hours PRN Severe Pain (7 - 10)      ALLERGIES:  Allergies    amoxicillin (Angioedema)  ciprofloxacin (Rash)  hydrochlorothiazide (Hives)  latex (Rash)  lisinopril (Angioedema; Rash; Hives)  morphine (Rash)  penicillin (Rash)    Intolerances        LABS:                        12.4   9.34  )-----------( 436      ( 12 Mar 2023 05:30 )             39.1     03-12    137  |  100  |  17  ----------------------------<  95  4.3   |  24  |  0.61    Ca    9.3      12 Mar 2023 05:30  Phos  3.5     03-12  Mg     1.9     03-12          CAPILLARY BLOOD GLUCOSE          RADIOLOGY & ADDITIONAL TESTS: Reviewed.

## 2023-03-13 NOTE — PROGRESS NOTE ADULT - SUBJECTIVE AND OBJECTIVE BOX
INFECTIOUS DISEASES CONSULT FOLLOW-UP NOTE    INTERVAL HPI/OVERNIGHT EVENTS: ISABELLE, overweekend, Vanc decreased to 1250mg q8h per trough. Endorses less erythema around L foot ulcer, but still c/o pulsating pain. Other ROS negative    ROS:   Constitutional, eyes, ENT, cardiovascular, respiratory, gastrointestinal, genitourinary, integumentary, neurological, psychiatric and heme/lymph are otherwise negative other than noted above     ANTIBIOTICS/RELEVANT:    MEDICATIONS  (STANDING):  chlorhexidine 2% Cloths 1 Application(s) Topical daily  enoxaparin Injectable 40 milliGRAM(s) SubCutaneous every 12 hours  gabapentin 100 milliGRAM(s) Oral daily  gabapentin 800 milliGRAM(s) Oral daily  meropenem  IVPB 2000 milliGRAM(s) IV Intermittent every 8 hours  nebivolol 10 milliGRAM(s) Oral <User Schedule>  polyethylene glycol 3350 17 Gram(s) Oral daily  senna 2 Tablet(s) Oral at bedtime  vancomycin  IVPB 1250 milliGRAM(s) IV Intermittent every 8 hours    MEDICATIONS  (PRN):  HYDROmorphone  Injectable 1 milliGRAM(s) IV Push every 4 hours PRN Severe Pain (7 - 10)    Vital Signs Last 24 Hrs  T(C): 36.5 (13 Mar 2023 04:54), Max: 36.7 (12 Mar 2023 20:52)  T(F): 97.7 (13 Mar 2023 04:54), Max: 98.1 (12 Mar 2023 20:52)  HR: 80 (13 Mar 2023 04:54) (80 - 95)  BP: 111/76 (13 Mar 2023 04:54) (111/76 - 130/79)  BP(mean): --  RR: 18 (13 Mar 2023 04:54) (17 - 18)  SpO2: 97% (13 Mar 2023 04:54) (96% - 97%)    Parameters below as of 13 Mar 2023 04:54  Patient On (Oxygen Delivery Method): room air    PHYSICAL EXAM:  General: NAD, sitting comfortably in bed   HEENT: PERRL/ EOMI, no scleral icterus, MMM  Neck: Supple, no JVD  Respiratory: lungs CTA b/l, no wheezes/crackles, no accessory muscle use  Cardiovascular: Regular rhythm/rate; +S1 +S2, no murmurs  Gastrointestinal: Soft, NTND, normoactive BS, no rebound, no guarding  Genitourinary: no suprapubic tenderness  Extremities: + L foot shallow clean base ulcer w/ mild surrounding erythema, unchanged from prior. WWP, no clubbing or cyanosis; no peripheral edema  Neurological: A&Ox3, no gross focal deficits, follows commands  Skin: Normal temperature, warm, dry    LABS:                        12.4   9.34  )-----------( 436      ( 12 Mar 2023 05:30 )             39.1     03-12    137  |  100  |  17  ----------------------------<  95  4.3   |  24  |  0.61    Ca    9.3      12 Mar 2023 05:30  Phos  3.5     03-12  Mg     1.9     03-12    MICROBIOLOGY:    RADIOLOGY & ADDITIONAL STUDIES:  Reviewed

## 2023-03-14 ENCOUNTER — TRANSCRIPTION ENCOUNTER (OUTPATIENT)
Age: 38
End: 2023-03-14

## 2023-03-14 DIAGNOSIS — E66.01 MORBID (SEVERE) OBESITY DUE TO EXCESS CALORIES: ICD-10-CM

## 2023-03-14 LAB
ANION GAP SERPL CALC-SCNC: 10 MMOL/L — SIGNIFICANT CHANGE UP (ref 5–17)
APTT BLD: 31.9 SEC — SIGNIFICANT CHANGE UP (ref 27.5–35.5)
BLD GP AB SCN SERPL QL: NEGATIVE — SIGNIFICANT CHANGE UP
BUN SERPL-MCNC: 16 MG/DL — SIGNIFICANT CHANGE UP (ref 7–23)
CALCIUM SERPL-MCNC: 9.1 MG/DL — SIGNIFICANT CHANGE UP (ref 8.4–10.5)
CHLORIDE SERPL-SCNC: 98 MMOL/L — SIGNIFICANT CHANGE UP (ref 96–108)
CO2 SERPL-SCNC: 27 MMOL/L — SIGNIFICANT CHANGE UP (ref 22–31)
CREAT SERPL-MCNC: 0.59 MG/DL — SIGNIFICANT CHANGE UP (ref 0.5–1.3)
CRP SERPL-MCNC: 9 MG/L — HIGH (ref 0–4)
EGFR: 118 ML/MIN/1.73M2 — SIGNIFICANT CHANGE UP
ERYTHROCYTE [SEDIMENTATION RATE] IN BLOOD: 25 MM/HR — HIGH
GLUCOSE SERPL-MCNC: 87 MG/DL — SIGNIFICANT CHANGE UP (ref 70–99)
HCG UR QL: NEGATIVE — SIGNIFICANT CHANGE UP
HCT VFR BLD CALC: 36.7 % — SIGNIFICANT CHANGE UP (ref 34.5–45)
HGB BLD-MCNC: 11.5 G/DL — SIGNIFICANT CHANGE UP (ref 11.5–15.5)
INR BLD: 0.98 — SIGNIFICANT CHANGE UP (ref 0.88–1.16)
MAGNESIUM SERPL-MCNC: 2 MG/DL — SIGNIFICANT CHANGE UP (ref 1.6–2.6)
MCHC RBC-ENTMCNC: 28.1 PG — SIGNIFICANT CHANGE UP (ref 27–34)
MCHC RBC-ENTMCNC: 31.3 GM/DL — LOW (ref 32–36)
MCV RBC AUTO: 89.7 FL — SIGNIFICANT CHANGE UP (ref 80–100)
NRBC # BLD: 0 /100 WBCS — SIGNIFICANT CHANGE UP (ref 0–0)
PHOSPHATE SERPL-MCNC: 3.9 MG/DL — SIGNIFICANT CHANGE UP (ref 2.5–4.5)
PLATELET # BLD AUTO: 417 K/UL — HIGH (ref 150–400)
POTASSIUM SERPL-MCNC: 4.5 MMOL/L — SIGNIFICANT CHANGE UP (ref 3.5–5.3)
POTASSIUM SERPL-SCNC: 4.5 MMOL/L — SIGNIFICANT CHANGE UP (ref 3.5–5.3)
PROTHROM AB SERPL-ACNC: 11.7 SEC — SIGNIFICANT CHANGE UP (ref 10.5–13.4)
RBC # BLD: 4.09 M/UL — SIGNIFICANT CHANGE UP (ref 3.8–5.2)
RBC # FLD: 12.8 % — SIGNIFICANT CHANGE UP (ref 10.3–14.5)
RH IG SCN BLD-IMP: POSITIVE — SIGNIFICANT CHANGE UP
SARS-COV-2 RNA SPEC QL NAA+PROBE: SIGNIFICANT CHANGE UP
SODIUM SERPL-SCNC: 135 MMOL/L — SIGNIFICANT CHANGE UP (ref 135–145)
VANCOMYCIN TROUGH SERPL-MCNC: 14.8 UG/ML — SIGNIFICANT CHANGE UP (ref 10–20)
WBC # BLD: 9.03 K/UL — SIGNIFICANT CHANGE UP (ref 3.8–10.5)
WBC # FLD AUTO: 9.03 K/UL — SIGNIFICANT CHANGE UP (ref 3.8–10.5)

## 2023-03-14 PROCEDURE — 37242 VASC EMBOLIZE/OCCLUDE ARTERY: CPT

## 2023-03-14 PROCEDURE — 99232 SBSQ HOSP IP/OBS MODERATE 35: CPT

## 2023-03-14 PROCEDURE — 36248 INS CATH ABD/L-EXT ART ADDL: CPT | Mod: 59

## 2023-03-14 PROCEDURE — 36247 INS CATH ABD/L-EXT ART 3RD: CPT

## 2023-03-14 RX ORDER — SODIUM CHLORIDE 9 MG/ML
1000 INJECTION, SOLUTION INTRAVENOUS
Refills: 0 | Status: DISCONTINUED | OUTPATIENT
Start: 2023-03-14 | End: 2023-03-14

## 2023-03-14 RX ORDER — VANCOMYCIN HCL 1 G
1250 VIAL (EA) INTRAVENOUS EVERY 12 HOURS
Refills: 0 | Status: DISCONTINUED | OUTPATIENT
Start: 2023-03-15 | End: 2023-03-15

## 2023-03-14 RX ORDER — HYDROMORPHONE HYDROCHLORIDE 2 MG/ML
1 INJECTION INTRAMUSCULAR; INTRAVENOUS; SUBCUTANEOUS EVERY 4 HOURS
Refills: 0 | Status: DISCONTINUED | OUTPATIENT
Start: 2023-03-14 | End: 2023-03-15

## 2023-03-14 RX ORDER — HYDROMORPHONE HYDROCHLORIDE 2 MG/ML
2 INJECTION INTRAMUSCULAR; INTRAVENOUS; SUBCUTANEOUS EVERY 4 HOURS
Refills: 0 | Status: DISCONTINUED | OUTPATIENT
Start: 2023-03-14 | End: 2023-03-15

## 2023-03-14 RX ORDER — FENTANYL CITRATE 50 UG/ML
25 INJECTION INTRAVENOUS
Refills: 0 | Status: DISCONTINUED | OUTPATIENT
Start: 2023-03-14 | End: 2023-03-15

## 2023-03-14 RX ORDER — VANCOMYCIN HCL 1 G
1250 VIAL (EA) INTRAVENOUS EVERY 8 HOURS
Refills: 0 | Status: DISCONTINUED | OUTPATIENT
Start: 2023-03-14 | End: 2023-03-14

## 2023-03-14 RX ADMIN — Medication 166.67 MILLIGRAM(S): at 00:42

## 2023-03-14 RX ADMIN — Medication 166.67 MILLIGRAM(S): at 07:33

## 2023-03-14 RX ADMIN — HYDROMORPHONE HYDROCHLORIDE 2 MILLIGRAM(S): 2 INJECTION INTRAMUSCULAR; INTRAVENOUS; SUBCUTANEOUS at 14:51

## 2023-03-14 RX ADMIN — GABAPENTIN 800 MILLIGRAM(S): 400 CAPSULE ORAL at 18:39

## 2023-03-14 RX ADMIN — MEROPENEM 280 MILLIGRAM(S): 1 INJECTION INTRAVENOUS at 18:39

## 2023-03-14 RX ADMIN — NEBIVOLOL HYDROCHLORIDE 10 MILLIGRAM(S): 5 TABLET ORAL at 18:16

## 2023-03-14 RX ADMIN — HYDROMORPHONE HYDROCHLORIDE 2 MILLIGRAM(S): 2 INJECTION INTRAMUSCULAR; INTRAVENOUS; SUBCUTANEOUS at 23:02

## 2023-03-14 RX ADMIN — HYDROMORPHONE HYDROCHLORIDE 2 MILLIGRAM(S): 2 INJECTION INTRAMUSCULAR; INTRAVENOUS; SUBCUTANEOUS at 15:13

## 2023-03-14 RX ADMIN — HYDROMORPHONE HYDROCHLORIDE 2 MILLIGRAM(S): 2 INJECTION INTRAMUSCULAR; INTRAVENOUS; SUBCUTANEOUS at 18:17

## 2023-03-14 RX ADMIN — HYDROMORPHONE HYDROCHLORIDE 1 MILLIGRAM(S): 2 INJECTION INTRAMUSCULAR; INTRAVENOUS; SUBCUTANEOUS at 08:10

## 2023-03-14 RX ADMIN — HYDROMORPHONE HYDROCHLORIDE 1 MILLIGRAM(S): 2 INJECTION INTRAMUSCULAR; INTRAVENOUS; SUBCUTANEOUS at 11:47

## 2023-03-14 RX ADMIN — HYDROMORPHONE HYDROCHLORIDE 2 MILLIGRAM(S): 2 INJECTION INTRAMUSCULAR; INTRAVENOUS; SUBCUTANEOUS at 22:11

## 2023-03-14 RX ADMIN — NEBIVOLOL HYDROCHLORIDE 10 MILLIGRAM(S): 5 TABLET ORAL at 07:27

## 2023-03-14 RX ADMIN — HYDROMORPHONE HYDROCHLORIDE 1 MILLIGRAM(S): 2 INJECTION INTRAMUSCULAR; INTRAVENOUS; SUBCUTANEOUS at 04:05

## 2023-03-14 RX ADMIN — GABAPENTIN 100 MILLIGRAM(S): 400 CAPSULE ORAL at 11:47

## 2023-03-14 RX ADMIN — HYDROMORPHONE HYDROCHLORIDE 1 MILLIGRAM(S): 2 INJECTION INTRAMUSCULAR; INTRAVENOUS; SUBCUTANEOUS at 07:27

## 2023-03-14 RX ADMIN — Medication 166.67 MILLIGRAM(S): at 19:23

## 2023-03-14 RX ADMIN — MEROPENEM 280 MILLIGRAM(S): 1 INJECTION INTRAVENOUS at 03:12

## 2023-03-14 RX ADMIN — HYDROMORPHONE HYDROCHLORIDE 1 MILLIGRAM(S): 2 INJECTION INTRAMUSCULAR; INTRAVENOUS; SUBCUTANEOUS at 03:13

## 2023-03-14 NOTE — PROGRESS NOTE ADULT - PROBLEM SELECTOR PLAN 3
Hx of Left foot AVM w/ chronic foot ulcer w/ multiple prior embolizations w/ most recent embolization on 01/24/2023 w/ Dr. Teran   -see sepsis /foor ulcer for plan Hx of Left foot AVM w/ chronic foot ulcer w/ multiple prior embolizations w/ most recent embolization on 01/24/2023 w/ Dr. Teran   -see sepsis /foot ulcer for plan

## 2023-03-14 NOTE — BRIEF OPERATIVE NOTE - OPERATION/FINDINGS
R CFA access using micropuncture technique and 5F sheath. Left LE angiogram shows AVM supplied by PTA. Transcatheter embolization performed using a total of5cc of 300-500 Embosphere  of 2 AVM nidus near ankle. Repeat angiogram shows improved an perfusion and decreased AV shunting. Sheath removed with manual compression for 15 mins with hemostasis achieved

## 2023-03-14 NOTE — BRIEF OPERATIVE NOTE - NSICDXBRIEFPROCEDURE_GEN_ALL_CORE_FT
PROCEDURES:  Angio for transcatheter embolization 14-Mar-2023 14:23:04  Janette Dixon   PROCEDURES:  Embolization, carotid artery, intra-arterial, for vascular malformation 14-Mar-2023 14:21:11  Janette Dixon

## 2023-03-14 NOTE — PROGRESS NOTE ADULT - ASSESSMENT
36 y/o female with HTN and AVM L foot with recurrent L foot nonpurulent ulcer infection presenting with worsening pain at ulcer site, was initially treated with Meropenem and Daptomycin, transitioned to Annie and Linezolid. She has significant allergies to PCN and FQs.  Blood culture 2/27 negative.  She remains afebrile, leukocytosis improved from admission, ESR and CRP improving. Primary team anticipates discharge today. Currently on Meropenem 2 g IV q8h since 2/28 & Vancomycin 1250 q8hrs since 3/6.  - C/w Meropenem 2g q8h  - f/u 1pm Vancomycin trough and redose Vancomycin until next trough (before 4th dose)  - Total antibiotic course for 4 weeks (until 4/3)  - f/u Dr. Teran recs s/p OR today

## 2023-03-14 NOTE — PRE-ANESTHESIA EVALUATION ADULT - NSANTHDIETYNSD_GEN_ALL_CORE
Yes
68 yowmf, lives eith  who is caretaker with 244/7 aid since Pt stroke, bib EMS after Pt called c/o  being aggressive.  reports recent behavior change, irritability, telling friend  is aggressive.  No evidence of acute psychiatric symptoms as Pt UA pos for UTI, and behavior change is likely attributed to delirium secondary to UTI.  Pt is not an imminent danger to self or others and is cleared psychiatrically for discharge.  Recommend follow up with mira psych MD to monitor meds and optimize mental health needs.  Case reviewed with Dr Ortega.

## 2023-03-14 NOTE — PROGRESS NOTE ADULT - PROBLEM SELECTOR PLAN 5
BMI 46.5. Patient is taking Qsymmia with dietary modifications. She is considering Wegovy with her outpatient provider.   - Discussed lifestyle modifications with patient and continued outpatient followup

## 2023-03-14 NOTE — PROGRESS NOTE ADULT - SUBJECTIVE AND OBJECTIVE BOX
OVERNIGHT EVENTS: Evaluated by Dr. Teran from interventional cardiology. Plan for cath lab today with potential intervention given ulcer not improving. NPO after MN.     SUBJECTIVE / INTERVAL HPI: Patient seen and examined at bedside. Plan discussed with patient at bedside. No new complaints. Having BMs. ROS otherwise negative.     VITAL SIGNS:  Vital Signs Last 24 Hrs  T(C): 36.4 (14 Mar 2023 05:41), Max: 37.3 (13 Mar 2023 15:16)  T(F): 97.5 (14 Mar 2023 05:41), Max: 99.2 (13 Mar 2023 15:16)  HR: 77 (14 Mar 2023 05:41) (77 - 94)  BP: 126/87 (14 Mar 2023 05:41) (115/82 - 132/85)  BP(mean): --  RR: 18 (14 Mar 2023 05:41) (18 - 18)  SpO2: 97% (14 Mar 2023 05:41) (95% - 97%)    Parameters below as of 14 Mar 2023 05:41  Patient On (Oxygen Delivery Method): room air      I&O's Summary      PHYSICAL EXAM:    General: NAD, laying in bed  HEENT: PERRL/ EOMI, no scleral icterus, MMM  Neck: Supple, no JVD  Respiratory: lungs CTA b/l, no wheezes/crackles, no accessory muscle use  Cardiovascular: Regular rhythm/rate; +S1 +S2, no murmurs  Gastrointestinal: Soft, NTND, normoactive BS, no rebound, no guarding  Genitourinary: no suprapubic tenderness  Extremities: + L foot shallow clean base ulcer w/ mild surrounding erythema (improved), WWP, no clubbing or cyanosis; no peripheral edema  Neurological: A&Ox3, no gross focal deficits, follows commands  Skin: Normal temperature, warm, dry    MEDICATIONS:  MEDICATIONS  (STANDING):  chlorhexidine 2% Cloths 1 Application(s) Topical daily  gabapentin 800 milliGRAM(s) Oral daily  gabapentin 100 milliGRAM(s) Oral daily  meropenem  IVPB 2000 milliGRAM(s) IV Intermittent every 8 hours  nebivolol 10 milliGRAM(s) Oral <User Schedule>  polyethylene glycol 3350 17 Gram(s) Oral daily  senna 2 Tablet(s) Oral at bedtime    MEDICATIONS  (PRN):  HYDROmorphone  Injectable 1 milliGRAM(s) IV Push every 4 hours PRN Severe Pain (7 - 10)      ALLERGIES:  Allergies    amoxicillin (Angioedema)  ciprofloxacin (Rash)  hydrochlorothiazide (Hives)  latex (Rash)  lisinopril (Angioedema; Rash; Hives)  morphine (Rash)  penicillin (Rash)    Intolerances        LABS:                        11.5   9.03  )-----------( 417      ( 14 Mar 2023 05:30 )             36.7     03-14    135  |  98  |  16  ----------------------------<  87  4.5   |  27  |  0.59    Ca    9.1      14 Mar 2023 05:30  Phos  3.9     03-14  Mg     2.0     03-14      PT/INR - ( 14 Mar 2023 05:30 )   PT: 11.7 sec;   INR: 0.98          PTT - ( 14 Mar 2023 05:30 )  PTT:31.9 sec    CAPILLARY BLOOD GLUCOSE          RADIOLOGY & ADDITIONAL TESTS: Reviewed.

## 2023-03-14 NOTE — PROGRESS NOTE ADULT - PROBLEM SELECTOR PLAN 6
F: tolerating PO, no IVF  E: replete K<4, Mg<2  N: Dash/TLC    VTE Prophylaxis: Lovenox SubQ  C: Full Code  D: RMF F: tolerating PO, no IVF  E: replete K<4, Mg<2  N: NPO for OR    VTE Prophylaxis: holding for OR  C: Full Code  D: [facility]

## 2023-03-14 NOTE — PROGRESS NOTE ADULT - SUBJECTIVE AND OBJECTIVE BOX
INFECTIOUS DISEASES CONSULT FOLLOW-UP NOTE    INTERVAL HPI/OVERNIGHT EVENTS: ISABELLE. Dr. Teran plans to take to OR today. No complaints, ROS negative    ROS:   Constitutional, eyes, ENT, cardiovascular, respiratory, gastrointestinal, genitourinary, integumentary, neurological, psychiatric and heme/lymph are otherwise negative other than noted above     ANTIBIOTICS/RELEVANT:    MEDICATIONS  (STANDING):  chlorhexidine 2% Cloths 1 Application(s) Topical daily  gabapentin 800 milliGRAM(s) Oral daily  gabapentin 100 milliGRAM(s) Oral daily  lactated ringers. 1000 milliLiter(s) (75 mL/Hr) IV Continuous <Continuous>  meropenem  IVPB 2000 milliGRAM(s) IV Intermittent every 8 hours  nebivolol 10 milliGRAM(s) Oral <User Schedule>  polyethylene glycol 3350 17 Gram(s) Oral daily  senna 2 Tablet(s) Oral at bedtime    MEDICATIONS  (PRN):  fentaNYL    Injectable 25 MICROGram(s) IV Push every 5 minutes PRN Moderate Pain (4 - 6)  HYDROmorphone  Injectable 1 milliGRAM(s) IV Push every 4 hours PRN Severe Pain (7 - 10)    Vital Signs Last 24 Hrs  T(C): 36.4 (14 Mar 2023 12:00), Max: 37.3 (13 Mar 2023 15:16)  T(F): 97.5 (14 Mar 2023 05:41), Max: 99.2 (13 Mar 2023 15:16)  HR: 77 (14 Mar 2023 12:00) (77 - 94)  BP: 126/87 (14 Mar 2023 12:00) (115/82 - 132/85)  BP(mean): 98 (14 Mar 2023 12:00) (98 - 98)  RR: 18 (14 Mar 2023 12:00) (18 - 18)  SpO2: 97% (14 Mar 2023 12:00) (95% - 97%)    Parameters below as of 14 Mar 2023 05:41  Patient On (Oxygen Delivery Method): room air    PHYSICAL EXAM:  General: NAD, sitting comfortably in bed   HEENT: PERRL/ EOMI, no scleral icterus, MMM  Neck: Supple, no JVD  Respiratory: lungs CTA b/l, no wheezes/crackles, no accessory muscle use  Cardiovascular: Regular rhythm/rate; +S1 +S2, no murmurs  Gastrointestinal: Soft, NTND, normoactive BS, no rebound, no guarding  Genitourinary: no suprapubic tenderness  Extremities: + L foot shallow clean base ulcer w/ mild surrounding erythema, unchanged from prior. WWP, no clubbing or cyanosis; no peripheral edema  Neurological: A&Ox3, no gross focal deficits, follows commands  Skin: Normal temperature, warm, dry    LABS:                        11.5   9.03  )-----------( 417      ( 14 Mar 2023 05:30 )             36.7     03-14    135  |  98  |  16  ----------------------------<  87  4.5   |  27  |  0.59    Ca    9.1      14 Mar 2023 05:30  Phos  3.9     03-14  Mg     2.0     03-14    PT/INR - ( 14 Mar 2023 05:30 )   PT: 11.7 sec;   INR: 0.98       PTT - ( 14 Mar 2023 05:30 )  PTT:31.9 sec    MICROBIOLOGY:    RADIOLOGY & ADDITIONAL STUDIES:  Reviewed

## 2023-03-14 NOTE — PROGRESS NOTE ADULT - PROBLEM SELECTOR PLAN 1
3/4 SIRS (Fever 101 at home, Tachy, elevated WBC). Possible source (s) PICC line and/or Left foot ulcer. Pt s/p recent dc on 2/7/23 on IV abx (Dapto 800 q24hrs and Cefepime 2g q8hrs) via PICC (placed on 2/3/23) for L foot infx.   Afebrile, HD stable on admission. Reports taking Tylenol at home prior to presentation. LA wnl.  PICC site c/d/i. PICC. L foot + new redness around shallow ulcer on L foot X 1 day. Denies URI s/s, cough, CP, SOB, abd pain, n/v/d/c and dysuria. CXR neg.  Per ID source is likely the foot and PICC cane stay in. Per ID switch from dapto to linezolid BCx ngtd. on 3/6, switched from linezolid to vanc.     Plan:   - c/w Vanc [id number], f/u trough before 4th dose (next trough 3/14 2PM)  - c/w Meropenem 2gm IV QD  - f/u ID recs  - While erythema is improving the ulcer still has not closed on appropriate antibiotics and appears to be increasing in size. Dr. Teran evaluated patient 3/13 w/ plan for angiogram today in the OR with potential embolization intervention.   - NPO until procedure 3/4 SIRS (Fever 101 at home, Tachy, elevated WBC). Possible source (s) PICC line and/or Left foot ulcer. Pt s/p recent dc on 2/7/23 on IV abx (Dapto 800 q24hrs and Cefepime 2g q8hrs) via PICC (placed on 2/3/23) for L foot infx.   Afebrile, HD stable on admission. Reports taking Tylenol at home prior to presentation. LA wnl.  PICC site c/d/i. PICC. L foot + new redness around shallow ulcer on L foot X 1 day. Denies URI s/s, cough, CP, SOB, abd pain, n/v/d/c and dysuria. CXR neg.  Per ID source is likely the foot and PICC cane stay in. Per ID switch from dapto to linezolid BCx ngtd. on 3/6, switched from linezolid to vanc.     Plan:   - c/w Vanc [id number], f/u trough before 4th dose (next trough 3/14 2PM)  - c/w Meropenem 2gm IV QD  - While erythema is improving the ulcer still has not closed on appropriate antibiotics and appears to be increasing in size. Dr. Teran evaluated patient 3/13 w/ plan for angiogram today in the OR with potential embolization intervention.   - NPO until procedure  - ID following, appreciate recs

## 2023-03-15 LAB
ANION GAP SERPL CALC-SCNC: 15 MMOL/L — SIGNIFICANT CHANGE UP (ref 5–17)
BUN SERPL-MCNC: 18 MG/DL — SIGNIFICANT CHANGE UP (ref 7–23)
CALCIUM SERPL-MCNC: 9.3 MG/DL — SIGNIFICANT CHANGE UP (ref 8.4–10.5)
CHLORIDE SERPL-SCNC: 95 MMOL/L — LOW (ref 96–108)
CO2 SERPL-SCNC: 23 MMOL/L — SIGNIFICANT CHANGE UP (ref 22–31)
CREAT SERPL-MCNC: 0.61 MG/DL — SIGNIFICANT CHANGE UP (ref 0.5–1.3)
EGFR: 117 ML/MIN/1.73M2 — SIGNIFICANT CHANGE UP
GLUCOSE SERPL-MCNC: 119 MG/DL — HIGH (ref 70–99)
HCT VFR BLD CALC: 36.7 % — SIGNIFICANT CHANGE UP (ref 34.5–45)
HGB BLD-MCNC: 11.8 G/DL — SIGNIFICANT CHANGE UP (ref 11.5–15.5)
MAGNESIUM SERPL-MCNC: 1.9 MG/DL — SIGNIFICANT CHANGE UP (ref 1.6–2.6)
MCHC RBC-ENTMCNC: 28.4 PG — SIGNIFICANT CHANGE UP (ref 27–34)
MCHC RBC-ENTMCNC: 32.2 GM/DL — SIGNIFICANT CHANGE UP (ref 32–36)
MCV RBC AUTO: 88.2 FL — SIGNIFICANT CHANGE UP (ref 80–100)
NRBC # BLD: 0 /100 WBCS — SIGNIFICANT CHANGE UP (ref 0–0)
PHOSPHATE SERPL-MCNC: 2.9 MG/DL — SIGNIFICANT CHANGE UP (ref 2.5–4.5)
PLATELET # BLD AUTO: 504 K/UL — HIGH (ref 150–400)
POTASSIUM SERPL-MCNC: 4.3 MMOL/L — SIGNIFICANT CHANGE UP (ref 3.5–5.3)
POTASSIUM SERPL-SCNC: 4.3 MMOL/L — SIGNIFICANT CHANGE UP (ref 3.5–5.3)
RBC # BLD: 4.16 M/UL — SIGNIFICANT CHANGE UP (ref 3.8–5.2)
RBC # FLD: 12.6 % — SIGNIFICANT CHANGE UP (ref 10.3–14.5)
SODIUM SERPL-SCNC: 133 MMOL/L — LOW (ref 135–145)
WBC # BLD: 10.07 K/UL — SIGNIFICANT CHANGE UP (ref 3.8–10.5)
WBC # FLD AUTO: 10.07 K/UL — SIGNIFICANT CHANGE UP (ref 3.8–10.5)

## 2023-03-15 PROCEDURE — 99232 SBSQ HOSP IP/OBS MODERATE 35: CPT

## 2023-03-15 RX ORDER — ENOXAPARIN SODIUM 100 MG/ML
40 INJECTION SUBCUTANEOUS EVERY 12 HOURS
Refills: 0 | Status: DISCONTINUED | OUTPATIENT
Start: 2023-03-15 | End: 2023-03-16

## 2023-03-15 RX ORDER — MEROPENEM 1 G/30ML
2000 INJECTION INTRAVENOUS EVERY 8 HOURS
Refills: 0 | Status: DISCONTINUED | OUTPATIENT
Start: 2023-03-15 | End: 2023-03-16

## 2023-03-15 RX ORDER — VANCOMYCIN HCL 1 G
1250 VIAL (EA) INTRAVENOUS EVERY 8 HOURS
Refills: 0 | Status: DISCONTINUED | OUTPATIENT
Start: 2023-03-15 | End: 2023-03-16

## 2023-03-15 RX ORDER — VANCOMYCIN HCL 1 G
1.25 VIAL (EA) INTRAVENOUS
Qty: 0 | Refills: 0 | DISCHARGE
Start: 2023-03-15

## 2023-03-15 RX ORDER — HYDROMORPHONE HYDROCHLORIDE 2 MG/ML
2 INJECTION INTRAMUSCULAR; INTRAVENOUS; SUBCUTANEOUS EVERY 4 HOURS
Refills: 0 | Status: DISCONTINUED | OUTPATIENT
Start: 2023-03-15 | End: 2023-03-16

## 2023-03-15 RX ORDER — MEROPENEM 1 G/30ML
2000 INJECTION INTRAVENOUS
Qty: 0 | Refills: 0 | DISCHARGE
Start: 2023-03-15

## 2023-03-15 RX ADMIN — ENOXAPARIN SODIUM 40 MILLIGRAM(S): 100 INJECTION SUBCUTANEOUS at 22:15

## 2023-03-15 RX ADMIN — HYDROMORPHONE HYDROCHLORIDE 2 MILLIGRAM(S): 2 INJECTION INTRAMUSCULAR; INTRAVENOUS; SUBCUTANEOUS at 03:02

## 2023-03-15 RX ADMIN — MEROPENEM 280 MILLIGRAM(S): 1 INJECTION INTRAVENOUS at 02:12

## 2023-03-15 RX ADMIN — MEROPENEM 280 MILLIGRAM(S): 1 INJECTION INTRAVENOUS at 15:57

## 2023-03-15 RX ADMIN — HYDROMORPHONE HYDROCHLORIDE 2 MILLIGRAM(S): 2 INJECTION INTRAMUSCULAR; INTRAVENOUS; SUBCUTANEOUS at 14:47

## 2023-03-15 RX ADMIN — HYDROMORPHONE HYDROCHLORIDE 2 MILLIGRAM(S): 2 INJECTION INTRAMUSCULAR; INTRAVENOUS; SUBCUTANEOUS at 10:30

## 2023-03-15 RX ADMIN — NEBIVOLOL HYDROCHLORIDE 10 MILLIGRAM(S): 5 TABLET ORAL at 06:20

## 2023-03-15 RX ADMIN — HYDROMORPHONE HYDROCHLORIDE 2 MILLIGRAM(S): 2 INJECTION INTRAMUSCULAR; INTRAVENOUS; SUBCUTANEOUS at 18:57

## 2023-03-15 RX ADMIN — HYDROMORPHONE HYDROCHLORIDE 1 MILLIGRAM(S): 2 INJECTION INTRAMUSCULAR; INTRAVENOUS; SUBCUTANEOUS at 07:10

## 2023-03-15 RX ADMIN — GABAPENTIN 800 MILLIGRAM(S): 400 CAPSULE ORAL at 14:48

## 2023-03-15 RX ADMIN — GABAPENTIN 100 MILLIGRAM(S): 400 CAPSULE ORAL at 12:04

## 2023-03-15 RX ADMIN — ENOXAPARIN SODIUM 40 MILLIGRAM(S): 100 INJECTION SUBCUTANEOUS at 09:15

## 2023-03-15 RX ADMIN — HYDROMORPHONE HYDROCHLORIDE 2 MILLIGRAM(S): 2 INJECTION INTRAMUSCULAR; INTRAVENOUS; SUBCUTANEOUS at 22:58

## 2023-03-15 RX ADMIN — Medication 166.67 MILLIGRAM(S): at 22:58

## 2023-03-15 RX ADMIN — HYDROMORPHONE HYDROCHLORIDE 2 MILLIGRAM(S): 2 INJECTION INTRAMUSCULAR; INTRAVENOUS; SUBCUTANEOUS at 02:13

## 2023-03-15 RX ADMIN — HYDROMORPHONE HYDROCHLORIDE 2 MILLIGRAM(S): 2 INJECTION INTRAMUSCULAR; INTRAVENOUS; SUBCUTANEOUS at 10:03

## 2023-03-15 RX ADMIN — MEROPENEM 280 MILLIGRAM(S): 1 INJECTION INTRAVENOUS at 22:15

## 2023-03-15 RX ADMIN — CHLORHEXIDINE GLUCONATE 1 APPLICATION(S): 213 SOLUTION TOPICAL at 12:04

## 2023-03-15 RX ADMIN — Medication 166.67 MILLIGRAM(S): at 16:42

## 2023-03-15 RX ADMIN — HYDROMORPHONE HYDROCHLORIDE 1 MILLIGRAM(S): 2 INJECTION INTRAMUSCULAR; INTRAVENOUS; SUBCUTANEOUS at 06:18

## 2023-03-15 RX ADMIN — NEBIVOLOL HYDROCHLORIDE 10 MILLIGRAM(S): 5 TABLET ORAL at 18:15

## 2023-03-15 RX ADMIN — HYDROMORPHONE HYDROCHLORIDE 2 MILLIGRAM(S): 2 INJECTION INTRAMUSCULAR; INTRAVENOUS; SUBCUTANEOUS at 15:15

## 2023-03-15 RX ADMIN — Medication 166.67 MILLIGRAM(S): at 06:18

## 2023-03-15 NOTE — PROGRESS NOTE ADULT - PROBLEM SELECTOR PLAN 3
Hx of Left foot AVM w/ chronic foot ulcer w/ multiple prior embolizations w/ most recent embolization on 01/24/2023 w/ Dr. Teran   -see sepsis /foot ulcer for plan

## 2023-03-15 NOTE — PROGRESS NOTE ADULT - PROBLEM SELECTOR PLAN 6
F: tolerating PO, no IVF  E: replete K<4, Mg<2  N: DASH/TLC    VTE Prophylaxis: lovenox BID  C: Full Code  D: RMF

## 2023-03-15 NOTE — PROGRESS NOTE ADULT - PROBLEM SELECTOR PLAN 1
3/4 SIRS (Fever 101 at home, Tachy, elevated WBC). Possible source (s) PICC line and/or Left foot ulcer. Pt s/p recent dc on 2/7/23 on IV abx (Dapto 800 q24hrs and Cefepime 2g q8hrs) via PICC (placed on 2/3/23) for L foot infx.   Afebrile, HD stable on admission. Reports taking Tylenol at home prior to presentation. LA wnl.  PICC site c/d/i. PICC. L foot + new redness around shallow ulcer on L foot X 1 day. Denies URI s/s, cough, CP, SOB, abd pain, n/v/d/c and dysuria. CXR neg.  Per ID source is likely the foot and PICC cane stay in. Per ID switch from dapto to linezolid BCx ngtd. on 3/6, switched from linezolid to vanc.     Plan:   - Dr. Teran of interventional cardiology was consulted for evaluation of nonhealing left ulcer despite coverage with appropriate antibiotics  - s/p angiogram and transcatheter embolization via right femoral groin access with improved perfusion 3/14   - C/w Meropenem 2g q8h  - c/w Vancomycin 1250mg q8h  - total antibiotic course for 4 weeks (until 4/3); already has PICC  - ID following, appreciate recs  - appreciate vascular/cardiology input from Dr. Teran's team

## 2023-03-15 NOTE — PROGRESS NOTE ADULT - SUBJECTIVE AND OBJECTIVE BOX
INFECTIOUS DISEASES CONSULT FOLLOW-UP NOTE    INTERVAL HPI/OVERNIGHT EVENTS: Vanc Tr 14.8, reordered as 1250mg q8h. s/p transcatherter embolization by Dr. Teran with improved perfusion. L foot pain unchanged. Other ROS negative.    ROS:   Constitutional, eyes, ENT, cardiovascular, respiratory, gastrointestinal, genitourinary, integumentary, neurological, psychiatric and heme/lymph are otherwise negative other than noted above     ANTIBIOTICS/RELEVANT:    MEDICATIONS  (STANDING):  chlorhexidine 2% Cloths 1 Application(s) Topical daily  enoxaparin Injectable 40 milliGRAM(s) SubCutaneous every 12 hours  gabapentin 800 milliGRAM(s) Oral daily  gabapentin 100 milliGRAM(s) Oral daily  nebivolol 10 milliGRAM(s) Oral <User Schedule>  polyethylene glycol 3350 17 Gram(s) Oral daily  senna 2 Tablet(s) Oral at bedtime  vancomycin  IVPB 1250 milliGRAM(s) IV Intermittent every 12 hours    MEDICATIONS  (PRN):  HYDROmorphone  Injectable 2 milliGRAM(s) IV Push every 4 hours PRN Severe Pain (7 - 10)    Vital Signs Last 24 Hrs  T(C): 36.3 (15 Mar 2023 10:03), Max: 37.2 (14 Mar 2023 21:25)  T(F): 97.4 (15 Mar 2023 10:03), Max: 98.9 (14 Mar 2023 21:25)  HR: 92 (15 Mar 2023 10:03) (92 - 106)  BP: 136/81 (15 Mar 2023 10:03) (116/74 - 136/81)  BP(mean): 88 (15 Mar 2023 05:34) (88 - 88)  RR: 18 (15 Mar 2023 10:03) (18 - 18)  SpO2: 95% (15 Mar 2023 10:03) (93% - 95%)    Parameters below as of 15 Mar 2023 10:03  Patient On (Oxygen Delivery Method): room air    PHYSICAL EXAM:  General: NAD, sitting comfortably in bed   HEENT: PERRL/ EOMI, no scleral icterus, MMM  Neck: Supple, no JVD  Respiratory: lungs CTA b/l, no wheezes/crackles, no accessory muscle use  Cardiovascular: Regular rhythm/rate; +S1 +S2, no murmurs  Gastrointestinal: Soft, NTND, normoactive BS, no rebound, no guarding  Genitourinary: no suprapubic tenderness  Extremities: + L foot shallow clean base ulcer w/ mild surrounding erythema, unchanged from prior. WWP, no clubbing or cyanosis; no peripheral edema  Neurological: A&Ox3, no gross focal deficits, follows commands  Skin: Normal temperature, warm, dry    LABS:                        11.8   10.07 )-----------( 504      ( 15 Mar 2023 05:30 )             36.7     03-15    133<L>  |  95<L>  |  18  ----------------------------<  119<H>  4.3   |  23  |  0.61    Ca    9.3      15 Mar 2023 05:30  Phos  2.9     03-15  Mg     1.9     03-15    PT/INR - ( 14 Mar 2023 05:30 )   PT: 11.7 sec;   INR: 0.98       PTT - ( 14 Mar 2023 05:30 )  PTT:31.9 sec    MICROBIOLOGY:    RADIOLOGY & ADDITIONAL STUDIES:  Reviewed

## 2023-03-15 NOTE — PROGRESS NOTE ADULT - PROBLEM SELECTOR PLAN 2
Chronic foot ulcer in the setting of AVM malformation. L foot + new redness around shallow ulcer on L foot X 1 day w/ ángel pain foot pain today. Home meds: Dapto/ Cefepime, Gabapentin 100 in am and 800 at 4pm. Percocet 10 q4hrs PRN. Dr Teran office notified, will follow patient.     - c/w 2mg dilaudid q4 s/p procedure and resume home medications on discharge  - bowel reg   - see sepsis for plan

## 2023-03-15 NOTE — PROGRESS NOTE ADULT - ASSESSMENT
36 y/o female with HTN and AVM L foot with recurrent L foot nonpurulent ulcer infection presenting with worsening pain at ulcer site, was initially treated with Meropenem and Daptomycin, transitioned to Annie and Linezolid. She has significant allergies to PCN and FQs.  Blood culture 2/27 negative.  She remains afebrile, leukocytosis improved from admission, ESR and CRP improving. Primary team anticipates discharge today. Currently on Meropenem 2 g IV q8h since 2/28 & Vancomycin 1250 q8hrs since 3/6. s/p transcatherter embolization on 3/14 by Dr. Teran with improved perfusion.  - C/w Meropenem 2g q8h & Vancomycin 1250mg q8h, total antibiotic course for 4 weeks (until 4/3)  - Set up home infusion (already has PICC)  - f/u Dr. Rahman outpatient    ID team 1 will sign off 38 y/o female with HTN and AVM L foot with recurrent L foot nonpurulent ulcer infection presenting with worsening pain at ulcer site, was initially treated with Meropenem and Daptomycin, transitioned to Annie and Linezolid. She has significant allergies to PCN and FQs.  Blood culture 2/27 negative.  She remains afebrile, leukocytosis improved from admission, ESR and CRP improving. Primary team anticipates discharge today. Currently on Meropenem 2 g IV q8h since 2/28 & Vancomycin 1250 q8hrs since 3/6. s/p transcatherter embolization on 3/14 by Dr. Teran with improved perfusion.    - C/w Meropenem 2g q8h  - C/w Vancomycin 1250mg q8h  - total antibiotic course for 4 weeks (until 4/3)  - Weekly labs: CMP, CBC, ESR, CRP, vanco trough faxed to ID office at [phone]  - Patient to follow up with Dr. Rahman in 2 weeks ([address] [place] [address], [phone]), ID office will call patient to schedule         ID team 1 will sign off

## 2023-03-15 NOTE — PROGRESS NOTE ADULT - SUBJECTIVE AND OBJECTIVE BOX
OVERNIGHT EVENTS: s/p transcatheter embolization via R femoral access by Dr. Teran with improved perfusion.    SUBJECTIVE / INTERVAL HPI: Patient seen and examined at bedside. C/o of pain in left foot following procedure which she states often happens after these procedures. Feeling if fullness in left foot. No other acute complaints at this time.     VITAL SIGNS:  Vital Signs Last 24 Hrs  T(C): 36.3 (15 Mar 2023 10:03), Max: 37.2 (14 Mar 2023 21:25)  T(F): 97.4 (15 Mar 2023 10:03), Max: 98.9 (14 Mar 2023 21:25)  HR: 92 (15 Mar 2023 10:03) (92 - 106)  BP: 136/81 (15 Mar 2023 10:03) (116/74 - 136/81)  BP(mean): 88 (15 Mar 2023 05:34) (88 - 88)  RR: 18 (15 Mar 2023 10:03) (18 - 18)  SpO2: 95% (15 Mar 2023 10:03) (93% - 95%)    Parameters below as of 15 Mar 2023 10:03  Patient On (Oxygen Delivery Method): room air      I&O's Summary      PHYSICAL EXAM:    General: NAD  HEENT: PERRL/ EOMI, no scleral icterus, MMM  Neck: Supple, no JVD  Respiratory: lungs CTA b/l, no wheezes/crackles, no accessory muscle use  Cardiovascular: Regular rhythm/rate; +S1 +S2, no murmurs  Gastrointestinal: Soft, NTND, normoactive BS, no rebound, no guarding  Genitourinary: no suprapubic tenderness  Extremities: + L foot shallow clean base ulcer w/ mild surrounding erythema, WWP, no clubbing or cyanosis; no peripheral edema; R groin access c/d/i  Neurological: A&Ox3, no gross focal deficits, follows commands  Skin: Normal temperature, warm, dry    MEDICATIONS:  MEDICATIONS  (STANDING):  chlorhexidine 2% Cloths 1 Application(s) Topical daily  enoxaparin Injectable 40 milliGRAM(s) SubCutaneous every 12 hours  gabapentin 800 milliGRAM(s) Oral daily  gabapentin 100 milliGRAM(s) Oral daily  nebivolol 10 milliGRAM(s) Oral <User Schedule>  polyethylene glycol 3350 17 Gram(s) Oral daily  senna 2 Tablet(s) Oral at bedtime  vancomycin  IVPB 1250 milliGRAM(s) IV Intermittent every 12 hours    MEDICATIONS  (PRN):  HYDROmorphone  Injectable 2 milliGRAM(s) IV Push every 4 hours PRN Severe Pain (7 - 10)      ALLERGIES:  Allergies    amoxicillin (Angioedema)  ciprofloxacin (Rash)  hydrochlorothiazide (Hives)  latex (Rash)  lisinopril (Angioedema; Rash; Hives)  morphine (Rash)  penicillin (Rash)    Intolerances        LABS:                        11.8   10.07 )-----------( 504      ( 15 Mar 2023 05:30 )             36.7     03-15    133<L>  |  95<L>  |  18  ----------------------------<  119<H>  4.3   |  23  |  0.61    Ca    9.3      15 Mar 2023 05:30  Phos  2.9     03-15  Mg     1.9     03-15      PT/INR - ( 14 Mar 2023 05:30 )   PT: 11.7 sec;   INR: 0.98          PTT - ( 14 Mar 2023 05:30 )  PTT:31.9 sec    CAPILLARY BLOOD GLUCOSE          RADIOLOGY & ADDITIONAL TESTS: Reviewed.

## 2023-03-15 NOTE — PROGRESS NOTE ADULT - ATTENDING COMMENTS
Patient here for L ulcer infection/cellulitis in setting of AVM.  She thinks pain and erythema remains the same.  ESR and CRP downtrending and WBC remains normal.  She really wants to try vanco again since she thinks vanco worked very well in the past.  Cont hugh.  Stop linezolid.  Start vanco 1250mg IV q8h and check troubh before 4th dose.    Team 1 will follow you.  Case d/w primary team.    Génesis Khan MD, MS  Infectious Disease attending  work cell [phone]   For any questions during evening/weekend/holiday, please page ID on call
No event overnight.  ulcer worsen and getting bigger, patient reports more pain. No new labs today yet.  Case d/w Dr. Rahman.  Will keep hugh, and switch dapto to linezolid 600mg PO q12h to see if she responds.  We do not have culture info to guide abx therapy and obtaining biopsy or deep wound culture will be difficult since she will likely has wound healing issue, potentially making this worsen.  Obtain WBC, ESR, CRP, CK.    Team 1 will follow you.  Case d/w primary team.    Génesis Khan MD, MS  Infectious Disease attending  work cell [phone]   For any questions during evening/weekend/holiday, please page ID on call
admitted for sepsis 2/2 acute on chronic L foot ulcer infection-> source less likely to be picc -> abx was to be continued 3/3  pw fever of 101 at home -> , Tachy, elevated WBC on admission   fu blood culture, wbc trended down to 10k, elevated esr/crp  Na 133 --> s/p 2 l ivf - will still send urine lyte and osm and fu repeat bmp in am   pt was started on her home medication. seen by ID today and will change abx to meropenam from cefepime and cw dapto   will consult wound care
L foot cellulitis/ulcer infection in setting of AVM.  Patient thinks pain slowly improving.  Erythema appears the same.   Van trough 14, therapeutic.  Cont vanco 1500mg IV q8h and meropenem 2g IV q8h.  Given lack of significant improvement on abx and this started after embolization procedure, desiree ask Dr. Teran to evaluate patient.  Obtain ESR and CRP on Monday.    Team 1 will follow you.  Case d/w primary team.    Génesis Khan MD, MS  Infectious Disease attending  work cell [phone]   For any questions during evening/weekend/holiday, please page ID on call
Patient with L foot cellulitis/ulcer infection in setting of AVM.  Overall patient thinks pain improving however erythema remains the same and wound not healing, still with pulsating sensation.  Dr. Teran to see patient today.  Vanc trough 16 - at goal.  Cont vanco 1250mg IV q8h and hugh 2g IV q8h.  f/u ESR and CRP.    Team 1 will follow you.  Case d/w primary team.    Génesis Khan MD, MS  Infectious Disease attending  work cell [phone]   For any questions during evening/weekend/holiday, please page ID on call
vanc trough 12, at goal. Patient thinks foot pain is the same. Erythema slightly worse than yesterday.  For now, continue vanc/hugh to treat L foot cellulitis/ulcer infection in setting of AVM.  Please obtain CRP and ESR tomorrow AM    Team 1 will follow you.    Case d/w primary team.    Génesis Khan MD, MS  Infectious Disease attending  work cell [phone]   For any questions during evening/weekend/holiday, please page ID on call
Cellulitis/ulcer infection of L foot in setting of AVM.  underwent embolization yesterday.  Patient feels well, said Dr. Waters changed dressing this morning and the erythema improved a lot.  She has pain from procedure.  Please set up OPAT as above.    Thank you for your consult.  Please re-consult us or call us with questions.  Case d/w primary team.    Génesis Khan MD, MS  Infectious Disease attending  work cell [phone]  For any questions during evening/weekend/holiday, please page ID on call
Coverage for Dr. Khan.  AVM L foot with chronic ulcer, surrounding cellulitis.  Per Ms. Kearney, she thinks may be slightly better today after first full day of vancomycin.  Afebrile, WBC WNL.  Ulcer still ~1.5 cm, relatively shallow, erythema appears slightly better than on image from yesterday. Vancomycin trough was subtherapeutic and appropriately timed (11.2;  at lab at 02:19, prior dose 19:07 on 3/8).  Agree with increased dose per primary team.  Can give one more day to see if significant improvement.  If not, would recall Dr. Teran's team and Wound Care Service.  Dr. Khan will resume care tomorrow.
Reports same burning sensation but ulcer seems to be improving, seems to be closing.  WBC remains normal.  Cont vanc/hugh for L foot cellulitis/ulcer infection in setting of AVM.  Check trough before 4th dose, goal 11-17.    Team 1 will follow you.  Case d/w primary team.    Génesis Khan MD, MS  Infectious Disease attending  work cell [phone]   For any questions during evening/weekend/holiday, please page ID on call
patient feels pain remains the same.  Erythema seems slightly improved. WBC normal at 10, CRP 21.8 and ESR 35, slightly uptrending.  Will continue hugh/linezolid to treat L foot ulcer infection/cellulitis in setting of AVM.    Team 1 will follow you.  Case d/w primary team.    Génesis Khan MD, MS  Infectious Disease attending  work cell [phone]   For any questions during evening/weekend/holiday, please page ID on call
Fever, leukocytosis and pain likely related to foot, afebrile since admission, leukocytosis has resolved, blood cultures 2/27 NGTD.  She does not feel better but foot wound appears narrower.  She had been on meropenem for <24 h when I saw her, would continue dapto/hugh and reassess.  Recommendations discussed with Dr. Goodwin.  Will follow with you – ID Team 1.  Dr. Khan will assume care tomorrow.
resting in bed no complaints     # Sepsis ( POA )   # Left Heel Medial Aspect Recurrent Non Healing ischemic Ulcer due to AVM with Surrounding cellulitis   # Obesity   # Hypertension   # Chronic Pain     - IV Meropenem and PO Linezolid , F/u ID recommendations   - continue gabapentin  - Continue Nebivolol   - DC home after final ID recs and Arrangement of home anbx infusion  - Case mgmt and  notified on 3/4/23
37F with PMH of left foot AVM, chronic ulcer, HTN, morbid obesity presenting with fevers and sepsis secondary to left foot ulcer.      Physical exam  Gen: no acute distress, lying flat in bed  HEENT: normocephalic, atraumatic, MMM  Cards: RRR, S1/S2, no mrg  Pulm: CTAB, no wheeze, crackles, rales or rhonchi  Ab: soft, nontender, nondistended, normoactive bowel sounds  Ext: LLE is wrapped    #LLE ulcer, infected  - continue on vancomycin and meropenem, continue to monitor progress  - she already has a PICC Line, so would be able to be discharged home with infusion services  - has remained afebrile, with reassuring vital signs.    >35 minutes spent on this encounter, including face to face with patient, care coordination and documentation
37F with PMH of left foot AVM, chronic ulcer, HTN, morbid obesity presenting with fevers and sepsis secondary to left foot ulcer.      Physical exam  Gen: no acute distress, lying flat in bed  HEENT: normocephalic, atraumatic, MMM  Cards: RRR, S1/S2, no mrg  Pulm: CTAB, no wheeze, crackles, rales or rhonchi  Ab: soft, nontender, nondistended, normoactive bowel sounds  Ext: LLE wrapped, RLE without any edema or erythema  Neuro: grossly nonfocal exam, moves all extremities, speech is fluent, alert and conversant    #LLE ulcer, infected  - continue on vancomycin and meropenem, continue to monitor progress.  Patient reports minimal improvement in pain.    - will see how things progress over the next 24 hours.  If no significant improvement, may need to involve help Dr. Teran's team, per ID note.   - has remained afebrile, with reassuring vital signs.    >35 minutes spent on this encounter, including face to face with patient, care coordination and documentation
37F with PMH of left foot AVM, chronic ulcer, HTN, morbid obesity presenting with fevers and sepsis secondary to left foot ulcer.      Physical exam  Gen: no acute distress, lying flat in bed  HEENT: normocephalic, atraumatic, MMM  Cards: RRR, S1/S2, no mrg  Pulm: CTAB, no wheeze, crackles, rales or rhonchi  Ab: soft, nontender, nondistended, normoactive bowel sounds  Ext: LLE wrapped, RLE without any edema or erythema  Neuro: grossly nonfocal exam, moves all extremities, speech is fluent, alert and conversant    #LLE ulcer, infected  - continue with vancomycin and meropenem  - ID following  - has PICC line so will be able to be discharged home once final plan in place  - team to call Dr. Teran's office tomorrow.  Dr. Khan wants Dr. Teran to see patient while she is in the hospital (Dr. Teran has done prior ablations for AVM)  - vital signs and labs are reassuring.     High level of decision making required for this encounter, including medication monitoring of vancomycin and further workup for acute infected ulcer of LLE.
admitted for sepsis 2/2 acute on chronic L foot ulcer infection->  pw fever of 101 at home -> , Tachy, elevated WBC on admission   ngtd on blood culture, wbc trended down to 10k, elevated esr/crp--> abx changed linezolid and meropenam on this admission   ID following and will give final recs if continues to improve 3/3---> pt will f/u with Dr. Rahman outpt for final duration   Na 133 --> s/p 2 l ivf -138 resolved   dispo pending clinical improvement in wound and fu with outpt provider to cont wound  care.
admitted for sepsis 2/2 acute on chronic L foot ulcer infection-> source less likely to be picc -> abx was to be continued 3/3  pw fever of 101 at home -> , Tachy, elevated WBC on admission   ngtd on blood culture, wbc trended down to 10k, elevated esr/crp  Na 133 --> s/p 2 l ivf -138 resolved   pt was started on her home medication. seen by ID today and changed abx to meropenam and cw dapto   dispo pending clinical improvement in wound and fu with outpt provider to cont wound  care
37F with PMH of left foot AVM, chronic ulcer, HTN, morbid obesity presenting with fevers and sepsis secondary to left foot ulcer.      Physical exam  Gen: no acute distress, lying flat in bed  HEENT: normocephalic, atraumatic, MMM  Cards: RRR, S1/S2, no mrg  Pulm: CTAB, no wheeze, crackles, rales or rhonchi  Ab: soft, nontender, nondistended, normoactive bowel sounds  Ext: LLE is wrapped    #LLE ulcer, infected  - follow up with ID about final antibiotic plan - was switched to vancomycin from linezolid  - checking vanc trough  - monitor   - she already has a PICC Line, so would be able to be discharged home with infusion services  - has remained afebrile    >35 minutes spent on this encounter, including face to face with patient, care coordination and documentation
37F with PMH of left foot AVM, chronic ulcer, HTN, morbid obesity presenting with fevers and sepsis secondary to left foot ulcer.      Physical exam  Gen: no acute distress, lying flat in bed  HEENT: normocephalic, atraumatic, MMM  Cards: RRR, S1/S2, no mrg  Pulm: CTAB, no wheeze, crackles, rales or rhonchi  Ab: soft, nontender, nondistended, normoactive bowel sounds  Ext: LLE is wrapped, but patient showed a picture that was taken today - wound is shallow, red  Neuro: grossly nonfocal exam, speech is fluent, moves all extremities    #LLE ulcer, infected  - follow up with ID about final antibiotic plan  - she already has a PICC Line, so would be able to be discharged home with infusion services  - has remained afebrile    >35 minutes spent on this encounter, including face to face with patient, care coordination and documentation
37F with PMH of left foot AVM, chronic ulcer, HTN, morbid obesity presenting with fevers and sepsis secondary to left foot ulcer.      Physical exam  Gen: no acute distress, lying flat in bed  HEENT: normocephalic, atraumatic, MMM  Cards: RRR, S1/S2, no mrg  Pulm: CTAB, no wheeze, crackles, rales or rhonchi  Ab: soft, nontender, nondistended, normoactive bowel sounds  Ext: LLE wrapped, RLE without any edema or erythema  Neuro: grossly nonfocal exam, moves all extremities, speech is fluent, alert and conversant    #LLE ulcer, infected  - continue with vancomycin and meropenem - planning for discharge tomorrow on vancomycin and meropenem  - to see patient today about topical ointment to place on wound  - s/p ablation with Dr. Teran    35 minutes spent on this encounter, including face to face with patient, care coordination and documentation.
resting in bed no complaints     # Sepsis ( POA )   # Left Heel Medial Aspect Recurrent Non Healing ischemic Ulcer due to AVM with Surrounding cellulitis   # Obesity   # Hypertension   # Chronic Pain     - IV Meropenem and PO Linezolid , F/u ID recommendations   - continue gabapentin  - Continue Nebivolol   - DC home after final ID recs and Arrangement of home anbx infusion  - Case mgmt and  notified on 3/4/23

## 2023-03-15 NOTE — CHART NOTE - NSCHARTNOTEFT_GEN_A_CORE
Admitting Diagnosis:   Patient is a 37y old  Female who presents with a chief complaint of fever (15 Mar 2023 08:20)      PAST MEDICAL & SURGICAL HISTORY:  HTN (hypertension)  AVM (arteriovenous malformation) of left foot  Foot ulceration left foot  S/P debridement LLE area overlying AVM  Elective surgery transcatheter therapy vascular embolization x5  Morbid obesity      Current Nutrition Order:   Diet, DASH/TLC:   Sodium & Cholesterol Restricted (03-14-23 @ 15:17)      PO Intake: Good (%) [ x  ]  Fair (50-75%) [   ] Poor (<25%) [   ]    GI Issues: none documented    Skin Integrity: no pressure injuries documented    Labs:   03-15    133<L>  |  95<L>  |  18  ----------------------------<  119<H>  4.3   |  23  |  0.61    Ca    9.3      15 Mar 2023 05:30  Phos  2.9     03-15  Mg     1.9     03-15      Medications:  MEDICATIONS  (STANDING):  chlorhexidine 2% Cloths 1 Application(s) Topical daily  enoxaparin Injectable 40 milliGRAM(s) SubCutaneous every 12 hours  gabapentin 800 milliGRAM(s) Oral daily  gabapentin 100 milliGRAM(s) Oral daily  nebivolol 10 milliGRAM(s) Oral <User Schedule>  polyethylene glycol 3350 17 Gram(s) Oral daily  senna 2 Tablet(s) Oral at bedtime  vancomycin  IVPB 1250 milliGRAM(s) IV Intermittent every 12 hours    MEDICATIONS  (PRN):  HYDROmorphone  Injectable 2 milliGRAM(s) IV Push every 4 hours PRN Severe Pain (7 - 10)      Weight:  Height for BMI (FEET)	5 Feet  Height for BMI (INCHES)	7 Inch(s)  Height for BMI (CENTIMETERS)	170.18 Centimeter(s)  Weight for BMI (lbs)	296.3 lb  Weight for BMI (kg)	134.4 kg  Body Mass Index	46.4    Weight Change: no new weights to assess    Estimated energy needs:   Estimated Energy Needs Weight (lbs)	135 lb  Estimated Energy Needs Weight (kg)	61.2 kg  Estimated Energy Needs From (reji/kg)	25  Estimated Energy Needs To (reji/kg)	30  Estimated Energy Needs Calculated From (reji/kg)	1530  Estimated Energy Needs Calculated To (reji/kg)	1836    Estimated Protein Needs Weight (lbs)	135 lb  Estimated Protein Needs Weight (kg)	61.2 kg  Estimated Protein Needs From (g/kg)	0.8  Estimated Protein Needs To (g/kg)	1  Estimated Protein Needs Calculated From (g/kg)	48.96  Estimated Protein Needs Calculated To (g/kg)	61.2    Subjective: 37F PMH left foot AVM w/ chronic left foot ulcer (s/p multiple embolizations, last 1/24), HTN,  morbid obesity and recent admission for AVM L foot re-embolization and dc'ed  on 2/7/23 on IV abx via PICC for L foot infx sent by Dr. Rahman for fever wok up. Pt was admitted on 1/24-2/7 for another AVM embolization and was dc'ed on 4wks of abx  Dapto 800 q24/Cefepime 2g q8hrs (2/3/23-3/3/23) via PICC (placed on 2/3/23) and f/up w/ Dr. Rahman. Pt was sent to ED by Dr. Rahman for septic wok up in the setting of new fever of 101 at home on 2/27 by visiting nurse. Took Tylenol at home prior to ED presentation. Per ED provider who spoke w/ / Lacey possible source is PICC line and/or Left foot ulcer and recommended to c/w same abx regiment pending re-eval in am by ID to finalize abx and decided on wether to keep/ remove PICC line. ROS + subjective f/c and fatigue X 1 day and 1 episode of bilious emesis today. Also reports new  redness around shallow ulcer on L foot X1 day. Also reports pain in L foot is worse today.  Denies URI s/s, cough, CP, SOB, abd pain, n/d/c and dysuria.     Pt seen at bedside for follow up assessment. Current diet order: DASH/TLC. Pt continues to report consumption of % of meals. Noted with hyponatremia-monitor fluids. On bowel regimen. RD to f/u prn.    Previous Nutrition Diagnosis: Overweight/obesity r/t excessive energy intake AEB BMI 46.5.    Active [ x  ]  Resolved [   ]    Goal: Pt to meet at least 75% of nutritional needs consistently     Recommendations:  1. Continue with diet order   2. Encourage pt to meed nutritional needs as able   3. Monitor labs: electrolytes, cmp  4. Monitor weights   5. Pain and bowel regimen per team   6. Will continue to assess/honor food preferences as able    Education: deferred at this time    Risk Level: High [   ] Moderate [  x ] Low [   ]

## 2023-03-16 ENCOUNTER — TRANSCRIPTION ENCOUNTER (OUTPATIENT)
Age: 38
End: 2023-03-16

## 2023-03-16 VITALS
DIASTOLIC BLOOD PRESSURE: 63 MMHG | OXYGEN SATURATION: 98 % | RESPIRATION RATE: 18 BRPM | HEART RATE: 77 BPM | TEMPERATURE: 98 F | SYSTOLIC BLOOD PRESSURE: 117 MMHG

## 2023-03-16 LAB
ANION GAP SERPL CALC-SCNC: 7 MMOL/L — SIGNIFICANT CHANGE UP (ref 5–17)
BUN SERPL-MCNC: 19 MG/DL — SIGNIFICANT CHANGE UP (ref 7–23)
CALCIUM SERPL-MCNC: 9.1 MG/DL — SIGNIFICANT CHANGE UP (ref 8.4–10.5)
CHLORIDE SERPL-SCNC: 102 MMOL/L — SIGNIFICANT CHANGE UP (ref 96–108)
CO2 SERPL-SCNC: 30 MMOL/L — SIGNIFICANT CHANGE UP (ref 22–31)
CREAT SERPL-MCNC: 0.63 MG/DL — SIGNIFICANT CHANGE UP (ref 0.5–1.3)
EGFR: 116 ML/MIN/1.73M2 — SIGNIFICANT CHANGE UP
GLUCOSE SERPL-MCNC: 93 MG/DL — SIGNIFICANT CHANGE UP (ref 70–99)
HCT VFR BLD CALC: 36.5 % — SIGNIFICANT CHANGE UP (ref 34.5–45)
HGB BLD-MCNC: 11.4 G/DL — LOW (ref 11.5–15.5)
MAGNESIUM SERPL-MCNC: 1.9 MG/DL — SIGNIFICANT CHANGE UP (ref 1.6–2.6)
MCHC RBC-ENTMCNC: 28.5 PG — SIGNIFICANT CHANGE UP (ref 27–34)
MCHC RBC-ENTMCNC: 31.2 GM/DL — LOW (ref 32–36)
MCV RBC AUTO: 91.3 FL — SIGNIFICANT CHANGE UP (ref 80–100)
NRBC # BLD: 0 /100 WBCS — SIGNIFICANT CHANGE UP (ref 0–0)
PHOSPHATE SERPL-MCNC: 4 MG/DL — SIGNIFICANT CHANGE UP (ref 2.5–4.5)
PLATELET # BLD AUTO: 417 K/UL — HIGH (ref 150–400)
POTASSIUM SERPL-MCNC: 4.7 MMOL/L — SIGNIFICANT CHANGE UP (ref 3.5–5.3)
POTASSIUM SERPL-SCNC: 4.7 MMOL/L — SIGNIFICANT CHANGE UP (ref 3.5–5.3)
RBC # BLD: 4 M/UL — SIGNIFICANT CHANGE UP (ref 3.8–5.2)
RBC # FLD: 12.9 % — SIGNIFICANT CHANGE UP (ref 10.3–14.5)
SODIUM SERPL-SCNC: 139 MMOL/L — SIGNIFICANT CHANGE UP (ref 135–145)
WBC # BLD: 9.03 K/UL — SIGNIFICANT CHANGE UP (ref 3.8–10.5)
WBC # FLD AUTO: 9.03 K/UL — SIGNIFICANT CHANGE UP (ref 3.8–10.5)

## 2023-03-16 PROCEDURE — C1769: CPT

## 2023-03-16 PROCEDURE — 86850 RBC ANTIBODY SCREEN: CPT

## 2023-03-16 PROCEDURE — 86140 C-REACTIVE PROTEIN: CPT

## 2023-03-16 PROCEDURE — 84100 ASSAY OF PHOSPHORUS: CPT

## 2023-03-16 PROCEDURE — 82550 ASSAY OF CK (CPK): CPT

## 2023-03-16 PROCEDURE — 85652 RBC SED RATE AUTOMATED: CPT

## 2023-03-16 PROCEDURE — 83735 ASSAY OF MAGNESIUM: CPT

## 2023-03-16 PROCEDURE — 80053 COMPREHEN METABOLIC PANEL: CPT

## 2023-03-16 PROCEDURE — 83605 ASSAY OF LACTIC ACID: CPT

## 2023-03-16 PROCEDURE — 81025 URINE PREGNANCY TEST: CPT

## 2023-03-16 PROCEDURE — 81001 URINALYSIS AUTO W/SCOPE: CPT

## 2023-03-16 PROCEDURE — 96374 THER/PROPH/DIAG INJ IV PUSH: CPT

## 2023-03-16 PROCEDURE — 80048 BASIC METABOLIC PNL TOTAL CA: CPT

## 2023-03-16 PROCEDURE — C1889: CPT

## 2023-03-16 PROCEDURE — 85027 COMPLETE CBC AUTOMATED: CPT

## 2023-03-16 PROCEDURE — 85730 THROMBOPLASTIN TIME PARTIAL: CPT

## 2023-03-16 PROCEDURE — 80202 ASSAY OF VANCOMYCIN: CPT

## 2023-03-16 PROCEDURE — C1887: CPT

## 2023-03-16 PROCEDURE — 87040 BLOOD CULTURE FOR BACTERIA: CPT

## 2023-03-16 PROCEDURE — 86901 BLOOD TYPING SEROLOGIC RH(D): CPT

## 2023-03-16 PROCEDURE — 99285 EMERGENCY DEPT VISIT HI MDM: CPT

## 2023-03-16 PROCEDURE — 87635 SARS-COV-2 COVID-19 AMP PRB: CPT

## 2023-03-16 PROCEDURE — 99232 SBSQ HOSP IP/OBS MODERATE 35: CPT

## 2023-03-16 PROCEDURE — 36415 COLL VENOUS BLD VENIPUNCTURE: CPT

## 2023-03-16 PROCEDURE — 86900 BLOOD TYPING SEROLOGIC ABO: CPT

## 2023-03-16 PROCEDURE — C1894: CPT

## 2023-03-16 PROCEDURE — 84702 CHORIONIC GONADOTROPIN TEST: CPT

## 2023-03-16 PROCEDURE — 85610 PROTHROMBIN TIME: CPT

## 2023-03-16 PROCEDURE — 85025 COMPLETE CBC W/AUTO DIFF WBC: CPT

## 2023-03-16 PROCEDURE — 71045 X-RAY EXAM CHEST 1 VIEW: CPT

## 2023-03-16 RX ADMIN — HYDROMORPHONE HYDROCHLORIDE 2 MILLIGRAM(S): 2 INJECTION INTRAMUSCULAR; INTRAVENOUS; SUBCUTANEOUS at 07:04

## 2023-03-16 RX ADMIN — HYDROMORPHONE HYDROCHLORIDE 2 MILLIGRAM(S): 2 INJECTION INTRAMUSCULAR; INTRAVENOUS; SUBCUTANEOUS at 11:35

## 2023-03-16 RX ADMIN — GABAPENTIN 800 MILLIGRAM(S): 400 CAPSULE ORAL at 14:35

## 2023-03-16 RX ADMIN — MEROPENEM 280 MILLIGRAM(S): 1 INJECTION INTRAVENOUS at 07:04

## 2023-03-16 RX ADMIN — Medication 166.67 MILLIGRAM(S): at 07:49

## 2023-03-16 RX ADMIN — HYDROMORPHONE HYDROCHLORIDE 2 MILLIGRAM(S): 2 INJECTION INTRAMUSCULAR; INTRAVENOUS; SUBCUTANEOUS at 11:10

## 2023-03-16 RX ADMIN — CHLORHEXIDINE GLUCONATE 1 APPLICATION(S): 213 SOLUTION TOPICAL at 11:11

## 2023-03-16 RX ADMIN — GABAPENTIN 100 MILLIGRAM(S): 400 CAPSULE ORAL at 11:11

## 2023-03-16 RX ADMIN — Medication 166.67 MILLIGRAM(S): at 13:00

## 2023-03-16 RX ADMIN — NEBIVOLOL HYDROCHLORIDE 10 MILLIGRAM(S): 5 TABLET ORAL at 07:04

## 2023-03-16 RX ADMIN — HYDROMORPHONE HYDROCHLORIDE 2 MILLIGRAM(S): 2 INJECTION INTRAMUSCULAR; INTRAVENOUS; SUBCUTANEOUS at 02:49

## 2023-03-16 RX ADMIN — MEROPENEM 280 MILLIGRAM(S): 1 INJECTION INTRAVENOUS at 14:31

## 2023-03-16 RX ADMIN — HYDROMORPHONE HYDROCHLORIDE 2 MILLIGRAM(S): 2 INJECTION INTRAMUSCULAR; INTRAVENOUS; SUBCUTANEOUS at 15:14

## 2023-03-16 RX ADMIN — ENOXAPARIN SODIUM 40 MILLIGRAM(S): 100 INJECTION SUBCUTANEOUS at 11:11

## 2023-03-16 NOTE — PROGRESS NOTE ADULT - REASON FOR ADMISSION
fever

## 2023-03-16 NOTE — PROGRESS NOTE ADULT - PROBLEM SELECTOR PLAN 3
Hx of Left foot AVM w/ chronic foot ulcer w/ multiple prior embolizations w/ most recent embolization on 01/24/2023 w/ Dr. Teran   -see sepsis /foot ulcer for plan    Patient being discharged today

## 2023-03-16 NOTE — PROGRESS NOTE ADULT - PROBLEM SELECTOR PLAN 1
3/4 SIRS (Fever 101 at home, Tachy, elevated WBC). Possible source (s) PICC line and/or Left foot ulcer. Pt s/p recent dc on 2/7/23 on IV abx (Dapto 800 q24hrs and Cefepime 2g q8hrs) via PICC (placed on 2/3/23) for L foot infx.   Afebrile, HD stable on admission. Reports taking Tylenol at home prior to presentation. LA wnl.  PICC site c/d/i. PICC. L foot + new redness around shallow ulcer on L foot X 1 day. Denies URI s/s, cough, CP, SOB, abd pain, n/v/d/c and dysuria. CXR neg.  Per ID source is likely the foot and PICC cane stay in. Per ID switch from dapto to linezolid BCx ngtd. on 3/6, switched from linezolid to vanc.     Plan:   - Dr. Teran of interventional cardiology was consulted for evaluation of nonhealing left ulcer despite coverage with appropriate antibiotics  - s/p angiogram and transcatheter embolization via right femoral groin access with improved perfusion 3/14   - C/w Meropenem 2g q8h  - c/w Vancomycin 1250mg q8h  - total antibiotic course for 4 weeks (until 4/3); already has PICC  - ID following, appreciate recs  - appreciate vascular/cardiology input from Dr. Teran's team    No changes to above plan, being discharged today.

## 2023-03-16 NOTE — PROGRESS NOTE ADULT - PROBLEM SELECTOR PROBLEM 4
HTN (hypertension)
Hypokalemia
HTN (hypertension)
Hypokalemia
Hypokalemia
HTN (hypertension)
HTN (hypertension)
Hypokalemia
HTN (hypertension)
Hypokalemia

## 2023-03-16 NOTE — DISCHARGE NOTE NURSING/CASE MANAGEMENT/SOCIAL WORK - NSDCPEFALRISK_GEN_ALL_CORE
For information on Fall & Injury Prevention, visit: [email or web address] OR  [email or web address] OR  https://www.cdc.gov/steadi/patient.html

## 2023-03-16 NOTE — PROGRESS NOTE ADULT - SUBJECTIVE AND OBJECTIVE BOX
Feeling well, still has some pain but her outpatient pain management doctor will be managing.       OVERNIGHT EVENTS: NAEO      Remaining ROS negative       PHYSICAL EXAM:  General: NAD  HEENT: PERRL/ EOMI, no scleral icterus, MMM  Neck: Supple, no JVD  Respiratory: lungs CTA b/l, no wheezes/crackles, no accessory muscle use  Cardiovascular: Regular rhythm/rate; +S1 +S2, no murmurs  Gastrointestinal: Soft, NTND, normoactive BS, no rebound, no guarding  Genitourinary: no suprapubic tenderness  Extremities: + L foot shallow clean base ulcer w/ mild surrounding erythema, WWP, no clubbing or cyanosis; no peripheral edema; R groin access c/d/i  Neurological: A&Ox3, no gross focal deficits, follows commands  Skin: Normal temperature, warm, dry    MEDICATIONS:    VITAL SIGNS:  Vital Signs Last 24 Hrs  T(C): 36.7 (16 Mar 2023 05:19), Max: 36.8 (15 Mar 2023 15:30)  T(F): 98 (16 Mar 2023 05:19), Max: 98.3 (15 Mar 2023 15:30)  HR: 77 (16 Mar 2023 05:19) (77 - 98)  BP: 117/63 (16 Mar 2023 05:19) (117/63 - 134/85)  BP(mean): --  RR: 18 (16 Mar 2023 05:19) (18 - 18)  SpO2: 98% (16 Mar 2023 05:19) (95% - 98%)    Parameters below as of 16 Mar 2023 05:19  Patient On (Oxygen Delivery Method): room air          MEDICATIONS:  MEDICATIONS  (STANDING):  chlorhexidine 2% Cloths 1 Application(s) Topical daily  enoxaparin Injectable 40 milliGRAM(s) SubCutaneous every 12 hours  gabapentin 100 milliGRAM(s) Oral daily  gabapentin 800 milliGRAM(s) Oral daily  meropenem  IVPB 2000 milliGRAM(s) IV Intermittent every 8 hours  nebivolol 10 milliGRAM(s) Oral <User Schedule>  polyethylene glycol 3350 17 Gram(s) Oral daily  senna 2 Tablet(s) Oral at bedtime  vancomycin  IVPB 1250 milliGRAM(s) IV Intermittent every 8 hours    MEDICATIONS  (PRN):  HYDROmorphone  Injectable 2 milliGRAM(s) IV Push every 4 hours PRN Severe Pain (7 - 10)      ALLERGIES:  Allergies    amoxicillin (Angioedema)  ciprofloxacin (Rash)  hydrochlorothiazide (Hives)  latex (Rash)  lisinopril (Angioedema; Rash; Hives)  morphine (Rash)  penicillin (Rash)    Intolerances        LABS:                        11.4   9.03  )-----------( 417      ( 16 Mar 2023 05:30 )             36.5     03-16    139  |  102  |  19  ----------------------------<  93  4.7   |  30  |  0.63    Ca    9.1      16 Mar 2023 05:30  Phos  4.0     03-16  Mg     1.9     03-16          CAPILLARY BLOOD GLUCOSE          RADIOLOGY & ADDITIONAL TESTS: Reviewed.

## 2023-03-16 NOTE — PROGRESS NOTE ADULT - PROBLEM SELECTOR PROBLEM 2
Ulcer of left foot

## 2023-03-16 NOTE — PROGRESS NOTE ADULT - PROBLEM SELECTOR PLAN 2
Chronic foot ulcer in the setting of AVM malformation. L foot + new redness around shallow ulcer on L foot X 1 day w/ ángel pain foot pain today. Home meds: Dapto/ Cefepime, Gabapentin 100 in am and 800 at 4pm. Percocet 10 q4hrs PRN. Dr Teran office notified, will follow patient.     - c/w 2mg dilaudid q4 s/p procedure and resume home medications on discharge  - bowel reg   - see sepsis for plan    No change to above plan, being discharged today.

## 2023-03-16 NOTE — PROGRESS NOTE ADULT - PROBLEM SELECTOR PROBLEM 5
Nutrition, metabolism, and development symptoms
HTN (hypertension)
Nutrition, metabolism, and development symptoms
Nutrition, metabolism, and development symptoms
HTN (hypertension)
HTN (hypertension)
Nutrition, metabolism, and development symptoms
Morbid obesity
HTN (hypertension)
Nutrition, metabolism, and development symptoms
HTN (hypertension)
Nutrition, metabolism, and development symptoms
Morbid obesity
HTN (hypertension)
Morbid obesity

## 2023-03-16 NOTE — PROGRESS NOTE ADULT - PROVIDER SPECIALTY LIST ADULT
Infectious Disease
Internal Medicine
Internal Medicine
Infectious Disease
Internal Medicine
Infectious Disease
Internal Medicine
Infectious Disease
Internal Medicine
Infectious Disease
Internal Medicine
Hospitalist
Hospitalist

## 2023-03-16 NOTE — DISCHARGE NOTE NURSING/CASE MANAGEMENT/SOCIAL WORK - NSDCVIVACCINE_GEN_ALL_CORE_FT
influenza, injectable, quadrivalent, preservative free; 22-Oct-2020 10:12; Miley Hebert (RN); [employer]; [id number] (Exp. Date: 30-Jun-2021); IntraMuscular; Deltoid Left.; 0.5 milliLiter(s); VIS (VIS Published: 15-Aug-2019, VIS Presented: 22-Oct-2020);   influenza, injectable, quadrivalent, preservative free; 14-Oct-2021 09:19; Margie Taveras (RN); [employer]; [id number] (Exp. Date: 30-Jun-2022); IntraMuscular; Deltoid Right.; 0.5 milliLiter(s); VIS (VIS Published: 06-Aug-2021, VIS Presented: 14-Oct-2021);   influenza, injectable, quadrivalent, preservative free; 09-Dec-2022 14:43; Melinda Sesay (RN); [employer]; [id number] (Exp. Date: 29-May-2022); IntraMuscular; Deltoid Right.; 0.5 milliLiter(s); VIS (VIS Published: 06-Aug-2021, VIS Presented: 09-Dec-2022);

## 2023-03-16 NOTE — PROGRESS NOTE ADULT - PROBLEM SELECTOR PROBLEM 3
AVM (congenital arteriovenous malformation)

## 2023-03-16 NOTE — PROGRESS NOTE ADULT - NUTRITIONAL ASSESSMENT
This patient has been assessed with a concern for Malnutrition and has been determined to have a diagnosis/diagnoses of Morbid obesity (BMI > 40).    This patient is being managed with:   Diet DASH/TLC-  Sodium & Cholesterol Restricted  Supplement Feeding Modality:  Oral  Probiotic Yogurt/Smoothie Cans or Servings Per Day:  3       Frequency:  Three Times a day  Entered: Feb 28 2023  4:06PM    
This patient has been assessed with a concern for Malnutrition and has been determined to have a diagnosis/diagnoses of Morbid obesity (BMI > 40).    This patient is being managed with:   Diet NPO-  Except Medications  With Ice Chips/Sips of Water  Entered: Mar 14 2023  5:27AM    
This patient has been assessed with a concern for Malnutrition and has been determined to have a diagnosis/diagnoses of Morbid obesity (BMI > 40).    This patient is being managed with:   Diet DASH/TLC-  Sodium & Cholesterol Restricted  Entered: Mar 14 2023  3:17PM    
This patient has been assessed with a concern for Malnutrition and has been determined to have a diagnosis/diagnoses of Morbid obesity (BMI > 40).    This patient is being managed with:   Diet DASH/TLC-  Sodium & Cholesterol Restricted  Supplement Feeding Modality:  Oral  Probiotic Yogurt/Smoothie Cans or Servings Per Day:  3       Frequency:  Three Times a day  Entered: Feb 28 2023  4:06PM    
This patient has been assessed with a concern for Malnutrition and has been determined to have a diagnosis/diagnoses of Morbid obesity (BMI > 40).    This patient is being managed with:   Diet DASH/TLC-  Sodium & Cholesterol Restricted  Entered: Mar 14 2023  3:17PM    
This patient has been assessed with a concern for Malnutrition and has been determined to have a diagnosis/diagnoses of Morbid obesity (BMI > 40).    This patient is being managed with:   Diet DASH/TLC-  Sodium & Cholesterol Restricted  Supplement Feeding Modality:  Oral  Probiotic Yogurt/Smoothie Cans or Servings Per Day:  3       Frequency:  Three Times a day  Entered: Feb 28 2023  4:06PM    

## 2023-03-16 NOTE — DISCHARGE NOTE NURSING/CASE MANAGEMENT/SOCIAL WORK - PATIENT PORTAL LINK FT
You can access the FollowMyHealth Patient Portal offered by [facility] by registering at the following website: [email or web address]. By joining [facility]’s FollowMyHealth portal, you will also be able to view your health information using other applications (apps) compatible with our system.

## 2023-03-28 DIAGNOSIS — E66.01 MORBID (SEVERE) OBESITY DUE TO EXCESS CALORIES: ICD-10-CM

## 2023-03-28 DIAGNOSIS — E87.6 HYPOKALEMIA: ICD-10-CM

## 2023-03-28 DIAGNOSIS — I10 ESSENTIAL (PRIMARY) HYPERTENSION: ICD-10-CM

## 2023-03-28 DIAGNOSIS — L03.116 CELLULITIS OF LEFT LOWER LIMB: ICD-10-CM

## 2023-03-28 DIAGNOSIS — L97.529 NON-PRESSURE CHRONIC ULCER OF OTHER PART OF LEFT FOOT WITH UNSPECIFIED SEVERITY: ICD-10-CM

## 2023-03-28 DIAGNOSIS — A41.9 SEPSIS, UNSPECIFIED ORGANISM: ICD-10-CM

## 2023-03-28 DIAGNOSIS — Z88.8 ALLERGY STATUS TO OTHER DRUGS, MEDICAMENTS AND BIOLOGICAL SUBSTANCES STATUS: ICD-10-CM

## 2023-03-28 DIAGNOSIS — Q27.32 ARTERIOVENOUS MALFORMATION OF VESSEL OF LOWER LIMB: ICD-10-CM

## 2023-03-28 DIAGNOSIS — Z88.0 ALLERGY STATUS TO PENICILLIN: ICD-10-CM

## 2023-03-29 ENCOUNTER — NON-APPOINTMENT (OUTPATIENT)
Age: 38
End: 2023-03-29

## 2023-03-29 ENCOUNTER — INPATIENT (INPATIENT)
Facility: HOSPITAL | Age: 38
LOS: 7 days | Discharge: HOME HEALTH | DRG: 314 | End: 2023-04-06
Attending: INTERNAL MEDICINE | Admitting: STUDENT IN AN ORGANIZED HEALTH CARE EDUCATION/TRAINING PROGRAM
Payer: MEDICARE

## 2023-03-29 VITALS
OXYGEN SATURATION: 98 % | TEMPERATURE: 99 F | RESPIRATION RATE: 18 BRPM | SYSTOLIC BLOOD PRESSURE: 182 MMHG | HEART RATE: 98 BPM | DIASTOLIC BLOOD PRESSURE: 108 MMHG | WEIGHT: 293 LBS | HEIGHT: 67 IN

## 2023-03-29 DIAGNOSIS — E66.01 MORBID (SEVERE) OBESITY DUE TO EXCESS CALORIES: Chronic | ICD-10-CM

## 2023-03-29 DIAGNOSIS — L97.509 NON-PRESSURE CHRONIC ULCER OF OTHER PART OF UNSPECIFIED FOOT WITH UNSPECIFIED SEVERITY: ICD-10-CM

## 2023-03-29 DIAGNOSIS — Z98.89 OTHER SPECIFIED POSTPROCEDURAL STATES: Chronic | ICD-10-CM

## 2023-03-29 DIAGNOSIS — Z29.9 ENCOUNTER FOR PROPHYLACTIC MEASURES, UNSPECIFIED: ICD-10-CM

## 2023-03-29 DIAGNOSIS — E66.01 MORBID (SEVERE) OBESITY DUE TO EXCESS CALORIES: ICD-10-CM

## 2023-03-29 DIAGNOSIS — I10 ESSENTIAL (PRIMARY) HYPERTENSION: ICD-10-CM

## 2023-03-29 DIAGNOSIS — Z41.9 ENCOUNTER FOR PROCEDURE FOR PURPOSES OTHER THAN REMEDYING HEALTH STATE, UNSPECIFIED: Chronic | ICD-10-CM

## 2023-03-29 DIAGNOSIS — Q27.30 ARTERIOVENOUS MALFORMATION, SITE UNSPECIFIED: ICD-10-CM

## 2023-03-29 DIAGNOSIS — A41.9 SEPSIS, UNSPECIFIED ORGANISM: ICD-10-CM

## 2023-03-29 PROCEDURE — 99222 1ST HOSP IP/OBS MODERATE 55: CPT | Mod: GC

## 2023-03-29 PROCEDURE — 99285 EMERGENCY DEPT VISIT HI MDM: CPT | Mod: CS

## 2023-03-29 PROCEDURE — 99222 1ST HOSP IP/OBS MODERATE 55: CPT

## 2023-03-29 PROCEDURE — 71045 X-RAY EXAM CHEST 1 VIEW: CPT | Mod: 26

## 2023-03-29 RX ORDER — HYDROMORPHONE HYDROCHLORIDE 2 MG/ML
2 INJECTION INTRAMUSCULAR; INTRAVENOUS; SUBCUTANEOUS EVERY 4 HOURS
Refills: 0 | Status: DISCONTINUED | OUTPATIENT
Start: 2023-03-29 | End: 2023-04-04

## 2023-03-29 RX ORDER — LANOLIN ALCOHOL/MO/W.PET/CERES
3 CREAM (GRAM) TOPICAL AT BEDTIME
Refills: 0 | Status: DISCONTINUED | OUTPATIENT
Start: 2023-03-29 | End: 2023-04-06

## 2023-03-29 RX ORDER — ONDANSETRON 8 MG/1
4 TABLET, FILM COATED ORAL EVERY 8 HOURS
Refills: 0 | Status: DISCONTINUED | OUTPATIENT
Start: 2023-03-29 | End: 2023-04-06

## 2023-03-29 RX ORDER — SENNA PLUS 8.6 MG/1
2 TABLET ORAL AT BEDTIME
Refills: 0 | Status: DISCONTINUED | OUTPATIENT
Start: 2023-03-29 | End: 2023-04-06

## 2023-03-29 RX ORDER — ENOXAPARIN SODIUM 100 MG/ML
40 INJECTION SUBCUTANEOUS EVERY 24 HOURS
Refills: 0 | Status: DISCONTINUED | OUTPATIENT
Start: 2023-03-29 | End: 2023-03-30

## 2023-03-29 RX ORDER — GABAPENTIN 400 MG/1
100 CAPSULE ORAL EVERY 24 HOURS
Refills: 0 | Status: DISCONTINUED | OUTPATIENT
Start: 2023-03-30 | End: 2023-04-06

## 2023-03-29 RX ORDER — VANCOMYCIN HCL 1 G
1250 VIAL (EA) INTRAVENOUS EVERY 8 HOURS
Refills: 0 | Status: COMPLETED | OUTPATIENT
Start: 2023-03-30 | End: 2023-03-30

## 2023-03-29 RX ORDER — HYDROMORPHONE HYDROCHLORIDE 2 MG/ML
2 INJECTION INTRAMUSCULAR; INTRAVENOUS; SUBCUTANEOUS EVERY 4 HOURS
Refills: 0 | Status: DISCONTINUED | OUTPATIENT
Start: 2023-03-29 | End: 2023-03-29

## 2023-03-29 RX ORDER — POLYETHYLENE GLYCOL 3350 17 G/17G
17 POWDER, FOR SOLUTION ORAL DAILY
Refills: 0 | Status: DISCONTINUED | OUTPATIENT
Start: 2023-03-29 | End: 2023-04-06

## 2023-03-29 RX ORDER — MEROPENEM 1 G/30ML
2000 INJECTION INTRAVENOUS EVERY 8 HOURS
Refills: 0 | Status: COMPLETED | OUTPATIENT
Start: 2023-03-30 | End: 2023-04-04

## 2023-03-29 RX ORDER — GABAPENTIN 400 MG/1
800 CAPSULE ORAL EVERY 24 HOURS
Refills: 0 | Status: DISCONTINUED | OUTPATIENT
Start: 2023-03-30 | End: 2023-04-06

## 2023-03-29 RX ORDER — NEBIVOLOL HYDROCHLORIDE 5 MG/1
10 TABLET ORAL
Refills: 0 | Status: DISCONTINUED | OUTPATIENT
Start: 2023-03-29 | End: 2023-04-06

## 2023-03-29 RX ADMIN — HYDROMORPHONE HYDROCHLORIDE 2 MILLIGRAM(S): 2 INJECTION INTRAMUSCULAR; INTRAVENOUS; SUBCUTANEOUS at 19:46

## 2023-03-29 RX ADMIN — HYDROMORPHONE HYDROCHLORIDE 2 MILLIGRAM(S): 2 INJECTION INTRAMUSCULAR; INTRAVENOUS; SUBCUTANEOUS at 23:43

## 2023-03-29 RX ADMIN — HYDROMORPHONE HYDROCHLORIDE 2 MILLIGRAM(S): 2 INJECTION INTRAMUSCULAR; INTRAVENOUS; SUBCUTANEOUS at 19:31

## 2023-03-29 NOTE — H&P ADULT - NSHPLABSRESULTS_GEN_ALL_CORE
LABS:                        12.2   12.32 )-----------( 509      ( 29 Mar 2023 15:08 )             36.4         135  |  100  |  7   ----------------------------<  101<H>  3.9   |  22  |  0.63    Ca    9.4      29 Mar 2023 15:08      PT/INR - ( 29 Mar 2023 15:08 )   PT: 13.8 sec;   INR: 1.16          PTT - ( 29 Mar 2023 15:08 )  PTT:34.3 sec  Urinalysis Basic - ( 29 Mar 2023 15:08 )    Color: Yellow / Appearance: Clear / S.020 / pH: x  Gluc: x / Ketone: 15 mg/dL  / Bili: Negative / Urobili: 0.2 E.U./dL   Blood: x / Protein: 30 mg/dL / Nitrite: NEGATIVE   Leuk Esterase: NEGATIVE / RBC: < 5 /HPF / WBC < 5 /HPF   Sq Epi: x / Non Sq Epi: Many /HPF / Bacteria: Present /HPF        MICROBIOLOGY:    RADIOLOGY & ADDITIONAL STUDIES:

## 2023-03-29 NOTE — H&P ADULT - ASSESSMENT
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

## 2023-03-29 NOTE — H&P ADULT - PROBLEM SELECTOR PLAN 3
Known history of L foot AVMs s/p 23 prior embolizations. Follows with Dr. Teran. Patient was recently evaluated s/p angiogram and transcatheter embolization via R femoral groin access with improved perfusion on 03/14. Patient has noticed some oozing of blood around the area, however is not brisk or acutely worsening.  - Wound Care consult   - Recommend continued outpatient follow-up with Dr. Teran

## 2023-03-29 NOTE — CONSULT NOTE ADULT - SUBJECTIVE AND OBJECTIVE BOX
HPI:  38 yo F with HTN, MO and AVM L foot with recurrent L foot ulcer with likely PICC infection.  ID consult for assistance with management.    She has undergone multiple direct stick and transcatheter embolizations for ulcers related to AVM.  Initially, she had been treated with courses of clindamycin.  She had been admitted on 10/10/22, underwent transcatheter embolization and OR wound debridement 10/11.  Wound culture from OR grew Pseudomonas aeruginosa.  She was treated with a 5 d course of vancomycin and cefepime.  She was readmitted on 10/31 with fever and L lateral ankle erythema/swelling/pain, ultimately responded to vanc and meropenem.  She was d/osorio on 11/10 with close ID follow-up, steady improvement.  She was readmitted on  with fever.  Labs were notable for WBC 10.7 with 78% PMNs, ESR 34 and CRP 29.  Repeat MRI showed phlegmon underlying ulcer surrounding area of contrast extravasation without improvement from .  PICC was removed and she symptomatically improved.  Blood cultures were negative.  PICC was replaced and she was d/osorio on  on vancomycin 1250 mg IV q12h and meropenem 1 g IV q8h.  Her ulcer closed completely on  and erythema resolved.  She was continued on vanc and meropenem through .  At that time, she had been having increased pulsing sensation in her foot.  She saw Dr. Teran who suggested additional embolization.  On ~ wound on foot again opened.  Further embolization was planned.  On , she developed mild erythema surrounding ulcer.  She underwent direct stick embolization on  and was admitted for further management.  She responded to daptomycin and cefepime.  She d/osorio on  – was set up with 4 week course (2/3-3/3) with plan to d/c earlier if possible based upon clinical appearance.  Per Ms.  Christel, she had ongoing improvement with small residual opening and very mild peripheral erythema, no drainage.  By 3-4 weeks after embolization, she had felt the standard result of embolization with baseline 4/10 throbbing pain.  On , the ulcer again became enlarged with increased erythema and she had fever and sweats at home.  She had WBC 13.2K on admission.  She did not improve with antibiotics and underwent transcatheter embolization on 3/14.  She was discharged on vanc and meropenem on 3/15 and has gradually improved with decreasing erythema and less depth to ulcer.    On 3/20, her vanc infusions began taking a long time.  Infusion company tried to obtain Cathflo for her but insurance wouldn’t cover.  Her PICC no longer had blood return.  Last night, she began feeling unwell and she had rigors but Tm 99.5.  This morning she again was not feeling well.  When tried to flush PICC, felt sensation in chest and throat.  She contacted my office, was referred to ED for PICC evaluation.  In the ED, she had T 99 (o), HR 98, /108.  Labs with WBC 12.3, plt 509.  PICC was removed at my direction and she was admitted for further management.      PAST MEDICAL & SURGICAL HISTORY:  HTN (hypertension)      AVM (arteriovenous malformation)  of left foot      Foot ulceration  left foot      S/P debridement  LLE area overlying AVM      Elective surgery  transcatheter therapy vascular embolization x5      Morbid obesity              MEDICATIONS  (STANDING):  enoxaparin Injectable 40 milliGRAM(s) SubCutaneous every 24 hours  nebivolol 20 milliGRAM(s) Oral daily    MEDICATIONS  (PRN):  aluminum hydroxide/magnesium hydroxide/simethicone Suspension 30 milliLiter(s) Oral every 4 hours PRN Dyspepsia  HYDROmorphone  Injectable 2 milliGRAM(s) IV Push every 4 hours PRN Severe Pain (7 - 10)  melatonin 3 milliGRAM(s) Oral at bedtime PRN Insomnia  ondansetron Injectable 4 milliGRAM(s) IV Push every 8 hours PRN Nausea and/or Vomiting      Allergies    amoxicillin (Angioedema)  ciprofloxacin (Rash)  hydrochlorothiazide (Hives)  latex (Rash)  lisinopril (Angioedema; Rash; Hives)  morphine (Rash)  penicillin (Rash)    Intolerances        SOCIAL HISTORY:  Born in [place], lives there now with her mother and boyfriend.  Has 2 dogs, no children.  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  No tobacco, alcohol or recreational drug use.      FAMILY HISTORY:  Family history of congenital malformation (Sibling)  AVMs: siblings    ROS:  No HA, photophobia, neck stiffness, rhinorrhea, sore throat, cough, CP, SOB, N, V, diarrhea, abd pain, dysuria, hematuria, frequency, muscle/joint symptoms, bruising/bleeding.    Vital Signs Last 24 Hrs  T(C): 37.4 (29 Mar 2023 18:15), Max: 37.4 (29 Mar 2023 18:15)  T(F): 99.4 (29 Mar 2023 18:15), Max: 99.4 (29 Mar 2023 18:15)  HR: 94 (29 Mar 2023 18:15) (94 - 94)  BP: 159/95 (29 Mar 2023 18:15) (159/95 - 159/95)  BP(mean): --  RR: 16 (29 Mar 2023 18:15) (16 - 16)  SpO2: 98% (29 Mar 2023 18:15) (98% - 98%)    Parameters below as of 29 Mar 2023 18:15  Patient On (Oxygen Delivery Method): room air        PE:  Alert, in no distress  HEENT:  NC, PERRL, sclerae anicteric, conjunctivae clear.  Sinuses nontender, no nasal exudate.  No buccal or pharyngeal lesions, erythema or exudate  Neck:  Supple, no adenopathy  Lungs:  Clear to auscultation  Cor:  RRR, S1, S2, no murmur appreciated  Abd:  Symmetric, normoactive BS.  Soft, nontender, no masses, guarding or rebound.  Liver and spleen not enlarged  Extrem:  No cyanosis or edema.  RUE PICC exit site without erythema or tenderness.  L foot ulcer with mild erythema, appears superficial  Skin:  No rashes.    LABS:                        12.2   12.32 )-----------( 509      ( 29 Mar 2023 15:08 )             36.4         135  |  100  |  7   ----------------------------<  101<H>  3.9   |  22  |  0.63    Ca    9.4      29 Mar 2023 15:08      Urinalysis Basic - ( 29 Mar 2023 15:08 )    Color: Yellow / Appearance: Clear / S.020 / pH: x  Gluc: x / Ketone: 15 mg/dL  / Bili: Negative / Urobili: 0.2 E.U./dL   Blood: x / Protein: 30 mg/dL / Nitrite: NEGATIVE   Leuk Esterase: NEGATIVE / RBC: < 5 /HPF / WBC < 5 /HPF   Sq Epi: x / Non Sq Epi: Many /HPF / Bacteria: Present /HPF        RADIOLOGY & ADDITIONAL STUDIES:    < from: Xray Chest 1 View- PORTABLE-Urgent (23 @ 16:48) >  FINDINGS: Interval removal of a right PICC. No pneumothorax. Lung zones   are clear. Limited evaluation the heart size due to the low lung volumes.   Focal eventration of the right hemidiaphragm, unchanged.    IMPRESSION:  Interval PICC removal.  No pneumothorax.  No acute pulmonary pathology.    < end of copied text >

## 2023-03-29 NOTE — H&P ADULT - NSHPPHYSICALEXAM_GEN_ALL_CORE
T(C): --  HR: --  BP: --  RR: --  SpO2: --    General: NAD, laying in bed, speaking in full sentences  HEENT: head NC/AT, no conjunctival injection, EOMI, MMM  Neck: supple, no JVD  Cardio: RRR, +S1/S2, no M/R/G  Resp: lungs CTAB, no cough/wheezes/rales/rhonchi  Abdo: soft, NT, ND, +bowel sounds x4, no organomegaly or palpable mass    Extremities: WWP, no edema/cyanosis/clubbing   Vasc: 2+ radial and DP pulses b/l  Neuro: A&Ox3  Psych: speech non-pressured, thoughts goal-oriented  Skin: dry, intact, no visible jaundice   MSK: no joint swelling T(C): -- 99  HR: -- 98  BP: -- 182/108  RR: -- 18  SpO2: -- 98%    General: NAD, laying in bed, speaking in full sentences, +increased body habitus  HEENT: head NC/AT, no conjunctival injection, EOMI, MMM  Neck: supple, no JVD  Cardio: RRR, +S1/S2, no M/R/G  Resp: lungs CTAB, no cough/wheezes/rales/rhonchi, on room air  Abdo: soft, NT, ND, +bowel sounds x4, no organomegaly or palpable mass    Extremities: + L foot ulcer on lateral aspect w/ mild surrounding erythema  Vasc: 2+ radial and DP pulses b/l  Neuro: A&Ox3, no focal deficits  Psych: speech non-pressured, thoughts goal-oriented  Skin: dry, intact, no visible jaundice   MSK: no joint swelling

## 2023-03-29 NOTE — PATIENT PROFILE ADULT - VISION (WITH CORRECTIVE LENSES IF THE PATIENT USUALLY WEARS THEM):
TRANSFER - OUT REPORT:    Verbal report given to Jenae(name) on Slime Max  being transferred to [address] [place] [address](unit) for routine progression of care       Report consisted of patients Situation, Background, Assessment and   Recommendations(SBAR). Information from the following report(s) SBAR, Kardex, Intake/Output and MAR was reviewed with the receiving nurse. Lines:       Opportunity for questions and clarification was provided.       Patient transported with:   O2 @ 2 liters Normal vision: sees adequately in most situations; can see medication labels, newsprint

## 2023-03-29 NOTE — H&P ADULT - NSICDXPASTMEDICALHX_GEN_ALL_CORE_FT
Encounter for palliative care
PAST MEDICAL HISTORY:  AVM (arteriovenous malformation) of left foot    Foot ulceration left foot    HTN (hypertension)

## 2023-03-29 NOTE — CONSULT NOTE ADULT - ASSESSMENT
38 yo F with HTN, MO and AVM L foot with recurrent L foot ulcer with low-grade temp increase at home, rigors and sweats in setting of malfunctioning PICC (placed 2/3), d/osorio in ED.  Management of her foot ulcer has required recurrent embolizations and recently prolonged antibiotic courses.  She is improving but likely needs a limited additional course.   Suggest:  - F/U blood cultures X 2 from 3/29 - if negative at 48h, okay to replace PICC.  - Continue vancomycin 1250 mg IV q8h - trough prior to 4th dose.  If within therapeutic range (goal 13-17), would only need weekly trough  - Continue meropenem 2 g IV q8h  Recommendations discussed with ED team.  Will follow with you - team 2.  Dr. Khan will assume care tomorrow.

## 2023-03-29 NOTE — H&P ADULT - PROBLEM SELECTOR PLAN 6
Plan:  F: s/p 2L NS in the ED   E: replete K<4, Mg<2  N: regular  VTE Prophylaxis: Lovenox   C: Full Code  D: JONATHAN Plan:  F: None  E: replete K<4, Mg<2  N: regular  VTE Prophylaxis: Lovenox   C: Full Code  D: [facility]

## 2023-03-29 NOTE — H&P ADULT - PROBLEM SELECTOR PLAN 2
Known chronic L foot ulcer due to AVM. Patient follows with Dr. Teran for AVMs and Pain management. On home Gabapentin 100mg in the morning and 800mg at 4pm. Percocet 10 q4hrs PRN. BUN 7/Cr 0.63 (at baseline)  - c/w Gabapentin 100mg in the morning and 800mg at 4PM  - c/w Dilaudid 1mg PO q4 PRN  - c/w Bowel regimen    - Wound Care consult Known chronic L foot ulcer due to AVM. Patient follows with Dr. Teran for AVMs and Pain management. On home Gabapentin 100mg in the morning and 800mg at 4pm. Percocet 10 q4hrs PRN. BUN 7/Cr 0.63 (at baseline)  - c/w Gabapentin 100mg in the morning and 800mg at 4PM  - c/w Dilaudid 1mg PO q4 PRN  - c/w Bowel regimen    - Wound Care consult  - Sepsis/Abx plan as above

## 2023-03-29 NOTE — H&P ADULT - PROBLEM SELECTOR PLAN 5
BMI 46.5. Patient is taking Qsymia daily with dietary modifications.   - Counseled on lifestyle modifications   - Patient will continue Qsymia outpatient

## 2023-03-29 NOTE — H&P ADULT - PROBLEM SELECTOR PLAN 4
On admission, presented with /108, repeat 176/102. Likely acutely elevated due to pain. On home Bystolic 10mg every 12 hours  - c/w home Bystolic 10mg every 12 hours

## 2023-03-29 NOTE — H&P ADULT - ATTENDING COMMENTS
37F w/ PMHx w/ HTN, morbid obesity, chronic L foot AVM s/p multiple embolizations with recurrent nonpurulent ulcer infection presented due to malfunctioning PICC line.  Patient met sepsis criteria on admission given tachycardia and WBC count with known source of AVM however hx of rigors at home concerning for possible bacteremia    #Sepsis  - due to infected AVM, tachy, WBC elevation on admission  - c/w meropenem and vancomycin  - concern for malfunctioning PICC was not getting proper rate of antibiotic infusion  - f/u blood cultures  - PICC removed on arrival  - ID following, ok to replace PICC if cultures negative for 48 hours    Remainder of plan as above

## 2023-03-29 NOTE — H&P ADULT - HISTORY OF PRESENT ILLNESS
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 Patient was recently admitted to [facility] 02/27-03/16 for a similar presentation, discharged on Meropeneum 2g q8 and Vancomycin 1250mg q8 via PICC line (placed 02/03/23). Patient endorsed having a hard time flushing her PICC line with Abx running slower for one week. She was transitioned to fast acting Abx pump, however that also became clogged and difficult to flush. Yesterday,  patient endorsed worsening throbbing L foot pain a/w chills, shakes and hot flashes. Tmax at home was 99.8 oral. Patient called Dr. Rahman's office today and was instructed to come to the ED given c/f infected PICC line. Patient denies any recent fevers, chills, nausea, vomiting, headache, acute sob, chest pain, abdominal pain, genitourinary sx.    ED Course:  Vitals: 99.0 F, HR 98, /108, RR 18(98%) on Room air  Labs: WBC 12.3, Plt 509, PT 13.8, BUN 7, Cr 0.63, U/A neg  Imaging: CXR  Consults: ID  Interventions: Meropenem 2g and Vancomycin 1250mg   37F w/ PMHx w/ HTN, morbid obesity, chronic L foot AVM s/p multiple embolizations with recurrent nonpurulent ulcer infection presents to [facility] for c/f infected PICC line. Patient was recently admitted to [facility] 02/27-03/16 for a similar presentation, discharged on Meropenem 2g q8 and Vancomycin 1250mg q8 via PICC line (placed 02/03/23). Patient endorsed having a hard time flushing her PICC line with Abx running slower for one week. She was transitioned to fast acting Abx pump, however that also became clogged and difficult to flush. Yesterday,  patient endorsed worsening throbbing L foot pain a/w chills, shakes and hot flashes. Tmax at home was 99.8 oral. Patient called Dr. Rahman's office today and was instructed to come to the ED given c/f infected PICC line. Patient denies any recent fevers, chills, nausea, vomiting, headache, acute sob, chest pain, abdominal pain, genitourinary sx.    ED Course:  Vitals: 99.0 F, HR 98, /108, RR 18(98%) on Room air  Labs: WBC 12.3, Plt 509, PT 13.8, BUN 7, Cr 0.63, U/A neg  Imaging: CXR  Consults: ID  Interventions: Meropenem 2g and Vancomycin 1250mg   HPI:  37F w/ PMHx w/ HTN, morbid obesity, chronic L foot AVM s/p multiple embolizations with recurrent nonpurulent ulcer infection presents to [facility] for c/f infected PICC line. Patient was recently admitted to [facility] 02/27-03/16 for a similar presentation, discharged on Meropenem 2g q8 and Vancomycin 1250mg q8 via PICC line (placed 02/03/23). Patient endorsed having a hard time flushing her PICC line with Abx running slower for one week. She was transitioned to fast acting Abx pump, however that also became clogged and difficult to flush. Yesterday,  patient endorsed worsening throbbing L foot pain a/w chills, shakes and hot flashes. Tmax at home was 99.8 oral. Patient called Dr. Rahman's office today and was instructed to come to the ED given c/f infected PICC line. Patient denies any recent fevers, chills, nausea, vomiting, headache, acute sob, chest pain, abdominal pain, genitourinary sx.    ED Course:  Vitals: 99.0 F, HR 98, /108, RR 18(98%) on Room air  Labs: WBC 12.3, Plt 509, PT 13.8, BUN 7, Cr 0.63, U/A neg  Imaging: CXR negative  Consults: ID  Interventions: Meropenem 2g and Vancomycin 1250mg

## 2023-03-29 NOTE — H&P ADULT - PROBLEM SELECTOR PLAN 1
On admission met 2/4 SIRS criteria with HR >90 and WBC >12. Likely source 2/2 PICC line (placed 02/03/23) vs. bacteremia vs. chronic L foot nonpurulent ulcer.   - PICC line removed (03/29/23)  - Obtain Lactate, CXR, UA/UCx  - ID consulted, appreciate recs  - C/w Meropenem 2g every 8 hours and Vanc 1250 every 8 hours, obtain trough prior to 4th dose  - f/u BCx, would need to observed for 48 hours prior to placement of new PICC line  - Recommend 1LNS bolus On admission met 2/4 SIRS criteria with HR >90 and WBC >12. Likely source 2/2 PICC line (placed 02/03/23) vs. bacteremia vs. chronic L foot nonpurulent ulcer. Patient has previously been on Annie/Vanc, Dapto/Cefepime regimens. 10/22 Would Cx growing PsA, Staph hemolyticus, Corynebacterium.   - PICC line removed (03/29/23)  - Obtain Lactate, CXR, UA/UCx  - ID consulted, appreciate recs  - C/w Meropenem 2g every 8 hours and Vanc 1250 every 8 hours, obtain trough prior to 4th dose  - f/u BCx, would need to observed for 48 hours prior to placement of new PICC line On admission met 2/4 SIRS criteria with HR >90 and WBC >12. Likely source 2/2 PICC line (placed 02/03/23) vs. bacteremia vs. chronic L foot nonpurulent ulcer. Patient has previously been on Annie/Vanc, Dapto/Cefepime regimens. 10/22 Would Cx growing PsA, Staph hemolyticus, Corynebacterium.   - PICC line removed (03/29/23)  - Obtain Lactate, CXR, UA/UCx  - ID consulted, appreciate recs  - C/w Meropenem 2g every 8 hours and Vanc 1250 every 8 hours, obtain trough prior to 4th dose  - f/u BCx, would need to observed for 48 hours prior to placement of new PICC line  - Patient did not receive initial fluids, however is mentating well and would encourage PO intake at this time

## 2023-03-30 LAB
ALBUMIN SERPL ELPH-MCNC: 3.6 G/DL — SIGNIFICANT CHANGE UP (ref 3.3–5)
ALP SERPL-CCNC: 77 U/L — SIGNIFICANT CHANGE UP (ref 40–120)
ALT FLD-CCNC: 16 U/L — SIGNIFICANT CHANGE UP (ref 10–45)
ANION GAP SERPL CALC-SCNC: 11 MMOL/L — SIGNIFICANT CHANGE UP (ref 5–17)
AST SERPL-CCNC: 11 U/L — SIGNIFICANT CHANGE UP (ref 10–40)
BASOPHILS # BLD AUTO: 0.05 K/UL — SIGNIFICANT CHANGE UP (ref 0–0.2)
BASOPHILS NFR BLD AUTO: 0.6 % — SIGNIFICANT CHANGE UP (ref 0–2)
BILIRUB SERPL-MCNC: 0.9 MG/DL — SIGNIFICANT CHANGE UP (ref 0.2–1.2)
BUN SERPL-MCNC: 13 MG/DL — SIGNIFICANT CHANGE UP (ref 7–23)
CALCIUM SERPL-MCNC: 9.2 MG/DL — SIGNIFICANT CHANGE UP (ref 8.4–10.5)
CHLORIDE SERPL-SCNC: 105 MMOL/L — SIGNIFICANT CHANGE UP (ref 96–108)
CO2 SERPL-SCNC: 23 MMOL/L — SIGNIFICANT CHANGE UP (ref 22–31)
CREAT SERPL-MCNC: 0.6 MG/DL — SIGNIFICANT CHANGE UP (ref 0.5–1.3)
EGFR: 117 ML/MIN/1.73M2 — SIGNIFICANT CHANGE UP
EOSINOPHIL # BLD AUTO: 0.1 K/UL — SIGNIFICANT CHANGE UP (ref 0–0.5)
EOSINOPHIL NFR BLD AUTO: 1.2 % — SIGNIFICANT CHANGE UP (ref 0–6)
GLUCOSE SERPL-MCNC: 102 MG/DL — HIGH (ref 70–99)
HCT VFR BLD CALC: 34.7 % — SIGNIFICANT CHANGE UP (ref 34.5–45)
HGB BLD-MCNC: 11.4 G/DL — LOW (ref 11.5–15.5)
IMM GRANULOCYTES NFR BLD AUTO: 0.1 % — SIGNIFICANT CHANGE UP (ref 0–0.9)
LACTATE SERPL-SCNC: 1.1 MMOL/L — SIGNIFICANT CHANGE UP (ref 0.5–2)
LYMPHOCYTES # BLD AUTO: 1.73 K/UL — SIGNIFICANT CHANGE UP (ref 1–3.3)
LYMPHOCYTES # BLD AUTO: 20.7 % — SIGNIFICANT CHANGE UP (ref 13–44)
MAGNESIUM SERPL-MCNC: 1.8 MG/DL — SIGNIFICANT CHANGE UP (ref 1.6–2.6)
MCHC RBC-ENTMCNC: 28.4 PG — SIGNIFICANT CHANGE UP (ref 27–34)
MCHC RBC-ENTMCNC: 32.9 GM/DL — SIGNIFICANT CHANGE UP (ref 32–36)
MCV RBC AUTO: 86.3 FL — SIGNIFICANT CHANGE UP (ref 80–100)
MONOCYTES # BLD AUTO: 0.7 K/UL — SIGNIFICANT CHANGE UP (ref 0–0.9)
MONOCYTES NFR BLD AUTO: 8.4 % — SIGNIFICANT CHANGE UP (ref 2–14)
NEUTROPHILS # BLD AUTO: 5.78 K/UL — SIGNIFICANT CHANGE UP (ref 1.8–7.4)
NEUTROPHILS NFR BLD AUTO: 69 % — SIGNIFICANT CHANGE UP (ref 43–77)
NRBC # BLD: 0 /100 WBCS — SIGNIFICANT CHANGE UP (ref 0–0)
PHOSPHATE SERPL-MCNC: 3.3 MG/DL — SIGNIFICANT CHANGE UP (ref 2.5–4.5)
PLATELET # BLD AUTO: 429 K/UL — HIGH (ref 150–400)
POTASSIUM SERPL-MCNC: 4 MMOL/L — SIGNIFICANT CHANGE UP (ref 3.5–5.3)
POTASSIUM SERPL-SCNC: 4 MMOL/L — SIGNIFICANT CHANGE UP (ref 3.5–5.3)
PROT SERPL-MCNC: 7.1 G/DL — SIGNIFICANT CHANGE UP (ref 6–8.3)
RBC # BLD: 4.02 M/UL — SIGNIFICANT CHANGE UP (ref 3.8–5.2)
RBC # FLD: 12.7 % — SIGNIFICANT CHANGE UP (ref 10.3–14.5)
SODIUM SERPL-SCNC: 139 MMOL/L — SIGNIFICANT CHANGE UP (ref 135–145)
VANCOMYCIN TROUGH SERPL-MCNC: 13.9 UG/ML — SIGNIFICANT CHANGE UP (ref 10–20)
WBC # BLD: 8.37 K/UL — SIGNIFICANT CHANGE UP (ref 3.8–10.5)
WBC # FLD AUTO: 8.37 K/UL — SIGNIFICANT CHANGE UP (ref 3.8–10.5)

## 2023-03-30 PROCEDURE — 99232 SBSQ HOSP IP/OBS MODERATE 35: CPT

## 2023-03-30 PROCEDURE — 99233 SBSQ HOSP IP/OBS HIGH 50: CPT

## 2023-03-30 RX ORDER — ENOXAPARIN SODIUM 100 MG/ML
40 INJECTION SUBCUTANEOUS EVERY 12 HOURS
Refills: 0 | Status: DISCONTINUED | OUTPATIENT
Start: 2023-03-30 | End: 2023-04-02

## 2023-03-30 RX ORDER — ENOXAPARIN SODIUM 100 MG/ML
40 INJECTION SUBCUTANEOUS EVERY 12 HOURS
Refills: 0 | Status: DISCONTINUED | OUTPATIENT
Start: 2023-03-30 | End: 2023-03-30

## 2023-03-30 RX ORDER — VANCOMYCIN HCL 1 G
1250 VIAL (EA) INTRAVENOUS EVERY 8 HOURS
Refills: 0 | Status: DISCONTINUED | OUTPATIENT
Start: 2023-03-30 | End: 2023-04-06

## 2023-03-30 RX ADMIN — NEBIVOLOL HYDROCHLORIDE 20 MILLIGRAM(S): 5 TABLET ORAL at 07:26

## 2023-03-30 RX ADMIN — HYDROMORPHONE HYDROCHLORIDE 2 MILLIGRAM(S): 2 INJECTION INTRAMUSCULAR; INTRAVENOUS; SUBCUTANEOUS at 07:27

## 2023-03-30 RX ADMIN — MEROPENEM 280 MILLIGRAM(S): 1 INJECTION INTRAVENOUS at 03:21

## 2023-03-30 RX ADMIN — HYDROMORPHONE HYDROCHLORIDE 2 MILLIGRAM(S): 2 INJECTION INTRAMUSCULAR; INTRAVENOUS; SUBCUTANEOUS at 00:05

## 2023-03-30 RX ADMIN — HYDROMORPHONE HYDROCHLORIDE 2 MILLIGRAM(S): 2 INJECTION INTRAMUSCULAR; INTRAVENOUS; SUBCUTANEOUS at 20:00

## 2023-03-30 RX ADMIN — HYDROMORPHONE HYDROCHLORIDE 2 MILLIGRAM(S): 2 INJECTION INTRAMUSCULAR; INTRAVENOUS; SUBCUTANEOUS at 15:54

## 2023-03-30 RX ADMIN — MEROPENEM 280 MILLIGRAM(S): 1 INJECTION INTRAVENOUS at 10:57

## 2023-03-30 RX ADMIN — GABAPENTIN 100 MILLIGRAM(S): 400 CAPSULE ORAL at 07:27

## 2023-03-30 RX ADMIN — ENOXAPARIN SODIUM 40 MILLIGRAM(S): 100 INJECTION SUBCUTANEOUS at 09:18

## 2023-03-30 RX ADMIN — HYDROMORPHONE HYDROCHLORIDE 2 MILLIGRAM(S): 2 INJECTION INTRAMUSCULAR; INTRAVENOUS; SUBCUTANEOUS at 11:35

## 2023-03-30 RX ADMIN — HYDROMORPHONE HYDROCHLORIDE 2 MILLIGRAM(S): 2 INJECTION INTRAMUSCULAR; INTRAVENOUS; SUBCUTANEOUS at 15:33

## 2023-03-30 RX ADMIN — ENOXAPARIN SODIUM 40 MILLIGRAM(S): 100 INJECTION SUBCUTANEOUS at 21:06

## 2023-03-30 RX ADMIN — HYDROMORPHONE HYDROCHLORIDE 2 MILLIGRAM(S): 2 INJECTION INTRAMUSCULAR; INTRAVENOUS; SUBCUTANEOUS at 19:40

## 2023-03-30 RX ADMIN — HYDROMORPHONE HYDROCHLORIDE 2 MILLIGRAM(S): 2 INJECTION INTRAMUSCULAR; INTRAVENOUS; SUBCUTANEOUS at 03:33

## 2023-03-30 RX ADMIN — HYDROMORPHONE HYDROCHLORIDE 2 MILLIGRAM(S): 2 INJECTION INTRAMUSCULAR; INTRAVENOUS; SUBCUTANEOUS at 11:59

## 2023-03-30 RX ADMIN — Medication 166.67 MILLIGRAM(S): at 01:18

## 2023-03-30 RX ADMIN — HYDROMORPHONE HYDROCHLORIDE 2 MILLIGRAM(S): 2 INJECTION INTRAMUSCULAR; INTRAVENOUS; SUBCUTANEOUS at 23:45

## 2023-03-30 RX ADMIN — GABAPENTIN 800 MILLIGRAM(S): 400 CAPSULE ORAL at 16:38

## 2023-03-30 RX ADMIN — HYDROMORPHONE HYDROCHLORIDE 2 MILLIGRAM(S): 2 INJECTION INTRAMUSCULAR; INTRAVENOUS; SUBCUTANEOUS at 08:05

## 2023-03-30 RX ADMIN — Medication 166.67 MILLIGRAM(S): at 19:40

## 2023-03-30 RX ADMIN — HYDROMORPHONE HYDROCHLORIDE 2 MILLIGRAM(S): 2 INJECTION INTRAMUSCULAR; INTRAVENOUS; SUBCUTANEOUS at 03:55

## 2023-03-30 RX ADMIN — Medication 166.67 MILLIGRAM(S): at 09:18

## 2023-03-30 RX ADMIN — MEROPENEM 280 MILLIGRAM(S): 1 INJECTION INTRAVENOUS at 18:43

## 2023-03-30 NOTE — PROGRESS NOTE ADULT - SUBJECTIVE AND OBJECTIVE BOX
OVERNIGHT EVENTS: ISABELLE    SUBJECTIVE / INTERVAL HPI: Patient seen and examined at bedside. Well known to me from previous admission. Has not had subjective feelings of fever, chills, chest pressure or pain since PICC line removal. Regarding left foot ulcer, pain is very much improved since previous admission and ulcer base more shallow. Overall feeling well and understanding of plan for PICC placement. Pain is controlled on home regimen. ROS otherwise negative.     VITAL SIGNS:  Vital Signs Last 24 Hrs  T(C): 36.9 (30 Mar 2023 10:35), Max: 37.4 (29 Mar 2023 18:15)  T(F): 98.5 (30 Mar 2023 10:35), Max: 99.4 (29 Mar 2023 18:15)  HR: 80 (30 Mar 2023 10:35) (79 - 94)  BP: 143/85 (30 Mar 2023 10:35) (136/81 - 159/95)  BP(mean): 100 (30 Mar 2023 05:00) (100 - 100)  RR: 18 (30 Mar 2023 10:35) (16 - 18)  SpO2: 97% (30 Mar 2023 10:35) (95% - 98%)    Parameters below as of 30 Mar 2023 10:35  Patient On (Oxygen Delivery Method): room air      I&O's Summary      PHYSICAL EXAM:    General: NAD, laying in bed   HEENT: head NC/AT, no conjunctival injection, EOMI, MMM  Neck: supple, no JVD  Cardio: RRR, +S1/S2, no M/R/G  Resp: lungs CTAB, no cough/wheezes/rales/rhonchi, on room air  Abdo: soft, NT, ND, +bowel sounds x4, no organomegaly or palpable mass    Extremities: + L foot ulcer on lateral aspect w/ mild surrounding erythema, no oozing, dressing in place  Vasc: 2+ radial and DP pulses b/l  Neuro: A&Ox3, no focal deficits  Psych: speech non-pressured, thoughts goal-oriented  Skin: dry, intact, no visible jaundice   MSK: no joint swelling    MEDICATIONS:  MEDICATIONS  (STANDING):  enoxaparin Injectable 40 milliGRAM(s) SubCutaneous every 12 hours  gabapentin 100 milliGRAM(s) Oral every 24 hours  gabapentin 800 milliGRAM(s) Oral every 24 hours  meropenem  IVPB 2000 milliGRAM(s) IV Intermittent every 8 hours  nebivolol 20 milliGRAM(s) Oral daily  polyethylene glycol 3350 17 Gram(s) Oral daily  senna 2 Tablet(s) Oral at bedtime    MEDICATIONS  (PRN):  aluminum hydroxide/magnesium hydroxide/simethicone Suspension 30 milliLiter(s) Oral every 4 hours PRN Dyspepsia  HYDROmorphone  Injectable 2 milliGRAM(s) IV Push every 4 hours PRN Severe Pain (7 - 10)  melatonin 3 milliGRAM(s) Oral at bedtime PRN Insomnia  ondansetron Injectable 4 milliGRAM(s) IV Push every 8 hours PRN Nausea and/or Vomiting      ALLERGIES:  Allergies    amoxicillin (Angioedema)  ciprofloxacin (Rash)  hydrochlorothiazide (Hives)  latex (Rash)  lisinopril (Angioedema; Rash; Hives)  morphine (Rash)  penicillin (Rash)    Intolerances        LABS:                        11.4   8.37  )-----------( 429      ( 30 Mar 2023 05:30 )             34.7     03-30    139  |  105  |  13  ----------------------------<  102<H>  4.0   |  23  |  0.60    Ca    9.2      30 Mar 2023 05:30  Phos  3.3     03-30  Mg     1.8     03-30    TPro  7.1  /  Alb  3.6  /  TBili  0.9  /  DBili  x   /  AST  11  /  ALT  16  /  AlkPhos  77  03-30    PT/INR - ( 29 Mar 2023 15:08 )   PT: 13.8 sec;   INR: 1.16          PTT - ( 29 Mar 2023 15:08 )  PTT:34.3 sec  Urinalysis Basic - ( 29 Mar 2023 15:08 )    Color: Yellow / Appearance: Clear / S.020 / pH: x  Gluc: x / Ketone: 15 mg/dL  / Bili: Negative / Urobili: 0.2 E.U./dL   Blood: x / Protein: 30 mg/dL / Nitrite: NEGATIVE   Leuk Esterase: NEGATIVE / RBC: < 5 /HPF / WBC < 5 /HPF   Sq Epi: x / Non Sq Epi: Many /HPF / Bacteria: Present /HPF      CAPILLARY BLOOD GLUCOSE          RADIOLOGY & ADDITIONAL TESTS: Reviewed.

## 2023-03-30 NOTE — PROGRESS NOTE ADULT - ASSESSMENT
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

## 2023-03-30 NOTE — PROGRESS NOTE ADULT - ASSESSMENT
37F h/o L foot AVM c/b recurrent L foot ulcer and infection on IV vanc/hugh p/w low grade fever, sweating and malfunctioning PICC (placed 2/3/23), c/f CLABSI. PICC removed on 3/29.  So far BCx ngtd and patient feels much better.    - cont vancomycin 1250mg IV q8h.  Check trough before 4th dose, goal 13-17  - cont meropenem 2g IV q8h  - f/u BCx.  If ngtd >48h, ok to place new PICC    Team 2 will follow you.  Case d/w primary team.    Génesis Khan MD, MS  Infectious Disease attending  work cell [phone]   For any questions during evening/weekend/holiday, please page ID on call

## 2023-03-30 NOTE — PROGRESS NOTE ADULT - SUBJECTIVE AND OBJECTIVE BOX
INFECTIOUS DISEASES CONSULT FOLLOW-UP NOTE    INTERVAL HPI/OVERNIGHT EVENTS:  no event overnight  patient feels well, denied n/v/d      ROS:   Constitutional, eyes, ENT, cardiovascular, respiratory, gastrointestinal, genitourinary, integumentary, neurological, psychiatric and heme/lymph are otherwise negative other than noted above       ANTIBIOTICS/RELEVANT:    MEDICATIONS  (STANDING):  enoxaparin Injectable 40 milliGRAM(s) SubCutaneous every 12 hours  gabapentin 100 milliGRAM(s) Oral every 24 hours  gabapentin 800 milliGRAM(s) Oral every 24 hours  meropenem  IVPB 2000 milliGRAM(s) IV Intermittent every 8 hours  nebivolol 20 milliGRAM(s) Oral daily  polyethylene glycol 3350 17 Gram(s) Oral daily  senna 2 Tablet(s) Oral at bedtime    MEDICATIONS  (PRN):  aluminum hydroxide/magnesium hydroxide/simethicone Suspension 30 milliLiter(s) Oral every 4 hours PRN Dyspepsia  HYDROmorphone  Injectable 2 milliGRAM(s) IV Push every 4 hours PRN Severe Pain (7 - 10)  melatonin 3 milliGRAM(s) Oral at bedtime PRN Insomnia  ondansetron Injectable 4 milliGRAM(s) IV Push every 8 hours PRN Nausea and/or Vomiting        Vital Signs Last 24 Hrs  T(C): 36.9 (30 Mar 2023 10:35), Max: 37.4 (29 Mar 2023 18:15)  T(F): 98.5 (30 Mar 2023 10:35), Max: 99.4 (29 Mar 2023 18:15)  HR: 80 (30 Mar 2023 10:35) (79 - 94)  BP: 143/85 (30 Mar 2023 10:35) (136/81 - 159/95)  BP(mean): 100 (30 Mar 2023 05:00) (100 - 100)  RR: 18 (30 Mar 2023 10:35) (16 - 18)  SpO2: 97% (30 Mar 2023 10:35) (95% - 98%)    Parameters below as of 30 Mar 2023 10:35  Patient On (Oxygen Delivery Method): room air        PHYSICAL EXAM:  Constitutional: alert, NAD  Eyes: the sclera and conjunctiva were normal.   ENT: the ears and nose were normal in appearance.   Neck: the appearance of the neck was normal and the neck was supple.   Pulmonary: no respiratory distress and lungs were clear to auscultation bilaterally.   Heart: heart rate was normal and rhythm regular, normal S1 and S2  Vascular:. there was no peripheral edema  Abdomen: normal bowel sounds, soft, non-tender  Neurological: no focal deficits.         LABS:                        11.4   8.37  )-----------( 429      ( 30 Mar 2023 05:30 )             34.7     03-30    139  |  105  |  13  ----------------------------<  102<H>  4.0   |  23  |  0.60    Ca    9.2      30 Mar 2023 05:30  Phos  3.3     03-30  Mg     1.8     03-30    TPro  7.1  /  Alb  3.6  /  TBili  0.9  /  DBili  x   /  AST  11  /  ALT  16  /  AlkPhos  77  03-30    PT/INR - ( 29 Mar 2023 15:08 )   PT: 13.8 sec;   INR: 1.16          PTT - ( 29 Mar 2023 15:08 )  PTT:34.3 sec  Urinalysis Basic - ( 29 Mar 2023 15:08 )    Color: Yellow / Appearance: Clear / S.020 / pH: x  Gluc: x / Ketone: 15 mg/dL  / Bili: Negative / Urobili: 0.2 E.U./dL   Blood: x / Protein: 30 mg/dL / Nitrite: NEGATIVE   Leuk Esterase: NEGATIVE / RBC: < 5 /HPF / WBC < 5 /HPF   Sq Epi: x / Non Sq Epi: Many /HPF / Bacteria: Present /HPF        MICROBIOLOGY:      RADIOLOGY & ADDITIONAL STUDIES:  Reviewed

## 2023-03-30 NOTE — PROGRESS NOTE ADULT - PROBLEM SELECTOR PLAN 1
On admission met 2/4 SIRS criteria with HR >90 and WBC >12. Likely source 2/2 PICC line (placed 02/03/23) vs. bacteremia vs. chronic L foot nonpurulent ulcer. Patient has previously been on Annie/Vanc, Dapto/Cefepime regimens. 10/22 Would Cx growing PsA, Staph hemolyticus, Corynebacterium. Lactate, UA neg.     - PICC line removed (03/29/23)  - ID consulted, appreciate recs  - C/w Meropenem 2g every 8 hours and Vanc 1250 every 8 hours, obtain trough prior to 4th dose (if therapeutic 13-17 then weekly troughs)  - f/u BCx 3/29, would need to observe for 48 hours prior to placement of new PICC line  - Patient did not receive initial fluids, however is mentating well and would encourage PO intake at this time

## 2023-03-30 NOTE — PROGRESS NOTE ADULT - PROBLEM SELECTOR PLAN 6
Plan:  F: None  E: replete K<4, Mg<2  N: regular  VTE Prophylaxis: Lovenox   C: Full Code  D: [facility]

## 2023-03-31 ENCOUNTER — APPOINTMENT (OUTPATIENT)
Dept: INFECTIOUS DISEASE | Facility: CLINIC | Age: 38
End: 2023-03-31

## 2023-03-31 LAB
ANION GAP SERPL CALC-SCNC: 11 MMOL/L — SIGNIFICANT CHANGE UP (ref 5–17)
BUN SERPL-MCNC: 16 MG/DL — SIGNIFICANT CHANGE UP (ref 7–23)
CALCIUM SERPL-MCNC: 9.2 MG/DL — SIGNIFICANT CHANGE UP (ref 8.4–10.5)
CHLORIDE SERPL-SCNC: 102 MMOL/L — SIGNIFICANT CHANGE UP (ref 96–108)
CO2 SERPL-SCNC: 22 MMOL/L — SIGNIFICANT CHANGE UP (ref 22–31)
CREAT SERPL-MCNC: 0.6 MG/DL — SIGNIFICANT CHANGE UP (ref 0.5–1.3)
EGFR: 117 ML/MIN/1.73M2 — SIGNIFICANT CHANGE UP
GLUCOSE SERPL-MCNC: 86 MG/DL — SIGNIFICANT CHANGE UP (ref 70–99)
HCT VFR BLD CALC: 37.4 % — SIGNIFICANT CHANGE UP (ref 34.5–45)
HGB BLD-MCNC: 12 G/DL — SIGNIFICANT CHANGE UP (ref 11.5–15.5)
MAGNESIUM SERPL-MCNC: 1.8 MG/DL — SIGNIFICANT CHANGE UP (ref 1.6–2.6)
MCHC RBC-ENTMCNC: 28.2 PG — SIGNIFICANT CHANGE UP (ref 27–34)
MCHC RBC-ENTMCNC: 32.1 GM/DL — SIGNIFICANT CHANGE UP (ref 32–36)
MCV RBC AUTO: 87.8 FL — SIGNIFICANT CHANGE UP (ref 80–100)
NRBC # BLD: 0 /100 WBCS — SIGNIFICANT CHANGE UP (ref 0–0)
PHOSPHATE SERPL-MCNC: 3.3 MG/DL — SIGNIFICANT CHANGE UP (ref 2.5–4.5)
PLATELET # BLD AUTO: 443 K/UL — HIGH (ref 150–400)
POTASSIUM SERPL-MCNC: 3.9 MMOL/L — SIGNIFICANT CHANGE UP (ref 3.5–5.3)
POTASSIUM SERPL-SCNC: 3.9 MMOL/L — SIGNIFICANT CHANGE UP (ref 3.5–5.3)
RBC # BLD: 4.26 M/UL — SIGNIFICANT CHANGE UP (ref 3.8–5.2)
RBC # FLD: 12.7 % — SIGNIFICANT CHANGE UP (ref 10.3–14.5)
SODIUM SERPL-SCNC: 135 MMOL/L — SIGNIFICANT CHANGE UP (ref 135–145)
WBC # BLD: 9.58 K/UL — SIGNIFICANT CHANGE UP (ref 3.8–10.5)
WBC # FLD AUTO: 9.58 K/UL — SIGNIFICANT CHANGE UP (ref 3.8–10.5)

## 2023-03-31 PROCEDURE — 99232 SBSQ HOSP IP/OBS MODERATE 35: CPT

## 2023-03-31 PROCEDURE — 99233 SBSQ HOSP IP/OBS HIGH 50: CPT

## 2023-03-31 RX ADMIN — MEROPENEM 280 MILLIGRAM(S): 1 INJECTION INTRAVENOUS at 01:44

## 2023-03-31 RX ADMIN — GABAPENTIN 800 MILLIGRAM(S): 400 CAPSULE ORAL at 15:59

## 2023-03-31 RX ADMIN — Medication 166.67 MILLIGRAM(S): at 13:53

## 2023-03-31 RX ADMIN — Medication 166.67 MILLIGRAM(S): at 07:07

## 2023-03-31 RX ADMIN — HYDROMORPHONE HYDROCHLORIDE 2 MILLIGRAM(S): 2 INJECTION INTRAMUSCULAR; INTRAVENOUS; SUBCUTANEOUS at 15:59

## 2023-03-31 RX ADMIN — HYDROMORPHONE HYDROCHLORIDE 2 MILLIGRAM(S): 2 INJECTION INTRAMUSCULAR; INTRAVENOUS; SUBCUTANEOUS at 12:15

## 2023-03-31 RX ADMIN — NEBIVOLOL HYDROCHLORIDE 10 MILLIGRAM(S): 5 TABLET ORAL at 20:01

## 2023-03-31 RX ADMIN — HYDROMORPHONE HYDROCHLORIDE 2 MILLIGRAM(S): 2 INJECTION INTRAMUSCULAR; INTRAVENOUS; SUBCUTANEOUS at 19:48

## 2023-03-31 RX ADMIN — HYDROMORPHONE HYDROCHLORIDE 2 MILLIGRAM(S): 2 INJECTION INTRAMUSCULAR; INTRAVENOUS; SUBCUTANEOUS at 07:54

## 2023-03-31 RX ADMIN — MEROPENEM 280 MILLIGRAM(S): 1 INJECTION INTRAVENOUS at 18:19

## 2023-03-31 RX ADMIN — ENOXAPARIN SODIUM 40 MILLIGRAM(S): 100 INJECTION SUBCUTANEOUS at 10:43

## 2023-03-31 RX ADMIN — NEBIVOLOL HYDROCHLORIDE 10 MILLIGRAM(S): 5 TABLET ORAL at 08:52

## 2023-03-31 RX ADMIN — MEROPENEM 280 MILLIGRAM(S): 1 INJECTION INTRAVENOUS at 10:42

## 2023-03-31 RX ADMIN — HYDROMORPHONE HYDROCHLORIDE 2 MILLIGRAM(S): 2 INJECTION INTRAMUSCULAR; INTRAVENOUS; SUBCUTANEOUS at 16:26

## 2023-03-31 RX ADMIN — HYDROMORPHONE HYDROCHLORIDE 2 MILLIGRAM(S): 2 INJECTION INTRAMUSCULAR; INTRAVENOUS; SUBCUTANEOUS at 23:58

## 2023-03-31 RX ADMIN — HYDROMORPHONE HYDROCHLORIDE 2 MILLIGRAM(S): 2 INJECTION INTRAMUSCULAR; INTRAVENOUS; SUBCUTANEOUS at 11:46

## 2023-03-31 RX ADMIN — HYDROMORPHONE HYDROCHLORIDE 2 MILLIGRAM(S): 2 INJECTION INTRAMUSCULAR; INTRAVENOUS; SUBCUTANEOUS at 03:50

## 2023-03-31 RX ADMIN — Medication 166.67 MILLIGRAM(S): at 21:25

## 2023-03-31 RX ADMIN — ENOXAPARIN SODIUM 40 MILLIGRAM(S): 100 INJECTION SUBCUTANEOUS at 21:24

## 2023-03-31 RX ADMIN — GABAPENTIN 100 MILLIGRAM(S): 400 CAPSULE ORAL at 07:07

## 2023-03-31 NOTE — PROGRESS NOTE ADULT - PROBLEM SELECTOR PLAN 1
On admission met 2/4 SIRS criteria with HR >90 and WBC >12. Likely source 2/2 PICC line (placed 02/03/23) vs. bacteremia vs. chronic L foot nonpurulent ulcer. Patient has previously been on Annie/Vanc, Dapto/Cefepime regimens. 10/22 Would Cx growing PsA, Staph hemolyticus, Corynebacterium. Lactate, UA neg.     - PICC line removed (03/29/23)  - ID consulted, appreciate recs  - C/w Meropenem 2g every 8 hours  - C/w Vanc 1250 every 8 hours - trough 3/30 13.9 which is therapeutic. Will check troughs weekly. Next trough 11/6  - f/u BCx 3/29, would need to observe for 48 hours prior to placement of new PICC line  - Anticipate PICC placement Monday 4/3  - Patient did not receive initial fluids, however is mentating well with good PO intake

## 2023-03-31 NOTE — PROGRESS NOTE ADULT - ASSESSMENT
37F h/o L foot AVM c/b recurrent L foot ulcer and infection on IV vanc/hugh p/w low grade fever, sweating and malfunctioning PICC (placed 2/3/23), c/f CLABSI. PICC removed on 3/29.  So far BCx ngtd x 48h, and patient feels much better.    - cont vancomycin 1250mg IV q8h.  Trough therapeutic, no need to check again   - cont meropenem 2g IV q8h  - OK to place PICC   - Duration of antibiotics is 2 weeks  - Weekly labs: CMP, CBC, ESR, CRP faxed to ID office at [phone]  - Patient to follow up with Dr. Rahman in 2 weeks ([address] [place] [address], [phone]), ID office will call patient to schedule       Team 2 will follow you.  Case d/w primary team.    Génesis Khan MD, MS  Infectious Disease attending  work cell [phone]   For any questions during evening/weekend/holiday, please page ID on call    37F h/o L foot AVM c/b recurrent L foot ulcer and infection on IV vanc/hugh p/w low grade fever, sweating and malfunctioning PICC (placed 2/3/23), c/f CLABSI. PICC removed on 3/29.  So far BCx ngtd x 48h, and patient feels much better.    - cont vancomycin 1250mg IV q8h.  Trough therapeutic, no need to check again   - cont meropenem 2g IV q8h  - OK to place PICC   - Duration of antibiotics is 4 weeks  - Weekly labs: CMP, CBC, ESR, CRP faxed to ID office at [phone]  - Patient to follow up with Dr. Rahman in 2 weeks ([address] [place] [address], [phone]), ID office will call patient to schedule       Team 2 will follow you.  Dr. Méndez is covering me this weekend.  Case d/w primary team.    Génesis Khan MD, MS  Infectious Disease attending  work cell [phone]   For any questions during evening/weekend/holiday, please page ID on call

## 2023-03-31 NOTE — PROGRESS NOTE ADULT - SUBJECTIVE AND OBJECTIVE BOX
INFECTIOUS DISEASES CONSULT FOLLOW-UP NOTE    INTERVAL HPI/OVERNIGHT EVENTS:  Patient is doing well, denied fever/chills, n/v/d, abdominal pain.      ROS:   Constitutional, eyes, ENT, cardiovascular, respiratory, gastrointestinal, genitourinary, integumentary, neurological, psychiatric and heme/lymph are otherwise negative other than noted above       ANTIBIOTICS/RELEVANT:    MEDICATIONS  (STANDING):  enoxaparin Injectable 40 milliGRAM(s) SubCutaneous every 12 hours  gabapentin 100 milliGRAM(s) Oral every 24 hours  gabapentin 800 milliGRAM(s) Oral every 24 hours  meropenem  IVPB 2000 milliGRAM(s) IV Intermittent every 8 hours  nebivolol 10 milliGRAM(s) Oral <User Schedule>  polyethylene glycol 3350 17 Gram(s) Oral daily  senna 2 Tablet(s) Oral at bedtime  vancomycin  IVPB 1250 milliGRAM(s) IV Intermittent every 8 hours    MEDICATIONS  (PRN):  aluminum hydroxide/magnesium hydroxide/simethicone Suspension 30 milliLiter(s) Oral every 4 hours PRN Dyspepsia  HYDROmorphone  Injectable 2 milliGRAM(s) IV Push every 4 hours PRN Severe Pain (7 - 10)  melatonin 3 milliGRAM(s) Oral at bedtime PRN Insomnia  ondansetron Injectable 4 milliGRAM(s) IV Push every 8 hours PRN Nausea and/or Vomiting        Vital Signs Last 24 Hrs  T(C): 36.1 (31 Mar 2023 09:08), Max: 36.7 (31 Mar 2023 05:49)  T(F): 96.9 (31 Mar 2023 09:08), Max: 98.1 (31 Mar 2023 05:49)  HR: 82 (31 Mar 2023 09:08) (82 - 88)  BP: 148/95 (31 Mar 2023 09:08) (119/78 - 148/95)  BP(mean): --  RR: 18 (31 Mar 2023 09:08) (18 - 18)  SpO2: 97% (31 Mar 2023 09:08) (96% - 97%)    Parameters below as of 31 Mar 2023 09:08  Patient On (Oxygen Delivery Method): room air        PHYSICAL EXAM:  Constitutional: alert, NAD  Eyes: the sclera and conjunctiva were normal.   ENT: the ears and nose were normal in appearance.   Neck: the appearance of the neck was normal and the neck was supple.   Pulmonary: no respiratory distress and lungs were clear to auscultation bilaterally.   Heart: heart rate was normal and rhythm regular, normal S1 and S2  Vascular:. there was no peripheral edema  Abdomen: normal bowel sounds, soft, non-tender  Ext: L lateral wound with mild erythema        LABS:                        12.0   9.58  )-----------( 443      ( 31 Mar 2023 08:35 )             37.4     03-31    135  |  102  |  16  ----------------------------<  86  3.9   |  22  |  0.60    Ca    9.2      31 Mar 2023 08:35  Phos  3.3     03-31  Mg     1.8     03-31    TPro  7.1  /  Alb  3.6  /  TBili  0.9  /  DBili  x   /  AST  11  /  ALT  16  /  AlkPhos  77  03-30          MICROBIOLOGY:      RADIOLOGY & ADDITIONAL STUDIES:  Reviewed

## 2023-03-31 NOTE — PROGRESS NOTE ADULT - ASSESSMENT
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 for c/f infected PICC line.

## 2023-03-31 NOTE — PROGRESS NOTE ADULT - PROBLEM SELECTOR PLAN 6
Plan:  F: None  E: replete K<4, Mg<2  N: regular  VTE Prophylaxis: Lovenox   C: Full Code  D: [facility]

## 2023-03-31 NOTE — PROGRESS NOTE ADULT - SUBJECTIVE AND OBJECTIVE BOX
OVERNIGHT EVENTS: ISABELLE    SUBJECTIVE / INTERVAL HPI: Patient seen and examined at bedside. Doing well this morning, no acute complaints. Pain in right arm has subsided. Having BMs. Understands she will be here over the weekend with new PICC placement anticipated for Monday.     VITAL SIGNS:  Vital Signs Last 24 Hrs  T(C): 36.7 (31 Mar 2023 05:49), Max: 36.9 (30 Mar 2023 10:35)  T(F): 98.1 (31 Mar 2023 05:49), Max: 98.5 (30 Mar 2023 10:35)  HR: 86 (31 Mar 2023 05:49) (80 - 88)  BP: 119/78 (31 Mar 2023 05:49) (119/78 - 143/85)  BP(mean): --  RR: 18 (31 Mar 2023 05:49) (18 - 18)  SpO2: 97% (31 Mar 2023 05:49) (95% - 97%)    Parameters below as of 30 Mar 2023 21:07  Patient On (Oxygen Delivery Method): room air      I&O's Summary      PHYSICAL EXAM:    General: NAD, laying in bed   HEENT: head NC/AT, no conjunctival injection, EOMI, MMM  Neck: supple, no JVD  Cardio: RRR, +S1/S2, no M/R/G  Resp: lungs CTAB, no cough/wheezes/rales/rhonchi, on room air  Abdo: soft, NT, ND, +bowel sounds x4, no organomegaly or palpable mass    Extremities: + L foot ulcer on lateral aspect w/ mild surrounding erythema, no oozing, dressing in place  Vasc: 2+ radial and DP pulses b/l  Neuro: A&Ox3, no focal deficits  Psych: speech non-pressured, thoughts goal-oriented  Skin: dry, intact, no visible jaundice   MSK: no joint swelling    MEDICATIONS:  MEDICATIONS  (STANDING):  enoxaparin Injectable 40 milliGRAM(s) SubCutaneous every 12 hours  gabapentin 100 milliGRAM(s) Oral every 24 hours  gabapentin 800 milliGRAM(s) Oral every 24 hours  meropenem  IVPB 2000 milliGRAM(s) IV Intermittent every 8 hours  nebivolol 10 milliGRAM(s) Oral <User Schedule>  polyethylene glycol 3350 17 Gram(s) Oral daily  senna 2 Tablet(s) Oral at bedtime  vancomycin  IVPB 1250 milliGRAM(s) IV Intermittent every 8 hours    MEDICATIONS  (PRN):  aluminum hydroxide/magnesium hydroxide/simethicone Suspension 30 milliLiter(s) Oral every 4 hours PRN Dyspepsia  HYDROmorphone  Injectable 2 milliGRAM(s) IV Push every 4 hours PRN Severe Pain (7 - 10)  melatonin 3 milliGRAM(s) Oral at bedtime PRN Insomnia  ondansetron Injectable 4 milliGRAM(s) IV Push every 8 hours PRN Nausea and/or Vomiting      ALLERGIES:  Allergies    amoxicillin (Angioedema)  ciprofloxacin (Rash)  hydrochlorothiazide (Hives)  latex (Rash)  lisinopril (Angioedema; Rash; Hives)  morphine (Rash)  penicillin (Rash)    Intolerances        LABS:                        11.4   8.37  )-----------( 429      ( 30 Mar 2023 05:30 )             34.7     03-30    139  |  105  |  13  ----------------------------<  102<H>  4.0   |  23  |  0.60    Ca    9.2      30 Mar 2023 05:30  Phos  3.3     03-30  Mg     1.8     03-30    TPro  7.1  /  Alb  3.6  /  TBili  0.9  /  DBili  x   /  AST  11  /  ALT  16  /  AlkPhos  77  03-30    PT/INR - ( 29 Mar 2023 15:08 )   PT: 13.8 sec;   INR: 1.16          PTT - ( 29 Mar 2023 15:08 )  PTT:34.3 sec  Urinalysis Basic - ( 29 Mar 2023 15:08 )    Color: Yellow / Appearance: Clear / S.020 / pH: x  Gluc: x / Ketone: 15 mg/dL  / Bili: Negative / Urobili: 0.2 E.U./dL   Blood: x / Protein: 30 mg/dL / Nitrite: NEGATIVE   Leuk Esterase: NEGATIVE / RBC: < 5 /HPF / WBC < 5 /HPF   Sq Epi: x / Non Sq Epi: Many /HPF / Bacteria: Present /HPF      CAPILLARY BLOOD GLUCOSE          RADIOLOGY & ADDITIONAL TESTS: Reviewed.

## 2023-04-01 PROCEDURE — 99233 SBSQ HOSP IP/OBS HIGH 50: CPT

## 2023-04-01 RX ADMIN — HYDROMORPHONE HYDROCHLORIDE 2 MILLIGRAM(S): 2 INJECTION INTRAMUSCULAR; INTRAVENOUS; SUBCUTANEOUS at 16:34

## 2023-04-01 RX ADMIN — HYDROMORPHONE HYDROCHLORIDE 2 MILLIGRAM(S): 2 INJECTION INTRAMUSCULAR; INTRAVENOUS; SUBCUTANEOUS at 21:15

## 2023-04-01 RX ADMIN — NEBIVOLOL HYDROCHLORIDE 10 MILLIGRAM(S): 5 TABLET ORAL at 18:14

## 2023-04-01 RX ADMIN — NEBIVOLOL HYDROCHLORIDE 10 MILLIGRAM(S): 5 TABLET ORAL at 09:55

## 2023-04-01 RX ADMIN — HYDROMORPHONE HYDROCHLORIDE 2 MILLIGRAM(S): 2 INJECTION INTRAMUSCULAR; INTRAVENOUS; SUBCUTANEOUS at 00:30

## 2023-04-01 RX ADMIN — HYDROMORPHONE HYDROCHLORIDE 2 MILLIGRAM(S): 2 INJECTION INTRAMUSCULAR; INTRAVENOUS; SUBCUTANEOUS at 21:30

## 2023-04-01 RX ADMIN — ENOXAPARIN SODIUM 40 MILLIGRAM(S): 100 INJECTION SUBCUTANEOUS at 09:55

## 2023-04-01 RX ADMIN — MEROPENEM 280 MILLIGRAM(S): 1 INJECTION INTRAVENOUS at 01:08

## 2023-04-01 RX ADMIN — Medication 166.67 MILLIGRAM(S): at 12:31

## 2023-04-01 RX ADMIN — MEROPENEM 280 MILLIGRAM(S): 1 INJECTION INTRAVENOUS at 10:29

## 2023-04-01 RX ADMIN — ENOXAPARIN SODIUM 40 MILLIGRAM(S): 100 INJECTION SUBCUTANEOUS at 21:15

## 2023-04-01 RX ADMIN — GABAPENTIN 800 MILLIGRAM(S): 400 CAPSULE ORAL at 16:34

## 2023-04-01 RX ADMIN — GABAPENTIN 100 MILLIGRAM(S): 400 CAPSULE ORAL at 07:14

## 2023-04-01 RX ADMIN — HYDROMORPHONE HYDROCHLORIDE 2 MILLIGRAM(S): 2 INJECTION INTRAMUSCULAR; INTRAVENOUS; SUBCUTANEOUS at 04:00

## 2023-04-01 RX ADMIN — Medication 166.67 MILLIGRAM(S): at 21:15

## 2023-04-01 RX ADMIN — HYDROMORPHONE HYDROCHLORIDE 2 MILLIGRAM(S): 2 INJECTION INTRAMUSCULAR; INTRAVENOUS; SUBCUTANEOUS at 12:31

## 2023-04-01 RX ADMIN — Medication 166.67 MILLIGRAM(S): at 04:03

## 2023-04-01 RX ADMIN — HYDROMORPHONE HYDROCHLORIDE 2 MILLIGRAM(S): 2 INJECTION INTRAMUSCULAR; INTRAVENOUS; SUBCUTANEOUS at 09:00

## 2023-04-01 RX ADMIN — HYDROMORPHONE HYDROCHLORIDE 2 MILLIGRAM(S): 2 INJECTION INTRAMUSCULAR; INTRAVENOUS; SUBCUTANEOUS at 08:32

## 2023-04-01 RX ADMIN — HYDROMORPHONE HYDROCHLORIDE 2 MILLIGRAM(S): 2 INJECTION INTRAMUSCULAR; INTRAVENOUS; SUBCUTANEOUS at 04:20

## 2023-04-01 RX ADMIN — HYDROMORPHONE HYDROCHLORIDE 2 MILLIGRAM(S): 2 INJECTION INTRAMUSCULAR; INTRAVENOUS; SUBCUTANEOUS at 17:00

## 2023-04-01 RX ADMIN — HYDROMORPHONE HYDROCHLORIDE 2 MILLIGRAM(S): 2 INJECTION INTRAMUSCULAR; INTRAVENOUS; SUBCUTANEOUS at 13:00

## 2023-04-01 RX ADMIN — MEROPENEM 280 MILLIGRAM(S): 1 INJECTION INTRAVENOUS at 18:13

## 2023-04-01 NOTE — PROGRESS NOTE ADULT - ASSESSMENT
37F with PMH of chronic L foot AVM requiring multiple embolizations and history of prior infections, HTN and morbid obesity, presenting with concern for PICC line infection and admitted for further treatment and eventual replacement of PICC line.    #PICC line infection  #L foot AVM  - PICC line removed in emergency room  - 37F with PMH of chronic L foot AVM requiring multiple embolizations and history of prior infections, HTN and morbid obesity, presenting with concern for PICC line infection and admitted for further treatment and eventual replacement of PICC line.    #PICC line infection  #L foot AVM  - Pt is for PICC line placement on Monday   - Will continue with vancomycin and meropenem  - Will monitor Vancomycin trough next trough is 4/6.   - ID following    #HTN  - continue home dose of bystolic    #Morbid obesity  - affects all aspects of care    #DISPO  - WIll d/c following PICC line placement

## 2023-04-01 NOTE — PROGRESS NOTE ADULT - SUBJECTIVE AND OBJECTIVE BOX
Patient was seen and examined at bedside. Case discuss with resident. Pt w/o any complaints this morning.     OBJECTIVE:  Vital Signs Last 24 Hrs  T(C): 37.1 (01 Apr 2023 08:30), Max: 37.1 (01 Apr 2023 08:30)  T(F): 98.8 (01 Apr 2023 08:30), Max: 98.8 (01 Apr 2023 08:30)  HR: 82 (01 Apr 2023 08:30) (80 - 91)  BP: 150/89 (01 Apr 2023 08:30) (112/73 - 151/99)  BP(mean): --  RR: 14 (01 Apr 2023 08:30) (14 - 18)  SpO2: 98% (01 Apr 2023 08:30) (95% - 98%)    Parameters below as of 01 Apr 2023 08:30  Patient On (Oxygen Delivery Method): room air      PHYSICAL EXAM:  Gen: NAD laying in bed; obese female  CV: RRR, +S1/S2, no mumur  Pulm: CTA b/l no wheezing or crackles   Abd: soft, NTND + BS no rebound or guarding       LABS:                        12.0   9.58  )-----------( 443      ( 31 Mar 2023 08:35 )             37.4     03-31    135  |  102  |  16  ----------------------------<  86  3.9   |  22  |  0.60    Ca    9.2      31 Mar 2023 08:35  Phos  3.3     03-31  Mg     1.8     03-31        aluminum hydroxide/magnesium hydroxide/simethicone Suspension 30 milliLiter(s) Oral every 4 hours PRN  enoxaparin Injectable 40 milliGRAM(s) SubCutaneous every 12 hours  gabapentin 100 milliGRAM(s) Oral every 24 hours  gabapentin 800 milliGRAM(s) Oral every 24 hours  HYDROmorphone  Injectable 2 milliGRAM(s) IV Push every 4 hours PRN  melatonin 3 milliGRAM(s) Oral at bedtime PRN  meropenem  IVPB 2000 milliGRAM(s) IV Intermittent every 8 hours  nebivolol 10 milliGRAM(s) Oral <User Schedule>  ondansetron Injectable 4 milliGRAM(s) IV Push every 8 hours PRN  polyethylene glycol 3350 17 Gram(s) Oral daily  senna 2 Tablet(s) Oral at bedtime  vancomycin  IVPB 1250 milliGRAM(s) IV Intermittent every 8 hours

## 2023-04-02 PROCEDURE — 99233 SBSQ HOSP IP/OBS HIGH 50: CPT | Mod: GC

## 2023-04-02 RX ADMIN — HYDROMORPHONE HYDROCHLORIDE 2 MILLIGRAM(S): 2 INJECTION INTRAMUSCULAR; INTRAVENOUS; SUBCUTANEOUS at 05:26

## 2023-04-02 RX ADMIN — HYDROMORPHONE HYDROCHLORIDE 2 MILLIGRAM(S): 2 INJECTION INTRAMUSCULAR; INTRAVENOUS; SUBCUTANEOUS at 14:09

## 2023-04-02 RX ADMIN — MEROPENEM 280 MILLIGRAM(S): 1 INJECTION INTRAVENOUS at 10:41

## 2023-04-02 RX ADMIN — Medication 166.67 MILLIGRAM(S): at 21:42

## 2023-04-02 RX ADMIN — HYDROMORPHONE HYDROCHLORIDE 2 MILLIGRAM(S): 2 INJECTION INTRAMUSCULAR; INTRAVENOUS; SUBCUTANEOUS at 14:30

## 2023-04-02 RX ADMIN — HYDROMORPHONE HYDROCHLORIDE 2 MILLIGRAM(S): 2 INJECTION INTRAMUSCULAR; INTRAVENOUS; SUBCUTANEOUS at 18:14

## 2023-04-02 RX ADMIN — HYDROMORPHONE HYDROCHLORIDE 2 MILLIGRAM(S): 2 INJECTION INTRAMUSCULAR; INTRAVENOUS; SUBCUTANEOUS at 01:22

## 2023-04-02 RX ADMIN — HYDROMORPHONE HYDROCHLORIDE 2 MILLIGRAM(S): 2 INJECTION INTRAMUSCULAR; INTRAVENOUS; SUBCUTANEOUS at 01:37

## 2023-04-02 RX ADMIN — HYDROMORPHONE HYDROCHLORIDE 2 MILLIGRAM(S): 2 INJECTION INTRAMUSCULAR; INTRAVENOUS; SUBCUTANEOUS at 22:39

## 2023-04-02 RX ADMIN — NEBIVOLOL HYDROCHLORIDE 10 MILLIGRAM(S): 5 TABLET ORAL at 18:14

## 2023-04-02 RX ADMIN — MEROPENEM 280 MILLIGRAM(S): 1 INJECTION INTRAVENOUS at 18:32

## 2023-04-02 RX ADMIN — Medication 166.67 MILLIGRAM(S): at 13:05

## 2023-04-02 RX ADMIN — Medication 166.67 MILLIGRAM(S): at 05:26

## 2023-04-02 RX ADMIN — NEBIVOLOL HYDROCHLORIDE 10 MILLIGRAM(S): 5 TABLET ORAL at 09:53

## 2023-04-02 RX ADMIN — GABAPENTIN 800 MILLIGRAM(S): 400 CAPSULE ORAL at 16:47

## 2023-04-02 RX ADMIN — HYDROMORPHONE HYDROCHLORIDE 2 MILLIGRAM(S): 2 INJECTION INTRAMUSCULAR; INTRAVENOUS; SUBCUTANEOUS at 09:54

## 2023-04-02 RX ADMIN — HYDROMORPHONE HYDROCHLORIDE 2 MILLIGRAM(S): 2 INJECTION INTRAMUSCULAR; INTRAVENOUS; SUBCUTANEOUS at 10:15

## 2023-04-02 RX ADMIN — GABAPENTIN 100 MILLIGRAM(S): 400 CAPSULE ORAL at 07:33

## 2023-04-02 RX ADMIN — MEROPENEM 280 MILLIGRAM(S): 1 INJECTION INTRAVENOUS at 01:33

## 2023-04-02 RX ADMIN — HYDROMORPHONE HYDROCHLORIDE 2 MILLIGRAM(S): 2 INJECTION INTRAMUSCULAR; INTRAVENOUS; SUBCUTANEOUS at 18:30

## 2023-04-02 RX ADMIN — HYDROMORPHONE HYDROCHLORIDE 2 MILLIGRAM(S): 2 INJECTION INTRAMUSCULAR; INTRAVENOUS; SUBCUTANEOUS at 22:24

## 2023-04-02 NOTE — PROGRESS NOTE ADULT - PROBLEM SELECTOR PLAN 6
Plan:  F: None  E: replete K<4, Mg<2  N: regular  VTE Prophylaxis: Lovenox   C: Full Code  D: [facility]

## 2023-04-02 NOTE — PROGRESS NOTE ADULT - PROBLEM SELECTOR PLAN 1
On admission met 2/4 SIRS criteria with HR >90 and WBC >12. Likely source 2/2 PICC line (placed 02/03/23) vs. bacteremia vs. chronic L foot nonpurulent ulcer. Patient has previously been on Annie/Vanc, Dapto/Cefepime regimens. 10/22 Would Cx growing PsA, Staph hemolyticus, Corynebacterium. Lactate, UA neg.     - PICC line removed (03/29/23)  - ID consulted, appreciate recs  - C/w Meropenem 2g every 8 hours  - C/w Vanc 1250 every 8 hours - trough 3/30 13.9 which is therapeutic. Will check troughs weekly. Next trough 4/6  - BCx 3/29, NGTD for >48 hours  - Anticipate PICC placement Monday 4/3

## 2023-04-02 NOTE — PROGRESS NOTE ADULT - SUBJECTIVE AND OBJECTIVE BOX
OVERNIGHT EVENTS: ISABELLE    SUBJECTIVE / INTERVAL HPI: Patient seen and examined at bedside. No acute complaints at this time, ROS negative.     VITAL SIGNS:  Vital Signs Last 24 Hrs  T(C): 37 (02 Apr 2023 09:50), Max: 37 (02 Apr 2023 09:50)  T(F): 98.6 (02 Apr 2023 09:50), Max: 98.6 (02 Apr 2023 09:50)  HR: 70 (02 Apr 2023 09:50) (70 - 87)  BP: 171/90 (02 Apr 2023 09:50) (119/75 - 171/90)  BP(mean): --  RR: 16 (02 Apr 2023 09:50) (16 - 17)  SpO2: 96% (02 Apr 2023 09:50) (95% - 97%)    Parameters below as of 02 Apr 2023 09:50  Patient On (Oxygen Delivery Method): room air      I&O's Summary      PHYSICAL EXAM:    General: NAD, laying in bed   HEENT: head NC/AT, no conjunctival injection, EOMI, MMM  Neck: supple, no JVD  Cardio: RRR, +S1/S2, no M/R/G  Resp: lungs CTAB, no cough/wheezes/rales/rhonchi, on room air  Abdo: soft, NT, ND, +bowel sounds x4, no organomegaly or palpable mass    Extremities: + L foot ulcer on lateral aspect w/ mild surrounding erythema, no oozing, dressing in place  Vasc: 2+ radial and DP pulses b/l  Neuro: A&Ox3, no focal deficits  Psych: speech non-pressured, thoughts goal-oriented  Skin: dry, intact, no visible jaundice   MSK: no joint swelling    MEDICATIONS:  MEDICATIONS  (STANDING):  gabapentin 100 milliGRAM(s) Oral every 24 hours  gabapentin 800 milliGRAM(s) Oral every 24 hours  meropenem  IVPB 2000 milliGRAM(s) IV Intermittent every 8 hours  nebivolol 10 milliGRAM(s) Oral <User Schedule>  polyethylene glycol 3350 17 Gram(s) Oral daily  senna 2 Tablet(s) Oral at bedtime  vancomycin  IVPB 1250 milliGRAM(s) IV Intermittent every 8 hours    MEDICATIONS  (PRN):  aluminum hydroxide/magnesium hydroxide/simethicone Suspension 30 milliLiter(s) Oral every 4 hours PRN Dyspepsia  HYDROmorphone  Injectable 2 milliGRAM(s) IV Push every 4 hours PRN Severe Pain (7 - 10)  melatonin 3 milliGRAM(s) Oral at bedtime PRN Insomnia  ondansetron Injectable 4 milliGRAM(s) IV Push every 8 hours PRN Nausea and/or Vomiting      ALLERGIES:  Allergies    amoxicillin (Angioedema)  ciprofloxacin (Rash)  hydrochlorothiazide (Hives)  latex (Rash)  lisinopril (Angioedema; Rash; Hives)  morphine (Rash)  penicillin (Rash)    Intolerances        LABS:              CAPILLARY BLOOD GLUCOSE          RADIOLOGY & ADDITIONAL TESTS: Reviewed.

## 2023-04-02 NOTE — PROGRESS NOTE ADULT - ASSESSMENT
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

## 2023-04-03 LAB
ANION GAP SERPL CALC-SCNC: 10 MMOL/L — SIGNIFICANT CHANGE UP (ref 5–17)
APTT BLD: 31.3 SEC — SIGNIFICANT CHANGE UP (ref 27.5–35.5)
BLD GP AB SCN SERPL QL: NEGATIVE — SIGNIFICANT CHANGE UP
BUN SERPL-MCNC: 13 MG/DL — SIGNIFICANT CHANGE UP (ref 7–23)
CALCIUM SERPL-MCNC: 8.6 MG/DL — SIGNIFICANT CHANGE UP (ref 8.4–10.5)
CHLORIDE SERPL-SCNC: 101 MMOL/L — SIGNIFICANT CHANGE UP (ref 96–108)
CO2 SERPL-SCNC: 25 MMOL/L — SIGNIFICANT CHANGE UP (ref 22–31)
CREAT SERPL-MCNC: 0.61 MG/DL — SIGNIFICANT CHANGE UP (ref 0.5–1.3)
EGFR: 117 ML/MIN/1.73M2 — SIGNIFICANT CHANGE UP
GLUCOSE SERPL-MCNC: 109 MG/DL — HIGH (ref 70–99)
HCT VFR BLD CALC: 33.8 % — LOW (ref 34.5–45)
HGB BLD-MCNC: 11 G/DL — LOW (ref 11.5–15.5)
INR BLD: 1.04 — SIGNIFICANT CHANGE UP (ref 0.88–1.16)
MCHC RBC-ENTMCNC: 28.4 PG — SIGNIFICANT CHANGE UP (ref 27–34)
MCHC RBC-ENTMCNC: 32.5 GM/DL — SIGNIFICANT CHANGE UP (ref 32–36)
MCV RBC AUTO: 87.3 FL — SIGNIFICANT CHANGE UP (ref 80–100)
NRBC # BLD: 0 /100 WBCS — SIGNIFICANT CHANGE UP (ref 0–0)
PLATELET # BLD AUTO: 439 K/UL — HIGH (ref 150–400)
POTASSIUM SERPL-MCNC: 3.8 MMOL/L — SIGNIFICANT CHANGE UP (ref 3.5–5.3)
POTASSIUM SERPL-SCNC: 3.8 MMOL/L — SIGNIFICANT CHANGE UP (ref 3.5–5.3)
PROTHROM AB SERPL-ACNC: 12.4 SEC — SIGNIFICANT CHANGE UP (ref 10.5–13.4)
RBC # BLD: 3.87 M/UL — SIGNIFICANT CHANGE UP (ref 3.8–5.2)
RBC # FLD: 12.4 % — SIGNIFICANT CHANGE UP (ref 10.3–14.5)
RH IG SCN BLD-IMP: POSITIVE — SIGNIFICANT CHANGE UP
SODIUM SERPL-SCNC: 136 MMOL/L — SIGNIFICANT CHANGE UP (ref 135–145)
WBC # BLD: 10.54 K/UL — HIGH (ref 3.8–10.5)
WBC # FLD AUTO: 10.54 K/UL — HIGH (ref 3.8–10.5)

## 2023-04-03 PROCEDURE — 36569 INSJ PICC 5 YR+ W/O IMAGING: CPT

## 2023-04-03 PROCEDURE — 76937 US GUIDE VASCULAR ACCESS: CPT | Mod: 26,59

## 2023-04-03 PROCEDURE — 99233 SBSQ HOSP IP/OBS HIGH 50: CPT

## 2023-04-03 PROCEDURE — 99232 SBSQ HOSP IP/OBS MODERATE 35: CPT

## 2023-04-03 RX ORDER — ENOXAPARIN SODIUM 100 MG/ML
40 INJECTION SUBCUTANEOUS EVERY 12 HOURS
Refills: 0 | Status: DISCONTINUED | OUTPATIENT
Start: 2023-04-04 | End: 2023-04-06

## 2023-04-03 RX ADMIN — MEROPENEM 280 MILLIGRAM(S): 1 INJECTION INTRAVENOUS at 04:14

## 2023-04-03 RX ADMIN — Medication 166.67 MILLIGRAM(S): at 06:31

## 2023-04-03 RX ADMIN — HYDROMORPHONE HYDROCHLORIDE 2 MILLIGRAM(S): 2 INJECTION INTRAMUSCULAR; INTRAVENOUS; SUBCUTANEOUS at 23:30

## 2023-04-03 RX ADMIN — HYDROMORPHONE HYDROCHLORIDE 2 MILLIGRAM(S): 2 INJECTION INTRAMUSCULAR; INTRAVENOUS; SUBCUTANEOUS at 11:24

## 2023-04-03 RX ADMIN — NEBIVOLOL HYDROCHLORIDE 10 MILLIGRAM(S): 5 TABLET ORAL at 09:09

## 2023-04-03 RX ADMIN — GABAPENTIN 800 MILLIGRAM(S): 400 CAPSULE ORAL at 15:29

## 2023-04-03 RX ADMIN — HYDROMORPHONE HYDROCHLORIDE 2 MILLIGRAM(S): 2 INJECTION INTRAMUSCULAR; INTRAVENOUS; SUBCUTANEOUS at 15:44

## 2023-04-03 RX ADMIN — HYDROMORPHONE HYDROCHLORIDE 2 MILLIGRAM(S): 2 INJECTION INTRAMUSCULAR; INTRAVENOUS; SUBCUTANEOUS at 11:39

## 2023-04-03 RX ADMIN — Medication 166.67 MILLIGRAM(S): at 13:10

## 2023-04-03 RX ADMIN — HYDROMORPHONE HYDROCHLORIDE 2 MILLIGRAM(S): 2 INJECTION INTRAMUSCULAR; INTRAVENOUS; SUBCUTANEOUS at 15:29

## 2023-04-03 RX ADMIN — HYDROMORPHONE HYDROCHLORIDE 2 MILLIGRAM(S): 2 INJECTION INTRAMUSCULAR; INTRAVENOUS; SUBCUTANEOUS at 19:30

## 2023-04-03 RX ADMIN — HYDROMORPHONE HYDROCHLORIDE 2 MILLIGRAM(S): 2 INJECTION INTRAMUSCULAR; INTRAVENOUS; SUBCUTANEOUS at 03:05

## 2023-04-03 RX ADMIN — HYDROMORPHONE HYDROCHLORIDE 2 MILLIGRAM(S): 2 INJECTION INTRAMUSCULAR; INTRAVENOUS; SUBCUTANEOUS at 23:45

## 2023-04-03 RX ADMIN — HYDROMORPHONE HYDROCHLORIDE 2 MILLIGRAM(S): 2 INJECTION INTRAMUSCULAR; INTRAVENOUS; SUBCUTANEOUS at 07:09

## 2023-04-03 RX ADMIN — HYDROMORPHONE HYDROCHLORIDE 2 MILLIGRAM(S): 2 INJECTION INTRAMUSCULAR; INTRAVENOUS; SUBCUTANEOUS at 19:45

## 2023-04-03 RX ADMIN — GABAPENTIN 100 MILLIGRAM(S): 400 CAPSULE ORAL at 07:09

## 2023-04-03 RX ADMIN — MEROPENEM 280 MILLIGRAM(S): 1 INJECTION INTRAVENOUS at 10:31

## 2023-04-03 RX ADMIN — Medication 166.67 MILLIGRAM(S): at 21:14

## 2023-04-03 RX ADMIN — MEROPENEM 280 MILLIGRAM(S): 1 INJECTION INTRAVENOUS at 18:19

## 2023-04-03 RX ADMIN — NEBIVOLOL HYDROCHLORIDE 10 MILLIGRAM(S): 5 TABLET ORAL at 19:08

## 2023-04-03 NOTE — DIETITIAN INITIAL EVALUATION ADULT - OTHER INFO
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 Patient was recently admitted to [facility] 02/27-03/16 for a similar presentation, discharged on Meropenem 2g q8 and Vancomycin 1250mg q8 via PICC line (placed 02/03/23). Patient endorsed having a hard time flushing her PICC line with Abx running slower for one week. She was transitioned to fast acting Abx pump, however that also became clogged and difficult to flush. Yesterday,  patient endorsed worsening throbbing L foot pain a/w chills, shakes and hot flashes. Tmax at home was 99.8 oral. Patient called Dr. Rahman's office today and was instructed to come to the ED given c/f infected PICC line.    Patient seen for length of stay assessment. Unable to conduct RD interview-pt in care x2 attempts. Current diet order: regular. NKFA. Known to RD from last admit. Previously noted to be taking weight loss medication and had lost ~60#. Dosing weight: 292.6 pounds, BMI 45.9. Michael score 21. No pressure injuries documented. +1 edema to L ankle. On bowel regimen. Observed with no overt signs and symptoms of muscle or fat wasting. Based on ASPEN guidelines, pt does not meet criteria for malnutrition. No cultural ethnic, Baptist food preferences noted. See nutrition recommendations below.

## 2023-04-03 NOTE — DIETITIAN INITIAL EVALUATION ADULT - PERTINENT MEDS FT
MEDICATIONS  (STANDING):  gabapentin 100 milliGRAM(s) Oral every 24 hours  gabapentin 800 milliGRAM(s) Oral every 24 hours  meropenem  IVPB 2000 milliGRAM(s) IV Intermittent every 8 hours  nebivolol 10 milliGRAM(s) Oral <User Schedule>  polyethylene glycol 3350 17 Gram(s) Oral daily  senna 2 Tablet(s) Oral at bedtime  vancomycin  IVPB 1250 milliGRAM(s) IV Intermittent every 8 hours    MEDICATIONS  (PRN):  aluminum hydroxide/magnesium hydroxide/simethicone Suspension 30 milliLiter(s) Oral every 4 hours PRN Dyspepsia  HYDROmorphone  Injectable 2 milliGRAM(s) IV Push every 4 hours PRN Severe Pain (7 - 10)  melatonin 3 milliGRAM(s) Oral at bedtime PRN Insomnia  ondansetron Injectable 4 milliGRAM(s) IV Push every 8 hours PRN Nausea and/or Vomiting

## 2023-04-03 NOTE — PROGRESS NOTE ADULT - SUBJECTIVE AND OBJECTIVE BOX
INFECTIOUS DISEASES CONSULT FOLLOW-UP NOTE    INTERVAL HPI/OVERNIGHT EVENTS:    Patient was seen and examined at bedside. No o/n events. Denies any complaints.       ROS:   Constitutional, eyes, ENT, cardiovascular, respiratory, gastrointestinal, genitourinary, integumentary, neurological, psychiatric and heme/lymph are otherwise negative other than noted above       ANTIBIOTICS/RELEVANT:    MEDICATIONS  (STANDING):  gabapentin 100 milliGRAM(s) Oral every 24 hours  gabapentin 800 milliGRAM(s) Oral every 24 hours  meropenem  IVPB 2000 milliGRAM(s) IV Intermittent every 8 hours  nebivolol 10 milliGRAM(s) Oral <User Schedule>  polyethylene glycol 3350 17 Gram(s) Oral daily  senna 2 Tablet(s) Oral at bedtime  vancomycin  IVPB 1250 milliGRAM(s) IV Intermittent every 8 hours    MEDICATIONS  (PRN):  aluminum hydroxide/magnesium hydroxide/simethicone Suspension 30 milliLiter(s) Oral every 4 hours PRN Dyspepsia  HYDROmorphone  Injectable 2 milliGRAM(s) IV Push every 4 hours PRN Severe Pain (7 - 10)  melatonin 3 milliGRAM(s) Oral at bedtime PRN Insomnia  ondansetron Injectable 4 milliGRAM(s) IV Push every 8 hours PRN Nausea and/or Vomiting        Vital Signs Last 24 Hrs  T(C): 36.3 (03 Apr 2023 09:10), Max: 37.3 (02 Apr 2023 23:15)  T(F): 97.4 (03 Apr 2023 09:10), Max: 99.1 (02 Apr 2023 23:15)  HR: 85 (03 Apr 2023 09:10) (83 - 88)  BP: 132/86 (03 Apr 2023 09:10) (123/80 - 138/83)  BP(mean): --  RR: 18 (03 Apr 2023 09:10) (17 - 18)  SpO2: 98% (03 Apr 2023 09:10) (97% - 98%)    Parameters below as of 03 Apr 2023 09:10  Patient On (Oxygen Delivery Method): room air        04-03-23 @ 07:01  -  04-03-23 @ 11:13  --------------------------------------------------------  IN: 140 mL / OUT: 0 mL / NET: 140 mL      PHYSICAL EXAM:  Constitutional: alert, NAD  Eyes: the sclera and conjunctiva were normal.   ENT: the ears and nose were normal in appearance.   Neck: the appearance of the neck was normal and the neck was supple.   Pulmonary: no respiratory distress and lungs were clear to auscultation bilaterally.   Heart: heart rate was normal and rhythm regular, normal S1 and S2  Vascular:. there was no peripheral edema  Abdomen: normal bowel sounds, soft, non-tender  Extremities: L foot wrapped   Neurological: no focal deficits.   Psychiatric: the affect was normal        LABS:                        11.0   10.54 )-----------( 439      ( 03 Apr 2023 06:59 )             33.8     04-03    136  |  101  |  13  ----------------------------<  109<H>  3.8   |  25  |  0.61    Ca    8.6      03 Apr 2023 06:59      PT/INR - ( 03 Apr 2023 06:59 )   PT: 12.4 sec;   INR: 1.04          PTT - ( 03 Apr 2023 06:59 )  PTT:31.3 sec      MICROBIOLOGY:  Bcxs 3/29 NGTD 4 days     RADIOLOGY & ADDITIONAL STUDIES:  Reviewed

## 2023-04-03 NOTE — PROGRESS NOTE ADULT - NS ATTEND AMEND GEN_ALL_CORE FT
#L foot ulcer infection, PICC malfunction, fever    Patient feels well, pain controlled.  Fever now resolved, BCx ngtd.  L foot ulcer appears stable.  Cont vanco 1250mg IV q8h and hugh 2g IV q8h for 4 weeks - duration TBD as outpatient.  Patient to see Dr. Rahman in 2 weeks.  Patient got PICC today.  Please set up IV infusion as outpatient.      Thank you for your consult.  Please re-consult us or call us with questions.  Case d/w primary team.    Génesis Khan MD, MS  Infectious Disease attending  work cell [phone]  For any questions during evening/weekend/holiday, please page ID on call

## 2023-04-03 NOTE — DIETITIAN INITIAL EVALUATION ADULT - PROBLEM SELECTOR PLAN 6
Plan:  F: None  E: replete K<4, Mg<2  N: regular  VTE Prophylaxis: Lovenox   C: Full Code  D: [facility]

## 2023-04-03 NOTE — PROGRESS NOTE ADULT - PROBLEM SELECTOR PLAN 1
On admission met 2/4 SIRS criteria with HR >90 and WBC >12. Likely source 2/2 PICC line (placed 02/03/23) vs. bacteremia vs. chronic L foot nonpurulent ulcer. Patient has previously been on Annie/Vanc, Dapto/Cefepime regimens. 10/22 Would Cx growing PsA, Staph hemolyticus, Corynebacterium. Lactate, UA neg.     - PICC line removed (03/29/23)  - ID consulted, appreciate recs  - C/w Meropenem 2g every 8 hours  - C/w Vanc 1250 every 8 hours - trough 3/30 13.9 which is therapeutic. Will check troughs weekly. Next trough 4/6  - BCx 3/29, NGTD for >48 hours  - Plan for PICC placement today 4/3

## 2023-04-03 NOTE — DIETITIAN INITIAL EVALUATION ADULT - PROBLEM SELECTOR PLAN 1
On admission met 2/4 SIRS criteria with HR >90 and WBC >12. Likely source 2/2 PICC line (placed 02/03/23) vs. bacteremia vs. chronic L foot nonpurulent ulcer. Patient has previously been on Annie/Vanc, Dapto/Cefepime regimens. 10/22 Would Cx growing PsA, Staph hemolyticus, Corynebacterium.   - PICC line removed (03/29/23)  - Obtain Lactate, CXR, UA/UCx  - ID consulted, appreciate recs  - C/w Meropenem 2g every 8 hours and Vanc 1250 every 8 hours, obtain trough prior to 4th dose  - f/u BCx, would need to observed for 48 hours prior to placement of new PICC line  - Patient did not receive initial fluids, however is mentating well and would encourage PO intake at this time

## 2023-04-03 NOTE — PROGRESS NOTE ADULT - SUBJECTIVE AND OBJECTIVE BOX
OVERNIGHT EVENTS: NAD    SUBJECTIVE / INTERVAL HPI: Patient seen and examined at bedside. Bell is feeling well this morning, pain in left leg is controlled. Spoke with Dr. Rahman and will require 4 additional weeks of Abx, patient in agreement.     VITAL SIGNS:  Vital Signs Last 24 Hrs  T(C): 36.3 (03 Apr 2023 09:10), Max: 37.3 (02 Apr 2023 23:15)  T(F): 97.4 (03 Apr 2023 09:10), Max: 99.1 (02 Apr 2023 23:15)  HR: 85 (03 Apr 2023 09:10) (83 - 88)  BP: 132/86 (03 Apr 2023 09:10) (123/80 - 138/83)  BP(mean): --  RR: 18 (03 Apr 2023 09:10) (17 - 18)  SpO2: 98% (03 Apr 2023 09:10) (97% - 98%)    Parameters below as of 03 Apr 2023 09:10  Patient On (Oxygen Delivery Method): room air      I&O's Summary    03 Apr 2023 07:01  -  03 Apr 2023 13:24  --------------------------------------------------------  IN: 390 mL / OUT: 0 mL / NET: 390 mL        PHYSICAL EXAM:    General: NAD, laying in bed   HEENT: head NC/AT, no conjunctival injection, EOMI, MMM  Neck: supple, no JVD  Cardio: RRR, +S1/S2, no M/R/G  Resp: lungs CTAB, no cough/wheezes/rales/rhonchi, on room air  Abdo: soft, NT, ND, +bowel sounds x4, no organomegaly or palpable mass    Extremities: + L foot ulcer on lateral aspect w/ mild surrounding erythema, no oozing, dressing in place  Vasc: 2+ radial and DP pulses b/l  Neuro: A&Ox3, no focal deficits  Psych: speech non-pressured, thoughts goal-oriented  Skin: dry, intact, no visible jaundice   MSK: no joint swelling    MEDICATIONS:  MEDICATIONS  (STANDING):  gabapentin 100 milliGRAM(s) Oral every 24 hours  gabapentin 800 milliGRAM(s) Oral every 24 hours  meropenem  IVPB 2000 milliGRAM(s) IV Intermittent every 8 hours  nebivolol 10 milliGRAM(s) Oral <User Schedule>  polyethylene glycol 3350 17 Gram(s) Oral daily  senna 2 Tablet(s) Oral at bedtime  vancomycin  IVPB 1250 milliGRAM(s) IV Intermittent every 8 hours    MEDICATIONS  (PRN):  aluminum hydroxide/magnesium hydroxide/simethicone Suspension 30 milliLiter(s) Oral every 4 hours PRN Dyspepsia  HYDROmorphone  Injectable 2 milliGRAM(s) IV Push every 4 hours PRN Severe Pain (7 - 10)  melatonin 3 milliGRAM(s) Oral at bedtime PRN Insomnia  ondansetron Injectable 4 milliGRAM(s) IV Push every 8 hours PRN Nausea and/or Vomiting      ALLERGIES:  Allergies    amoxicillin (Angioedema)  ciprofloxacin (Rash)  hydrochlorothiazide (Hives)  latex (Rash)  lisinopril (Angioedema; Rash; Hives)  morphine (Rash)  penicillin (Rash)    Intolerances        LABS:                        11.0   10.54 )-----------( 439      ( 03 Apr 2023 06:59 )             33.8     04-03    136  |  101  |  13  ----------------------------<  109<H>  3.8   |  25  |  0.61    Ca    8.6      03 Apr 2023 06:59      PT/INR - ( 03 Apr 2023 06:59 )   PT: 12.4 sec;   INR: 1.04          PTT - ( 03 Apr 2023 06:59 )  PTT:31.3 sec    CAPILLARY BLOOD GLUCOSE          RADIOLOGY & ADDITIONAL TESTS: Reviewed.

## 2023-04-03 NOTE — DIETITIAN INITIAL EVALUATION ADULT - PERTINENT LABORATORY DATA
04-03    136  |  101  |  13  ----------------------------<  109<H>  3.8   |  25  |  0.61    Ca    8.6      03 Apr 2023 06:59    A1C with Estimated Average Glucose Result: 5.2 % (11-02-22 @ 05:30)  A1C with Estimated Average Glucose Result: 5.1 % (08-16-22 @ 08:54)   Siliq Counseling:  I discussed with the patient the risks of Siliq including but not limited to new or worsening depression, suicidal thoughts and behavior, immunosuppression, malignancy, posterior leukoencephalopathy syndrome, and serious infections.  The patient understands that monitoring is required including a PPD at baseline and must alert us or the primary physician if symptoms of infection or other concerning signs are noted. There is also a special program designed to monitor depression which is required with Siliq.

## 2023-04-03 NOTE — PROGRESS NOTE ADULT - ASSESSMENT
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

## 2023-04-03 NOTE — PROGRESS NOTE ADULT - PROBLEM SELECTOR PLAN 6
Plan:  F: None  E: replete K<4, Mg<2  N: regular  VTE Prophylaxis: hold for PICC  C: Full Code  D: JONATHAN

## 2023-04-03 NOTE — PROGRESS NOTE ADULT - ASSESSMENT
37F h/o L foot AVM c/b recurrent L foot ulcer and infection on IV vanc/hugh p/w low grade fever, sweating and malfunctioning PICC (placed 2/3/23), c/f CLABSI. PICC removed on 3/29.  So far BCx ngtd x 4 days, and patient feels much better.    - continue vancomycin 1250mg IV q8h.    - continue meropenem 2g IV q8h  - Awaiting PICC  - Duration of antibiotics is 4 weeks (3/29-4/25)  - Weekly labs: CMP, CBC, ESR, CRP faxed to ID office at [phone]  - Patient to follow up with Dr. Rahman in 2 weeks ([address] [place] [address], [phone]), ID office will call patient to schedule     Team 2 is signing off. Thank you for your consultation.  Please call or reconsult with any questions.     Case d/w primary team. Final recommendation pending attending note.     Leah Bailey, Infectious Diseases PA

## 2023-04-03 NOTE — DIETITIAN INITIAL EVALUATION ADULT - PROBLEM SELECTOR PLAN 2
Known chronic L foot ulcer due to AVM. Patient follows with Dr. Teran for AVMs and Pain management. On home Gabapentin 100mg in the morning and 800mg at 4pm. Percocet 10 q4hrs PRN. BUN 7/Cr 0.63 (at baseline)  - c/w Gabapentin 100mg in the morning and 800mg at 4PM  - c/w Dilaudid 1mg PO q4 PRN  - c/w Bowel regimen    - Wound Care consult  - Sepsis/Abx plan as above

## 2023-04-04 ENCOUNTER — TRANSCRIPTION ENCOUNTER (OUTPATIENT)
Age: 38
End: 2023-04-04

## 2023-04-04 LAB
ANION GAP SERPL CALC-SCNC: 13 MMOL/L — SIGNIFICANT CHANGE UP (ref 5–17)
BUN SERPL-MCNC: 11 MG/DL — SIGNIFICANT CHANGE UP (ref 7–23)
CALCIUM SERPL-MCNC: 9.3 MG/DL — SIGNIFICANT CHANGE UP (ref 8.4–10.5)
CHLORIDE SERPL-SCNC: 98 MMOL/L — SIGNIFICANT CHANGE UP (ref 96–108)
CO2 SERPL-SCNC: 24 MMOL/L — SIGNIFICANT CHANGE UP (ref 22–31)
CREAT SERPL-MCNC: 0.51 MG/DL — SIGNIFICANT CHANGE UP (ref 0.5–1.3)
EGFR: 122 ML/MIN/1.73M2 — SIGNIFICANT CHANGE UP
GLUCOSE SERPL-MCNC: 123 MG/DL — HIGH (ref 70–99)
HCT VFR BLD CALC: 37 % — SIGNIFICANT CHANGE UP (ref 34.5–45)
HGB BLD-MCNC: 11.8 G/DL — SIGNIFICANT CHANGE UP (ref 11.5–15.5)
MAGNESIUM SERPL-MCNC: 1.7 MG/DL — SIGNIFICANT CHANGE UP (ref 1.6–2.6)
MCHC RBC-ENTMCNC: 28.2 PG — SIGNIFICANT CHANGE UP (ref 27–34)
MCHC RBC-ENTMCNC: 31.9 GM/DL — LOW (ref 32–36)
MCV RBC AUTO: 88.3 FL — SIGNIFICANT CHANGE UP (ref 80–100)
NRBC # BLD: 0 /100 WBCS — SIGNIFICANT CHANGE UP (ref 0–0)
PHOSPHATE SERPL-MCNC: 2.8 MG/DL — SIGNIFICANT CHANGE UP (ref 2.5–4.5)
PLATELET # BLD AUTO: 510 K/UL — HIGH (ref 150–400)
POTASSIUM SERPL-MCNC: 4.1 MMOL/L — SIGNIFICANT CHANGE UP (ref 3.5–5.3)
POTASSIUM SERPL-SCNC: 4.1 MMOL/L — SIGNIFICANT CHANGE UP (ref 3.5–5.3)
RBC # BLD: 4.19 M/UL — SIGNIFICANT CHANGE UP (ref 3.8–5.2)
RBC # FLD: 12.4 % — SIGNIFICANT CHANGE UP (ref 10.3–14.5)
SODIUM SERPL-SCNC: 135 MMOL/L — SIGNIFICANT CHANGE UP (ref 135–145)
VANCOMYCIN FLD-MCNC: 16.5 UG/ML — SIGNIFICANT CHANGE UP
WBC # BLD: 11.05 K/UL — HIGH (ref 3.8–10.5)
WBC # FLD AUTO: 11.05 K/UL — HIGH (ref 3.8–10.5)

## 2023-04-04 PROCEDURE — 99232 SBSQ HOSP IP/OBS MODERATE 35: CPT

## 2023-04-04 RX ORDER — HYDROMORPHONE HYDROCHLORIDE 2 MG/ML
2 INJECTION INTRAMUSCULAR; INTRAVENOUS; SUBCUTANEOUS EVERY 4 HOURS
Refills: 0 | Status: DISCONTINUED | OUTPATIENT
Start: 2023-04-04 | End: 2023-04-06

## 2023-04-04 RX ADMIN — GABAPENTIN 100 MILLIGRAM(S): 400 CAPSULE ORAL at 07:14

## 2023-04-04 RX ADMIN — ENOXAPARIN SODIUM 40 MILLIGRAM(S): 100 INJECTION SUBCUTANEOUS at 19:25

## 2023-04-04 RX ADMIN — GABAPENTIN 800 MILLIGRAM(S): 400 CAPSULE ORAL at 15:58

## 2023-04-04 RX ADMIN — NEBIVOLOL HYDROCHLORIDE 10 MILLIGRAM(S): 5 TABLET ORAL at 10:30

## 2023-04-04 RX ADMIN — Medication 166.67 MILLIGRAM(S): at 05:46

## 2023-04-04 RX ADMIN — HYDROMORPHONE HYDROCHLORIDE 2 MILLIGRAM(S): 2 INJECTION INTRAMUSCULAR; INTRAVENOUS; SUBCUTANEOUS at 03:48

## 2023-04-04 RX ADMIN — HYDROMORPHONE HYDROCHLORIDE 2 MILLIGRAM(S): 2 INJECTION INTRAMUSCULAR; INTRAVENOUS; SUBCUTANEOUS at 15:57

## 2023-04-04 RX ADMIN — MEROPENEM 280 MILLIGRAM(S): 1 INJECTION INTRAVENOUS at 10:08

## 2023-04-04 RX ADMIN — HYDROMORPHONE HYDROCHLORIDE 2 MILLIGRAM(S): 2 INJECTION INTRAMUSCULAR; INTRAVENOUS; SUBCUTANEOUS at 12:05

## 2023-04-04 RX ADMIN — HYDROMORPHONE HYDROCHLORIDE 2 MILLIGRAM(S): 2 INJECTION INTRAMUSCULAR; INTRAVENOUS; SUBCUTANEOUS at 11:50

## 2023-04-04 RX ADMIN — HYDROMORPHONE HYDROCHLORIDE 2 MILLIGRAM(S): 2 INJECTION INTRAMUSCULAR; INTRAVENOUS; SUBCUTANEOUS at 19:58

## 2023-04-04 RX ADMIN — HYDROMORPHONE HYDROCHLORIDE 2 MILLIGRAM(S): 2 INJECTION INTRAMUSCULAR; INTRAVENOUS; SUBCUTANEOUS at 07:51

## 2023-04-04 RX ADMIN — MEROPENEM 280 MILLIGRAM(S): 1 INJECTION INTRAVENOUS at 01:14

## 2023-04-04 RX ADMIN — NEBIVOLOL HYDROCHLORIDE 10 MILLIGRAM(S): 5 TABLET ORAL at 19:25

## 2023-04-04 RX ADMIN — ENOXAPARIN SODIUM 40 MILLIGRAM(S): 100 INJECTION SUBCUTANEOUS at 05:46

## 2023-04-04 RX ADMIN — Medication 166.67 MILLIGRAM(S): at 13:50

## 2023-04-04 RX ADMIN — HYDROMORPHONE HYDROCHLORIDE 2 MILLIGRAM(S): 2 INJECTION INTRAMUSCULAR; INTRAVENOUS; SUBCUTANEOUS at 16:12

## 2023-04-04 RX ADMIN — Medication 166.67 MILLIGRAM(S): at 22:05

## 2023-04-04 RX ADMIN — MEROPENEM 280 MILLIGRAM(S): 1 INJECTION INTRAVENOUS at 19:26

## 2023-04-04 RX ADMIN — SENNA PLUS 2 TABLET(S): 8.6 TABLET ORAL at 22:05

## 2023-04-04 RX ADMIN — HYDROMORPHONE HYDROCHLORIDE 2 MILLIGRAM(S): 2 INJECTION INTRAMUSCULAR; INTRAVENOUS; SUBCUTANEOUS at 08:51

## 2023-04-04 NOTE — DISCHARGE NOTE PROVIDER - ATTENDING DISCHARGE PHYSICAL EXAMINATION:
37F with PMH of chronic L foot AVM requiring multiple embolizations and history of prior infections, HTN and morbid obesity, presenting with concern for PICC line infection and admitted for further treatment and eventual replacement of PICC line.    Physical exam:  General: no acute distress, sitting up in bed  HEENT: normocephalic, atraumatic, MMM  Cards: RRR, normal S1/S2, no murmurs, rubs or gallops  Pulm: CTAB, no wheeze, crackles, rales or rhonchi  Ab: soft, nontender, nondistended, normoactive bowel sounds  Ext: wwp, no edema, wrapped wound on L foot, R PICC line site is nonerythematous, no surrounding fluctuance  Neuro: grossly nonfocal exam, speech is fluent, no facial asymmetry    #PICC line infection  #L foot AVM  - PICC line replaced  - discharging home today with vancomycin and meropenem for an additional 4 weeks    #HTN  - continue home dose of bystolic    #morbid obesity  - affects all aspects of care

## 2023-04-04 NOTE — DISCHARGE NOTE PROVIDER - CARE PROVIDERS DIRECT ADDRESSES
[email or web address]

## 2023-04-04 NOTE — DISCHARGE NOTE PROVIDER - NSDCCPCAREPLAN_GEN_ALL_CORE_FT
PRINCIPAL DISCHARGE DIAGNOSIS  Diagnosis: Sepsis  Assessment and Plan of Treatment: You     PRINCIPAL DISCHARGE DIAGNOSIS  Diagnosis: Sepsis  Assessment and Plan of Treatment: You were admitted for an infected PICC line. It was removed in the emergency room. We monitored and obtained blood cultures which were negative for any infection for over 48 hours. We placed a new PICC line. You will continue with your previous antibiotic regimen for an additional 4 weeks starting 4/4 until 5/1. Please follow up with Dr. Rahman in 2 weeks.

## 2023-04-04 NOTE — DISCHARGE NOTE PROVIDER - CARE PROVIDER_API CALL
Mila Rahman)  Infectious Disease; Internal Medicine  [address]  [place], [place] [address]  Phone: [phone]  Fax: [phone]  Follow Up Time:

## 2023-04-04 NOTE — PROGRESS NOTE ADULT - SUBJECTIVE AND OBJECTIVE BOX
OVERNIGHT EVENTS: ISABELLE    SUBJECTIVE / INTERVAL HPI: Patient seen and examined at bedside. Doing well, no pain or swelling at PICC site. No fevers overnight. Pain in foot controlled. ROS otherwise negative.     VITAL SIGNS:  Vital Signs Last 24 Hrs  T(C): 37.2 (04 Apr 2023 15:15), Max: 37.3 (04 Apr 2023 12:14)  T(F): 98.9 (04 Apr 2023 15:15), Max: 99.2 (04 Apr 2023 12:14)  HR: 91 (04 Apr 2023 15:15) (81 - 91)  BP: 127/83 (04 Apr 2023 15:15) (125/84 - 154/80)  BP(mean): --  RR: 17 (04 Apr 2023 15:15) (16 - 18)  SpO2: 95% (04 Apr 2023 15:15) (95% - 99%)    Parameters below as of 04 Apr 2023 15:15  Patient On (Oxygen Delivery Method): room air      I&O's Summary    03 Apr 2023 07:01  -  04 Apr 2023 07:00  --------------------------------------------------------  IN: 530 mL / OUT: 300 mL / NET: 230 mL        PHYSICAL EXAM:    General: NAD, laying in bed   HEENT: head NC/AT, no conjunctival injection, EOMI, MMM  Neck: supple, no JVD  Cardio: RRR, +S1/S2, no M/R/G  Resp: lungs CTAB, no cough/wheezes/rales/rhonchi, on room air  Abdo: soft, NT, ND, +bowel sounds x4, no organomegaly or palpable mass    Extremities: + L foot ulcer on lateral aspect w/ mild surrounding erythema, no oozing, dressing in place  Vasc: 2+ radial and DP pulses b/l  Neuro: A&Ox3, no focal deficits  Psych: speech non-pressured, thoughts goal-oriented  Skin: dry, intact, no visible jaundice   MSK: no joint swelling    MEDICATIONS:  MEDICATIONS  (STANDING):  enoxaparin Injectable 40 milliGRAM(s) SubCutaneous every 12 hours  gabapentin 800 milliGRAM(s) Oral every 24 hours  gabapentin 100 milliGRAM(s) Oral every 24 hours  meropenem  IVPB 2000 milliGRAM(s) IV Intermittent every 8 hours  nebivolol 10 milliGRAM(s) Oral <User Schedule>  polyethylene glycol 3350 17 Gram(s) Oral daily  senna 2 Tablet(s) Oral at bedtime  vancomycin  IVPB 1250 milliGRAM(s) IV Intermittent every 8 hours    MEDICATIONS  (PRN):  aluminum hydroxide/magnesium hydroxide/simethicone Suspension 30 milliLiter(s) Oral every 4 hours PRN Dyspepsia  HYDROmorphone  Injectable 2 milliGRAM(s) IV Push every 4 hours PRN Severe Pain (7 - 10)  melatonin 3 milliGRAM(s) Oral at bedtime PRN Insomnia  ondansetron Injectable 4 milliGRAM(s) IV Push every 8 hours PRN Nausea and/or Vomiting      ALLERGIES:  Allergies    amoxicillin (Angioedema)  ciprofloxacin (Rash)  hydrochlorothiazide (Hives)  latex (Rash)  lisinopril (Angioedema; Rash; Hives)  morphine (Rash)  penicillin (Rash)    Intolerances        LABS:                        11.8   11.05 )-----------( 510      ( 04 Apr 2023 11:36 )             37.0     04-04    135  |  98  |  11  ----------------------------<  123<H>  4.1   |  24  |  0.51    Ca    9.3      04 Apr 2023 11:36  Phos  2.8     04-04  Mg     1.7     04-04      PT/INR - ( 03 Apr 2023 06:59 )   PT: 12.4 sec;   INR: 1.04          PTT - ( 03 Apr 2023 06:59 )  PTT:31.3 sec    CAPILLARY BLOOD GLUCOSE          RADIOLOGY & ADDITIONAL TESTS: Reviewed.

## 2023-04-04 NOTE — DISCHARGE NOTE PROVIDER - NSDCMRMEDTOKEN_GEN_ALL_CORE_FT
Bystolic 10 mg oral tablet: 1 tab(s) orally 2 times a day  gabapentin 100 mg oral tablet: 1 tab(s) orally once a day (in the morning)  gabapentin 800 mg oral tablet: 1 tab(s) orally once a day (in the afternoon)  Junel 1.5/30 oral tablet: 1 tab(s) orally once a day  meropenem 1000 mg intravenous injection: 2000 milligram(s) intravenous every 8 hours  Percocet 10/325 oral tablet: 1 tab(s) orally 5 times a day, As Needed  vancomycin 1.25 g intravenous injection: 1.25 gram(s) intravenous every 8 hours

## 2023-04-04 NOTE — PROGRESS NOTE ADULT - ASSESSMENT
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 for c/f infected PICC line.

## 2023-04-04 NOTE — PROGRESS NOTE ADULT - PROBLEM SELECTOR PLAN 1
On admission met 2/4 SIRS criteria with HR >90 and WBC >12. Likely source 2/2 PICC line (placed 02/03/23) vs. bacteremia vs. chronic L foot nonpurulent ulcer. Patient has previously been on Annie/Vanc, Dapto/Cefepime regimens. 10/22 Would Cx growing PsA, Staph hemolyticus, Corynebacterium. Lactate, UA neg.     - PICC line removed (03/29/23)  - s/p PICC replacement 4/3  - ID consulted, appreciate recs - recommend c/w Abx for 4 weeks until 5/1  - C/w Meropenem 2g every 8 hours  - C/w Vanc 1250 every 8 hours - trough 3/30 13.9 which is therapeutic. Will check troughs weekly. Next trough 4/6  - BCx 3/29, NGTD for >48 hours

## 2023-04-04 NOTE — PROGRESS NOTE ADULT - PROBLEM SELECTOR PLAN 6
Plan:  F: None  E: replete K<4, Mg<2  N: regular  VTE Prophylaxis: lovenox  C: Full Code  D: [facility]

## 2023-04-04 NOTE — DISCHARGE NOTE PROVIDER - HOSPITAL COURSE
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      #Sepsis 2/2 to infected PICC line  On admission met 2/4 SIRS criteria with HR >90 and WBC >12. Likely source 2/2 PICC line (placed 02/03/23) vs. bacteremia vs. chronic L foot nonpurulent ulcer. Patient has previously been on Annie/Vanc, Dapto/Cefepime regimens. 10/22 Would Cx growing PsA, Staph hemolyticus, Corynebacterium. Lactate, UA neg.     - PICC line removed (03/29/23)  - PICC replaced (04/03/23)  - ID consulted, recommended extended Abx course for 4 more weeks (4/4-5/1)  - C/w Meropenem 2g every 8 hours  - C/w Vanc 1250 every 8 hours, weekly troughs  - BCx 3/29, NGTD for >48 hours    #Foot ulceration.   Known chronic L foot ulcer due to AVM. Patient follows with Dr. Teran for AVMs and Pain management. On home Gabapentin 100mg in the morning and 800mg at 4pm. Percocet 10 q4hrs PRN. BUN 7/Cr 0.63 (at baseline)  - c/w Gabapentin 100mg in the morning and 800mg at 4PM  - c/w Dilaudid 1mg PO q4 PRN  - c/w Bowel regimen    - c/w dressings changes as follows: saline w/ medihoney and gauze to be done by nursing staff BID  - Sepsis/Abx plan as above.    #AVM (arteriovenous malformation).   Known history of L foot AVMs s/p 23 prior embolizations. Follows with Dr. Teran. Patient was recently evaluated s/p angiogram and transcatheter embolization via R femoral groin access with improved perfusion on 03/14. Patient has noticed some oozing of blood around the area, however is not brisk or acutely worsening.  - dressings changes as above  - Continued outpatient follow-up with Dr. Teran.      Patient was discharged to: home    New medications: Meropenem 2g every 8 hours and Vancomycin 1250 every 8 hours for 4 weeks (4/4-5/1)  Changes to old medications: none  Medications that were stopped: none    Items to follow up as outpatient: F/u with Dr. Rahman    Physical exam at the time of discharge:  General: NAD, laying in bed   HEENT: head NC/AT, no conjunctival injection, EOMI, MMM  Neck: supple, no JVD  Cardio: RRR, +S1/S2, no M/R/G  Resp: lungs CTAB, no cough/wheezes/rales/rhonchi, on room air  Abdo: soft, NT, ND, +bowel sounds x4, no organomegaly or palpable mass    Extremities: + L foot ulcer on lateral aspect w/ mild surrounding erythema, no oozing, dressing in place  Vasc: 2+ radial and DP pulses b/l  Neuro: A&Ox3, no focal deficits  Psych: speech non-pressured, thoughts goal-oriented  Skin: dry, intact, no visible jaundice   MSK: no joint swelling

## 2023-04-05 LAB
ANION GAP SERPL CALC-SCNC: 12 MMOL/L — SIGNIFICANT CHANGE UP (ref 5–17)
BUN SERPL-MCNC: 13 MG/DL — SIGNIFICANT CHANGE UP (ref 7–23)
CALCIUM SERPL-MCNC: 8.8 MG/DL — SIGNIFICANT CHANGE UP (ref 8.4–10.5)
CHLORIDE SERPL-SCNC: 99 MMOL/L — SIGNIFICANT CHANGE UP (ref 96–108)
CO2 SERPL-SCNC: 25 MMOL/L — SIGNIFICANT CHANGE UP (ref 22–31)
CREAT SERPL-MCNC: 0.57 MG/DL — SIGNIFICANT CHANGE UP (ref 0.5–1.3)
EGFR: 118 ML/MIN/1.73M2 — SIGNIFICANT CHANGE UP
GLUCOSE SERPL-MCNC: 87 MG/DL — SIGNIFICANT CHANGE UP (ref 70–99)
HCT VFR BLD CALC: 36.1 % — SIGNIFICANT CHANGE UP (ref 34.5–45)
HGB BLD-MCNC: 11.5 G/DL — SIGNIFICANT CHANGE UP (ref 11.5–15.5)
MAGNESIUM SERPL-MCNC: 1.9 MG/DL — SIGNIFICANT CHANGE UP (ref 1.6–2.6)
MCHC RBC-ENTMCNC: 27.7 PG — SIGNIFICANT CHANGE UP (ref 27–34)
MCHC RBC-ENTMCNC: 31.9 GM/DL — LOW (ref 32–36)
MCV RBC AUTO: 87 FL — SIGNIFICANT CHANGE UP (ref 80–100)
NRBC # BLD: 0 /100 WBCS — SIGNIFICANT CHANGE UP (ref 0–0)
PHOSPHATE SERPL-MCNC: 3.6 MG/DL — SIGNIFICANT CHANGE UP (ref 2.5–4.5)
PLATELET # BLD AUTO: 570 K/UL — HIGH (ref 150–400)
POTASSIUM SERPL-MCNC: 4.2 MMOL/L — SIGNIFICANT CHANGE UP (ref 3.5–5.3)
POTASSIUM SERPL-SCNC: 4.2 MMOL/L — SIGNIFICANT CHANGE UP (ref 3.5–5.3)
RBC # BLD: 4.15 M/UL — SIGNIFICANT CHANGE UP (ref 3.8–5.2)
RBC # FLD: 12.3 % — SIGNIFICANT CHANGE UP (ref 10.3–14.5)
SODIUM SERPL-SCNC: 136 MMOL/L — SIGNIFICANT CHANGE UP (ref 135–145)
WBC # BLD: 9.83 K/UL — SIGNIFICANT CHANGE UP (ref 3.8–10.5)
WBC # FLD AUTO: 9.83 K/UL — SIGNIFICANT CHANGE UP (ref 3.8–10.5)

## 2023-04-05 PROCEDURE — 99239 HOSP IP/OBS DSCHRG MGMT >30: CPT

## 2023-04-05 RX ORDER — MEROPENEM 1 G/30ML
2000 INJECTION INTRAVENOUS EVERY 8 HOURS
Refills: 0 | Status: DISCONTINUED | OUTPATIENT
Start: 2023-04-05 | End: 2023-04-06

## 2023-04-05 RX ADMIN — HYDROMORPHONE HYDROCHLORIDE 2 MILLIGRAM(S): 2 INJECTION INTRAMUSCULAR; INTRAVENOUS; SUBCUTANEOUS at 14:50

## 2023-04-05 RX ADMIN — GABAPENTIN 100 MILLIGRAM(S): 400 CAPSULE ORAL at 07:20

## 2023-04-05 RX ADMIN — NEBIVOLOL HYDROCHLORIDE 10 MILLIGRAM(S): 5 TABLET ORAL at 18:58

## 2023-04-05 RX ADMIN — HYDROMORPHONE HYDROCHLORIDE 2 MILLIGRAM(S): 2 INJECTION INTRAMUSCULAR; INTRAVENOUS; SUBCUTANEOUS at 23:18

## 2023-04-05 RX ADMIN — MEROPENEM 280 MILLIGRAM(S): 1 INJECTION INTRAVENOUS at 10:47

## 2023-04-05 RX ADMIN — Medication 166.67 MILLIGRAM(S): at 21:18

## 2023-04-05 RX ADMIN — Medication 166.67 MILLIGRAM(S): at 12:00

## 2023-04-05 RX ADMIN — HYDROMORPHONE HYDROCHLORIDE 2 MILLIGRAM(S): 2 INJECTION INTRAMUSCULAR; INTRAVENOUS; SUBCUTANEOUS at 00:39

## 2023-04-05 RX ADMIN — Medication 166.67 MILLIGRAM(S): at 04:17

## 2023-04-05 RX ADMIN — ENOXAPARIN SODIUM 40 MILLIGRAM(S): 100 INJECTION SUBCUTANEOUS at 06:11

## 2023-04-05 RX ADMIN — HYDROMORPHONE HYDROCHLORIDE 2 MILLIGRAM(S): 2 INJECTION INTRAMUSCULAR; INTRAVENOUS; SUBCUTANEOUS at 04:45

## 2023-04-05 RX ADMIN — HYDROMORPHONE HYDROCHLORIDE 2 MILLIGRAM(S): 2 INJECTION INTRAMUSCULAR; INTRAVENOUS; SUBCUTANEOUS at 09:31

## 2023-04-05 RX ADMIN — HYDROMORPHONE HYDROCHLORIDE 2 MILLIGRAM(S): 2 INJECTION INTRAMUSCULAR; INTRAVENOUS; SUBCUTANEOUS at 04:17

## 2023-04-05 RX ADMIN — HYDROMORPHONE HYDROCHLORIDE 2 MILLIGRAM(S): 2 INJECTION INTRAMUSCULAR; INTRAVENOUS; SUBCUTANEOUS at 23:03

## 2023-04-05 RX ADMIN — HYDROMORPHONE HYDROCHLORIDE 2 MILLIGRAM(S): 2 INJECTION INTRAMUSCULAR; INTRAVENOUS; SUBCUTANEOUS at 14:40

## 2023-04-05 RX ADMIN — ENOXAPARIN SODIUM 40 MILLIGRAM(S): 100 INJECTION SUBCUTANEOUS at 18:57

## 2023-04-05 RX ADMIN — HYDROMORPHONE HYDROCHLORIDE 2 MILLIGRAM(S): 2 INJECTION INTRAMUSCULAR; INTRAVENOUS; SUBCUTANEOUS at 00:09

## 2023-04-05 RX ADMIN — MEROPENEM 280 MILLIGRAM(S): 1 INJECTION INTRAVENOUS at 18:58

## 2023-04-05 RX ADMIN — HYDROMORPHONE HYDROCHLORIDE 2 MILLIGRAM(S): 2 INJECTION INTRAMUSCULAR; INTRAVENOUS; SUBCUTANEOUS at 09:40

## 2023-04-05 RX ADMIN — HYDROMORPHONE HYDROCHLORIDE 2 MILLIGRAM(S): 2 INJECTION INTRAMUSCULAR; INTRAVENOUS; SUBCUTANEOUS at 18:58

## 2023-04-05 RX ADMIN — GABAPENTIN 800 MILLIGRAM(S): 400 CAPSULE ORAL at 18:58

## 2023-04-05 NOTE — PROGRESS NOTE ADULT - SUBJECTIVE AND OBJECTIVE BOX
LISA HURTADO  37y, Female    Patient is a 37y old  Female who presents with a chief complaint of LEFT HEEL ULCER     (03 Apr 2023 15:10)      INTERVAL HPI/OVERNIGHT EVENTS:    ROS: otherwise negative      T(C): 36.8 (04-05-23 @ 09:24), Max: 37.3 (04-04-23 @ 21:15)  HR: 85 (04-05-23 @ 09:24) (82 - 92)  BP: 152/75 (04-05-23 @ 09:24) (127/83 - 154/95)  RR: 17 (04-05-23 @ 09:24) (16 - 18)  SpO2: 96% (04-05-23 @ 09:24) (95% - 98%)  Wt(kg): --Vital Signs Last 24 Hrs  T(C): 36.8 (05 Apr 2023 09:24), Max: 37.3 (04 Apr 2023 21:15)  T(F): 98.2 (05 Apr 2023 09:24), Max: 99.2 (04 Apr 2023 21:15)  HR: 85 (05 Apr 2023 09:24) (82 - 92)  BP: 152/75 (05 Apr 2023 09:24) (127/83 - 154/95)  BP(mean): --  RR: 17 (05 Apr 2023 09:24) (16 - 18)  SpO2: 96% (05 Apr 2023 09:24) (95% - 98%)    Parameters below as of 05 Apr 2023 09:24  Patient On (Oxygen Delivery Method): room air        PHYSICAL EXAM:  Constitutional: resting comfortably in bed; NAD  Head: NC/AT  Eyes: PERRL, EOMI, anicteric sclera  ENT: no nasal discharge; MMM  Neck: supple; no JVD or thyromegaly  Respiratory: CTA B/L; no W/R/R, no retractions  Cardiac: +S1/S2; RRR; no M/R/G; PMI non-displaced  Gastrointestinal: soft, NT/ND; no rebound or guarding; +BSx4  Back: spine midline, no bony tenderness or step-offs; no CVAT B/L  Extremities: WWP, no clubbing or cyanosis; no peripheral edema. Capillary refill <2 sec  Musculoskeletal: NROM x4; no joint swelling, tenderness or erythema  Vascular: 2+ radial, DP/PT pulses B/L  Dermatologic: skin warm, dry and intact; no rashes, wounds, or scars  Lymphatic: no submandibular or cervical LAD  Neurologic: AAOx3; CNII-XII grossly intact; no focal deficits  Psychiatric: affect and characteristics of appearance, verbalizations, behaviors are appropriate    LABS:                        11.5   9.83  )-----------( 570      ( 05 Apr 2023 08:44 )             36.1     04-05    136  |  99  |  13  ----------------------------<  87  4.2   |  25  |  0.57    Ca    8.8      05 Apr 2023 08:44  Phos  3.6     04-05  Mg     1.9     04-05            CAPILLARY BLOOD GLUCOSE                MEDICATIONS  (STANDING):  enoxaparin Injectable 40 milliGRAM(s) SubCutaneous every 12 hours  gabapentin 100 milliGRAM(s) Oral every 24 hours  gabapentin 800 milliGRAM(s) Oral every 24 hours  meropenem  IVPB 2000 milliGRAM(s) IV Intermittent every 8 hours  nebivolol 10 milliGRAM(s) Oral <User Schedule>  polyethylene glycol 3350 17 Gram(s) Oral daily  senna 2 Tablet(s) Oral at bedtime  vancomycin  IVPB 1250 milliGRAM(s) IV Intermittent every 8 hours    MEDICATIONS  (PRN):  aluminum hydroxide/magnesium hydroxide/simethicone Suspension 30 milliLiter(s) Oral every 4 hours PRN Dyspepsia  HYDROmorphone  Injectable 2 milliGRAM(s) IV Push every 4 hours PRN Severe Pain (7 - 10)  melatonin 3 milliGRAM(s) Oral at bedtime PRN Insomnia  ondansetron Injectable 4 milliGRAM(s) IV Push every 8 hours PRN Nausea and/or Vomiting      RADIOLOGY & ADDITIONAL TESTS: Reviewed

## 2023-04-05 NOTE — PROGRESS NOTE ADULT - PROBLEM SELECTOR PLAN 6
Plan:  F: None  E: replete K<4, Mg<2  N: regular  VTE Prophylaxis: lovenox  C: Full Code  D: [facility]

## 2023-04-06 ENCOUNTER — TRANSCRIPTION ENCOUNTER (OUTPATIENT)
Age: 38
End: 2023-04-06

## 2023-04-06 VITALS
RESPIRATION RATE: 18 BRPM | OXYGEN SATURATION: 98 % | TEMPERATURE: 99 F | HEART RATE: 91 BPM | SYSTOLIC BLOOD PRESSURE: 151 MMHG | DIASTOLIC BLOOD PRESSURE: 100 MMHG

## 2023-04-06 PROCEDURE — 99232 SBSQ HOSP IP/OBS MODERATE 35: CPT

## 2023-04-06 RX ADMIN — MEROPENEM 280 MILLIGRAM(S): 1 INJECTION INTRAVENOUS at 11:00

## 2023-04-06 RX ADMIN — GABAPENTIN 100 MILLIGRAM(S): 400 CAPSULE ORAL at 07:20

## 2023-04-06 RX ADMIN — HYDROMORPHONE HYDROCHLORIDE 2 MILLIGRAM(S): 2 INJECTION INTRAMUSCULAR; INTRAVENOUS; SUBCUTANEOUS at 11:22

## 2023-04-06 RX ADMIN — MEROPENEM 280 MILLIGRAM(S): 1 INJECTION INTRAVENOUS at 01:44

## 2023-04-06 RX ADMIN — ENOXAPARIN SODIUM 40 MILLIGRAM(S): 100 INJECTION SUBCUTANEOUS at 06:35

## 2023-04-06 RX ADMIN — HYDROMORPHONE HYDROCHLORIDE 2 MILLIGRAM(S): 2 INJECTION INTRAMUSCULAR; INTRAVENOUS; SUBCUTANEOUS at 07:20

## 2023-04-06 RX ADMIN — Medication 166.67 MILLIGRAM(S): at 04:05

## 2023-04-06 RX ADMIN — Medication 166.67 MILLIGRAM(S): at 12:13

## 2023-04-06 RX ADMIN — NEBIVOLOL HYDROCHLORIDE 10 MILLIGRAM(S): 5 TABLET ORAL at 10:11

## 2023-04-06 NOTE — DISCHARGE NOTE NURSING/CASE MANAGEMENT/SOCIAL WORK - PATIENT PORTAL LINK FT
You can access the FollowMyHealth Patient Portal offered by [facility] by registering at the following website: [email or web address]. By joining [employer]’s FollowMyHealth portal, you will also be able to view your health information using other applications (apps) compatible with our system.

## 2023-04-06 NOTE — PROGRESS NOTE ADULT - PROBLEM SELECTOR PLAN 2
Known chronic L foot ulcer due to AVM. Patient follows with Dr. Teran for AVMs and Pain management. On home Gabapentin 100mg in the morning and 800mg at 4pm. Percocet 10 q4hrs PRN. BUN 7/Cr 0.63 (at baseline)  - c/w Gabapentin 100mg in the morning and 800mg at 4PM  - c/w Dilaudid 1mg PO q4 PRN  - c/w Bowel regimen    - c/w dressings changes as follows: saline w/ medihoney and gauze to be done by nursing staff BID  - Sepsis/Abx plan as above

## 2023-04-06 NOTE — PROGRESS NOTE ADULT - ASSESSMENT
37F w/ PMHx w/ HTN, morbid obesity, chronic L foot AVM s/p multiple embolizations with recurrent nonpurulent ulcer infection presents to [facility] for c/f infected PICC line.

## 2023-04-06 NOTE — DISCHARGE NOTE NURSING/CASE MANAGEMENT/SOCIAL WORK - NSDCVIVACCINE_GEN_ALL_CORE_FT
influenza, injectable, quadrivalent, preservative free; 22-Oct-2020 10:12; Miley Hebert (RN); [employer]; [id number] (Exp. Date: 30-Jun-2021); IntraMuscular; Deltoid Left.; 0.5 milliLiter(s); VIS (VIS Published: 15-Aug-2019, VIS Presented: 22-Oct-2020);   influenza, injectable, quadrivalent, preservative free; 14-Oct-2021 09:19; Margie Taveras (RN); [employer]; [id number] (Exp. Date: 30-Jun-2022); IntraMuscular; Deltoid Right.; 0.5 milliLiter(s); VIS (VIS Published: 06-Aug-2021, VIS Presented: 14-Oct-2021);   influenza, injectable, quadrivalent, preservative free; 09-Dec-2022 14:43; Melinda Sesay (RN); [employer]; [id number] (Exp. Date: 29-May-2022); IntraMuscular; Deltoid Right.; 0.5 milliLiter(s); VIS (VIS Published: 06-Aug-2021, VIS Presented: 09-Dec-2022);

## 2023-04-06 NOTE — PROGRESS NOTE ADULT - PROBLEM SELECTOR PROBLEM 2
Foot ulceration

## 2023-04-06 NOTE — DISCHARGE NOTE NURSING/CASE MANAGEMENT/SOCIAL WORK - NSDCFUADDAPPT_GEN_ALL_CORE_FT
Please follow up with Dr. Rahman in 2 weeks. You have informed us you will prefer to schedule your own appointment.  resilient/elastic

## 2023-04-06 NOTE — PROGRESS NOTE ADULT - SUBJECTIVE AND OBJECTIVE BOX
LISA HURTADO  37y, Female    Patient is a 37y old  Female who presents with a chief complaint of LEFT HEEL ULCER     (03 Apr 2023 15:10)      INTERVAL HPI/OVERNIGHT EVENTS:    ROS: otherwise negative      T(C): 36.4 (04-06-23 @ 05:51), Max: 36.8 (04-05-23 @ 09:24)  HR: 79 (04-06-23 @ 05:51) (70 - 98)  BP: 117/81 (04-06-23 @ 05:51) (113/62 - 152/75)  RR: 18 (04-06-23 @ 05:51) (17 - 18)  SpO2: 92% (04-06-23 @ 05:51) (92% - 96%)  Wt(kg): --Vital Signs Last 24 Hrs  T(C): 36.4 (06 Apr 2023 05:51), Max: 36.8 (05 Apr 2023 09:24)  T(F): 97.6 (06 Apr 2023 05:51), Max: 98.2 (05 Apr 2023 09:24)  HR: 79 (06 Apr 2023 05:51) (70 - 98)  BP: 117/81 (06 Apr 2023 05:51) (113/62 - 152/75)  BP(mean): --  RR: 18 (06 Apr 2023 05:51) (17 - 18)  SpO2: 92% (06 Apr 2023 05:51) (92% - 96%)    Parameters below as of 06 Apr 2023 05:51  Patient On (Oxygen Delivery Method): room air        PHYSICAL EXAM:  Constitutional: resting comfortably in bed; NAD  Head: NC/AT  Eyes: PERRL, EOMI, anicteric sclera  ENT: no nasal discharge; MMM  Neck: supple; no JVD or thyromegaly  Respiratory: CTA B/L; no W/R/R, no retractions  Cardiac: +S1/S2; RRR; no M/R/G; PMI non-displaced  Gastrointestinal: soft, NT/ND; no rebound or guarding; +BSx4  Back: spine midline, no bony tenderness or step-offs; no CVAT B/L  Extremities: WWP, no clubbing or cyanosis; no peripheral edema. Capillary refill <2 sec  Musculoskeletal: NROM x4; no joint swelling, tenderness or erythema  Vascular: 2+ radial, DP/PT pulses B/L  Dermatologic: skin warm, dry and intact; no rashes, wounds, or scars  Lymphatic: no submandibular or cervical LAD  Neurologic: AAOx3; CNII-XII grossly intact; no focal deficits  Psychiatric: affect and characteristics of appearance, verbalizations, behaviors are appropriate    LABS:                        11.5   9.83  )-----------( 570      ( 05 Apr 2023 08:44 )             36.1     04-05    136  |  99  |  13  ----------------------------<  87  4.2   |  25  |  0.57    Ca    8.8      05 Apr 2023 08:44  Phos  3.6     04-05  Mg     1.9     04-05            CAPILLARY BLOOD GLUCOSE                MEDICATIONS  (STANDING):  enoxaparin Injectable 40 milliGRAM(s) SubCutaneous every 12 hours  gabapentin 100 milliGRAM(s) Oral every 24 hours  gabapentin 800 milliGRAM(s) Oral every 24 hours  meropenem  IVPB 2000 milliGRAM(s) IV Intermittent every 8 hours  nebivolol 10 milliGRAM(s) Oral <User Schedule>  polyethylene glycol 3350 17 Gram(s) Oral daily  senna 2 Tablet(s) Oral at bedtime  vancomycin  IVPB 1250 milliGRAM(s) IV Intermittent every 8 hours    MEDICATIONS  (PRN):  aluminum hydroxide/magnesium hydroxide/simethicone Suspension 30 milliLiter(s) Oral every 4 hours PRN Dyspepsia  HYDROmorphone  Injectable 2 milliGRAM(s) IV Push every 4 hours PRN Severe Pain (7 - 10)  melatonin 3 milliGRAM(s) Oral at bedtime PRN Insomnia  ondansetron Injectable 4 milliGRAM(s) IV Push every 8 hours PRN Nausea and/or Vomiting      RADIOLOGY & ADDITIONAL TESTS: Reviewed   LISA HURTADO  37y, Female    Patient is a 37y old  Female who presents with a chief complaint of LEFT HEEL ULCER     (03 Apr 2023 15:10)      INTERVAL HPI/OVERNIGHT EVENTS: NAEO. Patient seen and examined at bedside. No complaints at this time.     ROS: otherwise negative      T(C): 36.4 (04-06-23 @ 05:51), Max: 36.8 (04-05-23 @ 09:24)  HR: 79 (04-06-23 @ 05:51) (70 - 98)  BP: 117/81 (04-06-23 @ 05:51) (113/62 - 152/75)  RR: 18 (04-06-23 @ 05:51) (17 - 18)  SpO2: 92% (04-06-23 @ 05:51) (92% - 96%)  Wt(kg): --Vital Signs Last 24 Hrs  T(C): 36.4 (06 Apr 2023 05:51), Max: 36.8 (05 Apr 2023 09:24)  T(F): 97.6 (06 Apr 2023 05:51), Max: 98.2 (05 Apr 2023 09:24)  HR: 79 (06 Apr 2023 05:51) (70 - 98)  BP: 117/81 (06 Apr 2023 05:51) (113/62 - 152/75)  BP(mean): --  RR: 18 (06 Apr 2023 05:51) (17 - 18)  SpO2: 92% (06 Apr 2023 05:51) (92% - 96%)    Parameters below as of 06 Apr 2023 05:51  Patient On (Oxygen Delivery Method): room air        PHYSICAL EXAM:  General: NAD, laying in bed   HEENT: head NC/AT, no conjunctival injection, EOMI, MMM  Neck: supple, no JVD  Cardio: RRR, +S1/S2, no M/R/G  Resp: lungs CTAB, no cough/wheezes/rales/rhonchi, on room air  Abdo: soft, NT, ND, +bowel sounds x4, no organomegaly or palpable mass    Extremities: + L foot ulcer on lateral aspect w/ mild surrounding erythema, no oozing, dressing in place  Vasc: 2+ radial and DP pulses b/l  Neuro: A&Ox3, no focal deficits  Psych: speech non-pressured, thoughts goal-oriented  Skin: dry, intact, no visible jaundice   MSK: no joint swelling      LABS:                        11.5   9.83  )-----------( 570      ( 05 Apr 2023 08:44 )             36.1     04-05    136  |  99  |  13  ----------------------------<  87  4.2   |  25  |  0.57    Ca    8.8      05 Apr 2023 08:44  Phos  3.6     04-05  Mg     1.9     04-05            CAPILLARY BLOOD GLUCOSE                MEDICATIONS  (STANDING):  enoxaparin Injectable 40 milliGRAM(s) SubCutaneous every 12 hours  gabapentin 100 milliGRAM(s) Oral every 24 hours  gabapentin 800 milliGRAM(s) Oral every 24 hours  meropenem  IVPB 2000 milliGRAM(s) IV Intermittent every 8 hours  nebivolol 10 milliGRAM(s) Oral <User Schedule>  polyethylene glycol 3350 17 Gram(s) Oral daily  senna 2 Tablet(s) Oral at bedtime  vancomycin  IVPB 1250 milliGRAM(s) IV Intermittent every 8 hours    MEDICATIONS  (PRN):  aluminum hydroxide/magnesium hydroxide/simethicone Suspension 30 milliLiter(s) Oral every 4 hours PRN Dyspepsia  HYDROmorphone  Injectable 2 milliGRAM(s) IV Push every 4 hours PRN Severe Pain (7 - 10)  melatonin 3 milliGRAM(s) Oral at bedtime PRN Insomnia  ondansetron Injectable 4 milliGRAM(s) IV Push every 8 hours PRN Nausea and/or Vomiting      RADIOLOGY & ADDITIONAL TESTS: Reviewed

## 2023-04-06 NOTE — PROGRESS NOTE ADULT - PROBLEM SELECTOR PLAN 1
On admission met 2/4 SIRS criteria with HR >90 and WBC >12. Likely source 2/2 PICC line (placed 02/03/23) vs. bacteremia vs. chronic L foot nonpurulent ulcer. Patient has previously been on Annie/Vanc, Dapto/Cefepime regimens. 10/22 Would Cx growing PsA, Staph hemolyticus, Corynebacterium. Lactate, UA neg.     - PICC line removed (03/29/23)  - s/p PICC replacement 4/3  - ID consulted, appreciate recs - recommend c/w Abx for 4 weeks until 5/1  - C/w Meropenem 2g every 8 hours  - C/w Vanc 1250 every 8 hours - trough 3/30 13.9 which is therapeutic. Will check troughs weekly. Next trough 4/6  - BCx 3/29, NGTD for >48 hours On admission met 2/4 SIRS criteria with HR >90 and WBC >12. Likely source 2/2 PICC line (placed 02/03/23) vs. bacteremia vs. chronic L foot nonpurulent ulcer. Patient has previously been on Annie/Vanc, Dapto/Cefepime regimens. 10/22 Would Cx growing PsA, Staph hemolyticus, Corynebacterium. Lactate, UA neg.     - PICC line removed (03/29/23)  - s/p PICC replacement 4/3  - ID consulted, appreciate recs - recommend c/w Abx for 4 weeks until 5/1  - C/w Meropenem 2g every 8 hours  - C/w Vanc 1250 every 8 hours - trough 3/30 13.9 which is therapeutic. Will check troughs weekly  - BCx 3/29, NGTD for >48 hours

## 2023-04-06 NOTE — DISCHARGE NOTE NURSING/CASE MANAGEMENT/SOCIAL WORK - NSSCNAMETXT_GEN_ALL_CORE
[facility]   [facility] [phone] & [facility] [phone]

## 2023-04-06 NOTE — PROGRESS NOTE ADULT - PROBLEM SELECTOR PLAN 6
Plan:  F: None  E: replete K<4, Mg<2  N: regular  VTE Prophylaxis: lovenox  C: Full Code  D: [facility] Plan:  F: None  E: replete K<4, Mg<2  N: regular  VTE Prophylaxis: lovenox  C: Full Code  D: [facility]

## 2023-04-06 NOTE — DISCHARGE NOTE NURSING/CASE MANAGEMENT/SOCIAL WORK - NSDCPEFALRISK_GEN_ALL_CORE
For information on Fall & Injury Prevention, visit: [email or web address] OR  [email or web address] OR  https://www.cdc.gov/steadi/patient.html

## 2023-04-06 NOTE — PROGRESS NOTE ADULT - PROBLEM SELECTOR PROBLEM 3
AVM (arteriovenous malformation)

## 2023-04-06 NOTE — PROGRESS NOTE ADULT - PROBLEM SELECTOR PLAN 3
Known history of L foot AVMs s/p 23 prior embolizations. Follows with Dr. Teran. Patient was recently evaluated s/p angiogram and transcatheter embolization via R femoral groin access with improved perfusion on 03/14. Patient has noticed some oozing of blood around the area, however is not brisk or acutely worsening.  - dressings changes as above  - Continued outpatient follow-up with Dr. Teran

## 2023-04-06 NOTE — PROGRESS NOTE ADULT - PROVIDER SPECIALTY LIST ADULT
Internal Medicine
Internal Medicine
Hospitalist
Infectious Disease
Internal Medicine

## 2023-04-06 NOTE — PROGRESS NOTE ADULT - ATTENDING COMMENTS
37F with PMH of chronic L foot AVM requiring multiple embolizations and history of prior infections, HTN and morbid obesity, presenting with concern for PICC line infection and admitted for further treatment and eventual replacement of PICC line.    #PICC line infection  #L foot AVM  - PICC line placement today.  Infusion services to be set up, anticipating discharge home tomorrow.   - continue with vancomycin and meropenem  - ID following  - pain is well controlled - has an outpatient pain management specialist who follows her     #HTN  - continue home dose of bystolic    #morbid obesity  - affects all aspects of care    High level of medical decision making required for this encounter - new medical issue (PICC line infection, requiring further workup) and two chronic medical conditions.  Also on vancomycin and IV dilaudid, which both require close monitoring.
37F with PMH of chronic L foot AVM requiring multiple embolizations and history of prior infections, HTN and morbid obesity, presenting with concern for PICC line infection and admitted for further treatment and eventual replacement of PICC line.    #PICC line infection  #L foot AVM  - PICC line removed in emergency room  - follow up cultures  - won't be able to have PICC line placed until negative blood cultures, and anticipate placement next week  - continue with vancomycin and meropenem  - ID following  - pain is well controlled - has an outpatient pain management specialist who follows her     #HTN  - continue home dose of bystolic    #morbid obesity  - affects all aspects of care    High level of medical decision making required for this encounter - new medical issue (PICC line infection, requiring further workup) and two chronic medical conditions.  Also on vancomycin, which requires close monitoring.
37F with PMH of chronic L foot AVM requiring multiple embolizations and history of prior infections, HTN and morbid obesity, presenting with concern for PICC line infection and admitted for further treatment and eventual replacement of PICC line.    #PICC line infection  #L foot AVM  - PICC line yesterday.  Felt a little chilled, but no fevers.   - continue with vancomycin and meropenem  - ID following  - pain is well controlled - has an outpatient pain management specialist who follows her     #HTN  - continue home dose of bystolic    #morbid obesity  - affects all aspects of care    > 35minutes spent on this encounter, including face to face with patient, care coordination and documentation.
37F with PMH of chronic L foot AVM requiring multiple embolizations and history of prior infections, HTN and morbid obesity, presenting with concern for PICC line infection and admitted for further treatment and eventual replacement of PICC line.    #PICC line infection  #L foot AVM  - Pt is for PICC line placement on Monday 4/3  - Will continue with vancomycin and meropenem  - Will monitor Vancomycin trough next trough is 4/6.   - ID following; Will f/u ID about the duration of abx tx course     #HTN  - continue home dose of bystolic    #Morbid obesity  - affects all aspects of care    #DISPO  - WIll d/c following PICC line placement
37F with PMH of chronic L foot AVM requiring multiple embolizations and history of prior infections, HTN and morbid obesity, presenting with concern for PICC line infection and admitted for further treatment and eventual replacement of PICC line.    #PICC line infection  #L foot AVM  - PICC line removed in emergency room  - won't be able to have PICC line placed until negative blood cultures finally result on Friday evening, and anticipate placement next week  - continue with vancomycin and meropenem  - ID following  - pain is well controlled - has an outpatient pain management specialist who follows her     #HTN  - continue home dose of bystolic    #morbid obesity  - affects all aspects of care    High level of medical decision making required for this encounter - new medical issue (PICC line infection, requiring further workup) and two chronic medical conditions.  Also on vancomycin, which requires close monitoring.
37F with PMH of chronic L foot AVM requiring multiple embolizations and history of prior infections, HTN and morbid obesity, presenting with concern for PICC line infection and admitted for further treatment and eventual replacement of PICC line.    Physical exam:  General: no acute distress, sitting up in bed  HEENT: normocephalic, atraumatic, MMM  Cards: RRR, normal S1/S2, no murmurs, rubs or gallops  Pulm: CTAB, no wheeze, crackles, rales or rhonchi  Ab: soft, nontender, nondistended, normoactive bowel sounds  Ext: wwp, no edema, wrapped wound on L foot, R PICC line site is nonerythematous, no surrounding fluctuance  Neuro: grossly nonfocal exam, speech is fluent, no facial asymmetry    #PICC line infection  #L foot AVM  - PICC line replaced  - discharging home today with vancomycin and meropenem for an additional 4 weeks    #HTN  - continue home dose of bystolic    #morbid obesity  - affects all aspects of care     Moderate level of decision making required including PICC line infection with no further workup, and presence of two chronic medical issues.
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
## 2023-04-12 DIAGNOSIS — Q27.32 ARTERIOVENOUS MALFORMATION OF VESSEL OF LOWER LIMB: ICD-10-CM

## 2023-04-12 DIAGNOSIS — E66.01 MORBID (SEVERE) OBESITY DUE TO EXCESS CALORIES: ICD-10-CM

## 2023-04-12 DIAGNOSIS — Z41.8 ENCOUNTER FOR OTHER PROCEDURES FOR PURPOSES OTHER THAN REMEDYING HEALTH STATE: ICD-10-CM

## 2023-04-12 DIAGNOSIS — Z88.1 ALLERGY STATUS TO OTHER ANTIBIOTIC AGENTS STATUS: ICD-10-CM

## 2023-04-12 DIAGNOSIS — Z88.8 ALLERGY STATUS TO OTHER DRUGS, MEDICAMENTS AND BIOLOGICAL SUBSTANCES: ICD-10-CM

## 2023-04-12 DIAGNOSIS — Z88.0 ALLERGY STATUS TO PENICILLIN: ICD-10-CM

## 2023-04-12 DIAGNOSIS — A41.9 SEPSIS, UNSPECIFIED ORGANISM: ICD-10-CM

## 2023-04-12 DIAGNOSIS — Z88.5 ALLERGY STATUS TO NARCOTIC AGENT: ICD-10-CM

## 2023-04-12 DIAGNOSIS — Z79.891 LONG TERM (CURRENT) USE OF OPIATE ANALGESIC: ICD-10-CM

## 2023-04-12 DIAGNOSIS — Z79.2 LONG TERM (CURRENT) USE OF ANTIBIOTICS: ICD-10-CM

## 2023-04-12 DIAGNOSIS — I10 ESSENTIAL (PRIMARY) HYPERTENSION: ICD-10-CM
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
## 2023-04-12 DIAGNOSIS — T80.211A BLOODSTREAM INFECTION DUE TO CENTRAL VENOUS CATHETER, INITIAL ENCOUNTER: ICD-10-CM

## 2023-04-12 DIAGNOSIS — Z79.899 OTHER LONG TERM (CURRENT) DRUG THERAPY: ICD-10-CM

## 2023-04-12 DIAGNOSIS — L97.509 NON-PRESSURE CHRONIC ULCER OF OTHER PART OF UNSPECIFIED FOOT WITH UNSPECIFIED SEVERITY: ICD-10-CM

## 2023-04-12 DIAGNOSIS — Z91.040 LATEX ALLERGY STATUS: ICD-10-CM

## 2023-04-12 DIAGNOSIS — Z20.822 CONTACT WITH AND (SUSPECTED) EXPOSURE TO COVID-19: ICD-10-CM

## 2023-04-20 PROCEDURE — 84702 CHORIONIC GONADOTROPIN TEST: CPT

## 2023-04-20 PROCEDURE — 36573 INSJ PICC RS&I 5 YR+: CPT

## 2023-04-20 PROCEDURE — 83605 ASSAY OF LACTIC ACID: CPT

## 2023-04-20 PROCEDURE — 96374 THER/PROPH/DIAG INJ IV PUSH: CPT

## 2023-04-20 PROCEDURE — 36415 COLL VENOUS BLD VENIPUNCTURE: CPT

## 2023-04-20 PROCEDURE — 85730 THROMBOPLASTIN TIME PARTIAL: CPT

## 2023-04-20 PROCEDURE — 85025 COMPLETE CBC W/AUTO DIFF WBC: CPT

## 2023-04-20 PROCEDURE — 80053 COMPREHEN METABOLIC PANEL: CPT

## 2023-04-20 PROCEDURE — 86850 RBC ANTIBODY SCREEN: CPT

## 2023-04-20 PROCEDURE — 71045 X-RAY EXAM CHEST 1 VIEW: CPT

## 2023-04-20 PROCEDURE — 86901 BLOOD TYPING SEROLOGIC RH(D): CPT

## 2023-04-20 PROCEDURE — 80048 BASIC METABOLIC PNL TOTAL CA: CPT

## 2023-04-20 PROCEDURE — 85027 COMPLETE CBC AUTOMATED: CPT

## 2023-04-20 PROCEDURE — 99285 EMERGENCY DEPT VISIT HI MDM: CPT | Mod: 25

## 2023-04-20 PROCEDURE — 84100 ASSAY OF PHOSPHORUS: CPT

## 2023-04-20 PROCEDURE — 80202 ASSAY OF VANCOMYCIN: CPT

## 2023-04-20 PROCEDURE — 87040 BLOOD CULTURE FOR BACTERIA: CPT

## 2023-04-20 PROCEDURE — 81001 URINALYSIS AUTO W/SCOPE: CPT

## 2023-04-20 PROCEDURE — 83735 ASSAY OF MAGNESIUM: CPT

## 2023-04-20 PROCEDURE — 87635 SARS-COV-2 COVID-19 AMP PRB: CPT

## 2023-04-20 PROCEDURE — 96375 TX/PRO/DX INJ NEW DRUG ADDON: CPT

## 2023-04-20 PROCEDURE — 86900 BLOOD TYPING SEROLOGIC ABO: CPT

## 2023-04-20 PROCEDURE — 85610 PROTHROMBIN TIME: CPT

## 2023-04-28 ENCOUNTER — APPOINTMENT (OUTPATIENT)
Dept: INFECTIOUS DISEASE | Facility: CLINIC | Age: 38
End: 2023-04-28
Payer: MEDICARE

## 2023-04-28 VITALS
SYSTOLIC BLOOD PRESSURE: 154 MMHG | TEMPERATURE: 98.6 F | HEIGHT: 67 IN | DIASTOLIC BLOOD PRESSURE: 96 MMHG | BODY MASS INDEX: 45.99 KG/M2 | OXYGEN SATURATION: 95 % | HEART RATE: 101 BPM | RESPIRATION RATE: 16 BRPM | WEIGHT: 293 LBS

## 2023-04-28 PROCEDURE — 36415 COLL VENOUS BLD VENIPUNCTURE: CPT

## 2023-04-28 PROCEDURE — 99213 OFFICE O/P EST LOW 20 MIN: CPT | Mod: 25

## 2023-04-28 NOTE — PHYSICAL EXAM
[General Appearance - Alert] : alert [General Appearance - Well-Appearing] : healthy appearing [Sclera] : the sclera and conjunctiva were normal [Auscultation Breath Sounds / Voice Sounds] : lungs were clear to auscultation bilaterally [Heart Rate And Rhythm] : heart rate was normal and rhythm regular [Heart Sounds] : normal S1 and S2 [Murmurs] : no murmurs [Edema] : there was no peripheral edema [Bowel Sounds] : normal bowel sounds [Abdomen Soft] : soft [Abdomen Tenderness] : non-tender [Costovertebral Angle Tenderness] : no CVA tenderness [Skin Color & Pigmentation] : normal skin color and pigmentation [] : no rash [FreeTextEntry1] : L lateral foot warm with AVM, skin closed without erythema

## 2023-04-28 NOTE — HISTORY OF PRESENT ILLNESS
[FreeTextEntry1] : 37 year old female with HTN, MO and AVM L foot with recurrent L foot ulcer. Most recently admitted 3/29 with low-grade temp increase at home, rigors and sweats, concerning for CLABSI (PICC placed 2/3), d/c’ed in ED. Patient reported feeling better. Blood cultures were negative. PICC was replaced and she was discharged to continue vancomycin 1250 mg IV q 8 h and meropenem 2 g IV q 8 h. She contacted the office to report wound closed on 4/7. Antibiotics were continued through 4/12 then discontinued.  She has pulsing pain in L foot, rates 7/10.

## 2023-04-28 NOTE — ASSESSMENT
[FreeTextEntry1] : 37 year old female with HTN, MO and AVM L foot with recurrent L foot ulcer. Most recently admitted 3/29 with low-grade temp increase at home, rigors and sweats, concerning for CLABSI (PICC placed 2/3). Blood cultures were negative. She completed course of vancomycin  and meropenem 2 g IV q 8 h on 4/12. Has been off antibiotics for >2 weeks, wound remains closed. No signs of active infection. \par Plan:\par - Obtain CBC, CRP, ESR for new baseline\par Follow up PRN.

## 2023-04-30 LAB
BASOPHILS # BLD AUTO: 0.05 K/UL
BASOPHILS NFR BLD AUTO: 0.5 %
CRP SERPL-MCNC: 49 MG/L
EOSINOPHIL # BLD AUTO: 0.02 K/UL
EOSINOPHIL NFR BLD AUTO: 0.2 %
ERYTHROCYTE [SEDIMENTATION RATE] IN BLOOD BY WESTERGREN METHOD: 58 MM/HR
HCT VFR BLD CALC: 40.7 %
HGB BLD-MCNC: 12.9 G/DL
IMM GRANULOCYTES NFR BLD AUTO: 0.2 %
LYMPHOCYTES # BLD AUTO: 1.73 K/UL
LYMPHOCYTES NFR BLD AUTO: 17.7 %
MAN DIFF?: NORMAL
MCHC RBC-ENTMCNC: 27.4 PG
MCHC RBC-ENTMCNC: 31.7 GM/DL
MCV RBC AUTO: 86.6 FL
MONOCYTES # BLD AUTO: 0.67 K/UL
MONOCYTES NFR BLD AUTO: 6.8 %
NEUTROPHILS # BLD AUTO: 7.31 K/UL
NEUTROPHILS NFR BLD AUTO: 74.6 %
PLATELET # BLD AUTO: 611 K/UL
RBC # BLD: 4.7 M/UL
RBC # FLD: 13.7 %
WBC # FLD AUTO: 9.8 K/UL

## 2023-05-01 ENCOUNTER — NON-APPOINTMENT (OUTPATIENT)
Age: 38
End: 2023-05-01

## 2023-06-20 NOTE — PATIENT PROFILE ADULT - NSPRESCRALCFREQ_GEN_A_NUR
Dear Oliva,     Your reported symptoms require an in-person evaluation. One of our nurses will call you to help you find in-person care. If you prefer to call sooner, please contact the number listed below.     If you feel you may be experiencing a medical emergency, contact 911 immediately. If you have any questions about your symptoms, contact us at  [phone].    Thank You,     ERICH Hale       Never

## 2023-07-06 NOTE — ED ADULT NURSE NOTE - SEPSIS REFERENCE DATA CRITERIA 2
[Abdominal Pain] : abdominal pain [Heartburn] : heartburn [Joint Pain] : joint pain [Joint Stiffness] : joint stiffness [Muscle Pain] : muscle pain [Back Pain] : back pain [Dizziness] : dizziness [Negative] : Heme/Lymph [Chest Pain] : no chest pain [Palpitations] : no palpitations [Leg Claudication] : no leg claudication [Lower Ext Edema] : no lower extremity edema [Orthopnea] : no orthopnea > 2 [Nausea] : no nausea [Constipation] : no constipation [Diarrhea] : diarrhea [Vomiting] : no vomiting [Melena] : no melena [Joint Swelling] : no joint swelling [Muscle Weakness] : no muscle weakness [Itching] : no itching [Mole Changes] : no mole changes [Nail Changes] : no nail changes [Hair Changes] : no hair changes [Skin Rash] : no skin rash [Headache] : no headache [Fainting] : no fainting [Confusion] : no confusion [Memory Loss] : no memory loss [Unsteady Walking] : no ataxia [de-identified] :  pruritic scalp resolved

## 2023-07-25 NOTE — PATIENT PROFILE ADULT - LIVING ENVIRONMENT
----- Message from Bandar Biggs MD sent at 7/25/2023  7:12 AM CDT -----  To the patient:  Cholesterol and triglycerides remain very high.  To my staff: Is the any way to see if we can get him in with Cardiology sooner than November?  
Spoke with pt and gave result. Pt said he saw the Cardiologist on yesterday. Pt said he was told he is high risk for cardiovascular disease. Pt has and echo and stress test appt on 07/31/23 and he f/u in Nov 2023 with Cardiology. This message was sent to MD.  
no

## 2023-08-15 NOTE — ED ADULT TRIAGE NOTE - OTHER COMPLAINTS
ulcer to left foot on meropenem and vanc for infection through right picc. fever 101 today, tylenol at 1130. hx of htn, denies sob, lightheadedness, dizziness, cp. reports pain increases bp. Azathioprine Counseling:  I discussed with the patient the risks of azathioprine including but not limited to myelosuppression, immunosuppression, hepatotoxicity, lymphoma, and infections.  The patient understands that monitoring is required including baseline LFTs, Creatinine, possible TPMP genotyping and weekly CBCs for the first month and then every 2 weeks thereafter.  The patient verbalized understanding of the proper use and possible adverse effects of azathioprine.  All of the patient's questions and concerns were addressed.

## 2023-08-15 NOTE — H&P ADULT - HEIGHT IN CM
2nd message left for patient to return call.  Did she  medication at [facility]?    I also left message for [facility] to see if patient picked up prescription.  
Call to Pt to inform of change in medication Miconazole 50 mg is not covered and per insurance list below can choose as are covered.    Per Dr Knutson changed to clotrimazole piyush.  Medication was called in to [facility] pharmacy.    Left message for Pt on above information.      
Patient returns call & is advised of messages below.  She verbalized understanding & denied questions/concerns.  Writer did offer to assist pt to schedule 6 mo fu due around 2/8/23 with fasting lab prior & pt declined & reports she will call back to schedule.  
Per [place] [employer], Oravig  Is not a covered medication. Please see the attached list on preferred medications.    Patient must have treatment failure of one preferred first:          Please let us know how you will proceed.    
Pharmacy faxed in a request for prior authorization on:    Medication: oravig(miconazole)  Dosage: 50mg tablet. Take buccal tablet daily to lip lesion  Quantity requested:  14 tablets  Pharmacy for prescription has been selected.    Initiation of prior authorization needed.  
Pharmacy returns call & reports medication is ready for .  Another attempt is made to call pt to advise with a message left.    
[phone]

## 2023-08-29 NOTE — DISCHARGE NOTE NURSING/CASE MANAGEMENT/SOCIAL WORK - MODE OF TRANSPORTATION
Ambulatory Soolantra Counseling: I discussed with the patients the risks of topial Soolantra. This is a medicine which decreases the number of mites and inflammation in the skin. You experience burning, stinging, eye irritation or allergic reactions.  Please call our office if you develop any problems from using this medication.

## 2023-09-12 ENCOUNTER — INPATIENT (INPATIENT)
Facility: HOSPITAL | Age: 38
LOS: 9 days | Discharge: HOME | DRG: 854 | End: 2023-09-22
Attending: STUDENT IN AN ORGANIZED HEALTH CARE EDUCATION/TRAINING PROGRAM | Admitting: GENERAL ACUTE CARE HOSPITAL
Payer: MEDICARE

## 2023-09-12 ENCOUNTER — NON-APPOINTMENT (OUTPATIENT)
Age: 38
End: 2023-09-12

## 2023-09-12 VITALS
HEART RATE: 110 BPM | WEIGHT: 293 LBS | SYSTOLIC BLOOD PRESSURE: 171 MMHG | OXYGEN SATURATION: 100 % | RESPIRATION RATE: 18 BRPM | HEIGHT: 67 IN | TEMPERATURE: 98 F | DIASTOLIC BLOOD PRESSURE: 112 MMHG

## 2023-09-12 DIAGNOSIS — I10 ESSENTIAL (PRIMARY) HYPERTENSION: ICD-10-CM

## 2023-09-12 DIAGNOSIS — Z98.89 OTHER SPECIFIED POSTPROCEDURAL STATES: Chronic | ICD-10-CM

## 2023-09-12 DIAGNOSIS — E66.01 MORBID (SEVERE) OBESITY DUE TO EXCESS CALORIES: ICD-10-CM

## 2023-09-12 DIAGNOSIS — Z29.9 ENCOUNTER FOR PROPHYLACTIC MEASURES, UNSPECIFIED: ICD-10-CM

## 2023-09-12 DIAGNOSIS — Q27.30 ARTERIOVENOUS MALFORMATION, SITE UNSPECIFIED: ICD-10-CM

## 2023-09-12 DIAGNOSIS — E66.01 MORBID (SEVERE) OBESITY DUE TO EXCESS CALORIES: Chronic | ICD-10-CM

## 2023-09-12 DIAGNOSIS — L97.509 NON-PRESSURE CHRONIC ULCER OF OTHER PART OF UNSPECIFIED FOOT WITH UNSPECIFIED SEVERITY: ICD-10-CM

## 2023-09-12 DIAGNOSIS — Z41.9 ENCOUNTER FOR PROCEDURE FOR PURPOSES OTHER THAN REMEDYING HEALTH STATE, UNSPECIFIED: Chronic | ICD-10-CM

## 2023-09-12 DIAGNOSIS — A41.9 SEPSIS, UNSPECIFIED ORGANISM: ICD-10-CM

## 2023-09-12 LAB
ALBUMIN SERPL ELPH-MCNC: 3.6 G/DL — SIGNIFICANT CHANGE UP (ref 3.3–5)
ALP SERPL-CCNC: 92 U/L — SIGNIFICANT CHANGE UP (ref 40–120)
ALT FLD-CCNC: 14 U/L — SIGNIFICANT CHANGE UP (ref 10–45)
ANION GAP SERPL CALC-SCNC: 11 MMOL/L — SIGNIFICANT CHANGE UP (ref 5–17)
APPEARANCE UR: CLEAR — SIGNIFICANT CHANGE UP
APTT BLD: 26.6 SEC — SIGNIFICANT CHANGE UP (ref 24.5–35.6)
AST SERPL-CCNC: 18 U/L — SIGNIFICANT CHANGE UP (ref 10–40)
BACTERIA # UR AUTO: PRESENT /HPF
BASE EXCESS BLDV CALC-SCNC: 1.1 MMOL/L — SIGNIFICANT CHANGE UP (ref -2–3)
BASOPHILS # BLD AUTO: 0.06 K/UL — SIGNIFICANT CHANGE UP (ref 0–0.2)
BASOPHILS NFR BLD AUTO: 0.5 % — SIGNIFICANT CHANGE UP (ref 0–2)
BILIRUB SERPL-MCNC: 0.5 MG/DL — SIGNIFICANT CHANGE UP (ref 0.2–1.2)
BILIRUB UR-MCNC: NEGATIVE — SIGNIFICANT CHANGE UP
BUN SERPL-MCNC: 12 MG/DL — SIGNIFICANT CHANGE UP (ref 7–23)
CA-I SERPL-SCNC: 1.15 MMOL/L — SIGNIFICANT CHANGE UP (ref 1.15–1.33)
CALCIUM SERPL-MCNC: 9.4 MG/DL — SIGNIFICANT CHANGE UP (ref 8.4–10.5)
CHLORIDE SERPL-SCNC: 102 MMOL/L — SIGNIFICANT CHANGE UP (ref 96–108)
CK MB CFR SERPL CALC: 1.2 NG/ML — SIGNIFICANT CHANGE UP (ref 0–6.7)
CK SERPL-CCNC: 114 U/L — SIGNIFICANT CHANGE UP (ref 25–170)
CO2 BLDV-SCNC: 26.2 MMOL/L — HIGH (ref 22–26)
CO2 SERPL-SCNC: 22 MMOL/L — SIGNIFICANT CHANGE UP (ref 22–31)
COLOR SPEC: YELLOW — SIGNIFICANT CHANGE UP
CREAT SERPL-MCNC: 0.56 MG/DL — SIGNIFICANT CHANGE UP (ref 0.5–1.3)
DIFF PNL FLD: ABNORMAL
EGFR: 118 ML/MIN/1.73M2 — SIGNIFICANT CHANGE UP
EOSINOPHIL # BLD AUTO: 0.02 K/UL — SIGNIFICANT CHANGE UP (ref 0–0.5)
EOSINOPHIL NFR BLD AUTO: 0.2 % — SIGNIFICANT CHANGE UP (ref 0–6)
EPI CELLS # UR: SIGNIFICANT CHANGE UP /HPF (ref 0–5)
GAS PNL BLDV: 132 MMOL/L — LOW (ref 136–145)
GAS PNL BLDV: SIGNIFICANT CHANGE UP
GLUCOSE SERPL-MCNC: 106 MG/DL — HIGH (ref 70–99)
GLUCOSE UR QL: NEGATIVE — SIGNIFICANT CHANGE UP
GRAM STN FLD: SIGNIFICANT CHANGE UP
HCG SERPL-ACNC: 0 MIU/ML — SIGNIFICANT CHANGE UP
HCO3 BLDV-SCNC: 25 MMOL/L — SIGNIFICANT CHANGE UP (ref 22–29)
HCT VFR BLD CALC: 38.2 % — SIGNIFICANT CHANGE UP (ref 34.5–45)
HGB BLD-MCNC: 12.4 G/DL — SIGNIFICANT CHANGE UP (ref 11.5–15.5)
IMM GRANULOCYTES NFR BLD AUTO: 0.5 % — SIGNIFICANT CHANGE UP (ref 0–0.9)
INR BLD: 0.99 — SIGNIFICANT CHANGE UP (ref 0.85–1.18)
KETONES UR-MCNC: NEGATIVE — SIGNIFICANT CHANGE UP
LACTATE SERPL-SCNC: 1.8 MMOL/L — SIGNIFICANT CHANGE UP (ref 0.5–2)
LEUKOCYTE ESTERASE UR-ACNC: ABNORMAL
LYMPHOCYTES # BLD AUTO: 1.39 K/UL — SIGNIFICANT CHANGE UP (ref 1–3.3)
LYMPHOCYTES # BLD AUTO: 11 % — LOW (ref 13–44)
MCHC RBC-ENTMCNC: 27.6 PG — SIGNIFICANT CHANGE UP (ref 27–34)
MCHC RBC-ENTMCNC: 32.5 GM/DL — SIGNIFICANT CHANGE UP (ref 32–36)
MCV RBC AUTO: 85.1 FL — SIGNIFICANT CHANGE UP (ref 80–100)
MONOCYTES # BLD AUTO: 0.61 K/UL — SIGNIFICANT CHANGE UP (ref 0–0.9)
MONOCYTES NFR BLD AUTO: 4.8 % — SIGNIFICANT CHANGE UP (ref 2–14)
NEUTROPHILS # BLD AUTO: 10.49 K/UL — HIGH (ref 1.8–7.4)
NEUTROPHILS NFR BLD AUTO: 83 % — HIGH (ref 43–77)
NITRITE UR-MCNC: NEGATIVE — SIGNIFICANT CHANGE UP
NRBC # BLD: 0 /100 WBCS — SIGNIFICANT CHANGE UP (ref 0–0)
PCO2 BLDV: 37 MMHG — LOW (ref 39–42)
PH BLDV: 7.44 — HIGH (ref 7.32–7.43)
PH UR: 6 — SIGNIFICANT CHANGE UP (ref 5–8)
PLATELET # BLD AUTO: 460 K/UL — HIGH (ref 150–400)
PO2 BLDV: 86 MMHG — HIGH (ref 25–45)
POTASSIUM BLDV-SCNC: 5 MMOL/L — SIGNIFICANT CHANGE UP (ref 3.5–5.1)
POTASSIUM SERPL-MCNC: 4.6 MMOL/L — SIGNIFICANT CHANGE UP (ref 3.5–5.3)
POTASSIUM SERPL-SCNC: 4.6 MMOL/L — SIGNIFICANT CHANGE UP (ref 3.5–5.3)
PROT SERPL-MCNC: 7.7 G/DL — SIGNIFICANT CHANGE UP (ref 6–8.3)
PROT UR-MCNC: NEGATIVE MG/DL — SIGNIFICANT CHANGE UP
PROTHROM AB SERPL-ACNC: 11.3 SEC — SIGNIFICANT CHANGE UP (ref 9.5–13)
RBC # BLD: 4.49 M/UL — SIGNIFICANT CHANGE UP (ref 3.8–5.2)
RBC # FLD: 12.8 % — SIGNIFICANT CHANGE UP (ref 10.3–14.5)
RBC CASTS # UR COMP ASSIST: < 5 /HPF — SIGNIFICANT CHANGE UP
SAO2 % BLDV: 97.7 % — HIGH (ref 67–88)
SODIUM SERPL-SCNC: 135 MMOL/L — SIGNIFICANT CHANGE UP (ref 135–145)
SP GR SPEC: 1.02 — SIGNIFICANT CHANGE UP (ref 1–1.03)
SPECIMEN SOURCE: SIGNIFICANT CHANGE UP
UROBILINOGEN FLD QL: 0.2 E.U./DL — SIGNIFICANT CHANGE UP
WBC # BLD: 12.63 K/UL — HIGH (ref 3.8–10.5)
WBC # FLD AUTO: 12.63 K/UL — HIGH (ref 3.8–10.5)
WBC UR QL: ABNORMAL /HPF

## 2023-09-12 PROCEDURE — 99222 1ST HOSP IP/OBS MODERATE 55: CPT

## 2023-09-12 PROCEDURE — 99223 1ST HOSP IP/OBS HIGH 75: CPT | Mod: GC

## 2023-09-12 PROCEDURE — 99285 EMERGENCY DEPT VISIT HI MDM: CPT

## 2023-09-12 PROCEDURE — 93010 ELECTROCARDIOGRAM REPORT: CPT

## 2023-09-12 PROCEDURE — 73620 X-RAY EXAM OF FOOT: CPT | Mod: 26,LT

## 2023-09-12 RX ORDER — HYDROMORPHONE HYDROCHLORIDE 2 MG/ML
1 INJECTION INTRAMUSCULAR; INTRAVENOUS; SUBCUTANEOUS EVERY 4 HOURS
Refills: 0 | Status: DISCONTINUED | OUTPATIENT
Start: 2023-09-12 | End: 2023-09-16

## 2023-09-12 RX ORDER — SODIUM CHLORIDE 9 MG/ML
1000 INJECTION INTRAMUSCULAR; INTRAVENOUS; SUBCUTANEOUS ONCE
Refills: 0 | Status: COMPLETED | OUTPATIENT
Start: 2023-09-12 | End: 2023-09-12

## 2023-09-12 RX ORDER — GABAPENTIN 400 MG/1
100 CAPSULE ORAL DAILY
Refills: 0 | Status: DISCONTINUED | OUTPATIENT
Start: 2023-09-12 | End: 2023-09-13

## 2023-09-12 RX ORDER — METOPROLOL TARTRATE 50 MG
12.5 TABLET ORAL EVERY 12 HOURS
Refills: 0 | Status: DISCONTINUED | OUTPATIENT
Start: 2023-09-12 | End: 2023-09-13

## 2023-09-12 RX ORDER — OXYCODONE HYDROCHLORIDE 5 MG/1
10 TABLET ORAL EVERY 6 HOURS
Refills: 0 | Status: DISCONTINUED | OUTPATIENT
Start: 2023-09-12 | End: 2023-09-12

## 2023-09-12 RX ORDER — GABAPENTIN 400 MG/1
800 CAPSULE ORAL DAILY
Refills: 0 | Status: DISCONTINUED | OUTPATIENT
Start: 2023-09-12 | End: 2023-09-22

## 2023-09-12 RX ORDER — ACETAMINOPHEN 500 MG
650 TABLET ORAL EVERY 6 HOURS
Refills: 0 | Status: DISCONTINUED | OUTPATIENT
Start: 2023-09-12 | End: 2023-09-22

## 2023-09-12 RX ORDER — ENOXAPARIN SODIUM 100 MG/ML
40 INJECTION SUBCUTANEOUS EVERY 12 HOURS
Refills: 0 | Status: DISCONTINUED | OUTPATIENT
Start: 2023-09-12 | End: 2023-09-22

## 2023-09-12 RX ORDER — LANOLIN ALCOHOL/MO/W.PET/CERES
3 CREAM (GRAM) TOPICAL AT BEDTIME
Refills: 0 | Status: DISCONTINUED | OUTPATIENT
Start: 2023-09-12 | End: 2023-09-22

## 2023-09-12 RX ORDER — ACETAMINOPHEN 500 MG
650 TABLET ORAL ONCE
Refills: 0 | Status: COMPLETED | OUTPATIENT
Start: 2023-09-12 | End: 2023-09-12

## 2023-09-12 RX ORDER — HYDROMORPHONE HYDROCHLORIDE 2 MG/ML
1 INJECTION INTRAMUSCULAR; INTRAVENOUS; SUBCUTANEOUS ONCE
Refills: 0 | Status: DISCONTINUED | OUTPATIENT
Start: 2023-09-12 | End: 2023-09-12

## 2023-09-12 RX ORDER — SENNA PLUS 8.6 MG/1
2 TABLET ORAL AT BEDTIME
Refills: 0 | Status: DISCONTINUED | OUTPATIENT
Start: 2023-09-12 | End: 2023-09-22

## 2023-09-12 RX ORDER — MEROPENEM 1 G/30ML
2000 INJECTION INTRAVENOUS EVERY 8 HOURS
Refills: 0 | Status: DISCONTINUED | OUTPATIENT
Start: 2023-09-12 | End: 2023-09-18

## 2023-09-12 RX ORDER — ONDANSETRON 8 MG/1
4 TABLET, FILM COATED ORAL EVERY 8 HOURS
Refills: 0 | Status: DISCONTINUED | OUTPATIENT
Start: 2023-09-12 | End: 2023-09-22

## 2023-09-12 RX ORDER — POLYETHYLENE GLYCOL 3350 17 G/17G
17 POWDER, FOR SOLUTION ORAL DAILY
Refills: 0 | Status: DISCONTINUED | OUTPATIENT
Start: 2023-09-12 | End: 2023-09-22

## 2023-09-12 RX ORDER — VANCOMYCIN HCL 1 G
1000 VIAL (EA) INTRAVENOUS ONCE
Refills: 0 | Status: DISCONTINUED | OUTPATIENT
Start: 2023-09-12 | End: 2023-09-12

## 2023-09-12 RX ORDER — VANCOMYCIN HCL 1 G
1250 VIAL (EA) INTRAVENOUS EVERY 8 HOURS
Refills: 0 | Status: COMPLETED | OUTPATIENT
Start: 2023-09-12 | End: 2023-09-13

## 2023-09-12 RX ORDER — MEROPENEM 1 G/30ML
2000 INJECTION INTRAVENOUS ONCE
Refills: 0 | Status: COMPLETED | OUTPATIENT
Start: 2023-09-12 | End: 2023-09-12

## 2023-09-12 RX ORDER — NORETHINDRONE AND ETHINYL ESTRADIOL 0.4-0.035
1 KIT ORAL
Qty: 0 | Refills: 0 | DISCHARGE

## 2023-09-12 RX ORDER — ENOXAPARIN SODIUM 100 MG/ML
40 INJECTION SUBCUTANEOUS EVERY 24 HOURS
Refills: 0 | Status: DISCONTINUED | OUTPATIENT
Start: 2023-09-12 | End: 2023-09-12

## 2023-09-12 RX ORDER — OXYCODONE HYDROCHLORIDE 5 MG/1
5 TABLET ORAL EVERY 6 HOURS
Refills: 0 | Status: DISCONTINUED | OUTPATIENT
Start: 2023-09-12 | End: 2023-09-12

## 2023-09-12 RX ADMIN — HYDROMORPHONE HYDROCHLORIDE 1 MILLIGRAM(S): 2 INJECTION INTRAMUSCULAR; INTRAVENOUS; SUBCUTANEOUS at 11:48

## 2023-09-12 RX ADMIN — Medication 166.67 MILLIGRAM(S): at 22:35

## 2023-09-12 RX ADMIN — OXYCODONE HYDROCHLORIDE 10 MILLIGRAM(S): 5 TABLET ORAL at 16:12

## 2023-09-12 RX ADMIN — Medication 166.67 MILLIGRAM(S): at 15:12

## 2023-09-12 RX ADMIN — SODIUM CHLORIDE 1000 MILLILITER(S): 9 INJECTION INTRAMUSCULAR; INTRAVENOUS; SUBCUTANEOUS at 12:40

## 2023-09-12 RX ADMIN — HYDROMORPHONE HYDROCHLORIDE 1 MILLIGRAM(S): 2 INJECTION INTRAMUSCULAR; INTRAVENOUS; SUBCUTANEOUS at 23:05

## 2023-09-12 RX ADMIN — HYDROMORPHONE HYDROCHLORIDE 1 MILLIGRAM(S): 2 INJECTION INTRAMUSCULAR; INTRAVENOUS; SUBCUTANEOUS at 22:35

## 2023-09-12 RX ADMIN — ENOXAPARIN SODIUM 40 MILLIGRAM(S): 100 INJECTION SUBCUTANEOUS at 17:48

## 2023-09-12 RX ADMIN — MEROPENEM 280 MILLIGRAM(S): 1 INJECTION INTRAVENOUS at 13:40

## 2023-09-12 RX ADMIN — MEROPENEM 280 MILLIGRAM(S): 1 INJECTION INTRAVENOUS at 21:37

## 2023-09-12 RX ADMIN — HYDROMORPHONE HYDROCHLORIDE 1 MILLIGRAM(S): 2 INJECTION INTRAMUSCULAR; INTRAVENOUS; SUBCUTANEOUS at 18:03

## 2023-09-12 RX ADMIN — Medication 650 MILLIGRAM(S): at 12:35

## 2023-09-12 RX ADMIN — HYDROMORPHONE HYDROCHLORIDE 1 MILLIGRAM(S): 2 INJECTION INTRAMUSCULAR; INTRAVENOUS; SUBCUTANEOUS at 17:48

## 2023-09-12 RX ADMIN — OXYCODONE HYDROCHLORIDE 10 MILLIGRAM(S): 5 TABLET ORAL at 15:12

## 2023-09-12 NOTE — PATIENT PROFILE ADULT - FUNCTIONAL ASSESSMENT - BASIC MOBILITY 6.
4-calculated by average/Not able to assess (calculate score using [facility] averaging method)

## 2023-09-12 NOTE — ED PROVIDER NOTE - OBJECTIVE STATEMENT
38F PMH HTN, morbid obesity, chronic L foot AVM s/p multiple embolizations with recurrent nonpurulent ulcer infections p/w concern for infection. Wound had completely healed up but ~1w ago began to open up again. Increasing in size and has increased throbbing pain to region. Also increased redness. Also fever 100.8 this morning. Pt was in touch w/ ID Dr. Rahman as well as Dr. Teran (cards/vascular) and was referred to ED. No other systemic symptoms.   Denies HA, URI symptoms, SOB/CP, NVD, abd pain, urinary complaints, other rashes.

## 2023-09-12 NOTE — ED ADULT TRIAGE NOTE - CHIEF COMPLAINT QUOTE
Patient c/o of left foot ankle ulcer X 1 week, states has an AV malformation, PMHx of surgeries for same condition. States has yellow pus discharge and fever 100.9o.  Sent to ED by Infectious Dse DR. Rahman and Vascular Dr. J Carlos Teran to ED.  pMHx  HTN

## 2023-09-12 NOTE — H&P ADULT - PROBLEM SELECTOR PLAN 1
Meets 2/4 SIRS Criteria with WBC>12 and HR>90. Likely source is chronic L foot ulcer, now purulent.   s/p Meropenem 2000 mg IV infusion in ED    -Continue Vancomycin 1250 mg Q8 and Meropenem 2g Q8  -Wound care consult  -f/u Wound culture  -f/u ID Team 2 consult Meets 2/4 SIRS Criteria with WBC>12 and HR>90. Likely source is chronic L foot ulcer.   s/p Meropenem 2000 mg IV infusion in ED    -Continue Vancomycin 1250 mg Q8 and Meropenem 2g Q8  -Wound care consult  -f/u Wound culture  -f/u ID Team 2 consult

## 2023-09-12 NOTE — H&P ADULT - HISTORY OF PRESENT ILLNESS
38 YOF w/ PMHx w/ HTN, morbid obesity, chronic L foot AVM s/p multiple embolizations with recurrent nonpurulent ulcer infection presents to [facility] with concern for infection. Patient notes that her L foot AVM ulcerates from time to time, with 23 past embolizations and most recent embolization occurring this past March. Last Wednesday, she noticed that the ulcer was beginning as a "tiny pinhole." She sent email Friday evening to Vascular Dr. Teran who she follows with, however by today the wound increased in size to be the size of peter, increased in pain (8-10/10 on pain scale), and was warm to the touch. Patient describes wound as throbbing and unable to bear weight on it. Today, Dr. Teran's PA recommended coming to the ED, as well as ID Dr. Kimball who she reached out to today. Since last night, patient notes that she had fever 100.9 F, chills, fatigue, and night sweats that soaked through her shirt. Denies any nausea, vomiting, diarrhea, or other symptoms on ROS.      ED COURSE  Vitals: T 98.5 /112, , RR 18, SpO2 100% on RA.     Labs: WBC 12.63 (high), Hb 12.4, Pt 460 (high).   EKG: VR 95 bpm, QTc 459 ms, Normal sinus rhythm.  Consults: ID  Imaging: XR Foot: Compared to 10/21/22, demonstrates overall interval ecrease in radiopaque opacity within region of soft tissue swelling along plantar aspect of foot. No focal osteopenia or cortical destruction to suggest osteomyelitis.  Interventions: Tylenol 650 mg, Dilaudid 1mg IV push, Meropenem 2000 mg IV infusion, 1L NS Bolus

## 2023-09-12 NOTE — PATIENT PROFILE ADULT - TOBACCO USE
Detail Level: Detailed
Initiate Treatment: Apply aquaphor between toes every morning
Plan: Use spacer (gauze) in between toes\\n
Samples Given: Jublia to to nails
Never smoker

## 2023-09-12 NOTE — H&P ADULT - ASSESSMENT
38 YOF w/ PMHx w/ HTN, morbid obesity, chronic L foot AVM s/p multiple embolizations with recurrent nonpurulent ulcer infection presents to [facility] with concern for infection, found to have sepsis 2/2 chronic L foot ulcer admitted for further medical management.

## 2023-09-12 NOTE — H&P ADULT - PROBLEM SELECTOR PLAN 3
Known history of L foot AVMs s/p 23 prior embolizations. Follows with Dr. Teran. Patient was recently evaluated s/p angiogram and transcatheter embolization via R femoral groin access with improved perfusion on 03/14. Patient has noticed some oozing of blood around the area, however is not brisk or acutely worsening.    - Wound Care consult   - Recommend continued outpatient follow-up with Dr. Teran Known history of L foot AVMs s/p 23 prior embolizations. Follows with Dr. Teran. Patient was recently evaluated s/p angiogram and transcatheter embolization via R femoral groin access with improved perfusion on 03/14.     - Wound Care consult   - Recommend continued outpatient follow-up with Dr. Teran

## 2023-09-12 NOTE — CONSULT NOTE ADULT - ASSESSMENT
38F h/o L foot AVM c/b recurrent L foot ulcer and infection presents with fever, chills, ulceration w/ erythema/tenderness/warmth x 1 week. Patient most recently s/p treatment for this recurrent ulcer with hugh and vanc (4/4-5/1). Wound had closed and patient had outpatient follow up with Dr. Rahman on 4/28- no signs of infection and has been doing well since. Wound opened again this past week and has subsequently become infected on exam. Patient afebrile in ED with leukocytosis of 12. She is stable and non-toxic appearing. Dr. Teran to evaluate patient     Suggest:  -Start meropenem 2g IV Q8  -Start vancomycin 1250 mg IV Q8. (Pt previously therapeutic at this dose). Please check trough before 4th dose  -f/u Dr. Teran's recs     Team 2 will follow you.    Case d/w primary team.  Final recommendation pending attending note.    Leah Bailey, Infectious Diseases PA  Please reach out for any questions 9 am-5pm. For evenings and weekends, please call the ID physician on call.  Work cell: [phone]

## 2023-09-12 NOTE — ED ADULT TRIAGE NOTE - GLASGOW COMA SCALE: EYE OPENING, MLM
IUD INSERTION AFTER CARE    Today you had an IUD inserted. It is common to have some uterine crampiness and light bleeding after the insertion.  Please refer to the IUD information pamphlet that was provided to familiarize yourself with the side effects.      Call the office if you develop worrisome symptoms such as very heavy bleeding or severe pain.  Call us also if  you develop fever, chills or foul smelling discharge    Your partner may feel the strings for a few weeks but they should not be painful    Refer to the IUD information pamphlet on checking the strings    Return to the clinic in one month for a check     (E4) spontaneous

## 2023-09-12 NOTE — H&P ADULT - NSHPSOCIALHISTORY_GEN_ALL_CORE
Lives on first floor of house with boyfriend and mother,  EtOH - denies.  Tobacco - denies.  Recreational drug use - denies.

## 2023-09-12 NOTE — ED PROVIDER NOTE - PROGRESS NOTE DETAILS
Klepfish: WBC 12.63, plt 460, other labs grossly wnl. Vitals improving, remains well appearing and stable. XR w/ no gas. Will admit medicine for further care.   Updated pt.

## 2023-09-12 NOTE — ED PROVIDER NOTE - PHYSICAL EXAMINATION
swelling to L lateral ankle region - pt states that the swelling is chronic but is slightly worse over last few days. Has ~1cm central open wound w/ yellowish center. +Surrounding erythema/warmth and mild ttp.   No crepitus, firmness, induration, fluctuance.   normal cap refill, no other LE edema, normal DP pulses  FROM ankle/foot joints.

## 2023-09-12 NOTE — H&P ADULT - PROBLEM SELECTOR PLAN 4
On admission, presented with /108, repeat 176/102. Likely acutely elevated due to pain. On home Bystolic 10mg every 12 hours    - c/w home Bystolic 10mg every 12 hours. On admission, presented with /112. Likely acutely elevated due to pain. On home Bystolic 10mg every 12 hours    - c/w home Bystolic 10mg every 12 hours. On admission, presented with /112. Likely acutely elevated due to pain. On home Bystolic 10mg every 12 hours    - Continue Metoprolol tartrate 12.5 mg BID with hold parameters

## 2023-09-12 NOTE — H&P ADULT - NSHPLABSRESULTS_GEN_ALL_CORE
12.4   12.63 )-----------( 460      ( 12 Sep 2023 12:17 )             38.2       09-12    135  |  102  |  12  ----------------------------<  106<H>  4.6   |  22  |  0.56    Ca    9.4      12 Sep 2023 12:17    TPro  7.7  /  Alb  3.6  /  TBili  0.5  /  DBili  x   /  AST  18  /  ALT  14  /  AlkPhos  92  09-12

## 2023-09-12 NOTE — H&P ADULT - NSHPPHYSICALEXAM_GEN_ALL_CORE
GENERAL: NAD, Resting comfortably in bed. (+)Increased body habitus.  CARDIO: +S1/S2. RRR. No murmurs, rubs, or gallops.  LUNGS: CTA Bilaterally. No wheezes, rhonchi, or rales.  ABDOMEN: Soft NTND. Bowel sounds present x4 quadrants.  EXTREMITIES: No clubbing, cyanosis, or pitting edema. (+)2-3 cm wound with serosanginous drainage on lateral aspect of Left foot, with surrounding erythema, swelling. Tender to touch. ROM in tact.   VASCULAR: 2+ radial pulses.  NEURO: AxO x3. No focal deficits.  PSYCH: Cooperative mood, broad affect. Goal-oriented speech. GENERAL: NAD, Resting comfortably in bed. (+)Increased body habitus.  CARDIO: +S1/S2. RRR. No murmurs, rubs, or gallops.  LUNGS: CTA Bilaterally. No wheezes, rhonchi, or rales.  ABDOMEN: Soft NTND. Bowel sounds present x4 quadrants.  EXTREMITIES: No clubbing, cyanosis, or pitting edema. (+)2-3 cm wound with yellow center and serosanginous drainage on lateral aspect of Left foot, with surrounding erythema, swelling. Tender to touch. ROM in tact.   VASCULAR: 2+ radial pulses.  NEURO: AxO x3. No focal deficits.  PSYCH: Cooperative mood, broad affect. Goal-oriented speech.

## 2023-09-12 NOTE — CONSULT NOTE ADULT - SUBJECTIVE AND OBJECTIVE BOX
INFECTIOUS DISEASES INITIAL CONSULT NOTE    HPI:      PAST MEDICAL & SURGICAL HISTORY:  HTN (hypertension)      AVM (arteriovenous malformation)  of left foot      Foot ulceration  left foot      S/P debridement  LLE area overlying AVM      Elective surgery  transcatheter therapy vascular embolization x5      Morbid obesity            Review of Systems:   Constitutional, eyes, ENT, cardiovascular, respiratory, gastrointestinal, genitourinary, integumentary, neurological, psychiatric and heme/lymph are otherwise negative other than noted above       ANTIBIOTICS:  MEDICATIONS  (STANDING):  vancomycin  IVPB. 1000 milliGRAM(s) IV Intermittent once    MEDICATIONS  (PRN):      Allergies    morphine (Rash)  latex (Rash)  penicillin (Rash)  lisinopril (Angioedema; Rash; Hives)  ciprofloxacin (Rash)  hydrochlorothiazide (Hives)  amoxicillin (Angioedema)    Intolerances        SOCIAL HISTORY:    FAMILY HISTORY:  Family history of congenital malformation (Sibling)  AVMs: siblings     no FH leading to current infection    Vital Signs Last 24 Hrs  T(C): 36.9 (12 Sep 2023 10:21), Max: 36.9 (12 Sep 2023 10:21)  T(F): 98.5 (12 Sep 2023 10:21), Max: 98.5 (12 Sep 2023 10:21)  HR: 98 (12 Sep 2023 12:49) (98 - 110)  BP: 151/86 (12 Sep 2023 12:49) (151/86 - 171/112)  BP(mean): --  RR: 18 (12 Sep 2023 12:49) (18 - 18)  SpO2: 100% (12 Sep 2023 12:49) (100% - 100%)    Parameters below as of 12 Sep 2023 12:49  Patient On (Oxygen Delivery Method): room air          PHYSICAL EXAM:  Constitutional: alert, NAD  Eyes: the sclera and conjunctiva were normal.   ENT: the ears and nose were normal in appearance.   Neck: the appearance of the neck was normal and the neck was supple.   Pulmonary: no respiratory distress and lungs were clear to auscultation bilaterally.   Heart: heart rate was normal and rhythm regular, normal S1 and S2  Vascular:. there was no peripheral edema  Abdomen: normal bowel sounds, soft, non-tender  Neurological: no focal deficits.   Psychiatric: the affect was normal      LABS:                        12.4   12.63 )-----------( 460      ( 12 Sep 2023 12:17 )             38.2     09-12    135  |  102  |  12  ----------------------------<  106<H>  4.6   |  22  |  0.56    Ca    9.4      12 Sep 2023 12:17    TPro  7.7  /  Alb  3.6  /  TBili  0.5  /  DBili  x   /  AST  18  /  ALT  14  /  AlkPhos  92  09-12    PT/INR - ( 12 Sep 2023 13:42 )   PT: 11.3 sec;   INR: 0.99          PTT - ( 12 Sep 2023 13:42 )  PTT:26.6 sec  Urinalysis Basic - ( 12 Sep 2023 12:17 )    Color: x / Appearance: x / SG: x / pH: x  Gluc: 106 mg/dL / Ketone: x  / Bili: x / Urobili: x   Blood: x / Protein: x / Nitrite: x   Leuk Esterase: x / RBC: x / WBC x   Sq Epi: x / Non Sq Epi: x / Bacteria: x        MICROBIOLOGY:    RADIOLOGY & ADDITIONAL STUDIES:   INFECTIOUS DISEASES INITIAL CONSULT NOTE    HPI:  38F w/ HTN, morbid obesity, chronic L foot AVM s/p multiple embolizations with recurrent nonpurulent ulcer infection presents to [facility] with concern for infection. ID consulted for antibiotic management. She has undergone multiple direct stick and transcatheter embolizations for ulcers related to AVM.  Initially, she had been treated with courses of clindamycin.  She had been admitted on 10/10/22, underwent transcatheter embolization and OR wound debridement 10/11.  Wound culture from OR grew PsA.  She was treated w/ vancomycin/cefepime. She was readmitted on 10/31 with fever and L lateral ankle erythema/swelling/pain, ultimately responded to vanc/meropenem. She was d/osorio on 11/10 and had clinical improvement.  She was readmitted on 12/4 with fever. Repeat MRI showed phlegmon underlying ulcer surrounding area of contrast extravasation without improvement from 11/2.  PICC was removed and she symptomatically improved.  BCxs were negative.  PICC was replaced and she was d/osorio on 12/9 on vancomycin 1250 mg IV q12h and meropenem 1 g IV q8h.  Her ulcer closed completely on 12/23 and erythema resolved- completed course 12/29. In January, wound on foot again opened and developed erythema. She underwent direct stick embolization on 1/24 and discharged on 4 wk course of daptomycin/cefepime (2/3-3/3) with plan to d/c earlier if possible based upon clinical appearance. On 2/27, the ulcer enlarged and became erythematous; had fever/chills. She did not improve with antibiotics and underwent transcatheter embolization on 3/14.  She was discharged on vanc and meropenem on 3/15. On 3/29, she was admitted to [facility] for infected PICC line- line was replaced 4/3 and she was dc'd with 4 more weeks of hugh and vanc (4/4-5/1). She was seen by Dr. Rahman for follow up on 4/28- wound had closed without signs of infection. Patient states she has been doing well since however last Wednesday, she noticed that wound opened to the size of a "tiny pinhole." Over the week she noticed the wound increasing in size and becoming more erythematous. She has been dressing wound wet to dry daily. She also reports yellow drainage from wound and increased pain with weight bearing. Last night, she had fever 100.9 F, chills, fatigue, and night sweats that soaked through her shirt. Denies any nausea, vomiting, diarrhea, or other symptoms on ROS. She reached out to Dr Teran and Dr. Rahman who advised she come to ED. Upon arrival, afebrile, WBC 12, XR R foot without signs of osteomyelitis. Started on hugh and vanc.         PAST MEDICAL & SURGICAL HISTORY:  HTN (hypertension)      AVM (arteriovenous malformation)  of left foot      Foot ulceration  left foot      S/P debridement  LLE area overlying AVM      Elective surgery  transcatheter therapy vascular embolization x5      Morbid obesity            Review of Systems:   Constitutional, eyes, ENT, cardiovascular, respiratory, gastrointestinal, genitourinary, integumentary, neurological, psychiatric and heme/lymph are otherwise negative other than noted above       ANTIBIOTICS:  MEDICATIONS  (STANDING):  vancomycin  IVPB. 1000 milliGRAM(s) IV Intermittent once    MEDICATIONS  (PRN):      Allergies    morphine (Rash)  latex (Rash)  penicillin (Rash)  lisinopril (Angioedema; Rash; Hives)  ciprofloxacin (Rash)  hydrochlorothiazide (Hives)  amoxicillin (Angioedema)    Intolerances        SOCIAL HISTORY:    FAMILY HISTORY:  Family history of congenital malformation (Sibling)  AVMs: siblings     no FH leading to current infection    Vital Signs Last 24 Hrs  T(C): 36.9 (12 Sep 2023 10:21), Max: 36.9 (12 Sep 2023 10:21)  T(F): 98.5 (12 Sep 2023 10:21), Max: 98.5 (12 Sep 2023 10:21)  HR: 98 (12 Sep 2023 12:49) (98 - 110)  BP: 151/86 (12 Sep 2023 12:49) (151/86 - 171/112)  BP(mean): --  RR: 18 (12 Sep 2023 12:49) (18 - 18)  SpO2: 100% (12 Sep 2023 12:49) (100% - 100%)    Parameters below as of 12 Sep 2023 12:49  Patient On (Oxygen Delivery Method): room air          PHYSICAL EXAM:  Constitutional: alert, NAD  Eyes: the sclera and conjunctiva were normal.   ENT: the ears and nose were normal in appearance.   Neck: the appearance of the neck was normal and the neck was supple.   Pulmonary: no respiratory distress and lungs were clear to auscultation bilaterally.   Heart: heart rate was normal and rhythm regular, normal S1 and S2  Vascular:. there was no peripheral edema  Abdomen: normal bowel sounds, soft, non-tender  Extremities: L foot: lateral ankle with approx 1.5 x 2 cm superficial ulceration with surrounding erythema and warmth. Scant yellow drainage. TTP. ROM intact   Neurological: no focal deficits.   Psychiatric: the affect was normal      LABS:                        12.4   12.63 )-----------( 460      ( 12 Sep 2023 12:17 )             38.2     09-12    135  |  102  |  12  ----------------------------<  106<H>  4.6   |  22  |  0.56    Ca    9.4      12 Sep 2023 12:17    TPro  7.7  /  Alb  3.6  /  TBili  0.5  /  DBili  x   /  AST  18  /  ALT  14  /  AlkPhos  92  09-12    PT/INR - ( 12 Sep 2023 13:42 )   PT: 11.3 sec;   INR: 0.99          PTT - ( 12 Sep 2023 13:42 )  PTT:26.6 sec  Urinalysis Basic - ( 12 Sep 2023 12:17 )    Color: x / Appearance: x / SG: x / pH: x  Gluc: 106 mg/dL / Ketone: x  / Bili: x / Urobili: x   Blood: x / Protein: x / Nitrite: x   Leuk Esterase: x / RBC: x / WBC x   Sq Epi: x / Non Sq Epi: x / Bacteria: x        MICROBIOLOGY:    RADIOLOGY & ADDITIONAL STUDIES:

## 2023-09-12 NOTE — ED ADULT TRIAGE NOTE - PAIN: PRESENCE, MLM
34 year old female w hx of etoh abuse, MVP presents to the ED requesting detox from alcohol. Pt states that she normally drinks 5+ liters of wine/day. Pt drank around 2:00 AM prior to going to sleep, and woke up around 8:30 feeling palpitations and as if she were in withdrawal. She attempted to drink 24 oz of wine around 9 AM but her symptoms persisted. Pt also endorses mid non-radiating chest tightness and shortness of breath. Pt denies any illicit drug use or caffeine. Denies prior hx of arrhythmias, MI/CAD, PE/DVT, or alcohol withdrawal seizures. Denies fevers/chills, nausea, vomiting, diarrhea, abd pain, leg pain/swelling. No recent 34 year old female w hx of etoh abuse, MVP presents to the ED requesting detox from alcohol. Pt states that she normally drinks 5+ liters of wine/day. Pt drank around 2:00 AM prior to going to sleep, and woke up around 8:30 feeling palpitations and as if she were in withdrawal. She attempted to drink 24 oz of wine around 9 AM but her symptoms persisted. Pt also endorses mid non-radiating chest tightness and shortness of breath. Pt denies any illicit drug use or caffeine. Denies prior hx of arrhythmias, MI/CAD, PE/DVT, or alcohol withdrawal seizures. Denies fevers/chills, nausea, vomiting, diarrhea, abd pain, leg pain/swelling. No recent travel/hospitalizations/immobilizations. Denies SI/HI, visual/auditory hallucinations. left foot/complains of pain/discomfort

## 2023-09-12 NOTE — H&P ADULT - ATTENDING COMMENTS
#Sepsis, POA 2/2 foot wound  #AVMs, chronic  #Foot pain  #HTN  #Obesity    -ID already consulted by ER for intial rec. following their recommendation. Annie/vanc. Wound care consult.   -Will notify her interventional cardiologist Dr. Teran for possible intervention  -c/w home med neurontin. Pain scale with oxy 5/10 mod/severe (takes percocet 325/5 at home).   -c/w home meds  -discussed lifestyle modifications. She is aware.

## 2023-09-12 NOTE — H&P ADULT - PROBLEM SELECTOR PLAN 2
Known chronic L foot ulcer due to AVM. Patient follows with Vascular Dr. Teran for AVMs and Pain management. On home Gabapentin 100mg in the morning and 800mg at 4pm. Percocet 10 q4hrs PRN. BUN 7/Cr 0.63 (at baseline)    - c/w Gabapentin 100mg in the morning and 800mg at 4PM  - c/w Dilaudid 1mg PO q4 PRN  - c/w Bowel regimen    - Wound Care consult  - Sepsis/Abx plan as above. Known chronic L foot ulcer due to AVM. Patient follows with Vascular Dr. Teran for AVMs and Pain management. On home Gabapentin 100mg in the morning and 800mg at 4pm. Percocet 10 q4hrs PRN.    - c/w Gabapentin 100mg in the morning and 800mg at 4PM  - Pain regimen:  Tylenol 650 mg Q6 - mild pain  Oxycodone 5 mg Q6 - moderate pain  Oxycodone 10 mg Q6 - severe pain  - c/w Bowel regimen    - Wound Care consult  - Sepsis/Abx plan as above. Known chronic L foot ulcer due to AVM. Patient follows with Vascular Dr. Teran for AVMs and Pain management. On home Gabapentin 100mg in the morning and 800mg at 4pm. Percocet 10mg q4hrs PRN.    - c/w Gabapentin 100mg in the morning and 800mg at 4PM  - Hold home med Percocet   - Pain regimen:  Tylenol 650 mg Q6 - mild pain  Oxycodone 5 mg Q6 - moderate pain  Oxycodone 10 mg Q6 - severe pain  - c/w Bowel regimen    - Wound Care consult  - Sepsis/Abx plan as above. Known chronic L foot ulcer due to AVM. Patient follows with Vascular Dr. Teran for AVMs and Pain management. On home Gabapentin 100mg in the morning and 800mg at 4pm. Percocet 10mg q4hrs PRN.    - c/w Gabapentin 100mg in the morning and 800mg at 4PM  - Hold home med Percocet   - Pain regimen:  Tylenol 650 mg Q6 PRN - mild pain  Oxycodone 5 mg Q6 PRN - moderate pain  Oxycodone 10 mg Q6 PRN - severe pain  - c/w Bowel regimen    - Wound Care consult  - Sepsis/Abx plan as above. Known chronic L foot ulcer due to AVM. Patient follows with Vascular Dr. Teran for AVMs and Pain management. On home Gabapentin 100mg in the morning and 800mg at 4pm. Percocet 10mg q4hrs PRN.    - c/w Gabapentin 100mg in the morning and 800mg at 4PM  - Hold home med Percocet   - Pain regimen:  Tylenol 650 mg Q6 PRN - mild pain  Oxycodone 5 mg Q6 PRN - moderate pain  Oxycodone 10 mg Q6 PRN - severe pain  - c/w Bowel regimen: Senna 2 tablets, Miralax 17 g oral daily    - Wound Care consult  - Sepsis/Abx plan as above. Known chronic L foot ulcer due to AVM. Patient follows with Vascular Dr. Teran for AVMs and Pain management. On home Gabapentin 100mg in the morning and 800mg at 4pm. Percocet 10mg q4hrs PRN.    - c/w Gabapentin 100mg in the morning and 800mg at 4PM  - Hold home med Percocet   - Pain regimen:  Tylenol 650 mg Q6 PRN - mild pain  Oxycodone 5 mg Q6 PRN - moderate pain  Dilaudid 1mg IV q4 - severe pain  - c/w Bowel regimen: Senna 2 tablets, Miralax 17 g oral daily    - Wound Care consult  - Sepsis/Abx plan as above.

## 2023-09-12 NOTE — PATIENT PROFILE ADULT - FUNCTIONAL ASSESSMENT - DAILY ACTIVITY SCORE.
[NS_[email or web address]_ALL_OB_FT:[email or web address]],[NS_DeliveryAssist1_OBGYN_ALL_OB_FT:[id number]],[NS_DeliveryRN_OBGYN_ALL_OB_FT:[id number]] 24 [NS_DeliveryAttending1_OBGYN_ALL_OB_FT:[id number]],[NS_DeliveryAssist1_OBGYN_ALL_OB_FT:[id number]],[NS_DeliveryRN_OBGYN_ALL_OB_FT:[id number]]

## 2023-09-12 NOTE — ED PROVIDER NOTE - CLINICAL SUMMARY MEDICAL DECISION MAKING FREE TEXT BOX
38F PMH HTN, morbid obesity, chronic L foot AVM s/p multiple embolizations with recurrent nonpurulent ulcer infections p/w concern for infection. Wound had completely healed up but ~1w ago began to open up again. Increasing in size and has increased throbbing pain to region. Also increased redness. Also fever 100.8 this morning. Pt was in touch w/ ID Dr. Rahman as well as Dr. Teran (cards/vascular) and was referred to ED. No other systemic symptoms.   Hypertensive (pt states she is often hypertensive when in pain), mild tachycardia, other vitals wnl. Exam as above.  ddX: Concern for recurrent foot infection, clinically not nec fasc. 38F PMH HTN, morbid obesity, chronic L foot AVM s/p multiple embolizations with recurrent nonpurulent ulcer infections p/w concern for infection. Wound had completely healed up but ~1w ago began to open up again. Increasing in size and has increased throbbing pain to region. Also increased redness. Also fever 100.8 this morning. Pt was in touch w/ ID Dr. Rahman as well as Dr. Teran (cards/vascular) and was referred to ED. No other systemic symptoms.   Hypertensive (pt states she is often hypertensive when in pain), mild tachycardia, other vitals wnl. Exam as above.  ddX: Concern for recurrent foot infection, clinically not nec fasc.  Labs, XR.   d/w Dr. Vasquez -> suggesting contacting Dr. Teran regarding possible intervention and then start abx (vanc/meropenem).  d.w Dr. Teran --> will eval pt, suggesting starting abx in interim.   Will admit medicine. (d/w Dr. Teran).

## 2023-09-12 NOTE — H&P ADULT - TIME BILLING
Reviewed labs, imaging, prior records. Case discussed with house staff, ER, infectious disease. Thorough H/P. Decision regarding inpatient care made.

## 2023-09-12 NOTE — ED PROVIDER NOTE - NS ED MD DISPO SPECIAL CONSIDERATION1
[FreeTextEntry1] : Patient on alfuzosin.  He states his FOS is good. Urgency has resolved. no feeling of post void.  Nocturia X 1 but only rarely.  no dysuria or hematuria.   None

## 2023-09-12 NOTE — ED ADULT NURSE NOTE - NSFALLHARMRISKINTERV_ED_ALL_ED

## 2023-09-12 NOTE — ED ADULT NURSE NOTE - OBJECTIVE STATEMENT
Patient aaox4 c/o left foot wound x 1 week, states has been treated multiple times for AV malformation. States noticed increased redness and discharge, referred to ER by infectious disease doctor. Speaking in full sentences without difficulty, denies sob/cp/dizziness/n/v. Endorses having fever yesterday. IV in progress, physician at bedside.

## 2023-09-12 NOTE — CONSULT NOTE ADULT - NS ATTEND AMEND GEN_ALL_CORE FT
38F with recurrent L foot ulcer and SSTI due to underlying AVM with multiple previous direct stick and catheter embolizations, with prior wound cx most significant for a pan-S PsA, but historically not improving on abx regimen targeting only this therapy, and requiring long courses of vanc/meropenem for resolution of SSTI, presents after ~1 week ago re-developed ulcer at this L foot site, and surrounding skin began slowly getting more red and swollen, and then patient developed fever/chills one day ago prompting presentation. Would appears very superficial. Surrounding erythema without any crepitus, fluctuance, or pain out of proportion to exam. Low suspicion for necrotizing infection, or involvement of deep structures. XR without any periosteal reaction, and I do not think further imaging to evaluate extent of infection is needed. Pending intervention for AVM with Dr. Teran. Recommend start vancomycin and meropenem as above. Anticipate discharge with IV abx and follow up in ID office with Dr. Rahman.

## 2023-09-13 LAB
ALBUMIN SERPL ELPH-MCNC: 3.5 G/DL — SIGNIFICANT CHANGE UP (ref 3.3–5)
ALP SERPL-CCNC: 81 U/L — SIGNIFICANT CHANGE UP (ref 40–120)
ALT FLD-CCNC: 12 U/L — SIGNIFICANT CHANGE UP (ref 10–45)
ANION GAP SERPL CALC-SCNC: 9 MMOL/L — SIGNIFICANT CHANGE UP (ref 5–17)
APPEARANCE UR: CLEAR — SIGNIFICANT CHANGE UP
AST SERPL-CCNC: 14 U/L — SIGNIFICANT CHANGE UP (ref 10–40)
BACTERIA # UR AUTO: SIGNIFICANT CHANGE UP /HPF
BASOPHILS # BLD AUTO: 0.05 K/UL — SIGNIFICANT CHANGE UP (ref 0–0.2)
BASOPHILS NFR BLD AUTO: 0.5 % — SIGNIFICANT CHANGE UP (ref 0–2)
BILIRUB SERPL-MCNC: 0.7 MG/DL — SIGNIFICANT CHANGE UP (ref 0.2–1.2)
BILIRUB UR-MCNC: NEGATIVE — SIGNIFICANT CHANGE UP
BUN SERPL-MCNC: 13 MG/DL — SIGNIFICANT CHANGE UP (ref 7–23)
CALCIUM SERPL-MCNC: 9.2 MG/DL — SIGNIFICANT CHANGE UP (ref 8.4–10.5)
CHLORIDE SERPL-SCNC: 100 MMOL/L — SIGNIFICANT CHANGE UP (ref 96–108)
CO2 SERPL-SCNC: 27 MMOL/L — SIGNIFICANT CHANGE UP (ref 22–31)
COLOR SPEC: YELLOW — SIGNIFICANT CHANGE UP
CREAT SERPL-MCNC: 0.75 MG/DL — SIGNIFICANT CHANGE UP (ref 0.5–1.3)
DIFF PNL FLD: NEGATIVE — SIGNIFICANT CHANGE UP
EGFR: 103 ML/MIN/1.73M2 — SIGNIFICANT CHANGE UP
EOSINOPHIL # BLD AUTO: 0.13 K/UL — SIGNIFICANT CHANGE UP (ref 0–0.5)
EOSINOPHIL NFR BLD AUTO: 1.3 % — SIGNIFICANT CHANGE UP (ref 0–6)
EPI CELLS # UR: ABNORMAL /HPF (ref 0–5)
GLUCOSE SERPL-MCNC: 102 MG/DL — HIGH (ref 70–99)
GLUCOSE UR QL: NEGATIVE — SIGNIFICANT CHANGE UP
HCT VFR BLD CALC: 35 % — SIGNIFICANT CHANGE UP (ref 34.5–45)
HGB BLD-MCNC: 11.4 G/DL — LOW (ref 11.5–15.5)
IMM GRANULOCYTES NFR BLD AUTO: 0.4 % — SIGNIFICANT CHANGE UP (ref 0–0.9)
KETONES UR-MCNC: NEGATIVE — SIGNIFICANT CHANGE UP
LEUKOCYTE ESTERASE UR-ACNC: ABNORMAL
LYMPHOCYTES # BLD AUTO: 1.44 K/UL — SIGNIFICANT CHANGE UP (ref 1–3.3)
LYMPHOCYTES # BLD AUTO: 14.2 % — SIGNIFICANT CHANGE UP (ref 13–44)
MAGNESIUM SERPL-MCNC: 1.8 MG/DL — SIGNIFICANT CHANGE UP (ref 1.6–2.6)
MCHC RBC-ENTMCNC: 27.4 PG — SIGNIFICANT CHANGE UP (ref 27–34)
MCHC RBC-ENTMCNC: 32.6 GM/DL — SIGNIFICANT CHANGE UP (ref 32–36)
MCV RBC AUTO: 84.1 FL — SIGNIFICANT CHANGE UP (ref 80–100)
MONOCYTES # BLD AUTO: 0.79 K/UL — SIGNIFICANT CHANGE UP (ref 0–0.9)
MONOCYTES NFR BLD AUTO: 7.8 % — SIGNIFICANT CHANGE UP (ref 2–14)
NEUTROPHILS # BLD AUTO: 7.66 K/UL — HIGH (ref 1.8–7.4)
NEUTROPHILS NFR BLD AUTO: 75.8 % — SIGNIFICANT CHANGE UP (ref 43–77)
NITRITE UR-MCNC: NEGATIVE — SIGNIFICANT CHANGE UP
NRBC # BLD: 0 /100 WBCS — SIGNIFICANT CHANGE UP (ref 0–0)
PH UR: 6.5 — SIGNIFICANT CHANGE UP (ref 5–8)
PHOSPHATE SERPL-MCNC: 3.5 MG/DL — SIGNIFICANT CHANGE UP (ref 2.5–4.5)
PLATELET # BLD AUTO: 435 K/UL — HIGH (ref 150–400)
POTASSIUM SERPL-MCNC: 4.3 MMOL/L — SIGNIFICANT CHANGE UP (ref 3.5–5.3)
POTASSIUM SERPL-SCNC: 4.3 MMOL/L — SIGNIFICANT CHANGE UP (ref 3.5–5.3)
PROT SERPL-MCNC: 6.7 G/DL — SIGNIFICANT CHANGE UP (ref 6–8.3)
PROT UR-MCNC: NEGATIVE MG/DL — SIGNIFICANT CHANGE UP
RBC # BLD: 4.16 M/UL — SIGNIFICANT CHANGE UP (ref 3.8–5.2)
RBC # FLD: 13.1 % — SIGNIFICANT CHANGE UP (ref 10.3–14.5)
RBC CASTS # UR COMP ASSIST: < 5 /HPF — SIGNIFICANT CHANGE UP
SODIUM SERPL-SCNC: 136 MMOL/L — SIGNIFICANT CHANGE UP (ref 135–145)
SP GR SPEC: 1.02 — SIGNIFICANT CHANGE UP (ref 1–1.03)
UROBILINOGEN FLD QL: 0.2 E.U./DL — SIGNIFICANT CHANGE UP
VANCOMYCIN TROUGH SERPL-MCNC: 11.4 UG/ML — SIGNIFICANT CHANGE UP (ref 10–20)
WBC # BLD: 10.11 K/UL — SIGNIFICANT CHANGE UP (ref 3.8–10.5)
WBC # FLD AUTO: 10.11 K/UL — SIGNIFICANT CHANGE UP (ref 3.8–10.5)
WBC UR QL: ABNORMAL /HPF

## 2023-09-13 PROCEDURE — 99232 SBSQ HOSP IP/OBS MODERATE 35: CPT

## 2023-09-13 PROCEDURE — 99233 SBSQ HOSP IP/OBS HIGH 50: CPT | Mod: GC

## 2023-09-13 RX ORDER — VANCOMYCIN HCL 1 G
1500 VIAL (EA) INTRAVENOUS EVERY 8 HOURS
Refills: 0 | Status: DISCONTINUED | OUTPATIENT
Start: 2023-09-13 | End: 2023-09-14

## 2023-09-13 RX ORDER — NEBIVOLOL HYDROCHLORIDE 5 MG/1
10 TABLET ORAL
Refills: 0 | Status: DISCONTINUED | OUTPATIENT
Start: 2023-09-13 | End: 2023-09-22

## 2023-09-13 RX ORDER — GABAPENTIN 400 MG/1
100 CAPSULE ORAL EVERY 24 HOURS
Refills: 0 | Status: DISCONTINUED | OUTPATIENT
Start: 2023-09-13 | End: 2023-09-22

## 2023-09-13 RX ADMIN — HYDROMORPHONE HYDROCHLORIDE 1 MILLIGRAM(S): 2 INJECTION INTRAMUSCULAR; INTRAVENOUS; SUBCUTANEOUS at 15:15

## 2023-09-13 RX ADMIN — GABAPENTIN 800 MILLIGRAM(S): 400 CAPSULE ORAL at 16:21

## 2023-09-13 RX ADMIN — HYDROMORPHONE HYDROCHLORIDE 1 MILLIGRAM(S): 2 INJECTION INTRAMUSCULAR; INTRAVENOUS; SUBCUTANEOUS at 11:21

## 2023-09-13 RX ADMIN — HYDROMORPHONE HYDROCHLORIDE 1 MILLIGRAM(S): 2 INJECTION INTRAMUSCULAR; INTRAVENOUS; SUBCUTANEOUS at 23:20

## 2023-09-13 RX ADMIN — HYDROMORPHONE HYDROCHLORIDE 1 MILLIGRAM(S): 2 INJECTION INTRAMUSCULAR; INTRAVENOUS; SUBCUTANEOUS at 07:18

## 2023-09-13 RX ADMIN — HYDROMORPHONE HYDROCHLORIDE 1 MILLIGRAM(S): 2 INJECTION INTRAMUSCULAR; INTRAVENOUS; SUBCUTANEOUS at 18:50

## 2023-09-13 RX ADMIN — GABAPENTIN 100 MILLIGRAM(S): 400 CAPSULE ORAL at 09:32

## 2023-09-13 RX ADMIN — Medication 12.5 MILLIGRAM(S): at 05:51

## 2023-09-13 RX ADMIN — ENOXAPARIN SODIUM 40 MILLIGRAM(S): 100 INJECTION SUBCUTANEOUS at 05:47

## 2023-09-13 RX ADMIN — HYDROMORPHONE HYDROCHLORIDE 1 MILLIGRAM(S): 2 INJECTION INTRAMUSCULAR; INTRAVENOUS; SUBCUTANEOUS at 19:15

## 2023-09-13 RX ADMIN — HYDROMORPHONE HYDROCHLORIDE 1 MILLIGRAM(S): 2 INJECTION INTRAMUSCULAR; INTRAVENOUS; SUBCUTANEOUS at 02:55

## 2023-09-13 RX ADMIN — HYDROMORPHONE HYDROCHLORIDE 1 MILLIGRAM(S): 2 INJECTION INTRAMUSCULAR; INTRAVENOUS; SUBCUTANEOUS at 22:51

## 2023-09-13 RX ADMIN — NEBIVOLOL HYDROCHLORIDE 10 MILLIGRAM(S): 5 TABLET ORAL at 18:20

## 2023-09-13 RX ADMIN — Medication 300 MILLIGRAM(S): at 16:20

## 2023-09-13 RX ADMIN — HYDROMORPHONE HYDROCHLORIDE 1 MILLIGRAM(S): 2 INJECTION INTRAMUSCULAR; INTRAVENOUS; SUBCUTANEOUS at 10:39

## 2023-09-13 RX ADMIN — HYDROMORPHONE HYDROCHLORIDE 1 MILLIGRAM(S): 2 INJECTION INTRAMUSCULAR; INTRAVENOUS; SUBCUTANEOUS at 02:40

## 2023-09-13 RX ADMIN — HYDROMORPHONE HYDROCHLORIDE 1 MILLIGRAM(S): 2 INJECTION INTRAMUSCULAR; INTRAVENOUS; SUBCUTANEOUS at 06:48

## 2023-09-13 RX ADMIN — ENOXAPARIN SODIUM 40 MILLIGRAM(S): 100 INJECTION SUBCUTANEOUS at 18:21

## 2023-09-13 RX ADMIN — HYDROMORPHONE HYDROCHLORIDE 1 MILLIGRAM(S): 2 INJECTION INTRAMUSCULAR; INTRAVENOUS; SUBCUTANEOUS at 14:50

## 2023-09-13 RX ADMIN — MEROPENEM 280 MILLIGRAM(S): 1 INJECTION INTRAVENOUS at 21:41

## 2023-09-13 RX ADMIN — MEROPENEM 280 MILLIGRAM(S): 1 INJECTION INTRAVENOUS at 14:50

## 2023-09-13 RX ADMIN — MEROPENEM 280 MILLIGRAM(S): 1 INJECTION INTRAVENOUS at 05:09

## 2023-09-13 RX ADMIN — Medication 166.67 MILLIGRAM(S): at 06:07

## 2023-09-13 NOTE — PROGRESS NOTE ADULT - ASSESSMENT
37F w/ PMHx w/ HTN, morbid obesity, chronic L foot AVM s/p multiple embolizations with recurrent nonpurulent ulcer infection presents to [facility] for c/f infected PICC line.

## 2023-09-13 NOTE — PROGRESS NOTE ADULT - PROBLEM SELECTOR PLAN 1
On admission met 2/4 SIRS criteria with HR >90 and WBC >12. Likely source 2/2 PICC line (placed 02/03/23) vs. bacteremia vs. chronic L foot nonpurulent ulcer. Patient has previously been on Annie/Vanc, Dapto/Cefepime regimens. 10/22 Would Cx growing PsA, Staph hemolyticus, Corynebacterium. Lactate, UA neg.     - PICC line removed (03/29/23)  - s/p PICC replacement 4/3  - ID consulted, appreciate recs - recommend c/w Abx for 4 weeks until 5/1  - C/w Meropenem 2g every 8 hours  - C/w Vanc 1250 every 8 hours - trough 3/30 13.9 which is therapeutic. Will check troughs weekly  - BCx 3/29, NGTD for >48 hours

## 2023-09-13 NOTE — ADVANCED PRACTICE NURSE CONSULT - RECOMMEDATIONS
Left lateral foot: cleanse with Vashe, apply Vashe moistened 2x2 gauze to wound bed, cover with dry gauze, Secure with Misa. Change BID    Discussed with bedside RN. Patient education. Supplies at bedside. Left lateral foot: cleanse with Vashe, apply Vashe moistened 2x2 gauze to wound bed, cover with dry gauze, Secure with Misa. Change BID    Discussed with bedside RN. Patient education. Supplies at bedside.  Discussed with medical team.

## 2023-09-13 NOTE — PROGRESS NOTE ADULT - SUBJECTIVE AND OBJECTIVE BOX
INFECTIOUS DISEASES CONSULT FOLLOW-UP NOTE    INTERVAL HPI/OVERNIGHT EVENTS:      ROS:   Constitutional, eyes, ENT, cardiovascular, respiratory, gastrointestinal, genitourinary, integumentary, neurological, psychiatric and heme/lymph are otherwise negative other than noted above       ANTIBIOTICS/RELEVANT:    MEDICATIONS  (STANDING):  enoxaparin Injectable 40 milliGRAM(s) SubCutaneous every 12 hours  gabapentin 100 milliGRAM(s) Oral every 24 hours  gabapentin 800 milliGRAM(s) Oral daily  meropenem  IVPB 2000 milliGRAM(s) IV Intermittent every 8 hours  nebivolol 10 milliGRAM(s) Oral <User Schedule>  polyethylene glycol 3350 17 Gram(s) Oral daily  senna 2 Tablet(s) Oral at bedtime    MEDICATIONS  (PRN):  acetaminophen     Tablet .. 650 milliGRAM(s) Oral every 6 hours PRN Temp greater or equal to 38C (100.4F), Mild Pain (1 - 3)  aluminum hydroxide/magnesium hydroxide/simethicone Suspension 30 milliLiter(s) Oral every 4 hours PRN Dyspepsia  HYDROmorphone  Injectable 1 milliGRAM(s) IV Push every 4 hours PRN Severe Pain (7 - 10)  melatonin 3 milliGRAM(s) Oral at bedtime PRN Insomnia  ondansetron Injectable 4 milliGRAM(s) IV Push every 8 hours PRN Nausea and/or Vomiting        Vital Signs Last 24 Hrs  T(C): 36.7 (13 Sep 2023 09:00), Max: 37.2 (12 Sep 2023 20:42)  T(F): 98.1 (13 Sep 2023 09:00), Max: 98.9 (12 Sep 2023 20:42)  HR: 90 (13 Sep 2023 09:00) (90 - 103)  BP: 147/91 (13 Sep 2023 09:00) (123/83 - 152/98)  BP(mean): 105 (12 Sep 2023 18:40) (105 - 105)  RR: 16 (13 Sep 2023 09:00) (16 - 18)  SpO2: 96% (13 Sep 2023 09:00) (95% - 100%)    Parameters below as of 13 Sep 2023 09:00  Patient On (Oxygen Delivery Method): room air        PHYSICAL EXAM:  Constitutional: alert, NAD  Eyes: the sclera and conjunctiva were normal.   ENT: the ears and nose were normal in appearance.   Neck: the appearance of the neck was normal and the neck was supple.   Pulmonary: no respiratory distress and lungs were clear to auscultation bilaterally.   Heart: heart rate was normal and rhythm regular, normal S1 and S2  Vascular:. there was no peripheral edema  Abdomen: normal bowel sounds, soft, non-tender  Neurological: no focal deficits.   Psychiatric: the affect was normal        LABS:                        11.4   10.11 )-----------( 435      ( 13 Sep 2023 05:30 )             35.0     09-13    136  |  100  |  13  ----------------------------<  102<H>  4.3   |  27  |  0.75    Ca    9.2      13 Sep 2023 05:30  Phos  3.5     09-13  Mg     1.8     09-13    TPro  6.7  /  Alb  3.5  /  TBili  0.7  /  DBili  x   /  AST  14  /  ALT  12  /  AlkPhos  81  09-13    PT/INR - ( 12 Sep 2023 13:42 )   PT: 11.3 sec;   INR: 0.99          PTT - ( 12 Sep 2023 13:42 )  PTT:26.6 sec  Urinalysis Basic - ( 13 Sep 2023 05:30 )    Color: x / Appearance: x / SG: x / pH: x  Gluc: 102 mg/dL / Ketone: x  / Bili: x / Urobili: x   Blood: x / Protein: x / Nitrite: x   Leuk Esterase: x / RBC: x / WBC x   Sq Epi: x / Non Sq Epi: x / Bacteria: x        MICROBIOLOGY:      RADIOLOGY & ADDITIONAL STUDIES:  Reviewed INFECTIOUS DISEASES CONSULT FOLLOW-UP NOTE    INTERVAL HPI/OVERNIGHT EVENTS:    Patient seen and examined at bedside. ISABELLE. Patient reports chills overnight. Denies other complaints.      ROS:   Constitutional, eyes, ENT, cardiovascular, respiratory, gastrointestinal, genitourinary, integumentary, neurological, psychiatric and heme/lymph are otherwise negative other than noted above     ANTIBIOTICS/RELEVANT:    MEDICATIONS  (STANDING):  enoxaparin Injectable 40 milliGRAM(s) SubCutaneous every 12 hours  gabapentin 100 milliGRAM(s) Oral every 24 hours  gabapentin 800 milliGRAM(s) Oral daily  meropenem  IVPB 2000 milliGRAM(s) IV Intermittent every 8 hours  nebivolol 10 milliGRAM(s) Oral <User Schedule>  polyethylene glycol 3350 17 Gram(s) Oral daily  senna 2 Tablet(s) Oral at bedtime    MEDICATIONS  (PRN):  acetaminophen     Tablet .. 650 milliGRAM(s) Oral every 6 hours PRN Temp greater or equal to 38C (100.4F), Mild Pain (1 - 3)  aluminum hydroxide/magnesium hydroxide/simethicone Suspension 30 milliLiter(s) Oral every 4 hours PRN Dyspepsia  HYDROmorphone  Injectable 1 milliGRAM(s) IV Push every 4 hours PRN Severe Pain (7 - 10)  melatonin 3 milliGRAM(s) Oral at bedtime PRN Insomnia  ondansetron Injectable 4 milliGRAM(s) IV Push every 8 hours PRN Nausea and/or Vomiting        Vital Signs Last 24 Hrs  T(C): 36.7 (13 Sep 2023 09:00), Max: 37.2 (12 Sep 2023 20:42)  T(F): 98.1 (13 Sep 2023 09:00), Max: 98.9 (12 Sep 2023 20:42)  HR: 90 (13 Sep 2023 09:00) (90 - 103)  BP: 147/91 (13 Sep 2023 09:00) (123/83 - 152/98)  BP(mean): 105 (12 Sep 2023 18:40) (105 - 105)  RR: 16 (13 Sep 2023 09:00) (16 - 18)  SpO2: 96% (13 Sep 2023 09:00) (95% - 100%)    Parameters below as of 13 Sep 2023 09:00  Patient On (Oxygen Delivery Method): room air        PHYSICAL EXAM:  Constitutional: alert, NAD  Eyes: the sclera and conjunctiva were normal.   ENT: the ears and nose were normal in appearance.   Neck: the appearance of the neck was normal and the neck was supple.   Pulmonary: no respiratory distress and lungs were clear to auscultation bilaterally.   Heart: heart rate was normal and rhythm regular, normal S1 and S2  Vascular:. there was no peripheral edema  Abdomen: normal bowel sounds, soft, non-tender  Extremities: L foot: lateral ankle with approx 1.5 x 2 cm superficial ulceration with surrounding erythema and warmth. Scant yellow drainage. TTP. ROM intact   Neurological: no focal deficits.   Psychiatric: the affect was normal      LABS:                        11.4   10.11 )-----------( 435      ( 13 Sep 2023 05:30 )             35.0     09-13    136  |  100  |  13  ----------------------------<  102<H>  4.3   |  27  |  0.75    Ca    9.2      13 Sep 2023 05:30  Phos  3.5     09-13  Mg     1.8     09-13    TPro  6.7  /  Alb  3.5  /  TBili  0.7  /  DBili  x   /  AST  14  /  ALT  12  /  AlkPhos  81  09-13    PT/INR - ( 12 Sep 2023 13:42 )   PT: 11.3 sec;   INR: 0.99          PTT - ( 12 Sep 2023 13:42 )  PTT:26.6 sec  Urinalysis Basic - ( 13 Sep 2023 05:30 )    Color: x / Appearance: x / SG: x / pH: x  Gluc: 102 mg/dL / Ketone: x  / Bili: x / Urobili: x   Blood: x / Protein: x / Nitrite: x   Leuk Esterase: x / RBC: x / WBC x   Sq Epi: x / Non Sq Epi: x / Bacteria: x        MICROBIOLOGY:  reviewed    RADIOLOGY & ADDITIONAL STUDIES:  Reviewed

## 2023-09-13 NOTE — PROGRESS NOTE ADULT - SUBJECTIVE AND OBJECTIVE BOX
OVERNIGHT EVENTS:    SUBJECTIVE / INTERVAL HPI: Patient seen and examined at bedside.     VITAL SIGNS:  Vital Signs Last 24 Hrs  T(C): 36.8 (13 Sep 2023 05:10), Max: 37.2 (12 Sep 2023 20:42)  T(F): 98.2 (13 Sep 2023 05:10), Max: 98.9 (12 Sep 2023 20:42)  HR: 94 (13 Sep 2023 05:10) (93 - 110)  BP: 123/83 (13 Sep 2023 05:10) (123/83 - 171/112)  BP(mean): 105 (12 Sep 2023 18:40) (105 - 105)  RR: 18 (13 Sep 2023 05:10) (16 - 18)  SpO2: 95% (13 Sep 2023 05:10) (95% - 100%)    Parameters below as of 13 Sep 2023 05:10  Patient On (Oxygen Delivery Method): room air        PHYSICAL EXAM:    General: alert, in no acute distress  HEENT: NC/AT; PERRL, anicteric sclera; MMM  Neck: supple  Cardiovascular: +S1/S2, RRR  Respiratory: CTA B/L; no W/R/R  Gastrointestinal: soft, NT/ND; +BSx4  Extremities: WWP; no edema, clubbing or cyanosis  Vascular: 2+ radial, DP/PT pulses B/L  Neurological: AAOx3; no focal deficits    MEDICATIONS:  MEDICATIONS  (STANDING):  enoxaparin Injectable 40 milliGRAM(s) SubCutaneous every 12 hours  gabapentin 800 milliGRAM(s) Oral daily  gabapentin 100 milliGRAM(s) Oral daily  meropenem  IVPB 2000 milliGRAM(s) IV Intermittent every 8 hours  metoprolol tartrate 12.5 milliGRAM(s) Oral every 12 hours  polyethylene glycol 3350 17 Gram(s) Oral daily  senna 2 Tablet(s) Oral at bedtime    MEDICATIONS  (PRN):  acetaminophen     Tablet .. 650 milliGRAM(s) Oral every 6 hours PRN Temp greater or equal to 38C (100.4F), Mild Pain (1 - 3)  aluminum hydroxide/magnesium hydroxide/simethicone Suspension 30 milliLiter(s) Oral every 4 hours PRN Dyspepsia  HYDROmorphone  Injectable 1 milliGRAM(s) IV Push every 4 hours PRN Severe Pain (7 - 10)  melatonin 3 milliGRAM(s) Oral at bedtime PRN Insomnia  ondansetron Injectable 4 milliGRAM(s) IV Push every 8 hours PRN Nausea and/or Vomiting      ALLERGIES:  Allergies    morphine (Rash)  latex (Rash)  penicillin (Rash)  lisinopril (Angioedema; Rash; Hives)  ciprofloxacin (Rash)  hydrochlorothiazide (Hives)  amoxicillin (Angioedema)    Intolerances        LABS:                        11.4   10.11 )-----------( 435      ( 13 Sep 2023 05:30 )             35.0     12    135  |  102  |  12  ----------------------------<  106<H>  4.6   |  22  |  0.56    Ca    9.4      12 Sep 2023 12:17    TPro  7.7  /  Alb  3.6  /  TBili  0.5  /  DBili  x   /  AST  18  /  ALT  14  /  AlkPhos  92  09-12    PT/INR - ( 12 Sep 2023 13:42 )   PT: 11.3 sec;   INR: 0.99          PTT - ( 12 Sep 2023 13:42 )  PTT:26.6 sec  Urinalysis Basic - ( 12 Sep 2023 20:30 )    Color: Yellow / Appearance: Clear / S.025 / pH: x  Gluc: x / Ketone: NEGATIVE  / Bili: Negative / Urobili: 0.2 E.U./dL   Blood: x / Protein: NEGATIVE mg/dL / Nitrite: NEGATIVE   Leuk Esterase: Trace / RBC: < 5 /HPF / WBC 5-10 /HPF   Sq Epi: x / Non Sq Epi: x / Bacteria: Present /HPF      CAPILLARY BLOOD GLUCOSE          RADIOLOGY & ADDITIONAL TESTS: Reviewed. OVERNIGHT EVENTS: ISABELLE    SUBJECTIVE / INTERVAL HPI: Patient seen and examined at bedside. States her pain is well controlled. NO additional fevers/chills.     VITAL SIGNS:  Vital Signs Last 24 Hrs  T(C): 36.8 (13 Sep 2023 05:10), Max: 37.2 (12 Sep 2023 20:42)  T(F): 98.2 (13 Sep 2023 05:10), Max: 98.9 (12 Sep 2023 20:42)  HR: 94 (13 Sep 2023 05:10) (93 - 110)  BP: 123/83 (13 Sep 2023 05:10) (123/83 - 171/112)  BP(mean): 105 (12 Sep 2023 18:40) (105 - 105)  RR: 18 (13 Sep 2023 05:10) (16 - 18)  SpO2: 95% (13 Sep 2023 05:10) (95% - 100%)    Parameters below as of 13 Sep 2023 05:10  Patient On (Oxygen Delivery Method): room air        PHYSICAL EXAM:    General: alert, in no acute distress  HEENT: NC/AT; PERRL, anicteric sclera; MMM  Neck: supple  Cardiovascular: +S1/S2, RRR  Respiratory: CTA B/L; no W/R/R  Gastrointestinal: soft, NT/ND; +BSx4  Extremities: WWP; clubbing or cyanosis, nonpitting edema of L leg up to the calf, no tenderness  Vascular: 2+ radial, DP/PT pulses B/L  Neurological: AAOx3; no focal deficits    MEDICATIONS:  MEDICATIONS  (STANDING):  enoxaparin Injectable 40 milliGRAM(s) SubCutaneous every 12 hours  gabapentin 800 milliGRAM(s) Oral daily  gabapentin 100 milliGRAM(s) Oral daily  meropenem  IVPB 2000 milliGRAM(s) IV Intermittent every 8 hours  metoprolol tartrate 12.5 milliGRAM(s) Oral every 12 hours  polyethylene glycol 3350 17 Gram(s) Oral daily  senna 2 Tablet(s) Oral at bedtime    MEDICATIONS  (PRN):  acetaminophen     Tablet .. 650 milliGRAM(s) Oral every 6 hours PRN Temp greater or equal to 38C (100.4F), Mild Pain (1 - 3)  aluminum hydroxide/magnesium hydroxide/simethicone Suspension 30 milliLiter(s) Oral every 4 hours PRN Dyspepsia  HYDROmorphone  Injectable 1 milliGRAM(s) IV Push every 4 hours PRN Severe Pain (7 - 10)  melatonin 3 milliGRAM(s) Oral at bedtime PRN Insomnia  ondansetron Injectable 4 milliGRAM(s) IV Push every 8 hours PRN Nausea and/or Vomiting      ALLERGIES:  Allergies    morphine (Rash)  latex (Rash)  penicillin (Rash)  lisinopril (Angioedema; Rash; Hives)  ciprofloxacin (Rash)  hydrochlorothiazide (Hives)  amoxicillin (Angioedema)    Intolerances        LABS:                        11.4   10.11 )-----------( 435      ( 13 Sep 2023 05:30 )             35.0     -12    135  |  102  |  12  ----------------------------<  106<H>  4.6   |  22  |  0.56    Ca    9.4      12 Sep 2023 12:17    TPro  7.7  /  Alb  3.6  /  TBili  0.5  /  DBili  x   /  AST  18  /  ALT  14  /  AlkPhos  92  09-12    PT/INR - ( 12 Sep 2023 13:42 )   PT: 11.3 sec;   INR: 0.99          PTT - ( 12 Sep 2023 13:42 )  PTT:26.6 sec  Urinalysis Basic - ( 12 Sep 2023 20:30 )    Color: Yellow / Appearance: Clear / S.025 / pH: x  Gluc: x / Ketone: NEGATIVE  / Bili: Negative / Urobili: 0.2 E.U./dL   Blood: x / Protein: NEGATIVE mg/dL / Nitrite: NEGATIVE   Leuk Esterase: Trace / RBC: < 5 /HPF / WBC 5-10 /HPF   Sq Epi: x / Non Sq Epi: x / Bacteria: Present /HPF      CAPILLARY BLOOD GLUCOSE          RADIOLOGY & ADDITIONAL TESTS: Reviewed.

## 2023-09-13 NOTE — PROGRESS NOTE ADULT - ASSESSMENT
38F h/o L foot AVM c/b recurrent L foot ulcer and infection presents with fever, chills, ulceration w/ erythema/tenderness/warmth x 1 week. Patient most recently s/p treatment for this recurrent ulcer with hugh and vanc (4/4-5/1). Wound had closed and patient had outpatient follow up with Dr. Rahman on 4/28- no signs of infection and has been doing well since. Wound opened again this past week and has subsequently become infected on exam. Patient afebrile with leukocytosis downtrending 10 (12). She is stable and non-toxic appearing. Dr. Teran to evaluate patient     Suggest:  -Continue meropenem 2g IV Q8  -Continue vancomycin 1250 mg IV Q8. Trough due at 1300 today  -f/u Dr. Teran's recs     Team 2 will follow you.    Case d/w primary team.  Final recommendation pending attending note.    Leah Bailey, Infectious Diseases PA  Please reach out for any questions 9 am-5pm. For evenings and weekends, please call the ID physician on call.  Work cell: [phone] 38F h/o L foot AVM c/b recurrent L foot ulcer and infection presents with fever, chills, ulceration w/ erythema/tenderness/warmth x 1 week. Patient most recently s/p treatment for this recurrent ulcer with hugh and vanc (4/4-5/1). Wound had closed and patient had outpatient follow up with Dr. Rahman on 4/28- no signs of infection and has been doing well since. Wound opened again this past week and has subsequently become infected on exam. Patient afebrile with leukocytosis downtrending 10 (12). She is stable and non-toxic appearing. Dr. Teran to evaluate patient. Vanc trough today 11.4 @ 1400 (about 1 hour late)- subtherapeutic, renal function normal- can increase dose    Suggest:  -Continue meropenem 2g IV Q8  -Can increase vancomycin from 1250 mg to 1500 mg IV Q8h. Check trough 30 minutes before 4th dose. Goal is 13-17  -f/u Dr. Teran's recs     Team 2 will follow you.    Case d/w primary team.  Final recommendation pending attending note.    Leah Bailey, Infectious Diseases PA  Please reach out for any questions 9 am-5pm. For evenings and weekends, please call the ID physician on call.  Work cell: [phone] 38F h/o L foot AVM c/b recurrent L foot ulcer and infection presents with fever, chills, ulceration w/ erythema/tenderness/warmth x 1 week. Patient most recently s/p treatment for this recurrent ulcer with hugh and vanc (4/4-5/1). Wound had closed and patient had outpatient follow up with Dr. Rahman on 4/28- no signs of infection and has been doing well since. Wound opened again this past week and has subsequently become infected on exam. Patient afebrile with leukocytosis downtrending 10 (12). She is stable and non-toxic appearing. Dr. Teran to evaluate patient. Vanc trough today 11.4 @ 1400 (about 1 hour late)- subtherapeutic, renal function normal- can increase dose    Suggest:  -Continue meropenem 2g IV Q8  -Can increase vancomycin from 1250 mg to 1500 mg IV Q8h. Check trough 30 minutes before 4th dose. Goal is 13-17  -f/u Dr. Teran's recs     Tomorrow ID team 1 and Dr. Rahman will assume care  Case d/w primary team.  Final recommendation pending attending note.    Leah Bailey, Infectious Diseases PA  Please reach out for any questions 9 am-5pm. For evenings and weekends, please call the ID physician on call.  Work cell: [phone]

## 2023-09-13 NOTE — ADVANCED PRACTICE NURSE CONSULT - ASSESSMENT
Pt reports long history with foot wounds. Followed outpatient by Dr Teran. Current wound started 1 week ago, signs of infection started 3 days ago. On IV abx.     Left lateral foot: full thickness wound 2x1x0.3cm with 80% thin yellow slough, 20% pink buds. Small serous drainage. Periwound warmth and erythema. Foot warm, dry, nonpitting edema.  Pt reports long history with foot wounds. Followed outpatient by Dr Teran. Current wound started 1 week ago, signs of infection started 3 days ago. On IV abx. Plan for eval today by Dr Teran.    Left lateral foot: full thickness wound 2 x 1 x 0.3cm with 80% thin yellow slough, 20% pink buds. Small serous drainage. Periwound warmth and erythema. No induration or fluctuance. Foot warm, dry, nonpitting edema.

## 2023-09-13 NOTE — PROGRESS NOTE ADULT - NS ATTEND AMEND GEN_ALL_CORE FT
# Recurrent L foot ulcer and SSTI due to underlying AVM with multiple previous direct stick and catheter embolizations  # Prior wound cx with pan-S PsA  - Continue vanc and meropenem. Agree with dosing as above.  - Pending evaluation by Dr. Teran for intervention for AVM     Tomorrow ID team 1 and Dr. Rahman will assume care # Recurrent L foot ulcer and SSTI due to underlying AVM with multiple previous direct stick and catheter embolizations  # Prior wound cx with pan-S PsA  - Continue vanc and meropenem. Agree with dosing as above.  - Pending evaluation by Dr. Teran for intervention for AVM   - Would appears very superficial. Low suspicion for necrotizing infection, or involvement of deep structures. XR without any periosteal reaction, and I do not think further imaging to evaluate extent of infection is needed.    Tomorrow ID team 1 and Dr. Rahman will assume care

## 2023-09-13 NOTE — ADVANCED PRACTICE NURSE CONSULT - REASON FOR CONSULT
Left foot wound, present on admission    Per chart review, 38F w/ PMHx w/ HTN, morbid obesity, chronic L foot AVM s/p multiple embolizations with recurrent nonpurulent ulcer infection presents to [facility] for c/f infected PICC line.

## 2023-09-14 LAB
-  AZTREONAM: SIGNIFICANT CHANGE UP
-  CEFEPIME: SIGNIFICANT CHANGE UP
-  CIPROFLOXACIN: SIGNIFICANT CHANGE UP
-  LEVOFLOXACIN: SIGNIFICANT CHANGE UP
-  PIPERACILLIN/TAZOBACTAM: SIGNIFICANT CHANGE UP
-  TOBRAMYCIN: SIGNIFICANT CHANGE UP
ALBUMIN SERPL ELPH-MCNC: 3.5 G/DL — SIGNIFICANT CHANGE UP (ref 3.3–5)
ALP SERPL-CCNC: 88 U/L — SIGNIFICANT CHANGE UP (ref 40–120)
ALT FLD-CCNC: 11 U/L — SIGNIFICANT CHANGE UP (ref 10–45)
ANION GAP SERPL CALC-SCNC: 11 MMOL/L — SIGNIFICANT CHANGE UP (ref 5–17)
AST SERPL-CCNC: 11 U/L — SIGNIFICANT CHANGE UP (ref 10–40)
BASOPHILS # BLD AUTO: 0.05 K/UL — SIGNIFICANT CHANGE UP (ref 0–0.2)
BASOPHILS NFR BLD AUTO: 0.5 % — SIGNIFICANT CHANGE UP (ref 0–2)
BILIRUB SERPL-MCNC: 0.5 MG/DL — SIGNIFICANT CHANGE UP (ref 0.2–1.2)
BUN SERPL-MCNC: 12 MG/DL — SIGNIFICANT CHANGE UP (ref 7–23)
CALCIUM SERPL-MCNC: 9 MG/DL — SIGNIFICANT CHANGE UP (ref 8.4–10.5)
CHLORIDE SERPL-SCNC: 102 MMOL/L — SIGNIFICANT CHANGE UP (ref 96–108)
CO2 SERPL-SCNC: 25 MMOL/L — SIGNIFICANT CHANGE UP (ref 22–31)
CREAT SERPL-MCNC: 0.6 MG/DL — SIGNIFICANT CHANGE UP (ref 0.5–1.3)
CULTURE RESULTS: NO GROWTH — SIGNIFICANT CHANGE UP
CULTURE RESULTS: SIGNIFICANT CHANGE UP
EGFR: 116 ML/MIN/1.73M2 — SIGNIFICANT CHANGE UP
EOSINOPHIL # BLD AUTO: 0.14 K/UL — SIGNIFICANT CHANGE UP (ref 0–0.5)
EOSINOPHIL NFR BLD AUTO: 1.3 % — SIGNIFICANT CHANGE UP (ref 0–6)
GLUCOSE SERPL-MCNC: 103 MG/DL — HIGH (ref 70–99)
HCT VFR BLD CALC: 35 % — SIGNIFICANT CHANGE UP (ref 34.5–45)
HGB BLD-MCNC: 11.4 G/DL — LOW (ref 11.5–15.5)
IMM GRANULOCYTES NFR BLD AUTO: 0.5 % — SIGNIFICANT CHANGE UP (ref 0–0.9)
LYMPHOCYTES # BLD AUTO: 1.66 K/UL — SIGNIFICANT CHANGE UP (ref 1–3.3)
LYMPHOCYTES # BLD AUTO: 15.1 % — SIGNIFICANT CHANGE UP (ref 13–44)
MAGNESIUM SERPL-MCNC: 1.9 MG/DL — SIGNIFICANT CHANGE UP (ref 1.6–2.6)
MCHC RBC-ENTMCNC: 27.7 PG — SIGNIFICANT CHANGE UP (ref 27–34)
MCHC RBC-ENTMCNC: 32.6 GM/DL — SIGNIFICANT CHANGE UP (ref 32–36)
MCV RBC AUTO: 85 FL — SIGNIFICANT CHANGE UP (ref 80–100)
METHOD TYPE: SIGNIFICANT CHANGE UP
METHOD TYPE: SIGNIFICANT CHANGE UP
MONOCYTES # BLD AUTO: 1.01 K/UL — HIGH (ref 0–0.9)
MONOCYTES NFR BLD AUTO: 9.2 % — SIGNIFICANT CHANGE UP (ref 2–14)
NEUTROPHILS # BLD AUTO: 8.09 K/UL — HIGH (ref 1.8–7.4)
NEUTROPHILS NFR BLD AUTO: 73.4 % — SIGNIFICANT CHANGE UP (ref 43–77)
NRBC # BLD: 0 /100 WBCS — SIGNIFICANT CHANGE UP (ref 0–0)
ORGANISM # SPEC MICROSCOPIC CNT: SIGNIFICANT CHANGE UP
PHOSPHATE SERPL-MCNC: 3.3 MG/DL — SIGNIFICANT CHANGE UP (ref 2.5–4.5)
PLATELET # BLD AUTO: 466 K/UL — HIGH (ref 150–400)
POTASSIUM SERPL-MCNC: 4.2 MMOL/L — SIGNIFICANT CHANGE UP (ref 3.5–5.3)
POTASSIUM SERPL-SCNC: 4.2 MMOL/L — SIGNIFICANT CHANGE UP (ref 3.5–5.3)
PROT SERPL-MCNC: 6.9 G/DL — SIGNIFICANT CHANGE UP (ref 6–8.3)
RBC # BLD: 4.12 M/UL — SIGNIFICANT CHANGE UP (ref 3.8–5.2)
RBC # FLD: 13.1 % — SIGNIFICANT CHANGE UP (ref 10.3–14.5)
SODIUM SERPL-SCNC: 138 MMOL/L — SIGNIFICANT CHANGE UP (ref 135–145)
SPECIMEN SOURCE: SIGNIFICANT CHANGE UP
SPECIMEN SOURCE: SIGNIFICANT CHANGE UP
WBC # BLD: 11 K/UL — HIGH (ref 3.8–10.5)
WBC # FLD AUTO: 11 K/UL — HIGH (ref 3.8–10.5)

## 2023-09-14 PROCEDURE — 99232 SBSQ HOSP IP/OBS MODERATE 35: CPT | Mod: GC

## 2023-09-14 PROCEDURE — 99233 SBSQ HOSP IP/OBS HIGH 50: CPT | Mod: GC

## 2023-09-14 RX ORDER — VANCOMYCIN HCL 1 G
1250 VIAL (EA) INTRAVENOUS EVERY 8 HOURS
Refills: 0 | Status: DISCONTINUED | OUTPATIENT
Start: 2023-09-14 | End: 2023-09-17

## 2023-09-14 RX ADMIN — MEROPENEM 280 MILLIGRAM(S): 1 INJECTION INTRAVENOUS at 22:37

## 2023-09-14 RX ADMIN — GABAPENTIN 800 MILLIGRAM(S): 400 CAPSULE ORAL at 15:17

## 2023-09-14 RX ADMIN — HYDROMORPHONE HYDROCHLORIDE 1 MILLIGRAM(S): 2 INJECTION INTRAMUSCULAR; INTRAVENOUS; SUBCUTANEOUS at 23:26

## 2023-09-14 RX ADMIN — HYDROMORPHONE HYDROCHLORIDE 1 MILLIGRAM(S): 2 INJECTION INTRAMUSCULAR; INTRAVENOUS; SUBCUTANEOUS at 11:04

## 2023-09-14 RX ADMIN — Medication 166.67 MILLIGRAM(S): at 20:06

## 2023-09-14 RX ADMIN — Medication 166.67 MILLIGRAM(S): at 11:19

## 2023-09-14 RX ADMIN — HYDROMORPHONE HYDROCHLORIDE 1 MILLIGRAM(S): 2 INJECTION INTRAMUSCULAR; INTRAVENOUS; SUBCUTANEOUS at 02:51

## 2023-09-14 RX ADMIN — HYDROMORPHONE HYDROCHLORIDE 1 MILLIGRAM(S): 2 INJECTION INTRAMUSCULAR; INTRAVENOUS; SUBCUTANEOUS at 16:16

## 2023-09-14 RX ADMIN — Medication 300 MILLIGRAM(S): at 00:01

## 2023-09-14 RX ADMIN — HYDROMORPHONE HYDROCHLORIDE 1 MILLIGRAM(S): 2 INJECTION INTRAMUSCULAR; INTRAVENOUS; SUBCUTANEOUS at 15:16

## 2023-09-14 RX ADMIN — NEBIVOLOL HYDROCHLORIDE 10 MILLIGRAM(S): 5 TABLET ORAL at 19:22

## 2023-09-14 RX ADMIN — MEROPENEM 280 MILLIGRAM(S): 1 INJECTION INTRAVENOUS at 15:16

## 2023-09-14 RX ADMIN — ENOXAPARIN SODIUM 40 MILLIGRAM(S): 100 INJECTION SUBCUTANEOUS at 06:52

## 2023-09-14 RX ADMIN — GABAPENTIN 100 MILLIGRAM(S): 400 CAPSULE ORAL at 09:49

## 2023-09-14 RX ADMIN — HYDROMORPHONE HYDROCHLORIDE 1 MILLIGRAM(S): 2 INJECTION INTRAMUSCULAR; INTRAVENOUS; SUBCUTANEOUS at 07:20

## 2023-09-14 RX ADMIN — MEROPENEM 280 MILLIGRAM(S): 1 INJECTION INTRAVENOUS at 06:52

## 2023-09-14 RX ADMIN — HYDROMORPHONE HYDROCHLORIDE 1 MILLIGRAM(S): 2 INJECTION INTRAMUSCULAR; INTRAVENOUS; SUBCUTANEOUS at 20:21

## 2023-09-14 RX ADMIN — HYDROMORPHONE HYDROCHLORIDE 1 MILLIGRAM(S): 2 INJECTION INTRAMUSCULAR; INTRAVENOUS; SUBCUTANEOUS at 03:20

## 2023-09-14 RX ADMIN — HYDROMORPHONE HYDROCHLORIDE 1 MILLIGRAM(S): 2 INJECTION INTRAMUSCULAR; INTRAVENOUS; SUBCUTANEOUS at 06:51

## 2023-09-14 RX ADMIN — NEBIVOLOL HYDROCHLORIDE 10 MILLIGRAM(S): 5 TABLET ORAL at 06:52

## 2023-09-14 RX ADMIN — HYDROMORPHONE HYDROCHLORIDE 1 MILLIGRAM(S): 2 INJECTION INTRAMUSCULAR; INTRAVENOUS; SUBCUTANEOUS at 19:21

## 2023-09-14 RX ADMIN — ENOXAPARIN SODIUM 40 MILLIGRAM(S): 100 INJECTION SUBCUTANEOUS at 19:21

## 2023-09-14 NOTE — PROGRESS NOTE ADULT - ASSESSMENT
38F h/o L foot AVM c/b recurrent L foot ulcer and infection presents with fever, chills, ulceration w/ erythema/tenderness/warmth x 1 week. Patient most recently s/p treatment for this recurrent ulcer with hugh and vanc (4/4-5/1). Wound had closed and patient had outpatient follow up with Dr. Rahman on 4/28- no signs of infection and has been doing well since. Wound opened again this past week and has subsequently become infected on exam. Patient afebrile  She is stable and non-toxic appearing. Suspect that given her pattern of disease, will require surgical intervention with possible embolization as it is often resistant to abx alone, ideally during this hospital admission     Suggest:  -Decrease vancomycin to 1.25 g Q8H   -Continue meropenem   -Follow up if there are plans for surgical intervention with Dr. Teran for embolization   -Team 1 will follow along

## 2023-09-14 NOTE — PROGRESS NOTE ADULT - PROBLEM SELECTOR PLAN 3
Known history of L foot AVMs s/p 23 prior embolizations. Follows with Dr. Teran. Patient was recently evaluated s/p angiogram and transcatheter embolization via R femoral groin access with improved perfusion on 03/14. Patient has noticed some oozing of blood around the area, however is not brisk or acutely worsening.  - dressings changes as above  - Continued outpatient follow-up with Dr. Teran Known history of L foot AVMs s/p 23 prior embolizations. Follows with Dr. Teran. Patient was recently evaluated s/p angiogram and transcatheter embolization via R femoral groin access with improved perfusion on 03/14. Patient has noticed some oozing of blood around the area, however is not brisk or acutely worsening.  - dressings changes as above  - Continued follow-up with Dr. Teran inpt Known history of L foot AVMs s/p 23 prior embolizations. Follows with Dr. Teran. S/p angiogram and transcatheter embolization via R femoral groin access with improved perfusion on 03/14.  - dressings changes as above  - Continued follow-up with Dr. Teran

## 2023-09-14 NOTE — PROGRESS NOTE ADULT - SUBJECTIVE AND OBJECTIVE BOX
OVERNIGHT EVENTS:    SUBJECTIVE / INTERVAL HPI: Patient seen and examined at bedside.     VITAL SIGNS:  Vital Signs Last 24 Hrs  T(C): 36.8 (14 Sep 2023 05:47), Max: 37 (13 Sep 2023 15:50)  T(F): 98.3 (14 Sep 2023 05:47), Max: 98.6 (13 Sep 2023 15:50)  HR: 89 (14 Sep 2023 05:47) (89 - 107)  BP: 123/80 (14 Sep 2023 05:47) (123/80 - 157/103)  BP(mean): --  RR: 17 (14 Sep 2023 05:47) (16 - 18)  SpO2: 96% (14 Sep 2023 05:47) (96% - 96%)    Parameters below as of 14 Sep 2023 05:47  Patient On (Oxygen Delivery Method): room air        PHYSICAL EXAM:    General: alert, in no acute distress  HEENT: NC/AT; PERRL, anicteric sclera; MMM  Neck: supple  Cardiovascular: +S1/S2, RRR  Respiratory: CTA B/L; no W/R/R  Gastrointestinal: soft, NT/ND; +BSx4  Extremities: WWP; no edema, clubbing or cyanosis  Vascular: 2+ radial, DP/PT pulses B/L  Neurological: AAOx3; no focal deficits    MEDICATIONS:  MEDICATIONS  (STANDING):  enoxaparin Injectable 40 milliGRAM(s) SubCutaneous every 12 hours  gabapentin 100 milliGRAM(s) Oral every 24 hours  gabapentin 800 milliGRAM(s) Oral daily  meropenem  IVPB 2000 milliGRAM(s) IV Intermittent every 8 hours  nebivolol 10 milliGRAM(s) Oral <User Schedule>  polyethylene glycol 3350 17 Gram(s) Oral daily  senna 2 Tablet(s) Oral at bedtime  vancomycin  IVPB 1500 milliGRAM(s) IV Intermittent every 8 hours    MEDICATIONS  (PRN):  acetaminophen     Tablet .. 650 milliGRAM(s) Oral every 6 hours PRN Temp greater or equal to 38C (100.4F), Mild Pain (1 - 3)  aluminum hydroxide/magnesium hydroxide/simethicone Suspension 30 milliLiter(s) Oral every 4 hours PRN Dyspepsia  HYDROmorphone  Injectable 1 milliGRAM(s) IV Push every 4 hours PRN Severe Pain (7 - 10)  melatonin 3 milliGRAM(s) Oral at bedtime PRN Insomnia  ondansetron Injectable 4 milliGRAM(s) IV Push every 8 hours PRN Nausea and/or Vomiting      ALLERGIES:  Allergies    morphine (Rash)  latex (Rash)  penicillin (Rash)  lisinopril (Angioedema; Rash; Hives)  ciprofloxacin (Rash)  hydrochlorothiazide (Hives)  amoxicillin (Angioedema)    Intolerances        LABS:                        11.4   10.11 )-----------( 435      ( 13 Sep 2023 05:30 )             35.0         136  |  100  |  13  ----------------------------<  102<H>  4.3   |  27  |  0.75    Ca    9.2      13 Sep 2023 05:30  Phos  3.5       Mg     1.8         TPro  6.7  /  Alb  3.5  /  TBili  0.7  /  DBili  x   /  AST  14  /  ALT  12  /  AlkPhos  81  13    PT/INR - ( 12 Sep 2023 13:42 )   PT: 11.3 sec;   INR: 0.99          PTT - ( 12 Sep 2023 13:42 )  PTT:26.6 sec  Urinalysis Basic - ( 13 Sep 2023 10:59 )    Color: Yellow / Appearance: Clear / S.020 / pH: x  Gluc: x / Ketone: NEGATIVE  / Bili: Negative / Urobili: 0.2 E.U./dL   Blood: x / Protein: NEGATIVE mg/dL / Nitrite: NEGATIVE   Leuk Esterase: Trace / RBC: < 5 /HPF / WBC 5-10 /HPF   Sq Epi: x / Non Sq Epi: x / Bacteria: None /HPF      CAPILLARY BLOOD GLUCOSE          RADIOLOGY & ADDITIONAL TESTS: Reviewed. OVERNIGHT EVENTS: ISABELLE    SUBJECTIVE / INTERVAL HPI: Patient seen and examined at bedside. Dressings changed by wound care, per pt slight improvement. Pain well controlled. Tolerating PO. Making urine and having regular BM. Denies any infectious symptoms.     VITAL SIGNS:  Vital Signs Last 24 Hrs  T(C): 36.8 (14 Sep 2023 05:47), Max: 37 (13 Sep 2023 15:50)  T(F): 98.3 (14 Sep 2023 05:47), Max: 98.6 (13 Sep 2023 15:50)  HR: 89 (14 Sep 2023 05:47) (89 - 107)  BP: 123/80 (14 Sep 2023 05:47) (123/80 - 157/103)  BP(mean): --  RR: 17 (14 Sep 2023 05:47) (16 - 18)  SpO2: 96% (14 Sep 2023 05:47) (96% - 96%)    Parameters below as of 14 Sep 2023 05:47  Patient On (Oxygen Delivery Method): room air        PHYSICAL EXAM:    General: alert, in no acute distress  HEENT: NC/AT; PERRL, anicteric sclera; MMM  Neck: supple  Cardiovascular: +S1/S2, RRR  Respiratory: CTA B/L; no W/R/R  Gastrointestinal: +obese, soft, NT/ND; +BSx4  Extremities: WWP; clubbing or cyanosis, nonpitting edema to LLE, + bandage, unsaturated, no drainage  Vascular: 2+ radial, DP/PT pulses B/L  Neurological: AAOx3; no focal deficits    MEDICATIONS:  MEDICATIONS  (STANDING):  enoxaparin Injectable 40 milliGRAM(s) SubCutaneous every 12 hours  gabapentin 100 milliGRAM(s) Oral every 24 hours  gabapentin 800 milliGRAM(s) Oral daily  meropenem  IVPB 2000 milliGRAM(s) IV Intermittent every 8 hours  nebivolol 10 milliGRAM(s) Oral <User Schedule>  polyethylene glycol 3350 17 Gram(s) Oral daily  senna 2 Tablet(s) Oral at bedtime  vancomycin  IVPB 1500 milliGRAM(s) IV Intermittent every 8 hours    MEDICATIONS  (PRN):  acetaminophen     Tablet .. 650 milliGRAM(s) Oral every 6 hours PRN Temp greater or equal to 38C (100.4F), Mild Pain (1 - 3)  aluminum hydroxide/magnesium hydroxide/simethicone Suspension 30 milliLiter(s) Oral every 4 hours PRN Dyspepsia  HYDROmorphone  Injectable 1 milliGRAM(s) IV Push every 4 hours PRN Severe Pain (7 - 10)  melatonin 3 milliGRAM(s) Oral at bedtime PRN Insomnia  ondansetron Injectable 4 milliGRAM(s) IV Push every 8 hours PRN Nausea and/or Vomiting      ALLERGIES:  Allergies    morphine (Rash)  latex (Rash)  penicillin (Rash)  lisinopril (Angioedema; Rash; Hives)  ciprofloxacin (Rash)  hydrochlorothiazide (Hives)  amoxicillin (Angioedema)    Intolerances        LABS:                        11.4   10.11 )-----------( 435      ( 13 Sep 2023 05:30 )             35.0     13    136  |  100  |  13  ----------------------------<  102<H>  4.3   |  27  |  0.75    Ca    9.2      13 Sep 2023 05:30  Phos  3.5       Mg     1.8         TPro  6.7  /  Alb  3.5  /  TBili  0.7  /  DBili  x   /  AST  14  /  ALT  12  /  AlkPhos  81  -13    PT/INR - ( 12 Sep 2023 13:42 )   PT: 11.3 sec;   INR: 0.99          PTT - ( 12 Sep 2023 13:42 )  PTT:26.6 sec  Urinalysis Basic - ( 13 Sep 2023 10:59 )    Color: Yellow / Appearance: Clear / S.020 / pH: x  Gluc: x / Ketone: NEGATIVE  / Bili: Negative / Urobili: 0.2 E.U./dL   Blood: x / Protein: NEGATIVE mg/dL / Nitrite: NEGATIVE   Leuk Esterase: Trace / RBC: < 5 /HPF / WBC 5-10 /HPF   Sq Epi: x / Non Sq Epi: x / Bacteria: None /HPF      CAPILLARY BLOOD GLUCOSE          RADIOLOGY & ADDITIONAL TESTS: Reviewed.

## 2023-09-14 NOTE — PROGRESS NOTE ADULT - PROBLEM SELECTOR PLAN 1
On admission met 2/4 SIRS criteria with HR >90 and WBC >12. Likely source 2/2 PICC line (placed 02/03/23) vs. bacteremia vs. chronic L foot nonpurulent ulcer. Patient has previously been on Annie/Vanc, Dapto/Cefepime regimens. 10/22 Would Cx growing PsA, Staph hemolyticus, Corynebacterium. Lactate, UA neg.     - PICC line removed (03/29/23)  - s/p PICC replacement 4/3  - ID consulted, appreciate recs - recommend c/w Abx for 4 weeks until 5/1  - C/w Meropenem 2g every 8 hours  - C/w Vanc 1250 every 8 hours - trough 3/30 13.9 which is therapeutic. Will check troughs weekly  - BCx 3/29, NGTD for >48 hours 2/2 to foot ulcer/abcess. Patient has previously been on Annie/Vanc, Dapto/Cefepime regimens.   Wound cx +pseudomonas  BCx NGTD x2 x48 hours  UCx NGTD    - C/w Meropenem 2g every 8 hours  - C/w Vanc 1250 every 8 hours, f/u troph and dose accordingly

## 2023-09-14 NOTE — PROGRESS NOTE ADULT - ASSESSMENT
37F w/ PMHx w/ HTN, morbid obesity, chronic L foot AVM s/p multiple embolizations with recurrent nonpurulent ulcer infection presents to [facility] for c/f infected PICC line.

## 2023-09-14 NOTE — PROGRESS NOTE ADULT - SUBJECTIVE AND OBJECTIVE BOX
INFECTIOUS DISEASES CONSULT FOLLOW-UP NOTE    INTERVAL HPI/OVERNIGHT EVENTS:  ISABELLE      ROS:   Constitutional, eyes, ENT, cardiovascular, respiratory, gastrointestinal, genitourinary, integumentary, neurological, psychiatric and heme/lymph are otherwise negative other than noted above     ANTIBIOTICS/RELEVANT:    MEDICATIONS  (STANDING):  enoxaparin Injectable 40 milliGRAM(s) SubCutaneous every 12 hours  gabapentin 100 milliGRAM(s) Oral every 24 hours  gabapentin 800 milliGRAM(s) Oral daily  meropenem  IVPB 2000 milliGRAM(s) IV Intermittent every 8 hours  nebivolol 10 milliGRAM(s) Oral <User Schedule>  polyethylene glycol 3350 17 Gram(s) Oral daily  senna 2 Tablet(s) Oral at bedtime    MEDICATIONS  (PRN):  acetaminophen     Tablet .. 650 milliGRAM(s) Oral every 6 hours PRN Temp greater or equal to 38C (100.4F), Mild Pain (1 - 3)  aluminum hydroxide/magnesium hydroxide/simethicone Suspension 30 milliLiter(s) Oral every 4 hours PRN Dyspepsia  HYDROmorphone  Injectable 1 milliGRAM(s) IV Push every 4 hours PRN Severe Pain (7 - 10)  melatonin 3 milliGRAM(s) Oral at bedtime PRN Insomnia  ondansetron Injectable 4 milliGRAM(s) IV Push every 8 hours PRN Nausea and/or Vomiting        Vital Signs Last 24 Hrs  T(C): 36.7 (13 Sep 2023 09:00), Max: 37.2 (12 Sep 2023 20:42)  T(F): 98.1 (13 Sep 2023 09:00), Max: 98.9 (12 Sep 2023 20:42)  HR: 90 (13 Sep 2023 09:00) (90 - 103)  BP: 147/91 (13 Sep 2023 09:00) (123/83 - 152/98)  BP(mean): 105 (12 Sep 2023 18:40) (105 - 105)  RR: 16 (13 Sep 2023 09:00) (16 - 18)  SpO2: 96% (13 Sep 2023 09:00) (95% - 100%)    Parameters below as of 13 Sep 2023 09:00  Patient On (Oxygen Delivery Method): room air        PHYSICAL EXAM:  Constitutional: alert, NAD  Eyes: the sclera and conjunctiva were normal.   ENT: the ears and nose were normal in appearance.   Neck: the appearance of the neck was normal and the neck was supple.   Pulmonary: no respiratory distress and lungs were clear to auscultation bilaterally.   Heart: heart rate was normal and rhythm regular, normal S1 and S2  Vascular:. there was no peripheral edema  Abdomen: normal bowel sounds, soft, non-tender  Extremities: L foot: lateral ankle with approx 1.5 x 2 cm superficial ulceration with surrounding erythema and warmth. Scant yellow drainage. TTP. ROM intact   Neurological: no focal deficits.   Psychiatric: the affect was normal      LABS:                        11.4   10.11 )-----------( 435      ( 13 Sep 2023 05:30 )             35.0     09-13    136  |  100  |  13  ----------------------------<  102<H>  4.3   |  27  |  0.75    Ca    9.2      13 Sep 2023 05:30  Phos  3.5     09-13  Mg     1.8     09-13    TPro  6.7  /  Alb  3.5  /  TBili  0.7  /  DBili  x   /  AST  14  /  ALT  12  /  AlkPhos  81  09-13    PT/INR - ( 12 Sep 2023 13:42 )   PT: 11.3 sec;   INR: 0.99          PTT - ( 12 Sep 2023 13:42 )  PTT:26.6 sec  Urinalysis Basic - ( 13 Sep 2023 05:30 )    Color: x / Appearance: x / SG: x / pH: x  Gluc: 102 mg/dL / Ketone: x  / Bili: x / Urobili: x   Blood: x / Protein: x / Nitrite: x   Leuk Esterase: x / RBC: x / WBC x   Sq Epi: x / Non Sq Epi: x / Bacteria: x        MICROBIOLOGY:  reviewed    RADIOLOGY & ADDITIONAL STUDIES:  Reviewed INFECTIOUS DISEASES CONSULT FOLLOW-UP NOTE    INTERVAL HPI/OVERNIGHT EVENTS:  ISABELLE    Today feels well, no new complaints, states she is fatigued but denies fever/chills  Foot pain well controlled     ROS:   Constitutional, eyes, ENT, cardiovascular, respiratory, gastrointestinal, genitourinary, integumentary, neurological, psychiatric and heme/lymph are otherwise negative other than noted above     ANTIBIOTICS/RELEVANT:    MEDICATIONS  (STANDING):  enoxaparin Injectable 40 milliGRAM(s) SubCutaneous every 12 hours  gabapentin 100 milliGRAM(s) Oral every 24 hours  gabapentin 800 milliGRAM(s) Oral daily  meropenem  IVPB 2000 milliGRAM(s) IV Intermittent every 8 hours  nebivolol 10 milliGRAM(s) Oral <User Schedule>  polyethylene glycol 3350 17 Gram(s) Oral daily  senna 2 Tablet(s) Oral at bedtime    MEDICATIONS  (PRN):  acetaminophen     Tablet .. 650 milliGRAM(s) Oral every 6 hours PRN Temp greater or equal to 38C (100.4F), Mild Pain (1 - 3)  aluminum hydroxide/magnesium hydroxide/simethicone Suspension 30 milliLiter(s) Oral every 4 hours PRN Dyspepsia  HYDROmorphone  Injectable 1 milliGRAM(s) IV Push every 4 hours PRN Severe Pain (7 - 10)  melatonin 3 milliGRAM(s) Oral at bedtime PRN Insomnia  ondansetron Injectable 4 milliGRAM(s) IV Push every 8 hours PRN Nausea and/or Vomiting        Vital Signs Last 24 Hrs  T(C): 36.7 (13 Sep 2023 09:00), Max: 37.2 (12 Sep 2023 20:42)  T(F): 98.1 (13 Sep 2023 09:00), Max: 98.9 (12 Sep 2023 20:42)  HR: 90 (13 Sep 2023 09:00) (90 - 103)  BP: 147/91 (13 Sep 2023 09:00) (123/83 - 152/98)  BP(mean): 105 (12 Sep 2023 18:40) (105 - 105)  RR: 16 (13 Sep 2023 09:00) (16 - 18)  SpO2: 96% (13 Sep 2023 09:00) (95% - 100%)    Parameters below as of 13 Sep 2023 09:00  Patient On (Oxygen Delivery Method): room air        PHYSICAL EXAM:  Constitutional: alert, NAD  Eyes: the sclera and conjunctiva were normal.   ENT: the ears and nose were normal in appearance.   Neck: the appearance of the neck was normal and the neck was supple.   Pulmonary: no respiratory distress and lungs were clear to auscultation bilaterally.   Heart: heart rate was normal and rhythm regular, normal S1 and S2  Vascular:. there was no peripheral edema  Abdomen: normal bowel sounds, soft, non-tender  Extremities: L foot: lateral ankle with approx 1.5 x 2 cm superficial ulceration with surrounding erythema and warmth. Scant yellow drainage. TTP. ROM intact   Neurological: no focal deficits.   Psychiatric: the affect was normal      LABS:                        11.4   10.11 )-----------( 435      ( 13 Sep 2023 05:30 )             35.0     09-13    136  |  100  |  13  ----------------------------<  102<H>  4.3   |  27  |  0.75    Ca    9.2      13 Sep 2023 05:30  Phos  3.5     09-13  Mg     1.8     09-13    TPro  6.7  /  Alb  3.5  /  TBili  0.7  /  DBili  x   /  AST  14  /  ALT  12  /  AlkPhos  81  09-13    PT/INR - ( 12 Sep 2023 13:42 )   PT: 11.3 sec;   INR: 0.99          PTT - ( 12 Sep 2023 13:42 )  PTT:26.6 sec  Urinalysis Basic - ( 13 Sep 2023 05:30 )    Color: x / Appearance: x / SG: x / pH: x  Gluc: 102 mg/dL / Ketone: x  / Bili: x / Urobili: x   Blood: x / Protein: x / Nitrite: x   Leuk Esterase: x / RBC: x / WBC x   Sq Epi: x / Non Sq Epi: x / Bacteria: x        MICROBIOLOGY:  reviewed    RADIOLOGY & ADDITIONAL STUDIES:  Reviewed

## 2023-09-14 NOTE — PROGRESS NOTE ADULT - PROBLEM SELECTOR PLAN 5
BMI 46.5. Patient is taking Qsymia daily with dietary modifications.   - Counseled on lifestyle modifications   - Patient will continue Qsymia outpatient BMI 46.1 with Class II obesity   - Counseled on lifestyle modifications   - nutrition consult  - DASH diet  - per patient, she has been debilitated due to recurrent AVM infections/abcess requiring frequent hospitalizations for IV abx. Has been subsequently sedentary.

## 2023-09-14 NOTE — PROGRESS NOTE ADULT - PROBLEM SELECTOR PLAN 2
Known chronic L foot ulcer due to AVM. Patient follows with Dr. Teran for AVMs and Pain management. On home Gabapentin 100mg in the morning and 800mg at 4pm. Percocet 10 q4hrs PRN. BUN 7/Cr 0.63 (at baseline)  - c/w Gabapentin 100mg in the morning and 800mg at 4PM  - c/w Dilaudid 1mg PO q4 PRN  - c/w Bowel regimen    - c/w dressings changes as follows: saline w/ medihoney and gauze to be done by nursing staff BID  - Sepsis/Abx plan as above Known chronic L foot ulcer due to AVM. Patient follows with Dr. Teran for AVMs and Pain management.   On home Gabapentin 100mg in the morning and 800mg at 4pm. Percocet 10 q4hrs PRN.   - c/w Gabapentin 100mg in the morning + 800mg at 4PM  - patient not amendable to increasing gabapentin 100 mg to TID as it causes HA and discomfort  - c/w Dilaudid 1mg PO q4 PRN  - c/w Bowel regimen    - c/w dressings changes as follows: w/ Vashe moistened 2x2 gauze 2 times daily   - Sepsis/Abx plan as above

## 2023-09-15 LAB
-  MEROPENEM: SIGNIFICANT CHANGE UP
ALBUMIN SERPL ELPH-MCNC: 3.3 G/DL — SIGNIFICANT CHANGE UP (ref 3.3–5)
ALP SERPL-CCNC: 88 U/L — SIGNIFICANT CHANGE UP (ref 40–120)
ALT FLD-CCNC: 10 U/L — SIGNIFICANT CHANGE UP (ref 10–45)
ANION GAP SERPL CALC-SCNC: 9 MMOL/L — SIGNIFICANT CHANGE UP (ref 5–17)
AST SERPL-CCNC: 12 U/L — SIGNIFICANT CHANGE UP (ref 10–40)
BASOPHILS # BLD AUTO: 0.07 K/UL — SIGNIFICANT CHANGE UP (ref 0–0.2)
BASOPHILS NFR BLD AUTO: 0.5 % — SIGNIFICANT CHANGE UP (ref 0–2)
BILIRUB SERPL-MCNC: 0.6 MG/DL — SIGNIFICANT CHANGE UP (ref 0.2–1.2)
BUN SERPL-MCNC: 12 MG/DL — SIGNIFICANT CHANGE UP (ref 7–23)
CALCIUM SERPL-MCNC: 9.6 MG/DL — SIGNIFICANT CHANGE UP (ref 8.4–10.5)
CHLORIDE SERPL-SCNC: 100 MMOL/L — SIGNIFICANT CHANGE UP (ref 96–108)
CO2 SERPL-SCNC: 24 MMOL/L — SIGNIFICANT CHANGE UP (ref 22–31)
CREAT SERPL-MCNC: 0.66 MG/DL — SIGNIFICANT CHANGE UP (ref 0.5–1.3)
EGFR: 114 ML/MIN/1.73M2 — SIGNIFICANT CHANGE UP
EOSINOPHIL # BLD AUTO: 0.18 K/UL — SIGNIFICANT CHANGE UP (ref 0–0.5)
EOSINOPHIL NFR BLD AUTO: 1.4 % — SIGNIFICANT CHANGE UP (ref 0–6)
GLUCOSE SERPL-MCNC: 98 MG/DL — SIGNIFICANT CHANGE UP (ref 70–99)
HCT VFR BLD CALC: 35.6 % — SIGNIFICANT CHANGE UP (ref 34.5–45)
HGB BLD-MCNC: 11.4 G/DL — LOW (ref 11.5–15.5)
IMM GRANULOCYTES NFR BLD AUTO: 0.5 % — SIGNIFICANT CHANGE UP (ref 0–0.9)
LYMPHOCYTES # BLD AUTO: 15.4 % — SIGNIFICANT CHANGE UP (ref 13–44)
LYMPHOCYTES # BLD AUTO: 2 K/UL — SIGNIFICANT CHANGE UP (ref 1–3.3)
MAGNESIUM SERPL-MCNC: 1.9 MG/DL — SIGNIFICANT CHANGE UP (ref 1.6–2.6)
MCHC RBC-ENTMCNC: 27.6 PG — SIGNIFICANT CHANGE UP (ref 27–34)
MCHC RBC-ENTMCNC: 32 GM/DL — SIGNIFICANT CHANGE UP (ref 32–36)
MCV RBC AUTO: 86.2 FL — SIGNIFICANT CHANGE UP (ref 80–100)
MONOCYTES # BLD AUTO: 1 K/UL — HIGH (ref 0–0.9)
MONOCYTES NFR BLD AUTO: 7.7 % — SIGNIFICANT CHANGE UP (ref 2–14)
NEUTROPHILS # BLD AUTO: 9.68 K/UL — HIGH (ref 1.8–7.4)
NEUTROPHILS NFR BLD AUTO: 74.5 % — SIGNIFICANT CHANGE UP (ref 43–77)
NRBC # BLD: 0 /100 WBCS — SIGNIFICANT CHANGE UP (ref 0–0)
PHOSPHATE SERPL-MCNC: 3.5 MG/DL — SIGNIFICANT CHANGE UP (ref 2.5–4.5)
PLATELET # BLD AUTO: 458 K/UL — HIGH (ref 150–400)
POTASSIUM SERPL-MCNC: 4.3 MMOL/L — SIGNIFICANT CHANGE UP (ref 3.5–5.3)
POTASSIUM SERPL-SCNC: 4.3 MMOL/L — SIGNIFICANT CHANGE UP (ref 3.5–5.3)
PROT SERPL-MCNC: 6.5 G/DL — SIGNIFICANT CHANGE UP (ref 6–8.3)
RBC # BLD: 4.13 M/UL — SIGNIFICANT CHANGE UP (ref 3.8–5.2)
RBC # FLD: 13 % — SIGNIFICANT CHANGE UP (ref 10.3–14.5)
SODIUM SERPL-SCNC: 133 MMOL/L — LOW (ref 135–145)
VANCOMYCIN TROUGH SERPL-MCNC: 14.2 UG/ML — SIGNIFICANT CHANGE UP (ref 10–20)
WBC # BLD: 13 K/UL — HIGH (ref 3.8–10.5)
WBC # FLD AUTO: 13 K/UL — HIGH (ref 3.8–10.5)

## 2023-09-15 PROCEDURE — 99232 SBSQ HOSP IP/OBS MODERATE 35: CPT | Mod: GC

## 2023-09-15 PROCEDURE — 99233 SBSQ HOSP IP/OBS HIGH 50: CPT | Mod: GC

## 2023-09-15 RX ADMIN — NEBIVOLOL HYDROCHLORIDE 10 MILLIGRAM(S): 5 TABLET ORAL at 06:13

## 2023-09-15 RX ADMIN — HYDROMORPHONE HYDROCHLORIDE 1 MILLIGRAM(S): 2 INJECTION INTRAMUSCULAR; INTRAVENOUS; SUBCUTANEOUS at 08:07

## 2023-09-15 RX ADMIN — Medication 166.67 MILLIGRAM(S): at 04:00

## 2023-09-15 RX ADMIN — Medication 166.67 MILLIGRAM(S): at 19:20

## 2023-09-15 RX ADMIN — NEBIVOLOL HYDROCHLORIDE 10 MILLIGRAM(S): 5 TABLET ORAL at 18:05

## 2023-09-15 RX ADMIN — HYDROMORPHONE HYDROCHLORIDE 1 MILLIGRAM(S): 2 INJECTION INTRAMUSCULAR; INTRAVENOUS; SUBCUTANEOUS at 03:27

## 2023-09-15 RX ADMIN — HYDROMORPHONE HYDROCHLORIDE 1 MILLIGRAM(S): 2 INJECTION INTRAMUSCULAR; INTRAVENOUS; SUBCUTANEOUS at 23:53

## 2023-09-15 RX ADMIN — Medication 166.67 MILLIGRAM(S): at 11:07

## 2023-09-15 RX ADMIN — HYDROMORPHONE HYDROCHLORIDE 1 MILLIGRAM(S): 2 INJECTION INTRAMUSCULAR; INTRAVENOUS; SUBCUTANEOUS at 20:30

## 2023-09-15 RX ADMIN — GABAPENTIN 100 MILLIGRAM(S): 400 CAPSULE ORAL at 07:32

## 2023-09-15 RX ADMIN — MEROPENEM 280 MILLIGRAM(S): 1 INJECTION INTRAVENOUS at 22:32

## 2023-09-15 RX ADMIN — MEROPENEM 280 MILLIGRAM(S): 1 INJECTION INTRAVENOUS at 06:13

## 2023-09-15 RX ADMIN — ENOXAPARIN SODIUM 40 MILLIGRAM(S): 100 INJECTION SUBCUTANEOUS at 18:05

## 2023-09-15 RX ADMIN — HYDROMORPHONE HYDROCHLORIDE 1 MILLIGRAM(S): 2 INJECTION INTRAMUSCULAR; INTRAVENOUS; SUBCUTANEOUS at 03:55

## 2023-09-15 RX ADMIN — ENOXAPARIN SODIUM 40 MILLIGRAM(S): 100 INJECTION SUBCUTANEOUS at 06:13

## 2023-09-15 RX ADMIN — HYDROMORPHONE HYDROCHLORIDE 1 MILLIGRAM(S): 2 INJECTION INTRAMUSCULAR; INTRAVENOUS; SUBCUTANEOUS at 11:50

## 2023-09-15 RX ADMIN — MEROPENEM 280 MILLIGRAM(S): 1 INJECTION INTRAVENOUS at 14:27

## 2023-09-15 RX ADMIN — GABAPENTIN 800 MILLIGRAM(S): 400 CAPSULE ORAL at 15:00

## 2023-09-15 RX ADMIN — HYDROMORPHONE HYDROCHLORIDE 1 MILLIGRAM(S): 2 INJECTION INTRAMUSCULAR; INTRAVENOUS; SUBCUTANEOUS at 11:35

## 2023-09-15 RX ADMIN — HYDROMORPHONE HYDROCHLORIDE 1 MILLIGRAM(S): 2 INJECTION INTRAMUSCULAR; INTRAVENOUS; SUBCUTANEOUS at 19:52

## 2023-09-15 RX ADMIN — HYDROMORPHONE HYDROCHLORIDE 1 MILLIGRAM(S): 2 INJECTION INTRAMUSCULAR; INTRAVENOUS; SUBCUTANEOUS at 15:36

## 2023-09-15 RX ADMIN — HYDROMORPHONE HYDROCHLORIDE 1 MILLIGRAM(S): 2 INJECTION INTRAMUSCULAR; INTRAVENOUS; SUBCUTANEOUS at 07:33

## 2023-09-15 RX ADMIN — HYDROMORPHONE HYDROCHLORIDE 1 MILLIGRAM(S): 2 INJECTION INTRAMUSCULAR; INTRAVENOUS; SUBCUTANEOUS at 00:00

## 2023-09-15 RX ADMIN — HYDROMORPHONE HYDROCHLORIDE 1 MILLIGRAM(S): 2 INJECTION INTRAMUSCULAR; INTRAVENOUS; SUBCUTANEOUS at 15:51

## 2023-09-15 NOTE — PROGRESS NOTE ADULT - SUBJECTIVE AND OBJECTIVE BOX
INFECTIOUS DISEASES CONSULT FOLLOW-UP NOTE    INTERVAL HPI/OVERNIGHT EVENTS:  ISABELLE    Today feels well, no new complaints  Foot pain well controlled     ROS:   Constitutional, eyes, ENT, cardiovascular, respiratory, gastrointestinal, genitourinary, integumentary, neurological, psychiatric and heme/lymph are otherwise negative other than noted above     ANTIBIOTICS/RELEVANT:    MEDICATIONS  (STANDING):  enoxaparin Injectable 40 milliGRAM(s) SubCutaneous every 12 hours  gabapentin 100 milliGRAM(s) Oral every 24 hours  gabapentin 800 milliGRAM(s) Oral daily  meropenem  IVPB 2000 milliGRAM(s) IV Intermittent every 8 hours  nebivolol 10 milliGRAM(s) Oral <User Schedule>  polyethylene glycol 3350 17 Gram(s) Oral daily  senna 2 Tablet(s) Oral at bedtime    MEDICATIONS  (PRN):  acetaminophen     Tablet .. 650 milliGRAM(s) Oral every 6 hours PRN Temp greater or equal to 38C (100.4F), Mild Pain (1 - 3)  aluminum hydroxide/magnesium hydroxide/simethicone Suspension 30 milliLiter(s) Oral every 4 hours PRN Dyspepsia  HYDROmorphone  Injectable 1 milliGRAM(s) IV Push every 4 hours PRN Severe Pain (7 - 10)  melatonin 3 milliGRAM(s) Oral at bedtime PRN Insomnia  ondansetron Injectable 4 milliGRAM(s) IV Push every 8 hours PRN Nausea and/or Vomiting        Vital Signs Last 24 Hrs  T(C): 36.7 (13 Sep 2023 09:00), Max: 37.2 (12 Sep 2023 20:42)  T(F): 98.1 (13 Sep 2023 09:00), Max: 98.9 (12 Sep 2023 20:42)  HR: 90 (13 Sep 2023 09:00) (90 - 103)  BP: 147/91 (13 Sep 2023 09:00) (123/83 - 152/98)  BP(mean): 105 (12 Sep 2023 18:40) (105 - 105)  RR: 16 (13 Sep 2023 09:00) (16 - 18)  SpO2: 96% (13 Sep 2023 09:00) (95% - 100%)    Parameters below as of 13 Sep 2023 09:00  Patient On (Oxygen Delivery Method): room air        PHYSICAL EXAM:  Constitutional: alert, NAD  Eyes: the sclera and conjunctiva were normal.   ENT: the ears and nose were normal in appearance.   Neck: the appearance of the neck was normal and the neck was supple.   Pulmonary: no respiratory distress and lungs were clear to auscultation bilaterally.   Heart: heart rate was normal and rhythm regular, normal S1 and S2  Vascular:. there was no peripheral edema  Abdomen: normal bowel sounds, soft, non-tender  Extremities: L foot: lateral ankle with approx 1.5 x 2 cm superficial ulceration with surrounding erythema and warmth. Scant yellow drainage. TTP. ROM intact   Neurological: no focal deficits.   Psychiatric: the affect was normal      LABS:                        11.4   10.11 )-----------( 435      ( 13 Sep 2023 05:30 )             35.0     09-13    136  |  100  |  13  ----------------------------<  102<H>  4.3   |  27  |  0.75    Ca    9.2      13 Sep 2023 05:30  Phos  3.5     09-13  Mg     1.8     09-13    TPro  6.7  /  Alb  3.5  /  TBili  0.7  /  DBili  x   /  AST  14  /  ALT  12  /  AlkPhos  81  09-13    PT/INR - ( 12 Sep 2023 13:42 )   PT: 11.3 sec;   INR: 0.99          PTT - ( 12 Sep 2023 13:42 )  PTT:26.6 sec  Urinalysis Basic - ( 13 Sep 2023 05:30 )    Color: x / Appearance: x / SG: x / pH: x  Gluc: 102 mg/dL / Ketone: x  / Bili: x / Urobili: x   Blood: x / Protein: x / Nitrite: x   Leuk Esterase: x / RBC: x / WBC x   Sq Epi: x / Non Sq Epi: x / Bacteria: x        MICROBIOLOGY:  reviewed    RADIOLOGY & ADDITIONAL STUDIES:  Reviewed

## 2023-09-15 NOTE — PROGRESS NOTE ADULT - PROBLEM SELECTOR PLAN 2
Known chronic L foot ulcer due to AVM. Patient follows with Dr. Teran for AVMs and Pain management.   On home Gabapentin 100mg in the morning and 800mg at 4pm. Percocet 10 q4hrs PRN.   - c/w Gabapentin 100mg in the morning + 800mg at 4PM  - patient not amendable to increasing gabapentin 100 mg to TID as it causes HA and discomfort  - c/w Dilaudid 1mg PO q4 PRN  - c/w Bowel regimen    - c/w dressings changes as follows: w/ Vashe moistened 2x2 gauze 2 times daily   - Sepsis/Abx plan as above

## 2023-09-15 NOTE — DIETITIAN INITIAL EVALUATION ADULT - PROBLEM SELECTOR PLAN 3
Known history of L foot AVMs s/p 23 prior embolizations. Follows with Dr. Teran. Patient was recently evaluated s/p angiogram and transcatheter embolization via R femoral groin access with improved perfusion on 03/14.     - Wound Care consult   - Recommend continued outpatient follow-up with Dr. Teran

## 2023-09-15 NOTE — DIETITIAN INITIAL EVALUATION ADULT - PROBLEM SELECTOR PLAN 2
Known chronic L foot ulcer due to AVM. Patient follows with Vascular Dr. Trean for AVMs and Pain management. On home Gabapentin 100mg in the morning and 800mg at 4pm. Percocet 10mg q4hrs PRN.    - c/w Gabapentin 100mg in the morning and 800mg at 4PM  - Hold home med Percocet   - Pain regimen:  Tylenol 650 mg Q6 PRN - mild pain  Oxycodone 5 mg Q6 PRN - moderate pain  Dilaudid 1mg IV q4 - severe pain  - c/w Bowel regimen: Senna 2 tablets, Miralax 17 g oral daily    - Wound Care consult  - Sepsis/Abx plan as above.

## 2023-09-15 NOTE — DIETITIAN INITIAL EVALUATION ADULT - PROBLEM SELECTOR PROBLEM 3
CHIEF COMPLAINT:   Foot Pain (Lt foot DOI 6/7/20)    HPI:  Rosi Azul is a 15 year old female who comes in with mom today complaining of left foot pain/swelling. Trailer fell onto left foot yesterday.  Iced and Ibuprofen last night. No previous injury noted.     ROS:  Comprehensive review of systems was reviewed and discussed with the patient, and was otherwise negative, except as noted in the history of present illness, above.    PAST MEDICAL, SURGICAL, MEDICATION, ALLERGY, & SOCIAL HISTORIES:  I have reviewed the past medical history, family history, social history, medications and allergies listed in the medical record as obtained by my nursing staff and support staff and agree with their documentation.    Past Medical History:   Diagnosis Date   • Head ache    • Pneumonia    • RAD (reactive airway disease)      Past Surgical History:   Procedure Laterality Date   • Adenoidectomy w/ myringotomy and tubes     • Tonsillectomy and adenoidectomy       Social History     Tobacco Use   • Smoking status: Never Smoker   • Smokeless tobacco: Never Used   Substance Use Topics   • Alcohol use: No     OBJECTIVE:  Visit Vitals  /75   Pulse 76   Temp 98.9 °F (37.2 °C) (Tympanic)   Wt 46 kg   LMP 11/14/2019 (Exact Date)   SpO2 99%     Physical Exam:  GENERAL:  Pleasant, alert and oriented, female in no acute distress.    BACK:  Grossly normal appearance; no erythema, ecchymosis, swelling, warmth, or sign of injury; no midline or bony tenderness; normal spinal curvatures observed; no CVA tenderness; hip alignment and height grossly normal; range of motion is full in all planes  HIPS:  Pelvis is stable; hip range of motion is full in all planes, bilaterally.  EXTREMITIES:   UPPER EXT:  Unremarkable   LOWER EXT:      Notable for area of redness to left anterior forefoot and mild bruising noted distally. Generalized tenderness to palpation to anterior foot.  Positive pedal/posterior tibial pulses noted.    NEURO:  lower extremity strength and sensory is normal; gait is normal  SKIN:  No rashes; normal turgor.    ASSESSMENT:  1. Contusion of left foot, initial encounter    2. Injury of left foot, initial encounter      PLAN:  Orders Placed This Encounter   • XR Foot 3 + View Left     XR of the left foot has been reviewed by this provider as well as with pt in office today- negative for fracture. Orthoe shoe for comfort. Resume normal diet/activity as tolerated.  Drink plenty of fluids to maintain good hydration. Follow-up with your primary care provider for recheck and to coordinate further care as necessary. Return to the clinic or urgent care sooner for worsening symptoms or any other concerns.    Walter Tarango MD             Arteriovenous malformation (AVM)

## 2023-09-15 NOTE — PROGRESS NOTE ADULT - ASSESSMENT
37F w/ PMHx w/ HTN, morbid obesity, chronic L foot AVM s/p multiple embolizations with recurrent nonpurulent ulcer infection presents to [facility] for c/f infected PICC line.

## 2023-09-15 NOTE — DIETITIAN INITIAL EVALUATION ADULT - PROBLEM SELECTOR PLAN 4
On admission, presented with /112. Likely acutely elevated due to pain. On home Bystolic 10mg every 12 hours    - Continue Metoprolol tartrate 12.5 mg BID with hold parameters

## 2023-09-15 NOTE — PROGRESS NOTE ADULT - PROBLEM SELECTOR PLAN 3
Known history of L foot AVMs s/p 23 prior embolizations. Follows with Dr. Teran. S/p angiogram and transcatheter embolization via R femoral groin access with improved perfusion on 03/14.  - dressings changes as above  - Continued follow-up with Dr. Teran

## 2023-09-15 NOTE — PROGRESS NOTE ADULT - ASSESSMENT
38F h/o L foot AVM c/b recurrent L foot ulcer and infection presents with fever, chills, ulceration w/ erythema/tenderness/warmth x 1 week. Patient most recently s/p treatment for this recurrent ulcer with hugh and vanc (4/4-5/1). Wound had closed and patient had outpatient follow up with Dr. Rahman on 4/28- no signs of infection and has been doing well since. Wound opened again this past week and has subsequently become infected on exam. Patient afebrile  She is stable and non-toxic appearing. Suspect that given her pattern of disease, will require surgical intervention with possible embolization as it is often resistant to abx alone, ideally during this hospital admission     Suggest:  -Continue vancomycin   -Continue meropenem   -Anticipate possible emobolization next Tuesday. To be evaluated on 9/18 for consideration   -Team 1 will follow along    38F h/o L foot AVM c/b recurrent L foot ulcer and infection presents with fever, chills, ulceration w/ erythema/tenderness/warmth x 1 week. Patient most recently s/p treatment for this recurrent ulcer with hugh and vanc (4/4-5/1). Wound had closed and patient had outpatient follow up with Dr. Rahman on 4/28- no signs of infection and has been doing well since. Wound opened again this past week and has subsequently become infected on exam. Patient afebrile  She is stable and non-toxic appearing. Suspect that given her pattern of disease, will require surgical intervention with possible embolization as it is often resistant to abx alone, ideally during this hospital admission     Suggest:  -Continue vancomycin 1250 mg IV q8h  -Continue meropenem 2 g IV q8h  -Anticipate possible emobolization next Tuesday. To be evaluated on 9/18 for consideration   -Team 1 will follow along

## 2023-09-15 NOTE — DIETITIAN INITIAL EVALUATION ADULT - PROBLEM SELECTOR PLAN 1
Meets 2/4 SIRS Criteria with WBC>12 and HR>90. Likely source is chronic L foot ulcer.   s/p Meropenem 2000 mg IV infusion in ED    -Continue Vancomycin 1250 mg Q8 and Meropenem 2g Q8  -Wound care consult  -f/u Wound culture  -f/u ID Team 2 consult

## 2023-09-15 NOTE — PROGRESS NOTE ADULT - PROBLEM SELECTOR PLAN 5
BMI 46.1 with Class II obesity   - Counseled on lifestyle modifications   - nutrition consult  - DASH diet  - per patient, she has been debilitated due to recurrent AVM infections/abcess requiring frequent hospitalizations for IV abx. Has been subsequently sedentary.

## 2023-09-15 NOTE — PROGRESS NOTE ADULT - SUBJECTIVE AND OBJECTIVE BOX
OVERNIGHT EVENTS:    SUBJECTIVE / INTERVAL HPI: Patient seen and examined at bedside.     VITAL SIGNS:  Vital Signs Last 24 Hrs  T(C): 36.6 (15 Sep 2023 06:05), Max: 37.1 (14 Sep 2023 21:09)  T(F): 97.8 (15 Sep 2023 06:05), Max: 98.8 (14 Sep 2023 21:09)  HR: 85 (15 Sep 2023 06:05) (85 - 94)  BP: 139/89 (15 Sep 2023 06:05) (123/82 - 148/86)  BP(mean): --  RR: 17 (15 Sep 2023 06:05) (16 - 18)  SpO2: 98% (15 Sep 2023 06:05) (91% - 98%)    Parameters below as of 15 Sep 2023 06:05  Patient On (Oxygen Delivery Method): room air        PHYSICAL EXAM:    General: alert, in no acute distress  HEENT: NC/AT; PERRL, anicteric sclera; MMM  Neck: supple  Cardiovascular: +S1/S2, RRR  Respiratory: CTA B/L; no W/R/R  Gastrointestinal: soft, NT/ND; +BSx4  Extremities: WWP; no edema, clubbing or cyanosis  Vascular: 2+ radial, DP/PT pulses B/L  Neurological: AAOx3; no focal deficits    MEDICATIONS:  MEDICATIONS  (STANDING):  enoxaparin Injectable 40 milliGRAM(s) SubCutaneous every 12 hours  gabapentin 100 milliGRAM(s) Oral every 24 hours  gabapentin 800 milliGRAM(s) Oral daily  meropenem  IVPB 2000 milliGRAM(s) IV Intermittent every 8 hours  nebivolol 10 milliGRAM(s) Oral <User Schedule>  polyethylene glycol 3350 17 Gram(s) Oral daily  senna 2 Tablet(s) Oral at bedtime  vancomycin  IVPB 1250 milliGRAM(s) IV Intermittent every 8 hours    MEDICATIONS  (PRN):  acetaminophen     Tablet .. 650 milliGRAM(s) Oral every 6 hours PRN Temp greater or equal to 38C (100.4F), Mild Pain (1 - 3)  aluminum hydroxide/magnesium hydroxide/simethicone Suspension 30 milliLiter(s) Oral every 4 hours PRN Dyspepsia  HYDROmorphone  Injectable 1 milliGRAM(s) IV Push every 4 hours PRN Severe Pain (7 - 10)  melatonin 3 milliGRAM(s) Oral at bedtime PRN Insomnia  ondansetron Injectable 4 milliGRAM(s) IV Push every 8 hours PRN Nausea and/or Vomiting      ALLERGIES:  Allergies    morphine (Rash)  latex (Rash)  penicillin (Rash)  lisinopril (Angioedema; Rash; Hives)  ciprofloxacin (Rash)  hydrochlorothiazide (Hives)  amoxicillin (Angioedema)    Intolerances        LABS:                        11.4   11.00 )-----------( 466      ( 14 Sep 2023 08:21 )             35.0     09-14    138  |  102  |  12  ----------------------------<  103<H>  4.2   |  25  |  0.60    Ca    9.0      14 Sep 2023 08:21  Phos  3.3     09-14  Mg     1.9     09-14    TPro  6.9  /  Alb  3.5  /  TBili  0.5  /  DBili  x   /  AST  11  /  ALT  11  /  AlkPhos  88  09-14      Urinalysis Basic - ( 14 Sep 2023 08:21 )    Color: x / Appearance: x / SG: x / pH: x  Gluc: 103 mg/dL / Ketone: x  / Bili: x / Urobili: x   Blood: x / Protein: x / Nitrite: x   Leuk Esterase: x / RBC: x / WBC x   Sq Epi: x / Non Sq Epi: x / Bacteria: x      CAPILLARY BLOOD GLUCOSE          RADIOLOGY & ADDITIONAL TESTS: Reviewed. OVERNIGHT EVENTS: ISABELLE    SUBJECTIVE / INTERVAL HPI: Patient seen and examined at bedside. Feeling relatively well but felt warm over night. Having throbbing pain with dressing changes otherwise controlled on current regimen.     VITAL SIGNS:  Vital Signs Last 24 Hrs  T(C): 36.6 (15 Sep 2023 06:05), Max: 37.1 (14 Sep 2023 21:09)  T(F): 97.8 (15 Sep 2023 06:05), Max: 98.8 (14 Sep 2023 21:09)  HR: 85 (15 Sep 2023 06:05) (85 - 94)  BP: 139/89 (15 Sep 2023 06:05) (123/82 - 148/86)  BP(mean): --  RR: 17 (15 Sep 2023 06:05) (16 - 18)  SpO2: 98% (15 Sep 2023 06:05) (91% - 98%)    Parameters below as of 15 Sep 2023 06:05  Patient On (Oxygen Delivery Method): room air        PHYSICAL EXAM:    General: alert, in no acute distress  HEENT: NC/AT; PERRL, anicteric sclera; MMM  Neck: supple  Cardiovascular: +S1/S2, RRR  Respiratory: CTA B/L; no W/R/R  Gastrointestinal: obese, soft, NT/ND; +BSx4  Extremities: WWP; non pitting edema of LLE, + wrapped in bandage, clubbing or cyanosis  Vascular: 2+ radial, DP/PT pulses B/L  Neurological: AAOx3; no focal deficits    MEDICATIONS:  MEDICATIONS  (STANDING):  enoxaparin Injectable 40 milliGRAM(s) SubCutaneous every 12 hours  gabapentin 100 milliGRAM(s) Oral every 24 hours  gabapentin 800 milliGRAM(s) Oral daily  meropenem  IVPB 2000 milliGRAM(s) IV Intermittent every 8 hours  nebivolol 10 milliGRAM(s) Oral <User Schedule>  polyethylene glycol 3350 17 Gram(s) Oral daily  senna 2 Tablet(s) Oral at bedtime  vancomycin  IVPB 1250 milliGRAM(s) IV Intermittent every 8 hours    MEDICATIONS  (PRN):  acetaminophen     Tablet .. 650 milliGRAM(s) Oral every 6 hours PRN Temp greater or equal to 38C (100.4F), Mild Pain (1 - 3)  aluminum hydroxide/magnesium hydroxide/simethicone Suspension 30 milliLiter(s) Oral every 4 hours PRN Dyspepsia  HYDROmorphone  Injectable 1 milliGRAM(s) IV Push every 4 hours PRN Severe Pain (7 - 10)  melatonin 3 milliGRAM(s) Oral at bedtime PRN Insomnia  ondansetron Injectable 4 milliGRAM(s) IV Push every 8 hours PRN Nausea and/or Vomiting      ALLERGIES:  Allergies    morphine (Rash)  latex (Rash)  penicillin (Rash)  lisinopril (Angioedema; Rash; Hives)  ciprofloxacin (Rash)  hydrochlorothiazide (Hives)  amoxicillin (Angioedema)    Intolerances        LABS:                        11.4   11.00 )-----------( 466      ( 14 Sep 2023 08:21 )             35.0     09-14    138  |  102  |  12  ----------------------------<  103<H>  4.2   |  25  |  0.60    Ca    9.0      14 Sep 2023 08:21  Phos  3.3     09-14  Mg     1.9     09-14    TPro  6.9  /  Alb  3.5  /  TBili  0.5  /  DBili  x   /  AST  11  /  ALT  11  /  AlkPhos  88  09-14      Urinalysis Basic - ( 14 Sep 2023 08:21 )    Color: x / Appearance: x / SG: x / pH: x  Gluc: 103 mg/dL / Ketone: x  / Bili: x / Urobili: x   Blood: x / Protein: x / Nitrite: x   Leuk Esterase: x / RBC: x / WBC x   Sq Epi: x / Non Sq Epi: x / Bacteria: x      CAPILLARY BLOOD GLUCOSE          RADIOLOGY & ADDITIONAL TESTS: Reviewed.

## 2023-09-15 NOTE — DIETITIAN INITIAL EVALUATION ADULT - ADD RECOMMEND
1. Continue with DASH/TLC diet  2. Encourage pt to meet nutritional needs as able   3. Monitor labs: electrolytes, cmp  4. Monitor weights   5. Pain and bowel regimen per team   6. Will continue to assess/honor food preferences as able

## 2023-09-15 NOTE — PROGRESS NOTE ADULT - PROBLEM SELECTOR PLAN 1
2/2 to foot ulcer/abcess. Patient has previously been on Annie/Vanc, Dapto/Cefepime regimens.   Wound cx +pseudomonas  BCx NGTD x2 x48 hours  UCx NGTD    - C/w Meropenem 2g every 8 hours  - C/w Vanc 1250 every 8 hours, f/u troph and dose accordingly 2/2 to foot ulcer/abcess. Patient has previously been on Annie/Vanc, Dapto/Cefepime regimens.   Wound cx +pseudomonas  BCx NGTD x2 x48 hours  UCx NGTD  - C/w Meropenem 2g every 8 hours  - C/w Vanc 1250 every 8 hours, f/u troph and dose accordingly  - will reach out to [facility] team for possible endurance cath as her PIV continue to infiltrate

## 2023-09-15 NOTE — DIETITIAN INITIAL EVALUATION ADULT - OTHER INFO
38 YOF w/ PMHx w/ HTN, morbid obesity, chronic L foot AVM s/p multiple embolizations with recurrent nonpurulent ulcer infection presents to [facility] with concern for infection. Patient notes that her L foot AVM ulcerates from time to time, with 23 past embolizations and most recent embolization occurring this past March. Last Wednesday, she noticed that the ulcer was beginning as a "tiny pinhole." She sent email Friday evening to Vascular Dr. Teran who she follows with, however by today the wound increased in size to be the size of peter, increased in pain (8-10/10 on pain scale), and was warm to the touch. Patient describes wound as throbbing and unable to bear weight on it. Today, Dr. Teran's PA recommended coming to the ED, as well as ID Dr. Kimball who she reached out to today. Since last night, patient notes that she had fever 100.9 F, chills, fatigue, and night sweats that soaked through her shirt.     Patient seen at bedside for nutrition education. Patient known to this RD from previous admissions. Current diet order: DASH/TLC. No known food allergies. No difficulty chewing/swallowing reported. Reports good appetite. Patient consumed pancakes, mendez, and juice at breakfast. Patient reports she continues on weight loss medication which was switched to ozempic after last hospital admission. She reports ozempic has been on back order for ~2 weeks so she has not been taking it recently. Reports she feels better portion control since starting medication and is focusing her meals on protein and vegetables. Dosing weight: 294 pounds, BMI 46.1. No pressure injuries documented. +3 edema to L foot. Denies N/V/D/C. Meds: antibiotics, bowel regimen. Observed with no overt signs and symptoms of muscle or fat wasting. Based on ASPEN guidelines, pt does not meet criteria for malnutrition. No cultural, ethnic, Zoroastrianism food preferences reported. See nutrition recommendations below.

## 2023-09-15 NOTE — DIETITIAN INITIAL EVALUATION ADULT - PERTINENT LABORATORY DATA
09-15    133<L>  |  100  |  12  ----------------------------<  98  4.3   |  24  |  0.66    Ca    9.6      15 Sep 2023 07:49  Phos  3.5     09-15  Mg     1.9     09-15    TPro  6.5  /  Alb  3.3  /  TBili  0.6  /  DBili  x   /  AST  12  /  ALT  10  /  AlkPhos  88  09-15  A1C with Estimated Average Glucose Result: 5.2 % (11-02-22 @ 05:30)

## 2023-09-15 NOTE — DIETITIAN INITIAL EVALUATION ADULT - PERTINENT MEDS FT
MEDICATIONS  (STANDING):  enoxaparin Injectable 40 milliGRAM(s) SubCutaneous every 12 hours  gabapentin 100 milliGRAM(s) Oral every 24 hours  gabapentin 800 milliGRAM(s) Oral daily  meropenem  IVPB 2000 milliGRAM(s) IV Intermittent every 8 hours  nebivolol 10 milliGRAM(s) Oral <User Schedule>  polyethylene glycol 3350 17 Gram(s) Oral daily  senna 2 Tablet(s) Oral at bedtime  vancomycin  IVPB 1250 milliGRAM(s) IV Intermittent every 8 hours    MEDICATIONS  (PRN):  acetaminophen     Tablet .. 650 milliGRAM(s) Oral every 6 hours PRN Temp greater or equal to 38C (100.4F), Mild Pain (1 - 3)  aluminum hydroxide/magnesium hydroxide/simethicone Suspension 30 milliLiter(s) Oral every 4 hours PRN Dyspepsia  HYDROmorphone  Injectable 1 milliGRAM(s) IV Push every 4 hours PRN Severe Pain (7 - 10)  melatonin 3 milliGRAM(s) Oral at bedtime PRN Insomnia  ondansetron Injectable 4 milliGRAM(s) IV Push every 8 hours PRN Nausea and/or Vomiting

## 2023-09-16 PROCEDURE — 99233 SBSQ HOSP IP/OBS HIGH 50: CPT | Mod: GC

## 2023-09-16 PROCEDURE — 99232 SBSQ HOSP IP/OBS MODERATE 35: CPT

## 2023-09-16 RX ORDER — HYDROMORPHONE HYDROCHLORIDE 2 MG/ML
1 INJECTION INTRAMUSCULAR; INTRAVENOUS; SUBCUTANEOUS EVERY 4 HOURS
Refills: 0 | Status: DISCONTINUED | OUTPATIENT
Start: 2023-09-16 | End: 2023-09-19

## 2023-09-16 RX ADMIN — HYDROMORPHONE HYDROCHLORIDE 1 MILLIGRAM(S): 2 INJECTION INTRAMUSCULAR; INTRAVENOUS; SUBCUTANEOUS at 08:04

## 2023-09-16 RX ADMIN — HYDROMORPHONE HYDROCHLORIDE 1 MILLIGRAM(S): 2 INJECTION INTRAMUSCULAR; INTRAVENOUS; SUBCUTANEOUS at 16:24

## 2023-09-16 RX ADMIN — NEBIVOLOL HYDROCHLORIDE 10 MILLIGRAM(S): 5 TABLET ORAL at 05:59

## 2023-09-16 RX ADMIN — HYDROMORPHONE HYDROCHLORIDE 1 MILLIGRAM(S): 2 INJECTION INTRAMUSCULAR; INTRAVENOUS; SUBCUTANEOUS at 16:39

## 2023-09-16 RX ADMIN — HYDROMORPHONE HYDROCHLORIDE 1 MILLIGRAM(S): 2 INJECTION INTRAMUSCULAR; INTRAVENOUS; SUBCUTANEOUS at 04:01

## 2023-09-16 RX ADMIN — NEBIVOLOL HYDROCHLORIDE 10 MILLIGRAM(S): 5 TABLET ORAL at 17:39

## 2023-09-16 RX ADMIN — HYDROMORPHONE HYDROCHLORIDE 1 MILLIGRAM(S): 2 INJECTION INTRAMUSCULAR; INTRAVENOUS; SUBCUTANEOUS at 12:18

## 2023-09-16 RX ADMIN — MEROPENEM 280 MILLIGRAM(S): 1 INJECTION INTRAVENOUS at 17:03

## 2023-09-16 RX ADMIN — Medication 166.67 MILLIGRAM(S): at 19:03

## 2023-09-16 RX ADMIN — Medication 166.67 MILLIGRAM(S): at 11:13

## 2023-09-16 RX ADMIN — MEROPENEM 280 MILLIGRAM(S): 1 INJECTION INTRAVENOUS at 05:59

## 2023-09-16 RX ADMIN — HYDROMORPHONE HYDROCHLORIDE 1 MILLIGRAM(S): 2 INJECTION INTRAMUSCULAR; INTRAVENOUS; SUBCUTANEOUS at 12:33

## 2023-09-16 RX ADMIN — GABAPENTIN 800 MILLIGRAM(S): 400 CAPSULE ORAL at 15:11

## 2023-09-16 RX ADMIN — HYDROMORPHONE HYDROCHLORIDE 1 MILLIGRAM(S): 2 INJECTION INTRAMUSCULAR; INTRAVENOUS; SUBCUTANEOUS at 21:03

## 2023-09-16 RX ADMIN — HYDROMORPHONE HYDROCHLORIDE 1 MILLIGRAM(S): 2 INJECTION INTRAMUSCULAR; INTRAVENOUS; SUBCUTANEOUS at 08:19

## 2023-09-16 RX ADMIN — ENOXAPARIN SODIUM 40 MILLIGRAM(S): 100 INJECTION SUBCUTANEOUS at 05:59

## 2023-09-16 RX ADMIN — HYDROMORPHONE HYDROCHLORIDE 1 MILLIGRAM(S): 2 INJECTION INTRAMUSCULAR; INTRAVENOUS; SUBCUTANEOUS at 04:35

## 2023-09-16 RX ADMIN — Medication 166.67 MILLIGRAM(S): at 02:44

## 2023-09-16 RX ADMIN — ENOXAPARIN SODIUM 40 MILLIGRAM(S): 100 INJECTION SUBCUTANEOUS at 17:38

## 2023-09-16 RX ADMIN — HYDROMORPHONE HYDROCHLORIDE 1 MILLIGRAM(S): 2 INJECTION INTRAMUSCULAR; INTRAVENOUS; SUBCUTANEOUS at 00:30

## 2023-09-16 NOTE — PROGRESS NOTE ADULT - ASSESSMENT
38F h/o HTN, morbid obesity, L foot AVM s/p multiple direct stick and transcatheter embolizations, recurrent cellulitis p/w L foot ulcer with cellulitis.  Currently on vanc/hugh with minimal improvement.  Awaiting eval by Dr. Teran.    - cont vanco 1250mg IV q8h  - cont hugh 2g IV q8h  - awaiting possible embolization plan, to be evaluated on 9/18    Team 1 will follow you.  Dr Méndez will resume care on Monday.  Case d/w primary team.    Génesis Khan MD, MS  Infectious Disease attending  office phone [phone]  work cell [phone] (provider to provider call only)  For any questions during evening/weekend/holiday, please page ID on call

## 2023-09-16 NOTE — PROGRESS NOTE ADULT - PROBLEM SELECTOR PLAN 1
2/2 to foot ulcer/abcess. Patient has previously been on Annie/Vanc, Dapto/Cefepime regimens.   Wound cx +pseudomonas  BCx NGTD x2 x48 hours  UCx NGTD  - C/w Meropenem 2g every 8 hours  - C/w Vanc 1250 every 8 hours, f/u troph at 11  and dose accordingly  - will reach out to [facility] team for possible endurance cath as her PIV continue to infiltrate

## 2023-09-16 NOTE — PROGRESS NOTE ADULT - ASSESSMENT
37F w/ PMHx w/ HTN, morbid obesity, chronic L foot AVM s/p multiple embolizations with recurrent nonpurulent ulcer infection presents to [facility] for c/f infected PICC line.

## 2023-09-16 NOTE — PROGRESS NOTE ADULT - SUBJECTIVE AND OBJECTIVE BOX
Patient is a 38y old  Female who presents with a chief complaint of CELLULITIS     (15 Sep 2023 15:10)      INTERVAL HPI/OVERNIGHT EVENTS: offers no new complaints; current symptoms resolving    MEDICATIONS  (STANDING):  enoxaparin Injectable 40 milliGRAM(s) SubCutaneous every 12 hours  gabapentin 100 milliGRAM(s) Oral every 24 hours  gabapentin 800 milliGRAM(s) Oral daily  meropenem  IVPB 2000 milliGRAM(s) IV Intermittent every 8 hours  nebivolol 10 milliGRAM(s) Oral <User Schedule>  polyethylene glycol 3350 17 Gram(s) Oral daily  senna 2 Tablet(s) Oral at bedtime  vancomycin  IVPB 1250 milliGRAM(s) IV Intermittent every 8 hours    MEDICATIONS  (PRN):  acetaminophen     Tablet .. 650 milliGRAM(s) Oral every 6 hours PRN Temp greater or equal to 38C (100.4F), Mild Pain (1 - 3)  aluminum hydroxide/magnesium hydroxide/simethicone Suspension 30 milliLiter(s) Oral every 4 hours PRN Dyspepsia  HYDROmorphone  Injectable 1 milliGRAM(s) IV Push every 4 hours PRN Severe Pain (7 - 10)  melatonin 3 milliGRAM(s) Oral at bedtime PRN Insomnia  ondansetron Injectable 4 milliGRAM(s) IV Push every 8 hours PRN Nausea and/or Vomiting      __________________________________________________  REVIEW OF SYSTEMS:    CONSTITUTIONAL: No fever,   EYES: no acute visual disturbances  NECK: No pain or stiffness  RESPIRATORY: No cough; No shortness of breath  CARDIOVASCULAR: No chest pain, no palpitations  GASTROINTESTINAL: No pain. No nausea or vomiting; No diarrhea   NEUROLOGICAL: No headache or numbness, no tremors  MUSCULOSKELETAL: No joint pain, no muscle pain  GENITOURINARY: no dysuria, no frequency, no hesitancy  PSYCHIATRY: no depression , no anxiety  ALL OTHER  ROS negative        Vital Signs Last 24 Hrs  T(C): 36.6 (16 Sep 2023 05:34), Max: 37.1 (15 Sep 2023 20:59)  T(F): 97.9 (16 Sep 2023 05:34), Max: 98.7 (15 Sep 2023 20:59)  HR: 93 (16 Sep 2023 05:34) (89 - 98)  BP: 104/70 (16 Sep 2023 05:34) (104/70 - 141/91)  BP(mean): --  RR: 18 (16 Sep 2023 05:34) (18 - 18)  SpO2: 94% (16 Sep 2023 05:34) (94% - 95%)    Parameters below as of 16 Sep 2023 05:34  Patient On (Oxygen Delivery Method): room air        ________________________________________________  PHYSICAL EXAM:  GENERAL: NAD  HEENT: Normocephalic;  conjunctivae and sclerae clear; moist mucous membranes;   NECK : supple  CHEST/LUNG: Clear to auscultation bilaterally with good air entry   HEART: S1 S2  regular; no murmurs, gallops or rubs  ABDOMEN: Soft, Nontender, Nondistended; Bowel sounds present  EXTREMITIES: no cyanosis; no edema; no calf tenderness  SKIN: LLE foot with ulcer with surrounding erythema and tenderness  NERVOUS SYSTEM:  Awake and alert; Oriented  to place, person and time ; no new deficits    _________________________________________________  LABS:                        11.4   13.00 )-----------( 458      ( 15 Sep 2023 07:49 )             35.6     09-15    133<L>  |  100  |  12  ----------------------------<  98  4.3   |  24  |  0.66    Ca    9.6      15 Sep 2023 07:49  Phos  3.5     09-15  Mg     1.9     09-15    TPro  6.5  /  Alb  3.3  /  TBili  0.6  /  DBili  x   /  AST  12  /  ALT  10  /  AlkPhos  88  09-15      Urinalysis Basic - ( 15 Sep 2023 07:49 )    Color: x / Appearance: x / SG: x / pH: x  Gluc: 98 mg/dL / Ketone: x  / Bili: x / Urobili: x   Blood: x / Protein: x / Nitrite: x   Leuk Esterase: x / RBC: x / WBC x   Sq Epi: x / Non Sq Epi: x / Bacteria: x      CAPILLARY BLOOD GLUCOSE            RADIOLOGY & ADDITIONAL TESTS:      Plan of care was discussed with patient and /or primary care giver; all questions and concerns were addressed and care was aligned with patient's wishes.

## 2023-09-16 NOTE — PROGRESS NOTE ADULT - SUBJECTIVE AND OBJECTIVE BOX
INFECTIOUS DISEASES CONSULT FOLLOW-UP NOTE    INTERVAL HPI/OVERNIGHT EVENTS:  no event overnight  patient feels well  same L foot pain     ROS:   Constitutional, eyes, ENT, cardiovascular, respiratory, gastrointestinal, genitourinary, integumentary, neurological, psychiatric and heme/lymph are otherwise negative other than noted above       ANTIBIOTICS/RELEVANT:    MEDICATIONS  (STANDING):  enoxaparin Injectable 40 milliGRAM(s) SubCutaneous every 12 hours  gabapentin 100 milliGRAM(s) Oral every 24 hours  gabapentin 800 milliGRAM(s) Oral daily  meropenem  IVPB 2000 milliGRAM(s) IV Intermittent every 8 hours  nebivolol 10 milliGRAM(s) Oral <User Schedule>  polyethylene glycol 3350 17 Gram(s) Oral daily  senna 2 Tablet(s) Oral at bedtime  vancomycin  IVPB 1250 milliGRAM(s) IV Intermittent every 8 hours    MEDICATIONS  (PRN):  acetaminophen     Tablet .. 650 milliGRAM(s) Oral every 6 hours PRN Temp greater or equal to 38C (100.4F), Mild Pain (1 - 3)  aluminum hydroxide/magnesium hydroxide/simethicone Suspension 30 milliLiter(s) Oral every 4 hours PRN Dyspepsia  HYDROmorphone  Injectable 1 milliGRAM(s) IV Push every 4 hours PRN Severe Pain (7 - 10)  melatonin 3 milliGRAM(s) Oral at bedtime PRN Insomnia  ondansetron Injectable 4 milliGRAM(s) IV Push every 8 hours PRN Nausea and/or Vomiting        Vital Signs Last 24 Hrs  T(C): 36.6 (16 Sep 2023 05:34), Max: 37.1 (15 Sep 2023 20:59)  T(F): 97.9 (16 Sep 2023 05:34), Max: 98.7 (15 Sep 2023 20:59)  HR: 93 (16 Sep 2023 05:34) (89 - 98)  BP: 104/70 (16 Sep 2023 05:34) (104/70 - 141/91)  BP(mean): --  RR: 18 (16 Sep 2023 05:34) (18 - 18)  SpO2: 94% (16 Sep 2023 05:34) (94% - 95%)    Parameters below as of 16 Sep 2023 05:34  Patient On (Oxygen Delivery Method): room air        09-15-23 @ 07:01  -  09-16-23 @ 07:00  --------------------------------------------------------  IN: 140 mL / OUT: 0 mL / NET: 140 mL    09-16-23 @ 07:01  -  09-16-23 @ 14:10  --------------------------------------------------------  IN: 250 mL / OUT: 0 mL / NET: 250 mL      PHYSICAL EXAM:  Constitutional: alert, NAD  Eyes: the sclera and conjunctiva were normal.   ENT: the ears and nose were normal in appearance.   Neck: the appearance of the neck was normal and the neck was supple.   Pulmonary: no respiratory distress and lungs were clear to auscultation bilaterally.   Heart: heart rate was normal and rhythm regular, normal S1 and S2  Vascular:. there was no peripheral edema  Abdomen: normal bowel sounds, soft, non-tender  Ext: L foot lateral ulcer with surrounding erythema, unchanged from prior         LABS:                        11.4   13.00 )-----------( 458      ( 15 Sep 2023 07:49 )             35.6     09-15    133<L>  |  100  |  12  ----------------------------<  98  4.3   |  24  |  0.66    Ca    9.6      15 Sep 2023 07:49  Phos  3.5     09-15  Mg     1.9     09-15    TPro  6.5  /  Alb  3.3  /  TBili  0.6  /  DBili  x   /  AST  12  /  ALT  10  /  AlkPhos  88  09-15      Urinalysis Basic - ( 15 Sep 2023 07:49 )    Color: x / Appearance: x / SG: x / pH: x  Gluc: 98 mg/dL / Ketone: x  / Bili: x / Urobili: x   Blood: x / Protein: x / Nitrite: x   Leuk Esterase: x / RBC: x / WBC x   Sq Epi: x / Non Sq Epi: x / Bacteria: x        MICROBIOLOGY:      RADIOLOGY & ADDITIONAL STUDIES:  Reviewed

## 2023-09-17 LAB
ALBUMIN SERPL ELPH-MCNC: 3.2 G/DL — LOW (ref 3.3–5)
ALP SERPL-CCNC: 108 U/L — SIGNIFICANT CHANGE UP (ref 40–120)
ALT FLD-CCNC: 12 U/L — SIGNIFICANT CHANGE UP (ref 10–45)
ANION GAP SERPL CALC-SCNC: 11 MMOL/L — SIGNIFICANT CHANGE UP (ref 5–17)
AST SERPL-CCNC: 13 U/L — SIGNIFICANT CHANGE UP (ref 10–40)
BASOPHILS # BLD AUTO: 0.04 K/UL — SIGNIFICANT CHANGE UP (ref 0–0.2)
BASOPHILS NFR BLD AUTO: 0.3 % — SIGNIFICANT CHANGE UP (ref 0–2)
BILIRUB SERPL-MCNC: 0.4 MG/DL — SIGNIFICANT CHANGE UP (ref 0.2–1.2)
BUN SERPL-MCNC: 11 MG/DL — SIGNIFICANT CHANGE UP (ref 7–23)
CALCIUM SERPL-MCNC: 9 MG/DL — SIGNIFICANT CHANGE UP (ref 8.4–10.5)
CHLORIDE SERPL-SCNC: 102 MMOL/L — SIGNIFICANT CHANGE UP (ref 96–108)
CO2 SERPL-SCNC: 25 MMOL/L — SIGNIFICANT CHANGE UP (ref 22–31)
CREAT SERPL-MCNC: 0.63 MG/DL — SIGNIFICANT CHANGE UP (ref 0.5–1.3)
CULTURE RESULTS: SIGNIFICANT CHANGE UP
CULTURE RESULTS: SIGNIFICANT CHANGE UP
EGFR: 115 ML/MIN/1.73M2 — SIGNIFICANT CHANGE UP
EOSINOPHIL # BLD AUTO: 0.17 K/UL — SIGNIFICANT CHANGE UP (ref 0–0.5)
EOSINOPHIL NFR BLD AUTO: 1.2 % — SIGNIFICANT CHANGE UP (ref 0–6)
GLUCOSE SERPL-MCNC: 121 MG/DL — HIGH (ref 70–99)
HCT VFR BLD CALC: 36.9 % — SIGNIFICANT CHANGE UP (ref 34.5–45)
HGB BLD-MCNC: 11.6 G/DL — SIGNIFICANT CHANGE UP (ref 11.5–15.5)
IMM GRANULOCYTES NFR BLD AUTO: 0.5 % — SIGNIFICANT CHANGE UP (ref 0–0.9)
LYMPHOCYTES # BLD AUTO: 14.5 % — SIGNIFICANT CHANGE UP (ref 13–44)
LYMPHOCYTES # BLD AUTO: 2.05 K/UL — SIGNIFICANT CHANGE UP (ref 1–3.3)
MAGNESIUM SERPL-MCNC: 2 MG/DL — SIGNIFICANT CHANGE UP (ref 1.6–2.6)
MCHC RBC-ENTMCNC: 27.8 PG — SIGNIFICANT CHANGE UP (ref 27–34)
MCHC RBC-ENTMCNC: 31.4 GM/DL — LOW (ref 32–36)
MCV RBC AUTO: 88.5 FL — SIGNIFICANT CHANGE UP (ref 80–100)
MONOCYTES # BLD AUTO: 0.85 K/UL — SIGNIFICANT CHANGE UP (ref 0–0.9)
MONOCYTES NFR BLD AUTO: 6 % — SIGNIFICANT CHANGE UP (ref 2–14)
NEUTROPHILS # BLD AUTO: 10.99 K/UL — HIGH (ref 1.8–7.4)
NEUTROPHILS NFR BLD AUTO: 77.5 % — HIGH (ref 43–77)
NRBC # BLD: 0 /100 WBCS — SIGNIFICANT CHANGE UP (ref 0–0)
PHOSPHATE SERPL-MCNC: 2.9 MG/DL — SIGNIFICANT CHANGE UP (ref 2.5–4.5)
PLATELET # BLD AUTO: 504 K/UL — HIGH (ref 150–400)
POTASSIUM SERPL-MCNC: 4.1 MMOL/L — SIGNIFICANT CHANGE UP (ref 3.5–5.3)
POTASSIUM SERPL-SCNC: 4.1 MMOL/L — SIGNIFICANT CHANGE UP (ref 3.5–5.3)
PROT SERPL-MCNC: 6.9 G/DL — SIGNIFICANT CHANGE UP (ref 6–8.3)
RBC # BLD: 4.17 M/UL — SIGNIFICANT CHANGE UP (ref 3.8–5.2)
RBC # FLD: 13.1 % — SIGNIFICANT CHANGE UP (ref 10.3–14.5)
SODIUM SERPL-SCNC: 138 MMOL/L — SIGNIFICANT CHANGE UP (ref 135–145)
SPECIMEN SOURCE: SIGNIFICANT CHANGE UP
SPECIMEN SOURCE: SIGNIFICANT CHANGE UP
VANCOMYCIN TROUGH SERPL-MCNC: 12.9 UG/ML — SIGNIFICANT CHANGE UP (ref 10–20)
WBC # BLD: 14.17 K/UL — HIGH (ref 3.8–10.5)
WBC # FLD AUTO: 14.17 K/UL — HIGH (ref 3.8–10.5)

## 2023-09-17 PROCEDURE — 76937 US GUIDE VASCULAR ACCESS: CPT | Mod: 26

## 2023-09-17 PROCEDURE — 36000 PLACE NEEDLE IN VEIN: CPT

## 2023-09-17 PROCEDURE — 99233 SBSQ HOSP IP/OBS HIGH 50: CPT | Mod: GC

## 2023-09-17 RX ORDER — VANCOMYCIN HCL 1 G
1250 VIAL (EA) INTRAVENOUS EVERY 8 HOURS
Refills: 0 | Status: DISCONTINUED | OUTPATIENT
Start: 2023-09-18 | End: 2023-09-22

## 2023-09-17 RX ORDER — DIPHENHYDRAMINE HCL 50 MG
25 CAPSULE ORAL EVERY 4 HOURS
Refills: 0 | Status: DISCONTINUED | OUTPATIENT
Start: 2023-09-17 | End: 2023-09-22

## 2023-09-17 RX ORDER — VANCOMYCIN HCL 1 G
1250 VIAL (EA) INTRAVENOUS EVERY 12 HOURS
Refills: 0 | Status: DISCONTINUED | OUTPATIENT
Start: 2023-09-17 | End: 2023-09-17

## 2023-09-17 RX ADMIN — HYDROMORPHONE HYDROCHLORIDE 1 MILLIGRAM(S): 2 INJECTION INTRAMUSCULAR; INTRAVENOUS; SUBCUTANEOUS at 22:48

## 2023-09-17 RX ADMIN — ENOXAPARIN SODIUM 40 MILLIGRAM(S): 100 INJECTION SUBCUTANEOUS at 17:18

## 2023-09-17 RX ADMIN — GABAPENTIN 800 MILLIGRAM(S): 400 CAPSULE ORAL at 16:37

## 2023-09-17 RX ADMIN — HYDROMORPHONE HYDROCHLORIDE 1 MILLIGRAM(S): 2 INJECTION INTRAMUSCULAR; INTRAVENOUS; SUBCUTANEOUS at 18:32

## 2023-09-17 RX ADMIN — HYDROMORPHONE HYDROCHLORIDE 1 MILLIGRAM(S): 2 INJECTION INTRAMUSCULAR; INTRAVENOUS; SUBCUTANEOUS at 13:55

## 2023-09-17 RX ADMIN — HYDROMORPHONE HYDROCHLORIDE 1 MILLIGRAM(S): 2 INJECTION INTRAMUSCULAR; INTRAVENOUS; SUBCUTANEOUS at 01:24

## 2023-09-17 RX ADMIN — MEROPENEM 280 MILLIGRAM(S): 1 INJECTION INTRAVENOUS at 01:24

## 2023-09-17 RX ADMIN — NEBIVOLOL HYDROCHLORIDE 10 MILLIGRAM(S): 5 TABLET ORAL at 18:02

## 2023-09-17 RX ADMIN — HYDROMORPHONE HYDROCHLORIDE 1 MILLIGRAM(S): 2 INJECTION INTRAMUSCULAR; INTRAVENOUS; SUBCUTANEOUS at 05:26

## 2023-09-17 RX ADMIN — HYDROMORPHONE HYDROCHLORIDE 1 MILLIGRAM(S): 2 INJECTION INTRAMUSCULAR; INTRAVENOUS; SUBCUTANEOUS at 18:02

## 2023-09-17 RX ADMIN — HYDROMORPHONE HYDROCHLORIDE 1 MILLIGRAM(S): 2 INJECTION INTRAMUSCULAR; INTRAVENOUS; SUBCUTANEOUS at 09:48

## 2023-09-17 RX ADMIN — MEROPENEM 280 MILLIGRAM(S): 1 INJECTION INTRAVENOUS at 17:18

## 2023-09-17 RX ADMIN — Medication 166.67 MILLIGRAM(S): at 19:45

## 2023-09-17 RX ADMIN — Medication 166.67 MILLIGRAM(S): at 03:51

## 2023-09-17 RX ADMIN — GABAPENTIN 100 MILLIGRAM(S): 400 CAPSULE ORAL at 07:40

## 2023-09-17 RX ADMIN — HYDROMORPHONE HYDROCHLORIDE 1 MILLIGRAM(S): 2 INJECTION INTRAMUSCULAR; INTRAVENOUS; SUBCUTANEOUS at 05:56

## 2023-09-17 RX ADMIN — NEBIVOLOL HYDROCHLORIDE 10 MILLIGRAM(S): 5 TABLET ORAL at 06:36

## 2023-09-17 RX ADMIN — HYDROMORPHONE HYDROCHLORIDE 1 MILLIGRAM(S): 2 INJECTION INTRAMUSCULAR; INTRAVENOUS; SUBCUTANEOUS at 14:25

## 2023-09-17 RX ADMIN — Medication 166.67 MILLIGRAM(S): at 13:28

## 2023-09-17 RX ADMIN — MEROPENEM 280 MILLIGRAM(S): 1 INJECTION INTRAVENOUS at 09:03

## 2023-09-17 RX ADMIN — HYDROMORPHONE HYDROCHLORIDE 1 MILLIGRAM(S): 2 INJECTION INTRAMUSCULAR; INTRAVENOUS; SUBCUTANEOUS at 22:18

## 2023-09-17 RX ADMIN — HYDROMORPHONE HYDROCHLORIDE 1 MILLIGRAM(S): 2 INJECTION INTRAMUSCULAR; INTRAVENOUS; SUBCUTANEOUS at 10:18

## 2023-09-17 RX ADMIN — ENOXAPARIN SODIUM 40 MILLIGRAM(S): 100 INJECTION SUBCUTANEOUS at 06:36

## 2023-09-17 NOTE — PROGRESS NOTE ADULT - PROBLEM SELECTOR PLAN 1
2/2 to foot ulcer/abcess. Patient has previously been on Annie/Vanc, Dapto/Cefepime regimens.   Wound cx +pseudomonas  BCx NGTD x2 x48 hours  UCx NGTD  - C/w Meropenem 2g every 8 hours  - C/w Vanc 1250 every 8 hours  - will reach out to [facility] team for possible endurance cath as her PIV continue to infiltrate

## 2023-09-17 NOTE — PROCEDURE NOTE - ADDITIONAL PROCEDURE DETAILS
as per primary team, patient has "blown" multiple IVs  patient is a difficult access patient and would recommend midline vs PICC line placement, if indicated  would also recommend bilateral upper arm dopplers to r/o DVT, thrombophlebitis  MD Primo mcintosh

## 2023-09-17 NOTE — PROGRESS NOTE ADULT - SUBJECTIVE AND OBJECTIVE BOX
OVERNIGHT EVENTS:    SUBJECTIVE / INTERVAL HPI: Patient seen and examined at bedside.     VITAL SIGNS:  Vital Signs Last 24 Hrs  T(C): 36.9 (17 Sep 2023 09:45), Max: 37.2 (17 Sep 2023 05:43)  T(F): 98.5 (17 Sep 2023 09:45), Max: 98.9 (17 Sep 2023 05:43)  HR: 83 (17 Sep 2023 09:45) (83 - 96)  BP: 144/79 (17 Sep 2023 09:45) (114/71 - 148/87)  BP(mean): --  RR: 18 (17 Sep 2023 09:45) (18 - 20)  SpO2: 99% (17 Sep 2023 09:45) (97% - 100%)    Parameters below as of 17 Sep 2023 09:45  Patient On (Oxygen Delivery Method): room air        PHYSICAL EXAM:    General: alert, in no acute distress  HEENT: NC/AT; PERRL, anicteric sclera; MMM  Neck: supple  Cardiovascular: +S1/S2, RRR  Respiratory: CTA B/L; no W/R/R  Gastrointestinal: soft, NT/ND; +BSx4  Extremities: WWP; no edema, clubbing or cyanosis  Vascular: 2+ radial, DP/PT pulses B/L  Neurological: AAOx3; no focal deficits    MEDICATIONS:  MEDICATIONS  (STANDING):  enoxaparin Injectable 40 milliGRAM(s) SubCutaneous every 12 hours  gabapentin 100 milliGRAM(s) Oral every 24 hours  gabapentin 800 milliGRAM(s) Oral daily  meropenem  IVPB 2000 milliGRAM(s) IV Intermittent every 8 hours  nebivolol 10 milliGRAM(s) Oral <User Schedule>  polyethylene glycol 3350 17 Gram(s) Oral daily  senna 2 Tablet(s) Oral at bedtime  vancomycin  IVPB 1250 milliGRAM(s) IV Intermittent every 8 hours    MEDICATIONS  (PRN):  acetaminophen     Tablet .. 650 milliGRAM(s) Oral every 6 hours PRN Temp greater or equal to 38C (100.4F), Mild Pain (1 - 3)  aluminum hydroxide/magnesium hydroxide/simethicone Suspension 30 milliLiter(s) Oral every 4 hours PRN Dyspepsia  HYDROmorphone  Injectable 1 milliGRAM(s) IV Push every 4 hours PRN Severe Pain (7 - 10)  melatonin 3 milliGRAM(s) Oral at bedtime PRN Insomnia  ondansetron Injectable 4 milliGRAM(s) IV Push every 8 hours PRN Nausea and/or Vomiting      ALLERGIES:  Allergies    morphine (Rash)  latex (Rash)  penicillin (Rash)  lisinopril (Angioedema; Rash; Hives)  ciprofloxacin (Rash)  hydrochlorothiazide (Hives)  amoxicillin (Angioedema)    Intolerances        LABS:                        11.6   14.17 )-----------( 504      ( 17 Sep 2023 08:19 )             36.9     09-17    138  |  102  |  11  ----------------------------<  121<H>  4.1   |  25  |  0.63    Ca    9.0      17 Sep 2023 08:19  Phos  2.9     09-17  Mg     2.0     09-17    TPro  6.9  /  Alb  3.2<L>  /  TBili  0.4  /  DBili  x   /  AST  13  /  ALT  12  /  AlkPhos  108  09-17      Urinalysis Basic - ( 17 Sep 2023 08:19 )    Color: x / Appearance: x / SG: x / pH: x  Gluc: 121 mg/dL / Ketone: x  / Bili: x / Urobili: x   Blood: x / Protein: x / Nitrite: x   Leuk Esterase: x / RBC: x / WBC x   Sq Epi: x / Non Sq Epi: x / Bacteria: x      CAPILLARY BLOOD GLUCOSE          RADIOLOGY & ADDITIONAL TESTS: Reviewed. OVERNIGHT EVENTS: ISABELLE    SUBJECTIVE / INTERVAL HPI: Patient seen and examined at bedside. Pt states she is feeling relatively well - denies any fevers/chills. Notes mild improvement over ulcer with dressing changes.     VITAL SIGNS:  Vital Signs Last 24 Hrs  T(C): 36.9 (17 Sep 2023 09:45), Max: 37.2 (17 Sep 2023 05:43)  T(F): 98.5 (17 Sep 2023 09:45), Max: 98.9 (17 Sep 2023 05:43)  HR: 83 (17 Sep 2023 09:45) (83 - 96)  BP: 144/79 (17 Sep 2023 09:45) (114/71 - 148/87)  BP(mean): --  RR: 18 (17 Sep 2023 09:45) (18 - 20)  SpO2: 99% (17 Sep 2023 09:45) (97% - 100%)    Parameters below as of 17 Sep 2023 09:45  Patient On (Oxygen Delivery Method): room air        PHYSICAL EXAM:    General: alert, in no acute distress  HEENT: NC/AT; PERRL, anicteric sclera; MMM  Neck: supple  Cardiovascular: +S1/S2, RRR  Respiratory: CTA B/L; no W/R/R  Gastrointestinal: soft, NT/ND; +BSx4  Extremities: WWP; no edema, clubbing or cyanosis, + bandage to the shin  Vascular: 2+ radial, DP/PT pulses B/L  Neurological: AAOx3; no focal deficits    MEDICATIONS:  MEDICATIONS  (STANDING):  enoxaparin Injectable 40 milliGRAM(s) SubCutaneous every 12 hours  gabapentin 100 milliGRAM(s) Oral every 24 hours  gabapentin 800 milliGRAM(s) Oral daily  meropenem  IVPB 2000 milliGRAM(s) IV Intermittent every 8 hours  nebivolol 10 milliGRAM(s) Oral <User Schedule>  polyethylene glycol 3350 17 Gram(s) Oral daily  senna 2 Tablet(s) Oral at bedtime  vancomycin  IVPB 1250 milliGRAM(s) IV Intermittent every 8 hours    MEDICATIONS  (PRN):  acetaminophen     Tablet .. 650 milliGRAM(s) Oral every 6 hours PRN Temp greater or equal to 38C (100.4F), Mild Pain (1 - 3)  aluminum hydroxide/magnesium hydroxide/simethicone Suspension 30 milliLiter(s) Oral every 4 hours PRN Dyspepsia  HYDROmorphone  Injectable 1 milliGRAM(s) IV Push every 4 hours PRN Severe Pain (7 - 10)  melatonin 3 milliGRAM(s) Oral at bedtime PRN Insomnia  ondansetron Injectable 4 milliGRAM(s) IV Push every 8 hours PRN Nausea and/or Vomiting      ALLERGIES:  Allergies    morphine (Rash)  latex (Rash)  penicillin (Rash)  lisinopril (Angioedema; Rash; Hives)  ciprofloxacin (Rash)  hydrochlorothiazide (Hives)  amoxicillin (Angioedema)    Intolerances        LABS:                        11.6   14.17 )-----------( 504      ( 17 Sep 2023 08:19 )             36.9     09-17    138  |  102  |  11  ----------------------------<  121<H>  4.1   |  25  |  0.63    Ca    9.0      17 Sep 2023 08:19  Phos  2.9     09-17  Mg     2.0     09-17    TPro  6.9  /  Alb  3.2<L>  /  TBili  0.4  /  DBili  x   /  AST  13  /  ALT  12  /  AlkPhos  108  09-17      Urinalysis Basic - ( 17 Sep 2023 08:19 )    Color: x / Appearance: x / SG: x / pH: x  Gluc: 121 mg/dL / Ketone: x  / Bili: x / Urobili: x   Blood: x / Protein: x / Nitrite: x   Leuk Esterase: x / RBC: x / WBC x   Sq Epi: x / Non Sq Epi: x / Bacteria: x      CAPILLARY BLOOD GLUCOSE          RADIOLOGY & ADDITIONAL TESTS: Reviewed.

## 2023-09-17 NOTE — PROGRESS NOTE ADULT - ASSESSMENT
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 for c/f infected PICC line.

## 2023-09-17 NOTE — PROCEDURE NOTE - NSICDXPROCEDURE_GEN_ALL_CORE_FT
PROCEDURES:  Insertion of intravenous catheter with ultrasound guidance 17-Sep-2023 19:48:19  Eryn Hunter

## 2023-09-18 LAB
ALBUMIN SERPL ELPH-MCNC: 3.2 G/DL — LOW (ref 3.3–5)
ALP SERPL-CCNC: 98 U/L — SIGNIFICANT CHANGE UP (ref 40–120)
ALT FLD-CCNC: 11 U/L — SIGNIFICANT CHANGE UP (ref 10–45)
ANION GAP SERPL CALC-SCNC: 8 MMOL/L — SIGNIFICANT CHANGE UP (ref 5–17)
APTT BLD: 31.1 SEC — SIGNIFICANT CHANGE UP (ref 24.5–35.6)
AST SERPL-CCNC: 10 U/L — SIGNIFICANT CHANGE UP (ref 10–40)
BASOPHILS # BLD AUTO: 0.04 K/UL — SIGNIFICANT CHANGE UP (ref 0–0.2)
BASOPHILS NFR BLD AUTO: 0.3 % — SIGNIFICANT CHANGE UP (ref 0–2)
BILIRUB SERPL-MCNC: 0.6 MG/DL — SIGNIFICANT CHANGE UP (ref 0.2–1.2)
BLD GP AB SCN SERPL QL: NEGATIVE — SIGNIFICANT CHANGE UP
BUN SERPL-MCNC: 10 MG/DL — SIGNIFICANT CHANGE UP (ref 7–23)
CALCIUM SERPL-MCNC: 9.3 MG/DL — SIGNIFICANT CHANGE UP (ref 8.4–10.5)
CHLORIDE SERPL-SCNC: 99 MMOL/L — SIGNIFICANT CHANGE UP (ref 96–108)
CO2 SERPL-SCNC: 28 MMOL/L — SIGNIFICANT CHANGE UP (ref 22–31)
CREAT SERPL-MCNC: 0.67 MG/DL — SIGNIFICANT CHANGE UP (ref 0.5–1.3)
EGFR: 113 ML/MIN/1.73M2 — SIGNIFICANT CHANGE UP
EOSINOPHIL # BLD AUTO: 0.19 K/UL — SIGNIFICANT CHANGE UP (ref 0–0.5)
EOSINOPHIL NFR BLD AUTO: 1.3 % — SIGNIFICANT CHANGE UP (ref 0–6)
GLUCOSE SERPL-MCNC: 97 MG/DL — SIGNIFICANT CHANGE UP (ref 70–99)
HCT VFR BLD CALC: 34.7 % — SIGNIFICANT CHANGE UP (ref 34.5–45)
HGB BLD-MCNC: 10.9 G/DL — LOW (ref 11.5–15.5)
IMM GRANULOCYTES NFR BLD AUTO: 0.8 % — SIGNIFICANT CHANGE UP (ref 0–0.9)
INR BLD: 0.95 — SIGNIFICANT CHANGE UP (ref 0.85–1.18)
LYMPHOCYTES # BLD AUTO: 15.1 % — SIGNIFICANT CHANGE UP (ref 13–44)
LYMPHOCYTES # BLD AUTO: 2.24 K/UL — SIGNIFICANT CHANGE UP (ref 1–3.3)
MAGNESIUM SERPL-MCNC: 2 MG/DL — SIGNIFICANT CHANGE UP (ref 1.6–2.6)
MCHC RBC-ENTMCNC: 27.6 PG — SIGNIFICANT CHANGE UP (ref 27–34)
MCHC RBC-ENTMCNC: 31.4 GM/DL — LOW (ref 32–36)
MCV RBC AUTO: 87.8 FL — SIGNIFICANT CHANGE UP (ref 80–100)
MONOCYTES # BLD AUTO: 1.02 K/UL — HIGH (ref 0–0.9)
MONOCYTES NFR BLD AUTO: 6.9 % — SIGNIFICANT CHANGE UP (ref 2–14)
NEUTROPHILS # BLD AUTO: 11.22 K/UL — HIGH (ref 1.8–7.4)
NEUTROPHILS NFR BLD AUTO: 75.6 % — SIGNIFICANT CHANGE UP (ref 43–77)
NRBC # BLD: 0 /100 WBCS — SIGNIFICANT CHANGE UP (ref 0–0)
PHOSPHATE SERPL-MCNC: 3.7 MG/DL — SIGNIFICANT CHANGE UP (ref 2.5–4.5)
PLATELET # BLD AUTO: 504 K/UL — HIGH (ref 150–400)
POTASSIUM SERPL-MCNC: 4.1 MMOL/L — SIGNIFICANT CHANGE UP (ref 3.5–5.3)
POTASSIUM SERPL-SCNC: 4.1 MMOL/L — SIGNIFICANT CHANGE UP (ref 3.5–5.3)
PROT SERPL-MCNC: 6.7 G/DL — SIGNIFICANT CHANGE UP (ref 6–8.3)
PROTHROM AB SERPL-ACNC: 10.9 SEC — SIGNIFICANT CHANGE UP (ref 9.5–13)
RBC # BLD: 3.95 M/UL — SIGNIFICANT CHANGE UP (ref 3.8–5.2)
RBC # FLD: 13 % — SIGNIFICANT CHANGE UP (ref 10.3–14.5)
RH IG SCN BLD-IMP: POSITIVE — SIGNIFICANT CHANGE UP
SODIUM SERPL-SCNC: 135 MMOL/L — SIGNIFICANT CHANGE UP (ref 135–145)
WBC # BLD: 14.83 K/UL — HIGH (ref 3.8–10.5)
WBC # FLD AUTO: 14.83 K/UL — HIGH (ref 3.8–10.5)

## 2023-09-18 PROCEDURE — 93970 EXTREMITY STUDY: CPT | Mod: 26

## 2023-09-18 PROCEDURE — 99232 SBSQ HOSP IP/OBS MODERATE 35: CPT | Mod: GC

## 2023-09-18 PROCEDURE — 99232 SBSQ HOSP IP/OBS MODERATE 35: CPT

## 2023-09-18 RX ORDER — MEROPENEM 1 G/30ML
2000 INJECTION INTRAVENOUS EVERY 8 HOURS
Refills: 0 | Status: DISCONTINUED | OUTPATIENT
Start: 2023-09-18 | End: 2023-09-22

## 2023-09-18 RX ADMIN — ENOXAPARIN SODIUM 40 MILLIGRAM(S): 100 INJECTION SUBCUTANEOUS at 17:47

## 2023-09-18 RX ADMIN — MEROPENEM 280 MILLIGRAM(S): 1 INJECTION INTRAVENOUS at 17:49

## 2023-09-18 RX ADMIN — GABAPENTIN 800 MILLIGRAM(S): 400 CAPSULE ORAL at 15:37

## 2023-09-18 RX ADMIN — HYDROMORPHONE HYDROCHLORIDE 1 MILLIGRAM(S): 2 INJECTION INTRAMUSCULAR; INTRAVENOUS; SUBCUTANEOUS at 11:30

## 2023-09-18 RX ADMIN — HYDROMORPHONE HYDROCHLORIDE 1 MILLIGRAM(S): 2 INJECTION INTRAMUSCULAR; INTRAVENOUS; SUBCUTANEOUS at 02:41

## 2023-09-18 RX ADMIN — HYDROMORPHONE HYDROCHLORIDE 1 MILLIGRAM(S): 2 INJECTION INTRAMUSCULAR; INTRAVENOUS; SUBCUTANEOUS at 11:15

## 2023-09-18 RX ADMIN — GABAPENTIN 100 MILLIGRAM(S): 400 CAPSULE ORAL at 06:59

## 2023-09-18 RX ADMIN — HYDROMORPHONE HYDROCHLORIDE 1 MILLIGRAM(S): 2 INJECTION INTRAMUSCULAR; INTRAVENOUS; SUBCUTANEOUS at 15:52

## 2023-09-18 RX ADMIN — ENOXAPARIN SODIUM 40 MILLIGRAM(S): 100 INJECTION SUBCUTANEOUS at 06:25

## 2023-09-18 RX ADMIN — HYDROMORPHONE HYDROCHLORIDE 1 MILLIGRAM(S): 2 INJECTION INTRAMUSCULAR; INTRAVENOUS; SUBCUTANEOUS at 22:25

## 2023-09-18 RX ADMIN — MEROPENEM 280 MILLIGRAM(S): 1 INJECTION INTRAVENOUS at 09:58

## 2023-09-18 RX ADMIN — Medication 166.67 MILLIGRAM(S): at 03:12

## 2023-09-18 RX ADMIN — NEBIVOLOL HYDROCHLORIDE 10 MILLIGRAM(S): 5 TABLET ORAL at 06:25

## 2023-09-18 RX ADMIN — HYDROMORPHONE HYDROCHLORIDE 1 MILLIGRAM(S): 2 INJECTION INTRAMUSCULAR; INTRAVENOUS; SUBCUTANEOUS at 07:24

## 2023-09-18 RX ADMIN — Medication 166.67 MILLIGRAM(S): at 11:15

## 2023-09-18 RX ADMIN — MEROPENEM 280 MILLIGRAM(S): 1 INJECTION INTRAVENOUS at 01:20

## 2023-09-18 RX ADMIN — HYDROMORPHONE HYDROCHLORIDE 1 MILLIGRAM(S): 2 INJECTION INTRAMUSCULAR; INTRAVENOUS; SUBCUTANEOUS at 22:04

## 2023-09-18 RX ADMIN — HYDROMORPHONE HYDROCHLORIDE 1 MILLIGRAM(S): 2 INJECTION INTRAMUSCULAR; INTRAVENOUS; SUBCUTANEOUS at 06:54

## 2023-09-18 RX ADMIN — Medication 166.67 MILLIGRAM(S): at 22:15

## 2023-09-18 RX ADMIN — HYDROMORPHONE HYDROCHLORIDE 1 MILLIGRAM(S): 2 INJECTION INTRAMUSCULAR; INTRAVENOUS; SUBCUTANEOUS at 15:37

## 2023-09-18 RX ADMIN — HYDROMORPHONE HYDROCHLORIDE 1 MILLIGRAM(S): 2 INJECTION INTRAMUSCULAR; INTRAVENOUS; SUBCUTANEOUS at 03:11

## 2023-09-18 RX ADMIN — NEBIVOLOL HYDROCHLORIDE 10 MILLIGRAM(S): 5 TABLET ORAL at 17:48

## 2023-09-18 NOTE — PROGRESS NOTE ADULT - PROBLEM SELECTOR PLAN 3
Known history of L foot AVMs s/p 23 prior embolizations. Follows with Dr. Teran. S/p angiogram and transcatheter embolization via R femoral groin access with improved perfusion on 03/14.  - dressings changes as above  - Continued follow-up with Dr. Teran Known history of L foot AVMs s/p 23 prior embolizations. Follows with Dr. Teran. S/p angiogram and transcatheter embolization via R femoral groin access with improved perfusion on 03/14.  - dressings changes as above  - Continued follow-up with Dr. Teran  - planned for OR 9/19

## 2023-09-18 NOTE — PROGRESS NOTE ADULT - SUBJECTIVE AND OBJECTIVE BOX
INFECTIOUS DISEASES CONSULT FOLLOW-UP NOTE    INTERVAL HPI/OVERNIGHT EVENTS:  ISABELLE  Pt reports feeling well. Foot pain is controlled. Denies fever/chills     ROS:   Constitutional, eyes, ENT, cardiovascular, respiratory, gastrointestinal, genitourinary, integumentary, neurological, psychiatric and heme/lymph are otherwise negative other than noted above       ANTIBIOTICS/RELEVANT:    MEDICATIONS  (STANDING):  enoxaparin Injectable 40 milliGRAM(s) SubCutaneous every 12 hours  gabapentin 100 milliGRAM(s) Oral every 24 hours  gabapentin 800 milliGRAM(s) Oral daily  meropenem  IVPB 2000 milliGRAM(s) IV Intermittent every 8 hours  nebivolol 10 milliGRAM(s) Oral <User Schedule>  polyethylene glycol 3350 17 Gram(s) Oral daily  senna 2 Tablet(s) Oral at bedtime  vancomycin  IVPB 1250 milliGRAM(s) IV Intermittent every 8 hours    MEDICATIONS  (PRN):  acetaminophen     Tablet .. 650 milliGRAM(s) Oral every 6 hours PRN Temp greater or equal to 38C (100.4F), Mild Pain (1 - 3)  aluminum hydroxide/magnesium hydroxide/simethicone Suspension 30 milliLiter(s) Oral every 4 hours PRN Dyspepsia  HYDROmorphone  Injectable 1 milliGRAM(s) IV Push every 4 hours PRN Severe Pain (7 - 10)  melatonin 3 milliGRAM(s) Oral at bedtime PRN Insomnia  ondansetron Injectable 4 milliGRAM(s) IV Push every 8 hours PRN Nausea and/or Vomiting        Vital Signs Last 24 Hrs  T(C): 36.6 (16 Sep 2023 05:34), Max: 37.1 (15 Sep 2023 20:59)  T(F): 97.9 (16 Sep 2023 05:34), Max: 98.7 (15 Sep 2023 20:59)  HR: 93 (16 Sep 2023 05:34) (89 - 98)  BP: 104/70 (16 Sep 2023 05:34) (104/70 - 141/91)  BP(mean): --  RR: 18 (16 Sep 2023 05:34) (18 - 18)  SpO2: 94% (16 Sep 2023 05:34) (94% - 95%)    Parameters below as of 16 Sep 2023 05:34  Patient On (Oxygen Delivery Method): room air        09-15-23 @ 07:01  -  09-16-23 @ 07:00  --------------------------------------------------------  IN: 140 mL / OUT: 0 mL / NET: 140 mL    09-16-23 @ 07:01  -  09-16-23 @ 14:10  --------------------------------------------------------  IN: 250 mL / OUT: 0 mL / NET: 250 mL      PHYSICAL EXAM:  Constitutional: alert, NAD  Eyes: the sclera and conjunctiva were normal.   ENT: the ears and nose were normal in appearance.   Neck: the appearance of the neck was normal and the neck was supple.   Pulmonary: no respiratory distress and lungs were clear to auscultation bilaterally.   Heart: heart rate was normal and rhythm regular, normal S1 and S2  Vascular:. there was no peripheral edema  Abdomen: normal bowel sounds, soft, non-tender  Ext: L foot lateral ulcer with surrounding erythema, unchanged from prior         LABS:                        11.4   13.00 )-----------( 458      ( 15 Sep 2023 07:49 )             35.6     09-15    133<L>  |  100  |  12  ----------------------------<  98  4.3   |  24  |  0.66    Ca    9.6      15 Sep 2023 07:49  Phos  3.5     09-15  Mg     1.9     09-15    TPro  6.5  /  Alb  3.3  /  TBili  0.6  /  DBili  x   /  AST  12  /  ALT  10  /  AlkPhos  88  09-15      Urinalysis Basic - ( 15 Sep 2023 07:49 )    Color: x / Appearance: x / SG: x / pH: x  Gluc: 98 mg/dL / Ketone: x  / Bili: x / Urobili: x   Blood: x / Protein: x / Nitrite: x   Leuk Esterase: x / RBC: x / WBC x   Sq Epi: x / Non Sq Epi: x / Bacteria: x        MICROBIOLOGY:      RADIOLOGY & ADDITIONAL STUDIES:  Reviewed

## 2023-09-18 NOTE — PROGRESS NOTE ADULT - PROBLEM SELECTOR PLAN 4
On admission, presented with /108, repeat 176/102. Likely acutely elevated due to pain. On home Bystolic 10mg every 12 hours  - c/w home Bystolic 10mg every 12 hours On home Bystolic 10mg every 12 hours  - c/w home med

## 2023-09-18 NOTE — PROGRESS NOTE ADULT - SUBJECTIVE AND OBJECTIVE BOX
OVERNIGHT EVENTS:    SUBJECTIVE / INTERVAL HPI: Patient seen and examined at bedside.     VITAL SIGNS:  Vital Signs Last 24 Hrs  T(C): 36.7 (18 Sep 2023 05:25), Max: 37.1 (17 Sep 2023 16:02)  T(F): 98.1 (18 Sep 2023 05:25), Max: 98.8 (17 Sep 2023 16:02)  HR: 91 (18 Sep 2023 05:25) (83 - 99)  BP: 108/71 (18 Sep 2023 05:25) (108/71 - 144/79)  BP(mean): --  RR: 18 (18 Sep 2023 05:25) (18 - 19)  SpO2: 97% (18 Sep 2023 05:25) (96% - 99%)    Parameters below as of 18 Sep 2023 05:25  Patient On (Oxygen Delivery Method): room air        PHYSICAL EXAM:    General: alert, in no acute distress  HEENT: NC/AT; PERRL, anicteric sclera; MMM  Neck: supple  Cardiovascular: +S1/S2, RRR  Respiratory: CTA B/L; no W/R/R  Gastrointestinal: soft, NT/ND; +BSx4  Extremities: WWP; no edema, clubbing or cyanosis  Vascular: 2+ radial, DP/PT pulses B/L  Neurological: AAOx3; no focal deficits    MEDICATIONS:  MEDICATIONS  (STANDING):  enoxaparin Injectable 40 milliGRAM(s) SubCutaneous every 12 hours  gabapentin 100 milliGRAM(s) Oral every 24 hours  gabapentin 800 milliGRAM(s) Oral daily  meropenem  IVPB 2000 milliGRAM(s) IV Intermittent every 8 hours  nebivolol 10 milliGRAM(s) Oral <User Schedule>  polyethylene glycol 3350 17 Gram(s) Oral daily  senna 2 Tablet(s) Oral at bedtime  vancomycin  IVPB 1250 milliGRAM(s) IV Intermittent every 8 hours    MEDICATIONS  (PRN):  acetaminophen     Tablet .. 650 milliGRAM(s) Oral every 6 hours PRN Temp greater or equal to 38C (100.4F), Mild Pain (1 - 3)  aluminum hydroxide/magnesium hydroxide/simethicone Suspension 30 milliLiter(s) Oral every 4 hours PRN Dyspepsia  diphenhydrAMINE 25 milliGRAM(s) Oral every 4 hours PRN Rash and/or Itching  HYDROmorphone  Injectable 1 milliGRAM(s) IV Push every 4 hours PRN Severe Pain (7 - 10)  melatonin 3 milliGRAM(s) Oral at bedtime PRN Insomnia  ondansetron Injectable 4 milliGRAM(s) IV Push every 8 hours PRN Nausea and/or Vomiting      ALLERGIES:  Allergies    morphine (Rash)  latex (Rash)  penicillin (Rash)  lisinopril (Angioedema; Rash; Hives)  ciprofloxacin (Rash)  hydrochlorothiazide (Hives)  amoxicillin (Angioedema)    Intolerances        LABS:                        11.6   14.17 )-----------( 504      ( 17 Sep 2023 08:19 )             36.9     09-17    138  |  102  |  11  ----------------------------<  121<H>  4.1   |  25  |  0.63    Ca    9.0      17 Sep 2023 08:19  Phos  2.9     09-17  Mg     2.0     09-17    TPro  6.9  /  Alb  3.2<L>  /  TBili  0.4  /  DBili  x   /  AST  13  /  ALT  12  /  AlkPhos  108  09-17      Urinalysis Basic - ( 17 Sep 2023 08:19 )    Color: x / Appearance: x / SG: x / pH: x  Gluc: 121 mg/dL / Ketone: x  / Bili: x / Urobili: x   Blood: x / Protein: x / Nitrite: x   Leuk Esterase: x / RBC: x / WBC x   Sq Epi: x / Non Sq Epi: x / Bacteria: x      CAPILLARY BLOOD GLUCOSE          RADIOLOGY & ADDITIONAL TESTS: Reviewed. OVERNIGHT EVENTS: ISABELLE    SUBJECTIVE / INTERVAL HPI: Patient seen and examined at bedside. Patient offers no new complaints but notes some discomfort over sight of IV infiltrations. Denies any fevers, chills, worsening LE pain .    VITAL SIGNS:  Vital Signs Last 24 Hrs  T(C): 36.7 (18 Sep 2023 05:25), Max: 37.1 (17 Sep 2023 16:02)  T(F): 98.1 (18 Sep 2023 05:25), Max: 98.8 (17 Sep 2023 16:02)  HR: 91 (18 Sep 2023 05:25) (83 - 99)  BP: 108/71 (18 Sep 2023 05:25) (108/71 - 144/79)  BP(mean): --  RR: 18 (18 Sep 2023 05:25) (18 - 19)  SpO2: 97% (18 Sep 2023 05:25) (96% - 99%)    Parameters below as of 18 Sep 2023 05:25  Patient On (Oxygen Delivery Method): room air        PHYSICAL EXAM:    General: alert, in no acute distress  HEENT: NC/AT; PERRL, anicteric sclera; MMM  Neck: supple  Cardiovascular: +S1/S2, RRR  Respiratory: CTA B/L; no W/R/R  Gastrointestinal: soft, NT/ND; +BSx4  Extremities: WWP; + edema LLE, clubbing or cyanosis,   Vascular: 2+ radial, DP/PT pulses B/L  Neurological: AAOx3; no focal deficits    MEDICATIONS:  MEDICATIONS  (STANDING):  enoxaparin Injectable 40 milliGRAM(s) SubCutaneous every 12 hours  gabapentin 100 milliGRAM(s) Oral every 24 hours  gabapentin 800 milliGRAM(s) Oral daily  meropenem  IVPB 2000 milliGRAM(s) IV Intermittent every 8 hours  nebivolol 10 milliGRAM(s) Oral <User Schedule>  polyethylene glycol 3350 17 Gram(s) Oral daily  senna 2 Tablet(s) Oral at bedtime  vancomycin  IVPB 1250 milliGRAM(s) IV Intermittent every 8 hours    MEDICATIONS  (PRN):  acetaminophen     Tablet .. 650 milliGRAM(s) Oral every 6 hours PRN Temp greater or equal to 38C (100.4F), Mild Pain (1 - 3)  aluminum hydroxide/magnesium hydroxide/simethicone Suspension 30 milliLiter(s) Oral every 4 hours PRN Dyspepsia  diphenhydrAMINE 25 milliGRAM(s) Oral every 4 hours PRN Rash and/or Itching  HYDROmorphone  Injectable 1 milliGRAM(s) IV Push every 4 hours PRN Severe Pain (7 - 10)  melatonin 3 milliGRAM(s) Oral at bedtime PRN Insomnia  ondansetron Injectable 4 milliGRAM(s) IV Push every 8 hours PRN Nausea and/or Vomiting      ALLERGIES:  Allergies    morphine (Rash)  latex (Rash)  penicillin (Rash)  lisinopril (Angioedema; Rash; Hives)  ciprofloxacin (Rash)  hydrochlorothiazide (Hives)  amoxicillin (Angioedema)    Intolerances        LABS:                        11.6   14.17 )-----------( 504      ( 17 Sep 2023 08:19 )             36.9     09-17    138  |  102  |  11  ----------------------------<  121<H>  4.1   |  25  |  0.63    Ca    9.0      17 Sep 2023 08:19  Phos  2.9     09-17  Mg     2.0     09-17    TPro  6.9  /  Alb  3.2<L>  /  TBili  0.4  /  DBili  x   /  AST  13  /  ALT  12  /  AlkPhos  108  09-17      Urinalysis Basic - ( 17 Sep 2023 08:19 )    Color: x / Appearance: x / SG: x / pH: x  Gluc: 121 mg/dL / Ketone: x  / Bili: x / Urobili: x   Blood: x / Protein: x / Nitrite: x   Leuk Esterase: x / RBC: x / WBC x   Sq Epi: x / Non Sq Epi: x / Bacteria: x      CAPILLARY BLOOD GLUCOSE          RADIOLOGY & ADDITIONAL TESTS: Reviewed.

## 2023-09-18 NOTE — PROGRESS NOTE ADULT - ASSESSMENT
38F h/o HTN, morbid obesity, L foot AVM s/p multiple direct stick and transcatheter embolizations, recurrent cellulitis p/w L foot ulcer with cellulitis.  Currently on vanc/hugh with minimal improvement.  Awaiting eval by Dr. Teran for possible intervention with embolization     - cont vanco 1250mg IV q8h  - cont hugh 2g IV q8h  - awaiting possible embolization, to be evaluated on 9/18  -Team 1 will continue to follow  38F h/o HTN, morbid obesity, L foot AVM s/p multiple direct stick and transcatheter embolizations, recurrent cellulitis p/w L foot ulcer with cellulitis.  Currently on vanc/hugh with minimal improvement.  Awaiting eval by Dr. Teran for possible intervention with embolization     - cont vanco 1250mg IV q8h  - cont hugh 2g IV q8h  -Above antibiotic regimen will have tentative 2 week total duration. Subject to change based on clinical course improvement   -OK to place PICC   -Per primary, will likely not undergo vascular intervention   -Team 1 will continue to follow

## 2023-09-18 NOTE — PROGRESS NOTE ADULT - PROBLEM SELECTOR PLAN 1
2/2 to foot ulcer/abcess. Patient has previously been on Annie/Vanc, Dapto/Cefepime regimens.   Wound cx +pseudomonas  BCx NGTD x2 x48 hours  UCx NGTD  - C/w Meropenem 2g every 8 hours  - C/w Vanc 1250 every 8 hours  - will reach out to [facility] team for possible endurance cath as her PIV continue to infiltrate 2/2 to foot ulcer/abcess. Patient has previously been on Annie/Vanc, Dapto/Cefepime regimens.   Wound cx +pseudomonas  BCx NGTD x2 x48 hours  UCx NGTD  - C/w Meropenem 2g every 8 hours  - C/w Vanc 1250 every 8 hours  - will reach out to ID team for end date on abx to plan for insertion of midline/picc

## 2023-09-18 NOTE — PROGRESS NOTE ADULT - ASSESSMENT
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

## 2023-09-19 ENCOUNTER — TRANSCRIPTION ENCOUNTER (OUTPATIENT)
Age: 38
End: 2023-09-19

## 2023-09-19 LAB
ALBUMIN SERPL ELPH-MCNC: 3.4 G/DL — SIGNIFICANT CHANGE UP (ref 3.3–5)
ALP SERPL-CCNC: 105 U/L — SIGNIFICANT CHANGE UP (ref 40–120)
ALT FLD-CCNC: 13 U/L — SIGNIFICANT CHANGE UP (ref 10–45)
ANION GAP SERPL CALC-SCNC: 11 MMOL/L — SIGNIFICANT CHANGE UP (ref 5–17)
AST SERPL-CCNC: 12 U/L — SIGNIFICANT CHANGE UP (ref 10–40)
BASOPHILS # BLD AUTO: 0.07 K/UL — SIGNIFICANT CHANGE UP (ref 0–0.2)
BASOPHILS NFR BLD AUTO: 0.4 % — SIGNIFICANT CHANGE UP (ref 0–2)
BILIRUB SERPL-MCNC: 0.6 MG/DL — SIGNIFICANT CHANGE UP (ref 0.2–1.2)
BUN SERPL-MCNC: 12 MG/DL — SIGNIFICANT CHANGE UP (ref 7–23)
CALCIUM SERPL-MCNC: 9.5 MG/DL — SIGNIFICANT CHANGE UP (ref 8.4–10.5)
CHLORIDE SERPL-SCNC: 100 MMOL/L — SIGNIFICANT CHANGE UP (ref 96–108)
CO2 SERPL-SCNC: 26 MMOL/L — SIGNIFICANT CHANGE UP (ref 22–31)
CREAT SERPL-MCNC: 0.59 MG/DL — SIGNIFICANT CHANGE UP (ref 0.5–1.3)
EGFR: 117 ML/MIN/1.73M2 — SIGNIFICANT CHANGE UP
EOSINOPHIL # BLD AUTO: 0.17 K/UL — SIGNIFICANT CHANGE UP (ref 0–0.5)
EOSINOPHIL NFR BLD AUTO: 1.1 % — SIGNIFICANT CHANGE UP (ref 0–6)
GLUCOSE SERPL-MCNC: 100 MG/DL — HIGH (ref 70–99)
HCT VFR BLD CALC: 33.9 % — LOW (ref 34.5–45)
HGB BLD-MCNC: 11 G/DL — LOW (ref 11.5–15.5)
IMM GRANULOCYTES NFR BLD AUTO: 0.4 % — SIGNIFICANT CHANGE UP (ref 0–0.9)
LYMPHOCYTES # BLD AUTO: 14.5 % — SIGNIFICANT CHANGE UP (ref 13–44)
LYMPHOCYTES # BLD AUTO: 2.29 K/UL — SIGNIFICANT CHANGE UP (ref 1–3.3)
MAGNESIUM SERPL-MCNC: 2 MG/DL — SIGNIFICANT CHANGE UP (ref 1.6–2.6)
MCHC RBC-ENTMCNC: 27.3 PG — SIGNIFICANT CHANGE UP (ref 27–34)
MCHC RBC-ENTMCNC: 32.4 GM/DL — SIGNIFICANT CHANGE UP (ref 32–36)
MCV RBC AUTO: 84.1 FL — SIGNIFICANT CHANGE UP (ref 80–100)
MONOCYTES # BLD AUTO: 1.28 K/UL — HIGH (ref 0–0.9)
MONOCYTES NFR BLD AUTO: 8.1 % — SIGNIFICANT CHANGE UP (ref 2–14)
NEUTROPHILS # BLD AUTO: 11.91 K/UL — HIGH (ref 1.8–7.4)
NEUTROPHILS NFR BLD AUTO: 75.5 % — SIGNIFICANT CHANGE UP (ref 43–77)
NRBC # BLD: 0 /100 WBCS — SIGNIFICANT CHANGE UP (ref 0–0)
PHOSPHATE SERPL-MCNC: 3.4 MG/DL — SIGNIFICANT CHANGE UP (ref 2.5–4.5)
PLATELET # BLD AUTO: 525 K/UL — HIGH (ref 150–400)
POTASSIUM SERPL-MCNC: 4.5 MMOL/L — SIGNIFICANT CHANGE UP (ref 3.5–5.3)
POTASSIUM SERPL-SCNC: 4.5 MMOL/L — SIGNIFICANT CHANGE UP (ref 3.5–5.3)
PROT SERPL-MCNC: 7.4 G/DL — SIGNIFICANT CHANGE UP (ref 6–8.3)
RBC # BLD: 4.03 M/UL — SIGNIFICANT CHANGE UP (ref 3.8–5.2)
RBC # FLD: 12.8 % — SIGNIFICANT CHANGE UP (ref 10.3–14.5)
SODIUM SERPL-SCNC: 137 MMOL/L — SIGNIFICANT CHANGE UP (ref 135–145)
WBC # BLD: 15.79 K/UL — HIGH (ref 3.8–10.5)
WBC # FLD AUTO: 15.79 K/UL — HIGH (ref 3.8–10.5)

## 2023-09-19 PROCEDURE — 37241 VASC EMBOLIZE/OCCLUDE VENOUS: CPT

## 2023-09-19 PROCEDURE — 99232 SBSQ HOSP IP/OBS MODERATE 35: CPT

## 2023-09-19 PROCEDURE — 99233 SBSQ HOSP IP/OBS HIGH 50: CPT | Mod: GC

## 2023-09-19 PROCEDURE — 36569 INSJ PICC 5 YR+ W/O IMAGING: CPT

## 2023-09-19 RX ORDER — HYDROMORPHONE HYDROCHLORIDE 2 MG/ML
1 INJECTION INTRAMUSCULAR; INTRAVENOUS; SUBCUTANEOUS EVERY 4 HOURS
Refills: 0 | Status: DISCONTINUED | OUTPATIENT
Start: 2023-09-19 | End: 2023-09-19

## 2023-09-19 RX ORDER — NEBIVOLOL HYDROCHLORIDE 5 MG/1
1 TABLET ORAL
Qty: 0 | Refills: 0 | DISCHARGE

## 2023-09-19 RX ORDER — GABAPENTIN 400 MG/1
1 CAPSULE ORAL
Qty: 0 | Refills: 0 | DISCHARGE

## 2023-09-19 RX ORDER — HYDROMORPHONE HYDROCHLORIDE 2 MG/ML
1 INJECTION INTRAMUSCULAR; INTRAVENOUS; SUBCUTANEOUS ONCE
Refills: 0 | Status: DISCONTINUED | OUTPATIENT
Start: 2023-09-19 | End: 2023-09-19

## 2023-09-19 RX ORDER — HYDROMORPHONE HYDROCHLORIDE 2 MG/ML
2 INJECTION INTRAMUSCULAR; INTRAVENOUS; SUBCUTANEOUS EVERY 4 HOURS
Refills: 0 | Status: DISCONTINUED | OUTPATIENT
Start: 2023-09-19 | End: 2023-09-22

## 2023-09-19 RX ORDER — OXYCODONE AND ACETAMINOPHEN 5; 325 MG/1; MG/1
1 TABLET ORAL
Qty: 0 | Refills: 0 | DISCHARGE

## 2023-09-19 RX ORDER — CHLORHEXIDINE GLUCONATE 213 G/1000ML
1 SOLUTION TOPICAL
Refills: 0 | Status: DISCONTINUED | OUTPATIENT
Start: 2023-09-19 | End: 2023-09-22

## 2023-09-19 RX ORDER — VANCOMYCIN HCL 1 G
1.25 VIAL (EA) INTRAVENOUS
Qty: 52.5 | Refills: 0
Start: 2023-09-19 | End: 2023-10-02

## 2023-09-19 RX ORDER — ACETAMINOPHEN 500 MG
1000 TABLET ORAL ONCE
Refills: 0 | Status: COMPLETED | OUTPATIENT
Start: 2023-09-19 | End: 2023-09-19

## 2023-09-19 RX ORDER — MEROPENEM 1 G/30ML
2000 INJECTION INTRAVENOUS
Qty: 84 | Refills: 0
Start: 2023-09-19 | End: 2023-10-02

## 2023-09-19 RX ORDER — HYDROMORPHONE HYDROCHLORIDE 2 MG/ML
2 INJECTION INTRAMUSCULAR; INTRAVENOUS; SUBCUTANEOUS ONCE
Refills: 0 | Status: DISCONTINUED | OUTPATIENT
Start: 2023-09-19 | End: 2023-09-19

## 2023-09-19 RX ORDER — SODIUM CHLORIDE 9 MG/ML
10 INJECTION INTRAMUSCULAR; INTRAVENOUS; SUBCUTANEOUS
Refills: 0 | Status: DISCONTINUED | OUTPATIENT
Start: 2023-09-19 | End: 2023-09-22

## 2023-09-19 RX ADMIN — HYDROMORPHONE HYDROCHLORIDE 1 MILLIGRAM(S): 2 INJECTION INTRAMUSCULAR; INTRAVENOUS; SUBCUTANEOUS at 02:06

## 2023-09-19 RX ADMIN — MEROPENEM 280 MILLIGRAM(S): 1 INJECTION INTRAVENOUS at 22:50

## 2023-09-19 RX ADMIN — HYDROMORPHONE HYDROCHLORIDE 1 MILLIGRAM(S): 2 INJECTION INTRAMUSCULAR; INTRAVENOUS; SUBCUTANEOUS at 06:19

## 2023-09-19 RX ADMIN — HYDROMORPHONE HYDROCHLORIDE 2 MILLIGRAM(S): 2 INJECTION INTRAMUSCULAR; INTRAVENOUS; SUBCUTANEOUS at 21:08

## 2023-09-19 RX ADMIN — HYDROMORPHONE HYDROCHLORIDE 1 MILLIGRAM(S): 2 INJECTION INTRAMUSCULAR; INTRAVENOUS; SUBCUTANEOUS at 11:05

## 2023-09-19 RX ADMIN — GABAPENTIN 100 MILLIGRAM(S): 400 CAPSULE ORAL at 07:30

## 2023-09-19 RX ADMIN — HYDROMORPHONE HYDROCHLORIDE 1 MILLIGRAM(S): 2 INJECTION INTRAMUSCULAR; INTRAVENOUS; SUBCUTANEOUS at 06:40

## 2023-09-19 RX ADMIN — HYDROMORPHONE HYDROCHLORIDE 1 MILLIGRAM(S): 2 INJECTION INTRAMUSCULAR; INTRAVENOUS; SUBCUTANEOUS at 16:59

## 2023-09-19 RX ADMIN — ENOXAPARIN SODIUM 40 MILLIGRAM(S): 100 INJECTION SUBCUTANEOUS at 06:20

## 2023-09-19 RX ADMIN — Medication 166.67 MILLIGRAM(S): at 21:08

## 2023-09-19 RX ADMIN — NEBIVOLOL HYDROCHLORIDE 10 MILLIGRAM(S): 5 TABLET ORAL at 06:20

## 2023-09-19 RX ADMIN — Medication 400 MILLIGRAM(S): at 16:37

## 2023-09-19 RX ADMIN — Medication 1000 MILLIGRAM(S): at 17:01

## 2023-09-19 RX ADMIN — HYDROMORPHONE HYDROCHLORIDE 1 MILLIGRAM(S): 2 INJECTION INTRAMUSCULAR; INTRAVENOUS; SUBCUTANEOUS at 17:48

## 2023-09-19 RX ADMIN — HYDROMORPHONE HYDROCHLORIDE 1 MILLIGRAM(S): 2 INJECTION INTRAMUSCULAR; INTRAVENOUS; SUBCUTANEOUS at 10:40

## 2023-09-19 RX ADMIN — HYDROMORPHONE HYDROCHLORIDE 2 MILLIGRAM(S): 2 INJECTION INTRAMUSCULAR; INTRAVENOUS; SUBCUTANEOUS at 21:38

## 2023-09-19 RX ADMIN — HYDROMORPHONE HYDROCHLORIDE 1 MILLIGRAM(S): 2 INJECTION INTRAMUSCULAR; INTRAVENOUS; SUBCUTANEOUS at 02:30

## 2023-09-19 NOTE — DISCHARGE NOTE PROVIDER - NSDCMRMEDTOKEN_GEN_ALL_CORE_FT
Bystolic 10 mg oral tablet: 1 tab(s) orally 2 times a day  gabapentin 100 mg oral tablet: 1 tab(s) orally once a day (in the morning)  gabapentin 800 mg oral tablet: 1 tab(s) orally once a day (in the afternoon)  Percocet 10/325 oral tablet: 1 tab(s) orally 5 times a day, As Needed   meropenem 1000 mg intravenous injection: 2,000 milligram(s) intravenously every 8 hours MDD: 6 grams  vancomycin 1 g intravenous injection: 1.25 gram(s) intravenously every 8 hours for 14 day course (9/19-10/2) MDD: 3.75  vancomycin 1 g intravenous injection: 1.25 gram(s) intravenously every 8 hours for 14 day course (9/19-10/2) MDD: 3.75   gabapentin 100 mg oral capsule: 1 cap(s) orally every 24 hours  gabapentin 800 mg oral tablet: 1 tab(s) orally once a day  meropenem 1000 mg intravenous injection: 2,000 milligram(s) intravenously every 8 hours MDD: 6 grams  meropenem 1000 mg intravenous injection: 2000 milligram(s) intravenous every 8 hours  nebivolol 10 mg oral tablet: 1 tab(s) orally once a day  Percocet 10 mg-325 mg oral tablet: 1 tab(s) orally 5 times a day as needed for  severe pain  vancomycin 1 g intravenous injection: 1.25 gram(s) intravenously every 8 hours for 14 day course (9/19-10/2) MDD: 3.75  vancomycin 1.25 g intravenous injection: 1.25 gram(s) intravenous every 8 hours

## 2023-09-19 NOTE — DISCHARGE NOTE PROVIDER - ATTENDING DISCHARGE PHYSICAL EXAMINATION:
Sepsis, POA 2/2 L foot AVM, recurrent ulceration/cellulitis  based on previous hx pt has not healed without embolization.    continue vanc and meropenem.    wbc trending up wbc 15k with chills post op overnight-- will cw to monitor - wbc downtrended to 12k   s/p embolization by Dr. Teran on 9/19  picc line placed 9/19- pt will be discharged 9/21 with at least 2 weeks of abx and fu with dr. Rahman outpt   HTN continue with home medications  c/w home med neurontin . Pain scale with Dilaudid (takes percocet 325/5 at home).  dvt ppx w lovenox.    Physical exam:  GENERAL: NAD, lying in bed comfortably  HEAD:  Atraumatic, Normocephalic  EYES: EOMI, PERRLA, conjunctiva and sclera clear  ENT: Moist mucous membranes  NECK: Supple, No JVD  CHEST/LUNG: Clear to auscultation bilaterally; No rales, rhonchi, wheezing, or rubs.  HEART: Regular rate and rhythm; S1+ S2+   ABDOMEN: Bowel sounds present; Soft, Nontender, Nondistended   EXTREMITIES:  2+ Peripheral Pulses, brisk capillary refill. No clubbing, cyanosis,   NERVOUS SYSTEM:  Alert & Oriented , speech clear   MSK: FROM all 4 extremities, full and equal strength  SKIN:  left foot cellulitis w small open wound

## 2023-09-19 NOTE — BRIEF OPERATIVE NOTE - NSICDXBRIEFPROCEDURE_GEN_ALL_CORE_FT
PROCEDURES:  Embolization, direct puncture, for AV malformation 19-Sep-2023 15:58:05  Gasper Guerrero

## 2023-09-19 NOTE — DISCHARGE NOTE PROVIDER - NSDCFUADDAPPT_GEN_ALL_CORE_FT
Please bring your Insurance card, Photo ID and Discharge paperwork to the following appointment:    (1) Please follow up with your Vascular Surgery Provider, Dr. J Carlos Teran at [address] [place] [address] on 09/26/2023 at 12:00pm.    Appointment was scheduled by Ms. DEVEN Venegas, Referral Coordinator.   Please bring your Insurance card, Photo ID and Discharge paperwork to the following appointments:    (1) Please follow up with your Vascular Surgery Provider, Dr. J Carlos Teran at [address], [address] [place] [address] on 09/26/2023 at 12:00pm.    Appointment was scheduled by Ms. DEVEN Venegas, Referral Coordinator.    (2) Please follow up with your Infectious Disease Provider, Dr. Mila Rahman [place] [address] [place] [address] on 10/06/2023 at 10:20am.    Appointment was scheduled by Ms. DEVEN Venegas, Referral Coordinator.

## 2023-09-19 NOTE — DISCHARGE NOTE PROVIDER - NSDCCPCAREPLAN_GEN_ALL_CORE_FT
PRINCIPAL DISCHARGE DIAGNOSIS  Diagnosis: Foot ulceration  Assessment and Plan of Treatment: You were admitted for an infection to your L foot over your known AVM. We placed a PICC line so that you can continue with your antibiotic regimen for an additional _______. Please follow up with Dr. Rahman in 2 weeks.     PRINCIPAL DISCHARGE DIAGNOSIS  Diagnosis: Foot ulceration  Assessment and Plan of Treatment: You were admitted for an infection to your L foot over your known AVM. We treated you with anitbiotics and placed a PICC line so that you can continue with your antibiotic regimen for 2 weeks. Please follow up with the infectious disease doctor,  Dr. Rahman at your scheduled appointment. Please continue to follow up with Dr. Teran.     PRINCIPAL DISCHARGE DIAGNOSIS  Diagnosis: Foot ulceration  Assessment and Plan of Treatment: You were admitted for an infection to your L foot over your known AVM. We treated you with anitbiotics and placed a PICC line so that you can continue with your antibiotic regimen for 2 weeks. Please follow up with the infectious disease doctor,  Dr. Rahman at your scheduled appointment. Please continue to follow up with Dr. Teran.  Wound care instructions: dressing changes with Vashe moistened 2x2 gauze 2 times daily

## 2023-09-19 NOTE — PROGRESS NOTE ADULT - ASSESSMENT
37F w/ PMHx w/ HTN, morbid obesity, chronic L foot AVM s/p multiple embolizations with recurrent nonpurulent ulcer infection presents to [facility] for c/f infected PICC line. 37 YO F with PMHx HTN, Morbid obesity, chronic L foot AVM s/p multiple embolizations with recurrent nonpurulent ulcer infection presents to [facility] with concern for infection, found to have sepsis 2/2 chronic L foot ulcer admitted for further medical management.

## 2023-09-19 NOTE — DISCHARGE NOTE PROVIDER - CARE PROVIDERS DIRECT ADDRESSES
[email or web address]

## 2023-09-19 NOTE — PROGRESS NOTE ADULT - SUBJECTIVE AND OBJECTIVE BOX
INTERVAL HPI/OVERNIGHT EVENTS:    Patient was seen and examined at bedside.  No change from prior. She's concerned her peripheral IVs are not delivering all the antibiotic doses    CONSTITUTIONAL:  Negative fever or chills, feels well, good appetite  EYES:  Negative  blurry vision or double vision  CARDIOVASCULAR:  Negative for chest pain or palpitations  RESPIRATORY:  Negative for cough, wheezing, or SOB   GASTROINTESTINAL:  Negative for nausea, vomiting, diarrhea, constipation, or abdominal pain  GENITOURINARY:  Negative frequency, urgency or dysuria  NEUROLOGIC:  No headache, confusion, dizziness, lightheadedness      ANTIBIOTICS/RELEVANT:    MEDICATIONS  (STANDING):  enoxaparin Injectable 40 milliGRAM(s) SubCutaneous every 12 hours  gabapentin 100 milliGRAM(s) Oral every 24 hours  gabapentin 800 milliGRAM(s) Oral daily  meropenem  IVPB 2000 milliGRAM(s) IV Intermittent every 8 hours  nebivolol 10 milliGRAM(s) Oral <User Schedule>  polyethylene glycol 3350 17 Gram(s) Oral daily  senna 2 Tablet(s) Oral at bedtime  vancomycin  IVPB 1250 milliGRAM(s) IV Intermittent every 8 hours    MEDICATIONS  (PRN):  acetaminophen     Tablet .. 650 milliGRAM(s) Oral every 6 hours PRN Temp greater or equal to 38C (100.4F), Mild Pain (1 - 3)  aluminum hydroxide/magnesium hydroxide/simethicone Suspension 30 milliLiter(s) Oral every 4 hours PRN Dyspepsia  diphenhydrAMINE 25 milliGRAM(s) Oral every 4 hours PRN Rash and/or Itching  HYDROmorphone  Injectable 1 milliGRAM(s) IV Push every 4 hours PRN Severe Pain (7 - 10)  melatonin 3 milliGRAM(s) Oral at bedtime PRN Insomnia  ondansetron Injectable 4 milliGRAM(s) IV Push every 8 hours PRN Nausea and/or Vomiting        Vital Signs Last 24 Hrs  T(C): 36.8 (19 Sep 2023 10:33), Max: 37.2 (18 Sep 2023 20:55)  T(F): 98.3 (19 Sep 2023 10:33), Max: 98.9 (18 Sep 2023 20:55)  HR: 91 (19 Sep 2023 10:33) (91 - 99)  BP: 131/78 (19 Sep 2023 10:33) (120/77 - 172/87)  BP(mean): --  RR: 16 (19 Sep 2023 10:33) (16 - 18)  SpO2: 98% (19 Sep 2023 10:33) (96% - 99%)    Parameters below as of 19 Sep 2023 10:33  Patient On (Oxygen Delivery Method): room air        PHYSICAL EXAM:  Constitutional: non-toxic, no distress  Eyes:ANTOINETTE, EOMI  Ear/Nose/Throat: no oral lesion, no sinus tenderness on percussion	  Neck:  supple  Respiratory: CTA chanel  Cardiovascular: S1S2 RRR, no murmurs  Gastrointestinal:soft, (+) BS, no HSM  Extremities: left foot cellulitis is unchanged   Vascular: DP Pulse:	right normal; left normal      LABS:                        11.0   15.79 )-----------( 525      ( 19 Sep 2023 05:30 )             33.9     09-19    137  |  100  |  12  ----------------------------<  100<H>  4.5   |  26  |  0.59    Ca    9.5      19 Sep 2023 05:30  Phos  3.4     09-19  Mg     2.0     09-19    TPro  7.4  /  Alb  3.4  /  TBili  0.6  /  DBili  x   /  AST  12  /  ALT  13  /  AlkPhos  105  09-19    PT/INR - ( 18 Sep 2023 07:27 )   PT: 10.9 sec;   INR: 0.95          PTT - ( 18 Sep 2023 07:27 )  PTT:31.1 sec  Urinalysis Basic - ( 19 Sep 2023 05:30 )    Color: x / Appearance: x / SG: x / pH: x  Gluc: 100 mg/dL / Ketone: x  / Bili: x / Urobili: x   Blood: x / Protein: x / Nitrite: x   Leuk Esterase: x / RBC: x / WBC x   Sq Epi: x / Non Sq Epi: x / Bacteria: x        MICROBIOLOGY:    RADIOLOGY & ADDITIONAL STUDIES:

## 2023-09-19 NOTE — PROCEDURE NOTE - NSPOSTCAREGUIDE_GEN_A_CORE
Instructed patient/caregiver to follow-up with primary care physician/Instructed patient/caregiver regarding signs and symptoms of infection/Keep the cast/splint/dressing clean and dry/Care for catheter as per unit/ICU protocols
Verbal/written post procedure instructions were given to patient/caregiver/Care for catheter as per unit/ICU protocols

## 2023-09-19 NOTE — DISCHARGE NOTE PROVIDER - DETAILS OF MALNUTRITION DIAGNOSIS/DIAGNOSES
This patient has been assessed with a concern for Malnutrition and was treated during this hospitalization for the following Nutrition diagnosis/diagnoses:     -  09/15/2023: Morbid obesity (BMI > 40)

## 2023-09-19 NOTE — PROGRESS NOTE ADULT - PROBLEM SELECTOR PLAN 1
2/2 to foot ulcer/abcess. Patient has previously been on Annie/Vanc, Dapto/Cefepime regimens.   Wound cx +pseudomonas  BCx NGTD x2 x48 hours  UCx NGTD  - C/w Meropenem 2g every 8 hours  - C/w Vanc 1250 every 8 hours  - will reach out to ID team for end date on abx to plan for insertion of midline/picc 2/2 to foot ulcer/abcess. Patient has previously been on Annie/Vanc, Dapto/Cefepime regimens.   Wound cx +pseudomonas  BCx NGTD x2 x48 hours  UCx NGTD  - C/w Meropenem 2g every 8 hours  - C/w Vanc 1250 every 8 hours  - planned for intervention procedure with Dr. Teran this am  - Per ID planned to continue antibiotics for 2-4 weeks post-discharge not including duration of abx while inpatient

## 2023-09-19 NOTE — PROGRESS NOTE ADULT - SUBJECTIVE AND OBJECTIVE BOX
INFECTIOUS DISEASES CONSULT FOLLOW-UP NOTE    INTERVAL HPI/OVERNIGHT EVENTS:      ROS:   Constitutional, eyes, ENT, cardiovascular, respiratory, gastrointestinal, genitourinary, integumentary, neurological, psychiatric and heme/lymph are otherwise negative other than noted above       ANTIBIOTICS/RELEVANT:    MEDICATIONS  (STANDING):  enoxaparin Injectable 40 milliGRAM(s) SubCutaneous every 12 hours  gabapentin 100 milliGRAM(s) Oral every 24 hours  gabapentin 800 milliGRAM(s) Oral daily  meropenem  IVPB 2000 milliGRAM(s) IV Intermittent every 8 hours  nebivolol 10 milliGRAM(s) Oral <User Schedule>  polyethylene glycol 3350 17 Gram(s) Oral daily  senna 2 Tablet(s) Oral at bedtime  vancomycin  IVPB 1250 milliGRAM(s) IV Intermittent every 8 hours    MEDICATIONS  (PRN):  acetaminophen     Tablet .. 650 milliGRAM(s) Oral every 6 hours PRN Temp greater or equal to 38C (100.4F), Mild Pain (1 - 3)  aluminum hydroxide/magnesium hydroxide/simethicone Suspension 30 milliLiter(s) Oral every 4 hours PRN Dyspepsia  diphenhydrAMINE 25 milliGRAM(s) Oral every 4 hours PRN Rash and/or Itching  HYDROmorphone  Injectable 1 milliGRAM(s) IV Push every 4 hours PRN Severe Pain (7 - 10)  melatonin 3 milliGRAM(s) Oral at bedtime PRN Insomnia  ondansetron Injectable 4 milliGRAM(s) IV Push every 8 hours PRN Nausea and/or Vomiting        Vital Signs Last 24 Hrs  T(C): 36.9 (19 Sep 2023 05:30), Max: 37.2 (18 Sep 2023 20:55)  T(F): 98.5 (19 Sep 2023 05:30), Max: 98.9 (18 Sep 2023 20:55)  HR: 92 (19 Sep 2023 05:30) (92 - 99)  BP: 120/77 (19 Sep 2023 05:30) (120/77 - 172/87)  BP(mean): --  RR: 17 (19 Sep 2023 05:30) (17 - 18)  SpO2: 96% (19 Sep 2023 05:30) (96% - 99%)    Parameters below as of 19 Sep 2023 05:30  Patient On (Oxygen Delivery Method): room air        PHYSICAL EXAM:  Constitutional: alert, NAD  Eyes: the sclera and conjunctiva were normal.   ENT: the ears and nose were normal in appearance.   Neck: the appearance of the neck was normal and the neck was supple.   Pulmonary: no respiratory distress and lungs were clear to auscultation bilaterally.   Heart: heart rate was normal and rhythm regular, normal S1 and S2  Vascular:. there was no peripheral edema  Abdomen: normal bowel sounds, soft, non-tender  Neurological: no focal deficits.   Psychiatric: the affect was normal        LABS:                        10.9   14.83 )-----------( 504      ( 18 Sep 2023 07:27 )             34.7     09-18    135  |  99  |  10  ----------------------------<  97  4.1   |  28  |  0.67    Ca    9.3      18 Sep 2023 07:27  Phos  3.7     09-18  Mg     2.0     09-18    TPro  6.7  /  Alb  3.2<L>  /  TBili  0.6  /  DBili  x   /  AST  10  /  ALT  11  /  AlkPhos  98  09-18    PT/INR - ( 18 Sep 2023 07:27 )   PT: 10.9 sec;   INR: 0.95          PTT - ( 18 Sep 2023 07:27 )  PTT:31.1 sec  Urinalysis Basic - ( 18 Sep 2023 07:27 )    Color: x / Appearance: x / SG: x / pH: x  Gluc: 97 mg/dL / Ketone: x  / Bili: x / Urobili: x   Blood: x / Protein: x / Nitrite: x   Leuk Esterase: x / RBC: x / WBC x   Sq Epi: x / Non Sq Epi: x / Bacteria: x        MICROBIOLOGY:      RADIOLOGY & ADDITIONAL STUDIES:  Reviewed

## 2023-09-19 NOTE — PROGRESS NOTE ADULT - PROBLEM SELECTOR PLAN 3
Known history of L foot AVMs s/p 23 prior embolizations. Follows with Dr. Teran. S/p angiogram and transcatheter embolization via R femoral groin access with improved perfusion on 03/14.  - dressings changes as above  - Continued follow-up with Dr. Teran  - planned for OR 9/19

## 2023-09-19 NOTE — PRE-ANESTHESIA EVALUATION ADULT - NSANTHSNORERD_ENT_A_CORE
Orders for Admissions have been placed for colonoscopy with Dr. Jasmine on 4/5/23.  
Per standing/verbal of Dr. Jasmine, prescription for Nulytely e-scribed to patient's pharmacy  
Please Reschedule: Dr. Jasmine/Nuno  Received: Today  [facility] Scheduling Msg Pool; P Gastro Procedure Preauth Pool  Cc: Noa Hannon,     Please review the changes below.     Procedure: Colonoscopy     Current date: 04/07/23   Current time: ---   Current facility: [facility]     Rescheduled date: 04/05/23   Rescheduled time: TBD   Rescheduled facility: [facility]   If facility changed, new case created?: N/A       Thank you,   KIRAN   GI Surgery Scheduler     
Progress Notes  Belkis Jasmine MD (Physician) • • Gastroenterology  Schedule surgery for Shukri Villegas with Dr. Belkis Jasmine has reviewed the risks, benefits, indications, complications and alternatives with the patient.  An opportunity to ask questions provided and all were answered.  Patient expresses understanding of the recommended procedure(s) outlined below and wishes to proceed with the surgery.     Surgery scheduling requirements include:  Procedure:  Colonoscopy (40367) with NuLytely  Diagnosis:  Constipation K59.00    Facility:  [facility]   Tentative Date:  Routine (next available or patient preference)  Tentative Time:  To be determined  Admission Type:  Outpatient Surgery.  Time Needed:  30 min  Anesthesia:  MAC  Special Equipment:  NA     Is patient:             Diabetic? No             On Coumadin? No             On aspirin/nonsteroidal anti-inflammatory drugs (ASA/NSAIDS)?  No             Sleep Apnea? No     Preoperative History and Physical:  Done with Dr. Jasmine  Preoperative labs done in clinic:  As per anesthesia guidelines  Schedule postoperative appointment: Follow up as scheduled      Patient Prep Instructions:  Nulytely  Additional Instructions:          
RE: Please Reschedule: Dr. Jasmine/Nuno  Received: Today  Kelly Coto  Pt. rescheduled to 4-5.  thanks     
Scheduled procedure with Patient at In-Person   Scheduled Via: Case Request Order for  [facility]  Did patient request a different provider then the referred to:n/a  Procedure date: 04/07/23   Procedure time: TBD; patient requesting earliest available; Did patient request this specific time? n/a    If a MAC pt is scheduled, pt was notified a family member/friend is need to stay with the patient over night after their procedure: Yes            Notified patient about receiving an animated Shannon program? Yes    Was letter mailed No  Was the letter sent thru [employer]? Yes. If yes was the patient to to look under letters for prep instructions?  Yes    The following have been confirmed:  Insurance name confirmed as [employer], will be the same at time of procedure?: Yes Ins Accepted at Facility? Yes  Latex Allergy No  Diabetic No  Sleep Apnea No if yes CPAP  No  Diuretic/Water pill No  Defibrillator/Pacemaker No  MRSA hx No  On Blood thinners and told to hold : Coumadin (Warfarin) or Plavix No   Phentermine (diet pill) No    Prep required? Yes, Pharmacy is [facility] on [address]; was request sent to nurse to send prep to pharmacy? Yes; Briefly reviewed? Yes;   If procedure is scheduled 7 days or less, patient was told to  prep letter?: n/a  
No

## 2023-09-19 NOTE — DISCHARGE NOTE PROVIDER - PROVIDER TOKENS
PROVIDER:[TOKEN:[[id number]],ESTABLISHEDPATIENT:[T]],PROVIDER:[TOKEN:[[id number]]] PROVIDER:[TOKEN:[[id number]],FOLLOWUP:[2 weeks],ESTABLISHEDPATIENT:[T]],PROVIDER:[TOKEN:[[id number]],FOLLOWUP:[2 weeks]] PROVIDER:[TOKEN:[[id number]],SCHEDULEDAPPT:[09/26/2023],SCHEDULEDAPPTTIME:[12:00 PM],ESTABLISHEDPATIENT:[T]],PROVIDER:[TOKEN:[[id number]],FOLLOWUP:[2 weeks]] PROVIDER:[TOKEN:[[id number]],SCHEDULEDAPPT:[09/26/2023],SCHEDULEDAPPTTIME:[12:00 PM],ESTABLISHEDPATIENT:[T]],PROVIDER:[TOKEN:[[id number]],SCHEDULEDAPPT:[10/06/2023],SCHEDULEDAPPTTIME:[10:20 AM],ESTABLISHEDPATIENT:[T]]

## 2023-09-19 NOTE — PROCEDURE NOTE - NSPROCDETAILS_GEN_ALL_CORE
USG/location identified, draped/prepped, sterile technique used/blood seen on insertion/dressing applied/flushes easily/secured in place/sterile technique, catheter placed
location identified, draped/prepped, sterile technique used/sterile dressing applied/sterile technique, catheter placed/supine position

## 2023-09-19 NOTE — DISCHARGE NOTE PROVIDER - CARE PROVIDER_API CALL
J Carlos Teran  Vascular/Intervent Radiology  [address]  [place] [address]  Phone: [phone]  Fax: [phone]  Established Patient  Follow Up Time:     Mila Rahman  Infectious Disease  [address], [address] [place] [address]  Phone: [phone]  Fax: [phone]  Follow Up Time:    J Carlos Teran  Vascular/Intervent Radiology  [address]  [place] [address]  Phone: [phone]  Fax: [phone]  Established Patient  Follow Up Time: 2 weeks    Mila Rahman  Infectious Disease  [address], [address]th [place] [address]  Phone: [phone]  Fax: [phone]  Follow Up Time: 2 weeks   J Carlos Teran  Vascular/Intervent Radiology  [address]  [place] [address]  Phone: [phone]  Fax: [phone]  Established Patient  Scheduled Appointment: 09/26/2023 12:00 PM    Mila Rahman  Infectious Disease  [address] [place] [address]  Phone: [phone]  Fax: [phone]  Follow Up Time: 2 weeks   J Carlos Teran  Vascular/Intervent Radiology  [address]  [place] [address]  Phone: [phone]  Fax: [phone]  Established Patient  Scheduled Appointment: 09/26/2023 12:00 PM    Mila Rahman  Infectious Disease  [address] [place] [address]  Phone: [phone]  Fax: [phone]  Established Patient  Scheduled Appointment: 10/06/2023 10:20 AM

## 2023-09-19 NOTE — BRIEF OPERATIVE NOTE - OPERATION/FINDINGS
Patient placed under LMA anesthesia. LLE prepped and draped. Symptomatic area over L heel wound studied under fluoroscopy showing residual AVM. Direct stick embolization performed with .5cc nBCA glue. Entry flow tracts closed with collagen.

## 2023-09-19 NOTE — DISCHARGE NOTE PROVIDER - HOSPITAL COURSE
#Discharge: do not delete    37 YO F with PMHx HTN, Morbid obesity, chronic L foot AVM s/p multiple embolizations with recurrent nonpurulent ulcer infection presents to [facility] with concern for infection, found to have sepsis 2/2 chronic L foot ulcer admitted for further medical management.    #Sepsis.   2/2 to foot ulcer/abcess. Patient has previously been on Annie/Vanc, Dapto/Cefepime regimens.   Wound cx +pseudomonas  BCx NGTD x2 x48 hours  UCx NGTD  - C/w Meropenem 2g every 8 hours to be continued through  - C/w Vanc 1250 every 8 hours to be continued through   - s/p intervention with Dr. Teran     #Foot ulceration.   #AVM (arteriovenous malformation).   ·  Plan: Known history of L foot AVMs s/p 23 prior embolizations. Follows with Dr. Teran. S/p intervention 9/19  - dressings changes as above  - Continued follow-up with Dr. Teran  - c/w home Gabapentin 100mg in the morning and 800mg at 4pm.   - c/w Percocet 10 q4hrs PRN    #HTN (hypertension).   - cw home Bystolic 10mg every 12 hours    #BMI 46.1 with Class II obesity   - Counseled on lifestyle modifications   - DASH diet  - per patient, she has been debilitated due to recurrent AVM infections/abcess requiring frequent hospitalizations for IV abx. Has been subsequently sedentary.    Patient was discharged to: home    New medications: Meropenem and vancomycin    Changes to old medications:    Medications that were stopped: None    Items to follow up as outpatient: f/u with Dr. Teran and ID    Physical exam at the time of discharge: #Discharge: do not delete    37 YO F with PMHx HTN, Morbid obesity, chronic L foot AVM s/p multiple embolizations with recurrent nonpurulent ulcer infection presents to [facility] with concern for infection, found to have sepsis 2/2 chronic L foot ulcer admitted for further medical management.    #Sepsis.   2/2 to foot ulcer/abcess. Patient has previously been on Annie/Vanc, Dapto/Cefepime regimens. During this hospitalization ID workup: Wound cx +pseudomonas, BCx NGTD x2 x5 days   UCx NG. Patient was treated with   - C/w Meropenem 2g every 8 hours to be continued through  - C/w Vanc 1250 every 8 hours to be continued through   - s/p intervention with Dr. Teran     #Foot ulceration.   #AVM (arteriovenous malformation).   ·  Plan: Known history of L foot AVMs s/p 23 prior embolizations. Follows with Dr. Teran. S/p intervention 9/19  - dressings changes as above  - Continued follow-up with Dr. Teran  - c/w home Gabapentin 100mg in the morning and 800mg at 4pm.   - c/w Percocet 10 q4hrs PRN    #HTN (hypertension).   - cw home Bystolic 10mg every 12 hours    #BMI 46.1 with Class II obesity   - Counseled on lifestyle modifications   - DASH diet  - per patient, she has been debilitated due to recurrent AVM infections/abcess requiring frequent hospitalizations for IV abx. Has been subsequently sedentary.    Patient was discharged to: home    New medications: Meropenem and vancomycin    Changes to old medications:    Medications that were stopped: None    Items to follow up as outpatient: f/u with Dr. Teran and ID    Physical exam at the time of discharge:   37 YO F with PMHx HTN, Morbid obesity, chronic L foot AVM s/p multiple embolizations with recurrent nonpurulent ulcer infection presents to [facility] with concern for infection, found to have sepsis 2/2 chronic L foot ulcer admitted for further medical management.    #Sepsis.   2/2 to foot ulcer/abcess. Patient has previously been on Annie/Vanc, Dapto/Cefepime regimens. During this hospitalization ID workup: Wound cx +pseudomonas, BCx NGTD x2 x5 days  UCx NG. Patient was treated with Meropenem 2g every 8 hours and Vanc 1250 every 8 hours to be continued through   - s/p intervention with Dr. Teran     #Foot ulceration.   #AVM (arteriovenous malformation).   ·  Plan: Known history of L foot AVMs s/p 23 prior embolizations. Follows with Dr. Teran. S/p intervention 9/19  - dressings changes as above  - Continued follow-up with Dr. Teran  - c/w home Gabapentin 100mg in the morning and 800mg at 4pm.   - c/w Percocet 10 q4hrs PRN    #HTN (hypertension).   - cw home Bystolic 10mg every 12 hours    #BMI 46.1 with Class II obesity   - Counseled on lifestyle modifications   - DASH diet  - per patient, she has been debilitated due to recurrent AVM infections/abcess requiring frequent hospitalizations for IV abx. Has been subsequently sedentary.    Patient was discharged to: home    New medications: Meropenem and vancomycin    Changes to old medications:    Medications that were stopped: None    Items to follow up as outpatient: f/u with Dr. Teran and ID    Physical exam at the time of discharge:   37 YO F with PMHx HTN, Morbid obesity, chronic L foot AVM s/p multiple embolizations with recurrent nonpurulent ulcer infection presents to [facility] with concern for infection, found to have sepsis 2/2 chronic L foot ulcer admitted for further medical management.    #Sepsis.   2/2 to foot ulcer/abcess. Patient has previously been on Annie/Vanc, Dapto/Cefepime regimens. During this hospitalization ID workup: Wound cx +pseudomonas, BCx NGTD x2 x5 days  UCx NG. Patient was treated with Meropenem 2g every 8 hours and Vanc 1250 every 8 hours to be continued for tentative 2 week total duration (day 1 = 9/19/23). s/p embolization with Dr. Teran on 9/19, tolerated procedure well. PICC line was placed on 9/19.    Plan:   -c/w IV  Continue vancomycin 1250mg IV q8h and  Continue meropenem 2 grams IV q8h for tentative 2 weeks  -f/u with Dr. Rahman and Dr. Teran    #Foot ulceration.   #AVM (arteriovenous malformation).   Known history of L foot AVMs s/p 23 prior embolizations. Follows with Dr. Teran. S/p intervention 9/19  - dressings changes as above  - Continued follow-up with Dr. Teran  - c/w home Gabapentin 100mg in the morning and 800mg at 4pm.   - c/w Percocet 10 q4hrs PRN    #HTN (hypertension).   - cw home Bystolic 10mg every 12 hours    #BMI 46.1 with Class II obesity   - Counseled on lifestyle modifications   - DASH diet  - per patient, she has been debilitated due to recurrent AVM infections/abcess requiring frequent hospitalizations for IV abx. Has been subsequently sedentary.    Patient was discharged to: home    New medications: Meropenem and vancomycin    Changes to old medications:    Medications that were stopped: None    Items to follow up as outpatient: f/u with Dr. Teran and ID    Physical exam at the time of discharge:   39 YO F with PMHx HTN, Morbid obesity, chronic L foot AVM s/p multiple embolizations with recurrent nonpurulent ulcer infection presents to [facility] with concern for infection, found to have sepsis 2/2 chronic L foot ulcer admitted for further medical management.    #Sepsis.   2/2 to foot ulcer/abcess. Patient has previously been on Annie/Vanc, Dapto/Cefepime regimens. During this hospitalization ID workup: Wound cx +pseudomonas, BCx NGTD x2 x5 days  UCx NG. Patient was treated with Meropenem 2g every 8 hours and Vanc 1250 every 8 hours to be continued for tentative 2 week total duration (day 1 = 9/19/23). s/p embolization with Dr. Teran on 9/19, tolerated procedure well. PICC line was placed on 9/19.    Plan:   -c/w IV  Continue vancomycin 1250mg IV q8h and  Continue meropenem 2 grams IV q8h for tentative 2 weeks  -f/u with Dr. Rahman and Dr. Teran    #Foot ulceration.   #AVM (arteriovenous malformation).   Known history of L foot AVMs s/p 23 prior embolizations. Follows with Dr. Teran. S/p embolization with Dr. Teran on 9/19. Dressings changes {....}. Pain management during hospitalization: Gabapentin 100mg daily, Tylenol 650mg q6 PRN for Mild Pain,     Plan:  c/w Gabapentin 100mg in the morning + 800mg at 4PM  - patient not amendable to increasing gabapentin 100 mg to TID as it causes HA and discomfort  - c/w Dilaudid 1mg PO q4 PRN  - c/w Bowel regimen    - c/w dressings changes as follows: w/ Vashe moistened 2x2 gauze 2 times daily   - Sepsis/Abx plan as above.  #HTN (hypertension).   - cw home Bystolic 10mg every 12 hours    #BMI 46.1 with Class II obesity   - Counseled on lifestyle modifications   - DASH diet  - per patient, she has been debilitated due to recurrent AVM infections/abcess requiring frequent hospitalizations for IV abx. Has been subsequently sedentary.    Patient was discharged to: home    New medications: Meropenem and vancomycin    Changes to old medications:    Medications that were stopped: None    Items to follow up as outpatient: f/u with Dr. Teran and ID    Physical exam at the time of discharge:   39 YO F with PMHx HTN, Morbid obesity, chronic L foot AVM s/p multiple embolizations with recurrent nonpurulent ulcer infection presents to [facility] with concern for infection, found to have sepsis 2/2 chronic L foot ulcer admitted for further medical management.    #Sepsis.   2/2 to foot ulcer/abcess. Patient has previously been on Annie/Vanc, Dapto/Cefepime regimens. During this hospitalization ID workup: Wound cx +pseudomonas, BCx NGTD x2 x5 days  UCx NG. Patient was treated with Meropenem 2g every 8 hours and Vanc 1250 every 8 hours to be continued for tentative 2 week total duration (day 1 = 9/19/23). s/p embolization with Dr. Teran on 9/19, tolerated procedure well. PICC line was placed on 9/19.    Plan:   -c/w IV  Continue vancomycin 1250mg IV q8h and  Continue meropenem 2 grams IV q8h for 2 weeks  -f/u with Infectious disease Dr. Rahman.   -F/u with endovascular surgery Dr. Teran    #Foot ulceration.   #AVM (arteriovenous malformation).   Known history of L foot AVMs s/p 23 prior embolizations. Follows with Dr. Teran. S/p embolization with Dr. Teran on 9/19.  Pain management during hospitalization: Gabapentin 100mg in the morning + 800mg at 4PM (patient was not amendable to increasing gabapentin 100 mg to TID as it causes HA and discomfort), Dilaudid 2mg PO q4 PRN. Dressing changes were done follows: w/ Vashe moistened 2x2 gauze 2 times daily. She was treated with abx as above.     Plan on discharge:   -c/w home oxycodone- acetaminophen   -dressing changes: w/ Vashe moistened 2x2 gauze 2 times daily    #HTN (hypertension).   - cw home Bystolic 10mg every 12 hours    #BMI 46.1 with Class II obesity   - Counseled on lifestyle modifications   - DASH diet  - per patient, she has been debilitated due to recurrent AVM infections/abcess requiring frequent hospitalizations for IV abx. Has been subsequently sedentary.    Patient was discharged to: home    New medications: Meropenem and vancomycin    Changes to old medications: None    Medications that were stopped: None    Items to follow up as outpatient: f/u with Dr. Teran and ID       37 YO F with PMHx HTN, Morbid obesity, chronic L foot AVM s/p multiple embolizations with recurrent nonpurulent ulcer infection presents to [facility] with concern for infection, found to have sepsis 2/2 chronic L foot ulcer admitted for further medical management.    #Sepsis.   2/2 to foot ulcer/abcess. Patient has previously been on Annie/Vanc, Dapto/Cefepime regimens. During this hospitalization ID workup: Wound cx +pseudomonas, BCx NGTD x2 x5 days  UCx NG. Patient was treated with Meropenem 2g every 8 hours and Vanc 1250 every 8 hours to be continued for tentative 2 week total duration (day 1 = 9/19/23). s/p embolization with Dr. Teran on 9/19, tolerated procedure well. PICC line was placed on 9/19.    Plan:   -c/w IV  Continue vancomycin 1250mg IV q8h and  Continue meropenem 2 grams IV q8h for 2 weeks  -f/u with Infectious disease Dr. Rahman.   -F/u with endovascular surgery Dr. Teran    #Foot ulceration.   #AVM (arteriovenous malformation).   Known history of L foot AVMs s/p 23 prior embolizations. Follows with Dr. Teran. S/p embolization with Dr. Teran on 9/19.  Pain management during hospitalization: Gabapentin 100mg in the morning + 800mg at 4PM (patient was not amendable to increasing gabapentin 100 mg to TID as it causes HA and discomfort), Dilaudid 2mg PO q4 PRN. Dressing changes were done follows: w/ Vashe moistened 2x2 gauze 2 times daily. She was treated with abx as above.     Plan on discharge:   -c/w home med oxycodone- acetaminophen .   -f/u with outpatient pain management   -dressing changes: w/ Vashe moistened 2x2 gauze 2 times daily    #HTN (hypertension).   Home med Bystolic 10mg every 12 hours was continued during this hospitalization.     Plan on discharge:   -c/w Bystolic 10mg every 12 hours    #BMI 46.1 with Class II obesity   Patient was counseled on lifestyle modifications and maintained on DASH diet. She reports she has been debilitated due to recurrent AVM infections/abcess requiring frequent hospitalizations for IV abx. Has been subsequently sedentary.    Plan on discharge:   -c/w DASH diet, encourage Physical Activity     Patient was discharged to: home    New medications: Meropenem and vancomycin    Changes to old medications: None    Medications that were stopped: None    Items to follow up as outpatient: f/u with Dr. Teran and ID

## 2023-09-19 NOTE — PROGRESS NOTE ADULT - SUBJECTIVE AND OBJECTIVE BOX
OVERNIGHT EVENTS:    SUBJECTIVE / INTERVAL HPI: Patient seen and examined at bedside.     VITAL SIGNS:  Vital Signs Last 24 Hrs  T(C): 36.9 (19 Sep 2023 05:30), Max: 37.2 (18 Sep 2023 20:55)  T(F): 98.5 (19 Sep 2023 05:30), Max: 98.9 (18 Sep 2023 20:55)  HR: 92 (19 Sep 2023 05:30) (92 - 99)  BP: 120/77 (19 Sep 2023 05:30) (120/77 - 172/87)  BP(mean): --  RR: 17 (19 Sep 2023 05:30) (17 - 18)  SpO2: 96% (19 Sep 2023 05:30) (96% - 99%)    Parameters below as of 19 Sep 2023 05:30  Patient On (Oxygen Delivery Method): room air        PHYSICAL EXAM:    General: alert, in no acute distress  HEENT: NC/AT; PERRL, anicteric sclera; MMM  Neck: supple  Cardiovascular: +S1/S2, RRR  Respiratory: CTA B/L; no W/R/R  Gastrointestinal: soft, NT/ND; +BSx4  Extremities: WWP; no edema, clubbing or cyanosis  Vascular: 2+ radial, DP/PT pulses B/L  Neurological: AAOx3; no focal deficits    MEDICATIONS:  MEDICATIONS  (STANDING):  enoxaparin Injectable 40 milliGRAM(s) SubCutaneous every 12 hours  gabapentin 100 milliGRAM(s) Oral every 24 hours  gabapentin 800 milliGRAM(s) Oral daily  meropenem  IVPB 2000 milliGRAM(s) IV Intermittent every 8 hours  nebivolol 10 milliGRAM(s) Oral <User Schedule>  polyethylene glycol 3350 17 Gram(s) Oral daily  senna 2 Tablet(s) Oral at bedtime  vancomycin  IVPB 1250 milliGRAM(s) IV Intermittent every 8 hours    MEDICATIONS  (PRN):  acetaminophen     Tablet .. 650 milliGRAM(s) Oral every 6 hours PRN Temp greater or equal to 38C (100.4F), Mild Pain (1 - 3)  aluminum hydroxide/magnesium hydroxide/simethicone Suspension 30 milliLiter(s) Oral every 4 hours PRN Dyspepsia  diphenhydrAMINE 25 milliGRAM(s) Oral every 4 hours PRN Rash and/or Itching  HYDROmorphone  Injectable 1 milliGRAM(s) IV Push every 4 hours PRN Severe Pain (7 - 10)  melatonin 3 milliGRAM(s) Oral at bedtime PRN Insomnia  ondansetron Injectable 4 milliGRAM(s) IV Push every 8 hours PRN Nausea and/or Vomiting      ALLERGIES:  Allergies    morphine (Rash)  latex (Rash)  penicillin (Rash)  lisinopril (Angioedema; Rash; Hives)  ciprofloxacin (Rash)  hydrochlorothiazide (Hives)  amoxicillin (Angioedema)    Intolerances        LABS:                        10.9   14.83 )-----------( 504      ( 18 Sep 2023 07:27 )             34.7     09-18    135  |  99  |  10  ----------------------------<  97  4.1   |  28  |  0.67    Ca    9.3      18 Sep 2023 07:27  Phos  3.7     09-18  Mg     2.0     09-18    TPro  6.7  /  Alb  3.2<L>  /  TBili  0.6  /  DBili  x   /  AST  10  /  ALT  11  /  AlkPhos  98  09-18    PT/INR - ( 18 Sep 2023 07:27 )   PT: 10.9 sec;   INR: 0.95          PTT - ( 18 Sep 2023 07:27 )  PTT:31.1 sec  Urinalysis Basic - ( 18 Sep 2023 07:27 )    Color: x / Appearance: x / SG: x / pH: x  Gluc: 97 mg/dL / Ketone: x  / Bili: x / Urobili: x   Blood: x / Protein: x / Nitrite: x   Leuk Esterase: x / RBC: x / WBC x   Sq Epi: x / Non Sq Epi: x / Bacteria: x      CAPILLARY BLOOD GLUCOSE          RADIOLOGY & ADDITIONAL TESTS: Reviewed. OVERNIGHT EVENTS: PIV infiltrated, abx held     SUBJECTIVE / INTERVAL HPI: Patient seen and examined at bedside. C/o pain and discomfort over UE and site of infiltration. Denies any fevers, chills.    VITAL SIGNS:  Vital Signs Last 24 Hrs  T(C): 36.9 (19 Sep 2023 05:30), Max: 37.2 (18 Sep 2023 20:55)  T(F): 98.5 (19 Sep 2023 05:30), Max: 98.9 (18 Sep 2023 20:55)  HR: 92 (19 Sep 2023 05:30) (92 - 99)  BP: 120/77 (19 Sep 2023 05:30) (120/77 - 172/87)  BP(mean): --  RR: 17 (19 Sep 2023 05:30) (17 - 18)  SpO2: 96% (19 Sep 2023 05:30) (96% - 99%)    Parameters below as of 19 Sep 2023 05:30  Patient On (Oxygen Delivery Method): room air        PHYSICAL EXAM:    General: alert, in no acute distress  HEENT: NC/AT; PERRL, anicteric sclera; MMM  Neck: supple  Cardiovascular: +S1/S2, RRR  Respiratory: CTA B/L; no W/R/R  Gastrointestinal: soft, NT/ND; +BSx4  Extremities: WWP; no edema, clubbing or cyanosis, bandage over LLE, no drainage  Vascular: 2+ radial, DP/PT pulses B/L  Neurological: AAOx3; no focal deficits    MEDICATIONS:  MEDICATIONS  (STANDING):  enoxaparin Injectable 40 milliGRAM(s) SubCutaneous every 12 hours  gabapentin 100 milliGRAM(s) Oral every 24 hours  gabapentin 800 milliGRAM(s) Oral daily  meropenem  IVPB 2000 milliGRAM(s) IV Intermittent every 8 hours  nebivolol 10 milliGRAM(s) Oral <User Schedule>  polyethylene glycol 3350 17 Gram(s) Oral daily  senna 2 Tablet(s) Oral at bedtime  vancomycin  IVPB 1250 milliGRAM(s) IV Intermittent every 8 hours    MEDICATIONS  (PRN):  acetaminophen     Tablet .. 650 milliGRAM(s) Oral every 6 hours PRN Temp greater or equal to 38C (100.4F), Mild Pain (1 - 3)  aluminum hydroxide/magnesium hydroxide/simethicone Suspension 30 milliLiter(s) Oral every 4 hours PRN Dyspepsia  diphenhydrAMINE 25 milliGRAM(s) Oral every 4 hours PRN Rash and/or Itching  HYDROmorphone  Injectable 1 milliGRAM(s) IV Push every 4 hours PRN Severe Pain (7 - 10)  melatonin 3 milliGRAM(s) Oral at bedtime PRN Insomnia  ondansetron Injectable 4 milliGRAM(s) IV Push every 8 hours PRN Nausea and/or Vomiting      ALLERGIES:  Allergies    morphine (Rash)  latex (Rash)  penicillin (Rash)  lisinopril (Angioedema; Rash; Hives)  ciprofloxacin (Rash)  hydrochlorothiazide (Hives)  amoxicillin (Angioedema)    Intolerances        LABS:                        10.9   14.83 )-----------( 504      ( 18 Sep 2023 07:27 )             34.7     09-18    135  |  99  |  10  ----------------------------<  97  4.1   |  28  |  0.67    Ca    9.3      18 Sep 2023 07:27  Phos  3.7     09-18  Mg     2.0     09-18    TPro  6.7  /  Alb  3.2<L>  /  TBili  0.6  /  DBili  x   /  AST  10  /  ALT  11  /  AlkPhos  98  09-18    PT/INR - ( 18 Sep 2023 07:27 )   PT: 10.9 sec;   INR: 0.95          PTT - ( 18 Sep 2023 07:27 )  PTT:31.1 sec  Urinalysis Basic - ( 18 Sep 2023 07:27 )    Color: x / Appearance: x / SG: x / pH: x  Gluc: 97 mg/dL / Ketone: x  / Bili: x / Urobili: x   Blood: x / Protein: x / Nitrite: x   Leuk Esterase: x / RBC: x / WBC x   Sq Epi: x / Non Sq Epi: x / Bacteria: x      CAPILLARY BLOOD GLUCOSE          RADIOLOGY & ADDITIONAL TESTS: Reviewed.

## 2023-09-19 NOTE — PROCEDURE NOTE - NSPROCSEDATIONNOT_GEN_ALL_CORE
Patient Seen in: [facility] [place] AND [facility] Emergency Department    History   Patient presents with:  Fall (musculoskeletal, neurologic)    Stated Complaint: fell down earlier and hurt her hand and wrist    HPI    Patient presents the emergency department after f daily.   RENVELA 800 MG Oral Tab,  TAKE 3 TABLETS WITH MEALS THREE TIMES DAILY   amiodarone HCl 200 MG Oral Tab,  Take 200 mg by mouth once daily. aspirin 81 MG Oral Tab,  Take 81 mg by mouth daily. Acetaminophen (TYLENOL OR),  as needed.    Cholecalc %  O2 Device: None (Room air)    Current:BP (!) 178/69   Pulse 71   Temp 98.3 °F (36.8 °C) (Oral)   Resp 18   SpO2 98%         Physical Exam  Constitutional:  Alert, well nourished adult lying in bed in no distress. Vital signs noted.   Eye:  No scleral ic List    START taking these medications    HYDROcodone-acetaminophen 5-325 MG Oral Tab  Take 1 tablet by mouth every 4 (four) hours as needed for Pain.   Qty: 12 tablet Refills: 0 No

## 2023-09-19 NOTE — PROGRESS NOTE ADULT - ASSESSMENT
IMPRESSION:  38 year old female with h/o HTN, morbid obesity, L foot AVM s/p multiple direct stick and transcatheter embolizations, recurrent cellulitis p/w L foot ulcer with cellulitis.  Currently on vanc/hugh with minimal improvement.  Awaiting eval by Dr. Teran for possible intervention with embolization     Recommend:  1. Continue vancomycin 1250mg IV q8h  2. Continue meropenem 2 grams IV q8h  3.  Above antibiotic regimen will have tentative 2 week total duration. Subject to change based on clinical course improvement   4. Please place PICC line (should not be midline as antibiotic duration may need to be extended)    ID team 1 will follow

## 2023-09-20 LAB
ALBUMIN SERPL ELPH-MCNC: 3.2 G/DL — LOW (ref 3.3–5)
ALP SERPL-CCNC: 111 U/L — SIGNIFICANT CHANGE UP (ref 40–120)
ALT FLD-CCNC: 10 U/L — SIGNIFICANT CHANGE UP (ref 10–45)
ANION GAP SERPL CALC-SCNC: 13 MMOL/L — SIGNIFICANT CHANGE UP (ref 5–17)
AST SERPL-CCNC: 10 U/L — SIGNIFICANT CHANGE UP (ref 10–40)
BASOPHILS # BLD AUTO: 0.02 K/UL — SIGNIFICANT CHANGE UP (ref 0–0.2)
BASOPHILS NFR BLD AUTO: 0.1 % — SIGNIFICANT CHANGE UP (ref 0–2)
BILIRUB SERPL-MCNC: 0.3 MG/DL — SIGNIFICANT CHANGE UP (ref 0.2–1.2)
BUN SERPL-MCNC: 13 MG/DL — SIGNIFICANT CHANGE UP (ref 7–23)
CALCIUM SERPL-MCNC: 9.2 MG/DL — SIGNIFICANT CHANGE UP (ref 8.4–10.5)
CHLORIDE SERPL-SCNC: 100 MMOL/L — SIGNIFICANT CHANGE UP (ref 96–108)
CO2 SERPL-SCNC: 23 MMOL/L — SIGNIFICANT CHANGE UP (ref 22–31)
CREAT SERPL-MCNC: 0.62 MG/DL — SIGNIFICANT CHANGE UP (ref 0.5–1.3)
EGFR: 115 ML/MIN/1.73M2 — SIGNIFICANT CHANGE UP
EOSINOPHIL # BLD AUTO: 0.01 K/UL — SIGNIFICANT CHANGE UP (ref 0–0.5)
EOSINOPHIL NFR BLD AUTO: 0.1 % — SIGNIFICANT CHANGE UP (ref 0–6)
GLUCOSE SERPL-MCNC: 173 MG/DL — HIGH (ref 70–99)
HCT VFR BLD CALC: 34.2 % — LOW (ref 34.5–45)
HGB BLD-MCNC: 10.8 G/DL — LOW (ref 11.5–15.5)
IMM GRANULOCYTES NFR BLD AUTO: 0.5 % — SIGNIFICANT CHANGE UP (ref 0–0.9)
LYMPHOCYTES # BLD AUTO: 1.39 K/UL — SIGNIFICANT CHANGE UP (ref 1–3.3)
LYMPHOCYTES # BLD AUTO: 9.2 % — LOW (ref 13–44)
MAGNESIUM SERPL-MCNC: 2 MG/DL — SIGNIFICANT CHANGE UP (ref 1.6–2.6)
MCHC RBC-ENTMCNC: 27.2 PG — SIGNIFICANT CHANGE UP (ref 27–34)
MCHC RBC-ENTMCNC: 31.6 GM/DL — LOW (ref 32–36)
MCV RBC AUTO: 86.1 FL — SIGNIFICANT CHANGE UP (ref 80–100)
MONOCYTES # BLD AUTO: 0.96 K/UL — HIGH (ref 0–0.9)
MONOCYTES NFR BLD AUTO: 6.4 % — SIGNIFICANT CHANGE UP (ref 2–14)
NEUTROPHILS # BLD AUTO: 12.64 K/UL — HIGH (ref 1.8–7.4)
NEUTROPHILS NFR BLD AUTO: 83.7 % — HIGH (ref 43–77)
NRBC # BLD: 0 /100 WBCS — SIGNIFICANT CHANGE UP (ref 0–0)
PHOSPHATE SERPL-MCNC: 2.9 MG/DL — SIGNIFICANT CHANGE UP (ref 2.5–4.5)
PLATELET # BLD AUTO: 569 K/UL — HIGH (ref 150–400)
POTASSIUM SERPL-MCNC: 4.7 MMOL/L — SIGNIFICANT CHANGE UP (ref 3.5–5.3)
POTASSIUM SERPL-SCNC: 4.7 MMOL/L — SIGNIFICANT CHANGE UP (ref 3.5–5.3)
PROT SERPL-MCNC: 7.3 G/DL — SIGNIFICANT CHANGE UP (ref 6–8.3)
RBC # BLD: 3.97 M/UL — SIGNIFICANT CHANGE UP (ref 3.8–5.2)
RBC # FLD: 12.5 % — SIGNIFICANT CHANGE UP (ref 10.3–14.5)
SODIUM SERPL-SCNC: 136 MMOL/L — SIGNIFICANT CHANGE UP (ref 135–145)
VANCOMYCIN TROUGH SERPL-MCNC: 11.9 UG/ML — SIGNIFICANT CHANGE UP (ref 10–20)
WBC # BLD: 15.1 K/UL — HIGH (ref 3.8–10.5)
WBC # FLD AUTO: 15.1 K/UL — HIGH (ref 3.8–10.5)

## 2023-09-20 PROCEDURE — 99233 SBSQ HOSP IP/OBS HIGH 50: CPT | Mod: GC

## 2023-09-20 PROCEDURE — 99232 SBSQ HOSP IP/OBS MODERATE 35: CPT

## 2023-09-20 RX ADMIN — Medication 166.67 MILLIGRAM(S): at 13:02

## 2023-09-20 RX ADMIN — GABAPENTIN 100 MILLIGRAM(S): 400 CAPSULE ORAL at 05:24

## 2023-09-20 RX ADMIN — HYDROMORPHONE HYDROCHLORIDE 2 MILLIGRAM(S): 2 INJECTION INTRAMUSCULAR; INTRAVENOUS; SUBCUTANEOUS at 10:47

## 2023-09-20 RX ADMIN — Medication 62.5 MILLIMOLE(S): at 14:05

## 2023-09-20 RX ADMIN — NEBIVOLOL HYDROCHLORIDE 10 MILLIGRAM(S): 5 TABLET ORAL at 17:57

## 2023-09-20 RX ADMIN — Medication 166.67 MILLIGRAM(S): at 21:45

## 2023-09-20 RX ADMIN — HYDROMORPHONE HYDROCHLORIDE 2 MILLIGRAM(S): 2 INJECTION INTRAMUSCULAR; INTRAVENOUS; SUBCUTANEOUS at 05:24

## 2023-09-20 RX ADMIN — MEROPENEM 280 MILLIGRAM(S): 1 INJECTION INTRAVENOUS at 19:06

## 2023-09-20 RX ADMIN — HYDROMORPHONE HYDROCHLORIDE 2 MILLIGRAM(S): 2 INJECTION INTRAMUSCULAR; INTRAVENOUS; SUBCUTANEOUS at 05:54

## 2023-09-20 RX ADMIN — HYDROMORPHONE HYDROCHLORIDE 2 MILLIGRAM(S): 2 INJECTION INTRAMUSCULAR; INTRAVENOUS; SUBCUTANEOUS at 19:06

## 2023-09-20 RX ADMIN — HYDROMORPHONE HYDROCHLORIDE 2 MILLIGRAM(S): 2 INJECTION INTRAMUSCULAR; INTRAVENOUS; SUBCUTANEOUS at 10:32

## 2023-09-20 RX ADMIN — HYDROMORPHONE HYDROCHLORIDE 2 MILLIGRAM(S): 2 INJECTION INTRAMUSCULAR; INTRAVENOUS; SUBCUTANEOUS at 14:45

## 2023-09-20 RX ADMIN — GABAPENTIN 800 MILLIGRAM(S): 400 CAPSULE ORAL at 14:05

## 2023-09-20 RX ADMIN — HYDROMORPHONE HYDROCHLORIDE 2 MILLIGRAM(S): 2 INJECTION INTRAMUSCULAR; INTRAVENOUS; SUBCUTANEOUS at 23:08

## 2023-09-20 RX ADMIN — HYDROMORPHONE HYDROCHLORIDE 2 MILLIGRAM(S): 2 INJECTION INTRAMUSCULAR; INTRAVENOUS; SUBCUTANEOUS at 19:21

## 2023-09-20 RX ADMIN — HYDROMORPHONE HYDROCHLORIDE 2 MILLIGRAM(S): 2 INJECTION INTRAMUSCULAR; INTRAVENOUS; SUBCUTANEOUS at 01:38

## 2023-09-20 RX ADMIN — ENOXAPARIN SODIUM 40 MILLIGRAM(S): 100 INJECTION SUBCUTANEOUS at 17:57

## 2023-09-20 RX ADMIN — NEBIVOLOL HYDROCHLORIDE 10 MILLIGRAM(S): 5 TABLET ORAL at 05:23

## 2023-09-20 RX ADMIN — Medication 166.67 MILLIGRAM(S): at 05:23

## 2023-09-20 RX ADMIN — ENOXAPARIN SODIUM 40 MILLIGRAM(S): 100 INJECTION SUBCUTANEOUS at 05:23

## 2023-09-20 RX ADMIN — HYDROMORPHONE HYDROCHLORIDE 2 MILLIGRAM(S): 2 INJECTION INTRAMUSCULAR; INTRAVENOUS; SUBCUTANEOUS at 23:28

## 2023-09-20 RX ADMIN — MEROPENEM 280 MILLIGRAM(S): 1 INJECTION INTRAVENOUS at 11:11

## 2023-09-20 RX ADMIN — HYDROMORPHONE HYDROCHLORIDE 2 MILLIGRAM(S): 2 INJECTION INTRAMUSCULAR; INTRAVENOUS; SUBCUTANEOUS at 01:08

## 2023-09-20 RX ADMIN — HYDROMORPHONE HYDROCHLORIDE 2 MILLIGRAM(S): 2 INJECTION INTRAMUSCULAR; INTRAVENOUS; SUBCUTANEOUS at 15:00

## 2023-09-20 NOTE — PHYSICAL THERAPY INITIAL EVALUATION ADULT - GAIT DEVIATIONS NOTED, PT EVAL
antalgic gait, no lose of  balance, decreased heel strike and hip flexion on LLE, decreased WB on LLE./decreased radha/increased time in double stance/decreased step length

## 2023-09-20 NOTE — PHYSICAL THERAPY INITIAL EVALUATION ADULT - PERTINENT HX OF CURRENT PROBLEM, REHAB EVAL
Pt is a 37 yo female presents to [facility] with concern for infection, found to have sepsis 2/2 chronic L foot ulcer admitted for further medical management, s/p Embolization, direct puncture, for AV malformation on 9/19/23.

## 2023-09-20 NOTE — PROGRESS NOTE ADULT - PROBLEM SELECTOR PLAN 2
Known chronic L foot ulcer due to AVM. Patient follows with Dr. Teran for AVMs and Pain management.   On home Gabapentin 100mg in the morning and 800mg at 4pm. Percocet 10 q4hrs PRN.   - c/w Gabapentin 100mg in the morning + 800mg at 4PM  - patient not amendable to increasing gabapentin 100 mg to TID as it causes HA and discomfort  - c/w Dilaudid 1mg PO q4 PRN  - c/w Bowel regimen    - c/w dressings changes as follows: w/ Vashe moistened 2x2 gauze 2 times daily   - Sepsis/Abx plan as above Known chronic L foot ulcer due to AVM. Patient follows with Dr. Teran for AVMs and Pain management.   On home Gabapentin 100mg in the morning and 800mg at 4pm. Percocet 10 q4hrs PRN.     Plan  - c/w Gabapentin 100mg in the morning + 800mg at 4PM  - patient not amendable to increasing gabapentin 100 mg to TID as it causes HA and discomfort  - c/w Dilaudid 1mg PO q4 PRN  - c/w Bowel regimen    - c/w dressings changes as follows: w/ Vashe moistened 2x2 gauze 2 times daily   - Sepsis/Abx plan as above

## 2023-09-20 NOTE — PROGRESS NOTE ADULT - SUBJECTIVE AND OBJECTIVE BOX
**INCOMPLETE NOTE    OVERNIGHT EVENTS:    SUBJECTIVE:  Patient seen and examined at bedside.    Vital Signs Last 12 Hrs  T(F): 97.8 (09-20-23 @ 06:00), Max: 98.3 (09-19-23 @ 21:02)  HR: 98 (09-20-23 @ 06:00) (98 - 102)  BP: 156/83 (09-20-23 @ 06:00) (150/102 - 156/83)  BP(mean): --  RR: 16 (09-20-23 @ 06:00) (16 - 17)  SpO2: 96% (09-20-23 @ 06:00) (96% - 98%)  I&O's Summary      PHYSICAL EXAM:  Constitutional: NAD, comfortable in bed.  HEENT: NC/AT, PERRLA, EOMI, no conjunctival pallor or scleral icterus, MMM  Neck: Supple, no JVD  Respiratory: CTA B/L. No w/r/r.   Cardiovascular: RRR, normal S1 and S2, no m/r/g.   Gastrointestinal: +BS, soft NTND, no guarding or rebound tenderness, no palpable masses   Extremities: wwp; no cyanosis, clubbing or edema.   Vascular: Pulses equal and strong throughout.   Neurological: AAOx3, no CN deficits, strength and sensation intact throughout.   Skin: No gross skin abnormalities or rashes        LABS:                        10.8   15.10 )-----------( 569      ( 20 Sep 2023 05:30 )             34.2     09-20    136  |  100  |  13  ----------------------------<  173<H>  4.7   |  23  |  0.62    Ca    9.2      20 Sep 2023 05:30  Phos  2.9     09-20  Mg     2.0     09-20    TPro  7.3  /  Alb  3.2<L>  /  TBili  0.3  /  DBili  x   /  AST  10  /  ALT  10  /  AlkPhos  111  09-20      Urinalysis Basic - ( 20 Sep 2023 05:30 )    Color: x / Appearance: x / SG: x / pH: x  Gluc: 173 mg/dL / Ketone: x  / Bili: x / Urobili: x   Blood: x / Protein: x / Nitrite: x   Leuk Esterase: x / RBC: x / WBC x   Sq Epi: x / Non Sq Epi: x / Bacteria: x          RADIOLOGY & ADDITIONAL TESTS:    MEDICATIONS  (STANDING):  chlorhexidine 2% Cloths 1 Application(s) Topical <User Schedule>  enoxaparin Injectable 40 milliGRAM(s) SubCutaneous every 12 hours  gabapentin 100 milliGRAM(s) Oral every 24 hours  gabapentin 800 milliGRAM(s) Oral daily  meropenem  IVPB 2000 milliGRAM(s) IV Intermittent every 8 hours  nebivolol 10 milliGRAM(s) Oral <User Schedule>  polyethylene glycol 3350 17 Gram(s) Oral daily  senna 2 Tablet(s) Oral at bedtime  vancomycin  IVPB 1250 milliGRAM(s) IV Intermittent every 8 hours    MEDICATIONS  (PRN):  acetaminophen     Tablet .. 650 milliGRAM(s) Oral every 6 hours PRN Temp greater or equal to 38C (100.4F), Mild Pain (1 - 3)  aluminum hydroxide/magnesium hydroxide/simethicone Suspension 30 milliLiter(s) Oral every 4 hours PRN Dyspepsia  diphenhydrAMINE 25 milliGRAM(s) Oral every 4 hours PRN Rash and/or Itching  HYDROmorphone  Injectable 2 milliGRAM(s) IV Push every 4 hours PRN Severe Pain (7 - 10)  melatonin 3 milliGRAM(s) Oral at bedtime PRN Insomnia  ondansetron Injectable 4 milliGRAM(s) IV Push every 8 hours PRN Nausea and/or Vomiting  sodium chloride 0.9% lock flush 10 milliLiter(s) IV Push every 1 hour PRN Pre/post blood products, medications, blood draw, and to maintain line patency   OVERNIGHT EVENTS: Pt reported foot pain overnight, received 2mg IV Dilaudid BP was elevated to 150/102 and HR elevated to 102, improved on subsequent reading to 98.     SUBJECTIVE:  Patient seen and examined at bedside. She reports she generally feels well, aside from her L foot pain. She reports pain is located at the site of embolization and it feels similar to what she has experienced in the past with this procedure. Pain is improved with Dilaudid. On ROS, denies fever, chills, cough, n/v, diarrhea, dysuria.     Vital Signs Last 12 Hrs  T(F): 97.8 (09-20-23 @ 06:00), Max: 98.3 (09-19-23 @ 21:02)  HR: 98 (09-20-23 @ 06:00) (98 - 102)  BP: 156/83 (09-20-23 @ 06:00) (150/102 - 156/83)  BP(mean): --  RR: 16 (09-20-23 @ 06:00) (16 - 17)  SpO2: 96% (09-20-23 @ 06:00) (96% - 98%)  I&O's Summary      PHYSICAL EXAM:  Constitutional: NAD, comfortable in bed.  HEENT: No conjunctival pallor or scleral icterus, MMM  Respiratory: Normal respiratory effort, CTA B/L. No w/r/r.   Cardiovascular: RRR, normal S1 and S2, no m/r/g.   Gastrointestinal: +BS, soft NTND, no guarding or rebound tenderness, no palpable masses   Extremities: wwp; no cyanosis, clubbing or edema. The L foot is wrapped in clean dressing, no visible blood or discharge noticed over dressing. Feet are warm to palpation bilaterally, plantar flexion-extension is intact bl  Neurological: AAOx3, no aphasia, dysarthria or facial droop. Strength is 5/5 in U and L ext chanel   Skin: No overt skin abnormalities or rashes noticed over exposed skin         LABS:                        10.8   15.10 )-----------( 569      ( 20 Sep 2023 05:30 )             34.2     09-20    136  |  100  |  13  ----------------------------<  173<H>  4.7   |  23  |  0.62    Ca    9.2      20 Sep 2023 05:30  Phos  2.9     09-20  Mg     2.0     09-20    TPro  7.3  /  Alb  3.2<L>  /  TBili  0.3  /  DBili  x   /  AST  10  /  ALT  10  /  AlkPhos  111  09-20      Urinalysis Basic - ( 20 Sep 2023 05:30 )    Color: x / Appearance: x / SG: x / pH: x  Gluc: 173 mg/dL / Ketone: x  / Bili: x / Urobili: x   Blood: x / Protein: x / Nitrite: x   Leuk Esterase: x / RBC: x / WBC x   Sq Epi: x / Non Sq Epi: x / Bacteria: x          RADIOLOGY & ADDITIONAL TESTS:    MEDICATIONS  (STANDING):  chlorhexidine 2% Cloths 1 Application(s) Topical <User Schedule>  enoxaparin Injectable 40 milliGRAM(s) SubCutaneous every 12 hours  gabapentin 100 milliGRAM(s) Oral every 24 hours  gabapentin 800 milliGRAM(s) Oral daily  meropenem  IVPB 2000 milliGRAM(s) IV Intermittent every 8 hours  nebivolol 10 milliGRAM(s) Oral <User Schedule>  polyethylene glycol 3350 17 Gram(s) Oral daily  senna 2 Tablet(s) Oral at bedtime  vancomycin  IVPB 1250 milliGRAM(s) IV Intermittent every 8 hours    MEDICATIONS  (PRN):  acetaminophen     Tablet .. 650 milliGRAM(s) Oral every 6 hours PRN Temp greater or equal to 38C (100.4F), Mild Pain (1 - 3)  aluminum hydroxide/magnesium hydroxide/simethicone Suspension 30 milliLiter(s) Oral every 4 hours PRN Dyspepsia  diphenhydrAMINE 25 milliGRAM(s) Oral every 4 hours PRN Rash and/or Itching  HYDROmorphone  Injectable 2 milliGRAM(s) IV Push every 4 hours PRN Severe Pain (7 - 10)  melatonin 3 milliGRAM(s) Oral at bedtime PRN Insomnia  ondansetron Injectable 4 milliGRAM(s) IV Push every 8 hours PRN Nausea and/or Vomiting  sodium chloride 0.9% lock flush 10 milliLiter(s) IV Push every 1 hour PRN Pre/post blood products, medications, blood draw, and to maintain line patency

## 2023-09-20 NOTE — PHYSICAL THERAPY INITIAL EVALUATION ADULT - IMPAIRMENTS CONTRIBUTING TO GAIT DEVIATIONS, PT EVAL
PT recommends ambulating with AD, however pt declines to ambulate with assistive device./impaired balance/pain/impaired postural control

## 2023-09-20 NOTE — PHYSICAL THERAPY INITIAL EVALUATION ADULT - ADDITIONAL COMMENTS
Pt lives with boyfriend and mother in an apt, few steps enter. Prior to admission, pt ambulated independently without AD. Pt has a knee scooter for ambulation if needed.

## 2023-09-20 NOTE — PROGRESS NOTE ADULT - PROBLEM SELECTOR PLAN 3
Known history of L foot AVMs s/p 23 prior embolizations. Follows with Dr. Teran. S/p angiogram and transcatheter embolization via R femoral groin access with improved perfusion on 03/14.  - dressings changes as above  - Continued follow-up with Dr. Teran  - planned for OR 9/19 Known history of L foot AVMs s/p 23 prior embolizations. Follows with Dr. Teran. S/p angiogram and transcatheter embolization via R femoral groin access with improved perfusion on 03/14.  -s/p emoblization of AVM with Dr. Teran on 9/19    Plan  - dressings changes as above  - Continued follow-up with Dr. Teran

## 2023-09-20 NOTE — PROGRESS NOTE ADULT - ASSESSMENT
IMPRESSION:  38 year old female with h/o HTN, morbid obesity, L foot AVM s/p multiple direct stick and transcatheter embolizations, recurrent cellulitis p/w L foot ulcer with cellulitis.  Currently on vanc/hugh. s/p direct stick embolization of AVM with IR 9/10. s/p PICC line 9/19.    Recommend:  1. Continue vancomycin 1250mg IV q8h  2. Continue meropenem 2 grams IV q8h  3.  Above antibiotic regimen will have tentative 2 week total duration. Subject to change based on clinical course improvement       ID team 1 will follow       INCOMPLETE IMPRESSION:  38 year old female with h/o HTN, morbid obesity, L foot AVM s/p multiple direct stick and transcatheter embolizations, recurrent cellulitis p/w L foot ulcer with cellulitis.  Currently on vanc/hugh which patient has responded to previously. s/p direct stick embolization of AVM with IR 9/19. s/p PICC line 9/19.    Recommend:  1. Continue vancomycin 1250mg IV q8h  2. Check Vanc trough 30 minutes before 4th dose  3. Continue meropenem 2 grams IV q8h  3.  Above antibiotic regimen will have tentative 2 week total duration. Subject to change based on clinical course improvement       ID team 1 will follow.    Discussed with Attending, Dr. Méndez. Not final until signed by Attending.

## 2023-09-20 NOTE — PROGRESS NOTE ADULT - ASSESSMENT
37 YO F with PMHx HTN, Morbid obesity, chronic L foot AVM s/p multiple embolizations with recurrent nonpurulent ulcer infection presents to [facility] with concern for infection, found to have sepsis 2/2 chronic L foot ulcer admitted for further medical management.

## 2023-09-20 NOTE — PROGRESS NOTE ADULT - PROBLEM SELECTOR PLAN 1
2/2 to foot ulcer/abcess. Patient has previously been on Annie/Vanc, Dapto/Cefepime regimens.   Wound cx +pseudomonas  BCx NGTD x2 x48 hours  UCx NGTD  - C/w Meropenem 2g every 8 hours  - C/w Vanc 1250 every 8 hours  - planned for intervention procedure with Dr. Teran this am  - Per ID planned to continue antibiotics for 2-4 weeks post-discharge not including duration of abx while inpatient 2/2 to foot ulcer/abcess. Patient has previously been on Annie/Vanc, Dapto/Cefepime regimens.   ID work up: Wound cx +pseudomonas. BCx NGTD x2 NG, UCx NG.  s/p PICC line placement on 9/19. Appreciate ID recs.     Plan:   - Continue vancomycin 1250mg IV q8h  -Continue meropenem 2 grams IV q8h  - This Antibiotic regimen will have tentative 2 week total duration (as per ID)

## 2023-09-20 NOTE — PROGRESS NOTE ADULT - SUBJECTIVE AND OBJECTIVE BOX
INFECTIOUS DISEASES CONSULT FOLLOW-UP NOTE    INTERVAL HPI/OVERNIGHT EVENTS:      ROS:   Constitutional, eyes, ENT, cardiovascular, respiratory, gastrointestinal, genitourinary, integumentary, neurological, psychiatric and heme/lymph are otherwise negative other than noted above       ANTIBIOTICS/RELEVANT:    MEDICATIONS  (STANDING):  chlorhexidine 2% Cloths 1 Application(s) Topical <User Schedule>  enoxaparin Injectable 40 milliGRAM(s) SubCutaneous every 12 hours  gabapentin 100 milliGRAM(s) Oral every 24 hours  gabapentin 800 milliGRAM(s) Oral daily  meropenem  IVPB 2000 milliGRAM(s) IV Intermittent every 8 hours  nebivolol 10 milliGRAM(s) Oral <User Schedule>  polyethylene glycol 3350 17 Gram(s) Oral daily  senna 2 Tablet(s) Oral at bedtime  sodium phosphate 15 milliMole(s)/250 mL IVPB 15 milliMole(s) IV Intermittent once  vancomycin  IVPB 1250 milliGRAM(s) IV Intermittent every 8 hours    MEDICATIONS  (PRN):  acetaminophen     Tablet .. 650 milliGRAM(s) Oral every 6 hours PRN Temp greater or equal to 38C (100.4F), Mild Pain (1 - 3)  aluminum hydroxide/magnesium hydroxide/simethicone Suspension 30 milliLiter(s) Oral every 4 hours PRN Dyspepsia  diphenhydrAMINE 25 milliGRAM(s) Oral every 4 hours PRN Rash and/or Itching  HYDROmorphone  Injectable 2 milliGRAM(s) IV Push every 4 hours PRN Severe Pain (7 - 10)  melatonin 3 milliGRAM(s) Oral at bedtime PRN Insomnia  ondansetron Injectable 4 milliGRAM(s) IV Push every 8 hours PRN Nausea and/or Vomiting  sodium chloride 0.9% lock flush 10 milliLiter(s) IV Push every 1 hour PRN Pre/post blood products, medications, blood draw, and to maintain line patency        Vital Signs Last 24 Hrs  T(C): 36.6 (20 Sep 2023 06:00), Max: 36.8 (19 Sep 2023 15:17)  T(F): 97.8 (20 Sep 2023 06:00), Max: 98.3 (19 Sep 2023 21:02)  HR: 98 (20 Sep 2023 06:00) (91 - 102)  BP: 156/83 (20 Sep 2023 06:00) (131/78 - 156/83)  BP(mean): 105 (19 Sep 2023 15:17) (105 - 105)  RR: 16 (20 Sep 2023 06:00) (16 - 17)  SpO2: 96% (20 Sep 2023 06:00) (96% - 98%)    Parameters below as of 20 Sep 2023 06:00  Patient On (Oxygen Delivery Method): room air        PHYSICAL EXAM:  Constitutional: alert, NAD  Eyes: the sclera and conjunctiva were normal.   ENT: the ears and nose were normal in appearance.   Neck: the appearance of the neck was normal and the neck was supple.   Pulmonary: no respiratory distress and lungs were clear to auscultation bilaterally.   Heart: heart rate was normal and rhythm regular, normal S1 and S2  Vascular:. there was no peripheral edema  Abdomen: normal bowel sounds, soft, non-tender  Neurological: no focal deficits.   Psychiatric: the affect was normal        LABS:                        10.8   15.10 )-----------( 569      ( 20 Sep 2023 05:30 )             34.2     09-20    136  |  100  |  13  ----------------------------<  173<H>  4.7   |  23  |  0.62    Ca    9.2      20 Sep 2023 05:30  Phos  2.9     09-20  Mg     2.0     09-20    TPro  7.3  /  Alb  3.2<L>  /  TBili  0.3  /  DBili  x   /  AST  10  /  ALT  10  /  AlkPhos  111  09-20      Urinalysis Basic - ( 20 Sep 2023 05:30 )    Color: x / Appearance: x / SG: x / pH: x  Gluc: 173 mg/dL / Ketone: x  / Bili: x / Urobili: x   Blood: x / Protein: x / Nitrite: x   Leuk Esterase: x / RBC: x / WBC x   Sq Epi: x / Non Sq Epi: x / Bacteria: x        MICROBIOLOGY:      RADIOLOGY & ADDITIONAL STUDIES:  Reviewed INFECTIOUS DISEASES CONSULT FOLLOW-UP NOTE    INTERVAL HPI/OVERNIGHT EVENTS:  Reports more pain after procedure yesterday, both her chronic pain and pain from cellulitis. Keeping bandage on as per surgeon. HAd some chills overnight.    ROS:   Constitutional, eyes, ENT, cardiovascular, respiratory, gastrointestinal, genitourinary, integumentary, neurological, psychiatric and heme/lymph are otherwise negative other than noted above       ANTIBIOTICS/RELEVANT:    MEDICATIONS  (STANDING):  chlorhexidine 2% Cloths 1 Application(s) Topical <User Schedule>  enoxaparin Injectable 40 milliGRAM(s) SubCutaneous every 12 hours  gabapentin 100 milliGRAM(s) Oral every 24 hours  gabapentin 800 milliGRAM(s) Oral daily  meropenem  IVPB 2000 milliGRAM(s) IV Intermittent every 8 hours  nebivolol 10 milliGRAM(s) Oral <User Schedule>  polyethylene glycol 3350 17 Gram(s) Oral daily  senna 2 Tablet(s) Oral at bedtime  sodium phosphate 15 milliMole(s)/250 mL IVPB 15 milliMole(s) IV Intermittent once  vancomycin  IVPB 1250 milliGRAM(s) IV Intermittent every 8 hours    MEDICATIONS  (PRN):  acetaminophen     Tablet .. 650 milliGRAM(s) Oral every 6 hours PRN Temp greater or equal to 38C (100.4F), Mild Pain (1 - 3)  aluminum hydroxide/magnesium hydroxide/simethicone Suspension 30 milliLiter(s) Oral every 4 hours PRN Dyspepsia  diphenhydrAMINE 25 milliGRAM(s) Oral every 4 hours PRN Rash and/or Itching  HYDROmorphone  Injectable 2 milliGRAM(s) IV Push every 4 hours PRN Severe Pain (7 - 10)  melatonin 3 milliGRAM(s) Oral at bedtime PRN Insomnia  ondansetron Injectable 4 milliGRAM(s) IV Push every 8 hours PRN Nausea and/or Vomiting  sodium chloride 0.9% lock flush 10 milliLiter(s) IV Push every 1 hour PRN Pre/post blood products, medications, blood draw, and to maintain line patency        Vital Signs Last 24 Hrs  T(C): 36.6 (20 Sep 2023 06:00), Max: 36.8 (19 Sep 2023 15:17)  T(F): 97.8 (20 Sep 2023 06:00), Max: 98.3 (19 Sep 2023 21:02)  HR: 98 (20 Sep 2023 06:00) (91 - 102)  BP: 156/83 (20 Sep 2023 06:00) (131/78 - 156/83)  BP(mean): 105 (19 Sep 2023 15:17) (105 - 105)  RR: 16 (20 Sep 2023 06:00) (16 - 17)  SpO2: 96% (20 Sep 2023 06:00) (96% - 98%)    Parameters below as of 20 Sep 2023 06:00  Patient On (Oxygen Delivery Method): room air        PHYSICAL EXAM:  Constitutional: non-toxic, no distress, sitting in bed  Eyes:ANTOINETTE, EOMI  Ear/Nose/Throat: no oral lesion, no sinus tenderness   Neck:  supple  Respiratory: CTA chanel, no WRR  Cardiovascular: S1S2 RRR, no murmurs  Gastrointestinal:soft, (+) BS, no HSM  Extremities: left foot wrapped in kerlix, no erythema outside of margins of bandage; L arm swelling from where PIV was, improving  Vascular: DP Pulse 1+ b/l feet        LABS:                        10.8   15.10 )-----------( 569      ( 20 Sep 2023 05:30 )             34.2     09-20    136  |  100  |  13  ----------------------------<  173<H>  4.7   |  23  |  0.62    Ca    9.2      20 Sep 2023 05:30  Phos  2.9     09-20  Mg     2.0     09-20    TPro  7.3  /  Alb  3.2<L>  /  TBili  0.3  /  DBili  x   /  AST  10  /  ALT  10  /  AlkPhos  111  09-20      Urinalysis Basic - ( 20 Sep 2023 05:30 )    Color: x / Appearance: x / SG: x / pH: x  Gluc: 173 mg/dL / Ketone: x  / Bili: x / Urobili: x   Blood: x / Protein: x / Nitrite: x   Leuk Esterase: x / RBC: x / WBC x   Sq Epi: x / Non Sq Epi: x / Bacteria: x        MICROBIOLOGY:      RADIOLOGY & ADDITIONAL STUDIES:  Reviewed

## 2023-09-20 NOTE — PHYSICAL THERAPY INITIAL EVALUATION ADULT - GENERAL OBSERVATIONS, REHAB EVAL
Pt received semi supine in bed with +PICC, +dressing L foot C/D/I, NAD. Pt left as found, NAD, call bell in reach, L foot dressing C/D/I.

## 2023-09-21 LAB
ANION GAP SERPL CALC-SCNC: 9 MMOL/L — SIGNIFICANT CHANGE UP (ref 5–17)
BASOPHILS # BLD AUTO: 0.1 K/UL — SIGNIFICANT CHANGE UP (ref 0–0.2)
BASOPHILS NFR BLD AUTO: 0.8 % — SIGNIFICANT CHANGE UP (ref 0–2)
BLD GP AB SCN SERPL QL: NEGATIVE — SIGNIFICANT CHANGE UP
BUN SERPL-MCNC: 15 MG/DL — SIGNIFICANT CHANGE UP (ref 7–23)
CALCIUM SERPL-MCNC: 8.7 MG/DL — SIGNIFICANT CHANGE UP (ref 8.4–10.5)
CHLORIDE SERPL-SCNC: 102 MMOL/L — SIGNIFICANT CHANGE UP (ref 96–108)
CO2 SERPL-SCNC: 25 MMOL/L — SIGNIFICANT CHANGE UP (ref 22–31)
CREAT SERPL-MCNC: 0.63 MG/DL — SIGNIFICANT CHANGE UP (ref 0.5–1.3)
EGFR: 115 ML/MIN/1.73M2 — SIGNIFICANT CHANGE UP
EOSINOPHIL # BLD AUTO: 0.2 K/UL — SIGNIFICANT CHANGE UP (ref 0–0.5)
EOSINOPHIL NFR BLD AUTO: 1.7 % — SIGNIFICANT CHANGE UP (ref 0–6)
GLUCOSE SERPL-MCNC: 89 MG/DL — SIGNIFICANT CHANGE UP (ref 70–99)
HCT VFR BLD CALC: 33.3 % — LOW (ref 34.5–45)
HGB BLD-MCNC: 10.8 G/DL — LOW (ref 11.5–15.5)
IMM GRANULOCYTES NFR BLD AUTO: 0.7 % — SIGNIFICANT CHANGE UP (ref 0–0.9)
LYMPHOCYTES # BLD AUTO: 28.7 % — SIGNIFICANT CHANGE UP (ref 13–44)
LYMPHOCYTES # BLD AUTO: 3.46 K/UL — HIGH (ref 1–3.3)
MAGNESIUM SERPL-MCNC: 1.9 MG/DL — SIGNIFICANT CHANGE UP (ref 1.6–2.6)
MCHC RBC-ENTMCNC: 27.6 PG — SIGNIFICANT CHANGE UP (ref 27–34)
MCHC RBC-ENTMCNC: 32.4 GM/DL — SIGNIFICANT CHANGE UP (ref 32–36)
MCV RBC AUTO: 85.2 FL — SIGNIFICANT CHANGE UP (ref 80–100)
MONOCYTES # BLD AUTO: 1.06 K/UL — HIGH (ref 0–0.9)
MONOCYTES NFR BLD AUTO: 8.8 % — SIGNIFICANT CHANGE UP (ref 2–14)
NEUTROPHILS # BLD AUTO: 7.15 K/UL — SIGNIFICANT CHANGE UP (ref 1.8–7.4)
NEUTROPHILS NFR BLD AUTO: 59.3 % — SIGNIFICANT CHANGE UP (ref 43–77)
NRBC # BLD: 0 /100 WBCS — SIGNIFICANT CHANGE UP (ref 0–0)
PHOSPHATE SERPL-MCNC: 4.1 MG/DL — SIGNIFICANT CHANGE UP (ref 2.5–4.5)
PLATELET # BLD AUTO: 608 K/UL — HIGH (ref 150–400)
POTASSIUM SERPL-MCNC: 4.2 MMOL/L — SIGNIFICANT CHANGE UP (ref 3.5–5.3)
POTASSIUM SERPL-SCNC: 4.2 MMOL/L — SIGNIFICANT CHANGE UP (ref 3.5–5.3)
RBC # BLD: 3.91 M/UL — SIGNIFICANT CHANGE UP (ref 3.8–5.2)
RBC # FLD: 12.9 % — SIGNIFICANT CHANGE UP (ref 10.3–14.5)
RH IG SCN BLD-IMP: POSITIVE — SIGNIFICANT CHANGE UP
SODIUM SERPL-SCNC: 136 MMOL/L — SIGNIFICANT CHANGE UP (ref 135–145)
WBC # BLD: 12.05 K/UL — HIGH (ref 3.8–10.5)
WBC # FLD AUTO: 12.05 K/UL — HIGH (ref 3.8–10.5)

## 2023-09-21 PROCEDURE — 99233 SBSQ HOSP IP/OBS HIGH 50: CPT | Mod: GC

## 2023-09-21 PROCEDURE — 99232 SBSQ HOSP IP/OBS MODERATE 35: CPT

## 2023-09-21 RX ORDER — NEBIVOLOL HYDROCHLORIDE 5 MG/1
1 TABLET ORAL
Refills: 0 | DISCHARGE
Start: 2023-09-21

## 2023-09-21 RX ORDER — OXYCODONE AND ACETAMINOPHEN 5; 325 MG/1; MG/1
1 TABLET ORAL
Qty: 0 | Refills: 0 | DISCHARGE
Start: 2023-09-21

## 2023-09-21 RX ORDER — VANCOMYCIN HCL 1 G
1.25 VIAL (EA) INTRAVENOUS
Qty: 0 | Refills: 0 | DISCHARGE
Start: 2023-09-21

## 2023-09-21 RX ORDER — GABAPENTIN 400 MG/1
1 CAPSULE ORAL
Qty: 0 | Refills: 0 | DISCHARGE
Start: 2023-09-21

## 2023-09-21 RX ORDER — MEROPENEM 1 G/30ML
2000 INJECTION INTRAVENOUS
Qty: 0 | Refills: 0 | DISCHARGE
Start: 2023-09-21

## 2023-09-21 RX ADMIN — NEBIVOLOL HYDROCHLORIDE 10 MILLIGRAM(S): 5 TABLET ORAL at 05:49

## 2023-09-21 RX ADMIN — HYDROMORPHONE HYDROCHLORIDE 2 MILLIGRAM(S): 2 INJECTION INTRAMUSCULAR; INTRAVENOUS; SUBCUTANEOUS at 16:57

## 2023-09-21 RX ADMIN — Medication 166.67 MILLIGRAM(S): at 05:49

## 2023-09-21 RX ADMIN — MEROPENEM 280 MILLIGRAM(S): 1 INJECTION INTRAVENOUS at 19:00

## 2023-09-21 RX ADMIN — HYDROMORPHONE HYDROCHLORIDE 2 MILLIGRAM(S): 2 INJECTION INTRAMUSCULAR; INTRAVENOUS; SUBCUTANEOUS at 15:57

## 2023-09-21 RX ADMIN — HYDROMORPHONE HYDROCHLORIDE 2 MILLIGRAM(S): 2 INJECTION INTRAMUSCULAR; INTRAVENOUS; SUBCUTANEOUS at 03:35

## 2023-09-21 RX ADMIN — HYDROMORPHONE HYDROCHLORIDE 2 MILLIGRAM(S): 2 INJECTION INTRAMUSCULAR; INTRAVENOUS; SUBCUTANEOUS at 12:38

## 2023-09-21 RX ADMIN — HYDROMORPHONE HYDROCHLORIDE 2 MILLIGRAM(S): 2 INJECTION INTRAMUSCULAR; INTRAVENOUS; SUBCUTANEOUS at 03:15

## 2023-09-21 RX ADMIN — HYDROMORPHONE HYDROCHLORIDE 2 MILLIGRAM(S): 2 INJECTION INTRAMUSCULAR; INTRAVENOUS; SUBCUTANEOUS at 19:10

## 2023-09-21 RX ADMIN — HYDROMORPHONE HYDROCHLORIDE 2 MILLIGRAM(S): 2 INJECTION INTRAMUSCULAR; INTRAVENOUS; SUBCUTANEOUS at 23:43

## 2023-09-21 RX ADMIN — HYDROMORPHONE HYDROCHLORIDE 2 MILLIGRAM(S): 2 INJECTION INTRAMUSCULAR; INTRAVENOUS; SUBCUTANEOUS at 20:10

## 2023-09-21 RX ADMIN — MEROPENEM 280 MILLIGRAM(S): 1 INJECTION INTRAVENOUS at 12:50

## 2023-09-21 RX ADMIN — ENOXAPARIN SODIUM 40 MILLIGRAM(S): 100 INJECTION SUBCUTANEOUS at 19:10

## 2023-09-21 RX ADMIN — HYDROMORPHONE HYDROCHLORIDE 2 MILLIGRAM(S): 2 INJECTION INTRAMUSCULAR; INTRAVENOUS; SUBCUTANEOUS at 07:28

## 2023-09-21 RX ADMIN — CHLORHEXIDINE GLUCONATE 1 APPLICATION(S): 213 SOLUTION TOPICAL at 05:50

## 2023-09-21 RX ADMIN — HYDROMORPHONE HYDROCHLORIDE 2 MILLIGRAM(S): 2 INJECTION INTRAMUSCULAR; INTRAVENOUS; SUBCUTANEOUS at 11:38

## 2023-09-21 RX ADMIN — MEROPENEM 280 MILLIGRAM(S): 1 INJECTION INTRAVENOUS at 03:28

## 2023-09-21 RX ADMIN — HYDROMORPHONE HYDROCHLORIDE 2 MILLIGRAM(S): 2 INJECTION INTRAMUSCULAR; INTRAVENOUS; SUBCUTANEOUS at 23:13

## 2023-09-21 RX ADMIN — GABAPENTIN 100 MILLIGRAM(S): 400 CAPSULE ORAL at 05:49

## 2023-09-21 RX ADMIN — HYDROMORPHONE HYDROCHLORIDE 2 MILLIGRAM(S): 2 INJECTION INTRAMUSCULAR; INTRAVENOUS; SUBCUTANEOUS at 07:56

## 2023-09-21 RX ADMIN — ENOXAPARIN SODIUM 40 MILLIGRAM(S): 100 INJECTION SUBCUTANEOUS at 05:50

## 2023-09-21 RX ADMIN — Medication 166.67 MILLIGRAM(S): at 22:23

## 2023-09-21 RX ADMIN — GABAPENTIN 800 MILLIGRAM(S): 400 CAPSULE ORAL at 15:56

## 2023-09-21 RX ADMIN — NEBIVOLOL HYDROCHLORIDE 10 MILLIGRAM(S): 5 TABLET ORAL at 19:11

## 2023-09-21 NOTE — PROGRESS NOTE ADULT - PROBLEM SELECTOR PLAN 3
Known history of L foot AVMs s/p 23 prior embolizations. Follows with Dr. Teran. S/p angiogram and transcatheter embolization via R femoral groin access with improved perfusion on 03/14.  -s/p emoblization of AVM with Dr. Teran on 9/19    Plan  - dressings changes as above  - Continued follow-up with Dr. Teran

## 2023-09-21 NOTE — PROGRESS NOTE ADULT - SUBJECTIVE AND OBJECTIVE BOX
INTERVAL HPI/OVERNIGHT EVENTS:    Patient was seen and examined at bedside.  Still with subjective chills overnight that resolved in the AM.      CONSTITUTIONAL:  Negative fever or chills, feels well, good appetite  EYES:  Negative  blurry vision or double vision  CARDIOVASCULAR:  Negative for chest pain or palpitations  RESPIRATORY:  Negative for cough, wheezing, or SOB   GASTROINTESTINAL:  Negative for nausea, vomiting, diarrhea, constipation, or abdominal pain  GENITOURINARY:  Negative frequency, urgency or dysuria  NEUROLOGIC:  No headache, confusion, dizziness, lightheadedness      ANTIBIOTICS/RELEVANT:    MEDICATIONS  (STANDING):  chlorhexidine 2% Cloths 1 Application(s) Topical <User Schedule>  enoxaparin Injectable 40 milliGRAM(s) SubCutaneous every 12 hours  gabapentin 100 milliGRAM(s) Oral every 24 hours  gabapentin 800 milliGRAM(s) Oral daily  meropenem  IVPB 2000 milliGRAM(s) IV Intermittent every 8 hours  nebivolol 10 milliGRAM(s) Oral <User Schedule>  polyethylene glycol 3350 17 Gram(s) Oral daily  senna 2 Tablet(s) Oral at bedtime  vancomycin  IVPB 1250 milliGRAM(s) IV Intermittent every 8 hours    MEDICATIONS  (PRN):  acetaminophen     Tablet .. 650 milliGRAM(s) Oral every 6 hours PRN Temp greater or equal to 38C (100.4F), Mild Pain (1 - 3)  aluminum hydroxide/magnesium hydroxide/simethicone Suspension 30 milliLiter(s) Oral every 4 hours PRN Dyspepsia  diphenhydrAMINE 25 milliGRAM(s) Oral every 4 hours PRN Rash and/or Itching  HYDROmorphone  Injectable 2 milliGRAM(s) IV Push every 4 hours PRN Severe Pain (7 - 10)  melatonin 3 milliGRAM(s) Oral at bedtime PRN Insomnia  ondansetron Injectable 4 milliGRAM(s) IV Push every 8 hours PRN Nausea and/or Vomiting  sodium chloride 0.9% lock flush 10 milliLiter(s) IV Push every 1 hour PRN Pre/post blood products, medications, blood draw, and to maintain line patency        Vital Signs Last 24 Hrs  T(C): 36.9 (21 Sep 2023 05:24), Max: 36.9 (21 Sep 2023 05:24)  T(F): 98.5 (21 Sep 2023 05:24), Max: 98.5 (21 Sep 2023 05:24)  HR: 88 (21 Sep 2023 05:24) (88 - 99)  BP: 102/69 (21 Sep 2023 05:24) (102/69 - 157/91)  BP(mean): --  RR: 17 (21 Sep 2023 05:24) (17 - 18)  SpO2: 98% (21 Sep 2023 05:24) (96% - 98%)    Parameters below as of 21 Sep 2023 05:24  Patient On (Oxygen Delivery Method): room air        PHYSICAL EXAM:  Constitutional: non-toxic, no distress  Eyes:ANTOINETTE, EOMI  Ear/Nose/Throat: no oral lesion, no sinus tenderness on percussion	  Neck:  supple  Respiratory: CTA chanel  Cardiovascular: S1S2 RRR, no murmurs  Gastrointestinal:soft, (+) BS, no HSM  Extremities: left foot with dressing in place   Vascular: DP Pulse:	right normal; left normal      LABS:                        10.8   12.05 )-----------( 608      ( 21 Sep 2023 08:49 )             33.3     09-21    136  |  102  |  15  ----------------------------<  89  4.2   |  25  |  0.63    Ca    8.7      21 Sep 2023 08:49  Phos  4.1     09-21  Mg     1.9     09-21    TPro  7.3  /  Alb  3.2<L>  /  TBili  0.3  /  DBili  x   /  AST  10  /  ALT  10  /  AlkPhos  111  09-20      Urinalysis Basic - ( 21 Sep 2023 08:49 )    Color: x / Appearance: x / SG: x / pH: x  Gluc: 89 mg/dL / Ketone: x  / Bili: x / Urobili: x   Blood: x / Protein: x / Nitrite: x   Leuk Esterase: x / RBC: x / WBC x   Sq Epi: x / Non Sq Epi: x / Bacteria: x    Vancomycin Level, Trough (09.20.23 @ 20:33)    Vancomycin Level, Trough: 11.9: Vancomycin trough levels should be rapidly reached and maintained at  15-20 ug/ml for life threatening MRSA  infections such as sepsis, endocarditis, osteomyelitis and pneumonia. A  first trough level should be drawn  before the 3rd or 4th dose.  Risk of renal toxicity is increased for levels >15 ug/ml, in patients on  other nephrotoxic drugs, who are  hemodynamically unstable, have unstable renal function, or are on  Vancomycin therapy for >14 days. Renal function with  creatinine levels should be monitored for those patients. ug/mL        MICROBIOLOGY:    RADIOLOGY & ADDITIONAL STUDIES:

## 2023-09-21 NOTE — PROGRESS NOTE ADULT - NUTRITIONAL ASSESSMENT
This patient has been assessed with a concern for Malnutrition and has been determined to have a diagnosis/diagnoses of Morbid obesity (BMI > 40).    This patient is being managed with:   Diet DASH/TLC-  Sodium & Cholesterol Restricted  Entered: Sep 12 2023  2:21PM  

## 2023-09-21 NOTE — PROGRESS NOTE ADULT - PROBLEM SELECTOR PROBLEM 3
AVM (arteriovenous malformation)

## 2023-09-21 NOTE — PROGRESS NOTE ADULT - ASSESSMENT
IMPRESSION:  38 year old female with h/o HTN, morbid obesity, L foot AVM s/p multiple direct stick and transcatheter embolizations, recurrent cellulitis p/w L foot ulcer with cellulitis.  Currently on vanc/hugh which patient has responded to previously. s/p direct stick embolization of AVM with IR 9/19. s/p PICC line 9/19.  WBC trending down.  Patient reports pain in the left foot is usual for her and usually lasts 7-10 days post-embolization    Recommend:  1. Continue vancomycin 1250mg IV q8h  2. Continue meropenem 2 grams IV q8h  3.  Above antibiotic regimen will have tentative 2 week total duration (day 1 = 9/19/23). Subject to change based on clinical course improvement.  Decision to extend can be made outpatient  4. Weekly labs outpatient:  CBC, CMP, vanco trough       ID team 1 will follow.

## 2023-09-21 NOTE — PROGRESS NOTE ADULT - PROBLEM SELECTOR PLAN 6
Plan:  F: None  E: replete K<4, Mg<2  N: regular  VTE Prophylaxis: lovenox  C: Full Code  D: WILMA BABCOCK
Plan:  F: None  E: replete K<4, Mg<2  N: DASH  VTE Prophylaxis: lovenox  C: Full Code  D: RMF > Home
Plan:  F: None  E: replete K<4, Mg<2  N: DASH  VTE Prophylaxis: lovenox  C: Full Code  D: RMF > Home
Plan:  F: None  E: replete K<4, Mg<2  N: regular  VTE Prophylaxis: lovenox  C: Full Code  D: WILMA BABCOCK
Plan:  F: None  E: replete K<4, Mg<2  N: DASH  VTE Prophylaxis: lovenox  C: Full Code  D: RMF > Home
Plan:  F: None  E: replete K<4, Mg<2  N: DASH  VTE Prophylaxis: lovenox  C: Full Code  D: RMF > Home
Plan:  F: None  E: replete K<4, Mg<2  N: regular  VTE Prophylaxis: lovenox  C: Full Code  D: WILMA BABCOCK
Plan:  F: None  E: replete K<4, Mg<2  N: DASH  VTE Prophylaxis: lovenox  C: Full Code  D: RMF > Home
Plan:  F: None  E: replete K<4, Mg<2  N: regular  VTE Prophylaxis: lovenox  C: Full Code  D: WILMA BABCOCK

## 2023-09-21 NOTE — PROGRESS NOTE ADULT - TIME BILLING
treatment of cellulitis
Managing recurrent cellulitis
treatment of cellulitis

## 2023-09-21 NOTE — PROGRESS NOTE ADULT - PROBLEM SELECTOR PROBLEM 2
Foot ulceration

## 2023-09-21 NOTE — PROGRESS NOTE ADULT - PROVIDER SPECIALTY LIST ADULT
Infectious Disease
Internal Medicine
Infectious Disease
Internal Medicine

## 2023-09-21 NOTE — PROGRESS NOTE ADULT - ASSESSMENT
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

## 2023-09-21 NOTE — PROGRESS NOTE ADULT - PROBLEM SELECTOR PLAN 1
2/2 to foot ulcer/abcess. Patient has previously been on Annie/Vanc, Dapto/Cefepime regimens.   ID work up: Wound cx +pseudomonas. BCx NGTD x2 NG, UCx NG.  s/p PICC line placement on 9/19. Appreciate ID recs.     Plan:   - Continue vancomycin 1250mg IV q8h  -Continue meropenem 2 grams IV q8h  - This Antibiotic regimen will have tentative 2 week total duration (as per ID) 2/2 to foot ulcer/abcess. Patient has previously been on Annie/Vanc, Dapto/Cefepime regimens.   ID work up: Wound cx +pseudomonas. BCx NGTD x2 NG, UCx NG.  s/p PICC line placement on 9/19. Appreciate ID recs. On 9/21: Leukocytosis downtrending 15.1->12.05    Plan:   - Continue vancomycin 1250mg IV q8h  -Continue meropenem 2 grams IV q8h  - This Antibiotic regimen will have tentative 2 week total duration (as per ID)

## 2023-09-21 NOTE — PROGRESS NOTE ADULT - PROBLEM SELECTOR PLAN 2
Known chronic L foot ulcer due to AVM. Patient follows with Dr. Teran for AVMs and Pain management.   On home Gabapentin 100mg in the morning and 800mg at 4pm. Percocet 10 q4hrs PRN.     Plan  - c/w Gabapentin 100mg in the morning + 800mg at 4PM  - patient not amendable to increasing gabapentin 100 mg to TID as it causes HA and discomfort  - c/w Dilaudid 1mg PO q4 PRN  - c/w Bowel regimen    - c/w dressings changes as follows: w/ Vashe moistened 2x2 gauze 2 times daily   - Sepsis/Abx plan as above

## 2023-09-21 NOTE — PROGRESS NOTE ADULT - SUBJECTIVE AND OBJECTIVE BOX
**INCOMPLETE NOTE    OVERNIGHT EVENTS:    SUBJECTIVE:  Patient seen and examined at bedside.    Vital Signs Last 12 Hrs  T(F): 98.5 (09-21-23 @ 05:24), Max: 98.5 (09-21-23 @ 05:24)  HR: 88 (09-21-23 @ 05:24) (88 - 89)  BP: 102/69 (09-21-23 @ 05:24) (102/69 - 156/83)  BP(mean): --  RR: 17 (09-21-23 @ 05:24) (17 - 17)  SpO2: 98% (09-21-23 @ 05:24) (97% - 98%)  I&O's Summary      PHYSICAL EXAM:  Constitutional: NAD, comfortable in bed.  HEENT: NC/AT, PERRLA, EOMI, no conjunctival pallor or scleral icterus, MMM  Neck: Supple, no JVD  Respiratory: CTA B/L. No w/r/r.   Cardiovascular: RRR, normal S1 and S2, no m/r/g.   Gastrointestinal: +BS, soft NTND, no guarding or rebound tenderness, no palpable masses   Extremities: wwp; no cyanosis, clubbing or edema.   Vascular: Pulses equal and strong throughout.   Neurological: AAOx3, no CN deficits, strength and sensation intact throughout.   Skin: No gross skin abnormalities or rashes        LABS:                        10.8   15.10 )-----------( 569      ( 20 Sep 2023 05:30 )             34.2     09-20    136  |  100  |  13  ----------------------------<  173<H>  4.7   |  23  |  0.62    Ca    9.2      20 Sep 2023 05:30  Phos  2.9     09-20  Mg     2.0     09-20    TPro  7.3  /  Alb  3.2<L>  /  TBili  0.3  /  DBili  x   /  AST  10  /  ALT  10  /  AlkPhos  111  09-20      Urinalysis Basic - ( 20 Sep 2023 05:30 )    Color: x / Appearance: x / SG: x / pH: x  Gluc: 173 mg/dL / Ketone: x  / Bili: x / Urobili: x   Blood: x / Protein: x / Nitrite: x   Leuk Esterase: x / RBC: x / WBC x   Sq Epi: x / Non Sq Epi: x / Bacteria: x          RADIOLOGY & ADDITIONAL TESTS:    MEDICATIONS  (STANDING):  chlorhexidine 2% Cloths 1 Application(s) Topical <User Schedule>  enoxaparin Injectable 40 milliGRAM(s) SubCutaneous every 12 hours  gabapentin 100 milliGRAM(s) Oral every 24 hours  gabapentin 800 milliGRAM(s) Oral daily  meropenem  IVPB 2000 milliGRAM(s) IV Intermittent every 8 hours  nebivolol 10 milliGRAM(s) Oral <User Schedule>  polyethylene glycol 3350 17 Gram(s) Oral daily  senna 2 Tablet(s) Oral at bedtime  vancomycin  IVPB 1250 milliGRAM(s) IV Intermittent every 8 hours    MEDICATIONS  (PRN):  acetaminophen     Tablet .. 650 milliGRAM(s) Oral every 6 hours PRN Temp greater or equal to 38C (100.4F), Mild Pain (1 - 3)  aluminum hydroxide/magnesium hydroxide/simethicone Suspension 30 milliLiter(s) Oral every 4 hours PRN Dyspepsia  diphenhydrAMINE 25 milliGRAM(s) Oral every 4 hours PRN Rash and/or Itching  HYDROmorphone  Injectable 2 milliGRAM(s) IV Push every 4 hours PRN Severe Pain (7 - 10)  melatonin 3 milliGRAM(s) Oral at bedtime PRN Insomnia  ondansetron Injectable 4 milliGRAM(s) IV Push every 8 hours PRN Nausea and/or Vomiting  sodium chloride 0.9% lock flush 10 milliLiter(s) IV Push every 1 hour PRN Pre/post blood products, medications, blood draw, and to maintain line patency   OVERNIGHT EVENTS: ISABELLE    SUBJECTIVE:  Patient seen and examined at bedside. Patient reports she feels well. Pain has been well controlled on current regiment. She endorses subjective fever and chills overnight that have now resolved. She notes she was able to get out of bed and walk to the bathroom yesterday. Her last BM was yesterday. On ROS denies cough, sore throat, runny nose, chest pain, abdominal pain, n.v, diarrhea, dysuria.     Vital Signs Last 12 Hrs  T(F): 98.5 (09-21-23 @ 05:24), Max: 98.5 (09-21-23 @ 05:24)  HR: 88 (09-21-23 @ 05:24) (88 - 89)  BP: 102/69 (09-21-23 @ 05:24) (102/69 - 156/83)  BP(mean): --  RR: 17 (09-21-23 @ 05:24) (17 - 17)  SpO2: 98% (09-21-23 @ 05:24) (97% - 98%)  I&O's Summary      PHYSICAL EXAM:  Constitutional: NAD, comfortable in bed.  HEENT: No conjunctival pallor or scleral icterus, MMM  Respiratory: Normal respiratory effort, CTA B/L. No w/r/r.   Cardiovascular: RRR, normal S1 and S2, no m/r/g.   Gastrointestinal: +BS, soft NTND, no guarding or rebound tenderness, no palpable masses   Extremities: wwp; no cyanosis, clubbing or edema. The L foot is wrapped in clean dressing, no visible blood or discharge noticed over dressing. Feet are warm to palpation bilaterally, plantar flexion-extension is intact bl  Neurological: AAOx3, no aphasia, dysarthria or facial droop. Strength is 5/5 in U and L ext chanel   Skin: No overt skin abnormalities or rashes noticed over exposed skin      LABS:                        10.8   15.10 )-----------( 569      ( 20 Sep 2023 05:30 )             34.2     09-20    136  |  100  |  13  ----------------------------<  173<H>  4.7   |  23  |  0.62    Ca    9.2      20 Sep 2023 05:30  Phos  2.9     09-20  Mg     2.0     09-20    TPro  7.3  /  Alb  3.2<L>  /  TBili  0.3  /  DBili  x   /  AST  10  /  ALT  10  /  AlkPhos  111  09-20      Urinalysis Basic - ( 20 Sep 2023 05:30 )    Color: x / Appearance: x / SG: x / pH: x  Gluc: 173 mg/dL / Ketone: x  / Bili: x / Urobili: x   Blood: x / Protein: x / Nitrite: x   Leuk Esterase: x / RBC: x / WBC x   Sq Epi: x / Non Sq Epi: x / Bacteria: x          RADIOLOGY & ADDITIONAL TESTS:    MEDICATIONS  (STANDING):  chlorhexidine 2% Cloths 1 Application(s) Topical <User Schedule>  enoxaparin Injectable 40 milliGRAM(s) SubCutaneous every 12 hours  gabapentin 100 milliGRAM(s) Oral every 24 hours  gabapentin 800 milliGRAM(s) Oral daily  meropenem  IVPB 2000 milliGRAM(s) IV Intermittent every 8 hours  nebivolol 10 milliGRAM(s) Oral <User Schedule>  polyethylene glycol 3350 17 Gram(s) Oral daily  senna 2 Tablet(s) Oral at bedtime  vancomycin  IVPB 1250 milliGRAM(s) IV Intermittent every 8 hours    MEDICATIONS  (PRN):  acetaminophen     Tablet .. 650 milliGRAM(s) Oral every 6 hours PRN Temp greater or equal to 38C (100.4F), Mild Pain (1 - 3)  aluminum hydroxide/magnesium hydroxide/simethicone Suspension 30 milliLiter(s) Oral every 4 hours PRN Dyspepsia  diphenhydrAMINE 25 milliGRAM(s) Oral every 4 hours PRN Rash and/or Itching  HYDROmorphone  Injectable 2 milliGRAM(s) IV Push every 4 hours PRN Severe Pain (7 - 10)  melatonin 3 milliGRAM(s) Oral at bedtime PRN Insomnia  ondansetron Injectable 4 milliGRAM(s) IV Push every 8 hours PRN Nausea and/or Vomiting  sodium chloride 0.9% lock flush 10 milliLiter(s) IV Push every 1 hour PRN Pre/post blood products, medications, blood draw, and to maintain line patency

## 2023-09-21 NOTE — PROGRESS NOTE ADULT - ATTENDING COMMENTS
Sepsis, POA 2/2 L foot AVM, recurrent ulceration/cellulitis  based on previous hx pt has not healed without embolization.    continue vanc and meropenem.    wbc trending up - fu ID rcs   goal vanc trough 10-15.    awaiting possible embolization plan, to be evaluated by Dr. Teran's team on 9/18  HTN continue with home medications  c/w home med neurontin . Pain scale with dilaudid (takes percocet 325/5 at home).  dvt ppx w lovenox
She remains afebrile with mild night sweats.  Foot pain and appearance are unchanged, WBC 13K today without shift.  She apparently will be evaluated for further embolization procedure on 9/18.  Would continue vanc and meropenem for now, dosing as above.  Dr Khan will cover from [facility] through 9/17, Dr. Méndez will assume care 9/18, team 1.
As above.  39 yo F with L foot AVM, recurrent ulceration/cellulitis for which she has previously required long antibiotic courses.  She generally has not healed without embolization.  Needs to be evaluated by Dr. Teran's team.  Would continue vanc and meropenem.  For SSTI, can have vanc trough 10-15.  Since she has required long courses, would keep on the lower end to avoid GISELLE.  Will follow with you, team 1.
Agree with above. Continue Vancomycin (trough is therapeutic) + Meropenem. In the past she has not responded to pathogen-directed therapy and has only responded to broad spectrum IV antibiotics. Therefore at least a 2 week course is warranted. Ok to place PICC line.  Follow up with Dr. Teran:  surgical intervention
She is s/p PICC placement.  Patient with some subjective chills post-op. Could be post-op reaction.  No change in therapy now.  Continue vanco and meropenem.  Monitor clinically.
#Sepsis, POA 2/2 foot wound/cellulitis   #AVMs, chronic  #Foot pain  #HTN continue with home medications  #Obesity- fu nutrition consult     ID following.  following their recommendation.   cw -meropenem 2g IV Q8, vancomycin increased to 1500 mg IV Q8-- check VT    Wound care consulted  fu recs from interventional cardiologist Dr. Teran for possible intervention  c/w home med neurontin- > will inc 100 tid. Pain scale with dilaudid (takes percocet 325/5 at home).
Sepsis, POA 2/2 L foot AVM, recurrent ulceration/cellulitis  based on previous hx pt has not healed without embolization.    pending evaluation by Dr. Teran's team- may plan for intervention tuesday .    continue vanc and meropenem.    goal vanc trough 10-15.    HTN continue with home medications  c/w home med neurontin . Pain scale with dilaudid (takes percocet 325/5 at home).  dvt ppx w lovenox
#Sepsis, POA 2/2 foot wound/cellulitis   #AVMs, chronic  #Foot pain  #HTN continue with home medications  #Obesity- fu nutrition consult     ID already consulted by ER.  following their recommendation. as tolerated   cw -meropenem 2g IV Q8, vancomycin 1250 mg IV Q8-- check VT 2pm. and re-order vanc after VT   Wound care consult.   fu recs from interventional cardiologist Dr. Teran for possible intervention  c/w home med neurontin- > will inc 100 tid. Pain scale with oxy 5/10 mod/severe (takes percocet 325/5 at home). will change iv dilaudid to oral
Sepsis, POA 2/2 L foot AVM, recurrent ulceration/cellulitis  based on previous hx pt has not healed without embolization.    continue vanc and meropenem.    wbc trending up wbc 15k with chills post op overnight-- will cw to monitor - wbc downtrended to 12k   s/p embolization by Dr. Teran on 9/19  picc line placed 9/19- pt will be discharged 9/21 with at least 2 weeks of abx and fu with dr. Rahman outpt   HTN continue with home medications  c/w home med neurontin . Pain scale with Dilaudid (takes percocet 325/5 at home).  dvt ppx w lovenox.
Sepsis, POA 2/2 L foot AVM, recurrent ulceration/cellulitis  based on previous hx pt has not healed without embolization.    continue vanc and meropenem.    wbc trending up wbc 15k with chills post op overnight-- will cw to monitor   s/p embolization by Dr. Teran on 9/19  picc line placed 9/19- pt will be discharged 9/21 with at least 2 weeks of abx and fu with dr. Rahman outpt   HTN continue with home medications  c/w home med neurontin . Pain scale with Dilaudid (takes percocet 325/5 at home).  dvt ppx w lovenox
Sepsis, POA 2/2 L foot AVM, recurrent ulceration/cellulitis  based on previous hx pt has not healed without embolization.    pending evaluation by Dr. Teran's team- may plan for intervention tuesday .    continue vanc and meropenem.    goal vanc trough 10-15.    HTN continue with home medications  c/w home med neurontin- > will inc 100 tid. Pain scale with dilaudid (takes percocet 325/5 at home).  dvt ppx w lovenox
Sepsis, POA 2/2 L foot AVM, recurrent ulceration/cellulitis  based on previous hx pt has not healed without embolization.    continue vanc and meropenem.    wbc trending up wbc 15k - fu ID rcs   awaiting embolization plan by Dr. Teran's team on 9/19  picc line placed 9/19- pt will be discharged with at least 2 weeks of abx and fu with dr. Rahman outpt -   HTN continue with home medications  c/w home med neurontin . Pain scale with dilaudid (takes percocet 325/5 at home).  dvt ppx w lovenox
38F h/o HTN, morbid obesity, L foot AVM s/p multiple direct stick and transcatheter embolizations, recurrent cellulitis p/w L foot ulcer with cellulitis.    ----for left ankle cellulitis/collection pt is Currently on vanc/hugh. ID team is following. Initial culture grew Strep Agalactae and Pseudomonas Aeruginosa   ----for AVM malformation,  Awaiting eval by Dr. Teran.

## 2023-09-21 NOTE — PROGRESS NOTE ADULT - REASON FOR ADMISSION
sepsis, Chronic L foot ulcer

## 2023-09-21 NOTE — PROGRESS NOTE ADULT - ATTENDING SUPERVISION STATEMENT
Resident
Fellow
Resident

## 2023-09-22 ENCOUNTER — TRANSCRIPTION ENCOUNTER (OUTPATIENT)
Age: 38
End: 2023-09-22

## 2023-09-22 VITALS
RESPIRATION RATE: 18 BRPM | DIASTOLIC BLOOD PRESSURE: 88 MMHG | HEART RATE: 83 BPM | OXYGEN SATURATION: 98 % | SYSTOLIC BLOOD PRESSURE: 133 MMHG | TEMPERATURE: 98 F

## 2023-09-22 PROCEDURE — 86900 BLOOD TYPING SEROLOGIC ABO: CPT

## 2023-09-22 PROCEDURE — 87184 SC STD DISK METHOD PER PLATE: CPT

## 2023-09-22 PROCEDURE — 86850 RBC ANTIBODY SCREEN: CPT

## 2023-09-22 PROCEDURE — 80202 ASSAY OF VANCOMYCIN: CPT

## 2023-09-22 PROCEDURE — 81001 URINALYSIS AUTO W/SCOPE: CPT

## 2023-09-22 PROCEDURE — 85610 PROTHROMBIN TIME: CPT

## 2023-09-22 PROCEDURE — 82803 BLOOD GASES ANY COMBINATION: CPT

## 2023-09-22 PROCEDURE — 87205 SMEAR GRAM STAIN: CPT

## 2023-09-22 PROCEDURE — 80048 BASIC METABOLIC PNL TOTAL CA: CPT

## 2023-09-22 PROCEDURE — 87040 BLOOD CULTURE FOR BACTERIA: CPT

## 2023-09-22 PROCEDURE — 36573 INSJ PICC RS&I 5 YR+: CPT

## 2023-09-22 PROCEDURE — 93970 EXTREMITY STUDY: CPT

## 2023-09-22 PROCEDURE — 86901 BLOOD TYPING SEROLOGIC RH(D): CPT

## 2023-09-22 PROCEDURE — 84100 ASSAY OF PHOSPHORUS: CPT

## 2023-09-22 PROCEDURE — 84295 ASSAY OF SERUM SODIUM: CPT

## 2023-09-22 PROCEDURE — 82330 ASSAY OF CALCIUM: CPT

## 2023-09-22 PROCEDURE — 36415 COLL VENOUS BLD VENIPUNCTURE: CPT

## 2023-09-22 PROCEDURE — 73620 X-RAY EXAM OF FOOT: CPT

## 2023-09-22 PROCEDURE — C1889: CPT

## 2023-09-22 PROCEDURE — 99239 HOSP IP/OBS DSCHRG MGMT >30: CPT

## 2023-09-22 PROCEDURE — 80053 COMPREHEN METABOLIC PANEL: CPT

## 2023-09-22 PROCEDURE — 99285 EMERGENCY DEPT VISIT HI MDM: CPT | Mod: 25

## 2023-09-22 PROCEDURE — 84132 ASSAY OF SERUM POTASSIUM: CPT

## 2023-09-22 PROCEDURE — 83605 ASSAY OF LACTIC ACID: CPT

## 2023-09-22 PROCEDURE — 96375 TX/PRO/DX INJ NEW DRUG ADDON: CPT

## 2023-09-22 PROCEDURE — 87086 URINE CULTURE/COLONY COUNT: CPT

## 2023-09-22 PROCEDURE — 84702 CHORIONIC GONADOTROPIN TEST: CPT

## 2023-09-22 PROCEDURE — 85025 COMPLETE CBC W/AUTO DIFF WBC: CPT

## 2023-09-22 PROCEDURE — 82553 CREATINE MB FRACTION: CPT

## 2023-09-22 PROCEDURE — 87070 CULTURE OTHR SPECIMN AEROBIC: CPT

## 2023-09-22 PROCEDURE — 96376 TX/PRO/DX INJ SAME DRUG ADON: CPT

## 2023-09-22 PROCEDURE — 83735 ASSAY OF MAGNESIUM: CPT

## 2023-09-22 PROCEDURE — 85730 THROMBOPLASTIN TIME PARTIAL: CPT

## 2023-09-22 PROCEDURE — 97161 PT EVAL LOW COMPLEX 20 MIN: CPT

## 2023-09-22 PROCEDURE — 87186 SC STD MICRODIL/AGAR DIL: CPT

## 2023-09-22 PROCEDURE — 93005 ELECTROCARDIOGRAM TRACING: CPT

## 2023-09-22 PROCEDURE — 96374 THER/PROPH/DIAG INJ IV PUSH: CPT

## 2023-09-22 PROCEDURE — 82550 ASSAY OF CK (CPK): CPT

## 2023-09-22 RX ADMIN — HYDROMORPHONE HYDROCHLORIDE 2 MILLIGRAM(S): 2 INJECTION INTRAMUSCULAR; INTRAVENOUS; SUBCUTANEOUS at 03:37

## 2023-09-22 RX ADMIN — MEROPENEM 280 MILLIGRAM(S): 1 INJECTION INTRAVENOUS at 11:18

## 2023-09-22 RX ADMIN — HYDROMORPHONE HYDROCHLORIDE 2 MILLIGRAM(S): 2 INJECTION INTRAMUSCULAR; INTRAVENOUS; SUBCUTANEOUS at 07:44

## 2023-09-22 RX ADMIN — HYDROMORPHONE HYDROCHLORIDE 2 MILLIGRAM(S): 2 INJECTION INTRAMUSCULAR; INTRAVENOUS; SUBCUTANEOUS at 15:12

## 2023-09-22 RX ADMIN — HYDROMORPHONE HYDROCHLORIDE 2 MILLIGRAM(S): 2 INJECTION INTRAMUSCULAR; INTRAVENOUS; SUBCUTANEOUS at 03:17

## 2023-09-22 RX ADMIN — MEROPENEM 280 MILLIGRAM(S): 1 INJECTION INTRAVENOUS at 03:17

## 2023-09-22 RX ADMIN — Medication 166.67 MILLIGRAM(S): at 06:35

## 2023-09-22 RX ADMIN — ENOXAPARIN SODIUM 40 MILLIGRAM(S): 100 INJECTION SUBCUTANEOUS at 06:24

## 2023-09-22 RX ADMIN — NEBIVOLOL HYDROCHLORIDE 10 MILLIGRAM(S): 5 TABLET ORAL at 06:24

## 2023-09-22 RX ADMIN — GABAPENTIN 100 MILLIGRAM(S): 400 CAPSULE ORAL at 07:16

## 2023-09-22 RX ADMIN — HYDROMORPHONE HYDROCHLORIDE 2 MILLIGRAM(S): 2 INJECTION INTRAMUSCULAR; INTRAVENOUS; SUBCUTANEOUS at 07:14

## 2023-09-22 RX ADMIN — CHLORHEXIDINE GLUCONATE 1 APPLICATION(S): 213 SOLUTION TOPICAL at 06:24

## 2023-09-22 RX ADMIN — Medication 166.67 MILLIGRAM(S): at 13:07

## 2023-09-22 RX ADMIN — HYDROMORPHONE HYDROCHLORIDE 2 MILLIGRAM(S): 2 INJECTION INTRAMUSCULAR; INTRAVENOUS; SUBCUTANEOUS at 12:18

## 2023-09-22 RX ADMIN — GABAPENTIN 800 MILLIGRAM(S): 400 CAPSULE ORAL at 15:11

## 2023-09-22 RX ADMIN — HYDROMORPHONE HYDROCHLORIDE 2 MILLIGRAM(S): 2 INJECTION INTRAMUSCULAR; INTRAVENOUS; SUBCUTANEOUS at 11:18

## 2023-09-22 NOTE — DISCHARGE NOTE NURSING/CASE MANAGEMENT/SOCIAL WORK - NSDCVIVACCINE_GEN_ALL_CORE_FT
influenza, injectable, quadrivalent, preservative free; 22-Oct-2020 10:12; Miley Hebert (RN); [employer]; [id number] (Exp. Date: 30-Jun-2021); IntraMuscular; Deltoid Left.; 0.5 milliLiter(s); VIS (VIS Published: 15-Aug-2019, VIS Presented: 22-Oct-2020);   influenza, injectable, quadrivalent, preservative free; 14-Oct-2021 09:19; Margie Taveras (RN); [employer]; [id number] (Exp. Date: 30-Jun-2022); IntraMuscular; Deltoid Right.; 0.5 milliLiter(s); VIS (VIS Published: 06-Aug-2021, VIS Presented: 14-Oct-2021);   influenza, injectable, quadrivalent, preservative free; 09-Dec-2022 14:43; Melinda Sesay (RN); [employer]; [id number] (Exp. Date: 29-May-2022); IntraMuscular; Deltoid Right.; 0.5 milliLiter(s); VIS (VIS Published: 06-Aug-2021, VIS Presented: 09-Dec-2022);

## 2023-09-22 NOTE — DISCHARGE NOTE NURSING/CASE MANAGEMENT/SOCIAL WORK - PATIENT PORTAL LINK FT
You can access the FollowMyHealth Patient Portal offered by [facility] by registering at the following website: [email or web address]. By joining [employer]’s FollowMyHealth portal, you will also be able to view your health information using other applications (apps) compatible with our system.

## 2023-09-22 NOTE — DISCHARGE NOTE NURSING/CASE MANAGEMENT/SOCIAL WORK - NSDCPEFALRISK_GEN_ALL_CORE
For information on Fall & Injury Prevention, visit: [email or web address] OR  [email or web address] OR  https://www.cdc.gov/steadi/patient.html

## 2023-09-22 NOTE — DISCHARGE NOTE NURSING/CASE MANAGEMENT/SOCIAL WORK - NSDCFUADDAPPT_GEN_ALL_CORE_FT
Please bring your Insurance card, Photo ID and Discharge paperwork to the following appointments:    (1) Please follow up with your Vascular Surgery Provider, Dr. J Carlos Teran at [address] [place] [address] on 09/26/2023 at 12:00pm.    Appointment was scheduled by Ms. DEVEN Venegas, Referral Coordinator.    (2) Please follow up with your Infectious Disease Provider, Dr. Mila Rahman [place] [address], [place] [id number] on 10/06/2023 at 10:20am.    Appointment was scheduled by Ms. DEVEN Venegas, Referral Coordinator.

## 2023-10-02 ENCOUNTER — NON-APPOINTMENT (OUTPATIENT)
Age: 38
End: 2023-10-02

## 2023-10-05 DIAGNOSIS — R53.1 WEAKNESS: ICD-10-CM

## 2023-10-05 DIAGNOSIS — Q27.30 ARTERIOVENOUS MALFORMATION, SITE UNSPECIFIED: ICD-10-CM

## 2023-10-05 DIAGNOSIS — Z88.6 ALLERGY STATUS TO ANALGESIC AGENT: ICD-10-CM

## 2023-10-05 DIAGNOSIS — E66.01 MORBID (SEVERE) OBESITY DUE TO EXCESS CALORIES: ICD-10-CM

## 2023-10-05 DIAGNOSIS — Z88.0 ALLERGY STATUS TO PENICILLIN: ICD-10-CM

## 2023-10-05 DIAGNOSIS — L97.509 NON-PRESSURE CHRONIC ULCER OF OTHER PART OF UNSPECIFIED FOOT WITH UNSPECIFIED SEVERITY: ICD-10-CM

## 2023-10-05 DIAGNOSIS — I10 ESSENTIAL (PRIMARY) HYPERTENSION: ICD-10-CM

## 2023-10-05 DIAGNOSIS — A41.9 SEPSIS, UNSPECIFIED ORGANISM: ICD-10-CM

## 2023-10-05 DIAGNOSIS — L03.116 CELLULITIS OF LEFT LOWER LIMB: ICD-10-CM

## 2023-10-05 DIAGNOSIS — Z91.040 LATEX ALLERGY STATUS: ICD-10-CM

## 2023-10-06 ENCOUNTER — INPATIENT (INPATIENT)
Facility: HOSPITAL | Age: 38
LOS: 5 days | Discharge: HOME HEALTH | DRG: 863 | End: 2023-10-12
Attending: GENERAL ACUTE CARE HOSPITAL | Admitting: STUDENT IN AN ORGANIZED HEALTH CARE EDUCATION/TRAINING PROGRAM
Payer: MEDICARE

## 2023-10-06 ENCOUNTER — APPOINTMENT (OUTPATIENT)
Dept: INFECTIOUS DISEASE | Facility: CLINIC | Age: 38
End: 2023-10-06
Payer: MEDICARE

## 2023-10-06 VITALS
SYSTOLIC BLOOD PRESSURE: 172 MMHG | HEART RATE: 98 BPM | TEMPERATURE: 99 F | OXYGEN SATURATION: 100 % | DIASTOLIC BLOOD PRESSURE: 109 MMHG | RESPIRATION RATE: 20 BRPM

## 2023-10-06 VITALS
HEART RATE: 85 BPM | SYSTOLIC BLOOD PRESSURE: 187 MMHG | HEIGHT: 67 IN | TEMPERATURE: 97.6 F | BODY MASS INDEX: 45.99 KG/M2 | OXYGEN SATURATION: 98 % | WEIGHT: 293 LBS | DIASTOLIC BLOOD PRESSURE: 135 MMHG

## 2023-10-06 DIAGNOSIS — I10 ESSENTIAL (PRIMARY) HYPERTENSION: ICD-10-CM

## 2023-10-06 DIAGNOSIS — Z98.89 OTHER SPECIFIED POSTPROCEDURAL STATES: Chronic | ICD-10-CM

## 2023-10-06 DIAGNOSIS — Q27.30 ARTERIOVENOUS MALFORMATION, SITE UNSPECIFIED: ICD-10-CM

## 2023-10-06 DIAGNOSIS — Z41.9 ENCOUNTER FOR PROCEDURE FOR PURPOSES OTHER THAN REMEDYING HEALTH STATE, UNSPECIFIED: Chronic | ICD-10-CM

## 2023-10-06 DIAGNOSIS — Z00.00 ENCOUNTER FOR GENERAL ADULT MEDICAL EXAMINATION WITHOUT ABNORMAL FINDINGS: ICD-10-CM

## 2023-10-06 DIAGNOSIS — L97.509 NON-PRESSURE CHRONIC ULCER OF OTHER PART OF UNSPECIFIED FOOT WITH UNSPECIFIED SEVERITY: ICD-10-CM

## 2023-10-06 LAB
ALBUMIN SERPL ELPH-MCNC: 3.6 G/DL — SIGNIFICANT CHANGE UP (ref 3.3–5)
ALP SERPL-CCNC: 84 U/L — SIGNIFICANT CHANGE UP (ref 40–120)
ALT FLD-CCNC: 9 U/L — LOW (ref 10–45)
ANION GAP SERPL CALC-SCNC: 15 MMOL/L — SIGNIFICANT CHANGE UP (ref 5–17)
APPEARANCE UR: CLEAR — SIGNIFICANT CHANGE UP
APTT BLD: 33.5 SEC — SIGNIFICANT CHANGE UP (ref 24.5–35.6)
AST SERPL-CCNC: 17 U/L — SIGNIFICANT CHANGE UP (ref 10–40)
BACTERIA # UR AUTO: PRESENT /HPF
BASOPHILS # BLD AUTO: 0.04 K/UL — SIGNIFICANT CHANGE UP (ref 0–0.2)
BASOPHILS NFR BLD AUTO: 0.4 % — SIGNIFICANT CHANGE UP (ref 0–2)
BILIRUB SERPL-MCNC: 0.9 MG/DL — SIGNIFICANT CHANGE UP (ref 0.2–1.2)
BILIRUB UR-MCNC: ABNORMAL
BUN SERPL-MCNC: 10 MG/DL — SIGNIFICANT CHANGE UP (ref 7–23)
CALCIUM SERPL-MCNC: 9.7 MG/DL — SIGNIFICANT CHANGE UP (ref 8.4–10.5)
CHLORIDE SERPL-SCNC: 100 MMOL/L — SIGNIFICANT CHANGE UP (ref 96–108)
CO2 SERPL-SCNC: 22 MMOL/L — SIGNIFICANT CHANGE UP (ref 22–31)
COLOR SPEC: YELLOW — SIGNIFICANT CHANGE UP
COMMENT - URINE: SIGNIFICANT CHANGE UP
CREAT SERPL-MCNC: 0.57 MG/DL — SIGNIFICANT CHANGE UP (ref 0.5–1.3)
DIFF PNL FLD: ABNORMAL
EGFR: 118 ML/MIN/1.73M2 — SIGNIFICANT CHANGE UP
EOSINOPHIL # BLD AUTO: 0.02 K/UL — SIGNIFICANT CHANGE UP (ref 0–0.5)
EOSINOPHIL NFR BLD AUTO: 0.2 % — SIGNIFICANT CHANGE UP (ref 0–6)
EPI CELLS # UR: ABNORMAL /HPF (ref 0–5)
GLUCOSE SERPL-MCNC: 102 MG/DL — HIGH (ref 70–99)
GLUCOSE UR QL: NEGATIVE — SIGNIFICANT CHANGE UP
HCT VFR BLD CALC: 39 % — SIGNIFICANT CHANGE UP (ref 34.5–45)
HGB BLD-MCNC: 12.7 G/DL — SIGNIFICANT CHANGE UP (ref 11.5–15.5)
HYALINE CASTS # UR AUTO: SIGNIFICANT CHANGE UP /LPF (ref 0–2)
IMM GRANULOCYTES NFR BLD AUTO: 0.4 % — SIGNIFICANT CHANGE UP (ref 0–0.9)
INR BLD: 1.04 — SIGNIFICANT CHANGE UP (ref 0.85–1.18)
KETONES UR-MCNC: NEGATIVE — SIGNIFICANT CHANGE UP
LACTATE SERPL-SCNC: 2 MMOL/L — SIGNIFICANT CHANGE UP (ref 0.5–2)
LEUKOCYTE ESTERASE UR-ACNC: NEGATIVE — SIGNIFICANT CHANGE UP
LYMPHOCYTES # BLD AUTO: 1.62 K/UL — SIGNIFICANT CHANGE UP (ref 1–3.3)
LYMPHOCYTES # BLD AUTO: 14.9 % — SIGNIFICANT CHANGE UP (ref 13–44)
MCHC RBC-ENTMCNC: 27.4 PG — SIGNIFICANT CHANGE UP (ref 27–34)
MCHC RBC-ENTMCNC: 32.6 GM/DL — SIGNIFICANT CHANGE UP (ref 32–36)
MCV RBC AUTO: 84.2 FL — SIGNIFICANT CHANGE UP (ref 80–100)
MONOCYTES # BLD AUTO: 0.7 K/UL — SIGNIFICANT CHANGE UP (ref 0–0.9)
MONOCYTES NFR BLD AUTO: 6.4 % — SIGNIFICANT CHANGE UP (ref 2–14)
NEUTROPHILS # BLD AUTO: 8.44 K/UL — HIGH (ref 1.8–7.4)
NEUTROPHILS NFR BLD AUTO: 77.7 % — HIGH (ref 43–77)
NITRITE UR-MCNC: POSITIVE
NRBC # BLD: 0 /100 WBCS — SIGNIFICANT CHANGE UP (ref 0–0)
PH UR: 6 — SIGNIFICANT CHANGE UP (ref 5–8)
PLATELET # BLD AUTO: 517 K/UL — HIGH (ref 150–400)
POTASSIUM SERPL-MCNC: 4.1 MMOL/L — SIGNIFICANT CHANGE UP (ref 3.5–5.3)
POTASSIUM SERPL-SCNC: 4.1 MMOL/L — SIGNIFICANT CHANGE UP (ref 3.5–5.3)
PROT SERPL-MCNC: 7.9 G/DL — SIGNIFICANT CHANGE UP (ref 6–8.3)
PROT UR-MCNC: 100 MG/DL
PROTHROM AB SERPL-ACNC: 11.8 SEC — SIGNIFICANT CHANGE UP (ref 9.5–13)
RBC # BLD: 4.63 M/UL — SIGNIFICANT CHANGE UP (ref 3.8–5.2)
RBC # FLD: 13.6 % — SIGNIFICANT CHANGE UP (ref 10.3–14.5)
RBC CASTS # UR COMP ASSIST: ABNORMAL /HPF
SODIUM SERPL-SCNC: 137 MMOL/L — SIGNIFICANT CHANGE UP (ref 135–145)
SP GR SPEC: >=1.03 — SIGNIFICANT CHANGE UP (ref 1–1.03)
UROBILINOGEN FLD QL: 0.2 E.U./DL — SIGNIFICANT CHANGE UP
WBC # BLD: 10.86 K/UL — HIGH (ref 3.8–10.5)
WBC # FLD AUTO: 10.86 K/UL — HIGH (ref 3.8–10.5)
WBC UR QL: < 5 /HPF — SIGNIFICANT CHANGE UP

## 2023-10-06 PROCEDURE — 99215 OFFICE O/P EST HI 40 MIN: CPT | Mod: 25

## 2023-10-06 PROCEDURE — 99223 1ST HOSP IP/OBS HIGH 75: CPT

## 2023-10-06 PROCEDURE — 93010 ELECTROCARDIOGRAM REPORT: CPT

## 2023-10-06 PROCEDURE — 99285 EMERGENCY DEPT VISIT HI MDM: CPT | Mod: 25

## 2023-10-06 PROCEDURE — 36000 PLACE NEEDLE IN VEIN: CPT

## 2023-10-06 PROCEDURE — 36415 COLL VENOUS BLD VENIPUNCTURE: CPT

## 2023-10-06 RX ORDER — GABAPENTIN 400 MG/1
100 CAPSULE ORAL
Refills: 0 | Status: DISCONTINUED | OUTPATIENT
Start: 2023-10-07 | End: 2023-10-12

## 2023-10-06 RX ORDER — HYDROMORPHONE HYDROCHLORIDE 2 MG/ML
1 INJECTION INTRAMUSCULAR; INTRAVENOUS; SUBCUTANEOUS ONCE
Refills: 0 | Status: DISCONTINUED | OUTPATIENT
Start: 2023-10-06 | End: 2023-10-06

## 2023-10-06 RX ORDER — GABAPENTIN 400 MG/1
800 CAPSULE ORAL
Refills: 0 | Status: DISCONTINUED | OUTPATIENT
Start: 2023-10-07 | End: 2023-10-12

## 2023-10-06 RX ORDER — METOPROLOL TARTRATE 50 MG
100 TABLET ORAL EVERY 12 HOURS
Refills: 0 | Status: DISCONTINUED | OUTPATIENT
Start: 2023-10-07 | End: 2023-10-12

## 2023-10-06 RX ORDER — DAPTOMYCIN 500 MG/10ML
1000 INJECTION, POWDER, LYOPHILIZED, FOR SOLUTION INTRAVENOUS ONCE
Refills: 0 | Status: COMPLETED | OUTPATIENT
Start: 2023-10-06 | End: 2023-10-06

## 2023-10-06 RX ORDER — ACETAMINOPHEN 500 MG
650 TABLET ORAL ONCE
Refills: 0 | Status: COMPLETED | OUTPATIENT
Start: 2023-10-06 | End: 2023-10-06

## 2023-10-06 RX ORDER — METOPROLOL TARTRATE 50 MG
12.5 TABLET ORAL ONCE
Refills: 0 | Status: COMPLETED | OUTPATIENT
Start: 2023-10-06 | End: 2023-10-06

## 2023-10-06 RX ADMIN — Medication 650 MILLIGRAM(S): at 22:56

## 2023-10-06 RX ADMIN — Medication 650 MILLIGRAM(S): at 23:56

## 2023-10-06 RX ADMIN — HYDROMORPHONE HYDROCHLORIDE 1 MILLIGRAM(S): 2 INJECTION INTRAMUSCULAR; INTRAVENOUS; SUBCUTANEOUS at 23:54

## 2023-10-06 RX ADMIN — HYDROMORPHONE HYDROCHLORIDE 1 MILLIGRAM(S): 2 INJECTION INTRAMUSCULAR; INTRAVENOUS; SUBCUTANEOUS at 19:35

## 2023-10-06 RX ADMIN — Medication 12.5 MILLIGRAM(S): at 19:37

## 2023-10-06 RX ADMIN — DAPTOMYCIN 140 MILLIGRAM(S): 500 INJECTION, POWDER, LYOPHILIZED, FOR SOLUTION INTRAVENOUS at 23:39

## 2023-10-06 NOTE — H&P ADULT - PROBLEM SELECTOR PLAN 3
Patient on nebivolol 10 mg BID at home, is compliant with medications. Was hypertensive to the 180's in the ED, improved with pain control.     Plan:   - therapeutic interchange Lopressor 100 mg BID   - pain control

## 2023-10-06 NOTE — H&P ADULT - HISTORY OF PRESENT ILLNESS
Pulled PICC   Got Dapto  Bell is a 38 year old woman with a PMHx of hypertension, chronic L foot AVM s/p multiple (~20 interventions with Dr. Teran) and chronic and recurrent superimposed infections, most recently on one month ago presents for worsening of infection despite antibiotics. She was last seen one month ago (9/12) for recurrent of left foor ulceration and underwent embolization with direct puncture and was discharged with Vancomycin and Meropenem for a two week course with close follow up with Dr. Rahman. She was doing well until approximately 48 hours ago, felt chills (temp at home 99) and had yellow/green purulence from the ulcer, and she was prompted to return to the ED for evaluation.     ID was consulted in the ED, recommendations were to start Daptomycin and remove the PICC line.

## 2023-10-06 NOTE — H&P ADULT - NSHPSOCIALHISTORY_GEN_ALL_CORE
Lives with:   Alcohol:   Drugs:   Ambulates: Lives with: With mother and boyfriend   Alcohol: No  Drugs: No  Ambulates: On own

## 2023-10-06 NOTE — H&P ADULT - PROBLEM SELECTOR PLAN 1
L foot AVMs s/p 23 prior embolizations. Follows with Dr. Teran. S/p embolization with Dr. Teran on 9/19.  Pain management:     Gabapentin 100mg in the morning + 800mg at 4PM (patient was not amendable to increasing gabapentin 100 mg to TID as it causes HA and discomfort)  Dilaudid 2mg PO q4 PRN.     Dressing changes were done follows: w/ Vashe moistened 2x2 gauze 2 times daily    Plan:   - would notify Dr. Teran of admission

## 2023-10-06 NOTE — H&P ADULT - NSHPPHYSICALEXAM_GEN_ALL_CORE
VITALS:   T(C): 37.9 (10-06-23 @ 21:47), Max: 37.9 (10-06-23 @ 21:47)  HR: 94 (10-06-23 @ 20:18) (94 - 98)  BP: 153/89 (10-06-23 @ 20:18) (153/89 - 172/109)  RR: 18 (10-06-23 @ 20:18) (18 - 20)  SpO2: 96% (10-06-23 @ 20:18) (96% - 100%)    GENERAL: NAD, lying in bed comfortably  HEAD:  Atraumatic, normocephalic  EYES: EOMI, PERRLA, conjunctiva and sclera clear  ENT: Moist mucous membranes  NECK: Supple, no JVD  HEART: Regular rate and rhythm, no murmurs, rubs, or gallops  LUNGS: Unlabored respirations.  Clear to auscultation bilaterally, no crackles, wheezing, or rhonchi  ABDOMEN: Soft, nontender, nondistended, +BS  EXTREMITIES: 2+ peripheral pulses bilaterally. No clubbing, cyanosis, or edema  NERVOUS SYSTEM:  A&Ox3, no focal deficits   SKIN: No rashes or lesions VITALS:   T(C): 37.9 (10-06-23 @ 21:47), Max: 37.9 (10-06-23 @ 21:47)  HR: 94 (10-06-23 @ 20:18) (94 - 98)  BP: 153/89 (10-06-23 @ 20:18) (153/89 - 172/109)  RR: 18 (10-06-23 @ 20:18) (18 - 20)  SpO2: 96% (10-06-23 @ 20:18) (96% - 100%)    GENERAL: NAD, lying in bed comfortably, obese, in pain  HEAD:  Atraumatic, normocephalic  EYES: EOMI, PERRLA, conjunctiva and sclera clear  ENT: Moist mucous membranes  NECK: Supple, no JVD  HEART: Regular rate and rhythm, no murmurs, rubs, or gallops  LUNGS: Unlabored respirations.  Clear to auscultation bilaterally, no crackles, wheezing, or rhonchi  ABDOMEN: Soft, nontender, nondistended, +BS  EXTREMITIES:   NERVOUS SYSTEM:  A&Ox3, no focal deficits   SKIN: No rashes or lesions

## 2023-10-06 NOTE — H&P ADULT - NSHPREVIEWOFSYSTEMS_GEN_ALL_CORE
REVIEW OF SYSTEMS:  CONSTITUTIONAL: No weakness, fevers or chills  EYES/ENT: No visual changes;  No vertigo or throat pain   NECK: No pain or stiffness  RESPIRATORY: No cough, wheezing, hemoptysis; No shortness of breath  CARDIOVASCULAR: No chest pain or palpitations  GASTROINTESTINAL: No abdominal or epigastric pain. No nausea, vomiting, or hematemesis; No diarrhea or constipation. No melena or hematochezia.  GENITOURINARY: No dysuria, frequency or hematuria  NEUROLOGICAL: No numbness or weakness  SKIN: No itching, rashes REVIEW OF SYSTEMS:  CONSTITUTIONAL: No weakness, fevers + chills for one day   EYES/ENT: No visual changes;  No vertigo or throat pain   NECK: No pain or stiffness  RESPIRATORY: No cough, wheezing, hemoptysis; No shortness of breath  CARDIOVASCULAR: No chest pain or palpitations  GASTROINTESTINAL: No abdominal or epigastric pain. No nausea, vomiting, or hematemesis; No diarrhea or constipation. No melena or hematochezia.  GENITOURINARY: No dysuria, frequency or hematuria  NEUROLOGICAL: No numbness or weakness  EXTREMITIES: Pain with ulceration of heel from AVM   SKIN: No itching, rashes

## 2023-10-06 NOTE — ED ADULT NURSE REASSESSMENT NOTE - NS ED NURSE REASSESS COMMENT FT1
RN called pharmacy regarding daptomycin. Pharmacy informed RN it is not ready yet and they will bring up medication when it's ready. (3) occasionally moist

## 2023-10-06 NOTE — ED ADULT NURSE NOTE - OBJECTIVE STATEMENT
patient presents to ED with Lt. foot pain, was hospitalized with cellulitis and on IV abx via Rt. PICC. denies f/c, cp, sob, dizziness.

## 2023-10-06 NOTE — ED ADULT TRIAGE NOTE - CHIEF COMPLAINT QUOTE
Pt states "I have an ulcer on my left foot I have a PICC line to get antibiotics. Today I have fever and chills so they told me to come in." PICC to r arm.

## 2023-10-06 NOTE — H&P ADULT - PROBLEM SELECTOR PLAN 2
w/ cellulitis. Has previously responded well to vancomycin and meropenem in the past but despite appropriate treatment, patient has had chills and fevers with new purulence from wound. ID consulted from the ED, received a 1X dose of Daptomycin. The picc line placed on 9/19 was also removed in the ED. There is no erythema near the picc insertion site.     Plan:   - Dr. Rahman aware of admission, gave verbal recommendations to ED provider and will see patient in the AM     Pain control:   Home regimen:   Gabapentin 100 mg in AM, 800 mg at 3 pm   Percocet  mg oral tablet     Inpatient:   - Gabapentin 10 mg in AM, 800 mg at 3 pm   - Dilaudid 2 mg PO q4-6 prn   -

## 2023-10-06 NOTE — ED PROVIDER NOTE - OBJECTIVE STATEMENT
38-year-old female history of an AVM to her left ankle that has required many surgical procedures presenting for new redness around the recent surgical site as well as chills and a temperature to 99.4 orally.  Patient was discharged from the hospital 2 weeks ago with a PICC line.  She has been getting vancomycin and meropenem.  She had been doing well but then for the past 2 days she has had increasing redness to the area and chills and sweats.  She saw her ID doctor this morning who then sent her into the emergency department for further work-up.  No cough or cold symptoms no abdominal pain no nausea vomiting diarrhea.

## 2023-10-06 NOTE — H&P ADULT - ASSESSMENT
Bell Kearney is a 42 year old woman with AVM of the left lateral heel with recurrent infections and embolizations, recently admitted for embolization and discharged with Vancomycin and Meropenem for two weeks via PICC line, admitted for fevers and chills and new purulence despite antibiotic therapy.

## 2023-10-06 NOTE — ED PROVIDER NOTE - PHYSICAL EXAMINATION
General:  Well appearing, no distress  HEENT:  No conjunctival injection, neck supple, no congestion   Chest:  Non-tender, no crepitance  Lungs:  Clear to auscultation bilaterally   Heart:  tachycardic   Abdomen:  soft, non-tender, non-distended  :  Deferred  Rectal:  Deferred  Extremities: Patient has wound dressed and asks that I leave dressing in place.  Has a photo of the area that shows a small ulcerated lesion with surrounding erythema   Neuro:  Alert, conversant, motor/sensory grossly intact

## 2023-10-06 NOTE — H&P ADULT - ATTENDING COMMENTS
39 yo F with PMHx HTN, L ankle AVM, recent admission for sepsis 2/2 L foot ulcer (discharged with PICC on Vanc/Meropenem for extended course on 9/22) px from home at urging of outpatient ID Dr. Rahman for 2d hx of chills admitted for further work-up of ongoing LLE ulcer with need for extended course abx.     #LLE ulcer – LLE culture from 9/12 growing Pseudomonas. Pt originally scheduled to complete IV abx with Vanc/Meropenem through 10/2 however due to open ulcer remaining with erythema and new drainage, course extended through 10/16 and now being sent to ED for further evaluation. On current admission, meeting 1/4 SIRS for tachycardia with low-grade temp 100.3F rectally. WBC 10k, downtrending from last labs on 9/21 with WBC 12k. IR PICC line placed on previous admission 9/19, removed on arrival to [facility] ED. ID consulted in ED. F/U blood cx. Continue Daptomycin. Weekly CK while on daptomycin.      Agree with remainder of resident plan as above.

## 2023-10-06 NOTE — ED PROVIDER NOTE - CLINICAL SUMMARY MEDICAL DECISION MAKING FREE TEXT BOX
38-year-old female status post recent surgery for AVM to the left foot that was complicated by infection presents with chills and sweats and increasing redness around the wound despite PICC line IV Vanco and meropenem for the past 2 weeks.  Will obtain labs and cultures we will give pain medication.  If patient will allow we will try to get a rectal temp because if she does have a fever recommendation from ID is to take the PICC line out.      7:20 PM: Case discussed with Dr. Rahman of ID who will follow case with me. 38-year-old female status post recent surgery for AVM to the left foot that was complicated by infection presents with chills and sweats and increasing redness around the wound despite PICC line IV Vanco and meropenem for the past 2 weeks.  Will obtain labs and cultures we will give pain medication.  If patient will allow we will try to get a rectal temp because if she does have a fever recommendation from ID is to take the PICC line out.      7:20 PM: Case discussed with Dr. Rahman of ID who will follow case with me.     Decision made to admit and administer daptomycin and to remove PICC.  Patient informed of plan.  PICC removed without difficulty at bedside. Catheter inspected.  No defects, breaks, missing pieces.

## 2023-10-06 NOTE — H&P ADULT - NSHPLABSRESULTS_GEN_ALL_CORE
.  LABS:                         12.7   10.86 )-----------( 517      ( 06 Oct 2023 19:19 )             39.0     10-06    137  |  100  |  10  ----------------------------<  102<H>  4.1   |  22  |  0.57    Ca    9.7      06 Oct 2023 19:19    TPro  7.9  /  Alb  3.6  /  TBili  0.9  /  DBili  x   /  AST  17  /  ALT  9<L>  /  AlkPhos  84  10-06    PT/INR - ( 06 Oct 2023 19:19 )   PT: 11.8 sec;   INR: 1.04          PTT - ( 06 Oct 2023 19:19 )  PTT:33.5 sec  Urinalysis Basic - ( 06 Oct 2023 21:43 )    Color: Yellow / Appearance: Clear / SG: >=1.030 / pH: x  Gluc: x / Ketone: NEGATIVE  / Bili: Small / Urobili: 0.2 E.U./dL   Blood: x / Protein: 100 mg/dL / Nitrite: POSITIVE   Leuk Esterase: NEGATIVE / RBC: Many /HPF / WBC < 5 /HPF   Sq Epi: x / Non Sq Epi: x / Bacteria: Present /HPF            Lactate, Blood: 2.0 mmol/L (10-06 @ 19:19)      RADIOLOGY, EKG & ADDITIONAL TESTS: Reviewed.

## 2023-10-06 NOTE — ED ADULT NURSE NOTE - NSFALLUNIVINTERV_ED_ALL_ED
Bed/Stretcher in lowest position, wheels locked, appropriate side rails in place/Call bell, personal items and telephone in reach/Instruct patient to call for assistance before getting out of bed/chair/stretcher/Non-slip footwear applied when patient is off stretcher/[place] to call system/Physically safe environment - no spills, clutter or unnecessary equipment/Purposeful proactive rounding/Room/bathroom lighting operational, light cord in reach

## 2023-10-07 LAB
ALBUMIN SERPL ELPH-MCNC: 3.7 G/DL — SIGNIFICANT CHANGE UP (ref 3.3–5)
ALP SERPL-CCNC: 86 U/L — SIGNIFICANT CHANGE UP (ref 40–120)
ALT FLD-CCNC: 8 U/L — LOW (ref 10–45)
ANION GAP SERPL CALC-SCNC: 14 MMOL/L — SIGNIFICANT CHANGE UP (ref 5–17)
AST SERPL-CCNC: 13 U/L — SIGNIFICANT CHANGE UP (ref 10–40)
BASOPHILS # BLD AUTO: 0.05 K/UL — SIGNIFICANT CHANGE UP (ref 0–0.2)
BASOPHILS NFR BLD AUTO: 0.6 % — SIGNIFICANT CHANGE UP (ref 0–2)
BILIRUB SERPL-MCNC: 1.2 MG/DL — SIGNIFICANT CHANGE UP (ref 0.2–1.2)
BUN SERPL-MCNC: 14 MG/DL — SIGNIFICANT CHANGE UP (ref 7–23)
CALCIUM SERPL-MCNC: 9.4 MG/DL — SIGNIFICANT CHANGE UP (ref 8.4–10.5)
CHLORIDE SERPL-SCNC: 101 MMOL/L — SIGNIFICANT CHANGE UP (ref 96–108)
CO2 SERPL-SCNC: 21 MMOL/L — LOW (ref 22–31)
CREAT SERPL-MCNC: 0.55 MG/DL — SIGNIFICANT CHANGE UP (ref 0.5–1.3)
EGFR: 119 ML/MIN/1.73M2 — SIGNIFICANT CHANGE UP
EOSINOPHIL # BLD AUTO: 0.07 K/UL — SIGNIFICANT CHANGE UP (ref 0–0.5)
EOSINOPHIL NFR BLD AUTO: 0.8 % — SIGNIFICANT CHANGE UP (ref 0–6)
GLUCOSE SERPL-MCNC: 127 MG/DL — HIGH (ref 70–99)
HCT VFR BLD CALC: 38.5 % — SIGNIFICANT CHANGE UP (ref 34.5–45)
HGB BLD-MCNC: 12.3 G/DL — SIGNIFICANT CHANGE UP (ref 11.5–15.5)
IMM GRANULOCYTES NFR BLD AUTO: 0.3 % — SIGNIFICANT CHANGE UP (ref 0–0.9)
LYMPHOCYTES # BLD AUTO: 1.93 K/UL — SIGNIFICANT CHANGE UP (ref 1–3.3)
LYMPHOCYTES # BLD AUTO: 21.9 % — SIGNIFICANT CHANGE UP (ref 13–44)
MAGNESIUM SERPL-MCNC: 1.9 MG/DL — SIGNIFICANT CHANGE UP (ref 1.6–2.6)
MCHC RBC-ENTMCNC: 27.3 PG — SIGNIFICANT CHANGE UP (ref 27–34)
MCHC RBC-ENTMCNC: 31.9 GM/DL — LOW (ref 32–36)
MCV RBC AUTO: 85.4 FL — SIGNIFICANT CHANGE UP (ref 80–100)
MONOCYTES # BLD AUTO: 0.53 K/UL — SIGNIFICANT CHANGE UP (ref 0–0.9)
MONOCYTES NFR BLD AUTO: 6 % — SIGNIFICANT CHANGE UP (ref 2–14)
NEUTROPHILS # BLD AUTO: 6.21 K/UL — SIGNIFICANT CHANGE UP (ref 1.8–7.4)
NEUTROPHILS NFR BLD AUTO: 70.4 % — SIGNIFICANT CHANGE UP (ref 43–77)
NRBC # BLD: 0 /100 WBCS — SIGNIFICANT CHANGE UP (ref 0–0)
PHOSPHATE SERPL-MCNC: 2.9 MG/DL — SIGNIFICANT CHANGE UP (ref 2.5–4.5)
PLATELET # BLD AUTO: 485 K/UL — HIGH (ref 150–400)
POTASSIUM SERPL-MCNC: 3.5 MMOL/L — SIGNIFICANT CHANGE UP (ref 3.5–5.3)
POTASSIUM SERPL-SCNC: 3.5 MMOL/L — SIGNIFICANT CHANGE UP (ref 3.5–5.3)
PROT SERPL-MCNC: 7.7 G/DL — SIGNIFICANT CHANGE UP (ref 6–8.3)
RBC # BLD: 4.51 M/UL — SIGNIFICANT CHANGE UP (ref 3.8–5.2)
RBC # FLD: 13.8 % — SIGNIFICANT CHANGE UP (ref 10.3–14.5)
SODIUM SERPL-SCNC: 136 MMOL/L — SIGNIFICANT CHANGE UP (ref 135–145)
WBC # BLD: 8.82 K/UL — SIGNIFICANT CHANGE UP (ref 3.8–10.5)
WBC # FLD AUTO: 8.82 K/UL — SIGNIFICANT CHANGE UP (ref 3.8–10.5)

## 2023-10-07 PROCEDURE — 99222 1ST HOSP IP/OBS MODERATE 55: CPT

## 2023-10-07 PROCEDURE — 76937 US GUIDE VASCULAR ACCESS: CPT | Mod: 26

## 2023-10-07 PROCEDURE — 36000 PLACE NEEDLE IN VEIN: CPT

## 2023-10-07 PROCEDURE — 99232 SBSQ HOSP IP/OBS MODERATE 35: CPT

## 2023-10-07 RX ORDER — DAPTOMYCIN 500 MG/10ML
550 INJECTION, POWDER, LYOPHILIZED, FOR SOLUTION INTRAVENOUS EVERY 24 HOURS
Refills: 0 | Status: DISCONTINUED | OUTPATIENT
Start: 2023-10-07 | End: 2023-10-07

## 2023-10-07 RX ORDER — DAPTOMYCIN 500 MG/10ML
900 INJECTION, POWDER, LYOPHILIZED, FOR SOLUTION INTRAVENOUS EVERY 24 HOURS
Refills: 0 | Status: DISCONTINUED | OUTPATIENT
Start: 2023-10-07 | End: 2023-10-10

## 2023-10-07 RX ORDER — HYDROMORPHONE HYDROCHLORIDE 2 MG/ML
1 INJECTION INTRAMUSCULAR; INTRAVENOUS; SUBCUTANEOUS EVERY 4 HOURS
Refills: 0 | Status: DISCONTINUED | OUTPATIENT
Start: 2023-10-07 | End: 2023-10-09

## 2023-10-07 RX ORDER — MEROPENEM 1 G/30ML
2000 INJECTION INTRAVENOUS EVERY 8 HOURS
Refills: 0 | Status: DISCONTINUED | OUTPATIENT
Start: 2023-10-07 | End: 2023-10-12

## 2023-10-07 RX ORDER — HYDROMORPHONE HYDROCHLORIDE 2 MG/ML
2 INJECTION INTRAMUSCULAR; INTRAVENOUS; SUBCUTANEOUS EVERY 4 HOURS
Refills: 0 | Status: DISCONTINUED | OUTPATIENT
Start: 2023-10-07 | End: 2023-10-07

## 2023-10-07 RX ORDER — ENOXAPARIN SODIUM 100 MG/ML
40 INJECTION SUBCUTANEOUS EVERY 12 HOURS
Refills: 0 | Status: DISCONTINUED | OUTPATIENT
Start: 2023-10-07 | End: 2023-10-12

## 2023-10-07 RX ADMIN — MEROPENEM 280 MILLIGRAM(S): 1 INJECTION INTRAVENOUS at 19:46

## 2023-10-07 RX ADMIN — HYDROMORPHONE HYDROCHLORIDE 1 MILLIGRAM(S): 2 INJECTION INTRAMUSCULAR; INTRAVENOUS; SUBCUTANEOUS at 09:49

## 2023-10-07 RX ADMIN — Medication 100 MILLIGRAM(S): at 06:07

## 2023-10-07 RX ADMIN — GABAPENTIN 100 MILLIGRAM(S): 400 CAPSULE ORAL at 06:06

## 2023-10-07 RX ADMIN — HYDROMORPHONE HYDROCHLORIDE 1 MILLIGRAM(S): 2 INJECTION INTRAMUSCULAR; INTRAVENOUS; SUBCUTANEOUS at 13:51

## 2023-10-07 RX ADMIN — HYDROMORPHONE HYDROCHLORIDE 1 MILLIGRAM(S): 2 INJECTION INTRAMUSCULAR; INTRAVENOUS; SUBCUTANEOUS at 17:44

## 2023-10-07 RX ADMIN — HYDROMORPHONE HYDROCHLORIDE 1 MILLIGRAM(S): 2 INJECTION INTRAMUSCULAR; INTRAVENOUS; SUBCUTANEOUS at 09:34

## 2023-10-07 RX ADMIN — GABAPENTIN 800 MILLIGRAM(S): 400 CAPSULE ORAL at 15:17

## 2023-10-07 RX ADMIN — MEROPENEM 280 MILLIGRAM(S): 1 INJECTION INTRAVENOUS at 12:18

## 2023-10-07 RX ADMIN — ENOXAPARIN SODIUM 40 MILLIGRAM(S): 100 INJECTION SUBCUTANEOUS at 19:13

## 2023-10-07 RX ADMIN — HYDROMORPHONE HYDROCHLORIDE 1 MILLIGRAM(S): 2 INJECTION INTRAMUSCULAR; INTRAVENOUS; SUBCUTANEOUS at 05:35

## 2023-10-07 RX ADMIN — DAPTOMYCIN 136 MILLIGRAM(S): 500 INJECTION, POWDER, LYOPHILIZED, FOR SOLUTION INTRAVENOUS at 21:17

## 2023-10-07 RX ADMIN — HYDROMORPHONE HYDROCHLORIDE 1 MILLIGRAM(S): 2 INJECTION INTRAMUSCULAR; INTRAVENOUS; SUBCUTANEOUS at 21:44

## 2023-10-07 RX ADMIN — ENOXAPARIN SODIUM 40 MILLIGRAM(S): 100 INJECTION SUBCUTANEOUS at 06:06

## 2023-10-07 RX ADMIN — HYDROMORPHONE HYDROCHLORIDE 1 MILLIGRAM(S): 2 INJECTION INTRAMUSCULAR; INTRAVENOUS; SUBCUTANEOUS at 22:14

## 2023-10-07 RX ADMIN — HYDROMORPHONE HYDROCHLORIDE 1 MILLIGRAM(S): 2 INJECTION INTRAMUSCULAR; INTRAVENOUS; SUBCUTANEOUS at 05:21

## 2023-10-07 RX ADMIN — HYDROMORPHONE HYDROCHLORIDE 1 MILLIGRAM(S): 2 INJECTION INTRAMUSCULAR; INTRAVENOUS; SUBCUTANEOUS at 17:59

## 2023-10-07 RX ADMIN — Medication 100 MILLIGRAM(S): at 19:16

## 2023-10-07 RX ADMIN — HYDROMORPHONE HYDROCHLORIDE 1 MILLIGRAM(S): 2 INJECTION INTRAMUSCULAR; INTRAVENOUS; SUBCUTANEOUS at 13:36

## 2023-10-07 NOTE — PROGRESS NOTE ADULT - SUBJECTIVE AND OBJECTIVE BOX
SUBJECTIVE/OVERNIGHT EVENTS: No acute overnight events. Pt seen in AM at bedside, resting comfortably in bed, and does not appear to be in any acute distress. When asked, pt denies any recent or active fever, chills, nausea, vomiting, headache, acute sob, chest pain, abdominal pain, genitourinary sx.    VITAL SIGNS:  Vital Signs Last 24 Hrs  T(C): 36.9 (07 Oct 2023 04:10), Max: 37.9 (06 Oct 2023 21:47)  T(F): 98.4 (07 Oct 2023 04:10), Max: 100.3 (06 Oct 2023 21:47)  HR: 83 (07 Oct 2023 06:00) (83 - 98)  BP: 143/91 (07 Oct 2023 06:00) (143/91 - 180/114)  BP(mean): --  RR: 20 (07 Oct 2023 06:00) (18 - 20)  SpO2: 94% (07 Oct 2023 06:00) (94% - 100%)    Parameters below as of 07 Oct 2023 06:00  Patient On (Oxygen Delivery Method): room air        PHYSICAL EXAM:  General: NAD; speaking in full sentences  HEENT: NC/AT; PERRL; EOMI; MMM  Neck: supple; no JVD  Cardiac: RRR; +S1/S2  Pulm: CTA B/L; no W/R/R  GI: soft, NT/ND, +BS  Extremities: WWP; no edema, clubbing or cyanosis. Bandage on left foot clean.  Vasc: 2+ radial, DP pulses B/L  Neuro: AAOx3; no focal deficits    MEDICATIONS:  MEDICATIONS  (STANDING):  DAPTOmycin IVPB 900 milliGRAM(s) IV Intermittent every 24 hours  enoxaparin Injectable 40 milliGRAM(s) SubCutaneous every 12 hours  gabapentin 100 milliGRAM(s) Oral <User Schedule>  gabapentin 800 milliGRAM(s) Oral <User Schedule>  meropenem  IVPB 2000 milliGRAM(s) IV Intermittent every 8 hours  metoprolol tartrate 100 milliGRAM(s) Oral every 12 hours    MEDICATIONS  (PRN):  HYDROmorphone  Injectable 1 milliGRAM(s) IV Push every 4 hours PRN Severe Pain (7 - 10)      ALLERGIES:  Allergies    ciprofloxacin (Rash)  penicillin (Rash)  hydrochlorothiazide (Hives)  morphine (Rash)  latex (Rash)  lisinopril (Angioedema; Rash; Hives)  amoxicillin (Angioedema)    Intolerances        LABS:                        12.3   8.82  )-----------( 485      ( 07 Oct 2023 11:29 )             38.5     10-07    136  |  101  |  14  ----------------------------<  127<H>  3.5   |  21<L>  |  0.55    Ca    9.4      07 Oct 2023 11:29  Phos  2.9     10-07  Mg     1.9     10-07    TPro  7.7  /  Alb  3.7  /  TBili  1.2  /  DBili  x   /  AST  13  /  ALT  8<L>  /  AlkPhos  86  10-07    PT/INR - ( 06 Oct 2023 19:19 )   PT: 11.8 sec;   INR: 1.04          PTT - ( 06 Oct 2023 19:19 )  PTT:33.5 sec    RADIOLOGY & ADDITIONAL TESTS: Reviewed.

## 2023-10-07 NOTE — PROGRESS NOTE ADULT - ATTENDING COMMENTS
42 year old woman with AVM of the left lateral heel with recurrent infections and embolizations, recently admitted for embolization and discharged with Vancomycin and Meropenem for two weeks via PICC line, admitted for fevers and chills, concern for PICC infection     Switched to hugh/dapto per ID  PICC removed  weekly CK  F/u cx  Appreciate ongoing ID guidance  Pain control  HTN - c/w home meds (therapeutic interchange)    Dispo: home

## 2023-10-07 NOTE — PATIENT PROFILE ADULT - FALL HARM RISK - PATIENT NEEDS ASSISTANCE
Pt with continued listing to Right side and posteriorly.  Poor trunk control./impaired balance/impaired motor control/impaired postural control/decreased strength No assistance needed

## 2023-10-07 NOTE — CONSULT NOTE ADULT - SUBJECTIVE AND OBJECTIVE BOX
HPI:  39 yo F with HTN, MO and AVM L foot with recurrent L foot ulcer s/p numerous direct stick and transcatheter embolizations, prolonged courses of antibiotics, had been off since 4/12/23. On 9/12, she reported that ulcer over AVM had begun opening X 1 week and she had had fever overnight. She was referred to the ED, where she was afebrile (on Tylenol), WBC 12.6 with 83% PMNs. She was started on vanc and meropenem. She remained afebrile, leukocytosis persisted (13-15) and was without improvement. On 9/19, she underwent direct stick embolization and PICC placement. She was discharged on 9/21 on vancomycin 1250 mg IV q8h and meropenem 2 g IV q8h. Since the time of discharge, the area of erythema has been shrinking. The ulcer has not gotten significantly smaller. She is doing wet-to-dry dressings to try to eliminate slough at base of ulcer. She had severe pain for 7 d after procedure, currently 7-8/10 in intensity. No fever, chills, night sweats. Nurse was unable to draw blood at last visit - last labs 9/25. She required Cathflo on 10/5 - no blood return from PICC. Seen by me for outpatient f/u on 10/6, at which time blood strikethrough on Biopatch was noted, a nursing visit was arranged.  Shortly after leaving, she felt increased fatigue.  She then developed rigors and sweats.  She was referred to the ED, where she had T 100.3 (r), HR 98, /109.  Labs with WBC 10.9 with 78% PMNs, plt 517, lactate 2.0.  I requested PICC be removed.  She was given daptomycin and admitted for further management.  She has had no further fever but still does not feel well, mild sweats.  L foot pain is unchanged.      PAST MEDICAL & SURGICAL HISTORY:  HTN (hypertension)      AVM (arteriovenous malformation)  of left foot      Foot ulceration  left foot      S/P debridement  LLE area overlying AVM      Elective surgery  transcatheter therapy vascular embolization x5              MEDICATIONS  (STANDING):  DAPTOmycin IVPB 900 milliGRAM(s) IV Intermittent every 24 hours  enoxaparin Injectable 40 milliGRAM(s) SubCutaneous every 12 hours  gabapentin 100 milliGRAM(s) Oral <User Schedule>  gabapentin 800 milliGRAM(s) Oral <User Schedule>  meropenem  IVPB 2000 milliGRAM(s) IV Intermittent every 8 hours  metoprolol tartrate 100 milliGRAM(s) Oral every 12 hours    MEDICATIONS  (PRN):  HYDROmorphone  Injectable 1 milliGRAM(s) IV Push every 4 hours PRN Severe Pain (7 - 10)      Allergies    ciprofloxacin (Rash)  penicillin (Rash)  hydrochlorothiazide (Hives)  morphine (Rash)  latex (Rash)  lisinopril (Angioedema; Rash; Hives)  amoxicillin (Angioedema)    Intolerances        SOCIAL HISTORY:  Born in [place], lives there now with her mother and boyfriend.  Has 2 dogs, no children.  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  No tobacco, alcohol or recreational drug use.      FAMILY HISTORY:  Family history of congenital malformation (Sibling)  AVMs: siblings        Vital Signs Last 24 Hrs  T(C): 36.7 (07 Oct 2023 15:33), Max: 37.9 (06 Oct 2023 21:47)  T(F): 98 (07 Oct 2023 15:33), Max: 100.3 (06 Oct 2023 21:47)  HR: 97 (07 Oct 2023 15:33) (83 - 98)  BP: 155/97 (07 Oct 2023 15:33) (143/91 - 180/114)  BP(mean): --  RR: 18 (07 Oct 2023 15:33) (18 - 20)  SpO2: 96% (07 Oct 2023 15:33) (94% - 100%)    Parameters below as of 07 Oct 2023 15:33  Patient On (Oxygen Delivery Method): room air        PE:  Alert, in no distress  HEENT:  NC, PERRL, sclerae anicteric, conjunctivae clear.  Sinuses nontender, no nasal exudate.  No buccal or pharyngeal lesions, erythema or exudate  Neck:  Supple, no adenopathy  Lungs:  Clear to auscultation  Cor:  RRR, S1, S2, no murmur appreciated  Abd:  Symmetric, normoactive BS.  Soft, nontender, no masses, guarding or rebound.  Liver and spleen not enlarged  Extrem:  No cyanosis or edema.  RUE PICC former exit site without purulence or erythema  Skin:  No rashes.    LABS:                        12.3   8.82  )-----------( 485      ( 07 Oct 2023 11:29 )             38.5     10-07    136  |  101  |  14  ----------------------------<  127<H>  3.5   |  21<L>  |  0.55    Ca    9.4      07 Oct 2023 11:29  Phos  2.9     10-07  Mg     1.9     10-07    TPro  7.7  /  Alb  3.7  /  TBili  1.2  /  DBili  x   /  AST  13  /  ALT  8<L>  /  AlkPhos  86  10-07        Urinalysis Basic - ( 07 Oct 2023 11:29 )    Color: x / Appearance: x / SG: x / pH: x  Gluc: 127 mg/dL / Ketone: x  / Bili: x / Urobili: x   Blood: x / Protein: x / Nitrite: x   Leuk Esterase: x / RBC: x / WBC x   Sq Epi: x / Non Sq Epi: x / Bacteria: x    MICROBIOLOGY:  Blood cultures:  10/6 X 2 - NGTD    RADIOLOGY & ADDITIONAL STUDIES:

## 2023-10-07 NOTE — CONSULT NOTE ADULT - ASSESSMENT
37 yo F with HTN, MO and AVM L foot with recurrent L foot ulcer with fever, rigors, sweats.  She was receiving vancomycin and meropenem via PICC for ulcer/cellulitis, which are slowly improving. She has no localizing symptoms - concern for CLABSI.  No other localizing symptoms.  I was concerned that she had resistant gram positive (eg Enterococcus) - reason for recommending daptomycin.  Meropenem should have been continued.  Suggest:  - F/U blood cultures from 10/6  - Daptomycin 900 mg IV q24h for now.  Please send CPK  - Restart meropenem 2 g IV q8h  Recommendations discussed with primary team - will follow with you - team 2.

## 2023-10-07 NOTE — PATIENT PROFILE ADULT - STATED REASON FOR ADMISSION
Pt states "I have an ulcer on my left foot I have a PICC line to get antibiotics. Today I have fever and chills so they told me to come in." PICC to r arm.  wound check

## 2023-10-08 LAB
ALBUMIN SERPL ELPH-MCNC: 3.5 G/DL — SIGNIFICANT CHANGE UP (ref 3.3–5)
ALP SERPL-CCNC: 84 U/L — SIGNIFICANT CHANGE UP (ref 40–120)
ALT FLD-CCNC: 7 U/L — LOW (ref 10–45)
ANION GAP SERPL CALC-SCNC: 12 MMOL/L — SIGNIFICANT CHANGE UP (ref 5–17)
AST SERPL-CCNC: 10 U/L — SIGNIFICANT CHANGE UP (ref 10–40)
BASOPHILS # BLD AUTO: 0.05 K/UL — SIGNIFICANT CHANGE UP (ref 0–0.2)
BASOPHILS NFR BLD AUTO: 0.6 % — SIGNIFICANT CHANGE UP (ref 0–2)
BILIRUB SERPL-MCNC: 0.8 MG/DL — SIGNIFICANT CHANGE UP (ref 0.2–1.2)
BUN SERPL-MCNC: 20 MG/DL — SIGNIFICANT CHANGE UP (ref 7–23)
CALCIUM SERPL-MCNC: 9.4 MG/DL — SIGNIFICANT CHANGE UP (ref 8.4–10.5)
CHLORIDE SERPL-SCNC: 106 MMOL/L — SIGNIFICANT CHANGE UP (ref 96–108)
CK SERPL-CCNC: 47 U/L — SIGNIFICANT CHANGE UP (ref 25–170)
CO2 SERPL-SCNC: 23 MMOL/L — SIGNIFICANT CHANGE UP (ref 22–31)
CREAT SERPL-MCNC: 0.61 MG/DL — SIGNIFICANT CHANGE UP (ref 0.5–1.3)
CULTURE RESULTS: SIGNIFICANT CHANGE UP
EGFR: 116 ML/MIN/1.73M2 — SIGNIFICANT CHANGE UP
EOSINOPHIL # BLD AUTO: 0.13 K/UL — SIGNIFICANT CHANGE UP (ref 0–0.5)
EOSINOPHIL NFR BLD AUTO: 1.6 % — SIGNIFICANT CHANGE UP (ref 0–6)
GLUCOSE SERPL-MCNC: 91 MG/DL — SIGNIFICANT CHANGE UP (ref 70–99)
HCT VFR BLD CALC: 37.4 % — SIGNIFICANT CHANGE UP (ref 34.5–45)
HGB BLD-MCNC: 11.9 G/DL — SIGNIFICANT CHANGE UP (ref 11.5–15.5)
IMM GRANULOCYTES NFR BLD AUTO: 0.4 % — SIGNIFICANT CHANGE UP (ref 0–0.9)
LYMPHOCYTES # BLD AUTO: 2.09 K/UL — SIGNIFICANT CHANGE UP (ref 1–3.3)
LYMPHOCYTES # BLD AUTO: 25 % — SIGNIFICANT CHANGE UP (ref 13–44)
MAGNESIUM SERPL-MCNC: 1.9 MG/DL — SIGNIFICANT CHANGE UP (ref 1.6–2.6)
MCHC RBC-ENTMCNC: 27.6 PG — SIGNIFICANT CHANGE UP (ref 27–34)
MCHC RBC-ENTMCNC: 31.8 GM/DL — LOW (ref 32–36)
MCV RBC AUTO: 86.8 FL — SIGNIFICANT CHANGE UP (ref 80–100)
MONOCYTES # BLD AUTO: 0.8 K/UL — SIGNIFICANT CHANGE UP (ref 0–0.9)
MONOCYTES NFR BLD AUTO: 9.6 % — SIGNIFICANT CHANGE UP (ref 2–14)
NEUTROPHILS # BLD AUTO: 5.25 K/UL — SIGNIFICANT CHANGE UP (ref 1.8–7.4)
NEUTROPHILS NFR BLD AUTO: 62.8 % — SIGNIFICANT CHANGE UP (ref 43–77)
NRBC # BLD: 0 /100 WBCS — SIGNIFICANT CHANGE UP (ref 0–0)
PHOSPHATE SERPL-MCNC: 4.1 MG/DL — SIGNIFICANT CHANGE UP (ref 2.5–4.5)
PLATELET # BLD AUTO: 478 K/UL — HIGH (ref 150–400)
POTASSIUM SERPL-MCNC: 3.9 MMOL/L — SIGNIFICANT CHANGE UP (ref 3.5–5.3)
POTASSIUM SERPL-SCNC: 3.9 MMOL/L — SIGNIFICANT CHANGE UP (ref 3.5–5.3)
PROT SERPL-MCNC: 6.9 G/DL — SIGNIFICANT CHANGE UP (ref 6–8.3)
RBC # BLD: 4.31 M/UL — SIGNIFICANT CHANGE UP (ref 3.8–5.2)
RBC # FLD: 13.5 % — SIGNIFICANT CHANGE UP (ref 10.3–14.5)
SODIUM SERPL-SCNC: 141 MMOL/L — SIGNIFICANT CHANGE UP (ref 135–145)
SPECIMEN SOURCE: SIGNIFICANT CHANGE UP
WBC # BLD: 8.35 K/UL — SIGNIFICANT CHANGE UP (ref 3.8–10.5)
WBC # FLD AUTO: 8.35 K/UL — SIGNIFICANT CHANGE UP (ref 3.8–10.5)

## 2023-10-08 PROCEDURE — 99232 SBSQ HOSP IP/OBS MODERATE 35: CPT

## 2023-10-08 RX ORDER — ACETAMINOPHEN 500 MG
975 TABLET ORAL EVERY 8 HOURS
Refills: 0 | Status: DISCONTINUED | OUTPATIENT
Start: 2023-10-08 | End: 2023-10-08

## 2023-10-08 RX ADMIN — Medication 100 MILLIGRAM(S): at 18:26

## 2023-10-08 RX ADMIN — HYDROMORPHONE HYDROCHLORIDE 1 MILLIGRAM(S): 2 INJECTION INTRAMUSCULAR; INTRAVENOUS; SUBCUTANEOUS at 22:02

## 2023-10-08 RX ADMIN — MEROPENEM 280 MILLIGRAM(S): 1 INJECTION INTRAVENOUS at 22:33

## 2023-10-08 RX ADMIN — ENOXAPARIN SODIUM 40 MILLIGRAM(S): 100 INJECTION SUBCUTANEOUS at 06:28

## 2023-10-08 RX ADMIN — HYDROMORPHONE HYDROCHLORIDE 1 MILLIGRAM(S): 2 INJECTION INTRAMUSCULAR; INTRAVENOUS; SUBCUTANEOUS at 17:34

## 2023-10-08 RX ADMIN — HYDROMORPHONE HYDROCHLORIDE 1 MILLIGRAM(S): 2 INJECTION INTRAMUSCULAR; INTRAVENOUS; SUBCUTANEOUS at 06:15

## 2023-10-08 RX ADMIN — GABAPENTIN 800 MILLIGRAM(S): 400 CAPSULE ORAL at 15:05

## 2023-10-08 RX ADMIN — HYDROMORPHONE HYDROCHLORIDE 1 MILLIGRAM(S): 2 INJECTION INTRAMUSCULAR; INTRAVENOUS; SUBCUTANEOUS at 02:14

## 2023-10-08 RX ADMIN — GABAPENTIN 100 MILLIGRAM(S): 400 CAPSULE ORAL at 06:28

## 2023-10-08 RX ADMIN — HYDROMORPHONE HYDROCHLORIDE 1 MILLIGRAM(S): 2 INJECTION INTRAMUSCULAR; INTRAVENOUS; SUBCUTANEOUS at 05:45

## 2023-10-08 RX ADMIN — HYDROMORPHONE HYDROCHLORIDE 1 MILLIGRAM(S): 2 INJECTION INTRAMUSCULAR; INTRAVENOUS; SUBCUTANEOUS at 21:42

## 2023-10-08 RX ADMIN — MEROPENEM 280 MILLIGRAM(S): 1 INJECTION INTRAVENOUS at 10:01

## 2023-10-08 RX ADMIN — ENOXAPARIN SODIUM 40 MILLIGRAM(S): 100 INJECTION SUBCUTANEOUS at 18:27

## 2023-10-08 RX ADMIN — Medication 100 MILLIGRAM(S): at 06:28

## 2023-10-08 RX ADMIN — HYDROMORPHONE HYDROCHLORIDE 1 MILLIGRAM(S): 2 INJECTION INTRAMUSCULAR; INTRAVENOUS; SUBCUTANEOUS at 13:25

## 2023-10-08 RX ADMIN — HYDROMORPHONE HYDROCHLORIDE 1 MILLIGRAM(S): 2 INJECTION INTRAMUSCULAR; INTRAVENOUS; SUBCUTANEOUS at 09:35

## 2023-10-08 RX ADMIN — HYDROMORPHONE HYDROCHLORIDE 1 MILLIGRAM(S): 2 INJECTION INTRAMUSCULAR; INTRAVENOUS; SUBCUTANEOUS at 01:44

## 2023-10-08 RX ADMIN — MEROPENEM 280 MILLIGRAM(S): 1 INJECTION INTRAVENOUS at 01:36

## 2023-10-08 RX ADMIN — HYDROMORPHONE HYDROCHLORIDE 1 MILLIGRAM(S): 2 INJECTION INTRAMUSCULAR; INTRAVENOUS; SUBCUTANEOUS at 18:52

## 2023-10-08 RX ADMIN — DAPTOMYCIN 136 MILLIGRAM(S): 500 INJECTION, POWDER, LYOPHILIZED, FOR SOLUTION INTRAVENOUS at 21:45

## 2023-10-08 RX ADMIN — HYDROMORPHONE HYDROCHLORIDE 1 MILLIGRAM(S): 2 INJECTION INTRAMUSCULAR; INTRAVENOUS; SUBCUTANEOUS at 13:38

## 2023-10-08 RX ADMIN — HYDROMORPHONE HYDROCHLORIDE 1 MILLIGRAM(S): 2 INJECTION INTRAMUSCULAR; INTRAVENOUS; SUBCUTANEOUS at 17:41

## 2023-10-08 NOTE — PROGRESS NOTE ADULT - ASSESSMENT
Bell Kearney is a 42 year old woman with AVM of the left lateral heel with recurrent infections and embolizations, recently admitted for embolization and discharged with Vancomycin and Meropenem for two weeks via PICC line, admitted for fevers and chills and new purulence despite antibiotic therapy.  42 year old woman with AVM of the left lateral heel with recurrent infections and embolizations, recently admitted for embolization and discharged with Vancomycin and Meropenem for two weeks via PICC line, admitted for fevers and chills, concern for PICC infection     Switched to hugh/dapto per ID  PICC removed  weekly CK  F/u cx - so far neg  Appreciate ongoing ID guidance  Pain control  HTN - c/w home meds (therapeutic interchange for nebivolol)      DVT ppx: lovenox bid  Dispo: home

## 2023-10-08 NOTE — PROGRESS NOTE ADULT - SUBJECTIVE AND OBJECTIVE BOX
INTERVAL HPI/OVERNIGHT EVENTS:  Tm 98.9.  Still feels intermittent chills and sweats.  Foot pain 6-7/10 in intensity    CONSTITUTIONAL:  As above  EYES:  No photophobia or visual changes  CARDIOVASCULAR:  No chest pain  RESPIRATORY:  No cough, wheezing, or SOB   GASTROINTESTINAL:  No nausea, vomiting, diarrhea, constipation, or abdominal pain  GENITOURINARY:  No frequency, urgency, dysuria or hematuria  NEUROLOGIC:  No headache, lightheadedness      ANTIBIOTICS/RELEVANT:    Daptomycin 900 mg IV q24h (10/7-present)  Meropenem 2 g IV q8h      Vital Signs Last 24 Hrs  T(C): 36.6 (08 Oct 2023 08:59), Max: 37.2 (07 Oct 2023 20:33)  T(F): 97.9 (08 Oct 2023 08:59), Max: 98.9 (07 Oct 2023 20:33)  HR: 89 (08 Oct 2023 08:59) (80 - 99)  BP: 170/117 (08 Oct 2023 08:59) (143/85 - 170/117)  BP(mean): --  RR: 18 (08 Oct 2023 08:59) (17 - 18)  SpO2: 98% (08 Oct 2023 08:59) (95% - 98%)    Parameters below as of 08 Oct 2023 08:59  Patient On (Oxygen Delivery Method): room air        PHYSICAL EXAM:  Constitutional:  Alert, nontoxic  Eyes:  Sclerae anicteric, conjunctivae clear, PERRL  Ear/Nose/Throat:  No nasal exudate or sinus tenderness;  No buccal mucosal lesions, no pharyngeal erythema or exudate	  Neck:  Supple, no adenopathy  Respiratory:  Clear bilaterally  Cardiovascular:  RRR, S1S2, no murmur appreciated  Gastrointestinal:  Symmetric, normoactive BS, soft, NT, no masses, guarding or rebound.  No HSM  Extremities:  No edema.  L foot wrapped      LABS:                        11.9   8.35  )-----------( 478      ( 08 Oct 2023 06:57 )             37.4         10-08    141  |  106  |  20  ----------------------------<  91  3.9   |  23  |  0.61    Ca    9.4      08 Oct 2023 06:57  Phos  4.1     10-08  Mg     1.9     10-08    TPro  6.9  /  Alb  3.5  /  TBili  0.8  /  DBili  x   /  AST  10  /  ALT  7<L>  /  AlkPhos  84  10-08    Creatine Kinase, Serum: 47 U/L (10.08.23 @ 06:57)    Urinalysis Basic - ( 08 Oct 2023 06:57 )    Color: x / Appearance: x / SG: x / pH: x  Gluc: 91 mg/dL / Ketone: x  / Bili: x / Urobili: x   Blood: x / Protein: x / Nitrite: x   Leuk Esterase: x / RBC: x / WBC x   Sq Epi: x / Non Sq Epi: x / Bacteria: x        MICROBIOLOGY:    Blood cultures:  10/6 X 2 - NGTD    RADIOLOGY & ADDITIONAL STUDIES:

## 2023-10-08 NOTE — PROGRESS NOTE ADULT - ASSESSMENT
39 yo F with HTN, MO and AVM L foot with recurrent L foot ulcer with fever, rigors, sweats.  She was receiving vancomycin and meropenem via PICC for ulcer/cellulitis, which are slowly improving. She has no localizing symptoms - concern for CLABSI.  No other localizing symptoms.  I was concerned that she had resistant gram positive (eg Enterococcus) - reason for recommending daptomycin.  Meropenem is for foot.  Suggest:  - F/U blood cultures from 10/6  - Daptomycin 900 mg IV q24h for now.    - Continue meropenem 2 g IV q8h  Will follow with you, team 2.

## 2023-10-08 NOTE — PROGRESS NOTE ADULT - SUBJECTIVE AND OBJECTIVE BOX
OVERNIGHT EVENTS:    SUBJECTIVE / INTERVAL HPI: Patient seen and examined at bedside.     VITAL SIGNS:  Vital Signs Last 24 Hrs  T(C): 36.6 (08 Oct 2023 08:59), Max: 37.2 (07 Oct 2023 20:33)  T(F): 97.9 (08 Oct 2023 08:59), Max: 98.9 (07 Oct 2023 20:33)  HR: 89 (08 Oct 2023 08:59) (80 - 99)  BP: 170/117 (08 Oct 2023 08:59) (143/85 - 170/117)  BP(mean): --  RR: 18 (08 Oct 2023 08:59) (17 - 18)  SpO2: 98% (08 Oct 2023 08:59) (95% - 98%)    Parameters below as of 08 Oct 2023 08:59  Patient On (Oxygen Delivery Method): room air        10-07-23 @ 07:01  -  10-08-23 @ 07:00  --------------------------------------------------------  IN: 330 mL / OUT: 0 mL / NET: 330 mL        PHYSICAL EXAM:    General: WDWN  HEENT: NC/AT; PERRL, anicteric sclera; MMM  Neck: supple  Cardiovascular: +S1/S2; RRR  Respiratory: CTA B/L; no W/R/R  Gastrointestinal: soft, NT/ND; +BSx4  Extremities: WWP; no edema, clubbing or cyanosis  Vascular: 2+ radial, DP/PT pulses B/L  Neurological: AAOx3; no focal deficits    MEDICATIONS:  MEDICATIONS  (STANDING):  DAPTOmycin IVPB 900 milliGRAM(s) IV Intermittent every 24 hours  enoxaparin Injectable 40 milliGRAM(s) SubCutaneous every 12 hours  gabapentin 100 milliGRAM(s) Oral <User Schedule>  gabapentin 800 milliGRAM(s) Oral <User Schedule>  meropenem  IVPB 2000 milliGRAM(s) IV Intermittent every 8 hours  metoprolol tartrate 100 milliGRAM(s) Oral every 12 hours    MEDICATIONS  (PRN):  HYDROmorphone  Injectable 1 milliGRAM(s) IV Push every 4 hours PRN Severe Pain (7 - 10)      ALLERGIES:  Allergies    ciprofloxacin (Rash)  penicillin (Rash)  hydrochlorothiazide (Hives)  morphine (Rash)  latex (Rash)  lisinopril (Angioedema; Rash; Hives)  amoxicillin (Angioedema)    Intolerances        LABS:                        11.9   8.35  )-----------( 478      ( 08 Oct 2023 06:57 )             37.4     10-08    141  |  106  |  20  ----------------------------<  91  3.9   |  23  |  0.61    Ca    9.4      08 Oct 2023 06:57  Phos  4.1     10-08  Mg     1.9     10-08    TPro  6.9  /  Alb  3.5  /  TBili  0.8  /  DBili  x   /  AST  10  /  ALT  7<L>  /  AlkPhos  84  10-08    PT/INR - ( 06 Oct 2023 19:19 )   PT: 11.8 sec;   INR: 1.04          PTT - ( 06 Oct 2023 19:19 )  PTT:33.5 sec  Urinalysis Basic - ( 08 Oct 2023 06:57 )    Color: x / Appearance: x / SG: x / pH: x  Gluc: 91 mg/dL / Ketone: x  / Bili: x / Urobili: x   Blood: x / Protein: x / Nitrite: x   Leuk Esterase: x / RBC: x / WBC x   Sq Epi: x / Non Sq Epi: x / Bacteria: x      CAPILLARY BLOOD GLUCOSE            RADIOLOGY & ADDITIONAL TESTS: Reviewed.    ASSESSMENT:    PLAN:  OVERNIGHT EVENTS: None    SUBJECTIVE / INTERVAL HPI: Patient seen and examined at bedside. No new complaints pain well controlled    VITAL SIGNS:  Vital Signs Last 24 Hrs  T(C): 36.6 (08 Oct 2023 08:59), Max: 37.2 (07 Oct 2023 20:33)  T(F): 97.9 (08 Oct 2023 08:59), Max: 98.9 (07 Oct 2023 20:33)  HR: 89 (08 Oct 2023 08:59) (80 - 99)  BP: 170/117 (08 Oct 2023 08:59) (143/85 - 170/117)  BP(mean): --  RR: 18 (08 Oct 2023 08:59) (17 - 18)  SpO2: 98% (08 Oct 2023 08:59) (95% - 98%)    Parameters below as of 08 Oct 2023 08:59  Patient On (Oxygen Delivery Method): room air        10-07-23 @ 07:01  -  10-08-23 @ 07:00  --------------------------------------------------------  IN: 330 mL / OUT: 0 mL / NET: 330 mL        PHYSICAL EXAM:    General: WDWN  HEENT: NC/AT; PERRL, anicteric sclera; MMM  Neck: supple  Cardiovascular: +S1/S2; RRR  Respiratory: CTA B/L; no W/R/R  Gastrointestinal: soft, NT/ND; +BSx4  Extremities: WWP; no edema, clubbing or cyanosis; left foot bandaged - clean  Vascular: 2+ radial, DP/PT pulses B/L  Neurological: AAOx3; no focal deficits    MEDICATIONS:  MEDICATIONS  (STANDING):  DAPTOmycin IVPB 900 milliGRAM(s) IV Intermittent every 24 hours  enoxaparin Injectable 40 milliGRAM(s) SubCutaneous every 12 hours  gabapentin 100 milliGRAM(s) Oral <User Schedule>  gabapentin 800 milliGRAM(s) Oral <User Schedule>  meropenem  IVPB 2000 milliGRAM(s) IV Intermittent every 8 hours  metoprolol tartrate 100 milliGRAM(s) Oral every 12 hours    MEDICATIONS  (PRN):  HYDROmorphone  Injectable 1 milliGRAM(s) IV Push every 4 hours PRN Severe Pain (7 - 10)      ALLERGIES:  Allergies    ciprofloxacin (Rash)  penicillin (Rash)  hydrochlorothiazide (Hives)  morphine (Rash)  latex (Rash)  lisinopril (Angioedema; Rash; Hives)  amoxicillin (Angioedema)    Intolerances        LABS:                        11.9   8.35  )-----------( 478      ( 08 Oct 2023 06:57 )             37.4     10-08    141  |  106  |  20  ----------------------------<  91  3.9   |  23  |  0.61    Ca    9.4      08 Oct 2023 06:57  Phos  4.1     10-08  Mg     1.9     10-08    TPro  6.9  /  Alb  3.5  /  TBili  0.8  /  DBili  x   /  AST  10  /  ALT  7<L>  /  AlkPhos  84  10-08    PT/INR - ( 06 Oct 2023 19:19 )   PT: 11.8 sec;   INR: 1.04          PTT - ( 06 Oct 2023 19:19 )  PTT:33.5 sec  Urinalysis Basic - ( 08 Oct 2023 06:57 )    Color: x / Appearance: x / SG: x / pH: x  Gluc: 91 mg/dL / Ketone: x  / Bili: x / Urobili: x   Blood: x / Protein: x / Nitrite: x   Leuk Esterase: x / RBC: x / WBC x   Sq Epi: x / Non Sq Epi: x / Bacteria: x      CAPILLARY BLOOD GLUCOSE            RADIOLOGY & ADDITIONAL TESTS: Reviewed.    ASSESSMENT:    PLAN:

## 2023-10-09 ENCOUNTER — TRANSCRIPTION ENCOUNTER (OUTPATIENT)
Age: 38
End: 2023-10-09

## 2023-10-09 LAB
ALBUMIN SERPL ELPH-MCNC: 3.3 G/DL — SIGNIFICANT CHANGE UP (ref 3.3–5)
ALBUMIN SERPL ELPH-MCNC: 4.2 G/DL
ALP BLD-CCNC: 90 U/L
ALP SERPL-CCNC: 92 U/L — SIGNIFICANT CHANGE UP (ref 40–120)
ALT FLD-CCNC: 8 U/L — LOW (ref 10–45)
ALT SERPL-CCNC: 9 U/L
ANION GAP SERPL CALC-SCNC: 13 MMOL/L — SIGNIFICANT CHANGE UP (ref 5–17)
ANION GAP SERPL CALC-SCNC: 15 MMOL/L
AST SERPL-CCNC: 12 U/L — SIGNIFICANT CHANGE UP (ref 10–40)
AST SERPL-CCNC: 14 U/L
BASOPHILS # BLD AUTO: 0.05 K/UL — SIGNIFICANT CHANGE UP (ref 0–0.2)
BASOPHILS NFR BLD AUTO: 0.6 % — SIGNIFICANT CHANGE UP (ref 0–2)
BILIRUB SERPL-MCNC: 0.6 MG/DL — SIGNIFICANT CHANGE UP (ref 0.2–1.2)
BILIRUB SERPL-MCNC: 0.8 MG/DL
BUN SERPL-MCNC: 10 MG/DL
BUN SERPL-MCNC: 15 MG/DL — SIGNIFICANT CHANGE UP (ref 7–23)
CALCIUM SERPL-MCNC: 9.1 MG/DL — SIGNIFICANT CHANGE UP (ref 8.4–10.5)
CALCIUM SERPL-MCNC: 9.6 MG/DL
CHLORIDE SERPL-SCNC: 102 MMOL/L
CHLORIDE SERPL-SCNC: 104 MMOL/L — SIGNIFICANT CHANGE UP (ref 96–108)
CO2 SERPL-SCNC: 19 MMOL/L — LOW (ref 22–31)
CO2 SERPL-SCNC: 22 MMOL/L
CREAT SERPL-MCNC: 0.54 MG/DL
CREAT SERPL-MCNC: 0.63 MG/DL — SIGNIFICANT CHANGE UP (ref 0.5–1.3)
CRP SERPL-MCNC: 26 MG/L
EGFR: 115 ML/MIN/1.73M2 — SIGNIFICANT CHANGE UP
EGFR: 119 ML/MIN/1.73M2
EOSINOPHIL # BLD AUTO: 0.14 K/UL — SIGNIFICANT CHANGE UP (ref 0–0.5)
EOSINOPHIL NFR BLD AUTO: 1.7 % — SIGNIFICANT CHANGE UP (ref 0–6)
ERYTHROCYTE [SEDIMENTATION RATE] IN BLOOD BY WESTERGREN METHOD: NORMAL
GLUCOSE SERPL-MCNC: 101 MG/DL
GLUCOSE SERPL-MCNC: 94 MG/DL — SIGNIFICANT CHANGE UP (ref 70–99)
HCT VFR BLD CALC: 39.5 % — SIGNIFICANT CHANGE UP (ref 34.5–45)
HGB BLD-MCNC: 12.6 G/DL — SIGNIFICANT CHANGE UP (ref 11.5–15.5)
IMM GRANULOCYTES NFR BLD AUTO: 0.4 % — SIGNIFICANT CHANGE UP (ref 0–0.9)
LYMPHOCYTES # BLD AUTO: 1.77 K/UL — SIGNIFICANT CHANGE UP (ref 1–3.3)
LYMPHOCYTES # BLD AUTO: 21.6 % — SIGNIFICANT CHANGE UP (ref 13–44)
MAGNESIUM SERPL-MCNC: 1.8 MG/DL — SIGNIFICANT CHANGE UP (ref 1.6–2.6)
MCHC RBC-ENTMCNC: 27.2 PG — SIGNIFICANT CHANGE UP (ref 27–34)
MCHC RBC-ENTMCNC: 31.9 GM/DL — LOW (ref 32–36)
MCV RBC AUTO: 85.1 FL — SIGNIFICANT CHANGE UP (ref 80–100)
MONOCYTES # BLD AUTO: 0.71 K/UL — SIGNIFICANT CHANGE UP (ref 0–0.9)
MONOCYTES NFR BLD AUTO: 8.6 % — SIGNIFICANT CHANGE UP (ref 2–14)
NEUTROPHILS # BLD AUTO: 5.51 K/UL — SIGNIFICANT CHANGE UP (ref 1.8–7.4)
NEUTROPHILS NFR BLD AUTO: 67.1 % — SIGNIFICANT CHANGE UP (ref 43–77)
NRBC # BLD: 0 /100 WBCS — SIGNIFICANT CHANGE UP (ref 0–0)
PHOSPHATE SERPL-MCNC: 3.3 MG/DL — SIGNIFICANT CHANGE UP (ref 2.5–4.5)
PLATELET # BLD AUTO: 438 K/UL — HIGH (ref 150–400)
POTASSIUM SERPL-MCNC: 4.1 MMOL/L — SIGNIFICANT CHANGE UP (ref 3.5–5.3)
POTASSIUM SERPL-SCNC: 4.1 MMOL/L — SIGNIFICANT CHANGE UP (ref 3.5–5.3)
POTASSIUM SERPL-SCNC: 4.3 MMOL/L
PROT SERPL-MCNC: 7.1 G/DL — SIGNIFICANT CHANGE UP (ref 6–8.3)
PROT SERPL-MCNC: 7.7 G/DL
RBC # BLD: 4.64 M/UL — SIGNIFICANT CHANGE UP (ref 3.8–5.2)
RBC # FLD: 13.6 % — SIGNIFICANT CHANGE UP (ref 10.3–14.5)
SODIUM SERPL-SCNC: 136 MMOL/L — SIGNIFICANT CHANGE UP (ref 135–145)
SODIUM SERPL-SCNC: 139 MMOL/L
WBC # BLD: 8.21 K/UL — SIGNIFICANT CHANGE UP (ref 3.8–10.5)
WBC # FLD AUTO: 8.21 K/UL — SIGNIFICANT CHANGE UP (ref 3.8–10.5)

## 2023-10-09 PROCEDURE — 99232 SBSQ HOSP IP/OBS MODERATE 35: CPT | Mod: GC

## 2023-10-09 PROCEDURE — 99232 SBSQ HOSP IP/OBS MODERATE 35: CPT

## 2023-10-09 RX ORDER — HYDROMORPHONE HYDROCHLORIDE 2 MG/ML
4 INJECTION INTRAMUSCULAR; INTRAVENOUS; SUBCUTANEOUS EVERY 4 HOURS
Refills: 0 | Status: DISCONTINUED | OUTPATIENT
Start: 2023-10-09 | End: 2023-10-10

## 2023-10-09 RX ORDER — HYDROMORPHONE HYDROCHLORIDE 2 MG/ML
4 INJECTION INTRAMUSCULAR; INTRAVENOUS; SUBCUTANEOUS EVERY 6 HOURS
Refills: 0 | Status: DISCONTINUED | OUTPATIENT
Start: 2023-10-09 | End: 2023-10-09

## 2023-10-09 RX ORDER — HYDROMORPHONE HYDROCHLORIDE 2 MG/ML
1 INJECTION INTRAMUSCULAR; INTRAVENOUS; SUBCUTANEOUS ONCE
Refills: 0 | Status: DISCONTINUED | OUTPATIENT
Start: 2023-10-09 | End: 2023-10-09

## 2023-10-09 RX ORDER — HYDROMORPHONE HYDROCHLORIDE 2 MG/ML
2 INJECTION INTRAMUSCULAR; INTRAVENOUS; SUBCUTANEOUS EVERY 6 HOURS
Refills: 0 | Status: DISCONTINUED | OUTPATIENT
Start: 2023-10-09 | End: 2023-10-09

## 2023-10-09 RX ADMIN — Medication 100 MILLIGRAM(S): at 18:34

## 2023-10-09 RX ADMIN — HYDROMORPHONE HYDROCHLORIDE 1 MILLIGRAM(S): 2 INJECTION INTRAMUSCULAR; INTRAVENOUS; SUBCUTANEOUS at 06:07

## 2023-10-09 RX ADMIN — MEROPENEM 280 MILLIGRAM(S): 1 INJECTION INTRAVENOUS at 14:07

## 2023-10-09 RX ADMIN — DAPTOMYCIN 136 MILLIGRAM(S): 500 INJECTION, POWDER, LYOPHILIZED, FOR SOLUTION INTRAVENOUS at 22:06

## 2023-10-09 RX ADMIN — HYDROMORPHONE HYDROCHLORIDE 1 MILLIGRAM(S): 2 INJECTION INTRAMUSCULAR; INTRAVENOUS; SUBCUTANEOUS at 05:52

## 2023-10-09 RX ADMIN — HYDROMORPHONE HYDROCHLORIDE 1 MILLIGRAM(S): 2 INJECTION INTRAMUSCULAR; INTRAVENOUS; SUBCUTANEOUS at 22:18

## 2023-10-09 RX ADMIN — HYDROMORPHONE HYDROCHLORIDE 2 MILLIGRAM(S): 2 INJECTION INTRAMUSCULAR; INTRAVENOUS; SUBCUTANEOUS at 14:01

## 2023-10-09 RX ADMIN — GABAPENTIN 800 MILLIGRAM(S): 400 CAPSULE ORAL at 14:41

## 2023-10-09 RX ADMIN — HYDROMORPHONE HYDROCHLORIDE 1 MILLIGRAM(S): 2 INJECTION INTRAMUSCULAR; INTRAVENOUS; SUBCUTANEOUS at 01:49

## 2023-10-09 RX ADMIN — ENOXAPARIN SODIUM 40 MILLIGRAM(S): 100 INJECTION SUBCUTANEOUS at 18:34

## 2023-10-09 RX ADMIN — MEROPENEM 280 MILLIGRAM(S): 1 INJECTION INTRAVENOUS at 06:42

## 2023-10-09 RX ADMIN — Medication 100 MILLIGRAM(S): at 06:10

## 2023-10-09 RX ADMIN — HYDROMORPHONE HYDROCHLORIDE 4 MILLIGRAM(S): 2 INJECTION INTRAMUSCULAR; INTRAVENOUS; SUBCUTANEOUS at 17:57

## 2023-10-09 RX ADMIN — HYDROMORPHONE HYDROCHLORIDE 2 MILLIGRAM(S): 2 INJECTION INTRAMUSCULAR; INTRAVENOUS; SUBCUTANEOUS at 13:01

## 2023-10-09 RX ADMIN — HYDROMORPHONE HYDROCHLORIDE 1 MILLIGRAM(S): 2 INJECTION INTRAMUSCULAR; INTRAVENOUS; SUBCUTANEOUS at 22:03

## 2023-10-09 RX ADMIN — HYDROMORPHONE HYDROCHLORIDE 4 MILLIGRAM(S): 2 INJECTION INTRAMUSCULAR; INTRAVENOUS; SUBCUTANEOUS at 16:49

## 2023-10-09 RX ADMIN — ENOXAPARIN SODIUM 40 MILLIGRAM(S): 100 INJECTION SUBCUTANEOUS at 06:11

## 2023-10-09 RX ADMIN — HYDROMORPHONE HYDROCHLORIDE 1 MILLIGRAM(S): 2 INJECTION INTRAMUSCULAR; INTRAVENOUS; SUBCUTANEOUS at 02:19

## 2023-10-09 RX ADMIN — GABAPENTIN 100 MILLIGRAM(S): 400 CAPSULE ORAL at 06:10

## 2023-10-09 RX ADMIN — MEROPENEM 280 MILLIGRAM(S): 1 INJECTION INTRAVENOUS at 22:05

## 2023-10-09 NOTE — DISCHARGE NOTE PROVIDER - NSDCMRMEDTOKEN_GEN_ALL_CORE_FT
gabapentin 100 mg oral capsule: 1 cap(s) orally every 24 hours  gabapentin 800 mg oral tablet: 1 tab(s) orally once a day  meropenem 1000 mg intravenous injection: 2,000 milligram(s) intravenously every 8 hours MDD: 6 grams  meropenem 1000 mg intravenous injection: 2000 milligram(s) intravenous every 8 hours  nebivolol 10 mg oral tablet: 1 tab(s) orally 2 times a day  Percocet 10 mg-325 mg oral tablet: 1 tab(s) orally 5 times a day as needed for  severe pain  vancomycin 1 g intravenous injection: 1.25 gram(s) intravenously every 8 hours for 14 day course (9/19-10/2) MDD: 3.75  vancomycin 1.25 g intravenous injection: 1.25 gram(s) intravenous every 8 hours   DAPTOmycin 500 mg intravenous injection: 900 milligram(s) intravenous every 24 hours  gabapentin 100 mg oral capsule: 1 cap(s) orally every 24 hours  gabapentin 800 mg oral tablet: 1 tab(s) orally once a day  meropenem 1000 mg intravenous injection: 2000 milligram(s) intravenous every 8 hours  nebivolol 10 mg oral tablet: 1 tab(s) orally 2 times a day  Percocet 10 mg-325 mg oral tablet: 1 tab(s) orally 5 times a day as needed for  severe pain   gabapentin 100 mg oral capsule: 1 cap(s) orally every 24 hours  gabapentin 800 mg oral tablet: 1 tab(s) orally once a day  meropenem 1000 mg intravenous injection: 2000 milligram(s) intravenous every 8 hours  nebivolol 10 mg oral tablet: 1 tab(s) orally 2 times a day  Percocet 10 mg-325 mg oral tablet: 1 tab(s) orally 5 times a day as needed for  severe pain  vancomycin 1.25 g intravenous injection: 1.25 gram(s) intravenously every 8 hours

## 2023-10-09 NOTE — DISCHARGE NOTE PROVIDER - NSDCQMSTROKE_NEU_ALL_CORE
Patient : Abhay Oconnor Age: 89 year old Sex: male   MRN: [id number] Encounter Date: 2/5/2023  B07/B07    History     Chief Complaint   Patient presents with   • Respiratory Distress     The Hx and ROS of the pt is limited due to AMS    HPI    Abhay Oconnor is a 89 year old presenting to the emergency department via EMS unresponsive after a seizure.     Per EMS, the pt had a seizure of 20 minutes and has been unresponsive since. He does have a wet sounding cough. 15 LPM O2. /104.    Per Family member, the pt has a history of seizures and they have been more frequent over the past week. He is a full code.     I have reviewed Abhay Oconnor's previous discharge summary from 12/29/2022.  Note Review Summary: the pt was admitted with tonoclonic seizures.    Pt has a history of pacemaker, seizures. He is on Depakote.    Allergies   Allergen Reactions   • Mold   (Environmental) SWELLING and Other (See Comments)     Nasal discharge   • Seasonal Other (See Comments)     Rhinorrhea, red eyes         No current facility-administered medications for this encounter.     Current Outpatient Medications   Medication Sig   • diclofenac (VOLTAREN) 1 % gel Apply 4 g topically 4 times daily. To both knees   • donepezil (Aricept) 10 MG tablet Take 1 tablet by mouth nightly.   • ferrous sulfate 325 (65 FE) MG tablet Take 1 tablet by mouth every other day.   • terazosin (HYTRIN) 5 MG capsule Take 1 capsule by mouth nightly.   • ketoconazole (NIZORAL) 2 % cream Apply twice a day to right foot   • lisinopril (ZESTRIL) 5 MG tablet Take 1 tablet by mouth daily.   • levETIRAcetam (KepPRA) 500 MG tablet Take 2 tablets by mouth in the morning and 2 tablets in the evening.   • vitamin B-12 (CYANOCOBALAMIN) 1000 MCG tablet Take 1 tablet by mouth daily.   • divalproex (DEPAKOTE) 250 MG delayed release EC tablet Take 1 tablet by mouth every 12 hours.   • polyethylene glycol (MIRALAX) 17 g packet Take 17 g by mouth as needed  (constipation). Stir and dissolve powder in any 4 to 8 ounces of beverage, then drink.   • aspirin 325 MG EC tablet Take 1 tablet by mouth daily.   • Lactobacillus-Inulin (CULTURELLE DIGESTIVE HEALTH PO) Take 1 capsule by mouth daily.   • folic acid (FOLATE) 1 MG tablet Take 2 tablets in the morning.   • acetaminophen (TYLENOL) 325 MG tablet Take 650 mg by mouth every 4 hours as needed for Pain or Fever. Dose: 2 tabs (=650mg)   • finasteride (PROSCAR) 5 MG tablet Take 1 tablet by mouth daily.       Past Medical History:   Diagnosis Date   • Anemia     iron deficiency   • BPH (benign prostatic hypertrophy)    • Bradycardia     has pacemaker   • Chronic pain     BL knee, spine     • COVID-19 07/01/2022   • DJD (degenerative joint disease)    • HTN (hypertension)    • Hyperlipidemia    • Left inguinal hernia    • Osteoporosis, unspecified 03/03/2011   • Paroxysmal atrial fibrillation (CMS/HCC)    • Pneumonia    • Resides in skilled nursing facility     [facility] [phone]   • Urinary incontinence     Occasionally   • Uses walker    • Wears reading eyeglasses        Past Surgical History:   Procedure Laterality Date   • Colonoscopy w/ polypectomy  07/24/2007   • Eye surgery      cataract   • Flexible sigmoidoscopy  08/15/2000   • Hemorrhoid surgery  07/24/2007   • Occult blood test tube  03/10/2011   • Other surgical history      removal of parotid growth -- ear surgery at 4 yrs old   • Pacemaker implant  11/08/2017       Family History   Problem Relation Age of Onset   • Stroke Sister    • Stroke Brother    • Other Brother         spinal stenosis       Social History     Tobacco Use   • Smoking status: Never   • Smokeless tobacco: Never   Substance Use Topics   • Alcohol use: Not Currently     Alcohol/week: 1.0 standard drink     Types: 1 Glasses of wine per week   • Drug use: No       Review of Systems     Review of Systems   Unable to perform ROS: Mental status change     Physical Exam     ED  Triage Vitals   ED Triage Vitals Group      Temp 02/05/23 2150 (!) 94.4 °F (34.7 °C)      Heart Rate 02/05/23 2115 79      Resp 02/05/23 2120 (!) 28      BP 02/05/23 2120 (!) 201/90      SpO2 02/05/23 2115 99 %      EtCO2 mmHg --       Height 02/05/23 2300 5' 9\" (1.753 m)      Weight 02/05/23 2150 156 lb 1.4 oz (70.8 kg)      Weight Scale Used 02/05/23 2150 Scale in bed      BMI (Calculated) --       IBW/kg (Calculated) 02/05/23 2300 70.7       Physical Exam  Vitals and nursing note reviewed.   Constitutional:       General: He is awake.      Appearance: He is well-developed. He is not toxic-appearing.   HENT:      Head: Normocephalic and atraumatic.   Eyes:      Conjunctiva/sclera: Conjunctivae normal.   Cardiovascular:      Rate and Rhythm: Normal rate and regular rhythm.      Heart sounds: Normal heart sounds.   Pulmonary:      Effort: Respiratory distress (moderate) present.      Breath sounds: Normal breath sounds.      Comments: There is gurgling respirations.  Course breaths sounds bilaterally.  Abdominal:      Palpations: Abdomen is soft.      Tenderness: There is no abdominal tenderness.   Musculoskeletal:         General: Normal range of motion.      Cervical back: Normal range of motion.   Skin:     General: Skin is warm and dry.   Neurological:      Comments: The pt is minimally responsive and unable to follow any commands.   Psychiatric:         Behavior: Behavior normal.           Procedures     Central Line    Date/Time: 2/6/2023 1:07 AM  Performed by: Oswald Rasheed MD  Authorized by: Oswald Rasheed MD     Consent:     Consent obtained:  Emergent situation    Consent given by:  Healthcare agent    Risks, benefits, and alternatives were discussed: yes      Risks discussed:  Arterial puncture, incorrect placement, bleeding and infection    Alternatives discussed:  No treatment  Universal protocol:     Procedure explained and questions answered to patient or proxy's satisfaction: yes      Relevant  documents present and verified: yes      Test results available: yes      Imaging studies available: yes      Required blood products, implants, devices, and special equipment available: yes      Site/side marked: yes      Immediately prior to procedure, a time out was called: yes      Patient identity confirmed:  Arm band  Pre-procedure details:     Indication(s): central venous access      Hand hygiene: Hand hygiene performed prior to insertion      Sterile barrier technique: All elements of maximal sterile technique followed      Skin preparation:  Chlorhexidine    Skin preparation agent: Skin preparation agent completely dried prior to procedure    Sedation:     Sedation type:  None  Anesthesia:     Anesthesia method:  Local infiltration    Local anesthetic:  Lidocaine 1% w/o epi  Procedure details:     Location:  R internal jugular    Patient position:  Supine    Procedural supplies:  Triple lumen    Catheter size:  8.5 Fr    Landmarks identified: yes      Ultrasound guidance: yes      Ultrasound guidance timing: real time      Sterile ultrasound techniques: Sterile gel and sterile probe covers were used      Number of attempts:  1    Successful placement: yes    Post-procedure details:     Post-procedure:  Dressing applied and line sutured    Assessment:  Blood return through all ports, no pneumothorax on x-ray, placement verified by x-ray and free fluid flow    Procedure completion:  Tolerated well, no immediate complications        Lab Results     Results for orders placed or performed during the hospital encounter of 02/05/23   Comprehensive Metabolic Panel   Result Value Ref Range    Fasting Status      Sodium 138 135 - 145 mmol/L    Potassium 3.6 3.4 - 5.1 mmol/L    Chloride 110 97 - 110 mmol/L    Carbon Dioxide 22 21 - 32 mmol/L    Anion Gap 10 7 - 19 mmol/L    Glucose 147 (H) 70 - 99 mg/dL    BUN 27 (H) 6 - 20 mg/dL    Creatinine 0.50 (L) 0.67 - 1.17 mg/dL    Glomerular Filtration Rate >90 >=60    BUN/  Creatinine Ratio 54 (H) 7 - 25    Calcium 6.7 (L) 8.4 - 10.2 mg/dL    Bilirubin, Total 0.2 0.2 - 1.0 mg/dL    GOT/AST 31 <=37 Units/L    GPT/ALT 34 <64 Units/L    Alkaline Phosphatase 72 45 - 117 Units/L    Albumin 2.4 (L) 3.6 - 5.1 g/dL    Protein, Total 5.6 (L) 6.4 - 8.2 g/dL    Globulin 3.2 2.0 - 4.0 g/dL    A/G Ratio 0.8 (L) 1.0 - 2.4   TROPONIN I, HIGH SENSITIVITY   Result Value Ref Range    Troponin I, High Sensitivity 15 <77 ng/L   Prothrombin Time   Result Value Ref Range    Prothrombin Time 10.4 9.7 - 11.8 sec    INR 1.0     Partial Thromboplastin Time   Result Value Ref Range    PTT 26 22 - 30 sec   NT proBNP   Result Value Ref Range    NT-proBNP 676 (H) <=450 pg/mL   Magnesium   Result Value Ref Range    Magnesium 2.0 1.7 - 2.4 mg/dL   Alcohol   Result Value Ref Range    Alcohol None Detected None Detected mg/dL   Procalcitonin   Result Value Ref Range    Procalcitonin 0.10 (H) <=0.09 ng/mL   COVID/Flu/RSV panel   Result Value Ref Range    Rapid SARS-COV-2 by PCR Not Detected Not Detected / Detected / Presumptive Positive / Inhibitors present    Influenza A by PCR Not Detected Not Detected    Influenza B by PCR Not Detected Not Detected    RSV BY PCR Not Detected Not Detected    Isolation Guidelines      Procedural Comment     Urinalysis & Reflex Microscopy With Culture If Indicated   Result Value Ref Range    COLOR, URINALYSIS Yellow     APPEARANCE, URINALYSIS Cloudy     GLUCOSE, URINALYSIS Negative Negative mg/dL    BILIRUBIN, URINALYSIS Negative Negative    KETONES, URINALYSIS Negative Negative mg/dL    SPECIFIC GRAVITY, URINALYSIS 1.025 1.005 - 1.030    OCCULT BLOOD, URINALYSIS Large (A) Negative    PH, URINALYSIS 5.5 5.0 - 7.0    PROTEIN, URINALYSIS 30 (A) Negative mg/dL    UROBILINOGEN, URINALYSIS 0.2 0.2, 1.0 mg/dL    NITRITE, URINALYSIS Negative Negative    LEUKOCYTE ESTERASE, URINALYSIS Negative Negative    SQUAMOUS EPITHELIAL, URINALYSIS 1 to 5 None Seen, 1 to 5 /hpf    ERYTHROCYTES, URINALYSIS  >100 (A) None Seen, 1 to 2 /hpf    LEUKOCYTES, URINALYSIS 11 to 25 (A) None Seen, 1 to 5 /hpf    BACTERIA, URINALYSIS None Seen None Seen /hpf    HYALINE CASTS, URINALYSIS 26 to 100 (A) None Seen, 1 to 5 /lpf    MUCUS Present    CBC with Automated Differential (performable only)   Result Value Ref Range    WBC 4.9 4.2 - 11.0 K/mcL    RBC 3.52 (L) 4.50 - 5.90 mil/mcL    HGB 11.5 (L) 13.0 - 17.0 g/dL    HCT 36.1 (L) 39.0 - 51.0 %    .6 (H) 78.0 - 100.0 fl    MCH 32.7 26.0 - 34.0 pg    MCHC 31.9 (L) 32.0 - 36.5 g/dL    RDW-CV 14.1 11.0 - 15.0 %    RDW-SD 53.2 (H) 39.0 - 50.0 fL     140 - 450 K/mcL    NRBC 0 <=0 /100 WBC    Neutrophil, Percent 63 %    Lymphocytes, Percent 23 %    Mono, Percent 11 %    Eosinophils, Percent 0 %    Basophils, Percent 0 %    Immature Granulocytes 3 %    Absolute Neutrophils 3.1 1.8 - 7.7 K/mcL    Absolute Lymphocytes 1.1 1.0 - 4.0 K/mcL    Absolute Monocytes 0.5 0.3 - 0.9 K/mcL    Absolute Eosinophils  0.0 0.0 - 0.5 K/mcL    Absolute Basophils 0.0 0.0 - 0.3 K/mcL    Absolute Immmature Granulocytes 0.1 0.0 - 0.2 K/mcL   Valproic Acid   Result Value Ref Range    Valproate 26 (L) 50 - 125 mcg/mL   Blood Culture    Specimen: Blood   Result Value Ref Range    Culture, Blood or Bone Marrow No Growth <24 hours    Blood Culture    Specimen: Blood   Result Value Ref Range    Culture, Blood or Bone Marrow No Growth <24 hours    BLOOD GAS, VENOUS -POINT OF CARE   Result Value Ref Range    PH, VENOUS - POINT OF CARE 7.14 (L) 7.35 - 7.45 Units    PCO2, VENOUS - POINT OF CARE 75 (H) 41 - 54 mm Hg    PO2, VENOUS - POINT OF CARE 68 (H) 35 - 42 mm Hg    HCO3, VENOUS - POINT OF CARE 26 22 - 28 mmol/L    BASE EXCESS / DEFICIT, VENOUS - POINT OF CARE -5 (L) -2 - 2 mmol/L    O2 SATURATION, VENOUS - POINT OF CARE 86 (H) 60 - 80 %    TCO2 - POINT OF CARE 28 (H) 19 - 24 mmol/L   ISTAT8 VENOUS  POINT OF CARE   Result Value Ref Range    BUN - POINT OF CARE 37 (H) 6 - 20 mg/dL    SODIUM - POINT OF CARE  134 (L) 135 - 145 mmol/L    POTASSIUM - POINT OF CARE 4.5 3.4 - 5.1 mmol/L    CHLORIDE - POINT OF CARE 100 97 - 110 mmol/L    TCO2 - POINT OF CARE 27 (H) 19 - 24 mmol/L    ANION GAP - POINT OF CARE 13 7 - 19 mmol/L    HEMATOCRIT - POINT OF CARE 38.0 (L) 39.0 - 51.0 %    HEMOGLOBIN - POINT OF CARE 12.9 (L) 13.0 - 17.0 g/dL    GLUCOSE - POINT OF CARE 151 (H) 70 - 99 mg/dL    CALCIUM, IONIZED - POINT OF CARE 1.23 1.15 - 1.29 mmol/L    Creatinine 0.80 0.67 - 1.17 mg/dL    Glomerular Filtration Rate 81 >=60   TROPONIN I  POINT OF CARE   Result Value Ref Range    TROPONIN I - POINT OF CARE <0.10 <0.10 ng/mL   LACTIC ACID VENOUS WITH REFLEX - POINT OF CARE   Result Value Ref Range    LACTIC ACID, VENOUS - POINT OF CARE 3.2 (HH) <2.0 mmol/L   LACTIC ACID VENOUS POINT OF CARE   Result Value Ref Range    LACTIC ACID, VENOUS - POINT OF CARE 1.2 <2.0 mmol/L   BLOOD GAS, ARTERIAL -POINT OF CARE   Result Value Ref Range    PH, ARTERIAL - POINT OF CARE 7.26 (L) 7.35 - 7.45 Units    PCO2, ARTERIAL - POINT OF CARE 56 (H) 35 - 48 mm Hg    PO2, ARTERIAL - POINT OF CARE 220 (H) 83 - 108 mm Hg    HCO3, ARTERIAL - POINT OF CARE 25 22 - 28 mmol/L    TCO2 - POINT OF CARE 27 (H) 19 - 24 mmol/L    BASE EXCESS / DEFICIT, ARTERIAL - POINT OF CARE -2 -2 - 3 mmol/L    O2 SATURATION, ARTERIAL - POINT OF CARE 100 (H) 95 - 99 %       EKG     [facility] EKG report   Results for orders placed or performed during the hospital encounter of 02/05/23   Electrocardiogram 12-Lead   Result Value Ref Range    Systolic Blood Pressure 154     Diastolic Blood Pressure 76     Ventricular Rate EKG/Min (BPM) 71     Atrial Rate (BPM) 68     QRS-Interval (MSEC) 162     QT-Interval (MSEC) 432     QTc 469     R Axis (Degrees) 92     T Axis (Degrees) -42     REPORT TEXT       Atrial fibrillation  Rightward axis  Left bundle branch block  Abnormal ECG  When compared with ECG of  12-27-22  No change  Reconfirmed by GIANA, MJOSY CROFT ([id number]),  Cm Loyola ([id number])  on 2/5/2023 10:04:17 PM       Radiology Results     Imaging Results          XR CHEST AP OR PA - PORTABLE (In process)                CT HEAD WO CONTRAST (Final result)  Result time 02/05/23 22:36:05    Final result                 Impression:    IMPRESSION:      1.  No CT evidence of acute intracranial process.  2.  Moderate age-appropriate generalized cerebral volume loss and advanced  chronic microvascular ischemic changes.    I, Attending Radiologist John Fox MD, have reviewed the images and  report and concur with these findings interpreted by Resident Radiologist,  Moises Amaral MD.              Narrative:    CT HEAD WO CONTRAST    CLINICAL INDICATION:  89 years-old Male with presenting history of Seizure,  abnormal neuro exam.    COMPARISON:  CT head 12/27/2022, 7/1/2022.    TECHNIQUE:  Routine noncontrast head CT spanning cranial vertex through  foramen magnum. Coronal and sagittal reformats were generated and reviewed.    FINDINGS:    No acute intracranial hemorrhage, mass effect or abnormal extra-axial fluid  collection.  The sulci and ventricles are moderately prominent.  Advanced  patchy supratentorial white matter hypoattenuation is nonspecific but  consistent with microvascular ischemic changes.  No CT evidence of an acute  territorial vascular insult, however if there is concern for acute ischemia  MRI follow-up could be considered.  Mild mucosal thickening in the left  maxillary sinuses. Paranasal sinuses are otherwise clear. Mild  opacification of left greater than right bilateral mastoid air cells.  The  calvarium is intact bilateral lens prostheses. Calcifications of the  bilateral carotid siphons.                      Preliminary result                 Impression:        1.  No acute intracranial abnormality.  2.  Moderate age-appropriate generalized cerebral volume loss and advanced  chronic microvascular ischemic changes.               Narrative:    CT HEAD WO CONTRAST    CLINICAL  INDICATION:  89 years-old Male with presenting history of   Seizure,  abnormal neuro exam.    COMPARISON:  CT head 12/27/2022, 7/1/2022.    TECHNIQUE:  Routine noncontrast head CT spanning cranial vertex through  foramen magnum. Coronal and sagittal reformats were generated and   reviewed.    FINDINGS:    No acute intracranial hemorrhage, mass effect or abnormal extra-axial   fluid  collection.  The sulci and ventricles are moderately prominent.  Advanced  patchy supratentorial white matter hypoattenuation is nonspecific but  consistent with microvascular ischemic changes.  No CT evidence of an   acute  territorial vascular insult, however if there is concern for acute   ischemia  MRI follow-up could be considered.  Mild mucosal thickening in the left  maxillary sinuses. Paranasal sinuses are otherwise clear. Mild  opacification of left greater than right bilateral mastoid air cells.  The  calvarium is intact bilateral lens prostheses. Calcifications of the  bilateral carotid siphons.                                   XR CHEST AP OR PA - PORTABLE (Final result)  Result time 02/05/23 21:50:02    Final result                 Impression:    FINDINGS/IMPRESSION:      Stable cardiomegaly. Left-sided pacemaker remains in place. There is no  evidence of dense focal consolidation or significant effusion.               Narrative:    EXAM: XR CHEST AP OR PA    INDICATION:  rule out pna    COMPARISON:  12/27/2022.                                ED Medications     Medications   NORepinephrine (LEVOPHED) 8 mg/250 mL in dextrose 5 % infusion (9 mcg/min Intravenous Rate/Dose Change 2/6/23 0056)   sodium chloride 0.9 % flush bag 25 mL (has no administration in time range)   sodium chloride (PF) 0.9 % injection 2 mL (has no administration in time range)   vancomycin 1,500 mg in sodium chloride 0.9% 250 mL IVPB (has no administration in time range)   cefepime (MAXIPIME) 2,000 mg in sodium chloride 0.9 % 100 mL IVPB (2,000 mg  Intravenous New Bag 2/6/23 0035)     Followed by   cefepime (MAXIPIME) 2,000 mg in sodium chloride 0.9 % 100 mL IVPB (has no administration in time range)   lidocaine 2% urethral (UROJET) 2 % jelly 10 mL (has no administration in time range)   levETIRAcetam (KepPRA INJECT) injection 1,000 mg (1,000 mg Intravenous Given 2/5/23 2150)   sodium chloride (NORMAL SALINE) 0.9 % bolus 1,000 mL (0 mLs Intravenous Completed 2/5/23 2331)   sodium chloride (NORMAL SALINE) 0.9 % bolus 1,000 mL (0 mLs Intravenous Completed 2/6/23 0015)   lactated ringers bolus 500 mL (0 mLs Intravenous Completed 2/6/23 0050)         ED Course     Vitals:    02/06/23 0020 02/06/23 0025 02/06/23 0040 02/06/23 0055   BP: (!) 84/54 93/51 98/53 (!) 81/48   BP Location: RUE - Right upper extremity RUE - Right upper extremity RUE - Right upper extremity RUE - Right upper extremity   Patient Position: Trendelenburg Trendelenburg Trendelenburg Trendelenburg   Pulse: 81 76 69 74   Resp: (!) 23 (!) 21 (!) 21 19   Temp: (!) 93.9 °F (34.4 °C) (!) 94.1 °F (34.5 °C) (!) 94.3 °F (34.6 °C) (!) 94.6 °F (34.8 °C)   TempSrc:       SpO2: 97% 98% 98% 98%   Weight:       Height:           ED Course as of 02/06/23 0109   Sun Feb 05, 2023 2118 Initial Impression: The pt is a 89 year old male who presents to the ED today unresponsive after a 20 minutes seziure . Labs, EKG, CXR, and CT head will be ordered to assess the pt's condition. Plan to administer Kepra for sx relief. Pt understands and agrees with the plan. All questions addressed.  [ZP]   2123 Pt was started on BIPAP. [ZP]   2134 Per RT, they deep suctioned the pt but he vomited again. They were able to get large amount of vomit out of posterior pharynx. [ZP]   2206 I rechecked the pt. Oxygen levels remain stable on BIPAP. [ZP]   2211 I spoke with Dr. Cisneros, Neurologist, regarding the patient's presentation and ED workup. The Dr. Cisneros said he will read an EEG if needed and he is available to the ICU physician if  No they need to consult him.  [ZP]   2215 I spoke with the family and updated them on the condition and critical nature. We discussed that intubation is not long term best plan if he condition were to get to that point. The family however, must talk with other family member before changing code status. He remains full code at this time. [ZP]   2222 Per RN, the pt's pressure are dropping. We will plan a liter of fluids. [ZP]   2246 Pt is now hypotensive and HR is dropping. Concerned that possible BIPAP could be interfering with venus flow. Will trial off of BIPAP, complete a VBG, and give additional fluids [ZP]   2256 The pt is 98% on a non-rebreather. BP has improved but still low. We will reassess and draw blood gas. [ZP]   2334 Discussed the case with .  He agrees with continued management plan for ABG and repeat lactate.  And he will come to assess the patient's bedside [EK]   2346 Updated the patient's POA and granddaughter at the bedside regarding his critical condition an ongoing hypotension.  We discussed plan for pressors they agree.  We discussed the central line and they would prefer to run at peripherally for now and reassess if he needs it would like to avoid invasive procedures if possible [EK]   Mon Feb 06, 2023   0005 Discussed with POA and the ICU doctor at bedside.  They would like the patient to be full code with full medical treatment this time there eating continue to try to contact additional family members to make further discussions about \"Do Not Resuscitate\" but this time they would like all measures taken.  They were agreeable to a central line placement [EK]   0047 Central line placed successfully [EK]   0047 Skin perfusion is warm and normal [EK]      ED Course User Index  [EK] Oswald Rasheed MD  [ZP] Cm Loyola       Radiology Review: I have independently interpreted the CT Head and have found No acute disease.  I am awaiting on the final radiology read.            Consults                 ED Consults done in the ED course    MDM                             Patient is a 89 year old with complaint of seizure respiratory distress.  My differential diagnosis includes but is not limited to seizure, aspiration, septic shock, dehydration. The patient will require admission.  Patient arrived after a tonic clonic seizure with gurgling respirations and emesis in his mouth.  It appeared that the patient likely aspirated.  He was 85% on 15 L when he arrived was placed on BiPAP.  Code status was confirmed with the POA and patient is full code.  After patient had been on BiPAP for period time he then subsequently became hypotensive and was also noted to be hypothermic.  IV fluids given with minimal responses blood pressure.  He was started on peripheral pressors.  Extensive conversation with patient's POA regarding goals of care and it was eventually decided that they would like all medical treatments pursued this time to they can consult further with family.  Central line was placed pressors continued.  ICU doctor saw the patient at bedside we will get a CT chest abdomen pelvis which with followed up inpatient for further evaluation of other causes.  Blood cultures drawn 30 cc/kilos IV fluids given and antibiotics given.  Skin perfusion is normal.  No clear source of infection has been identified at this time but given his critical nature and hypotension sepsis protocol was initiated and patient will be admitted to the intensive care unit.    Critical Care       Due to the presence of respiratory failure my attendance to this patient required critical care time of 52 minutes, including assessment/reassessment, documentation, ordering and interpreting ancillary studies, discussion with ED staff and consultants, patient and their family, and excludes time spent on separately billable procedures.        Disposition       Clinical Impression and Diagnosis  11:00 PM     Diagnosis:   1. Seizure (CMS/[facility])     2. Aspiration into airway, initial encounter    3. Hypotension, unspecified hypotension type    4. Hypothermia, initial encounter           Pt to be admitted to Dr. Lemon     Condition on Admission: Critical              Admit 2/5/2023 11:49 PM  Telemetry Bed?: Yes  Admitting Physician: ROMAN BROWN [[id number]]  Requested Unit/Floor: mricu  Transferring Patient to? Only adjust for transfers between [facility] ([facility], [facility], [facility]). **PLEASE ONLY USE BUTTON OPTIONS**: [facility] Center [902]  Is this a telephone or verbal order?: This is a telephone order from the admitting physician        I have reviewed the information recorded by the scribe for accuracy and agree with its contents.    ____________________________________________________________________    Cm Loyola acting as a scribe for Dr. Osawld Rasheed  Dictation # [id number]  Scribe: Cm Rasheed MD  02/06/23 0109

## 2023-10-09 NOTE — DISCHARGE NOTE PROVIDER - ATTENDING DISCHARGE PHYSICAL EXAMINATION:
PE  Gen: pleasant female in NAD  HEENT: EOMI, NCAT  Neck: no obvious jVD  Lungs: CTA b/l nonlabored  CV: RRR S1S2  GI: +BSnontender  LE: no edema. LLE ulcer wrapped looks c/d/i  Psych: pleasant, cooperative.

## 2023-10-09 NOTE — DISCHARGE NOTE PROVIDER - CARE PROVIDER_API CALL
Mila Rahman  Infectious Disease  [address] [place] [address]  Phone: [phone]  Fax: [phone]  Established Patient  Scheduled Appointment: 10/20/2023 10:20 AM   Mila Rahman  Infectious Disease  [address] [place] [address]  Phone: [phone]  Fax: [phone]  Established Patient  Scheduled Appointment: 10/20/2023 10:20 AM    J Carlos Teran  Vascular/Intervent Radiology  [address]  [place] [address]  Phone: [phone]  Fax: [phone]  Established Patient  Scheduled Appointment: 10/26/2023 03:00 AM

## 2023-10-09 NOTE — PROGRESS NOTE ADULT - ASSESSMENT
39 yo F with HTN, MO and AVM L foot with recurrent L foot ulcer with fever, rigors, sweats.  She was receiving vancomycin and meropenem via PICC for ulcer/cellulitis.  Cellulitis is improving, ulcer is not. She had no localizing symptoms - concern was for CLABSI.  No other localizing symptoms.  I was concerned that she had resistant gram positive (eg Enterococcus) - reason for recommending daptomycin.  Meropenem is for foot.  Suggest:  - F/U blood cultures from 10/6.  If no growth at 72 h, okay to replace PICC  - Daptomycin 900 mg IV q24h for now.    - Continue meropenem 2 g IV q8h  - For purposes of re-establishing OPAT, would give date for end of therapy as 11/20.  I will modify as needed.  Recommendations discussed with primary team. Will follow with you, team 2.

## 2023-10-09 NOTE — DISCHARGE NOTE PROVIDER - CARE PROVIDERS DIRECT ADDRESSES
[email or web address]

## 2023-10-09 NOTE — DISCHARGE NOTE PROVIDER - PROVIDER TOKENS
PROVIDER:[TOKEN:[[id number]],SCHEDULEDAPPT:[10/20/2023],SCHEDULEDAPPTTIME:[10:20 AM],ESTABLISHEDPATIENT:[T]] PROVIDER:[TOKEN:[[id number]],SCHEDULEDAPPT:[10/20/2023],SCHEDULEDAPPTTIME:[10:20 AM],ESTABLISHEDPATIENT:[T]],PROVIDER:[TOKEN:[[id number]],SCHEDULEDAPPT:[10/26/2023],SCHEDULEDAPPTTIME:[03:00 AM],ESTABLISHEDPATIENT:[T]]

## 2023-10-09 NOTE — DISCHARGE NOTE PROVIDER - NSDCFUSCHEDAPPT_GEN_ALL_CORE_FT
Prescription given one week ago for Hydrocodone. I can no longer provide any chronic opoid medication. He will need to see pain management.   [facility]  [facility] [address]  Scheduled Appointment: 10/20/2023

## 2023-10-09 NOTE — DISCHARGE NOTE PROVIDER - HOSPITAL COURSE
Hospital course by problem  LISA KEARNEY is a Lisa Kearney is a 42 year old woman with AVM of the left lateral heel with recurrent infections and embolizations, recently admitted for embolization and discharged with Vancomycin and Meropenem for two weeks via PICC line, admitted for fevers and chills and new purulence despite antibiotic therapy.         #AVM (arteriovenous malformation).   #Foot ulceration with superimposed infection  L foot AVMs s/p 23 prior embolizations. Follows with Dr. Teran. S/p embolization with Dr. Teran on 9/19.  Has previously responded well to vancomycin and meropenem in the past but despite appropriate treatment, patient has had chills and fevers with new purulence from wound. ID consulted from the ED, received a 1X dose of Daptomycin. The picc line placed on 9/19 was also removed in the ED. There was no erythema near the picc insertion site. Blood Cxs NGx72 hrs.     - Percocet  mg oral tablet   - Gabapentin 100mg in the morning + 800mg at 4PM    - Outpatient follow-up with Dr. Teran  - Daptomycin 900mg q24 and Meropenem 2g q8 via PICC until 11/20/23      #HTN (hypertension).   ·  Plan: Patient on nebivolol 10 mg BID at home, is compliant with medications. Was hypertensive to the 180's in the ED, improved with pain control. Received tx interchange Lopressor 100 mg BID in hospital.    - Nebivolol 10 mgt BID  - PCP follow-up      Patient was discharged to: home    New medications: Daptomycin 900mg q24 and Meropenem 2g q8 via PICC until 11/20/23  Changes to old medications: None   Medications that were stopped: None  New Hardware: PICC  New DME: None    Items to follow up as outpatient:  AVM, ulcer, cellulitis, pain management  Discharge physical exam:    General: WDWN  HEENT: NC/AT; PERRL, anicteric sclera; MMM  Neck: supple  Cardiovascular: +S1/S2; RRR  Respiratory: CTA B/L; no W/R/R  Gastrointestinal: soft, NT/ND; +BSx4  Extremities: WWP; no edema, clubbing or cyanosis; left foot bandaged - clean  Vascular: 2+ radial, DP/PT pulses B/L  Neurological: AAOx3; no focal Hospital course by problem  LISA KEARNEY is a Lisa Kearney is a 42 year old woman with AVM of the left lateral heel with recurrent infections and embolizations, recently admitted for embolization and discharged with Vancomycin and Meropenem for two weeks via PICC line, admitted for fevers and chills and new purulence despite antibiotic therapy.         #AVM (arteriovenous malformation).   #Foot ulceration with superimposed infection  L foot AVMs s/p 23 prior embolizations. Follows with Dr. Teran. S/p embolization with Dr. Teran on 9/19.  Has previously responded well to vancomycin and meropenem in the past but despite appropriate treatment, patient has had chills and fevers with new purulence from wound. ID consulted from the ED, received a 1X dose of Daptomycin. The picc line placed on 9/19 was also removed in the ED. There was no erythema near the picc insertion site. Blood Cxs NGx72 hrs.     - Percocet  mg oral tablet   - Gabapentin 100mg in the morning + 800mg at 4PM    - Outpatient follow-up with Dr. Teran  - Vancomycin 1250mg q8 and Meropenem 2g q8 via PICC until 11/20/23      #HTN (hypertension).   ·  Plan: Patient on nebivolol 10 mg BID at home, is compliant with medications. Was hypertensive to the 180's in the ED, improved with pain control. Received tx interchange Lopressor 100 mg BID in hospital.    - Nebivolol 10 mgt BID  - PCP follow-up      Patient was discharged to: home    New medications: - Vancomycin 1250mg q8 and Meropenem 2g q8 via PICC until 11/20/23  Changes to old medications: None   Medications that were stopped: None  New Hardware: PICC  New DME: None    Items to follow up as outpatient:  AVM, ulcer, cellulitis, pain management  Discharge physical exam:    General: WDWN  HEENT: NC/AT; PERRL, anicteric sclera; MMM  Neck: supple  Cardiovascular: +S1/S2; RRR  Respiratory: CTA B/L; no W/R/R  Gastrointestinal: soft, NT/ND; +BSx4  Extremities: WWP; no edema, clubbing or cyanosis; left foot bandaged - clean  Vascular: 2+ radial, DP/PT pulses B/L  Neurological: AAOx3; no focal Hospital course by problem  LISA KEARNEY is a Lisa Kearney is a 42 year old woman with AVM of the left lateral heel with recurrent infections and embolizations, recently admitted for embolization and discharged with Vancomycin and Meropenem for two weeks via PICC line, admitted for fevers and chills and new purulence despite antibiotic therapy.         #AVM (arteriovenous malformation).   #Foot ulceration with superimposed infection  L foot AVMs s/p 23 prior embolizations. Follows with Dr. Teran. S/p embolization with Dr. Teran on 9/19.  Has previously responded well to vancomycin and meropenem in the past but despite appropriate treatment, patient has had chills and fevers with new purulence from wound. ID consulted from the ED, received a 1X dose of Daptomycin. The picc line placed on 9/19 was also removed in the ED. There was no erythema near the picc insertion site. Blood Cxs NGx72 hrs.     - Percocet  mg oral tablet   - Gabapentin 100mg in the morning + 800mg at 4PM    - Outpatient follow-up with Dr. Teran  - Vancomycin 1250mg q8 and Meropenem 2g q8 via PICC until 11/20/23    Wound care:  - cleanse with Vashe, apply Vashe moistened 2x2 gauze to wound bed, cover with dry gauze, Secure with Misa. Change BID      #HTN (hypertension).   ·  Plan: Patient on nebivolol 10 mg BID at home, is compliant with medications. Was hypertensive to the 180's in the ED, improved with pain control. Received tx interchange Lopressor 100 mg BID in hospital.    - Nebivolol 10 mgt BID  - PCP follow-up      Patient was discharged to: home    New medications: - Vancomycin 1250mg q8 and Meropenem 2g q8 via PICC until 11/20/23  Changes to old medications: None   Medications that were stopped: None  New Hardware: PICC  New DME: None    Items to follow up as outpatient:  AVM, ulcer, cellulitis, pain management  Discharge physical exam:    General: WDWN  HEENT: NC/AT; PERRL, anicteric sclera; MMM  Neck: supple  Cardiovascular: +S1/S2; RRR  Respiratory: CTA B/L; no W/R/R  Gastrointestinal: soft, NT/ND; +BSx4  Extremities: WWP; no edema, clubbing or cyanosis; left foot bandaged - clean  Vascular: 2+ radial, DP/PT pulses B/L  Neurological: AAOx3; no focal Hospital course by problem  LISA KEARNEY is a Lisa Kearney is a 42 year old woman with AVM of the left lateral heel with recurrent infections and embolizations, recently admitted for embolization and discharged with Vancomycin and Meropenem for two weeks via PICC line, admitted for fevers and chills and new purulence despite antibiotic therapy.         #AVM (arteriovenous malformation).   #Foot ulceration with superimposed infection  L foot AVMs s/p 23 prior embolizations. Follows with Dr. Teran. S/p embolization with Dr. Teran on 9/19.  Has previously responded well to vancomycin and meropenem in the past but despite appropriate treatment, patient has had chills and fevers with new purulence from wound. ID consulted from the ED, received a 1X dose of Daptomycin. The picc line placed on 9/19 was also removed in the ED. There was no erythema near the picc insertion site. Blood Cxs NGx72 hrs.     - Percocet  mg oral tablet   - Gabapentin 100mg in the morning + 800mg at 4PM    - Outpatient follow-up with Dr. Teran  - Vancomycin 1250mg q8 and Meropenem 2g q8 via PICC until 11/20/23    Wound care:  Wound dressing changed on 10/12.   Instructions: cleanse with Vashe, apply Vashe moistened 2x2 gauze to wound bed, cover with dry gauze, Secure with Misa. Change BID      #HTN (hypertension).   ·  Plan: Patient on nebivolol 10 mg BID at home, is compliant with medications. Was hypertensive to the 180's in the ED, improved with pain control. Received tx interchange Lopressor 100 mg BID in hospital.    - Nebivolol 10 mgt BID  - PCP follow-up      Patient was discharged to: home    New medications: - Vancomycin 1250mg q8 and Meropenem 2g q8 via PICC until 11/20/23  Changes to old medications: None   Medications that were stopped: None  New Hardware: PICC  New DME: None    Items to follow up as outpatient:  AVM, ulcer, cellulitis, pain management  Discharge physical exam:    General: WDWN  HEENT: NC/AT; PERRL, anicteric sclera; MMM  Neck: supple  Cardiovascular: +S1/S2; RRR  Respiratory: CTA B/L; no W/R/R  Gastrointestinal: soft, NT/ND; +BSx4  Extremities: WWP; no edema, clubbing or cyanosis; left foot bandaged - clean  Vascular: 2+ radial, DP/PT pulses B/L  Neurological: AAOx3; no focal

## 2023-10-09 NOTE — DISCHARGE NOTE PROVIDER - NSDCDCMDCOMP_GEN_ALL_CORE
This document is complete and the patient is ready for discharge. minimum assist (75% patients effort)

## 2023-10-09 NOTE — DISCHARGE NOTE PROVIDER - NSDCCPCAREPLAN_GEN_ALL_CORE_FT
PRINCIPAL DISCHARGE DIAGNOSIS  Diagnosis: Cellulitis  Assessment and Plan of Treatment: Cellulitis ([id number]) is a common, potentially serious bacterial skin infection. The affected skin is swollen and inflamed and is typically painful and warm to the touch.  Untreated cellulitis can develop from a mild condition to a severe one.  Cellulitis  Cellulitis is usually a superficial infection of the skin (left). But if severe (right) or if left untreated, it can spread into the lymph nodes and bloodstream.  Cellulitis usually affects the lower legs, but it can occur on the face, arms and other areas. The infection happens when a break in the skin allows bacteria to enter.  Left untreated, the infection can spread to the lymph nodes and bloodstream and rapidly become life-threatening. It isn't usually spread from person to person.  Please continue your IV antibiotic regimen as prescribed via your PICC line.

## 2023-10-09 NOTE — PROGRESS NOTE ADULT - SUBJECTIVE AND OBJECTIVE BOX
SUBJECTIVE/OVERNIGHT EVENTS: No acute overnight events. Pt seen in AM at bedside, resting comfortably in bed, and does not appear to be in any acute distress. When asked, pt denies any recent or active fever, chills, nausea, vomiting, headache, acute sob, chest pain, abdominal pain, genitourinary sx.    VITAL SIGNS:  Vital Signs Last 24 Hrs  T(C): 36.9 (09 Oct 2023 14:43), Max: 36.9 (09 Oct 2023 05:47)  T(F): 98.5 (09 Oct 2023 14:43), Max: 98.5 (09 Oct 2023 05:47)  HR: 104 (09 Oct 2023 14:43) (91 - 104)  BP: 149/84 (09 Oct 2023 14:43) (138/83 - 152/87)  BP(mean): --  RR: 16 (09 Oct 2023 14:43) (16 - 18)  SpO2: 98% (09 Oct 2023 14:43) (96% - 98%)    Parameters below as of 09 Oct 2023 14:43  Patient On (Oxygen Delivery Method): room air        General: WDWN  HEENT: NC/AT; PERRL, anicteric sclera; MMM  Neck: supple  Cardiovascular: +S1/S2; RRR  Respiratory: CTA B/L; no W/R/R  Gastrointestinal: soft, NT/ND; +BSx4  Extremities: WWP; no edema, clubbing or cyanosis; left foot bandaged - clean  Vascular: 2+ radial, DP/PT pulses B/L  Neurological: AAOx3; no focal deficits    MEDICATIONS:  MEDICATIONS  (STANDING):  DAPTOmycin IVPB 900 milliGRAM(s) IV Intermittent every 24 hours  enoxaparin Injectable 40 milliGRAM(s) SubCutaneous every 12 hours  gabapentin 800 milliGRAM(s) Oral <User Schedule>  gabapentin 100 milliGRAM(s) Oral <User Schedule>  meropenem  IVPB 2000 milliGRAM(s) IV Intermittent every 8 hours  metoprolol tartrate 100 milliGRAM(s) Oral every 12 hours    MEDICATIONS  (PRN):  HYDROmorphone   Tablet 4 milliGRAM(s) Oral every 4 hours PRN breakthrough pain  oxycodone    5 mG/acetaminophen 325 mG 2 Tablet(s) Oral every 4 hours PRN Severe Pain (7 - 10)      ALLERGIES:  Allergies    ciprofloxacin (Rash)  penicillin (Rash)  hydrochlorothiazide (Hives)  morphine (Rash)  latex (Rash)  lisinopril (Angioedema; Rash; Hives)  amoxicillin (Angioedema)    Intolerances        LABS:                        12.6   8.21  )-----------( 438      ( 09 Oct 2023 05:30 )             39.5     10-09    136  |  104  |  15  ----------------------------<  94  4.1   |  19<L>  |  0.63    Ca    9.1      09 Oct 2023 05:30  Phos  3.3     10-09  Mg     1.8     10-09    TPro  7.1  /  Alb  3.3  /  TBili  0.6  /  DBili  x   /  AST  12  /  ALT  8<L>  /  AlkPhos  92  10-09        RADIOLOGY & ADDITIONAL TESTS: Reviewed.

## 2023-10-09 NOTE — PROGRESS NOTE ADULT - SUBJECTIVE AND OBJECTIVE BOX
INTERVAL HPI/OVERNIGHT EVENTS:  Afebrile.  Feels warm, no sweats.  Foot pain not well-controlled after change from IV to po dilaudid    CONSTITUTIONAL:  No fever, chills  EYES:  No photophobia or visual changes  CARDIOVASCULAR:  No chest pain  RESPIRATORY:  No cough, wheezing, or SOB   GASTROINTESTINAL:  No nausea, vomiting, diarrhea, constipation, or abdominal pain  GENITOURINARY:  No frequency, urgency, dysuria or hematuria  NEUROLOGIC:  No headache, lightheadedness      ANTIBIOTICS/RELEVANT:    Daptomycin 900 mg IV q24h (10/7-present)  Meropenem 2 g IV q8h      Vital Signs Last 24 Hrs  T(C): 36.9 (09 Oct 2023 05:47), Max: 37.1 (08 Oct 2023 15:40)  T(F): 98.5 (09 Oct 2023 05:47), Max: 98.8 (08 Oct 2023 15:40)  HR: 92 (09 Oct 2023 05:47) (91 - 94)  BP: 138/85 (09 Oct 2023 05:47) (138/83 - 152/87)  BP(mean): --  RR: 18 (09 Oct 2023 05:47) (17 - 18)  SpO2: 96% (09 Oct 2023 05:47) (96% - 96%)    Parameters below as of 09 Oct 2023 05:47  Patient On (Oxygen Delivery Method): room air        PHYSICAL EXAM:  Constitutional:  Alert  Eyes:  Sclerae anicteric, conjunctivae clear, PERRL  Ear/Nose/Throat:  No nasal exudate or sinus tenderness;  No buccal mucosal lesions, no pharyngeal erythema or exudate	  Neck:  Supple, no adenopathy  Respiratory:  Clear bilaterally  Cardiovascular:  RRR, S1S2, no murmur appreciated  Gastrointestinal:  Symmetric, normoactive BS, soft, NT, no masses, guarding or rebound.  No HSM  Extremities:  No edema.  L foot ulcer unchanged, fibrinous base, scant drainage, surrounding erythema significantly decreased from 10/7      LABS:                        12.6   8.21  )-----------( 438      ( 09 Oct 2023 05:30 )             39.5         10-09    136  |  104  |  15  ----------------------------<  94  4.1   |  19<L>  |  0.63    Ca    9.1      09 Oct 2023 05:30  Phos  3.3     10-09  Mg     1.8     10-09    TPro  7.1  /  Alb  3.3  /  TBili  0.6  /  DBili  x   /  AST  12  /  ALT  8<L>  /  AlkPhos  92  10-09      Urinalysis Basic - ( 09 Oct 2023 05:30 )    Color: x / Appearance: x / SG: x / pH: x  Gluc: 94 mg/dL / Ketone: x  / Bili: x / Urobili: x   Blood: x / Protein: x / Nitrite: x   Leuk Esterase: x / RBC: x / WBC x   Sq Epi: x / Non Sq Epi: x / Bacteria: x        MICROBIOLOGY:        RADIOLOGY & ADDITIONAL STUDIES:

## 2023-10-10 ENCOUNTER — TRANSCRIPTION ENCOUNTER (OUTPATIENT)
Age: 38
End: 2023-10-10

## 2023-10-10 LAB
ALBUMIN SERPL ELPH-MCNC: 3.5 G/DL — SIGNIFICANT CHANGE UP (ref 3.3–5)
ALP SERPL-CCNC: 87 U/L — SIGNIFICANT CHANGE UP (ref 40–120)
ALT FLD-CCNC: 9 U/L — LOW (ref 10–45)
ANION GAP SERPL CALC-SCNC: 12 MMOL/L — SIGNIFICANT CHANGE UP (ref 5–17)
AST SERPL-CCNC: 12 U/L — SIGNIFICANT CHANGE UP (ref 10–40)
BASOPHILS # BLD AUTO: 0.07 K/UL — SIGNIFICANT CHANGE UP (ref 0–0.2)
BASOPHILS NFR BLD AUTO: 0.9 % — SIGNIFICANT CHANGE UP (ref 0–2)
BILIRUB SERPL-MCNC: 0.8 MG/DL — SIGNIFICANT CHANGE UP (ref 0.2–1.2)
BUN SERPL-MCNC: 16 MG/DL — SIGNIFICANT CHANGE UP (ref 7–23)
CALCIUM SERPL-MCNC: 9.2 MG/DL — SIGNIFICANT CHANGE UP (ref 8.4–10.5)
CHLORIDE SERPL-SCNC: 103 MMOL/L — SIGNIFICANT CHANGE UP (ref 96–108)
CK SERPL-CCNC: 57 U/L — SIGNIFICANT CHANGE UP (ref 25–170)
CO2 SERPL-SCNC: 23 MMOL/L — SIGNIFICANT CHANGE UP (ref 22–31)
CREAT SERPL-MCNC: 0.58 MG/DL — SIGNIFICANT CHANGE UP (ref 0.5–1.3)
EGFR: 117 ML/MIN/1.73M2 — SIGNIFICANT CHANGE UP
EOSINOPHIL # BLD AUTO: 0.13 K/UL — SIGNIFICANT CHANGE UP (ref 0–0.5)
EOSINOPHIL NFR BLD AUTO: 1.7 % — SIGNIFICANT CHANGE UP (ref 0–6)
GLUCOSE SERPL-MCNC: 91 MG/DL — SIGNIFICANT CHANGE UP (ref 70–99)
HCT VFR BLD CALC: 37 % — SIGNIFICANT CHANGE UP (ref 34.5–45)
HGB BLD-MCNC: 11.9 G/DL — SIGNIFICANT CHANGE UP (ref 11.5–15.5)
IMM GRANULOCYTES NFR BLD AUTO: 0.3 % — SIGNIFICANT CHANGE UP (ref 0–0.9)
LYMPHOCYTES # BLD AUTO: 1.88 K/UL — SIGNIFICANT CHANGE UP (ref 1–3.3)
LYMPHOCYTES # BLD AUTO: 24.6 % — SIGNIFICANT CHANGE UP (ref 13–44)
MAGNESIUM SERPL-MCNC: 2.1 MG/DL — SIGNIFICANT CHANGE UP (ref 1.6–2.6)
MCHC RBC-ENTMCNC: 27.2 PG — SIGNIFICANT CHANGE UP (ref 27–34)
MCHC RBC-ENTMCNC: 32.2 GM/DL — SIGNIFICANT CHANGE UP (ref 32–36)
MCV RBC AUTO: 84.7 FL — SIGNIFICANT CHANGE UP (ref 80–100)
MONOCYTES # BLD AUTO: 0.63 K/UL — SIGNIFICANT CHANGE UP (ref 0–0.9)
MONOCYTES NFR BLD AUTO: 8.2 % — SIGNIFICANT CHANGE UP (ref 2–14)
NEUTROPHILS # BLD AUTO: 4.92 K/UL — SIGNIFICANT CHANGE UP (ref 1.8–7.4)
NEUTROPHILS NFR BLD AUTO: 64.3 % — SIGNIFICANT CHANGE UP (ref 43–77)
NRBC # BLD: 0 /100 WBCS — SIGNIFICANT CHANGE UP (ref 0–0)
PHOSPHATE SERPL-MCNC: 3.3 MG/DL — SIGNIFICANT CHANGE UP (ref 2.5–4.5)
PLATELET # BLD AUTO: 474 K/UL — HIGH (ref 150–400)
POTASSIUM SERPL-MCNC: 4.3 MMOL/L — SIGNIFICANT CHANGE UP (ref 3.5–5.3)
POTASSIUM SERPL-SCNC: 4.3 MMOL/L — SIGNIFICANT CHANGE UP (ref 3.5–5.3)
PROT SERPL-MCNC: 7.2 G/DL — SIGNIFICANT CHANGE UP (ref 6–8.3)
RBC # BLD: 4.37 M/UL — SIGNIFICANT CHANGE UP (ref 3.8–5.2)
RBC # FLD: 13.3 % — SIGNIFICANT CHANGE UP (ref 10.3–14.5)
SODIUM SERPL-SCNC: 138 MMOL/L — SIGNIFICANT CHANGE UP (ref 135–145)
WBC # BLD: 7.65 K/UL — SIGNIFICANT CHANGE UP (ref 3.8–10.5)
WBC # FLD AUTO: 7.65 K/UL — SIGNIFICANT CHANGE UP (ref 3.8–10.5)

## 2023-10-10 PROCEDURE — 99232 SBSQ HOSP IP/OBS MODERATE 35: CPT | Mod: GC

## 2023-10-10 PROCEDURE — 99232 SBSQ HOSP IP/OBS MODERATE 35: CPT

## 2023-10-10 PROCEDURE — 36569 INSJ PICC 5 YR+ W/O IMAGING: CPT

## 2023-10-10 RX ORDER — HYDROMORPHONE HYDROCHLORIDE 2 MG/ML
1 INJECTION INTRAMUSCULAR; INTRAVENOUS; SUBCUTANEOUS ONCE
Refills: 0 | Status: DISCONTINUED | OUTPATIENT
Start: 2023-10-10 | End: 2023-10-10

## 2023-10-10 RX ORDER — SODIUM CHLORIDE 9 MG/ML
10 INJECTION INTRAMUSCULAR; INTRAVENOUS; SUBCUTANEOUS
Refills: 0 | Status: DISCONTINUED | OUTPATIENT
Start: 2023-10-10 | End: 2023-10-12

## 2023-10-10 RX ORDER — HYDROMORPHONE HYDROCHLORIDE 2 MG/ML
0.5 INJECTION INTRAMUSCULAR; INTRAVENOUS; SUBCUTANEOUS ONCE
Refills: 0 | Status: DISCONTINUED | OUTPATIENT
Start: 2023-10-10 | End: 2023-10-10

## 2023-10-10 RX ORDER — DAPTOMYCIN 500 MG/10ML
900 INJECTION, POWDER, LYOPHILIZED, FOR SOLUTION INTRAVENOUS
Qty: 0 | Refills: 0 | DISCHARGE
Start: 2023-10-10

## 2023-10-10 RX ORDER — VANCOMYCIN HCL 1 G
1250 VIAL (EA) INTRAVENOUS EVERY 8 HOURS
Refills: 0 | Status: COMPLETED | OUTPATIENT
Start: 2023-10-10 | End: 2023-10-11

## 2023-10-10 RX ORDER — HYDROMORPHONE HYDROCHLORIDE 2 MG/ML
1 INJECTION INTRAMUSCULAR; INTRAVENOUS; SUBCUTANEOUS EVERY 4 HOURS
Refills: 0 | Status: DISCONTINUED | OUTPATIENT
Start: 2023-10-10 | End: 2023-10-12

## 2023-10-10 RX ORDER — MEROPENEM 1 G/30ML
2000 INJECTION INTRAVENOUS
Qty: 0 | Refills: 0 | DISCHARGE
Start: 2023-10-10

## 2023-10-10 RX ADMIN — HYDROMORPHONE HYDROCHLORIDE 1 MILLIGRAM(S): 2 INJECTION INTRAMUSCULAR; INTRAVENOUS; SUBCUTANEOUS at 10:53

## 2023-10-10 RX ADMIN — HYDROMORPHONE HYDROCHLORIDE 0.5 MILLIGRAM(S): 2 INJECTION INTRAMUSCULAR; INTRAVENOUS; SUBCUTANEOUS at 05:02

## 2023-10-10 RX ADMIN — HYDROMORPHONE HYDROCHLORIDE 4 MILLIGRAM(S): 2 INJECTION INTRAMUSCULAR; INTRAVENOUS; SUBCUTANEOUS at 16:55

## 2023-10-10 RX ADMIN — MEROPENEM 280 MILLIGRAM(S): 1 INJECTION INTRAVENOUS at 06:38

## 2023-10-10 RX ADMIN — HYDROMORPHONE HYDROCHLORIDE 4 MILLIGRAM(S): 2 INJECTION INTRAMUSCULAR; INTRAVENOUS; SUBCUTANEOUS at 15:55

## 2023-10-10 RX ADMIN — GABAPENTIN 100 MILLIGRAM(S): 400 CAPSULE ORAL at 06:39

## 2023-10-10 RX ADMIN — GABAPENTIN 800 MILLIGRAM(S): 400 CAPSULE ORAL at 15:51

## 2023-10-10 RX ADMIN — ENOXAPARIN SODIUM 40 MILLIGRAM(S): 100 INJECTION SUBCUTANEOUS at 18:55

## 2023-10-10 RX ADMIN — Medication 100 MILLIGRAM(S): at 18:55

## 2023-10-10 RX ADMIN — HYDROMORPHONE HYDROCHLORIDE 0.5 MILLIGRAM(S): 2 INJECTION INTRAMUSCULAR; INTRAVENOUS; SUBCUTANEOUS at 05:17

## 2023-10-10 RX ADMIN — Medication 100 MILLIGRAM(S): at 06:39

## 2023-10-10 RX ADMIN — MEROPENEM 280 MILLIGRAM(S): 1 INJECTION INTRAVENOUS at 14:00

## 2023-10-10 RX ADMIN — HYDROMORPHONE HYDROCHLORIDE 1 MILLIGRAM(S): 2 INJECTION INTRAMUSCULAR; INTRAVENOUS; SUBCUTANEOUS at 11:00

## 2023-10-10 RX ADMIN — Medication 166.67 MILLIGRAM(S): at 18:55

## 2023-10-10 RX ADMIN — ENOXAPARIN SODIUM 40 MILLIGRAM(S): 100 INJECTION SUBCUTANEOUS at 06:40

## 2023-10-10 RX ADMIN — HYDROMORPHONE HYDROCHLORIDE 1 MILLIGRAM(S): 2 INJECTION INTRAMUSCULAR; INTRAVENOUS; SUBCUTANEOUS at 19:51

## 2023-10-10 NOTE — PROGRESS NOTE ADULT - SUBJECTIVE AND OBJECTIVE BOX
INTERVAL HPI/OVERNIGHT EVENTS:  Tm 99.  Foot pain has increased.    CONSTITUTIONAL:  No fever, chills, night sweats  EYES:  No photophobia or visual changes  CARDIOVASCULAR:  No chest pain  RESPIRATORY:  No cough, wheezing, or SOB   GASTROINTESTINAL:  No nausea, vomiting, diarrhea, constipation, or abdominal pain  GENITOURINARY:  No frequency, urgency, dysuria or hematuria  NEUROLOGIC:  No headache, lightheadedness      ANTIBIOTICS/RELEVANT:  Daptomycin 900 mg IV q24h (10/7-present)  Meropenem 2 g IV q8h      Vital Signs Last 24 Hrs  T(C): 37.2 (10 Oct 2023 09:30), Max: 37.2 (10 Oct 2023 09:30)  T(F): 99 (10 Oct 2023 09:30), Max: 99 (10 Oct 2023 09:30)  HR: 113 (10 Oct 2023 12:44) (88 - 113)  BP: 162/112 (10 Oct 2023 12:44) (103/52 - 179/112)  BP(mean): 102 (09 Oct 2023 19:55) (102 - 102)  RR: 17 (10 Oct 2023 09:30) (16 - 19)  SpO2: 97% (10 Oct 2023 09:30) (90% - 100%)    Parameters below as of 10 Oct 2023 09:30  Patient On (Oxygen Delivery Method): room air        PHYSICAL EXAM:  Constitutional:  Alert  Eyes:  Sclerae anicteric, conjunctivae clear, PERRL  Ear/Nose/Throat:  No nasal exudate or sinus tenderness;  No buccal mucosal lesions, no pharyngeal erythema or exudate	  Neck:  Supple, no adenopathy  Respiratory:  Clear bilaterally  Cardiovascular:  RRR, S1S2, no murmur appreciated  Gastrointestinal:  Symmetric, normoactive BS, soft, NT, no masses, guarding or rebound.  No HSM  Extremities:  No edema.  L foot with somewhat lighter rim of erythema surrounding ulcer, ulcer size unchanged, fibrinous base with some drainage on dressing, foot warm.      LABS:                        11.9   7.65  )-----------( 474      ( 10 Oct 2023 05:30 )             37.0         10-10    138  |  103  |  16  ----------------------------<  91  4.3   |  23  |  0.58    Ca    9.2      10 Oct 2023 05:30  Phos  3.3     10-10  Mg     2.1     10-10    TPro  7.2  /  Alb  3.5  /  TBili  0.8  /  DBili  x   /  AST  12  /  ALT  9<L>  /  AlkPhos  87  10-10      Urinalysis Basic - ( 10 Oct 2023 05:30 )    Color: x / Appearance: x / SG: x / pH: x  Gluc: 91 mg/dL / Ketone: x  / Bili: x / Urobili: x   Blood: x / Protein: x / Nitrite: x   Leuk Esterase: x / RBC: x / WBC x   Sq Epi: x / Non Sq Epi: x / Bacteria: x        MICROBIOLOGY:    Blood cultures:  10/6 X 2 - NGTD    RADIOLOGY & ADDITIONAL STUDIES:

## 2023-10-10 NOTE — GOALS OF CARE CONVERSATION - ADVANCED CARE PLANNING - CONVERSATION DETAILS
Discussed overall goals of care including advanced directives with Ms. Kearney. During this dicussion we reviewed the current diagnosis, treatment plan, and likely prognosis. An explanination of advanced directives and MOLST was provided. Ms. Kearney identifies being independent as one of her most important goals.  Ms. Kearney expressed her wish to receive CPR on the event of cardiopulmonary arrest. She wishes not to be intubated except in the setting of a trial of intubation after CPR. MOLST form completed, signed, and placed in chart.

## 2023-10-10 NOTE — PROCEDURE NOTE - NSPROCDETAILS_GEN_ALL_CORE
location identified, draped/prepped, sterile technique used
location identified, draped/prepped, sterile technique used/blood seen on insertion/dressing applied/flushes easily/secured in place/sterile technique, catheter placed

## 2023-10-10 NOTE — GOALS OF CARE CONVERSATION - ADVANCED CARE PLANNING - WHAT MATTERS MOST
Ms. Kearney expressed that her independence makes her life worth living. She is frustrated about how the burden of her illness keeps her from maintaining long-term employment. She is on disability, which is keeping her from being fully finanacially independent from her mother. She also is frustrated with how her many, often lengthy, hospitalizations have kept her from travelling and moving about independently.

## 2023-10-10 NOTE — DISCHARGE NOTE NURSING/CASE MANAGEMENT/SOCIAL WORK - NSDCVIVACCINE_GEN_ALL_CORE_FT
influenza, injectable, quadrivalent, preservative free; 22-Oct-2020 10:12; Miley Hebert (RN); [employer]; [id number] (Exp. Date: 30-Jun-2021); IntraMuscular; Deltoid Left.; 0.5 milliLiter(s); VIS (VIS Published: 15-Aug-2019, VIS Presented: 22-Oct-2020);   influenza, injectable, quadrivalent, preservative free; 14-Oct-2021 09:19; Margie Taveras (RN); [employer]; [id number] (Exp. Date: 30-Jun-2022); IntraMuscular; Deltoid Right.; 0.5 milliLiter(s); VIS (VIS Published: 06-Aug-2021, VIS Presented: 14-Oct-2021);   influenza, injectable, quadrivalent, preservative free; 09-Dec-2022 14:43; Melinda Sesay (RN); [employer]; [id number] (Exp. Date: 29-May-2022); IntraMuscular; Deltoid Right.; 0.5 milliLiter(s); VIS (VIS Published: 06-Aug-2021, VIS Presented: 09-Dec-2022);

## 2023-10-10 NOTE — PROGRESS NOTE ADULT - ASSESSMENT
39 yo F with HTN, MO and AVM L foot with recurrent L foot ulcer with fever, rigors, sweats.  She had no localizing symptoms - concern was for CLABSI, blood cultures remain NGTD.  She was receiving vancomycin and meropenem via PICC for ulcer/cellulitis.  Cellulitis is improving, ulcer is not and foot pain is increasing.   Suggest:  - F/U blood cultures from 10/6.    - Okay to replace PICC  - Start vancomycin 1250 mg IV q8h.  Trough prior to 4th dose if she is still here, otherwise not need  - Stop daptomycin  - Continue meropenem 2 g IV q8h  - For purposes of re-establishing OPAT, would give date for end of therapy as 11/20.  I will modify as needed.  - Weekly labs while on IV antibiotics - CBC with diff, CMP, ESR, CRP, vancomycin trough  - Please have her seen by Dr. Teran's team re:  need for additional procedure?  Recommendations discussed with primary team & Dr. Cassidy. Will follow with you, team 2.

## 2023-10-10 NOTE — PROCEDURE NOTE - NSASSISTBY_GEN_A_CORE
Use Lefluynomide 10 mg per day for psoriatic arthritis. Use Norco 10 mg every 4 hours as needed for pain. Do labs . Use Soma 350 mg as needed for muscle spasm. Use Alprazolam as needed for anxiety/stress. Exercise as best you can.   Love fat low carb d
Myself

## 2023-10-10 NOTE — DISCHARGE NOTE NURSING/CASE MANAGEMENT/SOCIAL WORK - PATIENT PORTAL LINK FT
You can access the FollowMyHealth Patient Portal offered by [facility] by registering at the following website: [email or web address]. By joining [facility]’s FollowMyHealth portal, you will also be able to view your health information using other applications (apps) compatible with our system.

## 2023-10-10 NOTE — PROGRESS NOTE ADULT - SUBJECTIVE AND OBJECTIVE BOX
SUBJECTIVE/OVERNIGHT EVENTS: No acute overnight events. Pt seen in AM at bedside, resting comfortably in bed, and does not appear to be in any acute distress. When asked, pt denies any recent or active fever, chills, nausea, vomiting, headache, acute sob, chest pain, abdominal pain, genitourinary sx.    VITAL SIGNS:  Vital Signs Last 24 Hrs  T(C): 37.2 (10 Oct 2023 09:30), Max: 37.2 (10 Oct 2023 09:30)  T(F): 99 (10 Oct 2023 09:30), Max: 99 (10 Oct 2023 09:30)  HR: 113 (10 Oct 2023 12:44) (88 - 113)  BP: 162/112 (10 Oct 2023 12:44) (103/52 - 179/112)  BP(mean): 102 (09 Oct 2023 19:55) (102 - 102)  RR: 17 (10 Oct 2023 09:30) (16 - 19)  SpO2: 97% (10 Oct 2023 09:30) (90% - 100%)    Parameters below as of 10 Oct 2023 09:30  Patient On (Oxygen Delivery Method): room air    PHYSICAL EXAM:  General: WDWN  HEENT: NC/AT; PERRL, anicteric sclera; MMM  Neck: supple  Cardiovascular: +S1/S2; RRR  Respiratory: CTA B/L; no W/R/R  Gastrointestinal: soft, NT/ND; +BSx4  Extremities: WWP; no edema, clubbing or cyanosis; left foot bandaged - clean  Vascular: 2+ radial, DP/PT pulses B/L  Neurological: AAOx3; no focal deficits    MEDICATIONS:  MEDICATIONS  (STANDING):  enoxaparin Injectable 40 milliGRAM(s) SubCutaneous every 12 hours  gabapentin 100 milliGRAM(s) Oral <User Schedule>  gabapentin 800 milliGRAM(s) Oral <User Schedule>  meropenem  IVPB 2000 milliGRAM(s) IV Intermittent every 8 hours  metoprolol tartrate 100 milliGRAM(s) Oral every 12 hours  vancomycin  IVPB 1250 milliGRAM(s) IV Intermittent every 8 hours    MEDICATIONS  (PRN):  HYDROmorphone   Tablet 4 milliGRAM(s) Oral every 4 hours PRN breakthrough pain  oxycodone    5 mG/acetaminophen 325 mG 2 Tablet(s) Oral every 4 hours PRN Severe Pain (7 - 10)  sodium chloride 0.9% lock flush 10 milliLiter(s) IV Push every 1 hour PRN Pre/post blood products, medications, blood draw, and to maintain line patency      ALLERGIES:  Allergies    ciprofloxacin (Rash)  penicillin (Rash)  hydrochlorothiazide (Hives)  morphine (Rash)  latex (Rash)  lisinopril (Angioedema; Rash; Hives)  amoxicillin (Angioedema)    Intolerances        LABS:                        11.9   7.65  )-----------( 474      ( 10 Oct 2023 05:30 )             37.0     10-10    138  |  103  |  16  ----------------------------<  91  4.3   |  23  |  0.58    Ca    9.2      10 Oct 2023 05:30  Phos  3.3     10-10  Mg     2.1     10-10    TPro  7.2  /  Alb  3.5  /  TBili  0.8  /  DBili  x   /  AST  12  /  ALT  9<L>  /  AlkPhos  87  10-10        RADIOLOGY & ADDITIONAL TESTS: Reviewed.

## 2023-10-10 NOTE — PROCEDURAL SAFETY CHECKLIST WITH OR WITHOUT SEDATION - NSSPECIALEQUIPSUPPLY_GEN_ALL_CORE
The patient is neutropenic, afebrile  If febrile Pan Cx, CXR and change Levaquin to Cefepime  Continue Levaquin, Mepron, Caspofungin and Acyclovir for prophylaxis  COVID-19 surveillance (-) x 3( 4/22, 4/29 & 5/14) done

## 2023-10-11 LAB
ALBUMIN SERPL ELPH-MCNC: 2.9 G/DL — LOW (ref 3.3–5)
ALP SERPL-CCNC: 83 U/L — SIGNIFICANT CHANGE UP (ref 40–120)
ALT FLD-CCNC: 7 U/L — LOW (ref 10–45)
AST SERPL-CCNC: 27 U/L — SIGNIFICANT CHANGE UP (ref 10–40)
BILIRUB SERPL-MCNC: 0.7 MG/DL — SIGNIFICANT CHANGE UP (ref 0.2–1.2)
BUN SERPL-MCNC: 18 MG/DL — SIGNIFICANT CHANGE UP (ref 7–23)
CALCIUM SERPL-MCNC: 9.1 MG/DL — SIGNIFICANT CHANGE UP (ref 8.4–10.5)
CHLORIDE SERPL-SCNC: SIGNIFICANT CHANGE UP MMOL/L (ref 96–108)
CK SERPL-CCNC: 73 U/L — SIGNIFICANT CHANGE UP (ref 25–170)
CO2 SERPL-SCNC: 14 MMOL/L — LOW (ref 22–31)
CREAT SERPL-MCNC: 0.54 MG/DL — SIGNIFICANT CHANGE UP (ref 0.5–1.3)
CULTURE RESULTS: SIGNIFICANT CHANGE UP
CULTURE RESULTS: SIGNIFICANT CHANGE UP
EGFR: 119 ML/MIN/1.73M2 — SIGNIFICANT CHANGE UP
GLUCOSE SERPL-MCNC: 69 MG/DL — LOW (ref 70–99)
MAGNESIUM SERPL-MCNC: 2 MG/DL — SIGNIFICANT CHANGE UP (ref 1.6–2.6)
PHOSPHATE SERPL-MCNC: 3.8 MG/DL — SIGNIFICANT CHANGE UP (ref 2.5–4.5)
POTASSIUM SERPL-MCNC: SIGNIFICANT CHANGE UP MMOL/L (ref 3.5–5.3)
POTASSIUM SERPL-SCNC: SIGNIFICANT CHANGE UP MMOL/L (ref 3.5–5.3)
PROT SERPL-MCNC: 6.7 G/DL — SIGNIFICANT CHANGE UP (ref 6–8.3)
SODIUM SERPL-SCNC: SIGNIFICANT CHANGE UP MMOL/L (ref 135–145)
SPECIMEN SOURCE: SIGNIFICANT CHANGE UP
SPECIMEN SOURCE: SIGNIFICANT CHANGE UP
VANCOMYCIN TROUGH SERPL-MCNC: 16.4 UG/ML — SIGNIFICANT CHANGE UP (ref 10–20)

## 2023-10-11 PROCEDURE — 99232 SBSQ HOSP IP/OBS MODERATE 35: CPT | Mod: GC

## 2023-10-11 RX ORDER — VANCOMYCIN HCL 1 G
1250 VIAL (EA) INTRAVENOUS EVERY 8 HOURS
Refills: 0 | Status: COMPLETED | OUTPATIENT
Start: 2023-10-11 | End: 2023-10-12

## 2023-10-11 RX ORDER — VANCOMYCIN HCL 1 G
1.25 VIAL (EA) INTRAVENOUS
Qty: 0 | Refills: 0 | DISCHARGE

## 2023-10-11 RX ADMIN — Medication 166.67 MILLIGRAM(S): at 09:29

## 2023-10-11 RX ADMIN — ENOXAPARIN SODIUM 40 MILLIGRAM(S): 100 INJECTION SUBCUTANEOUS at 06:20

## 2023-10-11 RX ADMIN — HYDROMORPHONE HYDROCHLORIDE 1 MILLIGRAM(S): 2 INJECTION INTRAMUSCULAR; INTRAVENOUS; SUBCUTANEOUS at 05:16

## 2023-10-11 RX ADMIN — Medication 100 MILLIGRAM(S): at 18:53

## 2023-10-11 RX ADMIN — MEROPENEM 280 MILLIGRAM(S): 1 INJECTION INTRAVENOUS at 22:22

## 2023-10-11 RX ADMIN — GABAPENTIN 800 MILLIGRAM(S): 400 CAPSULE ORAL at 14:16

## 2023-10-11 RX ADMIN — HYDROMORPHONE HYDROCHLORIDE 1 MILLIGRAM(S): 2 INJECTION INTRAMUSCULAR; INTRAVENOUS; SUBCUTANEOUS at 19:06

## 2023-10-11 RX ADMIN — MEROPENEM 280 MILLIGRAM(S): 1 INJECTION INTRAVENOUS at 05:52

## 2023-10-11 RX ADMIN — GABAPENTIN 100 MILLIGRAM(S): 400 CAPSULE ORAL at 06:20

## 2023-10-11 RX ADMIN — Medication 100 MILLIGRAM(S): at 06:20

## 2023-10-11 RX ADMIN — ENOXAPARIN SODIUM 40 MILLIGRAM(S): 100 INJECTION SUBCUTANEOUS at 18:54

## 2023-10-11 RX ADMIN — HYDROMORPHONE HYDROCHLORIDE 1 MILLIGRAM(S): 2 INJECTION INTRAMUSCULAR; INTRAVENOUS; SUBCUTANEOUS at 18:51

## 2023-10-11 RX ADMIN — Medication 166.67 MILLIGRAM(S): at 18:53

## 2023-10-11 RX ADMIN — HYDROMORPHONE HYDROCHLORIDE 1 MILLIGRAM(S): 2 INJECTION INTRAMUSCULAR; INTRAVENOUS; SUBCUTANEOUS at 05:50

## 2023-10-11 RX ADMIN — HYDROMORPHONE HYDROCHLORIDE 1 MILLIGRAM(S): 2 INJECTION INTRAMUSCULAR; INTRAVENOUS; SUBCUTANEOUS at 14:16

## 2023-10-11 RX ADMIN — HYDROMORPHONE HYDROCHLORIDE 1 MILLIGRAM(S): 2 INJECTION INTRAMUSCULAR; INTRAVENOUS; SUBCUTANEOUS at 00:40

## 2023-10-11 RX ADMIN — HYDROMORPHONE HYDROCHLORIDE 1 MILLIGRAM(S): 2 INJECTION INTRAMUSCULAR; INTRAVENOUS; SUBCUTANEOUS at 23:50

## 2023-10-11 RX ADMIN — HYDROMORPHONE HYDROCHLORIDE 1 MILLIGRAM(S): 2 INJECTION INTRAMUSCULAR; INTRAVENOUS; SUBCUTANEOUS at 00:14

## 2023-10-11 RX ADMIN — Medication 166.67 MILLIGRAM(S): at 02:52

## 2023-10-11 RX ADMIN — MEROPENEM 280 MILLIGRAM(S): 1 INJECTION INTRAVENOUS at 14:16

## 2023-10-11 RX ADMIN — HYDROMORPHONE HYDROCHLORIDE 1 MILLIGRAM(S): 2 INJECTION INTRAMUSCULAR; INTRAVENOUS; SUBCUTANEOUS at 09:40

## 2023-10-11 RX ADMIN — HYDROMORPHONE HYDROCHLORIDE 1 MILLIGRAM(S): 2 INJECTION INTRAMUSCULAR; INTRAVENOUS; SUBCUTANEOUS at 14:31

## 2023-10-11 RX ADMIN — HYDROMORPHONE HYDROCHLORIDE 1 MILLIGRAM(S): 2 INJECTION INTRAMUSCULAR; INTRAVENOUS; SUBCUTANEOUS at 23:20

## 2023-10-11 RX ADMIN — HYDROMORPHONE HYDROCHLORIDE 1 MILLIGRAM(S): 2 INJECTION INTRAMUSCULAR; INTRAVENOUS; SUBCUTANEOUS at 09:25

## 2023-10-11 NOTE — PROGRESS NOTE ADULT - PROBLEM SELECTOR PLAN 1
L foot AVMs s/p 23 prior embolizations. Follows with Dr. Trean. S/p embolization with Dr. Teran on 9/19.  Pain management:     Gabapentin 100mg in the morning + 800mg at 4PM (patient was not amendable to increasing gabapentin 100 mg to TID as it causes HA and discomfort)  Dilaudid 2mg PO q4 PRN.     Dressing changes were done follows: w/ Vashe moistened 2x2 gauze 2 times daily    -F/u vascular recs
L foot AVMs s/p 23 prior embolizations. Follows with Dr. Teran. S/p embolization with Dr. Teran on 9/19.  Pain management:     Gabapentin 100mg in the morning + 800mg at 4PM (patient was not amendable to increasing gabapentin 100 mg to TID as it causes HA and discomfort)  Dilaudid 2mg PO q4 PRN.     Dressing changes were done follows: w/ Vashe moistened 2x2 gauze 2 times daily    -F/u vascular recs

## 2023-10-11 NOTE — PROGRESS NOTE ADULT - SUBJECTIVE AND OBJECTIVE BOX
SUBJECTIVE/OVERNIGHT EVENTS: No acute overnight events. Pt seen in AM at bedside, resting comfortably in bed, and does not appear to be in any acute distress. When asked, pt denies any recent or active fever, chills, nausea, vomiting, headache, acute sob, chest pain, abdominal pain, genitourinary sx, extremity swelling.    VITAL SIGNS:  Vital Signs Last 24 Hrs  T(C): 36.9 (11 Oct 2023 09:07), Max: 36.9 (11 Oct 2023 09:07)  T(F): 98.5 (11 Oct 2023 09:07), Max: 98.5 (11 Oct 2023 09:07)  HR: 96 (11 Oct 2023 09:07) (93 - 118)  BP: 155/91 (11 Oct 2023 09:07) (119/79 - 155/91)  BP(mean): --  RR: 18 (11 Oct 2023 09:07) (18 - 18)  SpO2: 96% (11 Oct 2023 09:07) (95% - 96%)    Parameters below as of 11 Oct 2023 09:07  Patient On (Oxygen Delivery Method): room air    PHYSICAL EXAM:  General: WDWN  HEENT: NC/AT; PERRL, anicteric sclera; MMM  Neck: supple  Cardiovascular: +S1/S2; RRR  Respiratory: CTA B/L; no W/R/R  Gastrointestinal: soft, NT/ND; +BSx4  Extremities: WWP; no edema, clubbing or cyanosis; left foot bandaged - clean. PICC in place LUE no bleeding erythema or tenderness  Vascular: 2+ radial, DP/PT pulses B/L  Neurological: AAOx3; no focal deficits    MEDICATIONS:  MEDICATIONS  (STANDING):  enoxaparin Injectable 40 milliGRAM(s) SubCutaneous every 12 hours  gabapentin 100 milliGRAM(s) Oral <User Schedule>  gabapentin 800 milliGRAM(s) Oral <User Schedule>  meropenem  IVPB 2000 milliGRAM(s) IV Intermittent every 8 hours  metoprolol tartrate 100 milliGRAM(s) Oral every 12 hours  vancomycin  IVPB 1250 milliGRAM(s) IV Intermittent every 8 hours    MEDICATIONS  (PRN):  HYDROmorphone  Injectable 1 milliGRAM(s) IV Push every 4 hours PRN Breakthrough pain  oxycodone    5 mG/acetaminophen 325 mG 2 Tablet(s) Oral every 4 hours PRN Severe Pain (7 - 10)  sodium chloride 0.9% lock flush 10 milliLiter(s) IV Push every 1 hour PRN Pre/post blood products, medications, blood draw, and to maintain line patency      ALLERGIES:  Allergies    ciprofloxacin (Rash)  penicillin (Rash)  hydrochlorothiazide (Hives)  morphine (Rash)  latex (Rash)  lisinopril (Angioedema; Rash; Hives)  amoxicillin (Angioedema)    Intolerances        LABS:                        11.9   7.65  )-----------( 474      ( 10 Oct 2023 05:30 )             37.0     10-11    see note  |  see note  |  18  ----------------------------<  69<L>  see note   |  14<L>  |  0.54    Ca    9.1      11 Oct 2023 05:30  Phos  3.8     10-11  Mg     2.0     10-11    TPro  6.7  /  Alb  2.9<L>  /  TBili  0.7  /  DBili  x   /  AST  27  /  ALT  7<L>  /  AlkPhos  83  10-11        RADIOLOGY & ADDITIONAL TESTS: Reviewed.

## 2023-10-11 NOTE — PROGRESS NOTE ADULT - PROBLEM SELECTOR PLAN 4
F: per oral   E: K>4, Mg>2  N: DASH diet   DVT: Lovenox 40 mg q12 (BMI 46)

## 2023-10-11 NOTE — PROGRESS NOTE ADULT - PROBLEM SELECTOR PROBLEM 1
AVM (arteriovenous malformation)

## 2023-10-11 NOTE — PROGRESS NOTE ADULT - PROBLEM SELECTOR PLAN 2
w/ cellulitis. Has previously responded well to vancomycin and meropenem in the past but despite appropriate treatment, patient has had chills and fevers with new purulence from wound. ID consulted from the ED, received a 1X dose of Daptomycin. The picc line placed on 9/19 was also removed in the ED. There is no erythema near the picc insertion site.     - Daptomycin 900mg q24 and Meropenem 2g q8 per ID.     Pain control:   Home regimen:   Gabapentin 100 mg in AM, 800 mg at 3 pm   Percocet  mg oral tablet     Inpatient:   - Gabapentin 10 mg in AM, 800 mg at 3 pm   - Dilaudid 2 mg PO q4-6 prn
w/ cellulitis. Has previously responded well to vancomycin and meropenem in the past but despite appropriate treatment, patient has had chills and fevers with new purulence from wound. ID consulted from the ED, received a 1X dose of Daptomycin. The picc line placed on 9/19 was also removed in the ED. There is no erythema near the picc insertion site.     - Daptomycin 900mg q24 and Meropenem 2g q8 per ID.     Pain control:   Home regimen:   Gabapentin 100 mg in AM, 800 mg at 3 pm   Percocet  mg oral tablet     Inpatient:   - Gabapentin 10 mg in AM, 800 mg at 3 pm   - Dilaudid 2 mg PO q4-6 prn
w/ cellulitis. Has previously responded well to vancomycin and meropenem in the past but despite appropriate treatment, patient has had chills and fevers with new purulence from wound. ID consulted from the ED, received a 1X dose of Daptomycin. The picc line placed on 9/19 was also removed in the ED. There is no erythema near the picc insertion site.     - Daptomycin 900mg q24 and Meropenem 2g q8 per ID. Will need PICC    Pain control:   Home regimen:   Gabapentin 100 mg in AM, 800 mg at 3 pm   Percocet  mg oral tablet     Inpatient:   - Gabapentin 10 mg in AM, 800 mg at 3 pm   - Percocet  mg q4 prn for severe pain  - Dilaudid 4 mg PO q4 prn for breakthrough pain
w/ cellulitis. Has previously responded well to vancomycin and meropenem in the past but despite appropriate treatment, patient has had chills and fevers with new purulence from wound. ID consulted from the ED, received a 1X dose of Daptomycin. The picc line placed on 9/19 was also removed in the ED. There is no erythema near the picc insertion site.     - DC Dapto  - Vancomycin 1250mg q8 and Meropenem 2g q8 per ID.     Pain control:   Home regimen:   Gabapentin 100 mg in AM, 800 mg at 3 pm   Percocet  mg oral tablet     Inpatient:   - Gabapentin 10 mg in AM, 800 mg at 3 pm   - Percocet  mg q4 prn for severe pain  - Dilaudid IV q4 prn for breakthrough pain
w/ cellulitis. Has previously responded well to vancomycin and meropenem in the past but despite appropriate treatment, patient has had chills and fevers with new purulence from wound. ID consulted from the ED, received a 1X dose of Daptomycin. The picc line placed on 9/19 was also removed in the ED. There is no erythema near the picc insertion site.     - DC Dapto  - Vancomycin 1250mg q8 and Meropenem 2g q8 per ID. Will need PICC    Pain control:   Home regimen:   Gabapentin 100 mg in AM, 800 mg at 3 pm   Percocet  mg oral tablet     Inpatient:   - Gabapentin 10 mg in AM, 800 mg at 3 pm   - Percocet  mg q4 prn for severe pain  - Dilaudid IV q4 prn for breakthrough pain

## 2023-10-11 NOTE — PROGRESS NOTE ADULT - REASON FOR ADMISSION
Infected ulcer w/ AVM

## 2023-10-11 NOTE — PROGRESS NOTE ADULT - PROVIDER SPECIALTY LIST ADULT
Internal Medicine
Internal Medicine
Infectious Disease
Internal Medicine
Infectious Disease
Infectious Disease

## 2023-10-12 VITALS — OXYGEN SATURATION: 97 % | RESPIRATION RATE: 21 BRPM

## 2023-10-12 LAB
BASOPHILS # BLD AUTO: 0.08 K/UL — SIGNIFICANT CHANGE UP (ref 0–0.2)
BASOPHILS NFR BLD AUTO: 0.9 % — SIGNIFICANT CHANGE UP (ref 0–2)
EOSINOPHIL # BLD AUTO: 0.27 K/UL — SIGNIFICANT CHANGE UP (ref 0–0.5)
EOSINOPHIL NFR BLD AUTO: 3 % — SIGNIFICANT CHANGE UP (ref 0–6)
HCT VFR BLD CALC: 35.7 % — SIGNIFICANT CHANGE UP (ref 34.5–45)
HGB BLD-MCNC: 11.3 G/DL — LOW (ref 11.5–15.5)
IMM GRANULOCYTES NFR BLD AUTO: 0.3 % — SIGNIFICANT CHANGE UP (ref 0–0.9)
LYMPHOCYTES # BLD AUTO: 1.45 K/UL — SIGNIFICANT CHANGE UP (ref 1–3.3)
LYMPHOCYTES # BLD AUTO: 16 % — SIGNIFICANT CHANGE UP (ref 13–44)
MCHC RBC-ENTMCNC: 27.4 PG — SIGNIFICANT CHANGE UP (ref 27–34)
MCHC RBC-ENTMCNC: 31.7 GM/DL — LOW (ref 32–36)
MCV RBC AUTO: 86.4 FL — SIGNIFICANT CHANGE UP (ref 80–100)
MONOCYTES # BLD AUTO: 0.69 K/UL — SIGNIFICANT CHANGE UP (ref 0–0.9)
MONOCYTES NFR BLD AUTO: 7.6 % — SIGNIFICANT CHANGE UP (ref 2–14)
NEUTROPHILS # BLD AUTO: 6.53 K/UL — SIGNIFICANT CHANGE UP (ref 1.8–7.4)
NEUTROPHILS NFR BLD AUTO: 72.2 % — SIGNIFICANT CHANGE UP (ref 43–77)
NRBC # BLD: 0 /100 WBCS — SIGNIFICANT CHANGE UP (ref 0–0)
PLATELET # BLD AUTO: 429 K/UL — HIGH (ref 150–400)
RBC # BLD: 4.13 M/UL — SIGNIFICANT CHANGE UP (ref 3.8–5.2)
RBC # FLD: 13.3 % — SIGNIFICANT CHANGE UP (ref 10.3–14.5)
WBC # BLD: 9.05 K/UL — SIGNIFICANT CHANGE UP (ref 3.8–10.5)
WBC # FLD AUTO: 9.05 K/UL — SIGNIFICANT CHANGE UP (ref 3.8–10.5)

## 2023-10-12 PROCEDURE — 99239 HOSP IP/OBS DSCHRG MGMT >30: CPT

## 2023-10-12 PROCEDURE — 85610 PROTHROMBIN TIME: CPT

## 2023-10-12 PROCEDURE — 83605 ASSAY OF LACTIC ACID: CPT

## 2023-10-12 PROCEDURE — 36573 INSJ PICC RS&I 5 YR+: CPT

## 2023-10-12 PROCEDURE — 84100 ASSAY OF PHOSPHORUS: CPT

## 2023-10-12 PROCEDURE — 82550 ASSAY OF CK (CPK): CPT

## 2023-10-12 PROCEDURE — 81001 URINALYSIS AUTO W/SCOPE: CPT

## 2023-10-12 PROCEDURE — 87086 URINE CULTURE/COLONY COUNT: CPT

## 2023-10-12 PROCEDURE — 83735 ASSAY OF MAGNESIUM: CPT

## 2023-10-12 PROCEDURE — 36415 COLL VENOUS BLD VENIPUNCTURE: CPT

## 2023-10-12 PROCEDURE — 80053 COMPREHEN METABOLIC PANEL: CPT

## 2023-10-12 PROCEDURE — 96374 THER/PROPH/DIAG INJ IV PUSH: CPT

## 2023-10-12 PROCEDURE — 85025 COMPLETE CBC W/AUTO DIFF WBC: CPT

## 2023-10-12 PROCEDURE — 85730 THROMBOPLASTIN TIME PARTIAL: CPT

## 2023-10-12 PROCEDURE — 99285 EMERGENCY DEPT VISIT HI MDM: CPT | Mod: 25

## 2023-10-12 PROCEDURE — 87040 BLOOD CULTURE FOR BACTERIA: CPT

## 2023-10-12 PROCEDURE — 80202 ASSAY OF VANCOMYCIN: CPT

## 2023-10-12 PROCEDURE — 93005 ELECTROCARDIOGRAM TRACING: CPT

## 2023-10-12 RX ADMIN — Medication 166.67 MILLIGRAM(S): at 09:31

## 2023-10-12 RX ADMIN — Medication 166.67 MILLIGRAM(S): at 01:44

## 2023-10-12 RX ADMIN — HYDROMORPHONE HYDROCHLORIDE 1 MILLIGRAM(S): 2 INJECTION INTRAMUSCULAR; INTRAVENOUS; SUBCUTANEOUS at 03:44

## 2023-10-12 RX ADMIN — HYDROMORPHONE HYDROCHLORIDE 1 MILLIGRAM(S): 2 INJECTION INTRAMUSCULAR; INTRAVENOUS; SUBCUTANEOUS at 14:28

## 2023-10-12 RX ADMIN — HYDROMORPHONE HYDROCHLORIDE 1 MILLIGRAM(S): 2 INJECTION INTRAMUSCULAR; INTRAVENOUS; SUBCUTANEOUS at 09:34

## 2023-10-12 RX ADMIN — GABAPENTIN 100 MILLIGRAM(S): 400 CAPSULE ORAL at 06:20

## 2023-10-12 RX ADMIN — Medication 100 MILLIGRAM(S): at 06:20

## 2023-10-12 RX ADMIN — MEROPENEM 280 MILLIGRAM(S): 1 INJECTION INTRAVENOUS at 15:17

## 2023-10-12 RX ADMIN — MEROPENEM 280 MILLIGRAM(S): 1 INJECTION INTRAVENOUS at 05:52

## 2023-10-12 RX ADMIN — HYDROMORPHONE HYDROCHLORIDE 1 MILLIGRAM(S): 2 INJECTION INTRAMUSCULAR; INTRAVENOUS; SUBCUTANEOUS at 09:04

## 2023-10-18 DIAGNOSIS — Q27.32 ARTERIOVENOUS MALFORMATION OF VESSEL OF LOWER LIMB: ICD-10-CM

## 2023-10-18 DIAGNOSIS — Z41.8 ENCOUNTER FOR OTHER PROCEDURES FOR PURPOSES OTHER THAN REMEDYING HEALTH STATE: ICD-10-CM
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
## 2023-10-18 DIAGNOSIS — Z88.5 ALLERGY STATUS TO NARCOTIC AGENT: ICD-10-CM

## 2023-10-18 DIAGNOSIS — E66.9 OBESITY, UNSPECIFIED: ICD-10-CM

## 2023-10-18 DIAGNOSIS — L97.519 NON-PRESSURE CHRONIC ULCER OF OTHER PART OF RIGHT FOOT WITH UNSPECIFIED SEVERITY: ICD-10-CM

## 2023-10-18 DIAGNOSIS — Z79.899 OTHER LONG TERM (CURRENT) DRUG THERAPY: ICD-10-CM

## 2023-10-18 DIAGNOSIS — T81.43XA INFECTION FOLLOWING A PROCEDURE, ORGAN AND SPACE SURGICAL SITE, INITIAL ENCOUNTER: ICD-10-CM

## 2023-10-18 DIAGNOSIS — Z91.040 LATEX ALLERGY STATUS: ICD-10-CM

## 2023-10-18 DIAGNOSIS — Z88.1 ALLERGY STATUS TO OTHER ANTIBIOTIC AGENTS STATUS: ICD-10-CM

## 2023-10-18 DIAGNOSIS — Z88.0 ALLERGY STATUS TO PENICILLIN: ICD-10-CM

## 2023-10-18 DIAGNOSIS — Z86.19 PERSONAL HISTORY OF OTHER INFECTIOUS AND PARASITIC DISEASES: ICD-10-CM
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
## 2023-10-18 DIAGNOSIS — Z88.8 ALLERGY STATUS TO OTHER DRUGS, MEDICAMENTS AND BIOLOGICAL SUBSTANCES: ICD-10-CM

## 2023-10-18 DIAGNOSIS — Z79.2 LONG TERM (CURRENT) USE OF ANTIBIOTICS: ICD-10-CM

## 2023-10-18 DIAGNOSIS — I10 ESSENTIAL (PRIMARY) HYPERTENSION: ICD-10-CM

## 2023-10-18 DIAGNOSIS — L03.116 CELLULITIS OF LEFT LOWER LIMB: ICD-10-CM

## 2023-10-20 ENCOUNTER — APPOINTMENT (OUTPATIENT)
Dept: INFECTIOUS DISEASE | Facility: CLINIC | Age: 38
End: 2023-10-20
Payer: MEDICARE

## 2023-10-20 VITALS
HEIGHT: 67 IN | OXYGEN SATURATION: 98 % | DIASTOLIC BLOOD PRESSURE: 81 MMHG | WEIGHT: 293 LBS | BODY MASS INDEX: 45.99 KG/M2 | SYSTOLIC BLOOD PRESSURE: 158 MMHG | HEART RATE: 78 BPM | TEMPERATURE: 97.4 F

## 2023-10-20 PROCEDURE — 99214 OFFICE O/P EST MOD 30 MIN: CPT

## 2023-10-23 ENCOUNTER — APPOINTMENT (OUTPATIENT)
Dept: HEART AND VASCULAR | Facility: CLINIC | Age: 38
End: 2023-10-23
Payer: MEDICARE

## 2023-10-23 ENCOUNTER — APPOINTMENT (OUTPATIENT)
Dept: VASCULAR SURGERY | Facility: CLINIC | Age: 38
End: 2023-10-23
Payer: MEDICARE

## 2023-10-23 PROCEDURE — 99214 OFFICE O/P EST MOD 30 MIN: CPT

## 2023-10-23 PROCEDURE — 99213 OFFICE O/P EST LOW 20 MIN: CPT

## 2023-10-24 ENCOUNTER — NON-APPOINTMENT (OUTPATIENT)
Age: 38
End: 2023-10-24

## 2023-10-24 ENCOUNTER — INPATIENT (INPATIENT)
Facility: HOSPITAL | Age: 38
LOS: 1 days | Discharge: HOME | DRG: 315 | End: 2023-10-26
Attending: STUDENT IN AN ORGANIZED HEALTH CARE EDUCATION/TRAINING PROGRAM | Admitting: STUDENT IN AN ORGANIZED HEALTH CARE EDUCATION/TRAINING PROGRAM
Payer: MEDICARE

## 2023-10-24 VITALS
SYSTOLIC BLOOD PRESSURE: 200 MMHG | HEART RATE: 93 BPM | WEIGHT: 293 LBS | OXYGEN SATURATION: 100 % | RESPIRATION RATE: 17 BRPM | TEMPERATURE: 99 F | DIASTOLIC BLOOD PRESSURE: 114 MMHG | HEIGHT: 67 IN

## 2023-10-24 DIAGNOSIS — Z98.89 OTHER SPECIFIED POSTPROCEDURAL STATES: Chronic | ICD-10-CM

## 2023-10-24 DIAGNOSIS — L03.90 CELLULITIS, UNSPECIFIED: ICD-10-CM

## 2023-10-24 DIAGNOSIS — Z41.9 ENCOUNTER FOR PROCEDURE FOR PURPOSES OTHER THAN REMEDYING HEALTH STATE, UNSPECIFIED: Chronic | ICD-10-CM

## 2023-10-24 DIAGNOSIS — L97.509 NON-PRESSURE CHRONIC ULCER OF OTHER PART OF UNSPECIFIED FOOT WITH UNSPECIFIED SEVERITY: ICD-10-CM

## 2023-10-24 DIAGNOSIS — Z29.9 ENCOUNTER FOR PROPHYLACTIC MEASURES, UNSPECIFIED: ICD-10-CM

## 2023-10-24 DIAGNOSIS — I10 ESSENTIAL (PRIMARY) HYPERTENSION: ICD-10-CM

## 2023-10-24 DIAGNOSIS — Q27.30 ARTERIOVENOUS MALFORMATION, SITE UNSPECIFIED: ICD-10-CM

## 2023-10-24 LAB
ANION GAP SERPL CALC-SCNC: 10 MMOL/L — SIGNIFICANT CHANGE UP (ref 5–17)
ANION GAP SERPL CALC-SCNC: 10 MMOL/L — SIGNIFICANT CHANGE UP (ref 5–17)
BUN SERPL-MCNC: 10 MG/DL — SIGNIFICANT CHANGE UP (ref 7–23)
BUN SERPL-MCNC: 10 MG/DL — SIGNIFICANT CHANGE UP (ref 7–23)
CALCIUM SERPL-MCNC: 9.2 MG/DL — SIGNIFICANT CHANGE UP (ref 8.4–10.5)
CALCIUM SERPL-MCNC: 9.2 MG/DL — SIGNIFICANT CHANGE UP (ref 8.4–10.5)
CHLORIDE SERPL-SCNC: 102 MMOL/L — SIGNIFICANT CHANGE UP (ref 96–108)
CHLORIDE SERPL-SCNC: 102 MMOL/L — SIGNIFICANT CHANGE UP (ref 96–108)
CO2 SERPL-SCNC: 23 MMOL/L — SIGNIFICANT CHANGE UP (ref 22–31)
CO2 SERPL-SCNC: 23 MMOL/L — SIGNIFICANT CHANGE UP (ref 22–31)
CREAT SERPL-MCNC: 0.64 MG/DL — SIGNIFICANT CHANGE UP (ref 0.5–1.3)
CREAT SERPL-MCNC: 0.64 MG/DL — SIGNIFICANT CHANGE UP (ref 0.5–1.3)
EGFR: 114 ML/MIN/1.73M2 — SIGNIFICANT CHANGE UP
EGFR: 114 ML/MIN/1.73M2 — SIGNIFICANT CHANGE UP
GLUCOSE SERPL-MCNC: 98 MG/DL — SIGNIFICANT CHANGE UP (ref 70–99)
GLUCOSE SERPL-MCNC: 98 MG/DL — SIGNIFICANT CHANGE UP (ref 70–99)
HCT VFR BLD CALC: 34.3 % — LOW (ref 34.5–45)
HCT VFR BLD CALC: 34.3 % — LOW (ref 34.5–45)
HGB BLD-MCNC: 11.4 G/DL — LOW (ref 11.5–15.5)
HGB BLD-MCNC: 11.4 G/DL — LOW (ref 11.5–15.5)
MCHC RBC-ENTMCNC: 27.1 PG — SIGNIFICANT CHANGE UP (ref 27–34)
MCHC RBC-ENTMCNC: 27.1 PG — SIGNIFICANT CHANGE UP (ref 27–34)
MCHC RBC-ENTMCNC: 33.2 GM/DL — SIGNIFICANT CHANGE UP (ref 32–36)
MCHC RBC-ENTMCNC: 33.2 GM/DL — SIGNIFICANT CHANGE UP (ref 32–36)
MCV RBC AUTO: 81.7 FL — SIGNIFICANT CHANGE UP (ref 80–100)
MCV RBC AUTO: 81.7 FL — SIGNIFICANT CHANGE UP (ref 80–100)
NRBC # BLD: 0 /100 WBCS — SIGNIFICANT CHANGE UP (ref 0–0)
NRBC # BLD: 0 /100 WBCS — SIGNIFICANT CHANGE UP (ref 0–0)
PLATELET # BLD AUTO: 501 K/UL — HIGH (ref 150–400)
PLATELET # BLD AUTO: 501 K/UL — HIGH (ref 150–400)
POTASSIUM SERPL-MCNC: 4.6 MMOL/L — SIGNIFICANT CHANGE UP (ref 3.5–5.3)
POTASSIUM SERPL-MCNC: 4.6 MMOL/L — SIGNIFICANT CHANGE UP (ref 3.5–5.3)
POTASSIUM SERPL-SCNC: 4.6 MMOL/L — SIGNIFICANT CHANGE UP (ref 3.5–5.3)
POTASSIUM SERPL-SCNC: 4.6 MMOL/L — SIGNIFICANT CHANGE UP (ref 3.5–5.3)
RBC # BLD: 4.2 M/UL — SIGNIFICANT CHANGE UP (ref 3.8–5.2)
RBC # BLD: 4.2 M/UL — SIGNIFICANT CHANGE UP (ref 3.8–5.2)
RBC # FLD: 13.7 % — SIGNIFICANT CHANGE UP (ref 10.3–14.5)
RBC # FLD: 13.7 % — SIGNIFICANT CHANGE UP (ref 10.3–14.5)
SODIUM SERPL-SCNC: 135 MMOL/L — SIGNIFICANT CHANGE UP (ref 135–145)
SODIUM SERPL-SCNC: 135 MMOL/L — SIGNIFICANT CHANGE UP (ref 135–145)
WBC # BLD: 10.84 K/UL — HIGH (ref 3.8–10.5)
WBC # BLD: 10.84 K/UL — HIGH (ref 3.8–10.5)
WBC # FLD AUTO: 10.84 K/UL — HIGH (ref 3.8–10.5)
WBC # FLD AUTO: 10.84 K/UL — HIGH (ref 3.8–10.5)

## 2023-10-24 PROCEDURE — 99223 1ST HOSP IP/OBS HIGH 75: CPT | Mod: GC

## 2023-10-24 PROCEDURE — 99285 EMERGENCY DEPT VISIT HI MDM: CPT

## 2023-10-24 RX ORDER — HYDROMORPHONE HYDROCHLORIDE 2 MG/ML
1 INJECTION INTRAMUSCULAR; INTRAVENOUS; SUBCUTANEOUS ONCE
Refills: 0 | Status: DISCONTINUED | OUTPATIENT
Start: 2023-10-24 | End: 2023-10-24

## 2023-10-24 RX ORDER — GABAPENTIN 400 MG/1
100 CAPSULE ORAL
Refills: 0 | Status: DISCONTINUED | OUTPATIENT
Start: 2023-10-25 | End: 2023-10-26

## 2023-10-24 RX ORDER — HYDROMORPHONE HYDROCHLORIDE 2 MG/ML
0.5 INJECTION INTRAMUSCULAR; INTRAVENOUS; SUBCUTANEOUS ONCE
Refills: 0 | Status: DISCONTINUED | OUTPATIENT
Start: 2023-10-24 | End: 2023-10-24

## 2023-10-24 RX ORDER — MEROPENEM 1 G/30ML
2 INJECTION INTRAVENOUS ONCE
Refills: 0 | Status: DISCONTINUED | OUTPATIENT
Start: 2023-10-24 | End: 2023-10-24

## 2023-10-24 RX ORDER — ACETAMINOPHEN 500 MG
650 TABLET ORAL EVERY 6 HOURS
Refills: 0 | Status: DISCONTINUED | OUTPATIENT
Start: 2023-10-24 | End: 2023-10-25

## 2023-10-24 RX ORDER — MEROPENEM 1 G/30ML
2000 INJECTION INTRAVENOUS ONCE
Refills: 0 | Status: COMPLETED | OUTPATIENT
Start: 2023-10-24 | End: 2023-10-24

## 2023-10-24 RX ORDER — VANCOMYCIN HCL 1 G
1250 VIAL (EA) INTRAVENOUS ONCE
Refills: 0 | Status: COMPLETED | OUTPATIENT
Start: 2023-10-24 | End: 2023-10-24

## 2023-10-24 RX ORDER — METOPROLOL TARTRATE 50 MG
100 TABLET ORAL EVERY 12 HOURS
Refills: 0 | Status: DISCONTINUED | OUTPATIENT
Start: 2023-10-24 | End: 2023-10-25

## 2023-10-24 RX ORDER — COLLAGENASE SANTYL 250 [ARB'U]/G
250 OINTMENT TOPICAL DAILY
Qty: 1 | Refills: 2 | Status: ACTIVE | COMMUNITY
Start: 2023-10-24 | End: 1900-01-01

## 2023-10-24 RX ORDER — GABAPENTIN 400 MG/1
800 CAPSULE ORAL
Refills: 0 | Status: DISCONTINUED | OUTPATIENT
Start: 2023-10-25 | End: 2023-10-26

## 2023-10-24 RX ORDER — FLUOCINONIDE 0.5 MG/G
0.05 CREAM TOPICAL TWICE DAILY
Qty: 1 | Refills: 1 | Status: ACTIVE | COMMUNITY
Start: 2023-10-24 | End: 1900-01-01

## 2023-10-24 RX ORDER — OXYCODONE HYDROCHLORIDE 5 MG/1
10 TABLET ORAL ONCE
Refills: 0 | Status: DISCONTINUED | OUTPATIENT
Start: 2023-10-24 | End: 2023-10-24

## 2023-10-24 RX ADMIN — HYDROMORPHONE HYDROCHLORIDE 1 MILLIGRAM(S): 2 INJECTION INTRAMUSCULAR; INTRAVENOUS; SUBCUTANEOUS at 17:37

## 2023-10-24 RX ADMIN — HYDROMORPHONE HYDROCHLORIDE 1 MILLIGRAM(S): 2 INJECTION INTRAMUSCULAR; INTRAVENOUS; SUBCUTANEOUS at 17:07

## 2023-10-24 RX ADMIN — Medication 166.67 MILLIGRAM(S): at 18:08

## 2023-10-24 RX ADMIN — MEROPENEM 280 MILLIGRAM(S): 1 INJECTION INTRAVENOUS at 19:56

## 2023-10-24 RX ADMIN — HYDROMORPHONE HYDROCHLORIDE 0.5 MILLIGRAM(S): 2 INJECTION INTRAMUSCULAR; INTRAVENOUS; SUBCUTANEOUS at 23:27

## 2023-10-24 RX ADMIN — Medication 1250 MILLIGRAM(S): at 18:08

## 2023-10-24 NOTE — ED ADULT NURSE NOTE - OBJECTIVE STATEMENT
pt received into spot 3 A&Ox4 ambulatory appears comfortable arrives via walk in triage for eval of LUE PICC line in place x 2 weeks for IV abx r/t left foot infection. no pt received into spot 3 A&Ox4 ambulatory appears comfortable arrives via walk in triage for eval of LUE PICC line in place x 2 weeks for IV abx r/t left foot infection. reports some feverish/ chills yesterday tmax 99.0 and some leaking swelling and redness at the insertion site. Homecare staff did not want to remove PICC. upon arrival to ED Md Patterson removed picc no no pain complications or bleeding noted. IV placed to RAC labs drawn and sent along with blood cultures x 2. pt reports pain to left foot medicated per orders

## 2023-10-24 NOTE — H&P ADULT - NSHPPHYSICALEXAM_GEN_ALL_CORE
PHYSICAL EXAM:  Constitutional: NAD, comfortable in bed.  HEENT: NC/AT, PERRLA, EOMI, no conjunctival pallor or scleral icterus, MMM  Neck: Supple, no JVD  Respiratory: CTA B/L. No w/r/r.   Cardiovascular: RRR, normal S1 and S2, no m/r/g.   Gastrointestinal: +BS, soft NTND, no guarding or rebound tenderness, no palpable masses   Extremities: wwp; L axillary region with erythema, some tenderness, around picc site. L. lateral malleous with pain, chronic, wrapped in ACE wrap.   Vascular: Pulses equal and strong throughout.   Neurological: AAOx3, no CN deficits, strength and sensation intact throughout.   Skin: No gross skin abnormalities or rashes

## 2023-10-24 NOTE — H&P ADULT - NSHPSOCIALHISTORY_GEN_ALL_CORE
With mother and boyfriend at home  Alcohol: No  Drugs: No  Ambulates: On own  Unemployed, on disability

## 2023-10-24 NOTE — H&P ADULT - PROBLEM SELECTOR PLAN 3
Chronic wound at site of AVM, without increased drainage or purulence, seen by vascular surgery outpatient who recommended local wound care. Has previously responded well to vancomycin and meropenem, was on them until 11/20 until PICC leaked. Follows Dr. Rahman outpatient. Patient without fevers, slight leukocytosis to 10.  - Wound care consult in the AM  - Continue vancomycin and meropenem as above  - Vascular care in AM  - ID consult as above.

## 2023-10-24 NOTE — H&P ADULT - PROBLEM SELECTOR PLAN 1
Patient presenting with tenderness and erythema at PICC line site x 2 days, PICC now removed, as well as subjective fever at home. On presentation, low grade temperature to 99, leukocytosis to 10.   - Ultrasound of UE to examine for thrombus  - c/w vancomycin as below  - Trend leukocytosis  - Tylenol 650q6 PRN for fevers  - Trend leukocytosis Patient presenting with tenderness and erythema at PICC line site x 2 days, PICC now removed, as well as subjective fever at home. Less likely infection more likely picc line malfunction On presentation, low grade temperature to 99, leukocytosis to 10.   - Ultrasound of UE to examine for thrombus  - c/w vancomycin as below  - Trend leukocytosis  - Tylenol 650q6 PRN for fevers  - IR PICC consult in AM.   - Coags ordered for AM.

## 2023-10-24 NOTE — ED PROVIDER NOTE - OBJECTIVE STATEMENT
38F hx AVM of the left lateral heel with recurrent infections and embolizations, recently admitted for embolization and discharged with Vancomycin and Meropenem. picc line has been leaking recently and developed redness around picc line site. visitinr nurse came today, and pulled back picc lisghtly, which revealed a hole in th epicc line. karoline reports subjective fever once over past few days. ptherwise having chronic pain to heel.

## 2023-10-24 NOTE — H&P ADULT - PROBLEM SELECTOR PLAN 4
Patient on nebivolol 10 mg BID at home, is compliant with medications. Was hypertensive to the 200's SBP in the ED, improved with pain control. Received tx interchange Lopressor 100 mg BID in hospital prior  - c/w lopressor 100mg BID

## 2023-10-24 NOTE — ED PROVIDER NOTE - CLINICAL SUMMARY MEDICAL DECISION MAKING FREE TEXT BOX
well appearingk, initial hypertension trending down. home abx that were not amdinistered due to malfunctioning picc line ordered. dressing of foot not taken down as foot war wrapped by vascualr this AM, per photo provided by karoline clean margins on wound. wilil admit for picc line placement

## 2023-10-24 NOTE — ED ADULT NURSE NOTE - NSFALLRISKINTERV_ED_ALL_ED

## 2023-10-24 NOTE — ED PROVIDER NOTE - PHYSICAL EXAMINATION
General: Awake, alert and oriented. No acute distress. Appears stated age.   Skin: Skin in warm, dry and intact without rashes or lesions. Appropriate color for ethnicity  HENMT: head normocephalic and atraumatic; bilateral external ears without swelling. no nasal discharge. moist oral mucosa. supple neck, trachea midline  EYES: Conjunctiva clear. nonicteric sclera. EOM intact, Eyelids are normal in appearance without swelling or lesions.  Cardiac: well perfused  Respiratory: breathing comfortably on room air. no audible wheezing or stridor  Abdominal: nondistended  MSK: left upper extremity picc site with minimal erythema, nontender. no swelling. left foot bandaged, dressing cdi  Neurological: The patient is awake, alert and oriented to person, place, and time with normal speech. CN 2-12 grossly intact. no apparent deficits. Memory is normal and thought process is intact. No gait abnormalities are appreciated.   Psychiatric: Appropriate mood and affect. Good judgement and insight

## 2023-10-24 NOTE — H&P ADULT - ASSESSMENT
38F hx AVM of  hypertension, chronic L foot AVM s/p multiple (~20 interventions with Dr. Teran) and chronic and recurrent superimposed infections and embolizations, admitted 3 weeks ago for embolization and purulence despite antibiotic therapy, dc'd with vancomycin and meropenem via PICC line (last day 11/20/23), presenting with after PICC leakage with subjective fever and skin irritation, admitted for management.

## 2023-10-24 NOTE — H&P ADULT - PROBLEM SELECTOR PLAN 5
F: s/p 1L NS in ED   E: Replete as necessary K>4 Mg>2  N: Full diet   DVT Prophylaxis: Lovenox 40mg daily   GI prophylaxis: None   CODE STATUS: FULL  DISPO: [facility] F: s/p 1L NS in ED   E: Replete as necessary K>4 Mg>2  N: Full diet   DVT Prophylaxis: scds   GI prophylaxis: None   CODE STATUS: FULL  DISPO: [facility]

## 2023-10-24 NOTE — H&P ADULT - LYMPHATIC
Medication: Atorvastatin 40 mg 1 tab po daily  Last office visit date: 9/19/2023  Next appointment scheduled?: Yes 12/21/2023  Number of refills given: 2  
No lymphadedenopathy

## 2023-10-24 NOTE — H&P ADULT - ATTENDING COMMENTS
#PICC malfunction: p/w leaking picc line w/ mild erythema and pruitiis. No edema/warmth. Afebrile in ED. Low c/f cellultitis. F/up LUE doppler, blood cx. c/w home vanc/meropenem. ID recs, IR consult for PICC      #LLE AVM: c/b chronic ulcer and recurrent superimposed infections.  Per Id recs c/w vanc/meropenem until 11/20. Serial exams. Wound care. ID consult

## 2023-10-24 NOTE — ED ADULT TRIAGE NOTE - CHIEF COMPLAINT QUOTE
Pt sent by MD Rahman for PICC line issue. Per pt, LUE PICC has been "leaking" for 1 week, LUE pain today, fever/chills x last night. Pt receiving abx for L foot ulcer.

## 2023-10-24 NOTE — H&P ADULT - PROBLEM SELECTOR PLAN 2
L foot AVMs s/p 23 prior embolizations. Follows with Dr. Teran. S/p embolization with Dr. Teran on 9/19. Has been on vancomycin and meropenem until 11/20 until PICC leaked, removed on 10/24.     Pain control:  - Gabapentin 100mg in the morning + 800mg at 4PM    - Oxycodone IR 10 for moderate pain, Dilaudid 2mg PO for severe breakthrough pain q6  Treatment  - c/w Vancomycin 1250mg q8 and Meropenem 2g q8 x 1 dose overnight  - ID consult in AM L foot AVMs s/p 23 prior embolizations. Follows with Dr. Teran. S/p embolization with Dr. Teran on 9/19. Has been on vancomycin and meropenem until 11/20 until PICC leaked, removed on 10/24.     Pain control:  - Gabapentin 100mg in the morning + 800mg at 4PM    - [id number] for moderate pain standing, Dilaudid 0.5mg for severe breakthrough pain q6  Treatment  - c/w Vancomycin 1250mg q8 and Meropenem 2g q8 x 1 dose overnight  - ID consult in AM

## 2023-10-24 NOTE — H&P ADULT - NSHPLABSRESULTS_GEN_ALL_CORE
LABS:                         11.4   10.84 )-----------( 501      ( 24 Oct 2023 17:03 )             34.3     10-24    135  |  102  |  10  ----------------------------<  98  4.6   |  23  |  0.64    Ca    9.2      24 Oct 2023 17:03        Urinalysis Basic - ( 24 Oct 2023 17:03 )    Color: x / Appearance: x / SG: x / pH: x  Gluc: 98 mg/dL / Ketone: x  / Bili: x / Urobili: x   Blood: x / Protein: x / Nitrite: x   Leuk Esterase: x / RBC: x / WBC x   Sq Epi: x / Non Sq Epi: x / Bacteria: x                RADIOLOGY, EKG & ADDITIONAL TESTS:

## 2023-10-24 NOTE — ED ADULT NURSE REASSESSMENT NOTE - NS ED NURSE REASSESS COMMENT FT1
pt resting on stretcher unlabored respirations noted VSS vanco infusing pending meropenem upon completion.

## 2023-10-24 NOTE — H&P ADULT - HISTORY OF PRESENT ILLNESS
38F hx AVM of  hypertension, chronic L foot AVM s/p multiple (~20 interventions with Dr. Teran) and chronic and recurrent superimposed infections and embolizations, admitted 3 weeks ago for embolization and purulence despite antibiotic therapy, dc'd with vancomycin and meropenem via PICC line (last day 11/20/23), presenting with a leaking PICC line, skin redness around picc line site, and subjective fever x 1 day. Patient endorses feeling the leak start Thursday. She saw Dr. Rahman on Friday but did not mention it as it wasn't too serious at the time. However over the weekend she felt the leaking got worse, and the skin became reddened and appeared irritated along the site. This morning she was seen by a visitng nurse who pulled the line and recommended she go to [facility].    Patient also endorses chronic pain of her L. AVM ulcer. She was seen by vascular surgery yesterday, who recommended local wound care, however she was unable to pick it up before going to the hospital.     ED Course  T99 HR87-91 /114 -> 156/72 RR16 Sat 97% on RA.   Labs: WBC 10.8, Platelets 501 (400-600 last admission)  10/6/23 BCx: NGTD  Interventions: Meropenem 2g, vancomycin 1250mg, dilaudid 1mg    Endorses: Fever, Chills  Denies: SOB, Chest pain, Abdominal Pain, dysuria, diarrhea constipation.

## 2023-10-25 ENCOUNTER — TRANSCRIPTION ENCOUNTER (OUTPATIENT)
Age: 38
End: 2023-10-25

## 2023-10-25 DIAGNOSIS — T82.898A OTHER SPECIFIED COMPLICATION OF VASCULAR PROSTHETIC DEVICES, IMPLANTS AND GRAFTS, INITIAL ENCOUNTER: ICD-10-CM

## 2023-10-25 LAB
ALBUMIN SERPL ELPH-MCNC: 3.3 G/DL — SIGNIFICANT CHANGE UP (ref 3.3–5)
ALBUMIN SERPL ELPH-MCNC: 3.3 G/DL — SIGNIFICANT CHANGE UP (ref 3.3–5)
ALP SERPL-CCNC: 67 U/L — SIGNIFICANT CHANGE UP (ref 40–120)
ALP SERPL-CCNC: 67 U/L — SIGNIFICANT CHANGE UP (ref 40–120)
ALT FLD-CCNC: 9 U/L — LOW (ref 10–45)
ALT FLD-CCNC: 9 U/L — LOW (ref 10–45)
ANION GAP SERPL CALC-SCNC: 11 MMOL/L — SIGNIFICANT CHANGE UP (ref 5–17)
ANION GAP SERPL CALC-SCNC: 11 MMOL/L — SIGNIFICANT CHANGE UP (ref 5–17)
AST SERPL-CCNC: 10 U/L — SIGNIFICANT CHANGE UP (ref 10–40)
AST SERPL-CCNC: 10 U/L — SIGNIFICANT CHANGE UP (ref 10–40)
BASOPHILS # BLD AUTO: 0.04 K/UL — SIGNIFICANT CHANGE UP (ref 0–0.2)
BASOPHILS # BLD AUTO: 0.04 K/UL — SIGNIFICANT CHANGE UP (ref 0–0.2)
BASOPHILS NFR BLD AUTO: 0.4 % — SIGNIFICANT CHANGE UP (ref 0–2)
BASOPHILS NFR BLD AUTO: 0.4 % — SIGNIFICANT CHANGE UP (ref 0–2)
BILIRUB SERPL-MCNC: 0.8 MG/DL — SIGNIFICANT CHANGE UP (ref 0.2–1.2)
BILIRUB SERPL-MCNC: 0.8 MG/DL — SIGNIFICANT CHANGE UP (ref 0.2–1.2)
BUN SERPL-MCNC: 10 MG/DL — SIGNIFICANT CHANGE UP (ref 7–23)
BUN SERPL-MCNC: 10 MG/DL — SIGNIFICANT CHANGE UP (ref 7–23)
CALCIUM SERPL-MCNC: 8.8 MG/DL — SIGNIFICANT CHANGE UP (ref 8.4–10.5)
CALCIUM SERPL-MCNC: 8.8 MG/DL — SIGNIFICANT CHANGE UP (ref 8.4–10.5)
CHLORIDE SERPL-SCNC: 103 MMOL/L — SIGNIFICANT CHANGE UP (ref 96–108)
CHLORIDE SERPL-SCNC: 103 MMOL/L — SIGNIFICANT CHANGE UP (ref 96–108)
CO2 SERPL-SCNC: 23 MMOL/L — SIGNIFICANT CHANGE UP (ref 22–31)
CO2 SERPL-SCNC: 23 MMOL/L — SIGNIFICANT CHANGE UP (ref 22–31)
CREAT SERPL-MCNC: 0.64 MG/DL — SIGNIFICANT CHANGE UP (ref 0.5–1.3)
CREAT SERPL-MCNC: 0.64 MG/DL — SIGNIFICANT CHANGE UP (ref 0.5–1.3)
EGFR: 114 ML/MIN/1.73M2 — SIGNIFICANT CHANGE UP
EGFR: 114 ML/MIN/1.73M2 — SIGNIFICANT CHANGE UP
EOSINOPHIL # BLD AUTO: 0.11 K/UL — SIGNIFICANT CHANGE UP (ref 0–0.5)
EOSINOPHIL # BLD AUTO: 0.11 K/UL — SIGNIFICANT CHANGE UP (ref 0–0.5)
EOSINOPHIL NFR BLD AUTO: 1.2 % — SIGNIFICANT CHANGE UP (ref 0–6)
EOSINOPHIL NFR BLD AUTO: 1.2 % — SIGNIFICANT CHANGE UP (ref 0–6)
GLUCOSE SERPL-MCNC: 94 MG/DL — SIGNIFICANT CHANGE UP (ref 70–99)
GLUCOSE SERPL-MCNC: 94 MG/DL — SIGNIFICANT CHANGE UP (ref 70–99)
HCT VFR BLD CALC: 31.4 % — LOW (ref 34.5–45)
HCT VFR BLD CALC: 31.4 % — LOW (ref 34.5–45)
HGB BLD-MCNC: 10.5 G/DL — LOW (ref 11.5–15.5)
HGB BLD-MCNC: 10.5 G/DL — LOW (ref 11.5–15.5)
IMM GRANULOCYTES NFR BLD AUTO: 0.4 % — SIGNIFICANT CHANGE UP (ref 0–0.9)
IMM GRANULOCYTES NFR BLD AUTO: 0.4 % — SIGNIFICANT CHANGE UP (ref 0–0.9)
INR BLD: 1.07 — SIGNIFICANT CHANGE UP (ref 0.85–1.18)
INR BLD: 1.07 — SIGNIFICANT CHANGE UP (ref 0.85–1.18)
LYMPHOCYTES # BLD AUTO: 2.15 K/UL — SIGNIFICANT CHANGE UP (ref 1–3.3)
LYMPHOCYTES # BLD AUTO: 2.15 K/UL — SIGNIFICANT CHANGE UP (ref 1–3.3)
LYMPHOCYTES # BLD AUTO: 22.9 % — SIGNIFICANT CHANGE UP (ref 13–44)
LYMPHOCYTES # BLD AUTO: 22.9 % — SIGNIFICANT CHANGE UP (ref 13–44)
MAGNESIUM SERPL-MCNC: 1.8 MG/DL — SIGNIFICANT CHANGE UP (ref 1.6–2.6)
MAGNESIUM SERPL-MCNC: 1.8 MG/DL — SIGNIFICANT CHANGE UP (ref 1.6–2.6)
MCHC RBC-ENTMCNC: 27.6 PG — SIGNIFICANT CHANGE UP (ref 27–34)
MCHC RBC-ENTMCNC: 27.6 PG — SIGNIFICANT CHANGE UP (ref 27–34)
MCHC RBC-ENTMCNC: 33.4 GM/DL — SIGNIFICANT CHANGE UP (ref 32–36)
MCHC RBC-ENTMCNC: 33.4 GM/DL — SIGNIFICANT CHANGE UP (ref 32–36)
MCV RBC AUTO: 82.4 FL — SIGNIFICANT CHANGE UP (ref 80–100)
MCV RBC AUTO: 82.4 FL — SIGNIFICANT CHANGE UP (ref 80–100)
MONOCYTES # BLD AUTO: 0.75 K/UL — SIGNIFICANT CHANGE UP (ref 0–0.9)
MONOCYTES # BLD AUTO: 0.75 K/UL — SIGNIFICANT CHANGE UP (ref 0–0.9)
MONOCYTES NFR BLD AUTO: 8 % — SIGNIFICANT CHANGE UP (ref 2–14)
MONOCYTES NFR BLD AUTO: 8 % — SIGNIFICANT CHANGE UP (ref 2–14)
NEUTROPHILS # BLD AUTO: 6.31 K/UL — SIGNIFICANT CHANGE UP (ref 1.8–7.4)
NEUTROPHILS # BLD AUTO: 6.31 K/UL — SIGNIFICANT CHANGE UP (ref 1.8–7.4)
NEUTROPHILS NFR BLD AUTO: 67.1 % — SIGNIFICANT CHANGE UP (ref 43–77)
NEUTROPHILS NFR BLD AUTO: 67.1 % — SIGNIFICANT CHANGE UP (ref 43–77)
NRBC # BLD: 0 /100 WBCS — SIGNIFICANT CHANGE UP (ref 0–0)
NRBC # BLD: 0 /100 WBCS — SIGNIFICANT CHANGE UP (ref 0–0)
PHOSPHATE SERPL-MCNC: 2.7 MG/DL — SIGNIFICANT CHANGE UP (ref 2.5–4.5)
PHOSPHATE SERPL-MCNC: 2.7 MG/DL — SIGNIFICANT CHANGE UP (ref 2.5–4.5)
PLATELET # BLD AUTO: 457 K/UL — HIGH (ref 150–400)
PLATELET # BLD AUTO: 457 K/UL — HIGH (ref 150–400)
POTASSIUM SERPL-MCNC: 3.8 MMOL/L — SIGNIFICANT CHANGE UP (ref 3.5–5.3)
POTASSIUM SERPL-MCNC: 3.8 MMOL/L — SIGNIFICANT CHANGE UP (ref 3.5–5.3)
POTASSIUM SERPL-SCNC: 3.8 MMOL/L — SIGNIFICANT CHANGE UP (ref 3.5–5.3)
POTASSIUM SERPL-SCNC: 3.8 MMOL/L — SIGNIFICANT CHANGE UP (ref 3.5–5.3)
PROT SERPL-MCNC: 6.6 G/DL — SIGNIFICANT CHANGE UP (ref 6–8.3)
PROT SERPL-MCNC: 6.6 G/DL — SIGNIFICANT CHANGE UP (ref 6–8.3)
PROTHROM AB SERPL-ACNC: 12.2 SEC — SIGNIFICANT CHANGE UP (ref 9.5–13)
PROTHROM AB SERPL-ACNC: 12.2 SEC — SIGNIFICANT CHANGE UP (ref 9.5–13)
RBC # BLD: 3.81 M/UL — SIGNIFICANT CHANGE UP (ref 3.8–5.2)
RBC # BLD: 3.81 M/UL — SIGNIFICANT CHANGE UP (ref 3.8–5.2)
RBC # FLD: 13.7 % — SIGNIFICANT CHANGE UP (ref 10.3–14.5)
RBC # FLD: 13.7 % — SIGNIFICANT CHANGE UP (ref 10.3–14.5)
SODIUM SERPL-SCNC: 137 MMOL/L — SIGNIFICANT CHANGE UP (ref 135–145)
SODIUM SERPL-SCNC: 137 MMOL/L — SIGNIFICANT CHANGE UP (ref 135–145)
WBC # BLD: 9.4 K/UL — SIGNIFICANT CHANGE UP (ref 3.8–10.5)
WBC # BLD: 9.4 K/UL — SIGNIFICANT CHANGE UP (ref 3.8–10.5)
WBC # FLD AUTO: 9.4 K/UL — SIGNIFICANT CHANGE UP (ref 3.8–10.5)
WBC # FLD AUTO: 9.4 K/UL — SIGNIFICANT CHANGE UP (ref 3.8–10.5)

## 2023-10-25 PROCEDURE — 93970 EXTREMITY STUDY: CPT | Mod: 26

## 2023-10-25 PROCEDURE — 36569 INSJ PICC 5 YR+ W/O IMAGING: CPT

## 2023-10-25 PROCEDURE — 99233 SBSQ HOSP IP/OBS HIGH 50: CPT | Mod: GC

## 2023-10-25 RX ORDER — INFLUENZA VIRUS VACCINE 15; 15; 15; 15 UG/.5ML; UG/.5ML; UG/.5ML; UG/.5ML
0.5 SUSPENSION INTRAMUSCULAR ONCE
Refills: 0 | Status: DISCONTINUED | OUTPATIENT
Start: 2023-10-25 | End: 2023-10-26

## 2023-10-25 RX ORDER — MEROPENEM 1 G/30ML
2000 INJECTION INTRAVENOUS ONCE
Refills: 0 | Status: COMPLETED | OUTPATIENT
Start: 2023-10-25 | End: 2023-10-25

## 2023-10-25 RX ORDER — HYDROMORPHONE HYDROCHLORIDE 2 MG/ML
0.5 INJECTION INTRAMUSCULAR; INTRAVENOUS; SUBCUTANEOUS EVERY 6 HOURS
Refills: 0 | Status: DISCONTINUED | OUTPATIENT
Start: 2023-10-25 | End: 2023-10-26

## 2023-10-25 RX ORDER — VANCOMYCIN HCL 1 G
1250 VIAL (EA) INTRAVENOUS EVERY 8 HOURS
Refills: 0 | Status: COMPLETED | OUTPATIENT
Start: 2023-10-25 | End: 2023-10-25

## 2023-10-25 RX ORDER — MEROPENEM 1 G/30ML
2000 INJECTION INTRAVENOUS EVERY 8 HOURS
Refills: 0 | Status: COMPLETED | OUTPATIENT
Start: 2023-10-25 | End: 2023-10-25

## 2023-10-25 RX ORDER — HYDROMORPHONE HYDROCHLORIDE 2 MG/ML
0.5 INJECTION INTRAMUSCULAR; INTRAVENOUS; SUBCUTANEOUS EVERY 6 HOURS
Refills: 0 | Status: DISCONTINUED | OUTPATIENT
Start: 2023-10-25 | End: 2023-10-25

## 2023-10-25 RX ORDER — GABAPENTIN 400 MG/1
1 CAPSULE ORAL
Qty: 0 | Refills: 0 | DISCHARGE
Start: 2023-10-25

## 2023-10-25 RX ORDER — VANCOMYCIN HCL 1 G
1250 VIAL (EA) INTRAVENOUS EVERY 8 HOURS
Refills: 0 | Status: DISCONTINUED | OUTPATIENT
Start: 2023-10-25 | End: 2023-10-26

## 2023-10-25 RX ORDER — OXYCODONE HYDROCHLORIDE 5 MG/1
10 TABLET ORAL EVERY 6 HOURS
Refills: 0 | Status: DISCONTINUED | OUTPATIENT
Start: 2023-10-25 | End: 2023-10-26

## 2023-10-25 RX ORDER — OXYCODONE HYDROCHLORIDE 5 MG/1
5 TABLET ORAL EVERY 6 HOURS
Refills: 0 | Status: DISCONTINUED | OUTPATIENT
Start: 2023-10-25 | End: 2023-10-26

## 2023-10-25 RX ORDER — MEROPENEM 1 G/30ML
2000 INJECTION INTRAVENOUS EVERY 8 HOURS
Refills: 0 | Status: COMPLETED | OUTPATIENT
Start: 2023-10-25 | End: 2023-10-26

## 2023-10-25 RX ORDER — NEBIVOLOL HYDROCHLORIDE 5 MG/1
10 TABLET ORAL
Refills: 0 | Status: DISCONTINUED | OUTPATIENT
Start: 2023-10-25 | End: 2023-10-26

## 2023-10-25 RX ORDER — HYDROMORPHONE HYDROCHLORIDE 2 MG/ML
0.5 INJECTION INTRAMUSCULAR; INTRAVENOUS; SUBCUTANEOUS ONCE
Refills: 0 | Status: DISCONTINUED | OUTPATIENT
Start: 2023-10-25 | End: 2023-10-25

## 2023-10-25 RX ORDER — ACETAMINOPHEN 500 MG
650 TABLET ORAL EVERY 6 HOURS
Refills: 0 | Status: DISCONTINUED | OUTPATIENT
Start: 2023-10-25 | End: 2023-10-26

## 2023-10-25 RX ORDER — COLLAGENASE CLOSTRIDIUM HIST. 250 UNIT/G
1 OINTMENT (GRAM) TOPICAL DAILY
Refills: 0 | Status: DISCONTINUED | OUTPATIENT
Start: 2023-10-25 | End: 2023-10-26

## 2023-10-25 RX ADMIN — MEROPENEM 280 MILLIGRAM(S): 1 INJECTION INTRAVENOUS at 11:46

## 2023-10-25 RX ADMIN — Medication 100 MILLIGRAM(S): at 06:22

## 2023-10-25 RX ADMIN — Medication 166.67 MILLIGRAM(S): at 03:48

## 2023-10-25 RX ADMIN — OXYCODONE HYDROCHLORIDE 5 MILLIGRAM(S): 5 TABLET ORAL at 15:25

## 2023-10-25 RX ADMIN — HYDROMORPHONE HYDROCHLORIDE 0.5 MILLIGRAM(S): 2 INJECTION INTRAMUSCULAR; INTRAVENOUS; SUBCUTANEOUS at 13:00

## 2023-10-25 RX ADMIN — NEBIVOLOL HYDROCHLORIDE 10 MILLIGRAM(S): 5 TABLET ORAL at 19:38

## 2023-10-25 RX ADMIN — HYDROMORPHONE HYDROCHLORIDE 0.5 MILLIGRAM(S): 2 INJECTION INTRAMUSCULAR; INTRAVENOUS; SUBCUTANEOUS at 19:38

## 2023-10-25 RX ADMIN — HYDROMORPHONE HYDROCHLORIDE 0.5 MILLIGRAM(S): 2 INJECTION INTRAMUSCULAR; INTRAVENOUS; SUBCUTANEOUS at 19:54

## 2023-10-25 RX ADMIN — MEROPENEM 280 MILLIGRAM(S): 1 INJECTION INTRAVENOUS at 02:39

## 2023-10-25 RX ADMIN — HYDROMORPHONE HYDROCHLORIDE 0.5 MILLIGRAM(S): 2 INJECTION INTRAMUSCULAR; INTRAVENOUS; SUBCUTANEOUS at 12:42

## 2023-10-25 RX ADMIN — HYDROMORPHONE HYDROCHLORIDE 0.5 MILLIGRAM(S): 2 INJECTION INTRAMUSCULAR; INTRAVENOUS; SUBCUTANEOUS at 00:11

## 2023-10-25 RX ADMIN — OXYCODONE HYDROCHLORIDE 5 MILLIGRAM(S): 5 TABLET ORAL at 14:39

## 2023-10-25 RX ADMIN — Medication 650 MILLIGRAM(S): at 12:10

## 2023-10-25 RX ADMIN — OXYCODONE HYDROCHLORIDE 10 MILLIGRAM(S): 5 TABLET ORAL at 11:13

## 2023-10-25 RX ADMIN — MEROPENEM 280 MILLIGRAM(S): 1 INJECTION INTRAVENOUS at 19:39

## 2023-10-25 RX ADMIN — HYDROMORPHONE HYDROCHLORIDE 0.5 MILLIGRAM(S): 2 INJECTION INTRAMUSCULAR; INTRAVENOUS; SUBCUTANEOUS at 03:33

## 2023-10-25 RX ADMIN — Medication 650 MILLIGRAM(S): at 19:38

## 2023-10-25 RX ADMIN — Medication 650 MILLIGRAM(S): at 11:13

## 2023-10-25 RX ADMIN — HYDROMORPHONE HYDROCHLORIDE 0.5 MILLIGRAM(S): 2 INJECTION INTRAMUSCULAR; INTRAVENOUS; SUBCUTANEOUS at 16:53

## 2023-10-25 RX ADMIN — Medication 1 APPLICATION(S): at 14:05

## 2023-10-25 RX ADMIN — HYDROMORPHONE HYDROCHLORIDE 0.5 MILLIGRAM(S): 2 INJECTION INTRAMUSCULAR; INTRAVENOUS; SUBCUTANEOUS at 09:10

## 2023-10-25 RX ADMIN — GABAPENTIN 100 MILLIGRAM(S): 400 CAPSULE ORAL at 07:09

## 2023-10-25 RX ADMIN — Medication 166.67 MILLIGRAM(S): at 10:05

## 2023-10-25 RX ADMIN — GABAPENTIN 800 MILLIGRAM(S): 400 CAPSULE ORAL at 14:05

## 2023-10-25 RX ADMIN — HYDROMORPHONE HYDROCHLORIDE 0.5 MILLIGRAM(S): 2 INJECTION INTRAMUSCULAR; INTRAVENOUS; SUBCUTANEOUS at 08:50

## 2023-10-25 RX ADMIN — OXYCODONE HYDROCHLORIDE 10 MILLIGRAM(S): 5 TABLET ORAL at 12:10

## 2023-10-25 RX ADMIN — HYDROMORPHONE HYDROCHLORIDE 0.5 MILLIGRAM(S): 2 INJECTION INTRAMUSCULAR; INTRAVENOUS; SUBCUTANEOUS at 02:38

## 2023-10-25 RX ADMIN — OXYCODONE HYDROCHLORIDE 10 MILLIGRAM(S): 5 TABLET ORAL at 21:53

## 2023-10-25 RX ADMIN — HYDROMORPHONE HYDROCHLORIDE 0.5 MILLIGRAM(S): 2 INJECTION INTRAMUSCULAR; INTRAVENOUS; SUBCUTANEOUS at 17:11

## 2023-10-25 RX ADMIN — OXYCODONE HYDROCHLORIDE 10 MILLIGRAM(S): 5 TABLET ORAL at 22:53

## 2023-10-25 NOTE — DISCHARGE NOTE NURSING/CASE MANAGEMENT/SOCIAL WORK - NSDCPEFALRISK_GEN_ALL_CORE
For information on Fall & Injury Prevention, visit: [email or web address] OR  [email or web address] OR  https://www.cdc.gov/steadi/patient.html

## 2023-10-25 NOTE — DISCHARGE NOTE PROVIDER - PROVIDER TOKENS
FREE:[LAST:[Will],FIRST:[Perico],PHONE:[phone]],FAX:[(   [phone]    -],ADDRESS:[[address] [place], [place] [address]  [place] [id number]],SCHEDULEDAPPT:[10/30/2023],SCHEDULEDAPPTTIME:[04:30 PM],ESTABLISHEDPATIENT:[T]]

## 2023-10-25 NOTE — CHART NOTE - NSCHARTNOTEFT_GEN_A_CORE
Ms. Kearney is a patient of Dr. Bright. She was sent to ED for PICC line malfunction. It is ok to exchange PICC line.       Leah Bailey, Infectious Diseases PA  Please reach out for any questions 9 am-5pm. For evenings and weekends, please call the ID physician on call.  Work cell: [phone] Ms. Kearney is a patient of Dr. Bright. She was sent to ED for PICC line malfunction. It is ok to exchange PICC line. Tentative end date of antibiotics is 11/20.       Leah Bailey, Infectious Diseases PA  Please reach out for any questions 9 am-5pm. For evenings and weekends, please call the ID physician on call.  Work cell: [phone]

## 2023-10-25 NOTE — DISCHARGE NOTE PROVIDER - CARE PROVIDER_API CALL
Perico Solis  [address]  [id number]  [place] [address]  Phone: [phone]  Fax: (   )    -  Established Patient  Scheduled Appointment: 10/30/2023 04:30 PM

## 2023-10-25 NOTE — DISCHARGE NOTE PROVIDER - ATTENDING DISCHARGE PHYSICAL EXAMINATION:
General: in no acute distress  Eyes: EOMI intact bilaterally.   Lungs: CTA B/L.   Cardiovascular: RRR. No murmurs, rubs, or gallops  Abdomen: Soft, non-tender non-distended  Extremities: right upper arm PICC no pain, swelling or erythema  MSK: 5/5  strength  Neurological: Alert and oriented x3

## 2023-10-25 NOTE — PROGRESS NOTE ADULT - ATTENDING COMMENTS
38F hx AVM of  hypertension, chronic L foot AVM s/p multiple (~20 interventions with Dr. Teran) and chronic and recurrent superimposed infections and embolizations, admitted 3 weeks ago for embolization and purulence despite antibiotic therapy, dc'd with vancomycin and meropenem via PICC line (last day 11/20/23), presenting with after PICC leakage with subjective fever and skin irritation, admitted for PICC replacement    Labs and imaging reviewed    Problem List  #Chronic L foot AVM  #Superimposed Infections  #PICC Malfunction  #HTN    Plan  -Failed bedside PICC, IR Consult placed  -Continue home Meorpenem and Vanc as per ID  -Dopplers b/l upper extremity  Anticipate D/C when PICC successful

## 2023-10-25 NOTE — DISCHARGE NOTE PROVIDER - NSDCMRMEDTOKEN_GEN_ALL_CORE_FT
gabapentin 100 mg oral capsule: 1 cap(s) orally every 24 hours  gabapentin 800 mg oral tablet: 1 tab(s) orally once a day  meropenem 1000 mg intravenous injection: 2000 milligram(s) intravenous every 8 hours  nebivolol 10 mg oral tablet: 1 tab(s) orally 2 times a day  Percocet 10 mg-325 mg oral tablet: 1 tab(s) orally 5 times a day as needed for  severe pain  vancomycin 1.25 g intravenous injection: 1.25 gram(s) intravenously every 8 hours   Rakel Martínez)  Urology  [address] [place] [address], [place] [address]  Phone: [phone]  Fax: [phone]  Follow Up Time: 1 week gabapentin 100 mg oral capsule: 1 cap(s) orally  gabapentin 800 mg oral tablet: 1 tab(s) orally  meropenem 1000 mg intravenous injection: 2000 milligram(s) intravenous every 8 hours  nebivolol 10 mg oral tablet: 1 tab(s) orally 2 times a day  Percocet 10 mg-325 mg oral tablet: 1 tab(s) orally 5 times a day as needed for  severe pain  vancomycin 1.25 g intravenous injection: 1.25 gram(s) intravenously every 8 hours   collagenase 250 units/g topical ointment: Apply topically to affected area once a day 1 Apply topically to affected area once a day  gabapentin 100 mg oral capsule: 1 cap(s) orally once a day Take in the morning  gabapentin 800 mg oral tablet: 1 tab(s) orally once a day (at bedtime)  meropenem 1000 mg intravenous injection: 2000 milligram(s) intravenous every 8 hours  nebivolol 10 mg oral tablet: 1 tab(s) orally 2 times a day  Percocet 10 mg-325 mg oral tablet: 1 tab(s) orally 5 times a day as needed for  severe pain  vancomycin 1.25 g intravenous injection: 1.25 gram(s) intravenously every 8 hours

## 2023-10-25 NOTE — ADVANCED PRACTICE NURSE CONSULT - REASON FOR CONSULT
Left foot wound    Per chart review, 38F hx AVM of  hypertension, chronic L foot AVM s/p multiple (~20 interventions with Dr. Teran) and chronic and recurrent superimposed infections and embolizations, admitted 3 weeks ago for embolization and purulence despite antibiotic therapy, dc'd with vancomycin and meropenem via PICC line (last day 11/20/23), presenting with after PICC leakage with subjective fever and skin irritation, admitted for management.

## 2023-10-25 NOTE — PATIENT PROFILE ADULT - NSTOBACCONEVERSMOKERY/N_GEN_A
No verbalizes understanding/demonstrates understanding of teaching/good return demonstration/needs met Obtained about 2-3 ml's of colostrum with hand expression. Aware of LC availability./verbalizes understanding/demonstrates understanding of teaching/good return demonstration/needs met

## 2023-10-25 NOTE — ADVANCED PRACTICE NURSE CONSULT - ASSESSMENT
Pt known to me from previuos admissions. Has f/up outatient with Dr Teran for AVM. Mom does dressing changes at home. Pt reports recently was prescribed Santyl but has not started yet (just picked it up yesterday).    Left lateral foot, full thickness wound: 2x1cm with 100% thick dry yellow slough. Periwound intact. No drainage. Foot warm, dry. No signs of active infection.

## 2023-10-25 NOTE — DISCHARGE NOTE PROVIDER - NSDCCPCAREPLAN_GEN_ALL_CORE_FT
PRINCIPAL DISCHARGE DIAGNOSIS  Diagnosis: Other complication due to venous access device  Assessment and Plan of Treatment: You were admitted for PICC line replacement for IV antibiotics. We performed an upper extremity ultrasound, which showed _______. PICC was placed on ______ for IV antibiotics for course completion on 11/20. Please follow up with Dr. aRhman.  Please follow up with Dr. Solis on 10/30.     PRINCIPAL DISCHARGE DIAGNOSIS  Diagnosis: Other complication due to venous access device  Assessment and Plan of Treatment: You were admitted for PICC line replacement for IV antibiotics. We performed an upper extremity ultrasound, which showed _______. PICC was placed on 10/25 for IV antibiotics for course completion of vancomycin and meropenem on 11/20. Please follow up with Dr. Rahman.  Please follow up with Dr. Solis on 10/30.     PRINCIPAL DISCHARGE DIAGNOSIS  Diagnosis: Other complication due to venous access device  Assessment and Plan of Treatment: You were admitted for PICC line replacement for IV antibiotics. We performed an upper extremity ultrasound, which showed _______. PICC was placed on ______for IV antibiotics for course completion of vancomycin and meropenem on 11/20. Please follow up with Dr. Rahman.  Please follow up with Dr. Solis on 10/30.     PRINCIPAL DISCHARGE DIAGNOSIS  Diagnosis: Other complication due to venous access device  Assessment and Plan of Treatment: You were admitted for PICC line replacement for IV antibiotics. We performed an upper extremity ultrasound, which showed a R superficial cephalic vein clot. PICC was placed on 10/26 by IR for IV antibiotics for course completion of vancomycin and meropenem on 11/20. Please follow up with Dr. Rahman.  Wound care saw you in patient to change your ankle dressining. Instructions include: cleanse with normal saline, dry well. Apply THICK layer of Collagenase, cover with normal saline moist 2x2 gauze, cover with dry gauze. Wrap with Misa. Change daily  Please follow up with Dr. Solis on 10/30.     PRINCIPAL DISCHARGE DIAGNOSIS  Diagnosis: Other complication due to venous access device  Assessment and Plan of Treatment: You were admitted for PICC line replacement for IV antibiotics. We performed an upper extremity ultrasound, which showed no evidence of deep venous thrombosis in either upper extremity. Superficial thrombosis of the RIGHT cephalic vein now extending from   the level of the mid arm to the proximal forearm. No change superficial thrombosis of the LEFT basilic vein. PICC was placed on 10/26 by IR for IV antibiotics for course completion of vancomycin and meropenem on 11/20. Please follow up with Dr. Rahman.  Wound care saw you in patient to change your ankle dressining. Instructions include: cleanse with normal saline, dry well. Apply THICK layer of Collagenase, cover with normal saline moist 2x2 gauze, cover with dry gauze. Wrap with Misa. Change daily  Please follow up with Dr. Solis on 10/30.

## 2023-10-25 NOTE — PATIENT PROFILE ADULT - FALL HARM RISK - HARM RISK INTERVENTIONS

## 2023-10-25 NOTE — CHART NOTE - NSCHARTNOTEFT_GEN_A_CORE
Patient positioned and draped in procedural fashion.    Left upper extremity, attempted access of basilic vein; unable to thread wire. No cephalic or brachial veins viable.    Right upper extremity, attempted access- no viable veins to pass wire through.    Patient will need to have picc placed in IR suite, as bedside picc line placement was unsuccessful.

## 2023-10-25 NOTE — DISCHARGE NOTE PROVIDER - NSDCFUSCHEDAPPT_GEN_ALL_CORE_FT
Gwyn Waters  [facility]  VASCULAR [address]  Scheduled Appointment: 11/07/2023

## 2023-10-25 NOTE — DISCHARGE NOTE NURSING/CASE MANAGEMENT/SOCIAL WORK - NSDCVIVACCINE_GEN_ALL_CORE_FT
influenza, injectable, quadrivalent, preservative free; 22-Oct-2020 10:12; Miley Hebert (RN); [employer]; [id number] (Exp. Date: 30-Jun-2021); IntraMuscular; Deltoid Left.; 0.5 milliLiter(s); VIS (VIS Published: 15-Aug-2019, VIS Presented: 22-Oct-2020);   influenza, injectable, quadrivalent, preservative free; 14-Oct-2021 09:19; Margie Taveras (RN); [employer]; [id number] (Exp. Date: 30-Jun-2022); IntraMuscular; Deltoid Right.; 0.5 milliLiter(s); VIS (VIS Published: 06-Aug-2021, VIS Presented: 14-Oct-2021);   influenza, injectable, quadrivalent, preservative free; 09-Dec-2022 14:43; Melinda Sesay (RN); [employer]; [id number] (Exp. Date: 29-May-2022); IntraMuscular; Deltoid Right.; 0.5 milliLiter(s); VIS (VIS Published: 06-Aug-2021, VIS Presented: 09-Dec-2022);

## 2023-10-25 NOTE — DISCHARGE NOTE NURSING/CASE MANAGEMENT/SOCIAL WORK - PATIENT PORTAL LINK FT
You can access the FollowMyHealth Patient Portal offered by [facility] by registering at the following website: [email or web address]. By joining [email or web address]’s FollowMyHealth portal, you will also be able to view your health information using other applications (apps) compatible with our system.

## 2023-10-25 NOTE — ADVANCED PRACTICE NURSE CONSULT - RECOMMEDATIONS
Left lateral foot: cleanse with normal saline, dry well. Apply THICK layer of Collagenase, cover with normal saline moist 2x2 gauze, cover with dry gauze. Wrap with Misa. Change daily.    Pt educated on wound care at home.     Discussed with primary RN and medical team.    Please reconsult prn.

## 2023-10-25 NOTE — DISCHARGE NOTE PROVIDER - HOSPITAL COURSE
#Discharge: do not delete    Patient is __ yo M/F with past medical history of _____ presented with _____, found to have _____ (one liner)    Hospital course (by problem):     Patient was discharged to: (home/[facility]/acute rehab/hospice, etc, and with what services – home health PT/RN? Home O2?)    New medications:   Changes to old medications:  Medications that were stopped:    Items to follow up as outpatient:    Physical exam at the time of discharge:       38F hx AVM of  hypertension, chronic L foot AVM s/p multiple (~20 interventions with Dr. Teran) and chronic and recurrent superimposed infections and embolizations, admitted 3 weeks ago for embolization and purulence despite antibiotic therapy, dc'd with vancomycin and meropenem via PICC line (last day 11/20/23), presenting with after PICC leakage with subjective fever and skin irritation, admitted for PICC replacement      PICC line infiltration.   Patient presenting with tenderness and erythema at PICC line site x 2 days, PICC now removed, as well as subjective fever at home. In ED, met 1/4 SIRS criteria (tachy 91). Less likely infection more likely picc line malfunction. BCx NGTD. s/p vanc and meropenem in ED  - Ultrasound of UE to examine for thrombus  - c/w vanc and hugh  - PICC placed _____    AVM (arteriovenous malformation).   L foot AVMs s/p 23 prior embolizations. Follows with Dr. Teran. S/p embolization with Dr. Teran on 9/19. Has been on vancomycin and meropenem until 11/20 until PICC leaked, removed on 10/24.   Pain control:  - Gabapentin 100mg in the morning + 800mg at 4PM    - [id number] for moderate pain standing, Dilaudid 0.5mg for severe breakthrough pain q6 PRN  - wound care following: cleanse with normal saline, dry well. Apply THICK layer of Collagenase, cover with normal saline moist 2x2 gauze, cover with dry gauze. Wrap with Misa. Change daily    HTN (hypertension).   Patient on nebivolol 10 mg BID at home, is compliant with medications. Was hypertensive to the 200's SBP in the ED, improved with pain control. Received tx interchange Lopressor 100 mg BID in hospital prior  - c/w home med on discharge        Patient was discharged to: home    New medications: none  Changes to old medications: none  Medications that were stopped: none    Items to follow up as outpatient: PCP for evaluation, vascular for AVM, and follow with Dr. Rahman for PICC line and infection management    Physical exam at the time of discharge:  General: in no acute distress  Eyes: EOMI intact bilaterally.   Lungs: CTA B/L.   Cardiovascular: RRR. No murmurs, rubs, or gallops  Abdomen: Soft, non-tender non-distended  Extremities: LUE medial arm with erythema and some scaling. no asymmetrical swelling  MSK: 5/5  strength  Neurological: Alert and oriented x3         38F hx AVM of  hypertension, chronic L foot AVM s/p multiple (~20 interventions with Dr. Teran) and chronic and recurrent superimposed infections and embolizations, admitted 3 weeks ago for embolization and purulence despite antibiotic therapy, dc'd with vancomycin and meropenem via PICC line (last day 11/20/23), presenting with after PICC leakage with subjective fever and skin irritation, admitted for PICC replacement      PICC line infiltration.   Patient presenting with tenderness and erythema at PICC line site x 2 days, PICC now removed, as well as subjective fever at home. In ED, met 1/4 SIRS criteria (tachy 91). Less likely infection more likely picc line malfunction. BCx NGTD. s/p vanc and meropenem in ED  - Ultrasound of UE to examine for thrombus  - PICC placed 10/25.     AVM (arteriovenous malformation).   L foot AVMs s/p 23 prior embolizations. Follows with Dr. Teran. S/p embolization with Dr. Teran on 9/19. Has been on vancomycin and meropenem until 11/20 until PICC leaked, removed on 10/24.   Pain control:  - Gabapentin 100mg in the morning + 800mg at 4PM    - [id number] for moderate pain standing, Dilaudid 0.5mg for severe breakthrough pain q6 PRN  - wound care following: cleanse with normal saline, dry well. Apply THICK layer of Collagenase, cover with normal saline moist 2x2 gauze, cover with dry gauze. Wrap with Misa. Change daily    HTN (hypertension).   Patient on nebivolol 10 mg BID at home, is compliant with medications. Was hypertensive to the 200's SBP in the ED, improved with pain control. Received tx interchange Lopressor 100 mg BID in hospital prior  - c/w home med on discharge        Patient was discharged to: home    New medications: none  Changes to old medications: none  Medications that were stopped: none    Items to follow up as outpatient: PCP for evaluation, vascular for AVM, and follow with Dr. Rahman for PICC line and infection management    Physical exam at the time of discharge:  General: in no acute distress  Eyes: EOMI intact bilaterally.   Lungs: CTA B/L.   Cardiovascular: RRR. No murmurs, rubs, or gallops  Abdomen: Soft, non-tender non-distended  Extremities: LUE medial arm with erythema and some scaling. no asymmetrical swelling  MSK: 5/5  strength  Neurological: Alert and oriented x3         38F hx AVM of  hypertension, chronic L foot AVM s/p multiple (~20 interventions with Dr. Teran) and chronic and recurrent superimposed infections and embolizations, admitted 3 weeks ago for embolization and purulence despite antibiotic therapy, dc'd with vancomycin and meropenem via PICC line (last day 11/20/23), presenting with after PICC leakage with subjective fever and skin irritation, admitted for PICC replacement      PICC line infiltration.   Patient presenting with tenderness and erythema at PICC line site x 2 days, PICC now removed, as well as subjective fever at home. In ED, met 1/4 SIRS criteria (tachy 91). Less likely infection more likely picc line malfunction. BCx NGTD. s/p vanc and meropenem in ED  - Ultrasound of UE to examine for thrombus _______  - PICC placed     AVM (arteriovenous malformation).   L foot AVMs s/p 23 prior embolizations. Follows with Dr. Teran. S/p embolization with Dr. Teran on 9/19. Has been on vancomycin and meropenem until 11/20 until PICC leaked, removed on 10/24.   Pain control:  - Gabapentin 100mg in the morning + 800mg at 4PM    - [id number] for moderate pain standing, Dilaudid 0.5mg for severe breakthrough pain q6 PRN  - wound care following: cleanse with normal saline, dry well. Apply THICK layer of Collagenase, cover with normal saline moist 2x2 gauze, cover with dry gauze. Wrap with Misa. Change daily    HTN (hypertension).   Patient on nebivolol 10 mg BID at home, is compliant with medications. Was hypertensive to the 200's SBP in the ED, improved with pain control. Received tx interchange Lopressor 100 mg BID in hospital prior  - c/w home med on discharge        Patient was discharged to: home    New medications: none  Changes to old medications: none  Medications that were stopped: none    Items to follow up as outpatient: PCP for evaluation, vascular for AVM, and follow with Dr. Rahman for PICC line and infection management    Physical exam at the time of discharge:  General: in no acute distress  Eyes: EOMI intact bilaterally.   Lungs: CTA B/L.   Cardiovascular: RRR. No murmurs, rubs, or gallops  Abdomen: Soft, non-tender non-distended  Extremities: LUE medial arm with erythema and some scaling. no asymmetrical swelling  MSK: 5/5  strength  Neurological: Alert and oriented x3         38F hx AVM of  hypertension, chronic L foot AVM s/p multiple (~20 interventions with Dr. Teran) and chronic and recurrent superimposed infections and embolizations, admitted 3 weeks ago for embolization and purulence despite antibiotic therapy, dc'd with vancomycin and meropenem via PICC line (last day 11/20/23), presenting with after PICC leakage with subjective fever and skin irritation, admitted for PICC replacement      PICC line infiltration.   Patient presenting with tenderness and erythema at PICC line site x 2 days, PICC now removed, as well as subjective fever at home. In ED, met 1/4 SIRS criteria (tachy 91). Less likely infection more likely picc line malfunction. BCx NGTD. s/p vanc and meropenem in ED  - Ultrasound of UE: No evidence of deep venous thrombosis in either upper extremity. Superficial thrombosis of the RIGHT cephalic vein now extending from   the level of the mid arm to the proximal forearm. No change superficial thrombosis of the LEFT basilic vein.  - PICC placed ______    AVM (arteriovenous malformation).   L foot AVMs s/p 23 prior embolizations. Follows with Dr. Teran. S/p embolization with Dr. Teran on 9/19. Has been on vancomycin and meropenem until 11/20 until PICC leaked, removed on 10/24.   Pain control:  - Gabapentin 100mg in the morning + 800mg at 4PM    - [id number] for moderate pain standing, Dilaudid 0.5mg for severe breakthrough pain q6 PRN  - wound care following: cleanse with normal saline, dry well. Apply THICK layer of Collagenase, cover with normal saline moist 2x2 gauze, cover with dry gauze. Wrap with Misa. Change daily    HTN (hypertension).   Patient on nebivolol 10 mg BID at home, is compliant with medications. Was hypertensive to the 200's SBP in the ED, improved with pain control. Received tx interchange Lopressor 100 mg BID in hospital prior  - c/w home med on discharge        Patient was discharged to: home    New medications: none  Changes to old medications: none  Medications that were stopped: none    Items to follow up as outpatient: PCP for evaluation, vascular for AVM, and follow with Dr. Rahman for PICC line and infection management    Physical exam at the time of discharge:  General: in no acute distress  Eyes: EOMI intact bilaterally.   Lungs: CTA B/L.   Cardiovascular: RRR. No murmurs, rubs, or gallops  Abdomen: Soft, non-tender non-distended  Extremities: LUE medial arm with erythema and some scaling. no asymmetrical swelling  MSK: 5/5  strength  Neurological: Alert and oriented x3         38F hx AVM of  hypertension, chronic L foot AVM s/p multiple (~20 interventions with Dr. Teran) and chronic and recurrent superimposed infections and embolizations, admitted 3 weeks ago for embolization and purulence despite antibiotic therapy, dc'd with vancomycin and meropenem via PICC line (last day 11/20/23), presenting with after PICC leakage with subjective fever and skin irritation, admitted for PICC replacement      PICC line infiltration.   Patient presenting with tenderness and erythema at PICC line site x 2 days, PICC now removed, as well as subjective fever at home. In ED, met 1/4 SIRS criteria (tachy 91). Less likely infection more likely picc line malfunction. BCx NGTD. s/p vanc and meropenem in ED  - Ultrasound of UE: No evidence of deep venous thrombosis in either upper extremity. Superficial thrombosis of the RIGHT cephalic vein now extending from   the level of the mid arm to the proximal forearm. No change superficial thrombosis of the LEFT basilic vein.  - PICC placed 10/26 by IR.     AVM (arteriovenous malformation).   L foot AVMs s/p 23 prior embolizations. Follows with Dr. Teran. S/p embolization with Dr. Teran on 9/19. Has been on vancomycin and meropenem until 11/20 until PICC leaked, removed on 10/24.   Pain control:  - Gabapentin 100mg in the morning + 800mg at 4PM    - [id number] for moderate pain standing, Dilaudid 0.5mg for severe breakthrough pain q6 PRN  - wound care following: cleanse with normal saline, dry well. Apply THICK layer of Collagenase, cover with normal saline moist 2x2 gauze, cover with dry gauze. Wrap with Misa. Change daily    HTN (hypertension).   Patient on nebivolol 10 mg BID at home, is compliant with medications. Was hypertensive to the 200's SBP in the ED, improved with pain control. Received tx interchange Lopressor 100 mg BID in hospital prior  - c/w home med on discharge        Patient was discharged to: home    New medications: none  Changes to old medications: none  Medications that were stopped: none    Items to follow up as outpatient: PCP for evaluation, vascular for AVM, and follow with Dr. Rahman for PICC line and infection management    Physical exam at the time of discharge:  General: in no acute distress  Eyes: EOMI intact bilaterally.   Lungs: CTA B/L.   Cardiovascular: RRR. No murmurs, rubs, or gallops  Abdomen: Soft, non-tender non-distended  Extremities: LUE medial arm with erythema and some scaling. no asymmetrical swelling  MSK: 5/5  strength  Neurological: Alert and oriented x3         38F hx AVM of  hypertension, chronic L foot AVM s/p multiple (~20 interventions with Dr. Teran) and chronic and recurrent superimposed infections and embolizations, admitted 3 weeks ago for embolization and purulence despite antibiotic therapy, dc'd with vancomycin and meropenem via PICC line (last day 11/20/23), presenting with after PICC leakage with subjective fever and skin irritation, admitted for PICC replacement      PICC line infiltration.   Patient presenting with tenderness and erythema at PICC line site x 2 days, PICC now removed, as well as subjective fever at home. In ED, met 1/4 SIRS criteria (tachy 91). Less likely infection more likely picc line malfunction. BCx NGTD. s/p vanc and meropenem in ED  - Ultrasound of UE: No evidence of deep venous thrombosis in either upper extremity. Superficial thrombosis of the RIGHT cephalic vein now extending from   the level of the mid arm to the proximal forearm. No change superficial thrombosis of the LEFT basilic vein.  - PICC placed 10/26 by IR.     AVM (arteriovenous malformation).   L foot AVMs s/p 23 prior embolizations. Follows with Dr. Teran. S/p embolization with Dr. Teran on 9/19. Has been on vancomycin and meropenem until 11/20 until PICC leaked, removed on 10/24.   Pain control:  - Gabapentin 100mg in the morning + 800mg at 4PM    - [id number] for moderate pain standing, Dilaudid 0.5mg for severe breakthrough pain q6 PRN  - wound care following: cleanse with normal saline, dry well. Apply THICK layer of Collagenase, cover with normal saline moist 2x2 gauze, cover with dry gauze. Wrap with Misa. Change daily    HTN (hypertension).   Patient on nebivolol 10 mg BID at home, is compliant with medications. Was hypertensive to the 200's SBP in the ED, improved with pain control. Received tx interchange Lopressor 100 mg BID in hospital prior  - c/w home med on discharge    Morbid Obesity:   -Counselled on weight loss including diet and exercise. Follow up with PCP        Patient was discharged to: home    New medications: none  Changes to old medications: none  Medications that were stopped: none    Items to follow up as outpatient: PCP for evaluation, vascular for AVM, and follow with Dr. Rahman for PICC line and infection management    Physical exam at the time of discharge:  General: in no acute distress  Eyes: EOMI intact bilaterally.   Lungs: CTA B/L.   Cardiovascular: RRR. No murmurs, rubs, or gallops  Abdomen: Soft, non-tender non-distended  Extremities: LUE medial arm with erythema and some scaling. no asymmetrical swelling  MSK: 5/5  strength  Neurological: Alert and oriented x3

## 2023-10-25 NOTE — PROGRESS NOTE ADULT - SUBJECTIVE AND OBJECTIVE BOX
******INCOMPLETE******    Patient is a 38y old  Female who presents with a chief complaint of   OVERNIGHT EVENTS/INTERVAL HPI:    REVIEW OF SYSTEMS:  All other review of systems is negative unless indicated above.    OBJECTIVE:  T(C): 37.1 (10-25-23 @ 05:54), Max: 37.3 (10-24-23 @ 15:44)  HR: 85 (10-25-23 @ 05:54) (85 - 95)  BP: 170/96 (10-25-23 @ 05:54) (148/88 - 200/114)  RR: 19 (10-25-23 @ 05:54) (16 - 19)  SpO2: 96% (10-25-23 @ 05:54) (96% - 100%)  Daily Height in cm: 170.18 (24 Oct 2023 15:44)    Daily     Physical Exam:  General: in no acute distress  Eyes: EOMI intact bilaterally. Anicteric sclerae, moist conjunctivae  HENT: Moist mucous membranes  Neck: Trachea midline, supple  Lungs: CTA B/L. No wheezes, rales, or rhonchi  Cardiovascular: RRR. No murmurs, rubs, or gallops  Abdomen: Soft, non-tender non-distended; No rebound or guarding  Extremities: WWP, No clubbing, cyanosis or edema  MSK: No midline bony tenderness. No CVA tenderness bilaterally  Neurological: Alert and oriented x3  Skin: Warm and dry. No obvious rash     Medications:  MEDICATIONS  (STANDING):  gabapentin 100 milliGRAM(s) Oral <User Schedule>  gabapentin 800 milliGRAM(s) Oral <User Schedule>  influenza   Vaccine 0.5 milliLiter(s) IntraMuscular once  meropenem  IVPB 2000 milliGRAM(s) IV Intermittent every 8 hours  metoprolol tartrate 100 milliGRAM(s) Oral every 12 hours  vancomycin  IVPB 1250 milliGRAM(s) IV Intermittent every 8 hours    MEDICATIONS  (PRN):  acetaminophen     Tablet .. 650 milliGRAM(s) Oral every 6 hours PRN Temp greater or equal to 38C (100.4F), Mild Pain (1 - 3)  HYDROmorphone  Injectable 0.5 milliGRAM(s) IV Push every 6 hours PRN Severe Pain (7 - 10)  oxycodone    5 mG/acetaminophen 325 mG 1 Tablet(s) Oral every 4 hours PRN Moderate Pain (4 - 6)      Labs:                        11.4   10.84 )-----------( 501      ( 24 Oct 2023 17:03 )             34.3     10-24    135  |  102  |  10  ----------------------------<  98  4.6   |  23  |  0.64    Ca    9.2      24 Oct 2023 17:03        Urinalysis Basic - ( 24 Oct 2023 17:03 )    Color: x / Appearance: x / SG: x / pH: x  Gluc: 98 mg/dL / Ketone: x  / Bili: x / Urobili: x   Blood: x / Protein: x / Nitrite: x   Leuk Esterase: x / RBC: x / WBC x   Sq Epi: x / Non Sq Epi: x / Bacteria: x          Radiology: Reviewed     OVERNIGHT EVENTS/INTERVAL HPI:    REVIEW OF SYSTEMS:  All other review of systems is negative unless indicated above.    OBJECTIVE:  T(C): 37.1 (10-25-23 @ 05:54), Max: 37.3 (10-24-23 @ 15:44)  HR: 85 (10-25-23 @ 05:54) (85 - 95)  BP: 170/96 (10-25-23 @ 05:54) (148/88 - 200/114)  RR: 19 (10-25-23 @ 05:54) (16 - 19)  SpO2: 96% (10-25-23 @ 05:54) (96% - 100%)  Daily Height in cm: 170.18 (24 Oct 2023 15:44)    Daily     Physical Exam:  General: in no acute distress  Eyes: EOMI intact bilaterally. Anicteric sclerae, moist conjunctivae  HENT: Moist mucous membranes  Neck: Trachea midline, supple  Lungs: CTA B/L. No wheezes, rales, or rhonchi  Cardiovascular: RRR. No murmurs, rubs, or gallops  Abdomen: Soft, non-tender non-distended; No rebound or guarding  Extremities: WWP, No clubbing, cyanosis or edema  MSK: No midline bony tenderness. No CVA tenderness bilaterally  Neurological: Alert and oriented x3  Skin: Warm and dry. No obvious rash     Medications:  MEDICATIONS  (STANDING):  gabapentin 100 milliGRAM(s) Oral <User Schedule>  gabapentin 800 milliGRAM(s) Oral <User Schedule>  influenza   Vaccine 0.5 milliLiter(s) IntraMuscular once  meropenem  IVPB 2000 milliGRAM(s) IV Intermittent every 8 hours  metoprolol tartrate 100 milliGRAM(s) Oral every 12 hours  vancomycin  IVPB 1250 milliGRAM(s) IV Intermittent every 8 hours    MEDICATIONS  (PRN):  acetaminophen     Tablet .. 650 milliGRAM(s) Oral every 6 hours PRN Temp greater or equal to 38C (100.4F), Mild Pain (1 - 3)  HYDROmorphone  Injectable 0.5 milliGRAM(s) IV Push every 6 hours PRN Severe Pain (7 - 10)  oxycodone    5 mG/acetaminophen 325 mG 1 Tablet(s) Oral every 4 hours PRN Moderate Pain (4 - 6)      Labs:                        11.4   10.84 )-----------( 501      ( 24 Oct 2023 17:03 )             34.3     10-24    135  |  102  |  10  ----------------------------<  98  4.6   |  23  |  0.64    Ca    9.2      24 Oct 2023 17:03        Urinalysis Basic - ( 24 Oct 2023 17:03 )    Color: x / Appearance: x / SG: x / pH: x  Gluc: 98 mg/dL / Ketone: x  / Bili: x / Urobili: x   Blood: x / Protein: x / Nitrite: x   Leuk Esterase: x / RBC: x / WBC x   Sq Epi: x / Non Sq Epi: x / Bacteria: x          Radiology: Reviewed OVERNIGHT EVENTS/INTERVAL HPI: o/n no acute events. This morning, pt reporting 7/10 pain on her L ankle. Denies headache, nausea, abd pain, LUE pain, dysuria.    REVIEW OF SYSTEMS:  All other review of systems is negative unless indicated above.    OBJECTIVE:  T(C): 37.1 (10-25-23 @ 05:54), Max: 37.3 (10-24-23 @ 15:44)  HR: 85 (10-25-23 @ 05:54) (85 - 95)  BP: 170/96 (10-25-23 @ 05:54) (148/88 - 200/114)  RR: 19 (10-25-23 @ 05:54) (16 - 19)  SpO2: 96% (10-25-23 @ 05:54) (96% - 100%)  Daily Height in cm: 170.18 (24 Oct 2023 15:44)    Daily     Physical Exam:  General: in no acute distress  Eyes: EOMI intact bilaterally.   Lungs: CTA B/L.   Cardiovascular: RRR. No murmurs, rubs, or gallops  Abdomen: Soft, non-tender non-distended  Extremities: LUE medial arm with erythema and scattered scaling. no asymmetrical swelling  MSK: 5/5  strength  Neurological: Alert and oriented x3      Medications:  MEDICATIONS  (STANDING):  gabapentin 100 milliGRAM(s) Oral <User Schedule>  gabapentin 800 milliGRAM(s) Oral <User Schedule>  influenza   Vaccine 0.5 milliLiter(s) IntraMuscular once  meropenem  IVPB 2000 milliGRAM(s) IV Intermittent every 8 hours  metoprolol tartrate 100 milliGRAM(s) Oral every 12 hours  vancomycin  IVPB 1250 milliGRAM(s) IV Intermittent every 8 hours    MEDICATIONS  (PRN):  acetaminophen     Tablet .. 650 milliGRAM(s) Oral every 6 hours PRN Temp greater or equal to 38C (100.4F), Mild Pain (1 - 3)  HYDROmorphone  Injectable 0.5 milliGRAM(s) IV Push every 6 hours PRN Severe Pain (7 - 10)  oxycodone    5 mG/acetaminophen 325 mG 1 Tablet(s) Oral every 4 hours PRN Moderate Pain (4 - 6)      Labs:                        11.4   10.84 )-----------( 501      ( 24 Oct 2023 17:03 )             34.3     10-24    135  |  102  |  10  ----------------------------<  98  4.6   |  23  |  0.64    Ca    9.2      24 Oct 2023 17:03        Urinalysis Basic - ( 24 Oct 2023 17:03 )    Color: x / Appearance: x / SG: x / pH: x  Gluc: 98 mg/dL / Ketone: x  / Bili: x / Urobili: x   Blood: x / Protein: x / Nitrite: x   Leuk Esterase: x / RBC: x / WBC x   Sq Epi: x / Non Sq Epi: x / Bacteria: x          Radiology: Reviewed

## 2023-10-26 VITALS
TEMPERATURE: 98 F | DIASTOLIC BLOOD PRESSURE: 92 MMHG | SYSTOLIC BLOOD PRESSURE: 146 MMHG | OXYGEN SATURATION: 98 % | RESPIRATION RATE: 17 BRPM | HEART RATE: 80 BPM

## 2023-10-26 LAB
ALBUMIN SERPL ELPH-MCNC: 3.4 G/DL — SIGNIFICANT CHANGE UP (ref 3.3–5)
ALBUMIN SERPL ELPH-MCNC: 3.4 G/DL — SIGNIFICANT CHANGE UP (ref 3.3–5)
ALP SERPL-CCNC: 70 U/L — SIGNIFICANT CHANGE UP (ref 40–120)
ALP SERPL-CCNC: 70 U/L — SIGNIFICANT CHANGE UP (ref 40–120)
ALT FLD-CCNC: 8 U/L — LOW (ref 10–45)
ALT FLD-CCNC: 8 U/L — LOW (ref 10–45)
ANION GAP SERPL CALC-SCNC: 10 MMOL/L — SIGNIFICANT CHANGE UP (ref 5–17)
ANION GAP SERPL CALC-SCNC: 10 MMOL/L — SIGNIFICANT CHANGE UP (ref 5–17)
AST SERPL-CCNC: 9 U/L — LOW (ref 10–40)
AST SERPL-CCNC: 9 U/L — LOW (ref 10–40)
BASOPHILS # BLD AUTO: 0.05 K/UL — SIGNIFICANT CHANGE UP (ref 0–0.2)
BASOPHILS # BLD AUTO: 0.05 K/UL — SIGNIFICANT CHANGE UP (ref 0–0.2)
BASOPHILS NFR BLD AUTO: 0.5 % — SIGNIFICANT CHANGE UP (ref 0–2)
BASOPHILS NFR BLD AUTO: 0.5 % — SIGNIFICANT CHANGE UP (ref 0–2)
BILIRUB SERPL-MCNC: 0.7 MG/DL — SIGNIFICANT CHANGE UP (ref 0.2–1.2)
BILIRUB SERPL-MCNC: 0.7 MG/DL — SIGNIFICANT CHANGE UP (ref 0.2–1.2)
BUN SERPL-MCNC: 13 MG/DL — SIGNIFICANT CHANGE UP (ref 7–23)
BUN SERPL-MCNC: 13 MG/DL — SIGNIFICANT CHANGE UP (ref 7–23)
CALCIUM SERPL-MCNC: 9.1 MG/DL — SIGNIFICANT CHANGE UP (ref 8.4–10.5)
CALCIUM SERPL-MCNC: 9.1 MG/DL — SIGNIFICANT CHANGE UP (ref 8.4–10.5)
CHLORIDE SERPL-SCNC: 105 MMOL/L — SIGNIFICANT CHANGE UP (ref 96–108)
CHLORIDE SERPL-SCNC: 105 MMOL/L — SIGNIFICANT CHANGE UP (ref 96–108)
CO2 SERPL-SCNC: 23 MMOL/L — SIGNIFICANT CHANGE UP (ref 22–31)
CO2 SERPL-SCNC: 23 MMOL/L — SIGNIFICANT CHANGE UP (ref 22–31)
CREAT SERPL-MCNC: 0.65 MG/DL — SIGNIFICANT CHANGE UP (ref 0.5–1.3)
CREAT SERPL-MCNC: 0.65 MG/DL — SIGNIFICANT CHANGE UP (ref 0.5–1.3)
EGFR: 114 ML/MIN/1.73M2 — SIGNIFICANT CHANGE UP
EGFR: 114 ML/MIN/1.73M2 — SIGNIFICANT CHANGE UP
EOSINOPHIL # BLD AUTO: 0.11 K/UL — SIGNIFICANT CHANGE UP (ref 0–0.5)
EOSINOPHIL # BLD AUTO: 0.11 K/UL — SIGNIFICANT CHANGE UP (ref 0–0.5)
EOSINOPHIL NFR BLD AUTO: 1.2 % — SIGNIFICANT CHANGE UP (ref 0–6)
EOSINOPHIL NFR BLD AUTO: 1.2 % — SIGNIFICANT CHANGE UP (ref 0–6)
FERRITIN SERPL-MCNC: 42 NG/ML — SIGNIFICANT CHANGE UP (ref 15–150)
FERRITIN SERPL-MCNC: 42 NG/ML — SIGNIFICANT CHANGE UP (ref 15–150)
GLUCOSE SERPL-MCNC: 99 MG/DL — SIGNIFICANT CHANGE UP (ref 70–99)
GLUCOSE SERPL-MCNC: 99 MG/DL — SIGNIFICANT CHANGE UP (ref 70–99)
HCG SERPL-ACNC: <0 MIU/ML — SIGNIFICANT CHANGE UP
HCG SERPL-ACNC: <0 MIU/ML — SIGNIFICANT CHANGE UP
HCT VFR BLD CALC: 36.1 % — SIGNIFICANT CHANGE UP (ref 34.5–45)
HCT VFR BLD CALC: 36.1 % — SIGNIFICANT CHANGE UP (ref 34.5–45)
HGB BLD-MCNC: 11.7 G/DL — SIGNIFICANT CHANGE UP (ref 11.5–15.5)
HGB BLD-MCNC: 11.7 G/DL — SIGNIFICANT CHANGE UP (ref 11.5–15.5)
IMM GRANULOCYTES NFR BLD AUTO: 0.2 % — SIGNIFICANT CHANGE UP (ref 0–0.9)
IMM GRANULOCYTES NFR BLD AUTO: 0.2 % — SIGNIFICANT CHANGE UP (ref 0–0.9)
IRON SATN MFR SERPL: 14 % — SIGNIFICANT CHANGE UP (ref 14–50)
IRON SATN MFR SERPL: 14 % — SIGNIFICANT CHANGE UP (ref 14–50)
IRON SATN MFR SERPL: 55 UG/DL — SIGNIFICANT CHANGE UP (ref 30–160)
IRON SATN MFR SERPL: 55 UG/DL — SIGNIFICANT CHANGE UP (ref 30–160)
LYMPHOCYTES # BLD AUTO: 1.47 K/UL — SIGNIFICANT CHANGE UP (ref 1–3.3)
LYMPHOCYTES # BLD AUTO: 1.47 K/UL — SIGNIFICANT CHANGE UP (ref 1–3.3)
LYMPHOCYTES # BLD AUTO: 15.9 % — SIGNIFICANT CHANGE UP (ref 13–44)
LYMPHOCYTES # BLD AUTO: 15.9 % — SIGNIFICANT CHANGE UP (ref 13–44)
MCHC RBC-ENTMCNC: 27.1 PG — SIGNIFICANT CHANGE UP (ref 27–34)
MCHC RBC-ENTMCNC: 27.1 PG — SIGNIFICANT CHANGE UP (ref 27–34)
MCHC RBC-ENTMCNC: 32.4 GM/DL — SIGNIFICANT CHANGE UP (ref 32–36)
MCHC RBC-ENTMCNC: 32.4 GM/DL — SIGNIFICANT CHANGE UP (ref 32–36)
MCV RBC AUTO: 83.6 FL — SIGNIFICANT CHANGE UP (ref 80–100)
MCV RBC AUTO: 83.6 FL — SIGNIFICANT CHANGE UP (ref 80–100)
MONOCYTES # BLD AUTO: 0.73 K/UL — SIGNIFICANT CHANGE UP (ref 0–0.9)
MONOCYTES # BLD AUTO: 0.73 K/UL — SIGNIFICANT CHANGE UP (ref 0–0.9)
MONOCYTES NFR BLD AUTO: 7.9 % — SIGNIFICANT CHANGE UP (ref 2–14)
MONOCYTES NFR BLD AUTO: 7.9 % — SIGNIFICANT CHANGE UP (ref 2–14)
NEUTROPHILS # BLD AUTO: 6.86 K/UL — SIGNIFICANT CHANGE UP (ref 1.8–7.4)
NEUTROPHILS # BLD AUTO: 6.86 K/UL — SIGNIFICANT CHANGE UP (ref 1.8–7.4)
NEUTROPHILS NFR BLD AUTO: 74.3 % — SIGNIFICANT CHANGE UP (ref 43–77)
NEUTROPHILS NFR BLD AUTO: 74.3 % — SIGNIFICANT CHANGE UP (ref 43–77)
NRBC # BLD: 0 /100 WBCS — SIGNIFICANT CHANGE UP (ref 0–0)
NRBC # BLD: 0 /100 WBCS — SIGNIFICANT CHANGE UP (ref 0–0)
PLATELET # BLD AUTO: 450 K/UL — HIGH (ref 150–400)
PLATELET # BLD AUTO: 450 K/UL — HIGH (ref 150–400)
POTASSIUM SERPL-MCNC: 4 MMOL/L — SIGNIFICANT CHANGE UP (ref 3.5–5.3)
POTASSIUM SERPL-MCNC: 4 MMOL/L — SIGNIFICANT CHANGE UP (ref 3.5–5.3)
POTASSIUM SERPL-SCNC: 4 MMOL/L — SIGNIFICANT CHANGE UP (ref 3.5–5.3)
POTASSIUM SERPL-SCNC: 4 MMOL/L — SIGNIFICANT CHANGE UP (ref 3.5–5.3)
PROT SERPL-MCNC: 6.9 G/DL — SIGNIFICANT CHANGE UP (ref 6–8.3)
PROT SERPL-MCNC: 6.9 G/DL — SIGNIFICANT CHANGE UP (ref 6–8.3)
RBC # BLD: 4.32 M/UL — SIGNIFICANT CHANGE UP (ref 3.8–5.2)
RBC # BLD: 4.32 M/UL — SIGNIFICANT CHANGE UP (ref 3.8–5.2)
RBC # FLD: 13.9 % — SIGNIFICANT CHANGE UP (ref 10.3–14.5)
RBC # FLD: 13.9 % — SIGNIFICANT CHANGE UP (ref 10.3–14.5)
SODIUM SERPL-SCNC: 138 MMOL/L — SIGNIFICANT CHANGE UP (ref 135–145)
SODIUM SERPL-SCNC: 138 MMOL/L — SIGNIFICANT CHANGE UP (ref 135–145)
TIBC SERPL-MCNC: 391 UG/DL — SIGNIFICANT CHANGE UP (ref 220–430)
TIBC SERPL-MCNC: 391 UG/DL — SIGNIFICANT CHANGE UP (ref 220–430)
TRANSFERRIN SERPL-MCNC: 328 MG/DL — SIGNIFICANT CHANGE UP (ref 200–360)
TRANSFERRIN SERPL-MCNC: 328 MG/DL — SIGNIFICANT CHANGE UP (ref 200–360)
UIBC SERPL-MCNC: 336 UG/DL — SIGNIFICANT CHANGE UP (ref 110–370)
UIBC SERPL-MCNC: 336 UG/DL — SIGNIFICANT CHANGE UP (ref 110–370)
WBC # BLD: 9.24 K/UL — SIGNIFICANT CHANGE UP (ref 3.8–10.5)
WBC # BLD: 9.24 K/UL — SIGNIFICANT CHANGE UP (ref 3.8–10.5)
WBC # FLD AUTO: 9.24 K/UL — SIGNIFICANT CHANGE UP (ref 3.8–10.5)
WBC # FLD AUTO: 9.24 K/UL — SIGNIFICANT CHANGE UP (ref 3.8–10.5)

## 2023-10-26 PROCEDURE — 36573 INSJ PICC RS&I 5 YR+: CPT

## 2023-10-26 PROCEDURE — 99239 HOSP IP/OBS DSCHRG MGMT >30: CPT

## 2023-10-26 RX ORDER — COLLAGENASE CLOSTRIDIUM HIST. 250 UNIT/G
1 OINTMENT (GRAM) TOPICAL
Qty: 1 | Refills: 0
Start: 2023-10-26 | End: 2023-11-14

## 2023-10-26 RX ADMIN — HYDROMORPHONE HYDROCHLORIDE 0.5 MILLIGRAM(S): 2 INJECTION INTRAMUSCULAR; INTRAVENOUS; SUBCUTANEOUS at 01:00

## 2023-10-26 RX ADMIN — OXYCODONE HYDROCHLORIDE 10 MILLIGRAM(S): 5 TABLET ORAL at 05:07

## 2023-10-26 RX ADMIN — NEBIVOLOL HYDROCHLORIDE 10 MILLIGRAM(S): 5 TABLET ORAL at 06:41

## 2023-10-26 RX ADMIN — Medication 166.67 MILLIGRAM(S): at 07:39

## 2023-10-26 RX ADMIN — MEROPENEM 280 MILLIGRAM(S): 1 INJECTION INTRAVENOUS at 03:30

## 2023-10-26 RX ADMIN — HYDROMORPHONE HYDROCHLORIDE 0.5 MILLIGRAM(S): 2 INJECTION INTRAMUSCULAR; INTRAVENOUS; SUBCUTANEOUS at 10:45

## 2023-10-26 RX ADMIN — HYDROMORPHONE HYDROCHLORIDE 0.5 MILLIGRAM(S): 2 INJECTION INTRAMUSCULAR; INTRAVENOUS; SUBCUTANEOUS at 07:37

## 2023-10-26 RX ADMIN — MEROPENEM 280 MILLIGRAM(S): 1 INJECTION INTRAVENOUS at 11:39

## 2023-10-26 RX ADMIN — Medication 650 MILLIGRAM(S): at 06:42

## 2023-10-26 RX ADMIN — OXYCODONE HYDROCHLORIDE 10 MILLIGRAM(S): 5 TABLET ORAL at 11:54

## 2023-10-26 RX ADMIN — HYDROMORPHONE HYDROCHLORIDE 0.5 MILLIGRAM(S): 2 INJECTION INTRAMUSCULAR; INTRAVENOUS; SUBCUTANEOUS at 02:00

## 2023-10-26 RX ADMIN — HYDROMORPHONE HYDROCHLORIDE 0.5 MILLIGRAM(S): 2 INJECTION INTRAMUSCULAR; INTRAVENOUS; SUBCUTANEOUS at 10:14

## 2023-10-26 RX ADMIN — OXYCODONE HYDROCHLORIDE 10 MILLIGRAM(S): 5 TABLET ORAL at 06:07

## 2023-10-26 RX ADMIN — GABAPENTIN 100 MILLIGRAM(S): 400 CAPSULE ORAL at 07:39

## 2023-10-26 RX ADMIN — Medication 650 MILLIGRAM(S): at 00:17

## 2023-10-26 RX ADMIN — Medication 166.67 MILLIGRAM(S): at 00:56

## 2023-10-26 NOTE — PROGRESS NOTE ADULT - PROBLEM SELECTOR PLAN 1
Patient presenting with tenderness and erythema at PICC line site x 2 days, PICC now removed, as well as subjective fever at home. In ED, met 1/4 SIRS criteria (tachy 91). Less likely infection more likely picc line malfunction. BCx NGTD. s/p vanc and meropenem in ED  - Ultrasound of UE to examine for thrombus  - c/w vanc and hugh  - IR PICC
Patient presenting with tenderness and erythema at PICC line site x 2 days, PICC now removed, as well as subjective fever at home. In ED, met 1/4 SIRS criteria (tachy 91). Less likely infection more likely picc line malfunction. BCx NGTD. s/p vanc and meropenem in ED  - Ultrasound of UE to examine for thrombus-   1.  No evidence of deep venous thrombosis in either upper extremity.  2.  Superficial thrombosis of the RIGHT cephalic vein now extending from   the level of the mid arm to the proximal forearm.  3.  No change superficial thrombosis of the LEFT basilic vein.  - c/w vanc and hugh  - IR PICC placed 10/26

## 2023-10-26 NOTE — PROGRESS NOTE ADULT - SUBJECTIVE AND OBJECTIVE BOX
OVERNIGHT EVENTS/INTERVAL HPI: No acute events o/n. This morning, pt reports pain is better controlled. ROS otherwise negative.     REVIEW OF SYSTEMS:  All other review of systems is negative unless indicated above.    OBJECTIVE:  T(C): 36.7 (10-26-23 @ 12:18), Max: 36.9 (10-25-23 @ 20:30)  HR: 80 (10-26-23 @ 12:18) (80 - 97)  BP: 146/92 (10-26-23 @ 12:18) (146/92 - 162/102)  RR: 17 (10-26-23 @ 12:18) (17 - 18)  SpO2: 98% (10-26-23 @ 12:18) (97% - 100%)  Daily     Daily     Physical Exam:  General: in no acute distress  Eyes: EOMI intact bilaterally.   Lungs: CTA B/L.   Cardiovascular: RRR. No murmurs, rubs, or gallops  Abdomen: Soft, non-tender non-distended  Extremities: LUE medial arm with erythema and scattered scaling. no asymmetrical swelling  MSK: 5/5  strength  Neurological: Alert and oriented x3    Medications:  MEDICATIONS  (STANDING):  acetaminophen     Tablet .. 650 milliGRAM(s) Oral every 6 hours  collagenase Ointment 1 Application(s) Topical daily  gabapentin 100 milliGRAM(s) Oral <User Schedule>  gabapentin 800 milliGRAM(s) Oral <User Schedule>  influenza   Vaccine 0.5 milliLiter(s) IntraMuscular once  nebivolol 10 milliGRAM(s) Oral <User Schedule>  vancomycin  IVPB 1250 milliGRAM(s) IV Intermittent every 8 hours    MEDICATIONS  (PRN):  oxyCODONE    IR 5 milliGRAM(s) Oral every 6 hours PRN Moderate Pain (4 - 6)  oxyCODONE    IR 10 milliGRAM(s) Oral every 6 hours PRN Severe Pain (7 - 10)      Labs:                        11.7   9.24  )-----------( 450      ( 26 Oct 2023 05:30 )             36.1     10-26    138  |  105  |  13  ----------------------------<  99  4.0   |  23  |  0.65    Ca    9.1      26 Oct 2023 05:30  Phos  2.7     10-25  Mg     1.8     10-25    TPro  6.9  /  Alb  3.4  /  TBili  0.7  /  DBili  x   /  AST  9<L>  /  ALT  8<L>  /  AlkPhos  70  10-26    PT/INR - ( 25 Oct 2023 05:30 )   PT: 12.2 sec;   INR: 1.07            Urinalysis Basic - ( 26 Oct 2023 05:30 )    Color: x / Appearance: x / SG: x / pH: x  Gluc: 99 mg/dL / Ketone: x  / Bili: x / Urobili: x   Blood: x / Protein: x / Nitrite: x   Leuk Esterase: x / RBC: x / WBC x   Sq Epi: x / Non Sq Epi: x / Bacteria: x          Radiology: Reviewed

## 2023-10-26 NOTE — PROGRESS NOTE ADULT - PROBLEM SELECTOR PLAN 2
L foot AVMs s/p 23 prior embolizations. Follows with Dr. Teran. S/p embolization with Dr. Teran on 9/19. Has been on vancomycin and meropenem until 11/20 until PICC leaked, removed on 10/24.   Pain control:  - Gabapentin 100mg in the morning + 800mg at 4PM    - [id number] for moderate pain standing, Dilaudid 0.5mg for severe breakthrough pain q6 PRN  - wound care following
L foot AVMs s/p 23 prior embolizations. Follows with Dr. Teran. S/p embolization with Dr. Teran on 9/19. Has been on vancomycin and meropenem until 11/20 until PICC leaked, removed on 10/24.   Pain control:  - Gabapentin 100mg in the morning + 800mg at 4PM    - [id number] for moderate pain standing, Dilaudid 0.5mg for severe breakthrough pain q6 PRN  - wound care following

## 2023-10-26 NOTE — PROGRESS NOTE ADULT - ASSESSMENT
38F hx AVM of  hypertension, chronic L foot AVM s/p multiple (~20 interventions with Dr. Teran) and chronic and recurrent superimposed infections and embolizations, admitted 3 weeks ago for embolization and purulence despite antibiotic therapy, dc'd with vancomycin and meropenem via PICC line (last day 11/20/23), presenting with after PICC leakage with subjective fever and skin irritation, admitted for PICC replacement
38F hx AVM of  hypertension, chronic L foot AVM s/p multiple (~20 interventions with Dr. Teran) and chronic and recurrent superimposed infections and embolizations, admitted 3 weeks ago for embolization and purulence despite antibiotic therapy, dc'd with vancomycin and meropenem via PICC line (last day 11/20/23), presenting with after PICC leakage with subjective fever and skin irritation, admitted for PICC replacement

## 2023-10-26 NOTE — PROGRESS NOTE ADULT - PROBLEM SELECTOR PLAN 3
Patient on nebivolol 10 mg BID at home, is compliant with medications. Was hypertensive to the 200's SBP in the ED, improved with pain control. Received tx interchange Lopressor 100 mg BID in hospital prior  - c/w lopressor 100mg BID  - manage pain
Patient on nebivolol 10 mg BID at home, is compliant with medications. Was hypertensive to the 200's SBP in the ED, improved with pain control. Received tx interchange Lopressor 100 mg BID in hospital prior  - c/w lopressor 100mg BID  - manage pain

## 2023-10-29 LAB
CULTURE RESULTS: SIGNIFICANT CHANGE UP
SPECIMEN SOURCE: SIGNIFICANT CHANGE UP

## 2023-11-01 DIAGNOSIS — Z91.040 LATEX ALLERGY STATUS: ICD-10-CM

## 2023-11-01 DIAGNOSIS — Z88.8 ALLERGY STATUS TO OTHER DRUGS, MEDICAMENTS AND BIOLOGICAL SUBSTANCES: ICD-10-CM

## 2023-11-01 DIAGNOSIS — Z88.0 ALLERGY STATUS TO PENICILLIN: ICD-10-CM

## 2023-11-01 DIAGNOSIS — E66.01 MORBID (SEVERE) OBESITY DUE TO EXCESS CALORIES: ICD-10-CM

## 2023-11-01 DIAGNOSIS — L97.529 NON-PRESSURE CHRONIC ULCER OF OTHER PART OF LEFT FOOT WITH UNSPECIFIED SEVERITY: ICD-10-CM
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
## 2023-11-01 DIAGNOSIS — Z41.8 ENCOUNTER FOR OTHER PROCEDURES FOR PURPOSES OTHER THAN REMEDYING HEALTH STATE: ICD-10-CM

## 2023-11-01 DIAGNOSIS — T82.534A LEAKAGE OF INFUSION CATHETER, INITIAL ENCOUNTER: ICD-10-CM

## 2023-11-01 DIAGNOSIS — Z88.5 ALLERGY STATUS TO NARCOTIC AGENT: ICD-10-CM

## 2023-11-01 DIAGNOSIS — I10 ESSENTIAL (PRIMARY) HYPERTENSION: ICD-10-CM

## 2023-11-01 DIAGNOSIS — E87.20 ACIDOSIS, UNSPECIFIED: ICD-10-CM

## 2023-11-01 DIAGNOSIS — Q27.32 ARTERIOVENOUS MALFORMATION OF VESSEL OF LOWER LIMB: ICD-10-CM

## 2023-11-03 NOTE — PATIENT PROFILE ADULT - HOW PATIENT ADDRESSED, PROFILE
Tympanic membrane normal.      Left Ear: Tympanic membrane normal.      Nose: Nose normal. No congestion or rhinorrhea. Mouth/Throat:      Mouth: Mucous membranes are moist.   Eyes:      Pupils: Pupils are equal, round, and reactive to light. Cardiovascular:      Rate and Rhythm: Normal rate and regular rhythm. Heart sounds: No murmur heard. Pulmonary:      Effort: Pulmonary effort is normal. No respiratory distress, nasal flaring or retractions. Breath sounds: Normal breath sounds. No stridor or decreased air movement. No wheezing, rhonchi or rales. Abdominal:      General: Abdomen is flat. Bowel sounds are normal.      Palpations: Abdomen is soft. Tenderness: There is no abdominal tenderness. There is no guarding or rebound. Genitourinary:     Penis: Normal.       Testes: Normal.   Musculoskeletal:         General: Normal range of motion. Cervical back: Normal range of motion. Skin:     General: Skin is warm and dry. Capillary Refill: Capillary refill takes less than 2 seconds. Findings: No rash. Neurological:      General: No focal deficit present. Mental Status: He is alert and oriented for age. Psychiatric:         Mood and Affect: Mood normal.         Behavior: Behavior normal.         DIAGNOSTIC RESULTS     EKG: All EKG's are interpreted by the Emergency Department Physician who either signs or Co-signs this chart in the absence of a cardiologist.        RADIOLOGY:   Non-plain film images such as CT, Ultrasound and MRI are read by the radiologist. Plain radiographic images are visualized and preliminarily interpreted by the emergency physician with the below findings:        Interpretation per the Radiologist below, if available at the time of this note:    XR ABDOMEN (KUB) (SINGLE AP VIEW)   Final Result   No evidence of bowel obstruction. Scattered colonic fecal retention.                ED BEDSIDE ULTRASOUND:   Performed by ED Physician -
Bell Kearney

## 2023-11-07 ENCOUNTER — APPOINTMENT (OUTPATIENT)
Dept: VASCULAR SURGERY | Facility: CLINIC | Age: 38
End: 2023-11-07
Payer: MEDICARE

## 2023-11-07 PROCEDURE — 99213 OFFICE O/P EST LOW 20 MIN: CPT

## 2023-11-13 PROCEDURE — 83540 ASSAY OF IRON: CPT

## 2023-11-13 PROCEDURE — 87040 BLOOD CULTURE FOR BACTERIA: CPT

## 2023-11-13 PROCEDURE — 84466 ASSAY OF TRANSFERRIN: CPT

## 2023-11-13 PROCEDURE — 99285 EMERGENCY DEPT VISIT HI MDM: CPT

## 2023-11-13 PROCEDURE — 96374 THER/PROPH/DIAG INJ IV PUSH: CPT

## 2023-11-13 PROCEDURE — 85610 PROTHROMBIN TIME: CPT

## 2023-11-13 PROCEDURE — C1751: CPT

## 2023-11-13 PROCEDURE — 83735 ASSAY OF MAGNESIUM: CPT

## 2023-11-13 PROCEDURE — 80053 COMPREHEN METABOLIC PANEL: CPT

## 2023-11-13 PROCEDURE — 84702 CHORIONIC GONADOTROPIN TEST: CPT

## 2023-11-13 PROCEDURE — 83550 IRON BINDING TEST: CPT

## 2023-11-13 PROCEDURE — 84100 ASSAY OF PHOSPHORUS: CPT

## 2023-11-13 PROCEDURE — 85025 COMPLETE CBC W/AUTO DIFF WBC: CPT

## 2023-11-13 PROCEDURE — 36573 INSJ PICC RS&I 5 YR+: CPT

## 2023-11-13 PROCEDURE — 82728 ASSAY OF FERRITIN: CPT

## 2023-11-13 PROCEDURE — 93970 EXTREMITY STUDY: CPT

## 2023-11-13 PROCEDURE — 85027 COMPLETE CBC AUTOMATED: CPT

## 2023-11-13 PROCEDURE — 36415 COLL VENOUS BLD VENIPUNCTURE: CPT

## 2023-11-13 PROCEDURE — 80048 BASIC METABOLIC PNL TOTAL CA: CPT

## 2023-11-13 PROCEDURE — 96375 TX/PRO/DX INJ NEW DRUG ADDON: CPT

## 2023-11-21 ENCOUNTER — APPOINTMENT (OUTPATIENT)
Dept: INFECTIOUS DISEASE | Facility: CLINIC | Age: 38
End: 2023-11-21
Payer: MEDICARE

## 2023-11-21 VITALS
BODY MASS INDEX: 45.99 KG/M2 | TEMPERATURE: 97.7 F | OXYGEN SATURATION: 98 % | SYSTOLIC BLOOD PRESSURE: 176 MMHG | DIASTOLIC BLOOD PRESSURE: 118 MMHG | WEIGHT: 293 LBS | HEIGHT: 67 IN | HEART RATE: 86 BPM

## 2023-11-21 DIAGNOSIS — L03.116 CELLULITIS OF LEFT LOWER LIMB: ICD-10-CM

## 2023-11-21 PROCEDURE — 99214 OFFICE O/P EST MOD 30 MIN: CPT

## 2023-11-28 ENCOUNTER — APPOINTMENT (OUTPATIENT)
Dept: VASCULAR SURGERY | Facility: CLINIC | Age: 38
End: 2023-11-28
Payer: MEDICARE

## 2023-11-28 PROCEDURE — 99213 OFFICE O/P EST LOW 20 MIN: CPT

## 2023-11-30 NOTE — CHART NOTE - NSCHARTNOTEFT_GEN_A_CORE
Admitting Diagnosis:   Patient is a 37y old  Female who presents with a chief complaint of Direct Stick Embolization (29 Jan 2023 14:51)      PAST MEDICAL & SURGICAL HISTORY:  HTN (hypertension)      AVM (arteriovenous malformation)  of left foot      Foot ulceration  left foot      S/P debridement  LLE area overlying AVM      Elective surgery  transcatheter therapy vascular embolization x5      Morbid obesity          Current Nutrition Order:  DASH TLC      PO Intake: Good (%) [  X ]  Fair (50-75%) [   ] Poor (<25%) [   ]    GI Issues: Pt denies NVDC this AM, per flow sheets LBM 1/28     Pain:  Per IDR plan to begin to titrate down to home Pain Reg     Skin Integrity:  Michael 21  1+Edema (right ankle; left ankle)   No pressure ulcers     Labs:   01-30    137  |  103  |  13  ----------------------------<  102<H>  4.4   |  26  |  0.58    Ca    8.6      30 Jan 2023 05:30  Mg     1.8     01-30      CAPILLARY BLOOD GLUCOSE          Medications:  MEDICATIONS  (STANDING):  chlorhexidine 4% Liquid 1 Application(s) Topical once  enoxaparin Injectable 40 milliGRAM(s) SubCutaneous every 12 hours  meropenem  IVPB 2000 milliGRAM(s) IV Intermittent every 8 hours  meropenem  IVPB      nebivolol 5 milliGRAM(s) Oral <User Schedule>  vancomycin  IVPB 1250 milliGRAM(s) IV Intermittent every 8 hours    MEDICATIONS  (PRN):  HYDROmorphone  Injectable 2 milliGRAM(s) IV Push every 6 hours PRN Severe Pain (7 - 10)  naloxone Injectable 0.1 milliGRAM(s) IV Push Once PRN For ANY of the following changes in patient status:  A. RR LESS THAN 10 breaths per minute, B. Oxygen saturation LESS THAN 90%, C. Sedation score of 6  ondansetron Injectable 4 milliGRAM(s) IV Push Once PRN Nausea  oxycodone    5 mG/acetaminophen 325 mG 2 Tablet(s) Oral every 4 hours PRN Moderate Pain (4 - 6)    5'7''  pounds+-10%  Weight 308 pounds BMI 48.2 %[id number]    Weight Change:   - Based on most recent EMR wt   - Reports use of qsymia x8 months for wt loss. Has lost 60 pounds thus far. Admit wt 308 pounds, BMI 48.2 - BMI note placed. NKFA.     Estimated energy needs:   IBW used to calculate energy needs due to pt's current body weight exceeding 120% of IBW  Adjust for age/BMI, s/p embolization, Skin    Estimated Energy Needs From (reji/kg)	25  Estimated Energy Needs To (reji/kg)	30  Estimated Energy Needs Calculated From (reji/kg)	1530  Estimated Energy Needs Calculated To (reji/kg)	1836    Estimated Protein Needs From (g/kg)	1.2  Estimated Protein Needs To (g/kg)	1.4  Estimated Protein Needs Calculated From (g/kg)	73.44  Estimated Protein Needs Calculated To (g/kg)	85.68    Estimated Fluid Needs From (ml/kg)	25  Estimated Fluid Needs To (ml/kg)	30  Estimated Fluid Needs Calculated From (ml/kg)	1530  Estimated Fluid Needs Calculated To (ml/kg)	1836    Subjective: 38 y/o female, morbidly obese, PMHx HTN and left foot AVM with chronic left foot ulcer (s/p multiple embolizations, previously requiring IV antibiotics, s/p prior OR debridement 10/11/2022, and subsequently treated with IV Meropenem/Vancomycin from 11/07/2022 to 12/29/2022 with wound closure on 12/23/2022 who presented to outpatient provider for continued follow up. Pt noted recent increased pulsing sensation in her foot and was told she may require additional embolization, though pt was initially hesitant to pursue this given recent infection. Pt now presented for repeat direct stick embolization of the left foot 1/24. ID consulted post-procedure for co-management and recommended IV.    Pt seen this AM on 5UR. Ordered for DASH Diet now-good PO at this time without any issues to offer this AM.    Please see below for nutritions recommendations.    Prior PES: Overweight/Obesity RT presumed intake>EER, Edema AEB BMI 48.2  >>on going  GOAL: pt will have safe gradual wt loss upon d/c of 0.5-1.0 pounds/week to reach IBW    Recommendations:  1. Cont with Diet.  2. Monitor PO intake/appetite, GI distress, diet tolerance, weights.  3. Pt would benefit from further Medical Nutrition Therapy after discharge. Recommend to follow up with [facility] for continuity of care. Email [email or web address] or call [phone].   4. Labs: monitor BMP, CBC, glucose, trend renal indices, LFTs.   5. RD to remain available for additional nutrition interventions as needed.     Education:   Spoke about [facility] Program with pt. Provided pt with [facility] outpatient nutrition contact Info. Pt seemed eager and willing to reach out to program.     Risk Level: High [   ] Moderate [ X  ] Low [   ]
Admitting Diagnosis:   Patient is a 37y old  Female who presents with a chief complaint of Direct Stick Embolization (06 Feb 2023 09:52)      PAST MEDICAL & SURGICAL HISTORY:  HTN (hypertension)      AVM (arteriovenous malformation)  of left foot      Foot ulceration  left foot      S/P debridement  LLE area overlying AVM      Elective surgery  transcatheter therapy vascular embolization x5      Morbid obesity    Current Nutrition Order: DASH/TLC      PO Intake: Good (%) [   ]  Fair (50-75%) [ x ] Poor (<25%) [   ]    Labs:   02-06    139  |  100  |  13  ----------------------------<  93  4.2   |  26  |  0.63    Ca    9.2      06 Feb 2023 05:30  Mg     1.8     02-06      CAPILLARY BLOOD GLUCOSE    Medications:  MEDICATIONS  (STANDING):  cefepime   IVPB      cefepime   IVPB 2000 milliGRAM(s) IV Intermittent every 8 hours  chlorhexidine 2% Cloths 1 Application(s) Topical <User Schedule>  chlorhexidine 4% Liquid 1 Application(s) Topical once  DAPTOmycin IVPB      DAPTOmycin IVPB 800 milliGRAM(s) IV Intermittent every 24 hours  enoxaparin Injectable 40 milliGRAM(s) SubCutaneous every 12 hours  gabapentin 300 milliGRAM(s) Oral <User Schedule>  nebivolol 10 milliGRAM(s) Oral <User Schedule>  polyethylene glycol 3350 17 Gram(s) Oral daily  senna 2 Tablet(s) Oral at bedtime    MEDICATIONS  (PRN):  HYDROmorphone  Injectable 1 milliGRAM(s) IV Push every 4 hours PRN Severe Pain (7 - 10)  naloxone Injectable 0.1 milliGRAM(s) IV Push Once PRN For ANY of the following changes in patient status:  A. RR LESS THAN 10 breaths per minute, B. Oxygen saturation LESS THAN 90%, C. Sedation score of 6  ondansetron Injectable 4 milliGRAM(s) IV Push Once PRN Nausea  sodium chloride 0.9% lock flush 10 milliLiter(s) IV Push every 1 hour PRN Pre/post blood products, medications, blood draw, and to maintain line patency    5'7''  pounds+-10%  Weight 308 pounds BMI 48.2 %UES139    Weight Change:   - Based on most recent EMR wt   - Reports use of qsymia x8 months for wt loss. Has lost 60 pounds thus far. Admit wt 308 pounds, BMI 48.2 - BMI note placed. NKFA.     Estimated energy needs:   IBW used to calculate energy needs due to pt's current body weight exceeding 120% of IBW  Adjust for age/BMI, s/p embolization, Skin    Estimated Energy Needs From (reji/kg)	25  Estimated Energy Needs To (reji/kg)	30  Estimated Energy Needs Calculated From (reji/kg)	1530  Estimated Energy Needs Calculated To (reji/kg)	1836    Estimated Protein Needs From (g/kg)	1.2  Estimated Protein Needs To (g/kg)	1.4  Estimated Protein Needs Calculated From (g/kg)	73.44  Estimated Protein Needs Calculated To (g/kg)	85.68    Estimated Fluid Needs From (ml/kg)	25  Estimated Fluid Needs To (ml/kg)	30  Estimated Fluid Needs Calculated From (ml/kg)	1530  Estimated Fluid Needs Calculated To (ml/kg)	1836    Subjective: 38 y/o female, morbidly obese, PMHx HTN and left foot AVM with chronic left foot ulcer (s/p multiple embolizations, previously requiring IV antibiotics, s/p prior OR debridement 10/11/2022, and subsequently treated with IV Meropenem/Vancomycin from 11/07/2022 to 12/29/2022 with wound closure on 12/23/2022 who presented to outpatient provider for continued follow up. Pt noted recent increased pulsing sensation in her foot and was told she may require additional embolization, though pt was initially hesitant to pursue this given recent infection. Pt now presented for repeat direct stick embolization of the left foot 1/24. ID consulted post-procedure for co-management and recommended IV.    Attempted to visit pt x2 for nutrition reassessment. Rx and labs reviewed. Pt continues on DASH diet with no reported issues regarding PO intake or ability to meet nutritional demands. Pt was pending SNF placement for continued IV ABX therapy, but pt not accepted to preferred facility and now opting for home with home infusion services; CM/SW following. Pt with noted 9/10 pain to heel, but no new GI or skin concerns. Continue to monitor oral intake and ability to meet nutritional demands. RDN will continue to reassess, intervene, and monitor as appropriate.     Prior PES: Overweight/Obesity RT presumed intake>EER, Edema AEB BMI 48.2  >>on going  GOAL: pt will have safe gradual wt loss upon d/c of 0.5-1.0 pounds/week to reach IBW    Recommendations:  -Continue current diet   -Encourage good PO intake  -Honor food preferences as able  -Monitor chemistry, GI fxn, and skin integrity    Risk Level: High [ x ] Moderate [   ] Low [   ].
patient

## 2024-01-02 ENCOUNTER — APPOINTMENT (OUTPATIENT)
Dept: VASCULAR SURGERY | Facility: CLINIC | Age: 39
End: 2024-01-02
Payer: MEDICARE

## 2024-01-02 VITALS
BODY MASS INDEX: 45.99 KG/M2 | DIASTOLIC BLOOD PRESSURE: 115 MMHG | WEIGHT: 293 LBS | SYSTOLIC BLOOD PRESSURE: 169 MMHG | HEART RATE: 109 BPM | HEIGHT: 67 IN

## 2024-01-02 DIAGNOSIS — L97.509 NON-PRESSURE CHRONIC ULCER OF OTHER PART OF UNSPECIFIED FOOT WITH UNSPECIFIED SEVERITY: ICD-10-CM

## 2024-01-02 DIAGNOSIS — Q27.30 ARTERIOVENOUS MALFORMATION, SITE UNSPECIFIED: ICD-10-CM

## 2024-01-02 PROCEDURE — 99213 OFFICE O/P EST LOW 20 MIN: CPT

## 2024-01-02 NOTE — PHYSICAL EXAM
[Respiratory Effort] : normal respiratory effort [Normal Heart Sounds] : normal heart sounds [2+] : left 2+ [Ankle Swelling (On Exam)] : present [Ankle Swelling On The Left] : of the left ankle [Varicose Veins Of Lower Extremities] : not present [] : not present [Purpura] : no purpura  [Petechiae] : no petechiae [Skin Ulcer] : ulcer [Skin Induration] : no induration [Alert] : alert [Oriented to Person] : oriented to person [Oriented to Place] : oriented to place [Oriented to Time] : oriented to time [Calm] : calm [de-identified] : well appearing [de-identified] : FROM throughout, strength 5/5x4 [de-identified] : L lateral malleolar ulcer measuring 1.75x.0.75cm w/some slough, no redness or tenderness around the wound, increased granulation within the wound and edges ramandeep

## 2024-01-02 NOTE — HISTORY OF PRESENT ILLNESS
[FreeTextEntry1] : 38yoF w/PMH of AMV s/p multiple embolization procedures w/Dr. Teran, returning for reevaluation of a chronic R foot ulcer.  She denies any redness in the foot, no drainage/fevers/chills, wound being dressed daily w/santyl ointment-->moist saline dressings and compression.

## 2024-01-02 NOTE — ASSESSMENT
[FreeTextEntry1] : 38yoF w/PMH of AMV s/p multiple embolization procedures w/Dr. Teran, returning for reevaluation of a chronic R foot ulcer.  She denies any redness in the foot, no drainage/fevers/chills, wound being dressed daily w/santyl ointment-->moist saline dressings and compression.  L lateral malleolar ulcer measuring 1.75x0.75cm w/some slough, no redness or tenderness around the wound, increased granulation within the wound and edges ramandeep.  Recommend pt continue cleansing the wound daily but dress w/medihoney, gauze/compression, and RTO in 2-3wks.  Will need to heal wounds w/regular dressing changes as Jeffry is unable to embolize the lesion further w/o risking necrosis of the soft tissue of the foot. [Arterial/Venous Disease] : arterial/venous disease

## 2024-01-02 NOTE — ADDENDUM
[FreeTextEntry1] : This note was written by Keron Warren, acting as a scribe for Dr. Gwyn Waters.  I, Dr. Gwyn Waters, have read and attest that all the information, medical decision-making, and discharge instructions within are true and accurate.  I, Dr. Gwyn Waters, personally performed the evaluation and management (E/M) services for this established patient who presents today with (an) existing condition(s).  That E/M includes conducting the examination, assessing all conditions, and (re)establishing/reinforcing a plan of care.  Today, my ACP, Keron Warren, was here to observe my evaluation and management services for this condition to be followed going forward.

## 2024-01-09 NOTE — PATIENT PROFILE ADULT. - FUNCTIONAL SCREEN CURRENT LEVEL: DRESSING, MLM
Continuity of Care Form    Patient Name: Kareem Hansen   :  1950  MRN:  [id number]    Admit date:  2023  Discharge date:  2024    Code Status Order: Full Code   Advance Directives:   Advance Care Flowsheet Documentation       Date/Time Healthcare Directive Type of Healthcare Directive Copy in Chart Healthcare Agent Appointed Healthcare Agent's Name Healthcare Agent's Phone Number    [phone] No, patient does not have an advance directive for healthcare treatment Durable power of  for health care Other (Comment) Next of kin -- --            Admitting Physician:  Racquel Acevedo DO  PCP: Jorge L Wade DO    Discharging Nurse: RACHELLE Marquez  Discharging Hospital Unit/Room#: 0215/0215-02  Discharging Unit Phone Number: [phone]    Emergency Contact:   Extended Emergency Contact Information  Primary Emergency Contact: Dayan Hansen  Address: [address] [place] [address]  Home Phone: [phone]  Mobile Phone: [phone]  Relation: Spouse  Secondary Emergency Contact: nena Marks  Home Phone: [phone]  Relation: Friend    Past Surgical History:  Past Surgical History:   Procedure Laterality Date    ABDOMEN SURGERY  2014    reveseral end peristomal hernia repair.    CARDIOVERSION  2019    Dr. Nava    CHOLECYSTECTOMY, OPEN N/A 13    LAPAROSCOPIC CONVERTED TO OPEN CHOLECYSTECTOMY WITH    COLON SURGERY      COLONOSCOPY      COLONOSCOPY  2013    Severe Diverticulosis unable to finish    COLONOSCOPY  14    diverticula-10 year f/u    COLOSTOMY  2014    CYSTOSCOPY  12/10/13    with bladder biopsy    CYSTOSCOPY  14    Cystourethroscopy, left ureteral catheter     EPIDURAL STEROID INJECTION Right 2019    RIGHT LUMBAR TWO THREE EPIDURAL STEROID INJECTION SITE CONFIRMED BY FLUROOSCOPY performed by DAVID Vasquez MD at [facility] OR    EPIDURAL STEROID INJECTION Right 2019    
(0) independent

## 2024-01-23 ENCOUNTER — APPOINTMENT (OUTPATIENT)
Dept: VASCULAR SURGERY | Facility: CLINIC | Age: 39
End: 2024-01-23
Payer: MEDICARE

## 2024-01-23 VITALS
SYSTOLIC BLOOD PRESSURE: 159 MMHG | HEIGHT: 67 IN | BODY MASS INDEX: 45.99 KG/M2 | WEIGHT: 293 LBS | HEART RATE: 120 BPM | DIASTOLIC BLOOD PRESSURE: 105 MMHG

## 2024-01-23 PROCEDURE — 99213 OFFICE O/P EST LOW 20 MIN: CPT

## 2024-01-23 NOTE — ASSESSMENT
[Arterial/Venous Disease] : arterial/venous disease [FreeTextEntry1] : 38yoF w/PMH of AMV s/p multiple embolization procedures w/Dr. Teran, returning for reevaluation of a chronic R foot ulcer.  She denies any redness in the foot, no drainage/fevers/chills, wound being dressed daily w/santyl ointment-->moist saline dressings and compression.  L lateral malleolar ulcer measuring [id number] w/minimal slough, flat, no redness or tenderness around the wound, increased granulation within the wound and edges ramandeep.  Recommend pt continue cleansing the wound daily but dress w/medihoney, gauze/compression, and RTO in 3wks.  Will need to heal wounds w/regular dressing changes as Jeffry is unable to embolize the lesion further w/o risking necrosis of the soft tissue of the foot.

## 2024-01-23 NOTE — PHYSICAL EXAM
[Respiratory Effort] : normal respiratory effort [Normal Heart Sounds] : normal heart sounds [2+] : left 2+ [Ankle Swelling (On Exam)] : present [Ankle Swelling On The Left] : of the left ankle [Skin Ulcer] : ulcer [Alert] : alert [Oriented to Person] : oriented to person [Oriented to Place] : oriented to place [Oriented to Time] : oriented to time [Calm] : calm [Varicose Veins Of Lower Extremities] : not present [] : not present [Purpura] : no purpura  [Petechiae] : no petechiae [Skin Induration] : no induration [de-identified] : well appearing [de-identified] : FROM throughout, strength 5/5x4 [de-identified] : L lateral malleolar ulcer measuring [id number] w/minimal slough, flat, no redness or tenderness around the wound, increased granulation within the wound and edges ramandeep

## 2024-02-04 NOTE — PATIENT PROFILE ADULT - FUNCTIONAL ASSESSMENT - DAILY ACTIVITY 1.
Pt bib [facility] from home for sudden onset dizziness. Does not know how long, just states it came on all at once.     Hypertensive with /110     
3 = A little assistance

## 2024-02-20 NOTE — PRE-ANESTHESIA EVALUATION ADULT - HEART RATE (BEATS/MIN)
Patient called to schedule their procedure, please give them a call back when able. Thank you.    92

## 2024-03-12 ENCOUNTER — APPOINTMENT (OUTPATIENT)
Dept: VASCULAR SURGERY | Facility: CLINIC | Age: 39
End: 2024-03-12

## 2024-03-21 NOTE — HISTORY OF PRESENT ILLNESS
Detail Level: Detailed Quality 47: Advance Care Plan: Advance Care Planning discussed and documented in the medical record; patient did not wish or was not able to name a surrogate decision maker or provide an advance care plan. [FreeTextEntry1] : 38 yo F with HTN, MO and AVM L foot with recurrent ulcer s/p multiple direct stick and transcatheter embolizations. Most recently underwent transcatheter embolization and OR wound debridement 10/11.  Wound culture from OR grew Pseudomonas aeruginosa.  She was treated with meropenem 1 g IV q 8 h and vancomycin 1250 mg q 8 h (11/7 – present).\par \par She was readmitted on 12/4 with fever. Labs notable for WBC 10.7 with 78% PMNs, plt 482, ESR 34, CRP 29.  Repeat MRI showed phlegmon underlying ulcer is surrounding the area of contrast extravasation and is without improvement from 11/2. Blood cultures negative x2. PICC was removed and she improved symptomatically. PICC replaced for discharge. She continues meropenem 1 g IV q 8 h and vancomycin 1250 mg q 8 h. She spoke with Dr. Teran who reviewed MRI. Per patient, Dr. Teran stated area being read a phlegmon is part of AVM and has been present on previous angios. Wound continues to heal. She has 5/10 pulsating pain. \par  Quality 226: Preventive Care And Screening: Tobacco Use: Screening And Cessation Intervention: Patient screened for tobacco use, is a smoker AND received Cessation Counseling within measurement period or in the six months prior to the measurement period

## 2024-03-21 NOTE — PROGRESS NOTE ADULT - PROBLEM/PLAN-6
Head , normocephalic , atraumatic , Face , Face within normal limits , Ears , External ears within normal limits , Nose/Nasopharynx , External nose  normal appearance , Mouth and Throat , Oral cavity appearance normal
DISPLAY PLAN FREE TEXT

## 2024-07-17 NOTE — H&P ADULT - HEART RATE (BEATS/MIN)
Problem: Knowledge Deficit  Goal: Patient/family/caregiver demonstrates understanding of disease process, treatment plan, medications, and discharge instructions  Description: Complete learning assessment and assess knowledge base.  Interventions:  - Provide teaching at level of understanding  - Provide teaching via preferred learning methods  Outcome: Progressing      86

## 2024-07-31 NOTE — H&P ADULT - HISTORY OF PRESENT ILLNESS
COVID: Swabbed Saturday 7/31 @[facility]  Pharmacy: [facility] - [address], [place] [address]  Escort: Sister    *Confirm meds on arrival  36 y/o Female w/ PMHx of HTN and small vessel AVM involving lateral aspect of L heel initially presented to Dr. Teran for transarterial microsphere embolization in 2020 which was successful w/ good pain relief, now returns for small (5mm) superficial ulcer of lateral aspect of L heel w/ oozing and pain. She has a fairly large soft tissue mass prone to recurrent ulceration and associated w/ occasional pain and bleeding. She states she had COVID in December and feels that the pain has been worse since that time. Surgical resection was previously considered as the lesion is quit protuberant but MRI showed that the lesion depth had bone involvement so this is inadvisable. The skin over the lesion is also very tight w/ diffuse dense consistency to the malformation. Pt now presents to [facility] for LLE angiogram w/ transarterial embolization w/ Dr. Teran. Requested Prescriptions     Pending Prescriptions Disp Refills    LIVIA HAIR SOLOSTAR 300 UNIT/ML Subcutaneous Solution Pen-injector [Pharmacy Med Name: TOUJEO MAX SOLOSTAR 300U/ML PEN 3ML] 18 mL 0     Sig: ADMINISTER UP TO 60 UNITS UNDER THE SKIN DAILY AS DIRECTED     HGBA1C:    Lab Results   Component Value Date    A1C 6.9 (A) 06/05/2024    A1C 9.1 (A) 02/07/2024    A1C 7.2 (A) 09/06/2023     (H) 04/28/2023     Your Appointments      Wednesday December 04, 2024 3:15 PM  Diabetes Pump follow up with FINN Fuentes  [facility] [address] [id number] [phone]             Last OFFICE VISIT: 6-5-2024-CB    Last Refill:  5-3-2024--18 ml with 0 refills

## 2024-08-28 NOTE — PATIENT PROFILE ADULT - SAFE PLACE TO LIVE
Detail Level: Detailed Add 88888 Cpt? (Important Note: In 2017 The Use Of 05989 Is Being Tracked By Cms To Determine Future Global Period Reimbursement For Global Periods): no no

## 2024-09-19 NOTE — PRE-ANESTHESIA EVALUATION ADULT - NSANTHNECKRD_ENT_A_CORE
No Acne Type: Comedonal Lesions Prep Text (Optional): Prior to removal the treatment areas were prepped in the usual fashion. Detail Level: Detailed Extraction Method: 11 blade and comedo extractor Consent was obtained and risks were reviewed including but not limited to scarring, infection, bleeding, scabbing, incomplete removal, and allergy to anesthesia. Render Post-Care Instructions In Note?: no Render Number Of Lesions Treated: yes Post-Care Instructions: I reviewed with the patient in detail post-care instructions. Patient is to keep the treatment areas dry overnight, and then apply bacitracin twice daily until healed. Patient may apply hydrogen peroxide soaks to remove any crusting.

## 2024-10-20 NOTE — DISCHARGE NOTE PROVIDER - NSDCFUADDAPPT_GEN_ALL_CORE_FT
Please follow up with Dr. Rahman in 2 weeks. You have informed us you will prefer to schedule your own appointment.  4 = No assist / stand by assistance

## 2025-02-19 NOTE — PATIENT PROFILE ADULT. - NS PRO OT REFERRAL QUES 1 YN
1722: pt's friend Leonila updated on pt status in Recovery and POC.  Leonila to return to [facility] for pt discharge.   1755:  pt's s/o Bola updated on pt status in Recovery and POC.    no

## 2025-02-28 NOTE — H&P ADULT - MLM HIDDEN
yes
Influenza    AMBULATORY CARE:    Influenza (the flu) is a viral infection of the lungs and airways. The virus spreads through droplets in the air when someone with flu coughs or sneezes. You may also get the virus through close contact with someone who has the flu. For example, a person with the virus on his or her hands can spread it by shaking hands with someone. You may spread the flu to others for 1 week or longer after signs or symptoms appear.    Common signs and symptoms include the following:    Fever and chills    Headaches, body aches, and muscle or joint pain    Cough, runny nose, and sore throat    Loss of appetite, nausea, vomiting, or diarrhea    Tiredness    Trouble breathing  Call your local emergency number (911 in the [place]) if:    You have trouble breathing, and your lips look purple or blue.    You have a seizure.  Seek care immediately if:    You are dizzy, or you are urinating less or not at all.    You have a headache with a stiff neck, and you feel tired or confused.    You have new pain or pressure in your chest.    Your symptoms, such as shortness of breath, vomiting, or diarrhea, get worse.    Your symptoms, such as fever and coughing, seem to get better, but then get worse.  Call your doctor if:    You have new muscle pain or weakness.    You have questions or concerns about your condition or care.  Treatment for influenza may include any of the following:    Acetaminophen decreases pain and fever. It is available without a doctor's order. Ask how much to take and how often to take it. Follow directions. Read the labels of all other medicines you are using to see if they also contain acetaminophen, or ask your doctor or pharmacist. Acetaminophen can cause liver damage if not taken correctly.    NSAIDs, such as ibuprofen, help decrease swelling, pain, and fever. This medicine is available with or without a doctor's order. NSAIDs can cause stomach bleeding or kidney problems in certain people. If you take blood thinner medicine, always ask your healthcare provider if NSAIDs are safe for you. Always read the medicine label and follow directions.    Antivirals help treat viral infections.  Manage your symptoms:    Rest as much as you can to help you recover.    Drink liquids as directed to help prevent dehydration. Ask how much liquid to drink each day and which liquids are best for you.    Gargle with warm salt water to soothe your sore throat. Your healthcare provider may also recommend throat lozenges or sprays.    Use a cool mist humidifier or vaporizer to ease your breathing and unplug your nose.  Humidifier  Prevent the spread of germs:      Wash your hands often. Wash your hands several times each day. Wash after you use the bathroom, change a child's diaper, and before you prepare or eat food. Use soap and water every time. Rub your soapy hands together, lacing your fingers. Wash the front and back of your hands, and in between your fingers. Use the fingers of one hand to scrub under the fingernails of the other hand. Wash for at least 20 seconds. Rinse with warm, running water for several seconds. Then dry your hands with a clean towel or paper towel. Use hand  that contains alcohol if soap and water are not available. Do not touch your eyes, nose, or mouth without washing your hands first.  Handwashing      Cover a sneeze or cough. Use a tissue that covers your mouth and nose. Throw the tissue away in a trash can right away. Use the bend of your arm if a tissue is not available. Wash your hands well with soap and water or use hand .    Stay home if you are sick. Avoid crowds during the first 1 to 4 days of illness.    Ask about vaccines you may need. Talk to your healthcare provider about your vaccine history. Your provider can tell you which vaccines you need, and when to get them.  Get the flu vaccine as soon as recommended. The vaccine may be available starting in September or October. Flu viruses change, so it is important to get a flu vaccine every year.    Get a COVID-19 vaccine as recommended. Vaccination is recommended for everyone 6 months or older. Your provider can tell you when and how often to get booster doses.    Get the pneumonia vaccine if recommended. This vaccine is usually recommended every 5 years. Your provider will tell you when to get this vaccine, if needed.  Follow up with your doctor as directed: Write down your questions so you remember to ask them during your visits.    © [employer] 1973, 2025

## 2025-03-31 NOTE — DISCHARGE NOTE NURSING/CASE MANAGEMENT/SOCIAL WORK - NSSCCARECORD_GEN_ALL_CORE
Patient is calling regarding the following: Wants to speak to someone regarding MRI    [facility]

## 2025-04-30 ENCOUNTER — NON-APPOINTMENT (OUTPATIENT)
Age: 40
End: 2025-04-30

## 2025-05-22 NOTE — PATIENT PROFILE ADULT - NSPROGENOTHERPROVIDER_GEN_A_NUR
May 22, 2025    Left a voicemail asking for a return call on behalf of my colleague, Cheli Gomez [facility].    Onur Black MS, [facility]  Genetic Counselor  Division of Genetics and Metabolism  [facility]  Office: [phone]  Fax: [phone]         none

## 2025-06-04 NOTE — H&P ADULT - GASTROINTESTINAL
Pathophysiology Set 1: Pr - Reflux Left Leg Varicose Veins: 0- None Right Dorsalis Pedis Pulse: 2 (Easily palpable) Left Leg Venous Hyperpigmentation: 0- None or focal low intensity (tan) Clinical Classification Set 1: C0 - no visible or palpable signs of venous disease Right Leg Compression Therapy: 0- None or noncompliant Other Plan: Preservative and Conservative management Etiology Set 1: Ec - Congenital Right Leg: Peripheral Vascular Disease?: No Detail Level: Detailed Follow Up Instructions:: I counseled the patient regarding the following:\\nContact office if new or worsening symptoms occur or if varicosities become painful, thrombosed, or red. Left Leg Circumference: medium Include Left Vcss In Note: Yes Anatomy Set 2: As - Superficial Veins detailed exam

## 2025-07-03 NOTE — PATIENT PROFILE ADULT - ...
Outreach attempt was made to schedule a Medicare Wellness Visit. This was the second attempt. Contact was not made, left message.    Asked if we have permission to switch ov on 12/5/2025 to mwv. If patient calls back giving permission to do so please assist in changing    28-Feb-2019 20:05:28